# Patient Record
Sex: MALE | Race: WHITE | NOT HISPANIC OR LATINO | Employment: OTHER | URBAN - METROPOLITAN AREA
[De-identification: names, ages, dates, MRNs, and addresses within clinical notes are randomized per-mention and may not be internally consistent; named-entity substitution may affect disease eponyms.]

---

## 2017-01-04 ENCOUNTER — OFFICE VISIT (OUTPATIENT)
Dept: FAMILY MEDICINE CLINIC | Facility: CLINIC | Age: 82
End: 2017-01-04

## 2017-01-04 VITALS
TEMPERATURE: 97.9 F | SYSTOLIC BLOOD PRESSURE: 164 MMHG | HEART RATE: 57 BPM | DIASTOLIC BLOOD PRESSURE: 80 MMHG | OXYGEN SATURATION: 97 % | WEIGHT: 205 LBS | HEIGHT: 71 IN | BODY MASS INDEX: 28.7 KG/M2

## 2017-01-04 DIAGNOSIS — L08.9 TRAUMATIC HEMATOMA OF RIGHT LOWER LEG WITH INFECTION, SUBSEQUENT ENCOUNTER: ICD-10-CM

## 2017-01-04 DIAGNOSIS — L03.115 CELLULITIS OF RIGHT LOWER EXTREMITY: Primary | ICD-10-CM

## 2017-01-04 DIAGNOSIS — Z09 HOSPITAL DISCHARGE FOLLOW-UP: ICD-10-CM

## 2017-01-04 DIAGNOSIS — I48.91 ATRIAL FIBRILLATION WITH RVR (HCC): ICD-10-CM

## 2017-01-04 DIAGNOSIS — S80.11XD TRAUMATIC HEMATOMA OF RIGHT LOWER LEG WITH INFECTION, SUBSEQUENT ENCOUNTER: ICD-10-CM

## 2017-01-04 PROBLEM — R60.0 LOCALIZED EDEMA: Status: ACTIVE | Noted: 2017-01-04

## 2017-01-04 PROCEDURE — 99214 OFFICE O/P EST MOD 30 MIN: CPT | Performed by: FAMILY MEDICINE

## 2017-01-04 RX ORDER — FUROSEMIDE 40 MG/1
40 TABLET ORAL 2 TIMES DAILY
Qty: 20 TABLET | Refills: 0 | Status: SHIPPED | OUTPATIENT
Start: 2017-01-04 | End: 2017-01-30

## 2017-01-04 NOTE — MR AVS SNAPSHOT
Casey Snowden   1/4/2017 2:30 PM   Office Visit    Provider:  Aleksander Diez MD   Department:  [facility]   Dept Phone:  [phone]                Your Full Care Plan              Today's Medication Changes          These changes are accurate as of: 1/4/17  3:15 PM.  If you have any questions, ask your nurse or doctor.               New Medication(s)Ordered:     furosemide 40 MG tablet   Commonly known as:  LASIX   Take 1 tablet by mouth 2 (Two) Times a Day.   Started by:  Aleksander Diez MD            Where to Get Your Medications      These medications were sent to [facility]/pharmacy #[id number] - [place], [place] - [address] [address] AT INTERSECTION OF [address] [address] - [phone] [facility] [phone]   [address] [address]     Phone:  [phone]     furosemide 40 MG tablet                  Your Updated Medication List          This list is accurate as of: 1/4/17  3:15 PM.  Always use your most recent med list.                atorvastatin 20 MG tablet   Commonly known as:  LIPITOR   TAKE 1 TABLET DAILY       atropine 1 % ophthalmic solution       carbidopa-levodopa  MG per tablet   Commonly known as:  SINEMET       carboxymethylcellulose 0.5 % solution   Commonly known as:  REFRESH PLUS       clindamycin 300 MG capsule   Commonly known as:  CLEOCIN   Take 1 capsule by mouth 3 (Three) Times a Day for 7 days.       dronedarone 400 MG tablet   Commonly known as:  MULTAQ   Take 1 tablet by mouth 2 (Two) Times a Day With Meals.       ELIQUIS 2.5 MG tablet tablet   Generic drug:  apixaban       furosemide 40 MG tablet   Commonly known as:  LASIX   Take 1 tablet by mouth 2 (Two) Times a Day.       PRESERVISION AREDS capsule               You Were Diagnosed With        Codes Comments    Cellulitis of right lower extremity    -  Primary ICD-10-CM: L03.115  ICD-9-CM: 682.6     Atrial fibrillation with RVR     ICD-10-CM: I48.91  ICD-9-CM: 427.31     Traumatic hematoma of right lower leg with infection, subsequent encounter     ICD-10-CM: S80.11XD, L08.9  ICD-9-CM: V58.89, 924.10, 686.9     Hospital discharge follow-up     ICD-10-CM: Z09  ICD-9-CM: V67.59       Instructions     None    Patient Instructions History      MyChart Signup     Our records indicate that you have declined [facility] MyChart signup. If you would like to sign up for [employer], please email [email or web address] or call [phone] to obtain an activation code.             Other Info from Your Visit           Your Appointments     Jan 30, 2017 11:15 AM [facility]   Hospital Follow Up with Capri Mercdao MD   [facility] (--)    [address]  [facility] [address]   [phone]            Apr 25, 2017  9:30 AM EDT   [facility]   [facility] (--)    [address] [address]   [phone]            May 02, 2017  9:30 AM EDT   Follow Up with Aleksander Diez MD   [facility] (--)    [address] [address]   [phone]           Arrive 15 minutes prior to appointment.            Oct 27, 2017  8:30 AM EDT   [facility]   [facility] (--)    [address] [address]   [phone]            Nov 03, 2017  1:00 PM EDT   Physical with Aleksander Diez MD   [facility] (--)    [address] [address]   [phone]           Arrive 15 minutes prior to appointment.              Allergies     Penicillins  Rash    Rocephin [Ceftriaxone]  Rash      Reason for Visit     Leg Problem F/u to Hospital Admit from [facility] due to blood and leg infection from hematoma.      Vital Signs     Blood Pressure Pulse Temperature Height Weight Oxygen Saturation    164/80 57 97.9  "°F (36.6 °C) 71\" (180.3 cm) 205 lb (93 kg) 97%    Body Mass Index Smoking Status                28.59 kg/m2 Former Smoker          Problems and Diagnoses Noted     Atrial fibrillation (irregular heartbeat)    Bacterial skin infection of leg    Hospital discharge follow-up    Accumulation of fluid in tissues    Traumatic hematoma of right lower leg with infection      "

## 2017-01-04 NOTE — PROGRESS NOTES
Subjective   Casey Snowden is a 82 y.o. male who is here for   Chief Complaint   Patient presents with   • Leg Problem     F/u to Hospital Admit from [facility] due to blood and leg infection from hematoma.   .     History of Present Illness   Knee Pain: Patient presents with a knee injury involving the  right knee. Onset of the symptoms was a week ago. Inciting event: began after a fall injury which occurred at home. Current symptoms include pain located medial knee, swelling and large infected hematoma. Pain is aggravated by any weight bearing.  Patient has had prior knee problems. Evaluation to date: plain films: no significant change from prior studies, MRI: no significant change from prior studies, Ortho consult: in patient at [facility] and ID consult. Treatment to date: surgery I&D.     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    Review of Systems    Objective   Physical Exam   Constitutional: He appears well-developed and well-nourished. No distress.   HENT:   Mouth/Throat: Oropharynx is clear and moist.   Eyes: Conjunctivae are normal.   Neck: Normal range of motion.   Cardiovascular: Normal rate and regular rhythm.    No murmur heard.  Pulmonary/Chest: Effort normal.   Musculoskeletal: He exhibits edema and tenderness.        Legs:  Golf ball sized hole in medial leg , site of infected hematoma I&D, no pus no blood  Good healing tissue  Has lots of surrounding edematous  tissue  Rest of right lower leg with 3+ edema    I re wrapped wound.     Neurological: He is alert. Coordination abnormal.   Skin: Ecchymosis noted. There is erythema.   Nursing note and vitals reviewed.      Assessment/Plan   Casey was seen today for leg problem.    Diagnoses and all orders for this visit:    Cellulitis of right lower extremity  -     furosemide (LASIX) 40 MG tablet; Take 1 tablet by mouth 2 (Two) Times a Day.    Atrial fibrillation with RVR    Traumatic hematoma of right lower leg with infection, subsequent encounter    Hospital discharge follow-up      Patient Instructions   finish off clinda  Cont wound care  Follow ups per schedule  Samples of ELIQUIS and coupon given.  2 yogurt a day to prevent diarrhea      There are no discontinued medications.     Return in about 6 months (around 7/4/2017).    Dr. Aleksander Diez  [facility] [place], [place].

## 2017-01-04 NOTE — PATIENT INSTRUCTIONS
finish off clinda  Cont wound care  Follow ups per schedule  Samples of ELIQUIS and coupon given.  2 yogurt a day to prevent diarrhea

## 2017-01-12 ENCOUNTER — OFFICE VISIT (OUTPATIENT)
Dept: WOUND CARE | Facility: HOSPITAL | Age: 82
End: 2017-01-12
Attending: SURGERY

## 2017-01-12 ENCOUNTER — TRANSCRIBE ORDERS (OUTPATIENT)
Dept: ADMINISTRATIVE | Facility: HOSPITAL | Age: 82
End: 2017-01-12

## 2017-01-12 DIAGNOSIS — M25.469 KNEE SWELLING: Primary | ICD-10-CM

## 2017-01-12 PROCEDURE — G0463 HOSPITAL OUTPT CLINIC VISIT: HCPCS

## 2017-01-13 NOTE — WOUND CARE CLINIC NOTE
DATE OF VISIT: 01/12/2017     CHIEF COMPLAINT: Traumatic wound with hematoma of right upper leg (L97.813).     HISTORY OF PRESENT ILLNESS: This is an 82-year-old male who was apparently climbing a fence and fell, striking the medial posterior aspect of his right upper leg just above the crease of his knee. This happened on approximately 12/26/2016. He had a large hematoma which was then drained and he was placed on antibiotics. Apparently he was admitted later, after Christmas, to the hospital. For a while he was on vancomycin in the hospital and went home on clindamycin, which he just finished recently on 01/09/2017. 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 An x-ray taken shortly after his injury did not indicate any displacement of the knee replacement.  He has had no fever or chills. The area, in spite of the fact that it has debrided, has remained swollen as if it still had retained hematoma, but the wound is not particularly tender and has not gotten larger, but has not gotten smaller. Apparently this right lower extremity leg has always been swollen since he had a blood clot in it about 7 years ago, but there is no indication that he has a clot now but it is more swollen than his left side.     PAST MEDICAL HISTORY:  1.  He also has a problem with Parkinson's disease.  2.  Hypertension.  3.  High cholesterol.   4.  Had a melanoma removed.     It was also noted while he was in the hospital that he had atrial fibrillation and he is on multiple drugs. He is scheduled to have a heart cardioversion sometime in about a month, if this does not spontaneously convert. He had some debridement by Dr. Mallory and there were sutures remaining since this procedure that was done sometime after Christmas.     REVIEW OF SYSTEMS: In addition to the other problems mentioned in history of present illness, he also has easy bruisability. He has problems with thought to be possibly  related to his Parkinson disease. He has some vision problems and persistent cough. Otherwise, he denies abdominal pain, chest pain, convulsions, seizure, depression, difficulty swallowing, GI or  difficulties, increased thirst, increased hunger, joint pain, nausea and vomiting, shortness of breath, urinary retention, kidney stones and no other real positive history.     SOCIAL HISTORY: He is  and lives with his wife. Denies history of smoking or alcohol use, but does use caffeine.     FAMILY HISTORY: Cancer and heart disease.     ALLERGIES: PENICILLIN.     MEDICATIONS:  1.  Atropine.  2.  Pravastatin.  3.  Eliquis.  4.  L-Dopa.  5.  Atorvastatin.    PHYSICAL EXAMINATION:  GENERAL: Well-nourished, well-developed, male with a slight tremor, but no acute distress.   VITAL SIGNS: Temperature is 97.8, pulse 53, respirations 16, blood pressure 139/70. Height is 71 inches and weighs 205 pounds.   HEENT: EOMs intact. Pupils equal, round, reactive to light. The nose and throat are clear. No masses.   NECK: Nontender. Thyroid is not enlarged. No carotid bruits.   CHEST: Clear to percussion and auscultation.   HEART: Reveals irregular heart rate. No obvious murmur.   ABDOMEN: Soft, nontender.   EXTREMITIES: Examination of his upper extremities without abnormality, with a normal neuromuscular activity except for the tremor from his Parkinson's. Examination of his lower extremities reveal he had some moderate swelling of his legs, but the right much worse than the left. The GABBY on the right is 1, and the GABBY on the left is 0.86. He has an open wound just above the crease of his knee on the posterior medial aspect, approximately 8 or 9 cm above his knee crease. There are sutures in place. The wound is clean except for some superficial exudate and slough. This wound measures 1.5 x 3 x 0.2 cm; it is above an area that seems quite swollen. It is purple below it and it is not clear whether this is a hematoma or just a  swollen area. The knee motion seems to be relatively normal. The incision for his previous knee replacement is without abnormality.     DESCRIPTION OF PROCEDURE: It was explained to the patient the need for some debridement of the wound of his right lower leg. He was given 4% lidocaine gel for topical anesthesia. Using sutures and forceps, and a 4 mm curette, the multiple black nylon sutures were removed from the wound. These were not really holding the edges together, they were simply in the wound and then some superficial slough and eschar and debris was removed in a superficial fashion into the layer of the subcutaneous fat. There was muscle present, but this was not debrided. The total wound size postdebridement was 1.5 x 3 x 1 cm in size. This indicates a much deeper wound than was measured prior to debridement. Some of this is removing some of the deeper tissue that was slough and exudate, and some was just wound measurement differences.     ASSESSMENT AND PLAN: This patient obviously had a hematoma and wound of his left lower extremity, above his knee. It is concerning that he does have artificial knee prosthesis. The swollen area, it is not clear whether this is a hematoma or fluid or just general swelling. We are going to get an ultrasound on this to see if there is a loculation of fluid. Otherwise, we are just going to follow it for now. We are going to treat the wound, which is now fairly clean and has healthy granulating base with TheraHoney, foam, Kerlix and Ace wrap from toes to above the wound on the thighs. We will decide what the results of the ultrasound, whether we need to do anything further. We may need a CT scan. I do not really want to open this up if we do not have loculated fluid; he already has the 1 wound. It does not appear for anything to be infected at this time and there was really nothing to culture and we will hold off on the antibiotics.         Casey Power MD  TOR:co  D:    01/12/2017 19:43:26  T:   01/13/2017 03:43:45  Job ID:   [id number]  Document ID:   [id number]  Rev:  0  cc:

## 2017-01-17 ENCOUNTER — HOSPITAL ENCOUNTER (OUTPATIENT)
Dept: ULTRASOUND IMAGING | Facility: HOSPITAL | Age: 82
Discharge: HOME | End: 2017-01-17
Attending: SURGERY | Admitting: SURGERY

## 2017-01-17 DIAGNOSIS — M25.469 KNEE SWELLING: ICD-10-CM

## 2017-01-17 PROCEDURE — 76882 US LMTD JT/FCL EVL NVASC XTR: CPT

## 2017-01-19 ENCOUNTER — OFFICE VISIT (OUTPATIENT)
Dept: WOUND CARE | Facility: HOSPITAL | Age: 82
End: 2017-01-19
Attending: SURGERY

## 2017-01-20 NOTE — WOUND CARE CLINIC NOTE
CHIEF COMPLAINT: Traumatic wound with possible hematoma of right upper leg (L97.813).     HISTORY OF PRESENT ILLNESS: This is an 82-year-old male who apparently was climbing a fence and fell, striking the medial posterior aspect of his right upper leg just above the crease of his knee. This happened on approximately 12/26/2016. He had a large hematoma, which was drained, and he was placed on antibiotics. Apparently he was admitted later after Richie to the hospital. For a while he was on vancomycin in the hospital and went home on clindamycin which he recently finished 01/09/2017. 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 X-ray taken shortly after his injury did not indicate any displacement of the knee replacement, but this was reviewed by his orthopedic surgeon. In spite of the fact that he had this wound opened and debrided it has remained swollen just distal to the incision, and it was suggested that he may still have hematoma. The wound is not particularly tender and has not gotten larger. If anything it has gotten maybe a little bit smaller. It is important to note that this right lower leg has always been more swollen since he had a blood clot in the leg about 7 years ago, but there is no indication that he has a clot now, but it is still more swollen than his left side. Apparently he also had an inferior vena cava filter placed by Dr. Ford Dorado several years ago following this blood clot. We have been treating this wound now with TheraHoney and they feel like it has gotten much smaller and it has done much better since we have been using the TheraHoney and wrapping the leg with an ACE wrap.     PHYSICAL EXAMINATION:  GENERAL: The patient is a well-nourished, well-developed, alert and oriented male in no distress.   VITAL SIGNS: Temperature 97.9, pulse 54, respiratory rate 18, blood pressure 152/76.   EXTREMITIES: Examination of his right lower extremity  reveals that he has the wound which indeed is much smaller. The upper thigh does look actually like it has been decompressed, but the swollen area still is present. He has peripheral swelling though distal to the knee all the way to the ankle. It seems to be worse than it was the last time I saw him, but other than the swelling it does not appear to have any other issues. The wound now measures 0.8 x 1.9 x 0.2 cm and looks actually healthy and clean and there is really nothing that needs further debridement. I think the biggest thing is still there is a continuing swollen area distal to the incision as well as the increased swelling of his lower extremity. He does have good motion of his knee and also good ankle strength and motion.     ASSESSMENT AND PLAN: It should be mentioned that the patient did go to have an ultrasound of this area this week and the ultrasound returned that it was likely a fluid-filled cavity approximately 6 cm in diameter compatible with a seroma or hematoma. The radiologist said he could not tell if it was separate from possibly a Baker's cyst, but just the anatomic location appears that this would not be the case, and it most likely is a seroma or hematoma. With the history that he has of the previous DVT I think we need to get a venous Doppler study ordered again. He had one perhaps in about 10/2016, but I think that this was before the accident and I think it would be conservative to obtain another venous Doppler to rule out deep vein thrombosis, particularly in light of the fact that he does have also the filter and he had the clot in the past. He has seen Dr. Dorado in the past and the family mentioned that maybe they would like to see him again and see if there are any issues with this. I suggested to them that that would be fine and we can get him an appointment. I think we will get the venous Dopplers first and let Dr. Dorado see this. I also offered gave them other suggestions; we could  try to aspirate the seroma with a large-bore needle, but the chances of this fluid coming out of it were slim, and there is another possibility he may need an actual operative drainage of this wound. I would be glad to aspirate the wound, but they wanted to see Dr. Dorado first, and that would be fine. I think if he felt it was indicated then he could certainly accomplish that and the family apparently wanted to discuss it with him in conjunction with our care, and I think though that ultimately this is either going to have to be aspirated or opened up and probably decorticated since it has been so chronic from the middle of 12/2016. We are going to continue the TheraHoney and gauze and the Coban 2 wrap from his toes to just below his knee, and then from his knee we will continue an ACE wrap and use TheraHoney. We will see him again next week. Hopefully we have the venous Doppler study back and he may have seen Dr. Dorado and they would hopefully feel better about what his position would be, and if we need to try to aspirate it too we can do that at that time, but I think once we know there is not a DVT we can proceed with all the options.       Casey Power MD  TOR:ab  D:   01/19/2017 18:19:39  T:   01/20/2017 16:12:46  Job ID:   [id number]  Document ID:   [id number]  Rev:  0  cc:

## 2017-01-23 ENCOUNTER — APPOINTMENT (OUTPATIENT)
Dept: WOUND CARE | Facility: HOSPITAL | Age: 82
End: 2017-01-23
Attending: SURGERY

## 2017-01-26 ENCOUNTER — OFFICE VISIT (OUTPATIENT)
Dept: WOUND CARE | Facility: HOSPITAL | Age: 82
End: 2017-01-26
Attending: SURGERY

## 2017-01-26 PROCEDURE — 97602 WOUND(S) CARE NON-SELECTIVE: CPT

## 2017-01-28 NOTE — WOUND CARE CLINIC NOTE
DATE OF SERVICE: 01/26/2017     CHIEF COMPLAINT: Traumatic wound with possible hematoma of right upper leg (L97.813).     HISTORY OF PRESENT ILLNESS: This patient is an 82-year-old male who apparently was climbing a fence and fell sometime around 12/26/2016. He had a wound on the right upper leg just above the crease of his knee. He had a large hematoma which was drained and he was placed on antibiotics. Apparently he was admitted after Richie to the hospital. He was on vancomycin for a while in the hospital and went home on clindamycin, which he is finished taking. It is important to note that he had a total knee replacement in the leg in 2007. X-rays taken shortly after injury did not indicate any displacement of the knee replacement, but this was reviewed by Orthopedic Surgeon. Despite the fact that he had this wound open and debrided, it has remained swollen just distal to the incision, and it was suggested that he may still have a hematoma. The wound is not particularly tender and has not gotten larger. If anything maybe it is a little bit smaller. It is important to note that the right lower leg has always been more swollen since he had a blood clot in the leg about 7 years ago, but there is no indication that this is a clot now, but is still more swollen than the left side. Apparently he also had an inferior vena cava filter placed by Dr. Dorado several years ago following a blood clot. We have been treating the open wound with TheraHoney and it has gotten much better. He had an ultrasound about a week ago and this showed about a 6 cm fluid-filled mass. It is not clear whether it is seroma or hematoma, but he also had a venous Doppler study of his leg and this ruled out the DVT.    PHYSICAL EXAMINATION:   GENERAL: Well-nourished, well-developed, male in no acute distress.   VITAL SIGNS: Temperature 97.9, pulse is 59, respirations 20, blood pressure 146/77.  EXTREMITIES: Examination of his right lower leg  shows he has some swelling of his lower leg, but this seems to be better than it has been in the past. He has a palpable pulse and GABBY is 1. The wound is just above the knee on the medial side of the right leg. It measures 1.2 x 0.3 x 0.1 cm and is considerably  and smaller than it has been. The mass that has been mentioned is just below the wound. It today measures roughly 7.5 x 5.5 cm. It seems softer, smaller and more movable. This possibly is due to the fact there is less swelling in the area.     ASSESSMENT AND PLAN: The patient overall is doing better. He has gotten a thigh high support sock which is distributing the pressure much better than the knee high sock or Ace wraps. I think the wound is almost healed. We are going to continue to use the TheraHoney for its autolytic properties, to continue cleaning the wound up so we can avoid further mechanical debridement. I think after 1 more week this will be clean and probably closed on its own. The question is what to do with the fluid cyst. We discussed this with Dr. Dorado. He is somewhat in agreement that we can watch it for a while. I did explain to the patient and his wife that this could become infected or it could become a nidus for infection or it may disappear or get hard. It is unlikely just an aspiration would remove it. It may need surgical removal, but if he wants to wait for a while I think there is no real rush, so we will follow along and see what happens, and we will see him next week. Dr. Dorado is also going to reevaluate him in about 3-4 weeks.       Casey Power MD  TOR:elfego  D:   01/26/2017 20:25:53  T:   01/27/2017 20:12:00  Job ID:   [id number]  Document ID:   [id number]  Rev:  0  cc:

## 2017-01-30 ENCOUNTER — OFFICE VISIT (OUTPATIENT)
Dept: CARDIOLOGY | Facility: CLINIC | Age: 82
End: 2017-01-30

## 2017-01-30 VITALS
HEART RATE: 50 BPM | WEIGHT: 189 LBS | BODY MASS INDEX: 26.36 KG/M2 | DIASTOLIC BLOOD PRESSURE: 95 MMHG | SYSTOLIC BLOOD PRESSURE: 172 MMHG

## 2017-01-30 DIAGNOSIS — E78.2 MIXED HYPERLIPIDEMIA: ICD-10-CM

## 2017-01-30 DIAGNOSIS — I10 BENIGN ESSENTIAL HYPERTENSION: Primary | ICD-10-CM

## 2017-01-30 DIAGNOSIS — I10 ESSENTIAL HYPERTENSION: ICD-10-CM

## 2017-01-30 DIAGNOSIS — I48.91 ATRIAL FIBRILLATION WITH RVR (HCC): ICD-10-CM

## 2017-01-30 PROCEDURE — 99214 OFFICE O/P EST MOD 30 MIN: CPT | Performed by: INTERNAL MEDICINE

## 2017-01-30 PROCEDURE — 93000 ELECTROCARDIOGRAM COMPLETE: CPT | Performed by: INTERNAL MEDICINE

## 2017-01-30 NOTE — MR AVS SNAPSHOT
Casey Snowden   1/30/2017 11:15 AM   Office Visit    Dept Phone:  [phone]   Encounter #:  [id number]    Provider:  Capri Mercado MD   Department:  [facility]                Your Full Care Plan              Today's Medication Changes          These changes are accurate as of: 1/30/17 12:17 PM.  If you have any questions, ask your nurse or doctor.               Stop taking medication(s)listed here:     dronedarone 400 MG tablet   Commonly known as:  MULTAQ   Stopped by:  Capri Mercado MD           furosemide 40 MG tablet   Commonly known as:  LASIX   Stopped by:  Capri Mercado MD                      Your Updated Medication List          This list is accurate as of: 1/30/17 12:17 PM.  Always use your most recent med list.                atorvastatin 20 MG tablet   Commonly known as:  LIPITOR   TAKE 1 TABLET DAILY       atropine 1 % ophthalmic solution       carbidopa-levodopa  MG per tablet   Commonly known as:  SINEMET       carboxymethylcellulose 0.5 % solution   Commonly known as:  REFRESH PLUS       ELIQUIS 2.5 MG tablet tablet   Generic drug:  apixaban       PRESERVISION AREDS capsule               We Performed the Following     ECG 12 Lead       You Were Diagnosed With        Codes Comments    Benign essential hypertension    -  Primary ICD-10-CM: I10  ICD-9-CM: 401.1     Mixed hyperlipidemia     ICD-10-CM: E78.2  ICD-9-CM: 272.2     Atrial fibrillation with RVR     ICD-10-CM: I48.91  ICD-9-CM: 427.31     Essential hypertension     ICD-10-CM: I10  ICD-9-CM: 401.9       Instructions     None    Patient Instructions History      Upcoming Appointments     Visit Type Date Time Department    HOSPITAL FOLLOW UP 1/30/2017 11:15 AM [facility]    FOLLOW UP 2/2/2017 10:45 AM  [facility]    [facility] 4/25/2017  9:30 AM [facility]    OFFICE VISIT 4/28/2017 10:15 AM [facility]    FOLLOW UP  5/2/2017  9:30 AM [facility]    [facility] 10/27/2017  8:30 AM [facility]    SUBSEQUENT MEDICARE WELLNESS 11/3/2017  1:00 PM [facility]      MyChart Signup     Our records indicate that you have declined [facility] MyChart signup. If you would like to sign up for MyChart, please email [email or web address] or call [phone] to obtain an activation code.             Other Info from Your Visit           Your Appointments     Feb 02, 2017 10:45 AM EST   Follow Up with Casey Power MD   [facility] WOUND [facility] ([place])    [address] [place] [address] [phone]           Arrive 15 minutes prior to appointment.            Apr 25, 2017  9:30 AM EDT   [facility]    [address] [address] [phone]            Apr 28, 2017 10:15 AM EDT   Office Visit with Capri Mercado MD   [facility] HEART SPECIALISTS (--)    [address] [facility] [facility] [facility] [address] [phone]           Arrive 15 minutes prior to appointment.            May 02, 2017  9:30 AM EDT   Follow Up with Aleksander Diez MD   [facility] FAMILY [facility]ICINE (--)    [address] [address] [phone]           Arrive 15 minutes prior to appointment.            Oct 27, 2017  8:30 AM EDT   [facility]   [facility] (--)    [address] [address] [phone]              Allergies     Penicillins  Rash    Rocephin [Ceftriaxone]  Rash      Reason for Visit     Atrial Fibrillation           Vital Signs     Blood Pressure Pulse Weight Body Mass Index Smoking Status       172/95 50 189 lb (85.7 kg) 26.36 kg/m2 Former Smoker       Problems and Diagnoses Noted     Atrial fibrillation (irregular heartbeat)    Benign essential hypertension     High cholesterol or triglycerides    High blood pressure      Results     ECG 12 Lead

## 2017-01-30 NOTE — LETTER
2017     Aleksander Diez MD  [address] [address]    Patient: Casey Snowden   YOB: 1934   Date of Visit: 2017       Dear Dr. Rg MD:    Thank you for referring Casey Snowden to me for evaluation. Below are the relevant portions of my assessment and plan of care.    If you have questions, please do not hesitate to call me. I look forward to following Casey along with you.         Sincerely,        Capri Mercado MD        CC: No Recipients  Capri Mercado MD  2017 12:12 PM  Sign at close encounter  Procedure   [facility]  Cardiology Progress Note    Patient Identification:  Name:Casey Snowden  Age:82 y.o.  Sex: male  :  1934  MRN: [id number]           2017    Subjective:    Chief Complaint   Patient presents with   • Atrial Fibrillation       HPI     Hospital Course 2016: Casey Snowden Is a 82-year-old male with past medical history including DVT, Parkinson's disease, and previous right knee replacement. He began having issues with his right knee about 3-4 weeks ago after a trauma from a fall to the knee. It has had some swelling both of the knee as well as behind the knee. He saw urgent care as well as his vascular surgeon Dr. NAVARRO. Additional issues developed over the past week, especially the past 2 or 3 days. He had fevers, chills increasing drowsiness, and even drainage from the area. He presented to [facility] emergency room x-rays were negative he was started on IV vancomycin. Feels about the same.  The patient was admitted to the hospital with the infected hematoma in the right leg. He also had some paroxysmal atrial fibrillation with a rapid response requiring Cardizem drip. He was seen by orthopedic surgery, cardiology and infectious disease. He had I&D of the infected hematoma which grew methicillin sensitive staph and was placed on multi for his paroxysmal A. fib.  After the I&D the patient was restarted on Eliquis and felt well enough to go home. He'll continue with some oral antibiotics for another week and follow-up with his doctors in the next week or 2 as listed for follow-up appointments.    Since the discharge from hospital he is doing good. Remains in sinus rhythm.  He completed his Multaq and remains in sinus rhythm and he wants to stay off Multaq. He is on Eliquis. He denies any chest pain.       Review of Systems   Constitution: Negative for chills, fever and malaise/fatigue.   HENT: Negative for headaches.    Eyes: Negative for blurred vision and double vision.   Cardiovascular: Positive for leg swelling (better). Negative for chest pain, irregular heartbeat and palpitations.   Respiratory: Positive for snoring. Negative for shortness of breath.    Skin: Negative for color change.   Musculoskeletal: Negative for arthritis and joint pain.   Gastrointestinal: Negative for abdominal pain, nausea and vomiting.   Neurological: Negative for dizziness, light-headedness and numbness.   Psychiatric/Behavioral: Negative for depression.       Past Medical History   Diagnosis Date   • Carotid stenosis    • Chronic deep vein thrombosis (DVT) of popliteal vein of right lower extremity    • DVT of lower extremity (deep venous thrombosis)    • Long-term use of high-risk medication    • Parkinson's disease    • Postphlebitic syndrome        Past Surgical History   Procedure Laterality Date   • Vena cava filter placement     • Total knee arthroplasty Right    • Knee incision and drainage Right 12/27/2016     Procedure: KNEE INCISION AND DRAINAGE;  Surgeon: Triston Whitten MD;  Location: [facility];  Service:        Social History     Social History   • Marital status:      Spouse name: N/A   • Number of children: N/A   • Years of education: college grad     Occupational History   • financial advis      self employed     Social History Main Topics   • Smoking status:  Former Smoker     Types: Cigarettes   • Smokeless tobacco: Never Used   • Alcohol use 1.2 oz/week     2 Glasses of wine per week   • Drug use: No   • Sexual activity: Yes     Partners: Female     Other Topics Concern   • Not on file     Social History Narrative       Family History   Problem Relation Age of Onset   • Heart attack Mother    • Stroke Brother    • Heart attack Maternal Aunt    • Heart attack Maternal Uncle    • Deep vein thrombosis Neg Hx        Scheduled Meds:    Current Outpatient Prescriptions:   •  atorvastatin (LIPITOR) 20 MG tablet, TAKE 1 TABLET DAILY, Disp: 90 tablet, Rfl: 3  •  atropine 1 % ophthalmic solution, Apply 1 drop to eye 3 (Three) Times a Day., Disp: , Rfl:   •  carbidopa-levodopa (SINEMET)  MG per tablet, 1 tablet 3 (Three) Times a Day., Disp: , Rfl:   •  carboxymethylcellulose (REFRESH PLUS) 0.5 % solution, 1 drop 3 (Three) Times a Day As Needed for dry eyes., Disp: , Rfl:   •  ELIQUIS 2.5 MG tablet tablet, 2.5 mg Every 12 (Twelve) Hours., Disp: , Rfl:   •  Multiple Vitamins-Minerals (PRESERVISION AREDS) capsule, Take  by mouth., Disp: , Rfl:     Objective:  Visit Vitals   • /95   • Pulse 50   • Wt 189 lb (85.7 kg)   • BMI 26.36 kg/m2        Physical Exam  Physical Exam:    General: No acute distress.    Skin: Warm and dry, no diaphoresis noted   HEENT: No ptosis; external ear and nose normal; oral mucosa moist   Neck: Supple; no carotid bruits; no JVD, Trachea mid line   Heart: S1S2 regular rate and rhythm; no murmurs; no gallop or rub appreciated, apex not displaced   Chest: Respirations regular, unlabored at rest, bilateral breath sounds have good air entry; no  wheezes auscultated.     Abdomen: Soft, non-tender, non-distended, positive bowel sounds  No hepatosplenomegaly   Extremities: Bilateral lower extremities have no pre-tibial pitting edema; Radials are palpable   Neurological: Alert and oriented x 3; no new motor deficits,           ECG 12 Lead  Date/Time:  1/30/2017 12:07 PM  Performed by: AMARI MERCADO  Authorized by: AMARI MERCADO   Comparison: compared with previous ECG   Similar to previous ECG  Rhythm: sinus rhythm  Rate: normal  QRS axis: normal               Assessment:  Problem List Items Addressed This Visit        Cardiovascular and Mediastinum    Benign essential hypertension - Primary    Hyperlipidemia    Atrial fibrillation with RVR    Hypertension          Plan:    Overall clinically he has been stable with no angina. His ECG has been stable. He can DC Multaq but continue the Eliquis. He remains in sinus now. No angina. His BP is high today and I asked him to check his bP and call his PmD to restart his Losartan      01/30/2017  Sudarshan Mercado MD, FACC

## 2017-02-02 ENCOUNTER — OFFICE VISIT (OUTPATIENT)
Dept: WOUND CARE | Facility: HOSPITAL | Age: 82
End: 2017-02-02
Attending: SURGERY

## 2017-02-02 PROCEDURE — G0463 HOSPITAL OUTPT CLINIC VISIT: HCPCS

## 2017-02-03 NOTE — WOUND CARE CLINIC NOTE
DATE OF VISIT: 02/02/2017    CHIEF COMPLAINT: Traumatic wound with possible hematoma of right upper leg (L97.813).     HISTORY OF PRESENT ILLNESS: The patient is an 82-year-old male who was climbing a fence in 12/2016 and feel, creating a wound on his right upper leg just above the crease of his knee. He had a large hematoma, which was drained and placed on antibiotics. He had an x-ray of the leg since he had previously had a knee replacement. The wound got better. We saw him. We were treating him with TheraHoney and other treatments, but he persisted in having what was felt to be a fluid collection, maybe seroma. An ultrasound suggested it was about a 6 cm filled mass. He has venous Dopplers of his leg and this ruled out a deep vein thrombosis. He has had previous vena cava filter inserted and in the past and blood clot in the leg. At the present time, the wound has apparently healed according to the, family. We have been following with his seroma and Dr. Dorado is also following him.    PHYSICAL EXAMINATION:  GENERAL: The patient is a well-nourished, well-developed, male in no acute distress.   VITAL SIGNS: Temperature is 98°, pulse 60, respirations 16, blood pressure is 170/80.   EXTREMITIES: The wound is healed on the right lower extremity.     ASSESSMENT AND PLAN: There is no wound, but there continues to be the swollen area that is smaller though this week than it was last week. It is now down to 6.6 x 5 cm. I think that this is getting smaller, that the swelling around it may be smaller also. He may still need to be drained in the future, but this is somewhat also being followed by Dr. Dorado. We will just see him back in about 4-6 weeks just for followup just to make sure nothing further is coming of this. As far as the wound goes, it is healed at this time.     Casey Power MD  TOR:kayden  D:   02/02/2017 18:13:52  T:   02/02/2017 23:32:34  Job ID:   [id number]  Document ID:   [id number]  Rev:  0  cc:

## 2017-03-09 ENCOUNTER — APPOINTMENT (OUTPATIENT)
Dept: WOUND CARE | Facility: HOSPITAL | Age: 82
End: 2017-03-09
Attending: SURGERY

## 2017-04-10 DIAGNOSIS — E78.2 MIXED HYPERLIPIDEMIA: ICD-10-CM

## 2017-04-10 RX ORDER — ATORVASTATIN CALCIUM 20 MG/1
TABLET, FILM COATED ORAL
Qty: 90 TABLET | Refills: 3 | Status: CANCELLED | OUTPATIENT
Start: 2017-04-10

## 2017-04-11 DIAGNOSIS — E78.2 MIXED HYPERLIPIDEMIA: ICD-10-CM

## 2017-04-11 RX ORDER — ATORVASTATIN CALCIUM 20 MG/1
20 TABLET, FILM COATED ORAL DAILY
Qty: 90 TABLET | Refills: 3 | Status: SHIPPED | OUTPATIENT
Start: 2017-04-11 | End: 2018-03-20 | Stop reason: SDUPTHER

## 2017-04-25 ENCOUNTER — LAB (OUTPATIENT)
Dept: FAMILY MEDICINE CLINIC | Facility: CLINIC | Age: 82
End: 2017-04-25

## 2017-04-25 DIAGNOSIS — I10 BENIGN ESSENTIAL HYPERTENSION: Primary | ICD-10-CM

## 2017-04-25 DIAGNOSIS — S80.11XD TRAUMATIC HEMATOMA OF RIGHT LOWER LEG WITH INFECTION, SUBSEQUENT ENCOUNTER: ICD-10-CM

## 2017-04-25 DIAGNOSIS — E78.5 HYPERLIPIDEMIA, UNSPECIFIED HYPERLIPIDEMIA TYPE: ICD-10-CM

## 2017-04-25 DIAGNOSIS — I65.29 STENOSIS OF CAROTID ARTERY, UNSPECIFIED LATERALITY: ICD-10-CM

## 2017-04-25 DIAGNOSIS — R60.0 LOCALIZED EDEMA: ICD-10-CM

## 2017-04-25 DIAGNOSIS — G20 PARKINSON'S DISEASE (HCC): ICD-10-CM

## 2017-04-25 DIAGNOSIS — L08.9 TRAUMATIC HEMATOMA OF RIGHT LOWER LEG WITH INFECTION, SUBSEQUENT ENCOUNTER: ICD-10-CM

## 2017-04-26 LAB
ALBUMIN SERPL-MCNC: 3.9 G/DL (ref 3.5–5.2)
ALBUMIN/GLOB SERPL: 1.8 G/DL
ALP SERPL-CCNC: 70 U/L (ref 40–129)
ALT SERPL-CCNC: <5 U/L (ref 5–41)
AST SERPL-CCNC: 12 U/L (ref 5–40)
BILIRUB SERPL-MCNC: 0.6 MG/DL (ref 0.2–1.2)
BUN SERPL-MCNC: 18 MG/DL (ref 8–23)
BUN/CREAT SERPL: 22.5 (ref 7–25)
CALCIUM SERPL-MCNC: 8.5 MG/DL (ref 8.8–10.5)
CHLORIDE SERPL-SCNC: 104 MMOL/L (ref 98–107)
CHOLEST SERPL-MCNC: 171 MG/DL (ref 0–200)
CO2 SERPL-SCNC: 26.2 MMOL/L (ref 22–29)
CREAT SERPL-MCNC: 0.8 MG/DL (ref 0.76–1.27)
ERYTHROCYTE [DISTWIDTH] IN BLOOD BY AUTOMATED COUNT: 14.3 % (ref 11.5–14.5)
ERYTHROCYTE [SEDIMENTATION RATE] IN BLOOD BY WESTERGREN METHOD: 9 MM/HR (ref 0–20)
GLOBULIN SER CALC-MCNC: 2.2 GM/DL
GLUCOSE SERPL-MCNC: 112 MG/DL (ref 65–99)
HCT VFR BLD AUTO: 40.7 % (ref 42–52)
HDLC SERPL-MCNC: 63 MG/DL (ref 40–60)
HGB BLD-MCNC: 13.7 G/DL (ref 14–18)
LDLC SERPL CALC-MCNC: 95 MG/DL (ref 0–100)
MCH RBC QN AUTO: 31.5 PG (ref 27–31)
MCHC RBC AUTO-ENTMCNC: 33.7 G/DL (ref 31–37)
MCV RBC AUTO: 93.6 FL (ref 80–94)
PLATELET # BLD AUTO: 173 10*3/MM3 (ref 140–500)
POTASSIUM SERPL-SCNC: 4.2 MMOL/L (ref 3.5–5.2)
PROT SERPL-MCNC: 6.1 G/DL (ref 6–8.5)
RBC # BLD AUTO: 4.35 10*6/MM3 (ref 4.7–6.1)
SODIUM SERPL-SCNC: 143 MMOL/L (ref 136–145)
T3FREE SERPL-MCNC: 2.6 PG/ML (ref 2–4.4)
TRIGL SERPL-MCNC: 65 MG/DL (ref 0–150)
TSH SERPL DL<=0.005 MIU/L-ACNC: 4.09 MIU/ML (ref 0.27–4.2)
VLDLC SERPL CALC-MCNC: 13 MG/DL (ref 8–32)
WBC # BLD AUTO: 4.88 10*3/MM3 (ref 4.8–10.8)

## 2017-04-28 ENCOUNTER — OFFICE VISIT (OUTPATIENT)
Dept: CARDIOLOGY | Facility: CLINIC | Age: 82
End: 2017-04-28

## 2017-04-28 VITALS
WEIGHT: 181 LBS | SYSTOLIC BLOOD PRESSURE: 165 MMHG | DIASTOLIC BLOOD PRESSURE: 79 MMHG | HEART RATE: 48 BPM | BODY MASS INDEX: 25.34 KG/M2 | HEIGHT: 71 IN

## 2017-04-28 DIAGNOSIS — I10 BENIGN ESSENTIAL HYPERTENSION: ICD-10-CM

## 2017-04-28 DIAGNOSIS — G20 PARKINSON'S DISEASE (HCC): ICD-10-CM

## 2017-04-28 DIAGNOSIS — I48.0 PAROXYSMAL ATRIAL FIBRILLATION (HCC): Primary | ICD-10-CM

## 2017-04-28 PROCEDURE — 93000 ELECTROCARDIOGRAM COMPLETE: CPT | Performed by: INTERNAL MEDICINE

## 2017-04-28 PROCEDURE — 99213 OFFICE O/P EST LOW 20 MIN: CPT | Performed by: INTERNAL MEDICINE

## 2017-05-02 ENCOUNTER — OFFICE VISIT (OUTPATIENT)
Dept: FAMILY MEDICINE CLINIC | Facility: CLINIC | Age: 82
End: 2017-05-02

## 2017-05-02 VITALS
HEIGHT: 71 IN | HEART RATE: 52 BPM | WEIGHT: 181.6 LBS | OXYGEN SATURATION: 97 % | DIASTOLIC BLOOD PRESSURE: 70 MMHG | SYSTOLIC BLOOD PRESSURE: 148 MMHG | BODY MASS INDEX: 25.42 KG/M2 | TEMPERATURE: 97.3 F

## 2017-05-02 DIAGNOSIS — I10 BENIGN ESSENTIAL HYPERTENSION: Primary | ICD-10-CM

## 2017-05-02 PROCEDURE — 99213 OFFICE O/P EST LOW 20 MIN: CPT | Performed by: FAMILY MEDICINE

## 2017-07-07 ENCOUNTER — ANESTHESIA (OUTPATIENT)
Dept: PERIOP | Facility: HOSPITAL | Age: 82
End: 2017-07-07

## 2017-07-07 ENCOUNTER — APPOINTMENT (OUTPATIENT)
Dept: GENERAL RADIOLOGY | Facility: HOSPITAL | Age: 82
End: 2017-07-07
Attending: SURGERY

## 2017-07-07 ENCOUNTER — ANESTHESIA EVENT (OUTPATIENT)
Dept: PERIOP | Facility: HOSPITAL | Age: 82
End: 2017-07-07

## 2017-07-07 ENCOUNTER — HOSPITAL ENCOUNTER (INPATIENT)
Facility: HOSPITAL | Age: 82
LOS: 3 days | Discharge: HOME HEALTH | End: 2017-07-10
Attending: SURGERY | Admitting: SURGERY

## 2017-07-07 DIAGNOSIS — T14.8XXA HEMATOMA: ICD-10-CM

## 2017-07-07 PROBLEM — Z95.828 PRESENCE OF IVC FILTER: Chronic | Status: ACTIVE | Noted: 2017-07-07

## 2017-07-07 PROBLEM — Z79.01 ANTICOAGULANT LONG-TERM USE: Chronic | Status: ACTIVE | Noted: 2017-07-07

## 2017-07-07 PROBLEM — I50.32 CHRONIC DIASTOLIC (CONGESTIVE) HEART FAILURE (HCC): Chronic | Status: ACTIVE | Noted: 2017-07-07

## 2017-07-07 LAB
ANION GAP SERPL CALCULATED.3IONS-SCNC: 10.6 MMOL/L
APTT PPP: 33.4 SECONDS (ref 22.7–35.4)
BACTERIA UR QL AUTO: NORMAL /HPF
BASOPHILS # BLD AUTO: 0.02 10*3/MM3 (ref 0–0.2)
BASOPHILS NFR BLD AUTO: 0.3 % (ref 0–1.5)
BILIRUB UR QL STRIP: NEGATIVE
BUN BLD-MCNC: 19 MG/DL (ref 8–23)
BUN/CREAT SERPL: 22.9 (ref 7–25)
CALCIUM SPEC-SCNC: 9.1 MG/DL (ref 8.6–10.5)
CHLORIDE SERPL-SCNC: 106 MMOL/L (ref 98–107)
CLARITY UR: ABNORMAL
CO2 SERPL-SCNC: 27.4 MMOL/L (ref 22–29)
COLOR UR: YELLOW
CREAT BLD-MCNC: 0.83 MG/DL (ref 0.76–1.27)
DEPRECATED RDW RBC AUTO: 51.1 FL (ref 37–54)
EOSINOPHIL # BLD AUTO: 0.07 10*3/MM3 (ref 0–0.7)
EOSINOPHIL NFR BLD AUTO: 1 % (ref 0.3–6.2)
ERYTHROCYTE [DISTWIDTH] IN BLOOD BY AUTOMATED COUNT: 14.8 % (ref 11.5–14.5)
GFR SERPL CREATININE-BSD FRML MDRD: 89 ML/MIN/1.73
GLUCOSE BLD-MCNC: 109 MG/DL (ref 65–99)
GLUCOSE UR STRIP-MCNC: NEGATIVE MG/DL
HCT VFR BLD AUTO: 38 % (ref 40.4–52.2)
HGB BLD-MCNC: 13 G/DL (ref 13.7–17.6)
HGB UR QL STRIP.AUTO: NEGATIVE
HYALINE CASTS UR QL AUTO: NORMAL /LPF
IMM GRANULOCYTES # BLD: 0 10*3/MM3 (ref 0–0.03)
IMM GRANULOCYTES NFR BLD: 0 % (ref 0–0.5)
INR PPP: 1.15 (ref 0.9–1.1)
KETONES UR QL STRIP: ABNORMAL
LEUKOCYTE ESTERASE UR QL STRIP.AUTO: NEGATIVE
LYMPHOCYTES # BLD AUTO: 0.89 10*3/MM3 (ref 0.9–4.8)
LYMPHOCYTES NFR BLD AUTO: 12.4 % (ref 19.6–45.3)
MCH RBC QN AUTO: 32.2 PG (ref 27–32.7)
MCHC RBC AUTO-ENTMCNC: 34.2 G/DL (ref 32.6–36.4)
MCV RBC AUTO: 94.1 FL (ref 79.8–96.2)
MONOCYTES # BLD AUTO: 0.57 10*3/MM3 (ref 0.2–1.2)
MONOCYTES NFR BLD AUTO: 8 % (ref 5–12)
NEUTROPHILS # BLD AUTO: 5.61 10*3/MM3 (ref 1.9–8.1)
NEUTROPHILS NFR BLD AUTO: 78.3 % (ref 42.7–76)
NITRITE UR QL STRIP: NEGATIVE
PH UR STRIP.AUTO: 5.5 [PH] (ref 5–8)
PLATELET # BLD AUTO: 141 10*3/MM3 (ref 140–500)
PMV BLD AUTO: 10.5 FL (ref 6–12)
POTASSIUM BLD-SCNC: 4.4 MMOL/L (ref 3.5–5.2)
PROT UR QL STRIP: ABNORMAL
PROTHROMBIN TIME: 14.3 SECONDS (ref 11.7–14.2)
RBC # BLD AUTO: 4.04 10*6/MM3 (ref 4.6–6)
RBC # UR: NORMAL /HPF
REF LAB TEST METHOD: NORMAL
SODIUM BLD-SCNC: 144 MMOL/L (ref 136–145)
SP GR UR STRIP: 1.03 (ref 1–1.03)
SQUAMOUS #/AREA URNS HPF: NORMAL /HPF
UROBILINOGEN UR QL STRIP: ABNORMAL
WBC NRBC COR # BLD: 7.16 10*3/MM3 (ref 4.5–10.7)
WBC UR QL AUTO: NORMAL /HPF

## 2017-07-07 PROCEDURE — 0JBN0ZZ EXCISION OF RIGHT LOWER LEG SUBCUTANEOUS TISSUE AND FASCIA, OPEN APPROACH: ICD-10-PCS | Performed by: SURGERY

## 2017-07-07 PROCEDURE — 25010000002 FENTANYL CITRATE (PF) 100 MCG/2ML SOLUTION: Performed by: ANESTHESIOLOGY

## 2017-07-07 PROCEDURE — 87186 SC STD MICRODIL/AGAR DIL: CPT | Performed by: SURGERY

## 2017-07-07 PROCEDURE — 85730 THROMBOPLASTIN TIME PARTIAL: CPT | Performed by: SURGERY

## 2017-07-07 PROCEDURE — 87075 CULTR BACTERIA EXCEPT BLOOD: CPT | Performed by: SURGERY

## 2017-07-07 PROCEDURE — 25010000002 PROPOFOL 10 MG/ML EMULSION: Performed by: NURSE ANESTHETIST, CERTIFIED REGISTERED

## 2017-07-07 PROCEDURE — 81001 URINALYSIS AUTO W/SCOPE: CPT | Performed by: SURGERY

## 2017-07-07 PROCEDURE — 87205 SMEAR GRAM STAIN: CPT | Performed by: SURGERY

## 2017-07-07 PROCEDURE — 25010000002 VANCOMYCIN 10 G RECONSTITUTED SOLUTION: Performed by: SURGERY

## 2017-07-07 PROCEDURE — 25010000002 MIDAZOLAM PER 1 MG: Performed by: ANESTHESIOLOGY

## 2017-07-07 PROCEDURE — 85025 COMPLETE CBC W/AUTO DIFF WBC: CPT | Performed by: SURGERY

## 2017-07-07 PROCEDURE — 87102 FUNGUS ISOLATION CULTURE: CPT | Performed by: SURGERY

## 2017-07-07 PROCEDURE — 87070 CULTURE OTHR SPECIMN AEROBIC: CPT | Performed by: SURGERY

## 2017-07-07 PROCEDURE — 71020 HC CHEST PA AND LATERAL: CPT

## 2017-07-07 PROCEDURE — 88304 TISSUE EXAM BY PATHOLOGIST: CPT | Performed by: SURGERY

## 2017-07-07 PROCEDURE — 87147 CULTURE TYPE IMMUNOLOGIC: CPT | Performed by: SURGERY

## 2017-07-07 PROCEDURE — 85610 PROTHROMBIN TIME: CPT | Performed by: SURGERY

## 2017-07-07 PROCEDURE — 80048 BASIC METABOLIC PNL TOTAL CA: CPT | Performed by: SURGERY

## 2017-07-07 RX ORDER — HYDRALAZINE HYDROCHLORIDE 20 MG/ML
10 INJECTION INTRAMUSCULAR; INTRAVENOUS EVERY 4 HOURS PRN
Status: DISCONTINUED | OUTPATIENT
Start: 2017-07-07 | End: 2017-07-10 | Stop reason: HOSPADM

## 2017-07-07 RX ORDER — PROMETHAZINE HYDROCHLORIDE 25 MG/1
25 TABLET ORAL ONCE AS NEEDED
Status: DISCONTINUED | OUTPATIENT
Start: 2017-07-07 | End: 2017-07-07 | Stop reason: HOSPADM

## 2017-07-07 RX ORDER — MIDAZOLAM HYDROCHLORIDE 1 MG/ML
1 INJECTION INTRAMUSCULAR; INTRAVENOUS
Status: DISCONTINUED | OUTPATIENT
Start: 2017-07-07 | End: 2017-07-07 | Stop reason: HOSPADM

## 2017-07-07 RX ORDER — FLUMAZENIL 0.1 MG/ML
0.2 INJECTION INTRAVENOUS AS NEEDED
Status: DISCONTINUED | OUTPATIENT
Start: 2017-07-07 | End: 2017-07-07 | Stop reason: HOSPADM

## 2017-07-07 RX ORDER — FENTANYL CITRATE 50 UG/ML
50 INJECTION, SOLUTION INTRAMUSCULAR; INTRAVENOUS
Status: DISCONTINUED | OUTPATIENT
Start: 2017-07-07 | End: 2017-07-07 | Stop reason: HOSPADM

## 2017-07-07 RX ORDER — SODIUM CHLORIDE 0.9 % (FLUSH) 0.9 %
1-10 SYRINGE (ML) INJECTION AS NEEDED
Status: DISCONTINUED | OUTPATIENT
Start: 2017-07-07 | End: 2017-07-10 | Stop reason: HOSPADM

## 2017-07-07 RX ORDER — HYDRALAZINE HYDROCHLORIDE 20 MG/ML
5 INJECTION INTRAMUSCULAR; INTRAVENOUS
Status: DISCONTINUED | OUTPATIENT
Start: 2017-07-07 | End: 2017-07-07 | Stop reason: HOSPADM

## 2017-07-07 RX ORDER — HYDROCODONE BITARTRATE AND ACETAMINOPHEN 7.5; 325 MG/1; MG/1
1 TABLET ORAL EVERY 4 HOURS PRN
Status: DISCONTINUED | OUTPATIENT
Start: 2017-07-07 | End: 2017-07-10 | Stop reason: HOSPADM

## 2017-07-07 RX ORDER — LIDOCAINE HYDROCHLORIDE 10 MG/ML
0.5 INJECTION, SOLUTION EPIDURAL; INFILTRATION; INTRACAUDAL; PERINEURAL ONCE AS NEEDED
Status: DISCONTINUED | OUTPATIENT
Start: 2017-07-07 | End: 2017-07-07 | Stop reason: HOSPADM

## 2017-07-07 RX ORDER — HYDROCODONE BITARTRATE AND ACETAMINOPHEN 7.5; 325 MG/1; MG/1
1 TABLET ORAL ONCE AS NEEDED
Status: DISCONTINUED | OUTPATIENT
Start: 2017-07-07 | End: 2017-07-07 | Stop reason: HOSPADM

## 2017-07-07 RX ORDER — NALOXONE HCL 0.4 MG/ML
0.2 VIAL (ML) INJECTION AS NEEDED
Status: DISCONTINUED | OUTPATIENT
Start: 2017-07-07 | End: 2017-07-07 | Stop reason: HOSPADM

## 2017-07-07 RX ORDER — PROMETHAZINE HYDROCHLORIDE 25 MG/ML
12.5 INJECTION, SOLUTION INTRAMUSCULAR; INTRAVENOUS ONCE AS NEEDED
Status: DISCONTINUED | OUTPATIENT
Start: 2017-07-07 | End: 2017-07-07 | Stop reason: HOSPADM

## 2017-07-07 RX ORDER — FENTANYL CITRATE 50 UG/ML
100 INJECTION, SOLUTION INTRAMUSCULAR; INTRAVENOUS
Status: DISCONTINUED | OUTPATIENT
Start: 2017-07-07 | End: 2017-07-07 | Stop reason: HOSPADM

## 2017-07-07 RX ORDER — FAMOTIDINE 10 MG/ML
20 INJECTION, SOLUTION INTRAVENOUS ONCE
Status: COMPLETED | OUTPATIENT
Start: 2017-07-07 | End: 2017-07-07

## 2017-07-07 RX ORDER — CARBIDOPA/LEVODOPA 25MG-250MG
1 TABLET ORAL 3 TIMES DAILY
Status: DISCONTINUED | OUTPATIENT
Start: 2017-07-07 | End: 2017-07-10 | Stop reason: HOSPADM

## 2017-07-07 RX ORDER — SODIUM CHLORIDE, SODIUM LACTATE, POTASSIUM CHLORIDE, CALCIUM CHLORIDE 600; 310; 30; 20 MG/100ML; MG/100ML; MG/100ML; MG/100ML
9 INJECTION, SOLUTION INTRAVENOUS CONTINUOUS
Status: DISCONTINUED | OUTPATIENT
Start: 2017-07-07 | End: 2017-07-07 | Stop reason: HOSPADM

## 2017-07-07 RX ORDER — HYDROMORPHONE HYDROCHLORIDE 1 MG/ML
0.5 INJECTION, SOLUTION INTRAMUSCULAR; INTRAVENOUS; SUBCUTANEOUS
Status: DISCONTINUED | OUTPATIENT
Start: 2017-07-07 | End: 2017-07-07 | Stop reason: HOSPADM

## 2017-07-07 RX ORDER — ATROPINE SULFATE 10 MG/ML
1 SOLUTION/ DROPS OPHTHALMIC 3 TIMES DAILY
Status: DISCONTINUED | OUTPATIENT
Start: 2017-07-07 | End: 2017-07-08

## 2017-07-07 RX ORDER — MIDAZOLAM HYDROCHLORIDE 1 MG/ML
2 INJECTION INTRAMUSCULAR; INTRAVENOUS
Status: DISCONTINUED | OUTPATIENT
Start: 2017-07-07 | End: 2017-07-07 | Stop reason: HOSPADM

## 2017-07-07 RX ORDER — LABETALOL HYDROCHLORIDE 5 MG/ML
5 INJECTION, SOLUTION INTRAVENOUS
Status: DISCONTINUED | OUTPATIENT
Start: 2017-07-07 | End: 2017-07-07 | Stop reason: HOSPADM

## 2017-07-07 RX ORDER — SODIUM CHLORIDE 0.9 % (FLUSH) 0.9 %
1-10 SYRINGE (ML) INJECTION AS NEEDED
Status: DISCONTINUED | OUTPATIENT
Start: 2017-07-07 | End: 2017-07-07 | Stop reason: HOSPADM

## 2017-07-07 RX ORDER — MULTIPLE VITAMINS W/ MINERALS TAB 9MG-400MCG
1 TAB ORAL DAILY
Status: DISCONTINUED | OUTPATIENT
Start: 2017-07-07 | End: 2017-07-10 | Stop reason: HOSPADM

## 2017-07-07 RX ORDER — EPHEDRINE SULFATE 50 MG/ML
5 INJECTION, SOLUTION INTRAVENOUS ONCE AS NEEDED
Status: DISCONTINUED | OUTPATIENT
Start: 2017-07-07 | End: 2017-07-07 | Stop reason: HOSPADM

## 2017-07-07 RX ORDER — PROPOFOL 10 MG/ML
VIAL (ML) INTRAVENOUS AS NEEDED
Status: DISCONTINUED | OUTPATIENT
Start: 2017-07-07 | End: 2017-07-07 | Stop reason: SURG

## 2017-07-07 RX ORDER — SODIUM CHLORIDE 9 MG/ML
100 INJECTION, SOLUTION INTRAVENOUS CONTINUOUS
Status: DISCONTINUED | OUTPATIENT
Start: 2017-07-07 | End: 2017-07-08

## 2017-07-07 RX ORDER — ONDANSETRON 2 MG/ML
4 INJECTION INTRAMUSCULAR; INTRAVENOUS ONCE AS NEEDED
Status: DISCONTINUED | OUTPATIENT
Start: 2017-07-07 | End: 2017-07-07 | Stop reason: HOSPADM

## 2017-07-07 RX ORDER — DIPHENHYDRAMINE HYDROCHLORIDE 50 MG/ML
12.5 INJECTION INTRAMUSCULAR; INTRAVENOUS
Status: DISCONTINUED | OUTPATIENT
Start: 2017-07-07 | End: 2017-07-07 | Stop reason: HOSPADM

## 2017-07-07 RX ORDER — PROMETHAZINE HYDROCHLORIDE 25 MG/1
25 SUPPOSITORY RECTAL ONCE AS NEEDED
Status: DISCONTINUED | OUTPATIENT
Start: 2017-07-07 | End: 2017-07-07 | Stop reason: HOSPADM

## 2017-07-07 RX ORDER — OXYCODONE AND ACETAMINOPHEN 7.5; 325 MG/1; MG/1
1 TABLET ORAL ONCE AS NEEDED
Status: DISCONTINUED | OUTPATIENT
Start: 2017-07-07 | End: 2017-07-07 | Stop reason: HOSPADM

## 2017-07-07 RX ORDER — ATORVASTATIN CALCIUM 20 MG/1
20 TABLET, FILM COATED ORAL DAILY
Status: DISCONTINUED | OUTPATIENT
Start: 2017-07-07 | End: 2017-07-10 | Stop reason: HOSPADM

## 2017-07-07 RX ORDER — CARBOXYMETHYLCELLULOSE SODIUM 10 MG/ML
1 GEL OPHTHALMIC 3 TIMES DAILY PRN
Status: DISCONTINUED | OUTPATIENT
Start: 2017-07-07 | End: 2017-07-10 | Stop reason: HOSPADM

## 2017-07-07 RX ORDER — PROMETHAZINE HYDROCHLORIDE 25 MG/1
12.5 TABLET ORAL ONCE AS NEEDED
Status: DISCONTINUED | OUTPATIENT
Start: 2017-07-07 | End: 2017-07-07 | Stop reason: HOSPADM

## 2017-07-07 RX ADMIN — SODIUM CHLORIDE 100 ML/HR: 9 INJECTION, SOLUTION INTRAVENOUS at 14:39

## 2017-07-07 RX ADMIN — CARBIDOPA AND LEVODOPA 1 TABLET: 25; 250 TABLET ORAL at 20:43

## 2017-07-07 RX ADMIN — PROPOFOL 150 MG: 10 INJECTION, EMULSION INTRAVENOUS at 11:29

## 2017-07-07 RX ADMIN — ATORVASTATIN CALCIUM 20 MG: 20 TABLET, FILM COATED ORAL at 17:07

## 2017-07-07 RX ADMIN — FAMOTIDINE 20 MG: 10 INJECTION INTRAVENOUS at 10:30

## 2017-07-07 RX ADMIN — MIDAZOLAM 1 MG: 1 INJECTION INTRAMUSCULAR; INTRAVENOUS at 10:32

## 2017-07-07 RX ADMIN — MULTIPLE VITAMINS W/ MINERALS TAB 1 TABLET: TAB at 17:08

## 2017-07-07 RX ADMIN — APIXABAN 2.5 MG: 2.5 TABLET, FILM COATED ORAL at 20:43

## 2017-07-07 RX ADMIN — VANCOMYCIN HYDROCHLORIDE 1250 MG: 10 INJECTION, POWDER, LYOPHILIZED, FOR SOLUTION INTRAVENOUS at 20:43

## 2017-07-07 RX ADMIN — VANCOMYCIN HYDROCHLORIDE 1250 MG: 10 INJECTION, POWDER, LYOPHILIZED, FOR SOLUTION INTRAVENOUS at 09:38

## 2017-07-07 RX ADMIN — CARBIDOPA AND LEVODOPA 1 TABLET: 25; 250 TABLET ORAL at 17:07

## 2017-07-07 RX ADMIN — FENTANYL CITRATE 25 MCG: 50 INJECTION INTRAMUSCULAR; INTRAVENOUS at 11:33

## 2017-07-07 RX ADMIN — PROPOFOL 50 MG: 10 INJECTION, EMULSION INTRAVENOUS at 11:33

## 2017-07-07 RX ADMIN — SODIUM CHLORIDE, POTASSIUM CHLORIDE, SODIUM LACTATE AND CALCIUM CHLORIDE 9 ML/HR: 600; 310; 30; 20 INJECTION, SOLUTION INTRAVENOUS at 10:31

## 2017-07-07 RX ADMIN — FENTANYL CITRATE 25 MCG: 50 INJECTION INTRAMUSCULAR; INTRAVENOUS at 11:44

## 2017-07-07 NOTE — ANESTHESIA PREPROCEDURE EVALUATION
Anesthesia Evaluation     Patient summary reviewed and Nursing notes reviewed   NPO Solid Status: > 8 hours       Airway   Mallampati: II  TM distance: >3 FB  Neck ROM: limited  possible difficult intubation  Dental - normal exam     Pulmonary - negative pulmonary ROS and normal exam   Cardiovascular - normal exam    (+) hypertension, dysrhythmias Atrial Fib, PVD, DVT,       Neuro/Psych- negative ROS  GI/Hepatic/Renal/Endo - negative ROS     Musculoskeletal (-) negative ROS    Abdominal  - normal exam   Substance History - negative use     OB/GYN negative ob/gyn ROS         Other        ROS/Med Hx Other: Parkinsons  Carotid stenosis                                Anesthesia Plan    ASA 3     general     intravenous induction   Anesthetic plan and risks discussed with patient.    Plan discussed with CRNA.

## 2017-07-07 NOTE — PLAN OF CARE
Problem: Patient Care Overview (Adult)  Goal: Plan of Care Review  Outcome: Ongoing (interventions implemented as appropriate)    07/07/17 0925   Coping/Psychosocial Response Interventions   Plan Of Care Reviewed With patient   Patient Care Overview   Progress no change       Goal: Adult Individualization and Mutuality  Outcome: Ongoing (interventions implemented as appropriate)    07/07/17 0925   Individualization   Patient Specific Preferences Call pt Afshin   Patient Specific Goals No infection after surgery.   Patient Specific Interventions Teach good hand hygiene.       Goal: Discharge Needs Assessment  Outcome: Ongoing (interventions implemented as appropriate)    07/07/17 0925   Discharge Needs Assessment   Concerns To Be Addressed denies needs/concerns at this time   Current Health   Anticipated Changes Related to Illness none   Self-Care   Equipment Currently Used at Home none         Problem: Perioperative Period (Adult)  Goal: Signs and Symptoms of Listed Potential Problems Will be Absent or Manageable (Perioperative Period)  Outcome: Ongoing (interventions implemented as appropriate)    07/07/17 0925   Perioperative Period   Problems Present (Perioperative Period) none

## 2017-07-07 NOTE — CONSULTS
Referring Provider: Valentin Peña MD  Reason for Consultation: To evaluate chronic medical conditions that may be exacerbated postoperatively  PCP: Aleksander Diez MD      HPI  Patient is a 82 y.o. male presents with history of A. fib and hypertension who presents as an admission after undergoing an incision and drainage of a infected postoperative seroma. Patient has been dealing with this for some time per old records and followed by Dr. Dorado.     We have been consulted due to elevated blood pressure.  The patient did have a blood pressure of 184/77 and 139/105 looking back at old records patient does have a history of A. fib and hypertension.  Patient was on losartan but about a year ago his neurologist is continued it due to the patient having Parkinson's needing to be on increased dose of carbidopa levodopa and he was having some dizziness issues which may have some autonomic instability.  Patient was followed up by Dr. Mercado for A. fib but since the patient only had at one time they attribute it due to overwhelming infection.  Patient is on Elliquis but that is due to a chronic DVT and filter.      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    • DVT of lower extremity (deep venous thrombosis)    • Long-term use of high-risk medication    • Parkinson's disease    • Postphlebitic syndrome        PAST SURGICAL HISTORY  Past Surgical History:   Procedure Laterality Date   • KNEE INCISION AND DRAINAGE Right 12/27/2016    Procedure: KNEE INCISION AND DRAINAGE;  Surgeon: Triston Whitten MD;  Location: [facility];  Service:    • TOTAL KNEE ARTHROPLASTY Right    • VENA CAVA FILTER PLACEMENT         FAMILY HISTORY  Family History   Problem Relation Age of Onset   • Heart attack Mother    • Stroke Brother    • Heart attack Maternal Aunt    • Heart attack Maternal Uncle    •  Deep vein thrombosis Neg Hx        SOCIAL HISTORY  Social History   Substance Use Topics   • Smoking status: Former Smoker     Types: Cigarettes   • Smokeless tobacco: Never Used   • Alcohol use 1.2 oz/week     2 Glasses of wine per week       MEDICATIONS:  Prescriptions Prior to Admission   Medication Sig Dispense Refill Last Dose   • atropine 1 % ophthalmic solution Apply 1 drop to eye 3 (Three) Times a Day. For drooling   7/7/2017 at 0700   • carbidopa-levodopa (SINEMET)  MG per tablet 1 tablet 3 (Three) Times a Day.   7/7/2017 at 0700   • carboxymethylcellulose (REFRESH PLUS) 0.5 % solution 1 drop 3 (Three) Times a Day As Needed for dry eyes.   7/6/2017 at Unknown time   • ELIQUIS 2.5 MG tablet tablet 2.5 mg Every 12 (Twelve) Hours.   7/6/2017 at 0800   • Multiple Vitamins-Minerals (PRESERVISION AREDS) capsule Take  by mouth.   7/7/2017 at 0700   • atorvastatin (LIPITOR) 20 MG tablet Take 1 tablet by mouth Daily. 90 tablet 3 7/5/2017 at 2100       Allergies:  Penicillins and Rocephin [ceftriaxone]    Review of Systems:  fatigue , increasing swelling and pain of the right thigh with skin discoloration  Negative for following (except as per HPI):  Constitution: chills, fevers,   Eyes: change of vision, loss of vision and discharge  ENT: ear drainage, ear ringing and facial trauma  Respiratory: cough, pleuritic pain, shortness of air  Cardiovascular: chest pressure, pain, lower extremity edema, palpitations  Gastrointestinal: constipation, diarrhea, nausea, vomiting, pain    Integument: rash and wound  Hematologic / Lymphatic: excessive bleeding and easy bruising  Musculoskeletal: joint pain, joint stiffness, joint swelling   Neurological: headaches, numbness, seizures and tremors  Behavioral / Psych: anxiety, depression and hallucinations         Vital Signs  Temp:  [97.2 °F (36.2 °C)-97.7 °F (36.5 °C)] 97.7 °F (36.5 °C)  Heart Rate:  [56-72] 60  Resp:  [14-18] 18  BP: (139-184)/() 160/73  Flowsheet  "Rows         First Filed Value    Admission Height  71\" (180.3 cm) Documented at 07/07/2017 0909    Admission Weight  180 lb 4.8 oz (81.8 kg) Documented at 07/07/2017 0909           Physical Exam:  General Appearance:    Alert, cooperative, in no acute distress   Head:    Normocephalic, without obvious abnormality, atraumatic   Eyes:         conjunctivae and sclerae normal, no icterus, PERRLA   ENT:    Ears grossly intact, oral mucosa moist,   Neck:   No adenopathy, supple, trachea midline,    Back:     Normal to inspection, range of motion normal   Lungs:     Clear to auscultation,respirations regular, even and                   unlabored    Heart:    Regular rhythm and normal rate,  +2/6 LUSBmurmur, normal S1 and S2,   Abdomen:     Normal bowel sounds, no masses,  soft non-tender, non-distended,        Pulses:   Pulses palpable and equal bilaterally   Skin:   No bleeding, rash, bruising    Neurologic:    Psych:   Cranial nerves 2 - 12 grossly intact, sensation intact,     Moves all extremities , equal bilateral strength 4/5    Alert and Oriented x 3, Normal Affect     LABS:  Admission on 07/07/2017   Component Date Value Ref Range Status   • Glucose 07/07/2017 109* 65 - 99 mg/dL Final   • BUN 07/07/2017 19  8 - 23 mg/dL Final   • Creatinine 07/07/2017 0.83  0.76 - 1.27 mg/dL Final   • Sodium 07/07/2017 144  136 - 145 mmol/L Final   • Potassium 07/07/2017 4.4  3.5 - 5.2 mmol/L Final   • Chloride 07/07/2017 106  98 - 107 mmol/L Final   • CO2 07/07/2017 27.4  22.0 - 29.0 mmol/L Final   • Calcium 07/07/2017 9.1  8.6 - 10.5 mg/dL Final   • eGFR Non African Amer 07/07/2017 89  >60 mL/min/1.73 Final   • BUN/Creatinine Ratio 07/07/2017 22.9  7.0 - 25.0 Final   • Anion Gap 07/07/2017 10.6  mmol/L Final   • Protime 07/07/2017 14.3* 11.7 - 14.2 Seconds Final   • INR 07/07/2017 1.15* 0.90 - 1.10 Final   • PTT 07/07/2017 33.4  22.7 - 35.4 seconds Final   • WBC 07/07/2017 7.16  4.50 - 10.70 10*3/mm3 Final   • RBC 07/07/2017 " 4.04* 4.60 - 6.00 10*6/mm3 Final   • Hemoglobin 07/07/2017 13.0* 13.7 - 17.6 g/dL Final   • Hematocrit 07/07/2017 38.0* 40.4 - 52.2 % Final   • MCV 07/07/2017 94.1  79.8 - 96.2 fL Final   • MCH 07/07/2017 32.2  27.0 - 32.7 pg Final   • MCHC 07/07/2017 34.2  32.6 - 36.4 g/dL Final   • RDW 07/07/2017 14.8* 11.5 - 14.5 % Final   • RDW-SD 07/07/2017 51.1  37.0 - 54.0 fl Final   • MPV 07/07/2017 10.5  6.0 - 12.0 fL Final   • Platelets 07/07/2017 141  140 - 500 10*3/mm3 Final   • Neutrophil % 07/07/2017 78.3* 42.7 - 76.0 % Final   • Lymphocyte % 07/07/2017 12.4* 19.6 - 45.3 % Final   • Monocyte % 07/07/2017 8.0  5.0 - 12.0 % Final   • Eosinophil % 07/07/2017 1.0  0.3 - 6.2 % Final   • Basophil % 07/07/2017 0.3  0.0 - 1.5 % Final   • Immature Grans % 07/07/2017 0.0  0.0 - 0.5 % Final   • Neutrophils, Absolute 07/07/2017 5.61  1.90 - 8.10 10*3/mm3 Final   • Lymphocytes, Absolute 07/07/2017 0.89* 0.90 - 4.80 10*3/mm3 Final   • Monocytes, Absolute 07/07/2017 0.57  0.20 - 1.20 10*3/mm3 Final   • Eosinophils, Absolute 07/07/2017 0.07  0.00 - 0.70 10*3/mm3 Final   • Basophils, Absolute 07/07/2017 0.02  0.00 - 0.20 10*3/mm3 Final   • Immature Grans, Absolute 07/07/2017 0.00  0.00 - 0.03 10*3/mm3 Final   • Color, UA 07/07/2017 Yellow  Yellow, Straw Final   • Appearance,  07/07/2017 Cloudy* Clear Final   • pH, UA 07/07/2017 5.5  5.0 - 8.0 Final   • Specific Gravity, UA 07/07/2017 1.027  1.005 - 1.030 Final   • Glucose, UA 07/07/2017 Negative  Negative Final   • Ketones, UA 07/07/2017 Trace* Negative Final   • Bilirubin, UA 07/07/2017 Negative  Negative Final   • Blood, UA 07/07/2017 Negative  Negative Final   • Protein, UA 07/07/2017 Trace* Negative Final   • Leuk Esterase, UA 07/07/2017 Negative  Negative Final   • Nitrite, UA 07/07/2017 Negative  Negative Final   • Urobilinogen, UA 07/07/2017 0.2 E.U./dL  0.2 - 1.0 E.U./dL Final   • RBC, UA 07/07/2017 0-2  None Seen, 0-2 /HPF Final   • WBC, UA 07/07/2017 0-2  None Seen, 0-2  /HPF Final   • Bacteria, UA 07/07/2017 None Seen  None Seen /HPF Final   • Squamous Epithelial Cells, UA 07/07/2017 0-2  None Seen, 0-2 /HPF Final   • Hyaline Casts, UA 07/07/2017 7-12  None Seen /LPF Final   • Methodology 07/07/2017 Automated Microscopy   Final   • Gram Stain Result 07/07/2017 Rare (1+) WBCs seen   Preliminary   • Gram Stain Result 07/07/2017 No organisms seen   Preliminary       DIAGNOSTICS:  Xr Chest 2 View    Result Date: 7/7/2017  Narrative: PA AND LATERAL CHEST X-RAY  HISTORY: Hypertension, atrial fibrillation and prior melanoma. Preop.  Chest x-ray consisting of 3 views is provided.  Comparison exams: 05/03/2006.  FINDINGS: The cardiomediastinal silhouette is normal. The lungs are clear. The costophrenic sulci are dry and the bones appear normal. There is no pneumothorax.      Impression: Negative.  This report was finalized on 7/7/2017 1:54 PM by Dr. Anup Verdin MD.           Results Review:   I reviewed the patient's new clinical results.  I personally viewed and interpreted the patient's EKG/Telemetry data - sinus  Old records reviewed    ASSESSMENT AND PLAN    +  Benign essential hypertension  -Patient's blood pressure seemed to been elevated at his cardiologist appointment in December where it was 165/79 however they felt like it had been lower at home and therefore did not reinstitute any medications due to the neurologist discontinuing his losartan about a year ago.  -Primary is ordered metoprolol 25 twice a day we'll add when necessary IV hydralazine  -Would avoid using methyldopa due to the Parkinson's  -Wife is concerned about continuing blood pressure medication and once pain is controlled may be able to use a smaller dose versus may need to have a risk-benefit discussion before discharge  Due to him being older having dizziness and do not want an elderly man with Parkinson's to fall     + Parkinson's disease  -stAble continue carbidopa levodopa  -Would avoid using Phenergan or  Compazine or methyldopa or tramadol, Demerol, Remeron, Flexeril     + A-fib  -Supposedly was a one-time incident to infection     + Hematoma/infected Seroma  -Status post incision and drainage postop management per primary     + Chronic diastolic (congestive) heart failure  -Stable, monitor volume status     +h/o DVT, h/o chronic DVT RLE and filter/ Anticoagulant long-term use  -ResumeEliquis when okay with primary    + DVT prophylaxis SCDs and MANUEL hose for now      I discussed the patients findings and my recommendations with the patient and/or family.  Please reference all orders placed.    Shannan Encarnacion MD  07/07/17  5:05 PM

## 2017-07-07 NOTE — OP NOTE
Operative Note  Date of Admission:  7/7/2017  OR Date: 7/7/2017    Pre-op Diagnosis:   Infected postoperative seroma [[id number]]    Post-op Diagnosis:     Post-Op Diagnosis Codes:     * Infected postoperative seroma [[id number]]    Procedure:   1) excisional debridement skin and subcutaneous tissue and seroma cavity (7 x 4 cm)    Surgeon: Valentin Peña MD    Assistant: None    Anesthesia: General    Staff:   Circulator: Gabby Choi RN  Scrub Person: Dianne Christianson; Alexandra Rudd    Estimated Blood Loss: 25-50 cc    Complications: None    Findings: Fluid in the wound was a seroma.  There was a well-defined seroma capsule as well.  There is a lot of skin and soft tissue infection but no abscess.    Indications:    The patient is an 82 y.o. male seen for evaluation of a recurrent lesion on the right thigh.  Please see Dr. Dorado's office history and physical for more details..       Procedure:    Patient was taken the operating and placed supine on operating table.  After induction of IV sedation general anesthesia the right leg was prepped and draped with ChloraPrep.  The periwound was bright red in color.  There was no obvious fluctuance but deftly an area where the fluid cavity was closest to the skin.  This was entered sharply.  After getting through the subcutaneous tissue we entered a seroma cavity.  Straw-colored fluid was removed via suction.  This showed a well-defined seroma wall.  This was excised almost in its entirety with the Bovie electrocautery.  Full-thickness skin was also excised in order to create an elliptical opening to the wound.  Simultaneous tissue was also excised sharply.  Any residual seroma wall was cauterized.  Good hemostasis was achieved.  A culture was obtained.  The wound was packed open with 2 moist 4 x 4's and dressed.  It's report was correct ×2 at the end the case.  Patient was recovery room in stable condition.  Total area debrided was 7 x 4 x 4 cm.      Active  Hospital Problems (** Indicates Principal Problem)    Diagnosis Date Noted   • Hematoma [T14.8] 07/07/2017      Resolved Hospital Problems    Diagnosis Date Noted Date Resolved   No resolved problems to display.      Valentin Peña MD     Date: 7/7/2017  Time: 12:18 PM

## 2017-07-07 NOTE — PLAN OF CARE
Problem: Patient Care Overview (Adult)  Goal: Plan of Care Review  Outcome: Ongoing (interventions implemented as appropriate)    07/07/17 1651   Coping/Psychosocial Response Interventions   Plan Of Care Reviewed With patient;spouse   Patient Care Overview   Progress improving   Outcome Evaluation   Outcome Summary/Follow up Plan Blood pressure remains high otherwide VSS, incision lateral right knee, dreid drainage, denies pain, consulted [facility] for BP management, resting well in bed, will continue to monitor pt.       Goal: Adult Individualization and Mutuality  Outcome: Ongoing (interventions implemented as appropriate)    Problem: Fall Risk (Adult)  Goal: Identify Related Risk Factors and Signs and Symptoms  Outcome: Ongoing (interventions implemented as appropriate)  Goal: Absence of Falls  Outcome: Ongoing (interventions implemented as appropriate)

## 2017-07-07 NOTE — PROGRESS NOTES
"Pharmacokinetic Consult - Vancomycin Dosing (Initial Note)    Casey Snowden is a 82 y.o. male 71\" (180.3 cm) 180 lb 4.8 oz (81.8 kg)   Pharmacy consulted to dose vancomycin for infected seroma, right thigh.  Pharmacy dosing vancomycin per Dr. Peña's request.   Goal trough: 10-20 mcg/mL     IV Anti-Infectives     Ordered     Dose/Rate Route Frequency Start Stop    07/07/17 1254  Pharmacy to dose vancomycin     Ordering Provider:  Valentin Peña MD     Does not apply Continuous PRN 07/07/17 1253      07/07/17 0912  vancomycin 1250 mg/250 mL 0.9% NS IVPB ([facility])     Ordering Provider:  Valentin Peña MD    1,250 mg Intravenous Once 07/07/17 0945 07/07/17 0938         Relevant clinical data and objective history reviewed:    Lab Results   Component Value Date    CREATININE 0.83 07/07/2017     Estimated Creatinine Clearance: 79.4 mL/min (by C-G formula based on Cr of 0.83).    Lab Results   Component Value Date    WBC 7.16 07/07/2017     Temp Readings from Last 1 Encounters:   07/07/17 97.2 °F (36.2 °C) (Oral)     Baseline culture/source/susceptibility:   7/7 wound cultures pending    Assessment/Plan  Pt received vanco 1250mg IV x1 at 0938 this aM. Will start vancomycin 1250mg IV Q 12hr at 2000 tonight. Will monitor serum creatinine and vancomycin levels and adjust as needed. Vancomycin level due with 5th dose at 0800 on 7/9.  Thank you Dr Peña for this consult.     Zohreh Anaya, PharmD, BCPS  07/07/17 2:26 PM  "

## 2017-07-07 NOTE — ANESTHESIA POSTPROCEDURE EVALUATION
Patient: Casey CLARK Temple    Procedure Summary     Date Anesthesia Start Anesthesia Stop Room / Location    07/07/17 1119 1213  [facility]       Procedure Diagnosis Surgeon Provider    INCISION AND DRAINAGE INFECTED HEMATOMA RIGHT THIGH (Right ) Infected postoperative seroma MD David Quintero MD          Anesthesia Type: general  Last vitals  BP      Temp      Pulse     Resp      SpO2        Post Anesthesia Care and Evaluation    Patient location during evaluation: bedside  Patient participation: complete - patient participated  Level of consciousness: awake  Pain score: 2  Pain management: adequate  Airway patency: patent  Anesthetic complications: No anesthetic complications    Cardiovascular status: acceptable  Respiratory status: acceptable  Hydration status: acceptable

## 2017-07-08 LAB
CYTO UR: NORMAL
LAB AP CASE REPORT: NORMAL
Lab: NORMAL
PATH REPORT.FINAL DX SPEC: NORMAL
PATH REPORT.GROSS SPEC: NORMAL

## 2017-07-08 PROCEDURE — 25010000002 VANCOMYCIN 10 G RECONSTITUTED SOLUTION: Performed by: SURGERY

## 2017-07-08 RX ORDER — ATROPINE SULFATE 10 MG/ML
1 SOLUTION/ DROPS OPHTHALMIC DAILY PRN
Status: DISCONTINUED | OUTPATIENT
Start: 2017-07-08 | End: 2017-07-10 | Stop reason: HOSPADM

## 2017-07-08 RX ADMIN — CARBIDOPA AND LEVODOPA 1 TABLET: 25; 250 TABLET ORAL at 16:31

## 2017-07-08 RX ADMIN — CARBIDOPA AND LEVODOPA 1 TABLET: 25; 250 TABLET ORAL at 08:03

## 2017-07-08 RX ADMIN — MULTIPLE VITAMINS W/ MINERALS TAB 1 TABLET: TAB at 08:03

## 2017-07-08 RX ADMIN — VANCOMYCIN HYDROCHLORIDE 1250 MG: 10 INJECTION, POWDER, LYOPHILIZED, FOR SOLUTION INTRAVENOUS at 10:32

## 2017-07-08 RX ADMIN — SODIUM HYPOCHLORITE 946 ML: 1.25 SOLUTION TOPICAL at 08:04

## 2017-07-08 RX ADMIN — APIXABAN 2.5 MG: 2.5 TABLET, FILM COATED ORAL at 20:12

## 2017-07-08 RX ADMIN — METOPROLOL TARTRATE 25 MG: 25 TABLET ORAL at 08:02

## 2017-07-08 RX ADMIN — SODIUM HYPOCHLORITE 946 ML: 1.25 SOLUTION TOPICAL at 16:31

## 2017-07-08 RX ADMIN — ATORVASTATIN CALCIUM 20 MG: 20 TABLET, FILM COATED ORAL at 08:03

## 2017-07-08 RX ADMIN — VANCOMYCIN HYDROCHLORIDE 1250 MG: 10 INJECTION, POWDER, LYOPHILIZED, FOR SOLUTION INTRAVENOUS at 20:14

## 2017-07-08 RX ADMIN — APIXABAN 2.5 MG: 2.5 TABLET, FILM COATED ORAL at 08:03

## 2017-07-08 RX ADMIN — CARBIDOPA AND LEVODOPA 1 TABLET: 25; 250 TABLET ORAL at 20:12

## 2017-07-08 RX ADMIN — SODIUM CHLORIDE 100 ML/HR: 9 INJECTION, SOLUTION INTRAVENOUS at 03:53

## 2017-07-08 NOTE — PLAN OF CARE
Problem: Patient Care Overview (Adult)  Goal: Plan of Care Review  Outcome: Ongoing (interventions implemented as appropriate)    07/08/17 1820   Coping/Psychosocial Response Interventions   Plan Of Care Reviewed With patient;spouse   Patient Care Overview   Progress improving   Outcome Evaluation   Outcome Summary/Follow up Plan VSS, frequent PAC's, continue to monitor, denies pain, drsg changed, pt up ambulating in fernandez and room       Goal: Adult Individualization and Mutuality  Outcome: Ongoing (interventions implemented as appropriate)    Problem: Fall Risk (Adult)  Goal: Identify Related Risk Factors and Signs and Symptoms  Outcome: Ongoing (interventions implemented as appropriate)    07/08/17 1820   Fall Risk   Fall Risk: Related Risk Factors history of falls   Fall Risk: Signs and Symptoms presence of risk factors       Goal: Absence of Falls  Outcome: Ongoing (interventions implemented as appropriate)    07/08/17 1820   Fall Risk (Adult)   Absence of Falls making progress toward outcome

## 2017-07-08 NOTE — PROGRESS NOTES
"DAILY PROGRESS NOTE  [facility]    Patient Identification:  Name: Casey Snowden  Age: 82 y.o.  Sex: male  :  1934  MRN: [id number]         Primary Care Physician: Aleksander Diez MD      Subjective  No c/o.     Objective:  General Appearance:  Comfortable, well-appearing, in no acute distress and not in pain.    Vital signs: (most recent): Blood pressure 169/72, pulse 61, temperature 98.5 °F (36.9 °C), temperature source Oral, resp. rate 16, height 71\" (180.3 cm), weight 180 lb 4.8 oz (81.8 kg), SpO2 98 %.    Lungs:  Normal respiratory rate and normal effort.  Breath sounds clear to auscultation.    Heart: Normal rate.  Regular rhythm.    Extremities: There is no dependent edema.    Neurological: Patient is alert and oriented to person, place and time.    Skin:  Warm and dry.                Vital signs in last 24 hours:  Temp:  [97.9 °F (36.6 °C)-99 °F (37.2 °C)] 98.5 °F (36.9 °C)  Heart Rate:  [57-70] 61  Resp:  [14-18] 16  BP: (129-169)/(69-86) 169/72    Intake/Output:    Intake/Output Summary (Last 24 hours) at 17 1851  Last data filed at 17 1815   Gross per 24 hour   Intake             2800 ml   Output             1650 ml   Net             1150 ml           Results from last 7 days  Lab Units 17  0854   WBC 10*3/mm3 7.16   HEMOGLOBIN g/dL 13.0*   PLATELETS 10*3/mm3 141     Results from last 7 days  Lab Units 17  0854   SODIUM mmol/L 144   POTASSIUM mmol/L 4.4   CHLORIDE mmol/L 106   CO2 mmol/L 27.4   BUN mg/dL 19   CREATININE mg/dL 0.83   GLUCOSE mg/dL 109*   Estimated Creatinine Clearance: 79.4 mL/min (by C-G formula based on Cr of 0.83).  Results from last 7 days  Lab Units 17  0854   CALCIUM mg/dL 9.1         Assessment:  Active Problems:    Benign essential hypertension - controlled.     Parkinson's disease    Paroxysmal a-fib - presently NSR    Hematoma    Chronic diastolic (congestive) heart failure    Anticoagulant long-term use    Presence of " IVC filter        Plan:  Medically stable. Thanks.     Kishore Fernandes MD  7/8/2017  6:51 PM

## 2017-07-08 NOTE — PROGRESS NOTES
Casey Mendez MD       LOS: 1 day   Patient Care Team:  Aleksander Diez MD as PCP - General  Kaye Landa MD as PCP - Claims Attributed  Joseph Acharya MD as Consulting Physician (Ophthalmology)  Ford Dorado MD as Consulting Physician (Vascular Surgery)  Kaye Landa MD as Consulting Physician (Neurology)  Kartik Valadez MD (Dermatology)    Subjective     82 y.o. male doing well after incision and drainage of right medial distal thigh seroma.  Cultures pending.  Cellulitis improving.  Base of wound clean and granulating.  Begin dressing changes with Dakin's.  Continue intravenous vancomycin.  Increase diet and activity.  Resume home Eliquis.      Review of Systems  Review of Systems - Negative except history of present illness      Objective     Vital Signs  Temp:  [97.2 °F (36.2 °C)-99 °F (37.2 °C)] 97.9 °F (36.6 °C)  Heart Rate:  [56-72] 70  Resp:  [14-18] 16  BP: (129-184)/() 155/86    Physical Exam  General: No acute distress. Alert and oriented x 4  HEENT: No jugular venous distension, trachea is midline  CV: RRR, S1S2  Resp: Clear unlabored breathing  Abd: Abdomen is soft, nontender, nondistended  Extremities: Right medial thigh wound clean and granulating.  Mild cellulitis.  Palpable pedal pulses.  No leg swelling   Results Review:     No results found for this or any previous visit (from the past 12 hour(s)).]      Assessment/Plan           Active Problems:    Benign essential hypertension    Parkinson's disease    A-fib    Hematoma    Chronic diastolic (congestive) heart failure    Anticoagulant long-term use    Presence of IVC filter      Assessment & Plan  82 y.o. male doing well after right leg surgery yesterday.  Begin dressing changes.  Resume Eliquis.  Continue antibiotics.  Await culture results.      Casey Mendez MD  07/08/17  8:29 AM

## 2017-07-09 LAB — VANCOMYCIN TROUGH SERPL-MCNC: 10.8 MCG/ML (ref 5–20)

## 2017-07-09 PROCEDURE — 80202 ASSAY OF VANCOMYCIN: CPT | Performed by: SURGERY

## 2017-07-09 PROCEDURE — 25010000002 VANCOMYCIN 10 G RECONSTITUTED SOLUTION: Performed by: SURGERY

## 2017-07-09 PROCEDURE — 25010000002 VANCOMYCIN: Performed by: SURGERY

## 2017-07-09 RX ADMIN — SODIUM HYPOCHLORITE 946 ML: 1.25 SOLUTION TOPICAL at 20:29

## 2017-07-09 RX ADMIN — VANCOMYCIN HYDROCHLORIDE 1750 MG: 1 INJECTION, POWDER, LYOPHILIZED, FOR SOLUTION INTRAVENOUS at 17:26

## 2017-07-09 RX ADMIN — VANCOMYCIN HYDROCHLORIDE 1250 MG: 10 INJECTION, POWDER, LYOPHILIZED, FOR SOLUTION INTRAVENOUS at 07:59

## 2017-07-09 RX ADMIN — CARBIDOPA AND LEVODOPA 1 TABLET: 25; 250 TABLET ORAL at 15:41

## 2017-07-09 RX ADMIN — CARBIDOPA AND LEVODOPA 1 TABLET: 25; 250 TABLET ORAL at 20:29

## 2017-07-09 RX ADMIN — CARBIDOPA AND LEVODOPA 1 TABLET: 25; 250 TABLET ORAL at 08:00

## 2017-07-09 RX ADMIN — ATORVASTATIN CALCIUM 20 MG: 20 TABLET, FILM COATED ORAL at 08:00

## 2017-07-09 RX ADMIN — APIXABAN 2.5 MG: 2.5 TABLET, FILM COATED ORAL at 20:29

## 2017-07-09 RX ADMIN — MULTIPLE VITAMINS W/ MINERALS TAB 1 TABLET: TAB at 08:00

## 2017-07-09 RX ADMIN — APIXABAN 2.5 MG: 2.5 TABLET, FILM COATED ORAL at 08:00

## 2017-07-09 RX ADMIN — SODIUM HYPOCHLORITE 946 ML: 1.25 SOLUTION TOPICAL at 09:00

## 2017-07-09 RX ADMIN — METOPROLOL TARTRATE 25 MG: 25 TABLET ORAL at 08:01

## 2017-07-09 NOTE — PLAN OF CARE
Problem: Patient Care Overview (Adult)  Goal: Plan of Care Review  Outcome: Ongoing (interventions implemented as appropriate)    07/09/17 0129   Coping/Psychosocial Response Interventions   Plan Of Care Reviewed With patient   Patient Care Overview   Progress improving   Outcome Evaluation   Outcome Summary/Follow up Plan HR bradycardic has been as low as mid 40s., frequent PAC's, Continue to monitor, denies pain, continue dressing changes.          07/09/17 0129   Coping/Psychosocial Response Interventions   Plan Of Care Reviewed With patient   Patient Care Overview   Progress improving   Outcome Evaluation   Outcome Summary/Follow up Plan HR bradycardic has been as low as mid 40s., frequent PAC's, Continue to monitor, denies pain, continue dressing changes.        Goal: Adult Individualization and Mutuality  Outcome: Ongoing (interventions implemented as appropriate)  Goal: Discharge Needs Assessment  Outcome: Ongoing (interventions implemented as appropriate)    Problem: Perioperative Period (Adult)  Goal: Signs and Symptoms of Listed Potential Problems Will be Absent or Manageable (Perioperative Period)  Outcome: Ongoing (interventions implemented as appropriate)    Problem: Fall Risk (Adult)  Goal: Identify Related Risk Factors and Signs and Symptoms  Outcome: Ongoing (interventions implemented as appropriate)  Goal: Absence of Falls  Outcome: Ongoing (interventions implemented as appropriate)

## 2017-07-09 NOTE — PROGRESS NOTES
"DAILY PROGRESS NOTE  [facility]    Patient Identification:  Name: Casey Snowden  Age: 82 y.o.  Sex: male  :  1934  MRN: [id number]         Primary Care Physician: Aleksander Diez MD      Subjective  No c/o.     Objective:  General Appearance:  Comfortable, well-appearing, in no acute distress and not in pain.    Vital signs: (most recent): Blood pressure 130/58, pulse 51, temperature 97.6 °F (36.4 °C), temperature source Oral, resp. rate 16, height 71\" (180.3 cm), weight 180 lb 4.8 oz (81.8 kg), SpO2 97 %.    Lungs:  Normal respiratory rate and normal effort.  Breath sounds clear to auscultation.    Heart: Normal rate.  Regular rhythm.    Extremities: There is dependent edema (Trace).    Neurological: Patient is oriented to person, place and time.    Skin:  Warm and dry.                Vital signs in last 24 hours:  Temp:  [97.2 °F (36.2 °C)-97.6 °F (36.4 °C)] 97.6 °F (36.4 °C)  Heart Rate:  [40-62] 51  Resp:  [16-24] 16  BP: (129-173)/(58-84) 130/58    Intake/Output:    Intake/Output Summary (Last 24 hours) at 17 1521  Last data filed at 17 1230   Gross per 24 hour   Intake             1216 ml   Output             1250 ml   Net              -34 ml           Results from last 7 days  Lab Units 17  0854   WBC 10*3/mm3 7.16   HEMOGLOBIN g/dL 13.0*   PLATELETS 10*3/mm3 141     Results from last 7 days  Lab Units 17  0854   SODIUM mmol/L 144   POTASSIUM mmol/L 4.4   CHLORIDE mmol/L 106   CO2 mmol/L 27.4   BUN mg/dL 19   CREATININE mg/dL 0.83   GLUCOSE mg/dL 109*   Estimated Creatinine Clearance: 79.4 mL/min (by C-G formula based on Cr of 0.83).  Results from last 7 days  Lab Units 17  0854   CALCIUM mg/dL 9.1         Assessment:  Active Problems:    Benign essential hypertension    Parkinson's disease    Paroxysmal a-fib    Hematoma    Chronic diastolic (congestive) heart failure    Anticoagulant long-term use    Presence of IVC filter        Plan:  Medically " stable.   Thanks.     Kishore Fernandes MD  7/9/2017  3:21 PM

## 2017-07-09 NOTE — PROGRESS NOTES
"Pharmacy to Dose Vancomycin IV    Day 3  Consult for Dr. Peña  Treating: Right thigh infected seroma  Goal trough/random: 15-20 mcg/mL     Current Vancomycin dose: 30.6 mg/Kg/day    IV Anti-Infectives     Ordered     Dose/Rate Route Frequency Start Stop    07/07/17 1431  vancomycin 1250 mg/250 mL 0.9% NS IVPB ([facility])     Ordering Provider:  Valentin Peña MD    1,250 mg  over 125 Minutes Intravenous Every 12 Hours 07/07/17 2000 07/07/17 0912  vancomycin 1250 mg/250 mL 0.9% NS IVPB ([facility])     Ordering Provider:  Valentin Peña MD    1,250 mg Intravenous Once 07/07/17 0945 07/07/17 0938      Relevant clinical data and objective history reviewed:  82 y.o. male 71\" (180.3 cm) 180 lb 4.8 oz (81.8 kg)  Body mass index is 25.15 kg/(m^2).    Results from last 7 days  Lab Units 07/07/17  0854   CREATININE mg/dL 0.83     Estimated Creatinine Clearance: 79.4 mL/min (by C-G formula based on Cr of 0.83).    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     Will increase Vancomycin to 1750 mg IV q12hrs (42.8 mg/Kg/day) & follow w levels.     Lab Results   Component Value Date    VANCO TROUGH 10.80 mcg/mL 07/09/2017 @ 07:17 am     Vancomycin Dosing History:  7/7 09:38 Vancomycin 1250 mg IV x1  7/8 Vancomycin 1250 mg IV q12hrs  7/* " 07:17 Vanc trough: 10.80 mcg/mL (prior to 5th dose)       Thank you for your consult,    Mary Sumner [facility]  07/09/17 10:31 AM

## 2017-07-09 NOTE — PLAN OF CARE
Problem: Patient Care Overview (Adult)  Goal: Plan of Care Review  Outcome: Ongoing (interventions implemented as appropriate)    07/09/17 8501   Coping/Psychosocial Response Interventions   Plan Of Care Reviewed With patient;spouse;sibling   Patient Care Overview   Progress improving   Outcome Evaluation   Outcome Summary/Follow up Plan VSS with brent/ PACs. Taking PO food and fluids well. Likely will get wound vac tomorrow. MD plans to order. Vanc trough low today and dose increased this montez. AMB steady in room and fernandez and up to BR et up in chair most of day. Will cont to monitor.       Goal: Adult Individualization and Mutuality  Outcome: Ongoing (interventions implemented as appropriate)  Goal: Discharge Needs Assessment  Outcome: Ongoing (interventions implemented as appropriate)    Problem: Perioperative Period (Adult)  Goal: Signs and Symptoms of Listed Potential Problems Will be Absent or Manageable (Perioperative Period)  Outcome: Ongoing (interventions implemented as appropriate)    Problem: Fall Risk (Adult)  Goal: Identify Related Risk Factors and Signs and Symptoms  Outcome: Outcome(s) achieved Date Met:  07/09/17

## 2017-07-09 NOTE — PROGRESS NOTES
Casey Mendez MD       LOS: 2 days   Patient Care Team:  Aleksander Diez MD as PCP - General  Kaye Landa MD as PCP - Claims Attributed  Joseph Acharya MD as Consulting Physician (Ophthalmology)  Ford Dorado MD as Consulting Physician (Vascular Surgery)  Kaye Landa MD as Consulting Physician (Neurology)  Kartik Valadez MD (Dermatology)    Subjective     82 y.o. male doing well after incision and drainage right medial thigh infected distal seroma.  Wound clean and granulating.  Cellulitis improving with intravenous antibiotics and Dakin's dressings.  Await final culture results.  Patient on Eliquis at home for atrial fibrillation which has been resumed.  Wound care may be best with wound VAC at home.  Discussed with patient.    Review of Systems  Review of Systems - Negative except history of present illness      Objective     Vital Signs  Temp:  [97.2 °F (36.2 °C)-98.5 °F (36.9 °C)] 97.6 °F (36.4 °C)  Heart Rate:  [52-62] 62  Resp:  [16-24] 18  BP: (152-173)/(72-84) 173/77    Physical Exam  General: No acute distress. Alert and oriented x 4  HEENT: No jugular venous distension, trachea is midline  CV: RRR, S1S2  Resp: Clear unlabored breathing  Abd: Abdomen is soft, nontender, nondistended  Extremities: Right medial thigh wound clean and granulating with cellulitis improving    Results Review:       Recent Results (from the past 12 hour(s))   Vancomycin, Trough Prior to dose due at 0800 on 7/9. Please make sure dose is not infusing prior to drawing level.    Collection Time: 07/09/17  7:17 AM   Result Value Ref Range    Vancomycin Trough 10.80 5.00 - 20.00 mcg/mL   ]      Assessment/Plan           Active Problems:    Benign essential hypertension    Parkinson's disease    Paroxysmal a-fib    Hematoma    Chronic diastolic (congestive) heart failure    Anticoagulant long-term use    Presence of IVC filter      Assessment & Plan  82 y.o. male with improving right medial thigh  wound and cellulitis.  Continue intravenous antibiotics until culture results known.  Consider instituting wound VAC to wound tomorrow.  Consider changing to oral antibiotics depending upon final culture results.      Casey Mendez MD  07/09/17  8:42 AM

## 2017-07-10 VITALS
DIASTOLIC BLOOD PRESSURE: 78 MMHG | SYSTOLIC BLOOD PRESSURE: 159 MMHG | OXYGEN SATURATION: 99 % | RESPIRATION RATE: 17 BRPM | WEIGHT: 180.3 LBS | TEMPERATURE: 97.7 F | BODY MASS INDEX: 25.24 KG/M2 | HEIGHT: 71 IN | HEART RATE: 50 BPM

## 2017-07-10 PROBLEM — L03.115 CELLULITIS OF RIGHT LOWER EXTREMITY WITHOUT FOOT: Status: ACTIVE | Noted: 2017-07-10

## 2017-07-10 LAB
ANION GAP SERPL CALCULATED.3IONS-SCNC: 11.9 MMOL/L
BUN BLD-MCNC: 12 MG/DL (ref 8–23)
BUN/CREAT SERPL: 16.9 (ref 7–25)
CALCIUM SPEC-SCNC: 8.3 MG/DL (ref 8.6–10.5)
CHLORIDE SERPL-SCNC: 108 MMOL/L (ref 98–107)
CO2 SERPL-SCNC: 24.1 MMOL/L (ref 22–29)
CREAT BLD-MCNC: 0.71 MG/DL (ref 0.76–1.27)
GFR SERPL CREATININE-BSD FRML MDRD: 106 ML/MIN/1.73
GLUCOSE BLD-MCNC: 104 MG/DL (ref 65–99)
MAGNESIUM SERPL-MCNC: 2.1 MG/DL (ref 1.6–2.4)
POTASSIUM BLD-SCNC: 3.8 MMOL/L (ref 3.5–5.2)
SODIUM BLD-SCNC: 144 MMOL/L (ref 136–145)

## 2017-07-10 PROCEDURE — 25010000002 VANCOMYCIN: Performed by: SURGERY

## 2017-07-10 PROCEDURE — 83735 ASSAY OF MAGNESIUM: CPT | Performed by: SURGERY

## 2017-07-10 PROCEDURE — 80048 BASIC METABOLIC PNL TOTAL CA: CPT | Performed by: HOSPITALIST

## 2017-07-10 RX ORDER — LOSARTAN POTASSIUM 25 MG/1
25 TABLET ORAL
Status: DISCONTINUED | OUTPATIENT
Start: 2017-07-10 | End: 2017-07-10 | Stop reason: HOSPADM

## 2017-07-10 RX ORDER — DOXYCYCLINE 100 MG/1
100 CAPSULE ORAL EVERY 12 HOURS SCHEDULED
Qty: 20 CAPSULE | Refills: 1 | Status: SHIPPED | OUTPATIENT
Start: 2017-07-10 | End: 2017-11-03

## 2017-07-10 RX ORDER — HYDROCODONE BITARTRATE AND ACETAMINOPHEN 7.5; 325 MG/1; MG/1
1 TABLET ORAL EVERY 4 HOURS PRN
Qty: 40 TABLET | Refills: 0 | Status: SHIPPED | OUTPATIENT
Start: 2017-07-10 | End: 2017-07-18

## 2017-07-10 RX ORDER — LOSARTAN POTASSIUM 25 MG/1
25 TABLET ORAL
Qty: 1 TABLET | Refills: 0 | Status: SHIPPED | OUTPATIENT
Start: 2017-07-10 | End: 2017-08-02 | Stop reason: SDUPTHER

## 2017-07-10 RX ORDER — DOXYCYCLINE 100 MG/1
100 CAPSULE ORAL EVERY 12 HOURS SCHEDULED
Status: DISCONTINUED | OUTPATIENT
Start: 2017-07-10 | End: 2017-07-10 | Stop reason: HOSPADM

## 2017-07-10 RX ADMIN — VANCOMYCIN HYDROCHLORIDE 1750 MG: 1 INJECTION, POWDER, LYOPHILIZED, FOR SOLUTION INTRAVENOUS at 05:46

## 2017-07-10 RX ADMIN — MULTIPLE VITAMINS W/ MINERALS TAB 1 TABLET: TAB at 08:09

## 2017-07-10 RX ADMIN — SODIUM HYPOCHLORITE 946 ML: 1.25 SOLUTION TOPICAL at 08:10

## 2017-07-10 RX ADMIN — LOSARTAN POTASSIUM 25 MG: 25 TABLET, FILM COATED ORAL at 11:05

## 2017-07-10 RX ADMIN — ATORVASTATIN CALCIUM 20 MG: 20 TABLET, FILM COATED ORAL at 08:09

## 2017-07-10 RX ADMIN — CARBIDOPA AND LEVODOPA 1 TABLET: 25; 250 TABLET ORAL at 08:09

## 2017-07-10 RX ADMIN — DOXYCYCLINE 100 MG: 100 CAPSULE ORAL at 09:49

## 2017-07-10 RX ADMIN — APIXABAN 2.5 MG: 2.5 TABLET, FILM COATED ORAL at 08:09

## 2017-07-10 NOTE — PROGRESS NOTES
"  Name: Casey Snowden  ADMIT: 2017   Age/Sex: 82 y.o.male LOS:  LOS: 3 days    :    1934     ROOM: [facility]   MRN:    [id number]    PCP:    Aleksander Diez MD     Subjective   No pain. No light-headedness or dizziness    Objective   Vital Signs  Temp:  [97.3 °F (36.3 °C)-97.7 °F (36.5 °C)] 97.6 °F (36.4 °C)  Heart Rate:  [40-59] 59  Resp:  [16-18] 17  BP: (129-187)/(58-80) 187/77  Body mass index is 25.15 kg/(m^2).    Objective:  General Appearance:  Comfortable and well-appearing.    Vital signs: (most recent): Blood pressure (!) 187/77, pulse 59, temperature 97.6 °F (36.4 °C), temperature source Oral, resp. rate 17, height 71\" (180.3 cm), weight 180 lb 4.8 oz (81.8 kg), SpO2 97 %.  Vital signs are normal.    HEENT: Normal HEENT exam.    Lungs:  Normal effort.  Breath sounds clear to auscultation.    Heart: Normal rate.  Regular rhythm.    Abdomen: Abdomen is soft and non-distended.  Bowel sounds are normal.   There is no abdominal tenderness.     Extremities: Normal range of motion.  There is no dependent edema.    Neurological: Patient is alert and oriented to person, place and time.    Skin:  Warm and dry.  No rash.             Results Review:       I reviewed the patient's new clinical results.    Results from last 7 days  Lab Units 17  0854   WBC 10*3/mm3 7.16   HEMOGLOBIN g/dL 13.0*   PLATELETS 10*3/mm3 141     Results from last 7 days  Lab Units 07/10/17  0326 17  0854   SODIUM mmol/L 144 144   POTASSIUM mmol/L 3.8 4.4   CHLORIDE mmol/L 108* 106   CO2 mmol/L 24.1 27.4   BUN mg/dL 12 19   CREATININE mg/dL 0.71* 0.83   GLUCOSE mg/dL 104* 109*   Estimated Creatinine Clearance: 82.4 mL/min (by C-G formula based on Cr of 0.71).  Results from last 7 days  Lab Units 07/10/17  0326 17  0854   CALCIUM mg/dL 8.3* 9.1   MAGNESIUM mg/dL 2.1  --        RADIOLOGY  7/10/2017  Pending      apixaban 2.5 mg Oral Q12H   atorvastatin 20 mg Oral Daily   carbidopa-levodopa 1 tablet Oral TID "   doxycycline 100 mg Oral Q12H   losartan 25 mg Oral Q24H   metoprolol tartrate 25 mg Oral Q12H   multivitamin with minerals 1 tablet Oral Daily   sodium hypochlorite 946 mL Irrigation Q12H       Pharmacy to dose vancomycin    Diet Regular      Assessment/Plan   Assessment:   Active Problems:    Benign essential hypertension    Parkinson's disease    Paroxysmal a-fib    Hematoma    Chronic diastolic (congestive) heart failure    Anticoagulant long-term use    Presence of IVC filter    Cellulitis of right lower extremity without foot        Plan:   - Had long discussion with wife and patient at bedside. BP high here to 190s/80s. HR has been 40s-50s. I will stop his metoprolol and restart losartan which he has been on in the past. Will watch him for a couple of hours and if he does ok then can be discharged from a medical perspective. He has follow up with his PCP in 1 week but will also need to follow up with his Neurologist in next 3-4 weeks as his Parkinson's can cause issues with his BP as well.       Disposition  TBD.      Johnny Duenas MD  [facility]  07/10/17  9:51 AM

## 2017-07-10 NOTE — PROGRESS NOTES
Continued Stay Note  [facility]     Patient Name: Casey Snowden  MRN: [id number]  Today's Date: 7/10/2017    Admit Date: 7/7/2017          Discharge Plan       07/10/17 1734    Final Note    Final Note Pt discharged home with [facility] and wound vac from [facility]              Discharge Codes       07/10/17 1733    Discharge Codes    Discharge Codes 06  Discharged/transferred to home under care of organized home health service organization in anticipation of skilled care        Expected Discharge Date and Time     Expected Discharge Date Expected Discharge Time    Jul 10, 2017             Mayuri Beltrán

## 2017-07-10 NOTE — DISCHARGE SUMMARY
Name: Casey CLARK Sp ADMIT: 2017   : 1934  PCP: Aleksander Diez MD    MRN: [id number] LOS: 3 days   AGE/SEX: 82 y.o. male  ROOM: [facility]     Date of Admission: 2017  Date of Discharge:  7/10/2017    PCP: Aleksander Diez MD      DISCHARGE DIAGNOSIS  Active Hospital Problems (** Indicates Principal Problem)    Diagnosis Date Noted   • Cellulitis of right lower extremity without foot [L03.115] 07/10/2017   • Hematoma [T14.8] 2017   • Chronic diastolic (congestive) heart failure [I50.32] 2017   • Anticoagulant long-term use [Z79.01] 2017   • Presence of IVC filter [Z95.828] 2017   • Paroxysmal a-fib [I48.0] 2016   • Benign essential hypertension [I10] 2016   • Parkinson's disease [G20] 2016      Resolved Hospital Problems    Diagnosis Date Noted Date Resolved   No resolved problems to display.       SECONDARY DIAGNOSES  Past Medical History:   Diagnosis Date   • Carotid stenosis    • Chronic deep vein thrombosis (DVT) of popliteal vein of right lower extremity    • DVT of lower extremity (deep venous thrombosis)    • Long-term use of high-risk medication    • Parkinson's disease    • Postphlebitic syndrome        CONSULTS   Consults     Date and Time Order Name Status Description    2017 1629 Inpatient Consult to Internal Medicine            PROCEDURES PERFORMED    Date: Admits 2017, discharge 7/10/2017    HOSPITAL COURSE  Patient is a 82 y.o. male presented to [facility] admitted for right leg cellulitis with infected seroma debrided and removed by Dr. Peña.  Wound is clear and appears to be healing well.  Cultures are staph aureus pansensitive.  He will begin doxycycline for a total of 14 day course.  He was on for days prior.  He will go home with 10 days' worth of antibiotic.  His follow-up will be in 2 weeks.  He will do Dakin's dressing changes until his wound VAC can be arranged..  Please see the admitting history and  "physical for further details.  Patient feels well is ambulatory and is strongly desires discharge      VITAL SIGNS  BP (!) 187/77 (BP Location: Right arm, Patient Position: Sitting)  Pulse 59  Temp 97.6 °F (36.4 °C) (Oral)   Resp 17  Ht 71\" (180.3 cm)  Wt 180 lb 4.8 oz (81.8 kg)  SpO2 97%  BMI 25.15 kg/m2  Objective  CONDITION ON DISCHARGE  Stable.      DISCHARGE DISPOSITION   Home-Health Care Svc      DISCHARGE MEDICATIONS   Casey Snowden   Home Medication Instructions APOLLO:[id number]    Printed on:07/10/17 0805   Medication Information                      atorvastatin (LIPITOR) 20 MG tablet  Take 1 tablet by mouth Daily.             atropine 1 % ophthalmic solution  Apply 1 drop to eye 3 (Three) Times a Day. For drooling             carbidopa-levodopa (SINEMET)  MG per tablet  1 tablet 3 (Three) Times a Day.             carboxymethylcellulose (REFRESH PLUS) 0.5 % solution  1 drop 3 (Three) Times a Day As Needed for dry eyes.             doxycycline (MONODOX) 100 MG capsule  Take 1 capsule by mouth Every 12 (Twelve) Hours. Indications: Skin and Soft Tissue Infection             ELIQUIS 2.5 MG tablet tablet  2.5 mg Every 12 (Twelve) Hours.             HYDROcodone-acetaminophen (NORCO) 7.5-325 MG per tablet  Take 1 tablet by mouth Every 4 (Four) Hours As Needed for Moderate Pain (4-6) for up to 7 days.             Multiple Vitamins-Minerals (PRESERVISION AREDS) capsule  Take  by mouth.             sodium hypochlorite in sterile water irrigation topical solution 0.0625%  Irrigate with 946 mL as directed Every 12 (Twelve) Hours.                Future Appointments  Date Time Provider Department Center   10/27/2017 8:30 AM [facility] None   11/3/2017 1:00 PM Aleksander Diez MD [facility] None     Additional Instructions for the Follow-ups that You Need to Schedule     Discharge Follow-Up With Specified Provider    As directed    To:  Dr Dorado   Follow Up:  2 Weeks           "   Follow-up Information     Follow up with Aleksander Diez MD .    Specialty:  Family Medicine    Contact information:    [address] [address] [phone]          Follow up with Kaye Landa MD .    Specialty:  Neurology    Contact information:    [address]  [address]  [facility] [place] [id number] [phone]            TEST  RESULTS PENDING AT DISCHARGE   Order Current Status    Anaerobic Culture In process    Fungus Culture In process    Wound Culture Preliminary result           Billin, Post Op Global    Ford Dorado MD  Office Number [phone]    07/10/17  8:05 AM

## 2017-07-10 NOTE — PROGRESS NOTES
Discharge Planning Assessment  [facility]     Patient Name: Casey Snowden  MRN: [id number]  Today's Date: 7/10/2017    Admit Date: 7/7/2017          Discharge Needs Assessment     None            Discharge Plan       07/10/17 0834    Case Management/Social Work Plan    Plan Home with [facility]    Patient/Family In Agreement With Plan yes    Additional Comments 0Spoke with patient at bedside.  Introduced self, explained CCP role, facesheet verified.  Pt states he prefers [facility] for home health.  Spoke with Santos who will follow and informed Santos of need for wound vac.  [employer] form order form for wound vac signed by Dr. Dorado and in CCP office.          Discharge Placement     No information found        Expected Discharge Date and Time     Expected Discharge Date Expected Discharge Time    Jul 10, 2017               Demographic Summary     None            Functional Status     None            Psychosocial     None            Abuse/Neglect     None            Legal     None            Substance Abuse     None            Patient Forms     None          Mayuri Beltrán

## 2017-07-10 NOTE — NURSING NOTE
S/W JEAN CLAUDE Vanessa regarding discharge today.  HH is following and will initiate wound vac.  Per Dr. Dorado's note, pt to continue Dakin's dressing changes until VAC set up.

## 2017-07-10 NOTE — PLAN OF CARE
Problem: Patient Care Overview (Adult)  Goal: Plan of Care Review  Outcome: Ongoing (interventions implemented as appropriate)  Plan possible wound vac in AM. Left inner thigh wound re-dressed with Dakins W-D. Denies pain. Sinus brent, HR 40's. Frequent PAC's, had short burst of Afib. Hopeful d/c in AM.     07/10/17 0159   Coping/Psychosocial Response Interventions   Plan Of Care Reviewed With patient   Patient Care Overview   Progress progress toward functional goals as expected         Problem: Fall Risk (Adult)  Goal: Absence of Falls  Outcome: Ongoing (interventions implemented as appropriate)    Problem: Infection, Risk/Actual (Adult)  Goal: Identify Related Risk Factors and Signs and Symptoms  Outcome: Outcome(s) achieved Date Met:  07/10/17  Goal: Infection Prevention/Resolution  Outcome: Ongoing (interventions implemented as appropriate)    Problem: Wound, Traumatic, Nonburn (Adult)  Goal: Signs and Symptoms of Listed Potential Problems Will be Absent or Manageable (Wound, Traumatic, Nonburn)  Outcome: Ongoing (interventions implemented as appropriate)

## 2017-07-10 NOTE — PROGRESS NOTES
Discharge Planning Assessment  [facility]     Patient Name: Casey Snowden  MRN: [id number]  Today's Date: 7/10/2017    Admit Date: 7/7/2017          Discharge Needs Assessment       07/10/17 0846    Living Environment    Lives With spouse    Living Arrangements house    Home Accessibility stairs to enter home    Number of Stairs to Enter Home 2    Transportation Available car;family or friend will provide    Living Environment    Provides Primary Care For no one    Quality Of Family Relationships supportive    Discharge Needs Assessment    Readmission Within The Last 30 Days no previous admission in last 30 days    Anticipated Changes Related to Illness none    Equipment Currently Used at Home none            Discharge Plan       07/10/17 0834    Case Management/Social Work Plan    Plan Home with [facility]    Patient/Family In Agreement With Plan yes    Additional Comments 0Spoke with patient at bedside.  Introduced self, explained CCP role, facesheet verified.  Pt states he prefers [facility] for home health.  Spoke with Santos who will follow and informed Santos of need for wound vac.  [employer] form order form for wound vac signed by Dr. Dorado and in CCP office.          Discharge Placement     No information found        Expected Discharge Date and Time     Expected Discharge Date Expected Discharge Time    Jul 10, 2017               Demographic Summary       07/10/17 0836    Referral Information    Admission Type inpatient    Referral Source admission list    Reason For Consult discharge planning    Contact Information    Permission Granted to Share Information With family/designee            Functional Status       07/10/17 0802    Functional Status Current    Ambulation 0-->independent    Transferring 0-->independent    Toileting 0-->independent    Bathing 0-->independent    Dressing 0-->independent    Eating 0-->independent    Communication 0-->understands/communicates without difficulty    Functional Status Prior     Ambulation 0-->independent    Transferring 0-->independent    Toileting 0-->independent    Bathing 0-->independent    Dressing 0-->independent    Communication 0-->understands/communicates without difficulty    Cognitive/Perceptual/Developmental    Current Mental Status/Cognitive Functioning no deficits noted            Psychosocial     None            Abuse/Neglect     None            Legal     None            Substance Abuse     None            Patient Forms     None          Mayuri Beltrán

## 2017-07-10 NOTE — PROGRESS NOTES
Ford Dorado MD       LOS: 3 days   Patient Care Team:  Aleksander Diez MD as PCP - General  Kaye Landa MD as PCP - Claims Attributed  Joseph Acharya MD as Consulting Physician (Ophthalmology)  Ford Dorado MD as Consulting Physician (Vascular Surgery)  Kaye Landa MD as Consulting Physician (Neurology)  Kartik Valadez MD (Dermatology)    Subjective     82 y.o. male doing well after incision and drainage right medial thigh infected distal seroma.  Wound clean and granulating.  Cellulitis improving with intravenous antibiotics and Dakin's dressings.  The results are coag-negative staph.  He is penicillin allergic.  We'll start him on doxycycline.  We'll continue a total of 2 week course.  Patient on Eliquis at home for atrial fibrillation which has been resumed.  Wound care may be best with wound VAC at home.  We'll try to arrange.  We'll go with home health until that can be started.  Discussed with patient.    Review of Systems  Review of Systems - Negative except history of present illness      Objective     Vital Signs  Temp:  [97.3 °F (36.3 °C)-97.7 °F (36.5 °C)] 97.6 °F (36.4 °C)  Heart Rate:  [40-62] 59  Resp:  [16-18] 17  BP: (129-187)/(58-80) 187/77    Physical Exam  General: No acute distress. Alert and oriented x 4  HEENT: No jugular venous distension, trachea is midline  CV: RRR, S1S2  Resp: Clear unlabored breathing  Abd: Abdomen is soft, nontender, nondistended  Extremities: Right medial thigh wound clean and granulating with cellulitis improving    Results Review:       Recent Results (from the past 12 hour(s))   Basic Metabolic Panel    Collection Time: 07/10/17  3:26 AM   Result Value Ref Range    Glucose 104 (H) 65 - 99 mg/dL    BUN 12 8 - 23 mg/dL    Creatinine 0.71 (L) 0.76 - 1.27 mg/dL    Sodium 144 136 - 145 mmol/L    Potassium 3.8 3.5 - 5.2 mmol/L    Chloride 108 (H) 98 - 107 mmol/L    CO2 24.1 22.0 - 29.0 mmol/L    Calcium 8.3 (L) 8.6 - 10.5 mg/dL    eGFR  Non  Amer 106 >60 mL/min/1.73    BUN/Creatinine Ratio 16.9 7.0 - 25.0    Anion Gap 11.9 mmol/L   Magnesium    Collection Time: 07/10/17  3:26 AM   Result Value Ref Range    Magnesium 2.1 1.6 - 2.4 mg/dL   ]      Assessment/Plan           Active Problems:    Benign essential hypertension    Parkinson's disease    Paroxysmal a-fib    Hematoma    Chronic diastolic (congestive) heart failure    Anticoagulant long-term use    Presence of IVC filter      Assessment & Plan  82 y.o. male with improving right medial thigh wound and cellulitis.  Continue intravenous antibiotics until culture results known.  Consider instituting wound VAC to wound tomorrow.  Consider changing to oral antibiotics depending upon final culture results.      Ford Dorado MD  07/10/17  7:58 AM

## 2017-07-11 ENCOUNTER — TRANSITIONAL CARE MANAGEMENT TELEPHONE ENCOUNTER (OUTPATIENT)
Dept: FAMILY MEDICINE CLINIC | Facility: CLINIC | Age: 82
End: 2017-07-11

## 2017-07-11 LAB
BACTERIA SPEC AEROBE CULT: ABNORMAL
GRAM STN SPEC: ABNORMAL
GRAM STN SPEC: ABNORMAL

## 2017-07-12 LAB — BACTERIA SPEC ANAEROBE CULT: NORMAL

## 2017-07-18 ENCOUNTER — OFFICE VISIT (OUTPATIENT)
Dept: FAMILY MEDICINE CLINIC | Facility: CLINIC | Age: 82
End: 2017-07-18

## 2017-07-18 VITALS
BODY MASS INDEX: 25.47 KG/M2 | HEIGHT: 71 IN | WEIGHT: 181.9 LBS | DIASTOLIC BLOOD PRESSURE: 68 MMHG | HEART RATE: 51 BPM | SYSTOLIC BLOOD PRESSURE: 130 MMHG | OXYGEN SATURATION: 98 % | TEMPERATURE: 97.8 F

## 2017-07-18 DIAGNOSIS — G20 PARKINSON'S DISEASE (HCC): ICD-10-CM

## 2017-07-18 DIAGNOSIS — I10 BENIGN ESSENTIAL HYPERTENSION: Primary | ICD-10-CM

## 2017-07-18 DIAGNOSIS — L08.9 TRAUMATIC HEMATOMA OF RIGHT LOWER LEG WITH INFECTION, SEQUELA: ICD-10-CM

## 2017-07-18 DIAGNOSIS — S80.11XS TRAUMATIC HEMATOMA OF RIGHT LOWER LEG WITH INFECTION, SEQUELA: ICD-10-CM

## 2017-07-18 DIAGNOSIS — Z09 HOSPITAL DISCHARGE FOLLOW-UP: ICD-10-CM

## 2017-07-18 PROCEDURE — 99496 TRANSJ CARE MGMT HIGH F2F 7D: CPT | Performed by: FAMILY MEDICINE

## 2017-07-18 NOTE — PROGRESS NOTES
"Transitional Care Follow Up Visit  Subjective     Casey Snowden is a 83 y.o. male who presents for a transitional care management visit.    Within 48 business hours after discharge our office contacted him via telephone to coordinate his care and needs.      I reviewed and discussed the details of that call along with the discharge summary, hospital problems, inpatient lab results, inpatient diagnostic studies, and consultation reports with Casey.    Date of TCM Phone Call 7/11/2017   Date of Admission 7/7/2017   Date of Discharge 7/10/2017   Discharge Disposition Home or Self Care       History of Present Illness   Course During Hospital Stay:  Admitted for I&D of infected seroma of calf  On doxy now  Bp went up and HR went low, toprol stopped and replaced with cozaar       The following portions of the patient's history were reviewed and updated as appropriate: allergies, current medications, past medical history, past social history, past surgical history and problem list.    Review of Systems    Objective   Physical Exam   Constitutional: He appears well-developed and well-nourished. No distress.   Cardiovascular: Normal rate and intact distal pulses.    Pulmonary/Chest: Effort normal.   Musculoskeletal:        Legs:  Neurological: He is alert.   Nursing note and vitals reviewed.       Fungus Culture   Order: [id number]   Status:  Preliminary result   Visible to patient:  No (Not Released) Dx:  Hematoma   Specimen Information: Thigh, Right; Body Fluid        Culture   No fungus isolated at 1 week           Tissue Exam   Order: [id number]   Status:  Final result   Visible to patient:  No (Not Released) Dx:  Hematoma      11d ago     Final Diagnosis   1: \"SEROMA WALL\", PARTIAL EXCISIONAL SPECIMEN:  FIBROMEMBRANOUS AND FATTY TISSUE WITH EXTENSIVE ACUTE AND CHRONIC INFLAMMATION.  OVERLYING SKIN WITH MILD REACTIVE CHANGE.           Anaerobic Culture   Order: [id number]   Status:  Final result   Visible to patient:  " No (Not Released) Dx:  Hematoma   Specimen Information: Thigh, Right; Body Fluid        Culture   No anaerobes isolated at 5 days              Specimen Information: Thigh, Right; Wound        Culture   Rare Staphylococcus lugdunensis (A)        Stain   Rare (1+) WBCs seen      No organisms seen      Resulting Agency: [facility]   Susceptibility    Staphylococcus lugdunensis     LORI     Clindamycin >=8 ug/ml Resistant     Erythromycin Intermediate     Oxacillin 0.5 ug/ml Susceptible     Penicillin G >=0.5 ug/ml Resistant     Rifampin <=0.5 ug/ml Susceptible     Tetracycline <=1 ug/ml Susceptible     Trimethoprim + Sulfamethoxazole <=10 ug/ml Susceptible     Vancomycin <=0.5 ug/ml Susceptible              Specimen Collected: 07/07/17 11:44 AM Last Resulted: 07/11/17  1:06 PM                  Assessment/Plan   Casey was seen today for transitional care management and hypertension.    Diagnoses and all orders for this visit:    Benign essential hypertension    Parkinson's disease    Traumatic hematoma of right lower leg with infection, sequela    Hospital discharge follow-up      Finish all doxy, he had 2 bottles.  Start yogurt and pro biotics today to avoid diarrhea  See Dr lopez next week  Current with home health for wound vac  Stay on small dose cozaar for now  But anticipated bp dropping again in future, and may stop if this occurs

## 2017-07-18 NOTE — PATIENT INSTRUCTIONS
Finish all doxy, he had 2 bottles.  Start yogurt and pro biotics today to avoid diarrhea  See Dr lopez next week  Current with home health for wound vac  Stay on small dose cozaar for now  But anticipated bp dropping again in future, and may stop if this occurs

## 2017-08-02 RX ORDER — LOSARTAN POTASSIUM 25 MG/1
TABLET ORAL
Qty: 30 TABLET | Refills: 0 | Status: SHIPPED | OUTPATIENT
Start: 2017-08-02 | End: 2017-08-31 | Stop reason: SDUPTHER

## 2017-08-04 LAB — FUNGUS WND CULT: NORMAL

## 2017-09-01 RX ORDER — LOSARTAN POTASSIUM 25 MG/1
TABLET ORAL
Qty: 90 TABLET | Refills: 0 | Status: SHIPPED | OUTPATIENT
Start: 2017-09-01 | End: 2017-11-03 | Stop reason: SDUPTHER

## 2017-10-27 DIAGNOSIS — Z00.00 ROUTINE ADULT HEALTH MAINTENANCE: ICD-10-CM

## 2017-10-27 DIAGNOSIS — Z51.81 MEDICATION MONITORING ENCOUNTER: ICD-10-CM

## 2017-10-27 DIAGNOSIS — R53.83 FATIGUE, UNSPECIFIED TYPE: ICD-10-CM

## 2017-10-27 DIAGNOSIS — I10 BENIGN ESSENTIAL HYPERTENSION: Primary | ICD-10-CM

## 2017-10-27 DIAGNOSIS — R35.1 NOCTURIA: ICD-10-CM

## 2017-10-27 DIAGNOSIS — E78.5 HYPERLIPIDEMIA, UNSPECIFIED HYPERLIPIDEMIA TYPE: ICD-10-CM

## 2017-10-27 LAB
ALT SERPL-CCNC: 7 U/L (ref 5–41)
APPEARANCE UR: CLEAR
BILIRUB UR QL STRIP: NEGATIVE
BUN SERPL-MCNC: 19 MG/DL (ref 8–23)
BUN/CREAT SERPL: 21.8 (ref 7–25)
CALCIUM SERPL-MCNC: 8.9 MG/DL (ref 8.8–10.5)
CHLORIDE SERPL-SCNC: 106 MMOL/L (ref 98–107)
CHOLEST SERPL-MCNC: 165 MG/DL (ref 0–200)
CO2 SERPL-SCNC: 27.6 MMOL/L (ref 22–29)
COLOR UR: YELLOW
CREAT SERPL-MCNC: 0.87 MG/DL (ref 0.76–1.27)
ERYTHROCYTE [DISTWIDTH] IN BLOOD BY AUTOMATED COUNT: 14.6 % (ref 11.5–14.5)
GFR SERPLBLD CREATININE-BSD FMLA CKD-EPI: 102 ML/MIN/1.73
GFR SERPLBLD CREATININE-BSD FMLA CKD-EPI: 84 ML/MIN/1.73
GLUCOSE SERPL-MCNC: 101 MG/DL (ref 65–99)
GLUCOSE UR QL: NEGATIVE
HCT VFR BLD AUTO: 41.5 % (ref 42–52)
HDLC SERPL-MCNC: 59 MG/DL (ref 40–60)
HGB BLD-MCNC: 13.8 G/DL (ref 14–18)
HGB UR QL STRIP: NEGATIVE
KETONES UR QL STRIP: ABNORMAL
LDLC SERPL CALC-MCNC: 95 MG/DL (ref 0–100)
LDLC/HDLC SERPL: 1.61 {RATIO}
LEUKOCYTE ESTERASE UR QL STRIP: NEGATIVE
MCH RBC QN AUTO: 32.6 PG (ref 27–31)
MCHC RBC AUTO-ENTMCNC: 33.3 G/DL (ref 31–37)
MCV RBC AUTO: 98.1 FL (ref 80–94)
NITRITE UR QL STRIP: NEGATIVE
PH UR STRIP: 5.5 [PH] (ref 4.5–8)
PLATELET # BLD AUTO: 176 10*3/MM3 (ref 140–500)
POTASSIUM SERPL-SCNC: 4.2 MMOL/L (ref 3.5–5.2)
PROT UR QL STRIP: ABNORMAL
PSA SERPL-MCNC: 0.93 NG/ML (ref 0–4)
RBC # BLD AUTO: 4.23 10*6/MM3 (ref 4.7–6.1)
SODIUM SERPL-SCNC: 144 MMOL/L (ref 136–145)
SP GR UR: 1.03 (ref 1–1.03)
TRIGL SERPL-MCNC: 56 MG/DL (ref 0–150)
UROBILINOGEN UR STRIP-MCNC: ABNORMAL MG/DL
VLDLC SERPL CALC-MCNC: 11.2 MG/DL (ref 8–32)
WBC # BLD AUTO: 5.06 10*3/MM3 (ref 4.8–10.8)

## 2017-11-03 ENCOUNTER — OFFICE VISIT (OUTPATIENT)
Dept: FAMILY MEDICINE CLINIC | Facility: CLINIC | Age: 82
End: 2017-11-03

## 2017-11-03 VITALS
HEART RATE: 53 BPM | TEMPERATURE: 97.9 F | HEIGHT: 71 IN | OXYGEN SATURATION: 96 % | SYSTOLIC BLOOD PRESSURE: 152 MMHG | WEIGHT: 176.6 LBS | DIASTOLIC BLOOD PRESSURE: 80 MMHG | BODY MASS INDEX: 24.72 KG/M2

## 2017-11-03 DIAGNOSIS — Z23 NEED FOR INFLUENZA VACCINATION: ICD-10-CM

## 2017-11-03 DIAGNOSIS — E78.2 MIXED HYPERLIPIDEMIA: ICD-10-CM

## 2017-11-03 DIAGNOSIS — G20 PARKINSON'S DISEASE (HCC): ICD-10-CM

## 2017-11-03 DIAGNOSIS — D50.0 IRON DEFICIENCY ANEMIA DUE TO CHRONIC BLOOD LOSS: ICD-10-CM

## 2017-11-03 DIAGNOSIS — I50.32 CHRONIC DIASTOLIC CONGESTIVE HEART FAILURE (HCC): Chronic | ICD-10-CM

## 2017-11-03 DIAGNOSIS — Z00.00 MEDICARE ANNUAL WELLNESS VISIT, SUBSEQUENT: ICD-10-CM

## 2017-11-03 DIAGNOSIS — Z79.01 ANTICOAGULANT LONG-TERM USE: Primary | Chronic | ICD-10-CM

## 2017-11-03 DIAGNOSIS — I10 BENIGN ESSENTIAL HYPERTENSION: ICD-10-CM

## 2017-11-03 DIAGNOSIS — Z23 NEED FOR VACCINATION FOR PNEUMOCOCCUS: ICD-10-CM

## 2017-11-03 DIAGNOSIS — I82.531 CHRONIC DEEP VEIN THROMBOSIS (DVT) OF POPLITEAL VEIN OF RIGHT LOWER EXTREMITY (HCC): ICD-10-CM

## 2017-11-03 PROCEDURE — 90670 PCV13 VACCINE IM: CPT | Performed by: FAMILY MEDICINE

## 2017-11-03 PROCEDURE — G0439 PPPS, SUBSEQ VISIT: HCPCS | Performed by: FAMILY MEDICINE

## 2017-11-03 PROCEDURE — 90662 IIV NO PRSV INCREASED AG IM: CPT | Performed by: FAMILY MEDICINE

## 2017-11-03 PROCEDURE — G0009 ADMIN PNEUMOCOCCAL VACCINE: HCPCS | Performed by: FAMILY MEDICINE

## 2017-11-03 PROCEDURE — G0008 ADMIN INFLUENZA VIRUS VAC: HCPCS | Performed by: FAMILY MEDICINE

## 2017-11-03 RX ORDER — LOSARTAN POTASSIUM 25 MG/1
25 TABLET ORAL DAILY
Qty: 90 TABLET | Refills: 3 | Status: SHIPPED | OUTPATIENT
Start: 2017-11-03 | End: 2018-05-11 | Stop reason: SDUPTHER

## 2017-11-03 NOTE — PROGRESS NOTES
QUICK REFERENCE INFORMATION:  The ABCs of the Annual Wellness Visit    Subsequent Medicare Wellness Visit    HEALTH RISK ASSESSMENT    1934    Recent Hospitalizations:  Recently treated at the following:  [facility].    Battled a lot of health problems this yr  Had a large right leg hematoma  evac by ortho  Got infected  Surgery again by vascular , large seroma  Wound vac at home for months  Wound finally healed   [facility], great job  Went to a college football game.    Now with a melanoma on his forehead  Going in for mohs.      Current Medical Providers:  Patient Care Team:  Aleksander Diez MD as PCP - General  Kaye Landa MD as PCP - Claims Attributed  Joseph Acharya MD as Consulting Physician (Ophthalmology)  Ford Dorado MD as Consulting Physician (Vascular Surgery)  Kaye Landa MD as Consulting Physician (Neurology)  Kartik Valadez MD (Dermatology)        Smoking Status:  History   Smoking Status   • Never Smoker   Smokeless Tobacco   • Never Used       Alcohol Consumption:  History   Alcohol Use   • 1.2 oz/week   • 2 Glasses of wine per week       Depression Screen:   PHQ-2/PHQ-9 Depression Screening 11/3/2017   Little interest or pleasure in doing things 0   Feeling down, depressed, or hopeless 0   Total Score 0       Health Habits and Functional and Cognitive Screening:  Functional & Cognitive Status 11/3/2017   Do you have difficulty preparing food and eating? No   Do you have difficulty bathing yourself, getting dressed or grooming yourself? No   Do you have difficulty using the toilet? No   Do you have difficulty moving around from place to place? No   Do you have trouble with steps or getting out of a bed or a chair? No   In the past year have you fallen or experienced a near fall? No   Current Diet Well Balanced Diet   Dental Exam Up to date   Eye Exam Up to date   Exercise (times per week) 4 times per week   Current Exercise  Activities Include Walking   Do you need help using the phone?  No   Are you deaf or do you have serious difficulty hearing?  Yes   Do you need help with transportation? No   Do you need help shopping? No   Do you need help preparing meals?  No   Do you need help with housework?  No   Do you need help with laundry? No   Do you need help taking your medications? No   Do you need help managing money? No   Have you felt unusual stress, anger or loneliness in the last month? No   Who do you live with? Spouse   If you need help, do you have trouble finding someone available to you? No   Have you been bothered in the last four weeks by sexual problems? No   Do you have difficulty concentrating, remembering or making decisions? No           Does the patient have evidence of cognitive impairment? Yes    Aspirin use counseling: on lifelong anti coag for dvt      Recent Lab Results:  CMP:  Lab Results   Component Value Date     (H) 10/27/2017    BUN 19 10/27/2017    CREATININE 0.87 10/27/2017    EGFRIFNONA 84 10/27/2017    EGFRIFAFRI 102 10/27/2017    BCR 21.8 10/27/2017     10/27/2017    K 4.2 10/27/2017    CO2 27.6 10/27/2017    CALCIUM 8.9 10/27/2017    PROTENTOTREF 6.1 04/25/2017    ALBUMIN 3.90 04/25/2017    LABGLOBREF 2.2 04/25/2017    LABIL2 1.8 04/25/2017    BILITOT 0.6 04/25/2017    ALKPHOS 70 04/25/2017    AST 12 04/25/2017    ALT 7 10/27/2017     Lipid Panel:  Lab Results   Component Value Date    TRIG 56 10/27/2017    HDL 59 10/27/2017    VLDL 11.2 10/27/2017    LDLHDL 1.61 10/27/2017     HbA1c:       Visual Acuity:  No exam data present    Age-appropriate Screening Schedule:  Refer to the list below for future screening recommendations based on patient's age, sex and/or medical conditions. Orders for these recommended tests are listed in the plan section. The patient has been provided with a written plan.    Health Maintenance   Topic Date Due   • TDAP/TD VACCINES (1 - Tdap) 07/12/1953   • LIPID  PANEL  10/27/2018   • INFLUENZA VACCINE  Completed   • PNEUMOCOCCAL VACCINES (65+ LOW/MEDIUM RISK)  Completed   • ZOSTER VACCINE  Completed        Subjective   History of Present Illness    Casey Snowden is a 83 y.o. male who presents for an Subsequent Wellness Visit.    The following portions of the patient's history were reviewed and updated as appropriate: allergies, current medications, past family history, past medical history, past social history, past surgical history and problem list.    Outpatient Medications Prior to Visit   Medication Sig Dispense Refill   • atorvastatin (LIPITOR) 20 MG tablet Take 1 tablet by mouth Daily. 90 tablet 3   • atropine 1 % ophthalmic solution Apply 1 drop to eye 3 (Three) Times a Day. For drooling     • carbidopa-levodopa (SINEMET)  MG per tablet 1 tablet 3 (Three) Times a Day.     • carboxymethylcellulose (REFRESH PLUS) 0.5 % solution 1 drop 3 (Three) Times a Day As Needed for dry eyes.     • ELIQUIS 2.5 MG tablet tablet 2.5 mg Every 12 (Twelve) Hours.     • Multiple Vitamins-Minerals (PRESERVISION AREDS) capsule Take  by mouth.     • doxycycline (MONODOX) 100 MG capsule Take 1 capsule by mouth Every 12 (Twelve) Hours. Indications: Skin and Soft Tissue Infection 20 capsule 1   • losartan (COZAAR) 25 MG tablet TAKE 1 TABLET BY MOUTH DAILY. 90 tablet 0   • sodium hypochlorite in sterile water irrigation topical solution 0.0625% Irrigate with 946 mL as directed Every 12 (Twelve) Hours. 1000 mL 2     No facility-administered medications prior to visit.        Patient Active Problem List   Diagnosis   • Benign essential hypertension   • Stenosis of carotid artery   • Mixed hyperlipidemia   • Parkinson's disease   • Peripheral arterial occlusive disease   • Routine adult health maintenance   • Screening for prostate cancer   • Medication monitoring encounter   • Melanoma   • Chronic deep vein thrombosis (DVT) of popliteal vein of right lower extremity   • Uses hearing aid  "  • Cellulitis of right lower extremity   • Sepsis   • Traumatic hematoma of right lower leg with infection   • Paroxysmal a-fib   • Hospital discharge follow-up   • Localized edema   • Hematoma   • Chronic diastolic (congestive) heart failure   • Anticoagulant long-term use   • Presence of IVC filter   • Cellulitis of right lower extremity without foot   • Medicare annual wellness visit, subsequent   • Iron deficiency anemia due to chronic blood loss       Advance Care Planning:  has an advance directive - a copy HAS NOT been provided    Identification of Risk Factors:  Risk factors include: increased fall risk, caretaker stress and cognitive impairment.    Review of Systems    Compared to one year ago, the patient feels his physical health is worse.  Compared to one year ago, the patient feels his mental health is better.    Objective     Physical Exam   Constitutional: He appears well-developed and well-nourished. No distress.   HENT:   Mouth/Throat: Oropharynx is clear and moist.   Cardiovascular: Normal rate.    Pulmonary/Chest: Effort normal.   Abdominal: Soft.   Neurological: He is alert. No cranial nerve deficit. He exhibits abnormal muscle tone. Coordination abnormal.   Psychiatric: He has a normal mood and affect.   Nursing note and vitals reviewed.      Vitals:    11/03/17 1258   BP: 152/80   BP Location: Left arm   Patient Position: Sitting   Pulse: 53   Temp: 97.9 °F (36.6 °C)   SpO2: 96%   Weight: 176 lb 9.6 oz (80.1 kg)   Height: 71\" (180.3 cm)   PainSc: 0-No pain       Body mass index is 24.63 kg/(m^2).  Discussed the patient's BMI with him. The BMI is in the acceptable range.    Assessment/Plan   Patient Self-Management and Personalized Health Advice  The patient has been provided with information about: diet, exercise and fall prevention and preventive services including:   · Diabetes screening, see lab orders, Exercise counseling provided, Influenza vaccine, Pneumococcal vaccine .    Visit " Diagnoses:    ICD-10-CM ICD-9-CM   1. Anticoagulant long-term use Z79.01 V58.61   2. Need for influenza vaccination Z23 V04.81   3. Need for vaccination for pneumococcus Z23 V03.82   4. Benign essential hypertension I10 401.1   5. Chronic deep vein thrombosis (DVT) of popliteal vein of right lower extremity I82.531 453.51   6. Chronic diastolic (congestive) heart failure I50.32 428.32     428.0   7. Mixed hyperlipidemia E78.2 272.2   8. Parkinson's disease G20 332.0   9. Medicare annual wellness visit, subsequent Z00.00 V70.0   10. Iron deficiency anemia due to chronic blood loss D50.0 280.0       Orders Placed This Encounter   Procedures   • Flu Vaccine High Dose PF 65YR+   • Pneumococcal Conjugate Vaccine 13-Valent All   • Vitamin B12     Standing Status:   Future     Standing Expiration Date:   11/3/2018   • Folate     Standing Status:   Future     Standing Expiration Date:   11/3/2018   • Iron and TIBC     Standing Status:   Future   • Ferritin     Standing Status:   Future   • Hemoglobin & Hematocrit, Blood     Standing Status:   Future       Outpatient Encounter Prescriptions as of 11/3/2017   Medication Sig Dispense Refill   • atorvastatin (LIPITOR) 20 MG tablet Take 1 tablet by mouth Daily. 90 tablet 3   • atropine 1 % ophthalmic solution Apply 1 drop to eye 3 (Three) Times a Day. For drooling     • carbidopa-levodopa (SINEMET)  MG per tablet 1 tablet 3 (Three) Times a Day.     • carboxymethylcellulose (REFRESH PLUS) 0.5 % solution 1 drop 3 (Three) Times a Day As Needed for dry eyes.     • ELIQUIS 2.5 MG tablet tablet 2.5 mg Every 12 (Twelve) Hours.     • losartan (COZAAR) 25 MG tablet Take 1 tablet by mouth Daily. Indications: High Blood Pressure Disorder 90 tablet 3   • Multiple Vitamins-Minerals (PRESERVISION AREDS) capsule Take  by mouth.     • [DISCONTINUED] doxycycline (MONODOX) 100 MG capsule Take 1 capsule by mouth Every 12 (Twelve) Hours. Indications: Skin and Soft Tissue Infection 20 capsule  1   • [DISCONTINUED] losartan (COZAAR) 25 MG tablet TAKE 1 TABLET BY MOUTH DAILY. 90 tablet 0   • [DISCONTINUED] sodium hypochlorite in sterile water irrigation topical solution 0.0625% Irrigate with 946 mL as directed Every 12 (Twelve) Hours. 1000 mL 2     No facility-administered encounter medications on file as of 11/3/2017.        Reviewed use of high risk medication in the elderly: yes  Reviewed for potential of harmful drug interactions in the elderly: yes    Follow Up:  Return in about 6 months (around 5/3/2018).         An After Visit Summary and PPPS with all of these plans were given to the patient.

## 2017-11-27 RX ORDER — LOSARTAN POTASSIUM 25 MG/1
TABLET ORAL
Qty: 90 TABLET | Refills: 0 | Status: SHIPPED | OUTPATIENT
Start: 2017-11-27 | End: 2018-05-11 | Stop reason: SDUPTHER

## 2018-03-20 DIAGNOSIS — E78.2 MIXED HYPERLIPIDEMIA: ICD-10-CM

## 2018-03-21 RX ORDER — ATORVASTATIN CALCIUM 20 MG/1
20 TABLET, FILM COATED ORAL DAILY
Qty: 90 TABLET | Refills: 0 | Status: SHIPPED | OUTPATIENT
Start: 2018-03-21 | End: 2018-05-11 | Stop reason: SDUPTHER

## 2018-04-03 ENCOUNTER — RESULTS ENCOUNTER (OUTPATIENT)
Dept: FAMILY MEDICINE CLINIC | Facility: CLINIC | Age: 83
End: 2018-04-03

## 2018-04-03 DIAGNOSIS — D50.0 IRON DEFICIENCY ANEMIA DUE TO CHRONIC BLOOD LOSS: ICD-10-CM

## 2018-05-04 LAB
FERRITIN SERPL-MCNC: 104 NG/ML (ref 30–400)
FOLATE SERPL-MCNC: 8.47 NG/ML (ref 4.78–20)
HCT VFR BLD AUTO: 40.3 % (ref 42–52)
HGB BLD-MCNC: 13.3 G/DL (ref 14–18)
IRON SATN MFR SERPL: 20 %
IRON SERPL-MCNC: 43 MCG/DL (ref 59–158)
TIBC SERPL-MCNC: 215 MCG/DL (ref 261–498)
UIBC SERPL-MCNC: 172 MCG/DL (ref 112–346)
VIT B12 SERPL-MCNC: 567 PG/ML (ref 232–1245)

## 2018-05-11 ENCOUNTER — OFFICE VISIT (OUTPATIENT)
Dept: FAMILY MEDICINE CLINIC | Facility: CLINIC | Age: 83
End: 2018-05-11

## 2018-05-11 VITALS
OXYGEN SATURATION: 97 % | HEART RATE: 59 BPM | BODY MASS INDEX: 24.5 KG/M2 | SYSTOLIC BLOOD PRESSURE: 140 MMHG | TEMPERATURE: 97.8 F | DIASTOLIC BLOOD PRESSURE: 72 MMHG | HEIGHT: 71 IN | WEIGHT: 175 LBS

## 2018-05-11 DIAGNOSIS — E78.2 MIXED HYPERLIPIDEMIA: Primary | ICD-10-CM

## 2018-05-11 DIAGNOSIS — D50.8 IRON DEFICIENCY ANEMIA SECONDARY TO INADEQUATE DIETARY IRON INTAKE: ICD-10-CM

## 2018-05-11 DIAGNOSIS — I50.32 CHRONIC DIASTOLIC CONGESTIVE HEART FAILURE (HCC): Chronic | ICD-10-CM

## 2018-05-11 PROBLEM — T14.8XXA HEMATOMA: Status: RESOLVED | Noted: 2017-07-07 | Resolved: 2018-05-11

## 2018-05-11 PROCEDURE — 99213 OFFICE O/P EST LOW 20 MIN: CPT | Performed by: FAMILY MEDICINE

## 2018-05-11 RX ORDER — PRAMIPEXOLE DIHYDROCHLORIDE 0.5 MG/1
TABLET ORAL
Refills: 3 | Status: ON HOLD | COMMUNITY
Start: 2018-04-28 | End: 2019-06-03

## 2018-05-11 RX ORDER — ATORVASTATIN CALCIUM 20 MG/1
20 TABLET, FILM COATED ORAL DAILY
Qty: 90 TABLET | Refills: 3 | Status: SHIPPED | OUTPATIENT
Start: 2018-05-11 | End: 2019-05-02 | Stop reason: SDUPTHER

## 2018-05-11 RX ORDER — LOSARTAN POTASSIUM 25 MG/1
25 TABLET ORAL DAILY
Qty: 90 TABLET | Refills: 3 | Status: SHIPPED | OUTPATIENT
Start: 2018-05-11 | End: 2018-12-20

## 2018-05-11 NOTE — PROGRESS NOTES
"  Chief Complaint   Patient presents with   • Follow-up   • Hypertension   • Hyperlipidemia       Subjective     Patient here for follow-up of elevated blood pressure.    He is exercising and is not adherent to a low-salt diet.    Blood pressure is well controlled at home.   Cardiac symptoms: fatigue.   Patient denies: chest pain.   Cardiovascular risk factors: advanced age (older than 55 for men, 65 for women), hypertension and male gender.   Use of agents associated with hypertension: none.   History of target organ damage: heart failure.  Patient is taking prescribed hypertension medications as prescribed without side effects.  Still mild anemia, iron is low,and his TIBC is low  Not eating  Add on Ensure , 1 a day.  Stay on otc iron , 1 a day.    No falls with his Parkinsons    The following portions of the patient's history were reviewed and updated as appropriate: allergies, current medications, past medical history, past social history, past surgical history and problem list.    Review of Systems  Pertinent items are noted in HPI.    Results for orders placed or performed in visit on 04/03/18   Vitamin B12   Result Value Ref Range    Vitamin B-12 567 232 - 1,245 pg/mL   Folate   Result Value Ref Range    Folate 8.47 4.78 - 20.00 ng/mL   Iron and TIBC   Result Value Ref Range    TIBC 215 (L) 261 - 498 mcg/dL    UIBC 172 112 - 346 mcg/dL    Iron 43 (L) 59 - 158 mcg/dL    Iron Saturation 20 %   Ferritin   Result Value Ref Range    Ferritin 104.00 30.00 - 400.00 ng/mL   Hemoglobin & Hematocrit, Blood   Result Value Ref Range    Hemoglobin 13.3 (L) 14.0 - 18.0 g/dL    Hematocrit 40.3 (L) 42.0 - 52.0 %        Vitals:    05/11/18 1324   BP: 140/72   BP Location: Left arm   Patient Position: Sitting   Pulse: 59   Temp: 97.8 °F (36.6 °C)   SpO2: 97%   Weight: 79.4 kg (175 lb)   Height: 180.3 cm (71\")     Objective    Gen: alert, pleasant.  HEENT: PERRL, EOMI intact, lids ok, ear canals clear, TMs normal, throat clear, " nostrils normal  Neck: no bruit, no enlarged thyroid  Lungs: CTA  Heart: RR no murmur  ABD: soft , + BS  Pulses: intact  Mood: stable     Assessment/Plan   Hypertension, normal blood pressure Evidence of target organ damage: heart failure.    Casey was seen today for follow-up, hypertension and hyperlipidemia.    Diagnoses and all orders for this visit:    Mixed hyperlipidemia  -     atorvastatin (LIPITOR) 20 MG tablet; Take 1 tablet by mouth Daily.    Chronic diastolic (congestive) heart failure  -     losartan (COZAAR) 25 MG tablet; Take 1 tablet by mouth Daily.    Iron deficiency anemia secondary to inadequate dietary iron intake  -     Iron and TIBC; Future  -     Ferritin; Future  -     Hemoglobin & Hematocrit, Blood; Future      Medication: no change.  Follow up: 6 months and as needed.    Patient Instructions   Again we will need to keep his BP up 140-150 to avoid falls.    Medications Discontinued During This Encounter   Medication Reason   • losartan (COZAAR) 25 MG tablet Duplicate order   • atorvastatin (LIPITOR) 20 MG tablet Reorder   • losartan (COZAAR) 25 MG tablet Reorder        Return in about 6 months (around 11/11/2018).    Limit salt  Limit alcoholic drinks to 1 a day  Limit caffeine to 1-2 servings a day    Dr. Aleksander Diez MD  [place], [place]  [facility].

## 2018-08-25 ENCOUNTER — HOSPITAL ENCOUNTER (EMERGENCY)
Facility: HOSPITAL | Age: 83
Discharge: HOME | End: 2018-08-25
Attending: EMERGENCY MEDICINE | Admitting: EMERGENCY MEDICINE

## 2018-08-25 ENCOUNTER — APPOINTMENT (OUTPATIENT)
Dept: GENERAL RADIOLOGY | Facility: HOSPITAL | Age: 83
End: 2018-08-25

## 2018-08-25 VITALS
RESPIRATION RATE: 16 BRPM | DIASTOLIC BLOOD PRESSURE: 82 MMHG | HEIGHT: 69 IN | HEART RATE: 61 BPM | TEMPERATURE: 97.7 F | SYSTOLIC BLOOD PRESSURE: 182 MMHG | WEIGHT: 171.06 LBS | OXYGEN SATURATION: 100 % | BODY MASS INDEX: 25.34 KG/M2

## 2018-08-25 DIAGNOSIS — R07.9 CHEST PAIN, UNSPECIFIED TYPE: Primary | ICD-10-CM

## 2018-08-25 LAB
ALBUMIN SERPL-MCNC: 3.9 G/DL (ref 3.5–5.2)
ALBUMIN/GLOB SERPL: 1.6 G/DL
ALP SERPL-CCNC: 68 U/L (ref 40–129)
ALT SERPL W P-5'-P-CCNC: <5 U/L (ref 5–41)
ANION GAP SERPL CALCULATED.3IONS-SCNC: 9.7 MMOL/L
AST SERPL-CCNC: 13 U/L (ref 5–40)
BASOPHILS # BLD AUTO: 0.03 10*3/MM3 (ref 0–0.2)
BASOPHILS NFR BLD AUTO: 0.3 % (ref 0–2)
BILIRUB SERPL-MCNC: 0.5 MG/DL (ref 0.2–1.2)
BUN BLD-MCNC: 20 MG/DL (ref 8–23)
BUN/CREAT SERPL: 26 (ref 7–25)
CALCIUM SPEC-SCNC: 8.8 MG/DL (ref 8.8–10.5)
CHLORIDE SERPL-SCNC: 104 MMOL/L (ref 98–107)
CO2 SERPL-SCNC: 28.3 MMOL/L (ref 22–29)
CREAT BLD-MCNC: 0.77 MG/DL (ref 0.76–1.27)
DEPRECATED RDW RBC AUTO: 51.2 FL (ref 37–54)
EOSINOPHIL # BLD AUTO: 0.11 10*3/MM3 (ref 0.1–0.3)
EOSINOPHIL NFR BLD AUTO: 1.2 % (ref 0–4)
ERYTHROCYTE [DISTWIDTH] IN BLOOD BY AUTOMATED COUNT: 14 % (ref 11.5–14.5)
GFR SERPL CREATININE-BSD FRML MDRD: 96 ML/MIN/1.73
GLOBULIN UR ELPH-MCNC: 2.5 GM/DL
GLUCOSE BLD-MCNC: 104 MG/DL (ref 65–99)
HCT VFR BLD AUTO: 39.8 % (ref 42–52)
HGB BLD-MCNC: 13.3 G/DL (ref 14–18)
HOLD SPECIMEN: NORMAL
HOLD SPECIMEN: NORMAL
IMM GRANULOCYTES # BLD: 0.04 10*3/MM3 (ref 0–0.03)
IMM GRANULOCYTES NFR BLD: 0.4 % (ref 0–0.5)
LYMPHOCYTES # BLD AUTO: 1.08 10*3/MM3 (ref 0.6–4.8)
LYMPHOCYTES NFR BLD AUTO: 11.9 % (ref 20–45)
MCH RBC QN AUTO: 32.8 PG (ref 27–31)
MCHC RBC AUTO-ENTMCNC: 33.4 G/DL (ref 31–37)
MCV RBC AUTO: 98 FL (ref 80–94)
MONOCYTES # BLD AUTO: 0.66 10*3/MM3 (ref 0–1)
MONOCYTES NFR BLD AUTO: 7.3 % (ref 3–8)
NEUTROPHILS # BLD AUTO: 7.13 10*3/MM3 (ref 1.5–8.3)
NEUTROPHILS NFR BLD AUTO: 78.9 % (ref 45–70)
NRBC BLD MANUAL-RTO: 0 /100 WBC (ref 0–0)
PLATELET # BLD AUTO: 159 10*3/MM3 (ref 140–500)
PMV BLD AUTO: 10.2 FL (ref 7.4–10.4)
POTASSIUM BLD-SCNC: 4.4 MMOL/L (ref 3.5–5.2)
PROT SERPL-MCNC: 6.4 G/DL (ref 6–8.5)
RBC # BLD AUTO: 4.06 10*6/MM3 (ref 4.7–6.1)
SODIUM BLD-SCNC: 142 MMOL/L (ref 136–145)
TROPONIN T SERPL-MCNC: 0.01 NG/ML (ref 0–0.03)
TROPONIN T SERPL-MCNC: <0.01 NG/ML (ref 0–0.03)
WBC NRBC COR # BLD: 9.05 10*3/MM3 (ref 4.8–10.8)
WHOLE BLOOD HOLD SPECIMEN: NORMAL
WHOLE BLOOD HOLD SPECIMEN: NORMAL

## 2018-08-25 PROCEDURE — 93010 ELECTROCARDIOGRAM REPORT: CPT | Performed by: INTERNAL MEDICINE

## 2018-08-25 PROCEDURE — 93005 ELECTROCARDIOGRAM TRACING: CPT | Performed by: EMERGENCY MEDICINE

## 2018-08-25 PROCEDURE — 84484 ASSAY OF TROPONIN QUANT: CPT | Performed by: EMERGENCY MEDICINE

## 2018-08-25 PROCEDURE — 80053 COMPREHEN METABOLIC PANEL: CPT | Performed by: EMERGENCY MEDICINE

## 2018-08-25 PROCEDURE — 99284 EMERGENCY DEPT VISIT MOD MDM: CPT | Performed by: EMERGENCY MEDICINE

## 2018-08-25 PROCEDURE — 99285 EMERGENCY DEPT VISIT HI MDM: CPT

## 2018-08-25 PROCEDURE — 85025 COMPLETE CBC W/AUTO DIFF WBC: CPT | Performed by: EMERGENCY MEDICINE

## 2018-08-25 PROCEDURE — 71046 X-RAY EXAM CHEST 2 VIEWS: CPT

## 2018-08-25 RX ORDER — SODIUM CHLORIDE 0.9 % (FLUSH) 0.9 %
10 SYRINGE (ML) INJECTION AS NEEDED
Status: DISCONTINUED | OUTPATIENT
Start: 2018-08-25 | End: 2018-08-25 | Stop reason: HOSPADM

## 2018-08-25 RX ORDER — FAMOTIDINE 20 MG/1
20 TABLET, FILM COATED ORAL 2 TIMES DAILY
Qty: 30 TABLET | Refills: 0 | Status: ON HOLD | OUTPATIENT
Start: 2018-08-25 | End: 2018-11-05

## 2018-08-25 RX ORDER — ASPIRIN 325 MG
325 TABLET ORAL ONCE
Status: COMPLETED | OUTPATIENT
Start: 2018-08-25 | End: 2018-08-25

## 2018-08-25 RX ORDER — ASPIRIN 325 MG
325 TABLET ORAL ONCE
Status: DISCONTINUED | OUTPATIENT
Start: 2018-08-25 | End: 2018-08-25

## 2018-08-25 RX ADMIN — ASPIRIN 325 MG: 325 TABLET ORAL at 14:22

## 2018-09-11 ENCOUNTER — OFFICE VISIT (OUTPATIENT)
Dept: FAMILY MEDICINE CLINIC | Facility: CLINIC | Age: 83
End: 2018-09-11

## 2018-09-11 VITALS
WEIGHT: 175 LBS | HEART RATE: 75 BPM | DIASTOLIC BLOOD PRESSURE: 66 MMHG | SYSTOLIC BLOOD PRESSURE: 116 MMHG | OXYGEN SATURATION: 97 % | HEIGHT: 69 IN | BODY MASS INDEX: 25.92 KG/M2

## 2018-09-11 DIAGNOSIS — R07.89 ANTERIOR CHEST WALL PAIN: Primary | ICD-10-CM

## 2018-09-11 PROCEDURE — 99213 OFFICE O/P EST LOW 20 MIN: CPT | Performed by: FAMILY MEDICINE

## 2018-09-11 NOTE — PROGRESS NOTES
Subjective   Casey Snowden is a 84 y.o. male who is here for   Chief Complaint   Patient presents with   • Chest Pain   .     History of Present Illness   Chest Pain: Patient complains of chest pain. Onset was 1 week ago, with improving course since that time. The patient describes the pain as intermittent, sharp in nature, does not radiate. Patient rates pain as a 5/10 in intensity.  Associated symptoms are none. Aggravating factors are coughing and deep inspiration.  Alleviating factors are: none. Patient's cardiac risk factors are advanced age (older than 55 for men, 65 for women).  Patient's risk factors for DVT/PE: none. Previous cardiac testing: chest x-ray, electrocardiogram (ECG) and troponins   Pain in left anterior sharp seems to move toward stomach  No injury  No rash  No cardiac history.  Reviewed ER notes/results, all -        The following portions of the patient's history were reviewed and updated as appropriate: allergies, current medications, past medical history, past social history, past surgical history and problem list.    Review of Systems    Objective   Physical Exam   Cardiovascular: Normal rate, regular rhythm and normal heart sounds.    No murmur heard.      Pulmonary/Chest: Effort normal and breath sounds normal. No respiratory distress. He has no wheezes. He has no rales. He exhibits no tenderness.   Nursing note and vitals reviewed.      Assessment/Plan   Casey was seen today for chest pain.    Diagnoses and all orders for this visit:    Anterior chest wall pain    obs for now  Tylenol for pain    There are no Patient Instructions on file for this visit.    There are no discontinued medications.     No Follow-up on file.    Dr. Aleksander Diez  [facility] [place], [place]

## 2018-10-03 ENCOUNTER — OFFICE VISIT (OUTPATIENT)
Dept: CARDIOLOGY | Facility: CLINIC | Age: 83
End: 2018-10-03

## 2018-10-03 VITALS
SYSTOLIC BLOOD PRESSURE: 148 MMHG | WEIGHT: 187 LBS | DIASTOLIC BLOOD PRESSURE: 82 MMHG | HEIGHT: 71 IN | HEART RATE: 75 BPM | BODY MASS INDEX: 26.18 KG/M2

## 2018-10-03 DIAGNOSIS — R26.2 INABILITY TO AMBULATE DUE TO MULTIPLE JOINTS: ICD-10-CM

## 2018-10-03 DIAGNOSIS — I50.32 CHRONIC DIASTOLIC CONGESTIVE HEART FAILURE (HCC): Chronic | ICD-10-CM

## 2018-10-03 DIAGNOSIS — I48.0 PAROXYSMAL A-FIB (HCC): Primary | ICD-10-CM

## 2018-10-03 DIAGNOSIS — M79.89 SWELLING OF BOTH LOWER EXTREMITIES: ICD-10-CM

## 2018-10-03 DIAGNOSIS — R07.2 PRECORDIAL PAIN: ICD-10-CM

## 2018-10-03 PROCEDURE — 99214 OFFICE O/P EST MOD 30 MIN: CPT | Performed by: INTERNAL MEDICINE

## 2018-10-03 RX ORDER — FUROSEMIDE 20 MG/1
20 TABLET ORAL DAILY
Qty: 30 TABLET | Refills: 11 | Status: ON HOLD | OUTPATIENT
Start: 2018-10-03 | End: 2018-11-05

## 2018-10-03 NOTE — PROGRESS NOTES
Subjective:     Encounter Date:10/03/2018      Patient ID: Casey Snowden is a 84 y.o. male.    Chief Complaint:PAF, CP, edema  History of Present Illness    Dear Dr. Diez,    I had the pleasure of seeing this patient in the office today for initial evaluation and consultation.  He was recently in the emergency room on August 25.    He has a history of paroxysmal atrial fibrillation and has previously followed with Dr. Mercado.  He has been maintained on chronic anticoagulation.  He apparently never required rate control medicine due to baseline bradycardia.    He came to [place] in because his been having some episodes of precordial chest discomfort.  EKG and troponin were unremarkable, it was recommended that he follow-up in our office.  He states since being in the emergency room he has not had any further episodes of chest discomfort.    He does note these been having increasing lower extremity edema.  He's had problems with swelling in the past-he has a history of chronic bilateral lower extremity DVTs and has an IVC in place.  He had an echocardiogram performed December 2016 that showed normal left ventricular systolic function and grade 1 diastolic dysfunction:  Interpretation Summary     · All left ventricular wall segments contract normally.  · Left ventricular function is normal.  · Left ventricular diastolic dysfunction (grade I) consistent with impaired relaxation.  · Left atrial cavity size is mildly dilated.  · Mild mitral valve regurgitation is present        He has not had any orthopnea or PND.  He has had a cough.  No chills or fevers.  He's been compliant with his medical therapy.  He has an appointment with dermatology for removal of melanoma.    The following portions of the patient's history were reviewed and updated as appropriate: allergies, current medications, past family history, past medical history, past social history, past surgical history and problem list.    Past Medical  History:   Diagnosis Date   • Cancer (CMS/HCC) 04/2018    basil cell carcinoma   • Carotid stenosis    • Chronic deep vein thrombosis (DVT) of popliteal vein of right lower extremity (CMS/HCC)    • DVT of lower extremity (deep venous thrombosis) (CMS/HCC)    • Hard of hearing    • Hx of blood clots    • Hyperlipidemia    • Hypertension    • Long-term use of high-risk medication    • Parkinson's disease (CMS/[facility])    • Postphlebitic syndrome        Past Surgical History:   Procedure Laterality Date   • BASAL CELL CARCINOMA EXCISION  04/2018   • CATARACT EXTRACTION Left 08/2018   • INCISION AND DRAINAGE LEG Right 7/7/2017    Procedure: INCISION AND DRAINAGE INFECTED HEMATOMA RIGHT THIGH;  Surgeon: Valentin Peña MD;  Location: [facility] OR;  Service:    • KNEE INCISION AND DRAINAGE Right 12/27/2016    Procedure: KNEE INCISION AND DRAINAGE;  Surgeon: Triston Whitten MD;  Location: [facility];  Service:    • NOSE SURGERY     • SKIN GRAFT  12/2017   • TOTAL KNEE ARTHROPLASTY Right    • VENA CAVA FILTER PLACEMENT         Social History     Social History   • Marital status:      Spouse name: N/A   • Number of children: N/A   • Years of education: college grad     Occupational History   • financial advis      self employed     Social History Main Topics   • Smoking status: Never Smoker   • Smokeless tobacco: Never Used      Comment: caff use   • Alcohol use 1.2 oz/week     2 Glasses of wine per week   • Drug use: No   • Sexual activity: Yes     Partners: Female     Other Topics Concern   • Not on file     Social History Narrative   • No narrative on file       Review of Systems   Constitution: Positive for malaise/fatigue. Negative for chills, decreased appetite, fever and night sweats.   HENT: Negative for ear discharge, ear pain, hearing loss, nosebleeds and sore throat.    Eyes: Negative for blurred vision, double vision and pain.   Cardiovascular: Negative for cyanosis.   Respiratory: Negative for  "hemoptysis and sputum production.    Endocrine: Negative for cold intolerance and heat intolerance.   Hematologic/Lymphatic: Negative for adenopathy.   Skin: Negative for dry skin, itching, nail changes, rash and suspicious lesions.   Musculoskeletal: Negative for arthritis, gout, muscle cramps, muscle weakness, myalgias and neck pain.   Gastrointestinal: Negative for anorexia, bowel incontinence, constipation, diarrhea, dysphagia, hematemesis and jaundice.   Genitourinary: Negative for bladder incontinence, dysuria, flank pain, frequency, hematuria and nocturia.   Neurological: Negative for focal weakness, numbness, paresthesias and seizures.   Psychiatric/Behavioral: Negative for altered mental status, hallucinations, hypervigilance, suicidal ideas and thoughts of violence.   Allergic/Immunologic: Negative for persistent infections.       Procedures       Objective:     Vitals:    10/03/18 1008   BP: 148/82   Pulse: 75   Weight: 84.8 kg (187 lb)   Height: 180.3 cm (71\")         Physical Exam   Constitutional: He is oriented to person, place, and time. He appears well-developed and well-nourished. No distress.   HENT:   Head: Normocephalic and atraumatic.   Nose: Nose normal.   Mouth/Throat: Oropharynx is clear and moist.   Eyes: Pupils are equal, round, and reactive to light. Conjunctivae and EOM are normal. Right eye exhibits no discharge. Left eye exhibits no discharge.   Neck: Normal range of motion. Neck supple. No tracheal deviation present. No thyromegaly present.   Cardiovascular: Normal rate, regular rhythm, S1 normal, S2 normal, normal heart sounds and normal pulses.  Exam reveals no S3.    Pulmonary/Chest: Effort normal and breath sounds normal. No stridor. No respiratory distress. He exhibits no tenderness.   Abdominal: Soft. Bowel sounds are normal. He exhibits no distension and no mass. There is no tenderness. There is no rebound and no guarding.   Musculoskeletal: Normal range of motion. He exhibits " edema. He exhibits no tenderness or deformity.   Lymphadenopathy:     He has no cervical adenopathy.   Neurological: He is alert and oriented to person, place, and time. He has normal reflexes.   Skin: Skin is warm and dry. No rash noted. He is not diaphoretic. No erythema.   Psychiatric: He has a normal mood and affect. Thought content normal.       Lab Review:             Performed        Assessment:          Diagnosis Plan   1. Paroxysmal A-fib (CMS/HCC)  Adult Transthoracic Echo Complete W/ Cont if Necessary Per Protocol    Stress Test With Myocardial Perfusion One Day   2. Chronic diastolic (congestive) heart failure  Adult Transthoracic Echo Complete W/ Cont if Necessary Per Protocol    Stress Test With Myocardial Perfusion One Day    furosemide (LASIX) 20 MG tablet   3. Swelling of both lower extremities  Adult Transthoracic Echo Complete W/ Cont if Necessary Per Protocol    Stress Test With Myocardial Perfusion One Day    furosemide (LASIX) 20 MG tablet   4. Precordial pain     5. Inability to ambulate due to multiple joints            Plan:       1. Atrial Fibrillation and Atrial Flutter  Assessment  • The patient has paroxysmal atrial fibrillation  • The patient's CHADS2-VASc score is 3  • A [id number] score of 2 or more is considered a high risk for a thromboembolic event    Plan  • Continue apixaban for antithrombotic therapy, bleeding issues discussed    2. Heart Failure  Assessment  • Beta blocker not prescribed for patient reasons  • Diuretics prescribed  • Angiotensin receptor blocker (ARB) prescribed    Plan  • The patient has received heart failure education on the following topics: medication instructions, symptom management and weight monitoring  • The heart failure care plan was discussed with the patient today including: up-titrating HF medications    Subjective/Objective  • Physical exam findings positive for peripheral edema.   • Physical exam findings negative for S3 gallop.    3.  Patient  has worsening bilateral lower extremity edema.  We will obtain an echocardiogram and I will initiate furosemide 20 mg once daily.  He does have a history of chronic DVT; we will write the results of the echocardiogram.  Patient remains on chronic anticoagulation and follows with Dr. Dorado  4.  History of chronic DVT-patient has an IVC filter in place  5.  Essential hypertension-continue current medical therapy.  6.  Chest discomfort-with both typical and atypical components, unable to ambulate on a treadmill, we will arrange for a Lexiscan Cardiolite.    Thank you very much for allowing us to participate in the care of this pleasant patient.  Please don't hesitate to call if I can be of assistance in any way.      Current Outpatient Prescriptions:   •  atorvastatin (LIPITOR) 20 MG tablet, Take 1 tablet by mouth Daily., Disp: 90 tablet, Rfl: 3  •  atropine 1 % ophthalmic solution, Apply 1 drop to eye 3 (Three) Times a Day. For drooling, Disp: , Rfl:   •  carbidopa-levodopa (SINEMET)  MG per tablet, 1 tablet 3 (Three) Times a Day., Disp: , Rfl:   •  carboxymethylcellulose (REFRESH PLUS) 0.5 % solution, 1 drop 3 (Three) Times a Day As Needed (eye)., Disp: , Rfl:   •  ELIQUIS 2.5 MG tablet tablet, 2.5 mg Every 12 (Twelve) Hours., Disp: , Rfl:   •  losartan (COZAAR) 25 MG tablet, Take 1 tablet by mouth Daily., Disp: 90 tablet, Rfl: 3  •  Multiple Vitamins-Minerals (PRESERVISION AREDS) capsule, Take 1 tablet by mouth Daily., Disp: , Rfl:   •  pramipexole (MIRAPEX) 0.5 MG tablet, TAKE 1 TABLET BY MOUTH 3 (THREE) TIMES DAILY, Disp: , Rfl: 3  •  famotidine (PEPCID) 20 MG tablet, Take 1 tablet by mouth 2 (Two) Times a Day., Disp: 30 tablet, Rfl: 0  •  furosemide (LASIX) 20 MG tablet, Take 1 tablet by mouth Daily., Disp: 30 tablet, Rfl: 11

## 2018-10-11 ENCOUNTER — RESULTS ENCOUNTER (OUTPATIENT)
Dept: FAMILY MEDICINE CLINIC | Facility: CLINIC | Age: 83
End: 2018-10-11

## 2018-10-11 DIAGNOSIS — D50.8 IRON DEFICIENCY ANEMIA SECONDARY TO INADEQUATE DIETARY IRON INTAKE: ICD-10-CM

## 2018-10-17 ENCOUNTER — HOSPITAL ENCOUNTER (OUTPATIENT)
Dept: NUCLEAR MEDICINE | Facility: HOSPITAL | Age: 83
Discharge: HOME | End: 2018-10-17
Attending: INTERNAL MEDICINE

## 2018-10-17 ENCOUNTER — HOSPITAL ENCOUNTER (OUTPATIENT)
Dept: CARDIOLOGY | Facility: HOSPITAL | Age: 83
Discharge: HOME | End: 2018-10-17
Attending: INTERNAL MEDICINE

## 2018-10-17 VITALS
WEIGHT: 187 LBS | HEART RATE: 67 BPM | BODY MASS INDEX: 26.18 KG/M2 | HEIGHT: 71 IN | OXYGEN SATURATION: 98 % | DIASTOLIC BLOOD PRESSURE: 83 MMHG | SYSTOLIC BLOOD PRESSURE: 150 MMHG

## 2018-10-17 DIAGNOSIS — M79.89 SWELLING OF BOTH LOWER EXTREMITIES: ICD-10-CM

## 2018-10-17 DIAGNOSIS — I50.32 CHRONIC DIASTOLIC CONGESTIVE HEART FAILURE (HCC): Chronic | ICD-10-CM

## 2018-10-17 DIAGNOSIS — I48.0 PAROXYSMAL A-FIB (HCC): ICD-10-CM

## 2018-10-17 LAB
AORTIC DIMENSIONLESS INDEX: 0.6 (DI)
BH CV ECHO MEAS - ACS: 1.8 CM
BH CV ECHO MEAS - AO MAX PG (FULL): 6.5 MMHG
BH CV ECHO MEAS - AO MAX PG: 10.1 MMHG
BH CV ECHO MEAS - AO MEAN PG (FULL): 3 MMHG
BH CV ECHO MEAS - AO MEAN PG: 5 MMHG
BH CV ECHO MEAS - AO ROOT AREA (BSA CORRECTED): 1.8
BH CV ECHO MEAS - AO ROOT AREA: 10.8 CM^2
BH CV ECHO MEAS - AO ROOT DIAM: 3.7 CM
BH CV ECHO MEAS - AO V2 MAX: 159 CM/SEC
BH CV ECHO MEAS - AO V2 MEAN: 102 CM/SEC
BH CV ECHO MEAS - AO V2 VTI: 32.5 CM
BH CV ECHO MEAS - ASC AORTA: 3.2 CM
BH CV ECHO MEAS - AVA(I,A): 1.9 CM^2
BH CV ECHO MEAS - AVA(I,D): 1.9 CM^2
BH CV ECHO MEAS - AVA(V,A): 1.9 CM^2
BH CV ECHO MEAS - AVA(V,D): 1.9 CM^2
BH CV ECHO MEAS - BSA(HAYCOCK): 2.1 M^2
BH CV ECHO MEAS - BSA: 2 M^2
BH CV ECHO MEAS - BZI_BMI: 26.1 KILOGRAMS/M^2
BH CV ECHO MEAS - BZI_METRIC_HEIGHT: 180.3 CM
BH CV ECHO MEAS - BZI_METRIC_WEIGHT: 84.8 KG
BH CV ECHO MEAS - CONTRAST EF (2CH): 33 CM2
BH CV ECHO MEAS - CONTRAST EF 4CH: 34 CM2
BH CV ECHO MEAS - EDV(CUBED): 241.8 ML
BH CV ECHO MEAS - EDV(MOD-SP2): 211 ML
BH CV ECHO MEAS - EDV(MOD-SP4): 185 ML
BH CV ECHO MEAS - EDV(TEICH): 196.1 ML
BH CV ECHO MEAS - EF(CUBED): 57.1 %
BH CV ECHO MEAS - EF(MOD-BP): 36 %
BH CV ECHO MEAS - EF(MOD-SP2): 33.2 %
BH CV ECHO MEAS - EF(MOD-SP4): 34.1 %
BH CV ECHO MEAS - EF(TEICH): 47.8 %
BH CV ECHO MEAS - ESV(CUBED): 103.8 ML
BH CV ECHO MEAS - ESV(MOD-SP2): 141 ML
BH CV ECHO MEAS - ESV(MOD-SP4): 122 ML
BH CV ECHO MEAS - ESV(TEICH): 102.4 ML
BH CV ECHO MEAS - FS: 24.6 %
BH CV ECHO MEAS - IVS/LVPW: 1.2
BH CV ECHO MEAS - IVSD: 1.1 CM
BH CV ECHO MEAS - LAT PEAK E' VEL: 8 CM/SEC
BH CV ECHO MEAS - LV DIASTOLIC VOL/BSA (35-75): 90.3 ML/M^2
BH CV ECHO MEAS - LV MASS(C)D: 254.6 GRAMS
BH CV ECHO MEAS - LV MASS(C)DI: 124.2 GRAMS/M^2
BH CV ECHO MEAS - LV MAX PG: 3.6 MMHG
BH CV ECHO MEAS - LV MEAN PG: 2 MMHG
BH CV ECHO MEAS - LV SYSTOLIC VOL/BSA (12-30): 59.5 ML/M^2
BH CV ECHO MEAS - LV V1 MAX: 94.8 CM/SEC
BH CV ECHO MEAS - LV V1 MEAN: 63.4 CM/SEC
BH CV ECHO MEAS - LV V1 VTI: 19.7 CM
BH CV ECHO MEAS - LVIDD: 6.2 CM
BH CV ECHO MEAS - LVIDS: 4.7 CM
BH CV ECHO MEAS - LVLD AP2: 9.6 CM
BH CV ECHO MEAS - LVLD AP4: 9 CM
BH CV ECHO MEAS - LVLS AP2: 8.9 CM
BH CV ECHO MEAS - LVLS AP4: 8.9 CM
BH CV ECHO MEAS - LVOT AREA (M): 3.1 CM^2
BH CV ECHO MEAS - LVOT AREA: 3.1 CM^2
BH CV ECHO MEAS - LVOT DIAM: 2 CM
BH CV ECHO MEAS - LVPWD: 0.87 CM
BH CV ECHO MEAS - MED PEAK E' VEL: 8 CM/SEC
BH CV ECHO MEAS - MR MAX PG: 84.3 MMHG
BH CV ECHO MEAS - MR MAX VEL: 459 CM/SEC
BH CV ECHO MEAS - MV A DUR: 0.17 SEC
BH CV ECHO MEAS - MV A MAX VEL: 55.3 CM/SEC
BH CV ECHO MEAS - MV DEC SLOPE: 365 CM/SEC^2
BH CV ECHO MEAS - MV DEC TIME: 0.16 SEC
BH CV ECHO MEAS - MV E MAX VEL: 111 CM/SEC
BH CV ECHO MEAS - MV E/A: 2
BH CV ECHO MEAS - MV MAX PG: 5.9 MMHG
BH CV ECHO MEAS - MV MEAN PG: 2 MMHG
BH CV ECHO MEAS - MV P1/2T MAX VEL: 121 CM/SEC
BH CV ECHO MEAS - MV P1/2T: 97.1 MSEC
BH CV ECHO MEAS - MV V2 MAX: 121 CM/SEC
BH CV ECHO MEAS - MV V2 MEAN: 64.2 CM/SEC
BH CV ECHO MEAS - MV V2 VTI: 32.2 CM
BH CV ECHO MEAS - MVA P1/2T LCG: 1.8 CM^2
BH CV ECHO MEAS - MVA(P1/2T): 2.3 CM^2
BH CV ECHO MEAS - MVA(VTI): 1.9 CM^2
BH CV ECHO MEAS - PA ACC TIME: 0.11 SEC
BH CV ECHO MEAS - PA MAX PG (FULL): 3.7 MMHG
BH CV ECHO MEAS - PA MAX PG: 5.2 MMHG
BH CV ECHO MEAS - PA PR(ACCEL): 28.2 MMHG
BH CV ECHO MEAS - PA V2 MAX: 114 CM/SEC
BH CV ECHO MEAS - PULM A REVS DUR: 0.17 SEC
BH CV ECHO MEAS - PULM A REVS VEL: 37.7 CM/SEC
BH CV ECHO MEAS - PULM DIAS VEL: 85.5 CM/SEC
BH CV ECHO MEAS - PULM S/D: 0.73
BH CV ECHO MEAS - PULM SYS VEL: 62.1 CM/SEC
BH CV ECHO MEAS - PVA(V,A): 1.7 CM^2
BH CV ECHO MEAS - PVA(V,D): 1.7 CM^2
BH CV ECHO MEAS - QP/QS: 0.62
BH CV ECHO MEAS - RAP SYSTOLE: 8 MMHG
BH CV ECHO MEAS - RV MAX PG: 1.5 MMHG
BH CV ECHO MEAS - RV MEAN PG: 1 MMHG
BH CV ECHO MEAS - RV V1 MAX: 61.3 CM/SEC
BH CV ECHO MEAS - RV V1 MEAN: 37.7 CM/SEC
BH CV ECHO MEAS - RV V1 VTI: 12.3 CM
BH CV ECHO MEAS - RVOT AREA: 3.1 CM^2
BH CV ECHO MEAS - RVOT DIAM: 2 CM
BH CV ECHO MEAS - RVSP: 53.4 MMHG
BH CV ECHO MEAS - SI(AO): 170.5 ML/M^2
BH CV ECHO MEAS - SI(CUBED): 67.3 ML/M^2
BH CV ECHO MEAS - SI(LVOT): 30.2 ML/M^2
BH CV ECHO MEAS - SI(MOD-SP2): 34.2 ML/M^2
BH CV ECHO MEAS - SI(MOD-SP4): 30.7 ML/M^2
BH CV ECHO MEAS - SI(TEICH): 45.8 ML/M^2
BH CV ECHO MEAS - SV(AO): 349.4 ML
BH CV ECHO MEAS - SV(CUBED): 138 ML
BH CV ECHO MEAS - SV(LVOT): 61.9 ML
BH CV ECHO MEAS - SV(MOD-SP2): 70 ML
BH CV ECHO MEAS - SV(MOD-SP4): 63 ML
BH CV ECHO MEAS - SV(RVOT): 38.6 ML
BH CV ECHO MEAS - SV(TEICH): 93.8 ML
BH CV ECHO MEAS - TAPSE (>1.6): 2 CM2
BH CV ECHO MEAS - TR MAX VEL: 337 CM/SEC
BH CV ECHO MEASUREMENTS AVERAGE E/E' RATIO: 13.88
BH CV STRESS BP STAGE 1: NORMAL
BH CV STRESS COMMENTS STAGE 1: NORMAL
BH CV STRESS DOSE REGADENOSON STAGE 1: 0.4
BH CV STRESS DURATION MIN STAGE 1: 0
BH CV STRESS DURATION SEC STAGE 1: 30
BH CV STRESS HR STAGE 1: 70
BH CV STRESS O2 STAGE 1: 98
BH CV STRESS PROTOCOL 1: NORMAL
BH CV STRESS RECOVERY BP: NORMAL MMHG
BH CV STRESS RECOVERY HR: 87 BPM
BH CV STRESS RECOVERY O2: 99 %
BH CV STRESS STAGE 1: 1
BH CV VAS BP LEFT ARM: NORMAL MMHG
BH CV XLRA - RV BASE: 4.1 CM
BH CV XLRA - TDI S': 13 CM/SEC
LEFT ATRIUM VOLUME INDEX: 53 ML/M2
LEFT ATRIUM VOLUME: 97 CM3
LV EF NUC BP: 40 %
MAXIMAL PREDICTED HEART RATE: 136 BPM
MAXIMAL PREDICTED HEART RATE: 136 BPM
PERCENT MAX PREDICTED HR: 91.91 %
STRESS BASELINE BP: NORMAL MMHG
STRESS BASELINE HR: 67 BPM
STRESS O2 SAT REST: 98 %
STRESS PERCENT HR: 108 %
STRESS POST ESTIMATED WORKLOAD: 1 METS
STRESS POST EXERCISE DUR SEC: 30 SEC
STRESS POST O2 SAT PEAK: 99 %
STRESS POST PEAK BP: NORMAL MMHG
STRESS POST PEAK HR: 125 BPM
STRESS TARGET HR: 116 BPM
STRESS TARGET HR: 116 BPM

## 2018-10-17 PROCEDURE — 93016 CV STRESS TEST SUPVJ ONLY: CPT | Performed by: INTERNAL MEDICINE

## 2018-10-17 PROCEDURE — A9500 TC99M SESTAMIBI: HCPCS | Performed by: INTERNAL MEDICINE

## 2018-10-17 PROCEDURE — 25010000002 REGADENOSON 0.4 MG/5ML SOLUTION: Performed by: INTERNAL MEDICINE

## 2018-10-17 PROCEDURE — 78452 HT MUSCLE IMAGE SPECT MULT: CPT

## 2018-10-17 PROCEDURE — 93017 CV STRESS TEST TRACING ONLY: CPT

## 2018-10-17 PROCEDURE — 93018 CV STRESS TEST I&R ONLY: CPT | Performed by: INTERNAL MEDICINE

## 2018-10-17 PROCEDURE — 93306 TTE W/DOPPLER COMPLETE: CPT

## 2018-10-17 PROCEDURE — 0 TECHNETIUM SESTAMIBI: Performed by: INTERNAL MEDICINE

## 2018-10-17 PROCEDURE — 93306 TTE W/DOPPLER COMPLETE: CPT | Performed by: INTERNAL MEDICINE

## 2018-10-17 PROCEDURE — 78452 HT MUSCLE IMAGE SPECT MULT: CPT | Performed by: INTERNAL MEDICINE

## 2018-10-17 PROCEDURE — 25010000002 PERFLUTREN (DEFINITY) 8.476 MG IN SODIUM CHLORIDE 0.9 % 10 ML INJECTION: Performed by: INTERNAL MEDICINE

## 2018-10-17 RX ADMIN — REGADENOSON 0.4 MG: 0.08 INJECTION, SOLUTION INTRAVENOUS at 10:21

## 2018-10-17 RX ADMIN — TECHNETIUM TC 99M SESTAMIBI 1 DOSE: 1 INJECTION INTRAVENOUS at 10:21

## 2018-10-17 RX ADMIN — PERFLUTREN 3 ML: 6.52 INJECTION, SUSPENSION INTRAVENOUS at 11:10

## 2018-10-17 RX ADMIN — TECHNETIUM TC 99M SESTAMIBI 1 DOSE: 1 INJECTION INTRAVENOUS at 08:28

## 2018-10-18 ENCOUNTER — OFFICE VISIT (OUTPATIENT)
Dept: CARDIOLOGY | Facility: CLINIC | Age: 83
End: 2018-10-18

## 2018-10-18 VITALS
DIASTOLIC BLOOD PRESSURE: 80 MMHG | BODY MASS INDEX: 25.34 KG/M2 | HEART RATE: 60 BPM | SYSTOLIC BLOOD PRESSURE: 132 MMHG | WEIGHT: 181 LBS | HEIGHT: 71 IN

## 2018-10-18 DIAGNOSIS — I50.21 ACUTE SYSTOLIC HEART FAILURE (HCC): Primary | ICD-10-CM

## 2018-10-18 DIAGNOSIS — I48.0 PAROXYSMAL A-FIB (HCC): Chronic | ICD-10-CM

## 2018-10-18 DIAGNOSIS — I10 BENIGN ESSENTIAL HYPERTENSION: ICD-10-CM

## 2018-10-18 PROCEDURE — 99214 OFFICE O/P EST MOD 30 MIN: CPT | Performed by: INTERNAL MEDICINE

## 2018-10-18 RX ORDER — ASPIRIN 81 MG/1
81 TABLET, CHEWABLE ORAL DAILY
Status: ON HOLD | COMMUNITY
End: 2018-11-05

## 2018-10-18 NOTE — PROGRESS NOTES
Subjective:     Encounter Date:10/18/2018      Patient ID: Casey Snowden is a 84 y.o. male.    Chief Complaint:PAF, CP, edema  History of Present Illness    Dear Dr. iDez,    I had the pleasure of seeing this patient in the office today for f/u.  Yesterday he had a stress test and echocardiogram; I had him come in today so we can go over this.  Interpretation Summary     · Findings consistent with a normal ECG stress test.  · Left ventricular ejection fraction is moderately reduced (Calculated EF = 40%).  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      Interpretation Summary     · Left ventricular systolic function is moderately decreased. Calculated EF = 36%. Estimated EF was in disagreement with the calculated EF. Estimated EF appears to be in the range of 31 - 35%. The left ventricular cavity is moderately dilated. Left ventricular diastolic dysfunction is noted (grade II w/high LAP) consistent with pseudonormalization.  · The following segments are akinetic: mid anterior, mid anteroseptal, apical anterior, apical septal and apex. All other segments are normal. The akinetic segments are thin, bright, and consistent with prior infarct  · Left atrial cavity size is moderate-to-severely dilated.  · Moderate mitral valve regurgitation is present. In one view it appears moderately severe  · Mild to moderate tricuspid valve regurgitation is present. Estimated right ventricular systolic pressure from tricuspid regurgitation is moderately elevated (45-55 mmHg).  · There is a small (<1cm) pericardial effusion.         He has a history of paroxysmal atrial fibrillation and has previously followed with Dr. Mercado.  He has been maintained on chronic anticoagulation.  He apparently never required rate control medicine due to baseline bradycardia.    He came to [facility] 8/25 because was been having  some episodes of precordial chest discomfort.  EKG and troponin were unremarkable, it was recommended that he follow-up in our office.  He came in about 6 weeks later.  He states since being in the emergency room he has not had any further episodes of chest discomfort.  Today he reaffirms that he is not had any further episodes of chest discomfort.    He does note these been having increasing lower extremity edema.  He's had problems with swelling in the past-he has a history of chronic bilateral lower extremity DVTs and has an IVC in place.  He had an echocardiogram performed December 2016 that showed normal left ventricular systolic function and grade 1 diastolic dysfunction.  When I saw him on the third we started him on lasix 20 mg daily and his edema has resolved.:  Interpretation Summary     · All left ventricular wall segments contract normally.  · Left ventricular function is normal.  · Left ventricular diastolic dysfunction (grade I) consistent with impaired relaxation.  · Left atrial cavity size is mildly dilated.  · Mild mitral valve regurgitation is present        He has not had any orthopnea or PND.  He has had a cough.  No chills or fevers.  He's been compliant with his medical therapy.  He has an appointment with dermatology for removal of melanoma.    The following portions of the patient's history were reviewed and updated as appropriate: allergies, current medications, past family history, past medical history, past social history, past surgical history and problem list.    Past Medical History:   Diagnosis Date   • Cancer (CMS/[facility]) 04/2018    basil cell carcinoma   • Carotid stenosis    • Chronic deep vein thrombosis (DVT) of popliteal vein of right lower extremity (CMS/[facility])    • DVT of lower extremity (deep venous thrombosis) (CMS/[facility])    • Hard of hearing    • Hx of blood clots    • Hyperlipidemia    • Hypertension    • Long-term use of high-risk medication    • Parkinson's disease (CMS/[facility])     • Postphlebitic syndrome        Past Surgical History:   Procedure Laterality Date   • BASAL CELL CARCINOMA EXCISION  04/2018   • CATARACT EXTRACTION Left 08/2018   • INCISION AND DRAINAGE LEG Right 7/7/2017    Procedure: INCISION AND DRAINAGE INFECTED HEMATOMA RIGHT THIGH;  Surgeon: Valentin Peña MD;  Location: [facility] OR;  Service:    • KNEE INCISION AND DRAINAGE Right 12/27/2016    Procedure: KNEE INCISION AND DRAINAGE;  Surgeon: Triston Whitten MD;  Location: [facility] OR;  Service:    • NOSE SURGERY     • SKIN GRAFT  12/2017   • TOTAL KNEE ARTHROPLASTY Right    • VENA CAVA FILTER PLACEMENT         Social History     Social History   • Marital status:      Spouse name: N/A   • Number of children: N/A   • Years of education: college grad     Occupational History   • financial advis      self employed     Social History Main Topics   • Smoking status: Never Smoker   • Smokeless tobacco: Never Used      Comment: caff use   • Alcohol use 1.2 oz/week     2 Glasses of wine per week   • Drug use: No   • Sexual activity: Yes     Partners: Female     Other Topics Concern   • Not on file     Social History Narrative   • No narrative on file       Review of Systems   Constitution: Positive for malaise/fatigue. Negative for chills, decreased appetite, fever and night sweats.   HENT: Negative for ear discharge, ear pain, hearing loss, nosebleeds and sore throat.    Eyes: Negative for blurred vision, double vision and pain.   Cardiovascular: Negative for cyanosis.   Respiratory: Negative for hemoptysis and sputum production.    Endocrine: Negative for cold intolerance and heat intolerance.   Hematologic/Lymphatic: Negative for adenopathy.   Skin: Negative for dry skin, itching, nail changes, rash and suspicious lesions.   Musculoskeletal: Negative for arthritis, gout, muscle cramps, muscle weakness, myalgias and neck pain.   Gastrointestinal: Negative for anorexia, bowel incontinence, constipation,  "diarrhea, dysphagia, hematemesis and jaundice.   Genitourinary: Negative for bladder incontinence, dysuria, flank pain, frequency, hematuria and nocturia.   Neurological: Negative for focal weakness, numbness, paresthesias and seizures.   Psychiatric/Behavioral: Negative for altered mental status, hallucinations, hypervigilance, suicidal ideas and thoughts of violence.   Allergic/Immunologic: Negative for persistent infections.       Procedures       Objective:     Vitals:    10/18/18 1105   BP: 132/80   Pulse: 60   Weight: 82.1 kg (181 lb)   Height: 180.3 cm (71\")         Physical Exam   Constitutional: He is oriented to person, place, and time. He appears well-developed and well-nourished. No distress.   HENT:   Head: Normocephalic and atraumatic.   Nose: Nose normal.   Mouth/Throat: Oropharynx is clear and moist.   Eyes: Pupils are equal, round, and reactive to light. Conjunctivae and EOM are normal. Right eye exhibits no discharge. Left eye exhibits no discharge.   Neck: Normal range of motion. Neck supple. No tracheal deviation present. No thyromegaly present.   Cardiovascular: Normal rate, regular rhythm, S1 normal, S2 normal, normal heart sounds and normal pulses.  Exam reveals no S3.    Pulmonary/Chest: Effort normal and breath sounds normal. No stridor. No respiratory distress. He exhibits no tenderness.   Abdominal: Soft. Bowel sounds are normal. He exhibits no distension and no mass. There is no tenderness. There is no rebound and no guarding.   Musculoskeletal: Normal range of motion. He exhibits edema. He exhibits no tenderness or deformity.   Lymphadenopathy:     He has no cervical adenopathy.   Neurological: He is alert and oriented to person, place, and time. He has normal reflexes.   Skin: Skin is warm and dry. No rash noted. He is not diaphoretic. No erythema.   Psychiatric: He has a normal mood and affect. Thought content normal.       Lab Review:             Performed        Assessment:          " Diagnosis Plan   1. Acute systolic heart failure (CMS/HCC)  Case Request Cath Lab: Left Heart Cath   2. Paroxysmal A-fib (CMS/[facility])  Case Request Cath Lab: Left Heart Cath   3. Benign essential hypertension  Case Request Cath Lab: Left Heart Cath          Plan:       1. Atrial Fibrillation and Atrial Flutter  Assessment  • The patient has paroxysmal atrial fibrillation  • The patient's CHADS2-VASc score is 3  • A [id number] score of 2 or more is considered a high risk for a thromboembolic event    Plan  • Continue apixaban for antithrombotic therapy, bleeding issues discussed    2. Heart Failure  Assessment  • Beta blocker not prescribed for patient reasons  • Diuretics prescribed  • Angiotensin receptor blocker (ARB) prescribed    Plan  • The patient has received heart failure education on the following topics: medication instructions, symptom management and weight monitoring  • The heart failure care plan was discussed with the patient today including: up-titrating HF medications    Subjective/Objective  • Physical exam findings positive for peripheral edema.   • Physical exam findings negative for S3 gallop.    3.  Hhistory of chronic DVT; Patient remains on chronic anticoagulation and follows with Dr. Dorado. patient has an IVC filter in place  4.  Abnormal echocardiogram and stress test consistent with an ischemic cardiomyopathy.  We will arrange for cardiac catheterization to be performed.  He will start taking enteric-coated aspirin 81 mg daily.  5.  Essential hypertension-continue current medical therapy.      Thank you very much for allowing us to participate in the care of this pleasant patient.  Please don't hesitate to call if I can be of assistance in any way.      Current Outpatient Prescriptions:   •  atorvastatin (LIPITOR) 20 MG tablet, Take 1 tablet by mouth Daily., Disp: 90 tablet, Rfl: 3  •  atropine 1 % ophthalmic solution, Apply 1 drop to eye 3 (Three) Times a Day. For drooling, Disp: , Rfl:   •   carbidopa-levodopa (SINEMET)  MG per tablet, 1 tablet 3 (Three) Times a Day., Disp: , Rfl:   •  carboxymethylcellulose (REFRESH PLUS) 0.5 % solution, 1 drop 3 (Three) Times a Day As Needed (eye)., Disp: , Rfl:   •  ELIQUIS 2.5 MG tablet tablet, 2.5 mg Every 12 (Twelve) Hours., Disp: , Rfl:   •  furosemide (LASIX) 20 MG tablet, Take 1 tablet by mouth Daily., Disp: 30 tablet, Rfl: 11  •  losartan (COZAAR) 25 MG tablet, Take 1 tablet by mouth Daily., Disp: 90 tablet, Rfl: 3  •  Multiple Vitamins-Minerals (PRESERVISION AREDS) capsule, Take 1 tablet by mouth Daily., Disp: , Rfl:   •  pramipexole (MIRAPEX) 0.5 MG tablet, TAKE 1 TABLET BY MOUTH 3 (THREE) TIMES DAILY, Disp: , Rfl: 3  •  famotidine (PEPCID) 20 MG tablet, Take 1 tablet by mouth 2 (Two) Times a Day., Disp: 30 tablet, Rfl: 0  No current facility-administered medications for this visit.

## 2018-10-30 PROBLEM — I50.21 ACUTE SYSTOLIC HEART FAILURE: Status: ACTIVE | Noted: 2018-10-30

## 2018-11-02 ENCOUNTER — TRANSCRIBE ORDERS (OUTPATIENT)
Dept: ADMINISTRATIVE | Facility: HOSPITAL | Age: 83
End: 2018-11-02

## 2018-11-02 ENCOUNTER — LAB (OUTPATIENT)
Dept: LAB | Facility: HOSPITAL | Age: 83
End: 2018-11-02
Attending: INTERNAL MEDICINE

## 2018-11-02 DIAGNOSIS — Z01.810 PRE-OPERATIVE CARDIOVASCULAR EXAMINATION: ICD-10-CM

## 2018-11-02 DIAGNOSIS — Z13.6 SCREENING FOR ISCHEMIC HEART DISEASE: ICD-10-CM

## 2018-11-02 DIAGNOSIS — Z01.810 PRE-OPERATIVE CARDIOVASCULAR EXAMINATION: Primary | ICD-10-CM

## 2018-11-02 LAB
ANION GAP SERPL CALCULATED.3IONS-SCNC: 11.5 MMOL/L
BASOPHILS # BLD AUTO: 0.04 10*3/MM3 (ref 0–0.2)
BASOPHILS NFR BLD AUTO: 0.7 % (ref 0–2)
BUN BLD-MCNC: 19 MG/DL (ref 8–23)
BUN/CREAT SERPL: 21.6 (ref 7–25)
CALCIUM SPEC-SCNC: 8.7 MG/DL (ref 8.8–10.5)
CHLORIDE SERPL-SCNC: 103 MMOL/L (ref 98–107)
CO2 SERPL-SCNC: 25.5 MMOL/L (ref 22–29)
CREAT BLD-MCNC: 0.88 MG/DL (ref 0.76–1.27)
DEPRECATED RDW RBC AUTO: 52.5 FL (ref 37–54)
EOSINOPHIL # BLD AUTO: 0.11 10*3/MM3 (ref 0.1–0.3)
EOSINOPHIL NFR BLD AUTO: 1.9 % (ref 0–4)
ERYTHROCYTE [DISTWIDTH] IN BLOOD BY AUTOMATED COUNT: 14.4 % (ref 11.5–14.5)
GFR SERPL CREATININE-BSD FRML MDRD: 83 ML/MIN/1.73
GLUCOSE BLD-MCNC: 108 MG/DL (ref 65–99)
HCT VFR BLD AUTO: 43 % (ref 42–52)
HGB BLD-MCNC: 14 G/DL (ref 14–18)
IMM GRANULOCYTES # BLD: 0.01 10*3/MM3 (ref 0–0.03)
IMM GRANULOCYTES NFR BLD: 0.2 % (ref 0–0.5)
LYMPHOCYTES # BLD AUTO: 1.19 10*3/MM3 (ref 0.6–4.8)
LYMPHOCYTES NFR BLD AUTO: 20.6 % (ref 20–45)
MCH RBC QN AUTO: 32 PG (ref 27–31)
MCHC RBC AUTO-ENTMCNC: 32.6 G/DL (ref 31–37)
MCV RBC AUTO: 98.2 FL (ref 80–94)
MONOCYTES # BLD AUTO: 0.49 10*3/MM3 (ref 0–1)
MONOCYTES NFR BLD AUTO: 8.5 % (ref 3–8)
NEUTROPHILS # BLD AUTO: 3.95 10*3/MM3 (ref 1.5–8.3)
NEUTROPHILS NFR BLD AUTO: 68.1 % (ref 45–70)
NRBC BLD MANUAL-RTO: 0 /100 WBC (ref 0–0)
PLATELET # BLD AUTO: 168 10*3/MM3 (ref 140–500)
PMV BLD AUTO: 10 FL (ref 7.4–10.4)
POTASSIUM BLD-SCNC: 3.9 MMOL/L (ref 3.5–5.2)
RBC # BLD AUTO: 4.38 10*6/MM3 (ref 4.7–6.1)
SODIUM BLD-SCNC: 140 MMOL/L (ref 136–145)
WBC NRBC COR # BLD: 5.79 10*3/MM3 (ref 4.8–10.8)

## 2018-11-02 PROCEDURE — 85025 COMPLETE CBC W/AUTO DIFF WBC: CPT

## 2018-11-02 PROCEDURE — 80048 BASIC METABOLIC PNL TOTAL CA: CPT

## 2018-11-02 PROCEDURE — 36415 COLL VENOUS BLD VENIPUNCTURE: CPT

## 2018-11-05 ENCOUNTER — HOSPITAL ENCOUNTER (OUTPATIENT)
Facility: HOSPITAL | Age: 83
Setting detail: HOSPITAL OUTPATIENT SURGERY
Discharge: HOME | End: 2018-11-05
Attending: INTERNAL MEDICINE | Admitting: INTERNAL MEDICINE

## 2018-11-05 VITALS
WEIGHT: 175 LBS | TEMPERATURE: 97.8 F | OXYGEN SATURATION: 99 % | RESPIRATION RATE: 16 BRPM | SYSTOLIC BLOOD PRESSURE: 132 MMHG | BODY MASS INDEX: 24.5 KG/M2 | HEIGHT: 71 IN | HEART RATE: 55 BPM | DIASTOLIC BLOOD PRESSURE: 69 MMHG

## 2018-11-05 DIAGNOSIS — I50.21 ACUTE SYSTOLIC HEART FAILURE (HCC): ICD-10-CM

## 2018-11-05 DIAGNOSIS — I48.0 PAROXYSMAL A-FIB (HCC): ICD-10-CM

## 2018-11-05 DIAGNOSIS — I10 BENIGN ESSENTIAL HYPERTENSION: ICD-10-CM

## 2018-11-05 PROCEDURE — 93458 L HRT ARTERY/VENTRICLE ANGIO: CPT | Performed by: INTERNAL MEDICINE

## 2018-11-05 PROCEDURE — 99152 MOD SED SAME PHYS/QHP 5/>YRS: CPT | Performed by: INTERNAL MEDICINE

## 2018-11-05 PROCEDURE — C1894 INTRO/SHEATH, NON-LASER: HCPCS | Performed by: INTERNAL MEDICINE

## 2018-11-05 PROCEDURE — 0 IOPAMIDOL PER 1 ML: Performed by: INTERNAL MEDICINE

## 2018-11-05 PROCEDURE — C1769 GUIDE WIRE: HCPCS | Performed by: INTERNAL MEDICINE

## 2018-11-05 PROCEDURE — 25010000002 FENTANYL CITRATE (PF) 100 MCG/2ML SOLUTION: Performed by: INTERNAL MEDICINE

## 2018-11-05 PROCEDURE — 25010000002 MIDAZOLAM PER 1 MG: Performed by: INTERNAL MEDICINE

## 2018-11-05 PROCEDURE — 25010000002 HEPARIN (PORCINE) PER 1000 UNITS: Performed by: INTERNAL MEDICINE

## 2018-11-05 RX ORDER — SODIUM CHLORIDE 9 MG/ML
100 INJECTION, SOLUTION INTRAVENOUS CONTINUOUS
Status: DISCONTINUED | OUTPATIENT
Start: 2018-11-05 | End: 2018-11-05 | Stop reason: HOSPADM

## 2018-11-05 RX ORDER — SODIUM CHLORIDE 0.9 % (FLUSH) 0.9 %
3-10 SYRINGE (ML) INJECTION AS NEEDED
Status: DISCONTINUED | OUTPATIENT
Start: 2018-11-05 | End: 2018-11-05 | Stop reason: HOSPADM

## 2018-11-05 RX ORDER — SODIUM CHLORIDE 0.9 % (FLUSH) 0.9 %
3 SYRINGE (ML) INJECTION EVERY 12 HOURS SCHEDULED
Status: DISCONTINUED | OUTPATIENT
Start: 2018-11-05 | End: 2018-11-05 | Stop reason: HOSPADM

## 2018-11-05 RX ORDER — CARVEDILOL 3.12 MG/1
3.12 TABLET ORAL 2 TIMES DAILY
Qty: 60 TABLET | Refills: 6 | Status: SHIPPED | OUTPATIENT
Start: 2018-11-05 | End: 2018-11-29 | Stop reason: SDUPTHER

## 2018-11-05 RX ORDER — MIDAZOLAM HYDROCHLORIDE 1 MG/ML
INJECTION INTRAMUSCULAR; INTRAVENOUS AS NEEDED
Status: DISCONTINUED | OUTPATIENT
Start: 2018-11-05 | End: 2018-11-05 | Stop reason: HOSPADM

## 2018-11-05 RX ORDER — LIDOCAINE HYDROCHLORIDE 20 MG/ML
INJECTION, SOLUTION INFILTRATION; PERINEURAL AS NEEDED
Status: DISCONTINUED | OUTPATIENT
Start: 2018-11-05 | End: 2018-11-05 | Stop reason: HOSPADM

## 2018-11-05 RX ORDER — LIDOCAINE HYDROCHLORIDE 10 MG/ML
0.1 INJECTION, SOLUTION EPIDURAL; INFILTRATION; INTRACAUDAL; PERINEURAL ONCE AS NEEDED
Status: DISCONTINUED | OUTPATIENT
Start: 2018-11-05 | End: 2018-11-05 | Stop reason: HOSPADM

## 2018-11-05 RX ORDER — SODIUM CHLORIDE 9 MG/ML
75 INJECTION, SOLUTION INTRAVENOUS CONTINUOUS
Status: DISCONTINUED | OUTPATIENT
Start: 2018-11-05 | End: 2018-11-05 | Stop reason: HOSPADM

## 2018-11-05 RX ORDER — FENTANYL CITRATE 50 UG/ML
INJECTION, SOLUTION INTRAMUSCULAR; INTRAVENOUS AS NEEDED
Status: DISCONTINUED | OUTPATIENT
Start: 2018-11-05 | End: 2018-11-05 | Stop reason: HOSPADM

## 2018-11-05 RX ADMIN — SODIUM CHLORIDE 75 ML/HR: 9 INJECTION, SOLUTION INTRAVENOUS at 08:44

## 2018-11-05 NOTE — INTERVAL H&P NOTE
H&P reviewed. The patient was examined and there are no changes to the H&P. I have explained the risks and benefits of the procedure to the patient.  The patient understands and agrees to proceed

## 2018-11-05 NOTE — DISCHARGE INSTRUCTIONS
[facility]  [address] [place] [address]    Coronary Angiogram (Radial/Ulnar Approach) After Care    Refer to this sheet in the next few weeks. These instructions provide you with information on caring for yourself after your procedure. Your caregiver may also give you more specific instructions. Your treatment has been planned according to current medical practices, but problems sometimes occur. Call your caregiver if you have any problems or questions after your procedure.    Home Care Instructions:  · You may shower the day after the procedure. Remove the bandage (dressing) and gently wash the site with plain soap and water. Gently pat the site dry. You may apply a band aid daily for 2 days if desired.    · Do not apply powder or lotion to the site.  · Do not submerge the affected site in water for 3 to 5 days or until the site is completely healed.   · Do not lift, push or pull anything over 10 pounds for 2 days after your procedure.  · Inspect the site at least twice daily. You may notice some bruising at the site and it may be tender for 1 to 2 weeks.     · Increase your fluid intake for the next 2 days.    · Keep arm elevated for 24 hours. For the remainder of the day, keep your arm in “Pledge of Allegiance” position when up and about.     · You may drive 24 hours after the procedure unless otherwise instructed by your caregiver.  · Do not operate machinery or power tools for 24 hours.  · A responsible adult should be with you for the first 24 hours after you arrive home. Do not make any important legal decisions or sign legal papers for 24 hours.  Do not drink alcohol for 24 hours.    · Metformin or any medications containing Metformin should not be taken for 48 hours after your procedure.      Call Your Doctor if:   · You have unusual pain at the radial/ulnar (wrist) site.  · You have redness, warmth, swelling, or pain at the radial/ulnar (wrist) site.  · You have drainage (other  than a small amount of blood on the dressing).  · You have chills or a fever > 101.  · Your arm becomes pale or dark, cool, tingly, or numb.  · You have heavy bleeding from the site, hold pressure on the site for 20 minutes.  If the bleeding stops, apply a fresh bandage and call your cardiologist.  However, if you continue to have bleeding, call 911.

## 2018-11-05 NOTE — H&P (VIEW-ONLY)
Subjective:     Encounter Date:10/18/2018      Patient ID: Casey Snowden is a 84 y.o. male.    Chief Complaint:PAF, CP, edema  History of Present Illness    Dear Dr. Diez,    I had the pleasure of seeing this patient in the office today for f/u.  Yesterday he had a stress test and echocardiogram; I had him come in today so we can go over this.  Interpretation Summary     · Findings consistent with a normal ECG stress test.  · Left ventricular ejection fraction is moderately reduced (Calculated EF = 40%).  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      Interpretation Summary     · Left ventricular systolic function is moderately decreased. Calculated EF = 36%. Estimated EF was in disagreement with the calculated EF. Estimated EF appears to be in the range of 31 - 35%. The left ventricular cavity is moderately dilated. Left ventricular diastolic dysfunction is noted (grade II w/high LAP) consistent with pseudonormalization.  · The following segments are akinetic: mid anterior, mid anteroseptal, apical anterior, apical septal and apex. All other segments are normal. The akinetic segments are thin, bright, and consistent with prior infarct  · Left atrial cavity size is moderate-to-severely dilated.  · Moderate mitral valve regurgitation is present. In one view it appears moderately severe  · Mild to moderate tricuspid valve regurgitation is present. Estimated right ventricular systolic pressure from tricuspid regurgitation is moderately elevated (45-55 mmHg).  · There is a small (<1cm) pericardial effusion.         He has a history of paroxysmal atrial fibrillation and has previously followed with Dr. Mercado.  He has been maintained on chronic anticoagulation.  He apparently never required rate control medicine due to baseline bradycardia.    He came to [facility] 8/25 because was been having  some episodes of precordial chest discomfort.  EKG and troponin were unremarkable, it was recommended that he follow-up in our office.  He came in about 6 weeks later.  He states since being in the emergency room he has not had any further episodes of chest discomfort.  Today he reaffirms that he is not had any further episodes of chest discomfort.    He does note these been having increasing lower extremity edema.  He's had problems with swelling in the past–he has a history of chronic bilateral lower extremity DVTs and has an IVC in place.  He had an echocardiogram performed December 2016 that showed normal left ventricular systolic function and grade 1 diastolic dysfunction.  When I saw him on the third we started him on lasix 20 mg daily and his edema has resolved.:  Interpretation Summary     · All left ventricular wall segments contract normally.  · Left ventricular function is normal.  · Left ventricular diastolic dysfunction (grade I) consistent with impaired relaxation.  · Left atrial cavity size is mildly dilated.  · Mild mitral valve regurgitation is present        He has not had any orthopnea or PND.  He has had a cough.  No chills or fevers.  He's been compliant with his medical therapy.  He has an appointment with dermatology for removal of melanoma.    The following portions of the patient's history were reviewed and updated as appropriate: allergies, current medications, past family history, past medical history, past social history, past surgical history and problem list.    Past Medical History:   Diagnosis Date   • Cancer (CMS/[facility]) 04/2018    basil cell carcinoma   • Carotid stenosis    • Chronic deep vein thrombosis (DVT) of popliteal vein of right lower extremity (CMS/[facility])    • DVT of lower extremity (deep venous thrombosis) (CMS/[facility])    • Hard of hearing    • Hx of blood clots    • Hyperlipidemia    • Hypertension    • Long-term use of high-risk medication    • Parkinson's disease (CMS/[facility])     • Postphlebitic syndrome        Past Surgical History:   Procedure Laterality Date   • BASAL CELL CARCINOMA EXCISION  04/2018   • CATARACT EXTRACTION Left 08/2018   • INCISION AND DRAINAGE LEG Right 7/7/2017    Procedure: INCISION AND DRAINAGE INFECTED HEMATOMA RIGHT THIGH;  Surgeon: Valentin Peña MD;  Location: [facility] OR;  Service:    • KNEE INCISION AND DRAINAGE Right 12/27/2016    Procedure: KNEE INCISION AND DRAINAGE;  Surgeon: Triston Whitten MD;  Location: [facility] OR;  Service:    • NOSE SURGERY     • SKIN GRAFT  12/2017   • TOTAL KNEE ARTHROPLASTY Right    • VENA CAVA FILTER PLACEMENT         Social History     Social History   • Marital status:      Spouse name: N/A   • Number of children: N/A   • Years of education: college grad     Occupational History   • financial advis      self employed     Social History Main Topics   • Smoking status: Never Smoker   • Smokeless tobacco: Never Used      Comment: caff use   • Alcohol use 1.2 oz/week     2 Glasses of wine per week   • Drug use: No   • Sexual activity: Yes     Partners: Female     Other Topics Concern   • Not on file     Social History Narrative   • No narrative on file       Review of Systems   Constitution: Positive for malaise/fatigue. Negative for chills, decreased appetite, fever and night sweats.   HENT: Negative for ear discharge, ear pain, hearing loss, nosebleeds and sore throat.    Eyes: Negative for blurred vision, double vision and pain.   Cardiovascular: Negative for cyanosis.   Respiratory: Negative for hemoptysis and sputum production.    Endocrine: Negative for cold intolerance and heat intolerance.   Hematologic/Lymphatic: Negative for adenopathy.   Skin: Negative for dry skin, itching, nail changes, rash and suspicious lesions.   Musculoskeletal: Negative for arthritis, gout, muscle cramps, muscle weakness, myalgias and neck pain.   Gastrointestinal: Negative for anorexia, bowel incontinence, constipation,  "diarrhea, dysphagia, hematemesis and jaundice.   Genitourinary: Negative for bladder incontinence, dysuria, flank pain, frequency, hematuria and nocturia.   Neurological: Negative for focal weakness, numbness, paresthesias and seizures.   Psychiatric/Behavioral: Negative for altered mental status, hallucinations, hypervigilance, suicidal ideas and thoughts of violence.   Allergic/Immunologic: Negative for persistent infections.       Procedures       Objective:     Vitals:    10/18/18 1105   BP: 132/80   Pulse: 60   Weight: 82.1 kg (181 lb)   Height: 180.3 cm (71\")         Physical Exam   Constitutional: He is oriented to person, place, and time. He appears well-developed and well-nourished. No distress.   HENT:   Head: Normocephalic and atraumatic.   Nose: Nose normal.   Mouth/Throat: Oropharynx is clear and moist.   Eyes: Pupils are equal, round, and reactive to light. Conjunctivae and EOM are normal. Right eye exhibits no discharge. Left eye exhibits no discharge.   Neck: Normal range of motion. Neck supple. No tracheal deviation present. No thyromegaly present.   Cardiovascular: Normal rate, regular rhythm, S1 normal, S2 normal, normal heart sounds and normal pulses.  Exam reveals no S3.    Pulmonary/Chest: Effort normal and breath sounds normal. No stridor. No respiratory distress. He exhibits no tenderness.   Abdominal: Soft. Bowel sounds are normal. He exhibits no distension and no mass. There is no tenderness. There is no rebound and no guarding.   Musculoskeletal: Normal range of motion. He exhibits edema. He exhibits no tenderness or deformity.   Lymphadenopathy:     He has no cervical adenopathy.   Neurological: He is alert and oriented to person, place, and time. He has normal reflexes.   Skin: Skin is warm and dry. No rash noted. He is not diaphoretic. No erythema.   Psychiatric: He has a normal mood and affect. Thought content normal.       Lab Review:             Performed        Assessment:          " Diagnosis Plan   1. Acute systolic heart failure (CMS/HCC)  Case Request Cath Lab: Left Heart Cath   2. Paroxysmal A-fib (CMS/[facility])  Case Request Cath Lab: Left Heart Cath   3. Benign essential hypertension  Case Request Cath Lab: Left Heart Cath          Plan:       1. Atrial Fibrillation and Atrial Flutter  Assessment  • The patient has paroxysmal atrial fibrillation  • The patient's CHADS2-VASc score is 3  • A ASS8HV5-LDTs score of 2 or more is considered a high risk for a thromboembolic event    Plan  • Continue apixaban for antithrombotic therapy, bleeding issues discussed    2. Heart Failure  Assessment  • Beta blocker not prescribed for patient reasons  • Diuretics prescribed  • Angiotensin receptor blocker (ARB) prescribed    Plan  • The patient has received heart failure education on the following topics: medication instructions, symptom management and weight monitoring  • The heart failure care plan was discussed with the patient today including: up-titrating HF medications    Subjective/Objective  • Physical exam findings positive for peripheral edema.   • Physical exam findings negative for S3 gallop.    3.  Hhistory of chronic DVT; Patient remains on chronic anticoagulation and follows with Dr. Dorado. patient has an IVC filter in place  4.  Abnormal echocardiogram and stress test consistent with an ischemic cardiomyopathy.  We will arrange for cardiac catheterization to be performed.  He will start taking enteric-coated aspirin 81 mg daily.  5.  Essential hypertension–continue current medical therapy.      Thank you very much for allowing us to participate in the care of this pleasant patient.  Please don't hesitate to call if I can be of assistance in any way.      Current Outpatient Prescriptions:   •  atorvastatin (LIPITOR) 20 MG tablet, Take 1 tablet by mouth Daily., Disp: 90 tablet, Rfl: 3  •  atropine 1 % ophthalmic solution, Apply 1 drop to eye 3 (Three) Times a Day. For drooling, Disp: , Rfl:   •   carbidopa-levodopa (SINEMET)  MG per tablet, 1 tablet 3 (Three) Times a Day., Disp: , Rfl:   •  carboxymethylcellulose (REFRESH PLUS) 0.5 % solution, 1 drop 3 (Three) Times a Day As Needed (eye)., Disp: , Rfl:   •  ELIQUIS 2.5 MG tablet tablet, 2.5 mg Every 12 (Twelve) Hours., Disp: , Rfl:   •  furosemide (LASIX) 20 MG tablet, Take 1 tablet by mouth Daily., Disp: 30 tablet, Rfl: 11  •  losartan (COZAAR) 25 MG tablet, Take 1 tablet by mouth Daily., Disp: 90 tablet, Rfl: 3  •  Multiple Vitamins-Minerals (PRESERVISION AREDS) capsule, Take 1 tablet by mouth Daily., Disp: , Rfl:   •  pramipexole (MIRAPEX) 0.5 MG tablet, TAKE 1 TABLET BY MOUTH 3 (THREE) TIMES DAILY, Disp: , Rfl: 3  •  famotidine (PEPCID) 20 MG tablet, Take 1 tablet by mouth 2 (Two) Times a Day., Disp: 30 tablet, Rfl: 0  No current facility-administered medications for this visit.

## 2018-11-29 ENCOUNTER — OFFICE VISIT (OUTPATIENT)
Dept: CARDIOLOGY | Facility: CLINIC | Age: 83
End: 2018-11-29

## 2018-11-29 VITALS
SYSTOLIC BLOOD PRESSURE: 102 MMHG | DIASTOLIC BLOOD PRESSURE: 68 MMHG | BODY MASS INDEX: 25.06 KG/M2 | HEART RATE: 46 BPM | WEIGHT: 179 LBS | HEIGHT: 71 IN

## 2018-11-29 DIAGNOSIS — E78.2 MIXED HYPERLIPIDEMIA: ICD-10-CM

## 2018-11-29 DIAGNOSIS — I50.32 CHRONIC DIASTOLIC CONGESTIVE HEART FAILURE (HCC): Primary | Chronic | ICD-10-CM

## 2018-11-29 DIAGNOSIS — I48.0 PAROXYSMAL A-FIB (HCC): Chronic | ICD-10-CM

## 2018-11-29 DIAGNOSIS — I10 BENIGN ESSENTIAL HYPERTENSION: ICD-10-CM

## 2018-11-29 PROCEDURE — 99214 OFFICE O/P EST MOD 30 MIN: CPT | Performed by: NURSE PRACTITIONER

## 2018-11-29 PROCEDURE — 93000 ELECTROCARDIOGRAM COMPLETE: CPT | Performed by: NURSE PRACTITIONER

## 2018-11-29 RX ORDER — CARVEDILOL 3.12 MG/1
3.12 TABLET ORAL 2 TIMES DAILY
Qty: 180 TABLET | Refills: 3 | Status: SHIPPED | OUTPATIENT
Start: 2018-11-29 | End: 2018-12-03 | Stop reason: SINTOL

## 2018-11-29 NOTE — PROGRESS NOTES
Subjective:     Encounter Date:11/29/2018      Patient ID: Casey Snowden Sr. is a 84 y.o. male.    Chief Complaint: Ischemia cardiomyopathy, f/u cath  History of Present Illness  He was last seen by Dr. Armas on 10/18/18 to discuss the results of his stress test and echocardiogram.  Interpretation Summary      · Findings consistent with a normal ECG stress test.  · Left ventricular ejection fraction is moderately reduced (Calculated EF = 40%).  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       Interpretation Summary      · Left ventricular systolic function is moderately decreased. Calculated EF = 36%. Estimated EF was in disagreement with the calculated EF. Estimated EF appears to be in the range of 31 - 35%. The left ventricular cavity is moderately dilated. Left ventricular diastolic dysfunction is noted (grade II w/high LAP) consistent with pseudonormalization.  · The following segments are akinetic: mid anterior, mid anteroseptal, apical anterior, apical septal and apex. All other segments are normal. The akinetic segments are thin, bright, and consistent with prior infarct  · Left atrial cavity size is moderate-to-severely dilated.  · Moderate mitral valve regurgitation is present. In one view it appears moderately severe  · Mild to moderate tricuspid valve regurgitation is present. Estimated right ventricular systolic pressure from tricuspid regurgitation is moderately elevated (45-55 mmHg).  · There is a small (<1cm) pericardial effusion.            He has a history of paroxysmal atrial fibrillation and has previously followed with Dr. Mercado.  He has been maintained on chronic anticoagulation.  He apparently never required rate control medicine due to baseline bradycardia.     He came to [facility] 8/25 because was been having some episodes of precordial chest discomfort.  EKG and  troponin were unremarkable, it was recommended that he follow-up in our office.  He came in about 6 weeks later.  He states since being in the emergency room he has not had any further episodes of chest discomfort.  Today he reaffirms that he is not had any further episodes of chest discomfort.     He does note these been having increasing lower extremity edema.  He's had problems with swelling in the past-he has a history of chronic bilateral lower extremity DVTs and has an IVC in place.  He had an echocardiogram performed December 2016 that showed normal left ventricular systolic function and grade 1 diastolic dysfunction.  When I saw him on the third we started him on lasix 20 mg daily and his edema has resolved  Interpretation Summary 10/17/18:    · Left ventricular systolic function is moderately decreased. Calculated EF = 36%. Estimated EF was in disagreement with the calculated EF. Estimated EF appears to be in the range of 31 - 35%. The left ventricular cavity is moderately dilated. Left ventricular diastolic dysfunction is noted (grade II w/high LAP) consistent with pseudonormalization.  · The following segments are akinetic: mid anterior, mid anteroseptal, apical anterior, apical septal and apex. All other segments are normal. The akinetic segments are thin, bright, and consistent with prior infarct  · Left atrial cavity size is moderate-to-severely dilated.  · Moderate mitral valve regurgitation is present. In one view it appears moderately severe  · Mild to moderate tricuspid valve regurgitation is present. Estimated right ventricular systolic pressure from tricuspid regurgitation is moderately elevated (45-55 mmHg).  · There is a small (<1cm) pericardial effusion.          He had a cardiac catheterization November 5, 2018.    LEFT VENTRICULOGRAPHY: The LV pressure was 158/12 .  There was severely reduced global LV function EF is about 30% is a large area of mid anterior to apical akinesis and  inferoapical akinesis a lot of calcium in the coronaries .  There was no mitral insufficiency or gradient across the aortic valve on pullback.     CORONARY ANGIOGRAPHY:  Left main: Normal  Left anterior descending: Luminal irregularities that are 10% in the proximal LAD the mid LAD tapers and then is subtotally occluded in the midportion probably it's recanalized distally the vessel is smallish and diffusely narrowed in the mid distal portion about 60% it it also has EREN 2 flow were all the other vessels have normal flow the diagonals are free of obstructive disease but not really of much size  Ramus intermedius:Not present  Circumflex: Proximal circumflex is normal there is some 1020% disease in the large OM1 some 20% disease in the mid circumflex just after the OM1 bifurcation  RCA: Is a dominant vessel.  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  There is no evidence of ischemia on stress testing and is not having much in the way of symptoms    He was diagnosed with ischemic cardiomyopathy with a EF of 31-35 percent.  He was discharged home on carvedilol 3.125 mg twice a day.    He is here for follow-up status post his catheter or to sensation.  He denies any palpitations, shortness of breath, edema.  He denies any chest pain/tightness, lightheadedness or dizziness.  He denies any fatigue.  He denies any PND, cough, orthopnea.  He is on Eliquis with a history of DVT and placement of IVC filter.    His right wrist catheterization site is well-healed.  Positive radial pulses bilaterally.         The following portions of the patient's history were reviewed and updated as appropriate: allergies, current medications, past family history, past medical history, past social history, past surgical history and problem list.    Review of Systems   Constitution: Negative for weakness and malaise/fatigue.   HENT:  Negative for congestion, hoarse voice and sore throat.    Eyes: Negative for blurred vision, double vision, photophobia, vision loss in left eye and vision loss in right eye.   Cardiovascular: Negative for chest pain, dyspnea on exertion, irregular heartbeat, leg swelling, near-syncope, orthopnea, palpitations, paroxysmal nocturnal dyspnea and syncope.   Respiratory: Negative for cough, hemoptysis, shortness of breath, sleep disturbances due to breathing, snoring, sputum production and wheezing.    Endocrine: Negative.    Hematologic/Lymphatic: Does not bruise/bleed easily.   Skin: Negative for color change, dry skin, poor wound healing and rash.   Musculoskeletal: Negative for back pain, falls, gout, joint pain, joint swelling, muscle cramps and muscle weakness.   Gastrointestinal: Negative for abdominal pain, constipation, diarrhea, dysphagia, melena, nausea and vomiting.   Neurological: Negative for excessive daytime sleepiness, dizziness, headaches, light-headedness, loss of balance, numbness, paresthesias, seizures and vertigo.   Psychiatric/Behavioral: Negative for depression and substance abuse. The patient is not nervous/anxious.        ECG 12 Lead  Date/Time: 11/29/2018 4:45 PM  Performed by: Jacqueline Fuller APRN  Authorized by: Jacqueline Fuller APRN   Comparison: compared with previous ECG from 8/25/2018  Rhythm: sinus tachycardia  Rate: bradycardic  Conduction: conduction normal  ST Elevation: V2, V3 and V4  ST Depression: aVL  T depression: V2, V3, V4 and V5  T flattening: I  QRS axis: normal  Clinical impression: abnormal ECG               Objective:         Physical Exam   Constitutional: He is oriented to person, place, and time. Vital signs are normal. He appears well-developed and well-nourished. No distress.   HENT:   Head: Normocephalic and atraumatic.   Right Ear: Hearing normal.   Left Ear: Hearing normal.   Eyes: Conjunctivae and lids are normal.   Neck: Normal range of motion. Neck supple. No JVD  "present. Carotid bruit is not present. No thyromegaly present.   Cardiovascular: Normal rate, regular rhythm, S1 normal, S2 normal, normal heart sounds and intact distal pulses.  PMI is not displaced.  Exam reveals no gallop.    No murmur heard.  Pulses:       Carotid pulses are 2+ on the right side, and 2+ on the left side.       Radial pulses are 2+ on the right side, and 2+ on the left side.        Dorsalis pedis pulses are 2+ on the right side, and 2+ on the left side.        Posterior tibial pulses are 2+ on the right side, and 2+ on the left side.   Pulmonary/Chest: Effort normal and breath sounds normal. No respiratory distress. He has no wheezes. He has no rhonchi. He has no rales. He exhibits no tenderness.   Abdominal: Soft. Normal appearance and bowel sounds are normal. He exhibits no distension, no abdominal bruit and no mass. There is no tenderness.   Musculoskeletal: Normal range of motion.   Exhibits no edema or deformity   Lymphadenopathy:     He has no cervical adenopathy.   Neurological: He is alert and oriented to person, place, and time. No cranial nerve deficit. Coordination and gait normal.   Oriented to person, place and time.   Skin: Skin is warm, dry and intact. No rash noted. He is not diaphoretic. No cyanosis. Nails show no clubbing.   Psychiatric: He has a normal mood and affect. His speech is normal and behavior is normal. Judgment and thought content normal. Cognition and memory are normal.     Vitals:    11/29/18 1108   BP: 102/68   Pulse: (!) 46   Weight: 81.2 kg (179 lb)   Height: 180.3 cm (71\")           Lab Review:       Assessment:          Diagnosis Plan   1. Chronic diastolic (congestive) heart failure     2. Paroxysmal A-fib (CMS/HCC)     3. Benign essential hypertension     4. Mixed hyperlipidemia            Plan:       1.  Chronic diastolic heart failure - now on carvedilol, tolerating well.  S/p cardiac cath.  Referral to cardiac rehab    Heart Failure  Assessment  • NYHA " class I - There is no limitation of physical activity. Physical activity does not cause fatigue, palpitations or shortness of breath.  • ACE inhibitor not prescribed  • Beta blocker prescribed  • Diuretics not prescribed  • Angiotensin receptor blocker (ARB) prescribed  • Calcium channel blockers not prescribed  • The most recent ejection fraction is 31%  • Left ventricular function is moderately reduced by qualitative assessment  • The left ventricle was last assessed on 11/5/2018    Plan  • The patient has received heart failure education on the following topics: dietary sodium restriction, medication instructions and physical activity  • The heart failure care plan was discussed with the patient today including: continuing the current program  •  The patient has been counseled about ICD or CRT-D implantation    Subjective/Objective    • Physical exam findings negative for rales and peripheral edema.      2. PAF- in sinus brent HR 46, on BB and Eliquis  Atrial Fibrillation and Atrial Flutter  Assessment  • The patient has paroxysmal atrial fibrillation  • This is non-valvular in etiology  • The patient's CHADS2-VASc score is 4  • A [id number] score of 2 or more is considered a high risk for a thromboembolic event  • Apixaban prescribed    Plan  • Attempt to maintain sinus rhythm  • Continue apixaban for antithrombotic therapy, bleeding issues discussed  • Continue beta blocker for rhythm control    3.  Essential HTN - controlled    4.  Hyperlipidemia - on statin therapy    RTO 1 month with JA - appt set    RIDDHI Ye

## 2018-12-03 ENCOUNTER — OFFICE VISIT (OUTPATIENT)
Dept: FAMILY MEDICINE CLINIC | Facility: CLINIC | Age: 83
End: 2018-12-03

## 2018-12-03 VITALS
BODY MASS INDEX: 25.06 KG/M2 | HEIGHT: 71 IN | DIASTOLIC BLOOD PRESSURE: 66 MMHG | WEIGHT: 179 LBS | HEART RATE: 31 BPM | OXYGEN SATURATION: 96 % | SYSTOLIC BLOOD PRESSURE: 104 MMHG

## 2018-12-03 DIAGNOSIS — G20 PARKINSON'S DISEASE (HCC): ICD-10-CM

## 2018-12-03 DIAGNOSIS — G90.3 ORTHOSTATIC HYPOTENSION DUE TO PARKINSON'S DISEASE (HCC): ICD-10-CM

## 2018-12-03 DIAGNOSIS — R00.1 BRADYCARDIA WITH 31-40 BEATS PER MINUTE: Primary | ICD-10-CM

## 2018-12-03 PROBLEM — R07.89 ANTERIOR CHEST WALL PAIN: Status: RESOLVED | Noted: 2018-09-11 | Resolved: 2018-12-03

## 2018-12-03 PROCEDURE — 99213 OFFICE O/P EST LOW 20 MIN: CPT | Performed by: FAMILY MEDICINE

## 2018-12-03 PROCEDURE — G0008 ADMIN INFLUENZA VIRUS VAC: HCPCS | Performed by: FAMILY MEDICINE

## 2018-12-03 PROCEDURE — 93000 ELECTROCARDIOGRAM COMPLETE: CPT | Performed by: FAMILY MEDICINE

## 2018-12-03 PROCEDURE — 90662 IIV NO PRSV INCREASED AG IM: CPT | Performed by: FAMILY MEDICINE

## 2018-12-03 NOTE — PROGRESS NOTES
Subjective   Casey Snowden Sr. is a 84 y.o. male who is here for   Chief Complaint   Patient presents with   • Follow-up   • Slow Heart Rate   • Hypotension   .     History of Present Illness   Fatigue: Patient complains of fatigue. Symptoms began several weeks ago. Sentinal symptom the patient feels fatigue began with: exercise intolerance. Symptoms of his fatigue have been general malaise. Patient describes the following psychologic symptoms: none.  Patient denies significant change in weight. Symptoms have gradually worsened. Severity has been symptoms bothersome, but easily able to carry out all usual work/school/family activities. Previous visits for this problem: multiple, this is a longstanding diagnosis. Last seen 1 week ago by me and cardio.   I saw him in sept, he had CP  Sent for nuclear stress which he flunked  Saw Dr soria, sent for Cath  Has old anterior defect and minor CAD  Placed on Coreg bid  We  Have had to stop all his bp meds over the past few years due to Parkinsons hypotension  Bp is low today  HR 30's.  On exam he has regular frequent premature beats.        The following portions of the patient's history were reviewed and updated as appropriate: allergies, current medications, past family history, past medical history, past social history, past surgical history and problem list.    Review of Systems    Objective     Physical Exam   Constitutional: He appears well-developed and well-nourished.   Cardiovascular: A regularly irregular rhythm present. Bradycardia present.   Neurological: A cranial nerve deficit is present. He exhibits abnormal muscle tone.   Nursing note and vitals reviewed.    Skin: right shin skin tear and purple due hitting re-bar around his home pool last week  Healing.      ECG 12 Lead  Date/Time: 12/3/2018 2:01 PM  Performed by: Aleksander Diez MD  Authorized by: Aleksander Diez MD   Comparison: compared with previous ECG from 11/3/2018  Rhythm: sinus  rhythm  Ectopy: atrial premature contractions  Rate: bradycardic  T depression: V2, V3 and V4  Clinical impression: abnormal ECG, dysrhythmia - atrial and myocardial injury  Comments: Heart rate is 48            Assessment/Plan   Casey was seen today for follow-up, slow heart rate and hypotension.    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  Too risky ; BP and HR both too low, high risk for falls    There are no Patient Instructions on file for this visit.    Medications Discontinued During This Encounter   Medication Reason   • carvedilol (COREG) 3.125 MG tablet Side effects        Return in about 6 months (around 6/3/2019) for Medicare Wellness visit.    Dr. Aleksander Diez  [facility]  [place], [place].

## 2018-12-07 ENCOUNTER — TREATMENT (OUTPATIENT)
Dept: CARDIAC REHAB | Facility: HOSPITAL | Age: 83
End: 2018-12-07

## 2018-12-07 DIAGNOSIS — I50.32 CHRONIC DIASTOLIC CONGESTIVE HEART FAILURE (HCC): Primary | ICD-10-CM

## 2018-12-07 PROCEDURE — 93798 PHYS/QHP OP CAR RHAB W/ECG: CPT

## 2018-12-07 PROCEDURE — 93797 PHYS/QHP OP CAR RHAB WO ECG: CPT

## 2018-12-07 NOTE — PROGRESS NOTES
1 Hour initial assessment and educational session followed by 1 hour exercise session. See session comments

## 2018-12-10 ENCOUNTER — TREATMENT (OUTPATIENT)
Dept: CARDIAC REHAB | Facility: HOSPITAL | Age: 83
End: 2018-12-10

## 2018-12-10 DIAGNOSIS — I50.32 CHRONIC DIASTOLIC CONGESTIVE HEART FAILURE (HCC): Primary | ICD-10-CM

## 2018-12-10 PROCEDURE — 93798 PHYS/QHP OP CAR RHAB W/ECG: CPT

## 2018-12-12 ENCOUNTER — TREATMENT (OUTPATIENT)
Dept: CARDIAC REHAB | Facility: HOSPITAL | Age: 83
End: 2018-12-12

## 2018-12-12 DIAGNOSIS — I50.32 CHRONIC DIASTOLIC CONGESTIVE HEART FAILURE (HCC): Primary | ICD-10-CM

## 2018-12-12 PROCEDURE — 93798 PHYS/QHP OP CAR RHAB W/ECG: CPT

## 2018-12-14 ENCOUNTER — TREATMENT (OUTPATIENT)
Dept: CARDIAC REHAB | Facility: HOSPITAL | Age: 83
End: 2018-12-14

## 2018-12-14 ENCOUNTER — APPOINTMENT (OUTPATIENT)
Dept: CARDIAC REHAB | Facility: HOSPITAL | Age: 83
End: 2018-12-14

## 2018-12-14 DIAGNOSIS — I50.32 CHRONIC DIASTOLIC CONGESTIVE HEART FAILURE (HCC): Primary | ICD-10-CM

## 2018-12-14 PROCEDURE — 93798 PHYS/QHP OP CAR RHAB W/ECG: CPT

## 2018-12-17 ENCOUNTER — APPOINTMENT (OUTPATIENT)
Dept: CARDIAC REHAB | Facility: HOSPITAL | Age: 83
End: 2018-12-17

## 2018-12-19 ENCOUNTER — APPOINTMENT (OUTPATIENT)
Dept: CARDIAC REHAB | Facility: HOSPITAL | Age: 83
End: 2018-12-19

## 2018-12-20 ENCOUNTER — OFFICE VISIT (OUTPATIENT)
Dept: CARDIOLOGY | Facility: CLINIC | Age: 83
End: 2018-12-20

## 2018-12-20 VITALS
BODY MASS INDEX: 25.48 KG/M2 | SYSTOLIC BLOOD PRESSURE: 168 MMHG | DIASTOLIC BLOOD PRESSURE: 82 MMHG | WEIGHT: 182 LBS | HEART RATE: 55 BPM | HEIGHT: 71 IN

## 2018-12-20 DIAGNOSIS — I50.32 CHRONIC DIASTOLIC CONGESTIVE HEART FAILURE (HCC): Chronic | ICD-10-CM

## 2018-12-20 DIAGNOSIS — Z79.01 ANTICOAGULANT LONG-TERM USE: Chronic | ICD-10-CM

## 2018-12-20 DIAGNOSIS — I82.531 CHRONIC DEEP VEIN THROMBOSIS (DVT) OF POPLITEAL VEIN OF RIGHT LOWER EXTREMITY (HCC): ICD-10-CM

## 2018-12-20 DIAGNOSIS — G90.3 ORTHOSTATIC HYPOTENSION DUE TO PARKINSON'S DISEASE (HCC): ICD-10-CM

## 2018-12-20 DIAGNOSIS — I25.10 CORONARY ARTERY DISEASE INVOLVING NATIVE CORONARY ARTERY OF NATIVE HEART WITHOUT ANGINA PECTORIS: Chronic | ICD-10-CM

## 2018-12-20 DIAGNOSIS — G20 PARKINSON'S DISEASE (HCC): ICD-10-CM

## 2018-12-20 DIAGNOSIS — I48.0 PAROXYSMAL A-FIB (HCC): Primary | Chronic | ICD-10-CM

## 2018-12-20 PROBLEM — I50.21 ACUTE SYSTOLIC HEART FAILURE (HCC): Status: RESOLVED | Noted: 2018-10-30 | Resolved: 2018-12-20

## 2018-12-20 PROCEDURE — 99214 OFFICE O/P EST MOD 30 MIN: CPT | Performed by: INTERNAL MEDICINE

## 2018-12-20 RX ORDER — LOSARTAN POTASSIUM 25 MG/1
50 TABLET ORAL DAILY
Qty: 90 TABLET | Refills: 3 | Status: SHIPPED | OUTPATIENT
Start: 2018-12-20 | End: 2019-05-02 | Stop reason: SDUPTHER

## 2018-12-20 RX ORDER — ASPIRIN 81 MG/1
81 TABLET ORAL DAILY
COMMUNITY
End: 2019-07-12

## 2018-12-20 NOTE — PROGRESS NOTES
Subjective:     Encounter Date:12/20/2018      Patient ID: Casey Snowden Sr. is a 84 y.o. male.    Chief Complaint:PAF, CAD  History of Present Illness    Dear Dr. Diez,    I had the pleasure of seeing this patient in the office today for f/u.      He comes in to follow-up with his history of CAD.  He was in our office about a month ago and since then is been doing well.  He had been started on carvedilol 3.125 mg twice daily by Dr. Azar, but developed bradycardia and so the carvedilol was discontinued.  He's not had any symptoms at all.  He is active in cardiac rehabilitation down doing well.  He's not had any chest pain or shortness of breath, and his edema has improved.    He had a cardiac catheterization November 5, 2018.     LEFT VENTRICULOGRAPHY: The LV pressure was 158/12 .  There was severely reduced global LV function EF is about 30% is a large area of mid anterior to apical akinesis and inferoapical akinesis a lot of calcium in the coronaries .  There was no mitral insufficiency or gradient across the aortic valve on pullback.     CORONARY ANGIOGRAPHY:  Left main: Normal  Left anterior descending: Luminal irregularities that are 10% in the proximal LAD the mid LAD tapers and then is subtotally occluded in the midportion probably it's recanalized distally the vessel is smallish and diffusely narrowed in the mid distal portion about 60% it it also has EREN 2 flow were all the other vessels have normal flow the diagonals are free of obstructive disease but not really of much size  Ramus intermedius:Not present  Circumflex: Proximal circumflex is normal there is some 1020% disease in the large OM1 some 20% disease in the mid circumflex just after the OM1 bifurcation  RCA: Is a dominant vessel.  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  There is no evidence of ischemia on stress testing and is  not having much in the way of symptoms     He was diagnosed with ischemic cardiomyopathy with a EF of 31-35 percent.  He was discharged home on carvedilol 3.125 mg twice a day.  Interpretation Summary     · Findings consistent with a normal ECG stress test.  · Left ventricular ejection fraction is moderately reduced (Calculated EF = 40%).  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      Interpretation Summary     · Left ventricular systolic function is moderately decreased. Calculated EF = 36%. Estimated EF was in disagreement with the calculated EF. Estimated EF appears to be in the range of 31 - 35%. The left ventricular cavity is moderately dilated. Left ventricular diastolic dysfunction is noted (grade II w/high LAP) consistent with pseudonormalization.  · The following segments are akinetic: mid anterior, mid anteroseptal, apical anterior, apical septal and apex. All other segments are normal. The akinetic segments are thin, bright, and consistent with prior infarct  · Left atrial cavity size is moderate-to-severely dilated.  · Moderate mitral valve regurgitation is present. In one view it appears moderately severe  · Mild to moderate tricuspid valve regurgitation is present. Estimated right ventricular systolic pressure from tricuspid regurgitation is moderately elevated (45-55 mmHg).  · There is a small (<1cm) pericardial effusion.         He has a history of paroxysmal atrial fibrillation and has previously followed with Dr. Mercado.  He has been maintained on chronic anticoagulation.  He apparently never required rate control medicine due to baseline bradycardia.    He came to ER 8/25 because was been having some episodes of precordial chest discomfort.  EKG and troponin were unremarkable, it was recommended that he follow-up in our office.  He came in about 6 weeks later.  He states  since being in the emergency room he has not had any further episodes of chest discomfort.  Today he reaffirms that he is not had any further episodes of chest discomfort.    He does note these been having increasing lower extremity edema.  He's had problems with swelling in the past-he has a history of chronic bilateral lower extremity DVTs and has an IVC in place.  He had an echocardiogram performed December 2016 that showed normal left ventricular systolic function and grade 1 diastolic dysfunction.  When I saw him on the third we started him on lasix 20 mg daily and his edema has resolved.:  Interpretation Summary     · All left ventricular wall segments contract normally.  · Left ventricular function is normal.  · Left ventricular diastolic dysfunction (grade I) consistent with impaired relaxation.  · Left atrial cavity size is mildly dilated.  · Mild mitral valve regurgitation is present        He has not had any orthopnea or PND.  He has had a cough.  No chills or fevers.  He's been compliant with his medical therapy.  He had an appointment with dermatology for removal of melanoma.    The following portions of the patient's history were reviewed and updated as appropriate: allergies, current medications, past family history, past medical history, past social history, past surgical history and problem list.    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 (CMS/HCC)    • DVT of lower extremity (deep venous thrombosis) (CMS/HCC)    • Hard of hearing    • Hx of blood clots    • Hyperlipidemia    • Hypertension    • Long-term use of high-risk medication    • Parkinson's disease (CMS/[facility])    • Postphlebitic syndrome        Past Surgical History:   Procedure Laterality Date   • BASAL CELL CARCINOMA EXCISION  04/2018   • CARDIAC CATHETERIZATION N/A 11/5/2018     Procedure: Left Heart Cath;  Surgeon: Oliverio Azar MD;  Location[facility] CATH [facility]AS[facility] LOC[facility];  Service: Cardiovascular   • CARDIAC CATHETERIZATION N/A 11/5/2018    Procedure: Coronary angiography;  Surgeon: Oliverio Azar MD;  Location: [facility] CATH [facility]AS[facility] LOC[facility];  Service: Cardiovascular   • CARDIAC CATHETERIZATION N/A 11/5/2018    Procedure: Left ventriculography;  Surgeon: Oliverio Azar MD;  Location: [facility] CATH [facility]AS[facility] LOC[facility];  Service: Cardiovascular   • CATARACT EXTRACTION Left 08/2018   • INCISION AND DRAINAGE LEG Right 7/7/2017    Procedure: INCISION AND DRAINAGE INFECTED HEMATOMA RIGHT THIGH;  Surgeon: Valentin Peña MD;  Location: [facility] MAIN OR;  Service:    • KNEE INCISION AND DRAINAGE Right 12/27/2016    Procedure: KNEE INCISION AND DRAINAGE;  Surgeon: Triston Whitten MD;  Location: [facility] MAIN OR;  Service:    • NOSE SURGERY      nose replacement   • SKIN GRAFT  12/2017   • TOTAL KNEE ARTHROPLASTY Right    • VENA CAVA FILTER PLACEMENT         Social History     Socioeconomic History   • Marital status:      Spouse name: Not on file   • Number of children: Not on file   • Years of education: college grad   • Highest education level: Not on file   Social Needs   • Financial resource strain: Not on file   • Food insecurity - worry: Not on file   • Food insecurity - inability: Not on file   • Transportation needs - medical: Not on file   • Transportation needs - non-medical: Not on file   Occupational History   • Occupation: financial advis     Comment: self employed   Tobacco Use   • Smoking status: Never Smoker   • Smokeless tobacco: Never Used   • Tobacco comment: caff use   Substance and Sexual Activity   • Alcohol use: Yes     Alcohol/week: 1.2 oz     Types: 2 Glasses of wine per week     Comment: none for a month   • Drug use: No   • Sexual activity: Yes     Partners: Female   Other Topics Concern   • Not on file   Social History Narrative   • Not on file  "      Review of Systems   Constitution: Positive for malaise/fatigue. Negative for chills, decreased appetite, fever and night sweats.   HENT: Negative for ear discharge, ear pain, hearing loss, nosebleeds and sore throat.    Eyes: Negative for blurred vision, double vision and pain.   Cardiovascular: Negative for cyanosis.   Respiratory: Negative for hemoptysis and sputum production.    Endocrine: Negative for cold intolerance and heat intolerance.   Hematologic/Lymphatic: Negative for adenopathy.   Skin: Negative for dry skin, itching, nail changes, rash and suspicious lesions.   Musculoskeletal: Negative for arthritis, gout, muscle cramps, muscle weakness, myalgias and neck pain.   Gastrointestinal: Negative for anorexia, bowel incontinence, constipation, diarrhea, dysphagia, hematemesis and jaundice.   Genitourinary: Negative for bladder incontinence, dysuria, flank pain, frequency, hematuria and nocturia.   Neurological: Negative for focal weakness, numbness, paresthesias and seizures.   Psychiatric/Behavioral: Negative for altered mental status, hallucinations, hypervigilance, suicidal ideas and thoughts of violence.   Allergic/Immunologic: Negative for persistent infections.       Procedures       Objective:     Vitals:    12/20/18 0953   BP: 168/82   Pulse: 55   Weight: 82.6 kg (182 lb)   Height: 180.3 cm (71\")         Physical Exam   Constitutional: He is oriented to person, place, and time. He appears well-developed and well-nourished. No distress.   HENT:   Head: Normocephalic and atraumatic.   Nose: Nose normal.   Mouth/Throat: Oropharynx is clear and moist.   Eyes: Conjunctivae and EOM are normal. Pupils are equal, round, and reactive to light. Right eye exhibits no discharge. Left eye exhibits no discharge.   Neck: Normal range of motion. Neck supple. No tracheal deviation present. No thyromegaly present.   Cardiovascular: Normal rate, regular rhythm, S1 normal, S2 normal, normal heart sounds and " normal pulses. Exam reveals no S3.   Pulmonary/Chest: Effort normal and breath sounds normal. No stridor. No respiratory distress. He exhibits no tenderness.   Abdominal: Soft. Bowel sounds are normal. He exhibits no distension and no mass. There is no tenderness. There is no rebound and no guarding.   Musculoskeletal: Normal range of motion. He exhibits edema. He exhibits no tenderness or deformity.   Lymphadenopathy:     He has no cervical adenopathy.   Neurological: He is alert and oriented to person, place, and time. He has normal reflexes.   Skin: Skin is warm and dry. No rash noted. He is not diaphoretic. No erythema.   Psychiatric: He has a normal mood and affect. Thought content normal.       Lab Review:             Performed        Assessment:          Diagnosis Plan   1. Paroxysmal A-fib (CMS/HCC)     2. Chronic diastolic (congestive) heart failure  losartan (COZAAR) 25 MG tablet   3. 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 Coronary artery disease involving native coronary artery of native heart without angina pectoris     5. 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 Parkinson's disease (CMS/[facility])     7. Anticoagulant long-term use            Plan:       1. Atrial Fibrillation and Atrial Flutter  Assessment  • The patient has paroxysmal atrial fibrillation  • The patient's CHADS2-VASc score is 3  • A [id number] score of 2 or more is considered a high risk for a thromboembolic event    Plan  • Continue apixaban for antithrombotic therapy, bleeding issues discussed    2. Heart Failure  Assessment  • Beta blocker not prescribed for patient reasons  • Diuretics prescribed  • Angiotensin receptor blocker (ARB) prescribed    Plan  • The patient has received heart failure education on the following topics: medication instructions, symptom management and weight monitoring  • The heart failure care plan was discussed with the patient  today including: up-titrating HF medications    Subjective/Objective  • Physical exam findings positive for peripheral edema.   • Physical exam findings negative for S3 gallop.    3.  Hhistory of chronic DVT; Patient remains on chronic anticoagulation and follows with Dr. Dorado. Patient has an IVC filter in place  4.  CAD- complete occlusion of LAD with apical wall motion abnormality and ejection fraction of 35-40 percent.  We will advance losartan from 25-50 mg daily, continue aspirin, continue atorvastatin  5.  Essential hypertension-      Thank you very much for allowing us to participate in the care of this pleasant patient.  Please don't hesitate to call if I can be of assistance in any way.      Current Outpatient Medications:   •  aspirin 81 MG EC tablet, Take 81 mg by mouth Daily., Disp: , Rfl:   •  atorvastatin (LIPITOR) 20 MG tablet, Take 1 tablet by mouth Daily., Disp: 90 tablet, Rfl: 3  •  atropine 1 % ophthalmic solution, Administer 1 drop to both eyes 3 (Three) Times a Day. For drooling, Disp: , Rfl:   •  carbidopa-levodopa (SINEMET)  MG per tablet, 1 tablet 3 (Three) Times a Day., Disp: , Rfl:   •  carboxymethylcellulose (REFRESH PLUS) 0.5 % solution, 1 drop 3 (Three) Times a Day As Needed (eye)., Disp: , Rfl:   •  ELIQUIS 2.5 MG tablet tablet, 2.5 mg Every 12 (Twelve) Hours., Disp: , Rfl:   •  losartan (COZAAR) 25 MG tablet, Take 1 tablet by mouth Daily., Disp: 90 tablet, Rfl: 3  •  Multiple Vitamins-Minerals (PRESERVISION AREDS) capsule, Take 1 tablet by mouth Daily., Disp: , Rfl:   •  pramipexole (MIRAPEX) 0.5 MG tablet, TAKE 1 TABLET BY MOUTH 3 (THREE) TIMES DAILY, Disp: , Rfl: 3

## 2018-12-21 ENCOUNTER — APPOINTMENT (OUTPATIENT)
Dept: CARDIAC REHAB | Facility: HOSPITAL | Age: 83
End: 2018-12-21

## 2018-12-21 ENCOUNTER — TREATMENT (OUTPATIENT)
Dept: CARDIAC REHAB | Facility: HOSPITAL | Age: 83
End: 2018-12-21

## 2018-12-21 DIAGNOSIS — I50.32 CHRONIC DIASTOLIC CONGESTIVE HEART FAILURE (HCC): Primary | ICD-10-CM

## 2018-12-21 PROCEDURE — 93798 PHYS/QHP OP CAR RHAB W/ECG: CPT

## 2018-12-24 ENCOUNTER — APPOINTMENT (OUTPATIENT)
Dept: CARDIAC REHAB | Facility: HOSPITAL | Age: 83
End: 2018-12-24

## 2018-12-26 ENCOUNTER — APPOINTMENT (OUTPATIENT)
Dept: CARDIAC REHAB | Facility: HOSPITAL | Age: 83
End: 2018-12-26

## 2018-12-26 ENCOUNTER — TREATMENT (OUTPATIENT)
Dept: CARDIAC REHAB | Facility: HOSPITAL | Age: 83
End: 2018-12-26

## 2018-12-26 DIAGNOSIS — I50.32 CHRONIC DIASTOLIC CONGESTIVE HEART FAILURE (HCC): Primary | ICD-10-CM

## 2018-12-26 PROCEDURE — 93798 PHYS/QHP OP CAR RHAB W/ECG: CPT

## 2018-12-28 ENCOUNTER — TREATMENT (OUTPATIENT)
Dept: CARDIAC REHAB | Facility: HOSPITAL | Age: 83
End: 2018-12-28

## 2018-12-28 ENCOUNTER — APPOINTMENT (OUTPATIENT)
Dept: CARDIAC REHAB | Facility: HOSPITAL | Age: 83
End: 2018-12-28

## 2018-12-28 DIAGNOSIS — I50.32 CHRONIC DIASTOLIC CONGESTIVE HEART FAILURE (HCC): Primary | ICD-10-CM

## 2018-12-28 PROCEDURE — 93798 PHYS/QHP OP CAR RHAB W/ECG: CPT

## 2018-12-31 ENCOUNTER — TREATMENT (OUTPATIENT)
Dept: CARDIAC REHAB | Facility: HOSPITAL | Age: 83
End: 2018-12-31

## 2018-12-31 ENCOUNTER — APPOINTMENT (OUTPATIENT)
Dept: CARDIAC REHAB | Facility: HOSPITAL | Age: 83
End: 2018-12-31

## 2018-12-31 DIAGNOSIS — I50.32 CHRONIC DIASTOLIC CONGESTIVE HEART FAILURE (HCC): Primary | ICD-10-CM

## 2018-12-31 PROCEDURE — 93798 PHYS/QHP OP CAR RHAB W/ECG: CPT

## 2019-01-02 ENCOUNTER — TREATMENT (OUTPATIENT)
Dept: CARDIAC REHAB | Facility: HOSPITAL | Age: 84
End: 2019-01-02

## 2019-01-02 ENCOUNTER — APPOINTMENT (OUTPATIENT)
Dept: CARDIAC REHAB | Facility: HOSPITAL | Age: 84
End: 2019-01-02

## 2019-01-02 DIAGNOSIS — I50.32 CHRONIC DIASTOLIC CONGESTIVE HEART FAILURE (HCC): Primary | ICD-10-CM

## 2019-01-02 PROCEDURE — 93798 PHYS/QHP OP CAR RHAB W/ECG: CPT

## 2019-01-04 ENCOUNTER — APPOINTMENT (OUTPATIENT)
Dept: CARDIAC REHAB | Facility: HOSPITAL | Age: 84
End: 2019-01-04

## 2019-01-04 ENCOUNTER — TREATMENT (OUTPATIENT)
Dept: CARDIAC REHAB | Facility: HOSPITAL | Age: 84
End: 2019-01-04

## 2019-01-04 DIAGNOSIS — I50.32 CHRONIC DIASTOLIC CONGESTIVE HEART FAILURE (HCC): Primary | ICD-10-CM

## 2019-01-04 PROCEDURE — 93798 PHYS/QHP OP CAR RHAB W/ECG: CPT

## 2019-01-07 ENCOUNTER — APPOINTMENT (OUTPATIENT)
Dept: CARDIAC REHAB | Facility: HOSPITAL | Age: 84
End: 2019-01-07

## 2019-01-07 ENCOUNTER — TREATMENT (OUTPATIENT)
Dept: CARDIAC REHAB | Facility: HOSPITAL | Age: 84
End: 2019-01-07

## 2019-01-07 DIAGNOSIS — I50.32 CHRONIC DIASTOLIC CONGESTIVE HEART FAILURE (HCC): Primary | ICD-10-CM

## 2019-01-07 PROCEDURE — 93798 PHYS/QHP OP CAR RHAB W/ECG: CPT

## 2019-01-09 ENCOUNTER — TREATMENT (OUTPATIENT)
Dept: CARDIAC REHAB | Facility: HOSPITAL | Age: 84
End: 2019-01-09

## 2019-01-09 ENCOUNTER — APPOINTMENT (OUTPATIENT)
Dept: CARDIAC REHAB | Facility: HOSPITAL | Age: 84
End: 2019-01-09

## 2019-01-09 ENCOUNTER — OFFICE VISIT (OUTPATIENT)
Dept: CARDIAC REHAB | Facility: HOSPITAL | Age: 84
End: 2019-01-09

## 2019-01-09 DIAGNOSIS — I50.32 CHRONIC DIASTOLIC CONGESTIVE HEART FAILURE (HCC): Primary | ICD-10-CM

## 2019-01-09 PROCEDURE — 93798 PHYS/QHP OP CAR RHAB W/ECG: CPT

## 2019-01-09 PROCEDURE — 93797 PHYS/QHP OP CAR RHAB WO ECG: CPT

## 2019-01-11 ENCOUNTER — TREATMENT (OUTPATIENT)
Dept: CARDIAC REHAB | Facility: HOSPITAL | Age: 84
End: 2019-01-11

## 2019-01-11 ENCOUNTER — APPOINTMENT (OUTPATIENT)
Dept: CARDIAC REHAB | Facility: HOSPITAL | Age: 84
End: 2019-01-11

## 2019-01-11 DIAGNOSIS — I50.32 CHRONIC DIASTOLIC CONGESTIVE HEART FAILURE (HCC): Primary | ICD-10-CM

## 2019-01-11 PROCEDURE — 93798 PHYS/QHP OP CAR RHAB W/ECG: CPT

## 2019-01-14 ENCOUNTER — TREATMENT (OUTPATIENT)
Dept: CARDIAC REHAB | Facility: HOSPITAL | Age: 84
End: 2019-01-14

## 2019-01-14 ENCOUNTER — APPOINTMENT (OUTPATIENT)
Dept: CARDIAC REHAB | Facility: HOSPITAL | Age: 84
End: 2019-01-14

## 2019-01-14 DIAGNOSIS — I50.32 CHRONIC DIASTOLIC CONGESTIVE HEART FAILURE (HCC): Primary | ICD-10-CM

## 2019-01-14 PROCEDURE — 93798 PHYS/QHP OP CAR RHAB W/ECG: CPT

## 2019-01-16 ENCOUNTER — APPOINTMENT (OUTPATIENT)
Dept: CARDIAC REHAB | Facility: HOSPITAL | Age: 84
End: 2019-01-16

## 2019-01-16 ENCOUNTER — TREATMENT (OUTPATIENT)
Dept: CARDIAC REHAB | Facility: HOSPITAL | Age: 84
End: 2019-01-16

## 2019-01-16 DIAGNOSIS — I50.32 CHRONIC DIASTOLIC CONGESTIVE HEART FAILURE (HCC): Primary | ICD-10-CM

## 2019-01-16 PROCEDURE — 93798 PHYS/QHP OP CAR RHAB W/ECG: CPT

## 2019-01-18 ENCOUNTER — TREATMENT (OUTPATIENT)
Dept: CARDIAC REHAB | Facility: HOSPITAL | Age: 84
End: 2019-01-18

## 2019-01-18 ENCOUNTER — APPOINTMENT (OUTPATIENT)
Dept: CARDIAC REHAB | Facility: HOSPITAL | Age: 84
End: 2019-01-18

## 2019-01-18 DIAGNOSIS — I50.32 CHRONIC DIASTOLIC CONGESTIVE HEART FAILURE (HCC): Primary | ICD-10-CM

## 2019-01-18 PROCEDURE — 93798 PHYS/QHP OP CAR RHAB W/ECG: CPT

## 2019-01-21 ENCOUNTER — APPOINTMENT (OUTPATIENT)
Dept: CARDIAC REHAB | Facility: HOSPITAL | Age: 84
End: 2019-01-21

## 2019-01-21 ENCOUNTER — TREATMENT (OUTPATIENT)
Dept: CARDIAC REHAB | Facility: HOSPITAL | Age: 84
End: 2019-01-21

## 2019-01-21 DIAGNOSIS — I50.32 CHRONIC DIASTOLIC CONGESTIVE HEART FAILURE (HCC): Primary | ICD-10-CM

## 2019-01-21 PROCEDURE — 93798 PHYS/QHP OP CAR RHAB W/ECG: CPT

## 2019-01-23 ENCOUNTER — APPOINTMENT (OUTPATIENT)
Dept: CARDIAC REHAB | Facility: HOSPITAL | Age: 84
End: 2019-01-23

## 2019-01-23 ENCOUNTER — OFFICE VISIT (OUTPATIENT)
Dept: CARDIAC REHAB | Facility: HOSPITAL | Age: 84
End: 2019-01-23

## 2019-01-23 ENCOUNTER — TREATMENT (OUTPATIENT)
Dept: CARDIAC REHAB | Facility: HOSPITAL | Age: 84
End: 2019-01-23

## 2019-01-23 DIAGNOSIS — I50.32 CHRONIC DIASTOLIC CONGESTIVE HEART FAILURE (HCC): Primary | ICD-10-CM

## 2019-01-23 PROCEDURE — 93797 PHYS/QHP OP CAR RHAB WO ECG: CPT

## 2019-01-23 PROCEDURE — 93798 PHYS/QHP OP CAR RHAB W/ECG: CPT

## 2019-01-25 ENCOUNTER — APPOINTMENT (OUTPATIENT)
Dept: CARDIAC REHAB | Facility: HOSPITAL | Age: 84
End: 2019-01-25

## 2019-01-25 ENCOUNTER — TREATMENT (OUTPATIENT)
Dept: CARDIAC REHAB | Facility: HOSPITAL | Age: 84
End: 2019-01-25

## 2019-01-25 DIAGNOSIS — I50.32 CHRONIC DIASTOLIC CONGESTIVE HEART FAILURE (HCC): Primary | ICD-10-CM

## 2019-01-25 PROCEDURE — 93798 PHYS/QHP OP CAR RHAB W/ECG: CPT

## 2019-01-28 ENCOUNTER — APPOINTMENT (OUTPATIENT)
Dept: CARDIAC REHAB | Facility: HOSPITAL | Age: 84
End: 2019-01-28

## 2019-01-28 ENCOUNTER — TREATMENT (OUTPATIENT)
Dept: CARDIAC REHAB | Facility: HOSPITAL | Age: 84
End: 2019-01-28

## 2019-01-28 DIAGNOSIS — I50.32 CHRONIC DIASTOLIC CONGESTIVE HEART FAILURE (HCC): Primary | ICD-10-CM

## 2019-01-28 PROCEDURE — 93798 PHYS/QHP OP CAR RHAB W/ECG: CPT

## 2019-01-30 ENCOUNTER — APPOINTMENT (OUTPATIENT)
Dept: CARDIAC REHAB | Facility: HOSPITAL | Age: 84
End: 2019-01-30

## 2019-02-01 ENCOUNTER — APPOINTMENT (OUTPATIENT)
Dept: CARDIAC REHAB | Facility: HOSPITAL | Age: 84
End: 2019-02-01

## 2019-02-01 ENCOUNTER — TREATMENT (OUTPATIENT)
Dept: CARDIAC REHAB | Facility: HOSPITAL | Age: 84
End: 2019-02-01

## 2019-02-01 DIAGNOSIS — I50.32 CHRONIC DIASTOLIC CONGESTIVE HEART FAILURE (HCC): Primary | ICD-10-CM

## 2019-02-01 PROCEDURE — 93798 PHYS/QHP OP CAR RHAB W/ECG: CPT

## 2019-02-04 ENCOUNTER — TREATMENT (OUTPATIENT)
Dept: CARDIAC REHAB | Facility: HOSPITAL | Age: 84
End: 2019-02-04

## 2019-02-04 ENCOUNTER — APPOINTMENT (OUTPATIENT)
Dept: CARDIAC REHAB | Facility: HOSPITAL | Age: 84
End: 2019-02-04

## 2019-02-04 DIAGNOSIS — I50.32 CHRONIC DIASTOLIC CONGESTIVE HEART FAILURE (HCC): Primary | ICD-10-CM

## 2019-02-04 PROCEDURE — 93798 PHYS/QHP OP CAR RHAB W/ECG: CPT

## 2019-02-06 ENCOUNTER — TREATMENT (OUTPATIENT)
Dept: CARDIAC REHAB | Facility: HOSPITAL | Age: 84
End: 2019-02-06

## 2019-02-06 ENCOUNTER — APPOINTMENT (OUTPATIENT)
Dept: CARDIAC REHAB | Facility: HOSPITAL | Age: 84
End: 2019-02-06

## 2019-02-06 DIAGNOSIS — I50.32 CHRONIC DIASTOLIC CONGESTIVE HEART FAILURE (HCC): Primary | ICD-10-CM

## 2019-02-06 PROCEDURE — 93798 PHYS/QHP OP CAR RHAB W/ECG: CPT

## 2019-02-08 ENCOUNTER — APPOINTMENT (OUTPATIENT)
Dept: CARDIAC REHAB | Facility: HOSPITAL | Age: 84
End: 2019-02-08

## 2019-02-08 ENCOUNTER — TREATMENT (OUTPATIENT)
Dept: CARDIAC REHAB | Facility: HOSPITAL | Age: 84
End: 2019-02-08

## 2019-02-08 DIAGNOSIS — I50.32 CHRONIC DIASTOLIC CONGESTIVE HEART FAILURE (HCC): Primary | ICD-10-CM

## 2019-02-08 PROCEDURE — 93798 PHYS/QHP OP CAR RHAB W/ECG: CPT

## 2019-02-11 ENCOUNTER — APPOINTMENT (OUTPATIENT)
Dept: CARDIAC REHAB | Facility: HOSPITAL | Age: 84
End: 2019-02-11

## 2019-02-11 ENCOUNTER — TREATMENT (OUTPATIENT)
Dept: CARDIAC REHAB | Facility: HOSPITAL | Age: 84
End: 2019-02-11

## 2019-02-11 DIAGNOSIS — I50.32 CHRONIC DIASTOLIC CONGESTIVE HEART FAILURE (HCC): Primary | ICD-10-CM

## 2019-02-11 PROCEDURE — 93798 PHYS/QHP OP CAR RHAB W/ECG: CPT

## 2019-02-13 ENCOUNTER — APPOINTMENT (OUTPATIENT)
Dept: CARDIAC REHAB | Facility: HOSPITAL | Age: 84
End: 2019-02-13

## 2019-02-13 ENCOUNTER — TREATMENT (OUTPATIENT)
Dept: CARDIAC REHAB | Facility: HOSPITAL | Age: 84
End: 2019-02-13

## 2019-02-13 DIAGNOSIS — I50.32 CHRONIC DIASTOLIC CONGESTIVE HEART FAILURE (HCC): Primary | ICD-10-CM

## 2019-02-13 PROCEDURE — 93798 PHYS/QHP OP CAR RHAB W/ECG: CPT

## 2019-02-15 ENCOUNTER — APPOINTMENT (OUTPATIENT)
Dept: CARDIAC REHAB | Facility: HOSPITAL | Age: 84
End: 2019-02-15

## 2019-02-15 ENCOUNTER — TREATMENT (OUTPATIENT)
Dept: CARDIAC REHAB | Facility: HOSPITAL | Age: 84
End: 2019-02-15

## 2019-02-15 DIAGNOSIS — I50.32 CHRONIC DIASTOLIC CONGESTIVE HEART FAILURE (HCC): Primary | ICD-10-CM

## 2019-02-15 PROCEDURE — 93798 PHYS/QHP OP CAR RHAB W/ECG: CPT

## 2019-03-22 ENCOUNTER — TELEPHONE (OUTPATIENT)
Dept: CARDIOLOGY | Facility: CLINIC | Age: 84
End: 2019-03-22

## 2019-03-22 NOTE — TELEPHONE ENCOUNTER
Pt's wife called and said that in light of the article in the newspaper ( [employer] on Wednesday- article said that if pt takes aspirin, you can get internal bleeding) she is wondering if the patient needs to be taking an aspirin since he is already taking Cozaar and Eliquis.    Pt can be reached at [phone]

## 2019-05-02 DIAGNOSIS — E78.2 MIXED HYPERLIPIDEMIA: ICD-10-CM

## 2019-05-02 DIAGNOSIS — I50.32 CHRONIC DIASTOLIC CONGESTIVE HEART FAILURE (HCC): Chronic | ICD-10-CM

## 2019-05-02 RX ORDER — LOSARTAN POTASSIUM 25 MG/1
50 TABLET ORAL DAILY
Qty: 180 TABLET | Refills: 0 | Status: ON HOLD | OUTPATIENT
Start: 2019-05-02 | End: 2019-06-05 | Stop reason: SDUPTHER

## 2019-05-02 RX ORDER — ATORVASTATIN CALCIUM 20 MG/1
20 TABLET, FILM COATED ORAL DAILY
Qty: 90 TABLET | Refills: 3 | Status: SHIPPED | OUTPATIENT
Start: 2019-05-02 | End: 2020-05-06

## 2019-06-03 ENCOUNTER — HOSPITAL ENCOUNTER (OUTPATIENT)
Facility: HOSPITAL | Age: 84
Discharge: ANOTHER HOSPITAL | End: 2019-06-04
Attending: EMERGENCY MEDICINE | Admitting: EMERGENCY MEDICINE

## 2019-06-03 ENCOUNTER — APPOINTMENT (OUTPATIENT)
Dept: GENERAL RADIOLOGY | Facility: HOSPITAL | Age: 84
End: 2019-06-03

## 2019-06-03 DIAGNOSIS — I48.91 ATRIAL FIBRILLATION WITH RVR (HCC): Primary | ICD-10-CM

## 2019-06-03 DIAGNOSIS — R91.8 INFILTRATE OF UPPER LOBE OF RIGHT LUNG PRESENT ON IMAGING STUDY: ICD-10-CM

## 2019-06-03 LAB
ALBUMIN SERPL-MCNC: 3.3 G/DL (ref 3.5–5.2)
ALBUMIN/GLOB SERPL: 1.1 G/DL
ALP SERPL-CCNC: 56 U/L (ref 39–117)
ALT SERPL W P-5'-P-CCNC: 5 U/L (ref 1–41)
ANION GAP SERPL CALCULATED.3IONS-SCNC: 12.5 MMOL/L
APTT PPP: 33.2 SECONDS (ref 24.3–38.1)
AST SERPL-CCNC: 15 U/L (ref 1–40)
B PARAPERT DNA SPEC QL NAA+PROBE: NOT DETECTED
B PERT DNA SPEC QL NAA+PROBE: NOT DETECTED
BASOPHILS # BLD AUTO: 0.02 10*3/MM3 (ref 0–0.2)
BASOPHILS NFR BLD AUTO: 0.3 % (ref 0–1.5)
BILIRUB SERPL-MCNC: 0.5 MG/DL (ref 0.2–1.2)
BUN BLD-MCNC: 23 MG/DL (ref 8–23)
BUN/CREAT SERPL: 24.2 (ref 7–25)
C PNEUM DNA NPH QL NAA+NON-PROBE: NOT DETECTED
CALCIUM SPEC-SCNC: 8.7 MG/DL (ref 8.6–10.5)
CHLORIDE SERPL-SCNC: 105 MMOL/L (ref 98–107)
CO2 SERPL-SCNC: 24.5 MMOL/L (ref 22–29)
CREAT BLD-MCNC: 0.95 MG/DL (ref 0.76–1.27)
DEPRECATED RDW RBC AUTO: 52.4 FL (ref 37–54)
EOSINOPHIL # BLD AUTO: 0.09 10*3/MM3 (ref 0–0.4)
EOSINOPHIL NFR BLD AUTO: 1.4 % (ref 0.3–6.2)
ERYTHROCYTE [DISTWIDTH] IN BLOOD BY AUTOMATED COUNT: 14.3 % (ref 12.3–15.4)
FLUAV H1 2009 PAND RNA NPH QL NAA+PROBE: NOT DETECTED
FLUAV H1 HA GENE NPH QL NAA+PROBE: NOT DETECTED
FLUAV H3 RNA NPH QL NAA+PROBE: NOT DETECTED
FLUAV SUBTYP SPEC NAA+PROBE: NOT DETECTED
FLUBV RNA ISLT QL NAA+PROBE: NOT DETECTED
GFR SERPL CREATININE-BSD FRML MDRD: 76 ML/MIN/1.73
GLOBULIN UR ELPH-MCNC: 3.1 GM/DL
GLUCOSE BLD-MCNC: 140 MG/DL (ref 65–99)
HADV DNA SPEC NAA+PROBE: NOT DETECTED
HBA1C MFR BLD: 5.3 % (ref 4.8–5.6)
HCOV 229E RNA SPEC QL NAA+PROBE: NOT DETECTED
HCOV HKU1 RNA SPEC QL NAA+PROBE: NOT DETECTED
HCOV NL63 RNA SPEC QL NAA+PROBE: NOT DETECTED
HCOV OC43 RNA SPEC QL NAA+PROBE: NOT DETECTED
HCT VFR BLD AUTO: 40.4 % (ref 37.5–51)
HGB BLD-MCNC: 13.4 G/DL (ref 13–17.7)
HMPV RNA NPH QL NAA+NON-PROBE: NOT DETECTED
HPIV1 RNA SPEC QL NAA+PROBE: NOT DETECTED
HPIV2 RNA SPEC QL NAA+PROBE: NOT DETECTED
HPIV3 RNA NPH QL NAA+PROBE: NOT DETECTED
HPIV4 P GENE NPH QL NAA+PROBE: NOT DETECTED
IMM GRANULOCYTES # BLD AUTO: 0.01 10*3/MM3 (ref 0–0.05)
IMM GRANULOCYTES NFR BLD AUTO: 0.2 % (ref 0–0.5)
INR PPP: 1.14 (ref 0.9–1.1)
LIPASE SERPL-CCNC: 15 U/L (ref 13–60)
LYMPHOCYTES # BLD AUTO: 0.79 10*3/MM3 (ref 0.7–3.1)
LYMPHOCYTES NFR BLD AUTO: 12.3 % (ref 19.6–45.3)
M PNEUMO IGG SER IA-ACNC: NOT DETECTED
MCH RBC QN AUTO: 32.6 PG (ref 26.6–33)
MCHC RBC AUTO-ENTMCNC: 33.2 G/DL (ref 31.5–35.7)
MCV RBC AUTO: 98.3 FL (ref 79–97)
MONOCYTES # BLD AUTO: 0.48 10*3/MM3 (ref 0.1–0.9)
MONOCYTES NFR BLD AUTO: 7.5 % (ref 5–12)
NEUTROPHILS # BLD AUTO: 5.03 10*3/MM3 (ref 1.7–7)
NEUTROPHILS NFR BLD AUTO: 78.3 % (ref 42.7–76)
NRBC BLD AUTO-RTO: 0 /100 WBC (ref 0–0.2)
NT-PROBNP SERPL-MCNC: 2646 PG/ML (ref 5–1800)
PLATELET # BLD AUTO: 135 10*3/MM3 (ref 140–450)
PMV BLD AUTO: 10.4 FL (ref 6–12)
POTASSIUM BLD-SCNC: 3.9 MMOL/L (ref 3.5–5.2)
PROCALCITONIN SERPL-MCNC: 0.33 NG/ML (ref 0.1–0.25)
PROT SERPL-MCNC: 6.4 G/DL (ref 6–8.5)
PROTHROMBIN TIME: 14.3 SECONDS (ref 12.1–15)
RBC # BLD AUTO: 4.11 10*6/MM3 (ref 4.14–5.8)
RHINOVIRUS RNA SPEC NAA+PROBE: NOT DETECTED
RSV RNA NPH QL NAA+NON-PROBE: NOT DETECTED
SODIUM BLD-SCNC: 142 MMOL/L (ref 136–145)
TROPONIN T SERPL-MCNC: 0.42 NG/ML (ref 0–0.03)
TROPONIN T SERPL-MCNC: <0.01 NG/ML (ref 0–0.03)
TSH SERPL DL<=0.05 MIU/L-ACNC: 6.01 MIU/ML (ref 0.27–4.2)
WBC NRBC COR # BLD: 6.42 10*3/MM3 (ref 3.4–10.8)

## 2019-06-03 PROCEDURE — 99284 EMERGENCY DEPT VISIT MOD MDM: CPT

## 2019-06-03 PROCEDURE — 96376 TX/PRO/DX INJ SAME DRUG ADON: CPT

## 2019-06-03 PROCEDURE — 84484 ASSAY OF TROPONIN QUANT: CPT | Performed by: EMERGENCY MEDICINE

## 2019-06-03 PROCEDURE — 83690 ASSAY OF LIPASE: CPT | Performed by: EMERGENCY MEDICINE

## 2019-06-03 PROCEDURE — 84443 ASSAY THYROID STIM HORMONE: CPT | Performed by: NURSE PRACTITIONER

## 2019-06-03 PROCEDURE — 84484 ASSAY OF TROPONIN QUANT: CPT | Performed by: NURSE PRACTITIONER

## 2019-06-03 PROCEDURE — 25010000002 LEVOFLOXACIN PER 250 MG: Performed by: NURSE PRACTITIONER

## 2019-06-03 PROCEDURE — 85730 THROMBOPLASTIN TIME PARTIAL: CPT | Performed by: EMERGENCY MEDICINE

## 2019-06-03 PROCEDURE — 96375 TX/PRO/DX INJ NEW DRUG ADDON: CPT

## 2019-06-03 PROCEDURE — 87633 RESP VIRUS 12-25 TARGETS: CPT | Performed by: NURSE PRACTITIONER

## 2019-06-03 PROCEDURE — 93005 ELECTROCARDIOGRAM TRACING: CPT | Performed by: EMERGENCY MEDICINE

## 2019-06-03 PROCEDURE — 93005 ELECTROCARDIOGRAM TRACING: CPT | Performed by: NURSE PRACTITIONER

## 2019-06-03 PROCEDURE — 80053 COMPREHEN METABOLIC PANEL: CPT | Performed by: EMERGENCY MEDICINE

## 2019-06-03 PROCEDURE — 96374 THER/PROPH/DIAG INJ IV PUSH: CPT

## 2019-06-03 PROCEDURE — 93010 ELECTROCARDIOGRAM REPORT: CPT | Performed by: INTERNAL MEDICINE

## 2019-06-03 PROCEDURE — 94799 UNLISTED PULMONARY SVC/PX: CPT

## 2019-06-03 PROCEDURE — 85610 PROTHROMBIN TIME: CPT | Performed by: EMERGENCY MEDICINE

## 2019-06-03 PROCEDURE — 93005 ELECTROCARDIOGRAM TRACING: CPT

## 2019-06-03 PROCEDURE — 83880 ASSAY OF NATRIURETIC PEPTIDE: CPT | Performed by: EMERGENCY MEDICINE

## 2019-06-03 PROCEDURE — 99222 1ST HOSP IP/OBS MODERATE 55: CPT | Performed by: NURSE PRACTITIONER

## 2019-06-03 PROCEDURE — 25010000002 ENOXAPARIN PER 10 MG: Performed by: NURSE PRACTITIONER

## 2019-06-03 PROCEDURE — 87581 M.PNEUMON DNA AMP PROBE: CPT | Performed by: NURSE PRACTITIONER

## 2019-06-03 PROCEDURE — 71046 X-RAY EXAM CHEST 2 VIEWS: CPT

## 2019-06-03 PROCEDURE — 85025 COMPLETE CBC W/AUTO DIFF WBC: CPT | Performed by: EMERGENCY MEDICINE

## 2019-06-03 PROCEDURE — 87798 DETECT AGENT NOS DNA AMP: CPT | Performed by: NURSE PRACTITIONER

## 2019-06-03 PROCEDURE — 87486 CHLMYD PNEUM DNA AMP PROBE: CPT | Performed by: NURSE PRACTITIONER

## 2019-06-03 PROCEDURE — 83036 HEMOGLOBIN GLYCOSYLATED A1C: CPT | Performed by: NURSE PRACTITIONER

## 2019-06-03 PROCEDURE — 96372 THER/PROPH/DIAG INJ SC/IM: CPT

## 2019-06-03 PROCEDURE — 84145 PROCALCITONIN (PCT): CPT | Performed by: NURSE PRACTITIONER

## 2019-06-03 PROCEDURE — 96365 THER/PROPH/DIAG IV INF INIT: CPT

## 2019-06-03 PROCEDURE — 99285 EMERGENCY DEPT VISIT HI MDM: CPT | Performed by: EMERGENCY MEDICINE

## 2019-06-03 RX ORDER — SODIUM CHLORIDE 0.9 % (FLUSH) 0.9 %
10 SYRINGE (ML) INJECTION AS NEEDED
Status: DISCONTINUED | OUTPATIENT
Start: 2019-06-03 | End: 2019-06-04 | Stop reason: HOSPADM

## 2019-06-03 RX ORDER — ASPIRIN 81 MG/1
81 TABLET ORAL DAILY
Status: DISCONTINUED | OUTPATIENT
Start: 2019-06-04 | End: 2019-06-04 | Stop reason: HOSPADM

## 2019-06-03 RX ORDER — BISACODYL 10 MG
10 SUPPOSITORY, RECTAL RECTAL DAILY PRN
Status: DISCONTINUED | OUTPATIENT
Start: 2019-06-03 | End: 2019-06-04 | Stop reason: HOSPADM

## 2019-06-03 RX ORDER — ATORVASTATIN CALCIUM 20 MG/1
20 TABLET, FILM COATED ORAL DAILY
Status: DISCONTINUED | OUTPATIENT
Start: 2019-06-03 | End: 2019-06-04 | Stop reason: HOSPADM

## 2019-06-03 RX ORDER — DILTIAZEM HYDROCHLORIDE 5 MG/ML
INJECTION INTRAVENOUS
Status: COMPLETED
Start: 2019-06-03 | End: 2019-06-03

## 2019-06-03 RX ORDER — ATORVASTATIN CALCIUM 20 MG/1
TABLET, FILM COATED ORAL
Status: COMPLETED
Start: 2019-06-03 | End: 2019-06-03

## 2019-06-03 RX ORDER — BISACODYL 5 MG/1
5 TABLET, DELAYED RELEASE ORAL DAILY PRN
Status: DISCONTINUED | OUTPATIENT
Start: 2019-06-03 | End: 2019-06-04 | Stop reason: HOSPADM

## 2019-06-03 RX ORDER — PRAMIPEXOLE DIHYDROCHLORIDE 0.25 MG/1
TABLET ORAL
Status: COMPLETED
Start: 2019-06-03 | End: 2019-06-03

## 2019-06-03 RX ORDER — ACETAMINOPHEN 325 MG/1
650 TABLET ORAL EVERY 4 HOURS PRN
Status: DISCONTINUED | OUTPATIENT
Start: 2019-06-03 | End: 2019-06-04 | Stop reason: HOSPADM

## 2019-06-03 RX ORDER — ATROPINE SULFATE 10 MG/ML
1 SOLUTION/ DROPS OPHTHALMIC 3 TIMES DAILY PRN
Status: DISCONTINUED | OUTPATIENT
Start: 2019-06-03 | End: 2019-06-04 | Stop reason: HOSPADM

## 2019-06-03 RX ORDER — PRAMIPEXOLE DIHYDROCHLORIDE 0.25 MG/1
0.5 TABLET ORAL 3 TIMES DAILY
Status: DISCONTINUED | OUTPATIENT
Start: 2019-06-03 | End: 2019-06-04 | Stop reason: HOSPADM

## 2019-06-03 RX ORDER — POLYVINYL ALCOHOL 14 MG/ML
1 SOLUTION/ DROPS OPHTHALMIC
Status: DISCONTINUED | OUTPATIENT
Start: 2019-06-03 | End: 2019-06-04 | Stop reason: HOSPADM

## 2019-06-03 RX ORDER — ATROPINE SULFATE 10 MG/ML
1 SOLUTION/ DROPS OPHTHALMIC 3 TIMES DAILY
Status: DISCONTINUED | OUTPATIENT
Start: 2019-06-03 | End: 2019-06-03

## 2019-06-03 RX ORDER — ONDANSETRON 2 MG/ML
4 INJECTION INTRAMUSCULAR; INTRAVENOUS EVERY 6 HOURS PRN
Status: DISCONTINUED | OUTPATIENT
Start: 2019-06-03 | End: 2019-06-04 | Stop reason: HOSPADM

## 2019-06-03 RX ORDER — SODIUM CHLORIDE 9 MG/ML
40 INJECTION, SOLUTION INTRAVENOUS AS NEEDED
Status: DISCONTINUED | OUTPATIENT
Start: 2019-06-03 | End: 2019-06-04 | Stop reason: HOSPADM

## 2019-06-03 RX ORDER — SODIUM CHLORIDE 0.9 % (FLUSH) 0.9 %
3 SYRINGE (ML) INJECTION EVERY 12 HOURS SCHEDULED
Status: DISCONTINUED | OUTPATIENT
Start: 2019-06-03 | End: 2019-06-04 | Stop reason: HOSPADM

## 2019-06-03 RX ORDER — LEVOFLOXACIN 5 MG/ML
750 INJECTION, SOLUTION INTRAVENOUS EVERY 24 HOURS
Status: DISCONTINUED | OUTPATIENT
Start: 2019-06-03 | End: 2019-06-04

## 2019-06-03 RX ORDER — DILTIAZEM HYDROCHLORIDE 5 MG/ML
10 INJECTION INTRAVENOUS ONCE
Status: COMPLETED | OUTPATIENT
Start: 2019-06-03 | End: 2019-06-03

## 2019-06-03 RX ORDER — CARBIDOPA/LEVODOPA 25MG-250MG
1 TABLET ORAL 3 TIMES DAILY
Status: DISCONTINUED | OUTPATIENT
Start: 2019-06-03 | End: 2019-06-04 | Stop reason: HOSPADM

## 2019-06-03 RX ORDER — PRAMIPEXOLE DIHYDROCHLORIDE 0.5 MG/1
0.5 TABLET ORAL 3 TIMES DAILY
COMMUNITY

## 2019-06-03 RX ORDER — ACETAMINOPHEN 650 MG/1
650 SUPPOSITORY RECTAL EVERY 4 HOURS PRN
Status: DISCONTINUED | OUTPATIENT
Start: 2019-06-03 | End: 2019-06-04 | Stop reason: HOSPADM

## 2019-06-03 RX ORDER — ONDANSETRON 4 MG/1
4 TABLET, FILM COATED ORAL EVERY 6 HOURS PRN
Status: DISCONTINUED | OUTPATIENT
Start: 2019-06-03 | End: 2019-06-04 | Stop reason: HOSPADM

## 2019-06-03 RX ORDER — SODIUM CHLORIDE 0.9 % (FLUSH) 0.9 %
3-10 SYRINGE (ML) INJECTION AS NEEDED
Status: DISCONTINUED | OUTPATIENT
Start: 2019-06-03 | End: 2019-06-04 | Stop reason: HOSPADM

## 2019-06-03 RX ADMIN — ATORVASTATIN CALCIUM 20 MG: 20 TABLET, FILM COATED ORAL at 20:51

## 2019-06-03 RX ADMIN — METOPROLOL TARTRATE 5 MG: 5 INJECTION, SOLUTION INTRAVENOUS at 18:13

## 2019-06-03 RX ADMIN — PRAMIPEXOLE DIHYDROCHLORIDE 0.5 MG: 0.25 TABLET ORAL at 22:35

## 2019-06-03 RX ADMIN — LEVOFLOXACIN 750 MG: 5 INJECTION, SOLUTION INTRAVENOUS at 19:01

## 2019-06-03 RX ADMIN — METOPROLOL TARTRATE 12.5 MG: 25 TABLET, FILM COATED ORAL at 22:35

## 2019-06-03 RX ADMIN — DILTIAZEM HYDROCHLORIDE 10 MG: 5 INJECTION INTRAVENOUS at 13:55

## 2019-06-03 RX ADMIN — DILTIAZEM HYDROCHLORIDE 10 MG: 5 INJECTION INTRAVENOUS at 12:44

## 2019-06-03 RX ADMIN — METOPROLOL TARTRATE 5 MG: 5 INJECTION, SOLUTION INTRAVENOUS at 17:33

## 2019-06-03 RX ADMIN — SODIUM CHLORIDE, PRESERVATIVE FREE 3 ML: 5 INJECTION INTRAVENOUS at 20:54

## 2019-06-03 RX ADMIN — CARBIDOPA AND LEVODOPA 1 TABLET: 10; 100 TABLET ORAL at 20:54

## 2019-06-03 RX ADMIN — ENOXAPARIN SODIUM 80 MG: 80 INJECTION SUBCUTANEOUS at 19:54

## 2019-06-03 RX ADMIN — METOPROLOL TARTRATE 5 MG: 5 INJECTION, SOLUTION INTRAVENOUS at 18:40

## 2019-06-03 RX ADMIN — DILTIAZEM HYDROCHLORIDE 5 MG/HR: 5 INJECTION INTRAVENOUS at 14:16

## 2019-06-03 NOTE — PROGRESS NOTES
Pharmacy was consulted to dose Lovenox for atrial fibrillation requiring full anticoagulation.    SCr= 0.95; CrCl= 65mL/min; Wt= 79.4kg; Platelets= 135    Initiate Lovenox 80mg (1mg/kg) subq q12h.    Susana Castaneda, PharmD  6/3/2019  6:56 PM

## 2019-06-03 NOTE — H&P
"[facility] HOSPITALIST     Aleksander Diez MD    CHIEF COMPLAINT: Dizziness    HISTORY OF PRESENT ILLNESS:  The patient is an 84-year-old male that presented to the emergency department secondary to sudden onset dizziness after mowing grass and exerting himself this morning. He notes left lower chest \"discomfort\" but no chest pain.  Wife notes 5/31/2019 patient had chills, excessive sleepiness with BP on low side.  2 days ago had temp 100.7 associated with dry cough without shortness of air or any further fever or chills.  She notes patient worked in his garden all day yesterday, did 1 hour of treadmill and elliptical at the [facility] then returned home and worked again in his garden when he developed the above symptoms.  Wife notes discussion with Dr. Sandoval 2 days ago regarding low blood pressure and cough and was told to hold his losartan which she did 2 days ago but then resumed today.    In the ER he was found to be in A. fib RVR.  He initially was given a bolus of diltiazem that brought his rate down to 110 from 160 however it returned to 160s soon thereafter.  He was placed on diltiazem drip and given second bolus of diltiazem.    He has a history of PAF, ICM with s/d CHF, RLS, Parkinson's with chronic orthostasis, HTN, carotid stenosis, PAD/chronic DVT RLE with IVC filter on ASA/Eliquis per Dr. Dorado, CAD/HLD    He otherwise denies headache/rhinorrhea/nasal congestion/lightheadedness/syncopal sensation/soa/n/v/d/chest pain/abdominal pain/recent illness/sick exposures/change in bowel or bladder habits/no weight change/bloody emesis or bloody stools/change in medications or any other new concerns.    Past Medical History:   Diagnosis Date   • Atrial fibrillation (CMS/HCC)    • Cancer (CMS/HCC) 04/2018    basil cell carcinoma   • Carotid stenosis    • Chronic deep vein thrombosis (DVT) of popliteal vein of right lower extremity (CMS/HCC)    • Coronary artery disease involving native coronary " artery of native heart without angina pectoris 12/20/2018   • DVT of lower extremity (deep venous thrombosis) (CMS/HCC)    • Hard of hearing    • Hx of blood clots    • Hyperlipidemia    • Hypertension    • Long-term use of high-risk medication    • Parkinson's disease (CMS/HCC)    • Postphlebitic syndrome      Past Surgical History:   Procedure Laterality Date   • BASAL CELL CARCINOMA EXCISION  04/2018   • CARDIAC CATHETERIZATION N/A 11/5/2018    Procedure: Left Heart Cath;  Surgeon: Oliverio Azar MD;  Location: [facility] CATH INVASIVE LOCATION;  Service: Cardiovascular   • CARDIAC CATHETERIZATION N/A 11/5/2018    Procedure: Coronary angiography;  Surgeon: Oliverio Azar MD;  Location: [facility] CATH INVASIVE LOCATION;  Service: Cardiovascular   • CARDIAC CATHETERIZATION N/A 11/5/2018    Procedure: Left ventriculography;  Surgeon: Oliverio Azar MD;  Location: [facility] CATH INVASIVE LOCATION;  Service: Cardiovascular   • CATARACT EXTRACTION Left 08/2018   • INCISION AND DRAINAGE LEG Right 7/7/2017    Procedure: INCISION AND DRAINAGE INFECTED HEMATOMA RIGHT THIGH;  Surgeon: Valentin Peña MD;  Location: [facility] OR;  Service:    • KNEE INCISION AND DRAINAGE Right 12/27/2016    Procedure: KNEE INCISION AND DRAINAGE;  Surgeon: Triston Whitten MD;  Location: [facility] MAIN OR;  Service:    • NOSE SURGERY      nose replacement   • SKIN GRAFT  12/2017   • TOTAL KNEE ARTHROPLASTY Right    • VENA CAVA FILTER PLACEMENT       Family History   Problem Relation Age of Onset   • Heart attack Mother 74   • Hypertension Mother    • Stroke Brother    • Cancer Brother    • Cancer Sister    • Cancer Brother    • Deep vein thrombosis Neg Hx      Social History     Tobacco Use   • Smoking status: Never Smoker   • Smokeless tobacco: Never Used   • Tobacco comment: caff use   Substance Use Topics   • Alcohol use: Yes     Alcohol/week: 1.2 oz     Types: 2 Glasses of wine per week     Comment: none for a month   • Drug use: No  "    Medications Prior to Admission   Medication Sig Dispense Refill Last Dose   • aspirin 81 MG EC tablet Take 81 mg by mouth Daily.   Taking   • atorvastatin (LIPITOR) 20 MG tablet TAKE 1 TABLET BY MOUTH  DAILY 90 tablet 3    • atropine 1 % ophthalmic solution Administer 1 drop to both eyes 3 (Three) Times a Day. For drooling   Taking   • carbidopa-levodopa (SINEMET)  MG per tablet 1 tablet 3 (Three) Times a Day.   Taking   • carboxymethylcellulose (REFRESH PLUS) 0.5 % solution 1 drop 3 (Three) Times a Day As Needed (eye).   Taking   • ELIQUIS 2.5 MG tablet tablet 2.5 mg Every 12 (Twelve) Hours.   Taking   • losartan (COZAAR) 25 MG tablet TAKE 2 TABLETS BY MOUTH  DAILY 180 tablet 0 6/3/2019 at Unknown time   • Multiple Vitamins-Minerals (PRESERVISION AREDS) capsule Take 1 tablet by mouth Daily.   Taking     Allergies:  Penicillins and Rocephin [ceftriaxone]    Immunization History   Administered Date(s) Administered   • Flu Vaccine High Dose PF 65YR+ 11/23/2015, 11/02/2016, 11/03/2017, 12/03/2018   • Pneumococcal Conjugate 13-Valent (PCV13) 11/03/2017   • Pneumococcal Polysaccharide (PPSV23) 11/02/2016     REVIEW OF SYSTEMS:  Please see the above history of present illness for pertinent positives and negatives.  The remainder of the patient's systems have been reviewed and are negative.     Vital Signs  Temp:  [97.6 °F (36.4 °C)] 97.6 °F (36.4 °C)  Heart Rate:  [109-160] 116  Resp:  [16-17] 17  BP: ()/(67-98) 128/87  Body mass index is 24.41 kg/m².    Flowsheet Rows      First Filed Value   Admission Height  180.3 cm (71\") Documented at 06/03/2019 1223   Admission Weight  79.4 kg (175 lb) Documented at 06/03/2019 1223           Physical Exam:  Physical Exam   Constitutional: Patient appears well-developed and well-nourished and in no acute distress   HEENT:   Head: Normocephalic and atraumatic.   Eyes:  Pupils are equal, round, and reactive to light. EOM are intact. Sclera are anicteric and " non-injected.  Mouth and Throat: Patient has moist mucous membranes. Oropharynx is clear of any erythema or exudate.     Cardiovascular: Tachycardic rate, irregular rhythm, S1 normal and S2 normal.  Exam reveals no gallop and no friction rub.  No murmur heard.  Pulmonary/Chest: Lungs are clear to auscultation bilaterally. No respiratory distress. No wheezes. No rhonchi. No rales.   Abdominal: Soft. Bowel sounds are normal. No distension and no mass. There is no hepatosplenomegaly. There is no tenderness.   Musculoskeletal: Normal Muscle tone  Extremities: No edema. Pulses are palpable in all 4 extremities.  Neurological: Patient is alert and oriented to person, place, and time. Cranial nerves II-XII are grossly intact with no focal deficits.  Left hand coarse tremor noted at rest.  Skin: Skin is warm. No rash noted. Nails show no clubbing.  No cyanosis or erythema.    Emotional Behavior:    Judgement and Insight: Fair   Mental Status:  Alertness alert   Memory: Fair   Mood and Affect:         Depression none               Anxiety none    Debilities:   Physical Weakness none   Handicaps none   Disabilities hard of hearing   Agitation none     Results Review:    I reviewed the patient's new clinical results.  Lab Results (most recent)     Procedure Component Value Units Date/Time    BNP [[id number]]  (Abnormal) Collected:  06/03/19 1253    Specimen:  Blood Updated:  06/03/19 1326     proBNP 2,646.0 pg/mL     Narrative:       Among patients with dyspnea, NT-proBNP is highly sensitive for the detection of acute congestive heart failure. In addition NT-proBNP of <300 pg/ml effectively rules out acute congestive heart failure with 99% negative predictive value.    Troponin [[id number]]  (Normal) Collected:  06/03/19 1253    Specimen:  Blood Updated:  06/03/19 1319     Troponin T <0.010 ng/mL     Narrative:       Troponin T Reference Range:  <= 0.03 ng/mL-   Negative for AMI  >0.03 ng/mL-     Abnormal for myocardial  necrosis.  Clinicians would have to utilize clinical acumen, EKG, Troponin and serial changes to determine if it is an Acute Myocardial Infarction or myocardial injury due to an underlying chronic condition.     Comprehensive Metabolic Panel [[id number]]  (Abnormal) Collected:  06/03/19 1253    Specimen:  Blood Updated:  06/03/19 1317     Glucose 140 mg/dL      BUN 23 mg/dL      Creatinine 0.95 mg/dL      Sodium 142 mmol/L      Potassium 3.9 mmol/L      Chloride 105 mmol/L      CO2 24.5 mmol/L      Calcium 8.7 mg/dL      Total Protein 6.4 g/dL      Albumin 3.30 g/dL      ALT (SGPT) 5 U/L      AST (SGOT) 15 U/L      Alkaline Phosphatase 56 U/L      Total Bilirubin 0.5 mg/dL      eGFR Non African Amer 76 mL/min/1.73      Globulin 3.1 gm/dL      A/G Ratio 1.1 g/dL      BUN/Creatinine Ratio 24.2     Anion Gap 12.5 mmol/L     Narrative:       GFR Normal >60  Chronic Kidney Disease <60  Kidney Failure <15    Protime-INR [[id number]]  (Abnormal) Collected:  06/03/19 1253    Specimen:  Blood Updated:  06/03/19 1316     Protime 14.3 Seconds      INR 1.14    Narrative:       Therapeutic Ranges for INR: 2.0-3.0 (PT 20-30)                              2.5-3.5 (PT 25-34)    aPTT [[id number]]  (Normal) Collected:  06/03/19 1253    Specimen:  Blood Updated:  06/03/19 1316     PTT 33.2 seconds     Narrative:       PTT = The equivalent PTT values for the therapeutic range of heparin levels at 0.1 to 0.7 U/ml are 53 to 110 seconds.    CBC & Differential [[id number]] Collected:  06/03/19 1253    Specimen:  Blood Updated:  06/03/19 1258    Narrative:       The following orders were created for panel order CBC & Differential.  Procedure                               Abnormality         Status                     ---------                               -----------         ------                     CBC Auto Differential[[id number]]        Abnormal            Final result                 Please view results for these tests on the individual  orders.    CBC Auto Differential [[id number]]  (Abnormal) Collected:  06/03/19 1253    Specimen:  Blood Updated:  06/03/19 1258     WBC 6.42 10*3/mm3      RBC 4.11 10*6/mm3      Hemoglobin 13.4 g/dL      Hematocrit 40.4 %      MCV 98.3 fL      MCH 32.6 pg      MCHC 33.2 g/dL      RDW 14.3 %      RDW-SD 52.4 fl      MPV 10.4 fL      Platelets 135 10*3/mm3      Neutrophil % 78.3 %      Lymphocyte % 12.3 %      Monocyte % 7.5 %      Eosinophil % 1.4 %      Basophil % 0.3 %      Immature Grans % 0.2 %      Neutrophils, Absolute 5.03 10*3/mm3      Lymphocytes, Absolute 0.79 10*3/mm3      Monocytes, Absolute 0.48 10*3/mm3      Eosinophils, Absolute 0.09 10*3/mm3      Basophils, Absolute 0.02 10*3/mm3      Immature Grans, Absolute 0.01 10*3/mm3      nRBC 0.0 /100 WBC     Lipase [[id number]] Collected:  06/03/19 1253    Specimen:  Blood Updated:  06/03/19 1256          Imaging Results (most recent)     Procedure Component Value Units Date/Time    XR Chest 2 View [[id number]] Collected:  06/03/19 1346     Updated:  06/03/19 1349    Narrative:       CHEST X-RAY, 06/03/2019         HISTORY:  84-year-old male in the ED complaining of chest pain today.     TECHNIQUE:  AP and lateral upright chest series.     COMPARISON:  *  Chest x-ray, 08/25/2018.     FINDINGS:  Mild patchy infiltrate is present in the right upper lobe, new since the  prior exam.     Remaining portions of both lungs are clear. No visible pleural effusion.  Heart size and pulmonary vascularity are within normal limits. Benign  calcified granulomas in the left upper lung and left hilum.       Impression:       Right upper lobe infiltrate.     This report was finalized on 6/3/2019 1:47 PM by Dr. Milton Jason MD.           Reviewed    ECG/EMG Results (most recent)     Procedure Component Value Units Date/Time    ECG 12 Lead [[id number]] Collected:  06/03/19 1229     Updated:  06/03/19 1311    Narrative:       HEART RATE= 143  bpm  RR Interval= 403  ms  NH  "Interval=   ms  P Horizontal Axis=   deg  P Front Axis=   deg  QRSD Interval= 94  ms  QT Interval= 288  ms  QRS Axis= 29  deg  T Wave Axis= 194  deg  - ABNORMAL ECG -  Atrial fibrillation with rapid V-rate  Ventricular premature complex  Extensive anterior infarct, acute  Electronically Signed By:   Date and Time of Study: 2019-06-03 12:29:41    ECG 12 Lead [[id number]] Collected:  06/03/19 1537     Updated:  06/03/19 1539    Narrative:       HEART RATE= 106  bpm  RR Interval= 566  ms  ID Interval=   ms  P Horizontal Axis=   deg  P Front Axis=   deg  QRSD Interval= 98  ms  QT Interval= 372  ms  QRS Axis= 15  deg  T Wave Axis= 105  deg  - ABNORMAL ECG -  Atrial fibrillation  Ventricular premature complex  Probable anteroseptal infarct, recent  Electronically Signed By:   Date and Time of Study: 2019-06-03 15:37:39        reviewed    Assessment/Plan   A-fib RVR, h/o PAF:  CAD/HLD/PAD/ICM EF 30%/chronic s/d CHF, h/o anterior occlusion:   HTN: Cardiology consulted (patient of Dr. Armas outpatient)  Hold eliquis, change to therapeutic dose lovenox, will likely need full A/C with 5 mg eliquis bid (was on 2.5 mg)  HOLD losartan, continue home asa 81 mg daily/lipitor 20 mg daily  D/W [facility] (RIDDHI Montes who d/w Dr. Azar), likely ST elevation rate related due to LAD occlusion, second EKG improved c/w previous  Repeat EKG tonight with next troponin  Continue IV metoprolol and wean off diltiazem drip  Monitor closely    (11/2018 Cath note: \"large area of mid anterior to apical akinesis and inferoapical akinesis a lot of calcium in the coronaries. 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 Landa [facility]  Allow home sinemet/atropine     Restless leg syndrome: no longer taking mirapex    I discussed the patients findings and my " recommendations with patient, wife, daughter, grandson, RN.    Nesha Rollins, RIDDHI  06/03/19  3:56 PM

## 2019-06-03 NOTE — ED NOTES
Chief Complaint   Patient presents with   • Dizziness     Pt c/o dizziness and indigestion that began this morning.  Pt has history of MI in October of 2018.  Pt wife states that BP was low this morning.           Margret Mcgowan RN  06/03/19 [id number]

## 2019-06-03 NOTE — ED PROVIDER NOTES
EMERGENCY DEPARTMENT ENCOUNTER      Room Number: 2/02      HPI:    Chief complaint: Weakness, near syncope, rapid heart rate and low blood pressure    Location: Symptoms noted at home    Quality/Severity: Moderate    Timing/Duration: Symptoms started this morning    Modifying Factors: Standing causes lightheadedness    Associated Symptoms: Uncomfortable feeling in left chest    Narrative: Pt is a 84 y.o. male who presents complaining of near syncope as noted above.  The patient relates that after exercising at the [email or web address] this morning and while tilling the garden he began to notice some lightheadedness.  Patient's wife checked the patient's blood pressure and it was low and the heart rate was elevated.  History of atrial fibrillation several years ago but no current treatment for this particular problem.  Patient is on Eliquis due to prior history of DVT.      PMD: Aleksander Diez MD    REVIEW OF SYSTEMS  Review of Systems   Constitutional: Negative for activity change, appetite change, fatigue and fever.   HENT: Negative for congestion.    Respiratory: Negative for cough, shortness of breath and wheezing.    Cardiovascular: Positive for chest pain (This morning and left chest-vague) and leg swelling (Chronic right lower extremity). Negative for palpitations.   Gastrointestinal: Negative for abdominal pain, diarrhea, nausea and vomiting.   Genitourinary: Negative for dysuria, flank pain, hematuria and urgency.   Musculoskeletal: Negative for back pain.   Skin: Negative for rash.   Neurological: Positive for dizziness and light-headedness. Negative for syncope, weakness and headaches.   Psychiatric/Behavioral: Positive for confusion (Patient's wife reports an episode of mild confusion 3 days ago that resolved overnight). The patient is not nervous/anxious.    All other systems reviewed and are negative.      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 (CMS/Formerly [facility]) 11/02/2016   • Uses hearing aid 11/02/2016   • Sepsis (CMS/Formerly [facility]) 12/26/2016   • Traumatic hematoma of right lower leg with infection 12/26/2016   • Paroxysmal A-fib (CMS/Formerly [facility]) 12/27/2016   • Hospital discharge follow-up 01/04/2017   • Localized edema 01/04/2017   • Chronic diastolic (congestive) heart failure 07/07/2017   • Anticoagulant long-term use 07/07/2017   • Presence of IVC filter 07/07/2017   • Cellulitis of right lower extremity without foot 07/10/2017   • Medicare annual wellness visit, subsequent 11/03/2017   • Iron deficiency anemia secondary to inadequate dietary iron intake 11/03/2017   • Bradycardia with 31-40 beats per minute 12/03/2018   • Orthostatic hypotension due to Parkinson's disease (CMS/Formerly [facility]) 12/03/2018   • Coronary artery disease involving native coronary artery of native heart without angina pectoris 12/20/2018     Resolved Ambulatory Problems     Diagnosis Date Noted   • Deep vein thrombosis (CMS/Formerly [facility]) 11/16/2012   • Cough 11/02/2016   • Cellulitis of right lower extremity 12/26/2016   • Hypertension 11/16/2012   • Hematoma 07/07/2017   • Anterior chest wall pain 09/11/2018   • Acute systolic heart failure (CMS/Formerly [facility]) 10/30/2018     Past Medical History:   Diagnosis Date   • Atrial fibrillation (CMS/Formerly [facility])    • Cancer (CMS/Formerly [facility]) 04/2018   • Carotid stenosis    • Chronic deep vein thrombosis (DVT) of popliteal vein of right lower extremity (CMS/Former[facility])    • Coronary artery disease involving native coronary artery of native heart without angina  pectoris 12/20/2018   • DVT of lower extremity (deep venous thrombosis) (CMS/HCC)    • Hard of hearing    • Hx of blood clots    • Hyperlipidemia    • Hypertension    • Long-term use of high-risk medication    • Parkinson's disease (CMS/HCC)    • Postphlebitic syndrome        PAST SURGICAL HISTORY  Past Surgical History:   Procedure Laterality Date   • BASAL CELL CARCINOMA EXCISION  04/2018   • CARDIAC CATHETERIZATION N/A 11/5/2018    Procedure: Left Heart Cath;  Surgeon: Oliverio Azar MD;  Location: [facility] CATH INVASIVE LOCATION;  Service: Cardiovascular   • CARDIAC CATHETERIZATION N/A 11/5/2018    Procedure: Coronary angiography;  Surgeon: Oliverio Azar MD;  Location: [facility] CATH INVASIVE LOCATION;  Service: Cardiovascular   • CARDIAC CATHETERIZATION N/A 11/5/2018    Procedure: Left ventriculography;  Surgeon: Oliverio Azar MD;  Location: [facility] CATH INVASIVE LOCATION;  Service: Cardiovascular   • CATARACT EXTRACTION Left 08/2018   • INCISION AND DRAINAGE LEG Right 7/7/2017    Procedure: INCISION AND DRAINAGE INFECTED HEMATOMA RIGHT THIGH;  Surgeon: Valentin Peña MD;  Location: [facility] OR;  Service:    • KNEE INCISION AND DRAINAGE Right 12/27/2016    Procedure: KNEE INCISION AND DRAINAGE;  Surgeon: Triston Whitten MD;  Location: [facility] OR;  Service:    • NOSE SURGERY      nose replacement   • SKIN GRAFT  12/2017   • TOTAL KNEE ARTHROPLASTY Right    • VENA CAVA FILTER PLACEMENT         FAMILY HISTORY  Family History   Problem Relation Age of Onset   • Heart attack Mother 74   • Hypertension Mother    • Stroke Brother    • Cancer Brother    • Cancer Sister    • Cancer Brother    • Deep vein thrombosis Neg Hx        SOCIAL HISTORY  Social History     Socioeconomic History   • Marital status:      Spouse name: Not on file   • Number of children: Not on file   • Years of education: college grad   • Highest education level: Not on file   Occupational History   • Occupation: financial  advis     Comment: self employed   Tobacco Use   • Smoking status: Never Smoker   • Smokeless tobacco: Never Used   • Tobacco comment: caff use   Substance and Sexual Activity   • Alcohol use: Yes     Alcohol/week: 1.2 oz     Types: 2 Glasses of wine per week     Comment: none for a month   • Drug use: No   • Sexual activity: Yes     Partners: Female       ALLERGIES  Penicillins and Rocephin [ceftriaxone]    PHYSICAL EXAM  ED Triage Vitals [06/03/19 1223]   Temp Heart Rate Resp BP SpO2   97.6 °F (36.4 °C) 116 16 128/88 98 %      Temp src Heart Rate Source Patient Position BP Location FiO2 (%)   Oral Monitor Lying Right arm --       Physical Exam   Constitutional: He is oriented to person, place, and time.   HENT:   Head: Normocephalic.   Old, left, frontoparietal surgical scar   Eyes: Conjunctivae and EOM are normal.   Neck: Normal range of motion. Neck supple. No thyromegaly present.   Cardiovascular:   No murmur heard.  Auscultation of the heart revealed an irregularly irregular rhythm with tachycardia.  2/6 systolic ejection murmur best heard over the right upper sternal border.   Pulmonary/Chest: Effort normal and breath sounds normal. No respiratory distress.   Abdominal: Soft. Bowel sounds are normal. There is no tenderness.   Musculoskeletal: Normal range of motion. He exhibits edema (1+ right lower extremity).   Lymphadenopathy:     He has no cervical adenopathy.   Neurological: He is alert and oriented to person, place, and time.   Skin: Skin is warm and dry.   Psychiatric: Mood, memory, affect and judgment normal.   Nursing note and vitals reviewed.      LAB RESULTS  Results for orders placed or performed during the hospital encounter of 06/03/19   Comprehensive Metabolic Panel   Result Value Ref Range    Glucose 140 (H) 65 - 99 mg/dL    BUN 23 8 - 23 mg/dL    Creatinine 0.95 0.76 - 1.27 mg/dL    Sodium 142 136 - 145 mmol/L    Potassium 3.9 3.5 - 5.2 mmol/L    Chloride 105 98 - 107 mmol/L    CO2 24.5 22.0  - 29.0 mmol/L    Calcium 8.7 8.6 - 10.5 mg/dL    Total Protein 6.4 6.0 - 8.5 g/dL    Albumin 3.30 (L) 3.50 - 5.20 g/dL    ALT (SGPT) 5 1 - 41 U/L    AST (SGOT) 15 1 - 40 U/L    Alkaline Phosphatase 56 39 - 117 U/L    Total Bilirubin 0.5 0.2 - 1.2 mg/dL    eGFR Non African Amer 76 >60 mL/min/1.73    Globulin 3.1 gm/dL    A/G Ratio 1.1 g/dL    BUN/Creatinine Ratio 24.2 7.0 - 25.0    Anion Gap 12.5 mmol/L   Protime-INR   Result Value Ref Range    Protime 14.3 12.1 - 15.0 Seconds    INR 1.14 (H) 0.90 - 1.10   aPTT   Result Value Ref Range    PTT 33.2 24.3 - 38.1 seconds   Lipase   Result Value Ref Range    Lipase 15 13 - 60 U/L   BNP   Result Value Ref Range    proBNP 2,646.0 (H) 5.0-1,800.0 pg/mL   Troponin   Result Value Ref Range    Troponin T <0.010 0.000-<0.030 ng/mL   CBC Auto Differential   Result Value Ref Range    WBC 6.42 3.40 - 10.80 10*3/mm3    RBC 4.11 (L) 4.14 - 5.80 10*6/mm3    Hemoglobin 13.4 13.0 - 17.7 g/dL    Hematocrit 40.4 37.5 - 51.0 %    MCV 98.3 (H) 79.0 - 97.0 fL    MCH 32.6 26.6 - 33.0 pg    MCHC 33.2 31.5 - 35.7 g/dL    RDW 14.3 12.3 - 15.4 %    RDW-SD 52.4 37.0 - 54.0 fl    MPV 10.4 6.0 - 12.0 fL    Platelets 135 (L) 140 - 450 10*3/mm3    Neutrophil % 78.3 (H) 42.7 - 76.0 %    Lymphocyte % 12.3 (L) 19.6 - 45.3 %    Monocyte % 7.5 5.0 - 12.0 %    Eosinophil % 1.4 0.3 - 6.2 %    Basophil % 0.3 0.0 - 1.5 %    Immature Grans % 0.2 0.0 - 0.5 %    Neutrophils, Absolute 5.03 1.70 - 7.00 10*3/mm3    Lymphocytes, Absolute 0.79 0.70 - 3.10 10*3/mm3    Monocytes, Absolute 0.48 0.10 - 0.90 10*3/mm3    Eosinophils, Absolute 0.09 0.00 - 0.40 10*3/mm3    Basophils, Absolute 0.02 0.00 - 0.20 10*3/mm3    Immature Grans, Absolute 0.01 0.00 - 0.05 10*3/mm3    nRBC 0.0 0.0 - 0.2 /100 WBC         I ordered the above labs and reviewed the results    RADIOLOGY  Xr Chest 2 View    Result Date: 6/3/2019  Narrative: CHEST X-RAY, 06/03/2019     HISTORY: 84-year-old male in the ED complaining of chest pain today.   TECHNIQUE: AP and lateral upright chest series.  COMPARISON: *  Chest x-ray, 08/25/2018.  FINDINGS: Mild patchy infiltrate is present in the right upper lobe, new since the prior exam.  Remaining portions of both lungs are clear. No visible pleural effusion. Heart size and pulmonary vascularity are within normal limits. Benign calcified granulomas in the left upper lung and left hilum.      Impression: Right upper lobe infiltrate.  This report was finalized on 6/3/2019 1:47 PM by Dr. Milton Jason MD.        I ordered the above radiologic testing and reviewed the results    PROCEDURES  Procedures      PROGRESS AND CONSULTS  ED Course as of Jun 03 1438   Mon Jun 03, 2019   1430 EKG tracing was contemporaneously reviewed at 1231 hrs. and showed an atrial fibrillation with a ventricular response of 143 bpm.  Single PVC noted.  Anterior Q waves suggestive of an old myocardial infarction.  EKG from October, 2018 did show similar anterior Q waves, but the anterior T waves are now different from the previous tracing.  Possibly, these are rate related changes.  Cardizem bolus given which did decrease the heart rate down to approximately 110 bpm.  Slowly over time the heart rate did slowly increase.  Subsequently, the patient was placed on a diltiazem drip.  Case and findings discussed with nurse hospitalist practitioner Ayo who agreed that the patient warranted admission for further evaluation/treatment.  Patient and wife were agreeable to care plan.  [ML]      ED Course User Index  [ML] Aleksander Rice MD           MEDICAL DECISION MAKING  Results were reviewed/discussed with the patient and they were also made aware of online access. Pt also made aware that some labs, such as cultures, will not be resulted during ER visit and follow up with PMD is necessary.     MDM  Number of Diagnoses or Management Options     Amount and/or Complexity of Data Reviewed  Clinical lab tests: ordered and reviewed  Tests in the  radiology section of CPT®: ordered and reviewed  Review and summarize past medical records: yes (Old cardiology tests reviewed including EKG, echo and heart catheterization)  Discuss the patient with other providers: yes  Independent visualization of images, tracings, or specimens: yes    Risk of Complications, Morbidity, and/or Mortality  Presenting problems: high  Diagnostic procedures: high  Management options: high    Patient Progress  Patient progress: improved         DIAGNOSIS  Final diagnoses:   Atrial fibrillation with RVR (CMS/HCC)   Infiltrate of upper lobe of right lung present on imaging study       Latest Documented Vital Signs:  As of 2:38 PM  BP- 122/81 HR- (!) 139 Temp- 97.6 °F (36.4 °C) (Oral) O2 sat- 97%    DISPOSITION  Patient admitted to the intensive care       Medication List      No changes were made to your prescriptions during this visit.              Aleksander Rice MD  06/03/19 [id number]

## 2019-06-03 NOTE — SIGNIFICANT NOTE
06/03/19 1958   Provider Notification   Reason for Communication Critical lab value  (troponin of 0.420)   Provider Name Deena Rimma APRABDOUL   Notification Route Phone call   Response No new orders

## 2019-06-03 NOTE — ED NOTES
EKG placed on back of patients stretcher for transport.  Left room to obtain monitor for transport.  Upon arriving back to room, family member holding EKG with cell phone and taking picture. Informed family that it is against policy to take photos off medical records.  Family refuses to delete photo.  Manager of ICU notified.      Margret Mcgowan, RN  06/03/19 [id number]

## 2019-06-04 ENCOUNTER — PREP FOR SURGERY (OUTPATIENT)
Dept: OTHER | Facility: HOSPITAL | Age: 84
End: 2019-06-04

## 2019-06-04 ENCOUNTER — APPOINTMENT (OUTPATIENT)
Dept: CARDIOLOGY | Facility: HOSPITAL | Age: 84
End: 2019-06-04

## 2019-06-04 ENCOUNTER — HOSPITAL ENCOUNTER (INPATIENT)
Facility: HOSPITAL | Age: 84
LOS: 1 days | Discharge: HOME | End: 2019-06-05
Attending: INTERNAL MEDICINE | Admitting: INTERNAL MEDICINE

## 2019-06-04 VITALS
WEIGHT: 177 LBS | HEART RATE: 53 BPM | TEMPERATURE: 97.9 F | SYSTOLIC BLOOD PRESSURE: 154 MMHG | OXYGEN SATURATION: 98 % | RESPIRATION RATE: 16 BRPM | BODY MASS INDEX: 24.78 KG/M2 | HEIGHT: 71 IN | DIASTOLIC BLOOD PRESSURE: 85 MMHG

## 2019-06-04 DIAGNOSIS — I25.119 CORONARY ARTERY DISEASE WITH ANGINA PECTORIS, UNSPECIFIED VESSEL OR LESION TYPE, UNSPECIFIED WHETHER NATIVE OR TRANSPLANTED HEART (HCC): Primary | ICD-10-CM

## 2019-06-04 DIAGNOSIS — I50.32 CHRONIC DIASTOLIC CONGESTIVE HEART FAILURE (HCC): Chronic | ICD-10-CM

## 2019-06-04 DIAGNOSIS — I25.119 CORONARY ARTERY DISEASE WITH ANGINA PECTORIS, UNSPECIFIED VESSEL OR LESION TYPE, UNSPECIFIED WHETHER NATIVE OR TRANSPLANTED HEART (HCC): ICD-10-CM

## 2019-06-04 LAB
ACT BLD: 186 SECONDS (ref 82–152)
ANION GAP SERPL CALCULATED.3IONS-SCNC: 10.3 MMOL/L
BASOPHILS # BLD AUTO: 0.03 10*3/MM3 (ref 0–0.2)
BASOPHILS NFR BLD AUTO: 0.6 % (ref 0–1.5)
BH CV ECHO MEAS - ACS: 1.8 CM
BH CV ECHO MEAS - AO MAX PG (FULL): 4.8 MMHG
BH CV ECHO MEAS - AO MAX PG: 8.9 MMHG
BH CV ECHO MEAS - AO MEAN PG (FULL): 3 MMHG
BH CV ECHO MEAS - AO MEAN PG: 5 MMHG
BH CV ECHO MEAS - AO ROOT AREA (BSA CORRECTED): 1.8
BH CV ECHO MEAS - AO ROOT AREA: 9.9 CM^2
BH CV ECHO MEAS - AO ROOT DIAM: 3.6 CM
BH CV ECHO MEAS - AO V2 MAX: 149 CM/SEC
BH CV ECHO MEAS - AO V2 MEAN: 95.4 CM/SEC
BH CV ECHO MEAS - AO V2 VTI: 35.1 CM
BH CV ECHO MEAS - AVA(I,A): 2.1 CM^2
BH CV ECHO MEAS - AVA(I,D): 2.1 CM^2
BH CV ECHO MEAS - AVA(V,A): 2.1 CM^2
BH CV ECHO MEAS - AVA(V,D): 2.1 CM^2
BH CV ECHO MEAS - BSA(HAYCOCK): 2 M^2
BH CV ECHO MEAS - BSA: 2 M^2
BH CV ECHO MEAS - BZI_BMI: 24.7 KILOGRAMS/M^2
BH CV ECHO MEAS - BZI_METRIC_HEIGHT: 180.3 CM
BH CV ECHO MEAS - BZI_METRIC_WEIGHT: 80.3 KG
BH CV ECHO MEAS - EDV(CUBED): 233.7 ML
BH CV ECHO MEAS - EDV(MOD-SP2): 128 ML
BH CV ECHO MEAS - EDV(MOD-SP4): 131 ML
BH CV ECHO MEAS - EDV(TEICH): 191.1 ML
BH CV ECHO MEAS - EF(CUBED): 55 %
BH CV ECHO MEAS - EF(MOD-BP): 46 %
BH CV ECHO MEAS - EF(MOD-SP2): 44.5 %
BH CV ECHO MEAS - EF(MOD-SP4): 49.5 %
BH CV ECHO MEAS - EF(TEICH): 45.9 %
BH CV ECHO MEAS - ESV(CUBED): 105.2 ML
BH CV ECHO MEAS - ESV(MOD-SP2): 71 ML
BH CV ECHO MEAS - ESV(MOD-SP4): 66.2 ML
BH CV ECHO MEAS - ESV(TEICH): 103.4 ML
BH CV ECHO MEAS - FS: 23.4 %
BH CV ECHO MEAS - IVS/LVPW: 1.1
BH CV ECHO MEAS - IVSD: 1.2 CM
BH CV ECHO MEAS - LA DIMENSION: 3.8 CM
BH CV ECHO MEAS - LA/AO: 1.1
BH CV ECHO MEAS - LAT PEAK E' VEL: 5 CM/SEC
BH CV ECHO MEAS - LV DIASTOLIC VOL/BSA (35-75): 65.4 ML/M^2
BH CV ECHO MEAS - LV MASS(C)D: 309.9 GRAMS
BH CV ECHO MEAS - LV MASS(C)DI: 154.8 GRAMS/M^2
BH CV ECHO MEAS - LV MAX PG: 4.1 MMHG
BH CV ECHO MEAS - LV MEAN PG: 2 MMHG
BH CV ECHO MEAS - LV SYSTOLIC VOL/BSA (12-30): 33.1 ML/M^2
BH CV ECHO MEAS - LV V1 MAX: 101 CM/SEC
BH CV ECHO MEAS - LV V1 MEAN: 65.1 CM/SEC
BH CV ECHO MEAS - LV V1 VTI: 24 CM
BH CV ECHO MEAS - LVIDD: 6.2 CM
BH CV ECHO MEAS - LVIDS: 4.7 CM
BH CV ECHO MEAS - LVLD AP2: 10.5 CM
BH CV ECHO MEAS - LVLD AP4: 10.1 CM
BH CV ECHO MEAS - LVLS AP2: 10.1 CM
BH CV ECHO MEAS - LVLS AP4: 9.1 CM
BH CV ECHO MEAS - LVOT AREA (M): 3.1 CM^2
BH CV ECHO MEAS - LVOT AREA: 3.1 CM^2
BH CV ECHO MEAS - LVOT DIAM: 2 CM
BH CV ECHO MEAS - LVPWD: 1.1 CM
BH CV ECHO MEAS - MED PEAK E' VEL: 4 CM/SEC
BH CV ECHO MEAS - MV A DUR: 0.13 SEC
BH CV ECHO MEAS - MV A MAX VEL: 55.4 CM/SEC
BH CV ECHO MEAS - MV DEC SLOPE: 442 CM/SEC^2
BH CV ECHO MEAS - MV DEC TIME: 0.2 SEC
BH CV ECHO MEAS - MV E MAX VEL: 81.1 CM/SEC
BH CV ECHO MEAS - MV E/A: 1.5
BH CV ECHO MEAS - MV MAX PG: 4.7 MMHG
BH CV ECHO MEAS - MV MEAN PG: 2 MMHG
BH CV ECHO MEAS - MV P1/2T MAX VEL: 111 CM/SEC
BH CV ECHO MEAS - MV P1/2T: 73.6 MSEC
BH CV ECHO MEAS - MV V2 MAX: 108 CM/SEC
BH CV ECHO MEAS - MV V2 MEAN: 60.3 CM/SEC
BH CV ECHO MEAS - MV V2 VTI: 37.5 CM
BH CV ECHO MEAS - MVA P1/2T LCG: 2 CM^2
BH CV ECHO MEAS - MVA(P1/2T): 3 CM^2
BH CV ECHO MEAS - MVA(VTI): 2 CM^2
BH CV ECHO MEAS - PA ACC TIME: 0.09 SEC
BH CV ECHO MEAS - PA MAX PG (FULL): 0.78 MMHG
BH CV ECHO MEAS - PA MAX PG: 2.1 MMHG
BH CV ECHO MEAS - PA PR(ACCEL): 40.8 MMHG
BH CV ECHO MEAS - PA V2 MAX: 73.3 CM/SEC
BH CV ECHO MEAS - PULM A REVS DUR: 0.12 SEC
BH CV ECHO MEAS - PULM A REVS VEL: 23.5 CM/SEC
BH CV ECHO MEAS - PULM DIAS VEL: 48.3 CM/SEC
BH CV ECHO MEAS - PULM S/D: 0.54
BH CV ECHO MEAS - PULM SYS VEL: 26.2 CM/SEC
BH CV ECHO MEAS - PVA(V,A): 3.3 CM^2
BH CV ECHO MEAS - PVA(V,D): 3.3 CM^2
BH CV ECHO MEAS - QP/QS: 0.88
BH CV ECHO MEAS - RAP SYSTOLE: 3 MMHG
BH CV ECHO MEAS - RV MAX PG: 1.4 MMHG
BH CV ECHO MEAS - RV MEAN PG: 1 MMHG
BH CV ECHO MEAS - RV V1 MAX: 58.4 CM/SEC
BH CV ECHO MEAS - RV V1 MEAN: 41.3 CM/SEC
BH CV ECHO MEAS - RV V1 VTI: 15.9 CM
BH CV ECHO MEAS - RVOT AREA: 4.2 CM^2
BH CV ECHO MEAS - RVOT DIAM: 2.3 CM
BH CV ECHO MEAS - RVSP: 41 MMHG
BH CV ECHO MEAS - SI(AO): 173.5 ML/M^2
BH CV ECHO MEAS - SI(CUBED): 64.2 ML/M^2
BH CV ECHO MEAS - SI(LVOT): 37.7 ML/M^2
BH CV ECHO MEAS - SI(MOD-SP2): 28.5 ML/M^2
BH CV ECHO MEAS - SI(MOD-SP4): 32.4 ML/M^2
BH CV ECHO MEAS - SI(TEICH): 43.8 ML/M^2
BH CV ECHO MEAS - SV(AO): 347.4 ML
BH CV ECHO MEAS - SV(CUBED): 128.6 ML
BH CV ECHO MEAS - SV(LVOT): 75.4 ML
BH CV ECHO MEAS - SV(MOD-SP2): 57 ML
BH CV ECHO MEAS - SV(MOD-SP4): 64.8 ML
BH CV ECHO MEAS - SV(RVOT): 66.1 ML
BH CV ECHO MEAS - SV(TEICH): 87.8 ML
BH CV ECHO MEAS - TAPSE (>1.6): 2.4 CM2
BH CV ECHO MEAS - TR MAX VEL: 323 CM/SEC
BH CV ECHO MEASUREMENTS AVERAGE E/E' RATIO: 18.02
BH CV VAS BP RIGHT ARM: NORMAL MMHG
BH CV XLRA - RV BASE: 4.5 CM
BH CV XLRA - TDI S': 9 CM/SEC
BUN BLD-MCNC: 18 MG/DL (ref 8–23)
BUN/CREAT SERPL: 22 (ref 7–25)
CALCIUM SPEC-SCNC: 8.3 MG/DL (ref 8.6–10.5)
CHLORIDE SERPL-SCNC: 106 MMOL/L (ref 98–107)
CHOLEST SERPL-MCNC: 128 MG/DL (ref 0–200)
CO2 SERPL-SCNC: 23.7 MMOL/L (ref 22–29)
CREAT BLD-MCNC: 0.82 MG/DL (ref 0.76–1.27)
DEPRECATED RDW RBC AUTO: 52 FL (ref 37–54)
EOSINOPHIL # BLD AUTO: 0.13 10*3/MM3 (ref 0–0.4)
EOSINOPHIL NFR BLD AUTO: 2.4 % (ref 0.3–6.2)
ERYTHROCYTE [DISTWIDTH] IN BLOOD BY AUTOMATED COUNT: 14.6 % (ref 12.3–15.4)
GFR SERPL CREATININE-BSD FRML MDRD: 90 ML/MIN/1.73
GLUCOSE BLD-MCNC: 111 MG/DL (ref 65–99)
HCT VFR BLD AUTO: 38.3 % (ref 37.5–51)
HDLC SERPL-MCNC: 50 MG/DL (ref 40–60)
HGB BLD-MCNC: 12.9 G/DL (ref 13–17.7)
IMM GRANULOCYTES # BLD AUTO: 0.01 10*3/MM3 (ref 0–0.05)
IMM GRANULOCYTES NFR BLD AUTO: 0.2 % (ref 0–0.5)
LDLC SERPL CALC-MCNC: 65 MG/DL (ref 0–100)
LDLC/HDLC SERPL: 1.3 {RATIO}
LEFT ATRIUM VOLUME INDEX: 47 ML/M2
LV EF 2D ECHO EST: 40 %
LYMPHOCYTES # BLD AUTO: 1.17 10*3/MM3 (ref 0.7–3.1)
LYMPHOCYTES NFR BLD AUTO: 21.7 % (ref 19.6–45.3)
MAXIMAL PREDICTED HEART RATE: 136 BPM
MCH RBC QN AUTO: 32.6 PG (ref 26.6–33)
MCHC RBC AUTO-ENTMCNC: 33.7 G/DL (ref 31.5–35.7)
MCV RBC AUTO: 96.7 FL (ref 79–97)
MONOCYTES # BLD AUTO: 0.48 10*3/MM3 (ref 0.1–0.9)
MONOCYTES NFR BLD AUTO: 8.9 % (ref 5–12)
NEUTROPHILS # BLD AUTO: 3.56 10*3/MM3 (ref 1.7–7)
NEUTROPHILS NFR BLD AUTO: 66.2 % (ref 42.7–76)
NRBC BLD AUTO-RTO: 0 /100 WBC (ref 0–0.2)
PLATELET # BLD AUTO: 140 10*3/MM3 (ref 140–450)
PMV BLD AUTO: 10.4 FL (ref 6–12)
POTASSIUM BLD-SCNC: 4.1 MMOL/L (ref 3.5–5.2)
RBC # BLD AUTO: 3.96 10*6/MM3 (ref 4.14–5.8)
SODIUM BLD-SCNC: 140 MMOL/L (ref 136–145)
STRESS TARGET HR: 116 BPM
T4 FREE SERPL-MCNC: 1.15 NG/DL (ref 0.93–1.7)
TRIGL SERPL-MCNC: 65 MG/DL (ref 0–150)
TROPONIN T SERPL-MCNC: 0.88 NG/ML (ref 0–0.03)
TROPONIN T SERPL-MCNC: 0.91 NG/ML (ref 0–0.03)
VLDLC SERPL-MCNC: 13 MG/DL (ref 8–32)
WBC NRBC COR # BLD: 5.38 10*3/MM3 (ref 3.4–10.8)

## 2019-06-04 PROCEDURE — C1725 CATH, TRANSLUMIN NON-LASER: HCPCS | Performed by: INTERNAL MEDICINE

## 2019-06-04 PROCEDURE — 93005 ELECTROCARDIOGRAM TRACING: CPT | Performed by: INTERNAL MEDICINE

## 2019-06-04 PROCEDURE — 25010000002 PERFLUTREN (DEFINITY) 8.476 MG IN SODIUM CHLORIDE 0.9 % 10 ML INJECTION: Performed by: INTERNAL MEDICINE

## 2019-06-04 PROCEDURE — C1887 CATHETER, GUIDING: HCPCS | Performed by: INTERNAL MEDICINE

## 2019-06-04 PROCEDURE — 84484 ASSAY OF TROPONIN QUANT: CPT | Performed by: NURSE PRACTITIONER

## 2019-06-04 PROCEDURE — 93458 L HRT ARTERY/VENTRICLE ANGIO: CPT | Performed by: INTERNAL MEDICINE

## 2019-06-04 PROCEDURE — C1769 GUIDE WIRE: HCPCS | Performed by: INTERNAL MEDICINE

## 2019-06-04 PROCEDURE — 4A023N7 MEASUREMENT OF CARDIAC SAMPLING AND PRESSURE, LEFT HEART, PERCUTANEOUS APPROACH: ICD-10-PCS | Performed by: INTERNAL MEDICINE

## 2019-06-04 PROCEDURE — C9600 PERC DRUG-EL COR STENT SING: HCPCS | Performed by: INTERNAL MEDICINE

## 2019-06-04 PROCEDURE — 99152 MOD SED SAME PHYS/QHP 5/>YRS: CPT | Performed by: INTERNAL MEDICINE

## 2019-06-04 PROCEDURE — 84439 ASSAY OF FREE THYROXINE: CPT | Performed by: NURSE PRACTITIONER

## 2019-06-04 PROCEDURE — 25010000002 FENTANYL CITRATE (PF) 100 MCG/2ML SOLUTION: Performed by: INTERNAL MEDICINE

## 2019-06-04 PROCEDURE — C1894 INTRO/SHEATH, NON-LASER: HCPCS | Performed by: INTERNAL MEDICINE

## 2019-06-04 PROCEDURE — 25010000002 MIDAZOLAM PER 1 MG: Performed by: INTERNAL MEDICINE

## 2019-06-04 PROCEDURE — 80048 BASIC METABOLIC PNL TOTAL CA: CPT | Performed by: NURSE PRACTITIONER

## 2019-06-04 PROCEDURE — 25010000002 HEPARIN (PORCINE) PER 1000 UNITS: Performed by: INTERNAL MEDICINE

## 2019-06-04 PROCEDURE — 99223 1ST HOSP IP/OBS HIGH 75: CPT | Performed by: INTERNAL MEDICINE

## 2019-06-04 PROCEDURE — 027034Z DILATION OF CORONARY ARTERY, ONE ARTERY WITH DRUG-ELUTING INTRALUMINAL DEVICE, PERCUTANEOUS APPROACH: ICD-10-PCS | Performed by: INTERNAL MEDICINE

## 2019-06-04 PROCEDURE — 99153 MOD SED SAME PHYS/QHP EA: CPT | Performed by: INTERNAL MEDICINE

## 2019-06-04 PROCEDURE — G0378 HOSPITAL OBSERVATION PER HR: HCPCS

## 2019-06-04 PROCEDURE — 93306 TTE W/DOPPLER COMPLETE: CPT | Performed by: INTERNAL MEDICINE

## 2019-06-04 PROCEDURE — 85347 COAGULATION TIME ACTIVATED: CPT

## 2019-06-04 PROCEDURE — 93010 ELECTROCARDIOGRAM REPORT: CPT | Performed by: INTERNAL MEDICINE

## 2019-06-04 PROCEDURE — B2111ZZ FLUOROSCOPY OF MULTIPLE CORONARY ARTERIES USING LOW OSMOLAR CONTRAST: ICD-10-PCS | Performed by: INTERNAL MEDICINE

## 2019-06-04 PROCEDURE — 85025 COMPLETE CBC W/AUTO DIFF WBC: CPT | Performed by: NURSE PRACTITIONER

## 2019-06-04 PROCEDURE — 80061 LIPID PANEL: CPT | Performed by: NURSE PRACTITIONER

## 2019-06-04 PROCEDURE — 25010000002 LEVOFLOXACIN PER 250 MG: Performed by: NURSE PRACTITIONER

## 2019-06-04 PROCEDURE — 0 IOPAMIDOL PER 1 ML: Performed by: INTERNAL MEDICINE

## 2019-06-04 PROCEDURE — B2151ZZ FLUOROSCOPY OF LEFT HEART USING LOW OSMOLAR CONTRAST: ICD-10-PCS | Performed by: INTERNAL MEDICINE

## 2019-06-04 PROCEDURE — 93306 TTE W/DOPPLER COMPLETE: CPT

## 2019-06-04 PROCEDURE — 92928 PRQ TCAT PLMT NTRAC ST 1 LES: CPT | Performed by: INTERNAL MEDICINE

## 2019-06-04 PROCEDURE — 99238 HOSP IP/OBS DSCHRG MGMT 30/<: CPT | Performed by: NURSE PRACTITIONER

## 2019-06-04 PROCEDURE — 25010000002 ENOXAPARIN PER 10 MG: Performed by: NURSE PRACTITIONER

## 2019-06-04 PROCEDURE — C1874 STENT, COATED/COV W/DEL SYS: HCPCS | Performed by: INTERNAL MEDICINE

## 2019-06-04 DEVICE — XIENCE SIERRA™ EVEROLIMUS ELUTING CORONARY STENT SYSTEM 3.00 MM X 28 MM / RAPID-EXCHANGE
Type: IMPLANTABLE DEVICE | Status: FUNCTIONAL
Brand: XIENCE SIERRA™

## 2019-06-04 RX ORDER — ATORVASTATIN CALCIUM 20 MG/1
20 TABLET, FILM COATED ORAL DAILY
Status: DISCONTINUED | OUTPATIENT
Start: 2019-06-05 | End: 2019-06-05 | Stop reason: HOSPADM

## 2019-06-04 RX ORDER — ACETAMINOPHEN 650 MG/1
650 SUPPOSITORY RECTAL EVERY 4 HOURS PRN
Status: DISCONTINUED | OUTPATIENT
Start: 2019-06-04 | End: 2019-06-05 | Stop reason: HOSPADM

## 2019-06-04 RX ORDER — ASPIRIN 81 MG/1
81 TABLET ORAL DAILY
Status: DISCONTINUED | OUTPATIENT
Start: 2019-06-04 | End: 2019-06-05 | Stop reason: HOSPADM

## 2019-06-04 RX ORDER — ONDANSETRON 4 MG/1
4 TABLET, FILM COATED ORAL EVERY 6 HOURS PRN
Status: DISCONTINUED | OUTPATIENT
Start: 2019-06-04 | End: 2019-06-05 | Stop reason: HOSPADM

## 2019-06-04 RX ORDER — ASPIRIN 81 MG/1
81 TABLET ORAL DAILY
Status: CANCELLED | OUTPATIENT
Start: 2019-06-05

## 2019-06-04 RX ORDER — MIDAZOLAM HYDROCHLORIDE 1 MG/ML
INJECTION INTRAMUSCULAR; INTRAVENOUS AS NEEDED
Status: DISCONTINUED | OUTPATIENT
Start: 2019-06-04 | End: 2019-06-04 | Stop reason: HOSPADM

## 2019-06-04 RX ORDER — POLYVINYL ALCOHOL 14 MG/ML
1 SOLUTION/ DROPS OPHTHALMIC
Status: DISCONTINUED | OUTPATIENT
Start: 2019-06-04 | End: 2019-06-05 | Stop reason: HOSPADM

## 2019-06-04 RX ORDER — LEVOFLOXACIN 5 MG/ML
750 INJECTION, SOLUTION INTRAVENOUS EVERY 24 HOURS
Status: DISCONTINUED | OUTPATIENT
Start: 2019-06-04 | End: 2019-06-04 | Stop reason: HOSPADM

## 2019-06-04 RX ORDER — ATROPINE SULFATE 10 MG/ML
1 SOLUTION/ DROPS OPHTHALMIC 3 TIMES DAILY PRN
Status: CANCELLED | OUTPATIENT
Start: 2019-06-04

## 2019-06-04 RX ORDER — ATORVASTATIN CALCIUM 20 MG/1
20 TABLET, FILM COATED ORAL DAILY
Status: CANCELLED | OUTPATIENT
Start: 2019-06-05

## 2019-06-04 RX ORDER — NALOXONE HCL 0.4 MG/ML
0.4 VIAL (ML) INJECTION
Status: DISCONTINUED | OUTPATIENT
Start: 2019-06-04 | End: 2019-06-05 | Stop reason: HOSPADM

## 2019-06-04 RX ORDER — HEPARIN SODIUM 1000 [USP'U]/ML
INJECTION, SOLUTION INTRAVENOUS; SUBCUTANEOUS AS NEEDED
Status: DISCONTINUED | OUTPATIENT
Start: 2019-06-04 | End: 2019-06-04 | Stop reason: HOSPADM

## 2019-06-04 RX ORDER — LIDOCAINE HYDROCHLORIDE 20 MG/ML
INJECTION, SOLUTION INFILTRATION; PERINEURAL AS NEEDED
Status: DISCONTINUED | OUTPATIENT
Start: 2019-06-04 | End: 2019-06-04 | Stop reason: HOSPADM

## 2019-06-04 RX ORDER — ACETAMINOPHEN 325 MG/1
650 TABLET ORAL EVERY 4 HOURS PRN
Status: DISCONTINUED | OUTPATIENT
Start: 2019-06-04 | End: 2019-06-05 | Stop reason: HOSPADM

## 2019-06-04 RX ORDER — CARBIDOPA/LEVODOPA 25MG-250MG
1 TABLET ORAL EVERY 8 HOURS SCHEDULED
Status: DISCONTINUED | OUTPATIENT
Start: 2019-06-04 | End: 2019-06-05 | Stop reason: HOSPADM

## 2019-06-04 RX ORDER — TEMAZEPAM 15 MG/1
15 CAPSULE ORAL NIGHTLY PRN
Status: DISCONTINUED | OUTPATIENT
Start: 2019-06-04 | End: 2019-06-05 | Stop reason: HOSPADM

## 2019-06-04 RX ORDER — NITROGLYCERIN 5 MG/ML
INJECTION, SOLUTION INTRAVENOUS AS NEEDED
Status: DISCONTINUED | OUTPATIENT
Start: 2019-06-04 | End: 2019-06-04 | Stop reason: HOSPADM

## 2019-06-04 RX ORDER — PRAMIPEXOLE DIHYDROCHLORIDE 0.5 MG/1
0.5 TABLET ORAL 3 TIMES DAILY
Status: DISCONTINUED | OUTPATIENT
Start: 2019-06-04 | End: 2019-06-04

## 2019-06-04 RX ORDER — SODIUM CHLORIDE 0.9 % (FLUSH) 0.9 %
3-10 SYRINGE (ML) INJECTION AS NEEDED
Status: CANCELLED | OUTPATIENT
Start: 2019-06-04

## 2019-06-04 RX ORDER — ACETAMINOPHEN 325 MG/1
650 TABLET ORAL EVERY 4 HOURS PRN
Status: CANCELLED | OUTPATIENT
Start: 2019-06-04

## 2019-06-04 RX ORDER — SODIUM CHLORIDE 0.9 % (FLUSH) 0.9 %
3 SYRINGE (ML) INJECTION EVERY 12 HOURS SCHEDULED
Status: DISCONTINUED | OUTPATIENT
Start: 2019-06-04 | End: 2019-06-05 | Stop reason: HOSPADM

## 2019-06-04 RX ORDER — BISACODYL 10 MG
10 SUPPOSITORY, RECTAL RECTAL DAILY PRN
Status: DISCONTINUED | OUTPATIENT
Start: 2019-06-04 | End: 2019-06-05 | Stop reason: HOSPADM

## 2019-06-04 RX ORDER — CARBIDOPA/LEVODOPA 25MG-250MG
1 TABLET ORAL 3 TIMES DAILY
Status: DISCONTINUED | OUTPATIENT
Start: 2019-06-04 | End: 2019-06-04

## 2019-06-04 RX ORDER — MORPHINE SULFATE 2 MG/ML
1 INJECTION, SOLUTION INTRAMUSCULAR; INTRAVENOUS EVERY 4 HOURS PRN
Status: DISCONTINUED | OUTPATIENT
Start: 2019-06-04 | End: 2019-06-05 | Stop reason: HOSPADM

## 2019-06-04 RX ORDER — CARBIDOPA/LEVODOPA 25MG-250MG
1 TABLET ORAL 3 TIMES DAILY
Status: CANCELLED | OUTPATIENT
Start: 2019-06-04

## 2019-06-04 RX ORDER — LOSARTAN POTASSIUM 50 MG/1
50 TABLET ORAL DAILY
Status: DISCONTINUED | OUTPATIENT
Start: 2019-06-04 | End: 2019-06-05 | Stop reason: HOSPADM

## 2019-06-04 RX ORDER — CLOPIDOGREL BISULFATE 75 MG/1
75 TABLET ORAL DAILY
Status: DISCONTINUED | OUTPATIENT
Start: 2019-06-05 | End: 2019-06-05 | Stop reason: HOSPADM

## 2019-06-04 RX ORDER — SODIUM CHLORIDE 9 MG/ML
50 INJECTION, SOLUTION INTRAVENOUS CONTINUOUS
Status: ACTIVE | OUTPATIENT
Start: 2019-06-04 | End: 2019-06-05

## 2019-06-04 RX ORDER — BISACODYL 10 MG
10 SUPPOSITORY, RECTAL RECTAL DAILY PRN
Status: CANCELLED | OUTPATIENT
Start: 2019-06-04

## 2019-06-04 RX ORDER — LOSARTAN POTASSIUM 50 MG/1
50 TABLET ORAL
Status: DISCONTINUED | OUTPATIENT
Start: 2019-06-04 | End: 2019-06-04 | Stop reason: SDUPTHER

## 2019-06-04 RX ORDER — SODIUM CHLORIDE 0.9 % (FLUSH) 0.9 %
10 SYRINGE (ML) INJECTION AS NEEDED
Status: CANCELLED | OUTPATIENT
Start: 2019-06-04

## 2019-06-04 RX ORDER — SODIUM CHLORIDE 9 MG/ML
75 INJECTION, SOLUTION INTRAVENOUS CONTINUOUS
Status: DISCONTINUED | OUTPATIENT
Start: 2019-06-04 | End: 2019-06-05 | Stop reason: HOSPADM

## 2019-06-04 RX ORDER — FENTANYL CITRATE 50 UG/ML
INJECTION, SOLUTION INTRAMUSCULAR; INTRAVENOUS AS NEEDED
Status: DISCONTINUED | OUTPATIENT
Start: 2019-06-04 | End: 2019-06-04 | Stop reason: HOSPADM

## 2019-06-04 RX ORDER — POLYVINYL ALCOHOL 14 MG/ML
1 SOLUTION/ DROPS OPHTHALMIC
Status: CANCELLED | OUTPATIENT
Start: 2019-06-04

## 2019-06-04 RX ORDER — SODIUM CHLORIDE 9 MG/ML
40 INJECTION, SOLUTION INTRAVENOUS AS NEEDED
Status: DISCONTINUED | OUTPATIENT
Start: 2019-06-04 | End: 2019-06-05 | Stop reason: HOSPADM

## 2019-06-04 RX ORDER — ACETAMINOPHEN 650 MG/1
650 SUPPOSITORY RECTAL EVERY 4 HOURS PRN
Status: CANCELLED | OUTPATIENT
Start: 2019-06-04

## 2019-06-04 RX ORDER — PRAMIPEXOLE DIHYDROCHLORIDE 0.5 MG/1
0.5 TABLET ORAL 3 TIMES DAILY
Status: DISCONTINUED | OUTPATIENT
Start: 2019-06-04 | End: 2019-06-05 | Stop reason: HOSPADM

## 2019-06-04 RX ORDER — ONDANSETRON 2 MG/ML
4 INJECTION INTRAMUSCULAR; INTRAVENOUS EVERY 6 HOURS PRN
Status: DISCONTINUED | OUTPATIENT
Start: 2019-06-04 | End: 2019-06-05 | Stop reason: HOSPADM

## 2019-06-04 RX ORDER — ATROPINE SULFATE 10 MG/ML
1 SOLUTION/ DROPS OPHTHALMIC 3 TIMES DAILY PRN
Status: DISCONTINUED | OUTPATIENT
Start: 2019-06-04 | End: 2019-06-05 | Stop reason: HOSPADM

## 2019-06-04 RX ORDER — PRAMIPEXOLE DIHYDROCHLORIDE 0.25 MG/1
0.5 TABLET ORAL 3 TIMES DAILY
Status: CANCELLED | OUTPATIENT
Start: 2019-06-04

## 2019-06-04 RX ORDER — SODIUM CHLORIDE 0.9 % (FLUSH) 0.9 %
10 SYRINGE (ML) INJECTION AS NEEDED
Status: DISCONTINUED | OUTPATIENT
Start: 2019-06-04 | End: 2019-06-05 | Stop reason: HOSPADM

## 2019-06-04 RX ORDER — SODIUM CHLORIDE 0.9 % (FLUSH) 0.9 %
3 SYRINGE (ML) INJECTION EVERY 12 HOURS SCHEDULED
Status: CANCELLED | OUTPATIENT
Start: 2019-06-04

## 2019-06-04 RX ORDER — BISACODYL 5 MG/1
5 TABLET, DELAYED RELEASE ORAL DAILY PRN
Status: DISCONTINUED | OUTPATIENT
Start: 2019-06-04 | End: 2019-06-05 | Stop reason: HOSPADM

## 2019-06-04 RX ORDER — LEVOFLOXACIN 5 MG/ML
750 INJECTION, SOLUTION INTRAVENOUS EVERY 24 HOURS
Status: CANCELLED | OUTPATIENT
Start: 2019-06-04 | End: 2019-06-09

## 2019-06-04 RX ORDER — HYDROCODONE BITARTRATE AND ACETAMINOPHEN 5; 325 MG/1; MG/1
1 TABLET ORAL EVERY 4 HOURS PRN
Status: DISCONTINUED | OUTPATIENT
Start: 2019-06-04 | End: 2019-06-05 | Stop reason: HOSPADM

## 2019-06-04 RX ORDER — ASPIRIN 81 MG/1
81 TABLET ORAL DAILY
Status: DISCONTINUED | OUTPATIENT
Start: 2019-06-05 | End: 2019-06-04

## 2019-06-04 RX ORDER — SODIUM CHLORIDE 0.9 % (FLUSH) 0.9 %
3-10 SYRINGE (ML) INJECTION AS NEEDED
Status: DISCONTINUED | OUTPATIENT
Start: 2019-06-04 | End: 2019-06-05 | Stop reason: HOSPADM

## 2019-06-04 RX ORDER — CLOPIDOGREL BISULFATE 75 MG/1
TABLET ORAL AS NEEDED
Status: DISCONTINUED | OUTPATIENT
Start: 2019-06-04 | End: 2019-06-04 | Stop reason: HOSPADM

## 2019-06-04 RX ORDER — ONDANSETRON 2 MG/ML
4 INJECTION INTRAMUSCULAR; INTRAVENOUS EVERY 6 HOURS PRN
Status: CANCELLED | OUTPATIENT
Start: 2019-06-04

## 2019-06-04 RX ORDER — SODIUM CHLORIDE 9 MG/ML
40 INJECTION, SOLUTION INTRAVENOUS AS NEEDED
Status: CANCELLED | OUTPATIENT
Start: 2019-06-04

## 2019-06-04 RX ORDER — LEVOFLOXACIN 5 MG/ML
750 INJECTION, SOLUTION INTRAVENOUS EVERY 24 HOURS
Status: DISCONTINUED | OUTPATIENT
Start: 2019-06-04 | End: 2019-06-05 | Stop reason: HOSPADM

## 2019-06-04 RX ORDER — BISACODYL 5 MG/1
5 TABLET, DELAYED RELEASE ORAL DAILY PRN
Status: CANCELLED | OUTPATIENT
Start: 2019-06-04

## 2019-06-04 RX ORDER — ONDANSETRON 4 MG/1
4 TABLET, FILM COATED ORAL EVERY 6 HOURS PRN
Status: CANCELLED | OUTPATIENT
Start: 2019-06-04

## 2019-06-04 RX ADMIN — ATORVASTATIN CALCIUM 20 MG: 20 TABLET, FILM COATED ORAL at 08:09

## 2019-06-04 RX ADMIN — SODIUM CHLORIDE 50 ML/HR: 9 INJECTION, SOLUTION INTRAVENOUS at 19:12

## 2019-06-04 RX ADMIN — METOPROLOL TARTRATE 12.5 MG: 25 TABLET, FILM COATED ORAL at 08:09

## 2019-06-04 RX ADMIN — LEVOFLOXACIN 750 MG: 5 INJECTION, SOLUTION INTRAVENOUS at 21:23

## 2019-06-04 RX ADMIN — PERFLUTREN 2 ML: 6.52 INJECTION, SUSPENSION INTRAVENOUS at 09:00

## 2019-06-04 RX ADMIN — CARBIDOPA AND LEVODOPA 1 TABLET: 25; 250 TABLET ORAL at 19:12

## 2019-06-04 RX ADMIN — NITROGLYCERIN 0.5 INCH: 20 OINTMENT TOPICAL at 06:41

## 2019-06-04 RX ADMIN — ASPIRIN 81 MG: 81 TABLET, DELAYED RELEASE ORAL at 19:12

## 2019-06-04 RX ADMIN — SODIUM CHLORIDE 75 ML/HR: 9 INJECTION, SOLUTION INTRAVENOUS at 10:36

## 2019-06-04 RX ADMIN — CARBIDOPA AND LEVODOPA 1 TABLET: 10; 100 TABLET ORAL at 08:09

## 2019-06-04 RX ADMIN — ASPIRIN 81 MG: 81 TABLET ORAL at 08:09

## 2019-06-04 RX ADMIN — METOPROLOL TARTRATE 5 MG: 5 INJECTION INTRAVENOUS at 19:12

## 2019-06-04 RX ADMIN — PRAMIPEXOLE DIHYDROCHLORIDE 0.5 MG: 0.5 TABLET ORAL at 21:23

## 2019-06-04 RX ADMIN — LOSARTAN POTASSIUM 50 MG: 50 TABLET, FILM COATED ORAL at 16:45

## 2019-06-04 RX ADMIN — PRAMIPEXOLE DIHYDROCHLORIDE 0.5 MG: 0.25 TABLET ORAL at 08:09

## 2019-06-04 RX ADMIN — CARBIDOPA AND LEVODOPA 1 TABLET: 25; 250 TABLET ORAL at 21:23

## 2019-06-04 RX ADMIN — SODIUM CHLORIDE, PRESERVATIVE FREE 3 ML: 5 INJECTION INTRAVENOUS at 08:11

## 2019-06-04 RX ADMIN — ENOXAPARIN SODIUM 80 MG: 80 INJECTION SUBCUTANEOUS at 05:53

## 2019-06-04 NOTE — DISCHARGE SUMMARY
"Casey Snowden Sr.  1934  [id number]    Hospitalists Discharge Summary    Date of Admission: 6/3/2019  Date of Discharge:  6/4/2019    History of Present Illness: Taken from [facility] on admit:  \"The patient is an 84-year-old male that presented to the emergency department secondary to sudden onset dizziness after mowing grass and exerting himself this morning. He notes left lower chest \"discomfort\" but no chest pain.  Wife notes 5/31/2019 patient had chills, excessive sleepiness with BP on low side.  2 days ago had temp 100.7 associated with dry cough without shortness of air or any further fever or chills.  She notes patient worked in his garden all day yesterday, did 1 hour of treadmill and elliptical at the [facility] then returned home and worked again in his garden when he developed the above symptoms.  Wife notes discussion with Dr. Diez 2 days ago regarding low blood pressure and cough and was told to hold his losartan which she did 2 days ago but then resumed today.     In the ER he was found to be in A. fib RVR.  He initially was given a bolus of diltiazem that brought his rate down to 110 from 160 however it returned to 160s soon thereafter.  He was placed on diltiazem drip and given second bolus of diltiazem.     He has a history of PAF, ICM with s/d CHF, RLS, Parkinson's with chronic orthostasis, HTN, carotid stenosis, PAD/chronic DVT RLE with IVC filter on ASA/Eliquis per Dr. Dorado, CAD/HLD     He otherwise denies headache/rhinorrhea/nasal congestion/lightheadedness/syncopal sensation/soa/n/v/d/chest pain/abdominal pain/recent illness/sick exposures/change in bowel or bladder habits/no weight change/bloody emesis or bloody stools/change in medications or any other new concerns.\"    Hospital Course Summary/Primary Discharge Diagnoses:  Acute STEMI:    A-fib RVR, h/o PAF:  CAD/HLD/PAD/ICM EF 30%/chronic s/d CHF, h/o LAD occlusion:   HTN: Cardiology following (patient of Dr. Armas outpatient)   Trops now " positive  Therapeutic dose lovenox started, eliquis (2.5 mg bid dosing at home) held  Initially on diltiazem drip, weaned off with initiation of metoprolol tartrate IV 5 mg x 3 doses then oral 12.5 mg every 12 hours with bradycardia this am  Losartan held initially for hypotention  Continued ASA/lipitor   Transfer for heart cath today     Secondary Discharge Diagnoses:  Rule out RUL CABP:   RVP negative, procal +  Started levaquin on admit    Subclinical hypothyroidism:   Repeat TFTs 6-8 weeks     Parkinson's disease: follows [facility]  Allowed home sinemet/atropine      Restless leg syndrome: no further issues, not on mirtazapine any longer    PCP  Patient Care Team:  Aleksander Diez MD as PCP - General  Ford Dorado MD as PCP - Claims Attributed  Joseph Acharya MD as Consulting Physician (Ophthalmology)  Ford Dorado MD as Consulting Physician (Vascular Surgery)  Kaye Landa MD as Consulting Physician (Neurology)  Kartik Valadez MD (Dermatology)    Consults:   Consults     Date and Time Order Name Status Description    6/3/2019 1531 Inpatient Cardiology Consult          Operations and Procedures Performed:     Xr Chest 2 View    Result Date: 6/3/2019  Narrative: CHEST X-RAY, 06/03/2019     HISTORY: 84-year-old male in the ED complaining of chest pain today.  TECHNIQUE: AP and lateral upright chest series.  COMPARISON: *  Chest x-ray, 08/25/2018.  FINDINGS: Mild patchy infiltrate is present in the right upper lobe, new since the prior exam.  Remaining portions of both lungs are clear. No visible pleural effusion. Heart size and pulmonary vascularity are within normal limits. Benign calcified granulomas in the left upper lung and left hilum.      Impression: Right upper lobe infiltrate.  This report was finalized on 6/3/2019 1:47 PM by Dr. Milton Jason MD.      Allergies:  is allergic to penicillins and rocephin [ceftriaxone].    Bob  No medications  noted per report of 6/3/2019, reviewed by me    Discharge Medications: PLEASE SEE DISCHARGE READMIT MEDICATIONS UPON RELEASE BY RN AT [facility] FACILITY    Last Lab Results:   Lab Results (most recent)     Procedure Component Value Units Date/Time    Troponin [[id number]]  (Abnormal) Collected:  06/04/19 0559    Specimen:  Blood Updated:  06/04/19 0631     Troponin T 0.911 ng/mL     Narrative:       Troponin T Reference Range:  <= 0.03 ng/mL-   Negative for AMI  >0.03 ng/mL-     Abnormal for myocardial necrosis.  Clinicians would have to utilize clinical acumen, EKG, Troponin and serial changes to determine if it is an Acute Myocardial Infarction or myocardial injury due to an underlying chronic condition.     Basic Metabolic Panel [[id number]]  (Abnormal) Collected:  06/04/19 0559    Specimen:  Blood Updated:  06/04/19 0628     Glucose 111 mg/dL      BUN 18 mg/dL      Creatinine 0.82 mg/dL      Sodium 140 mmol/L      Potassium 4.1 mmol/L      Chloride 106 mmol/L      CO2 23.7 mmol/L      Calcium 8.3 mg/dL      eGFR Non African Amer 90 mL/min/1.73      BUN/Creatinine Ratio 22.0     Anion Gap 10.3 mmol/L     Narrative:       GFR Normal >60  Chronic Kidney Disease <60  Kidney Failure <15    Lipid Panel [[id number]] Collected:  06/04/19 0559    Specimen:  Blood Updated:  06/04/19 0628     Total Cholesterol 128 mg/dL      Triglycerides 65 mg/dL      HDL Cholesterol 50 mg/dL      LDL Cholesterol  65 mg/dL      VLDL Cholesterol 13 mg/dL      LDL/HDL Ratio 1.30    Narrative:       Cholesterol Reference Ranges  (U.S. Department of Health and Human Services ATP III Classifications)    Desirable          <200 mg/dL  Borderline High    200-239 mg/dL  High Risk          >240 mg/dL      Triglyceride Reference Ranges  (U.S. Department of Health and Human Services ATP III Classifications)    Normal           <150 mg/dL  Borderline High  150-199 mg/dL  High             200-499 mg/dL  Very High        >500 mg/dL    HDL Reference  Ranges  (U.S. Department of Health and Human Services ATP III Classifcations)    Low     <40 mg/dl (major risk factor for CHD)  High    >60 mg/dl ('negative' risk factor for CHD)        LDL Reference Ranges  (U.S. Department of Health and Human Services ATP III Classifcations)    Optimal          <100 mg/dL  Near Optimal     100-129 mg/dL  Borderline High  130-159 mg/dL  High             160-189 mg/dL  Very High        >189 mg/dL    CBC & Differential [[id number]] Collected:  06/04/19 0559    Specimen:  Blood Updated:  06/04/19 0608    Narrative:       The following orders were created for panel order CBC & Differential.  Procedure                               Abnormality         Status                     ---------                               -----------         ------                     CBC Auto Differential[[id number]]        Abnormal            Final result                 Please view results for these tests on the individual orders.    CBC Auto Differential [[id number]]  (Abnormal) Collected:  06/04/19 0559    Specimen:  Blood Updated:  06/04/19 0608     WBC 5.38 10*3/mm3      RBC 3.96 10*6/mm3      Hemoglobin 12.9 g/dL      Hematocrit 38.3 %      MCV 96.7 fL      MCH 32.6 pg      MCHC 33.7 g/dL      RDW 14.6 %      RDW-SD 52.0 fl      MPV 10.4 fL      Platelets 140 10*3/mm3      Neutrophil % 66.2 %      Lymphocyte % 21.7 %      Monocyte % 8.9 %      Eosinophil % 2.4 %      Basophil % 0.6 %      Immature Grans % 0.2 %      Neutrophils, Absolute 3.56 10*3/mm3      Lymphocytes, Absolute 1.17 10*3/mm3      Monocytes, Absolute 0.48 10*3/mm3      Eosinophils, Absolute 0.13 10*3/mm3      Basophils, Absolute 0.03 10*3/mm3      Immature Grans, Absolute 0.01 10*3/mm3      nRBC 0.0 /100 WBC     Troponin [[id number]]  (Abnormal) Collected:  06/04/19 0004    Specimen:  Blood Updated:  06/04/19 0035     Troponin T 0.875 ng/mL     Narrative:       Troponin T Reference Range:  <= 0.03 ng/mL-   Negative for AMI  >0.03 ng/mL-      Abnormal for myocardial necrosis.  Clinicians would have to utilize clinical acumen, EKG, Troponin and serial changes to determine if it is an Acute Myocardial Infarction or myocardial injury due to an underlying chronic condition.     Respiratory Panel, PCR - Swab, Nasopharynx [[id number]]  (Normal) Collected:  06/03/19 1718    Specimen:  Swab from Nasopharynx Updated:  06/03/19 2123     ADENOVIRUS, PCR Not Detected     Coronavirus 229E Not Detected     Coronavirus HKU1 Not Detected     Coronavirus NL63 Not Detected     Coronavirus OC43 Not Detected     Human Metapneumovirus Not Detected     Human Rhinovirus/Enterovirus Not Detected     Influenza B PCR Not Detected     Parainfluenza Virus 1 Not Detected     Parainfluenza Virus 2 Not Detected     Parainfluenza Virus 3 Not Detected     Parainfluenza Virus 4 Not Detected     Bordetella pertussis pcr Not Detected     Influenza A H1 2009 PCR Not Detected     Chlamydophila pneumoniae PCR Not Detected     Mycoplasma pneumo by PCR Not Detected     Influenza A PCR Not Detected     Influenza A H3 Not Detected     Influenza A H1 Not Detected     RSV, PCR Not Detected     Bordetella parapertussis PCR Not Detected    TSH [[id number]]  (Abnormal) Collected:  06/03/19 1253    Specimen:  Blood Updated:  06/03/19 1559     TSH 6.010 mIU/mL     Procalcitonin [[id number]]  (Abnormal) Collected:  06/03/19 1253    Specimen:  Blood Updated:  06/03/19 1559     Procalcitonin 0.33 ng/mL     Narrative:       As a Marker for Sepsis (Non-Neonates):   1. <0.5 ng/mL represents a low risk of severe sepsis and/or septic shock.  2. >2 ng/mL represents a high risk of severe sepsis and/or septic shock.    As a Marker for Lower Respiratory Tract Infections that require antibiotic therapy:    PCT on Admission     Antibiotic Therapy       6-12 Hrs later  > 0.5                Strongly Recommended             >0.25 - <0.5         Recommended  0.1 - 0.25           Discouraged              Remeasure/reassess  PCT  <0.1                 Strongly Discouraged     Remeasure/reassess PCT                     PCT values of < 0.5 ng/mL do not exclude an infection, because localized infections (without systemic signs) may be associated with such low concentrations, or a systemic infection in its initial stages (< 6 hours). Furthermore, increased PCT can occur without infection. PCT concentrations between 0.5 and 2.0 ng/mL should be interpreted taking into account the patient's history. It is recommended to retest PCT within 6-24 hours if any concentrations < 2 ng/mL are obtained.    Hemoglobin A1c [[id number]]  (Normal) Collected:  06/03/19 1253    Specimen:  Blood Updated:  06/03/19 1552     Hemoglobin A1C 5.30 %     Narrative:       Hemoglobin A1C Ranges:    Increased Risk for Diabetes  5.7% to 6.4%  Diabetes                     >= 6.5%  Diabetic Goal                < 7.0%    Lipase [[id number]]  (Normal) Collected:  06/03/19 1253    Specimen:  Blood Updated:  06/03/19 1353     Lipase 15 U/L     BNP [[id number]]  (Abnormal) Collected:  06/03/19 1253    Specimen:  Blood Updated:  06/03/19 1326     proBNP 2,646.0 pg/mL     Narrative:       Among patients with dyspnea, NT-proBNP is highly sensitive for the detection of acute congestive heart failure. In addition NT-proBNP of <300 pg/ml effectively rules out acute congestive heart failure with 99% negative predictive value.    Comprehensive Metabolic Panel [[id number]]  (Abnormal) Collected:  06/03/19 1253    Specimen:  Blood Updated:  06/03/19 1317     Glucose 140 mg/dL      BUN 23 mg/dL      Creatinine 0.95 mg/dL      Sodium 142 mmol/L      Potassium 3.9 mmol/L      Chloride 105 mmol/L      CO2 24.5 mmol/L      Calcium 8.7 mg/dL      Total Protein 6.4 g/dL      Albumin 3.30 g/dL      ALT (SGPT) 5 U/L      AST (SGOT) 15 U/L      Alkaline Phosphatase 56 U/L      Total Bilirubin 0.5 mg/dL      eGFR Non African Amer 76 mL/min/1.73      Globulin 3.1 gm/dL      A/G Ratio 1.1 g/dL       BUN/Creatinine Ratio 24.2     Anion Gap 12.5 mmol/L     Narrative:       GFR Normal >60  Chronic Kidney Disease <60  Kidney Failure <15    Protime-INR [[id number]]  (Abnormal) Collected:  06/03/19 1253    Specimen:  Blood Updated:  06/03/19 1316     Protime 14.3 Seconds      INR 1.14    Narrative:       Therapeutic Ranges for INR: 2.0-3.0 (PT 20-30)                              2.5-3.5 (PT 25-34)    aPTT [[id number]]  (Normal) Collected:  06/03/19 1253    Specimen:  Blood Updated:  06/03/19 1316     PTT 33.2 seconds     Narrative:       PTT = The equivalent PTT values for the therapeutic range of heparin levels at 0.1 to 0.7 U/ml are 53 to 110 seconds.    CBC & Differential [[id number]] Collected:  06/03/19 1253    Specimen:  Blood Updated:  06/03/19 1258    Narrative:       The following orders were created for panel order CBC & Differential.  Procedure                               Abnormality         Status                     ---------                               -----------         ------                     CBC Auto Differential[[id number]]        Abnormal            Final result                 Please view results for these tests on the individual orders.    CBC Auto Differential [[id number]]  (Abnormal) Collected:  06/03/19 1253    Specimen:  Blood Updated:  06/03/19 1258     WBC 6.42 10*3/mm3      RBC 4.11 10*6/mm3      Hemoglobin 13.4 g/dL      Hematocrit 40.4 %      MCV 98.3 fL      MCH 32.6 pg      MCHC 33.2 g/dL      RDW 14.3 %      RDW-SD 52.4 fl      MPV 10.4 fL      Platelets 135 10*3/mm3      Neutrophil % 78.3 %      Lymphocyte % 12.3 %      Monocyte % 7.5 %      Eosinophil % 1.4 %      Basophil % 0.3 %      Immature Grans % 0.2 %      Neutrophils, Absolute 5.03 10*3/mm3      Lymphocytes, Absolute 0.79 10*3/mm3      Monocytes, Absolute 0.48 10*3/mm3      Eosinophils, Absolute 0.09 10*3/mm3      Basophils, Absolute 0.02 10*3/mm3      Immature Grans, Absolute 0.01 10*3/mm3      nRBC 0.0 /100 WBC          Imaging Results (most recent)     Procedure Component Value Units Date/Time    XR Chest 2 View [[id number]] Collected:  06/03/19 1346     Updated:  06/03/19 1349    Narrative:       CHEST X-RAY, 06/03/2019         HISTORY:  84-year-old male in the ED complaining of chest pain today.     TECHNIQUE:  AP and lateral upright chest series.     COMPARISON:  *  Chest x-ray, 08/25/2018.     FINDINGS:  Mild patchy infiltrate is present in the right upper lobe, new since the  prior exam.     Remaining portions of both lungs are clear. No visible pleural effusion.  Heart size and pulmonary vascularity are within normal limits. Benign  calcified granulomas in the left upper lung and left hilum.       Impression:       Right upper lobe infiltrate.     This report was finalized on 6/3/2019 1:47 PM by Dr. Milton Jason MD.             PROCEDURES: none    Condition on Discharge: Stable    Physical Exam at Discharge  Vital Signs  Temp:  [97.6 °F (36.4 °C)-98.5 °F (36.9 °C)] 97.9 °F (36.6 °C)  Heart Rate:  [] 52  Resp:  [16-18] 16  BP: ()/() 140/78  Body mass index is 24.69 kg/m².    Physical Exam:  Physical Exam   Constitutional: Patient appears well-developed and well-nourished and in no acute distress   HEENT:   Head: Normocephalic and atraumatic.   Eyes:  Pupils are equal, round, and reactive to light. EOM are intact. Sclera are anicteric and non-injected.  Mouth and Throat: Patient has moist mucous membranes. Oropharynx is clear of any erythema or exudate.       Cardiovascular: Bradycardic rate, regular rhythm S1 normal and S2 normal.  Exam reveals no gallop and no friction rub.  No murmur heard.  Pulmonary/Chest: Lungs are clear to auscultation bilaterally. No respiratory distress. No wheezes. No rhonchi. No rales.   Abdominal: Soft. Bowel sounds are normal. No distension and no mass. There is no hepatosplenomegaly. There is no tenderness.   Musculoskeletal: Normal Muscle tone for age  Extremities: No  edema. Pulses are palpable in all 4 extremities.  Neurological: Patient is alert and oriented to person, place, and time. Cranial nerves II-XII are grossly intact with no focal deficits.  Skin: Skin is warm. No rash noted. Nails show no clubbing.  No cyanosis or erythema.    Discharge Disposition  [facility], [place], operated by [facility]    Discharge Diet:    Dietary Orders (From admission, onward)    Start     Ordered    06/04/19 0001  NPO Diet NPO Except: Sips With Meds  Diet Effective Midnight     Question:  NPO Except:  Answer:  Sips With Meds    06/03/19 2010          Activity at Discharge:  Bedrest    Pre-discharge education  Cardiac      Follow-up Appointments  Future Appointments   Date Time Provider Department Center   6/5/2019  9:45 AM Aleksander Diez MD [facility] None   6/12/2019 10:45 AM eCcilio Armas III, MD [facility] None       Test Results Pending at Discharge: NONE     RIDDHI Barnes  06/04/19  8:08 AM    Time: Discharge 30 min (if over 30 minutes give explanation as to why it took greater than 30 minutes)

## 2019-06-04 NOTE — CONSULTS
[facility]  Consult Note                                                                              6/4/2019  No ref. provider found    Patient Identification:  Casey Snowden Sr.:   84 y.o.  male  1934     Date of Admission:6/3/2019    CC:  Consult requested by RIDDHI Rollins for evaluation of chest discomfort, ST elevation myocardial infarction, paroxysmal atrial fibrillation    History of Present Illness:  Patient is an 84-year-old gentleman with history of paroxysmal atrial fibrillation, coronary artery disease, hypertension, hyperlipidemia, deep venous thrombosis with IVC filter in place and on Eliquis therapy.  He is followed by Dr. Azar and in October 2018 had a stress test that showed an ejection fraction of 40% with medium to large size anterior inferior and apical wall infarction without ischemia.  An echocardiogram showed an ejection fraction of 31 to 35% with grade 2 diastolic dysfunction wall motion abnormalities in the anterior apical and anteroseptal walls.  There is moderate mitral regurgitation noted with mild to moderate tricuspid regurgitation with an RV systolic pressure moderately elevated.  Subsequent cardiac catheterization showed prior anterior wall infarction with a subtotal occlusion of the mid LAD with subsequent distal diffuse disease with 60% distal apical stenosis.  There was 10 to 30% stenoses in the circumflex and right coronary vessels.  He was treated medically and was doing cardiac rehab.  He was intolerant of Coreg due to bradycardia which was discontinued in December.    He states that he has been participating in rehab and doing yard work.  According to the chart he had had some fever and cough and chills for 3 to 4 days prior to presentation.  He does not seem to impressed with this.  He states yesterday morning he went to rehab his usual felt well however later in the morning when working in the yard he got lightheaded dizzy and had left-sided chest pressure.   It was nonradiating but he was short of breath and slightly diaphoretic with this.  He was brought to the emergency room where he was noted to be in atrial fibrillation with rapid ventricular rates.  He was also noted to have right-sided pneumonia.  He was started on Levaquin, given metoprolol and IV Cardizem.  He eventually converted to sinus rhythm.  His left-sided chest discomfort resolved prior to leaving the emergency room.  His troponin however is elevated though was normal on admission.  This morning it is 0.911.  He remained pain-free throughout the night.  This morning he complains of right-sided pleuritic chest pain that started at 6 this morning.  This is unlike his pain he had yesterday.  He again repeatedly still tells me he has not had any left-sided chest discomfort that he had when he came in.  He he converted to sinus rhythm on 430 this morning.  He is receiving Lopressor.  He also also received aspirin Lovenox.  Losartan has been on hold.    Past Medical History:  Past Medical History:   Diagnosis Date   • Atrial fibrillation (CMS/[facility])    • Cancer (CMS/[facility]) 04/2018    basil cell carcinoma   • Carotid stenosis    • Chronic deep vein thrombosis (DVT) of popliteal vein of right lower extremity (CMS/HCC)    • Coronary artery disease involving native coronary artery of native heart without angina pectoris 12/20/2018   • DVT of lower extremity (deep venous thrombosis) (CMS/[facility])    • Hard of hearing    • Hx of blood clots    • Hyperlipidemia    • Hypertension    • Long-term use of high-risk medication    • Parkinson's disease (CMS/HCC)    • Postphlebitic syndrome      Past Surgical History:  Past Surgical History:   Procedure Laterality Date   • BASAL CELL CARCINOMA EXCISION  04/2018   • CARDIAC CATHETERIZATION N/A 11/5/2018    Procedure: Left Heart Cath;  Surgeon: Oliverio Azar MD;  Location: [facility] CATH INVASIVE LOCATION;  Service: Cardiovascular   • CARDIAC CATHETERIZATION N/A 11/5/2018     Procedure: Coronary angiography;  Surgeon: Oliverio Azar MD;  [facility];  Service: Cardiovascular   • CARDIAC CATHETERIZATION N/A 11/5/2018    Procedure: Left ventriculography;  Surgeon: Oliverio Azar MD;  [facility];  Service: Cardiovascular   • CATARACT EXTRACTION Left 08/2018   • INCISION AND DRAINAGE LEG Right 7/7/2017    Procedure: INCISION AND DRAINAGE INFECTED HEMATOMA RIGHT THIGH;  Surgeon: Valentin Peña MD;  Location: [facility] MAIN OR;  Service:    • KNEE INCISION AND DRAINAGE Right 12/27/2016    Procedure: KNEE INCISION AND DRAINAGE;  Surgeon: Triston Whitten MD;  Location: [facility] MAIN OR;  Service:    • NOSE SURGERY      nose replacement   • SKIN GRAFT  12/2017   • TOTAL KNEE ARTHROPLASTY Right    • VENA CAVA FILTER PLACEMENT       Allergies:  Allergies   Allergen Reactions   • Penicillins Rash   • Rocephin [Ceftriaxone] Rash     Home Meds:  Medications Prior to Admission   Medication Sig Dispense Refill Last Dose   • aspirin 81 MG EC tablet Take 81 mg by mouth Daily.   6/2/2019 at Unknown time   • atorvastatin (LIPITOR) 20 MG tablet TAKE 1 TABLET BY MOUTH  DAILY 90 tablet 3 6/2/2019 at Unknown time   • atropine 1 % ophthalmic solution 1 drop 3 (Three) Times a Day As Needed (as needed orally for increased secretions). For drooling   6/3/2019 at Unknown time   • carbidopa-levodopa (SINEMET)  MG per tablet 1 tablet 3 (Three) Times a Day.   6/3/2019 at Unknown time   • carboxymethylcellulose (REFRESH PLUS) 0.5 % solution 1 drop 3 (Three) Times a Day As Needed (eye).   6/2/2019 at Unknown time   • ELIQUIS 2.5 MG tablet tablet 2.5 mg Every 12 (Twelve) Hours.   6/3/2019 at Unknown time   • losartan (COZAAR) 25 MG tablet TAKE 2 TABLETS BY MOUTH  DAILY 180 tablet 0 6/3/2019 at Unknown time   • Multiple Vitamins-Minerals (PRESERVISION AREDS) capsule Take 1 tablet by mouth Daily.   6/3/2019 at Unknown time   • pramipexole (MIRAPEX) 0.5 MG  tablet Take 0.5 mg by mouth 3 (Three) Times a Day.        Current Meds  Scheduled Meds:  aspirin 81 mg Oral Daily   atorvastatin 20 mg Oral Daily   carbidopa-levodopa      carbidopa-levodopa 1 tablet Oral TID   enoxaparin 1 mg/kg Subcutaneous Q12H   levoFLOXacin 750 mg Intravenous Q24H   metoprolol tartrate 12.5 mg Oral Q12H   pramipexole 0.5 mg Oral TID   sodium chloride 3 mL Intravenous Q12H     Continuous Infusions:  diltiaZEM 5-15 mg/hr Last Rate: 15 mg/hr (06/03/19 1440)   Pharmacy to Dose enoxaparin (LOVENOX)       Social History:   Social History     Socioeconomic History   • Marital status:      Spouse name: Not on file   • Number of children: Not on file   • Years of education: college grad   • Highest education level: Not on file   Occupational History   • Occupation: financial advis     Comment: self employed   Tobacco Use   • Smoking status: Never Smoker   • Smokeless tobacco: Never Used   • Tobacco comment: caff use   Substance and Sexual Activity   • Alcohol use: No     Alcohol/week: 1.2 oz     Types: 2 Glasses of wine per week     Frequency: Never     Comment: none for a month   • Drug use: No   • Sexual activity: Yes     Partners: Female     Family History:  Family History   Problem Relation Age of Onset   • Heart attack Mother 74   • Hypertension Mother    • Stroke Brother    • Cancer Brother    • Cancer Sister    • Cancer Brother    • Deep vein thrombosis Neg Hx      REVIEW OF SYSTEMS:   CONSTITUTIONAL: No weight loss, weakness or fatigue.   HEENT: Eyes: No visual loss, blurred vision, double vision or yellow sclerae. Ears, Nose, Throat: No hearing loss, sneezing, congestion, runny nose or sore throat.   SKIN: No rash or itching.     RESPIRATORY: No hemoptysis, cough or sputum.   GASTROINTESTINAL: No anorexia, nausea, vomiting or diarrhea. No abdominal pain, bright red blood per rectum or melena.  GENITOURINARY: No burning on urination, hematuria or increased frequency.  NEUROLOGICAL: No  "headache, syncope, paralysis, ataxia, numbness or tingling in the extremities. No change in bowel or bladder control.   MUSCULOSKELETAL: No muscle, back pain, joint pain or stiffness.   HEMATOLOGIC: No anemia, bleeding or bruising.   LYMPHATICS: No enlarged nodes. No history of splenectomy.   PSYCHIATRIC: No history of depression, anxiety, hallucinations.   ENDOCRINOLOGIC: No reports of sweating, cold or heat intolerance. No polyuria or polydipsia.     Physical Exam    /78   Pulse 52   Temp 97.9 °F (36.6 °C) (Oral)   Resp 16   Ht 180.3 cm (71\")   Wt 80.3 kg (177 lb)   SpO2 98%   BMI 24.69 kg/m²     General Appearance Well developed, cooperative and well nourished and no acute distress   Head Normocephalic, without abnormality, atraumatic   Ears Ears appear intact with no abnormalities noted   Throat No oral lesions, no thrush, oral mucosa moist   Neck No adenopathy, supple, trachea midline, no thyromegaly, no carotid bruit, no JVD   Back No skin lesions, erythema or scars, no tenderness to percussion or palpation,range of motion is normal   Lungs Clear to auscultation,respirations regular, even and unlabored   Heart Regular rhythm and normal rate, normal S1 and S2, no murmur, no gallop, no rub, no click   Chest wall No abnormalities observed   Abdomen Normal bowel sounds, no masses, no hepatomegaly,    Extremities Moves all extremities well, no edema, no cyanosis, no redness   Pulses Pulses palpable and equal bilaterally. Normal radial, carotid, femoral, dorsalis pedis and posterior tibial pulses bilaterally. Normal abdominal aorta   Skin No bleeding, bruising or rash   Psychiatric Alert and oriented x 3, normal mood and affect    Results from last 7 days   Lab Units 06/04/19  0559 06/03/19  1253   SODIUM mmol/L 140 142   POTASSIUM mmol/L 4.1 3.9   CHLORIDE mmol/L 106 105   CO2 mmol/L 23.7 24.5   BUN mg/dL 18 23   CREATININE mg/dL 0.82 0.95   CALCIUM mg/dL 8.3* 8.7   BILIRUBIN mg/dL  --  0.5   ALK PHOS " U/L  --  56   ALT (SGPT) U/L  --  5   AST (SGOT) U/L  --  15   GLUCOSE mg/dL 111* 140*     Results from last 7 days   Lab Units 06/04/19  0559 06/04/19  0004 06/03/19  1908   TROPONIN T ng/mL 0.911* 0.875* 0.420*     Results from last 7 days   Lab Units 06/04/19  0559 06/03/19  1253   WBC 10*3/mm3 5.38 6.42   HEMOGLOBIN g/dL 12.9* 13.4   HEMATOCRIT % 38.3 40.4   PLATELETS 10*3/mm3 140 135*     Results from last 7 days   Lab Units 06/03/19  1253   INR  1.14*   APTT seconds 33.2     I personally viewed and interpreted the patient's EKG/Telemetry data    Assessment and Plan  1.  ST elevation myocardial infarction.  Staggering course with no pain after he left the emergency room.  He had baseline ST-T wave changes in anterolateral leads which can founds interpretation of his EKG but I do see additional ST elevation on admission though this has improved and become more biphasic.  He remained pain-free during the night and has a known LAD occlusion.  All atrial fibrillation with rapid rates and pneumonia certainly playing a role, with additional ST elevation and troponin elevation I favor repeat cardiac catheterization and revascularization of the LAD if no other (circumflex) stenosis has developed.  Reviewed with the patient including risks, and benefits of cardiac catheterization including myocardial infarction, death, renal failure, bleeding stroke local injury to artery nerve and vein.  He understand wishes to proceed.  Have discussed with RIDDHI Rollins and will arrange transfer to [facility] to address further  2.  Known LAD disease with cardiomyopathy, as above  3. Hypertension  4.  Paroxysmal atrial fibrillation.  Maintaining sinus rhythm we will try to continue with low-dose metoprolol therapy.  Previously on Eliquis and will need to resume after cath.    5.  Hyperlipidemia  6.  History of moderate mitral regurgitation.  proBNP slightly elevated but he does not appear overtly wet.  Hold off on diuretics for now.   Recheck an echocardiogram  7.  Pneumonia.  On Levaquin.   8.  History of deep venous thrombosis, status post IVC filter placement  9.  History of skin cancer  10.  Parkinson's disease      I have reviewed HPI and ROS above.    Mini Perez  6/4/2019  7:46 AM    80min spent in reviewing records, discussion and examination of the patient and discussion with other members of the patient's medical team.     Dictated utilizing Dragon dictation

## 2019-06-04 NOTE — PLAN OF CARE
Problem: Patient Care Overview  Goal: Plan of Care Review  Outcome: Ongoing (interventions implemented as appropriate)   06/03/19 2000   Coping/Psychosocial   Plan of Care Reviewed With patient;daughter   Plan of Care Review   Progress improving   OTHER   Outcome Summary very pleasant and active 84 year old. Denies chest pain or discomfort. HR responded well to Metoprolol     Goal: Individualization and Mutuality  Outcome: Ongoing (interventions implemented as appropriate)

## 2019-06-04 NOTE — NURSING NOTE
Case Management Discharge Note    Final Note: Transferred to  [facility]    Destination      No service has been selected for the patient.      Durable Medical Equipment      No service has been selected for the patient.      Dialysis/Infusion      No service has been selected for the patient.      Home Medical Care      No service has been selected for the patient.      Therapy      No service has been selected for the patient.      Community Resources      No service has been selected for the patient.             Final Discharge Disposition Code: 02 - [facility] for [facility]

## 2019-06-04 NOTE — H&P (VIEW-ONLY)
[facility]  Consult Note                                                                              6/4/2019  No ref. provider found    Patient Identification:  Casey Snowden Sr.:   84 y.o.  male  1934     Date of Admission:6/3/2019    CC:  Consult requested by RIDDHI Rollins for evaluation of chest discomfort, ST elevation myocardial infarction, paroxysmal atrial fibrillation    History of Present Illness:  Patient is an 84-year-old gentleman with history of paroxysmal atrial fibrillation, coronary artery disease, hypertension, hyperlipidemia, deep venous thrombosis with IVC filter in place and on Eliquis therapy.  He is followed by Dr. Azar and in October 2018 had a stress test that showed an ejection fraction of 40% with medium to large size anterior inferior and apical wall infarction without ischemia.  An echocardiogram showed an ejection fraction of 31 to 35% with grade 2 diastolic dysfunction wall motion abnormalities in the anterior apical and anteroseptal walls.  There is moderate mitral regurgitation noted with mild to moderate tricuspid regurgitation with an RV systolic pressure moderately elevated.  Subsequent cardiac catheterization showed prior anterior wall infarction with a subtotal occlusion of the mid LAD with subsequent distal diffuse disease with 60% distal apical stenosis.  There was 10 to 30% stenoses in the circumflex and right coronary vessels.  He was treated medically and was doing cardiac rehab.  He was intolerant of Coreg due to bradycardia which was discontinued in December.    He states that he has been participating in rehab and doing yard work.  According to the chart he had had some fever and cough and chills for 3 to 4 days prior to presentation.  He does not seem to impressed with this.  He states yesterday morning he went to rehab his usual felt well however later in the morning when working in the yard he got lightheaded dizzy and had left-sided chest pressure.   It was nonradiating but he was short of breath and slightly diaphoretic with this.  He was brought to the emergency room where he was noted to be in atrial fibrillation with rapid ventricular rates.  He was also noted to have right-sided pneumonia.  He was started on Levaquin, given metoprolol and IV Cardizem.  He eventually converted to sinus rhythm.  His left-sided chest discomfort resolved prior to leaving the emergency room.  His troponin however is elevated though was normal on admission.  This morning it is 0.911.  He remained pain-free throughout the night.  This morning he complains of right-sided pleuritic chest pain that started at 6 this morning.  This is unlike his pain he had yesterday.  He again repeatedly still tells me he has not had any left-sided chest discomfort that he had when he came in.  He he converted to sinus rhythm on 430 this morning.  He is receiving Lopressor.  He also also received aspirin Lovenox.  Losartan has been on hold.    Past Medical History:  Past Medical History:   Diagnosis Date   • Atrial fibrillation (CMS/[facility])    • Cancer (CMS/[facility]) 04/2018    basil cell carcinoma   • Carotid stenosis    • Chronic deep vein thrombosis (DVT) of popliteal vein of right lower extremity (CMS/HCC)    • Coronary artery disease involving native coronary artery of native heart without angina pectoris 12/20/2018   • DVT of lower extremity (deep venous thrombosis) (CMS/[facility])    • Hard of hearing    • Hx of blood clots    • Hyperlipidemia    • Hypertension    • Long-term use of high-risk medication    • Parkinson's disease (CMS/HCC)    • Postphlebitic syndrome      Past Surgical History:  Past Surgical History:   Procedure Laterality Date   • BASAL CELL CARCINOMA EXCISION  04/2018   • CARDIAC CATHETERIZATION N/A 11/5/2018    Procedure: Left Heart Cath;  Surgeon: Oliverio Azar MD;  Location: [facility] CATH INVASIVE LOCATION;  Service: Cardiovascular   • CARDIAC CATHETERIZATION N/A 11/5/2018     Procedure: Coronary angiography;  Surgeon: Oliverio Azar MD;  Location: [facility] CATH INVASIVE LOCATION;  Service: Cardiovascular   • CARDIAC CATHETERIZATION N/A 11/5/2018    Procedure: Left ventriculography;  Surgeon: Oliverio Azar MD;  Location: [facility] CATH INVASIVE LOCATION;  Service: Cardiovascular   • CATARACT EXTRACTION Left 08/2018   • INCISION AND DRAINAGE LEG Right 7/7/2017    Procedure: INCISION AND DRAINAGE INFECTED HEMATOMA RIGHT THIGH;  Surgeon: Valentin Peña MD;  Location: [facility] MAIN OR;  Service:    • KNEE INCISION AND DRAINAGE Right 12/27/2016    Procedure: KNEE INCISION AND DRAINAGE;  Surgeon: Triston Whitten MD;  Location: [facility] MAIN OR;  Service:    • NOSE SURGERY      nose replacement   • SKIN GRAFT  12/2017   • TOTAL KNEE ARTHROPLASTY Right    • VENA CAVA FILTER PLACEMENT       Allergies:  Allergies   Allergen Reactions   • Penicillins Rash   • Rocephin [Ceftriaxone] Rash     Home Meds:  Medications Prior to Admission   Medication Sig Dispense Refill Last Dose   • aspirin 81 MG EC tablet Take 81 mg by mouth Daily.   6/2/2019 at Unknown time   • atorvastatin (LIPITOR) 20 MG tablet TAKE 1 TABLET BY MOUTH  DAILY 90 tablet 3 6/2/2019 at Unknown time   • atropine 1 % ophthalmic solution 1 drop 3 (Three) Times a Day As Needed (as needed orally for increased secretions). For drooling   6/3/2019 at Unknown time   • carbidopa-levodopa (SINEMET)  MG per tablet 1 tablet 3 (Three) Times a Day.   6/3/2019 at Unknown time   • carboxymethylcellulose (REFRESH PLUS) 0.5 % solution 1 drop 3 (Three) Times a Day As Needed (eye).   6/2/2019 at Unknown time   • ELIQUIS 2.5 MG tablet tablet 2.5 mg Every 12 (Twelve) Hours.   6/3/2019 at Unknown time   • losartan (COZAAR) 25 MG tablet TAKE 2 TABLETS BY MOUTH  DAILY 180 tablet 0 6/3/2019 at Unknown time   • Multiple Vitamins-Minerals (PRESERVISION AREDS) capsule Take 1 tablet by mouth Daily.   6/3/2019 at Unknown time   • pramipexole (MIRAPEX) 0.5 MG  tablet Take 0.5 mg by mouth 3 (Three) Times a Day.        Current Meds  Scheduled Meds:  aspirin 81 mg Oral Daily   atorvastatin 20 mg Oral Daily   carbidopa-levodopa      carbidopa-levodopa 1 tablet Oral TID   enoxaparin 1 mg/kg Subcutaneous Q12H   levoFLOXacin 750 mg Intravenous Q24H   metoprolol tartrate 12.5 mg Oral Q12H   pramipexole 0.5 mg Oral TID   sodium chloride 3 mL Intravenous Q12H     Continuous Infusions:  diltiaZEM 5-15 mg/hr Last Rate: 15 mg/hr (06/03/19 1440)   Pharmacy to Dose enoxaparin (LOVENOX)       Social History:   Social History     Socioeconomic History   • Marital status:      Spouse name: Not on file   • Number of children: Not on file   • Years of education: college grad   • Highest education level: Not on file   Occupational History   • Occupation: financial advis     Comment: self employed   Tobacco Use   • Smoking status: Never Smoker   • Smokeless tobacco: Never Used   • Tobacco comment: caff use   Substance and Sexual Activity   • Alcohol use: No     Alcohol/week: 1.2 oz     Types: 2 Glasses of wine per week     Frequency: Never     Comment: none for a month   • Drug use: No   • Sexual activity: Yes     Partners: Female     Family History:  Family History   Problem Relation Age of Onset   • Heart attack Mother 74   • Hypertension Mother    • Stroke Brother    • Cancer Brother    • Cancer Sister    • Cancer Brother    • Deep vein thrombosis Neg Hx      REVIEW OF SYSTEMS:   CONSTITUTIONAL: No weight loss, weakness or fatigue.   HEENT: Eyes: No visual loss, blurred vision, double vision or yellow sclerae. Ears, Nose, Throat: No hearing loss, sneezing, congestion, runny nose or sore throat.   SKIN: No rash or itching.     RESPIRATORY: No hemoptysis, cough or sputum.   GASTROINTESTINAL: No anorexia, nausea, vomiting or diarrhea. No abdominal pain, bright red blood per rectum or melena.  GENITOURINARY: No burning on urination, hematuria or increased frequency.  NEUROLOGICAL: No  "headache, syncope, paralysis, ataxia, numbness or tingling in the extremities. No change in bowel or bladder control.   MUSCULOSKELETAL: No muscle, back pain, joint pain or stiffness.   HEMATOLOGIC: No anemia, bleeding or bruising.   LYMPHATICS: No enlarged nodes. No history of splenectomy.   PSYCHIATRIC: No history of depression, anxiety, hallucinations.   ENDOCRINOLOGIC: No reports of sweating, cold or heat intolerance. No polyuria or polydipsia.     Physical Exam    /78   Pulse 52   Temp 97.9 °F (36.6 °C) (Oral)   Resp 16   Ht 180.3 cm (71\")   Wt 80.3 kg (177 lb)   SpO2 98%   BMI 24.69 kg/m²     General Appearance Well developed, cooperative and well nourished and no acute distress   Head Normocephalic, without abnormality, atraumatic   Ears Ears appear intact with no abnormalities noted   Throat No oral lesions, no thrush, oral mucosa moist   Neck No adenopathy, supple, trachea midline, no thyromegaly, no carotid bruit, no JVD   Back No skin lesions, erythema or scars, no tenderness to percussion or palpation,range of motion is normal   Lungs Clear to auscultation,respirations regular, even and unlabored   Heart Regular rhythm and normal rate, normal S1 and S2, no murmur, no gallop, no rub, no click   Chest wall No abnormalities observed   Abdomen Normal bowel sounds, no masses, no hepatomegaly,    Extremities Moves all extremities well, no edema, no cyanosis, no redness   Pulses Pulses palpable and equal bilaterally. Normal radial, carotid, femoral, dorsalis pedis and posterior tibial pulses bilaterally. Normal abdominal aorta   Skin No bleeding, bruising or rash   Psychiatric Alert and oriented x 3, normal mood and affect    Results from last 7 days   Lab Units 06/04/19  0559 06/03/19  1253   SODIUM mmol/L 140 142   POTASSIUM mmol/L 4.1 3.9   CHLORIDE mmol/L 106 105   CO2 mmol/L 23.7 24.5   BUN mg/dL 18 23   CREATININE mg/dL 0.82 0.95   CALCIUM mg/dL 8.3* 8.7   BILIRUBIN mg/dL  --  0.5   ALK PHOS " U/L  --  56   ALT (SGPT) U/L  --  5   AST (SGOT) U/L  --  15   GLUCOSE mg/dL 111* 140*     Results from last 7 days   Lab Units 06/04/19  0559 06/04/19  0004 06/03/19  1908   TROPONIN T ng/mL 0.911* 0.875* 0.420*     Results from last 7 days   Lab Units 06/04/19  0559 06/03/19  1253   WBC 10*3/mm3 5.38 6.42   HEMOGLOBIN g/dL 12.9* 13.4   HEMATOCRIT % 38.3 40.4   PLATELETS 10*3/mm3 140 135*     Results from last 7 days   Lab Units 06/03/19  1253   INR  1.14*   APTT seconds 33.2     I personally viewed and interpreted the patient's EKG/Telemetry data    Assessment and Plan  1.  ST elevation myocardial infarction.  Staggering course with no pain after he left the emergency room.  He had baseline ST-T wave changes in anterolateral leads which can founds interpretation of his EKG but I do see additional ST elevation on admission though this has improved and become more biphasic.  He remained pain-free during the night and has a known LAD occlusion.  All atrial fibrillation with rapid rates and pneumonia certainly playing a role, with additional ST elevation and troponin elevation I favor repeat cardiac catheterization and revascularization of the LAD if no other (circumflex) stenosis has developed.  Reviewed with the patient including risks, and benefits of cardiac catheterization including myocardial infarction, death, renal failure, bleeding stroke local injury to artery nerve and vein.  He understand wishes to proceed.  Have discussed with RIDDHI Rollins and will arrange transfer to [facility] to address further  2.  Known LAD disease with cardiomyopathy, as above  3. Hypertension  4.  Paroxysmal atrial fibrillation.  Maintaining sinus rhythm we will try to continue with low-dose metoprolol therapy.  Previously on Eliquis and will need to resume after cath.    5.  Hyperlipidemia  6.  History of moderate mitral regurgitation.  proBNP slightly elevated but he does not appear overtly wet.  Hold off on diuretics for now.   Recheck an echocardiogram  7.  Pneumonia.  On Levaquin.   8.  History of deep venous thrombosis, status post IVC filter placement  9.  History of skin cancer  10.  Parkinson's disease      I have reviewed HPI and ROS above.    Mini Perez  6/4/2019  7:46 AM    80min spent in reviewing records, discussion and examination of the patient and discussion with other members of the patient's medical team.     Dictated utilizing Dragon dictation

## 2019-06-04 NOTE — PLAN OF CARE
Problem: Patient Care Overview  Goal: Plan of Care Review  Outcome: Ongoing (interventions implemented as appropriate)   06/04/19 0350   Coping/Psychosocial   Plan of Care Reviewed With patient   Plan of Care Review   Progress no change   OTHER   Outcome Summary pt remains in afib overnight. denies any c/o chest pain. EKG done overnight and reviewed by cardiologist. troponins resulted with critical values; cardiology aware and we will continue to monitor pt overnight for signs of CP. Per [facility] pt possible transfer to [facility] in am for heart cath. continue to monitor, am labs ordered.      Goal: Individualization and Mutuality  Outcome: Ongoing (interventions implemented as appropriate)    Goal: Discharge Needs Assessment  Outcome: Ongoing (interventions implemented as appropriate)      Problem: Arrhythmia/Dysrhythmia (Symptomatic) (Adult)  Goal: Signs and Symptoms of Listed Potential Problems Will be Absent, Minimized or Managed (Arrhythmia/Dysrhythmia)  Outcome: Ongoing (interventions implemented as appropriate)   06/04/19 0350   Goal/Outcome Evaluation   Problems Assessed (Arrhythmia/Dysrhythmia) all   Problems Present (Dysrhythmia) electrophysiologic conduction defect;situational response       Problem: Cardiac: ACS (Acute Coronary Syndrome) (Adult)  Goal: Signs and Symptoms of Listed Potential Problems Will be Absent, Minimized or Managed (Cardiac: ACS)  Outcome: Ongoing (interventions implemented as appropriate)   06/04/19 0350   Goal/Outcome Evaluation   Problems Assessed (Acute Coronary Syndrome) all   Problems Present (Acute Coronary Syn) dysrhythmia/arrhythmia;ischemia leading to infarction;situational response

## 2019-06-04 NOTE — PLAN OF CARE
Problem: Patient Care Overview  Goal: Plan of Care Review  Outcome: Ongoing (interventions implemented as appropriate)   06/03/19 2154   Coping/Psychosocial   Plan of Care Reviewed With patient   Plan of Care Review   Progress no change   OTHER   Outcome Summary pt admitted prior to shift change.     Goal: Individualization and Mutuality  Outcome: Ongoing (interventions implemented as appropriate)    Goal: Discharge Needs Assessment  Outcome: Ongoing (interventions implemented as appropriate)      Problem: Arrhythmia/Dysrhythmia (Symptomatic) (Adult)  Goal: Signs and Symptoms of Listed Potential Problems Will be Absent, Minimized or Managed (Arrhythmia/Dysrhythmia)  Outcome: Ongoing (interventions implemented as appropriate)   06/03/19 2154   Goal/Outcome Evaluation   Problems Assessed (Arrhythmia/Dysrhythmia) all   Problems Present (Dysrhythmia) electrophysiologic conduction defect;situational response       Problem: Cardiac: ACS (Acute Coronary Syndrome) (Adult)  Goal: Signs and Symptoms of Listed Potential Problems Will be Absent, Minimized or Managed (Cardiac: ACS)  Outcome: Ongoing (interventions implemented as appropriate)   06/03/19 2154   Goal/Outcome Evaluation   Problems Assessed (Acute Coronary Syndrome) all   Problems Present (Acute Coronary Syn) dysrhythmia/arrhythmia;situational response

## 2019-06-05 VITALS
OXYGEN SATURATION: 97 % | RESPIRATION RATE: 16 BRPM | SYSTOLIC BLOOD PRESSURE: 154 MMHG | DIASTOLIC BLOOD PRESSURE: 63 MMHG | HEART RATE: 61 BPM | BODY MASS INDEX: 24.64 KG/M2 | WEIGHT: 176 LBS | TEMPERATURE: 97.9 F | HEIGHT: 71 IN

## 2019-06-05 PROBLEM — I21.4 NSTEMI (NON-ST ELEVATED MYOCARDIAL INFARCTION): Status: ACTIVE | Noted: 2019-06-05

## 2019-06-05 LAB
ANION GAP SERPL CALCULATED.3IONS-SCNC: 9.8 MMOL/L
BASOPHILS # BLD AUTO: 0.02 10*3/MM3 (ref 0–0.2)
BASOPHILS NFR BLD AUTO: 0.5 % (ref 0–1.5)
BUN BLD-MCNC: 16 MG/DL (ref 8–23)
BUN/CREAT SERPL: 21.3 (ref 7–25)
CALCIUM SPEC-SCNC: 7.8 MG/DL (ref 8.6–10.5)
CHLORIDE SERPL-SCNC: 105 MMOL/L (ref 98–107)
CHOLEST SERPL-MCNC: 131 MG/DL (ref 0–200)
CO2 SERPL-SCNC: 22.2 MMOL/L (ref 22–29)
CREAT BLD-MCNC: 0.75 MG/DL (ref 0.76–1.27)
DEPRECATED RDW RBC AUTO: 52.9 FL (ref 37–54)
EOSINOPHIL # BLD AUTO: 0.12 10*3/MM3 (ref 0–0.4)
EOSINOPHIL NFR BLD AUTO: 2.8 % (ref 0.3–6.2)
ERYTHROCYTE [DISTWIDTH] IN BLOOD BY AUTOMATED COUNT: 14.5 % (ref 12.3–15.4)
GFR SERPL CREATININE-BSD FRML MDRD: 99 ML/MIN/1.73
GLUCOSE BLD-MCNC: 102 MG/DL (ref 65–99)
HCT VFR BLD AUTO: 37 % (ref 37.5–51)
HDLC SERPL-MCNC: 45 MG/DL (ref 40–60)
HGB BLD-MCNC: 12.2 G/DL (ref 13–17.7)
IMM GRANULOCYTES # BLD AUTO: 0 10*3/MM3 (ref 0–0.05)
IMM GRANULOCYTES NFR BLD AUTO: 0 % (ref 0–0.5)
LDLC SERPL CALC-MCNC: 75 MG/DL (ref 0–100)
LDLC/HDLC SERPL: 1.67 {RATIO}
LYMPHOCYTES # BLD AUTO: 1.13 10*3/MM3 (ref 0.7–3.1)
LYMPHOCYTES NFR BLD AUTO: 26.5 % (ref 19.6–45.3)
MCH RBC QN AUTO: 32.5 PG (ref 26.6–33)
MCHC RBC AUTO-ENTMCNC: 33 G/DL (ref 31.5–35.7)
MCV RBC AUTO: 98.7 FL (ref 79–97)
MONOCYTES # BLD AUTO: 0.4 10*3/MM3 (ref 0.1–0.9)
MONOCYTES NFR BLD AUTO: 9.4 % (ref 5–12)
NEUTROPHILS # BLD AUTO: 2.59 10*3/MM3 (ref 1.7–7)
NEUTROPHILS NFR BLD AUTO: 60.8 % (ref 42.7–76)
NRBC BLD AUTO-RTO: 0 /100 WBC (ref 0–0.2)
PLATELET # BLD AUTO: 140 10*3/MM3 (ref 140–450)
PMV BLD AUTO: 10.7 FL (ref 6–12)
POTASSIUM BLD-SCNC: 3.7 MMOL/L (ref 3.5–5.2)
RBC # BLD AUTO: 3.75 10*6/MM3 (ref 4.14–5.8)
SODIUM BLD-SCNC: 137 MMOL/L (ref 136–145)
TRIGL SERPL-MCNC: 55 MG/DL (ref 0–150)
VLDLC SERPL-MCNC: 11 MG/DL (ref 5–40)
WBC NRBC COR # BLD: 4.26 10*3/MM3 (ref 3.4–10.8)

## 2019-06-05 PROCEDURE — 93010 ELECTROCARDIOGRAM REPORT: CPT | Performed by: INTERNAL MEDICINE

## 2019-06-05 PROCEDURE — 93005 ELECTROCARDIOGRAM TRACING: CPT | Performed by: INTERNAL MEDICINE

## 2019-06-05 PROCEDURE — 80061 LIPID PANEL: CPT | Performed by: INTERNAL MEDICINE

## 2019-06-05 PROCEDURE — 80048 BASIC METABOLIC PNL TOTAL CA: CPT | Performed by: NURSE PRACTITIONER

## 2019-06-05 PROCEDURE — 99232 SBSQ HOSP IP/OBS MODERATE 35: CPT | Performed by: NURSE PRACTITIONER

## 2019-06-05 PROCEDURE — 85025 COMPLETE CBC W/AUTO DIFF WBC: CPT | Performed by: NURSE PRACTITIONER

## 2019-06-05 RX ORDER — CLOPIDOGREL BISULFATE 75 MG/1
75 TABLET ORAL DAILY
Qty: 30 TABLET | Refills: 11 | Status: SHIPPED | OUTPATIENT
Start: 2019-06-06 | End: 2020-05-26

## 2019-06-05 RX ORDER — LEVOFLOXACIN 750 MG/1
750 TABLET ORAL DAILY
Qty: 3 TABLET | Refills: 0 | Status: SHIPPED | OUTPATIENT
Start: 2019-06-05 | End: 2019-06-08

## 2019-06-05 RX ORDER — LOSARTAN POTASSIUM 50 MG/1
50 TABLET ORAL DAILY
Qty: 30 TABLET | Refills: 5 | Status: SHIPPED | OUTPATIENT
Start: 2019-06-05 | End: 2019-11-26 | Stop reason: SDUPTHER

## 2019-06-05 RX ORDER — METOPROLOL SUCCINATE 25 MG/1
12.5 TABLET, EXTENDED RELEASE ORAL 2 TIMES DAILY
Qty: 30 TABLET | Refills: 11 | Status: SHIPPED | OUTPATIENT
Start: 2019-06-05 | End: 2019-07-12

## 2019-06-05 RX ADMIN — LOSARTAN POTASSIUM 50 MG: 50 TABLET, FILM COATED ORAL at 08:54

## 2019-06-05 RX ADMIN — PRAMIPEXOLE DIHYDROCHLORIDE 0.5 MG: 0.5 TABLET ORAL at 08:55

## 2019-06-05 RX ADMIN — CARBIDOPA AND LEVODOPA 1 TABLET: 25; 250 TABLET ORAL at 05:09

## 2019-06-05 RX ADMIN — CLOPIDOGREL 75 MG: 75 TABLET, FILM COATED ORAL at 08:54

## 2019-06-05 RX ADMIN — ATORVASTATIN CALCIUM 20 MG: 20 TABLET, FILM COATED ORAL at 08:55

## 2019-06-05 RX ADMIN — ASPIRIN 81 MG: 81 TABLET, DELAYED RELEASE ORAL at 08:54

## 2019-06-05 RX ADMIN — METOPROLOL TARTRATE 12.5 MG: 25 TABLET ORAL at 08:54

## 2019-06-05 NOTE — PLAN OF CARE
Problem: Patient Care Overview  Goal: Plan of Care Review  Outcome: Ongoing (interventions implemented as appropriate)   06/05/19 0440   Coping/Psychosocial   Plan of Care Reviewed With patient   Plan of Care Review   Progress improving   OTHER   Outcome Summary Sinus brent in the 50s. -150s. IV Lopressor PRN for SBP >160. Did not have to give during shift. No complaints of pain. Rt radial site c/d/i soft. Will continue to monitor.       Problem: Cardiac Catheterization (Diagnostic/Interventional) (Adult)  Goal: Signs and Symptoms of Listed Potential Problems Will be Absent, Minimized or Managed (Cardiac Catheterization)  Outcome: Ongoing (interventions implemented as appropriate)   06/05/19 0440   Goal/Outcome Evaluation   Problems Assessed (Cardiac Catheterization) all   Problems Present (Cardiac Cath) situational response

## 2019-06-05 NOTE — CONSULTS
"Met with patient and his wife, discussed benefits of cardiac rehab. Provided phase II information packet, which includes; general information about cardiac rehab, [facility] handout and [employer] letter article entitled “Cardiac Rehab is often the Best Medicine for Recovery\", stresses the importance of cardiac rehab after a heart event.   I provided the contact information for cardiac rehab here at [facility]. He has attend their program in the past and felt that it really helped him. He continues to exercise three days a week at the [facility] since graduating from the program. and encouraged him to call when discharged.   Instructed to bring a copy of After Visit Summary to initial assessment.        "

## 2019-06-05 NOTE — PROGRESS NOTES
Hospital Discharge    Patient Name: Casey Snowden Sr.  Age/Sex: 84 y.o. male  : 1934  MRN: [id number]    Encounter Provider: RIDDHI Azevedo  Referring Provider: Johnny Glasgow MD  Place of Service: [facility] CARDIOLOGY  Patient Care Team:  Aleksander Diez MD as PCP - General  Ford Dorado MD as PCP - [facility]  Joseph Acharya MD as Consulting Physician (Ophthalmology)  Ford Dorado MD as Consulting Physician (Vascular Surgery)  Kaye Landa MD as Consulting Physician (Neurology)  Kartik Valadez MD (Dermatology)         Date of Discharge:  2019   Date of Admit: 2019    Discharge Condition: Stable  Discharge Diagnosis:    Coronary artery disease with angina pectoris (CMS/HCC)    NSTEMI (non-ST elevated myocardial infarction) (CMS/[facility])      Hospital Course:   Casey Snowden Sr. is a 84 y.o. male who is followed by Dr. Armas for known coronary artery disease with LAD stenosis that was treated medically, cardiomyopathy, and atrial fibrillation. He presented to [facility] on 6/3 with complaints of light headed and dizziness as well as left sided chest pain as he was working in the yard. He was found to be in atrial fibrillation with RVR, with worse than baseline anterior ST changes. He was rate controlled over night. He was also noted to have pneumonia and was started on IV levaquin. Patient was seen by Dr. Perez on the morning of . He was noted to jave troponin elevation and was transferred to [facility] for cardiac catheterization. The LAD was totally occluded and was treated with PCI and drug eluting stent placement to the LAD (3.0 x 28 mm Xience Lory drug eluting stent). Echocardiogram showed EF 40% with an apical wall motion abnormalities. He has been on carvedilol on the past, this was stopped due to bradycardia. He received metoprolol and tolerated well with HR in the 50's.  At discharge this was changed to metoprolol succinate given cardiomyopathy. He is also on losartan. He will be on triple therapy for one month, then likely drop ASA.   Patient is stable for discharge. He will complete a 5 day course of levaquin. He has an appointment with Dr. Armas next week which he will keep.       Objective:  Temp:  [97.8 °F (36.6 °C)-98.3 °F (36.8 °C)] 97.9 °F (36.6 °C)  Heart Rate:  [51-82] 61  Resp:  [14-18] 16  BP: (128-188)/() 154/63    Intake/Output Summary (Last 24 hours) at 6/5/2019 1124  Last data filed at 6/5/2019 0745  Gross per 24 hour   Intake 1821 ml   Output --   Net 1821 ml     Body mass index is 24.55 kg/m².      06/04/19  1027 06/04/19  1736 06/05/19  0013   Weight: 79.4 kg (175 lb) 79.4 kg (175 lb) 79.8 kg (176 lb)     Weight change:     Physical Exam:  Constitutional: He is oriented to person, place, and time. He appears well-developed. He does not appear ill.   Neck: No JVD present. Carotid bruit is not present.   Cardiovascular: Normal rate, regular rhythm and normal heart sounds.    Pulses:       Posterior tibial pulses are 2+ on the right side, and 2+ on the left side.   Pulmonary/Chest: Effort normal and breath sounds normal.   Abdominal: Soft. Normal appearance and bowel sounds are normal. There is no tenderness.   Musculoskeletal: Normal range of motion.        Right shoulder: He exhibits no deformity.        Left shoulder: He exhibits no deformity.   Neurological: He is alert and oriented to person, place, and time. He has normal strength.   Skin: Skin is warm, dry and intact. No rash noted.   Psychiatric: He has a normal mood and affect. His behavior is normal. Thought content normal.   Vitals reviewed  Right radial cath site. Soft, no hematoma pulses intact.     Procedures Performed  Procedure(s):  Left Heart Cath  Coronary angiography  Left ventriculography  Percutaneous Coronary Intervention    Procedure findings:  Left main: Large caliber vessel that  bifurcates to an LAD and circumflex.  Normal  LAD: Medium caliber vessel that gives rise to a small caliber diagonal branch.  The LAD is occluded in the mid segment.  EREN I flow.  LCX: Medium caliber vessel that gives rise to medium caliber OM1 and OM 2 branch.  The circumflex coronary artery is normal  RCA: Medium caliber, dominant vessel gives rise to a small caliber PDA and RPL branch.  The RCA has a diffuse 30% to 40% stenosis in the mid segment     Left ventricular angiography: Normal left ventricular size.  Moderately reduced left ventricular systolic function.  Ejection fraction 35%.  Mid to distal anterolateral wall akinesis.  Apical dyskinesis.     Percutaneous intervention report: Unfractionated heparin was used for anticoagulation and confirmed therapeutic by ACT.  A 6 Italian XB 3.5 guide catheter was used to engage left main.  A Fielder XT and Corsair catheter were advanced in tandem.  The Fielder XT was used to cross the mid vessel occlusion and seated distally.  A 2.5 x 15 mm trek balloon was used as a trapping balloon to remove the Corsair catheter.  The 2.5 x 15 mm trek balloon was used to predilate the LAD.  Following that a 3.0 x 20 mm noncompliant trek balloon was used to the mid LAD.  A 3.0 x 28 mm Xience Lory drug-eluting stent was then deployed across the mid LAD stenosis at 16 yaakov.  This was postdilated with a 3.25 x 20 mm noncompliant trek balloon to 20 yaakov.  Completion angiography showed EREN-3 flow and no complications.  Wire and balloon were removed without difficulty     Hemodynamics:   LV: 150/8  AO: 150/64        PCI CORONARY SEGMENT: Mid LAD  PRE-STENOSIS: 100  POST-STENOSIS: 0%  LESION TYPE: c  EREN FLOW PRE/POST: 1/3  CULPRIT LESION: Yes     Estimated blood loss: Minimal  Complications: None     Conclusions:   1. Left main: Normal  2. LAD: Occluded mid segment.  EREN I flow  3. LCX:Normal  4. RCA: 30 to 40% mid vessel stenosis  5.  Normal left ventricular size.  Moderately reduced  systolic function.  Ejection fraction 35%.  Mid to distal anterolateral akinesis.  Apical dyskinesis  6.  PCI of the mid LAD with a 3.0 x 28 mm Xience Lory drug-eluting stent, postdilated to high pressure with a 3.25 mm noncompliant trek balloon     Echo 6/4/19  · The left ventricular cavity is mildly dilated.  · Left ventricular wall thickness is consistent with mild concentric hypertrophy.  · There is mild calcification of the aortic valve.  · Estimated EF = 40%.  · The following left ventricular wall segments are akinetic: apical anterior, apical lateral, apical inferior, apical septal and apex.  · Left ventricular diastolic dysfunction (grade II) consistent with pseudonormalization.  · Left atrial cavity size is moderately dilated.  · Mild tricuspid valve regurgitation is present.  · Calculated right ventricular systolic pressure from tricuspid regurgitation is 41 mmHg.  · Mild pulmonic valve regurgitation is present.       Consults:  Consults     Date and Time Order Name Status Description    6/4/2019 1022 Inpatient Cardiology Consult            Pertinent Test Results:  Results from last 7 days   Lab Units 06/05/19 0323 06/04/19  0559 06/03/19  1253   SODIUM mmol/L 137 140 142   POTASSIUM mmol/L 3.7 4.1 3.9   CHLORIDE mmol/L 105 106 105   CO2 mmol/L 22.2 23.7 24.5   BUN mg/dL 16 18 23   CREATININE mg/dL 0.75* 0.82 0.95   GLUCOSE mg/dL 102* 111* 140*   CALCIUM mg/dL 7.8* 8.3* 8.7   AST (SGOT) U/L  --   --  15   ALT (SGPT) U/L  --   --  5     Results from last 7 days   Lab Units 06/04/19  0559 06/04/19  0004 06/03/19  1908 06/03/19  1253   TROPONIN T ng/mL 0.911* 0.875* 0.420* <0.010     Results from last 7 days   Lab Units 06/05/19  0323 06/04/19  0559 06/03/19  1253   WBC 10*3/mm3 4.26 5.38 6.42   HEMOGLOBIN g/dL 12.2* 12.9* 13.4   HEMATOCRIT % 37.0* 38.3 40.4   PLATELETS 10*3/mm3 140 140 135*     Results from last 7 days   Lab Units 06/03/19  1253   INR  1.14*   APTT seconds 33.2         Results from last 7  days   Lab Units 06/05/19  0323 06/04/19  0559   CHOLESTEROL mg/dL 131 128   TRIGLYCERIDES mg/dL 55 65   HDL CHOL mg/dL 45 50     Results from last 7 days   Lab Units 06/03/19  1253   PROBNP pg/mL 2,646.0*     Results from last 7 days   Lab Units 06/03/19  1253   TSH mIU/mL 6.010*       Discharge Medications     Discharge Medications      New Medications      Instructions Start Date   clopidogrel 75 MG tablet  Commonly known as:  PLAVIX   75 mg, Oral, Daily   Start Date:  6/6/2019     metoprolol succinate XL 25 MG 24 hr tablet  Commonly known as:  TOPROL-XL   12.5 mg, Oral, 2 Times Daily         Changes to Medications      Instructions Start Date   losartan 50 MG tablet  Commonly known as:  COZAAR  What changed:  medication strength   50 mg, Oral, Daily         Continue These Medications      Instructions Start Date   aspirin 81 MG EC tablet   81 mg, Oral, Daily      atorvastatin 20 MG tablet  Commonly known as:  LIPITOR   20 mg, Oral, Daily      atropine 1 % ophthalmic solution   1 drop, 3 Times Daily PRN, For drooling      carbidopa-levodopa  MG per tablet  Commonly known as:  SINEMET   1 tablet, 3 Times Daily      carboxymethylcellulose 0.5 % solution  Commonly known as:  REFRESH PLUS   1 drop, 3 Times Daily PRN      ELIQUIS 2.5 MG tablet tablet  Generic drug:  apixaban   2.5 mg, Every 12 Hours Scheduled      pramipexole 0.5 MG tablet  Commonly known as:  MIRAPEX   0.5 mg, Oral, 3 Times Daily      PRESERVISION AREDS capsule   1 tablet, Oral, 2 Times Daily             Discharge Diet:    Dietary Orders (From admission, onward)    Start     Ordered    06/04/19 1533  Diet Regular; Consistent Carbohydrate, Cardiac  Diet Effective Now     Question Answer Comment   Diet Texture / Consistency Regular    Common Modifiers Consistent Carbohydrate    Common Modifiers Cardiac        06/04/19 1532          Activity at Discharge:    Activity Instructions     Gradually Increase Activity Until at Pre-Hospitalization Level       Routine post cardiac catheterization/PCI discharge home care instructions:    1. No submerging procedure site below water for 7-10 days.  2. No lifting objects greater than 1 lbs for 3 days.  3. If groin site used, avoid climbing several flights of stairs or sitting for longer than 2 hours at a time for the next 24 hours.   4. Monitor puncture site for bleeding and/or knots;. If bleeding should occur at the groin site: lie flat, apply pressure and return to the ER. If bleeding should occur at the wrist site, apply pressure and return to the ER.  5.  You may apply a DRY Band-Aid over the puncture site if needed. Do not apply any lotions, salves or ointments to site.  6. No driving for 3 days.  7. Return to ER for recurrent symptoms.  8. No smoking.  9. Take all medications as prescribed.           Discharge disposition: home     Discharge Instructions and Follow ups:  Future Appointments   Date Time Provider Department Center   6/11/2019  1:00 PM Aleksander Diez MD [facility] None   6/12/2019 10:45 AM Cecilio Armas III, MD [facility] None     Additional Instructions for the Follow-ups that You Need to Schedule     Discharge Follow-up with Specified Provider: Keep appointment with Dr. Armas.   As directed      To:  Keep appointment with Dr. Armas.           Follow-up Information     Johnny Glasgow MD Follow up.    Specialty:  Cardiology  Contact information:  [address] [address] [phone]             Aleksander Diez MD Follow up.    Specialty:  Family Medicine  Contact information:  [address] [facility] [address] [phone]                   Test Results Pending at Discharge: none     RIDDHI Azevedo  06/05/19  11:24 AM

## 2019-06-05 NOTE — PROGRESS NOTES
Case Management Discharge Note    Final Note: Pt d/c home.      Destination      No service has been selected for the patient.      Durable Medical Equipment      No service has been selected for the patient.      Dialysis/Infusion      No service has been selected for the patient.      Home Medical Care      No service has been selected for the patient.      Therapy      No service has been selected for the patient.      Community Resources      No service has been selected for the patient.        Transportation Services  Private: Car    Final Discharge Disposition Code: 01 - home or self-care

## 2019-06-06 ENCOUNTER — READMISSION MANAGEMENT (OUTPATIENT)
Dept: CALL CENTER | Facility: HOSPITAL | Age: 84
End: 2019-06-06

## 2019-06-06 ENCOUNTER — TELEPHONE (OUTPATIENT)
Dept: CARDIOLOGY | Facility: CLINIC | Age: 84
End: 2019-06-06

## 2019-06-06 NOTE — OUTREACH NOTE
Prep Survey      Responses   Facility patient discharged from?  [facility]   Is patient eligible?  Yes   Discharge diagnosis  NSTEMI     Does the patient have one of the following disease processes/diagnoses(primary or secondary)?  Acute MI (STEMI,NSTEMI)   Does the patient have Home health ordered?  No   Is there a DME ordered?  No   Prep survey completed?  Yes          Jana Avila RN

## 2019-06-06 NOTE — TELEPHONE ENCOUNTER
Pt's wife called stating pt's discharge paperwork stated he should discontinue all vitamin A supplement due to starting plavix and metoprolol. She is wanting to know if pt needs to stop preservision?    Please adivse

## 2019-06-07 ENCOUNTER — READMISSION MANAGEMENT (OUTPATIENT)
Dept: CALL CENTER | Facility: HOSPITAL | Age: 84
End: 2019-06-07

## 2019-06-07 NOTE — OUTREACH NOTE
AMI Week 1 Survey      Responses   Facility patient discharged from?  [facility]   Does the patient have one of the following disease processes/diagnoses(primary or secondary)?  Acute MI (STEMI,NSTEMI)   Is there a successful TCM telephone encounter documented?  No   Week 1 attempt successful?  Yes   Call start time  1217   Call end time  1219   Discharge diagnosis  NSTEMI     Is patient permission given to speak with other caregiver?  Yes   List who call center can speak with  Evonne- Wife   Meds reviewed with patient/caregiver?  Yes   Is the patient having any side effects they believe may be caused by any medication additions or changes?  No   Does the patient have all prescriptions related to this admission filled (includes statins,anticoagulants,HTN meds,anti-arrhythmia meds)  Yes   Is the patient taking all medications as directed (includes completed medication regime)?  Yes   Does the patient have a primary care provider?   Yes   Does the patient have an appointment with their PCP,cardiologist,or clinic within 7 days of discharge?  Yes   Has the patient kept scheduled appointments due by today?  N/A   Psychosocial issues?  No   Did the patient receive a copy of their discharge instructions?  Yes   Nursing interventions  Reviewed instructions with patient   What is the patient's perception of their health status since discharge?  Improving   Nursing interventions  Nurse provided patient education   Is the patient/caregiver able to teach back signs and symptoms of when to call for help immediately:  Sudden chest discomfort, Sudden discomfort in arms, back, neck or jaw, Shortness of breath at any time, Sudden sweating or clammy skin, Nausea or vomiting, Dizziness or lightheadedness, Irregular or rapid heart rate   Nursing interventions  Nurse provided patient education   Is the patient/caregiver able to teach back lifestyle changes to help prevent MIs  Regular exercise as approved by provider, Heart healthy diet    Is the patient/caregiver able to teach back ways to prevent a second heart attack:  Take medications, Follow up with MD   Is the patient/caregiver able to teach back the hierarchy of who to call/visit for symptoms/problems? PCP, Specialist, Home health nurse, Urgent Care, ED, 911  Yes   Week 1 call completed?  Yes          Marely Reeves RN

## 2019-06-11 ENCOUNTER — OFFICE VISIT (OUTPATIENT)
Dept: FAMILY MEDICINE CLINIC | Facility: CLINIC | Age: 84
End: 2019-06-11

## 2019-06-11 VITALS
OXYGEN SATURATION: 97 % | HEIGHT: 71 IN | DIASTOLIC BLOOD PRESSURE: 64 MMHG | BODY MASS INDEX: 24.64 KG/M2 | WEIGHT: 176 LBS | HEART RATE: 48 BPM | SYSTOLIC BLOOD PRESSURE: 126 MMHG

## 2019-06-11 DIAGNOSIS — I21.02 ST ELEVATION MYOCARDIAL INFARCTION INVOLVING LEFT ANTERIOR DESCENDING (LAD) CORONARY ARTERY (HCC): ICD-10-CM

## 2019-06-11 DIAGNOSIS — Z09 HOSPITAL DISCHARGE FOLLOW-UP: ICD-10-CM

## 2019-06-11 DIAGNOSIS — I48.91 ATRIAL FIBRILLATION WITH RVR (HCC): Primary | ICD-10-CM

## 2019-06-11 PROBLEM — L03.115 CELLULITIS OF RIGHT LOWER EXTREMITY WITHOUT FOOT: Status: RESOLVED | Noted: 2017-07-10 | Resolved: 2019-06-11

## 2019-06-11 PROCEDURE — 99213 OFFICE O/P EST LOW 20 MIN: CPT | Performed by: FAMILY MEDICINE

## 2019-06-11 NOTE — PROGRESS NOTES
Subjective   Casey Snowden Sr. is a 84 y.o. male who is here for   Chief Complaint   Patient presents with   • Atrial Fibrillation     Hospital Follow Up  Heart CATH AND STENT   .     History of Present Illness   Chest Pain: Patient complains of chest pain. Onset was 5 days ago, with resolved course since that time. The patient describes the pain as intermittent, vague in nature, does not radiate. Patient rates pain as a 1/10 in intensity.  Associated symptoms are fatigue, near-syncope and palpitations. Aggravating factors are none.  Alleviating factors are: none. Patient's cardiac risk factors are advanced age (older than 55 for men, 65 for women), dyslipidemia, hypertension and male gender.  Patient's risk factors for DVT/PE: none. Previous cardiac testing: s/p card cath: due to STEMI adn 100 % occluded LAD. .  His wife called me last Saturday night as oseas was lightheaded and working in the garden  Had no pain nor fever  We assumed is was his usual autonomic hypotension from his Parkinsons.  But this worsened and Evonne smartly took him to [facility]  We reviewed all his hospital records  Found to be in Afib with RVR, elevated ST seg and troponin's  Taken to [facility] for cath        The following portions of the patient's history were reviewed and updated as appropriate: allergies, current medications, past family history, past medical history, past social history, past surgical history and problem list.    Review of Systems    Objective   Physical Exam   Constitutional: He appears well-developed and well-nourished.   Cardiovascular: Normal rate.   Pulmonary/Chest: Effort normal.   Neurological: He is alert.   Nursing note and vitals reviewed.  wrist healed nicely      Assessment/Plan   Casey was seen today for atrial fibrillation.    Diagnoses and all orders for this visit:    Atrial fibrillation with RVR (CMS/[facility])    Hospital discharge follow-up    ST elevation myocardial infarction involving left anterior  descending (LAD) coronary artery (CMS/[facility])    continue all cardiac exertion restrictions for the time being  No driving no working with power tools and no garden or mowers.    I anticipate we will have to reduce his bp meds soon due to hypotension    He sees Dr soria tomorrow      There are no Patient Instructions on file for this visit.    There are no discontinued medications.     Return in about 3 months (around 9/11/2019) for blood pressure.    Dr. Aleksander Diez  [facility]  [place], [place]

## 2019-06-12 ENCOUNTER — OFFICE VISIT (OUTPATIENT)
Dept: CARDIOLOGY | Facility: CLINIC | Age: 84
End: 2019-06-12

## 2019-06-12 VITALS
BODY MASS INDEX: 25.2 KG/M2 | SYSTOLIC BLOOD PRESSURE: 150 MMHG | HEIGHT: 71 IN | HEART RATE: 43 BPM | DIASTOLIC BLOOD PRESSURE: 70 MMHG | WEIGHT: 180 LBS

## 2019-06-12 DIAGNOSIS — I25.10 CORONARY ARTERY DISEASE INVOLVING NATIVE CORONARY ARTERY OF NATIVE HEART WITHOUT ANGINA PECTORIS: Chronic | ICD-10-CM

## 2019-06-12 DIAGNOSIS — Z79.01 ANTICOAGULANT LONG-TERM USE: Chronic | ICD-10-CM

## 2019-06-12 DIAGNOSIS — I48.0 PAROXYSMAL A-FIB (HCC): Chronic | ICD-10-CM

## 2019-06-12 DIAGNOSIS — I50.32 CHRONIC DIASTOLIC CONGESTIVE HEART FAILURE (HCC): Chronic | ICD-10-CM

## 2019-06-12 DIAGNOSIS — I21.02 ST ELEVATION MYOCARDIAL INFARCTION INVOLVING LEFT ANTERIOR DESCENDING (LAD) CORONARY ARTERY (HCC): Primary | ICD-10-CM

## 2019-06-12 PROCEDURE — 99214 OFFICE O/P EST MOD 30 MIN: CPT | Performed by: INTERNAL MEDICINE

## 2019-06-12 NOTE — PROGRESS NOTES
Subjective:     Encounter Date:6/12/2019      Patient ID: Casey Snowden Sr. is a 84 y.o. male.    Chief Complaint:PAF, CAD  History of Present Illness    Dear Dr. Diez,    I had the pleasure of seeing this patient in the office today for f/u.      He comes in to follow-up with his history of CAD.      He presented to [facility] on 6/3 with complaints of light headed and dizziness as well as left sided chest pain as he was working in the yard. He was found to be in atrial fibrillation with RVR, with worse than baseline anterior ST changes. He was rate controlled over night. He was also noted to have pneumonia and was started on IV levaquin. Patient was seen by Dr. Perez on the morning of 6/4. He was noted to jave troponin elevation and was transferred to [facility] for cardiac catheterization. The LAD was totally occluded and was treated with PCI and drug eluting stent placement to the LAD (3.0 x 28 mm Xience Lory drug eluting stent). Echocardiogram showed EF 40% with an apical wall motion abnormalities. He has been on carvedilol on the past, this was stopped due to bradycardia. He received metoprolol and tolerated well with HR in the 50's. At discharge this was changed to metoprolol succinate given cardiomyopathy. He is also on losartan.     Cath 6/2019  Hemodynamics:   LV: 150/8  AO: 150/64        PCI CORONARY SEGMENT: Mid LAD  PRE-STENOSIS: 100  POST-STENOSIS: 0%  LESION TYPE: c  EREN FLOW PRE/POST: 1/3  CULPRIT LESION: Yes     Estimated blood loss: Minimal  Complications: None     Conclusions:   1. Left main: Normal  2. LAD: Occluded mid segment.  EREN I flow  3. LCX:Normal  4. RCA: 30 to 40% mid vessel stenosis  5.  Normal left ventricular size.  Moderately reduced systolic function.  Ejection fraction 35%.  Mid to distal anterolateral akinesis.  Apical dyskinesis  6.  PCI of the mid LAD with a 3.0 x 28 mm Xience Lory drug-eluting stent, postdilated to high pressure with a  3.25 mm noncompliant trek balloon     Echo 6/2019  Interpretation Summary     · The left ventricular cavity is mildly dilated.  · Left ventricular wall thickness is consistent with mild concentric hypertrophy.  · There is mild calcification of the aortic valve.  · Estimated EF = 40%.  · The following left ventricular wall segments are akinetic: apical anterior, apical lateral, apical inferior, apical septal and apex.  · Left ventricular diastolic dysfunction (grade II) consistent with pseudonormalization.  · Left atrial cavity size is moderately dilated.  · Mild tricuspid valve regurgitation is present.  · Calculated right ventricular systolic pressure from tricuspid regurgitation is 41 mmHg.  · Mild pulmonic valve regurgitation is present.          He has a history of paroxysmal atrial fibrillation and has previously followed with Dr. Mercado.  He has been maintained on chronic anticoagulation.  He apparently never required rate control medicine due to baseline bradycardia.      The following portions of the patient's history were reviewed and updated as appropriate: allergies, current medications, past family history, past medical history, past social history, past surgical history and problem list.    Past Medical History:   Diagnosis Date   • Atrial fibrillation (CMS/HCC)    • Cancer (CMS/[facility]) 04/2018    basil cell carcinoma   • Carotid stenosis    • CHF (congestive heart failure) (CMS/HCC)    • Chronic deep vein thrombosis (DVT) of popliteal vein of right lower extremity (CMS/HCC)    • Coronary artery disease involving native coronary artery of native heart without angina pectoris 12/20/2018   • DVT of lower extremity (deep venous thrombosis) (CMS/HCC)    • Elevated cholesterol    • Hard of hearing    • Hx of blood clots    • Hyperlipidemia    • Hypertension    • Long-term use of high-risk medication    • Parkinson's disease (CMS/HCC)    • Postphlebitic syndrome        Past Surgical History:   Procedure  Laterality Date   • BASAL CELL CARCINOMA EXCISION  04/2018   • CARDIAC CATHETERIZATION N/A 11/5/2018    Procedure: Left Heart Cath;  Surgeon: Oliverio Azar MD;  Location: [facility] CATH INVASIVE LOCATION;  Service: Cardiovascular   • CARDIAC CATHETERIZATION N/A 11/5/2018    Procedure: Coronary angiography;  Surgeon: Oliverio Azar MD;  Location:  [facility] CATH INVASIVE LOCATION;  Service: Cardiovascular   • CARDIAC CATHETERIZATION N/A 11/5/2018    Procedure: Left ventriculography;  Surgeon: Oliverio Azar MD;  Location: [facility] CATH INVASIVE LOCATION;  Service: Cardiovascular   • CARDIAC CATHETERIZATION N/A 6/4/2019    Procedure: Left Heart Cath;  Surgeon: Johnny Glasgow MD;  Location: [facility] CATH INVASIVE LOCATION;  Service: Cardiovascular   • CARDIAC CATHETERIZATION N/A 6/4/2019    Procedure: Coronary angiography;  Surgeon: Johnny Glasgow MD;  Location: [facility] CATH INVASIVE LOCATION;  Service: Cardiovascular   • CARDIAC CATHETERIZATION N/A 6/4/2019    Procedure: Left ventriculography;  Surgeon: Johnny Glasgow MD;  Location: [facility] CATH INVASIVE LOCATION;  Service: Cardiovascular   • CARDIAC CATHETERIZATION N/A 6/4/2019    Procedure: Stent BILL coronary;  Surgeon: Johnny Glasgow MD;  Location: [facility] CATH INVASIVE LOCATION;  Service: Cardiovascular   • CARDIAC SURGERY     • CATARACT EXTRACTION Left 08/2018   • CATARACT EXTRACTION Right 09/2018   • COLONOSCOPY      2011   • INCISION AND DRAINAGE LEG Right 7/7/2017    Procedure: INCISION AND DRAINAGE INFECTED HEMATOMA RIGHT THIGH;  Surgeon: Valentin Peña MD;  Location: [facility] OR;  Service:    • JOINT REPLACEMENT     • KNEE INCISION AND DRAINAGE Right 12/27/2016    Procedure: KNEE INCISION AND DRAINAGE;  Surgeon: Triston Whitten MD;  Location: [facility] OR;  Service:    • NOSE SURGERY      nose replacement   • SKIN BIOPSY     • SKIN GRAFT  12/2017   • TOTAL KNEE ARTHROPLASTY Right    • VASCULAR SURGERY     • VENA CAVA  "FILTER PLACEMENT         Social History     Socioeconomic History   • Marital status:      Spouse name: Not on file   • Number of children: Not on file   • Years of education: college grad   • Highest education level: Not on file   Occupational History   • Occupation: financial advis     Comment: self employed   Tobacco Use   • Smoking status: Never Smoker   • Smokeless tobacco: Never Used   • Tobacco comment: caff use   Substance and Sexual Activity   • Alcohol use: No     Frequency: Never     Comment: none for a month   • Drug use: No   • Sexual activity: Yes     Partners: Female       Review of Systems   Constitution: Positive for malaise/fatigue. Negative for chills, decreased appetite, fever and night sweats.   HENT: Negative for ear discharge, ear pain, hearing loss, nosebleeds and sore throat.    Eyes: Negative for blurred vision, double vision and pain.   Cardiovascular: Negative for cyanosis.   Respiratory: Negative for hemoptysis and sputum production.    Endocrine: Negative for cold intolerance and heat intolerance.   Hematologic/Lymphatic: Negative for adenopathy.   Skin: Negative for dry skin, itching, nail changes, rash and suspicious lesions.   Musculoskeletal: Negative for arthritis, gout, muscle cramps, muscle weakness, myalgias and neck pain.   Gastrointestinal: Negative for anorexia, bowel incontinence, constipation, diarrhea, dysphagia, hematemesis and jaundice.   Genitourinary: Negative for bladder incontinence, dysuria, flank pain, frequency, hematuria and nocturia.   Neurological: Negative for focal weakness, numbness, paresthesias and seizures.   Psychiatric/Behavioral: Negative for altered mental status, hallucinations, hypervigilance, suicidal ideas and thoughts of violence.   Allergic/Immunologic: Negative for persistent infections.       Procedures       Objective:     Vitals:    06/12/19 1043   BP: 150/70   Pulse: (!) 43   Weight: 81.6 kg (180 lb)   Height: 180.3 cm (71\")         " Physical Exam   Constitutional: He is oriented to person, place, and time. He appears well-developed and well-nourished. No distress.   HENT:   Head: Normocephalic and atraumatic.   Nose: Nose normal.   Mouth/Throat: Oropharynx is clear and moist.   Eyes: Conjunctivae and EOM are normal. Pupils are equal, round, and reactive to light. Right eye exhibits no discharge. Left eye exhibits no discharge.   Neck: Normal range of motion. Neck supple. No tracheal deviation present. No thyromegaly present.   Cardiovascular: Normal rate, regular rhythm, S1 normal, S2 normal, normal heart sounds and normal pulses. Exam reveals no S3.   Pulmonary/Chest: Effort normal and breath sounds normal. No stridor. No respiratory distress. He exhibits no tenderness.   Abdominal: Soft. Bowel sounds are normal. He exhibits no distension and no mass. There is no tenderness. There is no rebound and no guarding.   Musculoskeletal: Normal range of motion. He exhibits edema. He exhibits no tenderness or deformity.   Lymphadenopathy:     He has no cervical adenopathy.   Neurological: He is alert and oriented to person, place, and time. He has normal reflexes.   Skin: Skin is warm and dry. No rash noted. He is not diaphoretic. No erythema.   Psychiatric: He has a normal mood and affect. Thought content normal.       Lab Review:             Performed        Assessment:          Diagnosis Plan   1. ST elevation myocardial infarction involving left anterior descending (LAD) coronary artery (CMS/HCC)     2. Coronary artery disease involving native coronary artery of native heart without angina pectoris     3. Chronic diastolic (congestive) heart failure     4. Paroxysmal A-fib (CMS/HCC)     5. Anticoagulant long-term use            Plan:       1. Atrial Fibrillation and Atrial Flutter  Assessment  • The patient has paroxysmal atrial fibrillation  • The patient's CHADS2-VASc score is 3  • A [id number] score of 2 or more is considered a high risk for a  thromboembolic event    Plan  • Continue apixaban for antithrombotic therapy, bleeding issues discussed    2. Heart Failure  Assessment  • Beta blocker not prescribed for patient reasons  • Diuretics prescribed  • Angiotensin receptor blocker (ARB) prescribed    Plan  • The patient has received heart failure education on the following topics: medication instructions, symptom management and weight monitoring  • The heart failure care plan was discussed with the patient today including: up-titrating HF medications    Subjective/Objective  • Physical exam findings positive for peripheral edema.   • Physical exam findings negative for S3 gallop.    3.  Hhistory of chronic DVT; Patient remains on chronic anticoagulation and follows with Dr. Dorado. Patient has an IVC filter in place  4.  Coronary Artery Disease  Assessment  • The patient has no angina    Plan  • Lifestyle modifications discussed include adhering to a heart healthy diet, avoidance of tobacco products, maintenance of a healthy weight, medication compliance, regular exercise and regular monitoring of cholesterol and blood pressure  • Today we will continue his current medical therapy.  Heart rate is somewhat slower, so we may well be adjusting his medical regimen.  He remains on triple therapy with aspirin, Plavix, and Eliquis; we will see him back in 3 weeks and anticipate we will discontinue the aspirin at that time.    Subjective - Objective  • Current antiplatelet therapy includes aspirin 81 mg      5.  Essential hypertension-      Thank you very much for allowing us to participate in the care of this pleasant patient.  Please don't hesitate to call if I can be of assistance in any way.      Current Outpatient Medications:   •  aspirin 81 MG EC tablet, Take 81 mg by mouth Daily., Disp: , Rfl:   •  atorvastatin (LIPITOR) 20 MG tablet, TAKE 1 TABLET BY MOUTH  DAILY, Disp: 90 tablet, Rfl: 3  •  atropine 1 % ophthalmic solution, 1 drop 3 (Three) Times a Day  As Needed (as needed orally for increased secretions). For drooling, Disp: , Rfl:   •  carbidopa-levodopa (SINEMET)  MG per tablet, 1 tablet 3 (Three) Times a Day., Disp: , Rfl:   •  carboxymethylcellulose (REFRESH PLUS) 0.5 % solution, 1 drop 3 (Three) Times a Day As Needed (eye)., Disp: , Rfl:   •  clopidogrel (PLAVIX) 75 MG tablet, Take 1 tablet by mouth Daily., Disp: 30 tablet, Rfl: 11  •  ELIQUIS 2.5 MG tablet tablet, 2.5 mg Every 12 (Twelve) Hours., Disp: , Rfl:   •  losartan (COZAAR) 50 MG tablet, Take 1 tablet by mouth Daily., Disp: 30 tablet, Rfl: 5  •  metoprolol succinate XL (TOPROL-XL) 25 MG 24 hr tablet, Take 0.5 tablets by mouth 2 (Two) Times a Day., Disp: 30 tablet, Rfl: 11  •  pramipexole (MIRAPEX) 0.5 MG tablet, Take 0.5 mg by mouth 3 (Three) Times a Day., Disp: , Rfl:   •  Multiple Vitamins-Minerals (PRESERVISION AREDS) capsule, Take 1 tablet by mouth 2 (Two) Times a Day., Disp: , Rfl:

## 2019-06-14 ENCOUNTER — READMISSION MANAGEMENT (OUTPATIENT)
Dept: CALL CENTER | Facility: HOSPITAL | Age: 84
End: 2019-06-14

## 2019-06-14 NOTE — OUTREACH NOTE
AMI Week 2 Survey      Responses   Facility patient discharged from?  [place]   Does the patient have one of the following disease processes/diagnoses(primary or secondary)?  Acute MI (STEMI,NSTEMI)   Week 2 attempt successful?  Yes   Call start time  1505   Call end time  1512   Discharge diagnosis  NSTEMI     Is patient permission given to speak with other caregiver?  Yes   List who call center can speak with  Evonne- Wife   Meds reviewed with patient/caregiver?  Yes   Is the patient having any side effects they believe may be caused by any medication additions or changes?  No   Does the patient have all prescriptions related to this admission filled (includes statins,anticoagulants,HTN meds,anti-arrhythmia meds)  Yes   Is the patient taking all medications as directed (includes completed medication regime)?  Yes   Does the patient have a primary care provider?   Yes   Does the patient have an appointment with their PCP,cardiologist,or clinic within 7 days of discharge?  Yes   Has the patient kept scheduled appointments due by today?  Yes   Has home health visited the patient within 72 hours of discharge?  N/A   Psychosocial issues?  No   Did the patient receive a copy of their discharge instructions?  Yes   Nursing interventions  Reviewed instructions with patient   What is the patient's perception of their health status since discharge?  Improving   Nursing interventions  Nurse provided patient education   Is the patient/caregiver able to teach back signs and symptoms of when to call for help immediately:  Sudden chest discomfort, Sudden discomfort in arms, back, neck or jaw, Shortness of breath at any time, Sudden sweating or clammy skin, Nausea or vomiting, Dizziness or lightheadedness, Irregular or rapid heart rate   Nursing interventions  Nurse provided patient education   Is the pateint /caregiver able to teach back the importance of cardiac rehab?  Yes   Nursing interventions  Provided education on  importance of cardiac rehab   Is the patient/caregiver able to teach back lifestyle changes to help prevent MIs  Regular exercise as approved by provider, Heart healthy diet, Maintaining a healthy weight, Reducing stress   Is the patient/caregiver able to teach back ways to prevent a second heart attack:  Take medications, Follow up with MD, Participate in Cardiac Rehab   Is the patient/caregiver able to teach back the hierarchy of who to call/visit for symptoms/problems? PCP, Specialist, Home health nurse, Urgent Care, ED, 911  Yes   Additional teach back comments  Wrist has healed. He is out working in his garden today.  He is doing well.   Week 2 call completed?  Yes          Jennifer Cheung RN

## 2019-06-21 ENCOUNTER — READMISSION MANAGEMENT (OUTPATIENT)
Dept: CALL CENTER | Facility: HOSPITAL | Age: 84
End: 2019-06-21

## 2019-06-21 NOTE — OUTREACH NOTE
AMI Week 3 Survey      Responses   Facility patient discharged from?  [facility]   Does the patient have one of the following disease processes/diagnoses(primary or secondary)?  Acute MI (STEMI,NSTEMI)   Week 3 attempt successful?  Yes   Call start time  1227   Call end time  1230   Discharge diagnosis  NSTEMI     Is patient permission given to speak with other caregiver?  Yes   List who call center can speak with  Evonne- Wife   Person spoke with today (if not patient) and relationship  Evonne- Wife   Meds reviewed with patient/caregiver?  Yes   Is the patient having any side effects they believe may be caused by any medication additions or changes?  No   Does the patient have all prescriptions related to this admission filled (includes statins,anticoagulants,HTN meds,anti-arrhythmia meds)  Yes   Is the patient taking all medications as directed (includes completed medication regime)?  Yes   Does the patient have a primary care provider?   Yes   Does the patient have an appointment with their PCP,cardiologist,or clinic within 7 days of discharge?  Yes   Has the patient kept scheduled appointments due by today?  Yes   Psychosocial issues?  No   Did the patient receive a copy of their discharge instructions?  Yes   Nursing interventions  Reviewed instructions with patient, Educated on MyChart   What is the patient's perception of their health status since discharge?  Improving   Nursing interventions  Nurse provided patient education   Is the patient/caregiver able to teach back signs and symptoms of when to call for help immediately:  Sudden chest discomfort, Sudden discomfort in arms, back, neck or jaw, Shortness of breath at any time, Sudden sweating or clammy skin, Nausea or vomiting, Dizziness or lightheadedness, Irregular or rapid heart rate   Nursing interventions  Nurse provided patient education   Is the pateint /caregiver able to teach back the importance of cardiac rehab?  Yes   Nursing interventions   Provided education on importance of cardiac rehab   Is the patient/caregiver able to teach back lifestyle changes to help prevent MIs  Heart healthy diet, Regular exercise as approved by provider, Maintaining a healthy weight, Reducing stress   Is the patient/caregiver able to teach back ways to prevent a second heart attack:  Take medications, Follow up with MD, Manage risk factors   Is the patient/caregiver able to teach back the hierarchy of who to call/visit for symptoms/problems? PCP, Specialist, Home health nurse, Urgent Care, ED, 911  Yes   Week 3 call completed?  Yes   Revoked  No further contact(revokes)-requires comment   Graduated/Revoked comments  Leaving for a 2 week vacation. They will call with questions.           Marely Reeves RN

## 2019-07-10 ENCOUNTER — OFFICE VISIT (OUTPATIENT)
Dept: CARDIOLOGY | Facility: CLINIC | Age: 84
End: 2019-07-10

## 2019-07-10 VITALS
DIASTOLIC BLOOD PRESSURE: 62 MMHG | SYSTOLIC BLOOD PRESSURE: 142 MMHG | WEIGHT: 181 LBS | HEIGHT: 71 IN | HEART RATE: 49 BPM | BODY MASS INDEX: 25.34 KG/M2

## 2019-07-10 DIAGNOSIS — I50.32 CHRONIC DIASTOLIC CONGESTIVE HEART FAILURE (HCC): Chronic | ICD-10-CM

## 2019-07-10 DIAGNOSIS — Z95.828 PRESENCE OF IVC FILTER: Chronic | ICD-10-CM

## 2019-07-10 DIAGNOSIS — I21.02 ST ELEVATION MYOCARDIAL INFARCTION INVOLVING LEFT ANTERIOR DESCENDING (LAD) CORONARY ARTERY (HCC): ICD-10-CM

## 2019-07-10 DIAGNOSIS — I10 BENIGN ESSENTIAL HYPERTENSION: ICD-10-CM

## 2019-07-10 DIAGNOSIS — Z79.01 ANTICOAGULANT LONG-TERM USE: Chronic | ICD-10-CM

## 2019-07-10 DIAGNOSIS — I25.10 CORONARY ARTERY DISEASE INVOLVING NATIVE CORONARY ARTERY OF NATIVE HEART WITHOUT ANGINA PECTORIS: Primary | Chronic | ICD-10-CM

## 2019-07-10 DIAGNOSIS — I48.0 PAROXYSMAL A-FIB (HCC): Chronic | ICD-10-CM

## 2019-07-10 PROBLEM — I48.91 ATRIAL FIBRILLATION WITH RVR: Status: RESOLVED | Noted: 2019-06-03 | Resolved: 2019-07-10

## 2019-07-10 PROBLEM — R00.1 BRADYCARDIA WITH 31-40 BEATS PER MINUTE: Status: RESOLVED | Noted: 2018-12-03 | Resolved: 2019-07-10

## 2019-07-10 PROBLEM — I25.119 CORONARY ARTERY DISEASE WITH ANGINA PECTORIS: Status: RESOLVED | Noted: 2019-06-04 | Resolved: 2019-07-10

## 2019-07-10 PROCEDURE — 99214 OFFICE O/P EST MOD 30 MIN: CPT | Performed by: INTERNAL MEDICINE

## 2019-07-10 NOTE — PROGRESS NOTES
Subjective:     Encounter Date:7/10/2019      Patient ID: Casey Snowden Sr. is a 84 y.o. male.    Chief Complaint:PAF, CAD  History of Present Illness    Dear Dr. Diez,    I had the pleasure of seeing this patient in the office today for f/u.      He comes in to follow-up with his history of CAD.  Has been doing well with no complaints whatsoever.  He comes in today with anticipation that we will discontinue his aspirin.    He presented to [facility] on 6/3 with complaints of light headed and dizziness as well as left sided chest pain as he was working in the yard. He was found to be in atrial fibrillation with RVR, with worse than baseline anterior ST changes. He was rate controlled over night. He was also noted to have pneumonia and was started on IV levaquin. Patient was seen by Dr. Perez on the morning of 6/4. He was noted to jave troponin elevation and was transferred to [facility] for cardiac catheterization. The LAD was totally occluded and was treated with PCI and drug eluting stent placement to the LAD (3.0 x 28 mm Xience Lory drug eluting stent). Echocardiogram showed EF 40% with an apical wall motion abnormalities. He has been on carvedilol on the past, this was stopped due to bradycardia. He received metoprolol and tolerated well with HR in the 50's. At discharge this was changed to metoprolol succinate given cardiomyopathy. He is also on losartan.     Cath 6/2019  Hemodynamics:   LV: 150/8  AO: 150/64        PCI CORONARY SEGMENT: Mid LAD  PRE-STENOSIS: 100  POST-STENOSIS: 0%  LESION TYPE: c  EREN FLOW PRE/POST: 1/3  CULPRIT LESION: Yes     Estimated blood loss: Minimal  Complications: None     Conclusions:   1. Left main: Normal  2. LAD: Occluded mid segment.  EREN I flow  3. LCX:Normal  4. RCA: 30 to 40% mid vessel stenosis  5.  Normal left ventricular size.  Moderately reduced systolic function.  Ejection fraction 35%.  Mid to distal anterolateral akinesis.  Apical  dyskinesis  6.  PCI of the mid LAD with a 3.0 x 28 mm Xience Lory drug-eluting stent, postdilated to high pressure with a 3.25 mm noncompliant trek balloon     Echo 6/2019  Interpretation Summary     · The left ventricular cavity is mildly dilated.  · Left ventricular wall thickness is consistent with mild concentric hypertrophy.  · There is mild calcification of the aortic valve.  · Estimated EF = 40%.  · The following left ventricular wall segments are akinetic: apical anterior, apical lateral, apical inferior, apical septal and apex.  · Left ventricular diastolic dysfunction (grade II) consistent with pseudonormalization.  · Left atrial cavity size is moderately dilated.  · Mild tricuspid valve regurgitation is present.  · Calculated right ventricular systolic pressure from tricuspid regurgitation is 41 mmHg.  · Mild pulmonic valve regurgitation is present.          He has a history of paroxysmal atrial fibrillation and has previously followed with Dr. Mercado.  He has been maintained on chronic anticoagulation.  He apparently never required rate control medicine due to baseline bradycardia.      The following portions of the patient's history were reviewed and updated as appropriate: allergies, current medications, past family history, past medical history, past social history, past surgical history and problem list.    Past Medical History:   Diagnosis Date   • Atrial fibrillation (CMS/[facility])    • Cancer (CMS/[facility]) 04/2018    basil cell carcinoma   • Carotid stenosis    • CHF (congestive heart failure) (CMS/[facility])    • Chronic deep vein thrombosis (DVT) of popliteal vein of right lower extremity (CMS/[facility])    • Coronary artery disease involving native coronary artery of native heart without angina pectoris 12/20/2018   • DVT of lower extremity (deep venous thrombosis) (CMS/[facility])    • Elevated cholesterol    • Hard of hearing    • Hx of blood clots    • Hyperlipidemia    • Hypertension    • Long-term use of  high-risk medication    • Parkinson's disease (CMS/[facility])    • Postphlebitic syndrome        Past Surgical History:   Procedure Laterality Date   • BASAL CELL CARCINOMA EXCISION  04/2018   • CARDIAC CATHETERIZATION N/A 11/5/2018    Procedure: Left Heart Cath;  Surgeon: Oliverio Azar MD;  Location: [facility] C[facility]VASIVE LOCATION;  Service: Cardiovascular   • CARDIAC CATHETERIZATION N/A 11/5/2018    Procedure: Coronary angiography;  Surgeon: Oliverio Azar MD;  Location: [facility] CATH [facility]ASIVE LOC[facility];  Service: Cardiovascular   • CARDIAC CATHETERIZATION N/A 11/5/2018    Procedure: Left ventriculography;  Surgeon: Oliverio Azar MD;  Location:  [facility] CATH INVASIVE LOC[facility];  Service: Cardiovascular   • CARDIAC CATHETERIZATION N/A 6/4/2019    Procedure: Left Heart Cath;  Surgeon: Johnny Glasgow MD;  Location: [facility] CATH [facility]ASIVE LOC[facility];  Service: Cardiovascular   • CARDIAC CATHETERIZATION N/A 6/4/2019    Procedure: Coronary angiography;  Surgeon: Johnny Glasgow MD;  Location: [facility] C[facility]ASIVE LOC[facility];  Service: Cardiovascular   • CARDIAC CATHETERIZATION N/A 6/4/2019    Procedure: Left ventriculography;  Surgeon: Johnny Glasgow MD;  Location: [facility] C[facility]IVE LOC[facility];  Service: Cardiovascular   • CARDIAC CATHETERIZATION N/A 6/4/2019    Procedure: Stent BILL coronary;  Surgeon: Johnny Glasgow MD;  Location: [facility] CATH INVASIVE LOC[facility];  Service: Cardiovascular   • CARDIAC SURGERY     • CATARACT EXTRACTION Left 08/2018   • CATARACT EXTRACTION Right 09/2018   • COLONOSCOPY      2011   • INCISION AND DRAINAGE LEG Right 7/7/2017    Procedure: INCISION AND DRAINAGE INFECTED HEMATOMA RIGHT THIGH;  Surgeon: Valentin Peña MD;  Location: [facility] OR;  Service:    • JOINT REPLACEMENT     • KNEE INCISION AND DRAINAGE Right 12/27/2016    Procedure: KNEE INCISION AND DRAINAGE;  Surgeon: Triston Whitten MD;  Location: [facility] OR;  Service:    • NOSE SURGERY      nose  replacement   • SKIN BIOPSY     • SKIN GRAFT  12/2017   • TOTAL KNEE ARTHROPLASTY Right    • VASCULAR SURGERY     • VENA CAVA FILTER PLACEMENT         Social History     Socioeconomic History   • Marital status:      Spouse name: Not on file   • Number of children: Not on file   • Years of education: college grad   • Highest education level: Not on file   Occupational History   • Occupation: financial advis     Comment: self employed   Tobacco Use   • Smoking status: Never Smoker   • Smokeless tobacco: Never Used   • Tobacco comment: caff use   Substance and Sexual Activity   • Alcohol use: No     Frequency: Never     Comment: none for a month   • Drug use: No   • Sexual activity: Yes     Partners: Female       Review of Systems   Constitution: Positive for malaise/fatigue. Negative for chills, decreased appetite, fever and night sweats.   HENT: Negative for ear discharge, ear pain, hearing loss, nosebleeds and sore throat.    Eyes: Negative for blurred vision, double vision and pain.   Cardiovascular: Negative for cyanosis.   Respiratory: Negative for hemoptysis and sputum production.    Endocrine: Negative for cold intolerance and heat intolerance.   Hematologic/Lymphatic: Negative for adenopathy.   Skin: Negative for dry skin, itching, nail changes, rash and suspicious lesions.   Musculoskeletal: Negative for arthritis, gout, muscle cramps, muscle weakness, myalgias and neck pain.   Gastrointestinal: Negative for anorexia, bowel incontinence, constipation, diarrhea, dysphagia, hematemesis and jaundice.   Genitourinary: Negative for bladder incontinence, dysuria, flank pain, frequency, hematuria and nocturia.   Neurological: Negative for focal weakness, numbness, paresthesias and seizures.   Psychiatric/Behavioral: Negative for altered mental status, hallucinations, hypervigilance, suicidal ideas and thoughts of violence.   Allergic/Immunologic: Negative for persistent infections.       Procedures      "  Objective:     Vitals:    07/10/19 1501   BP: 142/62   Pulse: (!) 49   Weight: 82.1 kg (181 lb)   Height: 180.3 cm (71\")         Physical Exam   Constitutional: He is oriented to person, place, and time. He appears well-developed and well-nourished. No distress.   HENT:   Head: Normocephalic and atraumatic.   Nose: Nose normal.   Mouth/Throat: Oropharynx is clear and moist.   Eyes: Conjunctivae and EOM are normal. Pupils are equal, round, and reactive to light. Right eye exhibits no discharge. Left eye exhibits no discharge.   Neck: Normal range of motion. Neck supple. No tracheal deviation present. No thyromegaly present.   Cardiovascular: Normal rate, regular rhythm, S1 normal, S2 normal, normal heart sounds and normal pulses. Exam reveals no S3.   Pulmonary/Chest: Effort normal and breath sounds normal. No stridor. No respiratory distress. He exhibits no tenderness.   Abdominal: Soft. Bowel sounds are normal. He exhibits no distension and no mass. There is no tenderness. There is no rebound and no guarding.   Musculoskeletal: Normal range of motion. He exhibits edema. He exhibits no tenderness or deformity.   Lymphadenopathy:     He has no cervical adenopathy.   Neurological: He is alert and oriented to person, place, and time. He has normal reflexes.   Skin: Skin is warm and dry. No rash noted. He is not diaphoretic. No erythema.   Psychiatric: He has a normal mood and affect. Thought content normal.       Lab Review:             Performed        Assessment:          Diagnosis Plan   1. Coronary artery disease involving native coronary artery of native heart without angina pectoris     2. Paroxysmal A-fib (CMS/HCC)     3. ST elevation myocardial infarction involving left anterior descending (LAD) coronary artery (CMS/HCC)     4. Presence of IVC filter     5. Chronic diastolic (congestive) heart failure     6. Benign essential hypertension     7. Anticoagulant long-term use            Plan:       1. Atrial " Fibrillation and Atrial Flutter  Assessment  • The patient has paroxysmal atrial fibrillation  • The patient's CHADS2-VASc score is 3  • A [id number] score of 2 or more is considered a high risk for a thromboembolic event    Plan  • Continue apixaban for antithrombotic therapy, bleeding issues discussed    2. Heart Failure  Assessment  • Beta blocker not prescribed for patient reasons  • Diuretics prescribed  • Angiotensin receptor blocker (ARB) prescribed    Plan  • The patient has received heart failure education on the following topics: medication instructions, symptom management and weight monitoring  • The heart failure care plan was discussed with the patient today including: up-titrating HF medications    Subjective/Objective  • Physical exam findings positive for peripheral edema.   • Physical exam findings negative for S3 gallop.    3.  Hhistory of chronic DVT; Patient remains on chronic anticoagulation and follows with Dr. Dorado. Patient has an IVC filter in place  4.  Coronary Artery Disease  Assessment  • The patient has no angina    Plan  • Lifestyle modifications discussed include adhering to a heart healthy diet, avoidance of tobacco products, maintenance of a healthy weight, medication compliance, regular exercise and regular monitoring of cholesterol and blood pressure  • Doing well, today we will discontinue his aspirin and also discontinue his metoprolol given the bradycardia    Subjective - Objective  • Current antiplatelet therapy includes aspirin 81 mg      5.  Essential hypertension-      Thank you very much for allowing us to participate in the care of this pleasant patient.  Please don't hesitate to call if I can be of assistance in any way.      Current Outpatient Medications:   •  aspirin 81 MG EC tablet, Take 81 mg by mouth Daily., Disp: , Rfl:   •  atorvastatin (LIPITOR) 20 MG tablet, TAKE 1 TABLET BY MOUTH  DAILY, Disp: 90 tablet, Rfl: 3  •  atropine 1 % ophthalmic solution, 1 drop 3  (Three) Times a Day As Needed (as needed orally for increased secretions). For drooling, Disp: , Rfl:   •  carbidopa-levodopa (SINEMET)  MG per tablet, 1 tablet 3 (Three) Times a Day., Disp: , Rfl:   •  carboxymethylcellulose (REFRESH PLUS) 0.5 % solution, 1 drop 3 (Three) Times a Day As Needed (eye)., Disp: , Rfl:   •  clopidogrel (PLAVIX) 75 MG tablet, Take 1 tablet by mouth Daily., Disp: 30 tablet, Rfl: 11  •  ELIQUIS 2.5 MG tablet tablet, 2.5 mg Every 12 (Twelve) Hours., Disp: , Rfl:   •  losartan (COZAAR) 50 MG tablet, Take 1 tablet by mouth Daily., Disp: 30 tablet, Rfl: 5  •  metoprolol succinate XL (TOPROL-XL) 25 MG 24 hr tablet, Take 0.5 tablets by mouth 2 (Two) Times a Day., Disp: 30 tablet, Rfl: 11  •  Multiple Vitamins-Minerals (PRESERVISION AREDS) capsule, Take 1 tablet by mouth 2 (Two) Times a Day., Disp: , Rfl:   •  pramipexole (MIRAPEX) 0.5 MG tablet, Take 0.5 mg by mouth 3 (Three) Times a Day., Disp: , Rfl:

## 2019-08-15 ENCOUNTER — TELEPHONE (OUTPATIENT)
Dept: CARDIOLOGY | Facility: CLINIC | Age: 84
End: 2019-08-15

## 2019-08-15 NOTE — TELEPHONE ENCOUNTER
Patient is having dental implants done and needs to know how long he can be off of Eliquis? Is there anything else he needs to do besides that as far as stopping any other medication?    DB/

## 2019-09-03 RX ORDER — LOSARTAN POTASSIUM 25 MG/1
TABLET ORAL
Qty: 180 TABLET | Refills: 0 | Status: SHIPPED | OUTPATIENT
Start: 2019-09-03 | End: 2019-09-09 | Stop reason: DRUGHIGH

## 2019-09-09 ENCOUNTER — OFFICE VISIT (OUTPATIENT)
Dept: FAMILY MEDICINE CLINIC | Facility: CLINIC | Age: 84
End: 2019-09-09

## 2019-09-09 VITALS
DIASTOLIC BLOOD PRESSURE: 68 MMHG | HEIGHT: 71 IN | WEIGHT: 175 LBS | HEART RATE: 50 BPM | OXYGEN SATURATION: 99 % | BODY MASS INDEX: 24.5 KG/M2 | SYSTOLIC BLOOD PRESSURE: 128 MMHG

## 2019-09-09 DIAGNOSIS — G20 PARKINSON'S DISEASE (HCC): Primary | ICD-10-CM

## 2019-09-09 DIAGNOSIS — G90.3 ORTHOSTATIC HYPOTENSION DUE TO PARKINSON'S DISEASE (HCC): ICD-10-CM

## 2019-09-09 PROBLEM — K11.7 SIALORRHEA: Status: ACTIVE | Noted: 2019-08-19

## 2019-09-09 PROCEDURE — 99213 OFFICE O/P EST LOW 20 MIN: CPT | Performed by: FAMILY MEDICINE

## 2019-09-09 RX ORDER — CEPHALEXIN 500 MG/1
CAPSULE ORAL
Refills: 0 | COMMUNITY
Start: 2019-09-04 | End: 2020-03-04

## 2019-09-09 RX ORDER — FLUTICASONE PROPIONATE 0.05 %
CREAM (GRAM) TOPICAL
Refills: 1 | COMMUNITY
Start: 2019-07-10 | End: 2022-03-09

## 2019-09-09 NOTE — PROGRESS NOTES
Chief Complaint   Patient presents with   • Hypertension       Subjective     Patient here for follow-up of elevated blood pressure.    He is exercising and is adherent to a low-salt diet.    Blood pressure is well controlled at home.   Cardiac symptoms: none.   Patient denies: chest pressure/discomfort and syncope.   Cardiovascular risk factors: advanced age (older than 55 for men, 65 for women).   Use of agents associated with hypertension: none.   History of target organ damage: peripheral artery disease.  Patient is taking prescribed hypertension medications as prescribed without side effects.    Aspirin and Toprol stopped by cardiology.      The following portions of the patient's history were reviewed and updated as appropriate: allergies, current medications, past medical history, past social history, past surgical history and problem list.    Review of Systems  Pertinent items are noted in HPI.    Results for orders placed or performed during the hospital encounter of 06/04/19   Basic Metabolic Panel   Result Value Ref Range    Glucose 102 (H) 65 - 99 mg/dL    BUN 16 8 - 23 mg/dL    Creatinine 0.75 (L) 0.76 - 1.27 mg/dL    Sodium 137 136 - 145 mmol/L    Potassium 3.7 3.5 - 5.2 mmol/L    Chloride 105 98 - 107 mmol/L    CO2 22.2 22.0 - 29.0 mmol/L    Calcium 7.8 (L) 8.6 - 10.5 mg/dL    eGFR Non African Amer 99 >60 mL/min/1.73    BUN/Creatinine Ratio 21.3 7.0 - 25.0    Anion Gap 9.8 mmol/L   Lipid Panel   Result Value Ref Range    Total Cholesterol 131 0 - 200 mg/dL    Triglycerides 55 0 - 150 mg/dL    HDL Cholesterol 45 40 - 60 mg/dL    LDL Cholesterol  75 0 - 100 mg/dL    VLDL Cholesterol 11 5 - 40 mg/dL    LDL/HDL Ratio 1.67    CBC Auto Differential   Result Value Ref Range    WBC 4.26 3.40 - 10.80 10*3/mm3    RBC 3.75 (L) 4.14 - 5.80 10*6/mm3    Hemoglobin 12.2 (L) 13.0 - 17.7 g/dL    Hematocrit 37.0 (L) 37.5 - 51.0 %    MCV 98.7 (H) 79.0 - 97.0 fL    MCH 32.5 26.6 - 33.0 pg    MCHC 33.0 31.5 - 35.7 g/dL  "   RDW 14.5 12.3 - 15.4 %    RDW-SD 52.9 37.0 - 54.0 fl    MPV 10.7 6.0 - 12.0 fL    Platelets 140 140 - 450 10*3/mm3    Neutrophil % 60.8 42.7 - 76.0 %    Lymphocyte % 26.5 19.6 - 45.3 %    Monocyte % 9.4 5.0 - 12.0 %    Eosinophil % 2.8 0.3 - 6.2 %    Basophil % 0.5 0.0 - 1.5 %    Immature Grans % 0.0 0.0 - 0.5 %    Neutrophils, Absolute 2.59 1.70 - 7.00 10*3/mm3    Lymphocytes, Absolute 1.13 0.70 - 3.10 10*3/mm3    Monocytes, Absolute 0.40 0.10 - 0.90 10*3/mm3    Eosinophils, Absolute 0.12 0.00 - 0.40 10*3/mm3    Basophils, Absolute 0.02 0.00 - 0.20 10*3/mm3    Immature Grans, Absolute 0.00 0.00 - 0.05 10*3/mm3    nRBC 0.0 0.0 - 0.2 /100 WBC   POC Activated Clotting Time   Result Value Ref Range    Activated Clotting Time  186 (H) 82 - 152 Seconds        Vitals:    09/09/19 1415   BP: 128/68   BP Location: Left arm   Patient Position: Sitting   Cuff Size: Adult   Pulse: 50   SpO2: 99%   Weight: 79.4 kg (175 lb)   Height: 180.3 cm (71\")     BP Readings from Last 3 Encounters:   09/09/19 128/68   07/10/19 142/62   06/12/19 150/70     Objective      Gen: alert, pleasant.  HEENT: PERRL, EOMI intact, lids ok, ear canals clear, TMs normal, throat clear, nostrils normal  Neck: no bruit, no enlarged thyroid  Lungs: CTA  Heart: RR no murmur  ABD: soft , + BS  Pulses: intact  Mood: stable    Skin has a new basal cell cancer on his left forearm  Asked him to call his derm MD for eval.       Assessment/Plan   Hypertension, normal blood pressure Evidence of target organ damage: peripheral artery disease.    Casey was seen today for hypertension.    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  Follow up: 6 months and as needed.     He will see Vascular Dr Cintron and Cardio Dr Armas soon  Hopefully they can decide on Plavix or ELIQUIS  benign on double AC is high risk for him due to falls.    HR is better " off Toprol    Eating well    Reviewed neuro Dr Goodwin notes      There are no Patient Instructions on file for this visit.  Medications Discontinued During This Encounter   Medication Reason   • losartan (COZAAR) 25 MG tablet Dose adjustment        No Follow-up on file.    Limit salt  Limit alcoholic drinks to 1 a day  Limit caffeine to 1-2 servings a day    Dr. Aleksander Diez MD  [place], [place]  [facility].

## 2019-09-10 ENCOUNTER — TELEPHONE (OUTPATIENT)
Dept: CARDIOLOGY | Facility: CLINIC | Age: 84
End: 2019-09-10

## 2019-09-13 ENCOUNTER — OFFICE VISIT (OUTPATIENT)
Dept: CARDIOLOGY | Facility: CLINIC | Age: 84
End: 2019-09-13

## 2019-09-13 VITALS
DIASTOLIC BLOOD PRESSURE: 80 MMHG | WEIGHT: 174 LBS | SYSTOLIC BLOOD PRESSURE: 128 MMHG | HEART RATE: 55 BPM | BODY MASS INDEX: 24.36 KG/M2 | HEIGHT: 71 IN

## 2019-09-13 DIAGNOSIS — I65.29 STENOSIS OF CAROTID ARTERY, UNSPECIFIED LATERALITY: ICD-10-CM

## 2019-09-13 DIAGNOSIS — I82.531 CHRONIC DEEP VEIN THROMBOSIS (DVT) OF POPLITEAL VEIN OF RIGHT LOWER EXTREMITY (HCC): ICD-10-CM

## 2019-09-13 DIAGNOSIS — I77.9 PERIPHERAL ARTERIAL OCCLUSIVE DISEASE (HCC): ICD-10-CM

## 2019-09-13 DIAGNOSIS — Z95.828 PRESENCE OF IVC FILTER: Chronic | ICD-10-CM

## 2019-09-13 DIAGNOSIS — G90.3 ORTHOSTATIC HYPOTENSION DUE TO PARKINSON'S DISEASE (HCC): ICD-10-CM

## 2019-09-13 DIAGNOSIS — I25.10 CORONARY ARTERY DISEASE INVOLVING NATIVE CORONARY ARTERY OF NATIVE HEART WITHOUT ANGINA PECTORIS: Chronic | ICD-10-CM

## 2019-09-13 DIAGNOSIS — I50.32 CHRONIC DIASTOLIC CONGESTIVE HEART FAILURE (HCC): Chronic | ICD-10-CM

## 2019-09-13 DIAGNOSIS — G20 PARKINSON'S DISEASE (HCC): ICD-10-CM

## 2019-09-13 DIAGNOSIS — Z79.01 ANTICOAGULANT LONG-TERM USE: Chronic | ICD-10-CM

## 2019-09-13 DIAGNOSIS — I48.0 PAROXYSMAL A-FIB (HCC): Primary | Chronic | ICD-10-CM

## 2019-09-13 DIAGNOSIS — I21.02 ST ELEVATION MYOCARDIAL INFARCTION INVOLVING LEFT ANTERIOR DESCENDING (LAD) CORONARY ARTERY (HCC): ICD-10-CM

## 2019-09-13 PROCEDURE — 99214 OFFICE O/P EST MOD 30 MIN: CPT | Performed by: INTERNAL MEDICINE

## 2019-09-13 NOTE — PROGRESS NOTES
Subjective:     Encounter Date:9/13/2019      Patient ID: Casey Snowden Sr. is a 85 y.o. male.    Chief Complaint:PAF, CAD  History of Present Illness    Dear Dr. Diez,    I had the pleasure of seeing this patient in the office today for f/u.      He comes in today- he was asked to see us to discuss the combination of the Plavix and the Eliquis.  He courses on the Plavix because of his recent drug-eluting stent.  He is on the Eliquis because of his history of paroxysmal atrial fibrillation with elevated CHADS Vasc score of 3 as well as lower extremity DVT.    He has not had any significant bleeding problems.    He has not had any other cardiac complaints.  He denies any chest pain, pressure, tightness, squeezing, or heartburn.  He has not experienced any feeling of palpitations, tachycardia or heart racing and no presyncope or syncope.  There has not been any problems with dizziness or lightheadedness.  There has not been any orthopnea or PND, and no problems with lower extremity edema.  He denies any shortness of breath at rest or with activity and has not had any wheezing.  He has not had any problems with unexplained nausea or vomiting. He has continued to perform daily activities of living without any specific problem or change in the level of activity.  He has not been recently hospitalized for any reason.    He presented to [facility] on 6/3 with complaints of light headed and dizziness as well as left sided chest pain as he was working in the yard. He was found to be in atrial fibrillation with RVR, with worse than baseline anterior ST changes. He was rate controlled over night. He was also noted to have pneumonia and was started on IV levaquin. Patient was seen by Dr. Perez on the morning of 6/4. He was noted to jave troponin elevation and was transferred to [facility] for cardiac catheterization. The LAD was totally occluded and was treated with PCI and drug eluting stent  placement to the LAD (3.0 x 28 mm Xience Lory drug eluting stent). Echocardiogram showed EF 40% with an apical wall motion abnormalities. He has been on carvedilol on the past, this was stopped due to bradycardia. He received metoprolol and tolerated well with HR in the 50's. At discharge this was changed to metoprolol succinate given cardiomyopathy. He is also on losartan.     Cath 6/2019  Hemodynamics:   LV: 150/8  AO: 150/64        PCI CORONARY SEGMENT: Mid LAD  PRE-STENOSIS: 100  POST-STENOSIS: 0%  LESION TYPE: c  EREN FLOW PRE/POST: 1/3  CULPRIT LESION: Yes     Estimated blood loss: Minimal  Complications: None     Conclusions:   1. Left main: Normal  2. LAD: Occluded mid segment.  EREN I flow  3. LCX:Normal  4. RCA: 30 to 40% mid vessel stenosis  5.  Normal left ventricular size.  Moderately reduced systolic function.  Ejection fraction 35%.  Mid to distal anterolateral akinesis.  Apical dyskinesis  6.  PCI of the mid LAD with a 3.0 x 28 mm Xience Lory drug-eluting stent, postdilated to high pressure with a 3.25 mm noncompliant trek balloon     Echo 6/2019  Interpretation Summary     · The left ventricular cavity is mildly dilated.  · Left ventricular wall thickness is consistent with mild concentric hypertrophy.  · There is mild calcification of the aortic valve.  · Estimated EF = 40%.  · The following left ventricular wall segments are akinetic: apical anterior, apical lateral, apical inferior, apical septal and apex.  · Left ventricular diastolic dysfunction (grade II) consistent with pseudonormalization.  · Left atrial cavity size is moderately dilated.  · Mild tricuspid valve regurgitation is present.  · Calculated right ventricular systolic pressure from tricuspid regurgitation is 41 mmHg.  · Mild pulmonic valve regurgitation is present.          He has a history of paroxysmal atrial fibrillation and has previously followed with Dr. Mercado.  He has been maintained on chronic anticoagulation.  He  apparently never required rate control medicine due to baseline bradycardia.      The following portions of the patient's history were reviewed and updated as appropriate: allergies, current medications, past family history, past medical history, past social history, past surgical history and problem list.    Past Medical History:   Diagnosis Date   • Atrial fibrillation (CMS/[facility])    • Cancer (CMS/[facility]) 04/2018    basil cell carcinoma   • Carotid stenosis    • CHF (congestive heart failure) (CMS/[facility])    • Chronic deep vein thrombosis (DVT) of popliteal vein of right lower extremity (CMS/[facility])    • Coronary artery disease involving native coronary artery of native heart without angina pectoris 12/20/2018   • DVT of lower extremity (deep venous thrombosis) (CMS/[facility])    • Elevated cholesterol    • Hard of hearing    • Hx of blood clots    • Hyperlipidemia    • Hypertension    • Long-term use of high-risk medication    • Parkinson's disease (CMS/[facility])    • Postphlebitic syndrome        Past Surgical History:   Procedure Laterality Date   • BASAL CELL CARCINOMA EXCISION  04/2018   • CARDIAC CATHETERIZATION N/A 11/5/2018    Procedure: Left Heart Cath;  Surgeon: Oliverio Azar MD;  Location: [facility] INVASIVE LOCATION;  Service: Cardiovascular   • CARDIAC CATHETERIZATION N/A 11/5/2018    Procedure: Coronary angiography;  Surgeon: Oliverio Azar MD;  Location: [facility] CATH INVASIVE LOCATION;  Service: Cardiovascular   • CARDIAC CATHETERIZATION N/A 11/5/2018    Procedure: Left ventriculography;  Surgeon: Oliverio Azar MD;  Location: [facility] INVASIVE LOCATION;  Service: Cardiovascular   • CARDIAC CATHETERIZATION N/A 6/4/2019    Procedure: Left Heart Cath;  Surgeon: Johnny Glasgow MD;  Location: [facility] INVASIVE LOCATION;  Service: Cardiovascular   • CARDIAC CATHETERIZATION N/A 6/4/2019    Procedure: Coronary angiography;  Surgeon: Johnny Glasgow MD;  Location: [facility] INVASIVE LOCATION;   Service: Cardiovascular   • CARDIAC CATHETERIZATION N/A 6/4/2019    Procedure: Left ventriculography;  Surgeon: Johnny Glasgow MD;  Location: [facility] CATH INVASIVE LOCATION;  Service: Cardiovascular   • CARDIAC CATHETERIZATION N/A 6/4/2019    Procedure: Stent BILL coronary;  Surgeon: Johnny Glasgow MD;  Location:  [facility] CATH INVASIVE LOCATION;  Service: Cardiovascular   • CARDIAC SURGERY     • CATARACT EXTRACTION Left 08/2018   • CATARACT EXTRACTION Right 09/2018   • COLONOSCOPY      2011   • INCISION AND DRAINAGE LEG Right 7/7/2017    Procedure: INCISION AND DRAINAGE INFECTED HEMATOMA RIGHT THIGH;  Surgeon: Valentin Peña MD;  Location: [facility] MAIN OR;  Service:    • JOINT REPLACEMENT     • KNEE INCISION AND DRAINAGE Right 12/27/2016    Procedure: KNEE INCISION AND DRAINAGE;  Surgeon: Triston Whitten MD;  Location: [facility] MAIN OR;  Service:    • NOSE SURGERY      nose replacement   • SKIN BIOPSY     • SKIN GRAFT  12/2017   • TOTAL KNEE ARTHROPLASTY Right    • VASCULAR SURGERY     • VENA CAVA FILTER PLACEMENT         Social History     Socioeconomic History   • Marital status:      Spouse name: Not on file   • Number of children: Not on file   • Years of education: college grad   • Highest education level: Not on file   Occupational History   • Occupation: financial advis     Comment: self employed   Tobacco Use   • Smoking status: Never Smoker   • Smokeless tobacco: Never Used   • Tobacco comment: caff use   Substance and Sexual Activity   • Alcohol use: No     Frequency: Never     Comment: none for a month   • Drug use: No   • Sexual activity: Yes     Partners: Female       Review of Systems   Constitution: Positive for malaise/fatigue. Negative for chills, decreased appetite, fever and night sweats.   HENT: Negative for ear discharge, ear pain, hearing loss, nosebleeds and sore throat.    Eyes: Negative for blurred vision, double vision and pain.   Cardiovascular: Negative for  "cyanosis.   Respiratory: Negative for hemoptysis and sputum production.    Endocrine: Negative for cold intolerance and heat intolerance.   Hematologic/Lymphatic: Negative for adenopathy.   Skin: Negative for dry skin, itching, nail changes, rash and suspicious lesions.   Musculoskeletal: Negative for arthritis, gout, muscle cramps, muscle weakness, myalgias and neck pain.   Gastrointestinal: Negative for anorexia, bowel incontinence, constipation, diarrhea, dysphagia, hematemesis and jaundice.   Genitourinary: Negative for bladder incontinence, dysuria, flank pain, frequency, hematuria and nocturia.   Neurological: Negative for focal weakness, numbness, paresthesias and seizures.   Psychiatric/Behavioral: Negative for altered mental status, hallucinations, hypervigilance, suicidal ideas and thoughts of violence.   Allergic/Immunologic: Negative for persistent infections.       Procedures       Objective:     Vitals:    09/13/19 1006   BP: 128/80   Pulse: 55   Weight: 78.9 kg (174 lb)   Height: 180.3 cm (71\")         Physical Exam   Constitutional: He is oriented to person, place, and time. He appears well-developed and well-nourished. No distress.   HENT:   Head: Normocephalic and atraumatic.   Nose: Nose normal.   Mouth/Throat: Oropharynx is clear and moist.   Eyes: Conjunctivae and EOM are normal. Pupils are equal, round, and reactive to light. Right eye exhibits no discharge. Left eye exhibits no discharge.   Neck: Normal range of motion. Neck supple. No tracheal deviation present. No thyromegaly present.   Cardiovascular: Normal rate, regular rhythm, S1 normal, S2 normal, normal heart sounds and normal pulses. Exam reveals no S3.   Pulmonary/Chest: Effort normal and breath sounds normal. No stridor. No respiratory distress. He exhibits no tenderness.   Abdominal: Soft. Bowel sounds are normal. He exhibits no distension and no mass. There is no tenderness. There is no rebound and no guarding.   Musculoskeletal: " Normal range of motion. He exhibits edema. He exhibits no tenderness or deformity.   Lymphadenopathy:     He has no cervical adenopathy.   Neurological: He is alert and oriented to person, place, and time. He has normal reflexes.   Skin: Skin is warm and dry. No rash noted. He is not diaphoretic. No erythema.   Psychiatric: He has a normal mood and affect. Thought content normal.       Lab Review:             Performed        Assessment:          Diagnosis Plan   1. Paroxysmal A-fib (CMS/[facility])     2. ST elevation myocardial infarction involving left anterior descending (LAD) coronary artery (CMS/[facility])     3. Stenosis of carotid artery, unspecified laterality     4. Orthostatic hypotension due to Parkinson's disease (CMS/[facility])     5. Coronary artery disease involving native coronary artery of native heart without angina pectoris     6. Chronic diastolic (congestive) heart failure     7. Anticoagulant long-term use            Plan:       1. Atrial Fibrillation and Atrial Flutter  Assessment  • The patient has paroxysmal atrial fibrillation  • The patient's CHADS2-VASc score is 3  • A [id number] score of 2 or more is considered a high risk for a thromboembolic event    Plan  • Continue apixaban for antithrombotic therapy, bleeding issues discussed    2. Heart Failure  Assessment  • Beta blocker not prescribed for patient reasons  • Diuretics prescribed  • Angiotensin receptor blocker (ARB) prescribed    Plan  • The patient has received heart failure education on the following topics: medication instructions, symptom management and weight monitoring  • The heart failure care plan was discussed with the patient today including: up-titrating HF medications    Subjective/Objective  • Physical exam findings positive for peripheral edema.   • Physical exam findings negative for S3 gallop.    3.  Hhistory of chronic DVT; Patient remains on chronic anticoagulation and follows with Dr. Dorado. Patient has an IVC filter in  place  4.  Coronary Artery Disease  Assessment  • The patient has no angina    Plan  • Lifestyle modifications discussed include adhering to a heart healthy diet, avoidance of tobacco products, maintenance of a healthy weight, medication compliance, regular exercise and regular monitoring of cholesterol and blood pressure  • Doing well, today we will discontinue his aspirin and also discontinue his metoprolol given the bradycardia    Subjective - Objective  • Current antiplatelet therapy includes aspirin 81 mg      5.  Essential hypertension-  6.  At this point patient must remain on the combination of Plavix and Eliquis.  He has a class I indication for antiplatelet therapy given his recent drug-eluting stent and underlying coronary disease.  He has a class I indication for chronic anticoagulation with his history of DVT as well as paroxysmal atrial fibrillation.    Thank you very much for allowing us to participate in the care of this pleasant patient.  Please don't hesitate to call if I can be of assistance in any way.      Current Outpatient Medications:   •  atorvastatin (LIPITOR) 20 MG tablet, TAKE 1 TABLET BY MOUTH  DAILY, Disp: 90 tablet, Rfl: 3  •  atropine 1 % ophthalmic solution, 1 drop 3 (Three) Times a Day As Needed (as needed orally for increased secretions). For drooling, Disp: , Rfl:   •  carbidopa-levodopa (SINEMET)  MG per tablet, 1 tablet 3 (Three) Times a Day., Disp: , Rfl:   •  carboxymethylcellulose (REFRESH PLUS) 0.5 % solution, 1 drop 3 (Three) Times a Day As Needed (eye)., Disp: , Rfl:   •  cephalexin (KEFLEX) 500 MG capsule, TAKE ONE CAPSULE BY MOUTH TWICE A DAY UNTIL FINISHED, Disp: , Rfl: 0  •  clopidogrel (PLAVIX) 75 MG tablet, Take 1 tablet by mouth Daily., Disp: 30 tablet, Rfl: 11  •  ELIQUIS 2.5 MG tablet tablet, 2.5 mg Every 12 (Twelve) Hours., Disp: , Rfl:   •  fluticasone (CUTIVATE) 0.05 % cream, APPLY TO AFFECTED AREA TWICE A DAY, Disp: , Rfl: 1  •  losartan (COZAAR) 50 MG  tablet, Take 1 tablet by mouth Daily., Disp: 30 tablet, Rfl: 5  •  Multiple Vitamins-Minerals (PRESERVISION AREDS) capsule, Take 1 tablet by mouth 2 (Two) Times a Day., Disp: , Rfl:   •  pramipexole (MIRAPEX) 0.5 MG tablet, Take 0.5 mg by mouth 3 (Three) Times a Day., Disp: , Rfl:

## 2019-11-26 RX ORDER — LOSARTAN POTASSIUM 25 MG/1
TABLET ORAL
Qty: 180 TABLET | Refills: 3 | Status: SHIPPED | OUTPATIENT
Start: 2019-11-26 | End: 2020-09-10

## 2020-03-04 ENCOUNTER — OFFICE VISIT (OUTPATIENT)
Dept: CARDIOLOGY | Facility: CLINIC | Age: 85
End: 2020-03-04

## 2020-03-04 VITALS
HEIGHT: 71 IN | DIASTOLIC BLOOD PRESSURE: 80 MMHG | WEIGHT: 180 LBS | BODY MASS INDEX: 25.2 KG/M2 | HEART RATE: 59 BPM | SYSTOLIC BLOOD PRESSURE: 140 MMHG

## 2020-03-04 DIAGNOSIS — Z79.01 ANTICOAGULANT LONG-TERM USE: Chronic | ICD-10-CM

## 2020-03-04 DIAGNOSIS — I48.0 PAROXYSMAL A-FIB (HCC): Chronic | ICD-10-CM

## 2020-03-04 DIAGNOSIS — I11.0 HYPERTENSIVE HEART DISEASE WITH HEART FAILURE (HCC): Chronic | ICD-10-CM

## 2020-03-04 DIAGNOSIS — I10 BENIGN ESSENTIAL HYPERTENSION: Chronic | ICD-10-CM

## 2020-03-04 DIAGNOSIS — I82.531 CHRONIC DEEP VEIN THROMBOSIS (DVT) OF POPLITEAL VEIN OF RIGHT LOWER EXTREMITY (HCC): ICD-10-CM

## 2020-03-04 DIAGNOSIS — I50.32 CHRONIC DIASTOLIC CONGESTIVE HEART FAILURE (HCC): Chronic | ICD-10-CM

## 2020-03-04 DIAGNOSIS — Z95.828 PRESENCE OF IVC FILTER: Chronic | ICD-10-CM

## 2020-03-04 DIAGNOSIS — G90.3 ORTHOSTATIC HYPOTENSION DUE TO PARKINSON'S DISEASE (HCC): ICD-10-CM

## 2020-03-04 DIAGNOSIS — I25.10 CORONARY ARTERY DISEASE INVOLVING NATIVE CORONARY ARTERY OF NATIVE HEART WITHOUT ANGINA PECTORIS: Primary | Chronic | ICD-10-CM

## 2020-03-04 DIAGNOSIS — I21.02 ST ELEVATION MYOCARDIAL INFARCTION INVOLVING LEFT ANTERIOR DESCENDING (LAD) CORONARY ARTERY (HCC): Chronic | ICD-10-CM

## 2020-03-04 PROCEDURE — 93000 ELECTROCARDIOGRAM COMPLETE: CPT | Performed by: INTERNAL MEDICINE

## 2020-03-04 PROCEDURE — 99214 OFFICE O/P EST MOD 30 MIN: CPT | Performed by: INTERNAL MEDICINE

## 2020-03-04 NOTE — PROGRESS NOTES
Subjective:     Encounter Date: 03/04/20        Patient ID: Casey Snowden Sr. is a 85 y.o. male.    Chief Complaint:PAF, CAD  History of Present Illness    Dear Dr. Diez,    I had the pleasure of seeing this patient in the office today for f/u.   It has been 6 months since his last visit.  Is a history of paroxysmal atrial fibrillation  with elevated CHADS Vasc score of 3 as well as lower extremity DVT.  He also has a history of coronary disease with a drug-eluting stent.  Currently on a combination of Eliquis and Plavix.    He states he is doing great with no cardiac complaints.  He denies any chest pain, pressure, tightness, squeezing, or heartburn.  He has not experienced any feeling of palpitations, tachycardia or heart racing and no presyncope or syncope.  There has not been any problems with dizziness or lightheadedness.  There has not been any orthopnea or PND, and no problems with lower extremity edema.  He denies any shortness of breath at rest or with activity and has not had any wheezing.  He has not had any problems with unexplained nausea or vomiting. He has continued to perform daily activities of living without any specific problem or change in the level of activity.  He has not been recently hospitalized for any reason.    He presented to [facility] on 6/3 with complaints of light headed and dizziness as well as left sided chest pain as he was working in the yard. He was found to be in atrial fibrillation with RVR, with worse than baseline anterior ST changes. He was rate controlled over night. He was also noted to have pneumonia and was started on IV levaquin. Patient was seen by Dr. Perez on the morning of 6/4. He was noted to jave troponin elevation and was transferred to [facility] for cardiac catheterization. The LAD was totally occluded and was treated with PCI and drug eluting stent placement to the LAD (3.0 x 28 mm Xience Lory drug eluting stent).  Echocardiogram showed EF 40% with an apical wall motion abnormalities. He has been on carvedilol on the past, this was stopped due to bradycardia. He received metoprolol and tolerated well with HR in the 50's. At discharge this was changed to metoprolol succinate given cardiomyopathy. He is also on losartan.     Cath 6/2019  Hemodynamics:   LV: 150/8  AO: 150/64        PCI CORONARY SEGMENT: Mid LAD  PRE-STENOSIS: 100  POST-STENOSIS: 0%  LESION TYPE: c  EREN FLOW PRE/POST: 1/3  CULPRIT LESION: Yes     Estimated blood loss: Minimal  Complications: None     Conclusions:   1. Left main: Normal  2. LAD: Occluded mid segment.  EREN I flow  3. LCX:Normal  4. RCA: 30 to 40% mid vessel stenosis  5.  Normal left ventricular size.  Moderately reduced systolic function.  Ejection fraction 35%.  Mid to distal anterolateral akinesis.  Apical dyskinesis  6.  PCI of the mid LAD with a 3.0 x 28 mm Xience Lory drug-eluting stent, postdilated to high pressure with a 3.25 mm noncompliant trek balloon     Echo 6/2019  Interpretation Summary     · The left ventricular cavity is mildly dilated.  · Left ventricular wall thickness is consistent with mild concentric hypertrophy.  · There is mild calcification of the aortic valve.  · Estimated EF = 40%.  · The following left ventricular wall segments are akinetic: apical anterior, apical lateral, apical inferior, apical septal and apex.  · Left ventricular diastolic dysfunction (grade II) consistent with pseudonormalization.  · Left atrial cavity size is moderately dilated.  · Mild tricuspid valve regurgitation is present.  · Calculated right ventricular systolic pressure from tricuspid regurgitation is 41 mmHg.  · Mild pulmonic valve regurgitation is present.          He has a history of paroxysmal atrial fibrillation and has previously followed with Dr. Mercado.  He has been maintained on chronic anticoagulation.  He apparently never required rate control medicine due to baseline  bradycardia.      The following portions of the patient's history were reviewed and updated as appropriate: allergies, current medications, past family history, past medical history, past social history, past surgical history and problem list.    Past Medical History:   Diagnosis Date   • Atrial fibrillation (CMS/[facility])    • Cancer (CMS/[facility]) 04/2018    basil cell carcinoma   • Carotid stenosis    • CHF (congestive heart failure) (CMS/[facility])    • Chronic deep vein thrombosis (DVT) of popliteal vein of right lower extremity (CMS/[facility])    • Coronary artery disease involving native coronary artery of native heart without angina pectoris 12/20/2018   • DVT of lower extremity (deep venous thrombosis) (CMS/[facility])    • Elevated cholesterol    • Hard of hearing    • Hx of blood clots    • Hyperlipidemia    • Hypertension    • Long-term use of high-risk medication    • Parkinson's disease (CMS/[facility])    • Postphlebitic syndrome        Past Surgical History:   Procedure Laterality Date   • BASAL CELL CARCINOMA EXCISION  04/2018   • CARDIAC CATHETERIZATION N/A 11/5/2018    Procedure: Left Heart Cath;  Surgeon: Oliverio Azar MD;  Location: [facility] CATH INVASIVE LOCATION;  Service: Cardiovascular   • CARDIAC CATHETERIZATION N/A 11/5/2018    Procedure: Coronary angiography;  Surgeon: Oliverio Azar MD;  Location: [facility] CATH INVASIVE LOCATION;  Service: Cardiovascular   • CARDIAC CATHETERIZATION N/A 11/5/2018    Procedure: Left ventriculography;  Surgeon: Oliverio Azar MD;  [facility];  Service: Cardiovascular   • CARDIAC CATHETERIZATION N/A 6/4/2019    Procedure: Left Heart Cath;  Surgeon: Johnny Glasgow MD;  Location: [facility] CATH INVASIVE LOCATION;  Service: Cardiovascular   • CARDIAC CATHETERIZATION N/A 6/4/2019    Procedure: Coronary angiography;  Surgeon: Johnny Glasgow MD;  Location: [facility] CATH INVASIVE LOCATION;  Service: Cardiovascular   • CARDIAC CATHETERIZATION N/A 6/4/2019     Procedure: Left ventriculography;  Surgeon: Johnny Glasgow MD;  Location: [facility] CATH INVASIVE LOCATION;  Service: Cardiovascular   • CARDIAC CATHETERIZATION N/A 6/4/2019    Procedure: Stent BILL coronary;  Surgeon: Johnny Glasgow MD;  Location: [facility] CATH INVASIVE LOCATION;  Service: Cardiovascular   • CARDIAC SURGERY     • CATARACT EXTRACTION Left 08/2018   • CATARACT EXTRACTION Right 09/2018   • COLONOSCOPY      2011   • INCISION AND DRAINAGE LEG Right 7/7/2017    Procedure: INCISION AND DRAINAGE INFECTED HEMATOMA RIGHT THIGH;  Surgeon: Valentin Peña MD;  Location: [facility] MAIN OR;  Service:    • JOINT REPLACEMENT     • KNEE INCISION AND DRAINAGE Right 12/27/2016    Procedure: KNEE INCISION AND DRAINAGE;  Surgeon: Triston Whitten MD;  Location: [facility] OR;  Service:    • NOSE SURGERY      nose replacement   • SKIN BIOPSY     • SKIN GRAFT  12/2017   • TOTAL KNEE ARTHROPLASTY Right    • VASCULAR SURGERY     • VENA CAVA FILTER PLACEMENT         Social History     Socioeconomic History   • Marital status:      Spouse name: Not on file   • Number of children: Not on file   • Years of education: college grad   • Highest education level: Not on file   Occupational History   • Occupation: financial advis     Comment: self employed   Tobacco Use   • Smoking status: Never Smoker   • Smokeless tobacco: Never Used   • Tobacco comment: caff use   Substance and Sexual Activity   • Alcohol use: No     Frequency: Never     Comment: none for a month   • Drug use: No   • Sexual activity: Yes     Partners: Female       Review of Systems   Constitution: Positive for malaise/fatigue. Negative for chills, decreased appetite, fever and night sweats.   HENT: Negative for ear discharge, ear pain, hearing loss, nosebleeds and sore throat.    Eyes: Negative for blurred vision, double vision and pain.   Cardiovascular: Negative for cyanosis.   Respiratory: Negative for hemoptysis and sputum production.   "  Endocrine: Negative for cold intolerance and heat intolerance.   Hematologic/Lymphatic: Negative for adenopathy.   Skin: Negative for dry skin, itching, nail changes, rash and suspicious lesions.   Musculoskeletal: Negative for arthritis, gout, muscle cramps, muscle weakness, myalgias and neck pain.   Gastrointestinal: Negative for anorexia, bowel incontinence, constipation, diarrhea, dysphagia, hematemesis and jaundice.   Genitourinary: Negative for bladder incontinence, dysuria, flank pain, frequency, hematuria and nocturia.   Neurological: Negative for focal weakness, numbness, paresthesias and seizures.   Psychiatric/Behavioral: Negative for altered mental status, hallucinations, hypervigilance, suicidal ideas and thoughts of violence.   Allergic/Immunologic: Negative for persistent infections.         ECG 12 Lead  Date/Time: 3/4/2020 3:08 PM  Performed by: Cecilio Armas III, MD  Authorized by: Cecilio Armas III, MD   Comparison: compared with previous ECG   Similar to previous ECG  Rhythm: sinus rhythm  Rate: normal  Conduction: conduction normal  Q waves: V2, V3 and V4    ST Depression: V1, V2 and V3  T inversion: V1, V2, V3, V4, V6 and V5  QRS axis: normal  Other: no other findings    Clinical impression: abnormal EKG               Objective:     Vitals:    03/04/20 1315   BP: 140/80   Pulse: 59   Weight: 81.6 kg (180 lb)   Height: 180.3 cm (71\")         Physical Exam   Constitutional: He is oriented to person, place, and time. He appears well-developed and well-nourished. No distress.   HENT:   Head: Normocephalic and atraumatic.   Nose: Nose normal.   Mouth/Throat: Oropharynx is clear and moist.   Eyes: Pupils are equal, round, and reactive to light. Conjunctivae and EOM are normal. Right eye exhibits no discharge. Left eye exhibits no discharge.   Neck: Normal range of motion. Neck supple. No tracheal deviation present. No thyromegaly present.   Cardiovascular: Normal rate, regular rhythm, S1 normal, " S2 normal, normal heart sounds and normal pulses. Exam reveals no S3.   Pulmonary/Chest: Effort normal and breath sounds normal. No stridor. No respiratory distress. He exhibits no tenderness.   Abdominal: Soft. Bowel sounds are normal. He exhibits no distension and no mass. There is no tenderness. There is no rebound and no guarding.   Musculoskeletal: Normal range of motion. He exhibits edema. He exhibits no tenderness or deformity.   Lymphadenopathy:     He has no cervical adenopathy.   Neurological: He is alert and oriented to person, place, and time. He has normal reflexes.   Skin: Skin is warm and dry. No rash noted. He is not diaphoretic. No erythema.   Psychiatric: He has a normal mood and affect. Thought content normal.       Lab Review:             Performed        Assessment:          Diagnosis Plan   1. Coronary artery disease involving native coronary artery of native heart without angina pectoris  ECG 12 Lead   2. ST elevation myocardial infarction involving left anterior descending (LAD) coronary artery (CMS/[facility])  ECG 12 Lead   3. Presence of IVC filter  ECG 12 Lead   4. Paroxysmal A-fib (CMS/[facility])  ECG 12 Lead   5. Orthostatic hypotension due to Parkinson's disease (CMS/[facility])  ECG 12 Lead   6. Chronic diastolic (congestive) heart failure  ECG 12 Lead   7. Chronic deep vein thrombosis (DVT) of popliteal vein of right lower extremity (CMS/[facility])  ECG 12 Lead   8. Benign essential hypertension  ECG 12 Lead   9. Hypertensive heart disease with heart failure (CMS/[facility])  ECG 12 Lead   10. Anticoagulant long-term use  ECG 12 Lead          Plan:       1. Atrial Fibrillation and Atrial Flutter  Assessment  • The patient has paroxysmal atrial fibrillation  • The patient's CHADS2-VASc score is 3  • A [id number] score of 2 or more is considered a high risk for a thromboembolic event    Plan  • Continue apixaban for antithrombotic therapy, bleeding issues discussed    2. Heart Failure  Assessment  • Beta blocker not  prescribed for patient reasons  • Diuretics prescribed  • Angiotensin receptor blocker (ARB) prescribed    Plan  • The patient has received heart failure education on the following topics: medication instructions, symptom management and weight monitoring  • The heart failure care plan was discussed with the patient today including: continuing the current program    Subjective/Objective    • Physical exam findings negative for peripheral edema and S3 gallop.    3.  History of chronic DVT; Patient remains on chronic anticoagulation and follows with Dr. Dorado. Patient has an IVC filter in place  4.  Coronary Artery Disease  Assessment  • The patient has no angina    Plan  • Lifestyle modifications discussed include adhering to a heart healthy diet, avoidance of tobacco products, maintenance of a healthy weight, medication compliance, regular exercise and regular monitoring of cholesterol and blood pressure  • Toprol has been discontinued in the past because of bradycardia    Subjective - Objective  • There is a history of past MI  • There has been a previous stent procedure using BILL  • Current antiplatelet therapy includes aspirin 81 mg      5.  Essential hypertension-  6.  At this point patient must remain on the combination of Plavix and Eliquis.  He has a class I indication for antiplatelet therapy given his recent drug-eluting stent and underlying coronary disease.  He has a class I indication for chronic anticoagulation with his history of DVT as well as paroxysmal atrial fibrillation.    Thank you very much for allowing us to participate in the care of this pleasant patient.  Please don't hesitate to call if I can be of assistance in any way.      Current Outpatient Medications:   •  atorvastatin (LIPITOR) 20 MG tablet, TAKE 1 TABLET BY MOUTH  DAILY, Disp: 90 tablet, Rfl: 3  •  atropine 1 % ophthalmic solution, 1 drop 3 (Three) Times a Day As Needed (as needed orally for increased secretions). For drooling, Disp:  , Rfl:   •  carbidopa-levodopa (SINEMET)  MG per tablet, 1 tablet 3 (Three) Times a Day., Disp: , Rfl:   •  carboxymethylcellulose (REFRESH PLUS) 0.5 % solution, 1 drop 3 (Three) Times a Day As Needed (eye)., Disp: , Rfl:   •  clopidogrel (PLAVIX) 75 MG tablet, Take 1 tablet by mouth Daily., Disp: 30 tablet, Rfl: 11  •  ELIQUIS 2.5 MG tablet tablet, 2.5 mg Every 12 (Twelve) Hours., Disp: , Rfl:   •  fluticasone (CUTIVATE) 0.05 % cream, APPLY TO AFFECTED AREA TWICE A DAY, Disp: , Rfl: 1  •  losartan (COZAAR) 25 MG tablet, TAKE 2 TABLETS BY MOUTH  DAILY, Disp: 180 tablet, Rfl: 3  •  Multiple Vitamins-Minerals (PRESERVISION AREDS) capsule, Take 1 tablet by mouth 2 (Two) Times a Day., Disp: , Rfl:   •  pramipexole (MIRAPEX) 0.5 MG tablet, Take 0.5 mg by mouth 3 (Three) Times a Day., Disp: , Rfl:

## 2020-05-04 ENCOUNTER — OFFICE VISIT (OUTPATIENT)
Dept: FAMILY MEDICINE CLINIC | Facility: CLINIC | Age: 85
End: 2020-05-04

## 2020-05-04 DIAGNOSIS — G90.3 ORTHOSTATIC HYPOTENSION DUE TO PARKINSON'S DISEASE (HCC): ICD-10-CM

## 2020-05-04 DIAGNOSIS — G20 PARKINSON'S DISEASE (HCC): Primary | ICD-10-CM

## 2020-05-04 PROCEDURE — 99441 PR PHYS/QHP TELEPHONE EVALUATION 5-10 MIN: CPT | Performed by: FAMILY MEDICINE

## 2020-05-04 NOTE — PROGRESS NOTES
Subjective   Casey Snowden Sr. is a 85 y.o. male who is here for   Chief Complaint   Patient presents with   • Hypertension   .     History of Present Illness   Today is telephone medical visit between Dr Aleksander Diez and current patient.  Consent is given by patient for his encounter.  This is occurring during the current COVID 19 pandemic with social isolation mandates per federal and state guidelines. Patient's chart has been reviewed.  10 min visit. S/w he and his wife by speaker phone    Parkinsons is stable  No falls  No choking.  Able to work in the yard.  Staying healthy at home.        Home BP , two times a day:    169/80. In am  187/87. In am    Afternoon 140-150/ after meds  Probably all ok, as avoiding hypotension is more important for Afshin.          The following portions of the patient's history were reviewed and updated as appropriate: allergies, current medications, past family history, past medical history, past social history, past surgical history and problem list.    Review of Systems   Constitutional: Negative for fever.   Respiratory: Negative for shortness of breath.    Cardiovascular: Negative for chest pain.   Neurological: Positive for speech difficulty, weakness and numbness. Negative for dizziness, facial asymmetry, light-headedness and headaches.       Objective   Physical Exam    Assessment/Plan   Casey was seen today for hypertension.    Diagnoses and all orders for this visit:    Parkinson's disease (CMS/[facility])    Orthostatic hypotension due to Parkinson's disease (CMS/[facility])      There are no Patient Instructions on file for this visit.    There are no discontinued medications.     Return in about 6 months (around 11/4/2020) for Medicare Wellness visit.    Dr. Aleksander Diez  [facility]  [place], [place]

## 2020-05-05 DIAGNOSIS — E78.2 MIXED HYPERLIPIDEMIA: ICD-10-CM

## 2020-05-06 RX ORDER — ATORVASTATIN CALCIUM 20 MG/1
20 TABLET, FILM COATED ORAL DAILY
Qty: 90 TABLET | Refills: 3 | Status: SHIPPED | OUTPATIENT
Start: 2020-05-06 | End: 2021-06-22

## 2020-05-26 RX ORDER — CLOPIDOGREL BISULFATE 75 MG/1
TABLET ORAL
Qty: 90 TABLET | Refills: 1 | Status: SHIPPED | OUTPATIENT
Start: 2020-05-26 | End: 2020-10-20

## 2020-07-17 ENCOUNTER — TELEPHONE (OUTPATIENT)
Dept: FAMILY MEDICINE CLINIC | Facility: CLINIC | Age: 85
End: 2020-07-17

## 2020-07-17 NOTE — TELEPHONE ENCOUNTER
[place]  For mondays appointment Wife called and wanted you aware of some things with  since she cant come in the office with him on Monday    WIFE IS CONCERNED WITH WEIGHT LOSS AND CONTINUES TO EATS CANDY AND COKES also there is a SPOT UNDER LEFT EYE PLEASE LOOK AT

## 2020-09-10 ENCOUNTER — OFFICE VISIT (OUTPATIENT)
Dept: CARDIOLOGY | Facility: CLINIC | Age: 85
End: 2020-09-10

## 2020-09-10 VITALS
BODY MASS INDEX: 24.36 KG/M2 | SYSTOLIC BLOOD PRESSURE: 160 MMHG | HEIGHT: 71 IN | HEART RATE: 55 BPM | DIASTOLIC BLOOD PRESSURE: 78 MMHG | WEIGHT: 174 LBS

## 2020-09-10 DIAGNOSIS — Z79.01 ANTICOAGULANT LONG-TERM USE: Chronic | ICD-10-CM

## 2020-09-10 DIAGNOSIS — I10 BENIGN ESSENTIAL HYPERTENSION: Chronic | ICD-10-CM

## 2020-09-10 DIAGNOSIS — Z95.828 PRESENCE OF IVC FILTER: Chronic | ICD-10-CM

## 2020-09-10 DIAGNOSIS — I82.531 CHRONIC DEEP VEIN THROMBOSIS (DVT) OF POPLITEAL VEIN OF RIGHT LOWER EXTREMITY (HCC): Chronic | ICD-10-CM

## 2020-09-10 DIAGNOSIS — G20 PARKINSON'S DISEASE (HCC): ICD-10-CM

## 2020-09-10 DIAGNOSIS — I48.0 PAROXYSMAL A-FIB (HCC): Primary | Chronic | ICD-10-CM

## 2020-09-10 DIAGNOSIS — I21.02 ST ELEVATION MYOCARDIAL INFARCTION INVOLVING LEFT ANTERIOR DESCENDING (LAD) CORONARY ARTERY (HCC): Chronic | ICD-10-CM

## 2020-09-10 DIAGNOSIS — I50.32 CHRONIC DIASTOLIC CONGESTIVE HEART FAILURE (HCC): Chronic | ICD-10-CM

## 2020-09-10 DIAGNOSIS — G90.3 ORTHOSTATIC HYPOTENSION DUE TO PARKINSON'S DISEASE (HCC): ICD-10-CM

## 2020-09-10 DIAGNOSIS — I25.10 CORONARY ARTERY DISEASE INVOLVING NATIVE CORONARY ARTERY OF NATIVE HEART WITHOUT ANGINA PECTORIS: Chronic | ICD-10-CM

## 2020-09-10 PROBLEM — I73.9 PAD (PERIPHERAL ARTERY DISEASE): Chronic | Status: ACTIVE | Noted: 2020-09-10

## 2020-09-10 PROCEDURE — 93000 ELECTROCARDIOGRAM COMPLETE: CPT | Performed by: INTERNAL MEDICINE

## 2020-09-10 PROCEDURE — 99214 OFFICE O/P EST MOD 30 MIN: CPT | Performed by: INTERNAL MEDICINE

## 2020-09-10 RX ORDER — LOSARTAN POTASSIUM 100 MG/1
100 TABLET ORAL DAILY
Qty: 90 TABLET | Refills: 3 | Status: SHIPPED | OUTPATIENT
Start: 2020-09-10 | End: 2020-10-27

## 2020-09-10 NOTE — PROGRESS NOTES
Subjective:     Encounter Date: 09/10/20        Patient ID: Casey Snowden Sr. is a 86 y.o. male.    Chief Complaint:PAF, CAD  History of Present Illness    Dear Dr. Diez,    I had the pleasure of seeing this patient in the office today for f/u.   It has been 6 months since his last visit.  He has a history of paroxysmal atrial fibrillation with elevated CHADS Vasc score of 3 as well as lower extremity DVT.  He also has a history of coronary disease with a drug-eluting stent and an ischemic cardiomyopathy.  Currently on a combination of Eliquis and Plavix.    He states he is doing great with no cardiac complaints.  He has not had any chest pain or chest discomfort.  No shortness of breath.  Sometimes a little bit of a cough that is nonproductive.  No lower extremity edema.  Weight has been stable; his wife thinks he is lost a little bit of weight.  No chills or fevers.  No exposure to anyone known to have COVID-19.  No dizziness or lightheadedness, no presyncope or syncope.  They have been safe and watching out to try to avoid catching COVID-19.  He has not had any palpitations or tachycardia.  No problem with dizziness on standing.  They have noted that his systolic pressure has been running higher in the 160-180 range.  Diastolic is remained okay in the 70s.    He presented to [facility] on 6/3/19 with complaints of light headed and dizziness as well as left sided chest pain as he was working in the yard. He was found to be in atrial fibrillation with RVR, with worse than baseline anterior ST changes. He was rate controlled over night. He was also noted to have pneumonia and was started on IV levaquin. Patient was seen by Dr. Perez on the morning of 6/4. He was noted to jave troponin elevation and was transferred to [facility] for cardiac catheterization. The LAD was totally occluded and was treated with PCI and drug eluting stent placement to the LAD (3.0 x 28 mm Xience Lory  drug eluting stent). Echocardiogram showed EF 40% with an apical wall motion abnormalities. He has been on carvedilol on the past, this was stopped due to bradycardia. He received metoprolol and tolerated well with HR in the 50's. At discharge this was changed to metoprolol succinate given cardiomyopathy. He is also on losartan.     Cath 6/2019  Hemodynamics:   LV: 150/8  AO: 150/64        PCI CORONARY SEGMENT: Mid LAD  PRE-STENOSIS: 100  POST-STENOSIS: 0%  LESION TYPE: c  EREN FLOW PRE/POST: 1/3  CULPRIT LESION: Yes     Estimated blood loss: Minimal  Complications: None     Conclusions:   1. Left main: Normal  2. LAD: Occluded mid segment.  EREN I flow  3. LCX:Normal  4. RCA: 30 to 40% mid vessel stenosis  5.  Normal left ventricular size.  Moderately reduced systolic function.  Ejection fraction 35%.  Mid to distal anterolateral akinesis.  Apical dyskinesis  6.  PCI of the mid LAD with a 3.0 x 28 mm Xience Lory drug-eluting stent, postdilated to high pressure with a 3.25 mm noncompliant trek balloon     Echo 6/2019  Interpretation Summary     · The left ventricular cavity is mildly dilated.  · Left ventricular wall thickness is consistent with mild concentric hypertrophy.  · There is mild calcification of the aortic valve.  · Estimated EF = 40%.  · The following left ventricular wall segments are akinetic: apical anterior, apical lateral, apical inferior, apical septal and apex.  · Left ventricular diastolic dysfunction (grade II) consistent with pseudonormalization.  · Left atrial cavity size is moderately dilated.  · Mild tricuspid valve regurgitation is present.  · Calculated right ventricular systolic pressure from tricuspid regurgitation is 41 mmHg.  · Mild pulmonic valve regurgitation is present.          He has a history of paroxysmal atrial fibrillation and has previously followed with Dr. Mercado.  He has been maintained on chronic anticoagulation.  He apparently never required rate control medicine  due to baseline bradycardia.      The following portions of the patient's history were reviewed and updated as appropriate: allergies, current medications, past family history, past medical history, past social history, past surgical history and problem list.    Past Medical History:   Diagnosis Date   • Atrial fibrillation (CMS/[facility])    • Cancer (CMS/[facility]) 04/2018    basil cell carcinoma   • Carotid stenosis    • CHF (congestive heart failure) (CMS/[facility])    • Chronic deep vein thrombosis (DVT) of popliteal vein of right lower extremity (CMS/[facility])    • Coronary artery disease involving native coronary artery of native heart without angina pectoris 12/20/2018   • DVT of lower extremity (deep venous thrombosis) (CMS/[facility])    • Elevated cholesterol    • Hard of hearing    • Hx of blood clots    • Hyperlipidemia    • Hypertension    • Long-term use of high-risk medication    • Parkinson's disease (CMS/[facility])    • Postphlebitic syndrome        Past Surgical History:   Procedure Laterality Date   • BASAL CELL CARCINOMA EXCISION  04/2018   • CARDIAC CATHETERIZATION N/A 11/5/2018    Procedure: Left Heart Cath;  Surgeon: Oliverio Azar MD;  Location: [facility] CATH INVASIVE LOCATION;  Service: Cardiovascular   • CARDIAC CATHETERIZATION N/A 11/5/2018    Procedure: Coronary angiography;  Surgeon: Oliverio Azar MD;  Location: [facility] CATH INVASIVE LOCATION;  Service: Cardiovascular   • CARDIAC CATHETERIZATION N/A 11/5/2018    Procedure: Left ventriculography;  Surgeon: Oliverio Azar MD;  Location: [facility] CATH INVASIVE LOCATION;  Service: Cardiovascular   • CARDIAC CATHETERIZATION N/A 6/4/2019    Procedure: Left Heart Cath;  Surgeon: Johnny Glasgow MD;  Location: [facility] CATH INVASIVE LOCATION;  Service: Cardiovascular   • CARDIAC CATHETERIZATION N/A 6/4/2019    Procedure: Coronary angiography;  Surgeon: Johnny Glasgow MD;  Location: [facility] CATH INVASIVE LOCATION;  Service: Cardiovascular   • CARDIAC CATHETERIZATION  N/A 6/4/2019    Procedure: Left ventriculography;  Surgeon: Johnny Glasgow MD;  [facility];  Service: Cardiovascular   • CARDIAC CATHETERIZATION N/A 6/4/2019    Procedure: Stent BILL coronary;  Surgeon: Johnny Glasgow MD;  Location:  [facility] CATH INVASIVE [facility];  Service: Cardiovascular   • CARDIAC SURGERY     • CATARACT EXTRACTION Left 08/2018   • CATARACT EXTRACTION Right 09/2018   • COLONOSCOPY      2011   • INCISION AND DRAINAGE LEG Right 7/7/2017    Procedure: INCISION AND DRAINAGE INFECTED HEMATOMA RIGHT THIGH;  Surgeon: Valentin Peña MD;  Location: [facility] MAIN OR;  Service:    • JOINT REPLACEMENT     • KNEE INCISION AND DRAINAGE Right 12/27/2016    Procedure: KNEE INCISION AND DRAINAGE;  Surgeon: Triston Whitten MD;  Location: [facility] MAIN OR;  Service:    • NOSE SURGERY      nose replacement   • SKIN BIOPSY     • SKIN GRAFT  12/2017   • TOTAL KNEE ARTHROPLASTY Right    • VASCULAR SURGERY     • VENA CAVA FILTER PLACEMENT         Social History     Socioeconomic History   • Marital status:      Spouse name: Not on file   • Number of children: Not on file   • Years of education: college grad   • Highest education level: Not on file   Occupational History   • Occupation: financial advis     Comment: self employed   Tobacco Use   • Smoking status: Never Smoker   • Smokeless tobacco: Never Used   • Tobacco comment: caff use   Substance and Sexual Activity   • Alcohol use: No     Frequency: Never     Comment: none for a month   • Drug use: No   • Sexual activity: Yes     Partners: Female       Review of Systems   Constitution: Positive for malaise/fatigue. Negative for chills, decreased appetite, fever and night sweats.   HENT: Negative for ear discharge, ear pain, hearing loss, nosebleeds and sore throat.    Eyes: Negative for blurred vision, double vision and pain.   Cardiovascular: Negative for cyanosis.   Respiratory: Negative for hemoptysis and sputum  "production.    Endocrine: Negative for cold intolerance and heat intolerance.   Hematologic/Lymphatic: Negative for adenopathy.   Skin: Negative for dry skin, itching, nail changes, rash and suspicious lesions.   Musculoskeletal: Negative for arthritis, gout, muscle cramps, muscle weakness, myalgias and neck pain.   Gastrointestinal: Negative for anorexia, bowel incontinence, constipation, diarrhea, dysphagia, hematemesis and jaundice.   Genitourinary: Negative for bladder incontinence, dysuria, flank pain, frequency, hematuria and nocturia.   Neurological: Negative for focal weakness, numbness, paresthesias and seizures.   Psychiatric/Behavioral: Negative for altered mental status, hallucinations, hypervigilance, suicidal ideas and thoughts of violence.   Allergic/Immunologic: Negative for persistent infections.         ECG 12 Lead  Date/Time: 9/10/2020 10:42 AM  Performed by: Cecilio Armas III, MD  Authorized by: Cecilio Armas III, MD   Comparison: compared with previous ECG   Similar to previous ECG  Rhythm: sinus rhythm  Ectopy: unifocal PVCs  Rate: normal  Conduction: conduction normal  Q waves: V1, V2, V3 and V4    ST Elevation: V3 and V4  T inversion: V2, V3, V4, V5 and V6  QRS axis: normal  Other: no other findings    Clinical impression: abnormal EKG               Objective:     Vitals:    09/10/20 0854   BP: 160/78   Pulse: 55   Weight: 78.9 kg (174 lb)   Height: 180.3 cm (71\")         Physical Exam   Constitutional: He is oriented to person, place, and time. He appears well-developed and well-nourished. No distress.   HENT:   Head: Normocephalic and atraumatic.   Nose: Nose normal.   Mouth/Throat: Oropharynx is clear and moist.   Eyes: Pupils are equal, round, and reactive to light. Conjunctivae and EOM are normal. Right eye exhibits no discharge. Left eye exhibits no discharge.   Neck: Normal range of motion. Neck supple. No tracheal deviation present. No thyromegaly present.   Cardiovascular: Normal " rate, regular rhythm, S1 normal, S2 normal, normal heart sounds and normal pulses. Exam reveals no S3.   Pulmonary/Chest: Effort normal and breath sounds normal. No stridor. No respiratory distress. He exhibits no tenderness.   Abdominal: Soft. Bowel sounds are normal. He exhibits no distension and no mass. There is no tenderness. There is no rebound and no guarding.   Musculoskeletal: Normal range of motion. He exhibits edema. He exhibits no tenderness or deformity.   Lymphadenopathy:     He has no cervical adenopathy.   Neurological: He is alert and oriented to person, place, and time. He has normal reflexes.   Skin: Skin is warm and dry. No rash noted. He is not diaphoretic. No erythema.   Psychiatric: He has a normal mood and affect. Thought content normal.       Lab Review:             Performed        Assessment:          Diagnosis Plan   1. Paroxysmal A-fib (CMS/HCC)  losartan (COZAAR) 100 MG tablet    ECG 12 Lead   2. Coronary artery disease involving native coronary artery of native heart without angina pectoris  losartan (COZAAR) 100 MG tablet    ECG 12 Lead   3. Presence of IVC filter  ECG 12 Lead   4. Orthostatic hypotension due to Parkinson's disease (CMS/HCC)  ECG 12 Lead   5. Chronic diastolic (congestive) heart failure  ECG 12 Lead   6. Chronic deep vein thrombosis (DVT) of popliteal vein of right lower extremity (CMS/HCC)  ECG 12 Lead   7. Benign essential hypertension  ECG 12 Lead   8. Parkinson's disease (CMS/HCC)  ECG 12 Lead   9. Anticoagulant long-term use  ECG 12 Lead   10. ST elevation myocardial infarction involving left anterior descending (LAD) coronary artery (CMS/HCC)  ECG 12 Lead          Plan:       1. Atrial Fibrillation and Atrial Flutter  Assessment  • The patient has paroxysmal atrial fibrillation  • The patient's CHADS2-VASc score is 3  • A [id number] score of 2 or more is considered a high risk for a thromboembolic event    Plan  • Continue apixaban for antithrombotic therapy,  bleeding issues discussed    2. Heart Failure  Assessment  • Beta blocker not prescribed for patient reasons  • Diuretics prescribed  • Angiotensin receptor blocker (ARB) prescribed    Plan  • The patient has received heart failure education on the following topics: medication instructions, symptom management and weight monitoring  • The heart failure care plan was discussed with the patient today including: continuing the current program    Subjective/Objective    • Physical exam findings negative for peripheral edema and S3 gallop.    3.  History of chronic DVT; Patient remains on chronic anticoagulation and follows with Dr. Dorado. Patient has an IVC filter in place  4.  Coronary Artery Disease  Assessment  • The patient has no angina    Plan  • Lifestyle modifications discussed include adhering to a heart healthy diet, avoidance of tobacco products, maintenance of a healthy weight, medication compliance, regular exercise and regular monitoring of cholesterol and blood pressure  • Toprol has been discontinued in the past because of bradycardia    Subjective - Objective  • There is a history of past MI  • There has been a previous stent procedure using BILL  • Current antiplatelet therapy includes aspirin 81 mg      5.  Essential hypertension- he also has a history of orthostatic hypotension with his Parkinson's, but he is not been having orthostatic symptoms.  Given his current systolic hypertension and his history of ischemic cardiomyopathy I am going to advance his losartan from 50 to 100 mg daily I will have him call me in 1 or 2 weeks and let me know how his blood pressures running.  6.  At this point patient must remain on the combination of Plavix and Eliquis.  He has a class I indication for antiplatelet therapy given his prior drug-eluting stent and underlying coronary disease.  He has a class I indication for chronic anticoagulation with his history of DVT as well as paroxysmal atrial fibrillation.  7.   Ischemic cardiomyopathy-continue current medical therapy.  As noted above he is not on beta-blockade because of sinus bradycardia.  No heart failure symptoms.  We will arrange for a follow-up in 1 year.  Anticipate we will repeat an echocardiogram at that time.    Thank you very much for allowing us to participate in the care of this pleasant patient.  Please don't hesitate to call if I can be of assistance in any way.      Current Outpatient Medications:   •  atorvastatin (LIPITOR) 20 MG tablet, TAKE 1 TABLET BY MOUTH  DAILY, Disp: 90 tablet, Rfl: 3  •  atropine 1 % ophthalmic solution, 1 drop 3 (Three) Times a Day As Needed (as needed orally for increased secretions). For drooling, Disp: , Rfl:   •  carbidopa-levodopa (SINEMET)  MG per tablet, 1 tablet 3 (Three) Times a Day., Disp: , Rfl:   •  carboxymethylcellulose (REFRESH PLUS) 0.5 % solution, 1 drop 3 (Three) Times a Day As Needed (eye)., Disp: , Rfl:   •  clopidogrel (PLAVIX) 75 MG tablet, TAKE 1 TABLET BY MOUTH EVERY DAY, Disp: 90 tablet, Rfl: 1  •  ELIQUIS 2.5 MG tablet tablet, 2.5 mg Every 12 (Twelve) Hours., Disp: , Rfl:   •  fluticasone (CUTIVATE) 0.05 % cream, APPLY TO AFFECTED AREA TWICE A DAY, Disp: , Rfl: 1  •  losartan (COZAAR) 25 MG tablet, TAKE 2 TABLETS BY MOUTH  DAILY, Disp: 180 tablet, Rfl: 3  •  Multiple Vitamins-Minerals (PRESERVISION AREDS) capsule, Take 1 tablet by mouth 2 (Two) Times a Day., Disp: , Rfl:   •  pramipexole (MIRAPEX) 0.5 MG tablet, Take 0.5 mg by mouth 3 (Three) Times a Day., Disp: , Rfl:

## 2020-09-15 ENCOUNTER — OFFICE VISIT (OUTPATIENT)
Dept: FAMILY MEDICINE CLINIC | Facility: CLINIC | Age: 85
End: 2020-09-15

## 2020-09-15 VITALS
BODY MASS INDEX: 24.56 KG/M2 | HEIGHT: 71 IN | WEIGHT: 175.4 LBS | OXYGEN SATURATION: 99 % | SYSTOLIC BLOOD PRESSURE: 162 MMHG | DIASTOLIC BLOOD PRESSURE: 76 MMHG | HEART RATE: 50 BPM

## 2020-09-15 DIAGNOSIS — Z23 NEED FOR INFLUENZA VACCINATION: ICD-10-CM

## 2020-09-15 DIAGNOSIS — Z00.00 MEDICARE ANNUAL WELLNESS VISIT, SUBSEQUENT: Primary | ICD-10-CM

## 2020-09-15 PROBLEM — Z09 HOSPITAL DISCHARGE FOLLOW-UP: Status: RESOLVED | Noted: 2017-01-04 | Resolved: 2020-09-15

## 2020-09-15 PROCEDURE — G0008 ADMIN INFLUENZA VIRUS VAC: HCPCS | Performed by: FAMILY MEDICINE

## 2020-09-15 PROCEDURE — G0439 PPPS, SUBSEQ VISIT: HCPCS | Performed by: FAMILY MEDICINE

## 2020-09-15 PROCEDURE — 90694 VACC AIIV4 NO PRSRV 0.5ML IM: CPT | Performed by: FAMILY MEDICINE

## 2020-09-15 NOTE — PROGRESS NOTES
The ABCs of the Annual Wellness Visit  Subsequent Medicare Wellness Visit    Chief Complaint   Patient presents with   • Medicare Wellness-subsequent       Subjective   History of Present Illness:  Casey Snowden Sr. is a 86 y.o. male who presents for a Subsequent Medicare Wellness Visit.    HEALTH RISK ASSESSMENT    Recent Hospitalizations:  No hospitalization(s) within the last year.    Current Medical Providers:  Patient Care Team:  Aleksander Diez MD as PCP - General  Marck, Joseph Youngblood MD as Consulting Physician (Ophthalmology)  Ford Dorado MD as Consulting Physician (Vascular Surgery)  Kaye Landa MD as Consulting Physician (Neurology)  Kartik Valadez MD (Dermatology)    Smoking Status:  Social History     Tobacco Use   Smoking Status Never Smoker   Smokeless Tobacco Never Used   Tobacco Comment    caff use       Alcohol Consumption:  Social History     Substance and Sexual Activity   Alcohol Use No   • Frequency: Never    Comment: none for a month       Depression Screen:   PHQ-2/PHQ-9 Depression Screening 9/15/2020   Little interest or pleasure in doing things 0   Feeling down, depressed, or hopeless 0   Trouble falling or staying asleep, or sleeping too much 0   Feeling tired or having little energy 0   Poor appetite or overeating 0   Feeling bad about yourself - or that you are a failure or have let yourself or your family down 0   Trouble concentrating on things, such as reading the newspaper or watching television 0   Moving or speaking so slowly that other people could have noticed. Or the opposite - being so fidgety or restless that you have been moving around a lot more than usual 0   Thoughts that you would be better off dead, or of hurting yourself in some way 0   Total Score 0       Fall Risk Screen:  STEADI Fall Risk Assessment has not been completed.    Health Habits and Functional and Cognitive Screening:  Functional & Cognitive Status 9/15/2020   Do you have  difficulty preparing food and eating? No   Do you have difficulty bathing yourself, getting dressed or grooming yourself? No   Do you have difficulty using the toilet? No   Do you have difficulty moving around from place to place? No   Do you have trouble with steps or getting out of a bed or a chair? No   Current Diet Well Balanced Diet   Dental Exam Up to date   Eye Exam Up to date   Exercise (times per week) 7 times per week   Current Exercise Activities Include Walking   Do you need help using the phone?  No   Are you deaf or do you have serious difficulty hearing?  Yes   Do you need help with transportation? No   Do you need help shopping? No   Do you need help preparing meals?  No   Do you need help with housework?  No   Do you need help with laundry? No   Do you need help taking your medications? No   Do you need help managing money? No   Do you ever drive or ride in a car without wearing a seat belt? No   Have you felt unusual stress, anger or loneliness in the last month? No   Who do you live with? Spouse   If you need help, do you have trouble finding someone available to you? No   Have you been bothered in the last four weeks by sexual problems? No   Do you have difficulty concentrating, remembering or making decisions? No         Does the patient have evidence of cognitive impairment? Yes    Asprin use counseling:On clopidrogel as an alternative (due to ASA contraindication)    Age-appropriate Screening Schedule:  Refer to the list below for future screening recommendations based on patient's age, sex and/or medical conditions. Orders for these recommended tests are listed in the plan section. The patient has been provided with a written plan.    Health Maintenance   Topic Date Due   • TDAP/TD VACCINES (1 - Tdap) 07/12/1953   • LIPID PANEL  06/05/2020   • INFLUENZA VACCINE  Completed   • ZOSTER VACCINE  Completed          The following portions of the patient's history were reviewed and updated as  appropriate: allergies, current medications, past family history, past medical history, past social history, past surgical history and problem list.    Outpatient Medications Prior to Visit   Medication Sig Dispense Refill   • atorvastatin (LIPITOR) 20 MG tablet TAKE 1 TABLET BY MOUTH  DAILY 90 tablet 3   • atropine 1 % ophthalmic solution 1 drop 3 (Three) Times a Day As Needed (as needed orally for increased secretions). For drooling     • carbidopa-levodopa (SINEMET)  MG per tablet 1 tablet 3 (Three) Times a Day.     • carboxymethylcellulose (REFRESH PLUS) 0.5 % solution 1 drop 3 (Three) Times a Day As Needed (eye).     • clopidogrel (PLAVIX) 75 MG tablet TAKE 1 TABLET BY MOUTH EVERY DAY 90 tablet 1   • ELIQUIS 2.5 MG tablet tablet 2.5 mg Every 12 (Twelve) Hours.     • fluticasone (CUTIVATE) 0.05 % cream APPLY TO AFFECTED AREA TWICE A DAY  1   • losartan (COZAAR) 100 MG tablet Take 1 tablet by mouth Daily. 90 tablet 3   • Multiple Vitamins-Minerals (PRESERVISION AREDS) capsule Take 1 tablet by mouth 2 (Two) Times a Day.     • pramipexole (MIRAPEX) 0.5 MG tablet Take 0.5 mg by mouth 3 (Three) Times a Day.       No facility-administered medications prior to visit.        Patient Active Problem List   Diagnosis   • Benign essential hypertension   • Stenosis of carotid artery   • Mixed hyperlipidemia   • Parkinson's disease (CMS/HCC)   • Peripheral arterial occlusive disease (CMS/HCC)   • Routine adult health maintenance   • Screening for prostate cancer   • Medication monitoring encounter   • Melanoma (CMS/[facility])   • Chronic deep vein thrombosis (DVT) of popliteal vein of right lower extremity (CMS/[facility])   • Uses hearing aid   • Paroxysmal A-fib (CMS/[facility])   • Localized edema   • Chronic diastolic (congestive) heart failure   • Anticoagulant long-term use   • Presence of IVC filter   • Medicare annual wellness visit, subsequent   • Iron deficiency anemia secondary to inadequate dietary iron intake   • Orthostatic  "hypotension due to Parkinson's disease (CMS/[facility])   • Coronary artery disease involving native coronary artery of native heart without angina pectoris   • ST elevation myocardial infarction involving left anterior descending (LAD) coronary artery (CMS/[facility])   • Sialorrhea   • Hypertensive heart disease with heart failure (CMS/[facility])   • PAD (peripheral artery disease) (CMS/[facility])       Advanced Care Planning:  ACP discussion was held with the patient during this visit. Patient has an advance directive in EMR which is still valid.     Review of Systems    Compared to one year ago, the patient feels his physical health is worse.  Compared to one year ago, the patient feels his mental health is worse.    Reviewed chart for potential of high risk medication in the elderly: yes  Reviewed chart for potential of harmful drug interactions in the elderly:yes    Objective         Vitals:    09/15/20 1348   BP: 162/76   BP Location: Left arm   Patient Position: Sitting   Cuff Size: Adult   Pulse: 50   SpO2: 99%   Weight: 79.6 kg (175 lb 6.4 oz)   Height: 180.3 cm (71\")       Body mass index is 24.46 kg/m².  Discussed the patient's BMI with him. The BMI is in the acceptable range.    Physical Exam  Vitals signs and nursing note reviewed.   Cardiovascular:      Rate and Rhythm: Rhythm irregular.   Neurological:      Mental Status: He is alert.      Cranial Nerves: Cranial nerve deficit present.      Motor: Weakness present.      Gait: Gait abnormal.   Psychiatric:         Mood and Affect: Mood normal.               Assessment/Plan   Medicare Risks and Personalized Health Plan  CMS Preventative Services Quick Reference  Cardiovascular risk  Dementia/Memory   Depression/Dysphoria  Diabetic Lab Screening   Fall Risk  Immunizations Discussed/Encouraged (specific immunizations; Influenza )    The above risks/problems have been discussed with the patient.  Pertinent information has been shared with the patient in the After Visit " Summary.  Follow up plans and orders are seen below in the Assessment/Plan Section.    Diagnoses and all orders for this visit:    1. Medicare annual wellness visit, subsequent (Primary)    2. Need for influenza vaccination  -     Fluad Quad 65+ yrs ([id number])    parkinson's is stable  No falls  Eating sleeping well    Follow Up:  Return in about 6 months (around 3/15/2021).     An After Visit Summary and PPPS were given to the patient.

## 2020-09-16 ENCOUNTER — TELEPHONE (OUTPATIENT)
Dept: FAMILY MEDICINE CLINIC | Facility: CLINIC | Age: 85
End: 2020-09-16

## 2020-09-16 NOTE — TELEPHONE ENCOUNTER
PT IS CALLING IN TO LEAVE A MESSAGE FOR DR NIXON. PT TAKES LOSARTAN AND THE DOSAGE HAS BEEN INCREASED TO TAKING 4 TABLETS AT 100MG A DAY.  PTS CURRENT PRESCRIPTION CALLS FOR HIM TAKING 50MG.  PT WANTS TO KNOW IF IT IS OK FOR HIM TO TAKE 4 PILLS DAILY OR IF HE NEEDS A NEW PRESCRIPTION WRITTEN.    PT CALL BACK  [phone]  PHARMACY  [facility]  [address] [place] [address]  [phone]

## 2020-10-01 ENCOUNTER — TELEPHONE (OUTPATIENT)
Dept: CARDIOLOGY | Facility: CLINIC | Age: 85
End: 2020-10-01

## 2020-10-01 NOTE — TELEPHONE ENCOUNTER
Pt was seen on 9/10/20. This was the plan:        He was calling today with BP reading since his losartan 100 MG daily.     He will call back next week with his BP reading. He has been taking his BP before taking his medicine.    His BP now is 176/76.    PT #: [phone]

## 2020-10-06 DIAGNOSIS — G90.3 ORTHOSTATIC HYPOTENSION DUE TO PARKINSON'S DISEASE (HCC): Primary | ICD-10-CM

## 2020-10-06 DIAGNOSIS — I11.0 HYPERTENSIVE HEART DISEASE WITH HEART FAILURE (HCC): ICD-10-CM

## 2020-10-06 RX ORDER — CLONIDINE HYDROCHLORIDE 0.1 MG/1
0.1 TABLET ORAL EVERY 12 HOURS SCHEDULED
Status: DISCONTINUED | OUTPATIENT
Start: 2020-10-06 | End: 2021-02-19

## 2020-10-06 RX ORDER — CLONIDINE HYDROCHLORIDE 0.1 MG/1
0.1 TABLET ORAL 2 TIMES DAILY
COMMUNITY
End: 2020-10-06 | Stop reason: SDUPTHER

## 2020-10-06 RX ORDER — CLONIDINE HYDROCHLORIDE 0.1 MG/1
0.1 TABLET ORAL 2 TIMES DAILY
Qty: 60 TABLET | Refills: 0 | Status: SHIPPED | OUTPATIENT
Start: 2020-10-06 | End: 2020-10-30

## 2020-10-06 NOTE — TELEPHONE ENCOUNTER
Pt called. He was wanting to know if Carbidopa-levdopa  TID, pramipexole 0.5 MG TID would conflict with Clonidine 0.1 MG BID.    PT #:[phone]

## 2020-10-06 NOTE — TELEPHONE ENCOUNTER
Call patient-I have sent in a prescription for clonidine 0.1 mg for him to take twice daily.  Have him report back in 1-2 weeks with his blood pressure and how he is feeling.  Have him decrease his losartan back to 25 mg 1 tab daily

## 2020-10-20 ENCOUNTER — TELEPHONE (OUTPATIENT)
Dept: CARDIOLOGY | Facility: CLINIC | Age: 85
End: 2020-10-20

## 2020-10-20 RX ORDER — CLOPIDOGREL BISULFATE 75 MG/1
TABLET ORAL
Qty: 90 TABLET | Refills: 3 | Status: SHIPPED | OUTPATIENT
Start: 2020-10-20 | End: 2021-11-19

## 2020-10-20 NOTE — TELEPHONE ENCOUNTER
Patient left voicemail wanting to report B/P readings. Tried calling patient back, No answer and unable to LVM.

## 2020-10-27 DIAGNOSIS — I48.0 PAROXYSMAL A-FIB (HCC): Chronic | ICD-10-CM

## 2020-10-27 DIAGNOSIS — I25.10 CORONARY ARTERY DISEASE INVOLVING NATIVE CORONARY ARTERY OF NATIVE HEART WITHOUT ANGINA PECTORIS: Chronic | ICD-10-CM

## 2020-10-27 RX ORDER — LOSARTAN POTASSIUM 100 MG/1
50 TABLET ORAL DAILY
Qty: 90 TABLET | Refills: 3
Start: 2020-10-27 | End: 2020-12-04

## 2020-10-27 NOTE — TELEPHONE ENCOUNTER
Pt called today regarding BP reading:    10/17/20: 1:00PM: 190/79     10/19/20: 1:00PM: 156/68    10/22/20: 11:00AM: 153/78    10/26/20: 11:45AM 151/71    TODAY: 184/70    He is taking clonidine 0.1 mg for him to take twice daily and losartan 25MG daily    PT #: [phone]

## 2020-10-27 NOTE — TELEPHONE ENCOUNTER
Please have him increase the losartan back to 50 mg daily and continue the clonidine 0.1 mg BID.  Call in 2 weeks with updated BP

## 2020-10-29 ENCOUNTER — TELEPHONE (OUTPATIENT)
Dept: CARDIOLOGY | Facility: CLINIC | Age: 85
End: 2020-10-29

## 2020-10-29 NOTE — TELEPHONE ENCOUNTER
Pt's wife called the office, very concerned about her , she took his blood pressure this morning at 9 AM it was 139/82 with pulse at 122, at 950 pts blood pressure was 120/97 with a pulse of 122. She took his blood pressure around 10:20 blood pressure was 94/86 with a pulse of 84.    They are currently in [place], wife is very concerned. Pt took all his blood pressure medication this morning at 9 AM.    Pt is having no symptoms.     Wife is wondering if this should be concerning.     [phone]

## 2020-10-29 NOTE — TELEPHONE ENCOUNTER
Patient's spouse called at 1:25 pm, still concerned over his BP and pulse.  She is still awaiting a call for guidance.      11:20 a.m. BP 91/66 with HR of 109  1:15 p.m. BP 97/90 with .      Patient is having no symptoms of lightheadedness, headache, SHORTNESS OF BREATH, chest pressure or pain, weakness or any difficulty breathing.      In addition, he has had these medications today:    25 mg of Losartan  0.1 mg of Clonidine  2.5 mg of Eliquis    Please advise:

## 2020-10-29 NOTE — TELEPHONE ENCOUNTER
Spoke to wife.  They are actually currently out shopping.  The patient denies any symptomology at all.  He states he is feeling great.  The wife admits she is the one worrying.  I gave her parameters to call our office if his heart rate sustains greater than 140 or if he is symptomatic with his blood pressure or heart rate at his current levels.  They will be home on Saturday evening as they are currently out of town.  She voiced understanding.

## 2020-10-30 RX ORDER — CLONIDINE HYDROCHLORIDE 0.1 MG/1
TABLET ORAL
Qty: 60 TABLET | Refills: 0 | Status: SHIPPED | OUTPATIENT
Start: 2020-10-30 | End: 2020-11-18

## 2020-11-10 ENCOUNTER — TELEPHONE (OUTPATIENT)
Dept: CARDIOLOGY | Facility: CLINIC | Age: 85
End: 2020-11-10

## 2020-11-10 NOTE — TELEPHONE ENCOUNTER
Patient wife Evonne calling to report his B/P readings.    11/04     Morning 179/70  Afternoon 172/77  11/05  Morning 180/69  Afternoon 128/56  11/06  Morning 176/75  Afternoon 145/65  11/07  Morning 192/77  11/08  Morning 169/67  11/09  Morning 185/81    No HR reported    Thank you,   Billie Tomlinson LPN

## 2020-11-18 RX ORDER — CLONIDINE HYDROCHLORIDE 0.1 MG/1
TABLET ORAL
Qty: 90 TABLET | Refills: 2 | Status: SHIPPED | OUTPATIENT
Start: 2020-11-18 | End: 2021-02-19

## 2020-12-04 ENCOUNTER — TELEPHONE (OUTPATIENT)
Dept: FAMILY MEDICINE CLINIC | Facility: CLINIC | Age: 85
End: 2020-12-04

## 2020-12-04 ENCOUNTER — OFFICE VISIT (OUTPATIENT)
Dept: FAMILY MEDICINE CLINIC | Facility: CLINIC | Age: 85
End: 2020-12-04

## 2020-12-04 DIAGNOSIS — I11.0 HYPERTENSIVE HEART DISEASE WITH HEART FAILURE (HCC): Primary | ICD-10-CM

## 2020-12-04 DIAGNOSIS — IMO0002 METASTATIC SQUAMOUS CELL CARCINOMA: Primary | ICD-10-CM

## 2020-12-04 PROBLEM — C77.0: Status: ACTIVE | Noted: 2020-12-02

## 2020-12-04 PROCEDURE — 99442 PR PHYS/QHP TELEPHONE EVALUATION 11-20 MIN: CPT | Performed by: FAMILY MEDICINE

## 2020-12-04 RX ORDER — VALSARTAN 160 MG/1
160 TABLET ORAL DAILY
Qty: 90 TABLET | Refills: 3 | Status: SHIPPED | OUTPATIENT
Start: 2020-12-04 | End: 2021-11-11

## 2020-12-04 NOTE — TELEPHONE ENCOUNTER
PTS WIFE JEET IS CALLING IN TO REQUEST A CALL BACK FROM DR NIXON REGARDING A ILLNESS THAT PT CURRENTLY HAS.    JEET CALL BACK  [phone]

## 2020-12-04 NOTE — PROGRESS NOTES
Subjective   Casey Snowden Sr. is a 86 y.o. male who is here for   Chief Complaint   Patient presents with   • Cancer   .     History of Present Illness   Today is telephone medical visit between Dr Aleksander Diez and current patient.  Consent is given by patient for his encounter.  This is occurring during the current COVID-19 pandemic of 2020-1; with social isolation mandates per federal and state guidelines. Patient's chart has been reviewed.  Medical history from chart is reviewed. Patient location is per their choice. Provider location is in my routine medical office in [facility].  Regarding EM coding: history will not be as robust and exam will not be possible. Most EM coding decisions will be based on MDM and time. Time included pre and post charting and time spent speaking with the patient.  20 min call.    Telephone follow-up with Afshin and his wife.  He had his routine vascular scan 2 months ago on his carotids was found to have a right neck mass.  His vascular surgeon probably send him back to his dermatologist, where he is known to have had previous squamous basal and melanoma skin cancers in the past.  Seen by his dermatologist referred then to the plastic surgeon who performed the Mohs surgery for his melanoma.  Surgery was not recommended at that time sent to [facility].  Where he is seen Dr. Dario Li and Dr. Lares.  He has upcoming radiation and chemotherapy to the head and neck.  He apparently has 2 teeth are in bad condition and that needs to be extracted.  Was sent to his dentist here in [place].  Dental extraction is to occur on this December 9 next Wednesday.    Afshin is on both clopidogrel and Eliquis for history of multiple DVTs and paroxysmal atrial fibrillation.  Last EKG in September he is not in A. Fib.    Due to the emergency status he will have to come off both his blood thinners.  So spoke with he and his wife today they understand that today will be his last  Plavix dose.  He may restart the day after surgery on December 10 Thursday.  He will stay on his Eliquis through December 6 on Sunday.  He will not take his Eliquis on the seventh eighth or ninth.  Postop day 1 he can restart.    But again once he starts chemoradiation if he is bleeding from the gums area will have to follow commonsense to take him off the blood thinners again until his gums heal up and then he can restart the medication in a couple days.    Recommended that she go about the grocery stores weekend obtain lots of foods and liquids that time can consume because his mouth can be very sore and dry.  So stock up on Gatorade and ice cream popsicles and soups and chowders.            The following portions of the patient's history were reviewed and updated as appropriate: allergies, current medications, past family history, past medical history, past social history, past surgical history and problem list.    Review of Systems    Objective   Physical Exam    Assessment/Plan   Diagnoses and all orders for this visit:    1. Metastatic squamous cell carcinoma (CMS/HCC) (Primary)      There are no Patient Instructions on file for this visit.    There are no discontinued medications.     Return if symptoms worsen or fail to improve.    Dr. Aleksander Diez  [facility]  [place], [place]

## 2021-02-19 RX ORDER — CLONIDINE HYDROCHLORIDE 0.1 MG/1
TABLET ORAL
Qty: 270 TABLET | Refills: 2 | Status: SHIPPED | OUTPATIENT
Start: 2021-02-19 | End: 2021-11-23 | Stop reason: SDUPTHER

## 2021-03-16 ENCOUNTER — OFFICE VISIT (OUTPATIENT)
Dept: FAMILY MEDICINE CLINIC | Facility: CLINIC | Age: 86
End: 2021-03-16

## 2021-03-16 VITALS
DIASTOLIC BLOOD PRESSURE: 80 MMHG | HEART RATE: 57 BPM | TEMPERATURE: 98.8 F | OXYGEN SATURATION: 96 % | HEIGHT: 71 IN | BODY MASS INDEX: 24.22 KG/M2 | WEIGHT: 173 LBS | SYSTOLIC BLOOD PRESSURE: 140 MMHG

## 2021-03-16 DIAGNOSIS — I82.531 CHRONIC DEEP VEIN THROMBOSIS (DVT) OF POPLITEAL VEIN OF RIGHT LOWER EXTREMITY (HCC): ICD-10-CM

## 2021-03-16 DIAGNOSIS — IMO0002 METASTATIC SQUAMOUS CELL CARCINOMA: Primary | ICD-10-CM

## 2021-03-16 DIAGNOSIS — G20 PARKINSON'S DISEASE (HCC): ICD-10-CM

## 2021-03-16 DIAGNOSIS — I50.32 CHRONIC DIASTOLIC CONGESTIVE HEART FAILURE (HCC): ICD-10-CM

## 2021-03-16 DIAGNOSIS — I48.0 PAROXYSMAL A-FIB (HCC): ICD-10-CM

## 2021-03-16 PROBLEM — Z51.12 ENCOUNTER FOR ANTINEOPLASTIC IMMUNOTHERAPY: Status: ACTIVE | Noted: 2021-01-07

## 2021-03-16 PROCEDURE — 99214 OFFICE O/P EST MOD 30 MIN: CPT | Performed by: FAMILY MEDICINE

## 2021-03-16 NOTE — PROGRESS NOTES
Chief Complaint   Patient presents with   • Hypertension   • Hyperlipidemia       Subjective     Patient here for follow-up of elevated blood pressure.    He is not exercising and is adherent to a low-salt diet.    Blood pressure is well controlled at home.   Cardiac symptoms: fatigue.   Patient denies: chest pain.   Cardiovascular risk factors: advanced age (older than 55 for men, 65 for women), dyslipidemia, family history of premature cardiovascular disease, hypertension and male gender.   Use of agents associated with hypertension: none.   History of target organ damage: angina/ prior myocardial infarction, heart failure, peripheral artery disease and prior coronary revascularization.  Patient is taking prescribed hypertension medications as prescribed without side effects.  Afshin and his wife are here in follow-up.  Afshin is undergoing cancer treatment for metastatic skin squamous cell cancer.  Location is right neck, with extension into right neck lymph nodes.  Receiving care at [facility].  Underwent extensive radiation to the area with Dr. Dario Li.  Now is receiving IV infusions of immunotherapy.    He is not lost weight.  Very dry mouth with eating quite well.    Reviewed his recent PET scan, with resolution of the hot right neck lymph node.  No other metastatic disease is seen.    Reviewed other findings include a large 9 cm left renal cyst.  Which will not require treatment at this point.    Did discuss his small hiatal hernia, he was unaware of this diagnosis.  He has no significant GERD issues.    We updated his med list with new blood pressure medications now on valsartan 160 and clonidine.      Has yet another skin cancer he has several basal cell cancers on the back of his head.  He will have several Mohs surgeries in the next few weeks to remove these.    Had his 2 teeth extracted a few months ago.  Without complication.  Now back on his anticoagulation      The following portions  "of the patient's history were reviewed and updated as appropriate: allergies, current medications, past medical history, past social history, past surgical history and problem list.    Review of Systems  Pertinent items are noted in HPI.         Vitals:    03/16/21 0850   BP: 140/80   BP Location: Left arm   Patient Position: Sitting   Cuff Size: Adult   Pulse: 57   Temp: 98.8 °F (37.1 °C)   TempSrc: Temporal   SpO2: 96%   Weight: 78.5 kg (173 lb)   Height: 180.3 cm (71\")     BP Readings from Last 3 Encounters:   03/16/21 140/80   09/15/20 162/76   09/10/20 160/78     Objective      Gen: alert, pleasant.  Neck: no bruit, no enlarged thyroid  Lungs: CTA  Heart: RR, no murmur  Feet: no edema  Pulses: intact  Skin: Extensive sun damage to the skin from neck above.  Multiple scars.       Assessment/Plan   Hypertension, normal blood pressure Evidence of target organ damage: heart failure, left ventricular hypertrophy and peripheral artery disease.    Diagnoses and all orders for this visit:    1. Metastatic squamous cell carcinoma (CMS/HCC) (Primary)    2. Parkinson's disease (CMS/HCC)    3. Chronic diastolic congestive heart failure (CMS/HCC)    4. 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 Paroxysmal A-fib (CMS/HCC)      He has several follow-ups with multiple specialist as above.  We will see him in August for his Medicare wellness visit  Medication: no change.  Follow up: 4 months and as needed.    There are no Patient Instructions on file for this visit.  There are no discontinued medications.     No follow-ups on file.    Limit salt  Limit alcoholic drinks to 1 a day  Limit caffeine to 1-2 servings a day    Dr. Aleksander Diez MD  [place], [place]  [facility].  "

## 2021-06-22 DIAGNOSIS — E78.2 MIXED HYPERLIPIDEMIA: ICD-10-CM

## 2021-06-22 RX ORDER — ATORVASTATIN CALCIUM 20 MG/1
20 TABLET, FILM COATED ORAL DAILY
Qty: 90 TABLET | Refills: 0 | Status: SHIPPED | OUTPATIENT
Start: 2021-06-22 | End: 2021-09-20 | Stop reason: SDUPTHER

## 2021-09-20 ENCOUNTER — OFFICE VISIT (OUTPATIENT)
Dept: FAMILY MEDICINE CLINIC | Facility: CLINIC | Age: 86
End: 2021-09-20

## 2021-09-20 VITALS
BODY MASS INDEX: 22.65 KG/M2 | SYSTOLIC BLOOD PRESSURE: 150 MMHG | OXYGEN SATURATION: 98 % | HEART RATE: 50 BPM | HEIGHT: 71 IN | WEIGHT: 161.8 LBS | DIASTOLIC BLOOD PRESSURE: 78 MMHG

## 2021-09-20 DIAGNOSIS — IMO0002 METASTATIC SQUAMOUS CELL CARCINOMA: ICD-10-CM

## 2021-09-20 DIAGNOSIS — E78.2 MIXED HYPERLIPIDEMIA: ICD-10-CM

## 2021-09-20 DIAGNOSIS — Z00.00 MEDICARE ANNUAL WELLNESS VISIT, SUBSEQUENT: Primary | ICD-10-CM

## 2021-09-20 PROCEDURE — G0439 PPPS, SUBSEQ VISIT: HCPCS | Performed by: FAMILY MEDICINE

## 2021-09-20 PROCEDURE — 1159F MED LIST DOCD IN RCRD: CPT | Performed by: FAMILY MEDICINE

## 2021-09-20 PROCEDURE — 1170F FXNL STATUS ASSESSED: CPT | Performed by: FAMILY MEDICINE

## 2021-09-20 RX ORDER — LEVOTHYROXINE SODIUM 0.05 MG/1
50 TABLET ORAL DAILY
COMMUNITY
Start: 2021-07-14 | End: 2022-08-31 | Stop reason: SDUPTHER

## 2021-09-20 RX ORDER — ATORVASTATIN CALCIUM 20 MG/1
20 TABLET, FILM COATED ORAL DAILY
Qty: 90 TABLET | Refills: 3 | Status: SHIPPED | OUTPATIENT
Start: 2021-09-20 | End: 2022-08-18

## 2021-09-20 NOTE — PROGRESS NOTES
The ABCs of the Annual Wellness Visit  Subsequent Medicare Wellness Visit    Chief Complaint   Patient presents with   • Medicare Wellness-subsequent      Subjective    History of Present Illness:  Casey Snowden Sr. is a 87 y.o. male who presents for a Subsequent Medicare Wellness Visit.    The following portions of the patient's history were reviewed and   updated as appropriate: allergies, current medications, past medical history, past social history, past surgical history and problem list.    Compared to one year ago, the patient feels his physical   health is worse.    Compared to one year ago, the patient feels his mental   health is worse.    Recent Hospitalizations:  He was admitted within the past 365 days at [facility].       Current Medical Providers:  Patient Care Team:  Aleksander Diez MD as PCP - General  Marck, Joseph Youngblood MD as Consulting Physician (Ophthalmology)  Ford Dorado MD as Consulting Physician (Vascular Surgery)  Kaye Landa MD as Consulting Physician (Neurology)  Kartik Valadez MD (Dermatology)    Outpatient Medications Prior to Visit   Medication Sig Dispense Refill   • atropine 1 % ophthalmic solution 1 drop 3 (Three) Times a Day As Needed (as needed orally for increased secretions). For drooling     • carbidopa-levodopa (SINEMET)  MG per tablet 1 tablet 3 (Three) Times a Day.     • carboxymethylcellulose (REFRESH PLUS) 0.5 % solution 1 drop 3 (Three) Times a Day As Needed (eye).     • cloNIDine (CATAPRES) 0.1 MG tablet TAKE 0.1 MG IN THE AM AND 2 TABLETS IN THE PM. 270 tablet 2   • clopidogrel (PLAVIX) 75 MG tablet TAKE 1 TABLET BY MOUTH EVERY DAY 90 tablet 3   • ELIQUIS 2.5 MG tablet tablet 2.5 mg Every 12 (Twelve) Hours.     • fluticasone (CUTIVATE) 0.05 % cream APPLY TO AFFECTED AREA TWICE A DAY  1   • levothyroxine (SYNTHROID, LEVOTHROID) 50 MCG tablet Take 50 mcg by mouth Daily.     • Multiple Vitamins-Minerals (PRESERVISION  AREDS) capsule Take 1 tablet by mouth 2 (Two) Times a Day.     • pramipexole (MIRAPEX) 0.5 MG tablet Take 0.5 mg by mouth 3 (Three) Times a Day.     • valsartan (DIOVAN) 160 MG tablet Take 1 tablet by mouth Daily. 90 tablet 3   • atorvastatin (LIPITOR) 20 MG tablet TAKE 1 TABLET BY MOUTH  DAILY 90 tablet 0     No facility-administered medications prior to visit.       No opioid medication identified on active medication list. I have reviewed chart for other potential  high risk medication/s and harmful drug interactions in the elderly.          Aspirin is not on active medication list.  Aspirin use is not indicated based on review of current medical condition/s. Risk of harm outweighs potential benefits.  .    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 (CMS/HCC)   • Uses hearing aid   • Paroxysmal A-fib (CMS/HCC)   • Localized edema   • Chronic diastolic (congestive) heart failure   • Anticoagulant long-term use   • Presence of IVC filter   • Medicare annual wellness visit, subsequent   • Iron deficiency anemia secondary to inadequate dietary iron intake   • Orthostatic hypotension due to Parkinson's disease (CMS/HCC)   • Coronary artery disease involving native coronary artery of native heart without angina pectoris   • ST elevation myocardial infarction involving left anterior descending (LAD) coronary artery (CMS/HCC)   • Sialorrhea   • 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  ACP discussion was held with the patient during this visit. Patient has an advance directive (not in EMR), copy requested.          Objective    Vitals:    09/20/21 1028   BP: 150/78   BP Location: Left arm   Patient Position: Sitting   Cuff Size: Adult   Pulse: 50   SpO2: 98%   Weight: 73.4 kg (161 lb 12.8 oz)   Height: 180.3 cm (71\")     BMI Readings from Last 1 Encounters:   09/20/21 22.57 kg/m²   BMI is within normal parameters. No follow-up required.    Does the patient have evidence of cognitive impairment? Yes    Physical Exam  Cardiovascular:      Rate and Rhythm: Normal rate.   Neurological:      Mental Status: He is alert.   Psychiatric:         Mood and Affect: Mood normal.                 HEALTH RISK ASSESSMENT    Smoking Status:  Social History     Tobacco Use   Smoking Status Never Smoker   Smokeless Tobacco Never Used   Tobacco Comment    caff use     Alcohol Consumption:  Social History     Substance and Sexual Activity   Alcohol Use No    Comment: none for a month     Fall Risk Screen:    [facility] Fall Risk Assessment has not been completed.    Depression Screening:  PHQ-2/PHQ-9 Depression Screening 9/20/2021   Little interest or pleasure in doing things 0   Feeling down, depressed, or hopeless 0   Trouble falling or staying asleep, or sleeping too much -   Feeling tired or having little energy -   Poor appetite or overeating -   Feeling bad about yourself - or that you are a failure or have let yourself or your family down -   Trouble concentrating on things, such as reading the newspaper or watching television -   Moving or speaking so slowly that other people could have noticed. Or the opposite - being so fidgety or restless that you have been moving around a lot more than usual -   Thoughts that you would be better off dead, or of hurting yourself in some way - "   Total Score 0       Health Habits and Functional and Cognitive Screening:  Functional & Cognitive Status 9/20/2021   Do you have difficulty preparing food and eating? No   Do you have difficulty bathing yourself, getting dressed or grooming yourself? No   Do you have difficulty using the toilet? No   Do you have difficulty moving around from place to place? No   Do you have trouble with steps or getting out of a bed or a chair? No   Current Diet Well Balanced Diet   Dental Exam Up to date   Eye Exam Up to date   Exercise (times per week) 4 times per week   Current Exercises Include Treadmill   Current Exercise Activities Include -   Do you need help using the phone?  No   Are you deaf or do you have serious difficulty hearing?  Yes   Do you need help with transportation? No   Do you need help shopping? No   Do you need help preparing meals?  No   Do you need help with housework?  No   Do you need help with laundry? No   Do you need help taking your medications? No   Do you need help managing money? No   Do you ever drive or ride in a car without wearing a seat belt? No   Have you felt unusual stress, anger or loneliness in the last month? No   Who do you live with? Spouse   If you need help, do you have trouble finding someone available to you? No   Have you been bothered in the last four weeks by sexual problems? No   Do you have difficulty concentrating, remembering or making decisions? No       Age-appropriate Screening Schedule:  Refer to the list below for future screening recommendations based on patient's age, sex and/or medical conditions. Orders for these recommended tests are listed in the plan section. The patient has been provided with a written plan.    Health Maintenance   Topic Date Due   • TDAP/TD VACCINES (2 - Tdap) 08/20/2009   • LIPID PANEL  06/05/2020   • INFLUENZA VACCINE  10/01/2021   • ZOSTER VACCINE  Completed              Assessment/Plan   CMS Preventative Services Quick Reference  Risk  Factors Identified During Encounter  Cardiovascular Disease  Dementia/Memory   Depression/Dysphoria  Immunizations Discussed/Encouraged (specific Immunizations; Influenza and COVID19  The above risks/problems have been discussed with the patient.  Follow up actions/plans if indicated are seen below in the Assessment/Plan Section.  Pertinent information has been shared with the patient in the After Visit Summary.    Diagnoses and all orders for this visit:    1. Medicare annual wellness visit, subsequent (Primary)    2. Metastatic squamous cell carcinoma (CMS/HCC)    3. Mixed hyperlipidemia  -     atorvastatin (LIPITOR) 20 MG tablet; Take 1 tablet by mouth Daily. Indications: Elevation of Both Cholesterol and Triglycerides in Blood  Dispense: 90 tablet; Refill: 3    Afshin has had a rough year.  After having his carotids rechecked at his vascular surgeons office to follow-up on known carotid disease a large neck mass was seen.  End up being metastatic squamous cell cancer.  Has been with [facility] for the entire year.  Had radiation and immunotherapy.  Good news his last PET scan is all clear of obvious cancer    He has lost weight.    His wife will call the [facility] department as time is elbow for the COVID-19 booster shot.  Then 2 weeks after that he should get his high-dose flu shot    His PET scan in [facility] did reveal a 3.8 AAA.  We can reultrasound that area in 1 year.  Although I assume his vascular surgery follow-up will address this        Follow Up:   No follow-ups on file.     An After Visit Summary and PPPS were made available to the patient.

## 2021-09-30 ENCOUNTER — OFFICE VISIT (OUTPATIENT)
Dept: CARDIOLOGY | Facility: CLINIC | Age: 86
End: 2021-09-30

## 2021-09-30 VITALS
BODY MASS INDEX: 22.26 KG/M2 | SYSTOLIC BLOOD PRESSURE: 120 MMHG | OXYGEN SATURATION: 92 % | DIASTOLIC BLOOD PRESSURE: 84 MMHG | WEIGHT: 159 LBS | RESPIRATION RATE: 16 BRPM | HEIGHT: 71 IN | HEART RATE: 48 BPM

## 2021-09-30 DIAGNOSIS — I82.531 CHRONIC DEEP VEIN THROMBOSIS (DVT) OF POPLITEAL VEIN OF RIGHT LOWER EXTREMITY (HCC): ICD-10-CM

## 2021-09-30 DIAGNOSIS — G20 PARKINSON'S DISEASE (HCC): ICD-10-CM

## 2021-09-30 DIAGNOSIS — I48.0 PAROXYSMAL A-FIB (HCC): Primary | ICD-10-CM

## 2021-09-30 DIAGNOSIS — Z95.828 PRESENCE OF IVC FILTER: ICD-10-CM

## 2021-09-30 DIAGNOSIS — I25.10 CORONARY ARTERY DISEASE INVOLVING NATIVE CORONARY ARTERY OF NATIVE HEART WITHOUT ANGINA PECTORIS: ICD-10-CM

## 2021-09-30 DIAGNOSIS — I50.32 CHRONIC DIASTOLIC CONGESTIVE HEART FAILURE (HCC): ICD-10-CM

## 2021-09-30 DIAGNOSIS — I10 BENIGN ESSENTIAL HYPERTENSION: ICD-10-CM

## 2021-09-30 DIAGNOSIS — G90.3 ORTHOSTATIC HYPOTENSION DUE TO PARKINSON'S DISEASE (HCC): ICD-10-CM

## 2021-09-30 PROCEDURE — 93000 ELECTROCARDIOGRAM COMPLETE: CPT | Performed by: INTERNAL MEDICINE

## 2021-09-30 PROCEDURE — 99214 OFFICE O/P EST MOD 30 MIN: CPT | Performed by: INTERNAL MEDICINE

## 2021-09-30 NOTE — PROGRESS NOTES
Subjective:     Encounter Date: 09/30/21        Patient ID: Casey Snowden Sr. is a 87 y.o. male.    Chief Complaint:PAF, CAD  History of Present Illness    Dear Dr. Diez,    I had the pleasure of seeing this patient in the office today for f/u.   It has been 6 months since his last visit.  He has a history of paroxysmal atrial fibrillation with elevated CHADS Vasc score of 3 as well as lower extremity DVT.  He also has a history of coronary disease with a drug-eluting stent and an ischemic cardiomyopathy.  Currently on a combination of Eliquis and Plavix.    He has had a tough year.  He was diagnosed with metastatic squamous cell carcinoma.  He is undergoing radiation therapy to his neck as well as chemotherapy.  He just finished chemotherapy.    He states that he has lost weight.  He is lost about 15 to 20 pounds.  He is not been trying to lose weight.    No cardiac complaints.  No chest pain or chest discomfort.  No palpitations.  He has not had any problem with new lower extremity edema, he does have edema in the left leg since his DVT.  No dizziness or lightheadedness.  Lately his blood pressure has been elevated and this is being followed.    He presented to [facility] on 6/3/19 with complaints of light headed and dizziness as well as left sided chest pain as he was working in the yard. He was found to be in atrial fibrillation with RVR, with worse than baseline anterior ST changes. He was rate controlled over night. He was also noted to have pneumonia and was started on IV levaquin. Patient was seen by Dr. Perez on the morning of 6/4. He was noted to jave troponin elevation and was transferred to [facility] for cardiac catheterization. The LAD was totally occluded and was treated with PCI and drug eluting stent placement to the LAD (3.0 x 28 mm Xience Lory drug eluting stent). Echocardiogram showed EF 40% with an apical wall motion abnormalities. He has been on carvedilol  on the past, this was stopped due to bradycardia. He received metoprolol and tolerated well with HR in the 50's. At discharge this was changed to metoprolol succinate given cardiomyopathy. He is also on losartan.     Cath 6/2019  Hemodynamics:   LV: 150/8  AO: 150/64        PCI CORONARY SEGMENT: Mid LAD  PRE-STENOSIS: 100  POST-STENOSIS: 0%  LESION TYPE: c  EREN FLOW PRE/POST: 1/3  CULPRIT LESION: Yes     Estimated blood loss: Minimal  Complications: None     Conclusions:   1. Left main: Normal  2. LAD: Occluded mid segment.  EREN I flow  3. LCX:Normal  4. RCA: 30 to 40% mid vessel stenosis  5.  Normal left ventricular size.  Moderately reduced systolic function.  Ejection fraction 35%.  Mid to distal anterolateral akinesis.  Apical dyskinesis  6.  PCI of the mid LAD with a 3.0 x 28 mm Xience Lory drug-eluting stent, postdilated to high pressure with a 3.25 mm noncompliant trek balloon     Echo 6/2019  Interpretation Summary     · The left ventricular cavity is mildly dilated.  · Left ventricular wall thickness is consistent with mild concentric hypertrophy.  · There is mild calcification of the aortic valve.  · Estimated EF = 40%.  · The following left ventricular wall segments are akinetic: apical anterior, apical lateral, apical inferior, apical septal and apex.  · Left ventricular diastolic dysfunction (grade II) consistent with pseudonormalization.  · Left atrial cavity size is moderately dilated.  · Mild tricuspid valve regurgitation is present.  · Calculated right ventricular systolic pressure from tricuspid regurgitation is 41 mmHg.  · Mild pulmonic valve regurgitation is present.          He has a history of paroxysmal atrial fibrillation and has previously followed with Dr. Mercado.  He has been maintained on chronic anticoagulation.  He apparently never required rate control medicine due to baseline bradycardia.      The following portions of the patient's history were reviewed and updated as  appropriate: allergies, current medications, past family history, past medical history, past social history, past surgical history and problem list.    Past Medical History:   Diagnosis Date   • Atrial fibrillation (CMS/[facility])    • Cancer (CMS/[facility]) 04/2018    basil cell carcinoma   • Carotid stenosis    • CHF (congestive heart failure) (CMS/[facility])    • Chronic deep vein thrombosis (DVT) of popliteal vein of right lower extremity (CMS/[facility])    • Coronary artery disease involving native coronary artery of native heart without angina pectoris 12/20/2018   • DVT of lower extremity (deep venous thrombosis) (CMS/[facility])    • Hard of hearing    • Long-term use of high-risk medication    • PAD (peripheral artery disease) (CMS/[facility]) 9/10/2020   • Parkinson's disease (CMS/[facility])    • Postphlebitic syndrome        Past Surgical History:   Procedure Laterality Date   • BASAL CELL CARCINOMA EXCISION  04/2018   • CARDIAC CATHETERIZATION N/A 11/5/2018    Procedure: Left Heart Cath;  Surgeon: Oliverio Azar MD;  Location: [facility] CATH INVASIVE LOCATION;  Service: Cardiovascular   • CARDIAC CATHETERIZATION N/A 11/5/2018    Procedure: Coronary angiography;  Surgeon: Oliverio Azar MD;  [facility];  Service: Cardiovascular   • CARDIAC CATHETERIZATION N/A 11/5/2018    Procedure: Left ventriculography;  Surgeon: Oliverio Azar MD;  [facility];  Service: Cardiovascular   • CARDIAC CATHETERIZATION N/A 6/4/2019    Procedure: Left Heart Cath;  Surgeon: Johnny Glasgow MD;  Location: [facility] CATH INVASIVE LOCATION;  Service: Cardiovascular   • CARDIAC CATHETERIZATION N/A 6/4/2019    Procedure: Coronary angiography;  Surgeon: Johnny Glasgow MD;  [facility];  Service: Cardiovascular   • CARDIAC CATHETERIZATION N/A 6/4/2019    Procedure: Left ventriculography;  Surgeon: Johnny Glasgow MD;  Location: [facility] CATH INVASIVE LOCATION;  Service:  "Cardiovascular   • CARDIAC CATHETERIZATION N/A 6/4/2019    Procedure: Stent BILL coronary;  Surgeon: Johnny Glasgow MD;  [facility];  Service: Cardiovascular   • CARDIAC SURGERY     • CATARACT EXTRACTION Left 08/2018   • CATARACT EXTRACTION Right 09/2018   • COLONOSCOPY      2011   • INCISION AND DRAINAGE LEG Right 7/7/2017    Procedure: INCISION AND DRAINAGE INFECTED HEMATOMA RIGHT THIGH;  Surgeon: Valentin Peña MD;  Location: [facility] MAIN OR;  Service:    • JOINT REPLACEMENT     • KNEE INCISION AND DRAINAGE Right 12/27/2016    Procedure: KNEE INCISION AND DRAINAGE;  Surgeon: Triston Whitten MD;  Location: [facility] MAIN OR;  Service:    • NOSE SURGERY      nose replacement   • SKIN BIOPSY     • SKIN GRAFT  12/2017   • TOTAL KNEE ARTHROPLASTY Right    • VASCULAR SURGERY     • VENA CAVA FILTER PLACEMENT               ECG 12 Lead    Date/Time: 9/30/2021 10:33 AM  Performed by: Cecilio Armas III, MD  Authorized by: Cecilio Armas III, MD   Comparison: compared with previous ECG   Similar to previous ECG  Rhythm: sinus rhythm  Rate: normal  Conduction: conduction normal  Q waves: V2 and V3    ST Elevation: V2 and V3  T inversion: V3 and V4  T flattening: V5 and V6  QRS axis: normal  Other: no other findings    Clinical impression: abnormal EKG               Objective:     Vitals:    09/30/21 1019   BP: 120/84   Pulse: (!) 48   Resp: 16   SpO2: 92%   Weight: 72.1 kg (159 lb)   Height: 180.3 cm (71\")         Physical Exam  Constitutional:       General: He is not in acute distress.     Appearance: He is well-developed. He is not diaphoretic.   HENT:      Head: Normocephalic and atraumatic.      Nose: Nose normal.   Eyes:      General:         Right eye: No discharge.         Left eye: No discharge.      Conjunctiva/sclera: Conjunctivae normal.   Neck:      Thyroid: No thyromegaly.      Trachea: No tracheal deviation.   Cardiovascular:      Rate and Rhythm: Normal rate and regular " rhythm.      Pulses: Normal pulses.      Heart sounds: Normal heart sounds, S1 normal and S2 normal. No S3 sounds.    Pulmonary:      Effort: Pulmonary effort is normal. No respiratory distress.      Breath sounds: Normal breath sounds. No stridor.   Chest:      Chest wall: No tenderness.   Abdominal:      General: Bowel sounds are normal. There is no distension.      Palpations: Abdomen is soft. There is no mass.      Tenderness: There is no abdominal tenderness. There is no guarding or rebound.   Musculoskeletal:         General: No tenderness or deformity. Normal range of motion.      Cervical back: Normal range of motion and neck supple.      Left lower leg: Edema present.   Lymphadenopathy:      Cervical: No cervical adenopathy.   Skin:     General: Skin is warm and dry.      Findings: No erythema or rash.   Neurological:      Mental Status: He is alert and oriented to person, place, and time.      Deep Tendon Reflexes: Reflexes are normal and symmetric.   Psychiatric:         Thought Content: Thought content normal.         Lab Review:             Lab Results   Component Value Date    GLUCOSE 102 (H) 06/05/2019    BUN 17 03/18/2021    CREATININE 0.70 03/18/2021    EGFRIFNONA 99 06/05/2019    EGFRIFAFRI 102 10/27/2017    BCR 24.3 03/18/2021    K 4.4 03/18/2021    CO2 27 03/18/2021    CALCIUM 8.7 03/18/2021    PROTENTOTREF 6.1 04/25/2017    ALBUMIN 3.8 03/18/2021    LABIL2 1.5 03/18/2021    AST 20 03/18/2021    ALT 6 03/18/2021     Results for orders placed during the hospital encounter of 06/03/19    Adult Transthoracic Echo Complete W/ Cont if Necessary Per Protocol    Interpretation Summary  · The left ventricular cavity is mildly dilated.  · Left ventricular wall thickness is consistent with mild concentric hypertrophy.  · There is mild calcification of the aortic valve.  · Estimated EF = 40%.  · The following left ventricular wall segments are akinetic: apical anterior, apical lateral, apical inferior,  apical septal and apex.  · Left ventricular diastolic dysfunction (grade II) consistent with pseudonormalization.  · Left atrial cavity size is moderately dilated.  · Mild tricuspid valve regurgitation is present.  · Calculated right ventricular systolic pressure from tricuspid regurgitation is 41 mmHg.  · Mild pulmonic valve regurgitation is present.            Assessment:          Diagnosis Plan   1. Paroxysmal A-fib (HCC)  ECG 12 Lead   2. Coronary artery disease involving native coronary artery of native heart without angina pectoris  ECG 12 Lead   3. 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)  ECG 12 Lead   4. Presence of IVC filter  ECG 12 Lead   5. Chronic diastolic (congestive) heart failure  ECG 12 Lead   6. Chronic deep vein thrombosis (DVT) of popliteal vein of right lower extremity (HCC)  ECG 12 Lead   7. Benign essential hypertension  ECG 12 Lead   8. Parkinson's disease (HCC)  ECG 12 Lead          Plan:       1. Atrial Fibrillation and Atrial Flutter  Assessment  • The patient has paroxysmal atrial fibrillation  • The patient's CHADS2-VASc score is 3  • A [id number] score of 2 or more is considered a high risk for a thromboembolic event  • Apixaban prescribed    Plan  • Attempt to maintain sinus rhythm  • Continue apixaban for antithrombotic therapy, bleeding issues discussed  • Sinus bradycardia no rate control meds    2. Heart Failure  Assessment  • Beta blocker not prescribed for patient reasons  • Diuretics prescribed  • Angiotensin receptor blocker (ARB) prescribed    Plan  • The patient has received heart failure education on the following topics: medication instructions, symptom management and weight monitoring  • The heart failure care plan was discussed with the patient today including: continuing the current program    Subjective/Objective    • Physical exam findings negative for peripheral edema and S3 gallop.    3.  History of chronic DVT; Patient remains on chronic  anticoagulation and follows with Dr. Dorado. Patient has an IVC filter in place, chronic left lower extremity edema  4.  Coronary Artery Disease  Assessment  • The patient has no angina    Plan  • Lifestyle modifications discussed include adhering to a heart healthy diet, avoidance of tobacco products, maintenance of a healthy weight, medication compliance, regular exercise and regular monitoring of cholesterol and blood pressure  • Toprol has been discontinued in the past because of bradycardia    Subjective - Objective  • There is a history of past MI  • There has been a previous stent procedure using BILL  • Current antiplatelet therapy includes aspirin 81 mg      5.  Essential hypertension- he also has a history of orthostatic hypotension with his Parkinson's, but he is not been having orthostatic symptoms.  I will have him follow his blood pressure for now.  6.  At this point patient remains on the combination of Plavix and Eliquis.  He has a class I indication for antiplatelet therapy given his prior drug-eluting stent and underlying coronary disease.  He has a class I indication for chronic anticoagulation with his history of DVT as well as paroxysmal atrial fibrillation.  7.  Ischemic cardiomyopathy-continue current medical therapy.  As noted above he is not on beta-blockade because of sinus bradycardia.  No heart failure symptoms.  We will arrange for a follow-up in 1 year.  Anticipate we will repeat an echocardiogram at that time.    Thank you very much for allowing us to participate in the care of this pleasant patient.  Please don't hesitate to call if I can be of assistance in any way.      Current Outpatient Medications:   •  atorvastatin (LIPITOR) 20 MG tablet, Take 1 tablet by mouth Daily. Indications: Elevation of Both Cholesterol and Triglycerides in Blood, Disp: 90 tablet, Rfl: 3  •  atropine 1 % ophthalmic solution, 1 drop 3 (Three) Times a Day As Needed (as needed orally for increased secretions).  For drooling, Disp: , Rfl:   •  carbidopa-levodopa (SINEMET)  MG per tablet, 1 tablet 3 (Three) Times a Day., Disp: , Rfl:   •  carboxymethylcellulose (REFRESH PLUS) 0.5 % solution, 1 drop 3 (Three) Times a Day As Needed (eye)., Disp: , Rfl:   •  cloNIDine (CATAPRES) 0.1 MG tablet, TAKE 0.1 MG IN THE AM AND 2 TABLETS IN THE PM., Disp: 270 tablet, Rfl: 2  •  clopidogrel (PLAVIX) 75 MG tablet, TAKE 1 TABLET BY MOUTH EVERY DAY, Disp: 90 tablet, Rfl: 3  •  ELIQUIS 2.5 MG tablet tablet, 2.5 mg Every 12 (Twelve) Hours., Disp: , Rfl:   •  fluticasone (CUTIVATE) 0.05 % cream, APPLY TO AFFECTED AREA TWICE A DAY, Disp: , Rfl: 1  •  levothyroxine (SYNTHROID, LEVOTHROID) 50 MCG tablet, Take 50 mcg by mouth Daily., Disp: , Rfl:   •  Multiple Vitamins-Minerals (PRESERVISION AREDS) capsule, Take 1 tablet by mouth 2 (Two) Times a Day., Disp: , Rfl:   •  pramipexole (MIRAPEX) 0.5 MG tablet, Take 0.5 mg by mouth 3 (Three) Times a Day., Disp: , Rfl:   •  valsartan (DIOVAN) 160 MG tablet, Take 1 tablet by mouth Daily., Disp: 90 tablet, Rfl: 3

## 2021-11-11 DIAGNOSIS — I11.0 HYPERTENSIVE HEART DISEASE WITH HEART FAILURE (HCC): ICD-10-CM

## 2021-11-11 RX ORDER — VALSARTAN 160 MG/1
TABLET ORAL
Qty: 90 TABLET | Refills: 3 | Status: SHIPPED | OUTPATIENT
Start: 2021-11-11 | End: 2022-08-22 | Stop reason: SDUPTHER

## 2021-11-19 RX ORDER — CLOPIDOGREL BISULFATE 75 MG/1
TABLET ORAL
Qty: 90 TABLET | Refills: 3 | Status: SHIPPED | OUTPATIENT
Start: 2021-11-19 | End: 2022-11-28

## 2021-11-23 RX ORDER — CLONIDINE HYDROCHLORIDE 0.1 MG/1
TABLET ORAL
Qty: 270 TABLET | Refills: 2 | Status: SHIPPED | OUTPATIENT
Start: 2021-11-23 | End: 2022-07-12 | Stop reason: SDUPTHER

## 2021-12-07 ENCOUNTER — TELEPHONE (OUTPATIENT)
Dept: FAMILY MEDICINE CLINIC | Facility: CLINIC | Age: 86
End: 2021-12-07

## 2021-12-07 NOTE — TELEPHONE ENCOUNTER
Apply cortisone cream to the area 2 times a day.  Take a Claritin once a day  Apply cold pack to the area 3 times a day for 20 minutes  Should resolve in a several days

## 2021-12-07 NOTE — TELEPHONE ENCOUNTER
PATIENT LEFT ARM WHERE HE GOT THE BOOSTER FOR COVID VACCINE IS BROKE OUT IN A RASH FROM SHOULDER TO ELBOW. IT HAS STOP ITCHING BUT IS STILL RED.       CALLER: JEET  CALLBACK # [phone]

## 2022-01-11 ENCOUNTER — TELEPHONE (OUTPATIENT)
Dept: CARDIOLOGY | Facility: CLINIC | Age: 87
End: 2022-01-11

## 2022-01-11 NOTE — TELEPHONE ENCOUNTER
Patient made aware and will notify the RN.    Thank you,  Jennifer Hanks RN  [facility]  Triage

## 2022-01-11 NOTE — TELEPHONE ENCOUNTER
This patient is part of a case management program with [facility].  The nurse who called has just taken over his case.  If he is logged into their system, they get his VS readings.  This morning before his meds, his BP was 206/73 HR50. Patient told the nurse that he was very stressed with family matters at that time. After taking his am meds, his BP came down to 174/67 HR46.    She has a message stating that Dr. Armas is aware that he is still hypertensive. Patient is keeping a log.  Typically his BP runs 160-170s/60s.    She wanted to make us aware and make sure this is still something we just want to monitor.  She didn't have a current med list.  Our MAR has him on Clonidine 0.1mg 1 tablet in the am and 2 tablets in the pm. And  Valsartan 160mg 1 tablet every other day.    Patient is asymptomatic and denied any complaints.    Thank you,  Jennifer Hanks RN  [facility]  Triage

## 2022-01-11 NOTE — TELEPHONE ENCOUNTER
He is elderly and chronically ill and was going through chemo and radiation in September.  His blood pressures have been stable since that time.  He also has a history of orthostatic hypotension.  I would not change anything at this time.  If he becomes symptomatic let us know.  Please let the nursing staff know.

## 2022-03-09 ENCOUNTER — OFFICE VISIT (OUTPATIENT)
Dept: FAMILY MEDICINE CLINIC | Facility: CLINIC | Age: 87
End: 2022-03-09

## 2022-03-09 VITALS
OXYGEN SATURATION: 100 % | SYSTOLIC BLOOD PRESSURE: 144 MMHG | BODY MASS INDEX: 22.96 KG/M2 | TEMPERATURE: 97.3 F | DIASTOLIC BLOOD PRESSURE: 74 MMHG | HEIGHT: 71 IN | WEIGHT: 164 LBS | HEART RATE: 43 BPM

## 2022-03-09 DIAGNOSIS — G90.3 ORTHOSTATIC HYPOTENSION DUE TO PARKINSON'S DISEASE: Primary | ICD-10-CM

## 2022-03-09 PROBLEM — G47.52 RBD (REM BEHAVIORAL DISORDER): Status: ACTIVE | Noted: 2022-01-20

## 2022-03-09 PROCEDURE — 99213 OFFICE O/P EST LOW 20 MIN: CPT | Performed by: FAMILY MEDICINE

## 2022-03-09 RX ORDER — ATROPINE SULFATE 10 MG/ML
1 SOLUTION/ DROPS OPHTHALMIC
COMMUNITY
Start: 2022-01-25 | End: 2022-03-09 | Stop reason: SDUPTHER

## 2022-03-09 NOTE — PROGRESS NOTES
"  Subjective   Casey Snowden Sr. is a 87 y.o. male who is here for   Chief Complaint   Patient presents with   • Hyperlipidemia   • Parkinson's Disease   • Hypotension   .     History of Present Illness   Afshin comes in with his wife today.  Overall doing fairly well.  Has finished up all his metastatic skin squamous cell cancer radiation.  Also went through chemotherapy.  He has lost weight.  But it has leveled off.  His appetite is good.  No recent falling.  Biggest issue is his hearing loss he has bilateral hearing aids.  Reviewed his recent specialist notes.  Updated medication list.  His home blood pressures are still elevated 160  But again he has orthostatic hypotension due to his Parkinson's.  So do not recommend more aggressive antihypertensive medications      The following portions of the patient's history were reviewed and updated as appropriate: allergies, current medications, past family history, past medical history, past social history, past surgical history and problem list.    Review of Systems    Objective   Vitals:    03/09/22 1530   BP: 144/74   BP Location: Left arm   Patient Position: Sitting   Cuff Size: Adult   Pulse: (!) 43   Temp: 97.3 °F (36.3 °C)   SpO2: 100%   Weight: 74.4 kg (164 lb)   Height: 180.3 cm (71\")      Physical Exam  Vitals reviewed.   Cardiovascular:      Rate and Rhythm: Normal rate.   Neurological:      Mental Status: He is alert.      Motor: Weakness present.      Gait: Gait abnormal.         Assessment/Plan   Diagnoses and all orders for this visit:    1. 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) (Primary)      There are no Patient Instructions on file for this visit.    Medications Discontinued During This Encounter   Medication Reason   • atropine 1 % ophthalmic solution Duplicate order   • fluticasone (CUTIVATE) 0.05 % cream *Therapy completed        Return in about 6 months (around 9/9/2022).    Dr. Aleksander Diez  [facility]  [place], [place]

## 2022-07-12 RX ORDER — CLONIDINE HYDROCHLORIDE 0.1 MG/1
TABLET ORAL
Qty: 270 TABLET | Refills: 2 | Status: SHIPPED | OUTPATIENT
Start: 2022-07-12 | End: 2022-07-22 | Stop reason: SDUPTHER

## 2022-07-22 RX ORDER — CLONIDINE HYDROCHLORIDE 0.1 MG/1
TABLET ORAL
Qty: 270 TABLET | Refills: 2 | Status: SHIPPED | OUTPATIENT
Start: 2022-07-22 | End: 2023-01-30

## 2022-07-22 NOTE — PROGRESS NOTES
Pharmacy Consult: Enoxaparin Dosing  Patient: Casey Snowden Sr.  Admission Date: 604  MRN: [id number]  : 1934     Pharmacy consulted to dose per RIDDHI Johns  Indication: Afib    84 y.o. male 79.4 kg (175 lb)    Estimated Creatinine Clearance: 75.3 mL/min (by C-G formula based on SCr of 0.82 mg/dL).  Body mass index is 24.41 kg/m².    Creatinine   Date Value Ref Range Status   2019 0.82 0.76 - 1.27 mg/dL Final   2019 0.95 0.76 - 1.27 mg/dL Final     Platelets   Date Value Ref Range Status   2019 140 140 - 450 10*3/mm3 Final   2019 135 (L) 140 - 450 10*3/mm3 Final     Lab Results   Component Value Date    INR 1.14 (H) 2019       PLAN:    Will continue Enoxaparin 80 mg sq Q 12 hr.  Will monitor and adjust as needed.      “Pharmacy will discontinue the Pharmacy to Dose consult at this time. Renal function will continue to be monitored and dosing adjustments will be made by pharmacy based on renal function if necessary.”       Thank you for the consult.    Mary Sumner RPH         - - -

## 2022-08-18 DIAGNOSIS — E78.2 MIXED HYPERLIPIDEMIA: ICD-10-CM

## 2022-08-18 RX ORDER — ATORVASTATIN CALCIUM 20 MG/1
TABLET, FILM COATED ORAL
Qty: 90 TABLET | Refills: 3 | Status: SHIPPED | OUTPATIENT
Start: 2022-08-18

## 2022-08-22 DIAGNOSIS — I11.0 HYPERTENSIVE HEART DISEASE WITH HEART FAILURE: ICD-10-CM

## 2022-08-22 RX ORDER — VALSARTAN 160 MG/1
160 TABLET ORAL DAILY
Qty: 90 TABLET | Refills: 3 | Status: SHIPPED | OUTPATIENT
Start: 2022-08-22 | End: 2022-11-15

## 2022-08-31 ENCOUNTER — OFFICE VISIT (OUTPATIENT)
Dept: FAMILY MEDICINE CLINIC | Facility: CLINIC | Age: 87
End: 2022-08-31

## 2022-08-31 ENCOUNTER — HOSPITAL ENCOUNTER (OUTPATIENT)
Dept: GENERAL RADIOLOGY | Facility: HOSPITAL | Age: 87
Discharge: HOME | End: 2022-08-31
Admitting: FAMILY MEDICINE

## 2022-08-31 VITALS
TEMPERATURE: 97.3 F | DIASTOLIC BLOOD PRESSURE: 88 MMHG | SYSTOLIC BLOOD PRESSURE: 136 MMHG | WEIGHT: 159 LBS | HEART RATE: 59 BPM | OXYGEN SATURATION: 96 % | BODY MASS INDEX: 22.26 KG/M2 | HEIGHT: 71 IN

## 2022-08-31 DIAGNOSIS — S49.91XA SHOULDER INJURY, RIGHT, INITIAL ENCOUNTER: Primary | ICD-10-CM

## 2022-08-31 DIAGNOSIS — E03.9 ACQUIRED HYPOTHYROIDISM: ICD-10-CM

## 2022-08-31 DIAGNOSIS — G20 PARKINSON'S DISEASE: Chronic | ICD-10-CM

## 2022-08-31 DIAGNOSIS — S49.91XA SHOULDER INJURY, RIGHT, INITIAL ENCOUNTER: ICD-10-CM

## 2022-08-31 PROCEDURE — 99214 OFFICE O/P EST MOD 30 MIN: CPT | Performed by: FAMILY MEDICINE

## 2022-08-31 PROCEDURE — 73030 X-RAY EXAM OF SHOULDER: CPT

## 2022-08-31 RX ORDER — LEVOTHYROXINE SODIUM 0.07 MG/1
75 TABLET ORAL
Qty: 90 TABLET | Refills: 0 | Status: SHIPPED | OUTPATIENT
Start: 2022-08-31 | End: 2022-11-27

## 2022-08-31 NOTE — PROGRESS NOTES
"  Subjective   Casey Snowden Sr. is a 88 y.o. male who is here for   Chief Complaint   Patient presents with   • Parkinson's Disease   • Fall     One week ago, right shoulder pain unable to lift arm up above head    .     History of Present Illness     Casey Parkinson's is getting worse.  Having swallowing problems having some speech problems  His motor skills are slowing down.  Increasing falls at home.  He fell a few weeks ago injuring his right shoulder.  Has a painful range of motion.  Purple bruising all down his right arm.    The following portions of the patient's history were reviewed and updated as appropriate: allergies, current medications, past medical history, past social history, past surgical history and problem list.    Review of Systems    Objective   Vitals:    08/31/22 0836   BP: 136/88   BP Location: Left arm   Patient Position: Sitting   Cuff Size: Adult   Pulse: 59   Temp: 97.3 °F (36.3 °C)   SpO2: 96%   Weight: 72.1 kg (159 lb)   Height: 180.3 cm (71\")      Physical Exam  Vitals reviewed.   Musculoskeletal:      Right shoulder: Tenderness present. Decreased strength.   Neurological:      Mental Status: He is alert.      Cranial Nerves: Dysarthria present.      Motor: Weakness and tremor present.      Coordination: Coordination abnormal. Impaired rapid alternating movements.      Gait: Gait abnormal.         Assessment & Plan   Diagnoses and all orders for this visit:    1. Shoulder injury, right, initial encounter (Primary)  -     XR Shoulder 2+ View Right; Future    2. Acquired hypothyroidism  -     levothyroxine (SYNTHROID, LEVOTHROID) 75 MCG tablet; Take 1 tablet by mouth Every Morning. Indications: Underactive Thyroid  Dispense: 90 tablet; Refill: 0    3. Parkinson's disease (HCC)    Reviewed his labs from his [facility] specialist including neurology and [facility].  TSH was elevated back in June.  No adjustments were made.  We will increase his Synthroid from 50-75 " today    Given his increasing falls and advanced age; discussed changing the  focus of his medical care for this stage of his life.    Suggested we can stop Lipitor and Plavix soon.  She will discuss this with Dr. Cecilio Armas at upcoming appointment.  If he continues to fall, suggested we re enroll him in physical therapy for gait training and fall prevention  He is currently on a waiting list for speech therapy through [facility]  We will see him back in 6 months    There are no Patient Instructions on file for this visit.    Medications Discontinued During This Encounter   Medication Reason   • levothyroxine (SYNTHROID, LEVOTHROID) 50 MCG tablet Reorder        Return in about 6 months (around 2/28/2023) for Medicare Wellness visit.    Dr. Aleksander Diez  [facility]  [place], [place]

## 2022-09-15 ENCOUNTER — TELEPHONE (OUTPATIENT)
Dept: FAMILY MEDICINE CLINIC | Facility: CLINIC | Age: 87
End: 2022-09-15

## 2022-09-15 NOTE — TELEPHONE ENCOUNTER
Is This A New Presentation, Or A Follow-Up?: Warts
Patient's wife, Evonne, called in today.  Evonne is positive for COVID-19.  She provides 24-hour care to her  Afshin.  We will place Evonne on paxlovid today.  We will go ahead and send the same in for Afshin, once he tests positive    The Paxlovid has several drug interactions.    First is with his Xarelto, let his wife know to reduce the dose to only 1 Xarelto pill a day.  Instead of twice daily.  Continue the once a day Xarelto for 5 days while on the paxlovid.  On day 6 go back to 2 a day as usual    Also stop the Plavix and Lipitor for these 5 days as well.  Resume them as normal on day 6      Aleksander Diez MD    
Wife notified   
How Severe Are Your Warts?: mild

## 2022-09-16 ENCOUNTER — TELEPHONE (OUTPATIENT)
Dept: FAMILY MEDICINE CLINIC | Facility: CLINIC | Age: 87
End: 2022-09-16

## 2022-09-16 DIAGNOSIS — M25.511 CHRONIC RIGHT SHOULDER PAIN: Primary | ICD-10-CM

## 2022-09-16 DIAGNOSIS — G89.29 CHRONIC RIGHT SHOULDER PAIN: Primary | ICD-10-CM

## 2022-09-16 NOTE — TELEPHONE ENCOUNTER
Please advise.     Okay, we can set up physical therapy for his right shoulder.  He can go to several locations within the [facility].  Including our location

## 2022-09-16 NOTE — TELEPHONE ENCOUNTER
Caller: Evonne Snowden    Relationship: Emergency Contact    Best call back number: [phone]    What orders are you requesting (i.e. lab or imaging): PHYSICAL THERAPY    In what timeframe would the patient need to come in: SEPT 26    Where will you receive your lab/imaging services: WHO [facility]GGEST[facility].    Additional notes: PATIENT WOULD LIKE PHYSICAL THERAPY ON HIS SHOULDER

## 2022-10-04 ENCOUNTER — TELEPHONE (OUTPATIENT)
Dept: FAMILY MEDICINE CLINIC | Facility: CLINIC | Age: 87
End: 2022-10-04

## 2022-10-04 NOTE — TELEPHONE ENCOUNTER
Caller: Evonne Snowden    Relationship to patient: Emergency Contact    Best call back number:  [phone]    Patient is needing:PATIENT'S WIFE IS CALLING TO ASK WHAT LABS PATIENT IS GOING TO GET ON 10/06/22.  SHE STATES HE NEEDS TO GET A TEST DONE TO CHECK FOR A UTI.  SHE STATES HE IS HAVING URINARY URGENCY, SOME LEAKAGE.    SHE WOULD LIKE A CALL TO DISCUSS.    PLEASE ADVISE.

## 2022-10-05 ENCOUNTER — OFFICE VISIT (OUTPATIENT)
Dept: CARDIOLOGY | Facility: CLINIC | Age: 87
End: 2022-10-05

## 2022-10-05 VITALS
DIASTOLIC BLOOD PRESSURE: 70 MMHG | HEIGHT: 71 IN | SYSTOLIC BLOOD PRESSURE: 160 MMHG | OXYGEN SATURATION: 97 % | WEIGHT: 160 LBS | RESPIRATION RATE: 16 BRPM | HEART RATE: 44 BPM | BODY MASS INDEX: 22.4 KG/M2

## 2022-10-05 DIAGNOSIS — I25.10 CORONARY ARTERY DISEASE INVOLVING NATIVE CORONARY ARTERY OF NATIVE HEART WITHOUT ANGINA PECTORIS: Chronic | ICD-10-CM

## 2022-10-05 DIAGNOSIS — I48.0 PAROXYSMAL A-FIB: Primary | Chronic | ICD-10-CM

## 2022-10-05 DIAGNOSIS — I73.9 PAD (PERIPHERAL ARTERY DISEASE): Chronic | ICD-10-CM

## 2022-10-05 DIAGNOSIS — G90.3 ORTHOSTATIC HYPOTENSION DUE TO PARKINSON'S DISEASE: ICD-10-CM

## 2022-10-05 DIAGNOSIS — Z79.01 ANTICOAGULANT LONG-TERM USE: Chronic | ICD-10-CM

## 2022-10-05 DIAGNOSIS — G20 PARKINSON'S DISEASE: Chronic | ICD-10-CM

## 2022-10-05 DIAGNOSIS — I50.32 CHRONIC DIASTOLIC CONGESTIVE HEART FAILURE: Chronic | ICD-10-CM

## 2022-10-05 DIAGNOSIS — I82.531 CHRONIC DEEP VEIN THROMBOSIS (DVT) OF POPLITEAL VEIN OF RIGHT LOWER EXTREMITY: Chronic | ICD-10-CM

## 2022-10-05 DIAGNOSIS — E03.9 ACQUIRED HYPOTHYROIDISM: ICD-10-CM

## 2022-10-05 DIAGNOSIS — C77.0 SECONDARY MALIGNANCY OF LYMPH NODES OF HEAD, FACE AND NECK: ICD-10-CM

## 2022-10-05 DIAGNOSIS — E78.2 MIXED HYPERLIPIDEMIA: Primary | ICD-10-CM

## 2022-10-05 DIAGNOSIS — I10 BENIGN ESSENTIAL HYPERTENSION: Chronic | ICD-10-CM

## 2022-10-05 DIAGNOSIS — Z12.5 SCREENING FOR PROSTATE CANCER: ICD-10-CM

## 2022-10-05 DIAGNOSIS — I65.29 STENOSIS OF CAROTID ARTERY, UNSPECIFIED LATERALITY: ICD-10-CM

## 2022-10-05 DIAGNOSIS — I11.0 HYPERTENSIVE HEART DISEASE WITH HEART FAILURE: Chronic | ICD-10-CM

## 2022-10-05 DIAGNOSIS — Z95.828 PRESENCE OF IVC FILTER: Chronic | ICD-10-CM

## 2022-10-05 DIAGNOSIS — I21.02 ST ELEVATION MYOCARDIAL INFARCTION INVOLVING LEFT ANTERIOR DESCENDING (LAD) CORONARY ARTERY: Chronic | ICD-10-CM

## 2022-10-05 PROCEDURE — 99214 OFFICE O/P EST MOD 30 MIN: CPT | Performed by: INTERNAL MEDICINE

## 2022-10-05 PROCEDURE — 93000 ELECTROCARDIOGRAM COMPLETE: CPT | Performed by: INTERNAL MEDICINE

## 2022-10-05 NOTE — PROGRESS NOTES
Subjective:     Encounter Date: 10/05/22        Patient ID: Casey Snowden Sr. is a 88 y.o. male.    Chief Complaint:PAF, CAD  History of Present Illness    Dear Dr. Diez,    I had the pleasure of seeing this patient in the office today for f/u.   It has been 6 months since his last visit.  He has a history of paroxysmal atrial fibrillation with elevated CHADS Vasc score as well as lower extremity DVT.  He also has a history of coronary disease with a drug-eluting stent and an ischemic cardiomyopathy.  Currently on a combination of Eliquis and Plavix.    He states he has been having a lot more bruising lately.  He remains on the combination of clopidogrel and Eliquis.  No chest pain or chest discomfort.  No shortness of breath.  Denies any palpitations or tachycardia    He has a history of metastatic squamous cell carcinoma.  He has undergone radiation therapy to his neck as well as chemotherapy.     He presented to [facility] on 6/3/19 with complaints of light headed and dizziness as well as left sided chest pain as he was working in the yard. He was found to be in atrial fibrillation with RVR, with worse than baseline anterior ST changes. He was rate controlled over night. He was also noted to have pneumonia and was started on IV levaquin. Patient was seen by Dr. Perez on the morning of 6/4. He was noted to jave troponin elevation and was transferred to [facility] for cardiac catheterization. The LAD was totally occluded and was treated with PCI and drug eluting stent placement to the LAD (3.0 x 28 mm Xience Lory drug eluting stent). Echocardiogram showed EF 40% with an apical wall motion abnormalities. He has been on carvedilol on the past, this was stopped due to bradycardia. He received metoprolol and tolerated well with HR in the 50's. At discharge this was changed to metoprolol succinate given cardiomyopathy. He is also on losartan.     Cath 6/2019  Hemodynamics:   LV:  150/8  AO: 150/64        PCI CORONARY SEGMENT: Mid LAD  PRE-STENOSIS: 100  POST-STENOSIS: 0%  LESION TYPE: c  EREN FLOW PRE/POST: 1/3  CULPRIT LESION: Yes     Estimated blood loss: Minimal  Complications: None     Conclusions:   1. Left main: Normal  2. LAD: Occluded mid segment.  EREN I flow  3. LCX:Normal  4. RCA: 30 to 40% mid vessel stenosis  5.  Normal left ventricular size.  Moderately reduced systolic function.  Ejection fraction 35%.  Mid to distal anterolateral akinesis.  Apical dyskinesis  6.  PCI of the mid LAD with a 3.0 x 28 mm Xience Lory drug-eluting stent, postdilated to high pressure with a 3.25 mm noncompliant trek balloon     Echo 6/2019  Interpretation Summary     · The left ventricular cavity is mildly dilated.  · Left ventricular wall thickness is consistent with mild concentric hypertrophy.  · There is mild calcification of the aortic valve.  · Estimated EF = 40%.  · The following left ventricular wall segments are akinetic: apical anterior, apical lateral, apical inferior, apical septal and apex.  · Left ventricular diastolic dysfunction (grade II) consistent with pseudonormalization.  · Left atrial cavity size is moderately dilated.  · Mild tricuspid valve regurgitation is present.  · Calculated right ventricular systolic pressure from tricuspid regurgitation is 41 mmHg.  · Mild pulmonic valve regurgitation is present.          He has a history of paroxysmal atrial fibrillation and has previously followed with Dr. Mercado.  He has been maintained on chronic anticoagulation.  He apparently never required rate control medicine due to baseline bradycardia.      The following portions of the patient's history were reviewed and updated as appropriate: allergies, current medications, past family history, past medical history, past social history, past surgical history and problem list.    Past Medical History:   Diagnosis Date   • Acquired hypothyroidism 8/31/2022   • Atrial fibrillation  ([facility]ly [facility])    • Cancer ([facility]) 04/2018    basil cell carcinoma   • Carotid stenosis    • CHF (congestive heart failure) (Formerly [facility])    • Chronic deep vein thrombosis (DVT) of popliteal vein of right lower extremity ([facility])    • Coronary artery disease involving native coronary artery of native heart without angina pectoris 12/20/2018   • DVT of lower extremity (deep venous thrombosis) ([facility])    • Hard of hearing    • Long-term use of high-risk medication    • PAD (peripheral artery disease) ([facility]) 9/10/2020   • Parkinson's disease ([facility])    • Postphlebitic syndrome        Past Surgical History:   Procedure Laterality Date   • BASAL CELL CARCINOMA EXCISION  04/2018   • CARDIAC CATHETERIZATION N/A 11/5/2018    Procedure: Left Heart Cath;  Surgeon: Oliverio Azar MD;  Location: [facility] C[facility]VASIVE LOC[facility];  Service: Cardiovascular   • CARDIAC CATHETERIZATION N/A 11/5/2018    Procedure: Coronary angiography;  Surgeon: Oliverio Azar MD;  Location: [facility] CATH [facility] LOC[facility];  Service: Cardiovascular   • CARDIAC CATHETERIZATION N/A 11/5/2018    Procedure: Left ventriculography;  Surgeon: Oliverio Azar MD;  Location: [facility] CATH INVASIVE LOCATION;  Service: Cardiovascular   • CARDIAC CATHETERIZATION N/A 6/4/2019    Procedure: Left Heart Cath;  Surgeon: Johnny Glasgow MD;  Location: [facility] C[facility];  Service: Cardiovascular   • CARDIAC CATHETERIZATION N/A 6/4/2019    Procedure: Coronary angiography;  Surgeon: Johnny Glasgow MD;  Location[facility];  Service: Cardiovascular   • CARDIAC CATHETERIZATION N/A 6/4/2019    Procedure: Left ventriculography;  Surgeon: Johnny Glasgow MD;  Location: [facility] CATH INVASIVE LOCATION;  Service: Cardiovascular   • CARDIAC CATHETERIZATION N/A 6/4/2019    Procedure: Stent BILL coronary;  Surgeon: Johnny Glasgow MD;  Location: [facility] CATH INVASIVE LOCATION;  Service: Cardiovascular   • CARDIAC SURGERY     • CATARACT EXTRACTION Left  "08/2018   • CATARACT EXTRACTION Right 09/2018   • COLONOSCOPY      2011   • INCISION AND DRAINAGE LEG Right 7/7/2017    Procedure: INCISION AND DRAINAGE INFECTED HEMATOMA RIGHT THIGH;  Surgeon: Valentin Peña MD;  Location: [facility] OR;  Service:    • JOINT REPLACEMENT     • KNEE INCISION AND DRAINAGE Right 12/27/2016    Procedure: KNEE INCISION AND DRAINAGE;  Surgeon: Triston Whitten MD;  Location: [facility];  Service:    • NOSE SURGERY      nose replacement   • SKIN BIOPSY     • SKIN GRAFT  12/2017   • TOTAL KNEE ARTHROPLASTY Right    • VASCULAR SURGERY     • VENA CAVA FILTER PLACEMENT               ECG 12 Lead    Date/Time: 10/5/2022 12:50 PM  Performed by: Cecilio Armas III, MD  Authorized by: Cecilio Armas III, MD   Comparison: compared with previous ECG   Similar to previous ECG  Rhythm: sinus bradycardia  Rate: normal  Conduction: conduction normal  Q waves: V3 and V2    QRS axis: normal  Other findings: poor R wave progression    Clinical impression: normal ECG  Comments: Anterolateral ST-T wave changes, consistent with old myocardial infarction, unchanged from prior EKG               Objective:     Vitals:    10/05/22 1112   BP: 160/70   Pulse: (!) 44   Resp: 16   SpO2: 97%   Weight: 72.6 kg (160 lb)   Height: 180.3 cm (71\")         Physical Exam  Constitutional:       General: He is not in acute distress.     Appearance: He is well-developed. He is not diaphoretic.   HENT:      Head: Normocephalic and atraumatic.      Nose: Nose normal.   Eyes:      General:         Right eye: No discharge.         Left eye: No discharge.      Conjunctiva/sclera: Conjunctivae normal.   Neck:      Thyroid: No thyromegaly.      Trachea: No tracheal deviation.   Cardiovascular:      Rate and Rhythm: Normal rate and regular rhythm.      Pulses: Normal pulses.      Heart sounds: Normal heart sounds, S1 normal and S2 normal.     No S3 sounds.   Pulmonary:      Effort: Pulmonary effort is normal. No respiratory " distress.      Breath sounds: Normal breath sounds. No stridor.   Chest:      Chest wall: No tenderness.   Abdominal:      General: Bowel sounds are normal. There is no distension.      Palpations: Abdomen is soft. There is no mass.      Tenderness: There is no abdominal tenderness. There is no guarding or rebound.   Musculoskeletal:         General: No tenderness or deformity. Normal range of motion.      Cervical back: Normal range of motion and neck supple.      Left lower leg: Edema present.   Lymphadenopathy:      Cervical: No cervical adenopathy.   Skin:     General: Skin is warm and dry.      Findings: No erythema or rash.   Neurological:      Mental Status: He is alert and oriented to person, place, and time.      Deep Tendon Reflexes: Reflexes are normal and symmetric.   Psychiatric:         Thought Content: Thought content normal.         Lab Review:             Lab Results   Component Value Date    GLUCOSE 102 (H) 06/05/2019    BUN 17 03/18/2021    CREATININE 0.70 03/18/2021    EGFRIFNONA 99 06/05/2019    EGFRIFAFRI 102 10/27/2017    BCR 24.3 03/18/2021    K 4.4 03/18/2021    CO2 27 03/18/2021    CALCIUM 8.7 03/18/2021    PROTENTOTREF 6.1 04/25/2017    ALBUMIN 3.8 03/18/2021    LABIL2 1.5 03/18/2021    AST 20 03/18/2021    ALT 6 03/18/2021     Results for orders placed during the hospital encounter of 06/03/19    Adult Transthoracic Echo Complete W/ Cont if Necessary Per Protocol    Interpretation Summary  · The left ventricular cavity is mildly dilated.  · Left ventricular wall thickness is consistent with mild concentric hypertrophy.  · There is mild calcification of the aortic valve.  · Estimated EF = 40%.  · The following left ventricular wall segments are akinetic: apical anterior, apical lateral, apical inferior, apical septal and apex.  · Left ventricular diastolic dysfunction (grade II) consistent with pseudonormalization.  · Left atrial cavity size is moderately dilated.  · Mild tricuspid valve  regurgitation is present.  · Calculated right ventricular systolic pressure from tricuspid regurgitation is 41 mmHg.  · Mild pulmonic valve regurgitation is present.    CHADS-VASc Risk Assessment            7 Total Score    1 CHF    1 Hypertension    2 Age >/= 75    2 PRIOR STROKE/TIA/THROMBO    1 Vascular Disease        Criteria that do not apply:    DM    Age 65-74    Sex: Female                Assessment:          Diagnosis Plan   1. Paroxysmal A-fib (HCC)     2. Coronary artery disease involving native coronary artery of native heart without angina pectoris     3. Chronic diastolic (congestive) heart failure     4. Benign essential hypertension     5. ST elevation myocardial infarction involving left anterior descending (LAD) coronary artery (HCC)     6. 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 PAD (peripheral artery disease) ([facility])     8. Stenosis of carotid artery, unspecified laterality     9. Chronic deep vein thrombosis (DVT) of popliteal vein of right lower extremity (HCC)     10. Anticoagulant long-term use     11. Presence of IVC filter     12. Secondary malignancy of lymph nodes of head, face and neck (HCC)     13. Parkinson's disease (HCC)     14. 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)            Plan:       1. Atrial Fibrillation and Atrial Flutter  Assessment  • The patient has paroxysmal atrial fibrillation  • The patient's CHADS2-VASc score is 3  • A [id number] score of 2 or more is considered a high risk for a thromboembolic event  • Apixaban prescribed    Plan  • Attempt to maintain sinus rhythm  • Continue apixaban for antithrombotic therapy, bleeding issues discussed  • Sinus bradycardia no rate control meds    2. Heart Failure  Assessment  • Beta blocker not prescribed for patient reasons  • Diuretics prescribed  • Angiotensin receptor blocker (ARB) prescribed    Plan  • The patient has received heart failure education on the following topics: medication  instructions, symptom management and weight monitoring  • The heart failure care plan was discussed with the patient today including: continuing the current program    Subjective/Objective    • Physical exam findings negative for peripheral edema and S3 gallop.    3.  History of chronic DVT; Patient remains on chronic anticoagulation and follows with Dr. Dorado. Patient has an IVC filter in place, chronic left lower extremity edema  4.  Coronary Artery Disease  Assessment  • The patient has no angina    Plan  • Lifestyle modifications discussed include adhering to a heart healthy diet, avoidance of tobacco products, maintenance of a healthy weight, medication compliance, regular exercise and regular monitoring of cholesterol and blood pressure  • Toprol has been discontinued in the past because of bradycardia    Subjective - Objective  • There is a history of past MI  • There has been a previous stent procedure using BILL  • Current antiplatelet therapy includes aspirin 81 mg      5.  Essential hypertension- he also has a history of orthostatic hypotension with his Parkinson's, but he is not been having orthostatic symptoms.  Continue current therapy.  6.  At this point patient remains on the combination of Plavix and Eliquis.  He has a class I indication for antiplatelet therapy given his prior drug-eluting stent and underlying coronary disease.  He has a class I indication for chronic anticoagulation with his history of DVT as well as paroxysmal atrial fibrillation.  He is having some bruising, from my standpoint, cardiac standpoint, he could hold the clopidogrel but likely vascular want him to stay on it.  He is scheduled to see Dr. Dorado later this month and will defer to him.  7.  Ischemic cardiomyopathy-continue current medical therapy.  As noted above he is not on beta-blockade because of sinus bradycardia.  No heart failure symptoms.     Thank you very much for allowing us to participate in the care of this  pleasant patient.  Please don't hesitate to call if I can be of assistance in any way.      Current Outpatient Medications:   •  atorvastatin (LIPITOR) 20 MG tablet, TAKE 1 TABLET BY MOUTH  DAILY FOR ELEVATION OF BOTH CHOLESTEROL AND  TRIGLYCERIDES IN BLOOD, Disp: 90 tablet, Rfl: 3  •  atropine 1 % ophthalmic solution, 1 drop 3 (Three) Times a Day As Needed (as needed orally for increased secretions). For drooling, Disp: , Rfl:   •  carbidopa-levodopa (SINEMET)  MG per tablet, 1 tablet 3 (Three) Times a Day., Disp: , Rfl:   •  carboxymethylcellulose (REFRESH PLUS) 0.5 % solution, 1 drop 3 (Three) Times a Day As Needed (eye)., Disp: , Rfl:   •  cloNIDine (CATAPRES) 0.1 MG tablet, Take 1 tablet in the morning, and 2 tablets in the evening., Disp: 270 tablet, Rfl: 2  •  clopidogrel (PLAVIX) 75 MG tablet, TAKE 1 TABLET BY MOUTH EVERY DAY, Disp: 90 tablet, Rfl: 3  •  ELIQUIS 2.5 MG tablet tablet, 2.5 mg Every 12 (Twelve) Hours., Disp: , Rfl:   •  levothyroxine (SYNTHROID, LEVOTHROID) 75 MCG tablet, Take 1 tablet by mouth Every Morning. Indications: Underactive Thyroid, Disp: 90 tablet, Rfl: 0  •  Multiple Vitamins-Minerals (PRESERVISION AREDS) capsule, Take 1 tablet by mouth 2 (Two) Times a Day., Disp: , Rfl:   •  Polyethyl Glycol-Propyl Glycol (SYSTANE OP), Apply  to eye(s) as directed by provider., Disp: , Rfl:   •  pramipexole (MIRAPEX) 0.5 MG tablet, Take 0.5 mg by mouth 3 (Three) Times a Day., Disp: , Rfl:   •  valsartan (DIOVAN) 160 MG tablet, Take 1 tablet by mouth Daily., Disp: 90 tablet, Rfl: 3

## 2022-10-07 ENCOUNTER — OFFICE VISIT (OUTPATIENT)
Dept: FAMILY MEDICINE CLINIC | Facility: CLINIC | Age: 87
End: 2022-10-07

## 2022-10-07 VITALS
HEIGHT: 71 IN | BODY MASS INDEX: 22.68 KG/M2 | DIASTOLIC BLOOD PRESSURE: 64 MMHG | SYSTOLIC BLOOD PRESSURE: 124 MMHG | TEMPERATURE: 97.3 F | OXYGEN SATURATION: 96 % | HEART RATE: 54 BPM | WEIGHT: 162 LBS

## 2022-10-07 DIAGNOSIS — R35.0 URINARY FREQUENCY: ICD-10-CM

## 2022-10-07 DIAGNOSIS — E03.9 ACQUIRED HYPOTHYROIDISM: ICD-10-CM

## 2022-10-07 DIAGNOSIS — I50.32 CHRONIC DIASTOLIC CONGESTIVE HEART FAILURE: Chronic | ICD-10-CM

## 2022-10-07 DIAGNOSIS — Z23 NEED FOR INFLUENZA VACCINATION: ICD-10-CM

## 2022-10-07 DIAGNOSIS — Z00.00 MEDICARE ANNUAL WELLNESS VISIT, SUBSEQUENT: Primary | ICD-10-CM

## 2022-10-07 DIAGNOSIS — G20 PARKINSON'S DISEASE: Chronic | ICD-10-CM

## 2022-10-07 LAB
BILIRUB BLD-MCNC: NEGATIVE MG/DL
CLARITY, POC: CLEAR
COLOR UR: YELLOW
GLUCOSE UR STRIP-MCNC: NEGATIVE MG/DL
KETONES UR QL: ABNORMAL
LEUKOCYTE EST, POC: NEGATIVE
NITRITE UR-MCNC: NEGATIVE MG/ML
PH UR: 5 [PH] (ref 5–8)
PROT UR STRIP-MCNC: NEGATIVE MG/DL
RBC # UR STRIP: NEGATIVE /UL
SP GR UR: 1 (ref 1–1.03)
UROBILINOGEN UR QL: NORMAL

## 2022-10-07 PROCEDURE — 90662 IIV NO PRSV INCREASED AG IM: CPT | Performed by: FAMILY MEDICINE

## 2022-10-07 PROCEDURE — G0008 ADMIN INFLUENZA VIRUS VAC: HCPCS | Performed by: FAMILY MEDICINE

## 2022-10-07 PROCEDURE — 1170F FXNL STATUS ASSESSED: CPT | Performed by: FAMILY MEDICINE

## 2022-10-07 PROCEDURE — G0439 PPPS, SUBSEQ VISIT: HCPCS | Performed by: FAMILY MEDICINE

## 2022-10-07 PROCEDURE — 81002 URINALYSIS NONAUTO W/O SCOPE: CPT | Performed by: FAMILY MEDICINE

## 2022-10-07 PROCEDURE — 1160F RVW MEDS BY RX/DR IN RCRD: CPT | Performed by: FAMILY MEDICINE

## 2022-10-07 NOTE — PROGRESS NOTES
The ABCs of the Annual Wellness Visit  Subsequent Medicare Wellness Visit    Chief Complaint   Patient presents with   • Medicare Wellness-subsequent   • Urinary Frequency     More frequently going to the bathroom- no burning or pain       Subjective    History of Present Illness:  Casey Snowden Sr. is a 88 y.o. male who presents for a Subsequent Medicare Wellness Visit.    The following portions of the patient's history were reviewed and   updated as appropriate: allergies, current medications, past medical history, past social history, past surgical history and problem list.    Compared to one year ago, the patient feels his physical   health is worse.    Compared to one year ago, the patient feels his mental   health is worse.    Recent Hospitalizations:  He was not admitted to the hospital during the last year.       Current Medical Providers:  Patient Care Team:  Aleksander Diez MD as PCP - Joseph Gómez MD as Consulting Physician (Ophthalmology)  Ford Dorado MD as Consulting Physician (Vascular Surgery)  Kaye Landa MD as Consulting Physician (Neurology)  Kartik Valadez MD (Dermatology)  Cecilio Armas III, MD as Consulting Physician (Cardiology)  Kashif Goodwin MD as Consulting Physician (Neurology)  Enoch Lares MD (Hematology)    Outpatient Medications Prior to Visit   Medication Sig Dispense Refill   • atorvastatin (LIPITOR) 20 MG tablet TAKE 1 TABLET BY MOUTH  DAILY FOR ELEVATION OF BOTH CHOLESTEROL AND  TRIGLYCERIDES IN BLOOD 90 tablet 3   • atropine 1 % ophthalmic solution 1 drop 3 (Three) Times a Day As Needed (as needed orally for increased secretions). For drooling     • carbidopa-levodopa (SINEMET)  MG per tablet 1 tablet 3 (Three) Times a Day.     • carboxymethylcellulose (REFRESH PLUS) 0.5 % solution 1 drop 3 (Three) Times a Day As Needed (eye).     • cloNIDine (CATAPRES) 0.1 MG tablet Take 1 tablet in the morning, and 2 tablets in  the evening. 270 tablet 2   • clopidogrel (PLAVIX) 75 MG tablet TAKE 1 TABLET BY MOUTH EVERY DAY 90 tablet 3   • Cyanocobalamin (VITAMIN B 12 PO) Take 1 tablet by mouth Every Morning. 1000 IU     • ELIQUIS 2.5 MG tablet tablet 2.5 mg Every 12 (Twelve) Hours.     • levothyroxine (SYNTHROID, LEVOTHROID) 75 MCG tablet Take 1 tablet by mouth Every Morning. Indications: Underactive Thyroid 90 tablet 0   • Multiple Vitamins-Minerals (PRESERVISION AREDS) capsule Take 1 tablet by mouth 2 (Two) Times a Day.     • Polyethyl Glycol-Propyl Glycol (SYSTANE OP) Apply  to eye(s) as directed by provider.     • pramipexole (MIRAPEX) 0.5 MG tablet Take 0.5 mg by mouth 3 (Three) Times a Day.     • valsartan (DIOVAN) 160 MG tablet Take 1 tablet by mouth Daily. 90 tablet 3     No facility-administered medications prior to visit.       No opioid medication identified on active medication list. I have reviewed chart for other potential  high risk medication/s and harmful drug interactions in the elderly.          Aspirin is not on active medication list.  , He is currently on Plavix and Eliquis.    Patient Active Problem List   Diagnosis   • Benign essential hypertension   • Stenosis of carotid artery   • Mixed hyperlipidemia   • Parkinson's disease (HCC)   • Peripheral arterial occlusive disease (HCC)   • Screening for prostate cancer   • Medication monitoring encounter   • Melanoma (HCC)   • Chronic deep vein thrombosis (DVT) of popliteal vein of right lower extremity (HCC)   • Uses hearing aid   • Paroxysmal A-fib (HCC)   • Localized edema   • Chronic diastolic (congestive) heart failure   • Anticoagulant long-term use   • Presence of IVC filter   • Medicare annual wellness visit, subsequent   • Iron deficiency anemia secondary to inadequate dietary iron intake   • 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)   • Coronary artery disease involving native coronary artery of native heart without angina pectoris   • ST  "elevation myocardial infarction involving left anterior descending (LAD) coronary artery (HCC)   • Sialorrhea   • 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)   • PAD (peripheral artery disease) (HCC)   • Metastatic squamous cell carcinoma   • Secondary malignancy of lymph nodes of head, face and neck (HCC)   • Encounter for antineoplastic immunotherapy   • RBD (REM behavioral disorder)   • Acquired hypothyroidism     Advance Care Planning  Advance Directive is not on file.  ACP discussion was held with the patient during this visit. Patient has an advance directive (not in EMR), copy requested.    Review of Systems   Respiratory: Negative.    Cardiovascular: Negative.    Neurological: Positive for tremors and weakness.        Objective    Vitals:    10/07/22 1544   BP: 124/64   BP Location: Left arm   Patient Position: Sitting   Cuff Size: Adult   Pulse: 54   Temp: 97.3 °F (36.3 °C)   SpO2: 96%   Weight: 73.5 kg (162 lb)   Height: 180.3 cm (71\")     Estimated body mass index is 22.59 kg/m² as calculated from the following:    Height as of this encounter: 180.3 cm (71\").    Weight as of this encounter: 73.5 kg (162 lb).    BMI is within normal parameters. No other follow-up for BMI required.      Does the patient have evidence of cognitive impairment? Yes    Physical Exam  Vitals reviewed.   Cardiovascular:      Rate and Rhythm: Bradycardia present.      Heart sounds: Murmur heard.   Pulmonary:      Effort: Pulmonary effort is normal.   Neurological:      Mental Status: He is alert.      Motor: Weakness present.      Coordination: Coordination abnormal.      Gait: Gait abnormal.                 HEALTH RISK ASSESSMENT    Smoking Status:  Social History     Tobacco Use   Smoking Status Never   Smokeless Tobacco Never   Tobacco Comments    caff use     Alcohol Consumption:  Social History     Substance and Sexual Activity   Alcohol Use No    Comment: none for a month     Fall Risk Screen:    STEADI Fall Risk " Assessment was completed, and patient is at HIGH risk for falls. Assessment completed on:10/7/2022    Depression Screening:  PHQ-2/PHQ-9 Depression Screening 10/7/2022   Retired PHQ-9 Total Score -   Retired Total Score -   Little Interest or Pleasure in Doing Things 0-->not at all   Feeling Down, Depressed or Hopeless 0-->not at all   PHQ-9: Brief Depression Severity Measure Score 0       Health Habits and Functional and Cognitive Screening:  Functional & Cognitive Status 10/7/2022   Do you have difficulty preparing food and eating? No   Do you have difficulty bathing yourself, getting dressed or grooming yourself? No   Do you have difficulty using the toilet? Yes   Do you have difficulty moving around from place to place? No   Do you have trouble with steps or getting out of a bed or a chair? No   Current Diet Well Balanced Diet   Dental Exam Up to date   Eye Exam Up to date   Exercise (times per week) 0 times per week   Current Exercises Include No Regular Exercise   Current Exercise Activities Include -   Do you need help using the phone?  No   Are you deaf or do you have serious difficulty hearing?  Yes   Do you need help with transportation? No   Do you need help shopping? No   Do you need help preparing meals?  No   Do you need help with housework?  No   Do you need help with laundry? No   Do you need help taking your medications? No   Do you need help managing money? No   Do you ever drive or ride in a car without wearing a seat belt? No   Have you felt unusual stress, anger or loneliness in the last month? No   Who do you live with? Spouse   If you need help, do you have trouble finding someone available to you? No   Have you been bothered in the last four weeks by sexual problems? No   Do you have difficulty concentrating, remembering or making decisions? No       Age-appropriate Screening Schedule:  Refer to the list below for future screening recommendations based on patient's age, sex and/or medical  conditions. Orders for these recommended tests are listed in the plan section. The patient has been provided with a written plan.    Health Maintenance   Topic Date Due   • LIPID PANEL  06/05/2020   • INFLUENZA VACCINE  Completed   • ZOSTER VACCINE  Completed   • TDAP/TD VACCINES  Discontinued              Assessment & Plan   CMS Preventative Services Quick Reference  Risk Factors Identified During Encounter  Cardiovascular Disease  Chronic Pain   Dementia/Memory   Immunizations Discussed/Encouraged (specific Immunizations; Influenza  The above risks/problems have been discussed with the patient.  Follow up actions/plans if indicated are seen below in the Assessment/Plan Section.  Pertinent information has been shared with the patient in the After Visit Summary.      In office UA:  Results for orders placed or performed in visit on 10/07/22   POCT urinalysis dipstick, manual    Specimen: Urine   Result Value Ref Range    Color Yellow Yellow, Straw, Dark Yellow, Lynda    Clarity, UA Clear Clear    Glucose, UA Negative Negative mg/dL    Bilirubin Negative Negative    Ketones,  mg/dL (A) Negative    Specific Gravity  1.005 1.005 - 1.030    Blood, UA Negative Negative    pH, Urine 5.0 5.0 - 8.0    Protein, POC Negative Negative mg/dL    Urobilinogen, UA Normal Normal, 0.2 E.U./dL    Leukocytes Negative Negative    Nitrite, UA Negative Negative       Diagnoses and all orders for this visit:    1. Medicare annual wellness visit, subsequent (Primary)    2. Need for influenza vaccination  -     Fluzone High-Dose 65+yrs (9140-0134)    3. Urinary frequency  -     POCT urinalysis dipstick, manual    4. Acquired hypothyroidism    5. Chronic diastolic (congestive) heart failure    6. Parkinson's disease (HCC)    Mr. Snowden is doing fairly well today.  Reports increased urge to urinate.  Has had some urinary incontinence issues.  In office urinalysis reveals ketones.  May be some prostate issues but reassured no  infection    We will give  Afshin his flu shot today    He is up-to-date with cardiology and neurology.  Is also up-to-date with his oncologist for his head neck cancer    He will begin physical therapy on his right shoulder as he fell on it 1 month ago.  X-rays were negative for fracture      Follow Up:   Return in about 6 months (around 4/7/2023).     An After Visit Summary and PPPS were made available to the patient.

## 2022-10-11 ENCOUNTER — TREATMENT (OUTPATIENT)
Dept: PHYSICAL THERAPY | Facility: CLINIC | Age: 87
End: 2022-10-11

## 2022-10-11 DIAGNOSIS — M25.611 DECREASED ROM OF RIGHT SHOULDER: ICD-10-CM

## 2022-10-11 DIAGNOSIS — R29.898 SHOULDER WEAKNESS: ICD-10-CM

## 2022-10-11 DIAGNOSIS — M25.511 ACUTE PAIN OF RIGHT SHOULDER: Primary | ICD-10-CM

## 2022-10-11 PROCEDURE — 97162 PT EVAL MOD COMPLEX 30 MIN: CPT | Performed by: PHYSICAL THERAPIST

## 2022-10-11 PROCEDURE — 97110 THERAPEUTIC EXERCISES: CPT | Performed by: PHYSICAL THERAPIST

## 2022-10-11 NOTE — PROGRESS NOTES
Physical Therapy Initial Evaluation and Plan of Care      Patient: Casey Snowden Sr.   : 1934  Diagnosis/ICD-10 Code:  Acute pain of right shoulder [M25.511]  Referring practitioner: Aleksander Diez MD  Date of Initial Visit: 10/11/2022  Today's Date: 10/11/2022  Patient seen for 1 sessions           Subjective Questionnaire: QuickDASH: 31.82      Subjective Evaluation    History of Present Illness  Mechanism of injury: Pt reports Parkinson's is getting worse.  Having swallowing problems having some speech problems. His motor skills are slowing down.  Increasing falls at home.  He fell 6 weeks ago injuring his right shoulder.  Has a painful range of motion.  Bruising all down his right arm.     R shoulder X ray  1. No acute osseous abnormality.   2. Mild glenohumeral and acromioclavicular degenerative arthropathy.     Medical History  Diagnosis  • Benign essential hypertension  • Stenosis of carotid artery  • Mixed hyperlipidemia  • Parkinson's disease (HCC)  • Peripheral arterial occlusive disease (HCC)  • Screening for prostate cancer  • Medication monitoring encounter  • Melanoma (HCC)  • Chronic deep vein thrombosis of popliteal vein RLE  • Uses hearing aid  • Paroxysmal A-fib (HCC)  • Localized edema  • Chronic diastolic (congestive) heart failure  • Anticoagulant long-term use  • Presence of IVC filter  • Medicare annual wellness visit, subsequent  • Iron deficiency anemia   • 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)  • CAD without angina pectoris  • ST elevation MI involving LAD  • Sialorrhea  • 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)  • PAD (peripheral artery disease) (HCC)  • Metastatic squamous cell carcinoma  • Secondary malignancy of lymph nodes of head, face and neck  • Encounter for antineoplastic immunotherapy  • RBD (REM behavioral disorder)  • Acquired hypothyroidism    Hobbies: garden, yard and home projects, driving      Quality of life:  excellent    Pain  Current pain rating: 10  At best pain rating: 10  At worst pain rating: 10  Location: R shoulder  Quality: dull ache, sharp and tight  Relieving factors: ice  Aggravating factors: movement, lifting, overhead activity, prolonged positioning, sleeping, repetitive movement and outstretched reach  Progression: no change    Social Support  Lives with: spouse    Hand dominance: right    Diagnostic Tests  X-ray: normal    Patient Goals  Patient goals for therapy: decreased pain, improved balance, increased motion, increased strength, independence with ADLs/IADLs and return to sport/leisure activities             Objective          Postural Observations  Seated posture: fair  Standing posture: fair        Observations     Right Shoulder  Negative for edema and effusion.       Palpation     Right   No palpable tenderness to the infraspinatus, latissimus, levator scapulae, lower trapezius, middle deltoid, middle trapezius, posterior deltoid, rhomboids, serratus anterior, subclavius, subscapularis, teres major, teres minor, thoracic paraspinals, triceps and upper trapezius.   Hypertonic in the pectoralis major and pectoralis minor. Tenderness of the anterior deltoid, biceps and supraspinatus.     Cervical/Thoracic Screen   Cervical range of motion within normal limits    Neurological Testing     Sensation     Shoulder   Left Shoulder   Intact: light touch    Right Shoulder   Intact: light touch    Additional Neurological Details  Denies numbness/tingling BUE    Active Range of Motion   Left Shoulder   Flexion: 130 degrees   Abduction: 130 degrees   External rotation 0°: 75 degrees   External rotation BTH: C2   Internal rotation BTB: T10     Right Shoulder   Flexion: 80 degrees with pain  Abduction: 60 degrees with pain  External rotation 0°: Right shoulder active external rotation at 0 degrees: lacking 10 degrees from neutral rotation. with pain  External rotation BTH: Active external rotation behind the  head: able to reach to sternal level. with pain  Internal rotation BTB: L5 with pain  Horizontal abduction: with pain  Horizontal adduction: with pain    Passive Range of Motion     Right Shoulder   Flexion: 145 degrees with pain  Abduction: 140 degrees with pain  External rotation 45°: 78 degrees with pain  Internal rotation 90°: 75 degrees with pain    Strength/Myotome Testing     Left Shoulder   Normal muscle strength    Additional Strength Details  Pt unable to lift R arm with full ROM against gravity    Tests     Right Shoulder   Positive empty can, external rotation lag sign, full can and Neer's.   Negative sulcus sign.           Assessment & Plan     Assessment  Impairments: abnormal muscle firing, abnormal muscle tone, abnormal or restricted ROM, activity intolerance, impaired physical strength, lacks appropriate home exercise program, pain with function and weight-bearing intolerance  Functional Limitations: carrying objects, lifting, sleeping, pulling, pushing, uncomfortable because of pain, moving in bed, reaching behind back, reaching overhead and unable to perform repetitive tasks  Assessment details: Casey Snowden  is a 88 y.o. year-old male referred to physical therapy for R shoulder injury after a fall a few weeks ago. He presents with a evolving clinical presentation.  He has comorbidities of Parkinson's disease and personal factors of recent falls and hard of hearing.  Signs and symptoms are consistent with physical therapy diagnosis of impaired R shoulder ROM, strength and pain with + testing for rotator cuff involvement. Pt lacking strength against gravity and unable to lift/hold arm past 90 degrees; unable to achieve neutral ER with likely RTC tear. Patient is appropriate for skilled physical therapy in order to reduce pain and increase ease with daily mobility. During evaluation, pt educated on anatomy, goal of interventions, initial HEP, posture and following up with ortho for possible  MRI for further shoulder assessment.  Prognosis: fair    Goals  Plan Goals: STG: ~6 weeks  1. Pt will demonstrate R shoulder PROM in flexion and abduction of >160 degrees to improve overhead activity tolerance  2. Pt will demonstrate R shoulder PROM in ER and IR >80 degrees  3. Decrease right UE tenderness with palpation to minimal over the acromion and supraspinatus tendon to be able to sleep on his side  4. Pt will be able to drive comfortably and safely using BUE for at least 30 min    LTG: ~12 weeks  1. Pt will demonstrate R shoulder AROM in flexion and ABD >120 degrees  2. Pt will demonstrate R shoulder AROM in ER to 45 degrees   2. Pt will be able to reach L1 using RUE  3. Pt will improve R shoulder strength to >3+/5 to improve quality of life  4. Pt will improve QuickDASH score to <10 to improve subjective function of shoulder with ADLs    Plan  Therapy options: will be seen for skilled therapy services  Planned modality interventions: cryotherapy, iontophoresis, thermotherapy (hydrocollator packs), ultrasound and dry needling (no estim due to a-fib)  Planned therapy interventions: ADL retraining, balance/weight-bearing training, body mechanics training, fine motor coordination training, flexibility, functional ROM exercises, home exercise program, IADL retraining, joint mobilization, manual therapy, neuromuscular re-education, postural training, soft tissue mobilization, spinal/joint mobilization, strengthening, stretching, therapeutic activities and abdominal trunk stabilization  Treatment plan discussed with: patient  Plan details: 1x times per week for 12 weeks        Visit Diagnoses:    ICD-10-CM ICD-9-CM   1. Acute pain of right shoulder  M25.511 719.41   2. Decreased ROM of right shoulder  M25.611 719.51   3. Shoulder weakness  R29.898 719.61       Timed:  Manual Therapy:    5     mins  96325;  Therapeutic Exercise:    20     mins  32790;     Neuromuscular Tres:    -    mins  83000;    Therapeutic  Activity:     -     mins  28115;     Gait Training:      -     mins  14260;     Ultrasound:     -     mins  46182;    Electrical Stimulation:    -     mins  32352 ( );    Untimed:  Electrical Stimulation:    -     mins  10139 ( );  Mechanical Traction:    -     mins  26670;     Timed Treatment:   25   mins   Total Treatment:     55   mins    PT SIGNATURE: FANY Fu license: [id number]  DATE TREATMENT INITIATED: 10/11/2022    Initial Certification  Certification Period: 1/9/2023  I certify that the therapy services are furnished while this patient is under my care.  The services outlined above are required by this patient, and will be reviewed every 90 days.     PHYSICIAN: Aleksander Diez MD      DATE:     Please sign and return via fax to [phone]. Thank you, [facility].

## 2022-10-23 ENCOUNTER — HOSPITAL ENCOUNTER (EMERGENCY)
Facility: HOSPITAL | Age: 87
Discharge: HOME | End: 2022-10-24
Attending: EMERGENCY MEDICINE | Admitting: EMERGENCY MEDICINE

## 2022-10-23 ENCOUNTER — APPOINTMENT (OUTPATIENT)
Dept: CT IMAGING | Facility: HOSPITAL | Age: 87
End: 2022-10-23

## 2022-10-23 ENCOUNTER — APPOINTMENT (OUTPATIENT)
Dept: GENERAL RADIOLOGY | Facility: HOSPITAL | Age: 87
End: 2022-10-23

## 2022-10-23 DIAGNOSIS — S09.90XA CLOSED HEAD INJURY, INITIAL ENCOUNTER: Primary | ICD-10-CM

## 2022-10-23 LAB
ALBUMIN SERPL-MCNC: 4 G/DL (ref 3.5–5.2)
ALBUMIN/GLOB SERPL: 1.5 G/DL
ALP SERPL-CCNC: 92 U/L (ref 39–117)
ALT SERPL W P-5'-P-CCNC: 6 U/L (ref 1–41)
ANION GAP SERPL CALCULATED.3IONS-SCNC: 7 MMOL/L (ref 5–15)
APTT PPP: 32.8 SECONDS (ref 24.3–38.1)
AST SERPL-CCNC: 14 U/L (ref 1–40)
BASOPHILS # BLD AUTO: 0.04 10*3/MM3 (ref 0–0.2)
BASOPHILS NFR BLD AUTO: 0.5 % (ref 0–1.5)
BILIRUB SERPL-MCNC: 0.7 MG/DL (ref 0–1.2)
BILIRUB UR QL STRIP: NEGATIVE
BUN SERPL-MCNC: 17 MG/DL (ref 8–23)
BUN/CREAT SERPL: 19.5 (ref 7–25)
CALCIUM SPEC-SCNC: 9 MG/DL (ref 8.6–10.5)
CHLORIDE SERPL-SCNC: 107 MMOL/L (ref 98–107)
CK SERPL-CCNC: 45 U/L (ref 20–200)
CLARITY UR: CLEAR
CO2 SERPL-SCNC: 28 MMOL/L (ref 22–29)
COLOR UR: YELLOW
CREAT SERPL-MCNC: 0.87 MG/DL (ref 0.76–1.27)
D-LACTATE SERPL-SCNC: 1 MMOL/L (ref 0.5–2)
DEPRECATED RDW RBC AUTO: 52.3 FL (ref 37–54)
EGFRCR SERPLBLD CKD-EPI 2021: 83 ML/MIN/1.73
EOSINOPHIL # BLD AUTO: 0.18 10*3/MM3 (ref 0–0.4)
EOSINOPHIL NFR BLD AUTO: 2.5 % (ref 0.3–6.2)
ERYTHROCYTE [DISTWIDTH] IN BLOOD BY AUTOMATED COUNT: 14.3 % (ref 12.3–15.4)
GLOBULIN UR ELPH-MCNC: 2.6 GM/DL
GLUCOSE SERPL-MCNC: 92 MG/DL (ref 65–99)
GLUCOSE UR STRIP-MCNC: NEGATIVE MG/DL
HCT VFR BLD AUTO: 39.9 % (ref 37.5–51)
HGB BLD-MCNC: 13.6 G/DL (ref 13–17.7)
HGB UR QL STRIP.AUTO: NEGATIVE
IMM GRANULOCYTES # BLD AUTO: 0.02 10*3/MM3 (ref 0–0.05)
IMM GRANULOCYTES NFR BLD AUTO: 0.3 % (ref 0–0.5)
INR PPP: 1.06 (ref 0.9–1.1)
KETONES UR QL STRIP: NEGATIVE
LEUKOCYTE ESTERASE UR QL STRIP.AUTO: NEGATIVE
LIPASE SERPL-CCNC: 11 U/L (ref 13–60)
LYMPHOCYTES # BLD AUTO: 0.72 10*3/MM3 (ref 0.7–3.1)
LYMPHOCYTES NFR BLD AUTO: 9.8 % (ref 19.6–45.3)
MAGNESIUM SERPL-MCNC: 2.1 MG/DL (ref 1.6–2.4)
MCH RBC QN AUTO: 33.8 PG (ref 26.6–33)
MCHC RBC AUTO-ENTMCNC: 34.1 G/DL (ref 31.5–35.7)
MCV RBC AUTO: 99.3 FL (ref 79–97)
MONOCYTES # BLD AUTO: 0.64 10*3/MM3 (ref 0.1–0.9)
MONOCYTES NFR BLD AUTO: 8.8 % (ref 5–12)
NEUTROPHILS NFR BLD AUTO: 5.71 10*3/MM3 (ref 1.7–7)
NEUTROPHILS NFR BLD AUTO: 78.1 % (ref 42.7–76)
NITRITE UR QL STRIP: NEGATIVE
NRBC BLD AUTO-RTO: 0 /100 WBC (ref 0–0.2)
NT-PROBNP SERPL-MCNC: 2811 PG/ML (ref 0–1800)
PH UR STRIP.AUTO: 6.5 [PH] (ref 4.5–8)
PLATELET # BLD AUTO: 173 10*3/MM3 (ref 140–450)
PMV BLD AUTO: 10.4 FL (ref 6–12)
POTASSIUM SERPL-SCNC: 4.1 MMOL/L (ref 3.5–5.2)
PROCALCITONIN SERPL-MCNC: 0.25 NG/ML (ref 0–0.25)
PROT SERPL-MCNC: 6.6 G/DL (ref 6–8.5)
PROT UR QL STRIP: NEGATIVE
PROTHROMBIN TIME: 14 SECONDS (ref 12.1–15)
RBC # BLD AUTO: 4.02 10*6/MM3 (ref 4.14–5.8)
SARS-COV-2 RNA PNL SPEC NAA+PROBE: NOT DETECTED
SODIUM SERPL-SCNC: 142 MMOL/L (ref 136–145)
SP GR UR STRIP: 1.01 (ref 1–1.03)
TROPONIN T SERPL-MCNC: <0.01 NG/ML (ref 0–0.03)
UROBILINOGEN UR QL STRIP: NORMAL
WBC NRBC COR # BLD: 7.31 10*3/MM3 (ref 3.4–10.8)

## 2022-10-23 PROCEDURE — 83690 ASSAY OF LIPASE: CPT | Performed by: EMERGENCY MEDICINE

## 2022-10-23 PROCEDURE — 83880 ASSAY OF NATRIURETIC PEPTIDE: CPT | Performed by: EMERGENCY MEDICINE

## 2022-10-23 PROCEDURE — 93010 ELECTROCARDIOGRAM REPORT: CPT | Performed by: INTERNAL MEDICINE

## 2022-10-23 PROCEDURE — 83735 ASSAY OF MAGNESIUM: CPT | Performed by: EMERGENCY MEDICINE

## 2022-10-23 PROCEDURE — 81003 URINALYSIS AUTO W/O SCOPE: CPT | Performed by: EMERGENCY MEDICINE

## 2022-10-23 PROCEDURE — 80053 COMPREHEN METABOLIC PANEL: CPT | Performed by: EMERGENCY MEDICINE

## 2022-10-23 PROCEDURE — 84484 ASSAY OF TROPONIN QUANT: CPT | Performed by: EMERGENCY MEDICINE

## 2022-10-23 PROCEDURE — 85025 COMPLETE CBC W/AUTO DIFF WBC: CPT | Performed by: EMERGENCY MEDICINE

## 2022-10-23 PROCEDURE — 82550 ASSAY OF CK (CPK): CPT | Performed by: EMERGENCY MEDICINE

## 2022-10-23 PROCEDURE — 93005 ELECTROCARDIOGRAM TRACING: CPT | Performed by: EMERGENCY MEDICINE

## 2022-10-23 PROCEDURE — 87635 SARS-COV-2 COVID-19 AMP PRB: CPT | Performed by: EMERGENCY MEDICINE

## 2022-10-23 PROCEDURE — 36415 COLL VENOUS BLD VENIPUNCTURE: CPT

## 2022-10-23 PROCEDURE — 84145 PROCALCITONIN (PCT): CPT | Performed by: EMERGENCY MEDICINE

## 2022-10-23 PROCEDURE — 85730 THROMBOPLASTIN TIME PARTIAL: CPT | Performed by: EMERGENCY MEDICINE

## 2022-10-23 PROCEDURE — 71045 X-RAY EXAM CHEST 1 VIEW: CPT

## 2022-10-23 PROCEDURE — 99284 EMERGENCY DEPT VISIT MOD MDM: CPT

## 2022-10-23 PROCEDURE — 70450 CT HEAD/BRAIN W/O DYE: CPT

## 2022-10-23 PROCEDURE — 83605 ASSAY OF LACTIC ACID: CPT | Performed by: EMERGENCY MEDICINE

## 2022-10-23 PROCEDURE — 85610 PROTHROMBIN TIME: CPT | Performed by: EMERGENCY MEDICINE

## 2022-10-23 PROCEDURE — 87040 BLOOD CULTURE FOR BACTERIA: CPT | Performed by: EMERGENCY MEDICINE

## 2022-10-23 PROCEDURE — P9612 CATHETERIZE FOR URINE SPEC: HCPCS

## 2022-10-23 RX ORDER — SODIUM CHLORIDE 0.9 % (FLUSH) 0.9 %
10 SYRINGE (ML) INJECTION AS NEEDED
Status: DISCONTINUED | OUTPATIENT
Start: 2022-10-23 | End: 2022-10-24 | Stop reason: HOSPADM

## 2022-10-24 ENCOUNTER — TELEPHONE (OUTPATIENT)
Dept: FAMILY MEDICINE CLINIC | Facility: CLINIC | Age: 87
End: 2022-10-24

## 2022-10-24 VITALS
DIASTOLIC BLOOD PRESSURE: 95 MMHG | OXYGEN SATURATION: 96 % | SYSTOLIC BLOOD PRESSURE: 184 MMHG | WEIGHT: 160 LBS | TEMPERATURE: 98.3 F | BODY MASS INDEX: 22.4 KG/M2 | HEIGHT: 71 IN | RESPIRATION RATE: 14 BRPM | HEART RATE: 63 BPM

## 2022-10-24 LAB — QT INTERVAL: 462 MS

## 2022-10-24 NOTE — TELEPHONE ENCOUNTER
Caller: Evonne Snowden    Relationship to patient: Emergency Contact    Best call back number: [phone]    Chief complaint: FOLLOW UP FROM EMERGENCY ROOM VISIT, ON 10/23/22    Type of visit: HOSPITAL FOLLOW UP     Requested date: SEEN AT EMERGENCY ROOM ON 10/23/22     If rescheduling, when is the original appointment: N/A     Additional notes:PATIENT'S WIFE CALLED TO LET DR. NIXON KNOW SHE HAD TO TAKE HER  TO THE EMERGENCY ROOM  YESTERDAY AND WOULD LIKE TO SCHEDULE A HOSPITAL FOLLOW UP FOR HIM    HUB DID NOT SEE ANY AVAILABILITY WITHIN THE NEXT 7 DAYS

## 2022-10-24 NOTE — TELEPHONE ENCOUNTER
I called and spoke with pt's wife Evonne, she does not feel like pt needs a f/u at this time. She just wanted Dr. Diez to be aware that he was taken into the ER. He lost his balance and was disoriented. She states they had been in the car for 5 hours and he may have been overly tired, but she wanted to make sure he was not having a stroke. She said they did blood work and a CT and found no evidence of a stroke. She assured me she would call the office if anything changes and she thinks he needs to be seen.

## 2022-10-24 NOTE — ED PROVIDER NOTES
Subjective   History of Present Illness  This is an 88-year-old male accompanied by his wife and grandson, with a chief complaint of fall.  The  and wife drove back from [facility] today, and the wife had noticed that he seemed to be acting slightly confused.  She felt that he was making odd statements and not acting quite like himself.  However, he was able to walk into the house, eat dinner, use the restroom, and was otherwise participating in his normal ADLs.  He was in the closet changing clothes when his feet got hung up on the pants and he fell.  He struck his head on the way down.  The wife was unable to get him up and called their grandson to assist in getting him back upright.  His grandson felt that he was not behaving like normally, but states that it has gotten somewhat better since they have been here.  The patient himself does not have any complaints of pain at this point in time.  The patient or the person giving the history on their behalf denies any other precipitating, aggravating, or alleviating symptoms than those listed above.        Review of Systems   All other systems reviewed and are negative.      Past Medical History:   Diagnosis Date   • Acquired hypothyroidism 8/31/2022   • Atrial fibrillation ([facility])    • Cancer ([facility]) 04/2018    basil cell carcinoma   • Carotid stenosis    • CHF (congestive heart failure) ([facility])    • Chronic deep vein thrombosis (DVT) of popliteal vein of right lower extremity ([facility])    • Coronary artery disease involving native coronary artery of native heart without angina pectoris 12/20/2018   • DVT of lower extremity (deep venous thrombosis) ([facility])    • Hard of hearing    • Long-term use of high-risk medication    • PAD (peripheral artery disease) ([facility]) 9/10/2020   • Parkinson's disease ([facility])    • Postphlebitic syndrome        Allergies   Allergen Reactions   • Penicillins Rash   • Rocephin [Ceftriaxone] Rash       Past Surgical History:   Procedure Laterality  Date   • BASAL CELL CARCINOMA EXCISION  04/2018   • CARDIAC CATHETERIZATION N/A 11/5/2018    Procedure: Left Heart Cath;  Surgeon: Oliverio Azar MD;  [facility];  Service: Cardiovascular   • CARDIAC CATHETERIZATION N/A 11/5/2018    Procedure: Coronary angiography;  Surgeon: Oliverio Azar MD;  Location:  [facility] CATH INVASIVE LOC[facility];  Service: Cardiovascular   • CARDIAC CATHETERIZATION N/A 11/5/2018    Procedure: Left ventriculography;  Surgeon: Oliverio Azar MD;  [facility];  Service: Cardiovascular   • CARDIAC CATHETERIZATION N/A 6/4/2019    Procedure: Left Heart Cath;  Surgeon: Johnny Glasgow MD;  [facility];  Service: Cardiovascular   • CARDIAC CATHETERIZATION N/A 6/4/2019    Procedure: Coronary angiography;  Surgeon: Johnny Glasgow MD;  [facility];  Service: Cardiovascular   • CARDIAC CATHETERIZATION N/A 6/4/2019    Procedure: Left ventriculography;  Surgeon: Johnny Glasgow MD;  [facility];  Service: Cardiovascular   • CARDIAC CATHETERIZATION N/A 6/4/2019    Procedure: Stent BILL coronary;  Surgeon: Johnny Glasgow MD;  [facility];  Service: Cardiovascular   • CARDIAC SURGERY     • CATARACT EXTRACTION Left 08/2018   • CATARACT EXTRACTION Right 09/2018   • COLONOSCOPY      2011   • INCISION AND DRAINAGE LEG Right 7/7/2017    Procedure: INCISION AND DRAINAGE INFECTED HEMATOMA RIGHT THIGH;  Surgeon: Valentin Peña MD;  Location: [facility] OR;  Service:    • JOINT REPLACEMENT     • KNEE INCISION AND DRAINAGE Right 12/27/2016    Procedure: KNEE INCISION AND DRAINAGE;  Surgeon: Triston Whitten MD;  Location: [facility] OR;  Service:    • NOSE SURGERY      nose replacement   • SKIN BIOPSY     • SKIN GRAFT  12/2017   • TOTAL KNEE ARTHROPLASTY Right    • VASCULAR SURGERY     • VENA CAVA FILTER  PLACEMENT         Family History   Problem Relation Age of Onset   • Heart attack Mother 74   • Hypertension Mother    • Stroke Brother    • Cancer Brother    • Cancer Sister    • Cancer Brother    • Deep vein thrombosis Neg Hx        Social History     Socioeconomic History   • Marital status:    • Years of education: college grad   Tobacco Use   • Smoking status: Never   • Smokeless tobacco: Never   • Tobacco comments:     caff use   Vaping Use   • Vaping Use: Never used   Substance and Sexual Activity   • Alcohol use: No     Comment: none for a month   • Drug use: No   • Sexual activity: Not Currently     Partners: Female           Objective   Physical Exam  Vitals and nursing note reviewed.   Constitutional:       Appearance: Normal appearance. He is normal weight.   HENT:      Head: Normocephalic and atraumatic.      Comments: The patient has the expected flat facies and slow movements of Parkinson syndrome.     Nose: Nose normal.      Mouth/Throat:      Mouth: Mucous membranes are moist.      Pharynx: Oropharynx is clear.   Eyes:      Extraocular Movements: Extraocular movements intact.      Conjunctiva/sclera: Conjunctivae normal.      Pupils: Pupils are equal, round, and reactive to light.   Cardiovascular:      Rate and Rhythm: Normal rate and regular rhythm.      Pulses: Normal pulses.      Heart sounds: Normal heart sounds.   Pulmonary:      Effort: Pulmonary effort is normal.      Breath sounds: Normal breath sounds.   Abdominal:      General: Abdomen is flat. Bowel sounds are normal.      Palpations: Abdomen is soft.   Musculoskeletal:         General: Normal range of motion.      Cervical back: Normal range of motion and neck supple.   Skin:     General: Skin is warm and dry.      Capillary Refill: Capillary refill takes less than 2 seconds.   Neurological:      General: No focal deficit present.      Mental Status: He is alert and oriented to person, place, and time. Mental status is at  baseline.   Psychiatric:         Mood and Affect: Mood normal.         Behavior: Behavior normal.         Thought Content: Thought content normal.         Judgment: Judgment normal.         ECG 12 Lead      Date/Time: 10/23/2022 10:21 PM  Performed by: Esperanza Ramos MD  Authorized by: Esperanza Ramos MD   Interpreted by physician  Rhythm: sinus rhythm  Comments: Nonspecific ST and T wave changes, with biphasic T waves diffusely.  This is essentially unchanged from previous EKG                 ED Course  ED Course as of 10/24/22 0102   Mon Oct 24, 2022   0042 All findings were explained to the patient and his wife.  She believes the patient may just have been overtired, and request to take him home and send him in bed, and then have him follow-up in the morning. [LC]      ED Course User Index  [LC] Esperanza Ramos MD                                           MDM  Number of Diagnoses or Management Options     Amount and/or Complexity of Data Reviewed  Clinical lab tests: reviewed and ordered  Tests in the radiology section of CPT®: reviewed and ordered  Tests in the medicine section of CPT®: reviewed and ordered  Decide to obtain previous medical records or to obtain history from someone other than the patient: yes  Independent visualization of images, tracings, or specimens: yes    Risk of Complications, Morbidity, and/or Mortality  Presenting problems: moderate  Diagnostic procedures: moderate  Management options: moderate    Patient Progress  Patient progress: stable      Final diagnoses:   Closed head injury, initial encounter       ED Disposition  ED Disposition     ED Disposition   Discharge    Condition   Stable    Comment   --             Aleksander Diez MD  [address] [id number] [phone]    Call in 1 day           Medication List      No changes were made to your prescriptions during this visit.          Esperanza Ramos MD  10/24/22 0102

## 2022-10-27 ENCOUNTER — TREATMENT (OUTPATIENT)
Dept: PHYSICAL THERAPY | Facility: CLINIC | Age: 87
End: 2022-10-27

## 2022-10-27 DIAGNOSIS — M25.611 DECREASED ROM OF RIGHT SHOULDER: ICD-10-CM

## 2022-10-27 DIAGNOSIS — R29.898 SHOULDER WEAKNESS: ICD-10-CM

## 2022-10-27 DIAGNOSIS — M25.511 ACUTE PAIN OF RIGHT SHOULDER: Primary | ICD-10-CM

## 2022-10-27 PROCEDURE — 97140 MANUAL THERAPY 1/> REGIONS: CPT | Performed by: PHYSICAL THERAPIST

## 2022-10-27 PROCEDURE — 97110 THERAPEUTIC EXERCISES: CPT | Performed by: PHYSICAL THERAPIST

## 2022-10-27 NOTE — PROGRESS NOTES
Physical Therapy Daily Treatment Note      Patient: Casey Snowden Sr.   : 1934  Referring practitioner: Aleksander Diez MD  Date of Initial Visit: Type: THERAPY  Noted: 10/11/2022  Today's Date: 10/27/2022  Patient seen for 2 sessions         Casey Snowden reports: R shoulder still very limited; using L arm to eat, brush teeth, shower etc         Objective     R shoulder flexion: 92 degrees    See Exercise, Manual, and Modality Logs for complete treatment.       Assessment/Plan   Continued PROM to R shoulder to prevent adhesive capsulitis; added joint mobs and rhythmic stab for joint strength/stability. Progressed AAROM exercises with physioball and reviewed cane AAROM and isometrics; most strength in IR all others 2/5 or less with MMT. Mild improvement in active shoulder flexion compared to evaluation. Updated and issued HEP. Wife reports he has appt with Dr. Shipman  for possible RTC tear. Pt instructed to ice as needed for pain control.        Progress per Plan of Care and Progress strengthening /stabilization /functional activity           Timed:  Manual Therapy:    15     mins  57972;  Therapeutic Exercise:    30     mins  00786;     Neuromuscular Tres:    -    mins  62129;    Therapeutic Activity:     -     mins  26155;     Gait Training:      -     mins  38140;     Ultrasound:     -     mins  42565;      Untimed:  Electrical Stimulation:    -     mins  91595 ( );  Mechanical Traction:    -     mins  82218;   Dry needling:      -     mins  77277/    Timed Treatment:   45   mins   Total Treatment:     50   mins    Lucrecia Taylor PT  Physical Therapist    License #: [id number]

## 2022-10-28 LAB
BACTERIA SPEC AEROBE CULT: NORMAL
BACTERIA SPEC AEROBE CULT: NORMAL

## 2022-10-31 ENCOUNTER — TREATMENT (OUTPATIENT)
Dept: PHYSICAL THERAPY | Facility: CLINIC | Age: 87
End: 2022-10-31

## 2022-10-31 DIAGNOSIS — R29.898 SHOULDER WEAKNESS: ICD-10-CM

## 2022-10-31 DIAGNOSIS — M25.611 DECREASED ROM OF RIGHT SHOULDER: ICD-10-CM

## 2022-10-31 DIAGNOSIS — M25.511 ACUTE PAIN OF RIGHT SHOULDER: Primary | ICD-10-CM

## 2022-10-31 PROCEDURE — 97140 MANUAL THERAPY 1/> REGIONS: CPT | Performed by: PHYSICAL THERAPIST

## 2022-10-31 PROCEDURE — 97110 THERAPEUTIC EXERCISES: CPT | Performed by: PHYSICAL THERAPIST

## 2022-10-31 NOTE — PROGRESS NOTES
Physical Therapy Daily Treatment Note      Patient: Casey Snowden Sr.   : 1934  Referring practitioner: Aleksander Diez MD  Date of Initial Visit: Type: THERAPY  Noted: 10/11/2022  Today's Date: 10/31/2022  Patient seen for 3 sessions         Casey Snowden reports: minimal soreness after last session; pain in R shoulder today 7/10         Objective   See Exercise, Manual, and Modality Logs for complete treatment.       Assessment/Plan  Increased reps of AAROM with cane and ball today; pt with most difficulty with active ER, (unable to get to neutral rotation) with noted compensation due to pain and weakness. Pt to see shoulder specialist next week. Updated and issued HEP along with reminder for next appt. Pt educated on importance of maintaining ROM of R shoulder until he is assessed by shoulder ortho for possible RTC tear.        Progress per Plan of Care and Progress strengthening /stabilization /functional activity           Timed:  Manual Therapy:    13     mins  74625;  Therapeutic Exercise:    16     mins  31814;     Neuromuscular Tres:    -    mins  78232;    Therapeutic Activity:     -     mins  22329;     Gait Training:      -     mins  64435;     Ultrasound:     -     mins  23529;      Untimed:  Electrical Stimulation:    -     mins  64851 ( );  Mechanical Traction:    -     mins  68711;   Dry needling:      -     mins  35159/    Timed Treatment:   29   mins   Total Treatment:     30   mins  Lucrecia Taylor PT  Physical Therapist    License #: [id number]

## 2022-11-07 ENCOUNTER — TREATMENT (OUTPATIENT)
Dept: PHYSICAL THERAPY | Facility: CLINIC | Age: 87
End: 2022-11-07

## 2022-11-07 DIAGNOSIS — M25.611 DECREASED ROM OF RIGHT SHOULDER: ICD-10-CM

## 2022-11-07 DIAGNOSIS — M25.511 ACUTE PAIN OF RIGHT SHOULDER: Primary | ICD-10-CM

## 2022-11-07 DIAGNOSIS — R29.898 SHOULDER WEAKNESS: ICD-10-CM

## 2022-11-07 PROCEDURE — 97140 MANUAL THERAPY 1/> REGIONS: CPT | Performed by: PHYSICAL THERAPIST

## 2022-11-07 PROCEDURE — 97110 THERAPEUTIC EXERCISES: CPT | Performed by: PHYSICAL THERAPIST

## 2022-11-07 NOTE — PROGRESS NOTES
Physical Therapy Daily Treatment Note      Patient: Casey Snowden Sr.   : 1934  Referring practitioner: Aleksander Diez MD  Date of Initial Visit: Type: THERAPY  Noted: 10/11/2022  Today's Date: 2022  Patient seen for 4 sessions         Casey Snowden reports: still pain and difficult to lift my arm; using my L hand for most things even though I am R handed    Objective   See Exercise, Manual, and Modality Logs for complete treatment.       Assessment/Plan  Pt seeing shoulder/ortho on ; continues to progress PROM and AAROM. Still limited with AROM with all directions about shoulder level; sig weakness and lack of ROM with ER, unable to get to neutral actively. Plan to continue to progress strength per pt's tolerance until he sees MD next week for shoulder assessment.       Progress per Plan of Care and Progress strengthening /stabilization /functional activity           Timed:  Manual Therapy:    12     mins  53613;  Therapeutic Exercise:    18     mins  08152;     Neuromuscular Tres:    -    mins  70124;    Therapeutic Activity:     -     mins  83280;     Gait Training:      -     mins  41990;     Ultrasound:     -     mins  97063;      Untimed:  Electrical Stimulation:    -     mins  58457 ( );  Mechanical Traction:    -     mins  51094;   Dry needling:      -     mins  20516/    Timed Treatment:   30   mins   Total Treatment:     30   mins  Lucrecia Taylor PT  Physical Therapist    License #: [id number]

## 2022-11-14 ENCOUNTER — OFFICE VISIT (OUTPATIENT)
Dept: ORTHOPEDIC SURGERY | Facility: CLINIC | Age: 87
End: 2022-11-14

## 2022-11-14 VITALS
SYSTOLIC BLOOD PRESSURE: 217 MMHG | HEART RATE: 48 BPM | WEIGHT: 160 LBS | HEIGHT: 71 IN | DIASTOLIC BLOOD PRESSURE: 80 MMHG | BODY MASS INDEX: 22.4 KG/M2

## 2022-11-14 DIAGNOSIS — M12.811 ROTATOR CUFF ARTHROPATHY OF RIGHT SHOULDER: ICD-10-CM

## 2022-11-14 PROCEDURE — 99203 OFFICE O/P NEW LOW 30 MIN: CPT | Performed by: ORTHOPAEDIC SURGERY

## 2022-11-14 PROCEDURE — 20610 DRAIN/INJ JOINT/BURSA W/O US: CPT | Performed by: ORTHOPAEDIC SURGERY

## 2022-11-14 RX ADMIN — LIDOCAINE HYDROCHLORIDE 4 ML: 10 INJECTION, SOLUTION EPIDURAL; INFILTRATION; INTRACAUDAL; PERINEURAL at 09:44

## 2022-11-14 RX ADMIN — TRIAMCINOLONE ACETONIDE 80 MG: 40 INJECTION, SUSPENSION INTRA-ARTICULAR; INTRAMUSCULAR at 09:44

## 2022-11-14 NOTE — PROGRESS NOTES
Subjective:     Patient ID: Casey Snowden Sr. is a 88 y.o. male.    Chief Complaint:  Right shoulder pain, new patient  History of Present Illness  Casey Snowdne Sr. presents to clinic today for evaluation of right shoulder pain. He is accompanied by an adult female.    The patient states that his pain started after a fall back on 08/30/2022. The patient rates the pain as severe in intensity, 9 to 10 out of 10, aching in nature, with occasional sharp pain. The patient does note associated stiffness, as well as difficulty with raising his arm. The patient reports he has been doing physical therapy with minimal improvement. The patient states he is retired.     The patient is right hand dominant. The patient denies any previous issues with his right shoulder. The patient reports that he has pain, weakness, and stiffness in his right shoulder. The patient notes that he is unable to raise his right arm up to shoulder height or above. The patient states he did not have a problem with this before the fall. The patient denies any numbness or tingling. The patient reports he has been doing physical therapy for several weeks, with minimal improvement. The patient localizes his pain primarily to the anterolateral aspect of his right shoulder. The patient denies any radiation of pain down his arm. The patient rates pain as 9 to 10 out of 10.     The adult female states that he is very limited in his daily activities. The adult female reports the patient cannot cut his meat, he cannot eat with his right arm as he cannot get his hand up to his mouth, cannot brush his teeth, comb his hair, button his shirt; that she is literally doing everything for him. The adult female reports that he is not diabetic. The patient does note that he has had cortisone injections in his knee which gave minimal improvement.      Social History     Occupational History   • Occupation: /retired     Comment: self employed   Tobacco  Use   • Smoking status: Never   • Smokeless tobacco: Never   • Tobacco comments:     caff use   Vaping Use   • Vaping Use: Never used   Substance and Sexual Activity   • Alcohol use: No     Comment: none for a month   • Drug use: No   • Sexual activity: Not Currently     Partners: Female      Past Medical History:   Diagnosis Date   • Acquired hypothyroidism 8/31/2022   • Atrial fibrillation ([facility])    • Cancer ([facility]) 04/2018    basil cell carcinoma   • Carotid stenosis    • CHF (congestive heart failure) ([facility])    • Chronic deep vein thrombosis (DVT) of popliteal vein of right lower extremity ([facility])    • Coronary artery disease involving native coronary artery of native heart without angina pectoris 12/20/2018   • DVT of lower extremity (deep venous thrombosis) ([facility])    • Hard of hearing    • Long-term use of high-risk medication    • PAD (peripheral artery disease) ([facility]) 9/10/2020   • Parkinson's disease ([facility])    • Postphlebitic syndrome      Past Surgical History:   Procedure Laterality Date   • BASAL CELL CARCINOMA EXCISION  04/2018   • CARDIAC CATHETERIZATION N/A 11/5/2018    Procedure: Left Heart Cath;  Surgeon: Oliverio Azar MD;  [facility];  Service: Cardiovascular   • CARDIAC CATHETERIZATION N/A 11/5/2018    Procedure: Coronary angiography;  Surgeon: Oliverio Azar MD;  [facility];  Service: Cardiovascular   • CARDIAC CATHETERIZATION N/A 11/5/2018    Procedure: Left ventriculography;  Surgeon: Oliverio Azar MD;  [facility];  Service: Cardiovascular   • CARDIAC CATHETERIZATION N/A 6/4/2019    Procedure: Left Heart Cath;  Surgeon: Johnny Glasgow MD;  [facility];  Service: Cardiovascular   • CARDIAC CATHETERIZATION N/A 6/4/2019    Procedure: Coronary angiography;  Surgeon: Johnny Glasgow MD;  [facility];  Service: Cardiovascular   • CARDIAC  "CATHETERIZATION N/A 6/4/2019    Procedure: Left ventriculography;  Surgeon: Johnny Glasgow MD;  Location:  [facility] CATH INVASIVE LOCATION;  Service: Cardiovascular   • CARDIAC CATHETERIZATION N/A 6/4/2019    Procedure: Stent BILL coronary;  Surgeon: Johnny Glasgow MD;  Location:  [facility] CATH INVASIVE LOCATION;  Service: Cardiovascular   • CARDIAC SURGERY     • CATARACT EXTRACTION Left 08/2018   • CATARACT EXTRACTION Right 09/2018   • COLONOSCOPY      2011   • INCISION AND DRAINAGE LEG Right 7/7/2017    Procedure: INCISION AND DRAINAGE INFECTED HEMATOMA RIGHT THIGH;  Surgeon: Valentin Peña MD;  [facility];  Service:    • JOINT REPLACEMENT     • KNEE INCISION AND DRAINAGE Right 12/27/2016    Procedure: KNEE INCISION AND DRAINAGE;  Surgeon: Triston Whitten MD;  [facility];  Service:    • NOSE SURGERY      nose replacement   • SKIN BIOPSY     • SKIN GRAFT  12/2017   • TOTAL KNEE ARTHROPLASTY Right    • VASCULAR SURGERY     • VENA CAVA FILTER PLACEMENT         Family History   Problem Relation Age of Onset   • Heart attack Mother 74   • Hypertension Mother    • Stroke Brother    • Cancer Brother    • Cancer Sister    • Cancer Brother    • Deep vein thrombosis Neg Hx          Review of Systems        Objective:  Vitals:    11/14/22 0847   BP: (!) 217/80   Pulse: (!) 48   Weight: 72.6 kg (160 lb)   Height: 180.3 cm (71\")         11/14/22  0847   Weight: 72.6 kg (160 lb)     Body mass index is 22.32 kg/m².  Physical Exam    Vital signs reviewed.   General: No acute distress, alert and oriented  Eyes: conjunctiva clear; pupils equally round and reactive  ENT: external ears and nose atraumatic; oropharynx clear  CV: no peripheral edema  Resp: normal respiratory effort  Skin: no rashes or wounds; normal turgor  Psych: mood and affect appropriate; recent and remote memory intact          Ortho Exam       Right shoulder-  FF- Active- 75 degrees  FF - Passive- 145 degrees  FF " strength- 2/5  ER- Active- 10 degrees  2/5 strength   Passive ER- 35 degrees  Strength- 2/5  Positive deltoid firing in all three components.  Brisk cap refill to all digits, 1+ radial pulse  Positive sensation to light touch in all distributions, right proximal arm as well as right hand, symmetric to the left.    Imaging:  XR Shoulder 2+ View Right    Result Date: 8/31/2022  1. No acute osseous abnormality. 2. Mild glenohumeral and acromioclavicular degenerative arthropathy.  This report was finalized on 8/31/2022 11:48 AM by Dr. Milton Jsaon MD.      Review of outside x-rays right shoulder from August 2022 including review of imaging as well as radiology report indicates moderate to advanced glenohumeral arthritis with significant humeral head elevation consistent with rotator cuff arthropathy, no evidence of fracture, dislocation, or subluxation.    Assessment:        1. Rotator cuff arthropathy of right shoulder           Plan:  Large Joint Arthrocentesis: R glenohumeral  Date/Time: 11/14/2022 9:44 AM  Consent given by: patient  Site marked: site marked  Timeout: Immediately prior to procedure a time out was called to verify the correct patient, procedure, equipment, support staff and site/side marked as required   Supporting Documentation  Indications: pain   Procedure Details  Location: shoulder - R glenohumeral  Preparation: Patient was prepped and draped in the usual sterile fashion  Needle size: 22 G  Approach: anterior  Medications administered: 80 mg triamcinolone acetonide 40 MG/ML; 4 mL lidocaine PF 1% 1 %  Patient tolerance: patient tolerated the procedure well with no immediate complications              Plan:    1. Discussed treatment options at length with patient at today's visit.  2. At this point in time, given his significant pain as well as limitation in function, we discussed options. He would like to proceed with intra-articular injection to right shoulder at this point in time.  3.  Patient would like to proceed with cortisone injection today to the right shoulder glenohumeral joint. Recommended limited use of affected extremity for the next 24 hours to only essential activities other than work on general active and passive motion. Recommended supplementing with ice and soft tissue massage. Discussed with patient that they should see results in 5-7 days, if no improvement in 5-6 weeks I have asked them to call the office to review other options. Patient should call office immediately if they notice redness, warmth, fevers, chills, or residual numbness or tingling for greater than 6 hours after injection.  4. We did discuss option for reverse shoulder arthroplasty. He would like to hold off on that at this time.  5. I will follow-up with him in 6 weeks for reevaluation of motion, function, and strength.        Casey Snowden Sr. was in agreement with plan and had all questions answered.     Orders:  Orders Placed This Encounter   Procedures   • Large Joint Arthrocentesis: R glenohumeral       Medications:  No orders of the defined types were placed in this encounter.      Followup:  No follow-ups on file.    Diagnoses and all orders for this visit:    1. Rotator cuff arthropathy of right shoulder (Primary)    Other orders  -     Large Joint Arthrocentesis: R glenohumeral          Dictated utilizing Dragon dictation   Transcribed from ambient dictation for Junior Taylor MD by Felicia Scott.  11/14/22   10:52 EST    Patient or patient representative verbalized consent to the visit recording.  I have personally performed the services described in this document as transcribed by the above individual, and it is both accurate and complete.

## 2022-11-15 DIAGNOSIS — I11.0 HYPERTENSIVE HEART DISEASE WITH HEART FAILURE: ICD-10-CM

## 2022-11-15 RX ORDER — VALSARTAN 160 MG/1
TABLET ORAL
Qty: 90 TABLET | Refills: 3 | Status: SHIPPED | OUTPATIENT
Start: 2022-11-15

## 2022-11-18 ENCOUNTER — TREATMENT (OUTPATIENT)
Dept: PHYSICAL THERAPY | Facility: CLINIC | Age: 87
End: 2022-11-18

## 2022-11-18 DIAGNOSIS — R29.898 SHOULDER WEAKNESS: ICD-10-CM

## 2022-11-18 DIAGNOSIS — M25.611 DECREASED ROM OF RIGHT SHOULDER: ICD-10-CM

## 2022-11-18 DIAGNOSIS — M25.511 ACUTE PAIN OF RIGHT SHOULDER: Primary | ICD-10-CM

## 2022-11-18 PROCEDURE — 97110 THERAPEUTIC EXERCISES: CPT | Performed by: PHYSICAL THERAPIST

## 2022-11-18 PROCEDURE — 97530 THERAPEUTIC ACTIVITIES: CPT | Performed by: PHYSICAL THERAPIST

## 2022-11-18 NOTE — PROGRESS NOTES
Re-Assessment for 30 Day Progress Note    Patient: Casey Snowden Sr.   : 1934  Diagnosis/ICD-10 Code:  Acute pain of right shoulder [M25.511]  Referring practitioner: Aleksander Diez MD  Date of Initial Visit: Type: THERAPY  Noted: 10/11/2022  Today's Date: 2022  Patient seen for 5 sessions      Subjective:   Casey Snowden reports: he had steroid injection in R shoulder earlier this past week. Next follow up with surgeon in 6 weeks. Pt has no pain at rest or with lifting, but still limited with motion. Unable to use R arm for grooming/showering, eating, dressing etc and pt is R hand dominant.  Subjective Questionnaire: QuickDASH: 54.55  Clinical Progress: unchanged  Home Program Compliance: Yes  Treatment has included: therapeutic exercise, neuromuscular re-education, manual therapy, therapeutic activity and cryotherapy    Objective     Observations      Right Shoulder  Negative for edema and effusion.      Palpation      Right   No palpable tenderness to the latissimus, levator scapulae, lower trapezius, middle deltoid, middle trapezius, posterior deltoid, rhomboids, serratus anterior, subclavius, subscapularis, teres major, teres minor, thoracic paraspinals, triceps and upper trapezius.   Hypertonic in the pectoralis major and pectoralis minor. Tenderness of the anterior deltoid, biceps, infraspinatus, supraspinatus.        Active Range of Motion   Left Shoulder   Flexion: 130 degrees   Abduction: 130 degrees   External rotation 0°: 75 degrees   External rotation BTH: C2   Internal rotation BTB: T10     Right Shoulder   Flexion: 100 degrees  Abduction: 82 degrees  External rotation 0°: Right shoulder active external rotation at 0 degrees: able to achieve neutral rotation with max effort; moderate compensation  External rotation BTH: Active external rotation behind the head: able to reach to chin  Internal rotation BTB: Lumbar   Horizontal abduction: no pain  Horizontal adduction: no pain, able  to reach to opposite shoulder     Passive Range of Motion      Right Shoulder   Flexion: 150 degrees, no pain  Abduction: 154 degrees, no pain  External rotation 45°: 85 degrees with pain  Internal rotation 60°: 90 degrees     Strength/Myotome Testing     Left Shoulder   Normal muscle strength    Right Shoulder  Flexion: 3-  ABD: 2+  ER: 2+  IR: 4-     Tests      Right Shoulder   Positive empty can, external rotation lag sign, full can and Neer's.   Negative sulcus sign.      Assessment & Plan     Assessment  Impairments: abnormal muscle firing, abnormal muscle tone, abnormal or restricted ROM, activity intolerance, impaired physical strength, lacks appropriate home exercise program, pain with function and weight-bearing intolerance  Functional Limitations: carrying objects, lifting, sleeping, pulling, pushing, uncomfortable because of pain, moving in bed, reaching behind back, reaching overhead and unable to perform repetitive tasks  Assessment details: Casey Snowden  is a 88 y.o. year-old male referred to physical therapy for R shoulder injury after a fall a few weeks ago. He presents with a evolving clinical presentation.  He has comorbidities of Parkinson's disease and personal factors of recent falls and hard of hearing.  Pt saw shoulder surgeon and received shoulder steroid injection with sig improvement in pain but still limited functionally; not able to use R arm to eat, grooming, showering, dressing. Pt with mproving R arm passive and active ROM. Pt lacking strength against gravity and unable to lift/hold arm past 90 degrees; able to achieve neutral rotation at 0 degrees but compensating with shoulder ABD and trunk rotation. Added AROM exercises today since pain is controlled. Patient is appropriate for skilled physical therapy in order to reduce pain and increase ease with daily mobility. During therapy, pt educated on anatomy, goal of interventions, HEP, posture and consider potential  TSA.  Prognosis: fair    Goals  Plan Goals: STG: ~6 weeks  1. Pt will demonstrate R shoulder PROM in flexion and abduction of >160 degrees to improve overhead activity tolerance-MET  2. Pt will demonstrate R shoulder PROM in ER and IR >80 degrees-MET  3. Decrease right UE tenderness with palpation to minimal over the acromion and supraspinatus tendon to be able to sleep on his side-MET  4. Pt will be able to drive comfortably and safely using BUE for at least 30 min-PROGRESSING    LTG: ~12 weeks  1. Pt will demonstrate R shoulder AROM in flexion and ABD >120 degrees-PROGRESSING  2. Pt will demonstrate R shoulder AROM in ER to 45 degrees-PROGRESSING  2. Pt will be able to reach L1 using RUE-MET  3. Pt will improve R shoulder strength to >3+/5 to improve quality of life-PROGRESSING  4. Pt will improve QuickDASH score to <10 to improve subjective function of shoulder with ADLs-PROGRESSING    Plan  Therapy options: will be seen for skilled therapy services  Planned modality interventions: cryotherapy, iontophoresis, thermotherapy (hydrocollator packs), ultrasound and dry needling (no estim due to a-fib)  Planned therapy interventions: ADL retraining, balance/weight-bearing training, body mechanics training, fine motor coordination training, flexibility, functional ROM exercises, home exercise program, IADL retraining, joint mobilization, manual therapy, neuromuscular re-education, postural training, soft tissue mobilization, spinal/joint mobilization, strengthening, stretching, therapeutic activities and abdominal trunk stabilization  Treatment plan discussed with: patient  Plan details: 1x times per week for 10 weeks        Visit Diagnoses:    ICD-10-CM ICD-9-CM   1. Acute pain of right shoulder  M25.511 719.41   2. Decreased ROM of right shoulder  M25.611 719.51   3. Shoulder weakness  R29.898 719.61       PT Signature: Lucrecia Taylor PT  [place] license: [id number]      Timed:  Manual Therapy:    -     mins   03687;  Therapeutic Exercise:    28     mins  83117;     Neuromuscular Tres:    -    mins  53247;    Therapeutic Activity:     10     mins  29922;     Gait Training:      -     mins  94627;     Ultrasound:     -     mins  88770;    Electrical Stimulation:    -     mins  07260 ( );    Untimed:  Electrical Stimulation:    -     mins  57372 ( );  Mechanical Traction:    -     mins  94855; \  Dry needling:      -     mins  20560/20561    Timed Treatment:   38   mins   Total Treatment:     40   mins

## 2022-11-21 ENCOUNTER — TREATMENT (OUTPATIENT)
Dept: PHYSICAL THERAPY | Facility: CLINIC | Age: 87
End: 2022-11-21

## 2022-11-21 DIAGNOSIS — M25.511 ACUTE PAIN OF RIGHT SHOULDER: Primary | ICD-10-CM

## 2022-11-21 DIAGNOSIS — R29.898 SHOULDER WEAKNESS: ICD-10-CM

## 2022-11-21 DIAGNOSIS — M25.611 DECREASED ROM OF RIGHT SHOULDER: ICD-10-CM

## 2022-11-21 PROCEDURE — 97140 MANUAL THERAPY 1/> REGIONS: CPT | Performed by: PHYSICAL THERAPIST

## 2022-11-21 PROCEDURE — 97110 THERAPEUTIC EXERCISES: CPT | Performed by: PHYSICAL THERAPIST

## 2022-11-21 NOTE — PROGRESS NOTES
Physical Therapy Daily Treatment Note      Patient: Casey Snowden Sr.   : 1934  Referring practitioner: Aleksander Diez MD  Date of Initial Visit: Type: THERAPY  Noted: 10/11/2022  Today's Date: 2022  Patient seen for 6 sessions         Casey Snowden reports: pain is still good from steroid injection       Objective   See Exercise, Manual, and Modality Logs for complete treatment.       Assessment/Plan  Pt requires cues for isometrics and shoulder raises in frontal and sagittal planes; sig compensation with scaption/ABD at UT. PROM WNL and painful at end ranges. Added AROM ER in supine to improve ROM and muscle strength in ER. Improving AROM in flexion, but still lacking in ABD and ER and painful. Pt still considering TSA to return to ortho surgeon in January.       Progress per Plan of Care and Progress strengthening /stabilization /functional activity         Timed:  Manual Therapy:    10     mins  36976;  Therapeutic Exercise:    20     mins  24404;     Neuromuscular Tres:    -    mins  12065;    Therapeutic Activity:     -     mins  08509;     Gait Training:      -     mins  45733;     Ultrasound:     -     mins  76483;      Untimed:  Electrical Stimulation:    -     mins  82514 ( );  Mechanical Traction:    -     mins  28235;   Dry needling:      -     mins  68316/    Timed Treatment:   30   mins   Total Treatment:     35   mins  Lucrecia Taylor PT  Physical Therapist    License #: [id number]

## 2022-11-27 DIAGNOSIS — E03.9 ACQUIRED HYPOTHYROIDISM: ICD-10-CM

## 2022-11-27 RX ORDER — LIDOCAINE HYDROCHLORIDE 10 MG/ML
4 INJECTION, SOLUTION EPIDURAL; INFILTRATION; INTRACAUDAL; PERINEURAL
Status: COMPLETED | OUTPATIENT
Start: 2022-11-14 | End: 2022-11-14

## 2022-11-27 RX ORDER — TRIAMCINOLONE ACETONIDE 40 MG/ML
80 INJECTION, SUSPENSION INTRA-ARTICULAR; INTRAMUSCULAR
Status: COMPLETED | OUTPATIENT
Start: 2022-11-14 | End: 2022-11-14

## 2022-11-27 RX ORDER — LEVOTHYROXINE SODIUM 0.07 MG/1
75 TABLET ORAL
Qty: 90 TABLET | Refills: 1 | Status: SHIPPED | OUTPATIENT
Start: 2022-11-27

## 2022-11-28 ENCOUNTER — TREATMENT (OUTPATIENT)
Dept: PHYSICAL THERAPY | Facility: CLINIC | Age: 87
End: 2022-11-28

## 2022-11-28 DIAGNOSIS — M25.611 DECREASED ROM OF RIGHT SHOULDER: ICD-10-CM

## 2022-11-28 DIAGNOSIS — M25.511 ACUTE PAIN OF RIGHT SHOULDER: Primary | ICD-10-CM

## 2022-11-28 DIAGNOSIS — R29.898 SHOULDER WEAKNESS: ICD-10-CM

## 2022-11-28 PROCEDURE — 97140 MANUAL THERAPY 1/> REGIONS: CPT | Performed by: PHYSICAL THERAPIST

## 2022-11-28 PROCEDURE — 97110 THERAPEUTIC EXERCISES: CPT | Performed by: PHYSICAL THERAPIST

## 2022-11-28 RX ORDER — CLOPIDOGREL BISULFATE 75 MG/1
TABLET ORAL
Qty: 90 TABLET | Refills: 3 | Status: SHIPPED | OUTPATIENT
Start: 2022-11-28 | End: 2023-01-30

## 2022-11-28 NOTE — PROGRESS NOTES
Physical Therapy Daily Treatment Note      Patient: Casey Snowden Sr.   : 1934  Referring practitioner: Aleksander Diez MD  Date of Initial Visit: Type: THERAPY  Noted: 10/11/2022  Today's Date: 2022  Patient seen for 7 sessions         Casey Snowden reports: he is still using his L hand for most of his functional mobility but the pain and motion are getting better.          Objective   See Exercise, Manual, and Modality Logs for complete treatment.       Assessment/Plan  Pt with improving AROM of R shoulder; pt encouraged to try to use his R arm for eating and grooming activities to improve strength and functional use of R arm. Added bicep curls to help with functional strength of R arm; still most difficult motion is ER.        Progress per Plan of Care and Progress strengthening /stabilization /functional activity         Timed:  Manual Therapy:    15     mins  73921;  Therapeutic Exercise:    30     mins  27228;     Neuromuscular Tres:    -    mins  94019;    Therapeutic Activity:     -     mins  57835;     Gait Training:      -     mins  17771;     Ultrasound:     -     mins  74251;      Untimed:  Electrical Stimulation:    -     mins  18529 ( );  Mechanical Traction:    -     mins  91045;   Dry needling:      -     mins  97554/    Timed Treatment:   45   mins   Total Treatment:     45   mins  Lucrecia Taylor PT  Physical Therapist    License #: [id number]

## 2022-12-05 ENCOUNTER — TREATMENT (OUTPATIENT)
Dept: PHYSICAL THERAPY | Facility: CLINIC | Age: 87
End: 2022-12-05

## 2022-12-05 DIAGNOSIS — R29.898 SHOULDER WEAKNESS: ICD-10-CM

## 2022-12-05 DIAGNOSIS — M25.511 ACUTE PAIN OF RIGHT SHOULDER: Primary | ICD-10-CM

## 2022-12-05 DIAGNOSIS — M25.611 DECREASED ROM OF RIGHT SHOULDER: ICD-10-CM

## 2022-12-05 PROCEDURE — 97110 THERAPEUTIC EXERCISES: CPT | Performed by: PHYSICAL THERAPIST

## 2022-12-05 PROCEDURE — 97140 MANUAL THERAPY 1/> REGIONS: CPT | Performed by: PHYSICAL THERAPIST

## 2022-12-05 NOTE — PROGRESS NOTES
Physical Therapy Daily Treatment Note      Patient: Casey Snowden Sr.   : 1934  Referring practitioner: No ref. provider found  Date of Initial Visit: Type: THERAPY  Noted: 10/11/2022  Today's Date: 2022  Patient seen for 8 sessions         Casey Snowden reports: no pain but tried eating with his R arm with some difficulty but better.          Objective   See Exercise, Manual, and Modality Logs for complete treatment.       Assessment/Plan  Noted compensation with bicep curls due to weakness; increased reps for strength today with bicep curls and scap squeeze. Improving stability with manual rhythmic stab exercise. Improving function of R shoulder but has to be kept around shoulder level or below. Pt's pain is controlled with all exercises and manual tx; pt to follow up with surgeon after first of the year and considering TSA.        Progress per Plan of Care and Progress strengthening /stabilization /functional activity           Timed:  Manual Therapy:    12     mins  01429;  Therapeutic Exercise:    27     mins  13955;   (decreased units billed to account for divided time)  Neuromuscular Tres:    -    mins  31653;    Therapeutic Activity:     -     mins  51043;     Gait Training:      -     mins  43439;     Ultrasound:     -     mins  63777;      Untimed:  Electrical Stimulation:    -     mins  75565 ( );  Mechanical Traction:    -     mins  09645;   Dry needling:      -     mins  19663/    Timed Treatment:   39   mins   Total Treatment:     45   mins    Lucrecia Taylor PT  Physical Therapist  License #: [id number]                 11 YEAR WELL-CHILD VISIT    Nursing notes reviewed and accepted.    Connie Barr is a 11 year old female who presents for well-child exam.  Patient presents with Mother. Good growth and development. Active in gymnastics. She has not had her period. She has glasses but does not wear them. She has type I. Disease. Mom is wondering if there is any specific precautions to take a she approaches menarche.    Concerns raised today include:  Menstruation, bleeding disorder, advice     HEALTH:  Diet:  fair  Vision concerns:  No: Not applicable, does not like wearing her glasses   Hearing concerns:  No: Not applicable  Dental visit:  No: Not applicable    SOCIAL:  School:  [employer] /5th grade  Concerns for school performance:  None  Concerns for behavior:  None  Activity:  athletic  Interests/Activities:  Gymnastics, cheerleading, volleyball, track     DEVELOPMENT: age-appropriate    Medical history, surgical history, and family history reviewed and updated.    PHYSICAL EXAM:  Blood pressure 108/60, temperature 96.9 °F (36.1 °C), temperature source Temporal, height 5' (1.524 m), weight 34.7 kg.  Body mass index is 14.94 kg/m².  10 %ile (Z= -1.30) based on CDC (Girls, 2-20 Years) BMI-for-age based on BMI available as of 2/4/2019.  GENERAL:  Well appearing female, nontoxic, no acute distress.  Alert and interactive. Thin.  SKIN:  Warm, normal turgor.  No cyanosis.  No bruises or lesions.  HEAD:  Normocephalic, atraumatic.  EYES:  Conjunctivae without injection or icterus.  PERRL (pupils equal, round, and reactive to light), EOMI (extraocular movements are intact).  NOSE:  No flaring.  EARS:  TMs (tympanic membranes) transparent with good landmarks.  THROAT:  Oropharynx with moist mucous membranes and no lesions.  NECK:  Supple, no lymphadenopathy or masses.  HEART:  Regular rate and rhythm.  Quiet precordium.  Normal S1, S2.  No murmurs, rubs, or gallops.  LUNGS:  Clear to auscultation bilaterally.  No wheezes, rales, or  rhonchi.  Normal work of breathing.  ABDOMEN:  Soft, nontender.  No organomegaly or masses.  GENITOURINARY:  [place] stage II female genitalia.  EXTREMITIES:  Warm, dry, without abnormalities.  NEUROLOGIC:  Normal tone, bulk, and strength.    V    ASSESSMENT:  11 year old female well child.  Von Willebrand's    PLAN:  Encouraged her to wear her glasses.   Suggested mom contact the [facility] for guidance on impending menarche  All parental concerns and questions discussed.  Anticipatory guidance provided, handout given.              Accident prevention   Safety/car/bicycle/fire/sharp objects/falls/water              Development              Discipline              Diet/Healthy foods   Exercise/Activity   Limit TV/Video games/Computer              Television              Analgesics/Antipyretics              Sun exposure              Tobacco-free home              Dental care   Health/Puberty/About your body  Immunizations per orders.  Risks, benefits, and side effects discussed. = Boostrix, Menactra, HPV, influenza  Return to clinic in 1 year for WCE (well-child exam) or sooner for illness/concerns.      Roberto Vaughan MD  2/4/2019  3:51 PM

## 2022-12-12 ENCOUNTER — TREATMENT (OUTPATIENT)
Dept: PHYSICAL THERAPY | Facility: CLINIC | Age: 87
End: 2022-12-12

## 2022-12-12 DIAGNOSIS — M25.511 ACUTE PAIN OF RIGHT SHOULDER: Primary | ICD-10-CM

## 2022-12-12 DIAGNOSIS — M25.611 DECREASED ROM OF RIGHT SHOULDER: ICD-10-CM

## 2022-12-12 DIAGNOSIS — R29.898 SHOULDER WEAKNESS: ICD-10-CM

## 2022-12-12 PROCEDURE — 97140 MANUAL THERAPY 1/> REGIONS: CPT | Performed by: PHYSICAL THERAPIST

## 2022-12-12 PROCEDURE — 97110 THERAPEUTIC EXERCISES: CPT | Performed by: PHYSICAL THERAPIST

## 2022-12-12 NOTE — PROGRESS NOTES
Physical Therapy Daily Treatment Note      Patient: Casey Snowden Sr.   : 1934  Referring practitioner: Aleksander Diez MD  Date of Initial Visit: Type: THERAPY  Noted: 10/11/2022  Today's Date: 2022  Patient seen for 9 sessions         Casey Snowden reports: improving motion of R shoulder; I woke up Friday and I was able to lift it better.         Objective   See Exercise, Manual, and Modality Logs for complete treatment.       Assessment/Plan  Added 1lb weight to supine AROM of R shoulder strengthening; pt instructed to use water bottle at home for exercises to progress strength. Pt continues PROM with no pain; exercises with minimal pain but difficulty due to weakness of RTC. Most weakness and difficulty with ER and ABD; improving ABD. Able to reach to chin to simulate eating/feeding motion with RUE with improved ease and even up to earlobe level with difficulty; previously only able to reach to chest level. Pt still considering R TSA but with recent improvements in ROM and strength wants to continue to hold for now; appt with ortho surgeon in January.       Progress per Plan of Care and Progress strengthening /stabilization /functional activity           Timed:  Manual Therapy:    12     mins  27509;  Therapeutic Exercise:    20     mins  12324;     Neuromuscular Tres:    -    mins  28101;    Therapeutic Activity:     -     mins  14090;     Gait Training:      -     mins  38204;     Ultrasound:     -     mins  97266;      Untimed:  Electrical Stimulation:    -     mins  16739 ( );  Mechanical Traction:    -     mins  11070;   Dry needling:      -     mins  27594/    Timed Treatment:   32   mins   Total Treatment:     35   mins  Lucrecia Taylor PT  Physical Therapist    License #: [id number]

## 2022-12-19 ENCOUNTER — TREATMENT (OUTPATIENT)
Dept: PHYSICAL THERAPY | Facility: CLINIC | Age: 87
End: 2022-12-19

## 2022-12-19 DIAGNOSIS — M25.511 ACUTE PAIN OF RIGHT SHOULDER: Primary | ICD-10-CM

## 2022-12-19 DIAGNOSIS — R29.898 SHOULDER WEAKNESS: ICD-10-CM

## 2022-12-19 DIAGNOSIS — M25.611 DECREASED ROM OF RIGHT SHOULDER: ICD-10-CM

## 2022-12-19 PROCEDURE — 97140 MANUAL THERAPY 1/> REGIONS: CPT | Performed by: PHYSICAL THERAPIST

## 2022-12-19 PROCEDURE — 97110 THERAPEUTIC EXERCISES: CPT | Performed by: PHYSICAL THERAPIST

## 2022-12-19 NOTE — PROGRESS NOTES
"Re-Assessment for 60 Day Progress Note      Patient: Casey Snowden Sr.   : 1934  Diagnosis/ICD-10 Code:  Acute pain of right shoulder [M25.511]  Referring practitioner: Aleksander Diez MD  Date of Initial Visit: Type: THERAPY  Noted: 10/11/2022  Today's Date: 2022  Patient seen for 10 sessions      Subjective:   Casey Snowden reports: he was able to eat his cereal at breakfast with his R arm \"with some effort\" and his R shoulder fatigued. Minimal to no pain. Unable to reach head for showering or grooming ax.   Subjective Questionnaire: QuickDASH: 36.36  Clinical Progress: improved  Home Program Compliance: Yes  Treatment has included: therapeutic exercise, neuromuscular re-education, manual therapy, therapeutic activity and cryotherapy      Objective     Observations      Right Shoulder  Negative for edema and effusion.      Palpation      Right   No palpable tenderness to the latissimus, levator scapulae, lower trapezius, middle deltoid, middle trapezius, posterior deltoid, rhomboids, serratus anterior, subclavius, subscapularis, teres major, teres minor, thoracic paraspinals, triceps and upper trapezius.   Hypertonic in the pectoralis major and pectoralis minor.  Tenderness of the anterior deltoid, biceps, infraspinatus, supraspinatus.         Active Range of Motion   Left Shoulder   Flexion: 130 degrees   Abduction: 130 degrees   External rotation 0°: 75 degrees   External rotation BTH: C2   Internal rotation BTB: T10     Right Shoulder   Flexion: 110 degrees  Abduction: 72 degrees  External rotation 0°: Right shoulder active external rotation at 0 degrees: able to achieve neutral rotation with effort; moderate compensation  External rotation BTH: Active external rotation behind the head: able to reach to mouth, moderate compensation  Internal rotation BTB: Lumbar   Horizontal abduction: no pain  Horizontal adduction: no pain, able to reach to opposite shoulder     Passive Range of Motion "      Right Shoulder   Flexion: 160 degrees, no pain  Abduction: 175 degrees, no pain  External rotation 45°: 80 degrees with pain  Internal rotation 60°: 90 degrees     Strength/Myotome Testing     Left Shoulder   Normal muscle strength     Right Shoulder  Flexion: 3-  ABD: 2+  ER: 2+  IR: 4-     Tests      Right Shoulder   Positive external rotation lag sign  Negative sulcus sign.      Assessment & Plan     Assessment  Impairments: abnormal muscle firing, abnormal muscle tone, abnormal or restricted ROM, activity intolerance, impaired physical strength, lacks appropriate home exercise program, pain with function and weight-bearing intolerance  Functional Limitations: carrying objects, lifting, sleeping, pulling, pushing, uncomfortable because of pain, moving in bed, reaching behind back, reaching overhead and unable to perform repetitive tasks  Assessment details: Casey Snowden  is a 88 y.o. year-old male referred to physical therapy for R shoulder injury after a fall. He presents with a evolving clinical presentation.  He has comorbidities of Parkinson's disease and personal factors of recent falls and hard of hearing.  Pt saw shoulder surgeon and received shoulder steroid injection with sig improvement in pain but still limited functionally; not able to use R arm to eat, grooming, showering, dressing. Pt with improving R arm passive ROM and continued difficulty with active ROM. Pt lacking strength against gravity and unable to lift/hold arm past ~90 degrees; able to achieve neutral rotation at 0 degrees but compensating with shoulder ABD and trunk rotation. Added AROM exercises and strengthening exercises the past few weeks with improving functional use of strength of R arm, but quick fatigue. Improving QuickDASH. Patient is appropriate for skilled physical therapy in order to reduce pain and increase ease with daily mobility. During therapy, pt educated on anatomy, goal of interventions, HEP, posture and  consideration of potential TSA.  Prognosis: fair    Goals  Plan Goals: STG: ~6 weeks  1. Pt will demonstrate R shoulder PROM in flexion and abduction of >160 degrees to improve overhead activity tolerance-MET  2. Pt will demonstrate R shoulder PROM in ER and IR >80 degrees-MET  3. Decrease right UE tenderness with palpation to minimal over the acromion and supraspinatus tendon to be able to sleep on his side-MET  4. Pt will be able to drive comfortably and safely using BUE for at least 30 min-PROGRESSING    LTG: ~12 weeks  1. Pt will demonstrate R shoulder AROM in flexion and ABD >120 degrees-PROGRESSING  2. Pt will demonstrate R shoulder AROM in ER to 45 degrees-PROGRESSING  2. Pt will be able to reach L1 using RUE-MET  3. Pt will improve R shoulder strength to >3+/5 to improve quality of life-PROGRESSING  4. Pt will improve QuickDASH score to <10 to improve subjective function of shoulder with ADLs-PROGRESSING    Plan  Therapy options: will be seen for skilled therapy services  Planned modality interventions: cryotherapy, iontophoresis, thermotherapy (hydrocollator packs), ultrasound and dry needling (no estim due to a-fib)  Planned therapy interventions: ADL retraining, balance/weight-bearing training, body mechanics training, fine motor coordination training, flexibility, functional ROM exercises, home exercise program, IADL retraining, joint mobilization, manual therapy, neuromuscular re-education, postural training, soft tissue mobilization, spinal/joint mobilization, strengthening, stretching, therapeutic activities and abdominal trunk stabilization  Treatment plan discussed with: patient  Plan details: 1x times per week for 8 weeks        Visit Diagnoses:    ICD-10-CM ICD-9-CM   1. Acute pain of right shoulder  M25.511 719.41   2. Decreased ROM of right shoulder  M25.611 719.51   3. Shoulder weakness  R29.898 719.61       PT Signature: Lucrecia Taylor PT  [place] license: [id number]      Timed:  Manual Therapy:    15      mins  80705;  Therapeutic Exercise:    28     mins  17927;   (decreased units billed to account for divided time)  Neuromuscular Tres:    -    mins  65699;    Therapeutic Activity:     5     mins  29086;     Gait Training:      -     mins  53528;     Ultrasound:     -     mins  45630;    Electrical Stimulation:    -     mins  01331 ( );    Untimed:  Electrical Stimulation:    -     mins  61962 ( );  Mechanical Traction:    -     mins  92363; \  Dry needling:      -     mins  20560/20561    Timed Treatment:   48   mins   Total Treatment:     50   mins

## 2023-01-09 ENCOUNTER — TREATMENT (OUTPATIENT)
Dept: PHYSICAL THERAPY | Facility: CLINIC | Age: 88
End: 2023-01-09
Payer: MEDICARE

## 2023-01-09 DIAGNOSIS — M25.611 DECREASED ROM OF RIGHT SHOULDER: ICD-10-CM

## 2023-01-09 DIAGNOSIS — R29.898 SHOULDER WEAKNESS: ICD-10-CM

## 2023-01-09 DIAGNOSIS — M25.511 ACUTE PAIN OF RIGHT SHOULDER: Primary | ICD-10-CM

## 2023-01-09 PROCEDURE — 97530 THERAPEUTIC ACTIVITIES: CPT | Performed by: PHYSICAL THERAPIST

## 2023-01-09 PROCEDURE — 97110 THERAPEUTIC EXERCISES: CPT | Performed by: PHYSICAL THERAPIST

## 2023-01-09 PROCEDURE — 97140 MANUAL THERAPY 1/> REGIONS: CPT | Performed by: PHYSICAL THERAPIST

## 2023-01-09 NOTE — PROGRESS NOTES
Physical Therapy Daily Treatment Note      Patient: Casey Snowden Sr.   : 1934  Referring practitioner: No ref. provider found  Date of Initial Visit: Type: THERAPY  Noted: 10/11/2022  Today's Date: 2023  Patient seen for 11 sessions         Casey Snowden reports: he goes to the MD Wednesday, he sees improvement, he is eating with R hand now.         Objective     Active Range of Motion   Left Shoulder   Flexion: 130 degrees   Abduction: 130 degrees   External rotation 0°: 75 degrees   External rotation BTH: C2   Internal rotation BTB: T10     Right Shoulder   Flexion: 125 degrees  Abduction: 80 degrees  External rotation 0°: Right shoulder active external rotation at 0 degrees: 35 degrees  Internal rotation BTB: Lumbar   Horizontal abduction: no pain  Horizontal adduction: no pain, able to reach to opposite shoulder     Passive Range of Motion      Right Shoulder   Flexion: 160 degrees, no pain  Abduction: 175 degrees, no pain  External rotation 45°: 80 degrees, no pain  Internal rotation 60°: 90 degrees, no pain    See Exercise, Manual, and Modality Logs for complete treatment.       Assessment/Plan  Pt is progressing well with PT with R shoulder PROM WNL and progressing AROM; mostly limited by weakness. Pt reports he is able to eat using R arm now and complete grooming/dressing ax with R arm. Updated HEP to include strengthening exercises; pt has been very compliant with exercises at home. Pt has had minimal to no pain since last injection.  Pt would benefit from continued PT to progress AAROM to AROM exercises along with progress strength and shoulder stability.        Progress per Plan of Care and Progress strengthening /stabilization /functional activity           Timed:  Manual Therapy:    15     mins  73120;  Therapeutic Exercise:    20     mins  52735;     Neuromuscular Tres:    -    mins  92753;    Therapeutic Activity:     10     mins  05349;     Gait Training:      -     mins  96494;      Ultrasound:     -     mins  46408;      Untimed:  Electrical Stimulation:    -     mins  58636 ( );  Mechanical Traction:    -     mins  59364;   Dry needling:      -     mins  20560/20561    Timed Treatment:   45   mins   Total Treatment:     50   mins  Lucrecia Taylor PT  Physical Therapist    License #: [id number]

## 2023-01-11 ENCOUNTER — OFFICE VISIT (OUTPATIENT)
Dept: ORTHOPEDIC SURGERY | Facility: CLINIC | Age: 88
End: 2023-01-11
Payer: MEDICARE

## 2023-01-11 VITALS — WEIGHT: 160 LBS | HEIGHT: 71 IN | BODY MASS INDEX: 22.4 KG/M2

## 2023-01-11 DIAGNOSIS — M12.811 ROTATOR CUFF ARTHROPATHY OF RIGHT SHOULDER: Primary | ICD-10-CM

## 2023-01-11 PROCEDURE — 99212 OFFICE O/P EST SF 10 MIN: CPT | Performed by: ORTHOPAEDIC SURGERY

## 2023-01-11 NOTE — PROGRESS NOTES
Subjective:     Patient ID: Casey Snowden Sr. is a 88 y.o. male.    Chief Complaint:  Follow up right shoulder pain, rotator cuff arthropathy  Last injection- 11/14/2022- right glenohumeral joint    History of Present Illness  Casey Snowden Sr. returns to clinic today for evaluation of status post right shoulder glenohumeral injection. The patient is accompanied by an adult female who contributes in his medical history.    The patient reports that he is doing very well with his right shoulder following injection as well as continued work with physical therapy. He rates his pain 0 to 1 out of 10, mildly aching in nature, with some occasional crepitus noted. He denies numbness or tingling. The patient is attending physical therapy with Julisa, with improvement in his range of motion and strength.       Social History     Occupational History   • Occupation: /retired     Comment: self employed   Tobacco Use   • Smoking status: Never   • Smokeless tobacco: Never   • Tobacco comments:     caff use   Vaping Use   • Vaping Use: Never used   Substance and Sexual Activity   • Alcohol use: No     Comment: none for a month   • Drug use: No   • Sexual activity: Not Currently     Partners: Female      Past Medical History:   Diagnosis Date   • Acquired hypothyroidism 8/31/2022   • Atrial fibrillation (HCC)    • Cancer ([facility]) 04/2018    basil cell carcinoma   • Carotid stenosis    • CHF (congestive heart failure) ([facility])    • Chronic deep vein thrombosis (DVT) of popliteal vein of right lower extremity ([facility])    • Coronary artery disease involving native coronary artery of native heart without angina pectoris 12/20/2018   • DVT of lower extremity (deep venous thrombosis) ([facility])    • Hard of hearing    • Long-term use of high-risk medication    • PAD (peripheral artery disease) ([facility]) 9/10/2020   • Parkinson's disease ([facility])    • Postphlebitic syndrome      Past Surgical History:   Procedure Laterality Date   • BASAL  CELL CARCINOMA EXCISION  04/2018   • CARDIAC CATHETERIZATION N/A 11/5/2018    Procedure: Left Heart Cath;  Surgeon: Oliverio Azar MD;  [facility];  Service: Cardiovascular   • CARDIAC CATHETERIZATION N/A 11/5/2018    Procedure: Coronary angiography;  Surgeon: Oliverio Azar MD;  Location:  [facility] CATH INVASIVE LOC[facility];  Service: Cardiovascular   • CARDIAC CATHETERIZATION N/A 11/5/2018    Procedure: Left ventriculography;  Surgeon: Oliverio Azar MD;  Location:  [facility] CATH INVASIVE LOCATION;  Service: Cardiovascular   • CARDIAC CATHETERIZATION N/A 6/4/2019    Procedure: Left Heart Cath;  Surgeon: Johnny Glasgow MD;  [facility];  Service: Cardiovascular   • CARDIAC CATHETERIZATION N/A 6/4/2019    Procedure: Coronary angiography;  Surgeon: Johnny Glasgow MD;  [facility];  Service: Cardiovascular   • CARDIAC CATHETERIZATION N/A 6/4/2019    Procedure: Left ventriculography;  Surgeon: Johnny Glasgow MD;  [facility];  Service: Cardiovascular   • CARDIAC CATHETERIZATION N/A 6/4/2019    Procedure: Stent BILL coronary;  Surgeon: Johnny Glasgow MD;  [facility];  Service: Cardiovascular   • CARDIAC SURGERY     • CATARACT EXTRACTION Left 08/2018   • CATARACT EXTRACTION Right 09/2018   • COLONOSCOPY      2011   • INCISION AND DRAINAGE LEG Right 7/7/2017    Procedure: INCISION AND DRAINAGE INFECTED HEMATOMA RIGHT THIGH;  Surgeon: Valentin Peña MD;  [facility];  Service:    • JOINT REPLACEMENT     • KNEE INCISION AND DRAINAGE Right 12/27/2016    Procedure: KNEE INCISION AND DRAINAGE;  Surgeon: Triston Whitten MD;  Location: [facility] MAIN OR;  Service:    • NOSE SURGERY      nose replacement   • SKIN BIOPSY     • SKIN GRAFT  12/2017   • TOTAL KNEE ARTHROPLASTY Right    • VASCULAR SURGERY     • VENA CAVA FILTER PLACEMENT    "      Family History   Problem Relation Age of Onset   • Heart attack Mother 74   • Hypertension Mother    • Stroke Brother    • Cancer Brother    • Cancer Sister    • Cancer Brother    • Deep vein thrombosis Neg Hx          Review of Systems        Objective:  Vitals:    01/11/23 0809   Weight: 72.6 kg (160 lb)   Height: 180.3 cm (71\")         01/11/23 0809   Weight: 72.6 kg (160 lb)     Body mass index is 22.32 kg/m².  General: No acute distress.  Resp: normal respiratory effort  Skin: no rashes or wounds; normal turgor  Psych: mood and affect appropriate; recent and remote memory intact          Ortho Exam       Right shoulder-  Maximal tenderness along the anterior posterior glenohumeral joint with mild crepitus.  Active Forward Flexion- 145 degrees  Strength- 3/5  Active External Rotation- 20 degrees  Strength- 4-/5  Internal Rotation- T12  Strength- 4/5  Empty can test- Positive  Drop arm test- Positive  Ext rotation lag sign- Positive  Neer's sign- Minimally positive  Champagne- Minimally positive  Brisk cap refill to all digits, palpable 1+ radial pulse      Imaging:  None today  Assessment:        1. Rotator cuff arthropathy of right shoulder           Plan:        1. Discussed treatment options at length with patient at today's visit.  2. The patient is doing well following glenohumeral injection as well as continuing to work with therapy. He still has deficits from his rotator cuff deficiency, but otherwise his active motion has progressed and his function has improved with significant improvement in his pain. Given these findings, we discussed options. He would like to proceed with continued physical therapy with transition to home exercise program.  3. I will plan on seeing him back on an as needed basis if he has recurrence of symptoms for discussion of further treatment options. He is in agreement. All questions answered.        Casey Snowden Sr. was in agreement with plan and had all questions " answered.     Orders:  No orders of the defined types were placed in this encounter.      Medications:  No orders of the defined types were placed in this encounter.      Followup:  Return if symptoms worsen or fail to improve.    Diagnoses and all orders for this visit:    1. Rotator cuff arthropathy of right shoulder (Primary)          Dictated utilizing Dragon dictation     Transcribed from ambient dictation for Junior Taylor MD by Disha Gomez.  01/11/23   09:13 EST    Patient or patient representative verbalized consent to the visit recording.  I have personally performed the services described in this document as transcribed by the above individual, and it is both accurate and complete.

## 2023-01-17 ENCOUNTER — TREATMENT (OUTPATIENT)
Dept: PHYSICAL THERAPY | Facility: CLINIC | Age: 88
End: 2023-01-17
Payer: MEDICARE

## 2023-01-17 DIAGNOSIS — M25.611 DECREASED ROM OF RIGHT SHOULDER: ICD-10-CM

## 2023-01-17 DIAGNOSIS — R29.898 SHOULDER WEAKNESS: ICD-10-CM

## 2023-01-17 DIAGNOSIS — M25.511 ACUTE PAIN OF RIGHT SHOULDER: Primary | ICD-10-CM

## 2023-01-17 PROCEDURE — 97140 MANUAL THERAPY 1/> REGIONS: CPT | Performed by: PHYSICAL THERAPIST

## 2023-01-17 PROCEDURE — 97530 THERAPEUTIC ACTIVITIES: CPT | Performed by: PHYSICAL THERAPIST

## 2023-01-17 PROCEDURE — 97110 THERAPEUTIC EXERCISES: CPT | Performed by: PHYSICAL THERAPIST
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
## 2023-01-23 ENCOUNTER — TREATMENT (OUTPATIENT)
Dept: PHYSICAL THERAPY | Facility: CLINIC | Age: 88
End: 2023-01-23
Payer: MEDICARE

## 2023-01-23 DIAGNOSIS — R29.898 SHOULDER WEAKNESS: ICD-10-CM

## 2023-01-23 DIAGNOSIS — M25.611 DECREASED ROM OF RIGHT SHOULDER: ICD-10-CM

## 2023-01-23 DIAGNOSIS — M25.511 ACUTE PAIN OF RIGHT SHOULDER: Primary | ICD-10-CM

## 2023-01-23 PROCEDURE — 97112 NEUROMUSCULAR REEDUCATION: CPT | Performed by: PHYSICAL THERAPIST

## 2023-01-23 PROCEDURE — 97110 THERAPEUTIC EXERCISES: CPT | Performed by: PHYSICAL THERAPIST

## 2023-01-23 PROCEDURE — 97530 THERAPEUTIC ACTIVITIES: CPT | Performed by: PHYSICAL THERAPIST

## 2023-01-23 PROCEDURE — 97140 MANUAL THERAPY 1/> REGIONS: CPT | Performed by: PHYSICAL THERAPIST

## 2023-01-23 NOTE — PROGRESS NOTES
Physical Therapy Daily Treatment Note      Patient: Casey Snowden Sr.   : 1934  Referring practitioner: Aleksander Diez MD  Date of Initial Visit: Type: THERAPY  Noted: 10/11/2022  Today's Date: 2023  Patient seen for 13 sessions         Casey Snowden reports: he is using is R arm more; still difficulty eating but able to do it. [facility]          Objective   See Exercise, Manual, and Modality Logs for complete treatment.       Assessment/Plan  Pt with improving ROM and strength of R shoulder; added some wrist strengthening exercises with reports of difficulty with wrist mobility while eating. Difficulty with wrist ext and wrist radial deviation. Continue to progress strength and AROM per pt's tolerance to be able to reach head for grooming/showering ax.        Progress per Plan of Care and Progress strengthening /stabilization /functional activity           Timed:  Manual Therapy:    15     mins  02181;  Therapeutic Exercise:    20     mins  00497;     Neuromuscular Tres:    10    mins  32997;    Therapeutic Activity:     15     mins  76073;     Gait Training:      -     mins  50557;     Ultrasound:     -     mins  73150;      Untimed:  Electrical Stimulation:    -     mins  61594 ( );  Mechanical Traction:    -     mins  27406;   Dry needling:      -     mins  46927/    Timed Treatment:   60   mins   Total Treatment:     60   mins  Lucrecia Taylor PT  Physical Therapist    License #: [id number]                 Statement Selected

## 2023-01-28 ENCOUNTER — HOSPITAL ENCOUNTER (EMERGENCY)
Facility: HOSPITAL | Age: 88
Discharge: HOME | End: 2023-01-28
Attending: EMERGENCY MEDICINE | Admitting: EMERGENCY MEDICINE
Payer: MEDICARE

## 2023-01-28 ENCOUNTER — APPOINTMENT (OUTPATIENT)
Dept: GENERAL RADIOLOGY | Facility: HOSPITAL | Age: 88
End: 2023-01-28
Payer: MEDICARE

## 2023-01-28 ENCOUNTER — APPOINTMENT (OUTPATIENT)
Dept: CT IMAGING | Facility: HOSPITAL | Age: 88
End: 2023-01-28
Payer: MEDICARE

## 2023-01-28 VITALS
TEMPERATURE: 97.9 F | RESPIRATION RATE: 18 BRPM | DIASTOLIC BLOOD PRESSURE: 101 MMHG | HEART RATE: 53 BPM | OXYGEN SATURATION: 98 % | SYSTOLIC BLOOD PRESSURE: 216 MMHG

## 2023-01-28 DIAGNOSIS — Z79.01 CHRONIC ANTICOAGULATION: ICD-10-CM

## 2023-01-28 DIAGNOSIS — I10 HYPERTENSION NOT AT GOAL: ICD-10-CM

## 2023-01-28 DIAGNOSIS — S09.90XA MINOR HEAD INJURY, INITIAL ENCOUNTER: Primary | ICD-10-CM

## 2023-01-28 DIAGNOSIS — S61.412A SKIN TEAR OF LEFT HAND WITHOUT COMPLICATION, INITIAL ENCOUNTER: ICD-10-CM

## 2023-01-28 PROCEDURE — 90471 IMMUNIZATION ADMIN: CPT | Performed by: EMERGENCY MEDICINE

## 2023-01-28 PROCEDURE — 70486 CT MAXILLOFACIAL W/O DYE: CPT

## 2023-01-28 PROCEDURE — 70450 CT HEAD/BRAIN W/O DYE: CPT

## 2023-01-28 PROCEDURE — 99284 EMERGENCY DEPT VISIT MOD MDM: CPT

## 2023-01-28 PROCEDURE — 25010000002 TETANUS-DIPHTH-ACELL PERTUSSIS 5-2.5-18.5 LF-MCG/0.5 SUSPENSION PREFILLED SYRINGE: Performed by: EMERGENCY MEDICINE

## 2023-01-28 PROCEDURE — 73130 X-RAY EXAM OF HAND: CPT

## 2023-01-28 PROCEDURE — 90715 TDAP VACCINE 7 YRS/> IM: CPT | Performed by: EMERGENCY MEDICINE

## 2023-01-28 RX ADMIN — TETANUS TOXOID, REDUCED DIPHTHERIA TOXOID AND ACELLULAR PERTUSSIS VACCINE, ADSORBED 0.5 ML: 5; 2.5; 8; 8; 2.5 SUSPENSION INTRAMUSCULAR at 15:15

## 2023-01-28 RX ADMIN — LIDOCAINE HYDROCHLORIDE 1 ML: 10; .005 INJECTION, SOLUTION EPIDURAL; INFILTRATION; INTRACAUDAL; PERINEURAL at 15:17

## 2023-01-30 ENCOUNTER — OFFICE VISIT (OUTPATIENT)
Dept: FAMILY MEDICINE CLINIC | Facility: CLINIC | Age: 88
End: 2023-01-30
Payer: MEDICARE

## 2023-01-30 ENCOUNTER — TELEPHONE (OUTPATIENT)
Dept: PHYSICAL THERAPY | Facility: CLINIC | Age: 88
End: 2023-01-30

## 2023-01-30 VITALS
WEIGHT: 161 LBS | HEART RATE: 76 BPM | SYSTOLIC BLOOD PRESSURE: 134 MMHG | DIASTOLIC BLOOD PRESSURE: 82 MMHG | TEMPERATURE: 97.2 F | HEIGHT: 71 IN | BODY MASS INDEX: 22.54 KG/M2 | OXYGEN SATURATION: 93 %

## 2023-01-30 DIAGNOSIS — G20 PARKINSON'S DISEASE: Chronic | ICD-10-CM

## 2023-01-30 DIAGNOSIS — R29.6 FALLS FREQUENTLY: ICD-10-CM

## 2023-01-30 DIAGNOSIS — S00.83XD FACIAL CONTUSION, SUBSEQUENT ENCOUNTER: Primary | ICD-10-CM

## 2023-01-30 DIAGNOSIS — S41.112D SKIN TEAR OF UPPER ARM WITHOUT COMPLICATION, LEFT, SUBSEQUENT ENCOUNTER: ICD-10-CM

## 2023-01-30 PROCEDURE — 99214 OFFICE O/P EST MOD 30 MIN: CPT | Performed by: FAMILY MEDICINE

## 2023-01-30 RX ORDER — CLONIDINE HYDROCHLORIDE 0.1 MG/1
TABLET ORAL
Qty: 270 TABLET | Refills: 2 | Status: SHIPPED
Start: 2023-01-30

## 2023-01-30 NOTE — TELEPHONE ENCOUNTER
Caller: Evonne Snowden    Relationship: Emergency Contact         What was the call regarding:TOOK A GREAT FALL THIS WEEKEND HAS TO SEE OTHER  TODAY

## 2023-01-30 NOTE — PROGRESS NOTES
"  Subjective   Casey Snowden Sr. is a 88 y.o. male who is here for   Chief Complaint   Patient presents with   • ER Follow up     Had fallen down stairs needs exam skin tear left hand   .     History of Present Illness     Afshin is here in ER follow-up.  Unfortunate he is fallen again.  This time falling down his outside stairs.  Bruising of the left side of his head face and neck.  Also a large bruise on his right upper arm.  Also a large skin tear on his left dorsal hand and forearm.  Went to the ER at [facility].  Was seen there and treated.  Head CT of head and face revealed no intracranial blood nor facial fracture.      The following portions of the patient's history were reviewed and updated as appropriate: allergies, current medications, past medical history, past social history, past surgical history and problem list.    Review of Systems    Objective   Vitals:    01/30/23 1330   BP: 134/82   Pulse: 76   Temp: 97.2 °F (36.2 °C)   SpO2: 93%   Weight: 73 kg (161 lb)   Height: 180.3 cm (71\")      Physical Exam  HENT:      Head: Raccoon eyes, abrasion, contusion and left periorbital erythema present.      Jaw: There is normal jaw occlusion.       Left forearm large skin tear approximately 12 inches long from the forearm down the top of the right hand.  We will to remove the wound dressing from the ER.  Left some of the wound gauze attached to the drying scab.  To prevent bleeding  Applied Bactroban for 2 more nonstick pads placed this on top of the wound.  Then 4 x 4's then wrapped up Curlex  Overall it was healing well oozing blood.  No signs of infection      Assessment & Plan   Diagnoses and all orders for this visit:    1. Facial contusion, subsequent encounter (Primary)    2. Parkinson's disease (HCC)    3. Skin tear of upper arm without complication, left, subsequent encounter    4. Falls frequently    Other orders  -     cloNIDine (CATAPRES) 0.1 MG tablet; Take 1 tablet daily if systolic blood pressure " 160  Dispense: 270 tablet; Refill: 2    Left arm wrist skin tear redressed.  Due to trauma and bleeding we will hold his Plavix and Eliquis.  And also consider permanent disc continuation of these 2 anticoagulants.  This is a second major fall in a few months with injuries.    Also his wife reports his blood pressure is trending low at home.  We will change the direction on clonidine to only 1 a day if systolic blood pressure is 160  He was on 3 times daily    We will see him Wednesday for further wound care    There are no Patient Instructions on file for this visit.    Medications Discontinued During This Encounter   Medication Reason   • clopidogrel (PLAVIX) 75 MG tablet    • ELIQUIS 2.5 MG tablet tablet *Therapy completed   • cloNIDine (CATAPRES) 0.1 MG tablet         Return in about 2 days (around 2/1/2023) for wound/suture check.    Dr. Aleksander Diez  [place], [place]

## 2023-02-01 ENCOUNTER — OFFICE VISIT (OUTPATIENT)
Dept: FAMILY MEDICINE CLINIC | Facility: CLINIC | Age: 88
End: 2023-02-01
Payer: MEDICARE

## 2023-02-01 ENCOUNTER — HOSPITAL ENCOUNTER (OUTPATIENT)
Dept: GENERAL RADIOLOGY | Facility: HOSPITAL | Age: 88
Discharge: HOME | End: 2023-02-01
Admitting: FAMILY MEDICINE
Payer: MEDICARE

## 2023-02-01 VITALS
TEMPERATURE: 98 F | HEIGHT: 71 IN | DIASTOLIC BLOOD PRESSURE: 76 MMHG | SYSTOLIC BLOOD PRESSURE: 132 MMHG | HEART RATE: 64 BPM | OXYGEN SATURATION: 99 % | BODY MASS INDEX: 24.78 KG/M2 | WEIGHT: 177 LBS

## 2023-02-01 DIAGNOSIS — S00.83XD FACIAL CONTUSION, SUBSEQUENT ENCOUNTER: Primary | ICD-10-CM

## 2023-02-01 DIAGNOSIS — S41.112D SKIN TEAR OF UPPER ARM WITHOUT COMPLICATION, LEFT, SUBSEQUENT ENCOUNTER: ICD-10-CM

## 2023-02-01 DIAGNOSIS — R07.81 RIB PAIN ON LEFT SIDE: ICD-10-CM

## 2023-02-01 PROCEDURE — 71101 X-RAY EXAM UNILAT RIBS/CHEST: CPT

## 2023-02-01 PROCEDURE — 99214 OFFICE O/P EST MOD 30 MIN: CPT | Performed by: FAMILY MEDICINE

## 2023-02-01 NOTE — PROGRESS NOTES
"  Subjective   Casey Snowden Sr. is a 88 y.o. male who is here for   Chief Complaint   Patient presents with   • Suture / Staple Removal   .     History of Present Illness   Afshin comes back in today to follow-up on his left arm skin tear.  Minimal pain in the area.  We wrapped it up 2 days ago.  Now his hand is all swollen and purple.  He has good movement in the fingers.    Left facial contusions and abrasions are healing.  He does not report any headaches.  Vision is the same.  Bite and chewing feels normal.  Is been no blood from the nose or mouth.  He has no vertigo.    He is complaining of new left rib pain.  No shortness of breath.  No bruising on the left flank    The following portions of the patient's history were reviewed and updated as appropriate: allergies, current medications, past medical history, past social history, past surgical history and problem list.    Review of Systems    Objective   Vitals:    02/01/23 0836   BP: 132/76   Pulse: 64   Temp: 98 °F (36.7 °C)   SpO2: 99%   Weight: 80.3 kg (177 lb)   Height: 180.3 cm (70.98\")      Physical Exam     Slowly healing large skin tear to the back of the left hand and forearm dorsal hand and down between the thumb and index finger.  Old dressing removed.  Wound cleaned by padded lightly with 4 x 4 gauze and normal saline.  2 nonstick pads smeared with Bactroban applied over the open wound.  Again the wound was a skin tear down to the subcutaneous tissue which seems to be healing well; no signs of infection.  Not bleeding.  As he was on double anticoagulation.  4 x 4's then placed on top of the nonstick pads.  Area wrapped with Kerlix wrap.  Then cover with Coban.  come back on Friday for redressing of left arm and hand wound  And ordered x-ray to check for left rib fractures    Assessment & Plan   Diagnoses and all orders for this visit:    1. Facial contusion, subsequent encounter (Primary)    2. Skin tear of upper arm without complication, left, " subsequent encounter    3. Rib pain on left side  -     XR Ribs Left With PA Chest; Future    Remain off the blood thinners    There are no Patient Instructions on file for this visit.    There are no discontinued medications.     Return in about 2 days (around 2/3/2023) for wound/suture check.    Dr. Aleksander Diez  [place], [place]

## 2023-02-03 ENCOUNTER — OFFICE VISIT (OUTPATIENT)
Dept: FAMILY MEDICINE CLINIC | Facility: CLINIC | Age: 88
End: 2023-02-03
Payer: MEDICARE

## 2023-02-03 VITALS
OXYGEN SATURATION: 94 % | DIASTOLIC BLOOD PRESSURE: 82 MMHG | HEART RATE: 60 BPM | WEIGHT: 171 LBS | HEIGHT: 71 IN | SYSTOLIC BLOOD PRESSURE: 134 MMHG | BODY MASS INDEX: 23.94 KG/M2 | TEMPERATURE: 97.2 F

## 2023-02-03 DIAGNOSIS — S22.32XD CLOSED FRACTURE OF ONE RIB OF LEFT SIDE WITH ROUTINE HEALING, SUBSEQUENT ENCOUNTER: ICD-10-CM

## 2023-02-03 DIAGNOSIS — S41.112D SKIN TEAR OF UPPER ARM WITHOUT COMPLICATION, LEFT, SUBSEQUENT ENCOUNTER: Primary | ICD-10-CM

## 2023-02-03 PROCEDURE — 99214 OFFICE O/P EST MOD 30 MIN: CPT | Performed by: FAMILY MEDICINE

## 2023-02-03 NOTE — PROGRESS NOTES
"  Subjective   Casey Snowden Sr. is a 88 y.o. male who is here for   Chief Complaint   Patient presents with   • Wound Check     Bandage change   .     History of Present Illness   Afshin comes in today for left hand wound assessment and rewrapping    The following portions of the patient's history were reviewed and updated as appropriate: allergies, current medications, past medical history, past social history, past surgical history and problem list.    Review of Systems    Objective   Vitals:    02/03/23 0928   BP: 134/82   Pulse: 60   Temp: 97.2 °F (36.2 °C)   SpO2: 94%   Weight: 77.6 kg (171 lb)   Height: 180.3 cm (70.98\")      Physical Exam   Follow-up on his large skin tear from fall.  Slowly healing.  No bleeding.  Removed old dressing that we placed on Wednesday  Clean the wound with gauze and normal saline removing some old scab  Redressed with Bactroban on a nonstick pad.  Wrapped with gauze  Ace wrap over top of this.  Future wound care discussed.      Follow-up on his left rib x-rays.  Looks like he does have a subtle nondisplaced single rib fracture.  On the background of several previously broken and healed left rib fractures  XR Ribs Left With PA Chest (02/01/2023 09:25)      Assessment & Plan   Diagnoses and all orders for this visit:    1. Skin tear of upper arm without complication, left, subsequent encounter (Primary)    2. Closed fracture of one rib of left side with routine healing, subsequent encounter    Wife reports they have a wound care visit next Wednesday at [facility]  We will stay off his anticoagulation at this time.  As he is still recuperating from his multiple injuries from fall.  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  Less hypotension, due to his Parkinson's    There are no Patient Instructions on file for this visit.    There are no discontinued medications.     Return for " Next scheduled follow up.    Dr. Aleksander Diez  [place], [place]

## 2023-02-06 ENCOUNTER — TREATMENT (OUTPATIENT)
Dept: PHYSICAL THERAPY | Facility: CLINIC | Age: 88
End: 2023-02-06
Payer: MEDICARE

## 2023-02-06 DIAGNOSIS — M25.611 DECREASED ROM OF RIGHT SHOULDER: ICD-10-CM

## 2023-02-06 DIAGNOSIS — R29.898 SHOULDER WEAKNESS: ICD-10-CM

## 2023-02-06 DIAGNOSIS — M25.511 ACUTE PAIN OF RIGHT SHOULDER: Primary | ICD-10-CM

## 2023-02-06 PROCEDURE — 97530 THERAPEUTIC ACTIVITIES: CPT | Performed by: PHYSICAL THERAPIST

## 2023-02-06 PROCEDURE — 97110 THERAPEUTIC EXERCISES: CPT | Performed by: PHYSICAL THERAPIST

## 2023-02-06 PROCEDURE — 97140 MANUAL THERAPY 1/> REGIONS: CPT | Performed by: PHYSICAL THERAPIST

## 2023-02-06 NOTE — PROGRESS NOTES
Physical Therapy Daily Treatment Note      Patient: Casey Snowden Sr.   : 1934  Referring practitioner: Aleksander Diez MD  Date of Initial Visit: Type: THERAPY  Noted: 10/11/2022  Today's Date: 2023  Patient seen for 14 sessions         Casey Snowden reports: fell last week at the [place] [employer]; caught himself with BUE on the floor. L arm more then R arm. L arm in ACE wrap with sig hand bruising and swelling.           Objective   See Exercise, Manual, and Modality Logs for complete treatment.       Assessment/Plan  Modified exercises due to LUE injury; AAROM against gravity due to continued weakness. Improving functional use of R arm; improving ability to eat with RUE but pt reports difficulty with wrist motion. Pt asked to add wrist flexion/ext and UD/RD with a canned good at home with noted weakness at wrist. PROM WNL and improving strength. Wound care appt for LUE on Wednesday.        Progress per Plan of Care and Progress strengthening /stabilization /functional activity           Timed:  Manual Therapy:    15     mins  21728;  Therapeutic Exercise:    30     mins  74137;     Neuromuscular Tres:    -    mins  17246;    Therapeutic Activity:     10     mins  78122;     Gait Training:      -     mins  18407;     Ultrasound:     -     mins  01361;      Untimed:  Electrical Stimulation:    -     mins  38364 ( );  Mechanical Traction:    -     mins  49741;   Dry needling:      -     mins  24859/    Timed Treatment:   55   mins   Total Treatment:     55   mins  Lucrecia Taylor PT  Physical Therapist    License #: [id number]

## 2023-02-07 ENCOUNTER — APPOINTMENT (OUTPATIENT)
Dept: CT IMAGING | Facility: HOSPITAL | Age: 88
DRG: 178 | End: 2023-02-07
Payer: MEDICARE

## 2023-02-07 ENCOUNTER — APPOINTMENT (OUTPATIENT)
Dept: GENERAL RADIOLOGY | Facility: HOSPITAL | Age: 88
DRG: 178 | End: 2023-02-07
Payer: MEDICARE

## 2023-02-07 ENCOUNTER — APPOINTMENT (OUTPATIENT)
Dept: MRI IMAGING | Facility: HOSPITAL | Age: 88
DRG: 178 | End: 2023-02-07
Payer: MEDICARE

## 2023-02-07 ENCOUNTER — HOSPITAL ENCOUNTER (INPATIENT)
Facility: HOSPITAL | Age: 88
LOS: 2 days | Discharge: HOME | DRG: 178 | End: 2023-02-09
Attending: EMERGENCY MEDICINE | Admitting: HOSPITALIST
Payer: MEDICARE

## 2023-02-07 DIAGNOSIS — J18.9 PNEUMONIA OF RIGHT LOWER LOBE DUE TO INFECTIOUS ORGANISM: ICD-10-CM

## 2023-02-07 DIAGNOSIS — G20 PARKINSON'S DISEASE: Chronic | ICD-10-CM

## 2023-02-07 DIAGNOSIS — R41.82 ALTERED MENTAL STATUS, UNSPECIFIED ALTERED MENTAL STATUS TYPE: Primary | ICD-10-CM

## 2023-02-07 PROBLEM — G93.41 METABOLIC ENCEPHALOPATHY: Status: ACTIVE | Noted: 2023-02-07

## 2023-02-07 PROBLEM — J69.0 ASPIRATION PNEUMONIA OF RIGHT LUNG DUE TO VOMIT: Status: ACTIVE | Noted: 2023-02-07

## 2023-02-07 LAB
ALBUMIN SERPL-MCNC: 3.4 G/DL (ref 3.5–5.2)
ALBUMIN SERPL-MCNC: 3.7 G/DL (ref 3.5–5.2)
ALBUMIN/GLOB SERPL: 1.5 G/DL
ALBUMIN/GLOB SERPL: 1.5 G/DL
ALP SERPL-CCNC: 75 U/L (ref 39–117)
ALP SERPL-CCNC: 78 U/L (ref 39–117)
ALT SERPL W P-5'-P-CCNC: 6 U/L (ref 1–41)
ALT SERPL W P-5'-P-CCNC: <5 U/L (ref 1–41)
ANION GAP SERPL CALCULATED.3IONS-SCNC: 5.5 MMOL/L (ref 5–15)
ANION GAP SERPL CALCULATED.3IONS-SCNC: 6.7 MMOL/L (ref 5–15)
AST SERPL-CCNC: 12 U/L (ref 1–40)
AST SERPL-CCNC: 13 U/L (ref 1–40)
BACTERIA UR QL AUTO: ABNORMAL /HPF
BASOPHILS # BLD AUTO: 0.04 10*3/MM3 (ref 0–0.2)
BASOPHILS NFR BLD AUTO: 0.4 % (ref 0–1.5)
BILIRUB SERPL-MCNC: 0.8 MG/DL (ref 0–1.2)
BILIRUB SERPL-MCNC: 0.9 MG/DL (ref 0–1.2)
BILIRUB UR QL STRIP: NEGATIVE
BUN SERPL-MCNC: 24 MG/DL (ref 8–23)
BUN SERPL-MCNC: 26 MG/DL (ref 8–23)
BUN/CREAT SERPL: 32.9 (ref 7–25)
BUN/CREAT SERPL: 33.3 (ref 7–25)
CALCIUM SPEC-SCNC: 8.6 MG/DL (ref 8.6–10.5)
CALCIUM SPEC-SCNC: 9 MG/DL (ref 8.6–10.5)
CHLORIDE SERPL-SCNC: 107 MMOL/L (ref 98–107)
CHLORIDE SERPL-SCNC: 109 MMOL/L (ref 98–107)
CHOLEST SERPL-MCNC: 111 MG/DL (ref 0–200)
CLARITY UR: CLEAR
CO2 SERPL-SCNC: 26.3 MMOL/L (ref 22–29)
CO2 SERPL-SCNC: 26.5 MMOL/L (ref 22–29)
COLOR UR: YELLOW
CREAT SERPL-MCNC: 0.73 MG/DL (ref 0.76–1.27)
CREAT SERPL-MCNC: 0.78 MG/DL (ref 0.76–1.27)
D-LACTATE SERPL-SCNC: 1.1 MMOL/L (ref 0.5–2)
DEPRECATED RDW RBC AUTO: 45.4 FL (ref 37–54)
DEPRECATED RDW RBC AUTO: 46.8 FL (ref 37–54)
EGFRCR SERPLBLD CKD-EPI 2021: 85.8 ML/MIN/1.73
EGFRCR SERPLBLD CKD-EPI 2021: 87.5 ML/MIN/1.73
EOSINOPHIL # BLD AUTO: 0.07 10*3/MM3 (ref 0–0.4)
EOSINOPHIL NFR BLD AUTO: 0.6 % (ref 0.3–6.2)
ERYTHROCYTE [DISTWIDTH] IN BLOOD BY AUTOMATED COUNT: 12.9 % (ref 12.3–15.4)
ERYTHROCYTE [DISTWIDTH] IN BLOOD BY AUTOMATED COUNT: 12.9 % (ref 12.3–15.4)
GLOBULIN UR ELPH-MCNC: 2.3 GM/DL
GLOBULIN UR ELPH-MCNC: 2.5 GM/DL
GLUCOSE BLDC GLUCOMTR-MCNC: 101 MG/DL (ref 70–130)
GLUCOSE BLDC GLUCOMTR-MCNC: 112 MG/DL (ref 70–130)
GLUCOSE BLDC GLUCOMTR-MCNC: 135 MG/DL (ref 70–130)
GLUCOSE SERPL-MCNC: 131 MG/DL (ref 65–99)
GLUCOSE SERPL-MCNC: 147 MG/DL (ref 65–99)
GLUCOSE UR STRIP-MCNC: NEGATIVE MG/DL
HBA1C MFR BLD: 5.6 % (ref 4.8–5.6)
HCT VFR BLD AUTO: 32.4 % (ref 37.5–51)
HCT VFR BLD AUTO: 35.6 % (ref 37.5–51)
HDLC SERPL-MCNC: 57 MG/DL (ref 40–60)
HGB BLD-MCNC: 11.3 G/DL (ref 13–17.7)
HGB BLD-MCNC: 12.1 G/DL (ref 13–17.7)
HGB UR QL STRIP.AUTO: NEGATIVE
HYALINE CASTS UR QL AUTO: ABNORMAL /LPF
IMM GRANULOCYTES # BLD AUTO: 0.03 10*3/MM3 (ref 0–0.05)
IMM GRANULOCYTES NFR BLD AUTO: 0.3 % (ref 0–0.5)
KETONES UR QL STRIP: ABNORMAL
LDLC SERPL CALC-MCNC: 44 MG/DL (ref 0–100)
LDLC/HDLC SERPL: 0.82 {RATIO}
LEUKOCYTE ESTERASE UR QL STRIP.AUTO: ABNORMAL
LYMPHOCYTES # BLD AUTO: 0.58 10*3/MM3 (ref 0.7–3.1)
LYMPHOCYTES NFR BLD AUTO: 5.3 % (ref 19.6–45.3)
MAGNESIUM SERPL-MCNC: 2 MG/DL (ref 1.6–2.4)
MCH RBC QN AUTO: 32.6 PG (ref 26.6–33)
MCH RBC QN AUTO: 33 PG (ref 26.6–33)
MCHC RBC AUTO-ENTMCNC: 34 G/DL (ref 31.5–35.7)
MCHC RBC AUTO-ENTMCNC: 34.9 G/DL (ref 31.5–35.7)
MCV RBC AUTO: 94.7 FL (ref 79–97)
MCV RBC AUTO: 96 FL (ref 79–97)
MONOCYTES # BLD AUTO: 0.47 10*3/MM3 (ref 0.1–0.9)
MONOCYTES NFR BLD AUTO: 4.3 % (ref 5–12)
NEUTROPHILS NFR BLD AUTO: 89.1 % (ref 42.7–76)
NEUTROPHILS NFR BLD AUTO: 9.73 10*3/MM3 (ref 1.7–7)
NITRITE UR QL STRIP: NEGATIVE
NRBC BLD AUTO-RTO: 0 /100 WBC (ref 0–0.2)
PH UR STRIP.AUTO: 5.5 [PH] (ref 5–8)
PLATELET # BLD AUTO: 176 10*3/MM3 (ref 140–450)
PLATELET # BLD AUTO: 176 10*3/MM3 (ref 140–450)
PMV BLD AUTO: 9.5 FL (ref 6–12)
PMV BLD AUTO: 9.7 FL (ref 6–12)
POTASSIUM SERPL-SCNC: 4 MMOL/L (ref 3.5–5.2)
POTASSIUM SERPL-SCNC: 4.1 MMOL/L (ref 3.5–5.2)
PROCALCITONIN SERPL-MCNC: 0.4 NG/ML (ref 0–0.25)
PROT SERPL-MCNC: 5.7 G/DL (ref 6–8.5)
PROT SERPL-MCNC: 6.2 G/DL (ref 6–8.5)
PROT UR QL STRIP: ABNORMAL
QT INTERVAL: 410 MS
QT INTERVAL: 448 MS
RBC # BLD AUTO: 3.42 10*6/MM3 (ref 4.14–5.8)
RBC # BLD AUTO: 3.71 10*6/MM3 (ref 4.14–5.8)
RBC # UR STRIP: ABNORMAL /HPF
REF LAB TEST METHOD: ABNORMAL
SODIUM SERPL-SCNC: 140 MMOL/L (ref 136–145)
SODIUM SERPL-SCNC: 141 MMOL/L (ref 136–145)
SP GR UR STRIP: 1.03 (ref 1–1.03)
SQUAMOUS #/AREA URNS HPF: ABNORMAL /HPF
TRIGL SERPL-MCNC: 36 MG/DL (ref 0–150)
TROPONIN T SERPL-MCNC: 0.01 NG/ML (ref 0–0.03)
TROPONIN T SERPL-MCNC: <0.01 NG/ML (ref 0–0.03)
TSH SERPL DL<=0.05 MIU/L-ACNC: 2.03 UIU/ML (ref 0.27–4.2)
UROBILINOGEN UR QL STRIP: ABNORMAL
VLDLC SERPL-MCNC: 10 MG/DL (ref 5–40)
WBC # UR STRIP: ABNORMAL /HPF
WBC NRBC COR # BLD: 10.92 10*3/MM3 (ref 3.4–10.8)
WBC NRBC COR # BLD: 10.93 10*3/MM3 (ref 3.4–10.8)

## 2023-02-07 PROCEDURE — 25010000002 LEVOFLOXACIN PER 250 MG: Performed by: EMERGENCY MEDICINE

## 2023-02-07 PROCEDURE — 84145 PROCALCITONIN (PCT): CPT | Performed by: EMERGENCY MEDICINE

## 2023-02-07 PROCEDURE — 83735 ASSAY OF MAGNESIUM: CPT | Performed by: HOSPITALIST

## 2023-02-07 PROCEDURE — 36415 COLL VENOUS BLD VENIPUNCTURE: CPT | Performed by: NURSE PRACTITIONER

## 2023-02-07 PROCEDURE — 87040 BLOOD CULTURE FOR BACTERIA: CPT | Performed by: EMERGENCY MEDICINE

## 2023-02-07 PROCEDURE — 93005 ELECTROCARDIOGRAM TRACING: CPT | Performed by: EMERGENCY MEDICINE

## 2023-02-07 PROCEDURE — 99223 1ST HOSP IP/OBS HIGH 75: CPT | Performed by: PSYCHIATRY & NEUROLOGY

## 2023-02-07 PROCEDURE — 93010 ELECTROCARDIOGRAM REPORT: CPT | Performed by: INTERNAL MEDICINE

## 2023-02-07 PROCEDURE — 80053 COMPREHEN METABOLIC PANEL: CPT | Performed by: EMERGENCY MEDICINE

## 2023-02-07 PROCEDURE — 70551 MRI BRAIN STEM W/O DYE: CPT

## 2023-02-07 PROCEDURE — 80061 LIPID PANEL: CPT | Performed by: NURSE PRACTITIONER

## 2023-02-07 PROCEDURE — 84484 ASSAY OF TROPONIN QUANT: CPT | Performed by: HOSPITALIST

## 2023-02-07 PROCEDURE — 82962 GLUCOSE BLOOD TEST: CPT

## 2023-02-07 PROCEDURE — 70450 CT HEAD/BRAIN W/O DYE: CPT

## 2023-02-07 PROCEDURE — 85025 COMPLETE CBC W/AUTO DIFF WBC: CPT | Performed by: EMERGENCY MEDICINE

## 2023-02-07 PROCEDURE — 81001 URINALYSIS AUTO W/SCOPE: CPT | Performed by: EMERGENCY MEDICINE

## 2023-02-07 PROCEDURE — 71045 X-RAY EXAM CHEST 1 VIEW: CPT

## 2023-02-07 PROCEDURE — 71250 CT THORAX DX C-: CPT

## 2023-02-07 PROCEDURE — 84484 ASSAY OF TROPONIN QUANT: CPT | Performed by: EMERGENCY MEDICINE

## 2023-02-07 PROCEDURE — 99285 EMERGENCY DEPT VISIT HI MDM: CPT

## 2023-02-07 PROCEDURE — 83036 HEMOGLOBIN GLYCOSYLATED A1C: CPT | Performed by: NURSE PRACTITIONER

## 2023-02-07 PROCEDURE — 83605 ASSAY OF LACTIC ACID: CPT | Performed by: EMERGENCY MEDICINE

## 2023-02-07 PROCEDURE — 85027 COMPLETE CBC AUTOMATED: CPT | Performed by: NURSE PRACTITIONER

## 2023-02-07 PROCEDURE — 80053 COMPREHEN METABOLIC PANEL: CPT | Performed by: NURSE PRACTITIONER

## 2023-02-07 PROCEDURE — 84443 ASSAY THYROID STIM HORMONE: CPT | Performed by: NURSE PRACTITIONER

## 2023-02-07 PROCEDURE — 93005 ELECTROCARDIOGRAM TRACING: CPT | Performed by: HOSPITALIST

## 2023-02-07 PROCEDURE — 92610 EVALUATE SWALLOWING FUNCTION: CPT

## 2023-02-07 RX ORDER — VALSARTAN 160 MG/1
160 TABLET ORAL DAILY
Status: DISCONTINUED | OUTPATIENT
Start: 2023-02-07 | End: 2023-02-09 | Stop reason: HOSPADM

## 2023-02-07 RX ORDER — LEVOTHYROXINE SODIUM 0.07 MG/1
75 TABLET ORAL
Status: DISCONTINUED | OUTPATIENT
Start: 2023-02-07 | End: 2023-02-09 | Stop reason: HOSPADM

## 2023-02-07 RX ORDER — CLONIDINE HYDROCHLORIDE 0.1 MG/1
0.1 TABLET ORAL NIGHTLY
Status: DISCONTINUED | OUTPATIENT
Start: 2023-02-07 | End: 2023-02-09 | Stop reason: HOSPADM

## 2023-02-07 RX ORDER — ATORVASTATIN CALCIUM 80 MG/1
80 TABLET, FILM COATED ORAL NIGHTLY
Status: DISCONTINUED | OUTPATIENT
Start: 2023-02-07 | End: 2023-02-07

## 2023-02-07 RX ORDER — ASPIRIN 300 MG/1
300 SUPPOSITORY RECTAL DAILY
Status: DISCONTINUED | OUTPATIENT
Start: 2023-02-07 | End: 2023-02-07

## 2023-02-07 RX ORDER — ATORVASTATIN CALCIUM 20 MG/1
20 TABLET, FILM COATED ORAL NIGHTLY
Status: DISCONTINUED | OUTPATIENT
Start: 2023-02-07 | End: 2023-02-09 | Stop reason: HOSPADM

## 2023-02-07 RX ORDER — ASPIRIN 325 MG
325 TABLET ORAL DAILY
Status: DISCONTINUED | OUTPATIENT
Start: 2023-02-07 | End: 2023-02-07

## 2023-02-07 RX ORDER — ONDANSETRON 2 MG/ML
4 INJECTION INTRAMUSCULAR; INTRAVENOUS EVERY 6 HOURS PRN
Status: DISCONTINUED | OUTPATIENT
Start: 2023-02-07 | End: 2023-02-09 | Stop reason: HOSPADM

## 2023-02-07 RX ORDER — CARBIDOPA/LEVODOPA 25MG-250MG
1 TABLET ORAL 3 TIMES DAILY
Status: DISCONTINUED | OUTPATIENT
Start: 2023-02-07 | End: 2023-02-09 | Stop reason: HOSPADM

## 2023-02-07 RX ORDER — ACETAMINOPHEN 325 MG/1
650 TABLET ORAL EVERY 4 HOURS PRN
Status: DISCONTINUED | OUTPATIENT
Start: 2023-02-07 | End: 2023-02-09 | Stop reason: HOSPADM

## 2023-02-07 RX ORDER — PRAMIPEXOLE DIHYDROCHLORIDE 0.5 MG/1
0.5 TABLET ORAL 3 TIMES DAILY
Status: DISCONTINUED | OUTPATIENT
Start: 2023-02-07 | End: 2023-02-09 | Stop reason: HOSPADM

## 2023-02-07 RX ORDER — BISACODYL 10 MG
10 SUPPOSITORY, RECTAL RECTAL DAILY PRN
Status: DISCONTINUED | OUTPATIENT
Start: 2023-02-07 | End: 2023-02-09 | Stop reason: HOSPADM

## 2023-02-07 RX ORDER — ACETAMINOPHEN 650 MG/1
650 SUPPOSITORY RECTAL EVERY 4 HOURS PRN
Status: DISCONTINUED | OUTPATIENT
Start: 2023-02-07 | End: 2023-02-09 | Stop reason: HOSPADM

## 2023-02-07 RX ORDER — LEVOFLOXACIN 5 MG/ML
500 INJECTION, SOLUTION INTRAVENOUS EVERY 24 HOURS
Status: DISCONTINUED | OUTPATIENT
Start: 2023-02-07 | End: 2023-02-07

## 2023-02-07 RX ORDER — LEVOFLOXACIN 5 MG/ML
500 INJECTION, SOLUTION INTRAVENOUS EVERY 24 HOURS
Status: DISCONTINUED | OUTPATIENT
Start: 2023-02-08 | End: 2023-02-08

## 2023-02-07 RX ORDER — SODIUM CHLORIDE 0.9 % (FLUSH) 0.9 %
10 SYRINGE (ML) INJECTION AS NEEDED
Status: DISCONTINUED | OUTPATIENT
Start: 2023-02-07 | End: 2023-02-09 | Stop reason: HOSPADM

## 2023-02-07 RX ORDER — LEVOFLOXACIN 5 MG/ML
750 INJECTION, SOLUTION INTRAVENOUS ONCE
Status: COMPLETED | OUTPATIENT
Start: 2023-02-07 | End: 2023-02-07

## 2023-02-07 RX ORDER — SODIUM CHLORIDE 9 MG/ML
75 INJECTION, SOLUTION INTRAVENOUS CONTINUOUS
Status: DISCONTINUED | OUTPATIENT
Start: 2023-02-07 | End: 2023-02-07

## 2023-02-07 RX ORDER — METRONIDAZOLE 500 MG/100ML
500 INJECTION, SOLUTION INTRAVENOUS EVERY 8 HOURS
Status: DISCONTINUED | OUTPATIENT
Start: 2023-02-07 | End: 2023-02-09 | Stop reason: HOSPADM

## 2023-02-07 RX ADMIN — APIXABAN 2.5 MG: 2.5 TABLET, FILM COATED ORAL at 11:14

## 2023-02-07 RX ADMIN — PRAMIPEXOLE DIHYDROCHLORIDE 0.5 MG: 0.5 TABLET ORAL at 17:00

## 2023-02-07 RX ADMIN — ATORVASTATIN CALCIUM 20 MG: 20 TABLET, FILM COATED ORAL at 21:11

## 2023-02-07 RX ADMIN — CARBIDOPA AND LEVODOPA 1 TABLET: 25; 250 TABLET ORAL at 21:11

## 2023-02-07 RX ADMIN — PRAMIPEXOLE DIHYDROCHLORIDE 0.5 MG: 0.5 TABLET ORAL at 11:14

## 2023-02-07 RX ADMIN — ACETAMINOPHEN 650 MG: 325 TABLET, FILM COATED ORAL at 11:14

## 2023-02-07 RX ADMIN — CARBIDOPA AND LEVODOPA 1 TABLET: 25; 250 TABLET ORAL at 17:00

## 2023-02-07 RX ADMIN — CARBIDOPA AND LEVODOPA 1 TABLET: 25; 250 TABLET ORAL at 11:14

## 2023-02-07 RX ADMIN — CLONIDINE HYDROCHLORIDE 0.1 MG: 0.1 TABLET ORAL at 22:07

## 2023-02-07 RX ADMIN — PRAMIPEXOLE DIHYDROCHLORIDE 0.5 MG: 0.5 TABLET ORAL at 21:11

## 2023-02-07 RX ADMIN — LEVOFLOXACIN 750 MG: 5 INJECTION, SOLUTION INTRAVENOUS at 05:54

## 2023-02-07 RX ADMIN — METRONIDAZOLE 500 MG: 500 INJECTION, SOLUTION INTRAVENOUS at 12:35

## 2023-02-07 RX ADMIN — APIXABAN 2.5 MG: 2.5 TABLET, FILM COATED ORAL at 21:11

## 2023-02-07 RX ADMIN — LEVOTHYROXINE SODIUM 75 MCG: 0.07 TABLET ORAL at 11:14

## 2023-02-07 RX ADMIN — VALSARTAN 160 MG: 160 TABLET, FILM COATED ORAL at 11:14

## 2023-02-07 RX ADMIN — METRONIDAZOLE 500 MG: 500 INJECTION, SOLUTION INTRAVENOUS at 17:01

## 2023-02-07 RX ADMIN — SODIUM CHLORIDE 75 ML/HR: 9 INJECTION, SOLUTION INTRAVENOUS at 05:54

## 2023-02-07 NOTE — CONSULTS
Neurology Consult Note    Consult Date: 2/7/2023    Referring MD: Anup Bashir MD    Reason for Consult I have been asked to see the patient in neurological consultation to render advice and opinion regarding recurrent falls, confusion, word finding difficulty    Casey Snowden Sr. is a 88 y.o. male with approximately 8-year history of idiopathic Parkinson's disease followed by Dr. Kashif Goodwin, on Sinemet 1 tablet 3 times daily, reported history of A-fib on long-term anticoagulation with Eliquis as well as prior DVT with IVC filter in place.  His wife has noticed some progressive cognitive decline associated with this illness.  Over the last 90 days he has had a number of very high risk falls involving stairs or falling into tractors and his property.  Most recently about a week ago he had a bad fall down the stairs and injured his arm.  He struck his head and there was concern for possible head injury but work-up was negative.  His primary care doctor stopped his Plavix and Eliquis.  Several days ago patient developed fluctuating confusion with word finding difficulty and disorientation.  This appeared to be acutely worse last night which led to his presentation to the hospital.  He was admitted for possible stroke.  Symptoms have significantly improved overnight and he is now mostly back to his neurologic baseline.    Patient has a separate complaint of progressive oropharyngeal dysphagia.  He has had difficulty choking on dry foods.  He is currently on a nectar thick diet after speech therapy evaluation.  He has no interest in having a PEG tube placed.    Past Medical History:   Diagnosis Date   • Acquired hypothyroidism 8/31/2022   • Atrial fibrillation (HCC)    • Cancer (HCC) 04/2018    basil cell carcinoma   • Carotid stenosis    • CHF (congestive heart failure) (HCC)    • Chronic deep vein thrombosis (DVT) of popliteal vein of right lower extremity (HCC)    • Coronary artery disease involving native  "coronary artery of native heart without angina pectoris 12/20/2018   • DVT of lower extremity (deep venous thrombosis) ([facility])    • Hard of hearing    • Long-term use of high-risk medication    • PAD (peripheral artery disease) ([facility]) 9/10/2020   • Parkinson's disease ([facility])    • Postphlebitic syndrome        Exam  /66   Pulse 69   Temp 97.5 °F (36.4 °C) (Oral)   Resp 16   Ht 180.3 cm (71\")   Wt 76.9 kg (169 lb 8.5 oz)   SpO2 98%   BMI 23.65 kg/m²   Gen: NAD, vitals reviewed  MS: oriented x3, recent/remote memory intact, impaired verbal and semantic fluency but normal spontaneous speech, normal command following and able to repeat phrases.  Attention/concentration, no neglect  CN: visual acuity grossly normal, PERRL, EOMI, slight facial asymmetry at baseline per family at the bedside, moderate dysarthria  Motor: 5/5 throughout upper and lower extremities, bradykinetic, moderately increased tone, no resting tremor noted  Sensory: Intact to cold temperature and vibration throughout    DATA:    Lab Results   Component Value Date    GLUCOSE 131 (H) 02/07/2023    CALCIUM 8.6 02/07/2023     02/07/2023    K 4.0 02/07/2023    CO2 26.5 02/07/2023     (H) 02/07/2023    BUN 24 (H) 02/07/2023    CREATININE 0.73 (L) 02/07/2023    EGFRIFAFRI 102 10/27/2017    EGFRIFNONA 99 06/05/2019    BCR 32.9 (H) 02/07/2023    ANIONGAP 5.5 02/07/2023     Lab Results   Component Value Date    WBC 10.93 (H) 02/07/2023    HGB 11.3 (L) 02/07/2023    HCT 32.4 (L) 02/07/2023    MCV 94.7 02/07/2023     02/07/2023       Lab review: WBC 11 hemoglobin 11.3    Imaging review: I personally reviewed his CT scan of the head performed this morning in the emergency department which is significant for moderate generalized atrophy and hydrocephalus ex vacuo.  No acute stroke, ICH or SDH noted.  Radiology report reviewed.  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 Armas reviewed, he notes indication for Eliquis is PAF with [id number] of 3    Pre-stroke MRS: 3    Comment: 88-year-old man with fairly advanced Parkinson's disease on long-term anticoagulation for DVT and reported A-fib.  His anticoagulation was stopped after a recent severe fall.  He developed confusion and word finding difficulty over the last 72 hours but appears to be back to baseline at this time.  He did have a small area of apparent aspiration pneumonia noted.  Differential diagnosis for his subacute encephalopathy would include left temporal stroke versus infectious encephalopathy secondary to pneumonia.    I had a long conversation with his wife and daughter about underlying progression of Parkinson's disease and safety concerns.  The most pressing issue is his extreme fall risk which needs to be aggressively mitigated by avoiding stairs and using a rolling walker basically at all times.  I shared with him that advanced gait impairment is a feature of Parkinson's and will almost certainly get worse.    With regard to anticoagulation I would favor continuing Eliquis with a fall prevention strategy in place if he really does have PAF; however his wife thought that his A-fib may have been transient and provoked in the past, but she implied that his indication for anticoagulation for prior DVT was considered to be lifelong.    His dysphagia is also apparently quite severe and he has been placed on a modified diet.  He is not interested in a PEG in the future.    PLAN:  -I would favor resuming Eliquis with an aggressive fall prevention plan.  Not certain if he needs to continue on Plavix as well. Plan to speak to Dr. Armas and Dr. Dorado  -modified diet per ST, patient  declined PEG  -no stairs, use rollator walker, OT home safety eval  -no driving given cognitive decline and apraxia  -MRI brain today if able    Discussed with patient and family at length

## 2023-02-07 NOTE — THERAPY EVALUATION
Acute Care - Speech Language Pathology   Swallow Initial Evaluation [facility]     Patient Name: Casey Snowden Sr.  : 1934  MRN: [id number]  Today's Date: 2023               Admit Date: 2023    Visit Dx:     ICD-10-CM ICD-9-CM   1. Altered mental status, unspecified altered mental status type  R41.82 780.97   2. Pneumonia of right lower lobe due to infectious organism  J18.9 486     Patient Active Problem List   Diagnosis   • Benign essential hypertension   • Stenosis of carotid artery   • Mixed hyperlipidemia   • Parkinson's disease (HCC)   • Peripheral arterial occlusive disease (HCC)   • Screening for prostate cancer   • Medication monitoring encounter   • Melanoma (HCC)   • Chronic deep vein thrombosis (DVT) of popliteal vein of right lower extremity (HCC)   • Uses hearing aid   • Paroxysmal A-fib (HCC)   • Localized edema   • Chronic diastolic (congestive) heart failure   • Anticoagulant long-term use   • Presence of IVC filter   • Medicare annual wellness visit, subsequent   • Iron deficiency anemia secondary to inadequate dietary iron intake   • 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)   • Coronary artery disease involving native coronary artery of native heart without angina pectoris   • ST elevation myocardial infarction involving left anterior descending (LAD) coronary artery (HCC)   • Sialorrhea   • 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)   • PAD (peripheral artery disease) (HCC)   • Metastatic squamous cell carcinoma   • Secondary malignancy of lymph nodes of head, face and neck (HCC)   • Encounter for antineoplastic immunotherapy   • RBD (REM behavioral disorder)   • Acquired hypothyroidism   • Facial contusion, subsequent encounter   • Skin tear of upper arm without complication, left, subsequent encounter   • Altered mental status, unspecified altered mental status type   • Aspiration pneumonia of right lung due to vomit (HCC)   • Metabolic  encephalopathy     Past Medical History:   Diagnosis Date   • Acquired hypothyroidism 8/31/2022   • Atrial fibrillation ([facility])    • Cancer ([facility]) 04/2018    basil cell carcinoma   • Carotid stenosis    • CHF (congestive heart failure) ([facility])    • Chronic deep vein thrombosis (DVT) of popliteal vein of right lower extremity ([facility])    • Coronary artery disease involving native coronary artery of native heart without angina pectoris 12/20/2018   • DVT of lower extremity (deep venous thrombosis) ([facility])    • Hard of hearing    • Long-term use of high-risk medication    • PAD (peripheral artery disease) ([facility]) 9/10/2020   • Parkinson's disease ([facility])    • Postphlebitic syndrome      Past Surgical History:   Procedure Laterality Date   • BASAL CELL CARCINOMA EXCISION  04/2018   • CARDIAC CATHETERIZATION N/A 11/5/2018    Procedure: Left Heart Cath;  Surgeon: Oliverio Azar MD;  Location: [facility] CATH INVASIVE LOCATION;  Service: Cardiovascular   • CARDIAC CATHETERIZATION N/A 11/5/2018    Procedure: Coronary angiography;  Surgeon: Oliverio Azar MD;  Location: [facility] CATH INVASIVE LOCATION;  Service: Cardiovascular   • CARDIAC CATHETERIZATION N/A 11/5/2018    Procedure: Left ventriculography;  Surgeon: Oliverio Azar MD;  Location: [facility] CATH INVASIVE LOCATION;  Service: Cardiovascular   • CARDIAC CATHETERIZATION N/A 6/4/2019    Procedure: Left Heart Cath;  Surgeon: Johnny Glasgow MD;  [facility] LOC[facility];  Service: Cardiovascular   • CARDIAC CATHETERIZATION N/A 6/4/2019    Procedure: Coronary angiography;  Surgeon: Johnny Glasgow MD;  [facility];  Service: Cardiovascular   • CARDIAC CATHETERIZATION N/A 6/4/2019    Procedure: Left ventriculography;  Surgeon: Johnny Glasgow MD;  Location: [facility] CATH INVASIVE LOCATION;  Service: Cardiovascular   • CARDIAC CATHETERIZATION N/A 6/4/2019    Procedure: Stent BILL coronary;  Surgeon: Johnny Glasgow MD;   Location: [facility] CATH INVASIVE LOCATION;  Service: Cardiovascular   • CARDIAC SURGERY     • CATARACT EXTRACTION Left 08/2018   • CATARACT EXTRACTION Right 09/2018   • COLONOSCOPY      2011   • INCISION AND DRAINAGE LEG Right 7/7/2017    Procedure: INCISION AND DRAINAGE INFECTED HEMATOMA RIGHT THIGH;  Surgeon: Valentin Peña MD;  Location: [facility] OR;  Service:    • JOINT REPLACEMENT     • KNEE INCISION AND DRAINAGE Right 12/27/2016    Procedure: KNEE INCISION AND DRAINAGE;  Surgeon: Triston Whitten MD;  Location: [facility] OR;  Service:    • NOSE SURGERY      nose replacement   • SKIN BIOPSY     • SKIN GRAFT  12/2017   • TOTAL KNEE ARTHROPLASTY Right    • VASCULAR SURGERY     • VENA CAVA FILTER PLACEMENT         SLP Recommendation and Plan  SLP Swallowing Diagnosis: suspected pharyngeal dysphagia (02/07/23 1100)  SLP Diet Recommendation: mechanical ground textures, no mixed consistencies, honey thick liquids (02/07/23 1100)  Recommended Precautions and Strategies: upright posture during/after eating, no straw, small bites of food and sips of liquid, assist with feeding (02/07/23 1100)  SLP Rec. for Method of Medication Administration: meds whole, meds crushed, with puree, as tolerated (02/07/23 1100)     Monitor for Signs of Aspiration: yes, notify SLP if any concerns (02/07/23 1100)  Recommended Diagnostics: reassess via VFSS ([facility]) (02/07/23 1100)     Anticipated Discharge Disposition (SLP): unknown (02/07/23 1100)     Therapy Frequency (Swallow): PRN (02/07/23 1100)  Predicted Duration Therapy Intervention (Days): until discharge (02/07/23 1100)                                        Plan of Care Reviewed With: patient  Outcome Evaluation: Patient seen for clinical swallow assessment. Pt recently completed voice therapy for PD at [facility], but is not fully compliant with voicing exercises at home and has regressed some since D/C per wife. Admitted with AMS and RLL PNA. Denies hx of PNA. Pt  reports obstruction with dry solids. Wife indicated patient exhibits wet voice at times and c/o drooling. No hx of dysphagia work up. Intermittent throat clearing with thins via cup/straw, mixed, and regular solids. Wet voice with nectar via straw. No overt s/s of aspiration with ice, honey via cup, puree, or strained mech soft. SLP recs mech soft, no mixed, and honey, no straws. Meds whole or crushed with puree. Allow free water protocol with ice. Will follow for VFSS. Pt appears to be a great candidate for EMST. Discussed in detail with patient and family.      SWALLOW EVALUATION (last 72 hours)     SLP Adult Swallow Evaluation     Row Name 02/07/23 1100                   Rehab Evaluation    Document Type evaluation  -SH           General Information    Patient Profile Reviewed yes  -SH        Pertinent History Of Current Problem PD, AMS, RLL PNA  -        Current Method of Nutrition regular textures;thin liquids  -        Precautions/Limitations, Vision WFL  -        Precautions/Limitations, Hearing hearing impairment, bilaterally;hearing aid, bilaterally  -        Prior Level of Function-Communication other (see comments)  PD  -        Prior Level of Function-Swallowing no diet consistency restrictions;other (see comments)  signs of dysphagia  -        Plans/Goals Discussed with patient;spouse/S.O.;agreed upon  -        Barriers to Rehab medically complex  -        Patient's Goals for Discharge patient did not state  -        Family Goals for Discharge patient able to eat/drink without coughing/choking  -           Oral Motor Structure and Function    Dentition Assessment natural, present and adequate  -           Oral Musculature and Cranial Nerve Assessment    Oral Motor General Assessment generalized oral motor weakness  -           Clinical Swallow Eval    Clinical Swallow Evaluation Summary Patient seen for clinical swallow assessment. Pt recently completed voice therapy for PD at  [facility], but is not fully compliant with voicing exercises at home and has regressed some since D/C per wife. Admitted with AMS and RLL PNA. Denies hx of PNA. Pt reports obstruction with dry solids. Wife indicated patient exhibits wet voice at times and c/o drooling. No hx of dysphagia work up. Intermittent throat clearing with thins via cup/straw, mixed, and regular solids. Wet voice with nectar via straw. No overt s/s of aspiration with ice, honey via cup, puree, or strained mech soft. SLP recs mech soft, no mixed, and honey, no straws. Meds whole or crushed with puree. Allow free water protocol with ice. Will follow for VFSS. Pt appears to be a great candidate for EMST. Discussed in detail with patient and family.  -           SLP Evaluation Clinical Impression    SLP Swallowing Diagnosis suspected pharyngeal dysphagia  -           Recommendations    Therapy Frequency (Swallow) PRN  -        Predicted Duration Therapy Intervention (Days) until discharge  -        SLP Diet Recommendation mechanical ground textures;no mixed consistencies;honey thick liquids  -        Recommended Diagnostics reassess via [facility] ([facility]  -        Recommended Precautions and Strategies upright posture during/after eating;no straw;small bites of food and sips of liquid;assist with feeding  -        Oral Care Recommendations Oral Care BID/PRN  -        SLP Rec. for Method of Medication Administration meds whole;meds crushed;with puree;as tolerated  -        Monitor for Signs of Aspiration yes;notify SLP if any concerns  -        Anticipated Discharge Disposition (SLP) unknown  -           Swallow Goals (SLP)    Swallow LTGs Patient will demonstrate functional swallow for  -           (LTG) Patient will demonstrate functional swallow for    Diet Texture (Demonstrate functional swallow) mechanical ground textures  -        Liquid viscosity (Demonstrate functional swallow) honey/  moderately thick liquids  -               User Key  (r) = Recorded By, (t) = Taken By, (c) = Cosigned By    Initials Name Effective Dates     Tierra Mims MS CCC-SLP 06/16/21 -                 EDUCATION  The patient has been educated in the following areas:   Dysphagia (Swallowing Impairment).        SLP GOALS     Row Name 02/07/23 1100             (LTG) Patient will demonstrate functional swallow for    Diet Texture (Demonstrate functional swallow) mechanical ground textures  -SH      Liquid viscosity (Demonstrate functional swallow) honey/  moderately thick liquids  -            User Key  (r) = Recorded By, (t) = Taken By, (c) = Cosigned By    Initials Name Provider Type     Tierra Mims MS CCC-SLP Speech and Language Pathologist                   Time Calculation:    Time Calculation- SLP     Row Name 02/07/23 1144             Time Calculation- SLP    SLP Start Time 0900  -      SLP Received On 02/07/23  -         Untimed Charges    56859-MX Eval Oral Pharyng Swallow Minutes 90  -SH         Total Minutes    Untimed Charges Total Minutes 90  -SH       Total Minutes 90  -SH            User Key  (r) = Recorded By, (t) = Taken By, (c) = Cosigned By    Initials Name Provider Type     Tierra Mims MS CCC-SLP Speech and Language Pathologist                Therapy Charges for Today     Code Description Service Date Service Provider Modifiers Qty    [id number] HC ST EVAL ORAL PHARYNG SWALLOW 6 2/7/2023 Tierra Mims MS CCC-SLP GN 1               Tierra Mims MS CCC-DULCE  2/7/2023

## 2023-02-07 NOTE — CASE MANAGEMENT/SOCIAL WORK
Discharge Planning Assessment  [facility]     Patient Name: Casey Snowden .  MRN: [id number]  Today's Date: 2/7/2023    Admit Date: 2/7/2023    Plan: Home with spouse   Discharge Needs Assessment     Row Name 02/07/23 1413       Living Environment    People in Home spouse    Current Living Arrangements home    Potentially Unsafe Housing Conditions none    Primary Care Provided by spouse/significant other    Provides Primary Care For no one, unable/limited ability to care for self    Family Caregiver if Needed spouse;child(tyler), adult    Quality of Family Relationships helpful;involved;supportive    Able to Return to Prior Arrangements yes       Resource/Environmental Concerns    Resource/Environmental Concerns none       Transition Planning    Patient/Family Anticipates Transition to home    Patient/Family Anticipated Services at Transition rehabilitation services    Transportation Anticipated family or friend will provide       Discharge Needs Assessment    Readmission Within the Last 30 Days no previous admission in last 30 days    Equipment Currently Used at Home cane, straight;walker, rolling    Concerns to be Addressed discharge planning    Equipment Needed After Discharge none    Provided Post Acute Provider List? N/A    Provided Post Acute Provider Quality & Resource List? N/A               Discharge Plan     Row Name 02/07/23 1415       Plan    Plan Home with spouse    Plan Comments S/W pt, his wife Evonne and their dtr Svetlana at bedside.  Facesheet info confirmed. Pt lives in a 2 story house w/ Evonne.  Their bedroom and bath are on the main floor. Evonne assists pt at home as needed, including with monitoring his meds, meals, shopping, laundry and transportation. Svetlana and their son live close and can assist as needed.  Home DME includes a cane and walker, but pt does not use them.  He has used [facility] in the past and has never been to a SNF.  He has recently been going to outpt PT for his shoulder at  [facility] [place].  PT gregory is pending. Plan at present is to return home upon DC. CCP will continue to follow and assist w/ any DC needs. ...........Neda ESCOBAR/ ASIF              Continued Care and Services - Admitted Since 2/7/2023    Coordination has not been started for this encounter.          Demographic Summary     Row Name 02/07/23 1411       General Information    Admission Type inpatient    Arrived From home    Required Notices Provided Important Message from Medicare    Referral Source admission list    Reason for Consult discharge planning    Preferred Language English               Functional Status     Row Name 02/07/23 1411       Functional Status    Usual Activity Tolerance moderate    Current Activity Tolerance moderate       Assessment of Health Literacy    How often do you have someone help you read hospital materials? Always    How often do you have problems learning about your medical condition because of difficulty understanding written information? Often    How often do you have a problem understanding what is told to you about your medical condition? Often       Functional Status, IADL    Medications assistive person;independent    Meal Preparation assistive person    Housekeeping assistive person    Laundry assistive person    Shopping assistive person       Mental Status    General Appearance WDL WDL                  Neda Wilder RN

## 2023-02-07 NOTE — SIGNIFICANT NOTE
02/07/23 1552   OTHER   Discipline occupational therapist   Rehab Time/Intention   Session Not Performed patient unavailable for evaluation  (Pt off the floor for testing at attempted eval 2x today. Will follow-up)   Recommendation   OT - Next Appointment 02/08/23

## 2023-02-07 NOTE — ED TRIAGE NOTES
Patient presents to ED from home via EMS. Wife called EMS crew due to report of patient having chest pain.  Upon EMS arrival patient denied chest pain and had no complaints. Wife reported to EMS that patient had been confused since dinner time and was not acting like himself.     Upon arrival to ED patient is alert and oriented x 4 and has no complaints. When wife arrived to room she reports that patient was having word finding difficulty during dinner and later they were on the phone with their son and patient was falling asleep while talking. Patient went to bed at 2115 and then woke up early this morning with moaning and was not responding to verbal stimuli from wife, he did fully wake up and respond until EMS arrival. Patient does not have his hearing aids in at this time and is very [place].     Patient placed in mask by EMS, triage staff wearing appropriate PPE.    Medical equipment needed

## 2023-02-07 NOTE — PLAN OF CARE
Follow up for positive result and SBAR completion    Team member Name/:   Site team member works: [employer]    Date of Symptom Onset: 9/15   Date of Positive   Date tested:   Date resulted:   Last Date Worked (shift): 9/15    48-hour infectious period prior to symptom on set or Positive Test if Asymptomatic:  &     Team Members, patients exposed/potentially exposed and location/dates     What dates did you work? 9/15  What shift? Days  Did you have direct patient contact? Yes  Were the patients wearing masks? 4 patients, 2 were not wearing masks  What type of PPE do you wear? Mask and shield (for 2 without masks), goggle for 2 with masks    Ate lunch at desk with speech therapist behind.  Uncertain if speech therapist - Lacey Arango was eating at that time, or had PPE on.    Follow up scheduled for     SBAR to manager, IP, and EH RN   Goal Outcome Evaluation:  Plan of Care Reviewed With: patient           Outcome Evaluation: Patient seen for clinical swallow assessment. Pt recently completed voice therapy for PD at [facility], but is not fully compliant with voicing exercises at home and has regressed some since D/C per wife. Admitted with AMS and RLL PNA. Denies hx of PNA. Pt reports obstruction with dry solids. Wife indicated patient exhibits wet voice at times and c/o drooling. No hx of dysphagia work up. Intermittent throat clearing with thins via cup/straw, mixed, and regular solids. Wet voice with nectar via straw. No overt s/s of aspiration with ice, honey via cup, puree, or strained mech soft. SLP recs mech soft, no mixed, and honey, no straws. Meds whole or crushed with puree. Allow free water protocol with ice. Will follow for VFSS. Pt appears to be a great candidate for EMST. Discussed in detail with patient and family.      Patient was not wearing a face mask during this therapy encounter. Therapist used appropriate personal protective equipment including mask, eye protection and gloves.  Mask used was standard procedure mask. Appropriate PPE was worn during the entire therapy session. Hand hygiene was completed before and after therapy session. Patient is not in enhanced droplet precautions.

## 2023-02-07 NOTE — ED NOTES
".Nursing report ED to floor  Casey Snowden Sr.  88 y.o.  male    HPI :   Chief Complaint   Patient presents with    Altered Mental Status       Admitting doctor:   Anup Bashir MD    Admitting diagnosis:   The primary encounter diagnosis was Altered mental status, unspecified altered mental status type. A diagnosis of Pneumonia of right lower lobe due to infectious organism was also pertinent to this visit.    Code status:   Current Code Status       Date Active Code Status Order ID Comments User Context       2/7/2023 0544 CPR (Attempt to Resuscitate) [id number]  Tia Eden APRN ED        Question Answer    Code Status (Patient has no pulse and is not breathing) CPR (Attempt to Resuscitate)    Medical Interventions (Patient has pulse or is breathing) Full Support    Release to patient Routine Release                    Allergies:   Penicillins and Rocephin [ceftriaxone]    Isolation:   No active isolations    Intake and Output  No intake or output data in the 24 hours ending 02/07/23 0554    Weight:       02/07/23 0431   Weight: 77 kg (169 lb 12.1 oz)       Most recent vitals:   Vitals:    02/07/23 0427 02/07/23 0431 02/07/23 0531   BP: 146/86  139/65   Pulse: 94  64   Resp: 18  16   Temp: 98.9 °F (37.2 °C)     TempSrc: Oral     SpO2: 96%  96%   Weight:  77 kg (169 lb 12.1 oz)    Height: 180.3 cm (71\")         Active LDAs/IV Access:   Lines, Drains & Airways       Active LDAs       Name Placement date Placement time Site Days    Peripheral IV 02/07/23 0427 Posterior;Proximal;Right Forearm 02/07/23 0427  Forearm  less than 1                    Labs (abnormal labs have a star):   Labs Reviewed   COMPREHENSIVE METABOLIC PANEL - Abnormal; Notable for the following components:       Result Value    Glucose 147 (*)     BUN 26 (*)     BUN/Creatinine Ratio 33.3 (*)     All other components within normal limits    Narrative:     GFR Normal >60  Chronic Kidney Disease <60  Kidney Failure <15    The GFR " "formula is only valid for adults with stable renal function between ages 18 and 70.   URINALYSIS W/ MICROSCOPIC IF INDICATED (NO CULTURE) - Abnormal; Notable for the following components:    Ketones, UA Trace (*)     Protein, UA Trace (*)     Leuk Esterase, UA Small (1+) (*)     All other components within normal limits   PROCALCITONIN - Abnormal; Notable for the following components:    Procalcitonin 0.40 (*)     All other components within normal limits    Narrative:     As a Marker for Sepsis (Non-Neonates):    1. <0.5 ng/mL represents a low risk of severe sepsis and/or septic shock.  2. >2 ng/mL represents a high risk of severe sepsis and/or septic shock.    As a Marker for Lower Respiratory Tract Infections that require antibiotic therapy:    PCT on Admission    Antibiotic Therapy       6-12 Hrs later    >0.5                Strongly Recommended  >0.25 - <0.5        Recommended   0.1 - 0.25          Discouraged              Remeasure/reassess PCT  <0.1                Strongly Discouraged     Remeasure/reassess PCT    As 28 day mortality risk marker: \"Change in Procalcitonin Result\" (>80% or <=80%) if Day 0 (or Day 1) and Day 4 values are available. Refer to http://www.[email or web address]-pct-calculator.com    Change in PCT <=80%  A decrease of PCT levels below or equal to 80% defines a positive change in PCT test result representing a higher risk for 28-day all-cause mortality of patients diagnosed with severe sepsis for septic shock.    Change in PCT >80%  A decrease of PCT levels of more than 80% defines a negative change in PCT result representing a lower risk for 28-day all-cause mortality of patients diagnosed with severe sepsis or septic shock.      CBC WITH AUTO DIFFERENTIAL - Abnormal; Notable for the following components:    WBC 10.92 (*)     RBC 3.71 (*)     Hemoglobin 12.1 (*)     Hematocrit 35.6 (*)     Neutrophil % 89.1 (*)     Lymphocyte % 5.3 (*)     Monocyte % 4.3 (*)     Neutrophils, Absolute 9.73 (*)     " Lymphocytes, Absolute 0.58 (*)     All other components within normal limits   URINALYSIS, MICROSCOPIC ONLY - Abnormal; Notable for the following components:    WBC, UA 6-12 (*)     All other components within normal limits   TROPONIN (IN-HOUSE) - Normal    Narrative:     Troponin T Reference Range:  <= 0.03 ng/mL-   Negative for AMI  >0.03 ng/mL-     Abnormal for myocardial necrosis.  Clinicians would have to utilize clinical acumen, EKG, Troponin and serial changes to determine if it is an Acute Myocardial Infarction or myocardial injury due to an underlying chronic condition.       Results may be falsely decreased if patient taking Biotin.     LACTIC ACID, PLASMA - Normal   BLOOD CULTURE   BLOOD CULTURE   CBC (NO DIFF)   COMPREHENSIVE METABOLIC PANEL   HEMOGLOBIN A1C   LIPID PANEL   TSH   POCT GLUCOSE FINGERSTICK   POCT GLUCOSE FINGERSTICK   POCT GLUCOSE FINGERSTICK   POCT GLUCOSE FINGERSTICK   CBC AND DIFFERENTIAL    Narrative:     The following orders were created for panel order CBC & Differential.  Procedure                               Abnormality         Status                     ---------                               -----------         ------                     CBC Auto Differential[[id number]]        Abnormal            Final result                 Please view results for these tests on the individual orders.       EKG:   ECG 12 Lead Altered Mental Status   Preliminary Result   HEART RATE= 73  bpm   RR Interval= 822  ms   ID Interval= 146  ms   P Horizontal Axis= -22  deg   P Front Axis= 18  deg   QRSD Interval= 106  ms   QT Interval= 410  ms   QRS Axis= 12  deg   T Wave Axis= 65  deg   - ABNORMAL ECG -   Sinus rhythm   Atrial premature complex   LVH with secondary repolarization abnormality   Probable anterior infarct, age indeterminate   Electronically Signed By:    Date and Time of Study: 2023-02-07 04:42:14          Meds given in ED:   Medications   sodium chloride 0.9 % flush 10 mL (has no  administration in time range)   sodium chloride 0.9 % infusion (75 mL/hr Intravenous New Bag 2/7/23 0554)   acetaminophen (TYLENOL) tablet 650 mg (has no administration in time range)     Or   acetaminophen (TYLENOL) suppository 650 mg (has no administration in time range)   aspirin tablet 325 mg (has no administration in time range)     Or   aspirin suppository 300 mg (has no administration in time range)   atorvastatin (LIPITOR) tablet 80 mg (has no administration in time range)   ondansetron (ZOFRAN) injection 4 mg (has no administration in time range)   bisacodyl (DULCOLAX) suppository 10 mg (has no administration in time range)   levoFLOXacin (LEVAQUIN) 750 mg/150 mL D5W (premix) (LEVAQUIN) 750 mg (750 mg Intravenous New Bag 2/7/23 0554)       Imaging results:  CT Head Without Contrast    Result Date: 2/7/2023  No acute intracranial findings.  Radiation dose reduction techniques were utilized, including automated exposure control and exposure modulation based on body size.  This report was finalized on 2/7/2023 5:47 AM by Dr. Anat Andersen M.D.      XR Chest 1 View    Result Date: 2/7/2023  Potential patchy infiltrate at the right lung base. Correlation with any symptoms of pneumonia is recommended.  This report was finalized on 2/7/2023 5:20 AM by Dr. Anat Andersen M.D.       Ambulatory status:   - up with standby assist    Social issues:   Social History     Socioeconomic History    Marital status:     Years of education: college grad   Tobacco Use    Smoking status: Never    Smokeless tobacco: Never    Tobacco comments:     caff use   Vaping Use    Vaping Use: Never used   Substance and Sexual Activity    Alcohol use: No     Comment: none for a month    Drug use: No    Sexual activity: Not Currently     Partners: Female       NIH Stroke Scale:         Tiffanie Martel RN  02/07/23 05:54 EST

## 2023-02-07 NOTE — H&P
HISTORY AND PHYSICAL   [facility]        Date of Admission: 2023  Patient Identification:  Name: Casey Snowden Sr.  Age: 88 y.o.  Sex: male  :  1934  MRN: [id number]                     Primary Care Physician: Aleksander Diez MD    Chief Complaint: Confusion/falls    History of Present Illness:   Mr. Snowden is a pleasant 88-year-old man who is pretty soft-spoken as his wife does the majority of speaking for him.  She is very well versed in his past medical history and is aware of all medications as well as how they are dosed which was of benefit.  Apparently this patient has had some downward decline.  He does follow with neurology in an outpatient setting for his Parkinson's disease and had completed some type of speech therapy but does not have any type of addended diet and does not drink thickened liquids.  Spouse states his appetite and p.o. intake is great but he always has coughing associated with oral intake and even overnight.  He has had numerous falls at least 4 since August.  The fall that occurred a couple days prior to admission, patient was instructed by PCP to hold clonidine as well as anticoagulation.  CT on this admission demonstrates no aspects of intracranial hemorrhage and his anticoagulation will be reinstated.  They have not really voiced any fever or chills.  They noticed some of the swallowing issues as previously stated.  I feel he is developing an aspiration pneumonia based on his chest x-ray and when to further evaluate with CT of the chest but in the meantime we will blanket him with Levaquin and Flagyl due to his allergies.  He is not complain of anything at this time such as headache nausea vomiting any focal changes to vision smell taste or sound.  There is been no seizure-like activity and there is been no focal loss of function nor is there been any loss of consciousness.  He has a shuffling gait due to Parkinson's which is longstanding and  progressively getting worse.  Spouse states clonidine is utilized twice daily in an outpatient setting.  There have otherwise been no other changes to his medications particularly his Sinemet or Mirapex and again no new medications introduced over the last couple weeks.  Spouse states anticoagulated for past history of paroxysmal A-fib seen by Dr. Armas but states the vascular surgeon/Nieves due to PAD has also requested patient to remain on full anticoagulation    Past Medical History:  Past Medical History:   Diagnosis Date   • Acquired hypothyroidism 8/31/2022   • Atrial fibrillation (HCC)    • Cancer ([facility]) 04/2018    basil cell carcinoma   • Carotid stenosis    • CHF (congestive heart failure) ([facility])    • Chronic deep vein thrombosis (DVT) of popliteal vein of right lower extremity ([facility])    • Coronary artery disease involving native coronary artery of native heart without angina pectoris 12/20/2018   • DVT of lower extremity (deep venous thrombosis) ([facility])    • Hard of hearing    • Long-term use of high-risk medication    • PAD (peripheral artery disease) ([facility]) 9/10/2020   • Parkinson's disease ([facility])    • Postphlebitic syndrome      Past Surgical History:  Past Surgical History:   Procedure Laterality Date   • BASAL CELL CARCINOMA EXCISION  04/2018   • CARDIAC CATHETERIZATION N/A 11/5/2018    Procedure: Left Heart Cath;  Surgeon: Oliverio Azar MD;  Location: [facility] CATH INVASIVE LOCATION;  Service: Cardiovascular   • CARDIAC CATHETERIZATION N/A 11/5/2018    Procedure: Coronary angiography;  Surgeon: Oliverio Azar MD;  [facility];  Service: Cardiovascular   • CARDIAC CATHETERIZATION N/A 11/5/2018    Procedure: Left ventriculography;  Surgeon: Oliverio Azar MD;  Location: [facility] CATH INVASIVE LOCATION;  Service: Cardiovascular   • CARDIAC CATHETERIZATION N/A 6/4/2019    Procedure: Left Heart Cath;  Surgeon: Johnny Glasgow MD;  Location: [facility] CATH INVASIVE LOCATION;   Service: Cardiovascular   • CARDIAC CATHETERIZATION N/A 6/4/2019    Procedure: Coronary angiography;  Surgeon: Johnny Glasgow MD;  Location:  [facility] CATH INVASIVE LOCATION;  Service: Cardiovascular   • CARDIAC CATHETERIZATION N/A 6/4/2019    Procedure: Left ventriculography;  Surgeon: Johnny Glasgow MD;  Location:  [facility] CATH INVASIVE LOCATION;  Service: Cardiovascular   • CARDIAC CATHETERIZATION N/A 6/4/2019    Procedure: Stent BILL coronary;  Surgeon: Johnny Glasgow MD;  [facility];  Service: Cardiovascular   • CARDIAC SURGERY     • CATARACT EXTRACTION Left 08/2018   • CATARACT EXTRACTION Right 09/2018   • COLONOSCOPY      2011   • INCISION AND DRAINAGE LEG Right 7/7/2017    Procedure: INCISION AND DRAINAGE INFECTED HEMATOMA RIGHT THIGH;  Surgeon: Valentin Peña MD;  Location: [facility] MAIN OR;  Service:    • JOINT REPLACEMENT     • KNEE INCISION AND DRAINAGE Right 12/27/2016    Procedure: KNEE INCISION AND DRAINAGE;  Surgeon: Triston Whitten MD;  Location: [facility] MAIN OR;  Service:    • NOSE SURGERY      nose replacement   • SKIN BIOPSY     • SKIN GRAFT  12/2017   • TOTAL KNEE ARTHROPLASTY Right    • VASCULAR SURGERY     • VENA CAVA FILTER PLACEMENT        Home Meds:  Medications Prior to Admission   Medication Sig Dispense Refill Last Dose   • apixaban (ELIQUIS) 2.5 MG tablet tablet Take 2.5 mg by mouth 2 (Two) Times a Day.      • atorvastatin (LIPITOR) 20 MG tablet TAKE 1 TABLET BY MOUTH  DAILY FOR ELEVATION OF BOTH CHOLESTEROL AND  TRIGLYCERIDES IN BLOOD 90 tablet 3 2/6/2023   • carbidopa-levodopa (SINEMET)  MG per tablet 1 tablet 3 (Three) Times a Day.   2/6/2023   • carboxymethylcellulose (REFRESH PLUS) 0.5 % solution 1 drop 3 (Three) Times a Day As Needed (eye).   2/6/2023   • levothyroxine (SYNTHROID, LEVOTHROID) 75 MCG tablet TAKE 1 TABLET BY MOUTH EVERY MORNING. INDICATIONS: UNDERACTIVE THYROID 90 tablet 1 2/6/2023   • Multiple  Vitamins-Minerals (PRESERVISION AREDS) capsule Take 1 tablet by mouth 2 (Two) Times a Day.   2/6/2023   • pramipexole (MIRAPEX) 0.5 MG tablet Take 0.5 mg by mouth 3 (Three) Times a Day.   2/6/2023   • valsartan (DIOVAN) 160 MG tablet TAKE 1 TABLET BY MOUTH EVERY DAY 90 tablet 3 2/6/2023   • atropine 1 % ophthalmic solution 1 drop 3 (Three) Times a Day As Needed (as needed orally for increased secretions). For drooling      • cloNIDine (CATAPRES) 0.1 MG tablet Take 1 tablet daily if systolic blood pressure 160 270 tablet 2 More than a month   • Cyanocobalamin (VITAMIN B 12 PO) Take 1 tablet by mouth Every Morning. 1000 IU      • Polyethyl Glycol-Propyl Glycol (SYSTANE OP) Apply  to eye(s) as directed by provider.   More than a month       Allergies:  Allergies   Allergen Reactions   • Penicillins Rash   • Rocephin [Ceftriaxone] Rash     Immunizations:  Immunization History   Administered Date(s) Administered   • COVID-19 (MODERNA) 1st, 2nd, 3rd Dose Only 02/04/2021, 03/04/2021, 12/02/2021   • Fluad Quad 65+ 09/15/2020   • Fluzone High Dose =>65 Years ([employer] ONLY) 11/23/2015, 11/02/2016, 11/03/2017, 12/03/2018, 10/26/2019   • Fluzone High-Dose 65+yrs 02/14/2022, 10/07/2022   • Pneumococcal Conjugate 13-Valent (PCV13) 11/03/2017   • Pneumococcal Polysaccharide (PPSV23) 11/02/2016   • Shingrix 08/26/2020, 06/02/2022   • Td 08/20/1999   • Tdap 01/28/2023     Social History:   Social History     Social History Narrative   • Not on file     Social History     Socioeconomic History   • Marital status:    • Years of education: college grad   Tobacco Use   • Smoking status: Never   • Smokeless tobacco: Never   • Tobacco comments:     caff use   Vaping Use   • Vaping Use: Never used   Substance and Sexual Activity   • Alcohol use: No     Comment: none for a month   • Drug use: No   • Sexual activity: Not Currently     Partners: Female       Family History:  Family History   Problem Relation Age of Onset   • Heart  "attack Mother 74   • Hypertension Mother    • Stroke Brother    • Cancer Brother    • Cancer Sister    • Cancer Brother    • Deep vein thrombosis Neg Hx         Review of Systems  See history of present illness and past medical history.  Patient denies any fever chills night sweats.  Denies any headache nausea vomiting focal change in vision smell taste or sound.  He denies any problems swallowing to me the spouse states otherwise.  No chest pain shortness of breath positive cough noted denies any palpitation abdominal pain denies any specific joint pain denies any dysuria.  Denies any full syncope or focal loss of function.  Remainder of ROS is negative.    Objective:  T Max 24 hrs: Temp (24hrs), Av.2 °F (36.8 °C), Min:97.5 °F (36.4 °C), Max:98.9 °F (37.2 °C)    Vitals Ranges:   Temp:  [97.5 °F (36.4 °C)-98.9 °F (37.2 °C)] 97.5 °F (36.4 °C)  Heart Rate:  [64-94] 69  Resp:  [16-18] 16  BP: (139-152)/(65-86) 152/66      Exam:  /66   Pulse 69   Temp 97.5 °F (36.4 °C) (Oral)   Resp 16   Ht 180.3 cm (71\")   Wt 76.9 kg (169 lb 8.5 oz)   SpO2 98%   BMI 23.65 kg/m²     General Appearance:    Alert, cooperative and follows commands, PAD affect but otherwise conversational and pleasant, does not appear toxic though is chronically ill in appearance accompanied by spouse and daughter at bedside   Head:    Normocephalic, without obvious abnormality, atraumatic   Eyes:    PERRL, conjunctivae/corneas clear, EOM's intact, both eyes   Ears:    Normal external ear canals, both ears   Nose:   Nares normal, septum midline, mucosa normal, no drainage    or sinus tenderness   Throat:   Lips, mucosa, and tongue normal   Neck:   Supple, no point tenderness or JVD       Lungs:    Very subtle rhonchi noted at right base to auscultation bilaterally, respirations unlabored   Chest Wall:    No tenderness or deformity    Heart:    Regular rate and rhythm, S1 and S2 normal   Abdomen:     Soft, nontender, bowel sounds active all " four quadrants   Extremities:  Moving all, no cyanosis or edema, no particular discomfort with palpation of joint extremities   Pulses:   2+ and symmetric all extremities           Neurologic:   CNII-XII intact, no obvious focal deficit, I did not evaluate gait, strength while supine seems adequate/near baseline      .    Data Review:  Labs in chart were reviewed.        Results from last 7 days   Lab Units 02/07/23 0701   HEMOGLOBIN A1C % 5.60      Imaging Results (All)     Procedure Component Value Units Date/Time    CT Head Without Contrast [[id number]] Collected: 02/07/23 0544     Updated: 02/07/23 0550    Narrative:      CT HEAD WITHOUT CONTRAST     HISTORY: Renal status changes     COMPARISON: 01/28/2023     TECHNIQUE: Axial CT imaging was obtained through the brain. No IV  contrast was administered.     FINDINGS:  No acute intracranial hemorrhage is seen. There is diffuse atrophy.  There is periventricular and deep white matter microangiopathic change.  There is no midline shift or mass effect. There are bilateral basal  ganglia calcifications. Mucosal thickening is noted within the ethmoid  sinuses. Mastoid air cells appear clear.       Impression:      No acute intracranial findings.     Radiation dose reduction techniques were utilized, including automated  exposure control and exposure modulation based on body size.     This report was finalized on 2/7/2023 5:47 AM by Dr. Anat Andersen M.D.       XR Chest 1 View [[id number]] Collected: 02/07/23 0519     Updated: 02/07/23 0523    Narrative:      SINGLE VIEW OF THE CHEST     HISTORY: Altered mental status     COMPARISON: 07/07/2017     FINDINGS:  Cardiomegaly is present. There is calcification of the aorta. Patient  appears to have some patchy infiltrate at the right lung base.  Correlation with any symptoms of pneumonia is recommended. No  pneumothorax or pleural effusion is seen. There is some scarring noted  at the left lung base.       Impression:       Potential patchy infiltrate at the right lung base. Correlation with any  symptoms of pneumonia is recommended.     This report was finalized on 2/7/2023 5:20 AM by Dr. Anat Andersen M.D.               Assessment:  Active Hospital Problems    Diagnosis  POA   • **Aspiration pneumonia of right lung due to vomit (HCC) [J69.0]  Unknown   • Metabolic encephalopathy [G93.41]  Unknown   • Acquired hypothyroidism [E03.9]  Yes   • PAD (peripheral artery disease) (HCC) [I73.9]  Yes   • Chronic diastolic (congestive) heart failure [I50.32]  Yes   • Anticoagulant long-term use [Z79.01]  Not Applicable   • Paroxysmal A-fib (HCC) [I48.0]  Yes   • Parkinson's disease (HCC) [G20]  Yes   • Benign essential hypertension [I10]  Yes      Resolved Hospital Problems   No resolved problems to display.       Plan:    I think this patient's slow clinical decline is all due to Parkinson's disease made worse with secondary issues from his disease which have directly increased his falls risk.  I think this is also compounding his swallow issues leading to aspiration pneumonia is a chest x-ray showing some abnormalities on the right side of the lung   -Start Levaquin due to drug allergies and also provide some Flagyl for anaerobic coverage   -I will also further evaluate the chest with a CT   -I reinstated his anticoagulation his CT of the head showed no acute head bleed.  I agree with an MRI of the brain but I am not so sure we need to employ a stroke protocol so I discontinued the aspirin and made his Lipitor back to baseline given his good cholesterol profile.  Neuro consult is still placed and will appreciate any additional recommendations   -Speech to evaluate for dietary changes.  Does not take any type of thickened or addended diet prior to admission though has followed speech prior to admission for Parkinson's issues   -I think his anticoagulation is coming to a point where risks are starting to outweigh benefits.  I have no  need for an inpatient consult to cardiology at this time and have advised spouse and family at bedside to coordinate follow-up with Dr. Armas in an outpatient setting over the course of the next month as there needs to be further consideration to DC AC permanently.  He is had at least 4 falls since August PAF-RC- see above    HTN stable -I also think clonidine is a very poor choice for this gentleman due to his Parkinson's as well as drug interactions for some of his Parkinson's meds.  There is been some reported hypotension.  There could be some significant rebound with clonidine as he has been taking it scheduled twice daily prior to admission.  At this point time I am going to continue with 0.1 nightly and remove the a.m. dose and I think further titration to discontinuation should be performed per PCP in an outpatient setting over time.   -Continue valsartan as previously dosed    Hypothyroidism on levothyroxine    Anemia likely of chronic disease -no need for further daily monitoring    Mild reactive leukocytosis -no sepsis present    No additional DVT prophylaxis warranted    Terence Carballo MD  2/7/2023  09:31 EST

## 2023-02-07 NOTE — ED PROVIDER NOTES
EMERGENCY DEPARTMENT ENCOUNTER    Room Number:  MANGO/MANGO  Date seen:  2/7/2023  PCP: Aleksander Diez MD  Historian: Patient/EMS report      HPI:  Chief Complaint: Altered mental status  A complete HPI/ROS/PMH/PSH/SH/FH are unobtainable due to: Altered mental status  Context: Casey Snowden Sr. is a 88 y.o. male who presents to the ED today with mental status changes.  EMS reports that his spouse stated that he has been much more confused and disoriented over the last couple of days but worsened over the last 6 to 7 hours.  He did sustain a fall 4 days ago and was taken off his Eliquis following that fall.  It is unknown whether he struck his head but he did not lose consciousness.  They deny any reported headache, chest pain, shortness of breath, nausea/vomiting, or fevers and chills.          PAST MEDICAL HISTORY  Active Ambulatory Problems     Diagnosis Date Noted   • Benign essential hypertension 11/02/2016   • Stenosis of carotid artery 11/02/2016   • Mixed hyperlipidemia 11/16/2012   • Parkinson's disease (HCC) 11/02/2016   • Peripheral arterial occlusive disease (HCC) 11/02/2016   • Screening for prostate cancer 11/02/2016   • Medication monitoring encounter 11/02/2016   • Melanoma ([facility]) 11/02/2016   • Chronic deep vein thrombosis (DVT) of popliteal vein of right lower extremity ([facility]) 11/02/2016   • Uses hearing aid 11/02/2016   • Paroxysmal A-fib ([facility]) 12/27/2016   • Localized edema 01/04/2017   • Chronic diastolic (congestive) heart failure 07/07/2017   • Anticoagulant long-term use 07/07/2017   • Presence of IVC filter 07/07/2017   • Medicare annual wellness visit, subsequent 11/03/2017   • Iron deficiency anemia secondary to inadequate dietary iron intake 11/03/2017   • 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) 12/03/2018   • Coronary artery disease involving native coronary artery of native heart without angina pectoris 12/20/2018   • ST elevation myocardial infarction involving left  anterior descending (LAD) coronary artery ([facility]) 06/05/2019   • Sialorrhea 08/19/2019   • Hypertensive heart disease with heart failure ([facility]) 03/04/2020   • PAD (peripheral artery disease) ([facility]) 09/10/2020   • Metastatic squamous cell carcinoma 12/03/2020   • Secondary malignancy of lymph nodes of head, face and neck ([facility]) 12/02/2020   • Encounter for antineoplastic immunotherapy 01/07/2021   • RBD (REM behavioral disorder) 01/20/2022   • Acquired hypothyroidism 08/31/2022   • Facial contusion, subsequent encounter 01/30/2023   • Skin tear of upper arm without complication, left, subsequent encounter 01/30/2023     Resolved Ambulatory Problems     Diagnosis Date Noted   • Deep vein thrombosis ([facility]) 11/16/2012   • Routine adult health maintenance 11/02/2016   • Cough 11/02/2016   • Cellulitis of right lower extremity 12/26/2016   • Sepsis ([facility]) 12/26/2016   • Traumatic hematoma of right lower leg with infection 12/26/2016   • Hospital discharge follow-up 01/04/2017   • Hypertension 11/16/2012   • Hematoma 07/07/2017   • Cellulitis of right lower extremity without foot 07/10/2017   • Anterior chest wall pain 09/11/2018   • Acute systolic heart failure ([facility]) 10/30/2018   • Bradycardia with 31-40 beats per minute 12/03/2018   • Atrial fibrillation with RVR ([facility]) 06/03/2019   • Coronary artery disease with angina pectoris ([facility]) 06/04/2019     Past Medical History:   Diagnosis Date   • Atrial fibrillation ([facility])    • Cancer ([facility]) 04/2018   • Carotid stenosis    • CHF (congestive heart failure) ([facility])    • DVT of lower extremity (deep venous thrombosis) ([facility])    • Hard of hearing    • Long-term use of high-risk medication    • Postphlebitic syndrome          PAST SURGICAL HISTORY  Past Surgical History:   Procedure Laterality Date   • BASAL CELL CARCINOMA EXCISION  04/2018   • CARDIAC CATHETERIZATION N/A 11/5/2018    Procedure: Left Heart Cath;  Surgeon: Oliverio Azar MD;  Location: [facility] INVASIVE LOCATION;  Service:  Cardiovascular   • CARDIAC CATHETERIZATION N/A 11/5/2018    Procedure: Coronary angiography;  Surgeon: Oliverio Azar MD;  [facility];  Service: Cardiovascular   • CARDIAC CATHETERIZATION N/A 11/5/2018    Procedure: Left ventriculography;  Surgeon: Oliverio Azar MD;  [facility];  Service: Cardiovascular   • CARDIAC CATHETERIZATION N/A 6/4/2019    Procedure: Left Heart Cath;  Surgeon: Johnny Glasgow MD;  [facility];  Service: Cardiovascular   • CARDIAC CATHETERIZATION N/A 6/4/2019    Procedure: Coronary angiography;  Surgeon: Johnny Glasgow MD;  [facility];  Service: Cardiovascular   • CARDIAC CATHETERIZATION N/A 6/4/2019    Procedure: Left ventriculography;  Surgeon: Johnny Glasgow MD;  [facility];  Service: Cardiovascular   • CARDIAC CATHETERIZATION N/A 6/4/2019    Procedure: Stent BILL coronary;  Surgeon: Johnny Glasgow MD;  Location: [facility] CATH INVASIVE LOCATION;  Service: Cardiovascular   • CARDIAC SURGERY     • CATARACT EXTRACTION Left 08/2018   • CATARACT EXTRACTION Right 09/2018   • COLONOSCOPY      2011   • INCISION AND DRAINAGE LEG Right 7/7/2017    Procedure: INCISION AND DRAINAGE INFECTED HEMATOMA RIGHT THIGH;  Surgeon: Valentin Peña MD;  Location: [facility] OR;  Service:    • JOINT REPLACEMENT     • KNEE INCISION AND DRAINAGE Right 12/27/2016    Procedure: KNEE INCISION AND DRAINAGE;  Surgeon: Triston Whitten MD;  Location: [facility] OR;  Service:    • NOSE SURGERY      nose replacement   • SKIN BIOPSY     • SKIN GRAFT  12/2017   • TOTAL KNEE ARTHROPLASTY Right    • VASCULAR SURGERY     • VENA CAVA FILTER PLACEMENT           FAMILY HISTORY  Family History   Problem Relation Age of Onset   • Heart attack Mother 74   • Hypertension Mother    • Stroke Brother    • Cancer Brother    • Cancer Sister    • Cancer  Brother    • Deep vein thrombosis Neg Hx          SOCIAL HISTORY  Social History     Socioeconomic History   • Marital status:    • Years of education: college grad   Tobacco Use   • Smoking status: Never   • Smokeless tobacco: Never   • Tobacco comments:     caff use   Vaping Use   • Vaping Use: Never used   Substance and Sexual Activity   • Alcohol use: No     Comment: none for a month   • Drug use: No   • Sexual activity: Not Currently     Partners: Female         ALLERGIES  Penicillins and Rocephin [ceftriaxone]        REVIEW OF SYSTEMS  Review of Systems   Unable to perform ROS: Mental status change            PHYSICAL EXAM  ED Triage Vitals [02/07/23 0427]   Temp Heart Rate Resp BP SpO2   98.9 °F (37.2 °C) 94 18 146/86 96 %      Temp src Heart Rate Source Patient Position BP Location FiO2 (%)   Oral -- -- -- --       Physical Exam  Vitals and nursing note reviewed.   Constitutional:       General: He is not in acute distress.  HENT:      Head: Normocephalic and atraumatic.   Eyes:      Pupils: Pupils are equal, round, and reactive to light.   Cardiovascular:      Rate and Rhythm: Normal rate and regular rhythm.      Heart sounds: Normal heart sounds.   Pulmonary:      Effort: Pulmonary effort is normal. No respiratory distress.      Breath sounds: Normal breath sounds.   Abdominal:      Palpations: Abdomen is soft.      Tenderness: There is no abdominal tenderness. There is no guarding or rebound.   Musculoskeletal:         General: Normal range of motion.      Cervical back: Normal range of motion and neck supple.   Skin:     General: Skin is warm and dry.   Neurological:      Mental Status: He is alert. He is disoriented and confused.      Sensory: Sensation is intact.   Psychiatric:         Mood and Affect: Mood and affect normal.         Vital signs and nursing notes reviewed.          LAB RESULTS  Recent Results (from the past 24 hour(s))   ECG 12 Lead Altered Mental Status    Collection Time:  02/07/23  4:42 AM   Result Value Ref Range    QT Interval 410 ms   Comprehensive Metabolic Panel    Collection Time: 02/07/23  4:43 AM    Specimen: Blood   Result Value Ref Range    Glucose 147 (H) 65 - 99 mg/dL    BUN 26 (H) 8 - 23 mg/dL    Creatinine 0.78 0.76 - 1.27 mg/dL    Sodium 140 136 - 145 mmol/L    Potassium 4.1 3.5 - 5.2 mmol/L    Chloride 107 98 - 107 mmol/L    CO2 26.3 22.0 - 29.0 mmol/L    Calcium 9.0 8.6 - 10.5 mg/dL    Total Protein 6.2 6.0 - 8.5 g/dL    Albumin 3.7 3.5 - 5.2 g/dL    ALT (SGPT) <5 1 - 41 U/L    AST (SGOT) 13 1 - 40 U/L    Alkaline Phosphatase 78 39 - 117 U/L    Total Bilirubin 0.8 0.0 - 1.2 mg/dL    Globulin 2.5 gm/dL    A/G Ratio 1.5 g/dL    BUN/Creatinine Ratio 33.3 (H) 7.0 - 25.0    Anion Gap 6.7 5.0 - 15.0 mmol/L    eGFR 85.8 >60.0 mL/min/1.73   Troponin    Collection Time: 02/07/23  4:43 AM    Specimen: Blood   Result Value Ref Range    Troponin T 0.013 0.000 - 0.030 ng/mL   Lactic Acid, Plasma    Collection Time: 02/07/23  4:43 AM    Specimen: Blood   Result Value Ref Range    Lactate 1.1 0.5 - 2.0 mmol/L   Procalcitonin    Collection Time: 02/07/23  4:43 AM    Specimen: Blood   Result Value Ref Range    Procalcitonin 0.40 (H) 0.00 - 0.25 ng/mL   CBC Auto Differential    Collection Time: 02/07/23  4:43 AM    Specimen: Blood   Result Value Ref Range    WBC 10.92 (H) 3.40 - 10.80 10*3/mm3    RBC 3.71 (L) 4.14 - 5.80 10*6/mm3    Hemoglobin 12.1 (L) 13.0 - 17.7 g/dL    Hematocrit 35.6 (L) 37.5 - 51.0 %    MCV 96.0 79.0 - 97.0 fL    MCH 32.6 26.6 - 33.0 pg    MCHC 34.0 31.5 - 35.7 g/dL    RDW 12.9 12.3 - 15.4 %    RDW-SD 46.8 37.0 - 54.0 fl    MPV 9.7 6.0 - 12.0 fL    Platelets 176 140 - 450 10*3/mm3    Neutrophil % 89.1 (H) 42.7 - 76.0 %    Lymphocyte % 5.3 (L) 19.6 - 45.3 %    Monocyte % 4.3 (L) 5.0 - 12.0 %    Eosinophil % 0.6 0.3 - 6.2 %    Basophil % 0.4 0.0 - 1.5 %    Immature Grans % 0.3 0.0 - 0.5 %    Neutrophils, Absolute 9.73 (H) 1.70 - 7.00 10*3/mm3    Lymphocytes,  Absolute 0.58 (L) 0.70 - 3.10 10*3/mm3    Monocytes, Absolute 0.47 0.10 - 0.90 10*3/mm3    Eosinophils, Absolute 0.07 0.00 - 0.40 10*3/mm3    Basophils, Absolute 0.04 0.00 - 0.20 10*3/mm3    Immature Grans, Absolute 0.03 0.00 - 0.05 10*3/mm3    nRBC 0.0 0.0 - 0.2 /100 WBC   Urinalysis With Microscopic If Indicated (No Culture) - Urine, Clean Catch    Collection Time: 02/07/23  4:52 AM    Specimen: Urine, Clean Catch   Result Value Ref Range    Color, UA Yellow Yellow, Straw    Appearance, UA Clear Clear    pH, UA 5.5 5.0 - 8.0    Specific Gravity, UA 1.028 1.005 - 1.030    Glucose, UA Negative Negative    Ketones, UA Trace (A) Negative    Bilirubin, UA Negative Negative    Blood, UA Negative Negative    Protein, UA Trace (A) Negative    Leuk Esterase, UA Small (1+) (A) Negative    Nitrite, UA Negative Negative    Urobilinogen, UA 1.0 E.U./dL 0.2 - 1.0 E.U./dL   Urinalysis, Microscopic Only - Urine, Clean Catch    Collection Time: 02/07/23  4:52 AM    Specimen: Urine, Clean Catch   Result Value Ref Range    RBC, UA 0-2 None Seen, 0-2 /HPF    WBC, UA 6-12 (A) None Seen, 0-2 /HPF    Bacteria, UA None Seen None Seen /HPF    Squamous Epithelial Cells, UA 0-2 None Seen, 0-2 /HPF    Hyaline Casts, UA 3-6 None Seen /LPF    Methodology Automated Microscopy        Ordered the above labs and reviewed the results.        RADIOLOGY  CT Head Without Contrast    Result Date: 2/7/2023  CT HEAD WITHOUT CONTRAST  HISTORY: Renal status changes  COMPARISON: 01/28/2023  TECHNIQUE: Axial CT imaging was obtained through the brain. No IV contrast was administered.  FINDINGS: No acute intracranial hemorrhage is seen. There is diffuse atrophy. There is periventricular and deep white matter microangiopathic change. There is no midline shift or mass effect. There are bilateral basal ganglia calcifications. Mucosal thickening is noted within the ethmoid sinuses. Mastoid air cells appear clear.      No acute intracranial findings.  Radiation  dose reduction techniques were utilized, including automated exposure control and exposure modulation based on body size.  This report was finalized on 2/7/2023 5:47 AM by Dr. Anat Andersen M.D.      XR Chest 1 View    Result Date: 2/7/2023  SINGLE VIEW OF THE CHEST  HISTORY: Altered mental status  COMPARISON: 07/07/2017  FINDINGS: Cardiomegaly is present. There is calcification of the aorta. Patient appears to have some patchy infiltrate at the right lung base. Correlation with any symptoms of pneumonia is recommended. No pneumothorax or pleural effusion is seen. There is some scarring noted at the left lung base.      Potential patchy infiltrate at the right lung base. Correlation with any symptoms of pneumonia is recommended.  This report was finalized on 2/7/2023 5:20 AM by Dr. Anat Andersen M.D.        Ordered the above noted radiological studies. Reviewed by me in PACS.            PROCEDURES  Procedures    EKG independently interpreted by myself as follows:    EKG          EKG time: 0442  Rhythm/Rate: NSR with PAC, 73  P waves and [place]: nml  QRS, axis: nml, nml   ST and T waves: nml     Interpreted Contemporaneously by me, independently viewed  changed compared to prior 10/23/22            MEDICATIONS GIVEN IN ER  Medications   sodium chloride 0.9 % flush 10 mL (has no administration in time range)   sodium chloride 0.9 % infusion (75 mL/hr Intravenous New Bag 2/7/23 0554)   acetaminophen (TYLENOL) tablet 650 mg (has no administration in time range)     Or   acetaminophen (TYLENOL) suppository 650 mg (has no administration in time range)   aspirin tablet 325 mg (has no administration in time range)     Or   aspirin suppository 300 mg (has no administration in time range)   atorvastatin (LIPITOR) tablet 80 mg (has no administration in time range)   ondansetron (ZOFRAN) injection 4 mg (has no administration in time range)   bisacodyl (DULCOLAX) suppository 10 mg (has no administration in time range)    levoFLOXacin (LEVAQUIN) 750 mg/150 mL D5W (premix) (LEVAQUIN) 750 mg (750 mg Intravenous New Bag 2/7/23 0554)                   MEDICAL DECISION MAKING, PROGRESS, and CONSULTS    All labs have been independently reviewed by me.  All radiology studies have been reviewed by me and I have also reviewed the radiology report.   EKG's independently viewed and interpreted by me.  Discussion below represents my analysis of pertinent findings related to patient's condition, differential diagnosis, treatment plan and final disposition.      Additional sources:  - Discussed/ obtained information from independent historians: Patient but the vast majority of the history obtained from EMS    - External (non-ED) record review: Upon medical records review, the patient was last seen and evaluated by his primary care provider on 2/1/2023 following a fall he sustained at home where he underwent x-ray imaging of the chest and ribs.    - Chronic or social conditions impacting care: Hypertension, coronary artery disease, atrial fibrillation    - Shared decision making: Decision for admission based on shared conversations have between myself as well as the patient and spouse at bedside.  Case was discussed with RIDDHI Clark for [facility], who agrees to admit the patient to the hospital for Dr. Bashir.      Orders placed during this visit:  Orders Placed This Encounter   Procedures   • Blood Culture - Blood,   • Blood Culture - Blood,   • XR Chest 1 View   • CT Head Without Contrast   • MRI Brain Without Contrast   • Comprehensive Metabolic Panel   • Urinalysis With Microscopic If Indicated (No Culture) - Urine, Catheter   • Troponin   • Lactic Acid, Plasma   • Procalcitonin   • CBC Auto Differential   • Urinalysis, Microscopic Only - Urine, Clean Catch   • CBC (No Diff)   • Comprehensive Metabolic Panel   • Hemoglobin A1c   • Lipid Panel   • TSH   • NPO Diet NPO Type: Strict NPO   • Vital Signs   • Pulse Oximetry, Continuous   •  Cardiac Monitoring   • Notify physician of changes in level of consciousness, worsening of stroke symptoms, acute headache or severe nausea and vomiting or any of the following vital sign parameters:   • Nursing Dysphagia Screening   • RN to Place Order SLP Consult - Eval & Treat Choosing Reason of RN Dysphagia Screen Failed   • Nurse to Call MD or Nutrition Services for Diet if Patient Passes Dysphagia Screen   • Intake and Output   • Neuro Checks   • NIHSS Assessment   • Order CT Head Without Contrast for Neurological Decline   • Provide Stroke Education Material   • Tobacco Cessation Education   • Place Sequential Compression Device   • Maintain Sequential Compression Device   • Activity As Tolerated   • Code Status and Medical Interventions:   • L[facility] (on-call MD unless specified) Details   • Notify Stroke Coordinator   • Inpatient Rehab Admission Consult   • Consult to Case Management    • Consult to Diabetes Educator   • Inpatient Neurology Consult Stroke   • OT Consult: Eval & Treat   • PT Consult: Eval & Treat as tolerated   • Oxygen Therapy-   • SLP Consult: Eval & Treat Communication Disorder   • POC Glucose Q6H   • ECG 12 Lead Altered Mental Status   • ECG 12 Lead   • Adult transthoracic echo complete   • Insert Peripheral IV   • Inpatient Admission   • Fall Precautions   • CBC & Differential             Differential diagnosis:    Differential diagnosis includes but is not limited to sepsis, pneumonia, urinary tract infection, acute CVA, intracranial hemorrhage, acute coronary syndrome, acute anemia, or electrolyte disturbance.      Independent interpretation of labs, radiology studies, and discussions with consultants:    Chest x-ray independently interpreted by myself showing a potential right lower lobe infiltrate.  No edema noted.  CT scan of the head independently interpreted by myself showing no acute areas of infarct or hemorrhage.      ED Course as of 02/07/23 0633   Tue Feb 07, 2023    0525 Given the report of the patient having a 101 temperature at home as well as the potential on a right lower lobe infiltrate seen on chest x-ray, we will treat with IV antibiotics for community-acquired pneumonia. [BM]   0526 The patient's presentation, work-up, as well as treatment plan were discussed at length with RIDDHI Clark for [facility], who agrees to admit the patient to the hospital today for Dr. Bashir. [BM]   0605 On reevaluation, the patient is resting comfortably, though confused, with stable vital signs.  I informed the patient and spouse at bedside that his work-up does show a potential right lower lobe infiltrate on chest x-ray.  We will admit him to the hospital today for further management and treatment.  The patient and spouse are in agreement with that plan and all questions have been answered. [BM]      ED Course User Index  [BM] Ivan Encarnacion MD           The patient was wearing a facemask upon entrance into the room and remained in such throughout their visit.  I was wearing PPE including a facemask, eye protection, as well as gloves at any point entering the room and throughout the visit      DIAGNOSIS  Final diagnoses:   Altered mental status, unspecified altered mental status type   Pneumonia of right lower lobe due to infectious organism         DISPOSITION  ADMISSION    Discussed treatment plan and reason for admission with pt/family and admitting physician.  Pt/family voiced understanding of the plan for admission for further testing/treatment as needed.                 Latest Documented Vital Signs:  As of 06:33 EST  BP- 139/65 HR- 64 Temp- 98.9 °F (37.2 °C) (Oral) O2 sat- 96%              --    Please note that portions of this were completed with a voice recognition program.       Note Disclaimer: At [facility], we believe that sharing information builds trust and better relationships. You are receiving this note because you are receiving care at [facility] or  recently visited. It is possible you will see health information before a provider has talked with you about it. This kind of information can be easy to misunderstand. To help you fully understand what it means for your health, we urge you to discuss this note with your provider.           Ivan Encarnacion MD  02/07/23 0634

## 2023-02-08 ENCOUNTER — APPOINTMENT (OUTPATIENT)
Dept: GENERAL RADIOLOGY | Facility: HOSPITAL | Age: 88
DRG: 178 | End: 2023-02-08
Payer: MEDICARE

## 2023-02-08 PROCEDURE — 92526 ORAL FUNCTION THERAPY: CPT

## 2023-02-08 PROCEDURE — 74230 X-RAY XM SWLNG FUNCJ C+: CPT

## 2023-02-08 PROCEDURE — 92611 MOTION FLUOROSCOPY/SWALLOW: CPT

## 2023-02-08 PROCEDURE — 97162 PT EVAL MOD COMPLEX 30 MIN: CPT

## 2023-02-08 PROCEDURE — 99233 SBSQ HOSP IP/OBS HIGH 50: CPT | Performed by: NURSE PRACTITIONER

## 2023-02-08 PROCEDURE — 99222 1ST HOSP IP/OBS MODERATE 55: CPT | Performed by: INTERNAL MEDICINE

## 2023-02-08 PROCEDURE — 97110 THERAPEUTIC EXERCISES: CPT

## 2023-02-08 PROCEDURE — 25010000002 LEVOFLOXACIN PER 250 MG: Performed by: HOSPITALIST

## 2023-02-08 RX ORDER — LEVOFLOXACIN 5 MG/ML
500 INJECTION, SOLUTION INTRAVENOUS EVERY 24 HOURS
Status: DISCONTINUED | OUTPATIENT
Start: 2023-02-09 | End: 2023-02-09 | Stop reason: HOSPADM

## 2023-02-08 RX ORDER — LEVOFLOXACIN 5 MG/ML
750 INJECTION, SOLUTION INTRAVENOUS EVERY 24 HOURS
Status: DISCONTINUED | OUTPATIENT
Start: 2023-02-09 | End: 2023-02-08

## 2023-02-08 RX ORDER — LEVOFLOXACIN 5 MG/ML
250 INJECTION, SOLUTION INTRAVENOUS ONCE
Status: DISCONTINUED | OUTPATIENT
Start: 2023-02-08 | End: 2023-02-08

## 2023-02-08 RX ADMIN — METRONIDAZOLE 500 MG: 500 INJECTION, SOLUTION INTRAVENOUS at 17:07

## 2023-02-08 RX ADMIN — CARBIDOPA AND LEVODOPA 1 TABLET: 25; 250 TABLET ORAL at 17:06

## 2023-02-08 RX ADMIN — METRONIDAZOLE 500 MG: 500 INJECTION, SOLUTION INTRAVENOUS at 00:23

## 2023-02-08 RX ADMIN — PRAMIPEXOLE DIHYDROCHLORIDE 0.5 MG: 0.5 TABLET ORAL at 21:10

## 2023-02-08 RX ADMIN — BARIUM SULFATE 55 ML: 0.81 POWDER, FOR SUSPENSION ORAL at 09:11

## 2023-02-08 RX ADMIN — ATORVASTATIN CALCIUM 20 MG: 20 TABLET, FILM COATED ORAL at 21:12

## 2023-02-08 RX ADMIN — APIXABAN 2.5 MG: 2.5 TABLET, FILM COATED ORAL at 21:10

## 2023-02-08 RX ADMIN — CLONIDINE HYDROCHLORIDE 0.1 MG: 0.1 TABLET ORAL at 21:09

## 2023-02-08 RX ADMIN — APIXABAN 2.5 MG: 2.5 TABLET, FILM COATED ORAL at 07:37

## 2023-02-08 RX ADMIN — CARBIDOPA AND LEVODOPA 1 TABLET: 25; 250 TABLET ORAL at 07:36

## 2023-02-08 RX ADMIN — LEVOFLOXACIN 500 MG: 500 INJECTION, SOLUTION INTRAVENOUS at 05:44

## 2023-02-08 RX ADMIN — VALSARTAN 160 MG: 160 TABLET, FILM COATED ORAL at 07:36

## 2023-02-08 RX ADMIN — CARBIDOPA AND LEVODOPA 1 TABLET: 25; 250 TABLET ORAL at 21:10

## 2023-02-08 RX ADMIN — BARIUM SULFATE 50 ML: 400 SUSPENSION ORAL at 09:11

## 2023-02-08 RX ADMIN — PRAMIPEXOLE DIHYDROCHLORIDE 0.5 MG: 0.5 TABLET ORAL at 17:06

## 2023-02-08 RX ADMIN — METRONIDAZOLE 500 MG: 500 INJECTION, SOLUTION INTRAVENOUS at 07:47

## 2023-02-08 RX ADMIN — BARIUM SULFATE 4 ML: 980 POWDER, FOR SUSPENSION ORAL at 09:11

## 2023-02-08 RX ADMIN — PRAMIPEXOLE DIHYDROCHLORIDE 0.5 MG: 0.5 TABLET ORAL at 07:36

## 2023-02-08 RX ADMIN — LEVOTHYROXINE SODIUM 75 MCG: 0.07 TABLET ORAL at 05:44

## 2023-02-08 RX ADMIN — BARIUM SULFATE 1 TEASPOON(S): 0.6 CREAM ORAL at 09:11

## 2023-02-08 NOTE — PROGRESS NOTES
DOS: 2023  NAME: Casey Snowden .   : 1934  PCP: Aleksander Diez MD    Chief Complaint   Patient presents with   • Altered Mental Status        Stroke    Subjective: Pt seen in follow up, however the problem is new to me.  He is sitting up in his recliner with his wife and daughter at bedside.  He denies any new weakness, numbness, speech or visual disturbances, or headaches.    Objective:  Vital signs:      Vitals:    23 1822 23 2311 23 0711 23   BP: 115/62 121/57 152/68    BP Location:  Right arm Right arm    Patient Position:  Lying Lying    Pulse: 71 60 58 64   Resp: 16 16 16    Temp: 98 °F (36.7 °C) 97.7 °F (36.5 °C) 98 °F (36.7 °C)    TempSrc: Oral Oral Oral    SpO2: 98% 97% 96%    Weight:       Height:           Current Facility-Administered Medications:   •  acetaminophen (TYLENOL) tablet 650 mg, 650 mg, Oral, Q4H PRN, 650 mg at 23 1114 **OR** acetaminophen (TYLENOL) suppository 650 mg, 650 mg, Rectal, Q4H PRN, Tia Eden APRN  •  apixaban (ELIQUIS) tablet 2.5 mg, 2.5 mg, Oral, Q12H, Terence Carballo MD, 2.5 mg at 23 0737  •  atorvastatin (LIPITOR) tablet 20 mg, 20 mg, Oral, Nightly, Terence Carballo MD, 20 mg at 23 2111  •  bisacodyl (DULCOLAX) suppository 10 mg, 10 mg, Rectal, Daily PRN, Tia Eden APRN  •  carbidopa-levodopa (SINEMET)  MG per tablet 1 tablet, 1 tablet, Oral, TID, Terence Carballo MD, 1 tablet at 23 0736  •  cloNIDine (CATAPRES) tablet 0.1 mg, 0.1 mg, Oral, Nightly, Terence Carballo MD, 0.1 mg at 237  •  Glycerin-Hypromellose- (ARTIFICIAL TEARS) 0.2-0.2-1 % ophthalmic solution solution 2 drop, 2 drop, Both Eyes, 4x Daily PRN, Terence Carballo MD  •  levoFLOXacin (LEVAQUIN) 250 mg/50 mL D5W (premix) 250 mg, 250 mg, Intravenous, Once, Terence Carballo MD  •  [START ON 2023] levoFLOXacin (LEVAQUIN) 750 mg/150 mL D5W (premix) (LEVAQUIN) 750 mg, 750  mg, Intravenous, Q24H, Terence Carballo MD  •  levothyroxine (SYNTHROID, LEVOTHROID) tablet 75 mcg, 75 mcg, Oral, Q AM, Terence Carballo MD, 75 mcg at 02/08/23 0544  •  metroNIDAZOLE (FLAGYL) IVPB 500 mg, 500 mg, Intravenous, Q8H, Terence Carballo MD, 500 mg at 02/08/23 0747  •  ondansetron (ZOFRAN) injection 4 mg, 4 mg, Intravenous, Q6H PRN, Tia Eden APRN  •  pramipexole (MIRAPEX) tablet 0.5 mg, 0.5 mg, Oral, TID, Terence Carballo MD, 0.5 mg at 02/08/23 0736  •  [COMPLETED] Insert Peripheral IV, , , Once **AND** sodium chloride 0.9 % flush 10 mL, 10 mL, Intravenous, PRN, Ivan Encarnacion MD  •  valsartan (DIOVAN) tablet 160 mg, 160 mg, Oral, Daily, Terence Carballo MD, 160 mg at 02/08/23 0736    PRN meds  •  acetaminophen **OR** acetaminophen  •  bisacodyl  •  Glycerin-Hypromellose-  •  ondansetron  •  [COMPLETED] Insert Peripheral IV **AND** sodium chloride    No current facility-administered medications on file prior to encounter.     Current Outpatient Medications on File Prior to Encounter   Medication Sig   • apixaban (ELIQUIS) 2.5 MG tablet tablet Take 2.5 mg by mouth 2 (Two) Times a Day.   • atorvastatin (LIPITOR) 20 MG tablet TAKE 1 TABLET BY MOUTH  DAILY FOR ELEVATION OF BOTH CHOLESTEROL AND  TRIGLYCERIDES IN BLOOD   • carbidopa-levodopa (SINEMET)  MG per tablet 1 tablet 3 (Three) Times a Day.   • carboxymethylcellulose (REFRESH PLUS) 0.5 % solution 1 drop 3 (Three) Times a Day As Needed (eye).   • levothyroxine (SYNTHROID, LEVOTHROID) 75 MCG tablet TAKE 1 TABLET BY MOUTH EVERY MORNING. INDICATIONS: UNDERACTIVE THYROID   • Multiple Vitamins-Minerals (PRESERVISION AREDS) capsule Take 1 tablet by mouth 2 (Two) Times a Day.   • pramipexole (MIRAPEX) 0.5 MG tablet Take 0.5 mg by mouth 3 (Three) Times a Day.   • valsartan (DIOVAN) 160 MG tablet TAKE 1 TABLET BY MOUTH EVERY DAY   • atropine 1 % ophthalmic solution 1 drop 3 (Three) Times a Day As Needed (as needed  orally for increased secretions). For drooling   • cloNIDine (CATAPRES) 0.1 MG tablet Take 1 tablet daily if systolic blood pressure 160   • Cyanocobalamin (VITAMIN B 12 PO) Take 1 tablet by mouth Every Morning. 1000 IU   • Polyethyl Glycol-Propyl Glycol (SYSTANE OP) Apply  to eye(s) as directed by provider.       General appearance: Elderly male, flat affect, NAD, alert and cooperative, well groomed  HEENT: Normocephalic, atraumatic, no masses or tenderness  Resp: Even and unlabored  Extremities: No obvious edema  Skin: warm, dry    Neurological:   MS: oriented x3, recent/remote memory impaired, normal attention/concentration, language intact- low tone voice, no neglect, normal fund of knowledge  CN: visual fields full, EOMI, [place], facial sensation equal, mild left-sided facial droop, tongue midline  Motor: 5/5 in all 4 ext., mild cogwheeling of LUE  Sensory: light touch and cold sensation intact in all 4 ext.  Coordination: Normal finger to nose test  Gait and station: Deferred  Rapid alternating movements: normal finger to thumb tap    Laboratory results:  Lab Results   Component Value Date    TSH 2.030 02/07/2023     Lab Results   Component Value Date    HGBA1C 5.60 02/07/2023     Lab Results   Component Value Date    BUEFZAWS50 192 08/15/2022     Lab Results   Component Value Date    CHOL 111 02/07/2023    CHOL 131 06/05/2019    CHOL 128 06/04/2019    CHLPL 165 10/27/2017    CHLPL 171 04/25/2017    CHLPL 160 10/25/2016     Lab Results   Component Value Date    TRIG 36 02/07/2023    TRIG 55 06/05/2019    TRIG 65 06/04/2019     Lab Results   Component Value Date    HDL 57 02/07/2023    HDL 45 06/05/2019    HDL 50 06/04/2019     Lab Results   Component Value Date    LDL 44 02/07/2023    LDL 75 06/05/2019    LDL 65 06/04/2019     Lab Results   Component Value Date    WBC 10.93 (H) 02/07/2023    HGB 11.3 (L) 02/07/2023    HCT 32.4 (L) 02/07/2023    MCV 94.7 02/07/2023     02/07/2023     Lab Results   Component  Value Date    GLUCOSE 131 (H) 02/07/2023    BUN 24 (H) 02/07/2023    CREATININE 0.73 (L) 02/07/2023    EGFRIFNONA 99 06/05/2019    EGFRIFAFRI 102 10/27/2017    BCR 32.9 (H) 02/07/2023    K 4.0 02/07/2023    CO2 26.5 02/07/2023    CALCIUM 8.6 02/07/2023    PROTENTOTREF 6.1 04/25/2017    ALBUMIN 3.4 (L) 02/07/2023    LABIL2 1.5 03/18/2021    AST 12 02/07/2023    ALT 6 02/07/2023     Lab Results   Component Value Date    PTT 32.8 10/23/2022     Lab Results   Component Value Date    INR 1.06 10/23/2022    INR 1.14 (H) 06/03/2019    INR 1.15 (H) 07/07/2017    PROTIME 14.0 10/23/2022    PROTIME 14.3 06/03/2019    PROTIME 14.3 (H) 07/07/2017     Brief Urine Lab Results  (Last result in the past 365 days)      Color   Clarity   Blood   Leuk Est   Nitrite   Protein   CREAT   Urine HCG        02/07/23 0452 Yellow   Clear   Negative   Small (1+)   Negative   Trace                 Review and interpretation of imaging:  XR Ribs Left With PA Chest    Result Date: 2/1/2023  1. Old healed fracture deformities of the left 4th, 5th, 6th and 7th ribs. 2. Suspected subtle acute fracture of the anterior left 9th rib, correlate clinically.  This report was finalized on 2/1/2023 10:14 AM by Dr. Milton Jason MD.      CT Head Without Contrast    Result Date: 2/7/2023  No acute intracranial findings.  Radiation dose reduction techniques were utilized, including automated exposure control and exposure modulation based on body size.  This report was finalized on 2/7/2023 5:47 AM by Dr. Anat Andersen M.D.      CT Chest Without Contrast Diagnostic    Result Date: 2/7/2023  1.  Patchy ground glass opacities in the lung bases right greater than left favoring pneumonitis/aspiration. Superimposed edema not excluded. Recommend follow-up to resolution after treatment. 2.  Trace pleural effusions. 3.  Mildly prominent mediastinal nodes likely reactive which could be followed on subsequent surveillance. 4.  Age-indeterminate but  chronic-appearing nondisplaced rib fractures, please correlate clinically. 5.  Please see above for additional findings/recommendations.  This report was finalized on 2/7/2023 2:04 PM by Dr. Hans Laurent M.D.      MRI Brain Without Contrast    Result Date: 2/7/2023  1. No acute intracranial abnormality is seen. 2. There is mild/moderate small vessel disease in cerebral white matter. 3. There is a 5 x 5 cm defect in the subcutaneous fat of the scalp overlying the superior lateral left frontal bone. By history the patient had surgery for skin cancer at this site and please correlate with surgical history. The remainder of MRI of the brain is within normal limits with no acute infarct seen. The results were communicated to Terence Joshisuzanna by telephone on 12/07/2023 at 1:40 PM.      This report was finalized on 2/7/2023 5:11 PM by Dr. Triston Bennett M.D.      XR Chest 1 View    Result Date: 2/7/2023  Potential patchy infiltrate at the right lung base. Correlation with any symptoms of pneumonia is recommended.  This report was finalized on 2/7/2023 5:20 AM by Dr. Anat Andersen M.D.      Results for orders placed during the hospital encounter of 06/03/19    Adult Transthoracic Echo Complete W/ Cont if Necessary Per Protocol    Interpretation Summary  · The left ventricular cavity is mildly dilated.  · Left ventricular wall thickness is consistent with mild concentric hypertrophy.  · There is mild calcification of the aortic valve.  · Estimated EF = 40%.  · The following left ventricular wall segments are akinetic: apical anterior, apical lateral, apical inferior, apical septal and apex.  · Left ventricular diastolic dysfunction (grade II) consistent with pseudonormalization.  · Left atrial cavity size is moderately dilated.  · Mild tricuspid valve regurgitation is present.  · Calculated right ventricular systolic pressure from tricuspid regurgitation is 41 mmHg.  · Mild pulmonic valve regurgitation is  present.      Impression/Assessment:  This is a 88-year-old male with past medical history of Parkinson's disease on Sinemet 25- 250mg 1 tablet 3 times daily followed by [facility], paroxysmal atrial fibrillation on Eliquis 2.5 mg twice daily, DVT with IVC filter, PAD, CAD on Plavix and Lipitor PTA who presented to the hospital on 2/7/2023 with complaints of chest pain, word finding difficulty, and confusion.  Reportedly he has been having some pretty severe falls over the last few months with recent work-up for a head injury that was negative.  His Eliquis and Plavix were discontinued by his PCP due to concern for his recurrent falls.  Wife also has been complaining of progressive trouble swallowing and choking on dry foods.      Our service was consulted secondary to complaints of word finding difficulty.  He underwent a noncontrast CT head that did not reveal any acute intracranial abnormalities, moderate generalized atrophy and hydrocephalus ex vacuo noted.  MRI brain was also obtained and does not show any evidence of restricted diffusion to suggest an acute infarction, mild to moderate small vessel disease and generalized atrophy noted.    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  Suspect TME secondary to his aspiration pneumonia with exacerbating features of his Parkinson's disease.  Discussed again with the wife that we do recommend he resume his Eliquis with strict implementation of falls precautions at his home.  I recommended that they have an OT/PT home safety evaluation at discharge. Wife reports history of CAD with stent placement, will need to ask Dr. Armas if he needs to resume his Plavix.   Again  recommend assistive device at all times and avoiding stairs.  If he has to use the stairs he should have someone with him.  Recommend no driving ever given cognitive decline and apraxia.  Primary treating his aspiration pneumonia  ST following for his oropharyngeal dysphagia.  Continue falls precautions.  He should keep his planned follow-up with his established neurologist at [place].  We will sign off, will see again per request.    Case discussed with patient, wife, daughter, and Dr. Chavez, and he agrees with plan above.     RIDDHI Wagner

## 2023-02-08 NOTE — PROGRESS NOTES
"    DAILY PROGRESS NOTE  [facility]    Patient Identification:  Name: Casey Snowden Sr.  Age: 88 y.o.  Sex: male  :  1934  MRN: [id number]         Primary Care Physician: Aleksander Diez MD    Subjective:  Interval History: Feeling better overall.  Denies any chest pain nausea vomiting.  Not a big fan of the thickened liquids but is tolerating.  No diarrhea abdominal pain and/or chest pain    Objective: Sitting up in bedside chair eating breakfast.  Conversational and pleasant with fluent speech.  Nontoxic overall.  PD affect    Scheduled Meds:apixaban, 2.5 mg, Oral, Q12H  atorvastatin, 20 mg, Oral, Nightly  carbidopa-levodopa, 1 tablet, Oral, TID  cloNIDine, 0.1 mg, Oral, Nightly  levoFLOXacin, 250 mg, Intravenous, Once  [START ON 2023] levoFLOXacin, 750 mg, Intravenous, Q24H  levothyroxine, 75 mcg, Oral, Q AM  metroNIDAZOLE, 500 mg, Intravenous, Q8H  pramipexole, 0.5 mg, Oral, TID  valsartan, 160 mg, Oral, Daily      Continuous Infusions:     Vital signs in last 24 hours:  Temp:  [97.7 °F (36.5 °C)-98.1 °F (36.7 °C)] 98 °F (36.7 °C)  Heart Rate:  [58-71] 64  Resp:  [16] 16  BP: (115-152)/(57-72) 152/68    Intake/Output:    Intake/Output Summary (Last 24 hours) at 2023 1017  Last data filed at 2023 2045  Gross per 24 hour   Intake 600 ml   Output --   Net 600 ml       Exam:  /68 (BP Location: Right arm, Patient Position: Lying)   Pulse 64   Temp 98 °F (36.7 °C) (Oral)   Resp 16   Ht 180.3 cm (71\")   Wt 76.9 kg (169 lb 8.5 oz)   SpO2 96%   BMI 23.65 kg/m²     General Appearance:    Alert, cooperative, nontoxic                          Head:    Normocephalic, without obvious abnormality, atraumatic                           Eyes:    PERRL, conjunctivae/corneas clear, EOM's intact, both eyes                         Throat:   Oral mucosa pink and moist                           Neck:   Supple, symmetrical, trachea midline, no JVD                         Lungs:    " Right lower lobe rhonchi improving to auscultation bilaterally, respirations unlabored                 Chest Wall:    No tenderness or deformity                          Heart:    Regular rate and rhythm, S1 and S2 normal                  Abdomen:     Soft, nontender, bowel sounds active                 Extremities:   Moving all, no cyanosis or edema                        Pulses:   Pulses palpable in all extremities                            Skin:   Skin is warm and dry                  Neurologic:   CNII-XII intact, no obvious focal deficit      Data Review:  Labs in chart were reviewed.    Assessment:  Active Hospital Problems    Diagnosis  POA   • **Aspiration pneumonia of right lung due to vomit (HCC) [J69.0]  Unknown   • Metabolic encephalopathy [G93.41]  Unknown   • Acquired hypothyroidism [E03.9]  Yes   • PAD (peripheral artery disease) ([facility]) [I73.9]  Yes   • Chronic diastolic (congestive) heart failure [I50.32]  Yes   • Anticoagulant long-term use [Z79.01]  Not Applicable   • Paroxysmal A-fib (HCC) [I48.0]  Yes   • Parkinson's disease (HCC) [G20]  Yes   • Benign essential hypertension [I10]  Yes      Resolved Hospital Problems   No resolved problems to display.       Plan:    CT confirmed aspiration pneumonia   -Speech therapy adjusting diet -regular with honey thick with swallow study pending   -Levaquin/Flagyl I wrote for 500 mg of Levaquin but I see that has been changed by pharmacy to 750 mg and I do not really agree with that so I placed back on 500 mg      MRI of the brain discredit any acute CVA.  Discussed with radiology MD   -I imagine aspiration pneumonia has slowly worsened intermittent metabolic encephalopathy as well as made Parkinson's symptoms worse   -Await any further neuro recommendations      I truly think anticoagulation is becoming a bit high risk for this gentleman.  I do not think there is a good or bad answer but I think with his number of falls the risk are starting to outweigh  benefits.  Consult placed to cardiology as patient follows with Dr. Armas for further recommendations regarding AC.  I appreciate neurology looking to reach out discussed case with vascular  as I held on a vascular consult   -SIERRA hoskins noted the other night and likely not a new issue but will appreciate any additional cardiology input.  Troponin and magnesium levels as well as potassium are all normal though changes noted on EKG but I am not so sure I agree with elevated ST segments upon personal review    HTN stable -clonidine not a good choice and this should likely be further weaned to discontinuation by PCP.  Currently on reduced dosing with nightly only and blood pressure trends are more than acceptable    No family at bedside -request to call spouse and happy to oblige so I will reach out at [phone] and call this number twice but there was no answer    Terence Carballo MD  2/8/2023  10:17 EST

## 2023-02-08 NOTE — THERAPY EVALUATION
Patient Name: Casey Snowden Sr.  : 1934    MRN: [id number]                              Today's Date: 2023       Admit Date: 2023    Visit Dx:     ICD-10-CM ICD-9-CM   1. Altered mental status, unspecified altered mental status type  R41.82 780.97   2. Pneumonia of right lower lobe due to infectious organism  J18.9 486     Patient Active Problem List   Diagnosis   • Benign essential hypertension   • Stenosis of carotid artery   • Mixed hyperlipidemia   • Parkinson's disease (HCC)   • Peripheral arterial occlusive disease (HCC)   • Screening for prostate cancer   • Medication monitoring encounter   • Melanoma (HCC)   • Chronic deep vein thrombosis (DVT) of popliteal vein of right lower extremity (HCC)   • Uses hearing aid   • Paroxysmal A-fib (HCC)   • Localized edema   • Chronic diastolic (congestive) heart failure   • Anticoagulant long-term use   • Presence of IVC filter   • Medicare annual wellness visit, subsequent   • Iron deficiency anemia secondary to inadequate dietary iron intake   • 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)   • Coronary artery disease involving native coronary artery of native heart without angina pectoris   • ST elevation myocardial infarction involving left anterior descending (LAD) coronary artery (HCC)   • Sialorrhea   • 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)   • PAD (peripheral artery disease) (HCC)   • Metastatic squamous cell carcinoma   • Secondary malignancy of lymph nodes of head, face and neck (HCC)   • Encounter for antineoplastic immunotherapy   • RBD (REM behavioral disorder)   • Acquired hypothyroidism   • Facial contusion, subsequent encounter   • Skin tear of upper arm without complication, left, subsequent encounter   • Altered mental status, unspecified altered mental status type   • Aspiration pneumonia of right lung due to vomit (HCC)   • Metabolic encephalopathy     Past Medical History:   Diagnosis Date   • Acquired  hypothyroidism 8/31/2022   • Atrial fibrillation ([facility])    • Cancer ([facility]) 04/2018    basil cell carcinoma   • Carotid stenosis    • CHF (congestive heart failure) ([facility])    • Chronic deep vein thrombosis (DVT) of popliteal vein of right lower extremity ([facility])    • Coronary artery disease involving native coronary artery of native heart without angina pectoris 12/20/2018   • DVT of lower extremity (deep venous thrombosis) ([facility])    • Hard of hearing    • Long-term use of high-risk medication    • PAD (peripheral artery disease) ([facility]) 9/10/2020   • Parkinson's disease ([facility])    • Postphlebitic syndrome      Past Surgical History:   Procedure Laterality Date   • BASAL CELL CARCINOMA EXCISION  04/2018   • CARDIAC CATHETERIZATION N/A 11/5/2018    Procedure: Left Heart Cath;  Surgeon: Oliverio Azar MD;  Location: [facility] CATH INVASIVE LOCATION;  Service: Cardiovascular   • CARDIAC CATHETERIZATION N/A 11/5/2018    Procedure: Coronary angiography;  Surgeon: Oliverio Azar MD;  Location: [facility] CATH [facility]VASIVE LOCATION;  Service: Cardiovascular   • CARDIAC CATHETERIZATION N/A 11/5/2018    Procedure: Left ventriculography;  Surgeon: Oliverio Azar MD;  Location: [facility] CATH [facility]ASIVE LOC[facility];  Service: Cardiovascular   • CARDIAC CATHETERIZATION N/A 6/4/2019    Procedure: Left Heart Cath;  Surgeon: Johnny Glasgow MD;  Location: [facility] LOC[facility];  Service: Cardiovascular   • CARDIAC CATHETERIZATION N/A 6/4/2019    Procedure: Coronary angiography;  Surgeon: Johnny Glasgow MD;  Location: [facility] CATH [facility]VASIVE LOC[facility];  Service: Cardiovascular   • CARDIAC CATHETERIZATION N/A 6/4/2019    Procedure: Left ventriculography;  Surgeon: Johnny Glasgow MD;  Location: [facility] CATH [facility]AS[facility] LOC[facility];  Service: Cardiovascular   • CARDIAC CATHETERIZATION N/A 6/4/2019    Procedure: Stent BILL coronary;  Surgeon: Johnny Glasgow MD;  Location: [facility] CATH INVASIVE LOCATION;  Service: Cardiovascular   •  CARDIAC SURGERY     • CATARACT EXTRACTION Left 08/2018   • CATARACT EXTRACTION Right 09/2018   • COLONOSCOPY      2011   • INCISION AND DRAINAGE LEG Right 7/7/2017    Procedure: INCISION AND DRAINAGE INFECTED HEMATOMA RIGHT THIGH;  Surgeon: Valentin Peña MD;  Location: [facility] OR;  Service:    • JOINT REPLACEMENT     • KNEE INCISION AND DRAINAGE Right 12/27/2016    Procedure: KNEE INCISION AND DRAINAGE;  Surgeon: Triston Whitten MD;  Location: [facility] OR;  Service:    • NOSE SURGERY      nose replacement   • SKIN BIOPSY     • SKIN GRAFT  12/2017   • TOTAL KNEE ARTHROPLASTY Right    • VASCULAR SURGERY     • VENA CAVA FILTER PLACEMENT        General Information     Row Name 02/08/23 0956          Physical Therapy Time and Intention    Document Type evaluation  -AR     Mode of Treatment physical therapy  -AR     Row Name 02/08/23 0956          General Information    Patient Profile Reviewed yes  -AR     Prior Level of Function min assist:  no use of AD, 2 recent falls over last2 wks, 1 out at [employer]  -AR     Existing Precautions/Restrictions fall  -AR     Barriers to Rehab none identified  -AR     Row Name 02/08/23 0956          Living Environment    People in Home spouse  -AR     Row Name 02/08/23 0956          Home Main Entrance    Number of Stairs, Main Entrance five  -AR     Stair Railings, Main Entrance railings safe and in good condition  -AR     Row Name 02/08/23 0956          Cognition    Orientation Status (Cognition) oriented x 3  -AR     Row Name 02/08/23 0956          Safety Issues, Functional Mobility    Impairments Affecting Function (Mobility) balance;endurance/activity tolerance;strength  -AR           User Key  (r) = Recorded By, (t) = Taken By, (c) = Cosigned By    Initials Name Provider Type    AR Palmira Mao PT Physical Therapist               Mobility     Row Name 02/08/23 0957          Bed Mobility    Bed Mobility supine-sit;sit-supine  -AR     Supine-Sit [place]  (Bed Mobility) not tested  -AR     Sit-Supine [place] (Bed Mobility) not tested  -AR     Row Name 02/08/23 0957          Sit-Stand Transfer    Sit-Stand [place] (Transfers) contact guard  -AR     Row Name 02/08/23 0957          Gait/Stairs (Locomotion)    [place] Level (Gait) minimum assist (75% patient effort);contact guard  -AR     Distance in Feet (Gait) 320, 1 LOB first 10' requiring min A then CGA rest of walk.  -AR     Deviations/Abnormal Patterns (Gait) festinating/shuffling  -AR     Bilateral Gait Deviations forward flexed posture  -AR           User Key  (r) = Recorded By, (t) = Taken By, (c) = Cosigned By    Initials Name Provider Type    AR Palmira Mao, PT Physical Therapist               Obj/Interventions     Row Name 02/08/23 0958          Range of Motion Comprehensive    Comment, General Range of Motion B LE WFL  -AR     Row Name 02/08/23 0958          Strength Comprehensive (MMT)    Comment, General Manual Muscle Testing (MMT) Assessment B LE 4+/5  -AR     Row Name 02/08/23 0958          Balance    Balance Assessment standing dynamic balance  -AR     Dynamic Standing Balance contact guard  -AR           User Key  (r) = Recorded By, (t) = Taken By, (c) = Cosigned By    Initials Name Provider Type    AR Palmira Mao, PT Physical Therapist               Goals/Plan     Row Name 02/08/23 1001          Bed Mobility Goal 1 (PT)    Activity/Assistive Device (Bed Mobility Goal 1, PT) bed mobility activities, all  -AR     [place] Level/Cues Needed (Bed Mobility Goal 1, PT) standby assist  -AR     Time Frame (Bed Mobility Goal 1, PT) 1 week  -AR     Row Name 02/08/23 1001          Transfer Goal 1 (PT)    Activity/Assistive Device (Transfer Goal 1, PT) sit-to-stand/stand-to-sit;bed-to-chair/chair-to-bed  -AR     [place] Level/Cues Needed (Transfer Goal 1, PT) standby assist  -AR     Time Frame (Transfer Goal 1, PT) 1 week  -AR     Row Name 02/08/23 1001          Gait Training  Goal 1 (PT)    Activity/Assistive Device (Gait Training Goal 1, PT) gait (walking locomotion)  -AR     [place] Level (Gait Training Goal 1, PT) standby assist  -AR     Distance (Gait Training Goal 1, PT) 300  -AR     Time Frame (Gait Training Goal 1, PT) 1 week  -AR     Row Name 02/08/23 1001          Therapy Assessment/Plan (PT)    Planned Therapy Interventions (PT) balance training;bed mobility training;gait training;home exercise program;patient/family education;transfer training;ROM (range of motion);stair training;strengthening  -AR           User Key  (r) = Recorded By, (t) = Taken By, (c) = Cosigned By    Initials Name Provider Type    AR Palmira Mao, PT Physical Therapist               Clinical Impression     Row Name 02/08/23 0958          Pain    Pretreatment Pain Rating 0/10 - no pain  -AR     Posttreatment Pain Rating 0/10 - no pain  -AR     Pain Intervention(s) Repositioned  -AR     Row Name 02/08/23 0958          Plan of Care Review    Outcome Evaluation Pt admitted with AMS and aspiration pneumonia.  H/o Parkinson's, doesn't normally use any AD but owns cane, enjoys going to [employer], 2 recent falls.  Today pt demonstrates generalized ewakness from bsaeline but able to ambulate 320' w/ min A improving to cGA, no UE support.  Discussed using his cane on steps and when out of home for safety, walking recommendations while inpatient, and PT POC.  Recommend DC to home with assist as needed, family discussing if they want a rolling walker or not for home.  -AR     Row Name 02/08/23 0958          Therapy Assessment/Plan (PT)    Rehab Potential (PT) good, to achieve stated therapy goals  -AR     Criteria for Skilled Interventions Met (PT) yes  -AR     Therapy Frequency (PT) 3 times/wk  -AR     Row Name 02/08/23 0958          Vital Signs    O2 Delivery Pre Treatment room air  -AR     Row Name 02/08/23 0958          Positioning and Restraints    Pre-Treatment Position sitting in chair/recliner  -AR      Post Treatment Position --  transport bed  -AR           User Key  (r) = Recorded By, (t) = Taken By, (c) = Cosigned By    Initials Name Provider Type    Palmira Hanley PT Physical Therapist               Outcome Measures     Row Name 02/08/23 1002          How much help from another person do you currently need...    Turning from your back to your side while in flat bed without using bedrails? 4  -AR     Moving from lying on back to sitting on the side of a flat bed without bedrails? 3  -AR     Moving to and from a bed to a chair (including a wheelchair)? 3  -AR     Standing up from a chair using your arms (e.g., wheelchair, bedside chair)? 3  -AR     Climbing 3-5 steps with a railing? 3  -AR     To walk in hospital room? 3  -AR     AM-PAC 6 Clicks Score (PT) 19  -AR     Highest level of mobility 6 --> Walked 10 steps or more  -AR     Row Name 02/08/23 1002          Functional Assessment    Outcome Measure Options AM-PAC 6 Clicks Basic Mobility (PT)  -AR           User Key  (r) = Recorded By, (t) = Taken By, (c) = Cosigned By    Initials Name Provider Type    Palmira Hanley PT Physical Therapist                             Physical Therapy Education     Title: PT OT SLP Therapies (In Progress)     Topic: Physical Therapy (In Progress)     Point: Mobility training (In Progress)     Learning Progress Summary           Patient Acceptance, E, NR by AR at 2/8/2023 1002                   Point: Home exercise program (In Progress)     Learning Progress Summary           Patient Acceptance, E, NR by AR at 2/8/2023 1002                   Point: Body mechanics (In Progress)     Learning Progress Summary           Patient Acceptance, E, NR by AR at 2/8/2023 1002                   Point: Precautions (In Progress)     Learning Progress Summary           Patient Acceptance, E, NR by AR at 2/8/2023 1002                               User Key     Initials Effective Dates Name Provider Type Discipline    AR 06/16/21  -  Palmira Mao, PT Physical Therapist PT              PT Recommendation and Plan  Planned Therapy Interventions (PT): balance training, bed mobility training, gait training, home exercise program, patient/family education, transfer training, ROM (range of motion), stair training, strengthening  Outcome Evaluation: Pt admitted with AMS and aspiration pneumonia.  H/o Parkinson's, doesn't normally use any AD but owns cane, enjoys going to [employer], 2 recent falls.  Today pt demonstrates generalized ewakness from bsaeline but able to ambulate 320' w/ min A improving to cGA, no UE support.  Discussed using his cane on steps and when out of home for safety, walking recommendations while inpatient, and PT POC.  Recommend DC to home with assist as needed, family discussing if they want a rolling walker or not for home.     Time Calculation:    PT Charges     Row Name 02/08/23 0955             Time Calculation    Start Time 0830  -AR      Stop Time 0856  -AR      Time Calculation (min) 26 min  -AR      PT Received On 02/08/23  -AR      PT - Next Appointment 02/10/23  -AR      PT Goal Re-Cert Due Date 02/15/23  -AR            User Key  (r) = Recorded By, (t) = Taken By, (c) = Cosigned By    Initials Name Provider Type    AR Palmira Mao, PT Physical Therapist              Therapy Charges for Today     Code Description Service Date Service Provider Modifiers Qty    39124043971 HC PT THER PROC EA 15 MIN 2/8/2023 Palmira Mao, PT GP 1    14310464186 HC PT EVAL MOD COMPLEXITY 4 2/8/2023 Palmira Mao, PT GP 1          PT G-Codes  Outcome Measure Options: AM-PAC 6 Clicks Basic Mobility (PT)  AM-PAC 6 Clicks Score (PT): 19  PT Discharge Summary  Anticipated Discharge Disposition (PT): home with assist, home with outpatient therapy services (may benefit from OP PT)    Palmira Mao PT  2/8/2023

## 2023-02-08 NOTE — PLAN OF CARE
Goal Outcome Evaluation:              Outcome Evaluation: Pt admitted with AMS and aspiration pneumonia.  H/o Parkinson's, doesn't normally use any AD but owns cane, enjoys going to [employer], 2 recent falls.  Today pt demonstrates generalized ewakness from bsaeline but able to ambulate 320' w/ min A improving to cGA, no UE support.  Discussed using his cane on steps and when out of home for safety, walking recommendations while inpatient, and PT POC.  Recommend DC to home with assist as needed, family discussing if they want a rolling walker or not for home.

## 2023-02-08 NOTE — PLAN OF CARE
Goal Outcome Evaluation:              Outcome Evaluation: VFSS completed. Pt presents with mild-moderate oropharyngeal dysphagia. See note for details. Recommend mechanical soft/ground meat solids and thin liquids with use of small drinks by cup and 2 quick swallows per drink and liquid wash every 1-2 bites of solids. Fully upright posture, small bites, and meds whole if small, crushed if large in puree.     Pt seen later in afternoon for VFSS follow up with pt's wife present to provide education regarding VFSS results and recommendations. Extensive education provided regarding swallowing physiology, deficits noted, diet recommendation and modifications, and swallowing strategies. Pt able to repeat strategies and pt's wife wrote down information in her notebook. Pt's wife expressing that pt's cough is the worst at night and concern for reflux. Recommended to follow up with MD/GI regarding concern for reflux and initiate medication if appropriate per MD. Pt's wife to discuss with MD. All pt's and pt's wife questions were answered and agreed to plan of care. Recommend outpatient SLP services targeting dysphagia and pt reported he wants the doctors appointments to slow down before considering therapy. Will continue to follow.

## 2023-02-08 NOTE — THERAPY TREATMENT NOTE
Acute Care - Speech Language Pathology   Swallow Treatment Note Psychiatric     Patient Name: Casey Snowden Sr.  : 1934  MRN: [id number]  Today's Date: 2023               Admit Date: 2023    Visit Dx:     ICD-10-CM ICD-9-CM   1. Altered mental status, unspecified altered mental status type  R41.82 780.97   2. Pneumonia of right lower lobe due to infectious organism  J18.9 486     Patient Active Problem List   Diagnosis   • Benign essential hypertension   • Stenosis of carotid artery   • Mixed hyperlipidemia   • Parkinson's disease (HCC)   • Peripheral arterial occlusive disease (HCC)   • Screening for prostate cancer   • Medication monitoring encounter   • Melanoma (HCC)   • Chronic deep vein thrombosis (DVT) of popliteal vein of right lower extremity (HCC)   • Uses hearing aid   • Paroxysmal A-fib (HCC)   • Localized edema   • Chronic diastolic (congestive) heart failure   • Anticoagulant long-term use   • Presence of IVC filter   • Medicare annual wellness visit, subsequent   • Iron deficiency anemia secondary to inadequate dietary iron intake   • 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)   • Coronary artery disease involving native coronary artery of native heart without angina pectoris   • ST elevation myocardial infarction involving left anterior descending (LAD) coronary artery (HCC)   • Sialorrhea   • 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)   • PAD (peripheral artery disease) (HCC)   • Metastatic squamous cell carcinoma   • Secondary malignancy of lymph nodes of head, face and neck (HCC)   • Encounter for antineoplastic immunotherapy   • RBD (REM behavioral disorder)   • Acquired hypothyroidism   • Facial contusion, subsequent encounter   • Skin tear of upper arm without complication, left, subsequent encounter   • Altered mental status, unspecified altered mental status type   • Aspiration pneumonia of right lung due to vomit (HCC)   • Metabolic encephalopathy      Past Medical History:   Diagnosis Date   • Acquired hypothyroidism 8/31/2022   • Atrial fibrillation ([facility])    • Cancer ([facility]) 04/2018    basil cell carcinoma   • Carotid stenosis    • CHF (congestive heart failure) ([facility])    • Chronic deep vein thrombosis (DVT) of popliteal vein of right lower extremity ([facility])    • Coronary artery disease involving native coronary artery of native heart without angina pectoris 12/20/2018   • DVT of lower extremity (deep venous thrombosis) ([facility])    • Hard of hearing    • Long-term use of high-risk medication    • PAD (peripheral artery disease) ([facility]) 9/10/2020   • Parkinson's disease ([facility])    • Postphlebitic syndrome      Past Surgical History:   Procedure Laterality Date   • BASAL CELL CARCINOMA EXCISION  04/2018   • CARDIAC CATHETERIZATION N/A 11/5/2018    Procedure: Left Heart Cath;  Surgeon: Oliverio Azar MD;  Location: [facility] CATH INVASIVE LOCATION;  Service: Cardiovascular   • CARDIAC CATHETERIZATION N/A 11/5/2018    Procedure: Coronary angiography;  Surgeon: Oliverio Azar MD;  Location: [facility] CATH INVASIVE LOCATION;  Service: Cardiovascular   • CARDIAC CATHETERIZATION N/A 11/5/2018    Procedure: Left ventriculography;  Surgeon: Oliverio Azar MD;  Location: [facility] CATH INVASIVE LOCATION;  Service: Cardiovascular   • CARDIAC CATHETERIZATION N/A 6/4/2019    Procedure: Left Heart Cath;  Surgeon: Johnny Glasgow MD;  Location: [facility] CATH INVASIVE LOCATION;  Service: Cardiovascular   • CARDIAC CATHETERIZATION N/A 6/4/2019    Procedure: Coronary angiography;  Surgeon: Johnny Glasgow MD;  Location: [facility] CATH INVASIVE LOCATION;  Service: Cardiovascular   • CARDIAC CATHETERIZATION N/A 6/4/2019    Procedure: Left ventriculography;  Surgeon: Johnny Glasgow MD;  Location: [facility] CATH INVASIVE LOCATION;  Service: Cardiovascular   • CARDIAC CATHETERIZATION N/A 6/4/2019    Procedure: Stent BILL coronary;  Surgeon: Johnny Glasgow MD;  Location:   [facility];  Service: Cardiovascular   • CARDIAC SURGERY     • CATARACT EXTRACTION Left 08/2018   • CATARACT EXTRACTION Right 09/2018   • COLONOSCOPY      2011   • INCISION AND DRAINAGE LEG Right 7/7/2017    Procedure: INCISION AND DRAINAGE INFECTED HEMATOMA RIGHT THIGH;  Surgeon: Valentin Peña MD;  Location: [facility] MAIN OR;  Service:    • JOINT REPLACEMENT     • KNEE INCISION AND DRAINAGE Right 12/27/2016    Procedure: KNEE INCISION AND DRAINAGE;  Surgeon: Triston Whitten MD;  Location: [facility] MAIN OR;  Service:    • NOSE SURGERY      nose replacement   • SKIN BIOPSY     • SKIN GRAFT  12/2017   • TOTAL KNEE ARTHROPLASTY Right    • VASCULAR SURGERY     • VENA CAVA FILTER PLACEMENT         SLP Recommendation and Plan  SLP Swallowing Diagnosis: mild-moderate, oral dysphagia, pharyngeal dysphagia (02/08/23 0845)  SLP Diet Recommendation: mechanical ground textures, thin liquids (02/08/23 0845)  Recommended Precautions and Strategies: upright posture during/after eating, small bites of food and sips of liquid, no straw (quick double swallow every drink, liquid wash every 1-2 drinks) (02/08/23 0845)  SLP Rec. for Method of Medication Administration: meds whole, meds crushed, with puree (02/08/23 0845)     Monitor for Signs of Aspiration: yes, notify SLP if any concerns (02/08/23 0845)     Swallow Criteria for Skilled Therapeutic Interventions Met: demonstrates skilled criteria (02/08/23 0845)  Anticipated Discharge Disposition (SLP): home with OP services (if pt agreeable) (02/08/23 0845)  Rehab Potential/Prognosis, Swallowing: good, to achieve stated therapy goals (02/08/23 0845)  Therapy Frequency (Swallow): PRN (02/08/23 0845)  Predicted Duration Therapy Intervention (Days): until discharge (02/08/23 0845)      SWALLOW EVALUATION (last 72 hours)     SLP Adult Swallow Evaluation     Row Name 02/08/23 1110 02/08/23 0845 02/07/23 1100          Document Type therapy note (daily note)  -ML  "evaluation  - evaluation  -    Subjective Information no complaints  - no complaints  -ML --    Patient Observations alert;cooperative  - alert;cooperative  - --    Patient/Family/Caregiver Comments/Observations Pt's wife present in room.  -ML -- --    Patient Effort excellent  -ML excellent  -ML --    Symptoms Noted During/After Treatment none  -ML none  -ML --          Patient Profile Reviewed yes  -ML yes  -ML yes  -SH    Pertinent History Of Current Problem -- Pt admitted with recurrent falls, increased confusion and word finding difficulty at admission. MRI negative for acute intracranial abnormality. Pt and pt's wife describe increased swallowing difficulty. Pt with hx of Parkinson's disease. CT chest revealed \"Patchy ground glass opacities in the lung bases right greater than left favoring pneumonitis/aspiration. Superimposed edema not excluded.\" Pt on regular diet and thin liquids at home  - PD, AMS, RLL PNA  -    Current Method of Nutrition -- mechanical ground textures;honey-thick liquids  per clinical swallowing evaluation yesterday  - regular textures;thin liquids  -    Precautions/Limitations, Vision -- -- WFL  -    Precautions/Limitations, Hearing -- -- hearing impairment, bilaterally;hearing aid, bilaterally  -    Prior Level of Function-Communication -- -- other (see comments)  PD  -    Prior Level of Function-Swallowing -- -- no diet consistency restrictions;other (see comments)  signs of dysphagia  -    Plans/Goals Discussed with -- -- patient;spouse/S.O.;agreed upon  [facility]    Barriers to Rehab -- -- medically complex  -    Patient's Goals for Discharge -- -- patient did not state  -    Family Goals for Discharge -- -- patient able to eat/drink without coughing/choking  -          Dentition Assessment -- -- natural, present and adequate  -          Oral Motor General Assessment -- -- generalized oral motor weakness  -          Clinical Swallow Evaluation Summary -- " -- Patient seen for clinical swallow assessment. Pt recently completed voice therapy for PD at [facility], but is not fully compliant with voicing exercises at home and has regressed some since D/C per wife. Admitted with AMS and RLL PNA. Denies hx of PNA. Pt reports obstruction with dry solids. Wife indicated patient exhibits wet voice at times and c/o drooling. No hx of dysphagia work up. Intermittent throat clearing with thins via cup/straw, mixed, and regular solids. Wet voice with nectar via straw. No overt s/s of aspiration with ice, honey via cup, puree, or strained mech soft. SLP recs mech soft, no mixed, and honey, no straws. Meds whole or crushed with puree. Allow free water protocol with ice. Will follow for VFSS. Pt appears to be a great candidate for EMST. Discussed in detail with patient and family.  -SH          Utensils Used -- spoon;cup;straw  -ML --    Consistencies Trialed -- regular textures;soft to chew textures;chopped;mixed consistency;pureed;thin liquids;nectar/syrup-thick liquids  -ML --          VFSS Summary -- VFSS completed with radiologist, Dr. Dupree, present in room. Pt presents with mild-moderate oropharyngeal dysphagia characterized by mildly impaired hyolaryngeal excursion, impaired tongue base retraction and superior pharyngeal contraction causing vallecular residue (mild with puree, mild-moderate with mechanical soft, moderate-severe with regular solids), and also presence of collection of material at the level of base of tongue suggestive of pharyngocele that collects liquid material during the swallow which then falls to vallecula and pryiforms after the initial swallow. During trial of thin liquids by cup, no penetration or aspiration occurs during the initial swallow, however, mild oral residue, residue from suggestive pharyngocele, and mild residue already in vallecula and pyriforms collects in pyriforms and is posteriorly penetrated at initiation of spontanous second  swallow and then aspirated during the swallow. Aspiration was initially silent, however, when pt initiated talking after trial exhibited throat clear. Pt cued to further cough, which appeared effective to clear material from airway. Swallow strategy of 2 quick double swallows with small drink of thin liquids by cup was effective to eliminate penetration and aspiration of thin liquids during trials in isolation and when utilized as liquid wash after trials of puree and mechanical soft. Trace penetration to the cords noted on second swallow after trial of thin liquids used as liquid wash after regular solids and was not effective to eliminate vallecular residue of regular solids. Trial of thin liquids by straw was effective in eliminating vallecular residue of regular solids, however, noted penetration to the vocal cords on second swallow. Pt exhibited no penetration or aspiration during trials of nectar thick liquids by cup (utilizing double swallow), puree, mechanical soft, or regular solids. Pt is at risk of aspiration. Diet modification to mechanical soft solids and thin liquids with use of small drinks by cup and 2 quick swallows per drink and liquid wash every 1-2 bites reduces risk of aspiration at this time. Fully upright posture, small bites, and meds whole if small, crushed if large in puree.  -ML --          Summary Statement -- VFSS completed with radiologist, Dr. Dupree, present in room. Pt presents with mild-moderate oropharyngeal dysphagia characterized by mildly impaired hyolaryngeal excursion, impaired tongue base retraction and superior pharyngeal contraction causing vallecular residue (mild with puree, mild-moderate with mechanical soft, moderate-severe with regular solids) and also presence of collection of material at the level of base of tongue suggestive of pharyngocele that collects liquid material during the swallow which then falls to vallecula and pryiforms after the initial swallow. During  trial of thin liquids by cup, no penetration or aspiration occurs during the initial swallow, however, mild oral residue, residue from suggestive pharyngocele, and mild residue already in vallecula and pyriforms collects in pyriforms and is posteriorly penetrated at initiation of spontanous second swallow and then aspirated during the swallow. Trace penetration to the cords noted on second swallow after trial of thin liquids used as liquid wash after regular solids and was not effective to eliminate vallecular residue of regular solids. Trial of thin liquids by straw was effective in eliminating vallecular residue of regular solids, however, noted penetration to the vocal cords on second swallow. Pt exhibited no penetration or aspiration during trials of nectar thick liquids by cup (utilizing double swallow), puree, mechanical soft, or regular solid or during trials of thin liquids when utilizing small drink by cup with 2 quick swallows.  -ML --          SLP Swallowing Diagnosis -- mild-moderate;oral dysphagia;pharyngeal dysphagia  - suspected pharyngeal dysphagia  -    Functional Impact -- risk of aspiration/pneumonia  - --    Rehab Potential/Prognosis, Swallowing -- good, to achieve stated therapy goals  - --    Swallow Criteria for Skilled Therapeutic Interventions Met -- demonstrates skilled criteria  - --          Therapy Frequency (Swallow) -- PRN  -ML PRN  -    Predicted Duration Therapy Intervention (Days) -- until discharge  - until discharge  -    SLP Diet Recommendation -- mechanical ground textures;thin liquids  -ML mechanical ground textures;no mixed consistencies;honey thick liquids  -    Recommended Diagnostics -- -- reassess via VFSS ([facility])  -    Recommended Precautions and Strategies -- upright posture during/after eating;small bites of food and sips of liquid;no straw  quick double swallow every drink, liquid wash every 1-2 drinks  - upright posture during/after eating;no  straw;small bites of food and sips of liquid;assist with feeding  -    Oral Care Recommendations -- Oral Care BID/PRN  -ML Oral Care BID/PRN  -    SLP Rec. for Method of Medication Administration -- meds whole;meds crushed;with puree  -ML meds whole;meds crushed;with puree;as tolerated  -    Monitor for Signs of Aspiration -- yes;notify SLP if any concerns  -ML yes;notify SLP if any concerns  -    Anticipated Discharge Disposition (SLP) -- home with OP services  if pt agreeable  -ML unknown  -          Swallow LTGs -- Patient will demonstrate functional swallow for  -ML Patient will demonstrate functional swallow for  -    Swallow STGs -- pharyngeal strengthening exercise goal selection (SLP);swallow compensatory strategies goal selection (SLP)  - --    Pharyngeal Strengthening Exercise Goal Selection (SLP) -- pharyngeal strengthening exercise, SLP goal 1  - --    Swallow Compensatory Strategies Goal Selection (SLP) -- swallow compensatory strategies, SLP goal 1  -ML --          Diet Texture (Demonstrate functional swallow) -- soft to chew (chopped) textures  - mechanical ground textures  -    Liquid viscosity (Demonstrate functional swallow) -- thin liquids  -ML honey/  moderately thick liquids  -    [place] (Demonstrate functional swallow) -- with use of compensatory strategies  - --    Time Frame (Demonstrate functional swallow) -- by discharge  - --          Activity (Pharyngeal Strengthening Goal 1, SLP) -- increase superior movement of the hyolaryngeal complex;increase anterior movement of the hyolaryngeal complex;increase tongue base retraction;increase squeeze/positive pressure generation  - --    [place]/Accuracy (Pharyngeal Strengthening Goal 1, SLP) -- with minimal cues (75-90% accuracy)  - --    Time Frame (Pharyngeal Strengthening Goal 1, SLP) -- short term goal (STG)  - --          Activity (Swallow Compensatory Strategies/Techniques Goal 1, SLP) compensatory  strategies;during meal intake;small cup sips;other (see comments)  -ML compensatory strategies;during meal intake;small cup sips;other (see comments)  quick double swallow with thins, liquid wash every 1-2 bites  -ML --    [place]/Accuracy (Swallow Compensatory Strategies/Techniques Goal 1, SLP) independently (over 90% accuracy)  -ML independently (over 90% accuracy)  -ML --    Time Frame (Swallow Compensatory Strategies/Techniques Goal 1, SLP) short term goal (STG)  -ML short term goal (STG)  -ML --    Progress/Outcomes (Swallow Compensatory Strategies/Techniques Goal 1, SLP) goal ongoing  -ML -- --    Comment (Swallow Compensatory Strategies/Techniques Goal 1, SLP) Pt seen in room for VFSS follow up with pt's wife present to provide education regarding VFSS results and recommendations. Extensive education provided regarding swallowing physiology, deficits noted, diet recommendation and modifications, and swallowing strategies. Pt able to repeat strategies and pt's wife wrote down information in her notebook. Pt's wife expressing that pt's cough is the worst at night and concern for reflux. Recommended to follow up with MD/GI regarding concern for reflux and initiate medication if appropriate per MD. Pt's wife to discuss with MD. All pt's and pt's wife questions were answered and agreed to plan of care. Recommend outpatient SLP services targeting dysphagia and pt reported he wants the doctors appointments to slow down before considering therapy. Will continue to follow.  -ML -- --          User Key  (r) = Recorded By, (t) = Taken By, (c) = Cosigned By    Initials Name Effective Dates    SH Tierra Mims MS CCC-SLP 06/16/21 -     ML Rosa Shanks MS CCC-SLP 06/16/21 -                 EDUCATION  The patient has been educated in the following areas:   Dysphagia (Swallowing Impairment).        SLP GOALS     Row Name 02/08/23 1110 02/08/23 0845 02/07/23 1100       (LTG) Patient will demonstrate functional  swallow for    Diet Texture (Demonstrate functional swallow) -- soft to chew (chopped) textures  -ML mechanical ground textures  -SH    Liquid viscosity (Demonstrate functional swallow) -- thin liquids  -ML honey/  moderately thick liquids  -SH    [place] (Demonstrate functional swallow) -- with use of compensatory strategies  -ML --    Time Frame (Demonstrate functional swallow) -- by discharge  -ML --       (STG) Pharyngeal Strengthening Exercise Goal 1 (SLP)    Activity (Pharyngeal Strengthening Goal 1, SLP) -- increase superior movement of the hyolaryngeal complex;increase anterior movement of the hyolaryngeal complex;increase tongue base retraction;increase squeeze/positive pressure generation  -ML --    [place]/Accuracy (Pharyngeal Strengthening Goal 1, SLP) -- with minimal cues (75-90% accuracy)  -ML --    Time Frame (Pharyngeal Strengthening Goal 1, SLP) -- short term goal (STG)  -ML --       (STG) Swallow Compensatory Strategies Goal 1 (SLP)    Activity (Swallow Compensatory Strategies/Techniques Goal 1, SLP) compensatory strategies;during meal intake;small cup sips;other (see comments)  -ML compensatory strategies;during meal intake;small cup sips;other (see comments)  quick double swallow with thins, liquid wash every 1-2 bites  -ML --    [place]/Accuracy (Swallow Compensatory Strategies/Techniques Goal 1, SLP) independently (over 90% accuracy)  -ML independently (over 90% accuracy)  -ML --    Time Frame (Swallow Compensatory Strategies/Techniques Goal 1, SLP) short term goal (STG)  -ML short term goal (STG)  -ML --    Progress/Outcomes (Swallow Compensatory Strategies/Techniques Goal 1, SLP) goal ongoing  -ML -- --    Comment (Swallow Compensatory Strategies/Techniques Goal 1, SLP) Pt seen in room for VFSS follow up with pt's wife present to provide education regarding VFSS results and recommendations. Extensive education provided regarding swallowing physiology, deficits noted, diet  recommendation and modifications, and swallowing strategies. Pt able to repeat strategies and pt's wife wrote down information in her notebook. Pt's wife expressing that pt's cough is the worst at night and concern for reflux. Recommended to follow up with MD/GI regarding concern for reflux and initiate medication if appropriate per MD. Pt's wife to discuss with MD. All pt's and pt's wife questions were answered and agreed to plan of care. Recommend outpatient SLP services targeting dysphagia and pt reported he wants the doctors appointments to slow down before considering therapy. Will continue to follow.  -ML -- --          User Key  (r) = Recorded By, (t) = Taken By, (c) = Cosigned By    Initials Name Provider Type    Tierra Lozano MS CCC-SLP Speech and Language Pathologist    Rosa Flower MS CCC-SLP Speech and Language Pathologist                   Time Calculation:    Time Calculation- SLP     Row Name 02/08/23 1201 02/08/23 1030          Time Calculation- SLP    SLP Start Time 1110  -ML 0845  -ML     SLP Received On 02/08/23  -ML 02/08/23  -ML        Untimed Charges    SLP Eval/Re-eval  -- ST [facility] Flu[facility] Eval Swallow - 80155  -ML     14634-[facility] Flu[facility] Eval Swallow Minutes -- 120  -ML     63334-HP Treatment Swallow Minutes 30  -ML --        Total Minutes    Untimed Charges Total Minutes 30  -  -ML      Total Minutes 30  -  -ML           User Key  (r) = Recorded By, (t) = Taken By, (c) = Cosigned By    Initials Name Provider Type    Rosa Flower MS CCC-SLP Speech and Language Pathologist                Therapy Charges for Today     Code Description Service Date Service Provider Modifiers Qty    79819575083 HC ST MOTION FLUORO EVAL SWALLOW 8 2/8/2023 Rosa Shanks MS CCC-SLP GN 1    [id number] HC ST TREATMENT SWALLOW 2 2/8/2023 Rosa Shanks MS CCC-SLP GN 1               MS SHAINA Correa  2/8/2023

## 2023-02-08 NOTE — MBS/VFSS/FEES
Acute Care - Speech Language Pathology   Swallow Initial Evaluation [facility]     Patient Name: Casey Snowden Sr.  : 1934  MRN: [id number]  Today's Date: 2023               Admit Date: 2023    Visit Dx:     ICD-10-CM ICD-9-CM   1. Altered mental status, unspecified altered mental status type  R41.82 780.97   2. Pneumonia of right lower lobe due to infectious organism  J18.9 486     Patient Active Problem List   Diagnosis   • Benign essential hypertension   • Stenosis of carotid artery   • Mixed hyperlipidemia   • Parkinson's disease (HCC)   • Peripheral arterial occlusive disease (HCC)   • Screening for prostate cancer   • Medication monitoring encounter   • Melanoma (HCC)   • Chronic deep vein thrombosis (DVT) of popliteal vein of right lower extremity (HCC)   • Uses hearing aid   • Paroxysmal A-fib (HCC)   • Localized edema   • Chronic diastolic (congestive) heart failure   • Anticoagulant long-term use   • Presence of IVC filter   • Medicare annual wellness visit, subsequent   • Iron deficiency anemia secondary to inadequate dietary iron intake   • 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)   • Coronary artery disease involving native coronary artery of native heart without angina pectoris   • ST elevation myocardial infarction involving left anterior descending (LAD) coronary artery (HCC)   • Sialorrhea   • 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)   • PAD (peripheral artery disease) (HCC)   • Metastatic squamous cell carcinoma   • Secondary malignancy of lymph nodes of head, face and neck (HCC)   • Encounter for antineoplastic immunotherapy   • RBD (REM behavioral disorder)   • Acquired hypothyroidism   • Facial contusion, subsequent encounter   • Skin tear of upper arm without complication, left, subsequent encounter   • Altered mental status, unspecified altered mental status type   • Aspiration pneumonia of right lung due to vomit (HCC)   • Metabolic  encephalopathy     Past Medical History:   Diagnosis Date   • Acquired hypothyroidism 8/31/2022   • Atrial fibrillation ([facility])    • Cancer ([facility]) 04/2018    basil cell carcinoma   • Carotid stenosis    • CHF (congestive heart failure) ([facility])    • Chronic deep vein thrombosis (DVT) of popliteal vein of right lower extremity ([facility])    • Coronary artery disease involving native coronary artery of native heart without angina pectoris 12/20/2018   • DVT of lower extremity (deep venous thrombosis) ([facility])    • Hard of hearing    • Long-term use of high-risk medication    • PAD (peripheral artery disease) ([facility]) 9/10/2020   • Parkinson's disease ([facility])    • Postphlebitic syndrome      Past Surgical History:   Procedure Laterality Date   • BASAL CELL CARCINOMA EXCISION  04/2018   • CARDIAC CATHETERIZATION N/A 11/5/2018    Procedure: Left Heart Cath;  Surgeon: Oliverio Azar MD;  Location: [facility] CATH INVASIVE LOCATION;  Service: Cardiovascular   • CARDIAC CATHETERIZATION N/A 11/5/2018    Procedure: Coronary angiography;  Surgeon: Oliverio Azar MD;  Location: [facility] CATH INVASIVE LOCATION;  Service: Cardiovascular   • CARDIAC CATHETERIZATION N/A 11/5/2018    Procedure: Left ventriculography;  Surgeon: Oliverio Azar MD;  Location[facility];  Service: Cardiovascular   • CARDIAC CATHETERIZATION N/A 6/4/2019    Procedure: Left Heart Cath;  Surgeon: Johnny Glasgow MD;  [facility];  Service: Cardiovascular   • CARDIAC CATHETERIZATION N/A 6/4/2019    Procedure: Coronary angiography;  Surgeon: Johnny Glasgow MD;  [facility];  Service: Cardiovascular   • CARDIAC CATHETERIZATION N/A 6/4/2019    Procedure: Left ventriculography;  Surgeon: Johnny Glasgow MD;  Location: [facility] CATH INVASIVE LOCATION;  Service: Cardiovascular   • CARDIAC CATHETERIZATION N/A 6/4/2019    Procedure: Stent BILL coronary;  Surgeon: Johnny Glasgow MD;   Location: [facility] INVASIVE LOCATION;  Service: Cardiovascular   • CARDIAC SURGERY     • CATARACT EXTRACTION Left 08/2018   • CATARACT EXTRACTION Right 09/2018   • COLONOSCOPY      2011   • INCISION AND DRAINAGE LEG Right 7/7/2017    Procedure: INCISION AND DRAINAGE INFECTED HEMATOMA RIGHT THIGH;  Surgeon: Valentin Peña MD;  Location: [facility] OR;  Service:    • JOINT REPLACEMENT     • KNEE INCISION AND DRAINAGE Right 12/27/2016    Procedure: KNEE INCISION AND DRAINAGE;  Surgeon: Triston Whitten MD;  Location: [facility] OR;  Service:    • NOSE SURGERY      nose replacement   • SKIN BIOPSY     • SKIN GRAFT  12/2017   • TOTAL KNEE ARTHROPLASTY Right    • VASCULAR SURGERY     • VENA CAVA FILTER PLACEMENT         SLP Recommendation and Plan  SLP Swallowing Diagnosis: mild-moderate, oral dysphagia, pharyngeal dysphagia (02/08/23 0845)  SLP Diet Recommendation: mechanical ground textures, thin liquids (02/08/23 0845)  Recommended Precautions and Strategies: upright posture during/after eating, small bites of food and sips of liquid, no straw (quick double swallow every drink, liquid wash every 1-2 drinks) (02/08/23 0845)  SLP Rec. for Method of Medication Administration: meds whole, meds crushed, with puree (02/08/23 0845)     Monitor for Signs of Aspiration: yes, notify SLP if any concerns (02/08/23 0845)     Swallow Criteria for Skilled Therapeutic Interventions Met: demonstrates skilled criteria (02/08/23 0845)  Anticipated Discharge Disposition (SLP): home with OP services (if pt agreeable) (02/08/23 0845)  Rehab Potential/Prognosis, Swallowing: good, to achieve stated therapy goals (02/08/23 0845)  Therapy Frequency (Swallow): PRN (02/08/23 0845)  Predicted Duration Therapy Intervention (Days): until discharge (02/08/23 0845)       Outcome Evaluation: VFSS completed. Pt presents with mild-moderate oropharyngeal dysphagia. See note for details. Recommend mechanical soft/ground meat solids and thin  "liquids with use of small drinks by cup and 2 quick swallows per drink and liquid wash every 1-2 bites of solids. Fully upright posture, small bites, and meds whole if small, crushed if large in puree.      SWALLOW EVALUATION (last 72 hours)     SLP Adult Swallow Evaluation     Row Name 02/08/23 0845 02/07/23 1100          Document Type evaluation  - evaluation  -    Subjective Information no complaints  - --    Patient Observations alert;cooperative  - --    Patient Effort excellent  - --    Symptoms Noted During/After Treatment none  - --          Patient Profile Reviewed yes  -ML yes  -    Pertinent History Of Current Problem Pt admitted with recurrent falls, increased confusion and word finding difficulty at admission. MRI negative for acute intracranial abnormality. Pt and pt's wife describe increased swallowing difficulty. Pt with hx of Parkinson's disease. CT chest revealed \"Patchy ground glass opacities in the lung bases right greater than left favoring pneumonitis/aspiration. Superimposed edema not excluded.\" Pt on regular diet and thin liquids at home  - PD, AMS, RLL PNA  -    Current Method of Nutrition mechanical ground textures;honey-thick liquids  per clinical swallowing evaluation yesterday  - regular textures;thin liquids  -    Precautions/Limitations, Vision -- WFL  -    Precautions/Limitations, Hearing -- hearing impairment, bilaterally;hearing aid, bilaterally  -    Prior Level of Function-Communication -- other (see comments)  PD  -    Prior Level of Function-Swallowing -- no diet consistency restrictions;other (see comments)  signs of dysphagia  -    Plans/Goals Discussed with -- patient;spouse/S.O.;agreed upon  [facility]    Barriers to Rehab -- medically complex  -    Patient's Goals for Discharge -- patient did not state  -    Family Goals for Discharge -- patient able to eat/drink without coughing/choking  -          Dentition Assessment -- natural, present and " adequate  -          Oral Motor General Assessment -- generalized oral motor weakness  -          Clinical Swallow Evaluation Summary -- Patient seen for clinical swallow assessment. Pt recently completed voice therapy for PD at [facility], but is not fully compliant with voicing exercises at home and has regressed some since D/C per wife. Admitted with AMS and RLL PNA. Denies hx of PNA. Pt reports obstruction with dry solids. Wife indicated patient exhibits wet voice at times and c/o drooling. No hx of dysphagia work up. Intermittent throat clearing with thins via cup/straw, mixed, and regular solids. Wet voice with nectar via straw. No overt s/s of aspiration with ice, honey via cup, puree, or strained mech soft. SLP recs mech soft, no mixed, and honey, no straws. Meds whole or crushed with puree. Allow free water protocol with ice. Will follow for VFSS. Pt appears to be a great candidate for EMST. Discussed in detail with patient and family.  -          Utensils Used spoon;cup;straw  -ML --    Consistencies Trialed regular textures;soft to chew textures;chopped;mixed consistency;pureed;thin liquids;nectar/syrup-thick liquids  -ML --          VFSS Summary VFSS completed with radiologist, Dr. Dupree, present in room. Pt presents with mild-moderate oropharyngeal dysphagia characterized by mildly impaired hyolaryngeal excursion, impaired tongue base retraction and superior pharyngeal contraction causing vallecular residue (mild with puree, mild-moderate with mechanical soft, moderate-severe with regular solids), and also presence of collection of material at the level of base of tongue suggestive of pharyngocele that collects liquid material during the swallow which then falls to vallecula and pryiforms after the initial swallow. During trial of thin liquids by cup, no penetration or aspiration occurs during the initial swallow, however, mild oral residue, residue from suggestive pharyngocele, and mild  residue already in vallecula and pyriforms collects in pyriforms and is posteriorly penetrated at initiation of spontanous second swallow and then aspirated during the swallow. Aspiration was initially silent, however, when pt initiated talking after trial exhibited throat clear. Pt cued to further cough, which appeared effective to clear material from airway. Swallow strategy of 2 quick double swallows with small drink of thin liquids by cup was effective to eliminate penetration and aspiration of thin liquids during trials in isolation and when utilized as liquid wash after trials of puree and mechanical soft. Trace penetration to the cords noted on second swallow after trial of thin liquids used as liquid wash after regular solids and was not effective to eliminate vallecular residue of regular solids. Trial of thin liquids by straw was effective in eliminating vallecular residue of regular solids, however, noted penetration to the vocal cords on second swallow. Pt exhibited no penetration or aspiration during trials of nectar thick liquids by cup (utilizing double swallow), puree, mechanical soft, or regular solids. Pt is at risk of aspiration. Diet modification to mechanical soft solids and thin liquids with use of small drinks by cup and 2 quick swallows per drink and liquid wash every 1-2 bites reduces risk of aspiration at this time. Fully upright posture, small bites, and meds whole if small, crushed if large in puree.  -ML --          Summary Statement VFSS completed with radiologist, Dr. Dupree, present in room. Pt presents with mild-moderate oropharyngeal dysphagia characterized by mildly impaired hyolaryngeal excursion, impaired tongue base retraction and superior pharyngeal contraction causing vallecular residue (mild with puree, mild-moderate with mechanical soft, moderate-severe with regular solids) and also presence of collection of material at the level of base of tongue suggestive of pharyngocele  that collects liquid material during the swallow which then falls to vallecula and pryiforms after the initial swallow. During trial of thin liquids by cup, no penetration or aspiration occurs during the initial swallow, however, mild oral residue, residue from suggestive pharyngocele, and mild residue already in vallecula and pyriforms collects in pyriforms and is posteriorly penetrated at initiation of spontanous second swallow and then aspirated during the swallow. Trace penetration to the cords noted on second swallow after trial of thin liquids used as liquid wash after regular solids and was not effective to eliminate vallecular residue of regular solids. Trial of thin liquids by straw was effective in eliminating vallecular residue of regular solids, however, noted penetration to the vocal cords on second swallow. Pt exhibited no penetration or aspiration during trials of nectar thick liquids by cup (utilizing double swallow), puree, mechanical soft, or regular solid or during trials of thin liquids when utilizing small drink by cup with 2 quick swallows.  -ML --          SLP Swallowing Diagnosis mild-moderate;oral dysphagia;pharyngeal dysphagia  -ML suspected pharyngeal dysphagia  -    Functional Impact risk of aspiration/pneumonia  -ML --    Rehab Potential/Prognosis, Swallowing good, to achieve stated therapy goals  -ML --    Swallow Criteria for Skilled Therapeutic Interventions Met demonstrates skilled criteria  - --          Therapy Frequency (Swallow) PRN  -ML PRN  -    Predicted Duration Therapy Intervention (Days) until discharge  - until discharge  -    SLP Diet Recommendation mechanical ground textures;thin liquids  -ML mechanical ground textures;no mixed consistencies;honey thick liquids  -    Recommended Diagnostics -- reassess via VFSS (MBS)  -    Recommended Precautions and Strategies upright posture during/after eating;small bites of food and sips of liquid;no straw  quick double  swallow every drink, liquid wash every 1-2 drinks  -ML upright posture during/after eating;no straw;small bites of food and sips of liquid;assist with feeding  -    Oral Care Recommendations Oral Care BID/PRN  -ML Oral Care BID/PRN  -SH    SLP Rec. for Method of Medication Administration meds whole;meds crushed;with puree  -ML meds whole;meds crushed;with puree;as tolerated  -    Monitor for Signs of Aspiration yes;notify SLP if any concerns  -ML yes;notify SLP if any concerns  -    Anticipated Discharge Disposition (SLP) home with OP services  if pt agreeable  -ML unknown  -          Swallow LTGs Patient will demonstrate functional swallow for  -ML Patient will demonstrate functional swallow for  -    Swallow STGs pharyngeal strengthening exercise goal selection (SLP);swallow compensatory strategies goal selection (SLP)  -ML --    Pharyngeal Strengthening Exercise Goal Selection (SLP) pharyngeal strengthening exercise, SLP goal 1  -ML --    Swallow Compensatory Strategies Goal Selection (SLP) swallow compensatory strategies, SLP goal 1  -ML --          Diet Texture (Demonstrate functional swallow) soft to chew (chopped) textures  -ML mechanical ground textures  -    Liquid viscosity (Demonstrate functional swallow) thin liquids  -ML honey/  moderately thick liquids  -    [place] (Demonstrate functional swallow) with use of compensatory strategies  -ML --    Time Frame (Demonstrate functional swallow) by discharge  -ML --          Activity (Pharyngeal Strengthening Goal 1, SLP) increase superior movement of the hyolaryngeal complex;increase anterior movement of the hyolaryngeal complex;increase tongue base retraction;increase squeeze/positive pressure generation  -ML --    [place]/Accuracy (Pharyngeal Strengthening Goal 1, SLP) with minimal cues (75-90% accuracy)  -ML --    Time Frame (Pharyngeal Strengthening Goal 1, SLP) short term goal (STG)  -ML --          Activity (Swallow Compensatory  Strategies/Techniques Goal 1, SLP) compensatory strategies;during meal intake;small cup sips;other (see comments)  quick double swallow with thins, liquid wash every 1-2 bites  -ML --    [place]/Accuracy (Swallow Compensatory Strategies/Techniques Goal 1, SLP) independently (over 90% accuracy)  -ML --    Time Frame (Swallow Compensatory Strategies/Techniques Goal 1, SLP) short term goal (STG)  -ML --          User Key  (r) = Recorded By, (t) = Taken By, (c) = Cosigned By    Initials Name Effective Dates    SH Tierra Mims, MS CCC-SLP 06/16/21 -     ML Rimma Rosa, MS CCC-SLP 06/16/21 -                 EDUCATION  The patient has been educated in the following areas:   Dysphagia (Swallowing Impairment).        SLP GOALS     Row Name 02/08/23 0845 02/07/23 1100          (LTG) Patient will demonstrate functional swallow for    Diet Texture (Demonstrate functional swallow) soft to chew (chopped) textures  -ML mechanical ground textures  -SH     Liquid viscosity (Demonstrate functional swallow) thin liquids  -ML honey/  moderately thick liquids  -SH     [place] (Demonstrate functional swallow) with use of compensatory strategies  -ML --     Time Frame (Demonstrate functional swallow) by discharge  -ML --        (STG) Pharyngeal Strengthening Exercise Goal 1 (SLP)    Activity (Pharyngeal Strengthening Goal 1, SLP) increase superior movement of the hyolaryngeal complex;increase anterior movement of the hyolaryngeal complex;increase tongue base retraction;increase squeeze/positive pressure generation  -ML --     [place]/Accuracy (Pharyngeal Strengthening Goal 1, SLP) with minimal cues (75-90% accuracy)  -ML --     Time Frame (Pharyngeal Strengthening Goal 1, SLP) short term goal (STG)  -ML --        (STG) Swallow Compensatory Strategies Goal 1 (SLP)    Activity (Swallow Compensatory Strategies/Techniques Goal 1, SLP) compensatory strategies;during meal intake;small cup sips;other (see comments)  quick  double swallow with thins, liquid wash every 1-2 bites  -ML --     [place]/Accuracy (Swallow Compensatory Strategies/Techniques Goal 1, SLP) independently (over 90% accuracy)  -ML --     Time Frame (Swallow Compensatory Strategies/Techniques Goal 1, SLP) short term goal (STG)  -ML --           User Key  (r) = Recorded By, (t) = Taken By, (c) = Cosigned By    Initials Name Provider Type     Tierra Mims MS CCC-SLP Speech and Language Pathologist    Rosa Flower MS CCC-SLP Speech and Language Pathologist                   Time Calculation:    Time Calculation- SLP     Row Name 02/08/23 1030             Time Calculation- SLP    SLP Start Time 0845  -ML      SLP Received On 02/08/23  -ML         Untimed Charges    SLP Eval/Re-eval  ST [facility] Fluoro Eval Swallow - 25539  -ML      38017-WM [facility] Fluoro Eval Swallow Minutes 120  -ML         Total Minutes    Untimed Charges Total Minutes 120  -ML       Total Minutes 120  -ML            User Key  (r) = Recorded By, (t) = Taken By, (c) = Cosigned By    Initials Name Provider Type    Rosa Flower MS CCC-SLP Speech and Language Pathologist                Therapy Charges for Today     Code Description Service Date Service Provider Modifiers Qty    [id number] HC ST MOTION FLUORO EVAL SWALLOW 8 2/8/2023 Rosa Shanks MS CCC-SLP GN 1               Rosa Shanks MS CCC-DULCE  2/8/2023

## 2023-02-08 NOTE — PLAN OF CARE
No acute events overnight. Sinus brent on tele. VSS on room air. SCD's on. IV abx infused. Bed alarm activated. Call light within reach.    Goal Outcome Evaluation:           Progress: no change       Problem: Adult Inpatient Plan of Care  Goal: Plan of Care Review  Outcome: Ongoing, Progressing  Flowsheets  Taken 2/8/2023 0709 by Mariel Villanueva RN  Progress: no change  Taken 2/7/2023 1022 by Tierra Mims MS CCC-SLP  Plan of Care Reviewed With: patient  Goal: Patient-Specific Goal (Individualized)  Outcome: Ongoing, Progressing  Goal: Absence of Hospital-Acquired Illness or Injury  Outcome: Ongoing, Progressing  Intervention: Identify and Manage Fall Risk  Description: Perform standard risk assessment on admission using a validated tool or comprehensive approach appropriate to the patient; reassess fall risk frequently, with change in status or transfer to another level of care.  Communicate fall injury risk to interprofessional healthcare team.  Determine need for increased observation, equipment and environmental modification, such as low bed, signage and supportive, nonskid footwear.  Adjust safety measures to individual developmental age, stage and identified risk factors.  Reinforce the importance of safety and physical activity with patient and family.  Perform regular intentional rounding to assess need for position change, pain assessment and personal needs, including assistance with toileting.  Recent Flowsheet Documentation  Taken 2/8/2023 0640 by Mariel Villanueva RN  Safety Promotion/Fall Prevention: safety round/check completed  Taken 2/8/2023 0430 by Mariel Villanueva RN  Safety Promotion/Fall Prevention: safety round/check completed  Taken 2/8/2023 0224 by Mariel Villanueva RN  Safety Promotion/Fall Prevention: safety round/check completed  Taken 2/8/2023 0024 by Mariel Villanueva RN  Safety Promotion/Fall Prevention:   activity supervised   assistive device/personal items within reach   clutter  free environment maintained   fall prevention program maintained   gait belt   lighting adjusted   muscle strengthening facilitated   nonskid shoes/slippers when out of bed   room organization consistent   safety round/check completed  Taken 2/7/2023 2200 by Mariel Villanueva RN  Safety Promotion/Fall Prevention: safety round/check completed  Taken 2/7/2023 2045 by Mariel Villanueva RN  Safety Promotion/Fall Prevention:   activity supervised   assistive device/personal items within reach   clutter free environment maintained   fall prevention program maintained   gait belt   lighting adjusted   muscle strengthening facilitated   nonskid shoes/slippers when out of bed   room organization consistent   safety round/check completed  Intervention: Prevent Skin Injury  Description: Perform a screening for skin injury risk, such as pressure or moisture associated skin damage on admission and at regular intervals throughout hospital stay.  Keep all areas of skin (especially folds) clean and dry.  Maintain adequate skin hydration.  Relieve and redistribute pressure and protect bony prominences; implement measures based on patient-specific risk factors.  Match turning and repositioning schedule to clinical condition.  Encourage weight shift frequently; assist with reposition if unable to complete independently.  Float heels off bed; avoid pressure on the Achilles tendon.  Keep skin free from extended contact with medical devices.  Encourage functional activity and mobility, as early as tolerated.  Use aids (e.g., slide boards, mechanical lift) during transfer.  Recent Flowsheet Documentation  Taken 2/8/2023 0640 by Mariel Villanueva RN  Body Position:   sitting up in bed   position changed independently  Taken 2/8/2023 0430 by Mariel Villanueva RN  Body Position: right  Taken 2/8/2023 0224 by Mariel Villanueva RN  Body Position: position changed independently  Taken 2/8/2023 0024 by Mariel Villanueva RN  Body Position:  position changed independently  Skin Protection: adhesive use limited  Taken 2/7/2023 2200 by Mariel Villanueva RN  Body Position:   side-lying   right   position changed independently  Taken 2/7/2023 2045 by Mariel Villanueva RN  Body Position: sitting up in bed  Skin Protection: adhesive use limited  Intervention: Prevent and Manage VTE (Venous Thromboembolism) Risk  Description: Assess for VTE (venous thromboembolism) risk.  Encourage and assist with early ambulation.  Initiate and maintain compression or other therapy, as indicated, based on identified risk in accordance with organizational protocol and provider order.  Encourage both active and passive leg exercises while in bed, if unable to ambulate.  Recent Flowsheet Documentation  Taken 2/8/2023 0640 by Mariel Villanueva RN  Activity Management: activity adjusted per tolerance  Taken 2/8/2023 0430 by Mariel Villanueva RN  Activity Management: activity adjusted per tolerance  Taken 2/8/2023 0224 by Mariel Villanueva RN  Activity Management: activity adjusted per tolerance  Taken 2/8/2023 0024 by Mariel Villanueva RN  Activity Management: activity adjusted per tolerance  VTE Prevention/Management:   bilateral   sequential compression devices on  Range of Motion: active ROM (range of motion) encouraged  Taken 2/7/2023 2200 by Mariel Villanueva RN  Activity Management: activity adjusted per tolerance  Taken 2/7/2023 2045 by Mariel Villanueva RN  Activity Management: activity adjusted per tolerance  VTE Prevention/Management:   bilateral   sequential compression devices on  Range of Motion: ROM (range of motion) performed  Intervention: Prevent Infection  Description: Maintain skin and mucous membrane integrity; promote hand, oral and pulmonary hygiene.  Optimize fluid balance, nutrition, sleep and glycemic control to maximize infection resistance.  Identify potential sources of infection early to prevent or mitigate progression of infection (e.g., wound, lines,  devices).  Evaluate ongoing need for invasive devices; remove promptly when no longer indicated.  Recent Flowsheet Documentation  Taken 2/8/2023 0640 by Mariel Villanueva RN  Infection Prevention: rest/sleep promoted  Taken 2/8/2023 0430 by Mariel Villanueva RN  Infection Prevention: rest/sleep promoted  Taken 2/8/2023 0224 by Mariel Villanueva RN  Infection Prevention: rest/sleep promoted  Taken 2/8/2023 0024 by Mariel Villanueva RN  Infection Prevention:   hand hygiene promoted   rest/sleep promoted   single patient room provided   visitors restricted/screened  Taken 2/7/2023 2200 by Mariel Villanueva RN  Infection Prevention: rest/sleep promoted  Taken 2/7/2023 2045 by Mariel Villanueva RN  Infection Prevention:   hand hygiene promoted   rest/sleep promoted   single patient room provided   visitors restricted/screened  Goal: Optimal Comfort and Wellbeing  Outcome: Ongoing, Progressing  Intervention: Provide Person-Centered Care  Description: Use a family-focused approach to care.  Develop trust and rapport by proactively providing information, encouraging questions, addressing concerns and offering reassurance.  Acknowledge emotional response to hospitalization.  Recognize and utilize personal coping strategies.  Honor spiritual and cultural preferences.  Recent Flowsheet Documentation  Taken 2/8/2023 0024 by Mariel Villanueva RN  Trust Relationship/Rapport:   care explained   choices provided  Taken 2/7/2023 2045 by Mariel Villanueva RN  Trust Relationship/Rapport:   care explained   choices provided   emotional support provided   empathic listening provided   questions encouraged   questions answered   reassurance provided   thoughts/feelings acknowledged  Goal: Readiness for Transition of Care  Outcome: Ongoing, Progressing

## 2023-02-09 ENCOUNTER — READMISSION MANAGEMENT (OUTPATIENT)
Dept: CALL CENTER | Facility: HOSPITAL | Age: 88
End: 2023-02-09
Payer: MEDICARE

## 2023-02-09 VITALS
WEIGHT: 169.53 LBS | HEART RATE: 55 BPM | TEMPERATURE: 97.8 F | HEIGHT: 71 IN | BODY MASS INDEX: 23.73 KG/M2 | DIASTOLIC BLOOD PRESSURE: 74 MMHG | OXYGEN SATURATION: 99 % | SYSTOLIC BLOOD PRESSURE: 149 MMHG | RESPIRATION RATE: 16 BRPM

## 2023-02-09 PROCEDURE — 25010000002 LEVOFLOXACIN PER 250 MG: Performed by: HOSPITALIST

## 2023-02-09 RX ORDER — LEVOFLOXACIN 750 MG/1
750 TABLET ORAL DAILY
Qty: 3 TABLET | Refills: 0 | Status: SHIPPED | OUTPATIENT
Start: 2023-02-10 | End: 2023-02-15

## 2023-02-09 RX ORDER — METRONIDAZOLE 500 MG/1
500 TABLET ORAL 3 TIMES DAILY
Qty: 12 TABLET | Refills: 0 | Status: SHIPPED | OUTPATIENT
Start: 2023-02-09 | End: 2023-02-15

## 2023-02-09 RX ADMIN — LEVOFLOXACIN 500 MG: 500 INJECTION, SOLUTION INTRAVENOUS at 06:39

## 2023-02-09 RX ADMIN — METRONIDAZOLE 500 MG: 500 INJECTION, SOLUTION INTRAVENOUS at 08:44

## 2023-02-09 RX ADMIN — APIXABAN 2.5 MG: 2.5 TABLET, FILM COATED ORAL at 08:44

## 2023-02-09 RX ADMIN — METRONIDAZOLE 500 MG: 500 INJECTION, SOLUTION INTRAVENOUS at 01:27

## 2023-02-09 RX ADMIN — PRAMIPEXOLE DIHYDROCHLORIDE 0.5 MG: 0.5 TABLET ORAL at 08:44

## 2023-02-09 RX ADMIN — VALSARTAN 160 MG: 160 TABLET, FILM COATED ORAL at 08:44

## 2023-02-09 RX ADMIN — LEVOTHYROXINE SODIUM 75 MCG: 0.07 TABLET ORAL at 06:37

## 2023-02-09 RX ADMIN — CARBIDOPA AND LEVODOPA 1 TABLET: 25; 250 TABLET ORAL at 08:44

## 2023-02-09 NOTE — OUTREACH NOTE
Prep Survey    Flowsheet Row Responses   [facility] patient discharged from? [facility]   Is LACE score < 7 ? No   Eligibility [facility]   Date of Admission 02/07/23   Date of Discharge 02/09/23   Discharge Disposition Home or Self Care   Discharge diagnosis Aspiration pneumonia of right lung due to vomit    Does the patient have one of the following disease processes/diagnoses(primary or secondary)? Pneumonia   Does the patient have Home health ordered? No   Is there a DME ordered? Yes   [facility]   Prep survey completed? Yes          Roxanne DIAZ - Registered Nurse

## 2023-02-09 NOTE — PROGRESS NOTES
Discharge Planning Assessment  [facility]     Patient Name: Casey Snowden .  MRN: [id number]  Today's Date: 2/9/2023    Admit Date: 2/7/2023    Plan: Home with spouse and walker and 3 in 1 commode from [employer]   Discharge Needs Assessment    No documentation.                Discharge Plan     Row Name 02/09/23 1400       Plan    Plan Home with spouse and walker and 3 in 1 commode from [employer]    Final Discharge Disposition Code 01 - home or self-care    Final Note Home with family, spoke with daughter Svetlana she denied the need for home health. Gerald SILVERIO              Continued Care and Services - Admitted Since 2/7/2023     Durable Medical Equipment     Service Provider Request Status Selected Services Address Phone Fax Patient Preferred    [facility] Accepted N/A [id number] [address] [phone] --              Expected Discharge Date and Time     Expected Discharge Date Expected Discharge Time    Feb 9, 2023          Demographic Summary    No documentation.                Functional Status    No documentation.                Psychosocial    No documentation.                Abuse/Neglect    No documentation.                Legal    No documentation.                Substance Abuse    No documentation.                Patient Forms    No documentation.                   Gia Castillo, RN

## 2023-02-09 NOTE — CONSULTS
Date of Consultation: 23    Referral Provider: Terence Carballo MD     Reason for Consultation: Paroxysmal atrial fibrillation, anticoagulation recommendations.    Encounter Provider: Juan Diego Gustafson MD    Group of Service: [facility]     Patient Name: Casey Snowden Sr.    :1934    Chief complaint: Confusion, falls.    History of Present Illness:      This is a very pleasant 88 year-old male who is normally followed by Dr. Cecilio Armas in our group.  He has a history of paroxysmal atrial fibrillation and lower extremity DVT for which he takes Eliquis.  He has an IVC filter, and is followed by Dr. Dorado in vascular surgery.  He also has a history of coronary artery disease, and underwent drug-eluting stent placement to the mid LAD in 2019 following an NSTEMI.  His most recent ejection fraction was 40% with wall motion abnormalities consistent with an ischemic cardiomyopathy in 2019.    The patient was accompanied by his wife in the room today, and she provided much of the history.  He also has Parkinson's disease, and is on Sinemet.  His wife has noticed some cognitive decline over the last several months.  He has had 2 major falls in the last several months, including an episode several weeks ago where he fell down some stairs and injured his left arm significantly.  He did strike his head at that time as well.  He also fell in August after tripping in the yard.  He was recently taken off of his Plavix and Eliquis by his primary physician because of falls risk.    He presented with confusion, which has largely resolved.  He did have patchy groundglass opacities in the lung bases on a CT of the chest, possibly consistent with aspiration.  He denied any chest pain or significant new shortness of breath.  He is in sinus rhythm.    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 ([facility])    • Coronary artery disease involving native coronary artery of native heart without angina pectoris 12/20/2018   • DVT of lower extremity (deep venous thrombosis) ([facility])    • Hard of hearing    • Long-term use of high-risk medication    • PAD (peripheral artery disease) ([facility]) 9/10/2020   • Parkinson's disease ([facility])    • Postphlebitic syndrome          Past Surgical History:   Procedure Laterality Date   • BASAL CELL CARCINOMA EXCISION  04/2018   • CARDIAC CATHETERIZATION N/A 11/5/2018    Procedure: Left Heart Cath;  Surgeon: Oliverio Azar MD;  Location:  [facility];  Service: Cardiovascular   • CARDIAC CATHETERIZATION N/A 11/5/2018    Procedure: Coronary angiography;  Surgeon: Oliverio Azar MD;  Location:  [facility];  Service: Cardiovascular   • CARDIAC CATHETERIZATION N/A 11/5/2018    Procedure: Left ventriculography;  Surgeon: Oliverio Azar MD;  [facility];  Service: Cardiovascular   • CARDIAC CATHETERIZATION N/A 6/4/2019    Procedure: Left Heart Cath;  Surgeon: Johnny Glasgow MD;  [facility];  Service: Cardiovascular   • CARDIAC CATHETERIZATION N/A 6/4/2019    Procedure: Coronary angiography;  Surgeon: Johnny Glasgwo MD;  [facility];  Service: Cardiovascular   • CARDIAC CATHETERIZATION N/A 6/4/2019    Procedure: Left ventriculography;  Surgeon: Johnny Glasgow MD;  [facility];  Service: Cardiovascular   • CARDIAC CATHETERIZATION N/A 6/4/2019    Procedure: Stent BILL coronary;  Surgeon: Johnny Glasgow MD;  [facility];  Service: Cardiovascular   • CARDIAC SURGERY     • CATARACT EXTRACTION Left 08/2018   • CATARACT EXTRACTION Right 09/2018   •  COLONOSCOPY      2011   • INCISION AND DRAINAGE LEG Right 7/7/2017    Procedure: INCISION AND DRAINAGE INFECTED HEMATOMA RIGHT THIGH;  Surgeon: Valentin Peña MD;  Location: [facility] MAIN OR;  Service:    • JOINT REPLACEMENT     • KNEE INCISION AND DRAINAGE Right 12/27/2016    Procedure: KNEE INCISION AND DRAINAGE;  Surgeon: Triston Whitten MD;  Location: [facility] MAIN OR;  Service:    • NOSE SURGERY      nose replacement   • SKIN BIOPSY     • SKIN GRAFT  12/2017   • TOTAL KNEE ARTHROPLASTY Right    • VASCULAR SURGERY     • VENA CAVA FILTER PLACEMENT           Allergies   Allergen Reactions   • Penicillins Rash   • Rocephin [Ceftriaxone] Rash         No current facility-administered medications on file prior to encounter.     Current Outpatient Medications on File Prior to Encounter   Medication Sig Dispense Refill   • apixaban (ELIQUIS) 2.5 MG tablet tablet Take 2.5 mg by mouth 2 (Two) Times a Day.     • atorvastatin (LIPITOR) 20 MG tablet TAKE 1 TABLET BY MOUTH  DAILY FOR ELEVATION OF BOTH CHOLESTEROL AND  TRIGLYCERIDES IN BLOOD 90 tablet 3   • carbidopa-levodopa (SINEMET)  MG per tablet 1 tablet 3 (Three) Times a Day.     • carboxymethylcellulose (REFRESH PLUS) 0.5 % solution 1 drop 3 (Three) Times a Day As Needed (eye).     • levothyroxine (SYNTHROID, LEVOTHROID) 75 MCG tablet TAKE 1 TABLET BY MOUTH EVERY MORNING. INDICATIONS: UNDERACTIVE THYROID 90 tablet 1   • Multiple Vitamins-Minerals (PRESERVISION AREDS) capsule Take 1 tablet by mouth 2 (Two) Times a Day.     • pramipexole (MIRAPEX) 0.5 MG tablet Take 0.5 mg by mouth 3 (Three) Times a Day.     • valsartan (DIOVAN) 160 MG tablet TAKE 1 TABLET BY MOUTH EVERY DAY 90 tablet 3   • atropine 1 % ophthalmic solution 1 drop 3 (Three) Times a Day As Needed (as needed orally for increased secretions). For drooling     • cloNIDine (CATAPRES) 0.1 MG tablet Take 1 tablet daily if systolic blood pressure 160 270 tablet 2   • Cyanocobalamin (VITAMIN B 12 PO) Take  "1 tablet by mouth Every Morning. 1000 IU     • Polyethyl Glycol-Propyl Glycol (SYSTANE OP) Apply  to eye(s) as directed by provider.           Social History     Socioeconomic History   • Marital status:    • Years of education: college grad   Tobacco Use   • Smoking status: Never   • Smokeless tobacco: Never   • Tobacco comments:     caff use   Vaping Use   • Vaping Use: Never used   Substance and Sexual Activity   • Alcohol use: No     Comment: none for a month   • Drug use: No   • Sexual activity: Not Currently     Partners: Female         Family History   Problem Relation Age of Onset   • Heart attack Mother 74   • Hypertension Mother    • Stroke Brother    • Cancer Brother    • Cancer Sister    • Cancer Brother    • Deep vein thrombosis Neg Hx        REVIEW OF SYSTEMS:   Pertinent positives are noted in the HPI above.  Otherwise, all other systems were reviewed, and are negative.     Objective:     Vitals:    02/08/23 0711 02/08/23 0736 02/08/23 1219 02/08/23 1749   BP: 152/68  172/70 143/66   BP Location: Right arm  Right arm    Patient Position: Lying  Lying    Pulse: 58 64 56 72   Resp: 16  16 16   Temp: 98 °F (36.7 °C)  98.5 °F (36.9 °C) 98 °F (36.7 °C)   TempSrc: Oral  Oral Oral   SpO2: 96%  100% 100%   Weight:       Height:         Body mass index is 23.65 kg/m².  Flowsheet Rows    Flowsheet Row First Filed Value   Admission Height 180.3 cm (71\") Documented at 02/07/2023 0427   Admission Weight 77 kg (169 lb 12.1 oz) Documented at 02/07/2023 0431           General:    No acute distress, alert and oriented x4, pleasant                   Head:    Normocephalic, atraumatic.   Eyes:          Conjunctivae and sclerae normal, no icterus, PERRLA   Throat:   No oral lesions, no thrush, oral mucosa moist.    Neck:   Supple, trachea midline.   Lungs:     Clear to auscultation bilaterally     Heart:    Regular rhythm and normal rate.  No murmurs, gallops, or rubs noted.   Abdomen:     Soft, non-tender, " non-distended, positive bowel sounds.    Extremities:   Trace edema of the right lower extremity   Pulses:   Pulses palpable and equal bilaterally.    Skin:  Left arm wrapped from previous injury and skin tear noted on the left hand.   Neuro:  Parkinsonism.   Psychiatric:   Normal mood and affect.           Lab Review:                Results from last 7 days   Lab Units 02/07/23  0701   SODIUM mmol/L 141   POTASSIUM mmol/L 4.0   CHLORIDE mmol/L 109*   CO2 mmol/L 26.5   BUN mg/dL 24*   CREATININE mg/dL 0.73*   GLUCOSE mg/dL 131*   CALCIUM mg/dL 8.6     Results from last 7 days   Lab Units 02/07/23  1651 02/07/23  0443   TROPONIN T ng/mL <0.010 0.013     Results from last 7 days   Lab Units 02/07/23  0701   WBC 10*3/mm3 10.93*   HEMOGLOBIN g/dL 11.3*   HEMATOCRIT % 32.4*   PLATELETS 10*3/mm3 176         Results from last 7 days   Lab Units 02/07/23  0701   CHOLESTEROL mg/dL 111     Results from last 7 days   Lab Units 02/07/23  1651   MAGNESIUM mg/dL 2.0     Results from last 7 days   Lab Units 02/07/23  0701   CHOLESTEROL mg/dL 111   TRIGLYCERIDES mg/dL 36   HDL CHOL mg/dL 57   LDL CHOL mg/dL 44       EKG (reviewed by me personally):Sinus rhythm, short MN interval, ST changes in the anterior leads with biphasic T waves in V3 and V4 (unchanged)      Assessment:   1.  Idiopathic Parkinson's disease  2.  Parkinson's associated dementia  3.  Recurrent falls, related to gait imbalance from #1  4.  Paroxysmal atrial fibrillation  5.  History of lower extremity DVT, status post IVC filter  6.  Coronary artery disease, status post BILL to the mid LAD in 2019  7.  Ischemic cardiomyopathy with last ejection fraction 40% in 2019  8.  Likely aspiration pneumonitis   9.  Recent NSVT  10.  Hypertension with a history of orthostasis    Plan:       I had a long discussion with the patient and his wife today.  He has had 2 major falls in the last several months, including a fall several weeks ago where he significantly injured his  left arm and hit his head after falling down stairs.  He does not routinely fall, although he does have gait imbalance associated with the Parkinson's disease.  However, he also has paroxysmal atrial fibrillation with a CHADS-VASc score of at least 4.  He has also had a lower extremity DVT and has an IVC filter.    I really agree with Dr. Moreland here.  I think that there is a need for the Eliquis, and if he can stay on this, it would be favorable.  I would favor continuing this, but with very careful fall precautions and a plan for ambulating.  I personally think he very likely needs a walker, and I reiterated this to the patient and his wife.  If he has any falls going forward, I think the Eliquis will need to be discontinued.  I would not recommend restarting the Plavix.  I would keep him off of antiplatelet agents at this point to minimize his risk of bleeding with the Eliquis.  He does have coronary artery disease, although his LAD stent was placed in 2019.    With regards to the NSVT, this was self-limited several nights ago.  I would continue his current regimen for now.  I would not use a beta-blocker given his heart rate is already in the upper 50s to lower 60s.  I do not feel that he needs an ischemic evaluation without any anginal symptoms.  I also do not feel that an echocardiogram would change his management significantly here.    We will continue to follow.  Thank you very much for this consult.    Jeremy Gustafson MD

## 2023-02-09 NOTE — DISCHARGE SUMMARY
PHYSICIAN DISCHARGE SUMMARY                                                                        [facility]    Patient Identification:  Name: Casey Snowden Sr.  Age: 88 y.o.  Sex: male  :  1934  MRN: [id number]  Primary Care Physician: Aleksander Diez MD    Admit date: 2023  Discharge date and time: 2023     Discharged Condition: good    Discharge Diagnoses:  Aspiration pneumonia of right lung due to vomit (HCC)    Benign essential hypertension    Parkinson's disease (HCC)    Paroxysmal A-fib (HCC)    Chronic diastolic (congestive) heart failure    Anticoagulant long-term use    PAD (peripheral artery disease) (HCC)    Acquired hypothyroidism    Metabolic encephalopathy         Hospital Course:  Pleasant 88-year-old gentleman with history of Parkinson disease as well as paroxysmal atrial fibrillation on chronic anticoagulation presents via his family with history of increased confusion and recent fall.  Please H&P for full details.  On presentation he is afebrile but with a mild leukocytosis and mildly elevated procalcitonin level.  Chest x-ray followed by CT scan did show basilar infiltrates consistent with probable pneumonitis versus pneumonia.  If this combination there is concern for aspiration pneumonia and he was started on Levaquin and Flagyl.  Speech therapy consultation was obtained and his diet has been modified.  He has no pulmonary complaints today.  There is concern about the risk versus benefit of ongoing anticoagulation.  Both neurology and cardiology consultation was obtained.  Both would recommend continuing anticoagulation at this point.  He was also seen by PT and OT and we will request that they also see him at home.  There was reported confusion during hospitalization but he is alert and oriented this morning.  Suspect his confusion was secondary to possible delirium as well as possible  metabolic acidosis from the pneumonia/pneumonitis.        Consults:     Consults     Date and Time Order Name Status Description    2/8/2023  7:50 AM Inpatient Cardiology Consult Completed     2/7/2023  9:38 AM Inpatient Neurology Consult General Completed     2/7/2023  5:25 AM [facility] (on-call MD unless specified) Details              Discharge Exam:  Afebrile vital signs stable.  Well-developed well-nourished male in no apparent distress.  Lungs clear to auscultation good air movement.  Heart regular rate and rhythm.  Extremities no clubbing cyanosis or edema.  Alert oriented conversant cooperative and pleasant.     Disposition:  Home    Patient Instructions:      Discharge Medications      New Medications      Instructions Start Date   levoFLOXacin 750 MG tablet  Commonly known as: Levaquin   750 mg, Oral, Daily   Start Date: February 10, 2023     metroNIDAZOLE 500 MG tablet  Commonly known as: Flagyl   500 mg, Oral, 3 Times Daily         Continue These Medications      Instructions Start Date   apixaban 2.5 MG tablet tablet  Commonly known as: ELIQUIS   2.5 mg, Oral, 2 Times Daily      atorvastatin 20 MG tablet  Commonly known as: LIPITOR   TAKE 1 TABLET BY MOUTH  DAILY FOR ELEVATION OF BOTH CHOLESTEROL AND  TRIGLYCERIDES IN BLOOD      atropine 1 % ophthalmic solution   1 drop, 3 Times Daily PRN, For drooling      carbidopa-levodopa  MG per tablet  Commonly known as: SINEMET   1 tablet, 3 Times Daily      cloNIDine 0.1 MG tablet  Commonly known as: CATAPRES   Take 1 tablet daily if systolic blood pressure 160      levothyroxine 75 MCG tablet  Commonly known as: SYNTHROID, LEVOTHROID   75 mcg, Oral, Every Early Morning      pramipexole 0.5 MG tablet  Commonly known as: MIRAPEX   0.5 mg, Oral, 3 Times Daily      PreserVision AREDS capsule   1 tablet, Oral, 2 Times Daily      SYSTANE OP   Ophthalmic      valsartan 160 MG tablet  Commonly known as: DIOVAN   TAKE 1 TABLET BY MOUTH EVERY DAY      VITAMIN B 12 PO   1  tablet, Oral, Every Early Morning, 1000 IU         Stop These Medications    carboxymethylcellulose 0.5 % solution  Commonly known as: REFRESH PLUS          Diet Instructions     Diet: Regular      Discharge Diet: Regular    No Straw, No Mixed Consistencies; Texture: Mechanical Ground (NDD 2); Fluid Consistency: Thin        Future Appointments   Date Time Provider Department Center   2/13/2023 10:00 AM de Nise, Lucrecia, PT [facility]   2/20/2023 10:30 AM de Nise, Lucrecia, PT [facility]   2/27/2023 10:00 AM de Nise, Lucrecia, PT [facility]   3/6/2023 10:00 AM de Nise, Lucrecia, PT [facility]   3/13/2023 10:00 AM de Nise, Lucrecia, PT [facility]   3/20/2023 10:00 AM de Nise, Lucrecia, PT [facility]   3/27/2023 10:00 AM de Nise, Lucrecia, PT [facility]   3/31/2023 12:45 PM Aleksander Nixon MD [facility]   10/11/2023 10:45 AM Cecilio Armas III, MD [facility]     Additional Instructions for the Follow-ups that You Need to Schedule     Ambulatory Referral to Home Health (Hospital)   As directed      Face to Face Visit Date: 2/9/2023    Follow-up provider for Plan of Care?: I treated the patient in an acute care facility and will not continue treatment after discharge.    Follow-up provider: ALEKSANDER NIXON [[id number]]    Reason/Clinical Findings: Parkinson dz.  Falls.    Describe mobility limitations that make leaving home difficult: See above.    Nursing/Therapeutic Services Requested: Physical Therapy Occupational Therapy    PT orders: Therapeutic exercise Gait Training Transfer training Strengthening Home safety assessment    Weight Bearing Status: As Tolerated    Occupational orders: Activities of daily living Strengthening Cognition Fine motor Home safety assessment    Frequency: 1 Week 1         Discharge Follow-up with PCP   As directed       Currently Documented PCP:    Aleksander Nixon MD    PCP Phone Number:    [phone]     Follow Up Details: 1 week         Discharge  Follow-up with Specialty: Cardiology, Neurology   As directed      Specialty: Cardiology, Neurology    Follow Up Details: As directed.            Follow-up Information     Aleksander Diez MD .    Specialty: Family Medicine  Why: 1 week  Contact information:  [address] [facility] [id number] [phone]                       Discharge Order (From admission, onward)     Start     Ordered    02/09/23 1138  Discharge patient  Once        Expected Discharge Date: 02/09/23    Discharge Disposition: Home or Self Care    Physician of Record for Attribution - Please select from Treatment Team: KISHORE RAMIREZ [[id number]]    Review needed by CMO to determine Physician of Record: No       Question Answer Comment   Physician of Record for Attribution - Please select from Treatment Team KISHORE RAMIREZ    Review needed by CMO to determine Physician of Record No        02/09/23 1144                  Total time spent discharging patient including evaluation,post hospitalization follow up,  medication and post hospitalization instructions and education total time exceeds 30 minutes.    Signed:  Kishore Ramirez MD  2/9/2023  12:01 EST    EMR Dragon/Transcription disclaimer:   Much of this encounter note is an electronic transcription/translation of spoken language to printed text. The electronic translation of spoken language may permit erroneous, or at times, nonsensical words or phrases to be inadvertently transcribed; Although I have reviewed the note for such errors, some may still exist.

## 2023-02-09 NOTE — DISCHARGE PLACEMENT REQUEST
"Casey Snowden Sr. (88 y.o. Male)     Date of Birth   1934    Social Security Number       Address   [address] [place] [address]    Home Phone   [phone]    MRN   [id number]       Yarsanism   Bahai    Marital Status                               Admission Date   2/7/23    Admission Type   Emergency    Admitting Provider   Anup Bashir MD    Attending Provider   Kishore Fernandes MD    Department, Room/Bed   [address], [address]/1       Discharge Date       Discharge Disposition       Discharge Destination                               Attending Provider: Kishore Fernandes MD    Allergies: Penicillins, Rocephin [Ceftriaxone]    Isolation: None   Infection: None   Code Status: CPR    Ht: 180.3 cm (71\")   Wt: 76.9 kg (169 lb 8.5 oz)    Admission Cmt: None   Principal Problem: Aspiration pneumonia of right lung due to vomit (HCC) [J69.0]                 Active Insurance as of 2/7/2023     Primary Coverage     Payor Plan Insurance Group Employer/Plan Group    [facility] MEDICARE REPLACEMENT [facility] MEDICARE REPLACEMENT [id number]     Payor Plan Address Payor Plan Phone Number Payor Plan Fax Number Effective Dates    [address] [address]   1/1/2017 - None Entered    [facility] [address]       Subscriber Name Subscriber Birth Date Member ID       CASEY SNOWDEN EDUARDO JEFFERSON 1934 [id number]                 Emergency Contacts      (Rel.) Home Phone Work Phone Mobile Phone    SpEvonne (Spouse) [phone] -- [phone]    Svetlana Sol (Daughter) [phone] -- --    Casey Snowden Jr (Son) [phone] -- --              "

## 2023-02-09 NOTE — PLAN OF CARE
No acute events overnight. Patient alert and oriented, pleasant through care. Able to make needs known. Educated on importance of weight shifting when awake and up in chair to prevent pressure injuries as patient reports that he favors sitting up as opposed to being in bed. Call light within reach.    Goal Outcome Evaluation:  Plan of Care Reviewed With: patient        Progress: no change       Problem: Adult Inpatient Plan of Care  Goal: Plan of Care Review  Outcome: Ongoing, Progressing  Flowsheets (Taken 2/9/2023 0507)  Progress: no change  Plan of Care Reviewed With: patient  Goal: Patient-Specific Goal (Individualized)  Outcome: Ongoing, Progressing  Goal: Absence of Hospital-Acquired Illness or Injury  Outcome: Ongoing, Progressing  Intervention: Identify and Manage Fall Risk  Description: Perform standard risk assessment on admission using a validated tool or comprehensive approach appropriate to the patient; reassess fall risk frequently, with change in status or transfer to another level of care.  Communicate fall injury risk to interprofessional healthcare team.  Determine need for increased observation, equipment and environmental modification, such as low bed, signage and supportive, nonskid footwear.  Adjust safety measures to individual developmental age, stage and identified risk factors.  Reinforce the importance of safety and physical activity with patient and family.  Perform regular intentional rounding to assess need for position change, pain assessment and personal needs, including assistance with toileting.  Recent Flowsheet Documentation  Taken 2/9/2023 0230 by Mariel Villanueva, RN  Safety Promotion/Fall Prevention: safety round/check completed  Taken 2/9/2023 0000 by Mariel Villanueva, RN  Safety Promotion/Fall Prevention:   activity supervised   assistive device/personal items within reach   clutter free environment maintained   fall prevention program maintained   lighting adjusted   gait  belt   muscle strengthening facilitated   nonskid shoes/slippers when out of bed   room organization consistent   safety round/check completed  Taken 2/8/2023 2200 by Mariel Villanueva RN  Safety Promotion/Fall Prevention: safety round/check completed  Taken 2/8/2023 2045 by Mariel Villanueva RN  Safety Promotion/Fall Prevention: safety round/check completed  Intervention: Prevent Skin Injury  Description: Perform a screening for skin injury risk, such as pressure or moisture associated skin damage on admission and at regular intervals throughout hospital stay.  Keep all areas of skin (especially folds) clean and dry.  Maintain adequate skin hydration.  Relieve and redistribute pressure and protect bony prominences; implement measures based on patient-specific risk factors.  Match turning and repositioning schedule to clinical condition.  Encourage weight shift frequently; assist with reposition if unable to complete independently.  Float heels off bed; avoid pressure on the Achilles tendon.  Keep skin free from extended contact with medical devices.  Encourage functional activity and mobility, as early as tolerated.  Use aids (e.g., slide boards, mechanical lift) during transfer.  Recent Flowsheet Documentation  Taken 2/9/2023 0230 by Mariel Villanueva RN  Body Position: position changed independently  Taken 2/9/2023 0000 by Mariel Villanueva RN  Body Position: position changed independently  Skin Protection:   adhesive use limited   incontinence pads utilized   tubing/devices free from skin contact  Taken 2/8/2023 2200 by Mariel Villanueva RN  Body Position: supine, legs elevated  Taken 2/8/2023 2045 by Mariel Villanueva RN  Body Position:   turned   right   lower extremity elevated  Skin Protection:   adhesive use limited   incontinence pads utilized   tubing/devices free from skin contact  Intervention: Prevent and Manage VTE (Venous Thromboembolism) Risk  Description: Assess for VTE (venous thromboembolism)  risk.  Encourage and assist with early ambulation.  Initiate and maintain compression or other therapy, as indicated, based on identified risk in accordance with organizational protocol and provider order.  Encourage both active and passive leg exercises while in bed, if unable to ambulate.  Recent Flowsheet Documentation  Taken 2/9/2023 0230 by Mariel Villanueva RN  Activity Management: activity adjusted per tolerance  Taken 2/9/2023 0000 by Mariel Villanueva RN  Activity Management: activity adjusted per tolerance  VTE Prevention/Management:   bilateral   sequential compression devices on  Range of Motion: active ROM (range of motion) encouraged  Taken 2/8/2023 2200 by Mariel Villanueva RN  Activity Management:   back to bed   activity adjusted per tolerance  VTE Prevention/Management:   bilateral   sequential compression devices on  Taken 2/8/2023 2045 by Mariel Villanueva RN  Activity Management:   up in chair   activity adjusted per tolerance  VTE Prevention/Management:   bilateral   sequential compression devices off  Range of Motion: ROM (range of motion) performed  Intervention: Prevent Infection  Description: Maintain skin and mucous membrane integrity; promote hand, oral and pulmonary hygiene.  Optimize fluid balance, nutrition, sleep and glycemic control to maximize infection resistance.  Identify potential sources of infection early to prevent or mitigate progression of infection (e.g., wound, lines, devices).  Evaluate ongoing need for invasive devices; remove promptly when no longer indicated.  Recent Flowsheet Documentation  Taken 2/9/2023 0230 by Mariel Villanueva RN  Infection Prevention: rest/sleep promoted  Taken 2/9/2023 0000 by Mariel Villanueva RN  Infection Prevention:   hand hygiene promoted   rest/sleep promoted   single patient room provided   visitors restricted/screened  Taken 2/8/2023 2200 by Mariel Villanueva RN  Infection Prevention: rest/sleep promoted  Taken 2/8/2023 2045 by Rich  Mariel RN  Infection Prevention: rest/sleep promoted  Goal: Optimal Comfort and Wellbeing  Outcome: Ongoing, Progressing  Intervention: Provide Person-Centered Care  Description: Use a family-focused approach to care.  Develop trust and rapport by proactively providing information, encouraging questions, addressing concerns and offering reassurance.  Acknowledge emotional response to hospitalization.  Recognize and utilize personal coping strategies.  Honor spiritual and cultural preferences.  Recent Flowsheet Documentation  Taken 2/9/2023 0000 by Mariel Villanueva RN  Trust Relationship/Rapport:   care explained   choices provided   emotional support provided   empathic listening provided   questions encouraged   questions answered   reassurance provided   thoughts/feelings acknowledged  Taken 2/8/2023 2045 by Mariel Villanueva, RN  Trust Relationship/Rapport:   care explained   choices provided   emotional support provided   empathic listening provided   questions encouraged   questions answered   reassurance provided   thoughts/feelings acknowledged  Goal: Readiness for Transition of Care  Outcome: Ongoing, Progressing

## 2023-02-10 ENCOUNTER — TRANSITIONAL CARE MANAGEMENT TELEPHONE ENCOUNTER (OUTPATIENT)
Dept: CALL CENTER | Facility: HOSPITAL | Age: 88
End: 2023-02-10
Payer: MEDICARE

## 2023-02-10 ENCOUNTER — TELEPHONE (OUTPATIENT)
Dept: FAMILY MEDICINE CLINIC | Facility: CLINIC | Age: 88
End: 2023-02-10

## 2023-02-10 NOTE — OUTREACH NOTE
Call Center TCM Note    Flowsheet Row Responses   [facility] patient discharged from? [facility]   Does the patient have one of the following disease processes/diagnoses(primary or secondary)? Pneumonia   TCM attempt successful? Yes   Call start time 1408   Call end time 1420   Discharge diagnosis Aspiration pneumonia of right lung due to vomit    Is patient permission given to speak with other caregiver? Yes   List who call center can speak with Spouse Evonne    Person spoke with today (if not patient) and relationship Spouse Evonne    Meds reviewed with patient/caregiver? Yes   Is the patient having any side effects they believe may be caused by any medication additions or changes? No   Does the patient have all medications ordered at discharge? No   What is keeping the patient from filling the prescriptions? --  [picking up antibiotic later today-Flagyl was stopped by PCP-spouse spoke with PCP ]   Nursing Interventions Nurse provided patient education   Is the patient taking all medications as directed (includes completed medication regime)? Yes   Comments Hosp dc fu apt on 2/15/23 with PCP    Does the patient have an appointment with their PCP within 7 days of discharge? Yes   Has home health visited the patient within 72 hours of discharge? N/A   Home health comments PT scheduled 2/13/23 (scheduled previously)   Has all DME been delivered? Yes   DME comments Walker and bath toilet aid   Pulse Ox monitoring None   Did the patient receive a copy of their discharge instructions? Yes   Nursing interventions Reviewed instructions with patient   What is the patient's perception of their health status since discharge? Same   If the patient is a current smoker, are they able to teach back resources for cessation? Not a smoker   Is the patient/caregiver able to teach back the hierarchy of who to call/visit for symptoms/problems? PCP, Specialist, Home health nurse, Urgent Care, ED, 911 Yes   Is the patient/caregiver  able to teach back signs and symptoms of worsening condition: Fever/chills   Is the patient/caregiver able to teach back importance of completing antibiotic course of treatment? Yes   TCM call completed? Yes   Call end time [id number]          Lynda Castle RN    2/10/2023, 14:21 EST

## 2023-02-10 NOTE — PROGRESS NOTES
"Enter Query Response Below      Query Response:     Unable to determine         If applicable, please update the problem list.         Patient: Casey Snowden Sr.        : 1934  Account: [id number]           Admit Date: 2023        How to Respond to this query:       a. Click New Note     b. Answer query within the yellow box.                c. Update the Problem List, if applicable.      If you have any questions about this query contact me at: [email or web address]    Dr. Carballo,  Patient has a diagnosis of metabolic encephalopathy during this admission. Discharge noted stated \"There was reported confusion during hospitalization, but he is alert and oriented this morning.\"  Patient has a history of Parkinson's associated dementia.   During this admission the patient was describes as oriented x3 and had a GCS 15 the entire admission.     After study, was Metabolic Encephalopathy clinically supported during this admission?    -Yes, please include additional clinical indicators: ____________  -No  -Other- specify________  -Unable to determine      By submitting this query, we are merely seeking further clarification of documentation to accurately reflect all conditions that you are monitoring, evaluating, treating or that extend the hospitalization or utilize additional resources of care. Please utilize your independent clinical judgment when addressing the question(s) above.     This query and your response, once completed, will be entered into the legal medical record.    Sincerely,  Melinda Pedro  Clinical Documentation Integrity Program     "

## 2023-02-10 NOTE — TELEPHONE ENCOUNTER
Caller: Evonne Snowden    Relationship: Emergency Contact    Best call back number: [phone]    What is the best time to reach you: ANYTIME     Who are you requesting to speak with (clinical staff, provider,  specific staff member): CLINICAL    What was the call regarding: SOME MEDICATIONS PATIENT WAS PRESCRIBED BY HOSPITALITIS AND WIFE HAS CONCERNS REGARDING THEM      PLEASE CALL TO DISCUSS AND ADVISE

## 2023-02-10 NOTE — TELEPHONE ENCOUNTER
Patients spouse called regarding the two antibiotics he was given to continue at home since being d/c from hospital. They have him taking both levaquin and flagyl, but since Wednesday he has been experiencing some abdominal cramping and more loose stools. He does have an appointment to follow up with you next week, but she wants to make sure these two are ok to take together before she goes to the pharmacy to pick them up.

## 2023-02-12 LAB
BACTERIA SPEC AEROBE CULT: NORMAL
BACTERIA SPEC AEROBE CULT: NORMAL

## 2023-02-15 ENCOUNTER — OFFICE VISIT (OUTPATIENT)
Dept: FAMILY MEDICINE CLINIC | Facility: CLINIC | Age: 88
End: 2023-02-15
Payer: MEDICARE

## 2023-02-15 VITALS
TEMPERATURE: 96.9 F | DIASTOLIC BLOOD PRESSURE: 70 MMHG | HEIGHT: 71 IN | RESPIRATION RATE: 16 BRPM | WEIGHT: 171 LBS | SYSTOLIC BLOOD PRESSURE: 110 MMHG | HEART RATE: 60 BPM | BODY MASS INDEX: 23.94 KG/M2

## 2023-02-15 DIAGNOSIS — Z09 HOSPITAL DISCHARGE FOLLOW-UP: ICD-10-CM

## 2023-02-15 DIAGNOSIS — Z79.01 ANTICOAGULANT LONG-TERM USE: Chronic | ICD-10-CM

## 2023-02-15 DIAGNOSIS — J69.0 ASPIRATION PNEUMONIA OF RIGHT LOWER LOBE DUE TO VOMIT: Primary | ICD-10-CM

## 2023-02-15 DIAGNOSIS — R13.19 DYSPHAGIA, NEUROLOGIC: ICD-10-CM

## 2023-02-15 DIAGNOSIS — S41.112D SKIN TEAR OF UPPER ARM WITHOUT COMPLICATION, LEFT, SUBSEQUENT ENCOUNTER: ICD-10-CM

## 2023-02-15 DIAGNOSIS — S00.83XD FACIAL CONTUSION, SUBSEQUENT ENCOUNTER: ICD-10-CM

## 2023-02-15 DIAGNOSIS — G90.3 ORTHOSTATIC HYPOTENSION DUE TO PARKINSON'S DISEASE: ICD-10-CM

## 2023-02-15 PROBLEM — R41.82 ALTERED MENTAL STATUS, UNSPECIFIED ALTERED MENTAL STATUS TYPE: Status: RESOLVED | Noted: 2023-02-07 | Resolved: 2023-02-15

## 2023-02-15 PROCEDURE — 1111F DSCHRG MED/CURRENT MED MERGE: CPT | Performed by: FAMILY MEDICINE

## 2023-02-15 PROCEDURE — 99496 TRANSJ CARE MGMT HIGH F2F 7D: CPT | Performed by: FAMILY MEDICINE

## 2023-02-15 NOTE — PROGRESS NOTES
Transitional Care Follow Up Visit  Subjective     Casey Snowden Sr. is a 88 y.o. male who presents for a transitional care management visit.    Within 48 business hours after discharge our office contacted him via telephone to coordinate his care and needs.      I reviewed and discussed the details of that call along with the discharge summary, hospital problems, inpatient lab results, inpatient diagnostic studies, and consultation reports with Casey.     Current outpatient and discharge medications have been reconciled for the patient.  Reviewed by: Aleksander Diez MD      Date of TCM Phone Call 2/9/2023   [facility]   Date of Admission 2/7/2023   Date of Discharge 2/9/2023   Discharge Disposition Home or Self Care     Risk for Readmission (LACE) Score: 13 (2/9/2023  6:00 AM)    CT Head Without Contrast (02/07/2023 05:00)  XR Chest 1 View (02/07/2023 05:00)  CT Chest Without Contrast Diagnostic (02/07/2023 09:43)  MRI Brain Without Contrast (02/07/2023 13:26)  FL Video Swallow With Speech Single Contrast (02/08/2023 09:11)    History of Present Illness   Course During Hospital Stay:    Afshin was rushed to the hospital last week by his wife in the middle of the night.  Casey was doing his usual gurgling on his saliva while he sleeps at night.  Due to his Parkinson's.  He sleeps on his back.  Woke up said it was heart was hurting she was worried he was having a heart attack.  ER was worried he was having a stroke.  Good news he had neither  We reviewed his hospital stay.  Likely had a aspiration pneumonia.  Which certainly fits the clinical picture due to his Parkinson's and known dysphagia  Was treated with Levaquin and Flagyl  Sent home on same  He cannot tolerate the Flagyl.  So he stopped it.  But was able to finish out Levaquin.  His breathing is back to normal no fever    Seen by neurology and cardiology.  They want him back on at least the Eliquis as he does have a elevated  score  But they were okay with stopping the Plavix.  And I am okay with that  As he is a very high risk of falls, and he is proven such multiple times    Good news all abrasions of his left face are healed up.  Only minimal bruising in his anterior neck  And a large skin tear left hand is healing nicely without infection      ,     The following portions of the patient's history were reviewed and updated as appropriate: allergies, current medications, past medical history, past social history, past surgical history and problem list.    Review of Systems    Objective   Physical Exam  Vitals reviewed.   Cardiovascular:      Rate and Rhythm: Normal rate.   Pulmonary:      Effort: Pulmonary effort is normal.   Neurological:      Mental Status: He is alert.      Cranial Nerves: Cranial nerve deficit present.      Gait: Gait abnormal.       Vitals:    02/15/23 1259   BP: 110/70   Pulse: 60   Resp: 16   Temp: 96.9 °F (36.1 °C)         Current outpatient and discharge medications have been reconciled for the patient.  Reviewed by: Aleksander Diez MD    Assessment & Plan   Diagnoses and all orders for this visit:    1. Aspiration pneumonia of right lower lobe due to vomit (HCC) (Primary)    2. Anticoagulant long-term use    3. Facial contusion, subsequent encounter    4. 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)    5. Skin tear of upper arm without complication, left, subsequent encounter    6. Hospital discharge follow-up    7. Dysphagia, neurologic    Wound care discussed.    Fall risks discussed    Okay with restarting Plavix    We will try to generally follow the swallow study recommendations  Eating slowly, small bites, chew thoroughly.  Sip of water.  If food or water gets caught.  Stop eating, cough forcefully

## 2023-02-20 ENCOUNTER — TREATMENT (OUTPATIENT)
Dept: PHYSICAL THERAPY | Facility: CLINIC | Age: 88
End: 2023-02-20
Payer: MEDICARE

## 2023-02-20 DIAGNOSIS — M25.511 ACUTE PAIN OF RIGHT SHOULDER: Primary | ICD-10-CM

## 2023-02-20 DIAGNOSIS — M25.611 DECREASED ROM OF RIGHT SHOULDER: ICD-10-CM

## 2023-02-20 DIAGNOSIS — R29.898 SHOULDER WEAKNESS: ICD-10-CM

## 2023-02-20 PROCEDURE — 97110 THERAPEUTIC EXERCISES: CPT | Performed by: PHYSICAL THERAPIST

## 2023-02-20 PROCEDURE — 97140 MANUAL THERAPY 1/> REGIONS: CPT | Performed by: PHYSICAL THERAPIST

## 2023-02-20 PROCEDURE — 97530 THERAPEUTIC ACTIVITIES: CPT | Performed by: PHYSICAL THERAPIST
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
## 2023-02-21 ENCOUNTER — READMISSION MANAGEMENT (OUTPATIENT)
Dept: CALL CENTER | Facility: HOSPITAL | Age: 88
End: 2023-02-21
Payer: MEDICARE

## 2023-02-21 NOTE — OUTREACH NOTE
COPD/PN Week 2 Survey    Flowsheet Row Responses   [facility] patient discharged from? [facility]   Does the patient have one of the following disease processes/diagnoses(primary or secondary)? Pneumonia   Week 2 attempt successful? No   Unsuccessful attempts Attempt 1          Lenore Eid Registered Nurse

## 2023-02-27 ENCOUNTER — TREATMENT (OUTPATIENT)
Dept: PHYSICAL THERAPY | Facility: CLINIC | Age: 88
End: 2023-02-27
Payer: MEDICARE

## 2023-02-27 DIAGNOSIS — R29.898 SHOULDER WEAKNESS: ICD-10-CM

## 2023-02-27 DIAGNOSIS — M25.511 ACUTE PAIN OF RIGHT SHOULDER: Primary | ICD-10-CM

## 2023-02-27 DIAGNOSIS — M25.611 DECREASED ROM OF RIGHT SHOULDER: ICD-10-CM

## 2023-02-27 PROCEDURE — 97140 MANUAL THERAPY 1/> REGIONS: CPT | Performed by: PHYSICAL THERAPIST

## 2023-02-27 PROCEDURE — 97110 THERAPEUTIC EXERCISES: CPT | Performed by: PHYSICAL THERAPIST

## 2023-02-27 NOTE — PROGRESS NOTES
Physical Therapy Daily Treatment Note      Patient: Casey Snowden Sr.   : 1934  Referring practitioner: Aleksander Diez MD  Date of Initial Visit: Type: THERAPY  Noted: 10/11/2022  Today's Date: 2023  Patient seen for 16 sessions         Casey Snowden reports: R shoulder pain is about the same; some intermittent aches and pops but no sig pain.      Objective   See Exercise, Manual, and Modality Logs for complete treatment.       Assessment/Plan  No joint noise today during tx session; continued to work on strength with motion against gravity in flexion and ABD along with ER, most weakness in ER. Pt advised to avoid any activity that increases pain. Improving stability with rhythmic stab at shoulder but sig lack of ER strength needing AAROM for proper technique and decreased compensation. Some pain noted with eating with R hand. Also noted weak wrist flexors needing VC/TC to decrease compensation at bicep with strengthening exercise.      Progress per Plan of Care and Progress strengthening /stabilization /functional activity       Timed:  Manual Therapy:    15     mins  57440;  Therapeutic Exercise:    30     mins  80053; *  Neuromuscular Tres:    -    mins  92375;    Therapeutic Activity:     10     mins  17690; *    Gait Training:      -     mins  47553;     Ultrasound:     -     mins  96256;      Untimed:  Electrical Stimulation:    -     mins  41070 ( );  Mechanical Traction:    -     mins  89345;   Dry needling:      -     mins  49599/    *Decreased units billed to account for divided time    Timed Treatment:   55   mins   Total Treatment:     55   mins  Lucrecia Taylor PT  Physical Therapist    License #: [id number]

## 2023-03-01 ENCOUNTER — READMISSION MANAGEMENT (OUTPATIENT)
Dept: CALL CENTER | Facility: HOSPITAL | Age: 88
End: 2023-03-01
Payer: MEDICARE

## 2023-03-01 NOTE — OUTREACH NOTE
"COPD/PN Week 3 Survey    Flowsheet Row Responses   [facility] [place] patient discharged from? [place]   Does the patient have one of the following disease processes/diagnoses(primary or secondary)? Pneumonia   Week 3 attempt successful? Yes   Call start time 1608   Call end time 1619   Discharge diagnosis Aspiration pneumonia of right lung due to vomit    Person spoke with today (if not patient) and relationship Spouse Evonne Wilcox reviewed with patient/caregiver? Yes   Is the patient taking all medications as directed (includes completed medication regime)? Yes   Medication comments ABT completes   Does the patient have a primary care provider?  Yes   Does the patient have an appointment with their PCP or specialist within 7 days of discharge? Yes   Has the patient kept scheduled appointments due by today? Yes   Has home health visited the patient within 72 hours of discharge? N/A   Has all DME been delivered? No   DME comments Walker and bath toilet aid   Did the patient receive a copy of their discharge instructions? Yes   What is the patient's perception of their health status since discharge? Improving   Is the patient/caregiver able to teach back the hierarchy of who to call/visit for symptoms/problems? PCP, Specialist, Home health nurse, Urgent Care, ED, 911 Yes   Is the patient/caregiver able to teach back signs and symptoms of worsening condition: Fever/chills, Shortness of breath, Chest pain   Is the patient/caregiver able to teach back importance of completing antibiotic course of treatment? Yes   Week 3 call completed? Yes   Graduated/Revoked comments WIfe reports overall Pt is doing well and is out side cooking on the smoker at this time. Wife did report that pt has increased secretions at night and she can him him \"gurgle \" when he is asleep. Pt has Parkinsons and has an order for Atropine drops PRN up to 3 x day. Wife states he only uses it when they go out in public . Recommended using drop prior " to bed to help with the secretions.           CHRISTIAN OVERTON - Registered Nurse

## 2023-03-13 ENCOUNTER — TREATMENT (OUTPATIENT)
Dept: PHYSICAL THERAPY | Facility: CLINIC | Age: 88
End: 2023-03-13
Payer: MEDICARE

## 2023-03-13 DIAGNOSIS — M25.511 ACUTE PAIN OF RIGHT SHOULDER: Primary | ICD-10-CM

## 2023-03-13 DIAGNOSIS — R29.898 SHOULDER WEAKNESS: ICD-10-CM

## 2023-03-13 DIAGNOSIS — M25.611 DECREASED ROM OF RIGHT SHOULDER: ICD-10-CM

## 2023-03-13 PROCEDURE — 97110 THERAPEUTIC EXERCISES: CPT | Performed by: PHYSICAL THERAPIST

## 2023-03-13 PROCEDURE — 97140 MANUAL THERAPY 1/> REGIONS: CPT | Performed by: PHYSICAL THERAPIST

## 2023-03-13 PROCEDURE — 97530 THERAPEUTIC ACTIVITIES: CPT | Performed by: PHYSICAL THERAPIST

## 2023-03-13 NOTE — PROGRESS NOTES
Physical Therapy Daily Treatment Note      Patient: Casey Snowden Sr.   : 1934  Referring practitioner: Aleksander Diez MD  Date of Initial Visit: Type: THERAPY  Noted: 10/11/2022  Today's Date: 3/13/2023  Patient seen for 17 sessions         Casey Snowden reports: intermittent pain returning into R shoulder; able to eat better with R arm, still wanting to avoid surgery.         Objective   See Exercise, Manual, and Modality Logs for complete treatment.       Assessment/Plan  Pt with slow improvement in strength with likely RTC tear but wanting to avoid surgery. Updated and printed HEP for compliance to work on strength in pain-free range; focus on ER strength (isometrically and against gravity). Limited time today due to pt's dentist appt after today's session thus some exercises deferred. Pt may consider calling ortho office and get an appt for another injection in a few weeks with increased pain last few sessions.        Progress per Plan of Care and Progress strengthening /stabilization /functional activity         Timed:  Manual Therapy:    10     mins  72137;  Therapeutic Exercise:    25     mins  26686;     Neuromuscular Tres:    -    mins  16434;    Therapeutic Activity:     10     mins  79585;     Gait Training:      -     mins  89523;     Ultrasound:     -     mins  26534;      Untimed:  Electrical Stimulation:    -     mins  89855 ( );  Mechanical Traction:    -     mins  36647;   Dry needling:      -     mins  48532/    Timed Treatment:   45   mins   Total Treatment:     45   mins    Lucrecia Taylor PT  Physical Therapist    License #: [id number]

## 2023-03-20 ENCOUNTER — TREATMENT (OUTPATIENT)
Dept: PHYSICAL THERAPY | Facility: CLINIC | Age: 88
End: 2023-03-20
Payer: MEDICARE

## 2023-03-20 DIAGNOSIS — M25.611 DECREASED ROM OF RIGHT SHOULDER: ICD-10-CM

## 2023-03-20 DIAGNOSIS — R29.898 SHOULDER WEAKNESS: ICD-10-CM

## 2023-03-20 DIAGNOSIS — M25.511 ACUTE PAIN OF RIGHT SHOULDER: Primary | ICD-10-CM

## 2023-03-20 PROCEDURE — 97530 THERAPEUTIC ACTIVITIES: CPT | Performed by: PHYSICAL THERAPIST

## 2023-03-20 PROCEDURE — 97110 THERAPEUTIC EXERCISES: CPT | Performed by: PHYSICAL THERAPIST

## 2023-03-20 PROCEDURE — 97140 MANUAL THERAPY 1/> REGIONS: CPT | Performed by: PHYSICAL THERAPIST
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
## 2023-03-27 ENCOUNTER — TREATMENT (OUTPATIENT)
Dept: PHYSICAL THERAPY | Facility: CLINIC | Age: 88
End: 2023-03-27
Payer: MEDICARE

## 2023-03-27 DIAGNOSIS — M25.511 ACUTE PAIN OF RIGHT SHOULDER: Primary | ICD-10-CM

## 2023-03-27 DIAGNOSIS — R29.898 SHOULDER WEAKNESS: ICD-10-CM

## 2023-03-27 DIAGNOSIS — M25.611 DECREASED ROM OF RIGHT SHOULDER: ICD-10-CM

## 2023-03-27 PROCEDURE — 97140 MANUAL THERAPY 1/> REGIONS: CPT | Performed by: PHYSICAL THERAPIST

## 2023-03-27 PROCEDURE — 97110 THERAPEUTIC EXERCISES: CPT | Performed by: PHYSICAL THERAPIST

## 2023-03-27 NOTE — PROGRESS NOTES
Physical Therapy Daily Treatment Note      Patient: Casey Snowden Sr.   : 1934  Referring practitioner: Aleksander Diez MD  Date of Initial Visit: Type: THERAPY  Noted: 10/11/2022  Today's Date: 3/27/2023  Patient seen for 19 sessions         Casey Snowden reports: he has been working outside in his garden no shoulder pain with increased activity           Objective   See Exercise, Manual, and Modality Logs for complete treatment.       Assessment/Plan  Noted R shoulder pain with passive ER and end range flexion and ABD; continue to progress strength and functional ROM of R shoulder. Pt reports more pain than the past few weeks with exercises and manual tx; may consider US or ionto for pain control or refer back to surgeon for another steroid injection. Pt able to complete active arm elevation to ~90 degrees; worsens with fatigue throughout session.     Progress per Plan of Care and Progress strengthening /stabilization /functional activity           Timed:  Manual Therapy:    15     mins  12160;  Therapeutic Exercise:    23     mins  59937;     Neuromuscular Tres:    -    mins  51879;    Therapeutic Activity:     -     mins  96113;     Gait Training:      -     mins  92942;     Ultrasound:     -     mins  88644;      Untimed:  Electrical Stimulation:    -     mins  55425 ( );  Mechanical Traction:    -     mins  57417;   Dry needling:      -     mins  35211/    Timed Treatment:   38   mins   Total Treatment:     42   mins  Lucrecia Taylor PT  Physical Therapist    License #: [id number]

## 2023-03-31 ENCOUNTER — OFFICE VISIT (OUTPATIENT)
Dept: FAMILY MEDICINE CLINIC | Facility: CLINIC | Age: 88
End: 2023-03-31
Payer: MEDICARE

## 2023-03-31 VITALS
TEMPERATURE: 97.3 F | SYSTOLIC BLOOD PRESSURE: 132 MMHG | BODY MASS INDEX: 24.08 KG/M2 | DIASTOLIC BLOOD PRESSURE: 82 MMHG | HEIGHT: 71 IN | HEART RATE: 59 BPM | WEIGHT: 172 LBS | OXYGEN SATURATION: 97 %

## 2023-03-31 DIAGNOSIS — G90.3 ORTHOSTATIC HYPOTENSION DUE TO PARKINSON'S DISEASE: ICD-10-CM

## 2023-03-31 DIAGNOSIS — G20 PARKINSON'S DISEASE: Primary | Chronic | ICD-10-CM

## 2023-03-31 PROBLEM — R60.0 LOCALIZED EDEMA: Status: RESOLVED | Noted: 2017-01-04 | Resolved: 2023-03-31

## 2023-03-31 PROBLEM — S00.83XD FACIAL CONTUSION, SUBSEQUENT ENCOUNTER: Status: RESOLVED | Noted: 2023-01-30 | Resolved: 2023-03-31

## 2023-03-31 PROBLEM — G93.41 METABOLIC ENCEPHALOPATHY: Status: RESOLVED | Noted: 2023-02-07 | Resolved: 2023-03-31

## 2023-03-31 PROCEDURE — 1159F MED LIST DOCD IN RCRD: CPT | Performed by: FAMILY MEDICINE

## 2023-03-31 PROCEDURE — 99214 OFFICE O/P EST MOD 30 MIN: CPT | Performed by: FAMILY MEDICINE

## 2023-03-31 PROCEDURE — 1160F RVW MEDS BY RX/DR IN RCRD: CPT | Performed by: FAMILY MEDICINE

## 2023-03-31 RX ORDER — CLOPIDOGREL BISULFATE 75 MG/1
TABLET ORAL
COMMUNITY
Start: 2023-02-22 | End: 2023-03-31

## 2023-03-31 NOTE — PROGRESS NOTES
"  Chief Complaint   Patient presents with   • Hypertension   • Tremors       Subjective     Patient here for follow-up of elevated blood pressure.    He is exercising and is adherent to a low-salt diet.    Blood pressure is well controlled at home.   Cardiac symptoms: fatigue and near-syncope.   Patient denies: chest pain.   Cardiovascular risk factors: advanced age (older than 55 for men, 65 for women).   Use of agents associated with hypertension: none.   History of target organ damage: prior coronary revascularization.  Patient is taking prescribed hypertension medications as prescribed without side effects.    Has completed his course of Plavix.  Was placed on this briefly for possible stroke TIA during last hospitalization.  Stroke was ruled out,  Final diagnosis with aspiration pneumonia  Due to his known dysphagia due to his Parkinson disease    Parkinson's is fairly stable.  Still having memory problems  High risk for falls.  Several falls this year with sustained injuries    Ongoing frozen right shoulder.  He was told only thing to fix it will be a total shoulder  I have recommended against having any type of major surgery.  Too risky for Afshin        The following portions of the patient's history were reviewed and updated as appropriate: allergies, current medications, past medical history, past social history, past surgical history and problem list.    Review of Systems  A comprehensive review of systems was negative except for: Neurological: positive for coordination problems, dizziness, gait problems, memory problems, paresthesia, speech problems, tremors and weakness         Vitals:    03/31/23 1242   BP: 132/82   Pulse: 59   Temp: 97.3 °F (36.3 °C)   SpO2: 97%   Weight: 78 kg (172 lb)   Height: 180.3 cm (70.98\")     BP Readings from Last 3 Encounters:   03/31/23 132/82   02/15/23 110/70   02/09/23 149/74     Objective      Gen: alert, pleasant.  Neck: no bruit, no enlarged thyroid  Lungs: CTA  Heart: " RR, no murmur.  In rhythm today  Feet: no edema  Pulses: intact    Assessment & Plan   Hypertension, normal blood pressure Evidence of target organ damage: prior coronary revascularization.    Diagnoses and all orders for this visit:    1. Parkinson's disease (HCC) (Primary)    2. 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)      Fall prevention risk techniques issues discussed today    Continue his right shoulder exercises to maintain range of motion    Swallow precautions discussed.  High risk for aspiration pneumonia  Medication: no change.  Follow up: 6 months and as needed.    There are no Patient Instructions on file for this visit.  Medications Discontinued During This Encounter   Medication Reason   • clopidogrel (PLAVIX) 75 MG tablet *Therapy completed        Return in about 6 months (around 9/30/2023) for Medicare Wellness visit.    Limit salt  Limit alcoholic drinks to 1 a day  Limit caffeine to 1-2 servings a day    Dr. Aleksander Diez MD  [place], [place]  [facility].

## 2023-04-04 ENCOUNTER — TREATMENT (OUTPATIENT)
Dept: PHYSICAL THERAPY | Facility: CLINIC | Age: 88
End: 2023-04-04
Payer: MEDICARE

## 2023-04-04 DIAGNOSIS — M25.511 ACUTE PAIN OF RIGHT SHOULDER: Primary | ICD-10-CM

## 2023-04-04 DIAGNOSIS — M25.611 DECREASED ROM OF RIGHT SHOULDER: ICD-10-CM

## 2023-04-04 DIAGNOSIS — R29.898 SHOULDER WEAKNESS: ICD-10-CM

## 2023-04-04 PROCEDURE — 97140 MANUAL THERAPY 1/> REGIONS: CPT | Performed by: PHYSICAL THERAPIST

## 2023-04-04 PROCEDURE — 97110 THERAPEUTIC EXERCISES: CPT | Performed by: PHYSICAL THERAPIST

## 2023-04-04 NOTE — PROGRESS NOTES
Physical Therapy Daily Treatment Note      Patient: Casey Snowden Sr.   : 1934  Referring practitioner: Aleksander Diez MD  Date of Initial Visit: Type: THERAPY  Noted: 10/11/2022  Today's Date: 2023  Patient seen for 20 sessions         Casey Snowden reports: pain has been better this past week; I don't think I need that shot now.         Objective   See Exercise, Manual, and Modality Logs for complete treatment.       Assessment/Plan  Added manual PNF with mild resistance into flexion as tolerated from supine position; added sidelying ER to improve RTC strength. Pt with improving R shoulder mobility and strength; but limited by quick fatigue with exercises by end of session. Added AAROM standing with cane into flexion. Intermittent popping at shoulder joint but non-painful. Continue to progress strength per pt's tolerance to improve functional use of R arm.    Progress per Plan of Care and Progress strengthening /stabilization /functional activity           Timed:  Manual Therapy:    15     mins  37854;  Therapeutic Exercise:    20     mins  12520;     Neuromuscular Tres:    -    mins  70283;    Therapeutic Activity:     -     mins  02384;     Gait Training:      -     mins  44569;     Ultrasound:     -     mins  17701;      Untimed:  Electrical Stimulation:    -     mins  71122 ( );  Mechanical Traction:    -     mins  87213;   Dry needling:      -     mins  67790/    Timed Treatment:   35   mins   Total Treatment:     40   mins  Lucrecia Taylor PT  Physical Therapist    License #: [id number]

## 2023-04-10 ENCOUNTER — TELEPHONE (OUTPATIENT)
Dept: PHYSICAL THERAPY | Facility: CLINIC | Age: 88
End: 2023-04-10

## 2023-04-10 NOTE — TELEPHONE ENCOUNTER
Caller: Evonne Snowden    Relationship: Emergency Contact       What was the call regarding:HAS A NEURO APPT THEY FORGOT ABOUT

## 2023-04-11 ENCOUNTER — TREATMENT (OUTPATIENT)
Dept: PHYSICAL THERAPY | Facility: CLINIC | Age: 88
End: 2023-04-11
Payer: MEDICARE

## 2023-04-11 DIAGNOSIS — M25.611 DECREASED ROM OF RIGHT SHOULDER: ICD-10-CM

## 2023-04-11 DIAGNOSIS — R29.898 SHOULDER WEAKNESS: ICD-10-CM

## 2023-04-11 DIAGNOSIS — M25.511 ACUTE PAIN OF RIGHT SHOULDER: Primary | ICD-10-CM

## 2023-04-11 PROCEDURE — 97110 THERAPEUTIC EXERCISES: CPT | Performed by: PHYSICAL THERAPIST

## 2023-04-11 PROCEDURE — 97140 MANUAL THERAPY 1/> REGIONS: CPT | Performed by: PHYSICAL THERAPIST

## 2023-04-11 NOTE — PROGRESS NOTES
Physical Therapy Daily Treatment Note      Patient: Casey Snowden Sr.   : 1934  Referring practitioner: Aleksander Diez MD  Date of Initial Visit: Type: THERAPY  Noted: 10/11/2022  Today's Date: 2023  Patient seen for 21 sessions         Casey Snowden reports: no real pain at the moment         Objective   See Exercise, Manual, and Modality Logs for complete treatment.       Assessment/Plan  Improving strength with supine arm raises along with isometric ER and rhythmic stab exercises. Assistance for ER against resistance band and AAROM with PNF exercises. Continue to progress strength and ROM as tolerated.        Progress per Plan of Care and Progress strengthening /stabilization /functional activity           Timed:  Manual Therapy:    10     mins  29247;  Therapeutic Exercise:    20     mins  56818;     Neuromuscular Tres:    -    mins  99872;    Therapeutic Activity:     10     mins  90371; *    Gait Training:      -     mins  68740;     Ultrasound:     -     mins  71000;      Untimed:  Electrical Stimulation:    -     mins  28378 (MC );  Mechanical Traction:    -     mins  99039;   Dry needling:      -     mins  08122/    Timed Treatment:   40   mins   Total Treatment:     40   mins    Decreased units billed to account for divided time    Lucrecia Taylor PT  Physical Therapist    License #: [id number]

## 2023-04-12 ENCOUNTER — TELEPHONE (OUTPATIENT)
Dept: CARDIOLOGY | Facility: CLINIC | Age: 88
End: 2023-04-12
Payer: MEDICARE

## 2023-04-12 NOTE — TELEPHONE ENCOUNTER
Casey Douglass .'s spouse returned call.  Reviewed Dr. Armas' recommendations with Evonne and she verbalized understanding.  Evonne stated she will call Dr. Cintron as requested.    Scheduled patient for f/u with Dr. Armas in [place] office on 4/20.    Thank you,  Patricia MACARIO RN  Triage Nurse [facility]

## 2023-04-12 NOTE — TELEPHONE ENCOUNTER
Called and left VM. Will continue to try to reach patient.     Margret Rincon RN  Triage [facility]

## 2023-04-12 NOTE — TELEPHONE ENCOUNTER
Patient's wife is calling because 2 days ago the patient's RLE became a little more swollen and began leaking clear fluid. She states the patient has had a DVT in his right leg and it is also larger than his left, but the weeping is new. Patient denies any other symptoms. /78 HR 55-56. Patient does have a vascular surgeon Dr. Cintron. Wife was unsure if she should contact him or our office. Below are his meds:    eliquis 2.5mg BID  Valsartan 160mg daily    Margret Rincon RN  Triage [facility]

## 2023-04-17 ENCOUNTER — TREATMENT (OUTPATIENT)
Dept: PHYSICAL THERAPY | Facility: CLINIC | Age: 88
End: 2023-04-17
Payer: MEDICARE

## 2023-04-17 DIAGNOSIS — M25.611 DECREASED ROM OF RIGHT SHOULDER: ICD-10-CM

## 2023-04-17 DIAGNOSIS — M25.511 ACUTE PAIN OF RIGHT SHOULDER: Primary | ICD-10-CM

## 2023-04-17 DIAGNOSIS — R29.898 SHOULDER WEAKNESS: ICD-10-CM

## 2023-04-17 NOTE — PROGRESS NOTES
Re-Assessment / Re-Certification      Patient: Casey Snowden Sr.   : 1934  Diagnosis/ICD-10 Code:  Acute pain of right shoulder [M25.511]  Referring practitioner: Aleksander Diez MD  Date of Initial Visit: Type: THERAPY  Noted: 10/11/2022  Today's Date: 2023  Patient seen for 22 sessions      Subjective:   Casey Snowden reports: no pain this week; decreased popping in R shoulder. Still difficulty lifting my arm.   Subjective Questionnaire: QuickDASH: 20.45  Clinical Progress: improved  Home Program Compliance: Yes  Treatment has included: therapeutic exercise, neuromuscular re-education, manual therapy, therapeutic activity, moist heat and cryotherapy      Objective     Active Range of Motion   Left Shoulder   Flexion: 130 degrees   Abduction: 130 degrees   External rotation 0°: 75 degrees   External rotation BTH: C2   Internal rotation BTB: T10     Right Shoulder -standing  Flexion: 105 degrees  Abduction: 76 degrees  External rotation 0°: Right shoulder active external rotation at 0 degrees: 31 moderate compensation  External rotation BTH: Active external rotation behind the head: able to reach to ear/mouth  Internal rotation BTB: thoracic  Horizontal abduction: no pain  Horizontal adduction: no pain, able to reach to opposite shoulder     Right Shoulder-supine/sidelying  Flexion: 150 degrees  ABD: 145 degrees  ER: 50 degrees     Passive Range of Motion      Right Shoulder   Flexion: 160 degrees, mild pain  Abduction: 170 degrees, no pain  External rotation 45°: 80 degrees with pain  Internal rotation 60°: 90 degrees     Strength/Myotome Testing     Left Shoulder   Normal muscle strength     Right Shoulder  Flexion: 2+  ABD: 2+  ER: 3-  IR: 4-    Assessment & Plan     Assessment  Impairments: abnormal muscle firing, abnormal muscle tone, abnormal or restricted ROM, activity intolerance, impaired physical strength, lacks appropriate home exercise program, pain with function and weight-bearing  intolerance  Functional Limitations: carrying objects, lifting, pulling, pushing, uncomfortable because of pain, moving in bed, reaching behind back, reaching overhead and unable to perform repetitive tasks  Assessment details: Casey Snowden Leonarda is a 88 year-old male referred to physical therapy for R shoulder injury after a fall.  He has comorbidities of Parkinson's disease and personal factors of recent falls, hospitalization and hard of hearing. Pt saw shoulder surgeon and received shoulder steroid injection and initially recommending shoulder replacement however at most recent follow up; surgeon said continue PT and hold on surgery. Pt had to miss a few sessions recently due to other health issues. Pt with sig improvement in pain but still limited functionally; now able to use R arm to eat, but grooming, showering, dressing with effort. Pt with improving R arm passive ROM to WNL and continued difficulty with active ROM but compensating with neck and trunk but recent improvement in ER, but difficulty lifting R arm against gravity higher than ~ shoulder level. Able to reach ear/mouth but not behind the head.  Improving QuickDASH. Emphasis on strengthening the next few weeks. Patient is appropriate for skilled physical therapy in order to reduce pain and increase ease with daily mobility. During therapy, pt educated on anatomy, goal of interventions, HEP, posture and strengthening of R arm to improve function.    Barriers to therapy: recent fall and hospitalization due to pneumonia  Prognosis: fair    Goals  Plan Goals: STG: ~6 weeks  1. Pt will demonstrate R shoulder PROM in flexion and abduction of >160 degrees to improve overhead activity tolerance-MET  2. Pt will demonstrate R shoulder PROM in ER and IR >80 degrees-MET  3. Decrease right UE tenderness with palpation to minimal over the acromion and supraspinatus tendon to be able to sleep on his side-MET  4. Pt will be able to drive comfortably and safely  using BUE for at least 30 min-PROGRESSING    LTG: ~12 weeks  1. Pt will demonstrate R shoulder AROM in flexion and ABD >120 degrees-PROGRESSING  2. Pt will demonstrate R shoulder AROM in ER to 45 degrees-PROGRESSING  2. Pt will be able to reach L1 using RUE-MET  3. Pt will improve R shoulder strength to >3+/5 to improve quality of life-PROGRESSING  4. Pt will improve QuickDASH score to <10 to improve subjective function of shoulder with ADLs-PROGRESSING    Plan  Therapy options: will be seen for skilled therapy services  Planned modality interventions: cryotherapy, iontophoresis, thermotherapy (hydrocollator packs), ultrasound and dry needling (no estim due to a-fib)  Planned therapy interventions: ADL retraining, balance/weight-bearing training, body mechanics training, fine motor coordination training, flexibility, functional ROM exercises, home exercise program, IADL retraining, joint mobilization, manual therapy, neuromuscular re-education, postural training, soft tissue mobilization, spinal/joint mobilization, strengthening, stretching, therapeutic activities and abdominal trunk stabilization  Treatment plan discussed with: patient  Plan details: 1x times per week for 8 weeks        Visit Diagnoses:    ICD-10-CM ICD-9-CM   1. Acute pain of right shoulder  M25.511 719.41   2. Decreased ROM of right shoulder  M25.611 719.51   3. Shoulder weakness  R29.898 719.61            Recommendations: Continue as planned  Timeframe: 8 weeks  Prognosis to achieve goals: fair    PT Signature: Lucrecia Taylor PT  [place] license: [id number]      Based upon review of the patient's progress and continued therapy plan, it is my medical opinion that Casey Snowden should continue physical therapy treatment at [facility]  [address] [place] [id number] [phone].    Signature: __________________________________  Aleksander Diez MD    Timed:  Manual Therapy:    15     mins   42040;  Therapeutic Exercise:    30     mins  34178;     Neuromuscular Tres:    -    mins  86632;    Therapeutic Activity:     10     mins  26888;     Gait Training:      -     mins  16509;     Ultrasound:     -     mins  37929;    Electrical Stimulation:    -     mins  05526 ( );    Untimed:  Electrical Stimulation:    -     mins  15069 ( );  Mechanical Traction:    -     mins  21658; \  Dry needling:      -     mins  20560/20561    Timed Treatment:   55   mins   Total Treatment:     55   mins

## 2023-04-20 ENCOUNTER — OFFICE VISIT (OUTPATIENT)
Dept: CARDIOLOGY | Facility: CLINIC | Age: 88
End: 2023-04-20
Payer: MEDICARE

## 2023-04-20 VITALS
SYSTOLIC BLOOD PRESSURE: 138 MMHG | BODY MASS INDEX: 23.17 KG/M2 | WEIGHT: 165.5 LBS | OXYGEN SATURATION: 100 % | HEART RATE: 55 BPM | DIASTOLIC BLOOD PRESSURE: 80 MMHG | HEIGHT: 71 IN

## 2023-04-20 DIAGNOSIS — I50.32 CHRONIC DIASTOLIC CONGESTIVE HEART FAILURE: Chronic | ICD-10-CM

## 2023-04-20 DIAGNOSIS — I48.0 PAROXYSMAL A-FIB: Chronic | ICD-10-CM

## 2023-04-20 DIAGNOSIS — I11.0 HYPERTENSIVE HEART DISEASE WITH HEART FAILURE: Chronic | ICD-10-CM

## 2023-04-20 DIAGNOSIS — I10 BENIGN ESSENTIAL HYPERTENSION: Chronic | ICD-10-CM

## 2023-04-20 DIAGNOSIS — I73.9 PAD (PERIPHERAL ARTERY DISEASE): Primary | Chronic | ICD-10-CM

## 2023-04-20 DIAGNOSIS — I21.02 ST ELEVATION MYOCARDIAL INFARCTION INVOLVING LEFT ANTERIOR DESCENDING (LAD) CORONARY ARTERY: Chronic | ICD-10-CM

## 2023-04-20 NOTE — LETTER
April 20, 2023     Fodr Dorado MD  [address]  [place] [id number]    Patient: Casey Snowden Sr.   YOB: 1934   Date of Visit: 4/20/2023       Dear Dr. Estrada MD:    Thank you for referring Casey Snowden to me for evaluation. Below are the relevant portions of my assessment and plan of care.    If you have questions, please do not hesitate to call me. I look forward to following Casey along with you.         Sincerely,        Cecilio Armas III, MD        CC: No Recipients    Cecilio Armas III, MD  04/20/23 1219  Signed      Subjective:     Encounter Date: 04/20/23       Patient ID: Casey Sonwden Sr. is a 88 y.o. male.    Chief Complaint:PAF, CAD  History of Present Illness    Dear Dr. Diez,    I had the pleasure of seeing this patient in the office today for f/u.   It has been 6 months since his last visit.  He has a history of paroxysmal atrial fibrillation with elevated CHADS Vasc score as well as lower extremity DVT.  He also has a history of coronary disease with a drug-eluting stent and an ischemic cardiomyopathy.  Currently on a combination of Eliquis and Plavix.    He is recently hospitalized with mental status changes.  He also had called in because of swelling and drainage from his right lower extremity.  He has a history of a DVT on that side and follows with vascular surgery, we had them call vascular surgery and they recommended compliance with his compression stockings.    No chest pain or chest discomfort.  Weight is actually down 7 pounds from his weight 3 weeks ago.  He has not had any problems with swelling on the left.  No palpitations or tachycardia.  When he was hospitalized he was seen by Dr. Gustafson of our service and no changes were made.    He has a history of metastatic squamous cell carcinoma.  He has undergone radiation therapy to his neck as well as chemotherapy.     He presented to [facility] on 6/3/19 with complaints of light  headed and dizziness as well as left sided chest pain as he was working in the yard. He was found to be in atrial fibrillation with RVR, with worse than baseline anterior ST changes. He was rate controlled over night. He was also noted to have pneumonia and was started on IV levaquin. Patient was seen by Dr. Perez on the morning of 6/4. He was noted to jave troponin elevation and was transferred to [facility] for cardiac catheterization. The LAD was totally occluded and was treated with PCI and drug eluting stent placement to the LAD (3.0 x 28 mm Xience Lory drug eluting stent). Echocardiogram showed EF 40% with an apical wall motion abnormalities. He has been on carvedilol on the past, this was stopped due to bradycardia. He received metoprolol and tolerated well with HR in the 50's. At discharge this was changed to metoprolol succinate given cardiomyopathy. He is also on losartan.     Cath 6/2019  Hemodynamics:   LV: 150/8  AO: 150/64        PCI CORONARY SEGMENT: Mid LAD  PRE-STENOSIS: 100  POST-STENOSIS: 0%  LESION TYPE: c  EREN FLOW PRE/POST: 1/3  CULPRIT LESION: Yes     Estimated blood loss: Minimal  Complications: None     Conclusions:   1. Left main: Normal  2. LAD: Occluded mid segment.  EREN I flow  3. LCX:Normal  4. RCA: 30 to 40% mid vessel stenosis  5.  Normal left ventricular size.  Moderately reduced systolic function.  Ejection fraction 35%.  Mid to distal anterolateral akinesis.  Apical dyskinesis  6.  PCI of the mid LAD with a 3.0 x 28 mm Xience Lory drug-eluting stent, postdilated to high pressure with a 3.25 mm noncompliant trek balloon     Echo 6/2019  Interpretation Summary     · The left ventricular cavity is mildly dilated.  · Left ventricular wall thickness is consistent with mild concentric hypertrophy.  · There is mild calcification of the aortic valve.  · Estimated EF = 40%.  · The following left ventricular wall segments are akinetic: apical anterior, apical lateral,  apical inferior, apical septal and apex.  · Left ventricular diastolic dysfunction (grade II) consistent with pseudonormalization.  · Left atrial cavity size is moderately dilated.  · Mild tricuspid valve regurgitation is present.  · Calculated right ventricular systolic pressure from tricuspid regurgitation is 41 mmHg.  · Mild pulmonic valve regurgitation is present.          He has a history of paroxysmal atrial fibrillation and has previously followed with Dr. Mercado.  He has been maintained on chronic anticoagulation.  He apparently never required rate control medicine due to baseline bradycardia.      The following portions of the patient's history were reviewed and updated as appropriate: allergies, current medications, past family history, past medical history, past social history, past surgical history and problem list.    Past Medical History:   Diagnosis Date   • Acquired hypothyroidism 8/31/2022   • Atrial fibrillation    • Cancer 04/2018    basil cell carcinoma   • Carotid stenosis    • CHF (congestive heart failure)    • Chronic deep vein thrombosis (DVT) of popliteal vein of right lower extremity    • Coronary artery disease involving native coronary artery of native heart without angina pectoris 12/20/2018   • DVT of lower extremity (deep venous thrombosis)    • Hard of hearing    • Localized edema 1/4/2017   • Long-term use of high-risk medication    • Metabolic encephalopathy 2/7/2023   • PAD (peripheral artery disease) 9/10/2020   • Parkinson's disease    • Postphlebitic syndrome        Past Surgical History:   Procedure Laterality Date   • BASAL CELL CARCINOMA EXCISION  04/2018   • CARDIAC CATHETERIZATION N/A 11/5/2018    Procedure: Left Heart Cath;  Surgeon: Oliverio Azar MD;  Location: [facility] CATH INVASIVE LOCATION;  Service: Cardiovascular   • CARDIAC CATHETERIZATION N/A 11/5/2018    Procedure: Coronary angiography;  Surgeon: Oliverio Azar MD;  Location: [facility] CATH INVASIVE  "LOCATION;  Service: Cardiovascular   • CARDIAC CATHETERIZATION N/A 11/5/2018    Procedure: Left ventriculography;  Surgeon: Oliverio Azar MD;  Location: [facility] CATH INVASIVE LOCATION;  Service: Cardiovascular   • CARDIAC CATHETERIZATION N/A 6/4/2019    Procedure: Left Heart Cath;  Surgeon: Johnny Glasgow MD;  Location: [facility] CATH INVASIVE LOCATION;  Service: Cardiovascular   • CARDIAC CATHETERIZATION N/A 6/4/2019    Procedure: Coronary angiography;  Surgeon: Johnny Glasgow MD;  Location: [facility] CATH INVASIVE LOCATION;  Service: Cardiovascular   • CARDIAC CATHETERIZATION N/A 6/4/2019    Procedure: Left ventriculography;  Surgeon: Johnny Glasgow MD;  Location: [facility] CATH INVASIVE LOCATION;  Service: Cardiovascular   • CARDIAC CATHETERIZATION N/A 6/4/2019    Procedure: Stent BILL coronary;  Surgeon: Johnny Glasgow MD;  Location: [facility] CATH INVASIVE LOCATION;  Service: Cardiovascular   • CARDIAC SURGERY     • CATARACT EXTRACTION Left 08/2018   • CATARACT EXTRACTION Right 09/2018   • COLONOSCOPY      2011   • INCISION AND DRAINAGE LEG Right 7/7/2017    Procedure: INCISION AND DRAINAGE INFECTED HEMATOMA RIGHT THIGH;  Surgeon: Valentin Peña MD;  Location: [facility] OR;  Service:    • JOINT REPLACEMENT     • KNEE INCISION AND DRAINAGE Right 12/27/2016    Procedure: KNEE INCISION AND DRAINAGE;  Surgeon: Triston Whitten MD;  Location: [facility] MAIN OR;  Service:    • NOSE SURGERY      nose replacement   • SKIN BIOPSY     • SKIN GRAFT  12/2017   • TOTAL KNEE ARTHROPLASTY Right    • VASCULAR SURGERY     • VENA CAVA FILTER PLACEMENT             Procedures      Objective:     Vitals:    04/20/23 1112   BP: 138/80   BP Location: Right arm   Patient Position: Sitting   Pulse: 55   SpO2: 100%   Weight: 75.1 kg (165 lb 8 oz)   Height: 180.3 cm (70.98\")        Physical Exam  Constitutional:       General: He is not in acute distress.     Appearance: He is well-developed. He is not " diaphoretic.   HENT:      Head: Normocephalic and atraumatic.      Nose: Nose normal.   Eyes:      General:         Right eye: No discharge.         Left eye: No discharge.      Conjunctiva/sclera: Conjunctivae normal.   Neck:      Thyroid: No thyromegaly.      Trachea: No tracheal deviation.   Cardiovascular:      Rate and Rhythm: Normal rate and regular rhythm.      Pulses: Normal pulses.      Heart sounds: Normal heart sounds, S1 normal and S2 normal.     No S3 sounds.   Pulmonary:      Effort: Pulmonary effort is normal. No respiratory distress.      Breath sounds: Normal breath sounds. No stridor.   Chest:      Chest wall: No tenderness.   Abdominal:      General: Bowel sounds are normal. There is no distension.      Palpations: Abdomen is soft. There is no mass.      Tenderness: There is no abdominal tenderness. There is no guarding or rebound.   Musculoskeletal:         General: No tenderness or deformity. Normal range of motion.      Cervical back: Normal range of motion and neck supple.      Right lower leg: Edema present.   Lymphadenopathy:      Cervical: No cervical adenopathy.   Skin:     General: Skin is warm and dry.      Findings: No erythema or rash.   Neurological:      Mental Status: He is alert and oriented to person, place, and time.      Deep Tendon Reflexes: Reflexes are normal and symmetric.   Psychiatric:         Thought Content: Thought content normal.         Lab Review:             Lab Results   Component Value Date    GLUCOSE 131 (H) 02/07/2023    BUN 24 (H) 02/07/2023    CREATININE 0.73 (L) 02/07/2023    EGFRIFNONA 99 06/05/2019    EGFRIFAFRI 102 10/27/2017    BCR 32.9 (H) 02/07/2023    K 4.0 02/07/2023    CO2 26.5 02/07/2023    CALCIUM 8.6 02/07/2023    PROTENTOTREF 6.1 04/25/2017    ALBUMIN 3.4 (L) 02/07/2023    LABIL2 1.5 03/18/2021    AST 12 02/07/2023    ALT 6 02/07/2023     Results for orders placed during the hospital encounter of 06/03/19    Adult Transthoracic Echo Complete W/  Cont if Necessary Per Protocol    Interpretation Summary  · The left ventricular cavity is mildly dilated.  · Left ventricular wall thickness is consistent with mild concentric hypertrophy.  · There is mild calcification of the aortic valve.  · Estimated EF = 40%.  · The following left ventricular wall segments are akinetic: apical anterior, apical lateral, apical inferior, apical septal and apex.  · Left ventricular diastolic dysfunction (grade II) consistent with pseudonormalization.  · Left atrial cavity size is moderately dilated.  · Mild tricuspid valve regurgitation is present.  · Calculated right ventricular systolic pressure from tricuspid regurgitation is 41 mmHg.  · Mild pulmonic valve regurgitation is present.    CHADS-VASc Risk Assessment            7 Total Score    1 CHF    1 Hypertension    2 Age >/= 75    2 PRIOR STROKE/TIA/THROMBO    1 Vascular Disease        Criteria that do not apply:    DM    Age 65-74    Sex: Female               Assessment:         Diagnosis Plan   1. PAD (peripheral artery disease) ([facility])        2. Hypertensive heart disease with heart failure        3. ST elevation myocardial infarction involving left anterior descending (LAD) coronary artery        4. Chronic diastolic (congestive) heart failure        5. Paroxysmal A-fib ([facility])        6. Benign essential hypertension              Plan:      1. Atrial Fibrillation and Atrial Flutter  Assessment  • The patient has paroxysmal atrial fibrillation  • The patient's CHADS2-VASc score is 3  • A [id number] score of 2 or more is considered a high risk for a thromboembolic event  • Apixaban prescribed    Plan  • Attempt to maintain sinus rhythm  • Continue apixaban for antithrombotic therapy, bleeding issues discussed  • Sinus bradycardia no rate control meds    2. Heart Failure  Assessment  • Beta blocker not prescribed for patient reasons  • Diuretics prescribed  • Angiotensin receptor blocker (ARB) prescribed    Plan  • The patient  has received heart failure education on the following topics: medication instructions, symptom management and weight monitoring  • The heart failure care plan was discussed with the patient today including: continuing the current program    Subjective/Objective    • Physical exam findings negative for peripheral edema and S3 gallop.    3.  History of chronic DVT; Patient remains on chronic anticoagulation and follows with Dr. Dorado. Patient has an IVC filter in place, last week or 2 has developed increased right lower extremity edema and was having some weeping, they have been in contact with vascular surgery who recommended compliance with compression stockings at this point.  4.  Coronary Artery Disease  Assessment  • The patient has no angina    Plan  • Lifestyle modifications discussed include adhering to a heart healthy diet, avoidance of tobacco products, maintenance of a healthy weight, medication compliance, regular exercise and regular monitoring of cholesterol and blood pressure  • Toprol has been discontinued in the past because of bradycardia    Subjective - Objective  • There is a history of past MI  • There has been a previous stent procedure using BILL  • Current antiplatelet therapy includes aspirin 81 mg      5.  Essential hypertension- he also has a history of orthostatic hypotension with his Parkinson's, but he is not been having orthostatic symptoms.  Continue current therapy.  6.  At this point patient remains on the combination of Plavix and Eliquis.  He has a class I indication for antiplatelet therapy given his prior drug-eluting stent and underlying coronary disease.  He has a class I indication for chronic anticoagulation with his history of DVT as well as paroxysmal atrial fibrillation.   7.  Ischemic cardiomyopathy-continue current medical therapy.  As noted above he is not on beta-blockade because of sinus bradycardia.  No heart failure symptoms.     Thank you very much for allowing us to  participate in the care of this pleasant patient.  Please don't hesitate to call if I can be of assistance in any way.      Current Outpatient Medications:   •  apixaban (ELIQUIS) 2.5 MG tablet tablet, Take 1 tablet by mouth 2 (Two) Times a Day., Disp: , Rfl:   •  atorvastatin (LIPITOR) 20 MG tablet, TAKE 1 TABLET BY MOUTH  DAILY FOR ELEVATION OF BOTH CHOLESTEROL AND  TRIGLYCERIDES IN BLOOD, Disp: 90 tablet, Rfl: 3  •  atropine 1 % ophthalmic solution, 1 drop 3 (Three) Times a Day As Needed (as needed orally for increased secretions). For drooling, Disp: , Rfl:   •  carbidopa-levodopa (SINEMET)  MG per tablet, 1 tablet 3 (Three) Times a Day., Disp: , Rfl:   •  Cyanocobalamin (VITAMIN B 12 PO), Take 1 tablet by mouth Every Morning. 1000 IU, Disp: , Rfl:   •  levothyroxine (SYNTHROID, LEVOTHROID) 75 MCG tablet, TAKE 1 TABLET BY MOUTH EVERY MORNING. INDICATIONS: UNDERACTIVE THYROID, Disp: 90 tablet, Rfl: 1  •  Multiple Vitamins-Minerals (PRESERVISION AREDS) capsule, Take 1 tablet by mouth 2 (Two) Times a Day., Disp: , Rfl:   •  Polyethyl Glycol-Propyl Glycol (SYSTANE OP), Apply  to eye(s) as directed by provider., Disp: , Rfl:   •  pramipexole (MIRAPEX) 0.5 MG tablet, Take 1 tablet by mouth 3 (Three) Times a Day., Disp: , Rfl:   •  valsartan (DIOVAN) 160 MG tablet, TAKE 1 TABLET BY MOUTH EVERY DAY, Disp: 90 tablet, Rfl: 3

## 2023-04-20 NOTE — PROGRESS NOTES
Subjective:     Encounter Date: 04/20/23        Patient ID: Casey Snowden Sr. is a 88 y.o. male.    Chief Complaint:PAF, CAD  History of Present Illness    Dear Dr. Diez,    I had the pleasure of seeing this patient in the office today for f/u.   It has been 6 months since his last visit.  He has a history of paroxysmal atrial fibrillation with elevated CHADS Vasc score as well as lower extremity DVT.  He also has a history of coronary disease with a drug-eluting stent and an ischemic cardiomyopathy.  Currently on a combination of Eliquis and Plavix.    He is recently hospitalized with mental status changes.  He also had called in because of swelling and drainage from his right lower extremity.  He has a history of a DVT on that side and follows with vascular surgery, we had them call vascular surgery and they recommended compliance with his compression stockings.    No chest pain or chest discomfort.  Weight is actually down 7 pounds from his weight 3 weeks ago.  He has not had any problems with swelling on the left.  No palpitations or tachycardia.  When he was hospitalized he was seen by Dr. Gustafson of our service and no changes were made.    He has a history of metastatic squamous cell carcinoma.  He has undergone radiation therapy to his neck as well as chemotherapy.     He presented to [facility] on 6/3/19 with complaints of light headed and dizziness as well as left sided chest pain as he was working in the yard. He was found to be in atrial fibrillation with RVR, with worse than baseline anterior ST changes. He was rate controlled over night. He was also noted to have pneumonia and was started on IV levaquin. Patient was seen by Dr. Perez on the morning of 6/4. He was noted to jave troponin elevation and was transferred to [facility] for cardiac catheterization. The LAD was totally occluded and was treated with PCI and drug eluting stent placement to the LAD (3.0 x 28 mm  Xience Lory drug eluting stent). Echocardiogram showed EF 40% with an apical wall motion abnormalities. He has been on carvedilol on the past, this was stopped due to bradycardia. He received metoprolol and tolerated well with HR in the 50's. At discharge this was changed to metoprolol succinate given cardiomyopathy. He is also on losartan.     Cath 6/2019  Hemodynamics:   LV: 150/8  AO: 150/64        PCI CORONARY SEGMENT: Mid LAD  PRE-STENOSIS: 100  POST-STENOSIS: 0%  LESION TYPE: c  EREN FLOW PRE/POST: 1/3  CULPRIT LESION: Yes     Estimated blood loss: Minimal  Complications: None     Conclusions:   1. Left main: Normal  2. LAD: Occluded mid segment.  EREN I flow  3. LCX:Normal  4. RCA: 30 to 40% mid vessel stenosis  5.  Normal left ventricular size.  Moderately reduced systolic function.  Ejection fraction 35%.  Mid to distal anterolateral akinesis.  Apical dyskinesis  6.  PCI of the mid LAD with a 3.0 x 28 mm Xience Lory drug-eluting stent, postdilated to high pressure with a 3.25 mm noncompliant trek balloon     Echo 6/2019  Interpretation Summary     · The left ventricular cavity is mildly dilated.  · Left ventricular wall thickness is consistent with mild concentric hypertrophy.  · There is mild calcification of the aortic valve.  · Estimated EF = 40%.  · The following left ventricular wall segments are akinetic: apical anterior, apical lateral, apical inferior, apical septal and apex.  · Left ventricular diastolic dysfunction (grade II) consistent with pseudonormalization.  · Left atrial cavity size is moderately dilated.  · Mild tricuspid valve regurgitation is present.  · Calculated right ventricular systolic pressure from tricuspid regurgitation is 41 mmHg.  · Mild pulmonic valve regurgitation is present.          He has a history of paroxysmal atrial fibrillation and has previously followed with Dr. Mercado.  He has been maintained on chronic anticoagulation.  He apparently never required rate  control medicine due to baseline bradycardia.      The following portions of the patient's history were reviewed and updated as appropriate: allergies, current medications, past family history, past medical history, past social history, past surgical history and problem list.    Past Medical History:   Diagnosis Date   • Acquired hypothyroidism 8/31/2022   • Atrial fibrillation    • Cancer 04/2018    basil cell carcinoma   • Carotid stenosis    • CHF (congestive heart failure)    • Chronic deep vein thrombosis (DVT) of popliteal vein of right lower extremity    • Coronary artery disease involving native coronary artery of native heart without angina pectoris 12/20/2018   • DVT of lower extremity (deep venous thrombosis)    • Hard of hearing    • Localized edema 1/4/2017   • Long-term use of high-risk medication    • Metabolic encephalopathy 2/7/2023   • PAD (peripheral artery disease) 9/10/2020   • Parkinson's disease    • Postphlebitic syndrome        Past Surgical History:   Procedure Laterality Date   • BASAL CELL CARCINOMA EXCISION  04/2018   • CARDIAC CATHETERIZATION N/A 11/5/2018    Procedure: Left Heart Cath;  Surgeon: Oliverio Azar MD;  Location: [facility] CATH INVASIVE LOCATION;  Service: Cardiovascular   • CARDIAC CATHETERIZATION N/A 11/5/2018    Procedure: Coronary angiography;  Surgeon: Oliverio Azar MD;  Location: [facility] CATH INVASIVE LOCATION;  Service: Cardiovascular   • CARDIAC CATHETERIZATION N/A 11/5/2018    Procedure: Left ventriculography;  Surgeon: Oliverio Azar MD;  Location[facility] CATH INVASIVE LOC[facility];  Service: Cardiovascular   • CARDIAC CATHETERIZATION N/A 6/4/2019    Procedure: Left Heart Cath;  Surgeon: Johnny Glasgow MD;  Location: [facility] CATH INVASIVE LOCATION;  Service: Cardiovascular   • CARDIAC CATHETERIZATION N/A 6/4/2019    Procedure: Coronary angiography;  Surgeon: Johnny Glasgow MD;  Location: [facility] CATH INVASIVE LOCATION;  Service: Cardiovascular   •  "CARDIAC CATHETERIZATION N/A 6/4/2019    Procedure: Left ventriculography;  Surgeon: Johnny Glasgow MD;  [facility];  Service: Cardiovascular   • CARDIAC CATHETERIZATION N/A 6/4/2019    Procedure: Stent BILL coronary;  Surgeon: Johnny Glasgow MD;  Location:  [facility] CATH INVASIVE LOC[facility];  Service: Cardiovascular   • CARDIAC SURGERY     • CATARACT EXTRACTION Left 08/2018   • CATARACT EXTRACTION Right 09/2018   • COLONOSCOPY      2011   • INCISION AND DRAINAGE LEG Right 7/7/2017    Procedure: INCISION AND DRAINAGE INFECTED HEMATOMA RIGHT THIGH;  Surgeon: Valentin Peña MD;  [facility] MA[facility] OR;  Service:    • JOINT REPLACEMENT     • KNEE INCISION AND DRAINAGE Right 12/27/2016    Procedure: KNEE INCISION AND DRAINAGE;  Surgeon: Triston Whitten MD;  Location: [facility] MAIN OR;  Service:    • NOSE SURGERY      nose replacement   • SKIN BIOPSY     • SKIN GRAFT  12/2017   • TOTAL KNEE ARTHROPLASTY Right    • VASCULAR SURGERY     • VENA CAVA FILTER PLACEMENT             Procedures       Objective:     Vitals:    04/20/23 1112   BP: 138/80   BP Location: Right arm   Patient Position: Sitting   Pulse: 55   SpO2: 100%   Weight: 75.1 kg (165 lb 8 oz)   Height: 180.3 cm (70.98\")         Physical Exam  Constitutional:       General: He is not in acute distress.     Appearance: He is well-developed. He is not diaphoretic.   HENT:      Head: Normocephalic and atraumatic.      Nose: Nose normal.   Eyes:      General:         Right eye: No discharge.         Left eye: No discharge.      Conjunctiva/sclera: Conjunctivae normal.   Neck:      Thyroid: No thyromegaly.      Trachea: No tracheal deviation.   Cardiovascular:      Rate and Rhythm: Normal rate and regular rhythm.      Pulses: Normal pulses.      Heart sounds: Normal heart sounds, S1 normal and S2 normal.     No S3 sounds.   Pulmonary:      Effort: Pulmonary effort is normal. No respiratory distress.      Breath sounds: " Normal breath sounds. No stridor.   Chest:      Chest wall: No tenderness.   Abdominal:      General: Bowel sounds are normal. There is no distension.      Palpations: Abdomen is soft. There is no mass.      Tenderness: There is no abdominal tenderness. There is no guarding or rebound.   Musculoskeletal:         General: No tenderness or deformity. Normal range of motion.      Cervical back: Normal range of motion and neck supple.      Right lower leg: Edema present.   Lymphadenopathy:      Cervical: No cervical adenopathy.   Skin:     General: Skin is warm and dry.      Findings: No erythema or rash.   Neurological:      Mental Status: He is alert and oriented to person, place, and time.      Deep Tendon Reflexes: Reflexes are normal and symmetric.   Psychiatric:         Thought Content: Thought content normal.         Lab Review:             Lab Results   Component Value Date    GLUCOSE 131 (H) 02/07/2023    BUN 24 (H) 02/07/2023    CREATININE 0.73 (L) 02/07/2023    EGFRIFNONA 99 06/05/2019    EGFRIFAFRI 102 10/27/2017    BCR 32.9 (H) 02/07/2023    K 4.0 02/07/2023    CO2 26.5 02/07/2023    CALCIUM 8.6 02/07/2023    PROTENTOTREF 6.1 04/25/2017    ALBUMIN 3.4 (L) 02/07/2023    LABIL2 1.5 03/18/2021    AST 12 02/07/2023    ALT 6 02/07/2023     Results for orders placed during the hospital encounter of 06/03/19    Adult Transthoracic Echo Complete W/ Cont if Necessary Per Protocol    Interpretation Summary  · The left ventricular cavity is mildly dilated.  · Left ventricular wall thickness is consistent with mild concentric hypertrophy.  · There is mild calcification of the aortic valve.  · Estimated EF = 40%.  · The following left ventricular wall segments are akinetic: apical anterior, apical lateral, apical inferior, apical septal and apex.  · Left ventricular diastolic dysfunction (grade II) consistent with pseudonormalization.  · Left atrial cavity size is moderately dilated.  · Mild tricuspid valve  regurgitation is present.  · Calculated right ventricular systolic pressure from tricuspid regurgitation is 41 mmHg.  · Mild pulmonic valve regurgitation is present.    CHADS-VASc Risk Assessment            7 Total Score    1 CHF    1 Hypertension    2 Age >/= 75    2 PRIOR STROKE/TIA/THROMBO    1 Vascular Disease        Criteria that do not apply:    DM    Age 65-74    Sex: Female                Assessment:          Diagnosis Plan   1. PAD (peripheral artery disease) ([facility])        2. Hypertensive heart disease with heart failure        3. ST elevation myocardial infarction involving left anterior descending (LAD) coronary artery        4. Chronic diastolic (congestive) heart failure        5. Paroxysmal A-fib ([facility])        6. Benign essential hypertension               Plan:       1. Atrial Fibrillation and Atrial Flutter  Assessment  • The patient has paroxysmal atrial fibrillation  • The patient's CHADS2-VASc score is 3  • A [id number] score of 2 or more is considered a high risk for a thromboembolic event  • Apixaban prescribed    Plan  • Attempt to maintain sinus rhythm  • Continue apixaban for antithrombotic therapy, bleeding issues discussed  • Sinus bradycardia no rate control meds    2. Heart Failure  Assessment  • Beta blocker not prescribed for patient reasons  • Diuretics prescribed  • Angiotensin receptor blocker (ARB) prescribed    Plan  • The patient has received heart failure education on the following topics: medication instructions, symptom management and weight monitoring  • The heart failure care plan was discussed with the patient today including: continuing the current program    Subjective/Objective    • Physical exam findings negative for peripheral edema and S3 gallop.    3.  History of chronic DVT; Patient remains on chronic anticoagulation and follows with Dr. Dorado. Patient has an IVC filter in place, last week or 2 has developed increased right lower extremity edema and was having some  wekym, they have been in contact with vascular surgery who recommended compliance with compression stockings at this point.  4.  Coronary Artery Disease  Assessment  • The patient has no angina    Plan  • Lifestyle modifications discussed include adhering to a heart healthy diet, avoidance of tobacco products, maintenance of a healthy weight, medication compliance, regular exercise and regular monitoring of cholesterol and blood pressure  • Toprol has been discontinued in the past because of bradycardia    Subjective - Objective  • There is a history of past MI  • There has been a previous stent procedure using BILL  • Current antiplatelet therapy includes aspirin 81 mg      5.  Essential hypertension- he also has a history of orthostatic hypotension with his Parkinson's, but he is not been having orthostatic symptoms.  Continue current therapy.  6.  At this point patient remains on the combination of Plavix and Eliquis.  He has a class I indication for antiplatelet therapy given his prior drug-eluting stent and underlying coronary disease.  He has a class I indication for chronic anticoagulation with his history of DVT as well as paroxysmal atrial fibrillation.   7.  Ischemic cardiomyopathy-continue current medical therapy.  As noted above he is not on beta-blockade because of sinus bradycardia.  No heart failure symptoms.     Thank you very much for allowing us to participate in the care of this pleasant patient.  Please don't hesitate to call if I can be of assistance in any way.      Current Outpatient Medications:   •  apixaban (ELIQUIS) 2.5 MG tablet tablet, Take 1 tablet by mouth 2 (Two) Times a Day., Disp: , Rfl:   •  atorvastatin (LIPITOR) 20 MG tablet, TAKE 1 TABLET BY MOUTH  DAILY FOR ELEVATION OF BOTH CHOLESTEROL AND  TRIGLYCERIDES IN BLOOD, Disp: 90 tablet, Rfl: 3  •  atropine 1 % ophthalmic solution, 1 drop 3 (Three) Times a Day As Needed (as needed orally for increased secretions). For drooling,  Disp: , Rfl:   •  carbidopa-levodopa (SINEMET)  MG per tablet, 1 tablet 3 (Three) Times a Day., Disp: , Rfl:   •  Cyanocobalamin (VITAMIN B 12 PO), Take 1 tablet by mouth Every Morning. 1000 IU, Disp: , Rfl:   •  levothyroxine (SYNTHROID, LEVOTHROID) 75 MCG tablet, TAKE 1 TABLET BY MOUTH EVERY MORNING. INDICATIONS: UNDERACTIVE THYROID, Disp: 90 tablet, Rfl: 1  •  Multiple Vitamins-Minerals (PRESERVISION AREDS) capsule, Take 1 tablet by mouth 2 (Two) Times a Day., Disp: , Rfl:   •  Polyethyl Glycol-Propyl Glycol (SYSTANE OP), Apply  to eye(s) as directed by provider., Disp: , Rfl:   •  pramipexole (MIRAPEX) 0.5 MG tablet, Take 1 tablet by mouth 3 (Three) Times a Day., Disp: , Rfl:   •  valsartan (DIOVAN) 160 MG tablet, TAKE 1 TABLET BY MOUTH EVERY DAY, Disp: 90 tablet, Rfl: 3

## 2023-05-01 ENCOUNTER — TREATMENT (OUTPATIENT)
Dept: PHYSICAL THERAPY | Facility: CLINIC | Age: 88
End: 2023-05-01
Payer: MEDICARE

## 2023-05-01 DIAGNOSIS — R29.898 SHOULDER WEAKNESS: ICD-10-CM

## 2023-05-01 DIAGNOSIS — M25.611 DECREASED ROM OF RIGHT SHOULDER: ICD-10-CM

## 2023-05-01 DIAGNOSIS — M25.511 ACUTE PAIN OF RIGHT SHOULDER: Primary | ICD-10-CM

## 2023-05-01 NOTE — PROGRESS NOTES
Physical Therapy Daily Treatment Note      Patient: Casey Snowden Sr.   : 1934  Referring practitioner: Aleksander Diez MD  Date of Initial Visit: Type: THERAPY  Noted: 10/11/2022  Today's Date: 2023  Patient seen for 23 sessions         Casey Snowden reports: he has had some days where it has hurt more than others but is not related to any certain movement/activity.      Objective   See Exercise, Manual, and Modality Logs for complete treatment.       Assessment/Plan  Pt requires intermittent assistance for full R arm elevation with overhead exercises today; especially at the end of the session due to muscle fatigue. Most weakness with R shoulder activity >90 degrees and ER. Pt reports he sits down to eat and uses his R arm now without thinking. Still difficulty showering/grooming reaching to his head with R arm. Continue to progress strengthening and R shoulder stability as tolerated to improve functional use of R arm along with prevent further injury.     Progress per Plan of Care and Progress strengthening /stabilization /functional activity         Timed:  Manual Therapy:    15     mins  89532;  Therapeutic Exercise:    25     mins  39069;     Neuromuscular Tres:    -    mins  81236;    Therapeutic Activity:     10     mins  97322;     Gait Training:      -     mins  13052;     Ultrasound:     -     mins  43862;      Untimed:  Electrical Stimulation:    -     mins  03085 ( );  Mechanical Traction:    -     mins  46922;   Dry needling:      -     mins  58574/    Timed Treatment:   50   mins   Total Treatment:     50   mins  Lucrecia Taylor PT  Physical Therapist    License #: [id number]

## 2023-05-08 ENCOUNTER — TREATMENT (OUTPATIENT)
Dept: PHYSICAL THERAPY | Facility: CLINIC | Age: 88
End: 2023-05-08
Payer: MEDICARE

## 2023-05-08 DIAGNOSIS — M25.511 ACUTE PAIN OF RIGHT SHOULDER: Primary | ICD-10-CM

## 2023-05-08 DIAGNOSIS — R29.898 SHOULDER WEAKNESS: ICD-10-CM

## 2023-05-08 DIAGNOSIS — M25.611 DECREASED ROM OF RIGHT SHOULDER: ICD-10-CM

## 2023-05-08 PROCEDURE — 97110 THERAPEUTIC EXERCISES: CPT | Performed by: PHYSICAL THERAPIST

## 2023-05-08 PROCEDURE — 97140 MANUAL THERAPY 1/> REGIONS: CPT | Performed by: PHYSICAL THERAPIST

## 2023-05-08 NOTE — PROGRESS NOTES
Physical Therapy Daily Treatment Note      Patient: Casey Snowden Sr.   : 1934  Referring practitioner: Aleksander Diez MD  Date of Initial Visit: Type: THERAPY  Noted: 10/11/2022  Today's Date: 2023  Patient seen for 24 sessions         Casey Snowden reports: R shoulder hurting more recently but he has been working in the yard a lot recently.       Objective   See Exercise, Manual, and Modality Logs for complete treatment.       Assessment/Plan  Pt with improving strength and stability of R shoulder with exercises and manual tx today. Decreased popping. Mild increase in pain recently with increased yard work (spreading mulch and planting/pulling). No difficulty with eating but difficulty reaching anything above shoulder level or behind his head for grooming/dressing ax.        Progress per Plan of Care and Progress strengthening /stabilization /functional activity           Timed:  Manual Therapy:    12     mins  50990;  Therapeutic Exercise:    30     mins  03539;   (decreased units billed to account for divided time)  Neuromuscular Tres:    -    mins  70531;    Therapeutic Activity:     -     mins  34525;     Gait Training:      -     mins  69826;     Ultrasound:     -     mins  65714;      Untimed:  Electrical Stimulation:    -     mins  16767 ( );  Mechanical Traction:    -     mins  46502;   Dry needling:      -     mins  73834/    Timed Treatment:   42   mins   Total Treatment:     45   mins  Lucrecia Tayolr PT  Physical Therapist    License #: [id number]

## 2023-05-17 ENCOUNTER — TREATMENT (OUTPATIENT)
Dept: PHYSICAL THERAPY | Facility: CLINIC | Age: 88
End: 2023-05-17
Payer: MEDICARE

## 2023-05-17 DIAGNOSIS — R29.898 SHOULDER WEAKNESS: ICD-10-CM

## 2023-05-17 DIAGNOSIS — M25.511 ACUTE PAIN OF RIGHT SHOULDER: Primary | ICD-10-CM

## 2023-05-17 DIAGNOSIS — M25.611 DECREASED ROM OF RIGHT SHOULDER: ICD-10-CM

## 2023-05-17 NOTE — PROGRESS NOTES
Physical Therapy Re-Assessment/Progress Note      Patient: Casey Snowden Sr.   : 1934  Referring practitioner: Aleksander Diez MD  Date of Initial Visit: Type: THERAPY  Noted: 10/11/2022  Today's Date: 2023  Patient seen for 25 sessions         Casey Snowden reports: a little more pain in his shoulder recently; woke up with dizziness yesterday but improved today.     QuickDASH: 27.27     Objective     Active Range of Motion   Left Shoulder   Flexion: 130 degrees   Abduction: 130 degrees   External rotation 0°: 75 degrees   External rotation BTH: C2   Internal rotation BTB: T10     Right Shoulder -standing  Flexion: 105 degrees  Abduction: 76 degrees  External rotation 0°: Right shoulder active external rotation at 0 degrees: 31 moderate compensation  External rotation BTH: Active external rotation behind the head: able to reach to ear/mouth  Internal rotation BTB: thoracic  Horizontal abduction: no pain  Horizontal adduction: no pain, able to reach to opposite shoulder     Right Shoulder-supine/sidelying  Flexion: 150 degrees  ABD: 145 degrees  ER: 50 degrees     Passive Range of Motion      Right Shoulder   Flexion: 160 degrees, mild pain  Abduction: 170 degrees, moderate pain  External rotation 90°: 80 degrees, mild pain  Internal rotation 90°: 90 degrees     Strength/Myotome Testing     Left Shoulder   Normal muscle strength     Right Shoulder  Flexion: 2+  ABD: 2+  ER: 3-  IR: 4-    See Exercise, Manual, and Modality Logs for complete treatment.       Assessment & Plan     Assessment  Impairments: abnormal muscle firing, abnormal muscle tone, abnormal or restricted ROM, activity intolerance, impaired physical strength, lacks appropriate home exercise program, pain with function and weight-bearing intolerance  Functional Limitations: carrying objects, lifting, pulling, pushing, uncomfortable because of pain, moving in bed, reaching behind back, reaching overhead and unable to perform  repetitive tasks  Assessment details: Casey Snowden Sr. is a 88 year-old male referred to physical therapy for R shoulder injury after a fall.  He has comorbidities of Parkinson's disease and personal factors of recent falls, hospitalization and hard of hearing. Pt saw shoulder surgeon and received shoulder steroid injection and initially recommending shoulder replacement however at most recent follow up; surgeon said continue PT and hold on surgery. Pt with most weakness with ER and shoulder elevation higher than shoulder level. Pt demonstrate painful arc with pain/difficulty in mid-range of shoulder elevation. Pt has been working in the yard more recently which could contribute to his increased pain. Pt advised to apply ice with increased R shoulder activity. Pt also educated on goal of and frequency of shoulder injections for pain control vs risk/benefits of TSA. Pt had to miss a few sessions recently due to other health issues. Pt with sig improvement in pain but still limited functionally; now able to use R arm to eat, but grooming, showering, dressing with effort. Pt with improving R arm passive ROM to WNL and continued difficulty with active ROM but compensating with neck and trunk but recent improvement in ER, but difficulty lifting R arm against gravity higher than ~ shoulder level. Able to reach ear/mouth but not behind the head.  Improving QuickDASH. Emphasis on strengthening the next few weeks. Patient is appropriate for skilled physical therapy in order to reduce pain and increase ease with daily mobility. During therapy, pt educated on anatomy, goal of interventions, HEP, posture and strengthening of R arm to improve function.    Barriers to therapy: recent fall and hospitalization due to pneumonia  Prognosis: fair    Goals  Plan Goals: STG: ~6 weeks  1. Pt will demonstrate R shoulder PROM in flexion and abduction of >160 degrees to improve overhead activity tolerance-MET  2. Pt will demonstrate R  shoulder PROM in ER and IR >80 degrees-MET  3. Decrease right UE tenderness with palpation to minimal over the acromion and supraspinatus tendon to be able to sleep on his side-MET  4. Pt will be able to drive comfortably and safely using BUE for at least 30 min-MET    LTG: ~12 weeks  1. Pt will demonstrate R shoulder AROM in flexion and ABD >120 degrees-PROGRESSING  2. Pt will demonstrate R shoulder AROM in ER to 45 degrees-PROGRESSING  2. Pt will be able to reach L1 using RUE-MET  3. Pt will improve R shoulder strength to >3+/5 to improve quality of life-PROGRESSING  4. Pt will improve QuickDASH score to <10 to improve subjective function of shoulder with ADLs-PROGRESSING    Plan  Therapy options: will be seen for skilled therapy services  Planned modality interventions: cryotherapy, iontophoresis, thermotherapy (hydrocollator packs), ultrasound and dry needling (no estim due to a-fib)  Planned therapy interventions: ADL retraining, balance/weight-bearing training, body mechanics training, fine motor coordination training, flexibility, functional ROM exercises, home exercise program, IADL retraining, joint mobilization, manual therapy, neuromuscular re-education, postural training, soft tissue mobilization, spinal/joint mobilization, strengthening, stretching, therapeutic activities and abdominal trunk stabilization  Treatment plan discussed with: patient  Plan details: 1x times per week for 6 weeks      Progress per Plan of Care and Progress strengthening /stabilization /functional activity           Timed:  Manual Therapy:    10     mins  04553;  Therapeutic Exercise:    30     mins  84939; (decreased units billed to account for divided time)    Neuromuscular Tres:    -    mins  09078;    Therapeutic Activity:     15     mins  87191;     Gait Training:      -     mins  78562;     Ultrasound:     -     mins  21560;      Untimed:  Electrical Stimulation:    -     mins  17732 ( );  Mechanical Traction:    -      mins  70332;   Dry needling:      -     mins  20560/20561    Timed Treatment:   55   mins   Total Treatment:     55   mins  Lucrecia Taylor PT  Physical Therapist    License #: [id number]

## 2023-05-22 ENCOUNTER — TREATMENT (OUTPATIENT)
Dept: PHYSICAL THERAPY | Facility: CLINIC | Age: 88
End: 2023-05-22
Payer: MEDICARE

## 2023-05-22 DIAGNOSIS — R29.898 SHOULDER WEAKNESS: ICD-10-CM

## 2023-05-22 DIAGNOSIS — M25.511 ACUTE PAIN OF RIGHT SHOULDER: Primary | ICD-10-CM

## 2023-05-22 DIAGNOSIS — M25.611 DECREASED ROM OF RIGHT SHOULDER: ICD-10-CM

## 2023-05-22 PROCEDURE — 97140 MANUAL THERAPY 1/> REGIONS: CPT | Performed by: PHYSICAL THERAPIST

## 2023-05-22 PROCEDURE — 97110 THERAPEUTIC EXERCISES: CPT | Performed by: PHYSICAL THERAPIST

## 2023-05-22 NOTE — PROGRESS NOTES
Physical Therapy Daily Treatment Note      Patient: Casey Snowden Sr.   : 1934  Referring practitioner: Aleksander Diez MD  Date of Initial Visit: Type: THERAPY  Noted: 10/11/2022  Today's Date: 2023  Patient seen for 26 sessions         Casey Snowden reports: intermittent pain in R shoulder but working outside in the yard a lot.    Objective   See Exercise, Manual, and Modality Logs for complete treatment.       Assessment/Plan  Improving strength with exercises especially with standing overhead ball rolls on wall and standing resisted ER. Added horizontal ABD in supine today with noted weakness and instability. Pt reports decreased popping sensation at joint; only intermittently now. Continue to progress strength and AROM as tolerated to improve function; most difficulty reaching BTH with RUE, able to get to ear.         Progress per Plan of Care and Progress strengthening /stabilization /functional activity       Timed:  Manual Therapy:    12     mins  95335;  Therapeutic Exercise:    20     mins  58191;     Neuromuscular Tres:    -    mins  11974;    Therapeutic Activity:     15     mins  34586;     Gait Training:      -     mins  80704;     Ultrasound:     -     mins  13367;      Untimed:  Electrical Stimulation:    -     mins  72116 ( );  Mechanical Traction:    -     mins  74590;   Dry needling:      -     mins  75395/    Timed Treatment:   47   mins   Total Treatment:     55   mins    Decreased units billed to account for divided time    Lucrecia Taylor PT  Physical Therapist    License #: [id number]

## 2023-05-30 ENCOUNTER — TREATMENT (OUTPATIENT)
Dept: PHYSICAL THERAPY | Facility: CLINIC | Age: 88
End: 2023-05-30

## 2023-05-30 DIAGNOSIS — R29.898 SHOULDER WEAKNESS: ICD-10-CM

## 2023-05-30 DIAGNOSIS — E03.9 ACQUIRED HYPOTHYROIDISM: ICD-10-CM

## 2023-05-30 DIAGNOSIS — M25.611 DECREASED ROM OF RIGHT SHOULDER: ICD-10-CM

## 2023-05-30 DIAGNOSIS — M25.511 ACUTE PAIN OF RIGHT SHOULDER: Primary | ICD-10-CM

## 2023-05-30 PROCEDURE — 97110 THERAPEUTIC EXERCISES: CPT | Performed by: PHYSICAL THERAPIST

## 2023-05-30 PROCEDURE — 97140 MANUAL THERAPY 1/> REGIONS: CPT | Performed by: PHYSICAL THERAPIST

## 2023-05-30 RX ORDER — LEVOTHYROXINE SODIUM 0.07 MG/1
75 TABLET ORAL
Qty: 90 TABLET | Refills: 1 | Status: SHIPPED | OUTPATIENT
Start: 2023-05-30

## 2023-05-30 NOTE — PROGRESS NOTES
Physical Therapy Daily Treatment Note      Patient: Casey Snowden Sr.   : 1934  Referring practitioner: Aleksander Diez MD  Date of Initial Visit: Type: THERAPY  Noted: 10/11/2022  Today's Date: 2023  Patient seen for 27 sessions         Casey Snowden reports: minimal pain unless he does too much; pt continues to report improvement in functional use of R arm        Objective   See Exercise, Manual, and Modality Logs for complete treatment.       Assessment/Plan  Improving strength with exercises especially with standing overhead ball rolls on wall and ER; still tending to compensate with shoulder ABD and wrist ext. Pt continued to report improved functional use of R arm. Continue to progress strength and AROM as tolerated to improve function; most difficulty reaching BTH with RUE, able to get to ear. improving joint stability with resisted rhythmic stab at 90 degrees. Pt educated that due to his sig tear the R shoulder may not ever be as strong as it was but hopeful to continue to improve functional use of R arm and avoid TSR.        Progress per Plan of Care and Progress strengthening /stabilization /functional activity           Timed:  Manual Therapy:    15     mins  54049;  Therapeutic Exercise:    30     mins  34136;     Neuromuscular Tres:    -    mins  51694;    Therapeutic Activity:     15     mins  95633;     Gait Training:      -     mins  53846;     Ultrasound:     -     mins  55151;      Untimed:  Electrical Stimulation:    -     mins  14136 ( );  Mechanical Traction:    -     mins  56244;   Dry needling:      -     mins  95524/    Timed Treatment:   60   mins   Total Treatment:     60   mins    Decreased units billed to account for divided time.    Lucrecia Taylor PT  Physical Therapist    License #: [id number]

## 2023-06-05 ENCOUNTER — TREATMENT (OUTPATIENT)
Dept: PHYSICAL THERAPY | Facility: CLINIC | Age: 88
End: 2023-06-05
Payer: MEDICARE

## 2023-06-05 DIAGNOSIS — M25.611 DECREASED ROM OF RIGHT SHOULDER: ICD-10-CM

## 2023-06-05 DIAGNOSIS — R29.898 SHOULDER WEAKNESS: ICD-10-CM

## 2023-06-05 DIAGNOSIS — M25.511 ACUTE PAIN OF RIGHT SHOULDER: Primary | ICD-10-CM

## 2023-07-27 ENCOUNTER — TREATMENT (OUTPATIENT)
Dept: PHYSICAL THERAPY | Facility: CLINIC | Age: 88
End: 2023-07-27
Payer: MEDICARE

## 2023-07-27 DIAGNOSIS — M25.511 ACUTE PAIN OF RIGHT SHOULDER: Primary | ICD-10-CM

## 2023-07-27 DIAGNOSIS — M25.611 DECREASED ROM OF RIGHT SHOULDER: ICD-10-CM

## 2023-07-27 DIAGNOSIS — R29.898 SHOULDER WEAKNESS: ICD-10-CM

## 2023-07-27 NOTE — PROGRESS NOTES
Re-Assessment / Re-Certification        Patient: Casey Snowden Sr.   : 1934  Diagnosis/ICD-10 Code:  Acute pain of right shoulder [M25.511]  Referring practitioner: Aleksander Diez MD  Date of Initial Visit: Type: THERAPY  Noted: 10/11/2022  Today's Date: 2023  Patient seen for 32 sessions      Subjective:   Casey Snowden reports: I have trouble using my R arm like I want to. Pt fell off a step stool in the yard yesterday. Laceration on R forearm and R forehead  Subjective Questionnaire: QuickDASH: 22.73  Clinical Progress: unchanged  Home Program Compliance: Yes  Treatment has included: therapeutic exercise, neuromuscular re-education, manual therapy, therapeutic activity, and cryotherapy    Objective     Active Range of Motion   Left Shoulder   Flexion: 140 degrees   Abduction: 130 degrees   External rotation 0°: 75 degrees   External rotation BTH: C2   Internal rotation BTB: T10     Right Shoulder -standing  Flexion: 120 degrees  Abduction: 80 degrees  External rotation 0°: Right shoulder active external rotation at 0 degrees: 35 moderate compensation  External rotation BTH: Active external rotation behind the head: able to reach to ear/mouth  Internal rotation BTB: thoracic  Horizontal abduction: no pain  Horizontal adduction: no pain, able to reach to opposite shoulder     Right Shoulder-supine/sidelying  Flexion: 150 degrees  ABD: 145 degrees  ER: 50 degrees     Passive Range of Motion      Right Shoulder   Flexion: 160 degrees, mild pain  Abduction: 170 degrees, mild pain  External rotation 90°: 80 degrees, mild pain  Internal rotation 90°: 90 degrees     Strength/Myotome Testing     Left Shoulder   Normal muscle strength     Right Shoulder  Flexion: 3-  ABD: 2+  ER: 3-  IR: 4     Assessment & Plan     Assessment  Impairments: abnormal muscle firing, abnormal muscle tone, abnormal or restricted ROM, activity intolerance, impaired physical strength, lacks appropriate home exercise program,  pain with function and weight-bearing intolerance  Functional Limitations: carrying objects, lifting, pulling, pushing, uncomfortable because of pain, moving in bed, reaching behind back, reaching overhead and unable to perform repetitive tasks  Assessment details: Casey Snowden Sr. is a 88 year-old male referred to physical therapy for R shoulder injury after a fall.  He has comorbidities of Parkinson's disease and personal factors of recent falls (yesterday), hospitalization and hard of hearing. Pt initially saw shoulder surgeon and received shoulder steroid injection (Nov of 2022)  and initially recommending shoulder replacement however at most recent follow up; surgeon said continue PT and hold on surgery. Pt with most weakness with ER and motion against gravity specifically shoulder elevation higher than shoulder level. Pt demonstrates painful arc with pain/difficulty in mid-range of shoulder elevation. Pt has been working in the yard more recently which could contribute to his increased pain and recent falls limiting progress. Pt advised to apply ice with increased R shoulder activity. Pt also educated on goal of and frequency of shoulder injections for pain control vs risk/benefits of TSA. Pt had to miss a few sessions recently due to vacation; unable to fish/play games with his family due to R arm limitations. Pt with sig improvement in pain but still limited functionally; now able to use R arm to eat, but grooming, showering, dressing with effort (overhead). Pt with improving R arm passive ROM to WNL and continued difficulty with active ROM but compensating with neck and trunk but recent improvement in ER, but difficulty lifting R arm against gravity higher than ~ shoulder level. Emphasis on strengthening the next few weeks. Patient is appropriate for skilled physical therapy in order to reduce pain and increase ease with daily mobility. During therapy, pt educated on anatomy, goal of interventions, HEP,  posture and strengthening of R arm to improve function. Increased duration of PT due to fall on 7/26/23 with increased pain with today's exercises.     Barriers to therapy: falls (most recent yesterday 7/26)  Prognosis: fair    Goals  Plan Goals: STG: ~6 weeks  1. Pt will demonstrate R shoulder PROM in flexion and abduction of >160 degrees to improve overhead activity tolerance-MET  2. Pt will demonstrate R shoulder PROM in ER and IR >80 degrees-MET  3. Decrease right UE tenderness with palpation to minimal over the acromion and supraspinatus tendon to be able to sleep on his side-MET  4. Pt will be able to drive comfortably and safely using BUE for at least 30 min-MET    LTG: ~12 weeks  1. Pt will demonstrate R shoulder AROM in flexion and ABD >120 degrees-PROGRESSING  2. Pt will demonstrate R shoulder AROM in ER to 45 degrees-PROGRESSING  2. Pt will be able to reach L1 using RUE-MET  3. Pt will improve R shoulder strength to >3+/5 to improve quality of life-PROGRESSING  4. Pt will improve QuickDASH score to <10 to improve subjective function of shoulder with ADLs-PROGRESSING    Plan  Therapy options: will be seen for skilled therapy services  Planned modality interventions: cryotherapy, iontophoresis, thermotherapy (hydrocollator packs), ultrasound and dry needling (no estim due to a-fib)  Planned therapy interventions: ADL retraining, balance/weight-bearing training, body mechanics training, fine motor coordination training, flexibility, functional ROM exercises, home exercise program, IADL retraining, joint mobilization, manual therapy, neuromuscular re-education, postural training, soft tissue mobilization, spinal/joint mobilization, strengthening, stretching, therapeutic activities and abdominal trunk stabilization  Treatment plan discussed with: patient  Plan details: 1x times per week for 10 weeks      Visit Diagnoses:    ICD-10-CM ICD-9-CM   1. Acute pain of right shoulder  M25.511 719.41   2. Decreased ROM  of right shoulder  M25.611 719.51   3. Shoulder weakness  R29.898 719.61         PT Signature: Lucrecia Taylor PT  [place] license: [id number]      Based upon review of the patient's progress and continued therapy plan, it is my medical opinion that Casey Snowden should continue physical therapy treatment at [facility]  [address] [place] [id number] [phone].    Signature: __________________________________  Aleksander Diez MD    Timed:  Manual Therapy:    12     mins  73716;  Therapeutic Exercise:    30     mins  68832; *    Neuromuscular Tres:    -    mins  05242;    Therapeutic Activity:     15     mins  47917;     Gait Training:      -     mins  14996;     Ultrasound:     -     mins  97497;    Electrical Stimulation:    -     mins  89798 ( );    Untimed:  Electrical Stimulation:    -     mins  92518 ( );  Mechanical Traction:    -     mins  20039; \  Dry needling:      -     mins  19028/92876    Timed Treatment:   57   mins   Total Treatment:     60   mins    Decreased units billed to account for divided time*

## 2023-08-01 DIAGNOSIS — E78.2 MIXED HYPERLIPIDEMIA: ICD-10-CM

## 2023-08-01 DIAGNOSIS — I11.0 HYPERTENSIVE HEART DISEASE WITH HEART FAILURE: ICD-10-CM

## 2023-08-01 RX ORDER — ATORVASTATIN CALCIUM 20 MG/1
TABLET, FILM COATED ORAL
Qty: 90 TABLET | Refills: 3 | Status: SHIPPED | OUTPATIENT
Start: 2023-08-01

## 2023-08-01 RX ORDER — VALSARTAN 160 MG/1
TABLET ORAL
Qty: 90 TABLET | Refills: 3 | Status: SHIPPED | OUTPATIENT
Start: 2023-08-01

## 2023-08-03 ENCOUNTER — TELEPHONE (OUTPATIENT)
Dept: PHYSICAL THERAPY | Facility: CLINIC | Age: 88
End: 2023-08-03

## 2023-08-15 ENCOUNTER — HOSPITAL ENCOUNTER (OUTPATIENT)
Dept: GENERAL RADIOLOGY | Facility: HOSPITAL | Age: 88
Discharge: HOME | End: 2023-08-15
Admitting: FAMILY MEDICINE
Payer: MEDICARE

## 2023-08-15 ENCOUNTER — OFFICE VISIT (OUTPATIENT)
Dept: FAMILY MEDICINE CLINIC | Facility: CLINIC | Age: 88
End: 2023-08-15
Payer: MEDICARE

## 2023-08-15 VITALS
HEIGHT: 71 IN | TEMPERATURE: 97.3 F | SYSTOLIC BLOOD PRESSURE: 136 MMHG | WEIGHT: 165 LBS | OXYGEN SATURATION: 98 % | BODY MASS INDEX: 23.1 KG/M2 | DIASTOLIC BLOOD PRESSURE: 78 MMHG | HEART RATE: 75 BPM

## 2023-08-15 DIAGNOSIS — M54.42 ACUTE LEFT-SIDED LOW BACK PAIN WITH LEFT-SIDED SCIATICA: Primary | ICD-10-CM

## 2023-08-15 DIAGNOSIS — M54.42 ACUTE LEFT-SIDED LOW BACK PAIN WITH LEFT-SIDED SCIATICA: ICD-10-CM

## 2023-08-15 PROCEDURE — 1159F MED LIST DOCD IN RCRD: CPT | Performed by: FAMILY MEDICINE

## 2023-08-15 PROCEDURE — 99213 OFFICE O/P EST LOW 20 MIN: CPT | Performed by: FAMILY MEDICINE

## 2023-08-15 PROCEDURE — 1160F RVW MEDS BY RX/DR IN RCRD: CPT | Performed by: FAMILY MEDICINE

## 2023-08-15 PROCEDURE — 72110 X-RAY EXAM L-2 SPINE 4/>VWS: CPT

## 2023-08-15 NOTE — PROGRESS NOTES
"  Subjective   Casey Snowden Sr. is a 89 y.o. male who is here for   Chief Complaint   Patient presents with    Back Pain   .     Back Pain       Afshin brought in by his wife today with increasing left lumbar pain.  Pain is shooting down to just below his left knee.  He is an intermittent low back pain for years.  But sciatica is new and getting worse  He is using a walker.  He has advancing cognitive decline.  He is current with physical therapy now for right shoulder.    The following portions of the patient's history were reviewed and updated as appropriate: allergies, current medications, past medical history, past social history, past surgical history, and problem list.    Review of Systems   Musculoskeletal:  Positive for back pain.     Objective   Vitals:    08/15/23 1120   BP: 136/78   Pulse: 75   Temp: 97.3 øF (36.3 øC)   SpO2: 98%   Weight: 74.8 kg (165 lb)   Height: 180.3 cm (70.98\")      Physical Exam  Vitals reviewed.   Neurological:      Mental Status: He is alert.      Motor: Weakness present.      Gait: Gait abnormal.       Assessment & Plan   Diagnoses and all orders for this visit:    1. Acute left-sided low back pain with left-sided sciatica (Primary)  -     XR Spine Lumbar 4+ View; Future  -     Ambulatory Referral to Physical Therapy Evaluate and treat    We will send for x-rays  Then add on additional physical therapy orders to treat his lumbar pain and left-sided sciatica    There are no Patient Instructions on file for this visit.    There are no discontinued medications.     No follow-ups on file.    Dr. Aleksander Diez  [place], [place]    "

## 2023-08-16 NOTE — PROGRESS NOTES
Physical Therapy Initial Evaluation and Plan of Care    [address] [id number]    Patient: Casey Snowden Sr.   : 1934  Diagnosis/ICD-10 Code:  Acute bilateral low back pain with left-sided sciatica [M54.42]  Referring practitioner: Aleksander Diez MD  Date of Initial Visit: 2023  Today's Date: 2023  Patient seen for 1 sessions           Subjective Questionnaire: Oswestry: 68% or 34/50      Subjective Evaluation    History of Present Illness  Mechanism of injury: Afshin brought in by his wife today with increasing left lumbar pain.  Pain is shooting down to just below his left knee.  He is an intermittent low back pain for years but sciatica is new and getting worse. He is using a walker previously no AD.He has advancing cognitive decline and history of multiple falls. He is current with physical therapy now for right shoulder. MD did not give him any additional medication; wanting to try PT first.      Medical History  Diagnosis  ú           Benign essential hypertension  ú           Stenosis of carotid artery  ú           Mixed hyperlipidemia  ú           Parkinson's disease (HCC)  ú           Peripheral arterial occlusive disease (HCC)  ú           Screening for prostate cancer  ú           Medication monitoring encounter  ú           Melanoma (HCC)  ú           Chronic deep vein thrombosis of popliteal vein RLE  ú           Uses hearing aid  ú           Paroxysmal A-fib (HCC)  ú           Localized edema  ú           Chronic diastolic (congestive) heart failure  ú           Anticoagulant long-term use  ú           Presence of IVC filter  ú           Medicare annual wellness visit, subsequent  ú           Iron deficiency anemia   ú           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)  ú           CAD without angina pectoris  ú           ST elevation MI involving LAD  ú           Sialorrhea  ú           Hypertensive heart disease with heart failure  (HCC)  ú           PAD (peripheral artery disease) (HCC)  ú           Metastatic squamous cell carcinoma  ú           Secondary malignancy of lymph nodes of head, face and neck  ú           Encounter for antineoplastic immunotherapy  ú           RBD (REM behavioral disorder)  ú           Acquired hypothyroidism    Hobbies: garden, yard and home projects, driving          Patient Occupation: retired Quality of life: good    Pain  At best pain ratin  At worst pain ratin (walking)  Location: LLE  Quality: dull ache and sharp (shooting down L leg)  Relieving factors: medications (acetominophen)  Aggravating factors: ambulation, prolonged positioning, stairs, standing, overhead activity, repetitive movement, squatting and movement  Progression: worsening    Social Support  Lives in: multiple-level home (8 steps to enter)  Lives with: spouse    Hand dominance: right    Diagnostic Tests  X-ray: abnormal (Degenerative changes of the lumbar spine. No acute findings. IVC filter, dilated AAA)    Patient Goals  Patient goals for therapy: decreased pain, improved balance, increased motion, increased strength, independence with ADLs/IADLs and return to sport/leisure activities           Objective        Special Questions  Patient is experiencing disturbed sleep.       Postural Observations  Seated posture: good  Standing posture: good      Palpation   Left   Tenderness of the lumbar interspinals and quadratus lumborum.     Right Tenderness of the lumbar interspinals and quadratus lumborum.     Tenderness     Additional Tenderness Details  No pain with palpation    Neurological Testing     Sensation     Lumbar   Left   Intact: light touch    Right   Intact: light touch    Additional Neurological Details  Pt denies numbness/tingling BLE    Active Range of Motion     Lumbar   Flexion: with pain  Left lateral flexion: Active left lumbar lateral flexion: 50% with pain  Right lateral flexion: WFL  Left rotation: Active left  lumbar rotation: sideways.   Right rotation: Active right lumbar rotation: sideways.     Additional Active Range of Motion Details  Flexion to reach floor    Passive Range of Motion     Additional Passive Range of Motion Details  Pt unable to tolerate prone; no pain with lumbar PIVMs in sidelying/sitting    Strength/Myotome Testing     Left Hip   Planes of Motion   Flexion: 2-  Extension: 2+  Abduction: 3-  Adduction: 3-    Right Hip   Planes of Motion   Flexion: 3-  Extension: 3-  Abduction: 3+  Adduction: 3+    Left Knee   Flexion: 3-  Extension: 3+    Right Knee   Flexion: 4-  Extension: 4-    Left Ankle/Foot   Dorsiflexion: 3+  Plantar flexion: 3-    Right Ankle/Foot   Dorsiflexion: 4  Plantar flexion: 4    Tests       Thoracic   Positive slump.     Lumbar   Positive repeated flexion.     Left   Positive passive SLR.     Additional Tests Details  Slump + LLE    Lumbar Flexibility Comments:   Pt unable to lift LLE off table due to pain  RLE SLR: 47 degrees    Ambulation     Observational Gait   Gait: antalgic   Decreased walking speed and left stance time.     Additional Observational Gait Details  With cane today    Comments   Mod A for bed mobility on/off mat table        Assessment & Plan       Assessment  Impairments: abnormal gait, abnormal muscle firing, abnormal muscle tone, abnormal or restricted ROM, activity intolerance, impaired balance, impaired physical strength, lacks appropriate home exercise program, pain with function, safety issue and weight-bearing intolerance   Functional limitations: carrying objects, lifting, sleeping, walking, pulling, pushing, uncomfortable because of pain, moving in bed, sitting, standing, stooping and unable to perform repetitive tasks   Assessment details: Casey CLARK Sp Bowen is a 89 year-old male referred to physical therapy for low back pain radiating down LLE. He presents with a evolving clinical presentation.  He has comorbidities of R RTC tear, multiple falls, PD  and personal factors of trying to maintain his active lifestyle and independence that may affect his progress in the plan of care.  Pt using Rwx/cane but previously not using AD. Signs and symptoms are consistent with physical therapy diagnosis of impaired lumbar ROM with neural tension LLE. Patient is appropriate for skilled physical therapy in order to reduce pain and increase ease with daily mobility. During evaluation, pt educated on anatomy, goal of interventions, initial HEP and body mechanics to promote healthy lifestyle and improve quality of life.     Prognosis: good    Goals  Plan Goals: ST weeks  1. Pt will be able to perform/tolerate HEP without increased symptoms  2. Increased lumbar AROM to WFL in all planes to allow for increased ease with sit-stand transfers and functional activities  3. Pt will be able to complete bed mobility (supine to/from sit) without increased back/LLE pain  4. Pt will be able to compete LLE SLR to at least 30 degrees to indicate decreased neural tension    LT weeks  1. Pt will be able to perform/tolerate HEP independently  2. Pt will be able to walk/stand for 20 min without a rest break and no AD to be able to navigate the grocery store and yard work  3. Pt will improve Back Index to 15/50 or less to improve subjective function during ADLs  4. Pt to exhibit non painful trunk AROM to allow him to resume golfing/fishing without functional limitation  5. Pt to demonstrate ability to lift 10# medicine ball/weight from floor to waist safely and while demonstrating proper body mechanics with tolerable symptoms    Plan  Therapy options: will be seen for skilled therapy services  Planned modality interventions: cryotherapy, electrical stimulation/Russian stimulation, TENS, traction, ultrasound, thermotherapy (hydrocollator packs) and dry needling  Planned therapy interventions: abdominal trunk stabilization, balance/weight-bearing training, body mechanics training,  flexibility, functional ROM exercises, gait training, home exercise program, joint mobilization, manual therapy, postural training, spinal/joint mobilization, strengthening, stretching, therapeutic activities, motor coordination training, soft tissue mobilization, ADL retraining, neuromuscular re-education, transfer training and IADL retraining  Treatment plan discussed with: patient (wife)  Plan details: 2x per week for 12 weeks      Visit Diagnoses:    ICD-10-CM ICD-9-CM   1. Acute bilateral low back pain with left-sided sciatica  M54.42 724.2     724.3   2. Impaired functional mobility, balance, gait, and endurance  Z74.09 V49.89       Timed:  Manual Therapy:    8     mins  82918;  Therapeutic Exercise:    20     mins  10481;     Neuromuscular Tres:    -    mins  92207;    Therapeutic Activity:     -     mins  65681;     Gait Training:      -     mins  33657;     Ultrasound:     -     mins  73410;    Electrical Stimulation:    -     mins  95650 ( );    Low Eval                       20      Mins  23341  Mod Eval                        -     Mins  58059  High Eval                       -     Mins  57005    Untimed:  Electrical Stimulation:    -     mins  71555 ( );  Mechanical Traction:    -     mins  26185;     Timed Treatment:   28   mins   Total Treatment:     60   mins    PT SIGNATURE: Lucrecia Taylor PT   [place] license: [id number]  DATE TREATMENT INITIATED: 8/17/2023    Initial Certification  Certification Period: 11/15/2023  I certify that the therapy services are furnished while this patient is under my care.  The services outlined above are required by this patient, and will be reviewed every 90 days.     PHYSICIAN: Aleksander Diez MD      DATE:     Please sign and return via fax to [phone]. Thank you, [facility].

## 2023-08-17 ENCOUNTER — TREATMENT (OUTPATIENT)
Dept: PHYSICAL THERAPY | Facility: CLINIC | Age: 88
End: 2023-08-17
Payer: MEDICARE

## 2023-08-17 DIAGNOSIS — Z74.09 IMPAIRED FUNCTIONAL MOBILITY, BALANCE, GAIT, AND ENDURANCE: ICD-10-CM

## 2023-08-17 DIAGNOSIS — M54.42 ACUTE BILATERAL LOW BACK PAIN WITH LEFT-SIDED SCIATICA: Primary | ICD-10-CM

## 2023-08-17 PROCEDURE — 97161 PT EVAL LOW COMPLEX 20 MIN: CPT | Performed by: PHYSICAL THERAPIST

## 2023-08-17 PROCEDURE — 97140 MANUAL THERAPY 1/> REGIONS: CPT | Performed by: PHYSICAL THERAPIST

## 2023-08-17 PROCEDURE — 97110 THERAPEUTIC EXERCISES: CPT | Performed by: PHYSICAL THERAPIST

## 2023-08-21 ENCOUNTER — TREATMENT (OUTPATIENT)
Dept: PHYSICAL THERAPY | Facility: CLINIC | Age: 88
End: 2023-08-21
Payer: MEDICARE

## 2023-08-21 DIAGNOSIS — M54.42 ACUTE BILATERAL LOW BACK PAIN WITH LEFT-SIDED SCIATICA: Primary | ICD-10-CM

## 2023-08-21 DIAGNOSIS — Z74.09 IMPAIRED FUNCTIONAL MOBILITY, BALANCE, GAIT, AND ENDURANCE: ICD-10-CM

## 2023-08-21 PROCEDURE — 97140 MANUAL THERAPY 1/> REGIONS: CPT | Performed by: PHYSICAL THERAPIST

## 2023-08-21 PROCEDURE — 97110 THERAPEUTIC EXERCISES: CPT | Performed by: PHYSICAL THERAPIST

## 2023-08-21 NOTE — PROGRESS NOTES
Physical Therapy Daily Treatment Note      Patient: Casey Snowden Sr.   : 1934  Referring practitioner: Aleksander Diez MD  Date of Initial Visit: Type: THERAPY  Noted: 2023  Today's Date: 2023  Patient seen for 2 sessions         Casey Snowden reports: continued pain and LLE giving out.        Objective   See Exercise, Manual, and Modality Logs for complete treatment.       Assessment/Plan  Moderate cues with hand over hand technique for appropriate stretch. Continued manual traction/LAD and manual sciatic nerve glides. Pt needing min A for gait with cane through the clinic and multiple bouts of LLE giving out; pt with assistance out to car today. Pt has meeting at [employer] today; limited on time today. Pt instructed to ice/heat at home today.       Progress per Plan of Care and Progress strengthening /stabilization /functional activity           Timed:  Manual Therapy:    10     mins  73825;  Therapeutic Exercise:    30     mins  41822; *    Neuromuscular Tres:    -    mins  63160;    Therapeutic Activity:     10     mins  87951;  *   Gait Training:      -     mins  32507;     Ultrasound:     -     mins  32611;      Untimed:  Electrical Stimulation:    -     mins  45924 ( );  Mechanical Traction:    -     mins  39572;   Dry needling:      -     mins  30053/    Timed Treatment:   50   mins   Total Treatment:     50   mins    Decreased units billed to account for divided time*  Lucrecia Taylor PT  Physical Therapist    License #: [id number]

## 2023-08-24 ENCOUNTER — TREATMENT (OUTPATIENT)
Dept: PHYSICAL THERAPY | Facility: CLINIC | Age: 88
End: 2023-08-24
Payer: MEDICARE

## 2023-08-24 DIAGNOSIS — M54.42 ACUTE BILATERAL LOW BACK PAIN WITH LEFT-SIDED SCIATICA: Primary | ICD-10-CM

## 2023-08-24 DIAGNOSIS — Z74.09 IMPAIRED FUNCTIONAL MOBILITY, BALANCE, GAIT, AND ENDURANCE: ICD-10-CM

## 2023-08-24 PROCEDURE — 97110 THERAPEUTIC EXERCISES: CPT | Performed by: PHYSICAL THERAPIST

## 2023-08-24 PROCEDURE — 97140 MANUAL THERAPY 1/> REGIONS: CPT | Performed by: PHYSICAL THERAPIST

## 2023-08-24 NOTE — PROGRESS NOTES
Physical Therapy Daily Treatment Note      Patient: Casey Snowden Sr.   : 1934  Referring practitioner: Aleksander Diez MD  Date of Initial Visit: Type: THERAPY  Noted: 2023  Today's Date: 2023  Patient seen for 3 sessions         Casey Snowden reports: he is dizzy today; leg hasn't bothered him too much. Pt using walker today due to increased dizziness.        Objective     /73 mmHg-prior to exercise    151/76 mmHg- after table exercises    183/78 mmHg- after Nustep    See Exercise, Manual, and Modality Logs for complete treatment.       Assessment/Plan  Pt with increased tolerance to LE stretches and nerve glides today; thus added some stabilization exercises today. Nustep for LLE mobility. BP monitored throughout; pt and wife reports he normally runs moderately higher than normal. Advised wife to continue to check BP once he gets home and hydrate, rest today. PT recommending to use Rwx until dizziness subsides and LLE more stable. Multiple bouts of LLE giving out with cane last session but none today with Rwx.        Progress per Plan of Care and Progress strengthening /stabilization /functional activity           Timed:  Manual Therapy:    10     mins  50122;  Therapeutic Exercise:    30     mins  22405; *   Neuromuscular Tres:    -    mins  88417;    Therapeutic Activity:     10     mins  35587; *  Gait Training:      -     mins  63126;     Ultrasound:     -     mins  23825;      Untimed:  Electrical Stimulation:    -     mins  14616 ( );  Mechanical Traction:    -     mins  68297;   Dry needling:      -     mins  09844/    Timed Treatment:   50   mins   Total Treatment:     50   mins    Decreased units billed to account for divided time*  Lucrecia Taylor, PT  Physical Therapist    License #: [id number]

## 2023-08-29 ENCOUNTER — OFFICE VISIT (OUTPATIENT)
Dept: FAMILY MEDICINE CLINIC | Facility: CLINIC | Age: 88
End: 2023-08-29
Payer: MEDICARE

## 2023-08-29 ENCOUNTER — TREATMENT (OUTPATIENT)
Dept: PHYSICAL THERAPY | Facility: CLINIC | Age: 88
End: 2023-08-29
Payer: MEDICARE

## 2023-08-29 VITALS
WEIGHT: 171 LBS | HEIGHT: 71 IN | BODY MASS INDEX: 23.94 KG/M2 | TEMPERATURE: 97.6 F | DIASTOLIC BLOOD PRESSURE: 66 MMHG | OXYGEN SATURATION: 97 % | SYSTOLIC BLOOD PRESSURE: 146 MMHG | HEART RATE: 56 BPM

## 2023-08-29 DIAGNOSIS — I10 BENIGN ESSENTIAL HYPERTENSION: Primary | Chronic | ICD-10-CM

## 2023-08-29 DIAGNOSIS — Z74.09 IMPAIRED FUNCTIONAL MOBILITY, BALANCE, GAIT, AND ENDURANCE: ICD-10-CM

## 2023-08-29 DIAGNOSIS — M54.42 ACUTE BILATERAL LOW BACK PAIN WITH LEFT-SIDED SCIATICA: Primary | ICD-10-CM

## 2023-08-29 PROCEDURE — 97140 MANUAL THERAPY 1/> REGIONS: CPT | Performed by: PHYSICAL THERAPIST

## 2023-08-29 PROCEDURE — 99213 OFFICE O/P EST LOW 20 MIN: CPT | Performed by: FAMILY MEDICINE

## 2023-08-29 PROCEDURE — 1160F RVW MEDS BY RX/DR IN RCRD: CPT | Performed by: FAMILY MEDICINE

## 2023-08-29 PROCEDURE — 1159F MED LIST DOCD IN RCRD: CPT | Performed by: FAMILY MEDICINE

## 2023-08-29 NOTE — PROGRESS NOTES
Physical Therapy Daily Treatment Note    [facility]  [address] [place] [address]        Patient: Casey Snowden Sr.   : 1934  Referring practitioner: Aleksander Diez MD  Date of Initial Visit: Type: THERAPY  Noted: 2023  Today's Date: 2023  Patient seen for 4 sessions       Visit Diagnoses:    ICD-10-CM ICD-9-CM   1. Acute bilateral low back pain with left-sided sciatica  M54.42 724.2     724.3   2. Impaired functional mobility, balance, gait, and endurance  Z74.09 V49.89       Subjective Evaluation    History of Present Illness    Subjective comment: Left leg hurting today. No longer dizzy but hasn't taken his BP in a few days. 196/88 R UE. 193/85 L UE.  taken sitting today with mech cuff. Supine with manual cuff to L arm 185/100 taken 2x with same result     Objective   See Exercise, Manual, and Modality Logs for complete treatment.       Assessment & Plan       Assessment  Assessment details: MD chart notes states pt has HTN and orthostatic hypotension due to his PD. However, most BPs in MD office have been in the 130-140/75-85 range, upon review. States he has been taking his BP meds.   Admits he has been in quite a bit of pain the past few weeks. Has frequent sharp L glut pain if stepping the wrong way, usually with too much external rotation at the hip.   This was seen today when moving on rx table with sharp L glut pain. Able to ease in 30 seconds with repositioning.   Will relay BP numbers to spouse and recommending they communicate with MD ASAP.   May benefit from cupping but is on a blood thinner which could cause issue.   Has not spoken with Dr. Diez since x-ray. May benefit from pain management if PT rx not helpful.  Good tolerance to deep tissue work today and mod level distraction of L LE.   Unable to perform ex today due to BP above safe range.   Sharp pain and protected L hip when FWB with no device. Much improved off loading with walker. May need  further exam and imaging at L hip to r/o as source of pain. Pt left after gait assessment and did not perform ROM or special tests on hip today.         Timed:         Manual Therapy:    15     mins  52947;     Therapeutic Exercise:         mins  15575;     Neuromuscular Tres:        mins  25862;    Therapeutic Activity:          mins  58327;     Gait Training:           mins  74335;     Ultrasound:          mins  26443;    Ionto                                   mins   46047  Self Care                       5     mins   89081  Canalith Repos         mins 97775  Work hardening   __   min 58715/66755      Un-Timed:  Electrical Stimulation:         mins  18184 ( );  Dry Needling          mins self-pay  Traction          mins 23818      Timed Treatment:   20   mins   Total Treatment:   35     mins    Zorre Zeno Kimura, PT  [place] License: [id number]    [place] License:  [id number]

## 2023-08-29 NOTE — PROGRESS NOTES
"  Subjective   Casey Snowden Sr. is a 89 y.o. male who is here for   Chief Complaint   Patient presents with    Hypertension   .     History of Present Illness     Afshin comes over for a visit from physical therapy today.  He is receiving physical therapy for lumbar pain and sciatica loss of balance frequent falls.  Pressure was quite elevated today with Staladex in the 180s and 190s  He was having a fair amount of pain.  Takes his valsartan daily.  Not having any headache chest pain shortness of air.    The following portions of the patient's history were reviewed and updated as appropriate: allergies, current medications, past medical history, past social history, past surgical history, and problem list.    Review of Systems    Objective   Vitals:    08/29/23 1611   BP: 146/66   Pulse: 56   Temp: 97.6 øF (36.4 øC)   SpO2: 97%   Weight: 77.6 kg (171 lb)   Height: 180.3 cm (71\")      Physical Exam  Vitals reviewed.   Neurological:      Mental Status: He is alert.      Motor: Weakness present.      Gait: Gait abnormal.       Assessment & Plan   Diagnoses and all orders for this visit:    1. Benign essential hypertension (Primary)    Recent elevated blood pressure readings.  Asymptomatic  Perhaps brought on by stress anxiety and pain going to physical therapy  Asked him to take 2 extra drink Tylenol 1 hour prior to therapy  Resume physical therapy without changes    There are no Patient Instructions on file for this visit.    There are no discontinued medications.     No follow-ups on file.    Dr. Aleksander Diez  [place], [place]    "

## 2023-09-07 ENCOUNTER — TREATMENT (OUTPATIENT)
Dept: PHYSICAL THERAPY | Facility: CLINIC | Age: 88
End: 2023-09-07
Payer: MEDICARE

## 2023-09-07 DIAGNOSIS — Z74.09 IMPAIRED FUNCTIONAL MOBILITY, BALANCE, GAIT, AND ENDURANCE: ICD-10-CM

## 2023-09-07 DIAGNOSIS — M54.42 ACUTE BILATERAL LOW BACK PAIN WITH LEFT-SIDED SCIATICA: Primary | ICD-10-CM

## 2023-09-07 PROCEDURE — 97110 THERAPEUTIC EXERCISES: CPT | Performed by: PHYSICAL THERAPIST

## 2023-09-07 PROCEDURE — 97140 MANUAL THERAPY 1/> REGIONS: CPT | Performed by: PHYSICAL THERAPIST

## 2023-09-07 NOTE — PROGRESS NOTES
Physical Therapy Daily Treatment Note      Patient: Casey Snowden Sr.   : 1934  Referring practitioner: Aleksander Diez MD  Date of Initial Visit: Type: THERAPY  Noted: 2023  Today's Date: 2023  Patient seen for 5 sessions         Casey Snowden reports: L leg pain down to ankle/calf 9/10 and then 30 min prior to session in L hip at 10. Pt using walker for support today.            Objective   BP prior to exercise: 161/72 mmHg  /75 mmHg after mat table exercises    See Exercise, Manual, and Modality Logs for complete treatment.       Assessment/Plan  Pt with continued LLE pain; difficulty with WB on LLE. Added heel slides and supine ankle pumps today with reports of lower leg pain and neural tightness with knee ext. Pt advised to contact PCP about continued LLE with minimal progress made thus far in therapy. Pt encouraged to continue to monitor BP and go to the [facility] (if BP ok) and do the Nustep for neural mobility (not speed) for 10-15 min over the weekend and continue to use walker.       Progress per Plan of Care and Progress strengthening /stabilization /functional activity           Timed:  Manual Therapy:    15     mins  66675;  Therapeutic Exercise:    30     mins  43421; *    Neuromuscular Tres:    -    mins  53403;    Therapeutic Activity:     -     mins  30068;     Gait Training:      -     mins  93099;     Ultrasound:     -     mins  39106;      Untimed:  Electrical Stimulation:    -     mins  01332 ( );  Mechanical Traction:    -     mins  24368;   Dry needling:      -     mins  17990/    Timed Treatment:   45   mins   Total Treatment:     50   mins    Decreased units billed to account for divided time*  Lucrecia Taylor, PT  Physical Therapist    License #: [id number]

## 2023-09-14 ENCOUNTER — TREATMENT (OUTPATIENT)
Dept: PHYSICAL THERAPY | Facility: CLINIC | Age: 88
End: 2023-09-14
Payer: MEDICARE

## 2023-09-14 DIAGNOSIS — Z74.09 IMPAIRED FUNCTIONAL MOBILITY, BALANCE, GAIT, AND ENDURANCE: ICD-10-CM

## 2023-09-14 DIAGNOSIS — M54.42 ACUTE BILATERAL LOW BACK PAIN WITH LEFT-SIDED SCIATICA: Primary | ICD-10-CM

## 2023-09-14 PROCEDURE — 97110 THERAPEUTIC EXERCISES: CPT | Performed by: PHYSICAL THERAPIST

## 2023-09-14 NOTE — PROGRESS NOTES
Re-Assessment / Re-Certification      Patient: Casey Snowden Sr.   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   Subjective Questionnaire: Oswestry: 54% impaired  Clinical Progress: unchanged  Home Program Compliance: Yes  Treatment has included: therapeutic exercise, neuromuscular re-education, manual therapy, therapeutic activity, gait training, traction, moist heat, and cryotherapy      Objective     Special Questions  Patient is no longer experiencing disturbed sleep.         Postural Observations  Seated posture: good  Standing posture: good     Palpation     Left- minimal tenderness of the lumbar interspinals and quadratus lumborum.      Tenderness      Along sciatic nerve distribution at L piriformis, HS, calf     Neurological Testing      Sensation      Lumbar   Left   Intact: light touch     Right   Intact: light touch     Additional Neurological Details  Pt denies numbness/tingling BLE     Active Range of Motion      Lumbar   Flexion: with pain  Left lateral flexion: Active left lumbar lateral flexion: 50% with pain  Right lateral flexion: WFL  Left rotation: Active left lumbar rotation: sideways.   Right rotation: Active right lumbar rotation: sideways.      Additional Active Range of Motion Details  Flexion to reach floor     Passive Range of Motion      Additional Passive Range of Motion Details  Pt unable to tolerate prone; no pain with lumbar PIVMs in sidelying/sitting     Strength/Myotome Testing      Left Hip   Planes of Motion   Flexion: 3-  Extension: 3-  Abduction: 3-  Adduction: 3-     Right Hip    Planes of Motion   Flexion: 3-  Extension: 3-  Abduction: 3+  Adduction: 3+     Left Knee   Flexion: 3+  Extension: 4     Right Knee   Flexion: 4-  Extension: 4     Left Ankle/Foot   Dorsiflexion: 3+  Plantar flexion: 3-     Right Ankle/Foot   Dorsiflexion: 4  Plantar flexion: 4     Tests         Thoracic   Positive slump.     Left   Negative passive SLR or active SLR.      Additional Tests Details  Slump + LLE     Lumbar Flexibility Comments:   LLE SLR: 59 degrees  RLE SLR: 67 degrees     Ambulation      Observational Gait   Gait: antalgic   Decreased walking speed and left stance time.      Additional Observational Gait Details  With walker today     Comments   I for bed mobility on/off mat table      Assessment & Plan       Assessment  Impairments: abnormal gait, abnormal muscle firing, abnormal muscle tone, abnormal or restricted ROM, activity intolerance, impaired balance, impaired physical strength, lacks appropriate home exercise program, pain with function, safety issue and weight-bearing intolerance   Functional limitations: carrying objects, lifting, walking, pulling, pushing, uncomfortable because of pain, moving in bed, sitting, standing, stooping and unable to perform repetitive tasks   Assessment details: Casey Snowden  is a 89 year-old male referred to physical therapy for low back pain radiating down LLE; mostly knee to ankle. He presents with a evolving clinical presentation.  He has comorbidities of R RTC tear, multiple falls, PD and personal factors of trying to maintain his active lifestyle and independence that may affect his progress in the plan of care.  Pt using Rwx/cane but previously not using AD. Pt is sleeping better, decreasing neural irritation, now able to complete SLR without pain and I with bed mobility, transfers and gait with AD. Discussed starting the day with calf stretch and possibly doing the aquatic class at the [facility]. Pt report fall on Monday due to random LLE shooting  pain but no pain with exercises today. Patient is appropriate for skilled physical therapy in order to reduce pain and increase ease with daily mobility. Discussion with pt and wife to reach out to PCP and see what next steps are for potential injection due to continued pain down LLE and risk of falls.  Prognosis: good    Goals  Plan Goals: ST weeks  1. Pt will be able to perform/tolerate HEP without increased symptoms-MET  2. Increased lumbar AROM to WFL in all planes to allow for increased ease with sit-stand transfers and functional activities-PROGRESSING  3. Pt will be able to complete bed mobility (supine to/from sit) without increased back/LLE pain-MET (but inconsistent)  4. Pt will be able to compete LLE SLR to at least 30 degrees to indicate decreased neural tension-MET    LT weeks  1. Pt will be able to perform/tolerate HEP independently-PROGRESSING (discussed starting each day with calf stretch)  2. Pt will be able to walk/stand for 20 min without a rest break and no AD to be able to navigate the grocery store and yard work-PROGRESSING (not consistent)  3. Pt will improve Back Index to 15/50 or less to improve subjective function during ADLs-PROGRESSING  4. Pt to exhibit non painful trunk AROM to allow him to resume golfing/fishing without functional limitation-NOT MET  5. Pt to demonstrate ability to lift 10# medicine ball/weight from floor to waist safely and while demonstrating proper body mechanics with tolerable symptoms-NOT MET    Plan  Therapy options: will be seen for skilled therapy services  Planned modality interventions: cryotherapy, electrical stimulation/Russian stimulation, TENS, traction, ultrasound, thermotherapy (hydrocollator packs) and dry needling  Planned therapy interventions: abdominal trunk stabilization, balance/weight-bearing training, body mechanics training, flexibility, functional ROM exercises, gait training, home exercise program, joint mobilization, manual therapy,  postural training, spinal/joint mobilization, strengthening, stretching, therapeutic activities, motor coordination training, soft tissue mobilization, ADL retraining, neuromuscular re-education, transfer training and IADL retraining  Treatment plan discussed with: patient (wife)  Plan details: 2x per week for 8 weeks      Visit Diagnoses:    ICD-10-CM ICD-9-CM   1. Acute bilateral low back pain with left-sided sciatica  M54.42 724.2     724.3   2. Impaired functional mobility, balance, gait, and endurance  Z74.09 V49.89         PT Signature: Lucrecia Taylor PT  [place] license: [id number]      Timed:  Manual Therapy:    -     mins  45843;  Therapeutic Exercise:    30     mins  71791;     Neuromuscular Tres:    -    mins  96292;    Therapeutic Activity:     10     mins  76277;   *  Gait Training:      -     mins  25085;     Ultrasound:     -     mins  94478;    Electrical Stimulation:    -     mins  86760 ( );    Untimed:  Electrical Stimulation:    -     mins  57133 ( );  Mechanical Traction:    -     mins  68009; \  Dry needling:      -     mins  51579/20561    Timed Treatment:   40   mins   Total Treatment:     40   mins    Decreased units billed to account for divided time*

## 2023-09-21 ENCOUNTER — TREATMENT (OUTPATIENT)
Dept: PHYSICAL THERAPY | Facility: CLINIC | Age: 88
End: 2023-09-21
Payer: MEDICARE

## 2023-09-21 DIAGNOSIS — Z74.09 IMPAIRED FUNCTIONAL MOBILITY, BALANCE, GAIT, AND ENDURANCE: ICD-10-CM

## 2023-09-21 DIAGNOSIS — M54.42 ACUTE BILATERAL LOW BACK PAIN WITH LEFT-SIDED SCIATICA: Primary | ICD-10-CM

## 2023-09-21 PROCEDURE — 97530 THERAPEUTIC ACTIVITIES: CPT | Performed by: PHYSICAL THERAPIST

## 2023-09-21 PROCEDURE — 97110 THERAPEUTIC EXERCISES: CPT | Performed by: PHYSICAL THERAPIST

## 2023-09-21 NOTE — PROGRESS NOTES
Physical Therapy Daily Treatment Note      Patient: Casey Snowden Sr.   : 1934  Referring practitioner: Aleksander Diez MD  Date of Initial Visit: Type: THERAPY  Noted: 2023  Today's Date: 2023  Patient seen for 7 sessions         Casey Snowden reports: no sharp pains down the leg since last week; no falls. Discomfort sleeping last night. Pt still using walker for safety. Pain seems to have centralized behind my          Objective   See Exercise, Manual, and Modality Logs for complete treatment.       Assessment/Plan  Pt demonstrates decreased nerve tension down LLE with decreased intensity and frequency of radiating pain down the leg. Most pain now at posterior knee with nerve glides and stretches. Pt going out of town next week; advised to take walker and work on knee ext exercises until next visit.        Progress per Plan of Care and Progress strengthening /stabilization /functional activity           Timed:  Manual Therapy:    -     mins  80937;  Therapeutic Exercise:    30     mins  13599;     Neuromuscular Tres:    -    mins  96811;    Therapeutic Activity:     25    mins  23832;     Gait Training:      -     mins  33792;     Ultrasound:     -     mins  81632;      Untimed:  Electrical Stimulation:    -     mins  34215 ( );  Mechanical Traction:    -     mins  23593;   Dry needling:      -     mins  75203/    Timed Treatment:   55   mins   Total Treatment:     55   mins    Decreased units billed to account for divided time  Lucrecia Taylor PT  Physical Therapist    License #: [id number]

## 2023-10-02 DIAGNOSIS — I10 BENIGN ESSENTIAL HYPERTENSION: Primary | Chronic | ICD-10-CM

## 2023-10-02 DIAGNOSIS — E03.9 ACQUIRED HYPOTHYROIDISM: ICD-10-CM

## 2023-10-02 DIAGNOSIS — D50.8 IRON DEFICIENCY ANEMIA SECONDARY TO INADEQUATE DIETARY IRON INTAKE: ICD-10-CM

## 2023-10-02 DIAGNOSIS — Z00.00 MEDICARE ANNUAL WELLNESS VISIT, SUBSEQUENT: ICD-10-CM

## 2023-10-02 DIAGNOSIS — E78.2 MIXED HYPERLIPIDEMIA: ICD-10-CM

## 2023-10-05 ENCOUNTER — TREATMENT (OUTPATIENT)
Dept: PHYSICAL THERAPY | Facility: CLINIC | Age: 88
End: 2023-10-05
Payer: MEDICARE

## 2023-10-05 DIAGNOSIS — M54.42 ACUTE BILATERAL LOW BACK PAIN WITH LEFT-SIDED SCIATICA: Primary | ICD-10-CM

## 2023-10-05 DIAGNOSIS — Z74.09 IMPAIRED FUNCTIONAL MOBILITY, BALANCE, GAIT, AND ENDURANCE: ICD-10-CM

## 2023-10-05 PROCEDURE — 97110 THERAPEUTIC EXERCISES: CPT | Performed by: PHYSICAL THERAPIST

## 2023-10-05 PROCEDURE — 97112 NEUROMUSCULAR REEDUCATION: CPT | Performed by: PHYSICAL THERAPIST

## 2023-10-05 PROCEDURE — 97530 THERAPEUTIC ACTIVITIES: CPT | Performed by: PHYSICAL THERAPIST

## 2023-10-05 NOTE — PROGRESS NOTES
Physical Therapy Daily Treatment Note      Patient: Casey Snowden Sr.   : 1934  Referring practitioner: Aleksander Diez MD  Date of Initial Visit: Type: THERAPY  Noted: 2023  Today's Date: 10/5/2023  Patient seen for 8 sessions         Casey Snowden reports: continued L leg pain behind my knee; worse when I don't use the walker for support. Pt sees PCP on Tuesday         Objective   See Exercise, Manual, and Modality Logs for complete treatment.       Assessment/Plan  Pt with continued neural tension down LLE; concentrated at posterior L knee at this time. Pt following up with PCP next week due to impairments of L leg and minimal progress with therapy. Possible need for imaging or referral to ortho/pain management. Pt instructed to continue to use walker for safety and decreased falls risk.        Progress per Plan of Care and Progress strengthening /stabilization /functional activity       Timed:  Manual Therapy:    -     mins  50173;  Therapeutic Exercise:    20     mins  47975;     Neuromuscular Tres:    10    mins  06041;    Therapeutic Activity:     10     mins  22854;     Gait Training:      -     mins  28687;     Ultrasound:     -     mins  06604;      Untimed:  Electrical Stimulation:    -     mins  31849 ( );  Mechanical Traction:    -     mins  77628;   Dry needling:      -     mins  86159/    Timed Treatment:   40   mins   Total Treatment:     45   mins  Lucrecia Taylor PT  Physical Therapist    License #: [id number]

## 2023-10-09 ENCOUNTER — APPOINTMENT (OUTPATIENT)
Dept: CT IMAGING | Facility: HOSPITAL | Age: 88
End: 2023-10-09
Payer: MEDICARE

## 2023-10-09 ENCOUNTER — APPOINTMENT (OUTPATIENT)
Dept: GENERAL RADIOLOGY | Facility: HOSPITAL | Age: 88
End: 2023-10-09
Payer: MEDICARE

## 2023-10-09 ENCOUNTER — DOCUMENTATION (OUTPATIENT)
Dept: PHYSICAL THERAPY | Facility: CLINIC | Age: 88
End: 2023-10-09
Payer: MEDICARE

## 2023-10-09 ENCOUNTER — HOSPITAL ENCOUNTER (INPATIENT)
Facility: HOSPITAL | Age: 88
LOS: 7 days | Discharge: HOME HEALTH | End: 2023-10-16
Attending: EMERGENCY MEDICINE | Admitting: STUDENT IN AN ORGANIZED HEALTH CARE EDUCATION/TRAINING PROGRAM
Payer: MEDICARE

## 2023-10-09 ENCOUNTER — APPOINTMENT (OUTPATIENT)
Dept: MRI IMAGING | Facility: HOSPITAL | Age: 88
End: 2023-10-09
Payer: MEDICARE

## 2023-10-09 DIAGNOSIS — Z79.01 CHRONIC ANTICOAGULATION: ICD-10-CM

## 2023-10-09 DIAGNOSIS — I10 HYPERTENSION, UNSPECIFIED TYPE: ICD-10-CM

## 2023-10-09 DIAGNOSIS — H53.462 LEFT HOMONYMOUS HEMIANOPSIA: ICD-10-CM

## 2023-10-09 DIAGNOSIS — M54.42 ACUTE BILATERAL LOW BACK PAIN WITH LEFT-SIDED SCIATICA: Primary | ICD-10-CM

## 2023-10-09 DIAGNOSIS — R54 AGE-RELATED PHYSICAL DEBILITY: ICD-10-CM

## 2023-10-09 DIAGNOSIS — I48.91 ATRIAL FIBRILLATION, UNSPECIFIED TYPE: ICD-10-CM

## 2023-10-09 DIAGNOSIS — R41.4 LEFT-SIDED NEGLECT: ICD-10-CM

## 2023-10-09 DIAGNOSIS — E78.5 HYPERLIPIDEMIA, UNSPECIFIED HYPERLIPIDEMIA TYPE: ICD-10-CM

## 2023-10-09 DIAGNOSIS — I63.9 ACUTE ISCHEMIC STROKE: Primary | ICD-10-CM

## 2023-10-09 DIAGNOSIS — Z74.09 IMPAIRED FUNCTIONAL MOBILITY, BALANCE, GAIT, AND ENDURANCE: ICD-10-CM

## 2023-10-09 LAB
ABO GROUP BLD: NORMAL
ALBUMIN SERPL-MCNC: 4.2 G/DL (ref 3.5–5.2)
ALBUMIN/GLOB SERPL: 1.8 G/DL
ALP SERPL-CCNC: 90 U/L (ref 39–117)
ALT SERPL W P-5'-P-CCNC: <5 U/L (ref 1–41)
ANION GAP SERPL CALCULATED.3IONS-SCNC: 13.6 MMOL/L (ref 5–15)
APTT PPP: 30 SECONDS (ref 22.7–35.4)
AST SERPL-CCNC: 12 U/L (ref 1–40)
BASOPHILS # BLD AUTO: 0.04 10*3/MM3 (ref 0–0.2)
BASOPHILS NFR BLD AUTO: 0.5 % (ref 0–1.5)
BILIRUB SERPL-MCNC: 0.5 MG/DL (ref 0–1.2)
BLD GP AB SCN SERPL QL: NEGATIVE
BUN SERPL-MCNC: 18 MG/DL (ref 8–23)
BUN/CREAT SERPL: 20.2 (ref 7–25)
CALCIUM SPEC-SCNC: 9.4 MG/DL (ref 8.6–10.5)
CHLORIDE SERPL-SCNC: 106 MMOL/L (ref 98–107)
CO2 SERPL-SCNC: 26.4 MMOL/L (ref 22–29)
CREAT BLDA-MCNC: 0.9 MG/DL (ref 0.6–1.3)
CREAT SERPL-MCNC: 0.89 MG/DL (ref 0.76–1.27)
DEPRECATED RDW RBC AUTO: 44.9 FL (ref 37–54)
EGFRCR SERPLBLD CKD-EPI 2021: 81.9 ML/MIN/1.73
EOSINOPHIL # BLD AUTO: 0.07 10*3/MM3 (ref 0–0.4)
EOSINOPHIL NFR BLD AUTO: 1 % (ref 0.3–6.2)
ERYTHROCYTE [DISTWIDTH] IN BLOOD BY AUTOMATED COUNT: 12.9 % (ref 12.3–15.4)
GLOBULIN UR ELPH-MCNC: 2.3 GM/DL
GLUCOSE BLDC GLUCOMTR-MCNC: 119 MG/DL (ref 70–130)
GLUCOSE BLDC GLUCOMTR-MCNC: 70 MG/DL (ref 70–130)
GLUCOSE BLDC GLUCOMTR-MCNC: 86 MG/DL (ref 70–130)
GLUCOSE SERPL-MCNC: 58 MG/DL (ref 65–99)
HCT VFR BLD AUTO: 39.8 % (ref 37.5–51)
HGB BLD-MCNC: 13.8 G/DL (ref 13–17.7)
HOLD SPECIMEN: NORMAL
HOLD SPECIMEN: NORMAL
IMM GRANULOCYTES # BLD AUTO: 0.02 10*3/MM3 (ref 0–0.05)
IMM GRANULOCYTES NFR BLD AUTO: 0.3 % (ref 0–0.5)
INR PPP: 1.14 (ref 0.9–1.1)
LYMPHOCYTES # BLD AUTO: 0.62 10*3/MM3 (ref 0.7–3.1)
LYMPHOCYTES NFR BLD AUTO: 8.5 % (ref 19.6–45.3)
MCH RBC QN AUTO: 33.1 PG (ref 26.6–33)
MCHC RBC AUTO-ENTMCNC: 34.7 G/DL (ref 31.5–35.7)
MCV RBC AUTO: 95.4 FL (ref 79–97)
MONOCYTES # BLD AUTO: 0.56 10*3/MM3 (ref 0.1–0.9)
MONOCYTES NFR BLD AUTO: 7.7 % (ref 5–12)
NEUTROPHILS NFR BLD AUTO: 5.97 10*3/MM3 (ref 1.7–7)
NEUTROPHILS NFR BLD AUTO: 82 % (ref 42.7–76)
NRBC BLD AUTO-RTO: 0 /100 WBC (ref 0–0.2)
PLATELET # BLD AUTO: 168 10*3/MM3 (ref 140–450)
PMV BLD AUTO: 9.4 FL (ref 6–12)
POTASSIUM SERPL-SCNC: 4.6 MMOL/L (ref 3.5–5.2)
PROT SERPL-MCNC: 6.5 G/DL (ref 6–8.5)
PROTHROMBIN TIME: 14.8 SECONDS (ref 11.7–14.2)
QT INTERVAL: 469 MS
QTC INTERVAL: 469 MS
RBC # BLD AUTO: 4.17 10*6/MM3 (ref 4.14–5.8)
RH BLD: POSITIVE
SODIUM SERPL-SCNC: 146 MMOL/L (ref 136–145)
T&S EXPIRATION DATE: NORMAL
TROPONIN T SERPL HS-MCNC: 31 NG/L
WBC NRBC COR # BLD: 7.28 10*3/MM3 (ref 3.4–10.8)
WHOLE BLOOD HOLD COAG: NORMAL
WHOLE BLOOD HOLD SPECIMEN: NORMAL

## 2023-10-09 PROCEDURE — 85025 COMPLETE CBC W/AUTO DIFF WBC: CPT | Performed by: EMERGENCY MEDICINE

## 2023-10-09 PROCEDURE — 82948 REAGENT STRIP/BLOOD GLUCOSE: CPT

## 2023-10-09 PROCEDURE — 84484 ASSAY OF TROPONIN QUANT: CPT | Performed by: EMERGENCY MEDICINE

## 2023-10-09 PROCEDURE — 70496 CT ANGIOGRAPHY HEAD: CPT

## 2023-10-09 PROCEDURE — 25010000002 HEPARIN (PORCINE) 25000-0.45 UT/250ML-% SOLUTION: Performed by: STUDENT IN AN ORGANIZED HEALTH CARE EDUCATION/TRAINING PROGRAM

## 2023-10-09 PROCEDURE — 36415 COLL VENOUS BLD VENIPUNCTURE: CPT

## 2023-10-09 PROCEDURE — 86900 BLOOD TYPING SEROLOGIC ABO: CPT | Performed by: EMERGENCY MEDICINE

## 2023-10-09 PROCEDURE — 86850 RBC ANTIBODY SCREEN: CPT | Performed by: EMERGENCY MEDICINE

## 2023-10-09 PROCEDURE — 85610 PROTHROMBIN TIME: CPT | Performed by: EMERGENCY MEDICINE

## 2023-10-09 PROCEDURE — 70551 MRI BRAIN STEM W/O DYE: CPT

## 2023-10-09 PROCEDURE — 25510000001 IOPAMIDOL PER 1 ML: Performed by: EMERGENCY MEDICINE

## 2023-10-09 PROCEDURE — 70498 CT ANGIOGRAPHY NECK: CPT

## 2023-10-09 PROCEDURE — 99285 EMERGENCY DEPT VISIT HI MDM: CPT

## 2023-10-09 PROCEDURE — 93010 ELECTROCARDIOGRAM REPORT: CPT | Performed by: INTERNAL MEDICINE

## 2023-10-09 PROCEDURE — 85730 THROMBOPLASTIN TIME PARTIAL: CPT | Performed by: EMERGENCY MEDICINE

## 2023-10-09 PROCEDURE — 80053 COMPREHEN METABOLIC PANEL: CPT | Performed by: EMERGENCY MEDICINE

## 2023-10-09 PROCEDURE — 99291 CRITICAL CARE FIRST HOUR: CPT | Performed by: STUDENT IN AN ORGANIZED HEALTH CARE EDUCATION/TRAINING PROGRAM

## 2023-10-09 PROCEDURE — 71045 X-RAY EXAM CHEST 1 VIEW: CPT

## 2023-10-09 PROCEDURE — 82565 ASSAY OF CREATININE: CPT

## 2023-10-09 PROCEDURE — 86901 BLOOD TYPING SEROLOGIC RH(D): CPT | Performed by: EMERGENCY MEDICINE

## 2023-10-09 PROCEDURE — 93005 ELECTROCARDIOGRAM TRACING: CPT | Performed by: EMERGENCY MEDICINE

## 2023-10-09 PROCEDURE — 0042T HC CT CEREBRAL PERFUSION W/WO CONTRAST: CPT

## 2023-10-09 RX ORDER — SODIUM CHLORIDE 0.9 % (FLUSH) 0.9 %
10 SYRINGE (ML) INJECTION AS NEEDED
Status: DISCONTINUED | OUTPATIENT
Start: 2023-10-09 | End: 2023-10-16 | Stop reason: HOSPADM

## 2023-10-09 RX ORDER — CARBIDOPA/LEVODOPA 25MG-250MG
1 TABLET ORAL 3 TIMES DAILY
Status: DISCONTINUED | OUTPATIENT
Start: 2023-10-09 | End: 2023-10-16 | Stop reason: HOSPADM

## 2023-10-09 RX ORDER — BISACODYL 10 MG
10 SUPPOSITORY, RECTAL RECTAL DAILY PRN
Status: DISCONTINUED | OUTPATIENT
Start: 2023-10-09 | End: 2023-10-16 | Stop reason: HOSPADM

## 2023-10-09 RX ORDER — POLYETHYLENE GLYCOL 3350 17 G/17G
17 POWDER, FOR SOLUTION ORAL DAILY PRN
Status: DISCONTINUED | OUTPATIENT
Start: 2023-10-09 | End: 2023-10-16 | Stop reason: HOSPADM

## 2023-10-09 RX ORDER — VALSARTAN 160 MG/1
160 TABLET ORAL DAILY
Status: DISCONTINUED | OUTPATIENT
Start: 2023-10-10 | End: 2023-10-16 | Stop reason: HOSPADM

## 2023-10-09 RX ORDER — LEVOTHYROXINE SODIUM 0.07 MG/1
75 TABLET ORAL
Status: DISCONTINUED | OUTPATIENT
Start: 2023-10-10 | End: 2023-10-16 | Stop reason: HOSPADM

## 2023-10-09 RX ORDER — HYDRALAZINE HYDROCHLORIDE 10 MG/1
10 TABLET, FILM COATED ORAL EVERY 8 HOURS PRN
Status: DISCONTINUED | OUTPATIENT
Start: 2023-10-09 | End: 2023-10-16 | Stop reason: HOSPADM

## 2023-10-09 RX ORDER — CLONIDINE HYDROCHLORIDE 0.1 MG/1
0.1 TABLET ORAL AS NEEDED
COMMUNITY
End: 2023-10-16 | Stop reason: HOSPADM

## 2023-10-09 RX ORDER — SODIUM CHLORIDE 9 MG/ML
40 INJECTION, SOLUTION INTRAVENOUS AS NEEDED
Status: DISCONTINUED | OUTPATIENT
Start: 2023-10-09 | End: 2023-10-16 | Stop reason: HOSPADM

## 2023-10-09 RX ORDER — AMOXICILLIN 250 MG
2 CAPSULE ORAL 2 TIMES DAILY
Status: DISCONTINUED | OUTPATIENT
Start: 2023-10-09 | End: 2023-10-16 | Stop reason: HOSPADM

## 2023-10-09 RX ORDER — HEPARIN SODIUM 10000 [USP'U]/100ML
12 INJECTION, SOLUTION INTRAVENOUS
Status: DISCONTINUED | OUTPATIENT
Start: 2023-10-09 | End: 2023-10-12

## 2023-10-09 RX ORDER — ATORVASTATIN CALCIUM 80 MG/1
80 TABLET, FILM COATED ORAL NIGHTLY
Status: DISCONTINUED | OUTPATIENT
Start: 2023-10-09 | End: 2023-10-15

## 2023-10-09 RX ORDER — CLOPIDOGREL BISULFATE 75 MG/1
300 TABLET ORAL ONCE
Status: COMPLETED | OUTPATIENT
Start: 2023-10-09 | End: 2023-10-09

## 2023-10-09 RX ORDER — ASPIRIN 81 MG/1
81 TABLET, CHEWABLE ORAL DAILY
Status: DISCONTINUED | OUTPATIENT
Start: 2023-10-09 | End: 2023-10-11

## 2023-10-09 RX ORDER — BISACODYL 5 MG/1
5 TABLET, DELAYED RELEASE ORAL DAILY PRN
Status: DISCONTINUED | OUTPATIENT
Start: 2023-10-09 | End: 2023-10-16 | Stop reason: HOSPADM

## 2023-10-09 RX ORDER — PRAMIPEXOLE DIHYDROCHLORIDE 0.5 MG/1
0.5 TABLET ORAL 3 TIMES DAILY
Status: DISCONTINUED | OUTPATIENT
Start: 2023-10-09 | End: 2023-10-10

## 2023-10-09 RX ORDER — SODIUM CHLORIDE 0.9 % (FLUSH) 0.9 %
10 SYRINGE (ML) INJECTION EVERY 12 HOURS SCHEDULED
Status: DISCONTINUED | OUTPATIENT
Start: 2023-10-09 | End: 2023-10-16 | Stop reason: HOSPADM

## 2023-10-09 RX ADMIN — SENNOSIDES AND DOCUSATE SODIUM 2 TABLET: 50; 8.6 TABLET ORAL at 20:36

## 2023-10-09 RX ADMIN — HYDRALAZINE HYDROCHLORIDE 10 MG: 10 TABLET, FILM COATED ORAL at 18:47

## 2023-10-09 RX ADMIN — PRAMIPEXOLE DIHYDROCHLORIDE 0.5 MG: 0.5 TABLET ORAL at 20:36

## 2023-10-09 RX ADMIN — ASPIRIN 81 MG: 81 TABLET, CHEWABLE ORAL at 18:33

## 2023-10-09 RX ADMIN — HEPARIN SODIUM 12 UNITS/KG/HR: 10000 INJECTION, SOLUTION INTRAVENOUS at 18:40

## 2023-10-09 RX ADMIN — IOPAMIDOL 95 ML: 755 INJECTION, SOLUTION INTRAVENOUS at 12:25

## 2023-10-09 RX ADMIN — Medication 10 ML: at 15:09

## 2023-10-09 RX ADMIN — ATORVASTATIN CALCIUM 80 MG: 80 TABLET, FILM COATED ORAL at 20:36

## 2023-10-09 RX ADMIN — IOPAMIDOL 50 ML: 755 INJECTION, SOLUTION INTRAVENOUS at 12:23

## 2023-10-09 RX ADMIN — CARBIDOPA AND LEVODOPA 1 TABLET: 25; 250 TABLET ORAL at 18:32

## 2023-10-09 RX ADMIN — PRAMIPEXOLE DIHYDROCHLORIDE 0.5 MG: 0.5 TABLET ORAL at 18:33

## 2023-10-09 RX ADMIN — Medication 10 ML: at 20:36

## 2023-10-09 RX ADMIN — CARBIDOPA AND LEVODOPA 1 TABLET: 25; 250 TABLET ORAL at 20:35

## 2023-10-09 RX ADMIN — CLOPIDOGREL BISULFATE 300 MG: 75 TABLET, FILM COATED ORAL at 18:33

## 2023-10-09 NOTE — ED NOTES
"Nursing report ED to floor  Casey Snowden Sr.  89 y.o.  male    HPI :   Chief Complaint   Patient presents with    Neuro Deficit(s)       Admitting doctor:   Hernán Saldivar MD    Admitting diagnosis:   The primary encounter diagnosis was Acute ischemic stroke. Diagnoses of Left-sided neglect, Left homonymous hemianopsia, Chronic anticoagulation, Atrial fibrillation, unspecified type, Hypertension, unspecified type, and Hyperlipidemia, unspecified hyperlipidemia type were also pertinent to this visit.    Code status:   Current Code Status       Date Active Code Status Order ID Comments User Context       Prior            Allergies:   Penicillins and Rocephin [ceftriaxone]    Isolation:   No active isolations    Intake and Output  No intake or output data in the 24 hours ending 10/09/23 1325    Weight:       10/09/23  1154   Weight: 76 kg (167 lb 8.8 oz)       Most recent vitals:   Vitals:    10/09/23 1154 10/09/23 1156 10/09/23 1231   BP:  156/80 173/76   BP Location:   Right arm   Patient Position:   Lying   Pulse:  64 60   Resp:  17 18   Temp:   97.6 °F (36.4 °C)   TempSrc:   Oral   SpO2:  98% 95%   Weight: 76 kg (167 lb 8.8 oz)     Height:   180.3 cm (71\")       Active LDAs/IV Access:   Lines, Drains & Airways       Active LDAs       Name Placement date Placement time Site Days    Peripheral IV 10/09/23 1154 Left Antecubital 10/09/23  1154  Antecubital  less than 1                    Labs (abnormal labs have a star):   Labs Reviewed   PROTIME-INR - Abnormal; Notable for the following components:       Result Value    Protime 14.8 (*)     INR 1.14 (*)     All other components within normal limits   CBC WITH AUTO DIFFERENTIAL - Abnormal; Notable for the following components:    MCH 33.1 (*)     Neutrophil % 82.0 (*)     Lymphocyte % 8.5 (*)     Lymphocytes, Absolute 0.62 (*)     All other components within normal limits   APTT - Normal   POCT GLUCOSE FINGERSTICK - Normal   POCT CREATININE - Normal   RAINBOW DRAW    " Narrative:     The following orders were created for panel order [place] Draw.  Procedure                               Abnormality         Status                     ---------                               -----------         ------                     Green Top (Gel)[[id number]]                                  Final result               Lavender Top[[id number]]                                     Final result               Gold Top - SST[[id number]]                                   Final result               Light Blue Top[[id number]]                                   Final result                 Please view results for these tests on the individual orders.   COMPREHENSIVE METABOLIC PANEL   SINGLE HSTROPONIN T   POCT GLUCOSE FINGERSTICK   TYPE AND SCREEN   CBC AND DIFFERENTIAL    Narrative:     The following orders were created for panel order CBC & Differential.  Procedure                               Abnormality         Status                     ---------                               -----------         ------                     CBC Auto Differential[[id number]]        Abnormal            Final result                 Please view results for these tests on the individual orders.   GREEN TOP   LAVENDER TOP   GOLD TOP - SST   LIGHT BLUE TOP       EKG:   ECG 12 Lead Stroke Evaluation   Preliminary Result   HEART RATE= 60  bpm   RR Interval= 1000  ms   MA Interval= 138  ms   P Horizontal Axis= -31  deg   P Front Axis= -29  deg   QRSD Interval= 106  ms   QT Interval= 469  ms   QTcB= 469  ms   QRS Axis= 9  deg   T Wave Axis= 23  deg   - ABNORMAL ECG -   Sinus rhythm   Ventricular premature complex   Probable anterior infarct, age indeterminate   Electronically Signed By:    Date and Time of Study: 2023-10-09 12:35:10          Meds given in ED:   Medications   sodium chloride 0.9 % flush 10 mL (has no administration in time range)   clopidogrel (PLAVIX) tablet 300 mg (has no administration in time range)   aspirin  chewable tablet 81 mg (has no administration in time range)   iopamidol (ISOVUE-370) 76 % injection 50 mL (50 mL Intravenous Given by Other 10/9/23 1223)   iopamidol (ISOVUE-370) 76 % injection 95 mL (95 mL Intravenous Given by Other 10/9/23 1224)       Imaging results:  No radiology results for the last day    Ambulatory status:   - bedrest    Social issues:   Social History     Socioeconomic History    Marital status:     Years of education: college grad   Tobacco Use    Smoking status: Never    Smokeless tobacco: Never    Tobacco comments:     caff use   Vaping Use    Vaping Use: Never used   Substance and Sexual Activity    Alcohol use: No     Comment: none for a month    Drug use: No    Sexual activity: Not Currently     Partners: Female       NIH Stroke Scale:  Interval: baseline    Triston Champagen RN  10/09/23 13:25 EDT       99

## 2023-10-09 NOTE — H&P
Patient Name:  Casey Snowden .  YOB: 1934  MRN:  [id number]  Admit Date:  10/9/2023  Patient Care Team:  Aleksander Diez MD as PCP - General  Joseph Acharya MD as Consulting Physician (Ophthalmology)  Ford Dorado MD as Consulting Physician (Vascular Surgery)  Cecilio Armas III, MD as Consulting Physician (Cardiology)  Kashif Goodwin MD as Consulting Physician (Neurology)  Enoch Lares MD (Hematology)  Kayden Chance MD (Dermatology)      Subjective   History Present Illness     Chief Complaint   Patient presents with    Neuro Deficit(s)     This is an 89-year-old male with Parkinson's disease with dementia, atrial fibrillation, history of DVT status post IVC filter was brought to the hospital by his wife after a sudden alteration in mental status.  Reportedly they were on the phone, and during or right after the conversation the patient became somewhat confused, had some left-sided weakness, and appeared to be gray.  EMS was called and he was brought to the hospital.  CT angiograms in the emergency department that showed severe stenosis of the right ICA due to an atherosclerotic plaque with a small area of dangling thrombus as well as severe stenosis within the posterior cerebral arteries with diminished perfusion.  Troponin was elevated at 31, EKG was normal sinus rhythm.  He was admitted for further evaluation and management and neurology consultation    Personal History     Past Medical History:   Diagnosis Date    Acquired hypothyroidism 8/31/2022    Atrial fibrillation     Cancer 04/2018    basil cell carcinoma    Carotid stenosis     CHF (congestive heart failure)     Chronic deep vein thrombosis (DVT) of popliteal vein of right lower extremity     Coronary artery disease involving native coronary artery of native heart without angina pectoris 12/20/2018    DVT of lower extremity (deep venous thrombosis)     Hard of hearing     Localized edema 1/4/2017     Long-term use of high-risk medication     Metabolic encephalopathy 2/7/2023    PAD (peripheral artery disease) 9/10/2020    Parkinson's disease     Postphlebitic syndrome      Past Surgical History:   Procedure Laterality Date    BASAL CELL CARCINOMA EXCISION  04/2018    CARDIAC CATHETERIZATION N/A 11/5/2018    Procedure: Left Heart Cath;  Surgeon: Oliverio Azar MD;  Location[facility] C[facility]VASIVE LOCATION;  Service: Cardiovascular    CARDIAC CATHETERIZATION N/A 11/5/2018    Procedure: Coronary angiography;  Surgeon: Oliverio Azar MD;  [facility];  Service: Cardiovascular    CARDIAC CATHETERIZATION N/A 11/5/2018    Procedure: Left ventriculography;  Surgeon: Oliveroi Azar MD;  [facility];  Service: Cardiovascular    CARDIAC CATHETERIZATION N/A 6/4/2019    Procedure: Left Heart Cath;  Surgeon: Johnny Glasgow MD;  Location: [facility] CATH INVASIVE LOCATION;  Service: Cardiovascular    CARDIAC CATHETERIZATION N/A 6/4/2019    Procedure: Coronary angiography;  Surgeon: Johnny Glasgow MD;  Location: [facility] CATH INVASIVE LOCATION;  Service: Cardiovascular    CARDIAC CATHETERIZATION N/A 6/4/2019    Procedure: Left ventriculography;  Surgeon: Johnny Glasgow MD;  Location: [facility] CATH INVASIVE LOCATION;  Service: Cardiovascular    CARDIAC CATHETERIZATION N/A 6/4/2019    Procedure: Stent BILL coronary;  Surgeon: Johnny Glasgow MD;  Location: [facility] CATH INVASIVE LOCATION;  Service: Cardiovascular    CARDIAC SURGERY      CATARACT EXTRACTION Left 08/2018    CATARACT EXTRACTION Right 09/2018    COLONOSCOPY      2011    INCISION AND DRAINAGE LEG Right 7/7/2017    Procedure: INCISION AND DRAINAGE INFECTED HEMATOMA RIGHT THIGH;  Surgeon: Valentin Peña MD;  Location: [facility] OR;  Service:     JOINT REPLACEMENT      KNEE INCISION AND DRAINAGE Right 12/27/2016    Procedure: KNEE INCISION AND DRAINAGE;  Surgeon: Triston Whitten,  MD;  Location: [facility] OR;  Service:     NOSE SURGERY      nose replacement    SKIN BIOPSY      SKIN GRAFT  12/2017    TOTAL KNEE ARTHROPLASTY Right     VASCULAR SURGERY      VENA CAVA FILTER PLACEMENT       Family History   Problem Relation Age of Onset    Heart attack Mother 74    Hypertension Mother     Stroke Brother     Cancer Brother     Cancer Sister     Cancer Brother     Deep vein thrombosis Neg Hx      Social History     Tobacco Use    Smoking status: Never    Smokeless tobacco: Never    Tobacco comments:     caff use   Vaping Use    Vaping Use: Never used   Substance Use Topics    Alcohol use: No     Comment: none for a month    Drug use: No     No current facility-administered medications on file prior to encounter.     Current Outpatient Medications on File Prior to Encounter   Medication Sig Dispense Refill    apixaban (ELIQUIS) 2.5 MG tablet tablet Take 1 tablet by mouth 2 (Two) Times a Day.      atorvastatin (LIPITOR) 20 MG tablet TAKE 1 TABLET BY MOUTH  DAILY FOR ELEVATION OF BOTH CHOLESTEROL AND  TRIGLYCERIDES IN BLOOD (Patient taking differently: Take 1 tablet by mouth Every Night.) 90 tablet 3    atropine 1 % ophthalmic solution 1 drop 3 (Three) Times a Day As Needed (as needed orally for increased secretions). Orally      carbidopa-levodopa (SINEMET)  MG per tablet Take 1 tablet by mouth 3 (Three) Times a Day.      cloNIDine (CATAPRES) 0.1 MG tablet Take 1 tablet by mouth As Needed for High Blood Pressure. AS needed if BP>160  Does not take anymore      Cyanocobalamin (Vitamin B 12) 500 MCG tablet Take 2 tablets by mouth Every Morning.      levothyroxine (SYNTHROID, LEVOTHROID) 75 MCG tablet TAKE 1 TABLET BY MOUTH EVERY MORNING. INDICATIONS: UNDERACTIVE THYROID 90 tablet 1    Multiple Vitamins-Minerals (PRESERVISION AREDS) capsule Take 1 tablet by mouth 2 (Two) Times a Day.      Polyethyl Glycol-Propyl Glycol (SYSTANE OP) Apply 1 dose to eye(s) as directed by provider As Needed.       pramipexole (MIRAPEX) 0.5 MG tablet Take 1 tablet by mouth 3 (Three) Times a Day. Pt now takes 2.5 mg      valsartan (DIOVAN) 160 MG tablet TAKE 1 TABLET BY MOUTH  DAILY (Patient taking differently: Take 1 tablet by mouth Daily.) 90 tablet 3     Allergies   Allergen Reactions    Penicillins Rash    Rocephin [Ceftriaxone] Rash       Objective    Objective     Vital Signs  Temp:  [97.5 °F (36.4 °C)-97.6 °F (36.4 °C)] 97.5 °F (36.4 °C)  Heart Rate:  [56-64] 56  Resp:  [17-18] 18  BP: (156-192)/(76-80) 192/79  SpO2:  [95 %-99 %] 99 %  on   ;   Device (Oxygen Therapy): room air  Body mass index is 23.37 kg/m².    Physical Exam  Constitutional:       General: He is not in acute distress.     Comments: Chronically ill-appearing, elderly and frail   HENT:      Head: Normocephalic and atraumatic.   Eyes:      Extraocular Movements: Extraocular movements intact.      Pupils: Pupils are equal, round, and reactive to light.   Cardiovascular:      Rate and Rhythm: Normal rate and regular rhythm.   Pulmonary:      Effort: Pulmonary effort is normal. No respiratory distress.   Abdominal:      General: There is no distension.      Palpations: Abdomen is soft.      Tenderness: There is no abdominal tenderness.   Skin:     General: Skin is warm and dry.   Neurological:      Mental Status: He is oriented to person, place, and time.      Motor: Weakness present.         Results Review:  I reviewed the patient's new clinical results.  I reviewed the patient's new imaging results and agree with the interpretation.  I reviewed the patient's other test results and agree with the interpretation  I personally viewed and interpreted the patient's EKG/Telemetry data  Discussed with ED provider.    Lab Results (last 24 hours)       Procedure Component Value Units Date/Time    POC Glucose Once [[id number]]  (Normal) Collected: 10/09/23 1158    Specimen: Blood Updated: 10/09/23 1200     Glucose 70 mg/dL     CBC & Differential [[id number]]   (Abnormal) Collected: 10/09/23 1203    Specimen: Blood Updated: 10/09/23 1211    Narrative:      The following orders were created for panel order CBC & Differential.  Procedure                               Abnormality         Status                     ---------                               -----------         ------                     CBC Auto Differential[[id number]]        Abnormal            Final result                 Please view results for these tests on the individual orders.    Comprehensive Metabolic Panel [[id number]]  (Abnormal) Collected: 10/09/23 1203    Specimen: Blood Updated: 10/09/23 1344     Glucose 58 mg/dL      BUN 18 mg/dL      Creatinine 0.89 mg/dL      Sodium 146 mmol/L      Potassium 4.6 mmol/L      Chloride 106 mmol/L      CO2 26.4 mmol/L      Calcium 9.4 mg/dL      Total Protein 6.5 g/dL      Albumin 4.2 g/dL      ALT (SGPT) <5 U/L      AST (SGOT) 12 U/L      Alkaline Phosphatase 90 U/L      Total Bilirubin 0.5 mg/dL      Globulin 2.3 gm/dL      A/G Ratio 1.8 g/dL      BUN/Creatinine Ratio 20.2     Anion Gap 13.6 mmol/L      eGFR 81.9 mL/min/1.73     Narrative:      GFR Normal >60  Chronic Kidney Disease <60  Kidney Failure <15    The GFR formula is only valid for adults with stable renal function between ages 18 and 70.    Protime-INR [[id number]]  (Abnormal) Collected: 10/09/23 1203    Specimen: Blood Updated: 10/09/23 1229     Protime 14.8 Seconds      INR 1.14    aPTT [[id number]]  (Normal) Collected: 10/09/23 1203    Specimen: Blood Updated: 10/09/23 1229     PTT 30.0 seconds     Single High Sensitivity Troponin T [[id number]]  (Abnormal) Collected: 10/09/23 1203    Specimen: Blood Updated: 10/09/23 1343     HS Troponin T 31 ng/L     Narrative:      High Sensitive Troponin T Reference Range:  <10.0 ng/L- Negative Female for AMI  <15.0 ng/L- Negative Male for AMI  >=10 - Abnormal Female indicating possible myocardial injury.  >=15 - Abnormal Male indicating possible myocardial injury.    Clinicians would have to utilize clinical acumen, EKG, Troponin, and serial changes to determine if it is an Acute Myocardial Infarction or myocardial injury due to an underlying chronic condition.         CBC Auto Differential [[id number]]  (Abnormal) Collected: 10/09/23 1203    Specimen: Blood Updated: 10/09/23 1211     WBC 7.28 10*3/mm3      RBC 4.17 10*6/mm3      Hemoglobin 13.8 g/dL      Hematocrit 39.8 %      MCV 95.4 fL      MCH 33.1 pg      MCHC 34.7 g/dL      RDW 12.9 %      RDW-SD 44.9 fl      MPV 9.4 fL      Platelets 168 10*3/mm3      Neutrophil % 82.0 %      Lymphocyte % 8.5 %      Monocyte % 7.7 %      Eosinophil % 1.0 %      Basophil % 0.5 %      Immature Grans % 0.3 %      Neutrophils, Absolute 5.97 10*3/mm3      Lymphocytes, Absolute 0.62 10*3/mm3      Monocytes, Absolute 0.56 10*3/mm3      Eosinophils, Absolute 0.07 10*3/mm3      Basophils, Absolute 0.04 10*3/mm3      Immature Grans, Absolute 0.02 10*3/mm3      nRBC 0.0 /100 WBC     POC Creatinine [[id number]]  (Normal) Collected: 10/09/23 1207    Specimen: Blood Updated: 10/09/23 1305     Creatinine 0.90 mg/dL      Comment: Serial Number: [id number]:  [id number]       POC Glucose Once [[id number]]  (Normal) Collected: 10/09/23 1359    Specimen: Blood Updated: 10/09/23 1401     Glucose 86 mg/dL             Results for orders placed or performed during the hospital encounter of 02/07/23   Blood Culture - Blood, Arm, Right    Specimen: Arm, Right; Blood   Result Value Ref Range    Blood Culture No growth at 5 days        Imaging Results (Last 24 Hours)       Procedure Component Value Units Date/Time    MRI Brain Without Contrast [[id number]] Resulted: 10/09/23 1624     Updated: 10/09/23 1636    CT Angiogram Head w AI Analysis of LVO [[id number]] Collected: 10/09/23 1410     Updated: 10/09/23 1514    Narrative:      CT ANGIOGRAM OF THE HEAD AND NECK AND CT PERFUSION STUDY     CLINICAL HISTORY: Left-sided weakness and neglect.     TECHNIQUE: A noncontrast  head CT was performed with 3 mm axial images.  Thereafter, a CT perfusion study was performed after the dynamic bolus  of IV contrast. Standard perfusion maps were constructed with RAPID  software. A CT angiogram of the head and neck was then performed with 1  mm axial images. Sagittal, coronal, and 3-dimensional reconstructed  images were obtained. Finally, a delayed postcontrast head CT was  performed with 3 mm axial images. AI analysis of LVO was utilized.     COMPARISON: Brain MRI dated 02/07/2023.     FINDINGS:     NONCONTRAST HEAD CT: The ventricles, sulci, and cisterns are  age-appropriate. Mild changes of chronic small vessel ischemic phenomena  are noted. There is no convincing area of abnormal gray-white matter  differentiation to suggest a focus of acute completed infarction.     There is an indeterminate area of skin thickening and subcutaneous fat  density within the right parietal scalp which measures up to  approximately 5 x 17 mm in greatest coronal dimensions and approximately  14 mm in greatest AP dimensions. While this may represent an area of  scarring or scalp hematoma I cannot exclude the possibility of an  infiltrative scalp lesion at this site. I recommend further evaluation  with physical exam findings.     CT PERFUSION STUDY: There is no abnormality seen on the CBF less than  30% maps. On the time to maximal enhancement greater than 6 seconds  maps, there is a 62 cc abnormality seen within the inferior and medial  aspects of the right temporal and occipital lobes within the right PCA  distribution.     CTA NECK: There is a classic configuration of the aortic arch. There is  a mild degree of stenosis at the origin of the left subclavian artery  and a moderate degree of stenosis at the origin of the right subclavian  artery. There is a moderate degree of stenosis at the origin of the  right vertebral artery and a moderate to severe degree of stenosis is  seen at the origin of the left  vertebral artery. A moderate degree of  stenosis is seen at the origin of the left common carotid artery. There  is a mild to moderate degree of stenosis of the distal aspect the left  common carotid where the residual lumen diameter is approximately 4 to 5  mm. Within the proximal portion of the left ICA, there is no significant  NASCET stenosis. Within the proximal portion of the right ICA, there is  an irregular predominantly noncalcified atherosclerotic plaque that  results in at least an 80% NASCET stenosis. Furthermore, there is a  question of a small dangling thrombus within the proximal portion of the  right ICA as best seen on image #177.     CTA HEAD: The intracranial segments of the vertebral arteries are  unremarkable. The basilar artery is within normal limits. There is a  mild stenosis of the proximal left P2 segment and there is a severe  degree of stenosis within the distal left P2 segment. There are 2 severe  stenoses appreciated within the right P2 segment.     Atherosclerotic calcifications are appreciated within the cavernous  segments of the internal carotids resulting in mild degrees of luminal  compromise. There is a prominent appearance of the right MCA vasculature  which likely reflects autoregulatory vasodilatation from the proximal  ICA stenosis. Otherwise, the middle and anterior cerebral's are  unremarkable.     DELAYED POSTCONTRAST HEAD CT: No abnormal foci of contrast enhancement  are seen within the brain parenchyma.       Impression:         Within the proximal portion of the right ICA, there is a severe stenosis  that is estimated to be at least 80% by NASCET criteria. This is  secondary to a largely noncalcified atherosclerotic plaque. The plaque  is irregular and there may even be a very small area of dangling  thrombus protruding into the lumen of the right ICA. Intracranially,  there is an dilated right MCA vasculature which is likely secondary to  autoregulation as a result of  the proximal right ICA stenosis.     There are severe stenoses within the posterior cerebral arteries. There  is diminished perfusion within the right PCA distribution on the time to  maximal enhancement greater than 6 seconds maps.     A moderate degree of stenosis is seen at the origin of the right  vertebral artery and a moderate to severe degree of stenosis is seen at  the origin of the left vertebral artery.     Additional intracranial and extracranial atherosclerotic phenomena are  as discussed in detail above.     Otherwise, there is no evidence to suggest an area of acute completed  infarction and there is no evidence for large vessel occlusion. Further  evaluation could be performed with an MRI study for more sensitive  assessment of an area of acute infarction.     Incidental note is made of an indeterminate area of skin thickening and  subcutaneous fat density within the right parietal scalp measuring up to  5 x 17 x 14 mm in greatest dimensions which may represent a scalp  hematoma or area of scalp scarring. However, I cannot exclude the  possibility of an infiltrative lesion at this site. Please correlate  with physical exam findings.     The urgent findings of the noncontrast head CT were discussed with Dr. Chou on 10/09/2023 at approximately 12:15 p.m. The findings of the  CT angiogram and CT perfusion study were discussed with Dr. Chou at  approximately 12:30 p.m.     The findings within the scalp were discussed with Dr. Kay on  10/09/2023 at approximately 1:45 p.m.     Radiation dose reduction techniques were utilized, including automated  exposure control and exposure modulation based on body size.        This report was finalized on 10/9/2023 3:10 PM by Dr. Sebastian Laurent M.D  on Workstation: [id number]       CT Angiogram Neck [[id number]] Collected: 10/09/23 1410     Updated: 10/09/23 1514    Narrative:      CT ANGIOGRAM OF THE HEAD AND NECK AND CT PERFUSION STUDY     CLINICAL HISTORY:  Left-sided weakness and neglect.     TECHNIQUE: A noncontrast head CT was performed with 3 mm axial images.  Thereafter, a CT perfusion study was performed after the dynamic bolus  of IV contrast. Standard perfusion maps were constructed with RAPID  software. A CT angiogram of the head and neck was then performed with 1  mm axial images. Sagittal, coronal, and 3-dimensional reconstructed  images were obtained. Finally, a delayed postcontrast head CT was  performed with 3 mm axial images. AI analysis of LVO was utilized.     COMPARISON: Brain MRI dated 02/07/2023.     FINDINGS:     NONCONTRAST HEAD CT: The ventricles, sulci, and cisterns are  age-appropriate. Mild changes of chronic small vessel ischemic phenomena  are noted. There is no convincing area of abnormal gray-white matter  differentiation to suggest a focus of acute completed infarction.     There is an indeterminate area of skin thickening and subcutaneous fat  density within the right parietal scalp which measures up to  approximately 5 x 17 mm in greatest coronal dimensions and approximately  14 mm in greatest AP dimensions. While this may represent an area of  scarring or scalp hematoma I cannot exclude the possibility of an  infiltrative scalp lesion at this site. I recommend further evaluation  with physical exam findings.     CT PERFUSION STUDY: There is no abnormality seen on the CBF less than  30% maps. On the time to maximal enhancement greater than 6 seconds  maps, there is a 62 cc abnormality seen within the inferior and medial  aspects of the right temporal and occipital lobes within the right PCA  distribution.     CTA NECK: There is a classic configuration of the aortic arch. There is  a mild degree of stenosis at the origin of the left subclavian artery  and a moderate degree of stenosis at the origin of the right subclavian  artery. There is a moderate degree of stenosis at the origin of the  right vertebral artery and a moderate to severe  degree of stenosis is  seen at the origin of the left vertebral artery. A moderate degree of  stenosis is seen at the origin of the left common carotid artery. There  is a mild to moderate degree of stenosis of the distal aspect the left  common carotid where the residual lumen diameter is approximately 4 to 5  mm. Within the proximal portion of the left ICA, there is no significant  NASCET stenosis. Within the proximal portion of the right ICA, there is  an irregular predominantly noncalcified atherosclerotic plaque that  results in at least an 80% NASCET stenosis. Furthermore, there is a  question of a small dangling thrombus within the proximal portion of the  right ICA as best seen on image #177.     CTA HEAD: The intracranial segments of the vertebral arteries are  unremarkable. The basilar artery is within normal limits. There is a  mild stenosis of the proximal left P2 segment and there is a severe  degree of stenosis within the distal left P2 segment. There are 2 severe  stenoses appreciated within the right P2 segment.     Atherosclerotic calcifications are appreciated within the cavernous  segments of the internal carotids resulting in mild degrees of luminal  compromise. There is a prominent appearance of the right MCA vasculature  which likely reflects autoregulatory vasodilatation from the proximal  ICA stenosis. Otherwise, the middle and anterior cerebral's are  unremarkable.     DELAYED POSTCONTRAST HEAD CT: No abnormal foci of contrast enhancement  are seen within the brain parenchyma.       Impression:         Within the proximal portion of the right ICA, there is a severe stenosis  that is estimated to be at least 80% by NASCET criteria. This is  secondary to a largely noncalcified atherosclerotic plaque. The plaque  is irregular and there may even be a very small area of dangling  thrombus protruding into the lumen of the right ICA. Intracranially,  there is an dilated right MCA vasculature which  is likely secondary to  autoregulation as a result of the proximal right ICA stenosis.     There are severe stenoses within the posterior cerebral arteries. There  is diminished perfusion within the right PCA distribution on the time to  maximal enhancement greater than 6 seconds maps.     A moderate degree of stenosis is seen at the origin of the right  vertebral artery and a moderate to severe degree of stenosis is seen at  the origin of the left vertebral artery.     Additional intracranial and extracranial atherosclerotic phenomena are  as discussed in detail above.     Otherwise, there is no evidence to suggest an area of acute completed  infarction and there is no evidence for large vessel occlusion. Further  evaluation could be performed with an MRI study for more sensitive  assessment of an area of acute infarction.     Incidental note is made of an indeterminate area of skin thickening and  subcutaneous fat density within the right parietal scalp measuring up to  5 x 17 x 14 mm in greatest dimensions which may represent a scalp  hematoma or area of scalp scarring. However, I cannot exclude the  possibility of an infiltrative lesion at this site. Please correlate  with physical exam findings.     The urgent findings of the noncontrast head CT were discussed with Dr. Chou on 10/09/2023 at approximately 12:15 p.m. The findings of the  CT angiogram and CT perfusion study were discussed with Dr. Chou at  approximately 12:30 p.m.     The findings within the scalp were discussed with Dr. Kay on  10/09/2023 at approximately 1:45 p.m.     Radiation dose reduction techniques were utilized, including automated  exposure control and exposure modulation based on body size.        This report was finalized on 10/9/2023 3:10 PM by Dr. Sebastian Laurent M.D  on Workstation: [id number]       CT CEREBRAL PERFUSION WITH & WITHOUT CONTRAST [[id number]] Collected: 10/09/23 1410     Updated: 10/09/23 1514    Narrative:       CT ANGIOGRAM OF THE HEAD AND NECK AND CT PERFUSION STUDY     CLINICAL HISTORY: Left-sided weakness and neglect.     TECHNIQUE: A noncontrast head CT was performed with 3 mm axial images.  Thereafter, a CT perfusion study was performed after the dynamic bolus  of IV contrast. Standard perfusion maps were constructed with RAPID  software. A CT angiogram of the head and neck was then performed with 1  mm axial images. Sagittal, coronal, and 3-dimensional reconstructed  images were obtained. Finally, a delayed postcontrast head CT was  performed with 3 mm axial images. AI analysis of LVO was utilized.     COMPARISON: Brain MRI dated 02/07/2023.     FINDINGS:     NONCONTRAST HEAD CT: The ventricles, sulci, and cisterns are  age-appropriate. Mild changes of chronic small vessel ischemic phenomena  are noted. There is no convincing area of abnormal gray-white matter  differentiation to suggest a focus of acute completed infarction.     There is an indeterminate area of skin thickening and subcutaneous fat  density within the right parietal scalp which measures up to  approximately 5 x 17 mm in greatest coronal dimensions and approximately  14 mm in greatest AP dimensions. While this may represent an area of  scarring or scalp hematoma I cannot exclude the possibility of an  infiltrative scalp lesion at this site. I recommend further evaluation  with physical exam findings.     CT PERFUSION STUDY: There is no abnormality seen on the CBF less than  30% maps. On the time to maximal enhancement greater than 6 seconds  maps, there is a 62 cc abnormality seen within the inferior and medial  aspects of the right temporal and occipital lobes within the right PCA  distribution.     CTA NECK: There is a classic configuration of the aortic arch. There is  a mild degree of stenosis at the origin of the left subclavian artery  and a moderate degree of stenosis at the origin of the right subclavian  artery. There is a moderate degree  of stenosis at the origin of the  right vertebral artery and a moderate to severe degree of stenosis is  seen at the origin of the left vertebral artery. A moderate degree of  stenosis is seen at the origin of the left common carotid artery. There  is a mild to moderate degree of stenosis of the distal aspect the left  common carotid where the residual lumen diameter is approximately 4 to 5  mm. Within the proximal portion of the left ICA, there is no significant  NASCET stenosis. Within the proximal portion of the right ICA, there is  an irregular predominantly noncalcified atherosclerotic plaque that  results in at least an 80% NASCET stenosis. Furthermore, there is a  question of a small dangling thrombus within the proximal portion of the  right ICA as best seen on image #177.     CTA HEAD: The intracranial segments of the vertebral arteries are  unremarkable. The basilar artery is within normal limits. There is a  mild stenosis of the proximal left P2 segment and there is a severe  degree of stenosis within the distal left P2 segment. There are 2 severe  stenoses appreciated within the right P2 segment.     Atherosclerotic calcifications are appreciated within the cavernous  segments of the internal carotids resulting in mild degrees of luminal  compromise. There is a prominent appearance of the right MCA vasculature  which likely reflects autoregulatory vasodilatation from the proximal  ICA stenosis. Otherwise, the middle and anterior cerebral's are  unremarkable.     DELAYED POSTCONTRAST HEAD CT: No abnormal foci of contrast enhancement  are seen within the brain parenchyma.       Impression:         Within the proximal portion of the right ICA, there is a severe stenosis  that is estimated to be at least 80% by NASCET criteria. This is  secondary to a largely noncalcified atherosclerotic plaque. The plaque  is irregular and there may even be a very small area of dangling  thrombus protruding into the lumen  of the right ICA. Intracranially,  there is an dilated right MCA vasculature which is likely secondary to  autoregulation as a result of the proximal right ICA stenosis.     There are severe stenoses within the posterior cerebral arteries. There  is diminished perfusion within the right PCA distribution on the time to  maximal enhancement greater than 6 seconds maps.     A moderate degree of stenosis is seen at the origin of the right  vertebral artery and a moderate to severe degree of stenosis is seen at  the origin of the left vertebral artery.     Additional intracranial and extracranial atherosclerotic phenomena are  as discussed in detail above.     Otherwise, there is no evidence to suggest an area of acute completed  infarction and there is no evidence for large vessel occlusion. Further  evaluation could be performed with an MRI study for more sensitive  assessment of an area of acute infarction.     Incidental note is made of an indeterminate area of skin thickening and  subcutaneous fat density within the right parietal scalp measuring up to  5 x 17 x 14 mm in greatest dimensions which may represent a scalp  hematoma or area of scalp scarring. However, I cannot exclude the  possibility of an infiltrative lesion at this site. Please correlate  with physical exam findings.     The urgent findings of the noncontrast head CT were discussed with Dr. Chou on 10/09/2023 at approximately 12:15 p.m. The findings of the  CT angiogram and CT perfusion study were discussed with Dr. Chou at  approximately 12:30 p.m.     The findings within the scalp were discussed with Dr. Kay on  10/09/2023 at approximately 1:45 p.m.     Radiation dose reduction techniques were utilized, including automated  exposure control and exposure modulation based on body size.        This report was finalized on 10/9/2023 3:10 PM by Dr. Sebastian Laurent M.D  on Workstation: [id number]       XR Chest 1 View [[id number]] Collected:  10/09/23 1335     Updated: 10/09/23 1352    Narrative:      XR CHEST 1 VW-     HISTORY: Male who is 89 years-old, stroke     TECHNIQUE: Frontal view of the chest     COMPARISON: 2/7/2023     FINDINGS: The heart size is borderline. Pulmonary vasculature is  unremarkable. Aorta is calcified. No focal pulmonary consolidation,  pleural effusion, or pneumothorax. Old granulomatous disease is present.  No acute osseous process.       Impression:      No no focal pulmonary consolidation. Borderline heart size.  Follow-up as clinical indications persist.     This report was finalized on 10/9/2023 1:49 PM by Dr. Wesley Amaya M.D on Workstation: [id number]               Results for orders placed during the hospital encounter of 06/03/19    Adult Transthoracic Echo Complete W/ Cont if Necessary Per Protocol    Interpretation Summary  · The left ventricular cavity is mildly dilated.  · Left ventricular wall thickness is consistent with mild concentric hypertrophy.  · There is mild calcification of the aortic valve.  · Estimated EF = 40%.  · The following left ventricular wall segments are akinetic: apical anterior, apical lateral, apical inferior, apical septal and apex.  · Left ventricular diastolic dysfunction (grade II) consistent with pseudonormalization.  · Left atrial cavity size is moderately dilated.  · Mild tricuspid valve regurgitation is present.  · Calculated right ventricular systolic pressure from tricuspid regurgitation is 41 mmHg.  · Mild pulmonic valve regurgitation is present.      ECG 12 Lead Stroke Evaluation   Preliminary Result   HEART RATE= 60  bpm   RR Interval= 1000  ms   TN Interval= 138  ms   P Horizontal Axis= -31  deg   P Front Axis= -29  deg   QRSD Interval= 106  ms   QT Interval= 469  ms   QTcB= 469  ms   QRS Axis= 9  deg   T Wave Axis= 23  deg   - ABNORMAL ECG -   Sinus rhythm   Ventricular premature complex   Probable anterior infarct, age indeterminate   Electronically Signed By:    Date  and Time of Study: 2023-10-09 12:35:10      SCANNED - TELEMETRY     Final Result                 Assessment/Plan     Active Hospital Problems    Diagnosis  POA    **Acute ischemic stroke [I63.9]  Yes    Benign essential hypertension [I10]  Yes      Resolved Hospital Problems   No resolved problems to display.     Acute ischemic stroke  CT angiogram results noted.  Stat MRI of the brain has been ordered.  Echocardiogram, PT/OT, swallow evaluation    Right internal carotid artery stenosis with possible thrombus  Has been started on Plavix, aspirin, and atorvastatin  80% stenosis noted.    DVT of the popliteal vein of right lower extremity  IVC filter noted    Paroxysmal atrial fibrillation  Currently on Eliquis 2.5 mg twice daily.  Holding off on restarting Eliquis due to possible need for vascular intervention    Parkinson disorder  Continue home Sinemet, pramipexole    Coronary artery disease  Aspirin, statin, Plavix    Hypertension   hypothyroidism  Continue home medications    Abdominal discomfort  Exam not consistent with acute abdomen  check GI panel PCR due to some mentions of loose/watery stool    I discussed the patient's findings and my recommendations with patient, family, and nursing staff.    VTE Prophylaxis - SCDs.  Code Status - Full code.       Hernán Saldivar MD  [facility]  10/09/23  16:46 EDT

## 2023-10-09 NOTE — CONSULTS
Name: Casey Snowden . ADMIT: 10/9/2023   : 1934  PCP: Aleksander Diez MD    MRN: [id number] LOS: 0 days   AGE/SEX: 89 y.o. male  ROOM: [facility] N9/     Inpatient Vascular Surgery Consult  Consult performed by: Valentin Peña MD  Consult ordered by: Berto Chou MD        CC: Right carotid stenosis  Subjective     History of Present Illness  89 y.o. male with a history of DVT status post IVC filter, atrial fibrillation on Eliquis, small abdominal aortic aneurysm, and carotid artery disease all followed by my partner Dr. Ford Dorado in the office.  He has office appointment with us in a few weeks.  In the interim, he was admitted after confusion and left-sided weakness this morning.  Work-up is demonstrated a right-sided stroke and a severe right carotid artery stenosis.      Review of Systems  Falls, weakness, skin cancer, easy bruising, confusion, Parkinson's      History Review Reviewed Comments   Past Medical History:  [x]     Past Surgical History: [x]     Family History: [x]     Social History: [x]       Scheduled Meds:     aspirin, 81 mg, Oral, Daily  atorvastatin, 80 mg, Oral, Nightly  carbidopa-levodopa, 1 tablet, Oral, TID  [START ON 10/10/2023] levothyroxine, 75 mcg, Oral, Q AM  pramipexole, 0.5 mg, Oral, TID  senna-docusate sodium, 2 tablet, Oral, BID  sodium chloride, 10 mL, Intravenous, Q12H  sodium chloride, 10 mL, Intravenous, Q12H  [START ON 10/10/2023] valsartan, 160 mg, Oral, Daily      IV Meds:   heparin, 12 Units/kg/hr, Last Rate: 12 Units/kg/hr (10/09/23 1840)        Allergies: Penicillins and Rocephin [ceftriaxone]      Objective   Temp:  [97.5 °F (36.4 °C)-97.6 °F (36.4 °C)] 97.5 °F (36.4 °C)  Heart Rate:  [56-64] 56  Resp:  [17-18] 18  BP: (156-192)/(76-80) 192/79    No intake/output data recorded.    Physical Exam  Constitutional:       Appearance: He is ill-appearing.   Cardiovascular:      Rate and Rhythm: Rhythm irregular.   Abdominal:       Palpations: Abdomen is soft.   Neurological:      Comments: Patient able to answer questions and provide history.   strength is symmetric.       Data Reviewed:  CBC    Results from last 7 days   Lab Units 10/09/23  1203 10/03/23  0854   WBC 10*3/mm3 7.28 5.55   HEMOGLOBIN g/dL 13.8 13.0   PLATELETS 10*3/mm3 168 171     BMP   Results from last 7 days   Lab Units 10/09/23  1207 10/09/23  1203 10/03/23  0854   SODIUM mmol/L  --  146* 141   POTASSIUM mmol/L  --  4.6 4.7   CHLORIDE mmol/L  --  106 106   CO2 mmol/L  --  26.4 26.1   BUN mg/dL  --  18 21   CREATININE mg/dL 0.90 0.89 0.83   GLUCOSE mg/dL  --  58* 97     Coag   Results from last 7 days   Lab Units 10/09/23  1203   INR  1.14*   APTT seconds 30.0     Radiology(recent) MRI Brain Without Contrast    Result Date: 10/9/2023  Multiple small cortical and white matter infarcts involving the right cerebral hemisphere is appreciated including small infarcts involving the pre and postcentral gyri on the right superiorly, right parietal lobe superiorly and posteriorly and white matter of the right parietal and temporal lobes. The largest infarct measures approximately 4 mm in size and involves the right parietal cortex superiorly and posteriorly. There is no evidence of hemorrhage. There is no evidence of mass or of mass effect on this noncontrast MRI examination of the brain. Moderate small vessel ischemic disease is noted.  The above information was called to and discussed with Dr. Chou at the time of the dictation.      CT Angiogram Head w AI Analysis of LVO    Result Date: 10/9/2023   Within the proximal portion of the right ICA, there is a severe stenosis that is estimated to be at least 80% by NASCET criteria. This is secondary to a largely noncalcified atherosclerotic plaque. The plaque is irregular and there may even be a very small area of dangling thrombus protruding into the lumen of the right ICA. Intracranially, there is an dilated right MCA  vasculature which is likely secondary to autoregulation as a result of the proximal right ICA stenosis.  There are severe stenoses within the posterior cerebral arteries. There is diminished perfusion within the right PCA distribution on the time to maximal enhancement greater than 6 seconds maps.  A moderate degree of stenosis is seen at the origin of the right vertebral artery and a moderate to severe degree of stenosis is seen at the origin of the left vertebral artery.  Additional intracranial and extracranial atherosclerotic phenomena are as discussed in detail above.  Otherwise, there is no evidence to suggest an area of acute completed infarction and there is no evidence for large vessel occlusion. Further evaluation could be performed with an MRI study for more sensitive assessment of an area of acute infarction.  Incidental note is made of an indeterminate area of skin thickening and subcutaneous fat density within the right parietal scalp measuring up to 5 x 17 x 14 mm in greatest dimensions which may represent a scalp hematoma or area of scalp scarring. However, I cannot exclude the possibility of an infiltrative lesion at this site. Please correlate with physical exam findings.  The urgent findings of the noncontrast head CT were discussed with Dr. Chou on 10/09/2023 at approximately 12:15 p.m. The findings of the CT angiogram and CT perfusion study were discussed with Dr. Chou at approximately 12:30 p.m.  The findings within the scalp were discussed with Dr. Kay on 10/09/2023 at approximately 1:45 p.m.  Radiation dose reduction techniques were utilized, including automated exposure control and exposure modulation based on body size.   This report was finalized on 10/9/2023 3:10 PM by Dr. Sebastian Laurent M.D on Workstation: [id number]      CT Angiogram Neck    Result Date: 10/9/2023   Within the proximal portion of the right ICA, there is a severe stenosis that is estimated to be at least 80%  by NASCET criteria. This is secondary to a largely noncalcified atherosclerotic plaque. The plaque is irregular and there may even be a very small area of dangling thrombus protruding into the lumen of the right ICA. Intracranially, there is an dilated right MCA vasculature which is likely secondary to autoregulation as a result of the proximal right ICA stenosis.  There are severe stenoses within the posterior cerebral arteries. There is diminished perfusion within the right PCA distribution on the time to maximal enhancement greater than 6 seconds maps.  A moderate degree of stenosis is seen at the origin of the right vertebral artery and a moderate to severe degree of stenosis is seen at the origin of the left vertebral artery.  Additional intracranial and extracranial atherosclerotic phenomena are as discussed in detail above.  Otherwise, there is no evidence to suggest an area of acute completed infarction and there is no evidence for large vessel occlusion. Further evaluation could be performed with an MRI study for more sensitive assessment of an area of acute infarction.  Incidental note is made of an indeterminate area of skin thickening and subcutaneous fat density within the right parietal scalp measuring up to 5 x 17 x 14 mm in greatest dimensions which may represent a scalp hematoma or area of scalp scarring. However, I cannot exclude the possibility of an infiltrative lesion at this site. Please correlate with physical exam findings.  The urgent findings of the noncontrast head CT were discussed with Dr. Chou on 10/09/2023 at approximately 12:15 p.m. The findings of the CT angiogram and CT perfusion study were discussed with Dr. Chou at approximately 12:30 p.m.  The findings within the scalp were discussed with Dr. Kay on 10/09/2023 at approximately 1:45 p.m.  Radiation dose reduction techniques were utilized, including automated exposure control and exposure modulation based on body  size.   This report was finalized on 10/9/2023 3:10 PM by Dr. Sebastian Laurent M.D on Workstation: [id number]      CT CEREBRAL PERFUSION WITH & WITHOUT CONTRAST    Result Date: 10/9/2023   Within the proximal portion of the right ICA, there is a severe stenosis that is estimated to be at least 80% by NASCET criteria. This is secondary to a largely noncalcified atherosclerotic plaque. The plaque is irregular and there may even be a very small area of dangling thrombus protruding into the lumen of the right ICA. Intracranially, there is an dilated right MCA vasculature which is likely secondary to autoregulation as a result of the proximal right ICA stenosis.  There are severe stenoses within the posterior cerebral arteries. There is diminished perfusion within the right PCA distribution on the time to maximal enhancement greater than 6 seconds maps.  A moderate degree of stenosis is seen at the origin of the right vertebral artery and a moderate to severe degree of stenosis is seen at the origin of the left vertebral artery.  Additional intracranial and extracranial atherosclerotic phenomena are as discussed in detail above.  Otherwise, there is no evidence to suggest an area of acute completed infarction and there is no evidence for large vessel occlusion. Further evaluation could be performed with an MRI study for more sensitive assessment of an area of acute infarction.  Incidental note is made of an indeterminate area of skin thickening and subcutaneous fat density within the right parietal scalp measuring up to 5 x 17 x 14 mm in greatest dimensions which may represent a scalp hematoma or area of scalp scarring. However, I cannot exclude the possibility of an infiltrative lesion at this site. Please correlate with physical exam findings.  The urgent findings of the noncontrast head CT were discussed with Dr. Chou on 10/09/2023 at approximately 12:15 p.m. The findings of the CT angiogram and CT perfusion study  were discussed with Dr. Chou at approximately 12:30 p.m.  The findings within the scalp were discussed with Dr. Kay on 10/09/2023 at approximately 1:45 p.m.  Radiation dose reduction techniques were utilized, including automated exposure control and exposure modulation based on body size.   This report was finalized on 10/9/2023 3:10 PM by Dr. Sebastian Laurent M.D on Workstation: [id number]      XR Chest 1 View    Result Date: 10/9/2023  No no focal pulmonary consolidation. Borderline heart size. Follow-up as clinical indications persist.  This report was finalized on 10/9/2023 1:49 PM by Dr. Wesley Amaya M.D on Workstation: [id number]       Labs significant for: Right-sided carotid stenosis and right-sided stroke    Imaging Studies:  Imaging studies shows soft plaque in the right ICA with a greater than 90% stenosis.  I really do not see any clear thrombus as mentioned by radiology.      Active Hospital Problems    Diagnosis  POA    **Acute ischemic stroke [I63.9]  Yes    Benign essential hypertension [I10]  Yes      Resolved Hospital Problems   No resolved problems to display.       Data Points:  During this visit the following were done:  Labs Reviewed [x]    Labs Ordered []    Radiology Reports Reviewed [x]    Radiology Ordered []    EKG, echo, and/or stress test reviewed []    Vascular lab results reviewed  []    Vascular lab images reviewed and interpreted per myself   []    Referring Provider Records Reviewed []    ER Records Reviewed []    Hospital Records Reviewed/Summarized [x]    History Obtained From Family []    Radiological images view and Interpreted per myself []    Case Discussed with referring provider []     Decision to obtain and request outside records  []    Total data points:4 or more    Active Hospital Problems:   Active Hospital Problems    Diagnosis  POA    **Acute ischemic stroke [I63.9]  Yes    Benign essential hypertension [I10]  Yes      Resolved Hospital Problems   No resolved  "problems to display.      Assessment & Plan   [place]  Assessment / Plan     89 y.o. male who is followed in my office by Dr. Ford Dorado for DVT status post IVC filter, small abdominal aortic aneurysm, and carotid artery disease.  His last ultrasound was done about a year ago and he is due to see us again in the office in a few weeks.  The patient had an episode of confusion and left-sided weakness this morning and was brought into the hospital by his wife.  He has been seen by stroke neurology.  As part of his work-up he had a CT angiogram of the head and neck as well as an MRI which was just completed.  CT angiogram shows a severe right-sided stenosis and mention of some possible thrombus.  MRI interpretation is pending but it looks like he does have some small right-sided strokes as well.    The patient has had a progressive decline over the last several months with falls but his wife believes that the episode today was quite different and that his previous falls were not related to other strokes but instead related to his Parkinson's.    On exam, his  strength is symmetric.  He has scattered skin lesions from skin cancer resections.  Carotid bruit noted.    The patient did not mention history of head neck radiation to me but when I got back to my office I see that indeed he has been treated for this.    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 Patient was recently at his cardiologist office for evaluation of carotid stenosis. At that time, patient was getting carotid Doppler ultrasounds and was noted to have right neck mass. Patient actually had noticed this right neck mass about 4 weeks " "ago. The mass is nontender and was not associate with any dysphagia. No weight loss or any other complaints. Patient was seen by Dr. José Gibson and FNA of the submandibular mass returned positive for squamous cell carcinoma. PET CT scan showed multiple hypermetabolic cervical lymph nodes. \"    From their last note on 6/23 they were discussing his clinical decline and multiple falls and considering stopping surveillance imaging.    It does look like this is most likely a symptomatic right internal carotid artery stenosis with extensive soft plaque.  Patient took his last dose of Eliquis yesterday and has been started on a heparin drip.  I actually do not see that there is clear thrombus on the CT angiogram but I do not think this is unreasonable.  The patient's age, significant decline with multiple falls, and the history of head neck radiation dramatically increased risk for carotid endarterectomy.  I need to get more information about the extent of his radiation and discuss with the care team regarding all options.  We will continue to follow.    I discussed the patients findings and my recommendations with patient and family.  Please call my office with any question: [phone]                      "

## 2023-10-09 NOTE — ED PROVIDER NOTES
EMERGENCY DEPARTMENT ENCOUNTER    Room Number:  35/35  PCP: Aleksander Diez MD  Historian: Patient      HPI:  Chief Complaint: Altered mental status  A complete HPI/ROS/PMH/PSH/SH/FH are unobtainable due to: Nothing  Context: Casey Snowden Sr. is a 89 y.o. male who presents to the ED c/o altered mental status noted by his wife around 1015 this morning.  EMS arrived and noted the patient appeared to have some left facial droop and he was also flaccid in the left upper extremity.  They report that the left upper extremity weakness resolved in route.  The patient also was noted to have left neglect which has persisted.  Patient reportedly takes apixaban and his last dose was this morning.  He denies headache.            PAST MEDICAL HISTORY  Active Ambulatory Problems     Diagnosis Date Noted    Benign essential hypertension 11/02/2016    Stenosis of carotid artery 11/02/2016    Mixed hyperlipidemia 11/16/2012    Parkinson's disease 11/02/2016    Peripheral arterial occlusive disease 11/02/2016    Screening for prostate cancer 11/02/2016    Medication monitoring encounter 11/02/2016    Melanoma 11/02/2016    Chronic deep vein thrombosis (DVT) of popliteal vein of right lower extremity 11/02/2016    Uses hearing aid 11/02/2016    Paroxysmal A-fib 12/27/2016    Chronic diastolic (congestive) heart failure 07/07/2017    Anticoagulant long-term use 07/07/2017    Presence of IVC filter 07/07/2017    Medicare annual wellness visit, subsequent 11/03/2017    Iron deficiency anemia secondary to inadequate dietary iron intake 11/03/2017    Orthostatic hypotension due to Parkinson's disease 12/03/2018    Coronary artery disease involving native coronary artery of native heart without angina pectoris 12/20/2018    ST elevation myocardial infarction involving left anterior descending (LAD) coronary artery 06/05/2019    Sialorrhea 08/19/2019    Hypertensive heart disease with heart failure 03/04/2020    PAD (peripheral artery  disease) 09/10/2020    Metastatic squamous cell carcinoma 12/03/2020    Secondary malignancy of lymph nodes of head, face and neck 12/02/2020    Encounter for antineoplastic immunotherapy 01/07/2021    RBD (REM behavioral disorder) 01/20/2022    Acquired hypothyroidism 08/31/2022    Skin tear of upper arm without complication, left, subsequent encounter 01/30/2023    Aspiration pneumonia of right lung due to vomit 02/07/2023    Dysphagia, neurologic 02/15/2023     Resolved Ambulatory Problems     Diagnosis Date Noted    Deep vein thrombosis 11/16/2012    Routine adult health maintenance 11/02/2016    Cough 11/02/2016    Cellulitis of right lower extremity 12/26/2016    Sepsis 12/26/2016    Traumatic hematoma of right lower leg with infection 12/26/2016    Hospital discharge follow-up 01/04/2017    Localized edema 01/04/2017    Hypertension 11/16/2012    Hematoma 07/07/2017    Cellulitis of right lower extremity without foot 07/10/2017    Anterior chest wall pain 09/11/2018    Acute systolic heart failure 10/30/2018    Bradycardia with 31-40 beats per minute 12/03/2018    Atrial fibrillation with RVR 06/03/2019    Coronary artery disease with angina pectoris 06/04/2019    Facial contusion, subsequent encounter 01/30/2023    Altered mental status, unspecified altered mental status type 02/07/2023    Metabolic encephalopathy 02/07/2023     Past Medical History:   Diagnosis Date    Atrial fibrillation     Cancer 04/2018    Carotid stenosis     CHF (congestive heart failure)     DVT of lower extremity (deep venous thrombosis)     Hard of hearing     Long-term use of high-risk medication     Postphlebitic syndrome          PAST SURGICAL HISTORY  Past Surgical History:   Procedure Laterality Date    BASAL CELL CARCINOMA EXCISION  04/2018    CARDIAC CATHETERIZATION N/A 11/5/2018    Procedure: Left Heart Cath;  Surgeon: Oliverio Azar MD;  Location: [facility] CATH INVASIVE LOCATION;  Service: Cardiovascular    CARDIAC  CATHETERIZATION N/A 11/5/2018    Procedure: Coronary angiography;  Surgeon: Oliverio Azar MD;  Location: [facility] CATH INVASIVE LOCATION;  Service: Cardiovascular    CARDIAC CATHETERIZATION N/A 11/5/2018    Procedure: Left ventriculography;  Surgeon: Oliverio Azar MD;  Location: [facility] CATH INVASIVE LOCATION;  Service: Cardiovascular    CARDIAC CATHETERIZATION N/A 6/4/2019    Procedure: Left Heart Cath;  Surgeon: Johnny Glasgow MD;  Location: [facility] C[facility]VASIVE LOCATION;  Service: Cardiovascular    CARDIAC CATHETERIZATION N/A 6/4/2019    Procedure: Coronary angiography;  Surgeon: Johnny Glasgow MD;  Location: [facility] CATH INVASIVE LOCATION;  Service: Cardiovascular    CARDIAC CATHETERIZATION N/A 6/4/2019    Procedure: Left ventriculography;  Surgeon: Johnny Glasgow MD;  Location: [facility] CATH INVASIVE LOCATION;  Service: Cardiovascular    CARDIAC CATHETERIZATION N/A 6/4/2019    Procedure: Stent BILL coronary;  Surgeon: Johnny Glasgow MD;  Location: [facility] CATH INVASIVE LOCATION;  Service: Cardiovascular    CARDIAC SURGERY      CATARACT EXTRACTION Left 08/2018    CATARACT EXTRACTION Right 09/2018    COLONOSCOPY      2011    INCISION AND DRAINAGE LEG Right 7/7/2017    Procedure: INCISION AND DRAINAGE INFECTED HEMATOMA RIGHT THIGH;  Surgeon: Valentin Peña MD;  Location: [facility] OR;  Service:     JOINT REPLACEMENT      KNEE INCISION AND DRAINAGE Right 12/27/2016    Procedure: KNEE INCISION AND DRAINAGE;  Surgeon: Triston Whitten MD;  Location: [facility] MAIN OR;  Service:     NOSE SURGERY      nose replacement    SKIN BIOPSY      SKIN GRAFT  12/2017    TOTAL KNEE ARTHROPLASTY Right     VASCULAR SURGERY      VENA CAVA FILTER PLACEMENT           FAMILY HISTORY  Family History   Problem Relation Age of Onset    Heart attack Mother 74    Hypertension Mother     Stroke Brother     Cancer Brother     Cancer Sister     Cancer Brother     Deep vein thrombosis Neg Hx          SOCIAL  HISTORY  Social History     Socioeconomic History    Marital status:     Years of education: college grad   Tobacco Use    Smoking status: Never    Smokeless tobacco: Never    Tobacco comments:     caff use   Vaping Use    Vaping Use: Never used   Substance and Sexual Activity    Alcohol use: No     Comment: none for a month    Drug use: No    Sexual activity: Not Currently     Partners: Female         ALLERGIES  Penicillins and Rocephin [ceftriaxone]        REVIEW OF SYSTEMS  Review of Systems   Review of all 14 systems is negative other than stated in the HPI above.      PHYSICAL EXAM  ED Triage Vitals [10/09/23 1156]   Temp Heart Rate Resp BP SpO2   -- 64 17 156/80 98 %      Temp src Heart Rate Source Patient Position BP Location FiO2 (%)   -- -- -- -- --       Physical Exam      GENERAL: Awake and alert, no acute distress  HENT: nares patent  EYES: no scleral icterus, pupils 3 mm reactive bilaterally, patient does not track past midline to the left, left hemianopsia present  CV: irregular rhythm, normal rate  RESPIRATORY: normal effort  ABDOMEN: soft, nondistended, nontender throughout  MUSCULOSKELETAL: no deformity  NEURO: alert, moves all extremities, follows commands.  Patient oriented to self only.  He has left hemineglect.  Mild left lower facial droop.  Normal strength in bilateral upper and lower extremities without drift present.  Speech fluent and clear.  PSYCH:  calm, cooperative  SKIN: warm, dry    Vital signs and nursing notes reviewed.          LAB RESULTS  Recent Results (from the past 24 hour(s))   POC Glucose Once    Collection Time: 10/09/23 11:58 AM    Specimen: Blood   Result Value Ref Range    Glucose 70 70 - 130 mg/dL   Protime-INR    Collection Time: 10/09/23 12:03 PM    Specimen: Blood   Result Value Ref Range    Protime 14.8 (H) 11.7 - 14.2 Seconds    INR 1.14 (H) 0.90 - 1.10   aPTT    Collection Time: 10/09/23 12:03 PM    Specimen: Blood   Result Value Ref Range    PTT 30.0 22.7 -  35.4 seconds   Type & Screen    Collection Time: 10/09/23 12:03 PM    Specimen: Blood   Result Value Ref Range    ABO Type A     RH type Positive     Antibody Screen Negative     T&S Expiration Date 10/12/2023 11:59:59 PM    CBC Auto Differential    Collection Time: 10/09/23 12:03 PM    Specimen: Blood   Result Value Ref Range    WBC 7.28 3.40 - 10.80 10*3/mm3    RBC 4.17 4.14 - 5.80 10*6/mm3    Hemoglobin 13.8 13.0 - 17.7 g/dL    Hematocrit 39.8 37.5 - 51.0 %    MCV 95.4 79.0 - 97.0 fL    MCH 33.1 (H) 26.6 - 33.0 pg    MCHC 34.7 31.5 - 35.7 g/dL    RDW 12.9 12.3 - 15.4 %    RDW-SD 44.9 37.0 - 54.0 fl    MPV 9.4 6.0 - 12.0 fL    Platelets 168 140 - 450 10*3/mm3    Neutrophil % 82.0 (H) 42.7 - 76.0 %    Lymphocyte % 8.5 (L) 19.6 - 45.3 %    Monocyte % 7.7 5.0 - 12.0 %    Eosinophil % 1.0 0.3 - 6.2 %    Basophil % 0.5 0.0 - 1.5 %    Immature Grans % 0.3 0.0 - 0.5 %    Neutrophils, Absolute 5.97 1.70 - 7.00 10*3/mm3    Lymphocytes, Absolute 0.62 (L) 0.70 - 3.10 10*3/mm3    Monocytes, Absolute 0.56 0.10 - 0.90 10*3/mm3    Eosinophils, Absolute 0.07 0.00 - 0.40 10*3/mm3    Basophils, Absolute 0.04 0.00 - 0.20 10*3/mm3    Immature Grans, Absolute 0.02 0.00 - 0.05 10*3/mm3    nRBC 0.0 0.0 - 0.2 /100 WBC   POC Creatinine    Collection Time: 10/09/23 12:07 PM    Specimen: Blood   Result Value Ref Range    Creatinine 0.90 0.60 - 1.30 mg/dL   ECG 12 Lead Stroke Evaluation    Collection Time: 10/09/23 12:35 PM   Result Value Ref Range    QT Interval 469 ms    QTC Interval 469 ms       Ordered the above labs and reviewed the results.        RADIOLOGY  No Radiology Exams Resulted Within Past 24 Hours    Ordered the above noted radiological studies. Reviewed by me in PACS.            PROCEDURES  Critical Care    Performed by: Oscar Kay MD  Authorized by: Oscar Kay MD    Critical care provider statement:     Critical care time (minutes):  35    Critical care time was exclusive of:  Separately billable  procedures and treating other patients    Critical care was necessary to treat or prevent imminent or life-threatening deterioration of the following conditions:  CNS failure or compromise    Critical care was time spent personally by me on the following activities:  Ordering and review of laboratory studies, ordering and review of radiographic studies, ordering and performing treatments and interventions, discussions with consultants, evaluation of patient's response to treatment, pulse oximetry, re-evaluation of patient's condition, obtaining history from patient or surrogate and examination of patient                MEDICATIONS GIVEN IN ER  Medications   sodium chloride 0.9 % flush 10 mL (has no administration in time range)   iopamidol (ISOVUE-370) 76 % injection 50 mL (50 mL Intravenous Given by Other 10/9/23 1223)   iopamidol (ISOVUE-370) 76 % injection 95 mL (95 mL Intravenous Given by Other 10/9/23 1225)                   MEDICAL DECISION MAKING, PROGRESS, and CONSULTS    All labs have been independently reviewed by me.  All radiology studies have been reviewed by me and I have also reviewed the radiology report.   EKG's independently viewed and interpreted by me.  Discussion below represents my analysis of pertinent findings related to patient's condition, differential diagnosis, treatment plan and final disposition.      Additional sources:  - Discussed/ obtained information from independent historians: EMS    - External (non-ED) record review: I reviewed PCP office visit from 8/29/2023 where patient was seen in follow-up for hypertension.    - Chronic or social conditions impacting care: Chronic anticoagulation    - Shared decision making: Patient informed of plan for admission for further evaluation of possible acute ischemic stroke      Orders placed during this visit:  Orders Placed This Encounter   Procedures    Critical Care    CT Angiogram Head w AI Analysis of LVO    CT Angiogram Neck    CT  CEREBRAL PERFUSION WITH & WITHOUT CONTRAST    XR Chest 1 View    Lyons Draw    Comprehensive Metabolic Panel    Protime-INR    aPTT    Single High Sensitivity Troponin T    CBC Auto Differential    NPO Diet NPO Type: Strict NPO    Initiate Department's Acute Stroke Process (Team D, Code 19, etc.)    Perform NIH Stroke Scale    Measure Actual Weight    Notify MD for SBP < 80 or > 200    Notify Provider for SBP greater than 140 if hemorrhagic stroke    Head of Bed 30 Degrees or Less    Undress and Gown    Continuous Pulse Oximetry    Vital Signs    Neuro Checks    No Hypotonic Fluids    Nursing Dysphagia Screening (Complete Prior to Giving anything PO)    RN to Place Order SLP Consult (IF swallow screen failed) - Eval & Treat Choosing Reason of RN Dysphagia Screen Failed    Inpatient Neurology Consult Stroke    Inpatient Neurology Consult Stroke    LHA (on-call MD unless specified) Details    Inpatient Palliative Care Team Consult    Oxygen Therapy- Nasal Cannula; Titrate 1-6 LPM Per SpO2; 90 - 95%    POC Glucose Once    POC Glucose Once    POC Creatinine    ECG 12 Lead Stroke Evaluation    Type & Screen    Insert Large-Bore Peripheral IV - RIGHT AC Preferred    Inpatient Admission    CBC & Differential    Green Top (Gel)    Lavender Top    Gold Top - SST    Light Blue Top           Differential diagnosis includes but is not limited to:    Intracranial hemorrhage  Acute ischemic stroke  Seizure        Independent interpretation of labs, radiology studies, and discussions with consultants:  ED Course as of 10/09/23 1315   Mon Oct 09, 2023   1201 Discussed with Dr. Chou, stroke neurologist, who agreed with plan to obtain CT head, CTA head neck, CT perfusion imaging.  Patient will not be a candidate for thrombolytic therapy due to his current use of apixaban with last dose this morning. [JR]   1203 NIH Stroke Scale/Score (NIHSS) - MDCalc  Calculated on Oct 09 2023 12:03 PM  7 points -> NIH Stroke Scale [JR]   1211  CT brain without contrast independently interpreted in PACS and demonstrates no acute intracranial hemorrhage. [JR]   1301 EKG          EKG time: 12:35 PM  Rhythm/Rate: Sinus rhythm, 60  P waves and AL: Normal  QRS, axis: Normal axis, single PVC  ST and T waves: Biphasic T waves anteriorly with borderline ST elevation in V3    Interpreted Contemporaneously by me, independently viewed  Similar compared to prior 2/7/2023       [JR]   1312 Discussed with Dr. Saldivar [facility], who agrees to admit. [JR]      ED Course User Index  [JR] Oscar Kay MD             DIAGNOSIS  Final diagnoses:   Acute ischemic stroke   Left-sided neglect   Left homonymous hemianopsia   Chronic anticoagulation   Atrial fibrillation, unspecified type   Hypertension, unspecified type   Hyperlipidemia, unspecified hyperlipidemia type         DISPOSITION  Admit            Latest Documented Vital Signs:  As of 13:15 EDT  BP- 173/76 HR- 60 Temp- 97.6 °F (36.4 °C) (Oral) O2 sat- 95%              --    Please note that portions of this were completed with a voice recognition program.       Note Disclaimer: At [facility], we believe that sharing information builds trust and better relationships. You are receiving this note because you are receiving care at [facility] or recently visited. It is possible you will see health information before a provider has talked with you about it. This kind of information can be easy to misunderstand. To help you fully understand what it means for your health, we urge you to discuss this note with your provider.             Oscar Kay MD  10/09/23 9224

## 2023-10-09 NOTE — ED TRIAGE NOTES
Patient to ED via EMS from home. Patient's wife called EMS for unresponsiveness. When EMS arrived, patient was hypotensive but did improve when they laid him down. EMS reports that patient had some left sided weakness and has left hemineglect.

## 2023-10-09 NOTE — CONSULTS
Neurology Consult Note    Consult Date: 10/9/2023    Referring MD: Hernán Saldivar MD    Reason for Consult I have been asked to see the patient in neurological consultation to render advice and opinion regarding strokelike symptoms.    Casey Snowden Sr. is a 89 y.o. 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  As per the ED physician the patient had left-sided weakness and neglect and a team D was initiated.  I personally reviewed the images, CT perfusion showed an area of penumbra on the right PCA territory, CT angio head and neck showed critical stenosis on the right ICA with soft thrombus, right P1 severe stenosis.  On my assessment the patient neglect improved but still having left hemianopsia and left facial droop, left-sided weakness also improved on my assessment.  As per the wife patient had sciatica with back pain 6 weeks ago and since that time he would use a walker.  She stated compliant with his Eliquis and he took his last dose this morning.    Past Medical History:   Diagnosis Date    Acquired hypothyroidism 8/31/2022    Atrial fibrillation     Cancer 04/2018    basil cell carcinoma    Carotid stenosis     CHF (congestive heart failure)     Chronic deep vein thrombosis (DVT) of popliteal vein of right lower extremity     Coronary artery disease involving native coronary artery of native heart without angina pectoris 12/20/2018    DVT of lower extremity (deep venous thrombosis)     Hard of hearing     Localized edema 1/4/2017    Long-term use of high-risk medication     Metabolic encephalopathy 2/7/2023    PAD (peripheral artery disease) 9/10/2020    Parkinson's disease     Postphlebitic syndrome        Exam  BP (!) 192/79   Pulse 56   Temp 97.6 °F (36.4 °C) (Oral)   Resp  "18   Ht 180.3 cm (71\")   Wt 76 kg (167 lb 8.8 oz)   SpO2 95%   BMI 23.37 kg/m²   Gen: NAD, vitals reviewed  MS: oriented x3, recent/remote memory intact, normal attention/concentration, language intact, no neglect.  CN: Left hemianopsia, PERRL, EOMI, left lower facial droop, no dysarthria  Motor: 5/5 throughout upper and lower extremities, normal tone    NIH Stroke Scale :    (0_) 1a. Level of consciousness (LOC): 0=alert;1=arousable by minor stimulation;2=obtunded or needs strong stimulation to attend;3=unresponsive or reflex responses only  (0_) 1b. LOC Questions: 0=answers both;1=answers one;2=answers neither  (0_) 1c. LOC Commands: 0=performs both tasks;1=performs one task;2=performs neither task  (0_) 2. Best Gaze: 0=normal;1=partial gaze palsy;2=forced deviation or total gaze paresis  (2_) 3. Visual: 0=normal;1=partial hemianopia;2=complete hemianopia;3=blind  (1_) 4. Facial palsy: 0=normal;1=minor paresis;2=partial paralysis;3=complete paralysis  FOR 5 AND 6 BELOW: 0=normal;1=drifts but maintains in air;2=unable to maintain in air;3=moves but unable to lift against gravity;4=no movement  (0_)0 (_)1 (_)2 (_)3 (_)4 (_)NA 5a. Motor arm-left  (0_)0 (_)1 (_)2 (_)3 (_)4 (_)NA 5b. Motor arm-right  (0_)0 (_)1 (_)2 (_)3 (_)4 (_)NA 6a. Motor leg-left  (0_)0 (_)1 (_)2 (_)3 (_)4 (_)NA 6ba. Motor leg-right  (0_) 7. Limb ataxia:0=absent;1=unilateral;2=bilateral; NA=unable to test  (0_) 8. Sensory: 0=normal;1=mild-moderate loss;2-severe or total loss  (0_) 9. Best language: 0=normal;1=mild-moderate aphasia, some deficits apparent but able to communicate;2=severe aphasia, fragmentary expression only, unable to communicate well;3=global aphasia, mute and no comprehension  (0_)10. Dysarthria: 0=normal;1=mild-moderate, slurs some words;2=severe, speech mostly unintelligible; NA=unable to test (e.g.,intubation)  (0_)11. Extinction/Inattention: 0=normal;1=visual,tactile,auditory or other extinction to bilateral simultaneous " stimulation, but no severe neglect;2=answers neither      NIH Score: Complete  3      DATA:    Lab Results   Component Value Date    GLUCOSE 58 (L) 10/09/2023    CALCIUM 9.4 10/09/2023     (H) 10/09/2023    K 4.6 10/09/2023    CO2 26.4 10/09/2023     10/09/2023    BUN 18 10/09/2023    CREATININE 0.90 10/09/2023    EGFRIFAFRI 102 10/27/2017    EGFRIFNONA 99 06/05/2019    BCR 20.2 10/09/2023    ANIONGAP 13.6 10/09/2023     Lab Results   Component Value Date    WBC 7.28 10/09/2023    HGB 13.8 10/09/2023    HCT 39.8 10/09/2023    MCV 95.4 10/09/2023     10/09/2023       Lab review: CBC and BMP unremarkable.    Imaging review: I personally reviewed the images, CT perfusion showed an area of penumbra on the right PCA territory, CT angio head and neck showed critical stenosis on the right ICA with soft thrombus, right P1 severe stenosis.    Diagnoses:  -Strokelike symptoms, rule out right MCA/PCA acute ischemic stroke.  -Right internal carotid artery severe stenosis with soft thrombus  -Right posterior cerebral artery severe stenosis versus nonocclusive thrombus  -Atrial fibrillation on Eliquis    Pre-stroke MRS: 3  NIHSS: 3    Comment: Patient was on Eliquis prior to arrival but he was only taking half dose at 2.5 mg twice daily instead of the full dose 5 mg twice daily.    PLAN:   1.  Loaded with 300 mg of Plavix, 81 mg aspirin  2.  We will get stat MRI brain to evaluate the size of the stroke and safety of anticoagulation.  3.  Depending on the size of the stroke we will decide about heparin drip  4.  Also depending on the size of the stroke we will decide about vascular surgery consult giving the right ICA stenosis/thrombosis.  5.  Stroke labs and order set  6.  2D echo to rule out intracardiac thrombus  7.  PT/OT evaluation  8.  Needs official swallow evaluation prior to taking p.o.  9.  Start Lipitor 80 mg daily will adjust after stroke labs.  10.  Stroke labs with LDL, TSH, A1c    Patient is  critically ill.    Medical Decision Making for this neurology consultation consists of the following:  Review of previous chart, including H/P, provider and nursing notes as applicable.  Review of medications and vital signs.  Review of previous labs, neuroimaging, and additional relevant diagnostics.   Interpretation of laboratory, imaging, and other diagnostic results  Discussion with other providers and family   Total face-to-face/floor time: 60 minutes at least 30 minutes of critical care, reviewing critical imaging, deciding about antiplatelets and anticoagulation, ordering stat MRI, discussing with other attendings such as ED and neuro interventionalist.

## 2023-10-09 NOTE — PROGRESS NOTES
Clinical Pharmacy Services: Medication History    Casey Snowden Sr. is a 89 y.o. male presenting to [facility] for   Chief Complaint   Patient presents with    Neuro Deficit(s)       He  has a past medical history of Acquired hypothyroidism (8/31/2022), Atrial fibrillation, Cancer (04/2018), Carotid stenosis, CHF (congestive heart failure), Chronic deep vein thrombosis (DVT) of popliteal vein of right lower extremity, Coronary artery disease involving native coronary artery of native heart without angina pectoris (12/20/2018), DVT of lower extremity (deep venous thrombosis), Hard of hearing, Localized edema (1/4/2017), Long-term use of high-risk medication, Metabolic encephalopathy (2/7/2023), PAD (peripheral artery disease) (9/10/2020), Parkinson's disease, and Postphlebitic syndrome.    Allergies as of 10/09/2023 - Reviewed 10/09/2023   Allergen Reaction Noted    Penicillins Rash 11/02/2016    Rocephin [ceftriaxone] Rash 12/26/2016       Medication information was obtained from: Patient Supplied Medication List   Pharmacy and Phone Number:     Prior to Admission Medications       Prescriptions Last Dose Informant Patient Reported? Taking?    apixaban (ELIQUIS) 2.5 MG tablet tablet  Self Yes Yes    Take 1 tablet by mouth 2 (Two) Times a Day.    atorvastatin (LIPITOR) 20 MG tablet  Self No Yes    TAKE 1 TABLET BY MOUTH  DAILY FOR ELEVATION OF BOTH CHOLESTEROL AND  TRIGLYCERIDES IN BLOOD    Patient taking differently:  Take 1 tablet by mouth Every Night.    atropine 1 % ophthalmic solution  Self Yes Yes    1 drop 3 (Three) Times a Day As Needed (as needed orally for increased secretions). Orally    carbidopa-levodopa (SINEMET)  MG per tablet  Self Yes Yes    Take 1 tablet by mouth 3 (Three) Times a Day.    cloNIDine (CATAPRES) 0.1 MG tablet  Self Yes Yes    Take 1 tablet by mouth As Needed for High Blood Pressure. AS needed if BP>160    Cyanocobalamin (Vitamin B 12) 500 MCG tablet  Self Yes Yes     Take 2 tablets by mouth Every Morning.    levothyroxine (SYNTHROID, LEVOTHROID) 75 MCG tablet  Self No Yes    TAKE 1 TABLET BY MOUTH EVERY MORNING. INDICATIONS: UNDERACTIVE THYROID    Multiple Vitamins-Minerals (PRESERVISION AREDS) capsule  Self Yes Yes    Take 1 tablet by mouth 2 (Two) Times a Day.    Polyethyl Glycol-Propyl Glycol (SYSTANE OP)  Self Yes Yes    Apply 1 dose to eye(s) as directed by provider As Needed.    pramipexole (MIRAPEX) 0.5 MG tablet  Self Yes Yes    Take 1 tablet by mouth 3 (Three) Times a Day.    valsartan (DIOVAN) 160 MG tablet  Self No Yes    TAKE 1 TABLET BY MOUTH  DAILY    Patient taking differently:  Take 1 tablet by mouth Daily.              Medication notes:     This medication list is complete to the best of my knowledge as of 10/9/2023    Please call if questions.    Bjorn Fried  Medication History Technician  [phone]    10/9/2023 12:13 EDT

## 2023-10-10 ENCOUNTER — APPOINTMENT (OUTPATIENT)
Dept: CT IMAGING | Facility: HOSPITAL | Age: 88
End: 2023-10-10
Payer: MEDICARE

## 2023-10-10 ENCOUNTER — APPOINTMENT (OUTPATIENT)
Dept: CARDIOLOGY | Facility: HOSPITAL | Age: 88
End: 2023-10-10
Payer: MEDICARE

## 2023-10-10 LAB
ANION GAP SERPL CALCULATED.3IONS-SCNC: 9.3 MMOL/L (ref 5–15)
APTT PPP: 41.7 SECONDS (ref 22.7–35.4)
APTT PPP: 43.9 SECONDS (ref 22.7–35.4)
APTT PPP: 47.6 SECONDS (ref 22.7–35.4)
APTT PPP: 95.4 SECONDS (ref 22.7–35.4)
BASOPHILS # BLD AUTO: 0.03 10*3/MM3 (ref 0–0.2)
BASOPHILS NFR BLD AUTO: 0.7 % (ref 0–1.5)
BH CV VAS PRELIMINARY FINDINGS SCRIPTING: 1
BH CV XLRA MEAS LEFT DIST CCA EDV: -6.7 CM/SEC
BH CV XLRA MEAS LEFT DIST CCA PSV: -73.3 CM/SEC
BH CV XLRA MEAS LEFT DIST ICA EDV: -8.4 CM/SEC
BH CV XLRA MEAS LEFT DIST ICA PSV: -57.1 CM/SEC
BH CV XLRA MEAS LEFT ICA/CCA RATIO: 2.2
BH CV XLRA MEAS LEFT MID ICA EDV: -18.4 CM/SEC
BH CV XLRA MEAS LEFT MID ICA PSV: -79.2 CM/SEC
BH CV XLRA MEAS LEFT PROX CCA EDV: 7.5 CM/SEC
BH CV XLRA MEAS LEFT PROX CCA PSV: 90.7 CM/SEC
BH CV XLRA MEAS LEFT PROX ECA PSV: -222.7 CM/SEC
BH CV XLRA MEAS LEFT PROX ICA EDV: 18.7 CM/SEC
BH CV XLRA MEAS LEFT PROX ICA PSV: 161.3 CM/SEC
BH CV XLRA MEAS LEFT PROX SCLA PSV: 145.3 CM/SEC
BH CV XLRA MEAS LEFT VERTEBRAL A EDV: 8.6 CM/SEC
BH CV XLRA MEAS LEFT VERTEBRAL A PSV: 43.5 CM/SEC
BH CV XLRA MEAS RIGHT DIST CCA EDV: -9.2 CM/SEC
BH CV XLRA MEAS RIGHT DIST CCA PSV: -65.4 CM/SEC
BH CV XLRA MEAS RIGHT DIST ICA EDV: -12.5 CM/SEC
BH CV XLRA MEAS RIGHT DIST ICA PSV: -47.8 CM/SEC
BH CV XLRA MEAS RIGHT ICA/CCA RATIO: 8.4
BH CV XLRA MEAS RIGHT MID ICA EDV: -14.3 CM/SEC
BH CV XLRA MEAS RIGHT MID ICA PSV: -87 CM/SEC
BH CV XLRA MEAS RIGHT PROX CCA EDV: -7.1 CM/SEC
BH CV XLRA MEAS RIGHT PROX CCA PSV: -56.5 CM/SEC
BH CV XLRA MEAS RIGHT PROX ECA PSV: -238.7 CM/SEC
BH CV XLRA MEAS RIGHT PROX ICA EDV: -148.2 CM/SEC
BH CV XLRA MEAS RIGHT PROX ICA PSV: -549.9 CM/SEC
BH CV XLRA MEAS RIGHT PROX SCLA PSV: 126.8 CM/SEC
BH CV XLRA MEAS RIGHT VERTEBRAL A EDV: 9 CM/SEC
BH CV XLRA MEAS RIGHT VERTEBRAL A PSV: 48.4 CM/SEC
BUN SERPL-MCNC: 16 MG/DL (ref 8–23)
BUN/CREAT SERPL: 24.2 (ref 7–25)
CALCIUM SPEC-SCNC: 8.8 MG/DL (ref 8.6–10.5)
CHLORIDE SERPL-SCNC: 106 MMOL/L (ref 98–107)
CHOLEST SERPL-MCNC: 138 MG/DL (ref 0–200)
CO2 SERPL-SCNC: 23.7 MMOL/L (ref 22–29)
CREAT SERPL-MCNC: 0.66 MG/DL (ref 0.76–1.27)
DEPRECATED RDW RBC AUTO: 43.8 FL (ref 37–54)
EGFRCR SERPLBLD CKD-EPI 2021: 89.7 ML/MIN/1.73
EOSINOPHIL # BLD AUTO: 0.12 10*3/MM3 (ref 0–0.4)
EOSINOPHIL NFR BLD AUTO: 3 % (ref 0.3–6.2)
ERYTHROCYTE [DISTWIDTH] IN BLOOD BY AUTOMATED COUNT: 13 % (ref 12.3–15.4)
GLUCOSE BLDC GLUCOMTR-MCNC: 130 MG/DL (ref 70–130)
GLUCOSE BLDC GLUCOMTR-MCNC: 89 MG/DL (ref 70–130)
GLUCOSE SERPL-MCNC: 103 MG/DL (ref 65–99)
HBA1C MFR BLD: 5.7 % (ref 4.8–5.6)
HCT VFR BLD AUTO: 36.4 % (ref 37.5–51)
HDLC SERPL-MCNC: 56 MG/DL (ref 40–60)
HGB BLD-MCNC: 12.5 G/DL (ref 13–17.7)
IMM GRANULOCYTES # BLD AUTO: 0.01 10*3/MM3 (ref 0–0.05)
IMM GRANULOCYTES NFR BLD AUTO: 0.2 % (ref 0–0.5)
LDLC SERPL CALC-MCNC: 71 MG/DL (ref 0–100)
LDLC/HDLC SERPL: 1.28 {RATIO}
LEFT ARM BP: NORMAL MMHG
LYMPHOCYTES # BLD AUTO: 0.76 10*3/MM3 (ref 0.7–3.1)
LYMPHOCYTES NFR BLD AUTO: 18.8 % (ref 19.6–45.3)
MCH RBC QN AUTO: 32.3 PG (ref 26.6–33)
MCHC RBC AUTO-ENTMCNC: 34.3 G/DL (ref 31.5–35.7)
MCV RBC AUTO: 94.1 FL (ref 79–97)
MONOCYTES # BLD AUTO: 0.46 10*3/MM3 (ref 0.1–0.9)
MONOCYTES NFR BLD AUTO: 11.4 % (ref 5–12)
NEUTROPHILS NFR BLD AUTO: 2.66 10*3/MM3 (ref 1.7–7)
NEUTROPHILS NFR BLD AUTO: 65.9 % (ref 42.7–76)
NRBC BLD AUTO-RTO: 0 /100 WBC (ref 0–0.2)
PLATELET # BLD AUTO: 162 10*3/MM3 (ref 140–450)
PMV BLD AUTO: 9.9 FL (ref 6–12)
POTASSIUM SERPL-SCNC: 3.7 MMOL/L (ref 3.5–5.2)
RBC # BLD AUTO: 3.87 10*6/MM3 (ref 4.14–5.8)
RIGHT ARM BP: NORMAL MMHG
SODIUM SERPL-SCNC: 139 MMOL/L (ref 136–145)
TRIGL SERPL-MCNC: 52 MG/DL (ref 0–150)
VLDLC SERPL-MCNC: 11 MG/DL (ref 5–40)
WBC NRBC COR # BLD: 4.04 10*3/MM3 (ref 3.4–10.8)

## 2023-10-10 PROCEDURE — 83036 HEMOGLOBIN GLYCOSYLATED A1C: CPT | Performed by: STUDENT IN AN ORGANIZED HEALTH CARE EDUCATION/TRAINING PROGRAM

## 2023-10-10 PROCEDURE — 97165 OT EVAL LOW COMPLEX 30 MIN: CPT

## 2023-10-10 PROCEDURE — 80048 BASIC METABOLIC PNL TOTAL CA: CPT | Performed by: STUDENT IN AN ORGANIZED HEALTH CARE EDUCATION/TRAINING PROGRAM

## 2023-10-10 PROCEDURE — 85730 THROMBOPLASTIN TIME PARTIAL: CPT | Performed by: STUDENT IN AN ORGANIZED HEALTH CARE EDUCATION/TRAINING PROGRAM

## 2023-10-10 PROCEDURE — 93880 EXTRACRANIAL BILAT STUDY: CPT

## 2023-10-10 PROCEDURE — 92610 EVALUATE SWALLOWING FUNCTION: CPT

## 2023-10-10 PROCEDURE — 70450 CT HEAD/BRAIN W/O DYE: CPT

## 2023-10-10 PROCEDURE — 80061 LIPID PANEL: CPT | Performed by: STUDENT IN AN ORGANIZED HEALTH CARE EDUCATION/TRAINING PROGRAM

## 2023-10-10 PROCEDURE — 82948 REAGENT STRIP/BLOOD GLUCOSE: CPT

## 2023-10-10 PROCEDURE — 97535 SELF CARE MNGMENT TRAINING: CPT

## 2023-10-10 PROCEDURE — 99233 SBSQ HOSP IP/OBS HIGH 50: CPT | Performed by: STUDENT IN AN ORGANIZED HEALTH CARE EDUCATION/TRAINING PROGRAM

## 2023-10-10 PROCEDURE — 25010000002 HEPARIN (PORCINE) 25000-0.45 UT/250ML-% SOLUTION: Performed by: STUDENT IN AN ORGANIZED HEALTH CARE EDUCATION/TRAINING PROGRAM

## 2023-10-10 PROCEDURE — 85025 COMPLETE CBC W/AUTO DIFF WBC: CPT | Performed by: STUDENT IN AN ORGANIZED HEALTH CARE EDUCATION/TRAINING PROGRAM

## 2023-10-10 RX ORDER — PRAMIPEXOLE DIHYDROCHLORIDE 0.25 MG/1
0.25 TABLET ORAL 3 TIMES DAILY
Status: DISCONTINUED | OUTPATIENT
Start: 2023-10-10 | End: 2023-10-16 | Stop reason: HOSPADM

## 2023-10-10 RX ORDER — CLOPIDOGREL BISULFATE 75 MG/1
75 TABLET ORAL DAILY
Status: DISCONTINUED | OUTPATIENT
Start: 2023-10-10 | End: 2023-10-11

## 2023-10-10 RX ADMIN — CARBIDOPA AND LEVODOPA 1 TABLET: 25; 250 TABLET ORAL at 08:39

## 2023-10-10 RX ADMIN — CARBIDOPA AND LEVODOPA 1 TABLET: 25; 250 TABLET ORAL at 16:41

## 2023-10-10 RX ADMIN — ATORVASTATIN CALCIUM 80 MG: 80 TABLET, FILM COATED ORAL at 20:33

## 2023-10-10 RX ADMIN — ASPIRIN 81 MG: 81 TABLET, CHEWABLE ORAL at 08:39

## 2023-10-10 RX ADMIN — VALSARTAN 160 MG: 160 TABLET, FILM COATED ORAL at 08:39

## 2023-10-10 RX ADMIN — HEPARIN SODIUM 15 UNITS/KG/HR: 10000 INJECTION, SOLUTION INTRAVENOUS at 01:54

## 2023-10-10 RX ADMIN — SENNOSIDES AND DOCUSATE SODIUM 2 TABLET: 50; 8.6 TABLET ORAL at 20:33

## 2023-10-10 RX ADMIN — SENNOSIDES AND DOCUSATE SODIUM 2 TABLET: 50; 8.6 TABLET ORAL at 08:41

## 2023-10-10 RX ADMIN — PRAMIPEXOLE DIHYDROCHLORIDE 0.5 MG: 0.5 TABLET ORAL at 08:39

## 2023-10-10 RX ADMIN — CARBIDOPA AND LEVODOPA 1 TABLET: 25; 250 TABLET ORAL at 20:33

## 2023-10-10 RX ADMIN — PRAMIPEXOLE DIHYDROCHLORIDE 0.25 MG: 0.25 TABLET ORAL at 20:33

## 2023-10-10 RX ADMIN — LEVOTHYROXINE SODIUM 75 MCG: 0.07 TABLET ORAL at 05:28

## 2023-10-10 RX ADMIN — Medication 10 ML: at 08:42

## 2023-10-10 RX ADMIN — HEPARIN SODIUM 16 UNITS/KG/HR: 10000 INJECTION, SOLUTION INTRAVENOUS at 20:52

## 2023-10-10 RX ADMIN — CLOPIDOGREL BISULFATE 75 MG: 75 TABLET, FILM COATED ORAL at 15:00

## 2023-10-10 RX ADMIN — PRAMIPEXOLE DIHYDROCHLORIDE 0.25 MG: 0.25 TABLET ORAL at 16:41

## 2023-10-10 NOTE — PROGRESS NOTES
Name: Casey Snowden . ADMIT: 10/9/2023   : 1934  PCP: Aleksander Diez MD    MRN: [id number] LOS: 1 days   AGE/SEX: 89 y.o. male  ROOM: [facility]     Subjective   Examined at bedside.  No new complaints.  Blood pressures elevated.    Objective   Objective   Vital Signs  Temp:  [97.5 °F (36.4 °C)-97.6 °F (36.4 °C)] 97.5 °F (36.4 °C)  Heart Rate:  [56-64] 62  Resp:  [17-18] 18  BP: (119-193)/(52-80) 193/80  SpO2:  [95 %-100 %] 100 %  on   ;   Device (Oxygen Therapy): (P) room air  Body mass index is 23.37 kg/m².  Physical Exam  Constitutional:       Comments: Chronically ill-appearing   HENT:      Head: Normocephalic and atraumatic.   Cardiovascular:      Rate and Rhythm: Normal rate and regular rhythm.   Pulmonary:      Effort: Pulmonary effort is normal. No respiratory distress.   Abdominal:      General: There is no distension.      Palpations: Abdomen is soft.      Tenderness: There is no abdominal tenderness.   Neurological:      Mental Status: He is alert and oriented to person, place, and time.      Motor: Weakness present.         Results Review     I reviewed the patient's new clinical results.  Results from last 7 days   Lab Units 10/10/23  0444 10/09/23  1203   WBC 10*3/mm3 4.04 7.28   HEMOGLOBIN g/dL 12.5* 13.8   PLATELETS 10*3/mm3 162 168     Results from last 7 days   Lab Units 10/10/23  0444 10/09/23  1207 10/09/23  1203   SODIUM mmol/L 139  --  146*   POTASSIUM mmol/L 3.7  --  4.6   CHLORIDE mmol/L 106  --  106   CO2 mmol/L 23.7  --  26.4   BUN mg/dL 16  --  18   CREATININE mg/dL 0.66* 0.90 0.89   GLUCOSE mg/dL 103*  --  58*   Estimated Creatinine Clearance: 81.6 mL/min (A) (by C-G formula based on SCr of 0.66 mg/dL (L)).  Results from last 7 days   Lab Units 10/09/23  1203   ALBUMIN g/dL 4.2   BILIRUBIN mg/dL 0.5   ALK PHOS U/L 90   AST (SGOT) U/L 12   ALT (SGPT) U/L <5     Results from last 7 days   Lab Units 10/10/23  0444 10/09/23  1203   CALCIUM mg/dL 8.8 9.4   ALBUMIN g/dL  --   4.2       COVID19   Date Value Ref Range Status   10/23/2022 Not Detected Not Detected - Ref. Range Final     SARS-CoV-2, MARCELO   Date Value Ref Range Status   07/20/2020 Not Detected Not Detected Final     Comment:     This test was developed and its performance characteristics determined  by [facility]. This test has not been FDA cleared or  approved. This test has been authorized by FDA under an Emergency Use  Authorization (EUA). This test is only authorized for the duration of  time the declaration that circumstances exist justifying the  authorization of the emergency use of in vitro diagnostic tests for  detection of SARS-CoV-2 virus and/or diagnosis of COVID-19 infection  under section 564(b)(1) of the Act, 21 U.S.C. 360bbb-3(b)(1), unless  the authorization is terminated or revoked sooner.  When diagnostic testing is negative, the possibility of a false  negative result should be considered in the context of a patient's  recent exposures and the presence of clinical signs and symptoms  consistent with COVID-19. An individual without symptoms of COVID-19  and who is not shedding SARS-CoV-2 virus would expect to have a  negative (not detected) result in this assay.     Hemoglobin A1C   Date/Time Value Ref Range Status   10/10/2023 0444 5.70 (H) 4.80 - 5.60 % Final     Glucose   Date/Time Value Ref Range Status   10/10/2023 0629 89 70 - 130 mg/dL Final   10/09/2023 2051 119 70 - 130 mg/dL Final   10/09/2023 1359 86 70 - 130 mg/dL Final   10/09/2023 1158 70 70 - 130 mg/dL Final     Results for orders placed or performed during the hospital encounter of 02/07/23   Blood Culture - Blood, Arm, Right    Specimen: Arm, Right; Blood   Result Value Ref Range    Blood Culture No growth at 5 days          MRI Brain Without Contrast  Narrative: MRI EXAMINATION OF THE BRAIN WITHOUT CONTRAST     HISTORY: Confusion, weakness. Left-sided weakness. Melanoma.     COMPARISON: CT head 10/09/2023.     TECHNIQUE: A MRI  examination of the brain was performed utilizing  sagittal T1, axial diffusion, T1, T2, T2 FLAIR and susceptibility  weighted imaging.     FINDINGS: Small cortical infarcts are appreciated involving the  precentral and postcentral gyri on the right superiorly measuring 2 to 3  mm in size. An acute cortical infarct involving the right parietal lobe  superiorly and posteriorly is appreciated measuring 4 mm in size. An  acute periventricular infarct involving the right parietal lobe is  appreciated superior to the atrium of the right lateral ventricle. Acute  infarcts are appreciated involving the white matter of the right  temporal lobe posteriorly and medially, periventricular. The larger  infarcts demonstrate mild T2 FLAIR hyperintensity. Mild to moderate  small vessel ischemic disease is noted. There is no evidence of mass or  mass effect on this noncontrast MRI examination of the brain.     Impression: Multiple small cortical and white matter infarcts involving  the right cerebral hemisphere is appreciated including small infarcts  involving the pre and postcentral gyri on the right superiorly, right  parietal lobe superiorly and posteriorly and white matter of the right  parietal and temporal lobes. The largest infarct measures approximately  4 mm in size and involves the right parietal cortex superiorly and  posteriorly. There is no evidence of hemorrhage. There is no evidence of  mass or of mass effect on this noncontrast MRI examination of the brain.  Moderate small vessel ischemic disease is noted.     The above information was called to and discussed with Dr. Chou at  the time of the dictation.     CT Angiogram Head w AI Analysis of LVO, CT Angiogram Neck, CT CEREBRAL PERFUSION WITH & WITHOUT CONTRAST  Narrative: CT ANGIOGRAM OF THE HEAD AND NECK AND CT PERFUSION STUDY     CLINICAL HISTORY: Left-sided weakness and neglect.     TECHNIQUE: A noncontrast head CT was performed with 3 mm axial  images.  Thereafter, a CT perfusion study was performed after the dynamic bolus  of IV contrast. Standard perfusion maps were constructed with RAPID  software. A CT angiogram of the head and neck was then performed with 1  mm axial images. Sagittal, coronal, and 3-dimensional reconstructed  images were obtained. Finally, a delayed postcontrast head CT was  performed with 3 mm axial images. AI analysis of LVO was utilized.     COMPARISON: Brain MRI dated 02/07/2023.     FINDINGS:     NONCONTRAST HEAD CT: The ventricles, sulci, and cisterns are  age-appropriate. Mild changes of chronic small vessel ischemic phenomena  are noted. There is no convincing area of abnormal gray-white matter  differentiation to suggest a focus of acute completed infarction.     There is an indeterminate area of skin thickening and subcutaneous fat  density within the right parietal scalp which measures up to  approximately 5 x 17 mm in greatest coronal dimensions and approximately  14 mm in greatest AP dimensions. While this may represent an area of  scarring or scalp hematoma I cannot exclude the possibility of an  infiltrative scalp lesion at this site. I recommend further evaluation  with physical exam findings.     CT PERFUSION STUDY: There is no abnormality seen on the CBF less than  30% maps. On the time to maximal enhancement greater than 6 seconds  maps, there is a 62 cc abnormality seen within the inferior and medial  aspects of the right temporal and occipital lobes within the right PCA  distribution.     CTA NECK: There is a classic configuration of the aortic arch. There is  a mild degree of stenosis at the origin of the left subclavian artery  and a moderate degree of stenosis at the origin of the right subclavian  artery. There is a moderate degree of stenosis at the origin of the  right vertebral artery and a moderate to severe degree of stenosis is  seen at the origin of the left vertebral artery. A moderate degree  of  stenosis is seen at the origin of the left common carotid artery. There  is a mild to moderate degree of stenosis of the distal aspect the left  common carotid where the residual lumen diameter is approximately 4 to 5  mm. Within the proximal portion of the left ICA, there is no significant  NASCET stenosis. Within the proximal portion of the right ICA, there is  an irregular predominantly noncalcified atherosclerotic plaque that  results in at least an 80% NASCET stenosis. Furthermore, there is a  question of a small dangling thrombus within the proximal portion of the  right ICA as best seen on image #177.     CTA HEAD: The intracranial segments of the vertebral arteries are  unremarkable. The basilar artery is within normal limits. There is a  mild stenosis of the proximal left P2 segment and there is a severe  degree of stenosis within the distal left P2 segment. There are 2 severe  stenoses appreciated within the right P2 segment.     Atherosclerotic calcifications are appreciated within the cavernous  segments of the internal carotids resulting in mild degrees of luminal  compromise. There is a prominent appearance of the right MCA vasculature  which likely reflects autoregulatory vasodilatation from the proximal  ICA stenosis. Otherwise, the middle and anterior cerebral's are  unremarkable.     DELAYED POSTCONTRAST HEAD CT: No abnormal foci of contrast enhancement  are seen within the brain parenchyma.     Impression:    Within the proximal portion of the right ICA, there is a severe stenosis  that is estimated to be at least 80% by NASCET criteria. This is  secondary to a largely noncalcified atherosclerotic plaque. The plaque  is irregular and there may even be a very small area of dangling  thrombus protruding into the lumen of the right ICA. Intracranially,  there is an dilated right MCA vasculature which is likely secondary to  autoregulation as a result of the proximal right ICA stenosis.     There  are severe stenoses within the posterior cerebral arteries. There  is diminished perfusion within the right PCA distribution on the time to  maximal enhancement greater than 6 seconds maps.     A moderate degree of stenosis is seen at the origin of the right  vertebral artery and a moderate to severe degree of stenosis is seen at  the origin of the left vertebral artery.     Additional intracranial and extracranial atherosclerotic phenomena are  as discussed in detail above.     Otherwise, there is no evidence to suggest an area of acute completed  infarction and there is no evidence for large vessel occlusion. Further  evaluation could be performed with an MRI study for more sensitive  assessment of an area of acute infarction.     Incidental note is made of an indeterminate area of skin thickening and  subcutaneous fat density within the right parietal scalp measuring up to  5 x 17 x 14 mm in greatest dimensions which may represent a scalp  hematoma or area of scalp scarring. However, I cannot exclude the  possibility of an infiltrative lesion at this site. Please correlate  with physical exam findings.     The urgent findings of the noncontrast head CT were discussed with Dr. Chou on 10/09/2023 at approximately 12:15 p.m. The findings of the  CT angiogram and CT perfusion study were discussed with Dr. Chou at  approximately 12:30 p.m.     The findings within the scalp were discussed with Dr. Kay on  10/09/2023 at approximately 1:45 p.m.     Radiation dose reduction techniques were utilized, including automated  exposure control and exposure modulation based on body size.        This report was finalized on 10/9/2023 3:10 PM by Dr. Sebastian Laurent M.D  on Workstation: [id number]     XR Chest 1 View  Narrative: XR CHEST 1 VW-     HISTORY: Male who is 89 years-old, stroke     TECHNIQUE: Frontal view of the chest     COMPARISON: 2/7/2023     FINDINGS: The heart size is borderline. Pulmonary vasculature  is  unremarkable. Aorta is calcified. No focal pulmonary consolidation,  pleural effusion, or pneumothorax. Old granulomatous disease is present.  No acute osseous process.     Impression: No no focal pulmonary consolidation. Borderline heart size.  Follow-up as clinical indications persist.     This report was finalized on 10/9/2023 1:49 PM by Dr. Wesley Amaya M.D on Workstation: [id number]       Scheduled Medications  aspirin, 81 mg, Oral, Daily  atorvastatin, 80 mg, Oral, Nightly  carbidopa-levodopa, 1 tablet, Oral, TID  levothyroxine, 75 mcg, Oral, Q AM  pramipexole, 0.5 mg, Oral, TID  senna-docusate sodium, 2 tablet, Oral, BID  sodium chloride, 10 mL, Intravenous, Q12H  sodium chloride, 10 mL, Intravenous, Q12H  valsartan, 160 mg, Oral, Daily    Infusions  heparin, 12 Units/kg/hr, Last Rate: 15 Units/kg/hr (10/10/23 0154)    Diet  NPO Diet NPO Type: Strict NPO       Assessment/Plan     Active Hospital Problems    Diagnosis  POA    **Acute ischemic stroke [I63.9]  Yes    Benign essential hypertension [I10]  Yes      Resolved Hospital Problems   No resolved problems to display.       89 y.o. male admitted with Acute ischemic stroke.    Acute cerebrovascular infarcts  MRI confirmed.  Neurology is following.    Right ICA severe stenosis with possible thrombus  He has been started on heparin drip.  Continue aspirin  Echocardiogram, carotid ultrasound ordered    DVT of the popliteal vein of right lower extremity  IVC filter noted     Paroxysmal atrial fibrillation  Currently on Eliquis 2.5 mg twice daily.  Eliquis is currently held and he is on a heparin drip    Parkinson disorder  Continue home Sinemet, pramipexole     Coronary artery disease  Aspirin, statin.      Hypertension   hypothyroidism  Continue home medications     Abdominal discomfort  Exam not consistent with acute abdomen  check GI panel PCR due to some mentions of loose/watery stool      Heparin ggt  for DVT prophylaxis.  Full code.  Discussed with  patient and family.        Hernán Saldivar MD  [facility]  10/10/23  11:55 EDT    Copied text on this note has been reviewed and updated as of 10/10/23

## 2023-10-10 NOTE — CASE MANAGEMENT/SOCIAL WORK
Discharge Planning Assessment  [facility]     Patient Name: Casey Snowden .  MRN: [id number]  Today's Date: 10/10/2023    Admit Date: 10/9/2023    Plan: home with family; PT/OT/Speech consults pending   Discharge Needs Assessment       Row Name 10/10/23 0941       Living Environment    People in Home spouse    Current Living Arrangements home    Potentially Unsafe Housing Conditions none    In the past 12 months has the electric, gas, oil, or water company threatened to shut off services in your home? No    Primary Care Provided by self    Provides Primary Care For no one    Family Caregiver if Needed spouse    Quality of Family Relationships involved;helpful    Able to Return to Prior Arrangements yes       Resource/Environmental Concerns    Resource/Environmental Concerns none       Food Insecurity    Within the past 12 months, you worried that your food would run out before you got the money to buy more. Never true    Within the past 12 months, the food you bought just didn't last and you didn't have money to get more. Never true       Transition Planning    Patient/Family Anticipates Transition to home with family    Transportation Anticipated family or friend will provide       Discharge Needs Assessment    Readmission Within the Last 30 Days no previous admission in last 30 days    Equipment Currently Used at Home walker, rolling    Concerns to be Addressed discharge planning    Equipment Needed After Discharge none    Provided Post Acute Provider List? Yes    Post Acute Provider List Home Health    Delivered To Patient    Method of Delivery In person                   Discharge Plan       Row Name 10/10/23 0942       Plan    Plan home with family; PT/OT/Speech consults pending    Patient/Family in Agreement with Plan yes    Plan Comments Spoke with pt, pt's wife and daughter (Evonne) bedside. Confirmed facesheet correct. Explained role of CCP. Pt lives at home with his wife in a house with steps to  enter. He has walker he uses to ambulate and bedside commode at home. Daughter is going to purchase a shower chair. Wife assist with ADLs as needed. Pts PCP is Dr. Diez and he uses [facility] in [place] with no issues. Pt has used HH in past, [facility] No [facility]F history. PT/OT and speech consults pending. HH/SNF list provided. CCP to follow up for d/c needs.                  Continued Care and Services - Admitted Since 10/9/2023    Coordination has not been started for this encounter.          Demographic Summary    No documentation.                  Functional Status    No documentation.                  Psychosocial    No documentation.                  Abuse/Neglect    No documentation.                  Legal    No documentation.                  Substance Abuse    No documentation.                  Patient Forms    No documentation.                     POLO Khan

## 2023-10-10 NOTE — THERAPY EVALUATION
Patient Name: Casey Snowden .  : 1934    MRN: [id number]                              Today's Date: 10/10/2023       Admit Date: 10/9/2023    Visit Dx:     ICD-10-CM ICD-9-CM   1. Acute ischemic stroke  I63.9 434.91   2. Left-sided neglect  R41.4 781.8   3. Left homonymous hemianopsia  H53.462 368.46   4. Chronic anticoagulation  Z79.01 V58.61   5. Atrial fibrillation, unspecified type  I48.91 427.31   6. Hypertension, unspecified type  I10 401.9   7. Hyperlipidemia, unspecified hyperlipidemia type  E78.5 272.4     Patient Active Problem List   Diagnosis    Benign essential hypertension    Stenosis of carotid artery    Mixed hyperlipidemia    Parkinson's disease    Peripheral arterial occlusive disease    Screening for prostate cancer    Medication monitoring encounter    Melanoma    Chronic deep vein thrombosis (DVT) of popliteal vein of right lower extremity    Uses hearing aid    Paroxysmal A-fib    Chronic diastolic (congestive) heart failure    Anticoagulant long-term use    Presence of IVC filter    Medicare annual wellness visit, subsequent    Iron deficiency anemia secondary to inadequate dietary iron intake    Orthostatic hypotension due to Parkinson's disease    Coronary artery disease involving native coronary artery of native heart without angina pectoris    ST elevation myocardial infarction involving left anterior descending (LAD) coronary artery    Sialorrhea    Hypertensive heart disease with heart failure    PAD (peripheral artery disease)    Metastatic squamous cell carcinoma    Secondary malignancy of lymph nodes of head, face and neck    Encounter for antineoplastic immunotherapy    RBD (REM behavioral disorder)    Acquired hypothyroidism    Skin tear of upper arm without complication, left, subsequent encounter    Aspiration pneumonia of right lung due to vomit    Dysphagia, neurologic    Acute ischemic stroke     Past Medical History:   Diagnosis Date    Acquired hypothyroidism  8/31/2022    Atrial fibrillation     Cancer 04/2018    basil cell carcinoma    Carotid stenosis     CHF (congestive heart failure)     Chronic deep vein thrombosis (DVT) of popliteal vein of right lower extremity     Coronary artery disease involving native coronary artery of native heart without angina pectoris 12/20/2018    DVT of lower extremity (deep venous thrombosis)     Hard of hearing     Localized edema 1/4/2017    Long-term use of high-risk medication     Metabolic encephalopathy 2/7/2023    PAD (peripheral artery disease) 9/10/2020    Parkinson's disease     Postphlebitic syndrome      Past Surgical History:   Procedure Laterality Date    BASAL CELL CARCINOMA EXCISION  04/2018    CARDIAC CATHETERIZATION N/A 11/5/2018    Procedure: Left Heart Cath;  Surgeon: Oliverio Azar MD;  Location[facility]VASIVE LOC[facility];  Service: Cardiovascular    CARDIAC CATHETERIZATION N/A 11/5/2018    Procedure: Coronary angiography;  Surgeon: Oliverio Azar MD;  Location: [facility] CATH INVASIVE LOCATION;  Service: Cardiovascular    CARDIAC CATHETERIZATION N/A 11/5/2018    Procedure: Left ventriculography;  Surgeon: Oliverio Azar MD;  Location: [facility] CATH INVASIVE LOC[facility];  Service: Cardiovascular    CARDIAC CATHETERIZATION N/A 6/4/2019    Procedure: Left Heart Cath;  Surgeon: Johnny Glasgow MD;  Location: [facility] CATH INVASIVE LOCATION;  Service: Cardiovascular    CARDIAC CATHETERIZATION N/A 6/4/2019    Procedure: Coronary angiography;  Surgeon: Johnny Glasgow MD;  Location[facility] LOC[facility];  Service: Cardiovascular    CARDIAC CATHETERIZATION N/A 6/4/2019    Procedure: Left ventriculography;  Surgeon: Johnny Glasgow MD;  Location: [facility] CATH INVASIVE LOCATION;  Service: Cardiovascular    CARDIAC CATHETERIZATION N/A 6/4/2019    Procedure: Stent BILL coronary;  Surgeon: Johnny Glasgow MD;  Location: [facility] CATH [facility];  Service: Cardiovascular    CARDIAC  SURGERY      CATARACT EXTRACTION Left 08/2018    CATARACT EXTRACTION Right 09/2018    COLONOSCOPY      2011    INCISION AND DRAINAGE LEG Right 7/7/2017    Procedure: INCISION AND DRAINAGE INFECTED HEMATOMA RIGHT THIGH;  Surgeon: Valentin Peña MD;  Location: [facility] OR;  Service:     JOINT REPLACEMENT      KNEE INCISION AND DRAINAGE Right 12/27/2016    Procedure: KNEE INCISION AND DRAINAGE;  Surgeon: Triston Whitten MD;  Location: [facility] OR;  Service:     NOSE SURGERY      nose replacement    SKIN BIOPSY      SKIN GRAFT  12/2017    TOTAL KNEE ARTHROPLASTY Right     VASCULAR SURGERY      VENA CAVA FILTER PLACEMENT        General Information       Row Name 10/10/23 1434          OT Time and Intention    Document Type evaluation  -     Mode of Treatment occupational therapy  -       Row Name 10/10/23 1434          General Information    Patient Profile Reviewed yes  -JW     Prior Level of Function independent:;ADL's;all household mobility  indep with ADLs and fxl mobility using RW  -     Existing Precautions/Restrictions fall  -     Barriers to Rehab none identified  -       Row Name 10/10/23 1434          Living Environment    People in Home spouse  -       Row Name 10/10/23 1434          Cognition    Orientation Status (Cognition) oriented x 3  -               User Key  (r) = Recorded By, (t) = Taken By, (c) = Cosigned By      Initials Name Provider Type     Farhana Jackson OT Occupational Therapist                     Mobility/ADL's       Row Name 10/10/23 1435          Bed Mobility    Bed Mobility supine-sit;sit-supine  -     Supine-Sit [place] (Bed Mobility) standby assist  -     Sit-Supine [place] (Bed Mobility) standby assist  -     Assistive Device (Bed Mobility) bed rails;head of bed elevated  -       Row Name 10/10/23 1435          Transfers    Transfers toilet transfer;sit-stand transfer  -       Row Name 10/10/23 1435          Sit-Stand Transfer    Sit-Stand  [place] (Transfers) standby assist  -     Assistive Device (Sit-Stand Transfers) walker, front-wheeled  -       Row Name 10/10/23 1435          Toilet Transfer    Type (Toilet Transfer) sit-stand;stand-sit  -     [place] Level (Toilet Transfer) standby assist  -     Assistive Device (Toilet Transfer) commode;grab bars/safety frame;walker, front-wheeled  -       Row Name 10/10/23 1435          Functional Mobility    Functional Mobility- Ind. Level contact guard assist  -     Functional Mobility- Device walker, front-wheeled  -     Functional Mobility- Comment performs fxl ambulation around the room using RW with CGA  -       Row Name 10/10/23 1435          Activities of Daily Living    BADL Assessment/Intervention lower body dressing;toileting;upper body dressing  -[facility] Name 10/10/23 1435          Lower Body Dressing Assessment/Training    [place] Level (Lower Body Dressing) doff;don;socks;supervision  -     Position (Lower Body Dressing) edge of bed sitting  -     Comment, (Lower Body Dressing) fig-4  -       Row Name 10/10/23 1435          Toileting Assessment/Training    Comment, (Toileting) completes toilet TF but no urge to void or have BM  -       Row Name 10/10/23 1435          Upper Body Dressing Assessment/Training    [place] Level (Upper Body Dressing) don;doff;front opening garment;set up;supervision  -     Position (Upper Body Dressing) edge of bed sitting  -     Comment, (Upper Body Dressing) gown on back  -               User Key  (r) = Recorded By, (t) = Taken By, (c) = Cosigned By      Initials Name Provider Type    Farhana Rosas OT Occupational Therapist                   Obj/Interventions       Row Name 10/10/23 1436          Sensory Assessment (Somatosensory)    Sensory Assessment (Somatosensory) sensation intact  -       Row Name 10/10/23 1436          Vision Assessment/Intervention    Visual Impairment/Limitations blurry  vision;corrective lenses full-time  -     Vision Assessment Comment reports slightly decreased visual acuity from baseline  -       Row Name 10/10/23 1436          Strength Comprehensive (MMT)    Comment, General Manual Muscle Testing (MMT) Assessment UE strength WFL. R proximal weakness from prior injury. LUE 4+/5 grossly  -       Row Name 10/10/23 1436          Motor Skills    Motor Skills coordination  -     Coordination WNL;finger to nose  -       Row Name 10/10/23 1436          Balance    Balance Assessment sitting static balance;sitting dynamic balance;standing static balance;standing dynamic balance  -     Static Sitting Balance independent  -     Dynamic Sitting Balance supervision  -     Position, Sitting Balance sitting edge of bed  -     Static Standing Balance standby assist  -     Dynamic Standing Balance standby assist;contact guard  -     Position/Device Used, Standing Balance supported  -     Balance Interventions occupation based/functional task;sitting;standing  -               User Key  (r) = Recorded By, (t) = Taken By, (c) = Cosigned By      Initials Name Provider Type     Farhana Jackson OT Occupational Therapist                   Goals/Plan       Row Name 10/10/23 1445          Transfer Goal 1 (OT)    Activity/Assistive Device (Transfer Goal 1, OT) transfers, all  -     [place] Level/Cues Needed (Transfer Goal 1, OT) standby assist  -     Time Frame (Transfer Goal 1, OT) short term goal (STG);2 weeks  -     Progress/Outcome (Transfer Goal 1, OT) goal met  -               User Key  (r) = Recorded By, (t) = Taken By, (c) = Cosigned By      Initials Name Provider Type     Farhana Jackson OT Occupational Therapist                   Clinical Impression       Row Name 10/10/23 1438          Pain Assessment    Pretreatment Pain Rating 2/10  -     Posttreatment Pain Rating 2/10  -     Pain Location - Side/Orientation Left  -     Pain Location lower  -      Pain Location - extremity  -     Pre/Posttreatment Pain Comment reports pain in LLE ~6 weeks now  -     Pain Intervention(s) Repositioned;Ambulation/increased activity  -       Row Name 10/10/23 1438          Plan of Care Review    Plan of Care Reviewed With patient  -     Outcome Evaluation Pt is an 89 y/o M admitted with L weakness, multiple R infarcts. Hx of Parkinson's dx, dementia. He lives with his spouse and is indep with ADLs and fxl mobility using RW. He presents today appearing to be close to baseline fxn. UE strength 4+/5 grossly except proximal RUE d/t recent injury, sensation, ROM, and coordination WFL, some dec visual acuity from baseline. He sits EOB with spv, set-up for LB dressing and SBA for commode TF. He performs fxl ambulation using RW with SBA-CGA, no overt LOB in and out of bathroom. Returns to supine in bed, left with all needs in reach. No acute OT needs identified at this time, will sign off. Recommend d/c home with family assist  -       Row Name 10/10/23 1438          Therapy Assessment/Plan (OT)    Criteria for Skilled Therapeutic Interventions Met (OT) no problems identified which require skilled intervention  -     Therapy Frequency (OT) evaluation only  -       Row Name 10/10/23 1438          Therapy Plan Review/Discharge Plan (OT)    Anticipated Discharge Disposition (OT) home with assist  -       Row Name 10/10/23 1437          Positioning and Restraints    Pre-Treatment Position in bed  -     Post Treatment Position bed  -     In Bed fowlers;call light within reach;encouraged to call for assist;exit alarm on;with family/caregiver  -               User Key  (r) = Recorded By, (t) = Taken By, (c) = Cosigned By      Initials Name Provider Type    Farhana Rosas, FREDDIE Occupational Therapist                   Outcome Measures       Row Name 10/10/23 1172          How much help from another is currently needed...    Putting on and taking off regular lower body  clothing? 3  -JW     Bathing (including washing, rinsing, and drying) 3  -JW     Toileting (which includes using toilet bed pan or urinal) 3  -JW     Putting on and taking off regular upper body clothing 3  -JW     Taking care of personal grooming (such as brushing teeth) 4  -JW     Eating meals 4  -JW     AM-PAC 6 Clicks Score (OT) 20  -       Row Name 10/10/23 0839          How much help from another person do you currently need...    Turning from your back to your side while in flat bed without using bedrails? 2 (P)   -JR     Moving from lying on back to sitting on the side of a flat bed without bedrails? 2 (P)   -JR     Moving to and from a bed to a chair (including a wheelchair)? 2 (P)   -JR     Standing up from a chair using your arms (e.g., wheelchair, bedside chair)? 2 (P)   -JR     Climbing 3-5 steps with a railing? 1 (P)   -JR     To walk in hospital room? 2 (P)   -JR     AM-PAC 6 Clicks Score (PT) 11 (P)   -JR     Highest level of mobility 4 --> Transferred to chair/commode (P)   -       Row Name 10/10/23 1446          Modified [place] Scale    Modified [place] Scale 3 - Moderate disability.  Requiring some help, but able to walk without assistance.  -       Row Name 10/10/23 1446          Functional Assessment    Outcome Measure Options AM-PAC 6 Clicks Daily Activity (OT);Modified [place]  -               User Key  (r) = Recorded By, (t) = Taken By, (c) = Cosigned By      Initials Name Provider Type     Farhana Jackson, OT Occupational Therapist    Tia Vazquez, Nursing Student Nursing Student                    Occupational Therapy Education       Title: PT OT SLP Therapies (In Progress)       Topic: Occupational Therapy (In Progress)       Point: ADL training (Done)       Description:   Instruct learner(s) on proper safety adaptation and remediation techniques during self care or transfers.   Instruct in proper use of assistive devices.                  Learning Progress Summary              Patient Acceptance, E, VU by  at 10/10/2023 1447    Comment: role of OT, plan of care, safety                         Point: Home exercise program (Not Started)       Description:   Instruct learner(s) on appropriate technique for monitoring, assisting and/or progressing therapeutic exercises/activities.                  Learner Progress:  Not documented in this visit.              Point: Precautions (Done)       Description:   Instruct learner(s) on prescribed precautions during self-care and functional transfers.                  Learning Progress Summary             Patient Acceptance, E, VU by RICKY at 10/10/2023 1447    Comment: role of OT, plan of care, safety                         Point: Body mechanics (Done)       Description:   Instruct learner(s) on proper positioning and spine alignment during self-care, functional mobility activities and/or exercises.                  Learning Progress Summary             Patient Acceptance, E, VU by  at 10/10/2023 1447    Comment: role of OT, plan of care, safety                                         User Key       Initials Effective Dates Name Provider Type Discipline     06/10/21 -  Farhana Jackson OT Occupational Therapist OT                  OT Recommendation and Plan  Therapy Frequency (OT): evaluation only  Plan of Care Review  Plan of Care Reviewed With: patient  Outcome Evaluation: Pt is an 88 y/o M admitted with L weakness, multiple R infarcts. Hx of Parkinson's dx, dementia. He lives with his spouse and is indep with ADLs and fxl mobility using RW. He presents today appearing to be close to baseline fxn. UE strength 4+/5 grossly except proximal RUE d/t recent injury, sensation, ROM, and coordination WFL, some dec visual acuity from baseline. He sits EOB with spv, set-up for LB dressing and SBA for commode TF. He performs fxl ambulation using RW with SBA-CGA, no overt LOB in and out of bathroom. Returns to supine in bed, left with all needs in reach. No  acute OT needs identified at this time, will sign off. Recommend d/c home with family assist     Time Calculation:   Evaluation Complexity (OT)  Review Occupational Profile/Medical/Therapy History Complexity: brief/low complexity  Assessment, Occupational Performance/Identification of Deficit Complexity: 1-3 performance deficits  Clinical Decision Making Complexity (OT): problem focused assessment/low complexity  Overall Complexity of Evaluation (OT): low complexity     Time Calculation- OT       Row Name 10/10/23 1447             Time Calculation- OT    OT Start Time 1407  -JW      OT Stop Time 1432  -JW      OT Time Calculation (min) 25 min  -JW      Total Timed Code Minutes- OT 15 minute(s)  -JW      OT Received On 10/10/23  -         Timed Charges    81890 - OT Self Care/Mgmt Minutes 15  -JW         Untimed Charges    OT Eval/Re-eval Minutes 10  -JW         Total Minutes    Timed Charges Total Minutes 15  -JW      Untimed Charges Total Minutes 10  -JW       Total Minutes 25  -JW                User Key  (r) = Recorded By, (t) = Taken By, (c) = Cosigned By      Initials Name Provider Type     Farhana Jakcson OT Occupational Therapist                  Therapy Charges for Today       Code Description Service Date Service Provider Modifiers Qty    [id number]  OT SELF CARE/MGMT/TRAIN EA 15 MIN 10/10/2023 Farhana Jackson OT GO 1    [id number]  OT EVAL LOW COMPLEXITY 3 10/10/2023 Farhana Jackson OT GO 1                 Farhana Jackson OT  10/10/2023

## 2023-10-10 NOTE — PROGRESS NOTES
Name: Casey Snowden . ADMIT: 10/9/2023   : 1934  PCP: Aleksander Diez MD    MRN: [id number] LOS: 1 days   AGE/SEX: 89 y.o. male  ROOM:  [place] [id number]     CC: Stroke and right carotid artery stenosis  Interval History: Well overnight.  Daughter at bedside.  Subjective   Subjective     Review of Systems  Objective   Objective     Vitals:   Temp:  [97.5 °F (36.4 °C)-97.6 °F (36.4 °C)] 97.5 °F (36.4 °C)  Heart Rate:  [56-64] 62  Resp:  [17-18] 18  BP: (119-193)/(52-80) 193/80    No intake/output data recorded.    Scheduled Meds:     aspirin, 81 mg, Oral, Daily  atorvastatin, 80 mg, Oral, Nightly  carbidopa-levodopa, 1 tablet, Oral, TID  levothyroxine, 75 mcg, Oral, Q AM  pramipexole, 0.5 mg, Oral, TID  senna-docusate sodium, 2 tablet, Oral, BID  sodium chloride, 10 mL, Intravenous, Q12H  sodium chloride, 10 mL, Intravenous, Q12H  valsartan, 160 mg, Oral, Daily      IV Meds:   heparin, 12 Units/kg/hr, Last Rate: 15 Units/kg/hr (10/10/23 0154)        Physical Exam  Vascular: Radiation changes to the right neck.    Data Reviewed:  CBC    Results from last 7 days   Lab Units 10/10/23  0444 10/09/23  1203   WBC 10*3/mm3 4.04 7.28   HEMOGLOBIN g/dL 12.5* 13.8   PLATELETS 10*3/mm3 162 168     BMP   Results from last 7 days   Lab Units 10/10/23  0444 10/09/23  1207 10/09/23  1203   SODIUM mmol/L 139  --  146*   POTASSIUM mmol/L 3.7  --  4.6   CHLORIDE mmol/L 106  --  106   CO2 mmol/L 23.7  --  26.4   BUN mg/dL 16  --  18   CREATININE mg/dL 0.66* 0.90 0.89   GLUCOSE mg/dL 103*  --  58*     Radiology(recent) MRI Brain Without Contrast    Result Date: 10/9/2023  Multiple small cortical and white matter infarcts involving the right cerebral hemisphere is appreciated including small infarcts involving the pre and postcentral gyri on the right superiorly, right parietal lobe superiorly and posteriorly and white matter of the right parietal and temporal lobes. The largest infarct measures approximately 4 mm in  size and involves the right parietal cortex superiorly and posteriorly. There is no evidence of hemorrhage. There is no evidence of mass or of mass effect on this noncontrast MRI examination of the brain. Moderate small vessel ischemic disease is noted.  The above information was called to and discussed with Dr. Chou at the time of the dictation.      CT Angiogram Head w AI Analysis of LVO    Result Date: 10/9/2023   Within the proximal portion of the right ICA, there is a severe stenosis that is estimated to be at least 80% by NASCET criteria. This is secondary to a largely noncalcified atherosclerotic plaque. The plaque is irregular and there may even be a very small area of dangling thrombus protruding into the lumen of the right ICA. Intracranially, there is an dilated right MCA vasculature which is likely secondary to autoregulation as a result of the proximal right ICA stenosis.  There are severe stenoses within the posterior cerebral arteries. There is diminished perfusion within the right PCA distribution on the time to maximal enhancement greater than 6 seconds maps.  A moderate degree of stenosis is seen at the origin of the right vertebral artery and a moderate to severe degree of stenosis is seen at the origin of the left vertebral artery.  Additional intracranial and extracranial atherosclerotic phenomena are as discussed in detail above.  Otherwise, there is no evidence to suggest an area of acute completed infarction and there is no evidence for large vessel occlusion. Further evaluation could be performed with an MRI study for more sensitive assessment of an area of acute infarction.  Incidental note is made of an indeterminate area of skin thickening and subcutaneous fat density within the right parietal scalp measuring up to 5 x 17 x 14 mm in greatest dimensions which may represent a scalp hematoma or area of scalp scarring. However, I cannot exclude the possibility of an infiltrative lesion  at this site. Please correlate with physical exam findings.  The urgent findings of the noncontrast head CT were discussed with Dr. Chou on 10/09/2023 at approximately 12:15 p.m. The findings of the CT angiogram and CT perfusion study were discussed with Dr. Chou at approximately 12:30 p.m.  The findings within the scalp were discussed with Dr. Kay on 10/09/2023 at approximately 1:45 p.m.  Radiation dose reduction techniques were utilized, including automated exposure control and exposure modulation based on body size.   This report was finalized on 10/9/2023 3:10 PM by Dr. Sebastian Laurent M.D on Workstation: [id number]      CT Angiogram Neck    Result Date: 10/9/2023   Within the proximal portion of the right ICA, there is a severe stenosis that is estimated to be at least 80% by NASCET criteria. This is secondary to a largely noncalcified atherosclerotic plaque. The plaque is irregular and there may even be a very small area of dangling thrombus protruding into the lumen of the right ICA. Intracranially, there is an dilated right MCA vasculature which is likely secondary to autoregulation as a result of the proximal right ICA stenosis.  There are severe stenoses within the posterior cerebral arteries. There is diminished perfusion within the right PCA distribution on the time to maximal enhancement greater than 6 seconds maps.  A moderate degree of stenosis is seen at the origin of the right vertebral artery and a moderate to severe degree of stenosis is seen at the origin of the left vertebral artery.  Additional intracranial and extracranial atherosclerotic phenomena are as discussed in detail above.  Otherwise, there is no evidence to suggest an area of acute completed infarction and there is no evidence for large vessel occlusion. Further evaluation could be performed with an MRI study for more sensitive assessment of an area of acute infarction.  Incidental note is made of an indeterminate area of  skin thickening and subcutaneous fat density within the right parietal scalp measuring up to 5 x 17 x 14 mm in greatest dimensions which may represent a scalp hematoma or area of scalp scarring. However, I cannot exclude the possibility of an infiltrative lesion at this site. Please correlate with physical exam findings.  The urgent findings of the noncontrast head CT were discussed with Dr. Chou on 10/09/2023 at approximately 12:15 p.m. The findings of the CT angiogram and CT perfusion study were discussed with Dr. Chou at approximately 12:30 p.m.  The findings within the scalp were discussed with Dr. Kay on 10/09/2023 at approximately 1:45 p.m.  Radiation dose reduction techniques were utilized, including automated exposure control and exposure modulation based on body size.   This report was finalized on 10/9/2023 3:10 PM by Dr. Sebastian Laurent M.D on Workstation: [id number]      CT CEREBRAL PERFUSION WITH & WITHOUT CONTRAST    Result Date: 10/9/2023   Within the proximal portion of the right ICA, there is a severe stenosis that is estimated to be at least 80% by NASCET criteria. This is secondary to a largely noncalcified atherosclerotic plaque. The plaque is irregular and there may even be a very small area of dangling thrombus protruding into the lumen of the right ICA. Intracranially, there is an dilated right MCA vasculature which is likely secondary to autoregulation as a result of the proximal right ICA stenosis.  There are severe stenoses within the posterior cerebral arteries. There is diminished perfusion within the right PCA distribution on the time to maximal enhancement greater than 6 seconds maps.  A moderate degree of stenosis is seen at the origin of the right vertebral artery and a moderate to severe degree of stenosis is seen at the origin of the left vertebral artery.  Additional intracranial and extracranial atherosclerotic phenomena are as discussed in detail above.  Otherwise,  there is no evidence to suggest an area of acute completed infarction and there is no evidence for large vessel occlusion. Further evaluation could be performed with an MRI study for more sensitive assessment of an area of acute infarction.  Incidental note is made of an indeterminate area of skin thickening and subcutaneous fat density within the right parietal scalp measuring up to 5 x 17 x 14 mm in greatest dimensions which may represent a scalp hematoma or area of scalp scarring. However, I cannot exclude the possibility of an infiltrative lesion at this site. Please correlate with physical exam findings.  The urgent findings of the noncontrast head CT were discussed with Dr. Chou on 10/09/2023 at approximately 12:15 p.m. The findings of the CT angiogram and CT perfusion study were discussed with Dr. Chou at approximately 12:30 p.m.  The findings within the scalp were discussed with Dr. Kay on 10/09/2023 at approximately 1:45 p.m.  Radiation dose reduction techniques were utilized, including automated exposure control and exposure modulation based on body size.   This report was finalized on 10/9/2023 3:10 PM by Dr. Sebastian Laurent M.D on Workstation: [id number]      XR Chest 1 View    Result Date: 10/9/2023  No no focal pulmonary consolidation. Borderline heart size. Follow-up as clinical indications persist.  This report was finalized on 10/9/2023 1:49 PM by Dr. Wesley Amaya M.D on Workstation: [id number]       Most notable findings include: Stenosis as discussed    Active Hospital Problems:   Active Hospital Problems    Diagnosis  POA    **Acute ischemic stroke [I63.9]  Yes    Benign essential hypertension [I10]  Yes      Resolved Hospital Problems   No resolved problems to display.      Assessment & Plan   [place], Subsequent Hospital Care  Assessment / Plan     Symptomatic right ICA stenosis: Patient with ipsilateral stroke and extensive soft plaque.  Patient has a history of 33  radiation treatments to the right neck in 2020.  These were performed at [facility].  He also has some skin changes of the right neck more prominent in the upper neck as opposed to the base of the neck.  This increases risk significantly for carotid endarterectomy.  Will discuss with stroke team regarding additional options which could include stenting.    Valentin Peña MD  10/10/23  10:07 EDT  Office Number [phone]

## 2023-10-10 NOTE — NURSING NOTE
[facility] Acute Inpt Rehab Note     Referral received via stroke order set.  Please note this is a screening only, rehab admissions will not actively be evaluating this patient.  If felt patient is appropriate for our services once therapies begin, please call our office at [phone], to initiate a full referral.    Thank you,   Mandie Encarnacion RN   Rehab Admission Nurse

## 2023-10-10 NOTE — THERAPY EVALUATION
Acute Care - Speech Language Pathology   Swallow Initial Evaluation [facility]     Patient Name: Casey Snowden Sr.  : 1934  MRN: [id number]  Today's Date: 10/10/2023               Admit Date: 10/9/2023    Visit Dx:     ICD-10-CM ICD-9-CM   1. Acute ischemic stroke  I63.9 434.91   2. Left-sided neglect  R41.4 781.8   3. Left homonymous hemianopsia  H53.462 368.46   4. Chronic anticoagulation  Z79.01 V58.61   5. Atrial fibrillation, unspecified type  I48.91 427.31   6. Hypertension, unspecified type  I10 401.9   7. Hyperlipidemia, unspecified hyperlipidemia type  E78.5 272.4     Patient Active Problem List   Diagnosis    Benign essential hypertension    Stenosis of carotid artery    Mixed hyperlipidemia    Parkinson's disease    Peripheral arterial occlusive disease    Screening for prostate cancer    Medication monitoring encounter    Melanoma    Chronic deep vein thrombosis (DVT) of popliteal vein of right lower extremity    Uses hearing aid    Paroxysmal A-fib    Chronic diastolic (congestive) heart failure    Anticoagulant long-term use    Presence of IVC filter    Medicare annual wellness visit, subsequent    Iron deficiency anemia secondary to inadequate dietary iron intake    Orthostatic hypotension due to Parkinson's disease    Coronary artery disease involving native coronary artery of native heart without angina pectoris    ST elevation myocardial infarction involving left anterior descending (LAD) coronary artery    Sialorrhea    Hypertensive heart disease with heart failure    PAD (peripheral artery disease)    Metastatic squamous cell carcinoma    Secondary malignancy of lymph nodes of head, face and neck    Encounter for antineoplastic immunotherapy    RBD (REM behavioral disorder)    Acquired hypothyroidism    Skin tear of upper arm without complication, left, subsequent encounter    Aspiration pneumonia of right lung due to vomit    Dysphagia, neurologic    Acute ischemic stroke     Past  Medical History:   Diagnosis Date    Acquired hypothyroidism 8/31/2022    Atrial fibrillation     Cancer 04/2018    basil cell carcinoma    Carotid stenosis     CHF (congestive heart failure)     Chronic deep vein thrombosis (DVT) of popliteal vein of right lower extremity     Coronary artery disease involving native coronary artery of native heart without angina pectoris 12/20/2018    DVT of lower extremity (deep venous thrombosis)     Hard of hearing     Localized edema 1/4/2017    Long-term use of high-risk medication     Metabolic encephalopathy 2/7/2023    PAD (peripheral artery disease) 9/10/2020    Parkinson's disease     Postphlebitic syndrome      Past Surgical History:   Procedure Laterality Date    BASAL CELL CARCINOMA EXCISION  04/2018    CARDIAC CATHETERIZATION N/A 11/5/2018    Procedure: Left Heart Cath;  Surgeon: Oliverio Azar MD;  Location: [facility] C[facility]AS[facility] LOC[facility];  Service: Cardiovascular    CARDIAC CATHETERIZATION N/A 11/5/2018    Procedure: Coronary angiography;  Surgeon: Oliverio Azar MD;  Location: [facility] CATH INVASIVE LOCATION;  Service: Cardiovascular    CARDIAC CATHETERIZATION N/A 11/5/2018    Procedure: Left ventriculography;  Surgeon: Oliverio Azar MD;  Location: [facility] CATH [facility]VASIVE LOC[facility];  Service: Cardiovascular    CARDIAC CATHETERIZATION N/A 6/4/2019    Procedure: Left Heart Cath;  Surgeon: Johnny Glasgow MD;  Location: [facility] CATH [facility]VAS[facility] LOC[facility];  Service: Cardiovascular    CARDIAC CATHETERIZATION N/A 6/4/2019    Procedure: Coronary angiography;  Surgeon: Johnny Glasgow MD;  Location: [facility] CATH [facility]VAS[facility] LOC[facility];  Service: Cardiovascular    CARDIAC CATHETERIZATION N/A 6/4/2019    Procedure: Left ventriculography;  Surgeon: Johnny Glasgow MD;  Location: [facility] CATH INVASIVE LOCATION;  Service: Cardiovascular    CARDIAC CATHETERIZATION N/A 6/4/2019    Procedure: Stent BILL coronary;  Surgeon: Johnny Glasgow MD;  Location: [facility] CATH  INVASIVE LOCATION;  Service: Cardiovascular    CARDIAC SURGERY      CATARACT EXTRACTION Left 08/2018    CATARACT EXTRACTION Right 09/2018    COLONOSCOPY      2011    INCISION AND DRAINAGE LEG Right 7/7/2017    Procedure: INCISION AND DRAINAGE INFECTED HEMATOMA RIGHT THIGH;  Surgeon: Valentin Peña MD;  Location: [facility] MAIN OR;  Service:     JOINT REPLACEMENT      KNEE INCISION AND DRAINAGE Right 12/27/2016    Procedure: KNEE INCISION AND DRAINAGE;  Surgeon: Triston Whitten MD;  Location: [facility] MAIN OR;  Service:     NOSE SURGERY      nose replacement    SKIN BIOPSY      SKIN GRAFT  12/2017    TOTAL KNEE ARTHROPLASTY Right     VASCULAR SURGERY      VENA CAVA FILTER PLACEMENT         SLP Recommendation and Plan  SLP Swallowing Diagnosis: R/O pharyngeal dysphagia (10/10/23 1300)  SLP Diet Recommendation: puree, nectar thick liquids, ice chips between meals after oral care, with supervision (10/10/23 1300)  Recommended Precautions and Strategies: upright posture during/after eating, small bites of food and sips of liquid, no straw (10/10/23 1300)  SLP Rec. for Method of Medication Administration: meds whole, with puree (10/10/23 1300)     Monitor for Signs of Aspiration: yes, notify SLP if any concerns (10/10/23 1300)  Recommended Diagnostics: reassess via VFSS (MBS) (10/10/23 1300)  Swallow Criteria for Skilled Therapeutic Interventions Met: demonstrates skilled criteria (10/10/23 1300)  Anticipated Discharge Disposition (SLP): anticipate therapy at next level of care (10/10/23 1300)  Rehab Potential/Prognosis, Swallowing: good, to achieve stated therapy goals (10/10/23 1300)  Therapy Frequency (Swallow): PRN (10/10/23 1300)  Predicted Duration Therapy Intervention (Days): until discharge (10/10/23 1300)  Oral Care Recommendations: Oral Care before breakfast, after meals and PRN, Before ice/water (10/10/23 1300)                                      Oral Care Recommendations: Oral Care before breakfast, after  meals and PRN, Before ice/water (10/10/23 1300)    Outcome Evaluation: Clinical swallow assessment completed. Family at bedside. Reported that patient had recent dental procedure (lower tooth extraction) and has been tolerating soft solids to avoid dental damage. Per chart, patient has dysphagia history with most recent VFSS completed 02/2023 (recommendation for soft, ground and thin liquids). Patient agreeable to p.o. trials this date. Tolerated ice chips with appropriate oral manipulation and no overt s/sx of aspiration. Demonstrated throat clear with trials of thin by spoon and immediate cough with thin by straw. Inconsistent wet vocal quality/throat clear with thin by cup and nectar by straw. Tolerated trials of nectar by spoon/cup, honey by cup, and puree with no overt s/sx of aspiration or penetration. Mastication timely/fxnal for soft solids. Throat clear x2 with soft solids. Patient reported globus sensation following trials of soft solids. Regular solids deferred due to recent dental procedure. Due to inconsistent overt s/sx of aspiration with thins, recommend puree solids and nectar thick liquids by cup. Medication with applesauce. Precautions: upright, small bites/sips, avoid straws. Patient and family agreeable to recommendations. ST to follow with instrumental swallow assessment to determine least restrictive diet.      SWALLOW EVALUATION (last 72 hours)       SLP Adult Swallow Evaluation       Row Name 10/10/23 1300       Rehab Evaluation    Document Type evaluation  -SR    Subjective Information no complaints  -SR    Patient Observations alert;cooperative;agree to therapy  -SR    Patient/Family/Caregiver Comments/Observations Pt seated upright in bed. Family at bedside.  -SR    Patient Effort good  -SR    Symptoms Noted During/After Treatment none  -SR       General Information    Patient Profile Reviewed yes  -SR    Pertinent History Of Current Problem 89 y.o. male; presented to hospital with L sided  weakness; admitted with acute ischemic CVA. PMHx significant for Parkinson's dx, dementia. Hx dysphagia - VFSS completed 02/2023 with recommendation for mechanical soft (ground) with thin liquids.  -SR    Current Method of Nutrition NPO  -SR    Precautions/Limitations, Vision WFL with corrective lenses  -SR    Precautions/Limitations, Hearing WFL;for purposes of eval  -SR    Prior Level of Function-Communication cognitive-linguistic impairment;other (see comments)  hx dementia, per chart  -SR    Prior Level of Function-Swallowing soft to chew;thin liquids;other (see comments)  soft foods following dental procedure  -SR    Plans/Goals Discussed with patient;family;agreed upon  -SR    Barriers to Rehab none identified  -SR       Pain Scale: Numbers Pre/Post-Treatment    Pretreatment Pain Rating 0/10 - no pain  -SR    Posttreatment Pain Rating 0/10 - no pain  -SR       Oral Motor Structure and Function    Dentition Assessment missing teeth;other (see comments)  recent dental procedure- lower tooth extraction  -SR    Mucosal Quality moist, healthy  -SR    Volitional Swallow WFL  -SR    Volitional Cough WFL  -SR       Oral Musculature and Cranial Nerve Assessment    Oral Motor General Assessment generalized oral motor weakness  -SR    Oral Labial or Buccal Impairment, Detail, Cranial Nerve VII (Facial): left labial droop;other (see comments)  mild  -SR       General Eating/Swallowing Observations    Respiratory Support Currently in Use room air  -SR    Eating/Swallowing Skills self-fed;fed by SLP  -SR    Positioning During Eating upright 90 degree;upright in bed  -SR    Utensils Used spoon;cup;straw  -SR    Consistencies Trialed ice chips;thin liquids;nectar/syrup-thick liquids;honey-thick liquids;pureed;mechanical ground textures;mixed consistency  -SR       Clinical Swallow Eval    Clinical Swallow Evaluation Summary Clinical swallow assessment completed. Family at bedside. Reported that patient had recent dental  procedure (lower tooth extraction) and has been tolerating soft solids to avoid dental damage. Per chart, patient has dysphagia history with most recent VFSS completed 02/2023 (recommendation for soft, ground and thin liquids). Patient agreeable to p.o. trials this date. Tolerated ice chips with appropriate oral manipulation and no overt s/sx of aspiration. Demonstrated throat clear with trials of thin by spoon and immediate cough with thin by straw. Inconsistent wet vocal quality/throat clear with thin by cup and nectar by straw. Tolerated trials of nectar by spoon/cup, honey by cup, and puree with no overt s/sx of aspiration or penetration. Mastication timely/fxnal for soft solids. Throat clear x2 with soft solids. Patient reported globus sensation following trials of soft solids. Regular solids deferred due to recent dental procedure. Due to inconsistent overt s/sx of aspiration with thins, recommend puree solids and nectar thick liquids by cup. Medication with applesauce. Precautions: upright, small bites/sips, avoid straws. Patient and family agreeable to recommendations. ST to follow with instrumental swallow assessment to determine least restrictive diet.  -SR       SLP Evaluation Clinical Impression    SLP Swallowing Diagnosis R/O pharyngeal dysphagia  -SR    Functional Impact risk of aspiration/pneumonia  -SR    Rehab Potential/Prognosis, Swallowing good, to achieve stated therapy goals  -SR    Swallow Criteria for Skilled Therapeutic Interventions Met demonstrates skilled criteria  -SR       Recommendations    Therapy Frequency (Swallow) PRN  -SR    Predicted Duration Therapy Intervention (Days) until discharge  -SR    SLP Diet Recommendation puree;nectar thick liquids;ice chips between meals after oral care, with supervision  -SR    Recommended Diagnostics reassess via VFSS (MBS)  -SR    Recommended Precautions and Strategies upright posture during/after eating;small bites of food and sips of liquid;no  straw  -SR    Oral Care Recommendations Oral Care before breakfast, after meals and PRN;Before ice/water  -SR    SLP Rec. for Method of Medication Administration meds whole;with puree  -SR    Monitor for Signs of Aspiration yes;notify SLP if any concerns  -SR    Anticipated Discharge Disposition (SLP) anticipate therapy at next level of care  -SR       Swallow Goals (SLP)    Swallow LTGs Patient will demonstrate progress toward functional swallow for  -SR       (LTG) Patient will demonstrate progress toward functional swallow for    Diet Texture (Demonstrate progress toward functional swallow) soft to chew (ground) textures  -SR    Liquid viscosity (Demonstrate progress toward functional swallow) thin liquids  -SR    Alger (Demonstrate progress towards functional swallow) independently (over 90% accuracy)  -SR    Time Frame (Demonstrate progress toward functional swallow) 1 week  -SR    Progress/Outcomes (Demonstrate progress toward functional swallow) new goal  -SR              User Key  (r) = Recorded By, (t) = Taken By, (c) = Cosigned By      Initials Name Effective Dates    SR Tierra Rodriguez CCC-SLP 11/10/22 -                     EDUCATION  The patient has been educated in the following areas:   Dysphagia (Swallowing Impairment) Oral Care/Hydration.        SLP GOALS       Row Name 10/10/23 1300             (LTG) Patient will demonstrate progress toward functional swallow for    Diet Texture (Demonstrate progress toward functional swallow) soft to chew (ground) textures  -SR      Liquid viscosity (Demonstrate progress toward functional swallow) thin liquids  -SR      Alger (Demonstrate progress towards functional swallow) independently (over 90% accuracy)  -SR      Time Frame (Demonstrate progress toward functional swallow) 1 week  -SR      Progress/Outcomes (Demonstrate progress toward functional swallow) new goal  -SR                User Key  (r) = Recorded By, (t) = Taken By, (c) = Cosigned By       Initials Name Provider Type    Tierra Fernandez CCC-SLP Speech and Language Pathologist                       Time Calculation:    Time Calculation- SLP       Row Name 10/10/23 1508             Time Calculation- SLP    SLP Start Time 1300  -SR      SLP Received On 10/10/23  -SR         Untimed Charges    SLP Eval/Re-eval  ST Eval Oral Pharyng Swallow - 57169  -SR      05338-MP Eval Oral Pharyng Swallow Minutes 60  -SR         Total Minutes    Untimed Charges Total Minutes 60  -SR       Total Minutes 60  -SR                User Key  (r) = Recorded By, (t) = Taken By, (c) = Cosigned By      Initials Name Provider Type    Tierra Fernandez CCC-SLP Speech and Language Pathologist                    Therapy Charges for Today       Code Description Service Date Service Provider Modifiers Qty    [id number]  ST EVAL ORAL PHARYNG SWALLOW 4 10/10/2023 Tierra Rodriguez CCC-SLP GN 1                 SHAINA Vang  10/10/2023

## 2023-10-10 NOTE — SIGNIFICANT NOTE
10/10/23 1558   OTHER   Discipline physical therapist   Rehab Time/Intention   Session Not Performed other (see comments)  (PT check on patient x2 in AM. Pt with MD then off the floor for testing. PT will check back tomorrow.)   Recommendation   PT - Next Appointment 10/11/23

## 2023-10-10 NOTE — PLAN OF CARE
Goal Outcome Evaluation:  Plan of Care Reviewed With: patient        Progress: improving       Patient alert and oriented x4 with some forgetfulness. No acute changes noted. VSS.

## 2023-10-10 NOTE — PLAN OF CARE
Goal Outcome Evaluation:  Plan of Care Reviewed With: patient           Outcome Evaluation: Pt is an 88 y/o M admitted with L weakness, multiple R infarcts. Hx of Parkinson's dx, dementia. He lives with his spouse and is indep with ADLs and fxl mobility using RW. He presents today appearing to be close to baseline fxn. UE strength 4+/5 grossly except proximal RUE d/t recent injury, sensation, ROM, and coordination WFL, some dec visual acuity from baseline. He sits EOB with spv, set-up for LB dressing and SBA for commode TF. He performs fxl ambulation using RW with SBA-CGA, no overt LOB in and out of bathroom. Returns to supine in bed, left with all needs in reach. No acute OT needs identified at this time, will sign off. Recommend d/c home with family assist      Anticipated Discharge Disposition (OT): home with assist

## 2023-10-10 NOTE — PLAN OF CARE
Goal Outcome Evaluation:              Outcome Evaluation: Clinical swallow assessment completed. Family at bedside. Reported that patient had recent dental procedure (lower tooth extraction) and has been tolerating soft solids to avoid dental damage. Per chart, patient has dysphagia history with most recent VFSS completed 02/2023 (recommendation for soft, ground and thin liquids). Patient agreeable to p.o. trials this date. Tolerated ice chips with appropriate oral manipulation and no overt s/sx of aspiration. Demonstrated throat clear with trials of thin by spoon and immediate cough with thin by straw. Inconsistent wet vocal quality/throat clear with thin by cup and nectar by straw. Tolerated trials of nectar by spoon/cup, honey by cup, and puree with no overt s/sx of aspiration or penetration. Mastication timely/fxnal for soft solids. Throat clear x2 with soft solids. Patient reported globus sensation following trials of soft solids. Regular solids deferred due to recent dental procedure.     Due to inconsistent overt s/sx of aspiration with thins, recommend puree solids and nectar thick liquids by cup. Medication with applesauce. Precautions: upright, small bites/sips, avoid straws. Patient and family agreeable to recommendations. ST to follow with instrumental swallow assessment to determine least restrictive diet.

## 2023-10-10 NOTE — PROGRESS NOTES
"DOS: 10/10/2023  NAME: Casey Snowden .   : 1934  PCP: Aleksander Diez MD  Chief Complaint   Patient presents with    Neuro Deficit(s)     Stroke    Subjective: Patient denies any new symptoms overnight.  Feels left-sided weakness has improved.  Denies current vision issues.  No headache.Pt seen in follow up today, however the problem is new to the examiner.      Objective:  Vital signs: BP (!) 193/80 (BP Location: Right arm, Patient Position: Lying)   Pulse 62   Temp 97.5 °F (36.4 °C) (Oral)   Resp 18   Ht 180.3 cm (71\")   Wt 76 kg (167 lb 8.8 oz)   SpO2 100%   BMI 23.37 kg/m²       General appearance: Well developed, well nourished, well groomed, alert and cooperative.   HEENT: Normocephalic.   Cardiac: Irregular rhythm.  Chest Exam: Clear to auscultation bilaterally, no wheezes, no rhonchi.  Extremities: Normal, no edema.   Skin: No rashes or birthmarks.     Higher integrative function: Awake and alert, oriented x3.  Good attention/concentration.  Speech fluent.  No neglect  CN II: Visual fields intact.  CN III IV VI: Extraocular movements are full without nystagmus. Pupils are equal, round, and reactive to light.  CN V: Normal facial sensation.  CN VII: Left droop.  CN VIII: Auditory acuity is normal.   CN IX & X: Symmetric palatal movement.   CN XI: Sternocleidomastoid and trapezius are normal. No weakness.   CN XII: The tongue is midline. No atrophy or fasciculations.   Motor: Normal muscle strength, bulk, and tone in upper and lower extremities.  Sensation: Symmetric to light touch in all extremities.  Station and gait: Deferred.  Coordination: Finger to nose test showed no dysmetria.     Scheduled Meds:aspirin, 81 mg, Oral, Daily  atorvastatin, 80 mg, Oral, Nightly  carbidopa-levodopa, 1 tablet, Oral, TID  levothyroxine, 75 mcg, Oral, Q AM  pramipexole, 0.5 mg, Oral, TID  senna-docusate sodium, 2 tablet, Oral, BID  sodium chloride, 10 mL, Intravenous, Q12H  sodium chloride, 10 mL, " Intravenous, Q12H  valsartan, 160 mg, Oral, Daily      Continuous Infusions:heparin, 12 Units/kg/hr, Last Rate: 15 Units/kg/hr (10/10/23 0154)      PRN Meds:.  senna-docusate sodium **AND** polyethylene glycol **AND** bisacodyl **AND** bisacodyl    hydrALAZINE    sodium chloride    sodium chloride    sodium chloride    sodium chloride    sodium chloride    Laboratory results:  Lab Results   Component Value Date    GLUCOSE 103 (H) 10/10/2023    CALCIUM 8.8 10/10/2023     10/10/2023    K 3.7 10/10/2023    CO2 23.7 10/10/2023     10/10/2023    BUN 16 10/10/2023    CREATININE 0.66 (L) 10/10/2023    EGFRIFAFRI 102 10/27/2017    EGFRIFNONA 99 06/05/2019    BCR 24.2 10/10/2023    ANIONGAP 9.3 10/10/2023     Lab Results   Component Value Date    WBC 4.04 10/10/2023    HGB 12.5 (L) 10/10/2023    HCT 36.4 (L) 10/10/2023    MCV 94.1 10/10/2023     10/10/2023     Lab Results   Component Value Date    CHOL 138 10/10/2023    CHOL 111 02/07/2023    CHOL 131 06/05/2019     Lab Results   Component Value Date    HDL 56 10/10/2023    HDL 57 02/07/2023    HDL 45 06/05/2019     Lab Results   Component Value Date    LDL 71 10/10/2023    LDL 44 02/07/2023    LDL 75 06/05/2019     Lab Results   Component Value Date    TRIG 52 10/10/2023    TRIG 36 02/07/2023    TRIG 55 06/05/2019         Lab 10/10/23  0444   HEMOGLOBIN A1C 5.70*      Review and interpretation of imaging: MRI brain images viewed by me, shows few small scattered strokes in the right parietal region.  MRI Brain Without Contrast    Result Date: 10/9/2023  MRI EXAMINATION OF THE BRAIN WITHOUT CONTRAST  HISTORY: Confusion, weakness. Left-sided weakness. Melanoma.  COMPARISON: CT head 10/09/2023.  TECHNIQUE: A MRI examination of the brain was performed utilizing sagittal T1, axial diffusion, T1, T2, T2 FLAIR and susceptibility weighted imaging.  FINDINGS: Small cortical infarcts are appreciated involving the precentral and postcentral gyri on the right  superiorly measuring 2 to 3 mm in size. An acute cortical infarct involving the right parietal lobe superiorly and posteriorly is appreciated measuring 4 mm in size. An acute periventricular infarct involving the right parietal lobe is appreciated superior to the atrium of the right lateral ventricle. Acute infarcts are appreciated involving the white matter of the right temporal lobe posteriorly and medially, periventricular. The larger infarcts demonstrate mild T2 FLAIR hyperintensity. Mild to moderate small vessel ischemic disease is noted. There is no evidence of mass or mass effect on this noncontrast MRI examination of the brain.      Multiple small cortical and white matter infarcts involving the right cerebral hemisphere is appreciated including small infarcts involving the pre and postcentral gyri on the right superiorly, right parietal lobe superiorly and posteriorly and white matter of the right parietal and temporal lobes. The largest infarct measures approximately 4 mm in size and involves the right parietal cortex superiorly and posteriorly. There is no evidence of hemorrhage. There is no evidence of mass or of mass effect on this noncontrast MRI examination of the brain. Moderate small vessel ischemic disease is noted.  The above information was called to and discussed with Dr. Chou at the time of the dictation.      CT Angiogram Head w AI Analysis of LVO    Result Date: 10/9/2023  CT ANGIOGRAM OF THE HEAD AND NECK AND CT PERFUSION STUDY  CLINICAL HISTORY: Left-sided weakness and neglect.  TECHNIQUE: A noncontrast head CT was performed with 3 mm axial images. Thereafter, a CT perfusion study was performed after the dynamic bolus of IV contrast. Standard perfusion maps were constructed with RAPID software. A CT angiogram of the head and neck was then performed with 1 mm axial images. Sagittal, coronal, and 3-dimensional reconstructed images were obtained. Finally, a delayed postcontrast head CT  was performed with 3 mm axial images. AI analysis of LVO was utilized.  COMPARISON: Brain MRI dated 02/07/2023.  FINDINGS:  NONCONTRAST HEAD CT: The ventricles, sulci, and cisterns are age-appropriate. Mild changes of chronic small vessel ischemic phenomena are noted. There is no convincing area of abnormal gray-white matter differentiation to suggest a focus of acute completed infarction.  There is an indeterminate area of skin thickening and subcutaneous fat density within the right parietal scalp which measures up to approximately 5 x 17 mm in greatest coronal dimensions and approximately 14 mm in greatest AP dimensions. While this may represent an area of scarring or scalp hematoma I cannot exclude the possibility of an infiltrative scalp lesion at this site. I recommend further evaluation with physical exam findings.  CT PERFUSION STUDY: There is no abnormality seen on the CBF less than 30% maps. On the time to maximal enhancement greater than 6 seconds maps, there is a 62 cc abnormality seen within the inferior and medial aspects of the right temporal and occipital lobes within the right PCA distribution.  CTA NECK: There is a classic configuration of the aortic arch. There is a mild degree of stenosis at the origin of the left subclavian artery and a moderate degree of stenosis at the origin of the right subclavian artery. There is a moderate degree of stenosis at the origin of the right vertebral artery and a moderate to severe degree of stenosis is seen at the origin of the left vertebral artery. A moderate degree of stenosis is seen at the origin of the left common carotid artery. There is a mild to moderate degree of stenosis of the distal aspect the left common carotid where the residual lumen diameter is approximately 4 to 5 mm. Within the proximal portion of the left ICA, there is no significant NASCET stenosis. Within the proximal portion of the right ICA, there is an irregular predominantly  noncalcified atherosclerotic plaque that results in at least an 80% NASCET stenosis. Furthermore, there is a question of a small dangling thrombus within the proximal portion of the right ICA as best seen on image #177.  CTA HEAD: The intracranial segments of the vertebral arteries are unremarkable. The basilar artery is within normal limits. There is a mild stenosis of the proximal left P2 segment and there is a severe degree of stenosis within the distal left P2 segment. There are 2 severe stenoses appreciated within the right P2 segment.  Atherosclerotic calcifications are appreciated within the cavernous segments of the internal carotids resulting in mild degrees of luminal compromise. There is a prominent appearance of the right MCA vasculature which likely reflects autoregulatory vasodilatation from the proximal ICA stenosis. Otherwise, the middle and anterior cerebral's are unremarkable.  DELAYED POSTCONTRAST HEAD CT: No abnormal foci of contrast enhancement are seen within the brain parenchyma.       Within the proximal portion of the right ICA, there is a severe stenosis that is estimated to be at least 80% by NASCET criteria. This is secondary to a largely noncalcified atherosclerotic plaque. The plaque is irregular and there may even be a very small area of dangling thrombus protruding into the lumen of the right ICA. Intracranially, there is an dilated right MCA vasculature which is likely secondary to autoregulation as a result of the proximal right ICA stenosis.  There are severe stenoses within the posterior cerebral arteries. There is diminished perfusion within the right PCA distribution on the time to maximal enhancement greater than 6 seconds maps.  A moderate degree of stenosis is seen at the origin of the right vertebral artery and a moderate to severe degree of stenosis is seen at the origin of the left vertebral artery.  Additional intracranial and extracranial atherosclerotic phenomena are as  discussed in detail above.  Otherwise, there is no evidence to suggest an area of acute completed infarction and there is no evidence for large vessel occlusion. Further evaluation could be performed with an MRI study for more sensitive assessment of an area of acute infarction.  Incidental note is made of an indeterminate area of skin thickening and subcutaneous fat density within the right parietal scalp measuring up to 5 x 17 x 14 mm in greatest dimensions which may represent a scalp hematoma or area of scalp scarring. However, I cannot exclude the possibility of an infiltrative lesion at this site. Please correlate with physical exam findings.  The urgent findings of the noncontrast head CT were discussed with Dr. Chou on 10/09/2023 at approximately 12:15 p.m. The findings of the CT angiogram and CT perfusion study were discussed with Dr. Chou at approximately 12:30 p.m.  The findings within the scalp were discussed with Dr. Kay on 10/09/2023 at approximately 1:45 p.m.  Radiation dose reduction techniques were utilized, including automated exposure control and exposure modulation based on body size.   This report was finalized on 10/9/2023 3:10 PM by Dr. Sebastian Laurent M.D on Workstation: [id number]      CT Angiogram Neck    Result Date: 10/9/2023  CT ANGIOGRAM OF THE HEAD AND NECK AND CT PERFUSION STUDY  CLINICAL HISTORY: Left-sided weakness and neglect.  TECHNIQUE: A noncontrast head CT was performed with 3 mm axial images. Thereafter, a CT perfusion study was performed after the dynamic bolus of IV contrast. Standard perfusion maps were constructed with RAPID software. A CT angiogram of the head and neck was then performed with 1 mm axial images. Sagittal, coronal, and 3-dimensional reconstructed images were obtained. Finally, a delayed postcontrast head CT was performed with 3 mm axial images. AI analysis of LVO was utilized.  COMPARISON: Brain MRI dated 02/07/2023.  FINDINGS:  NONCONTRAST HEAD  CT: The ventricles, sulci, and cisterns are age-appropriate. Mild changes of chronic small vessel ischemic phenomena are noted. There is no convincing area of abnormal gray-white matter differentiation to suggest a focus of acute completed infarction.  There is an indeterminate area of skin thickening and subcutaneous fat density within the right parietal scalp which measures up to approximately 5 x 17 mm in greatest coronal dimensions and approximately 14 mm in greatest AP dimensions. While this may represent an area of scarring or scalp hematoma I cannot exclude the possibility of an infiltrative scalp lesion at this site. I recommend further evaluation with physical exam findings.  CT PERFUSION STUDY: There is no abnormality seen on the CBF less than 30% maps. On the time to maximal enhancement greater than 6 seconds maps, there is a 62 cc abnormality seen within the inferior and medial aspects of the right temporal and occipital lobes within the right PCA distribution.  CTA NECK: There is a classic configuration of the aortic arch. There is a mild degree of stenosis at the origin of the left subclavian artery and a moderate degree of stenosis at the origin of the right subclavian artery. There is a moderate degree of stenosis at the origin of the right vertebral artery and a moderate to severe degree of stenosis is seen at the origin of the left vertebral artery. A moderate degree of stenosis is seen at the origin of the left common carotid artery. There is a mild to moderate degree of stenosis of the distal aspect the left common carotid where the residual lumen diameter is approximately 4 to 5 mm. Within the proximal portion of the left ICA, there is no significant NASCET stenosis. Within the proximal portion of the right ICA, there is an irregular predominantly noncalcified atherosclerotic plaque that results in at least an 80% NASCET stenosis. Furthermore, there is a question of a small dangling thrombus  within the proximal portion of the right ICA as best seen on image #177.  CTA HEAD: The intracranial segments of the vertebral arteries are unremarkable. The basilar artery is within normal limits. There is a mild stenosis of the proximal left P2 segment and there is a severe degree of stenosis within the distal left P2 segment. There are 2 severe stenoses appreciated within the right P2 segment.  Atherosclerotic calcifications are appreciated within the cavernous segments of the internal carotids resulting in mild degrees of luminal compromise. There is a prominent appearance of the right MCA vasculature which likely reflects autoregulatory vasodilatation from the proximal ICA stenosis. Otherwise, the middle and anterior cerebral's are unremarkable.  DELAYED POSTCONTRAST HEAD CT: No abnormal foci of contrast enhancement are seen within the brain parenchyma.       Within the proximal portion of the right ICA, there is a severe stenosis that is estimated to be at least 80% by NASCET criteria. This is secondary to a largely noncalcified atherosclerotic plaque. The plaque is irregular and there may even be a very small area of dangling thrombus protruding into the lumen of the right ICA. Intracranially, there is an dilated right MCA vasculature which is likely secondary to autoregulation as a result of the proximal right ICA stenosis.  There are severe stenoses within the posterior cerebral arteries. There is diminished perfusion within the right PCA distribution on the time to maximal enhancement greater than 6 seconds maps.  A moderate degree of stenosis is seen at the origin of the right vertebral artery and a moderate to severe degree of stenosis is seen at the origin of the left vertebral artery.  Additional intracranial and extracranial atherosclerotic phenomena are as discussed in detail above.  Otherwise, there is no evidence to suggest an area of acute completed infarction and there is no evidence for large  vessel occlusion. Further evaluation could be performed with an MRI study for more sensitive assessment of an area of acute infarction.  Incidental note is made of an indeterminate area of skin thickening and subcutaneous fat density within the right parietal scalp measuring up to 5 x 17 x 14 mm in greatest dimensions which may represent a scalp hematoma or area of scalp scarring. However, I cannot exclude the possibility of an infiltrative lesion at this site. Please correlate with physical exam findings.  The urgent findings of the noncontrast head CT were discussed with Dr. Chou on 10/09/2023 at approximately 12:15 p.m. The findings of the CT angiogram and CT perfusion study were discussed with Dr. Chou at approximately 12:30 p.m.  The findings within the scalp were discussed with Dr. Kay on 10/09/2023 at approximately 1:45 p.m.  Radiation dose reduction techniques were utilized, including automated exposure control and exposure modulation based on body size.   This report was finalized on 10/9/2023 3:10 PM by Dr. Sebastian Laurent M.D on Workstation: [id number]      CT CEREBRAL PERFUSION WITH & WITHOUT CONTRAST    Result Date: 10/9/2023  CT ANGIOGRAM OF THE HEAD AND NECK AND CT PERFUSION STUDY  CLINICAL HISTORY: Left-sided weakness and neglect.  TECHNIQUE: A noncontrast head CT was performed with 3 mm axial images. Thereafter, a CT perfusion study was performed after the dynamic bolus of IV contrast. Standard perfusion maps were constructed with RAPID software. A CT angiogram of the head and neck was then performed with 1 mm axial images. Sagittal, coronal, and 3-dimensional reconstructed images were obtained. Finally, a delayed postcontrast head CT was performed with 3 mm axial images. AI analysis of LVO was utilized.  COMPARISON: Brain MRI dated 02/07/2023.  FINDINGS:  NONCONTRAST HEAD CT: The ventricles, sulci, and cisterns are age-appropriate. Mild changes of chronic small vessel ischemic phenomena  are noted. There is no convincing area of abnormal gray-white matter differentiation to suggest a focus of acute completed infarction.  There is an indeterminate area of skin thickening and subcutaneous fat density within the right parietal scalp which measures up to approximately 5 x 17 mm in greatest coronal dimensions and approximately 14 mm in greatest AP dimensions. While this may represent an area of scarring or scalp hematoma I cannot exclude the possibility of an infiltrative scalp lesion at this site. I recommend further evaluation with physical exam findings.  CT PERFUSION STUDY: There is no abnormality seen on the CBF less than 30% maps. On the time to maximal enhancement greater than 6 seconds maps, there is a 62 cc abnormality seen within the inferior and medial aspects of the right temporal and occipital lobes within the right PCA distribution.  CTA NECK: There is a classic configuration of the aortic arch. There is a mild degree of stenosis at the origin of the left subclavian artery and a moderate degree of stenosis at the origin of the right subclavian artery. There is a moderate degree of stenosis at the origin of the right vertebral artery and a moderate to severe degree of stenosis is seen at the origin of the left vertebral artery. A moderate degree of stenosis is seen at the origin of the left common carotid artery. There is a mild to moderate degree of stenosis of the distal aspect the left common carotid where the residual lumen diameter is approximately 4 to 5 mm. Within the proximal portion of the left ICA, there is no significant NASCET stenosis. Within the proximal portion of the right ICA, there is an irregular predominantly noncalcified atherosclerotic plaque that results in at least an 80% NASCET stenosis. Furthermore, there is a question of a small dangling thrombus within the proximal portion of the right ICA as best seen on image #177.  CTA HEAD: The intracranial segments of the  vertebral arteries are unremarkable. The basilar artery is within normal limits. There is a mild stenosis of the proximal left P2 segment and there is a severe degree of stenosis within the distal left P2 segment. There are 2 severe stenoses appreciated within the right P2 segment.  Atherosclerotic calcifications are appreciated within the cavernous segments of the internal carotids resulting in mild degrees of luminal compromise. There is a prominent appearance of the right MCA vasculature which likely reflects autoregulatory vasodilatation from the proximal ICA stenosis. Otherwise, the middle and anterior cerebral's are unremarkable.  DELAYED POSTCONTRAST HEAD CT: No abnormal foci of contrast enhancement are seen within the brain parenchyma.       Within the proximal portion of the right ICA, there is a severe stenosis that is estimated to be at least 80% by NASCET criteria. This is secondary to a largely noncalcified atherosclerotic plaque. The plaque is irregular and there may even be a very small area of dangling thrombus protruding into the lumen of the right ICA. Intracranially, there is an dilated right MCA vasculature which is likely secondary to autoregulation as a result of the proximal right ICA stenosis.  There are severe stenoses within the posterior cerebral arteries. There is diminished perfusion within the right PCA distribution on the time to maximal enhancement greater than 6 seconds maps.  A moderate degree of stenosis is seen at the origin of the right vertebral artery and a moderate to severe degree of stenosis is seen at the origin of the left vertebral artery.  Additional intracranial and extracranial atherosclerotic phenomena are as discussed in detail above.  Otherwise, there is no evidence to suggest an area of acute completed infarction and there is no evidence for large vessel occlusion. Further evaluation could be performed with an MRI study for more sensitive assessment of an area of  acute infarction.  Incidental note is made of an indeterminate area of skin thickening and subcutaneous fat density within the right parietal scalp measuring up to 5 x 17 x 14 mm in greatest dimensions which may represent a scalp hematoma or area of scalp scarring. However, I cannot exclude the possibility of an infiltrative lesion at this site. Please correlate with physical exam findings.  The urgent findings of the noncontrast head CT were discussed with Dr. Chou on 10/09/2023 at approximately 12:15 p.m. The findings of the CT angiogram and CT perfusion study were discussed with Dr. Chou at approximately 12:30 p.m.  The findings within the scalp were discussed with Dr. Kay on 10/09/2023 at approximately 1:45 p.m.  Radiation dose reduction techniques were utilized, including automated exposure control and exposure modulation based on body size.   This report was finalized on 10/9/2023 3:10 PM by Dr. Sebastian Laurent M.D on Workstation: [id number]      XR Chest 1 View    Result Date: 10/9/2023  XR CHEST 1 VW-  HISTORY: Male who is 89 years-old, stroke  TECHNIQUE: Frontal view of the chest  COMPARISON: 2/7/2023  FINDINGS: The heart size is borderline. Pulmonary vasculature is unremarkable. Aorta is calcified. No focal pulmonary consolidation, pleural effusion, or pneumothorax. Old granulomatous disease is present. No acute osseous process.      No no focal pulmonary consolidation. Borderline heart size. Follow-up as clinical indications persist.  This report was finalized on 10/9/2023 1:49 PM by Dr. Wesley Amaya M.D on Workstation: [id number]       Impression:  89-year-old male with history of Parkinson's disease followed by Dr. 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  When examined by the ED physician patient was found to have left-sided weakness and neglect so team D was initiated.  Left visual field loss was also noted initially.  CT perfusion showed an area of penumbra in the right PCA territory and CTA head and neck showed critical stenosis in the right ICA with soft thrombus as well as right P1 severe stenosis.  He has improved overnight.  He reports compliance with his Eliquis.  In addition to Eliquis 2.5 mg twice daily he was on Lipitor 20 mg daily.    Additional work-up  MRI brain without: Multiple small cortical and white matter infarcts in the right cerebral hemisphere in the pre and postcentral gyri on the right as well as right parietal lobe and temporal lobe.  No hemorrhage.  Labs: LDL 71, hemoglobin A1c 5.7%.Earlier this month B12 level was 652, folate 11.1, TSH 4.59, free T41.63.    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 Chou today.  Will follow.      Addendum: felt too high risk for CEA, consult interventional MARVIN. PTT not therapeutic yet, will give additional dose of plavix 75 mg. Longterm will likely recommend single antiplatelet and AC, for cardioembolic and atheroembolic disease.    Addendum: 10/11/2023  Nurse Quiana called yesterday and this morning stating that patient was more sleepy and lethargic, BP was low  90s yesterday but improved after bolus NS, BS was normal. CT head yesterday -ve for acute finding. This morning /81, normal BS, slept well per nurse. No fever or elevated WBC.    Please follow delirium precautions as detailed below.    Delirium precautions:    1. Frequent reorientation, reminding patient not questioning patient   2. Shades up during day/down at night   3. TV off except for soft music only   4. Familiar objects at bedside   5. Encourage friends/family to spend the night   6. Minimize anticholingeric medications   7. Minimize polypharmacy   8. Minimize restraints, includes lines and/or foleys and/or feeding tubes as able   9. Minimize overnight checks/vitals to encourage restful consistent sleep patterns   10. Out of bed during day as much as possible

## 2023-10-11 ENCOUNTER — APPOINTMENT (OUTPATIENT)
Dept: CT IMAGING | Facility: HOSPITAL | Age: 88
End: 2023-10-11
Payer: MEDICARE

## 2023-10-11 ENCOUNTER — APPOINTMENT (OUTPATIENT)
Dept: CARDIOLOGY | Facility: HOSPITAL | Age: 88
End: 2023-10-11
Payer: MEDICARE

## 2023-10-11 LAB
ANION GAP SERPL CALCULATED.3IONS-SCNC: 8 MMOL/L (ref 5–15)
AORTIC DIMENSIONLESS INDEX: 0.5 (DI)
APTT PPP: 116.1 SECONDS (ref 22.7–35.4)
APTT PPP: 58.9 SECONDS (ref 22.7–35.4)
APTT PPP: 63.7 SECONDS (ref 22.7–35.4)
BH CV ECHO MEAS - AO MAX PG: 14.4 MMHG
BH CV ECHO MEAS - AO MEAN PG: 8 MMHG
BH CV ECHO MEAS - AO ROOT DIAM: 3.3 CM
BH CV ECHO MEAS - AO V2 MAX: 190 CM/SEC
BH CV ECHO MEAS - AO V2 VTI: 43.3 CM
BH CV ECHO MEAS - AVA(I,D): 1.87 CM2
BH CV ECHO MEAS - EDV(CUBED): 166.4 ML
BH CV ECHO MEAS - EDV(MOD-SP2): 182 ML
BH CV ECHO MEAS - EDV(MOD-SP4): 180 ML
BH CV ECHO MEAS - EF(MOD-BP): 44.7 %
BH CV ECHO MEAS - EF(MOD-SP2): 45.1 %
BH CV ECHO MEAS - EF(MOD-SP4): 43.3 %
BH CV ECHO MEAS - ESV(CUBED): 54 ML
BH CV ECHO MEAS - ESV(MOD-SP2): 100 ML
BH CV ECHO MEAS - ESV(MOD-SP4): 102 ML
BH CV ECHO MEAS - FS: 31.3 %
BH CV ECHO MEAS - IVS/LVPW: 0.86 CM
BH CV ECHO MEAS - IVSD: 1.2 CM
BH CV ECHO MEAS - LAT PEAK E' VEL: 5 CM/SEC
BH CV ECHO MEAS - LV DIASTOLIC VOL/BSA (35-75): 92.2 CM2
BH CV ECHO MEAS - LV MASS(C)D: 304.3 GRAMS
BH CV ECHO MEAS - LV MAX PG: 3.6 MMHG
BH CV ECHO MEAS - LV MEAN PG: 2 MMHG
BH CV ECHO MEAS - LV SYSTOLIC VOL/BSA (12-30): 52.2 CM2
BH CV ECHO MEAS - LV V1 MAX: 95.2 CM/SEC
BH CV ECHO MEAS - LV V1 VTI: 22.1 CM
BH CV ECHO MEAS - LVIDD: 5.5 CM
BH CV ECHO MEAS - LVIDS: 3.8 CM
BH CV ECHO MEAS - LVOT AREA: 3.7 CM2
BH CV ECHO MEAS - LVOT DIAM: 2.16 CM
BH CV ECHO MEAS - LVPWD: 1.4 CM
BH CV ECHO MEAS - MED PEAK E' VEL: 4.4 CM/SEC
BH CV ECHO MEAS - MR MAX PG: 54.5 MMHG
BH CV ECHO MEAS - MR MAX VEL: 369.2 CM/SEC
BH CV ECHO MEAS - MV A DUR: 0.11 SEC
BH CV ECHO MEAS - MV A MAX VEL: 106.3 CM/SEC
BH CV ECHO MEAS - MV DEC SLOPE: 175.9 CM/SEC2
BH CV ECHO MEAS - MV DEC TIME: 417 SEC
BH CV ECHO MEAS - MV E MAX VEL: 79.3 CM/SEC
BH CV ECHO MEAS - MV E/A: 0.75
BH CV ECHO MEAS - MV MAX PG: 4.3 MMHG
BH CV ECHO MEAS - MV MEAN PG: 1.69 MMHG
BH CV ECHO MEAS - MV P1/2T: 136.9 MSEC
BH CV ECHO MEAS - MV V2 VTI: 34.6 CM
BH CV ECHO MEAS - MVA(P1/2T): 1.61 CM2
BH CV ECHO MEAS - MVA(VTI): 2.34 CM2
BH CV ECHO MEAS - PA ACC TIME: 0.13 SEC
BH CV ECHO MEAS - PA V2 MAX: 97 CM/SEC
BH CV ECHO MEAS - PI END-D VEL: 134.8 CM/SEC
BH CV ECHO MEAS - PULM A REVS DUR: 0.12 SEC
BH CV ECHO MEAS - PULM A REVS VEL: 39 CM/SEC
BH CV ECHO MEAS - PULM DIAS VEL: 38.1 CM/SEC
BH CV ECHO MEAS - PULM S/D: 1.44
BH CV ECHO MEAS - PULM SYS VEL: 55 CM/SEC
BH CV ECHO MEAS - RAP SYSTOLE: 3 MMHG
BH CV ECHO MEAS - RV MAX PG: 2.12 MMHG
BH CV ECHO MEAS - RV V1 MAX: 72.8 CM/SEC
BH CV ECHO MEAS - RV V1 VTI: 17.2 CM
BH CV ECHO MEAS - RVSP: 30.2 MMHG
BH CV ECHO MEAS - SI(MOD-SP2): 42 ML/M2
BH CV ECHO MEAS - SI(MOD-SP4): 39.9 ML/M2
BH CV ECHO MEAS - SV(LVOT): 81.1 ML
BH CV ECHO MEAS - SV(MOD-SP2): 82 ML
BH CV ECHO MEAS - SV(MOD-SP4): 78 ML
BH CV ECHO MEAS - TAPSE (>1.6): 2.7 CM
BH CV ECHO MEAS - TR MAX PG: 27.2 MMHG
BH CV ECHO MEAS - TR MAX VEL: 261 CM/SEC
BH CV ECHO MEASUREMENTS AVERAGE E/E' RATIO: 16.87
BH CV XLRA - RV BASE: 3.9 CM
BH CV XLRA - TDI S': 17 CM/SEC
BUN SERPL-MCNC: 16 MG/DL (ref 8–23)
BUN/CREAT SERPL: 22.9 (ref 7–25)
CALCIUM SPEC-SCNC: 8.9 MG/DL (ref 8.6–10.5)
CHLORIDE SERPL-SCNC: 107 MMOL/L (ref 98–107)
CO2 SERPL-SCNC: 23 MMOL/L (ref 22–29)
CREAT SERPL-MCNC: 0.7 MG/DL (ref 0.76–1.27)
DEPRECATED RDW RBC AUTO: 46.6 FL (ref 37–54)
EGFRCR SERPLBLD CKD-EPI 2021: 88.1 ML/MIN/1.73
ERYTHROCYTE [DISTWIDTH] IN BLOOD BY AUTOMATED COUNT: 13 % (ref 12.3–15.4)
GLUCOSE BLDC GLUCOMTR-MCNC: 109 MG/DL (ref 70–130)
GLUCOSE SERPL-MCNC: 122 MG/DL (ref 65–99)
HCT VFR BLD AUTO: 38.6 % (ref 37.5–51)
HGB BLD-MCNC: 13.3 G/DL (ref 13–17.7)
LEFT ATRIUM VOLUME INDEX: 32.2 ML/M2
MCH RBC QN AUTO: 33.1 PG (ref 26.6–33)
MCHC RBC AUTO-ENTMCNC: 34.5 G/DL (ref 31.5–35.7)
MCV RBC AUTO: 96 FL (ref 79–97)
PA ADP PRP-ACNC: 203 PRU (ref 194–418)
PLATELET # BLD AUTO: 158 10*3/MM3 (ref 140–450)
PMV BLD AUTO: 9.5 FL (ref 6–12)
POTASSIUM SERPL-SCNC: 3.9 MMOL/L (ref 3.5–5.2)
RBC # BLD AUTO: 4.02 10*6/MM3 (ref 4.14–5.8)
SODIUM SERPL-SCNC: 138 MMOL/L (ref 136–145)
WBC NRBC COR # BLD: 4.75 10*3/MM3 (ref 3.4–10.8)

## 2023-10-11 PROCEDURE — 93306 TTE W/DOPPLER COMPLETE: CPT | Performed by: INTERNAL MEDICINE

## 2023-10-11 PROCEDURE — 80048 BASIC METABOLIC PNL TOTAL CA: CPT | Performed by: STUDENT IN AN ORGANIZED HEALTH CARE EDUCATION/TRAINING PROGRAM

## 2023-10-11 PROCEDURE — 82948 REAGENT STRIP/BLOOD GLUCOSE: CPT

## 2023-10-11 PROCEDURE — 85730 THROMBOPLASTIN TIME PARTIAL: CPT | Performed by: STUDENT IN AN ORGANIZED HEALTH CARE EDUCATION/TRAINING PROGRAM

## 2023-10-11 PROCEDURE — 85027 COMPLETE CBC AUTOMATED: CPT | Performed by: STUDENT IN AN ORGANIZED HEALTH CARE EDUCATION/TRAINING PROGRAM

## 2023-10-11 PROCEDURE — 25010000002 HEPARIN (PORCINE) 25000-0.45 UT/250ML-% SOLUTION: Performed by: STUDENT IN AN ORGANIZED HEALTH CARE EDUCATION/TRAINING PROGRAM

## 2023-10-11 PROCEDURE — 93306 TTE W/DOPPLER COMPLETE: CPT

## 2023-10-11 PROCEDURE — 99223 1ST HOSP IP/OBS HIGH 75: CPT | Performed by: NURSE PRACTITIONER

## 2023-10-11 PROCEDURE — 25510000001 PERFLUTREN (DEFINITY) 8.476 MG IN SODIUM CHLORIDE (PF) 0.9 % 10 ML INJECTION: Performed by: STUDENT IN AN ORGANIZED HEALTH CARE EDUCATION/TRAINING PROGRAM

## 2023-10-11 PROCEDURE — 99232 SBSQ HOSP IP/OBS MODERATE 35: CPT | Performed by: NURSE PRACTITIONER

## 2023-10-11 PROCEDURE — 85576 BLOOD PLATELET AGGREGATION: CPT | Performed by: NEUROLOGICAL SURGERY

## 2023-10-11 PROCEDURE — 97161 PT EVAL LOW COMPLEX 20 MIN: CPT

## 2023-10-11 PROCEDURE — 97110 THERAPEUTIC EXERCISES: CPT

## 2023-10-11 RX ORDER — ACETAMINOPHEN 325 MG/1
650 TABLET ORAL EVERY 6 HOURS PRN
Status: DISCONTINUED | OUTPATIENT
Start: 2023-10-11 | End: 2023-10-16 | Stop reason: HOSPADM

## 2023-10-11 RX ADMIN — CARBIDOPA AND LEVODOPA 1 TABLET: 25; 250 TABLET ORAL at 16:39

## 2023-10-11 RX ADMIN — PERFLUTREN 2 ML: 6.52 INJECTION, SUSPENSION INTRAVENOUS at 11:33

## 2023-10-11 RX ADMIN — CARBIDOPA AND LEVODOPA 1 TABLET: 25; 250 TABLET ORAL at 20:29

## 2023-10-11 RX ADMIN — ATORVASTATIN CALCIUM 80 MG: 80 TABLET, FILM COATED ORAL at 20:29

## 2023-10-11 RX ADMIN — CLOPIDOGREL BISULFATE 75 MG: 75 TABLET, FILM COATED ORAL at 10:05

## 2023-10-11 RX ADMIN — SENNOSIDES AND DOCUSATE SODIUM 2 TABLET: 50; 8.6 TABLET ORAL at 20:29

## 2023-10-11 RX ADMIN — LEVOTHYROXINE SODIUM 75 MCG: 0.07 TABLET ORAL at 06:01

## 2023-10-11 RX ADMIN — HEPARIN SODIUM 14 UNITS/KG/HR: 10000 INJECTION, SOLUTION INTRAVENOUS at 23:14

## 2023-10-11 RX ADMIN — PRAMIPEXOLE DIHYDROCHLORIDE 0.25 MG: 0.25 TABLET ORAL at 10:05

## 2023-10-11 RX ADMIN — CARBIDOPA AND LEVODOPA 1 TABLET: 25; 250 TABLET ORAL at 10:05

## 2023-10-11 RX ADMIN — SENNOSIDES AND DOCUSATE SODIUM 2 TABLET: 50; 8.6 TABLET ORAL at 10:05

## 2023-10-11 RX ADMIN — ASPIRIN 81 MG: 81 TABLET, CHEWABLE ORAL at 10:05

## 2023-10-11 RX ADMIN — VALSARTAN 160 MG: 160 TABLET, FILM COATED ORAL at 10:05

## 2023-10-11 RX ADMIN — PRAMIPEXOLE DIHYDROCHLORIDE 0.25 MG: 0.25 TABLET ORAL at 16:39

## 2023-10-11 RX ADMIN — PRAMIPEXOLE DIHYDROCHLORIDE 0.25 MG: 0.25 TABLET ORAL at 20:29

## 2023-10-11 RX ADMIN — TICAGRELOR 180 MG: 90 TABLET ORAL at 17:50

## 2023-10-11 NOTE — PROGRESS NOTES
Name: Casey Snowden . ADMIT: 10/9/2023   : 1934  PCP: Aleksander Diez MD    MRN: [id number] LOS: 2 days   AGE/SEX: 89 y.o. male  ROOM:  [place] [id number]     CC: Stroke and right carotid artery stenosis  Interval History: Stat head CT scan ordered overnight  Subjective   Subjective     Review of Systems  Objective   Objective     Vitals:   Temp:  [97.3 °F (36.3 °C)-97.9 °F (36.6 °C)] 97.9 °F (36.6 °C)  Heart Rate:  [59-64] 60  Resp:  [16-18] 16  BP: ()/() 206/79    No intake/output data recorded.    Scheduled Meds:     aspirin, 81 mg, Oral, Daily  atorvastatin, 80 mg, Oral, Nightly  carbidopa-levodopa, 1 tablet, Oral, TID  clopidogrel, 75 mg, Oral, Daily  levothyroxine, 75 mcg, Oral, Q AM  pramipexole, 0.25 mg, Oral, TID  senna-docusate sodium, 2 tablet, Oral, BID  sodium chloride, 500 mL, Intravenous, Once  sodium chloride, 10 mL, Intravenous, Q12H  sodium chloride, 10 mL, Intravenous, Q12H  valsartan, 160 mg, Oral, Daily      IV Meds:   heparin, 12 Units/kg/hr, Last Rate: Stopped (10/11/23 0625)        Physical Exam  Vascular: Radiation changes to the right neck.    Data Reviewed:  CBC    Results from last 7 days   Lab Units 10/11/23  0447 10/10/23  0444 10/09/23  1203   WBC 10*3/mm3 4.75 4.04 7.28   HEMOGLOBIN g/dL 13.3 12.5* 13.8   PLATELETS 10*3/mm3 158 162 168     BMP   Results from last 7 days   Lab Units 10/11/23  0447 10/10/23  0444 10/09/23  1207 10/09/23  1203   SODIUM mmol/L 138 139  --  146*   POTASSIUM mmol/L 3.9 3.7  --  4.6   CHLORIDE mmol/L 107 106  --  106   CO2 mmol/L 23.0 23.7  --  26.4   BUN mg/dL 16 16  --  18   CREATININE mg/dL 0.70* 0.66* 0.90 0.89   GLUCOSE mg/dL 122* 103*  --  58*     Radiology(recent) CT Head Without Contrast    Result Date: 10/11/2023  There is no evidence of acute infarction or hemorrhage. Further evaluation could be performed with MRI examination of the brain. The above information was called to and discussed with Dr. Chou. .     Radiation dose reduction techniques were utilized, including automated exposure control and exposure modulation based on body size.   This report was finalized on 10/11/2023 7:37 AM by Dr. Liban Smith M.D on Workstation: [id number]      MRI Brain Without Contrast    Result Date: 10/10/2023  Multiple small cortical and white matter infarcts involving the right cerebral hemisphere is appreciated including small infarcts involving the pre and postcentral gyri on the right superiorly, right parietal lobe superiorly and posteriorly and white matter of the right parietal and temporal lobes. The largest infarct measures approximately 4 mm in size and involves the right parietal cortex superiorly and posteriorly. There is no evidence of hemorrhage. There is no evidence of mass or of mass effect on this noncontrast MRI examination of the brain. Moderate small vessel ischemic disease is noted.  The above information was called to and discussed with Dr. Chou at the time of the dictation.  This report was finalized on 10/10/2023 4:59 PM by Dr. Liban Smith M.D on Workstation: [id number]      CT Angiogram Head w AI Analysis of LVO    Result Date: 10/9/2023   Within the proximal portion of the right ICA, there is a severe stenosis that is estimated to be at least 80% by NASCET criteria. This is secondary to a largely noncalcified atherosclerotic plaque. The plaque is irregular and there may even be a very small area of dangling thrombus protruding into the lumen of the right ICA. Intracranially, there is an dilated right MCA vasculature which is likely secondary to autoregulation as a result of the proximal right ICA stenosis.  There are severe stenoses within the posterior cerebral arteries. There is diminished perfusion within the right PCA distribution on the time to maximal enhancement greater than 6 seconds maps.  A moderate degree of stenosis is seen at the origin of the right vertebral artery and a moderate to severe  degree of stenosis is seen at the origin of the left vertebral artery.  Additional intracranial and extracranial atherosclerotic phenomena are as discussed in detail above.  Otherwise, there is no evidence to suggest an area of acute completed infarction and there is no evidence for large vessel occlusion. Further evaluation could be performed with an MRI study for more sensitive assessment of an area of acute infarction.  Incidental note is made of an indeterminate area of skin thickening and subcutaneous fat density within the right parietal scalp measuring up to 5 x 17 x 14 mm in greatest dimensions which may represent a scalp hematoma or area of scalp scarring. However, I cannot exclude the possibility of an infiltrative lesion at this site. Please correlate with physical exam findings.  The urgent findings of the noncontrast head CT were discussed with Dr. Chou on 10/09/2023 at approximately 12:15 p.m. The findings of the CT angiogram and CT perfusion study were discussed with Dr. Chou at approximately 12:30 p.m.  The findings within the scalp were discussed with Dr. Kay on 10/09/2023 at approximately 1:45 p.m.  Radiation dose reduction techniques were utilized, including automated exposure control and exposure modulation based on body size.   This report was finalized on 10/9/2023 3:10 PM by Dr. Sebastian Laurent M.D on Workstation: [id number]      CT Angiogram Neck    Result Date: 10/9/2023   Within the proximal portion of the right ICA, there is a severe stenosis that is estimated to be at least 80% by NASCET criteria. This is secondary to a largely noncalcified atherosclerotic plaque. The plaque is irregular and there may even be a very small area of dangling thrombus protruding into the lumen of the right ICA. Intracranially, there is an dilated right MCA vasculature which is likely secondary to autoregulation as a result of the proximal right ICA stenosis.  There are severe stenoses within the  posterior cerebral arteries. There is diminished perfusion within the right PCA distribution on the time to maximal enhancement greater than 6 seconds maps.  A moderate degree of stenosis is seen at the origin of the right vertebral artery and a moderate to severe degree of stenosis is seen at the origin of the left vertebral artery.  Additional intracranial and extracranial atherosclerotic phenomena are as discussed in detail above.  Otherwise, there is no evidence to suggest an area of acute completed infarction and there is no evidence for large vessel occlusion. Further evaluation could be performed with an MRI study for more sensitive assessment of an area of acute infarction.  Incidental note is made of an indeterminate area of skin thickening and subcutaneous fat density within the right parietal scalp measuring up to 5 x 17 x 14 mm in greatest dimensions which may represent a scalp hematoma or area of scalp scarring. However, I cannot exclude the possibility of an infiltrative lesion at this site. Please correlate with physical exam findings.  The urgent findings of the noncontrast head CT were discussed with Dr. Chou on 10/09/2023 at approximately 12:15 p.m. The findings of the CT angiogram and CT perfusion study were discussed with Dr. Chou at approximately 12:30 p.m.  The findings within the scalp were discussed with Dr. Kay on 10/09/2023 at approximately 1:45 p.m.  Radiation dose reduction techniques were utilized, including automated exposure control and exposure modulation based on body size.   This report was finalized on 10/9/2023 3:10 PM by Dr. Sebastian Laurent M.D on Workstation: [id number]      CT CEREBRAL PERFUSION WITH & WITHOUT CONTRAST    Result Date: 10/9/2023   Within the proximal portion of the right ICA, there is a severe stenosis that is estimated to be at least 80% by NASCET criteria. This is secondary to a largely noncalcified atherosclerotic plaque. The plaque is irregular and  there may even be a very small area of dangling thrombus protruding into the lumen of the right ICA. Intracranially, there is an dilated right MCA vasculature which is likely secondary to autoregulation as a result of the proximal right ICA stenosis.  There are severe stenoses within the posterior cerebral arteries. There is diminished perfusion within the right PCA distribution on the time to maximal enhancement greater than 6 seconds maps.  A moderate degree of stenosis is seen at the origin of the right vertebral artery and a moderate to severe degree of stenosis is seen at the origin of the left vertebral artery.  Additional intracranial and extracranial atherosclerotic phenomena are as discussed in detail above.  Otherwise, there is no evidence to suggest an area of acute completed infarction and there is no evidence for large vessel occlusion. Further evaluation could be performed with an MRI study for more sensitive assessment of an area of acute infarction.  Incidental note is made of an indeterminate area of skin thickening and subcutaneous fat density within the right parietal scalp measuring up to 5 x 17 x 14 mm in greatest dimensions which may represent a scalp hematoma or area of scalp scarring. However, I cannot exclude the possibility of an infiltrative lesion at this site. Please correlate with physical exam findings.  The urgent findings of the noncontrast head CT were discussed with Dr. Chou on 10/09/2023 at approximately 12:15 p.m. The findings of the CT angiogram and CT perfusion study were discussed with Dr. Chou at approximately 12:30 p.m.  The findings within the scalp were discussed with Dr. Kay on 10/09/2023 at approximately 1:45 p.m.  Radiation dose reduction techniques were utilized, including automated exposure control and exposure modulation based on body size.   This report was finalized on 10/9/2023 3:10 PM by Dr. Sebastian Laurent M.D on Workstation: [id number]      XR Chest 1  View    Result Date: 10/9/2023  No no focal pulmonary consolidation. Borderline heart size. Follow-up as clinical indications persist.  This report was finalized on 10/9/2023 1:49 PM by Dr. Wesley Amaya M.D on Workstation: [id number]       Most notable findings include: Stenosis as discussed    Active Hospital Problems:   Active Hospital Problems    Diagnosis  POA    **Acute ischemic stroke [I63.9]  Yes    Benign essential hypertension [I10]  Yes      Resolved Hospital Problems   No resolved problems to display.      Assessment & Plan   [place]illin, Subsequent Hospital Care  Assessment / Plan     Symptomatic right ICA stenosis: Patient with ipsilateral stroke and extensive soft plaque.  Patient has a history of 33 radiation treatments to the right neck in .  These were performed at [facility].  He also has some skin changes of the right neck more prominent in the mid and upper neck as opposed to the base of the neck but has had an FNA/Bx there.  This increases risk significantly for carotid endarterectomy.    TF- JESS vs TCAR. Discussed with Dr. Cadena. Likely TF-JESS Thursday. Will remain available.    Heparin drip primarily for atrial fibrillation and so would not be in a hurry to start it after his procedure.       Valentin Peña MD  10/11/23  10:56 EDT  Office Number [phone]

## 2023-10-11 NOTE — NURSING NOTE
Called to pts room by daughter for increased confusion.  Pt alert but showing signs of confusion.  Took pts vitals, BP 99/53.  Lowered HOB, and paged neuro stat.  500 NS bolus and stat head CT ordered.

## 2023-10-11 NOTE — CONSULTS
[facility] NEUROSURGERY CONSULT NOTE    Patient name: Casey CLARK Sp .  Referring Provider: MD Casey  Reason for Consultation: severe ZEE stenosis     Patient Care Team:  Aleksander Diez MD as PCP - General  Joseph Acharya MD as Consulting Physician (Ophthalmology)  Ford Dorado MD as Consulting Physician (Vascular Surgery)  Cecilio Armas III, MD as Consulting Physician (Cardiology)  Kashif Goodwin MD as Consulting Physician (Neurology)  Enoch Lares MD (Hematology)  Kayden Chance MD (Dermatology)    Chief complaint: Confusion, left-sided weakness found to have right-sided stroke and severe right ICA stenosis.    Subjective .     History of present illness:    Patient is a 89 y.o. male with a history of dementia, DVT status post IVC filter and atrial fibrillation on Eliquis who was brought to the ED for increased confusion and left sided weakness.  Right ICA stenosis is known and he is followed by vascular.  Unfortunately, he is considered too high risk for surgical approach from history of multiple radiation treatments and we have been consulted for possible angioplasty with stent placement.    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    Carotid stenosis     CHF (congestive heart failure)     Chronic deep vein thrombosis (DVT) of popliteal vein of right lower extremity     Coronary artery disease involving native coronary artery of native heart without angina pectoris 12/20/2018    DVT of lower extremity (deep venous thrombosis)     Hard of hearing     Localized edema 1/4/2017    Long-term use of high-risk medication     Metabolic encephalopathy 2/7/2023    PAD (peripheral artery disease) 9/10/2020    Parkinson's disease     Postphlebitic syndrome        PAST SURGICAL  HISTORY  Past Surgical History:   Procedure Laterality Date    BASAL CELL CARCINOMA EXCISION  04/2018    CARDIAC CATHETERIZATION N/A 11/5/2018    Procedure: Left Heart Cath;  Surgeon: Oliverio Azar MD;  [facility]VAS[facility];  Service: Cardiovascular    CARDIAC CATHETERIZATION N/A 11/5/2018    Procedure: Coronary angiography;  Surgeon: Oliverio Azar MD;  Location: [facility] CATH INVASIVE LOC[facility];  Service: Cardiovascular    CARDIAC CATHETERIZATION N/A 11/5/2018    Procedure: Left ventriculography;  Surgeon: Oliverio Azar MD;  Location: [facility] CATH INVASIVE LOC[facility];  Service: Cardiovascular    CARDIAC CATHETERIZATION N/A 6/4/2019    Procedure: Left Heart Cath;  Surgeon: Johnny Glasgow MD;  Location: [facility] CATH INVASIVE LOC[facility];  Service: Cardiovascular    CARDIAC CATHETERIZATION N/A 6/4/2019    Procedure: Coronary angiography;  Surgeon: Johnny Glasgow MD;  Location: [facility] CATH INVASIVE LOCATION;  Service: Cardiovascular    CARDIAC CATHETERIZATION N/A 6/4/2019    Procedure: Left ventriculography;  Surgeon: Johnny Glasgow MD;  Location: [facility] CATH INVASIVE LOCATION;  Service: Cardiovascular    CARDIAC CATHETERIZATION N/A 6/4/2019    Procedure: Stent BILL coronary;  Surgeon: Johnny Glasgow MD;  Location: [facility] CATH INVASIVE LOCATION;  Service: Cardiovascular    CARDIAC SURGERY      CATARACT EXTRACTION Left 08/2018    CATARACT EXTRACTION Right 09/2018    COLONOSCOPY      2011    INCISION AND DRAINAGE LEG Right 7/7/2017    Procedure: INCISION AND DRAINAGE INFECTED HEMATOMA RIGHT THIGH;  Surgeon: Valentin Peña MD;  Location: [facility] OR;  Service:     JOINT REPLACEMENT      KNEE INCISION AND DRAINAGE Right 12/27/2016    Procedure: KNEE INCISION AND DRAINAGE;  Surgeon: Triston Whitten MD;  Location: [facility] MAIN OR;  Service:     NOSE SURGERY      nose replacement    SKIN BIOPSY      SKIN GRAFT  12/2017    TOTAL KNEE ARTHROPLASTY Right     VASCULAR  SURGERY      VENA CAVA FILTER PLACEMENT         FAMILY HISTORY  Family History   Problem Relation Age of Onset    Heart attack Mother 74    Hypertension Mother     Stroke Brother     Cancer Brother     Cancer Sister     Cancer Brother     Deep vein thrombosis Neg Hx        SOCIAL HISTORY  Social History     Tobacco Use    Smoking status: Never    Smokeless tobacco: Never    Tobacco comments:     caff use   Vaping Use    Vaping Use: Never used   Substance Use Topics    Alcohol use: No     Comment: none for a month    Drug use: No       Allergies:  Penicillins and Rocephin [ceftriaxone]    MEDICATIONS:  Medications Prior to Admission   Medication Sig Dispense Refill Last Dose    apixaban (ELIQUIS) 2.5 MG tablet tablet Take 1 tablet by mouth 2 (Two) Times a Day.   10/9/2023 at 0800    atorvastatin (LIPITOR) 20 MG tablet TAKE 1 TABLET BY MOUTH  DAILY FOR ELEVATION OF BOTH CHOLESTEROL AND  TRIGLYCERIDES IN BLOOD (Patient taking differently: Take 1 tablet by mouth Every Night.) 90 tablet 3 10/8/2023 at 2100    atropine 1 % ophthalmic solution 1 drop 3 (Three) Times a Day As Needed (as needed orally for increased secretions). Orally   Past Week    carbidopa-levodopa (SINEMET)  MG per tablet Take 1 tablet by mouth 3 (Three) Times a Day.   10/9/2023 at 0800    cloNIDine (CATAPRES) 0.1 MG tablet Take 1 tablet by mouth As Needed for High Blood Pressure. AS needed if BP>160  Does not take anymore   Patient Taking Differently    Cyanocobalamin (Vitamin B 12) 500 MCG tablet Take 2 tablets by mouth Every Morning.   10/9/2023    levothyroxine (SYNTHROID, LEVOTHROID) 75 MCG tablet TAKE 1 TABLET BY MOUTH EVERY MORNING. INDICATIONS: UNDERACTIVE THYROID 90 tablet 1 10/9/2023    Multiple Vitamins-Minerals (PRESERVISION AREDS) capsule Take 1 tablet by mouth 2 (Two) Times a Day.   10/9/2023    Polyethyl Glycol-Propyl Glycol (SYSTANE OP) Apply 1 dose to eye(s) as directed by provider As Needed.   10/9/2023    pramipexole (MIRAPEX)  0.5 MG tablet Take 0.5 tablets by mouth 3 (Three) Times a Day. Pt now takes 2.5 mg   Patient Taking Differently    valsartan (DIOVAN) 160 MG tablet TAKE 1 TABLET BY MOUTH  DAILY (Patient taking differently: Take 1 tablet by mouth Daily.) 90 tablet 3 10/9/2023 at 0800       Objective     Results Review:  LABS:    No results displayed because visit has over 200 results.          DIAGNOSTICS:  CT Head Without Contrast    Result Date: 10/11/2023  There is no evidence of acute infarction or hemorrhage. Further evaluation could be performed with MRI examination of the brain. The above information was called to and discussed with Dr. Chou. .    Radiation dose reduction techniques were utilized, including automated exposure control and exposure modulation based on body size.   This report was finalized on 10/11/2023 7:37 AM by Dr. Liban Smith M.D on Workstation: [id number]      MRI Brain Without Contrast    Result Date: 10/10/2023  Multiple small cortical and white matter infarcts involving the right cerebral hemisphere is appreciated including small infarcts involving the pre and postcentral gyri on the right superiorly, right parietal lobe superiorly and posteriorly and white matter of the right parietal and temporal lobes. The largest infarct measures approximately 4 mm in size and involves the right parietal cortex superiorly and posteriorly. There is no evidence of hemorrhage. There is no evidence of mass or of mass effect on this noncontrast MRI examination of the brain. Moderate small vessel ischemic disease is noted.  The above information was called to and discussed with Dr. Chou at the time of the dictation.  This report was finalized on 10/10/2023 4:59 PM by Dr. Liban Smith M.D on Workstation: [id number]         Results Review:   I reviewed the patient's new clinical results.  I personally viewed and interpreted the patient's chart.    Vital Signs   Temp:  [97.3 °F (36.3 °C)-97.9 °F (36.6 °C)] 97.9 °F  "(36.6 °C)  Heart Rate:  [59-64] 62  Resp:  [16-18] 16  BP: ()/() 148/65    Physical exam  Drowsy, arouses, confused  Pupils equal round reactive to light  Speech is fluent and clear  Motor exam  Bilateral deltoids 5/5, bilateral biceps 5/5, bilateral triceps 5/5, bilateral hand  5/5  Bilateral hip flexion 5/5, bilateral knee extension 5/5, bilateral DF/PF 5/5  gait deferred  Able to detect  light touch in all 4 extremities      Assessment & Plan       Acute ischemic stroke    Benign essential hypertension      89-year-old male with acute right-sided stroke and symptomatic right ICA stenosis.    PLAN:     Eliquis has been put on hold for now and he is on a heparin drip for possible thrombus.  He was loaded with plavix however P2Y12 is not therapeutic-check tomorrow. Continue ASA and plavix for now. If he becomes therapeutic plan for possible angioplasty with stent placement 10/12.  We will go ahead and make him n.p.o. after midnight.  Switch to Brilinta if remains a nonresponder.    I discussed the patient's findings and my recommendations with patient, family, and nursing staff    Greater than 50% of time spent in counseling and/or coordination of care. Total face/floor time 40 minutes.    Addendum:  Discussed with neurology.  Plan is to DC Plavix and load with Brilinta today.  Repeat P2 Y12 in a.m.  Keep n.p.o. after midnight for angioplasty and possible stent placement tomorrow..    Tiffanie Sheldon, APRN  10/11/23  14:13 EDT    \"Dictated utilizing Dragon dictation\".      "

## 2023-10-11 NOTE — PROGRESS NOTES
Name: Casey Snowden . ADMIT: 10/9/2023   : 1934  PCP: Aleksander Diez MD    MRN: [id number] LOS: 2 days   AGE/SEX: 89 y.o. male  ROOM: [facility]     Subjective     Concern for some urinary retention.  Blood pressure labile     Objective   Objective   Vital Signs  Temp:  [97.3 °F (36.3 °C)-97.9 °F (36.6 °C)] 97.9 °F (36.6 °C)  Heart Rate:  [59-64] 60  Resp:  [16-18] 16  BP: ()/() 206/79  SpO2:  [98 %-100 %] 100 %  on   ;   Device (Oxygen Therapy): partial rebreather mask  Body mass index is 23.29 kg/m².  Physical Exam  Constitutional:       Comments: Chronically ill-appearing   HENT:      Head: Normocephalic and atraumatic.   Eyes:      Extraocular Movements: Extraocular movements intact.      Pupils: Pupils are equal, round, and reactive to light.   Cardiovascular:      Rate and Rhythm: Normal rate and regular rhythm.   Pulmonary:      Effort: Pulmonary effort is normal. No respiratory distress.   Abdominal:      General: There is no distension.      Palpations: Abdomen is soft.      Tenderness: There is no abdominal tenderness.   Musculoskeletal:      Right lower leg: No edema.      Left lower leg: No edema.   Neurological:      Mental Status: He is alert and oriented to person, place, and time.      Motor: Weakness present.         Results Review     I reviewed the patient's new clinical results.  Results from last 7 days   Lab Units 10/11/23  0447 10/10/23  0444 10/09/23  1203   WBC 10*3/mm3 4.75 4.04 7.28   HEMOGLOBIN g/dL 13.3 12.5* 13.8   PLATELETS 10*3/mm3 158 162 168     Results from last 7 days   Lab Units 10/11/23  0447 10/10/23  0444 10/09/23  1207 10/09/23  1203   SODIUM mmol/L 138 139  --  146*   POTASSIUM mmol/L 3.9 3.7  --  4.6   CHLORIDE mmol/L 107 106  --  106   CO2 mmol/L 23.0 23.7  --  26.4   BUN mg/dL 16 16  --  18   CREATININE mg/dL 0.70* 0.66* 0.90 0.89   GLUCOSE mg/dL 122* 103*  --  58*   Estimated Creatinine Clearance: 76.7 mL/min (A) (by C-G formula based on SCr  of 0.7 mg/dL (L)).  Results from last 7 days   Lab Units 10/09/23  1203   ALBUMIN g/dL 4.2   BILIRUBIN mg/dL 0.5   ALK PHOS U/L 90   AST (SGOT) U/L 12   ALT (SGPT) U/L <5     Results from last 7 days   Lab Units 10/11/23  0447 10/10/23  0444 10/09/23  1203   CALCIUM mg/dL 8.9 8.8 9.4   ALBUMIN g/dL  --   --  4.2       COVID19   Date Value Ref Range Status   10/23/2022 Not Detected Not Detected - Ref. Range Final     SARS-CoV-2, MARCELO   Date Value Ref Range Status   07/20/2020 Not Detected Not Detected Final     Comment:     This test was developed and its performance characteristics determined  by [facility]. This test has not been FDA cleared or  approved. This test has been authorized by FDA under an Emergency Use  Authorization (EUA). This test is only authorized for the duration of  time the declaration that circumstances exist justifying the  authorization of the emergency use of in vitro diagnostic tests for  detection of SARS-CoV-2 virus and/or diagnosis of COVID-19 infection  under section 564(b)(1) of the Act, 21 U.S.C. 360bbb-3(b)(1), unless  the authorization is terminated or revoked sooner.  When diagnostic testing is negative, the possibility of a false  negative result should be considered in the context of a patient's  recent exposures and the presence of clinical signs and symptoms  consistent with COVID-19. An individual without symptoms of COVID-19  and who is not shedding SARS-CoV-2 virus would expect to have a  negative (not detected) result in this assay.     Hemoglobin A1C   Date/Time Value Ref Range Status   10/10/2023 0444 5.70 (H) 4.80 - 5.60 % Final     Glucose   Date/Time Value Ref Range Status   10/11/2023 0814 109 70 - 130 mg/dL Final   10/10/2023 1842 130 70 - 130 mg/dL Final   10/10/2023 0629 89 70 - 130 mg/dL Final   10/09/2023 2051 119 70 - 130 mg/dL Final   10/09/2023 1359 86 70 - 130 mg/dL Final   10/09/2023 1158 70 70 - 130 mg/dL Final     Results for orders placed or  performed during the hospital encounter of 02/07/23   Blood Culture - Blood, Arm, Right    Specimen: Arm, Right; Blood   Result Value Ref Range    Blood Culture No growth at 5 days          Adult Transthoracic Echo Complete w/ Color, Spectral and Contrast if Necessary Per Protocol    Left ventricular systolic function is mildly decreased. Calculated left   ventricular EF = 44.7%    Left ventricular wall thickness is consistent with mild to moderate   concentric hypertrophy.    The following left ventricular wall segments are hypokinetic: apical   anterior and mid anteroseptal. The following left ventricular wall   segments are akinetic: apical inferior, apical septal and apex.    Left ventricular diastolic function is consistent with (grade Ia w/high   LAP) impaired relaxation.    There is calcification of the aortic valve.    Estimated right ventricular systolic pressure from tricuspid   regurgitation is normal (<35 mmHg).  CT Head Without Contrast  Narrative: CT HEAD WITHOUT CONTRAST     HISTORY: Left-sided neglect.     COMPARISON: MRI brain 10/09/2023.     FINDINGS: The brain and ventricles are symmetrical. There is no evidence  of intracranial hemorrhage, hydrocephalus or of abnormal extra-axial  fluid. No focal area of decreased attenuation to suggest acute  infarction is identified. The small acute infarcts noted on the MRI  examination of 10/09/2023 are occult.     Impression: There is no evidence of acute infarction or hemorrhage.  Further evaluation could be performed with MRI examination of the brain.  The above information was called to and discussed with Dr. Chou. .           Radiation dose reduction techniques were utilized, including automated  exposure control and exposure modulation based on body size.        This report was finalized on 10/11/2023 7:37 AM by Dr. Liban Smith M.D on Workstation: [id number]       Scheduled Medications  aspirin, 81 mg, Oral, Daily  atorvastatin, 80 mg, Oral,  Nightly  carbidopa-levodopa, 1 tablet, Oral, TID  clopidogrel, 75 mg, Oral, Daily  levothyroxine, 75 mcg, Oral, Q AM  pramipexole, 0.25 mg, Oral, TID  senna-docusate sodium, 2 tablet, Oral, BID  sodium chloride, 500 mL, Intravenous, Once  sodium chloride, 10 mL, Intravenous, Q12H  sodium chloride, 10 mL, Intravenous, Q12H  valsartan, 160 mg, Oral, Daily    Infusions  heparin, 12 Units/kg/hr, Last Rate: Stopped (10/11/23 0625)    Diet  Diet: Regular/House Diet; No Straw; Texture: Pureed (NDD 1); Fluid Consistency: Nectar Thick       Assessment/Plan     Active Hospital Problems    Diagnosis  POA    **Acute ischemic stroke [I63.9]  Yes    Benign essential hypertension [I10]  Yes      Resolved Hospital Problems   No resolved problems to display.       89 y.o. male admitted with Acute ischemic stroke.    Acute cerebrovascular infarcts  MRI confirmed.  Neurology is following.  On aspirin, statin, Plavix.    Right ICA severe stenosis with possible thrombus  He has been started on heparin drip.    Carotid duplex showed near complete occlusion of the right ICA, and 50 to 69% stenosis of the left ICA.  Echocardiogram showed EF of 44.7%, grade 1 diastolic dysfunction    DVT of the popliteal vein of right lower extremity  IVC filter noted     Paroxysmal atrial fibrillation  Currently on Eliquis 2.5 mg twice daily.  Eliquis is currently held and he is on a heparin drip    Parkinson disorder  Continue home Sinemet, pramipexole     Coronary artery disease  Aspirin, statin     Hypertension   hypothyroidism  Continue home medications     Abdominal discomfort  Abdomen does not appear acute on exam.  It may be due to urinary retention.  Check bladder scan      Heparin ggt  for DVT prophylaxis.  Full code.  Discussed with patient and family.        Hernán Saldivar MD  [facility]  10/11/23  12:13 EDT    Copied text on this note has been reviewed and updated as of 10/11/23

## 2023-10-11 NOTE — THERAPY EVALUATION
Acute Care - Physical Therapy Initial Evaluation  [facility]     Patient Name: Casey Snowden Sr.  : 1934  MRN: [id number]  Today's Date: 10/11/2023      Visit Dx:     ICD-10-CM ICD-9-CM   1. Acute ischemic stroke  I63.9 434.91   2. Left-sided neglect  R41.4 781.8   3. Left homonymous hemianopsia  H53.462 368.46   4. Chronic anticoagulation  Z79.01 V58.61   5. Atrial fibrillation, unspecified type  I48.91 427.31   6. Hypertension, unspecified type  I10 401.9   7. Hyperlipidemia, unspecified hyperlipidemia type  E78.5 272.4     Patient Active Problem List   Diagnosis    Benign essential hypertension    Stenosis of carotid artery    Mixed hyperlipidemia    Parkinson's disease    Peripheral arterial occlusive disease    Screening for prostate cancer    Medication monitoring encounter    Melanoma    Chronic deep vein thrombosis (DVT) of popliteal vein of right lower extremity    Uses hearing aid    Paroxysmal A-fib    Chronic diastolic (congestive) heart failure    Anticoagulant long-term use    Presence of IVC filter    Medicare annual wellness visit, subsequent    Iron deficiency anemia secondary to inadequate dietary iron intake    Orthostatic hypotension due to Parkinson's disease    Coronary artery disease involving native coronary artery of native heart without angina pectoris    ST elevation myocardial infarction involving left anterior descending (LAD) coronary artery    Sialorrhea    Hypertensive heart disease with heart failure    PAD (peripheral artery disease)    Metastatic squamous cell carcinoma    Secondary malignancy of lymph nodes of head, face and neck    Encounter for antineoplastic immunotherapy    RBD (REM behavioral disorder)    Acquired hypothyroidism    Skin tear of upper arm without complication, left, subsequent encounter    Aspiration pneumonia of right lung due to vomit    Dysphagia, neurologic    Acute ischemic stroke     Past Medical History:   Diagnosis Date    Acquired  hypothyroidism 8/31/2022    Atrial fibrillation     Cancer 04/2018    basil cell carcinoma    Carotid stenosis     CHF (congestive heart failure)     Chronic deep vein thrombosis (DVT) of popliteal vein of right lower extremity     Coronary artery disease involving native coronary artery of native heart without angina pectoris 12/20/2018    DVT of lower extremity (deep venous thrombosis)     Hard of hearing     Localized edema 1/4/2017    Long-term use of high-risk medication     Metabolic encephalopathy 2/7/2023    PAD (peripheral artery disease) 9/10/2020    Parkinson's disease     Postphlebitic syndrome      Past Surgical History:   Procedure Laterality Date    BASAL CELL CARCINOMA EXCISION  04/2018    CARDIAC CATHETERIZATION N/A 11/5/2018    Procedure: Left Heart Cath;  Surgeon: Oliverio Azar MD;  [facility];  Service: Cardiovascular    CARDIAC CATHETERIZATION N/A 11/5/2018    Procedure: Coronary angiography;  Surgeon: Oliverio Azar MD;  [facility];  Service: Cardiovascular    CARDIAC CATHETERIZATION N/A 11/5/2018    Procedure: Left ventriculography;  Surgeon: Oliverio Azar MD;  [facility];  Service: Cardiovascular    CARDIAC CATHETERIZATION N/A 6/4/2019    Procedure: Left Heart Cath;  Surgeon: Johnny Glasgow MD;  Location:  [facility];  Service: Cardiovascular    CARDIAC CATHETERIZATION N/A 6/4/2019    Procedure: Coronary angiography;  Surgeon: Johnny Glasgow MD;  [facility];  Service: Cardiovascular    CARDIAC CATHETERIZATION N/A 6/4/2019    Procedure: Left ventriculography;  Surgeon: Johnny Glasgow MD;  [facility];  Service: Cardiovascular    CARDIAC CATHETERIZATION N/A 6/4/2019    Procedure: Stent BILL coronary;  Surgeon: Johnny Glasgow MD;  [facility];  Service: Cardiovascular     CARDIAC SURGERY      CATARACT EXTRACTION Left 08/2018    CATARACT EXTRACTION Right 09/2018    COLONOSCOPY      2011    INCISION AND DRAINAGE LEG Right 7/7/2017    Procedure: INCISION AND DRAINAGE INFECTED HEMATOMA RIGHT THIGH;  Surgeon: Valentin Peña MD;  Location: [facility];  Service:     JOINT REPLACEMENT      KNEE INCISION AND DRAINAGE Right 12/27/2016    Procedure: KNEE INCISION AND DRAINAGE;  Surgeon: Triston Whitten MD;  Location: [facility];  Service:     NOSE SURGERY      nose replacement    SKIN BIOPSY      SKIN GRAFT  12/2017    TOTAL KNEE ARTHROPLASTY Right     VASCULAR SURGERY      VENA CAVA FILTER PLACEMENT       PT Assessment (last 12 hours)       PT Evaluation and Treatment       [facility] Name 10/11/23 1500          Physical Therapy Time and Intention    Subjective Information no complaints  -     Document Type evaluation  -     Mode of Treatment individual therapy;physical therapy  -     Patient Effort good  -     Symptoms Noted During/After Treatment none  -[facility] Name 10/11/23 1500          General Information    Patient Profile Reviewed yes  -     Patient Observations alert;cooperative;agree to therapy  -     Patient/Family/Caregiver Comments/Observations pt supine in bed no acute distress  -     Existing Precautions/Restrictions fall  -       Row Name 10/11/23 1500          Pain    Pretreatment Pain Rating 0/10 - no pain  -     Posttreatment Pain Rating 0/10 - no pain  -[facility] Name 10/11/23 1500          Cognition    Orientation Status (Cognition) oriented to;person  -     Follows Commands (Cognition) follows one-step commands;75-90% accuracy  -[facility] Name 10/11/23 1500          Range of Motion Comprehensive    Comment, General Range of Motion LEs AROM WFL  -       Row Name 10/11/23 1500          Strength Comprehensive (MMT)    Comment, General Manual Muscle Testing (MMT) Assessment LEs grossly at least 3/5  -[facility] Name 10/11/23 1500           Bed Mobility    Bed Mobility sit-supine;supine-sit  -     Supine-Sit [place] (Bed Mobility) standby assist  -     Sit-Supine [place] (Bed Mobility) standby assist  -     Assistive Device (Bed Mobility) bed rails;head of bed elevated  -[facility] Name 10/11/23 1500          Transfers    Transfers stand-sit transfer;sit-stand transfer;toilet transfer  -       Row Name 10/11/23 1500          Sit-Stand Transfer    Sit-Stand [place] (Transfers) contact guard  -     Assistive Device (Sit-Stand Transfers) walker, front-wheeled  -       Row Name 10/11/23 1500          Stand-Sit Transfer    Stand-Sit [place] (Transfers) contact guard  -     Assistive Device (Stand-Sit Transfers) walker, front-wheeled  [facility] Name 10/11/23 1500          Toilet Transfer    Type (Toilet Transfer) stand-sit;sit-stand  -     [place] Level (Toilet Transfer) contact guard  -     Assistive Device (Toilet Transfer) commode;walker, front-wheeled;grab bars/safety frame  -[facility] Name 10/11/23 1500          Gait/Stairs (Locomotion)    [place] Level (Gait) contact guard;set up;verbal cues  -     Assistive Device (Gait) walker, front-wheeled  -     Distance in Feet (Gait) 150  -     Pattern (Gait) step-through  -     Deviations/Abnormal Patterns (Gait) matt decreased;stride length decreased  -     Bilateral Gait Deviations forward flexed posture;heel strike decreased;weight shift ability decreased  -     Comment, (Gait/Stairs) Vcs for inc step length bilaterally, rythmic counting  -       Row Name 10/11/23 1500          Balance    Static Sitting Balance standby assist  -     Position, Sitting Balance unsupported;sitting in chair  -     Static Standing Balance contact guard  -     Position/Device Used, Standing Balance supported;walker, front-wheeled  -       Row Name 10/11/23 1500          Plan of Care Review    Plan of Care Reviewed With patient  -     Outcome  Evaluation Pt 88 yo male noted Right hemisphere strokes, likely cardioembolic and atheroembolic, PMH dementia, PD. Pt baseline ambulatory w walker lives w family. On PT exam pt ambulated 150ft CGA rwx, shuffling gait. Pt may benefit from skilled PT acutely PRN for generalized strengthening, anticipate return to home, [facility] as needed.  -       Row Name 10/11/23 1500          Positioning and Restraints    Pre-Treatment Position in bed  -     Post Treatment Position bed  -     In Bed fowlers;call light within reach;encouraged to call for assist;exit alarm on  -       Row Name 10/11/23 1500          Therapy Assessment/Plan (PT)    Rehab Potential (PT) good, to achieve stated therapy goals  -     Criteria for Skilled Interventions Met (PT) yes  -     Therapy Frequency (PT) 3 times/wk  -[facility] Name 10/11/23 1500          PT Evaluation Complexity    History, PT Evaluation Complexity 1-2 personal factors and/or comorbidities  -     Examination of Body Systems (PT Eval Complexity) total of 3 or more elements  -     Clinical Presentation (PT Evaluation Complexity) stable  -     Clinical Decision Making (PT Evaluation Complexity) low complexity  -     Overall Complexity (PT Evaluation Complexity) low complexity  -       Row Name 10/11/23 1500          Physical Therapy Goals    Bed Mobility Goal Selection (PT) bed mobility, PT goal 1  -     Transfer Goal Selection (PT) transfer, PT goal 1  -     Gait Training Goal Selection (PT) gait training, PT goal 1  -[facility] Name 10/11/23 1500          Bed Mobility Goal 1 (PT)    Activity/Assistive Device (Bed Mobility Goal 1, PT) bed mobility activities, all  -     [place] Level/Cues Needed (Bed Mobility Goal 1, PT) modified independence  -     Time Frame (Bed Mobility Goal 1, PT) 2 weeks  -       Row Name 10/11/23 1500          Transfer Goal 1 (PT)    Activity/Assistive Device (Transfer Goal 1, PT)  bed-to-chair/chair-to-bed;sit-to-stand/stand-to-sit;walker, rolling  -     [place] Level/Cues Needed (Transfer Goal 1, PT) modified independence  -     Time Frame (Transfer Goal 1, PT) 2 weeks  -       Row Name 10/11/23 1500          Gait Training Goal 1 (PT)    Activity/Assistive Device (Gait Training Goal 1, PT) assistive device use;walker, rolling  -     [place] Level (Gait Training Goal 1, PT) modified independence  -     Distance (Gait Training Goal 1, PT) 300  -     Time Frame (Gait Training Goal 1, PT) 2 weeks  -               User Key  (r) = Recorded By, (t) = Taken By, (c) = Cosigned By      Initials Name Provider Type     Alexandra Lucio, PT Physical Therapist                    Physical Therapy Education       Title: PT OT SLP Therapies (In Progress)       Topic: Physical Therapy (In Progress)       Point: Mobility training (In Progress)       Learning Progress Summary             Patient Acceptance, E, NR by  at 10/11/2023 1546                         Point: Home exercise program (In Progress)       Learning Progress Summary             Patient Acceptance, E, NR by  at 10/11/2023 1546                         Point: Body mechanics (In Progress)       Learning Progress Summary             Patient Acceptance, E, NR by  at 10/11/2023 1546                         Point: Precautions (In Progress)       Learning Progress Summary             Patient Acceptance, E, NR by  at 10/11/2023 1546                                         User Key       Initials Effective Dates Name Provider Type [facility] 06/16/21 -  Alexandra Lucio, PT Physical Therapist PT                  PT Recommendation and Plan  Anticipated Discharge Disposition (PT): home with assist  Planned Therapy Interventions (PT): balance training, bed mobility training, gait training, home exercise program, ROM (range of motion), stair training, strengthening, stretching, transfer training  Therapy Frequency (PT): 3  times/wk  Plan of Care Reviewed With: patient  Outcome Evaluation: Pt 88 yo male noted Right hemisphere strokes, likely cardioembolic and atheroembolic, PMH dementia, PD. Pt baseline ambulatory w walker lives w family. On PT exam pt ambulated 150ft CGA rwx, shuffling gait. Pt may benefit from skilled PT acutely PRN for generalized strengthening, anticipate return to home, [facility] as needed.   Outcome Measures       Row Name 10/11/23 1500             How much help from another person do you currently need...    Turning from your back to your side while in flat bed without using bedrails? 3  -LH      Moving from lying on back to sitting on the side of a flat bed without bedrails? 3  -LH      Moving to and from a bed to a chair (including a wheelchair)? 3  -LH      Standing up from a chair using your arms (e.g., wheelchair, bedside chair)? 3  -LH      Climbing 3-5 steps with a railing? 3  -LH      To walk in hospital room? 3  -      AM-PAC 6 Clicks Score (PT) 18  -      Highest level of mobility 6 --> Walked 10 steps or more  -         Functional Assessment    Outcome Measure Options AM-PAC 6 Clicks Basic Mobility (PT)  -                User Key  (r) = Recorded By, (t) = Taken By, (c) = Cosigned By      Initials Name Provider Type     Alexandra Lucio, PT Physical Therapist                     Time Calculation:    PT Charges       Row Name 10/11/23 1547             Time Calculation    Start Time 0937  -      Stop Time 1004  -      Time Calculation (min) 27 min  -      PT Received On 10/11/23  -      PT - Next Appointment 10/12/23  -      PT Goal Re-Cert Due Date 10/25/23  -         Time Calculation- PT    Total Timed Code Minutes- PT 15 minute(s)  -                User Key  (r) = Recorded By, (t) = Taken By, (c) = Cosigned By      Initials Name Provider Type     Alexandra Lucio, PT Physical Therapist                  Therapy Charges for Today       Code Description Service Date Service Provider  Modifiers Qty    68784976850 HC PT EVAL LOW COMPLEXITY 3 10/11/2023 Alexandra Lucio, PT GP 1    12980591842 HC PT THER PROC EA 15 MIN 10/11/2023 Alexandra Lucio, PT GP 1            PT G-Codes  Outcome Measure Options: AM-PAC 6 Clicks Basic Mobility (PT)  AM-PAC 6 Clicks Score (PT): 18  AM-PAC 6 Clicks Score (OT): 20  Modified [place] Scale: 3 - Moderate disability.  Requiring some help, but able to walk without assistance.    Alexandra Lucio, PT  10/11/2023

## 2023-10-11 NOTE — PLAN OF CARE
Goal Outcome Evaluation:              Outcome Evaluation: Defer VFSS, at this time, due to pt fatigue, per RN. Pt reported to have gotten little sleep last night and is currently very lethargic. Will plan to reschedule VFSS for later today or tomorrow.

## 2023-10-11 NOTE — PLAN OF CARE
Goal Outcome Evaluation:  Plan of Care Reviewed With: patient           Outcome Evaluation: Pt 89 yo male noted Right hemisphere strokes, likely cardioembolic and atheroembolic, PMH dementia, PD. Pt baseline ambulatory w walker lives w family. On PT exam pt ambulated 150ft CGA rwx, shuffling gait. Pt may benefit from skilled PT acutely PRN for generalized strengthening, anticipate return to home, [facility] as needed.      Anticipated Discharge Disposition (PT): home with assist

## 2023-10-11 NOTE — PLAN OF CARE
Goal Outcome Evaluation:  Plan of Care Reviewed With: patient        Progress: improving     500cc bolus finished while in CT. Upon return to room patient is alert and oriented x4. Blood pressure elevated but within parameters. PTT levels elevated. Heparin drip currently on hold, to resume at 0720 per protocol. Next PTT level due at 1220

## 2023-10-12 ENCOUNTER — ANESTHESIA EVENT (OUTPATIENT)
Dept: INTERVENTIONAL RADIOLOGY/VASCULAR | Facility: HOSPITAL | Age: 88
End: 2023-10-12
Payer: MEDICARE

## 2023-10-12 LAB
ACT BLD: 137 SECONDS (ref 82–152)
ACT BLD: 227 SECONDS (ref 82–152)
ACT BLD: 233 SECONDS (ref 82–152)
ACT BLD: 239 SECONDS (ref 82–152)
ANION GAP SERPL CALCULATED.3IONS-SCNC: 10 MMOL/L (ref 5–15)
APTT PPP: 70.6 SECONDS (ref 22.7–35.4)
APTT PPP: 75.9 SECONDS (ref 22.7–35.4)
APTT PPP: 84 SECONDS (ref 22.7–35.4)
BUN SERPL-MCNC: 14 MG/DL (ref 8–23)
BUN/CREAT SERPL: 20.6 (ref 7–25)
CALCIUM SPEC-SCNC: 8.6 MG/DL (ref 8.6–10.5)
CHLORIDE SERPL-SCNC: 106 MMOL/L (ref 98–107)
CO2 SERPL-SCNC: 24 MMOL/L (ref 22–29)
CREAT SERPL-MCNC: 0.68 MG/DL (ref 0.76–1.27)
DEPRECATED RDW RBC AUTO: 46.3 FL (ref 37–54)
EGFRCR SERPLBLD CKD-EPI 2021: 88.9 ML/MIN/1.73
ERYTHROCYTE [DISTWIDTH] IN BLOOD BY AUTOMATED COUNT: 13.2 % (ref 12.3–15.4)
GLUCOSE BLDC GLUCOMTR-MCNC: 104 MG/DL (ref 70–130)
GLUCOSE SERPL-MCNC: 100 MG/DL (ref 65–99)
HCT VFR BLD AUTO: 35.5 % (ref 37.5–51)
HGB BLD-MCNC: 12.5 G/DL (ref 13–17.7)
MCH RBC QN AUTO: 33.5 PG (ref 26.6–33)
MCHC RBC AUTO-ENTMCNC: 35.2 G/DL (ref 31.5–35.7)
MCV RBC AUTO: 95.2 FL (ref 79–97)
PA ADP PRP-ACNC: 115 PRU (ref 194–418)
PLATELET # BLD AUTO: 151 10*3/MM3 (ref 140–450)
PMV BLD AUTO: 10 FL (ref 6–12)
POTASSIUM SERPL-SCNC: 3.8 MMOL/L (ref 3.5–5.2)
RBC # BLD AUTO: 3.73 10*6/MM3 (ref 4.14–5.8)
SODIUM SERPL-SCNC: 140 MMOL/L (ref 136–145)
WBC NRBC COR # BLD: 5.73 10*3/MM3 (ref 3.4–10.8)

## 2023-10-12 PROCEDURE — C1769 GUIDE WIRE: HCPCS | Performed by: NEUROLOGICAL SURGERY

## 2023-10-12 PROCEDURE — 85730 THROMBOPLASTIN TIME PARTIAL: CPT | Performed by: STUDENT IN AN ORGANIZED HEALTH CARE EDUCATION/TRAINING PROGRAM

## 2023-10-12 PROCEDURE — C1887 CATHETER, GUIDING: HCPCS | Performed by: NEUROLOGICAL SURGERY

## 2023-10-12 PROCEDURE — C1884 EMBOLIZATION PROTECT SYST: HCPCS | Performed by: NEUROLOGICAL SURGERY

## 2023-10-12 PROCEDURE — C1760 CLOSURE DEV, VASC: HCPCS | Performed by: NEUROLOGICAL SURGERY

## 2023-10-12 PROCEDURE — 25010000002 PHENYLEPHRINE 10 MG/ML SOLUTION: Performed by: NURSE ANESTHETIST, CERTIFIED REGISTERED

## 2023-10-12 PROCEDURE — 85027 COMPLETE CBC AUTOMATED: CPT | Performed by: STUDENT IN AN ORGANIZED HEALTH CARE EDUCATION/TRAINING PROGRAM

## 2023-10-12 PROCEDURE — 25810000003 SODIUM CHLORIDE 0.9 % SOLUTION: Performed by: NEUROLOGICAL SURGERY

## 2023-10-12 PROCEDURE — C1894 INTRO/SHEATH, NON-LASER: HCPCS | Performed by: NEUROLOGICAL SURGERY

## 2023-10-12 PROCEDURE — 25810000003 LACTATED RINGERS PER 1000 ML: Performed by: NEUROLOGICAL SURGERY

## 2023-10-12 PROCEDURE — C1725 CATH, TRANSLUMIN NON-LASER: HCPCS | Performed by: NEUROLOGICAL SURGERY

## 2023-10-12 PROCEDURE — 0 IODIXANOL PER 1 ML: Performed by: STUDENT IN AN ORGANIZED HEALTH CARE EDUCATION/TRAINING PROGRAM

## 2023-10-12 PROCEDURE — 037K3DZ DILATION OF RIGHT INTERNAL CAROTID ARTERY WITH INTRALUMINAL DEVICE, PERCUTANEOUS APPROACH: ICD-10-PCS | Performed by: NEUROLOGICAL SURGERY

## 2023-10-12 PROCEDURE — 25810000003 LACTATED RINGERS PER 1000 ML: Performed by: NURSE ANESTHETIST, CERTIFIED REGISTERED

## 2023-10-12 PROCEDURE — 85347 COAGULATION TIME ACTIVATED: CPT

## 2023-10-12 PROCEDURE — 25010000002 HEPARIN (PORCINE) PER 1000 UNITS: Performed by: NURSE ANESTHETIST, CERTIFIED REGISTERED

## 2023-10-12 PROCEDURE — 25010000002 CLEVIDIPINE BUTYRATE PER 1 MG: Performed by: NURSE ANESTHETIST, CERTIFIED REGISTERED

## 2023-10-12 PROCEDURE — 80048 BASIC METABOLIC PNL TOTAL CA: CPT | Performed by: STUDENT IN AN ORGANIZED HEALTH CARE EDUCATION/TRAINING PROGRAM

## 2023-10-12 PROCEDURE — C1876 STENT, NON-COA/NON-COV W/DEL: HCPCS | Performed by: NEUROLOGICAL SURGERY

## 2023-10-12 PROCEDURE — 82948 REAGENT STRIP/BLOOD GLUCOSE: CPT

## 2023-10-12 PROCEDURE — C9248 INJ, CLEVIDIPINE BUTYRATE: HCPCS | Performed by: NURSE ANESTHETIST, CERTIFIED REGISTERED

## 2023-10-12 PROCEDURE — 85576 BLOOD PLATELET AGGREGATION: CPT | Performed by: NURSE PRACTITIONER

## 2023-10-12 PROCEDURE — 25010000002 FENTANYL CITRATE (PF) 50 MCG/ML SOLUTION: Performed by: NURSE ANESTHETIST, CERTIFIED REGISTERED

## 2023-10-12 PROCEDURE — 25010000002 HYDRALAZINE PER 20 MG: Performed by: NURSE ANESTHETIST, CERTIFIED REGISTERED

## 2023-10-12 PROCEDURE — 25010000002 HYDRALAZINE PER 20 MG: Performed by: STUDENT IN AN ORGANIZED HEALTH CARE EDUCATION/TRAINING PROGRAM

## 2023-10-12 PROCEDURE — 37215 TRANSCATH STENT CCA W/EPS: CPT | Performed by: NEUROLOGICAL SURGERY

## 2023-10-12 PROCEDURE — 25010000002 PROPOFOL 10 MG/ML EMULSION: Performed by: NURSE ANESTHETIST, CERTIFIED REGISTERED

## 2023-10-12 RX ORDER — GLYCOPYRROLATE 0.2 MG/ML
INJECTION INTRAMUSCULAR; INTRAVENOUS AS NEEDED
Status: DISCONTINUED | OUTPATIENT
Start: 2023-10-12 | End: 2023-10-12 | Stop reason: SURG

## 2023-10-12 RX ORDER — LIDOCAINE HYDROCHLORIDE 20 MG/ML
INJECTION, SOLUTION INFILTRATION; PERINEURAL AS NEEDED
Status: DISCONTINUED | OUTPATIENT
Start: 2023-10-12 | End: 2023-10-12 | Stop reason: SURG

## 2023-10-12 RX ORDER — IODIXANOL 320 MG/ML
200 INJECTION, SOLUTION INTRAVASCULAR
Status: COMPLETED | OUTPATIENT
Start: 2023-10-12 | End: 2023-10-12

## 2023-10-12 RX ORDER — FENTANYL CITRATE 50 UG/ML
INJECTION, SOLUTION INTRAMUSCULAR; INTRAVENOUS AS NEEDED
Status: DISCONTINUED | OUTPATIENT
Start: 2023-10-12 | End: 2023-10-12 | Stop reason: SURG

## 2023-10-12 RX ORDER — PHENYLEPHRINE HYDROCHLORIDE 10 MG/ML
INJECTION INTRAVENOUS AS NEEDED
Status: DISCONTINUED | OUTPATIENT
Start: 2023-10-12 | End: 2023-10-12 | Stop reason: SURG

## 2023-10-12 RX ORDER — NALOXONE HCL 0.4 MG/ML
0.2 VIAL (ML) INJECTION AS NEEDED
Status: DISCONTINUED | OUTPATIENT
Start: 2023-10-12 | End: 2023-10-12

## 2023-10-12 RX ORDER — LIDOCAINE HYDROCHLORIDE 10 MG/ML
INJECTION, SOLUTION EPIDURAL; INFILTRATION; INTRACAUDAL; PERINEURAL AS NEEDED
Status: COMPLETED | OUTPATIENT
Start: 2023-10-12 | End: 2023-10-12

## 2023-10-12 RX ORDER — HYDRALAZINE HYDROCHLORIDE 20 MG/ML
5 INJECTION INTRAMUSCULAR; INTRAVENOUS ONCE
Status: COMPLETED | OUTPATIENT
Start: 2023-10-12 | End: 2023-10-12

## 2023-10-12 RX ORDER — HEPARIN SODIUM 10000 [USP'U]/100ML
12 INJECTION, SOLUTION INTRAVENOUS
Status: DISCONTINUED | OUTPATIENT
Start: 2023-10-12 | End: 2023-10-13

## 2023-10-12 RX ORDER — FAMOTIDINE 10 MG/ML
20 INJECTION, SOLUTION INTRAVENOUS EVERY 12 HOURS SCHEDULED
Status: COMPLETED | OUTPATIENT
Start: 2023-10-12 | End: 2023-10-12

## 2023-10-12 RX ORDER — DIPHENHYDRAMINE HYDROCHLORIDE 50 MG/ML
6 INJECTION INTRAMUSCULAR; INTRAVENOUS
Status: DISCONTINUED | OUTPATIENT
Start: 2023-10-12 | End: 2023-10-12

## 2023-10-12 RX ORDER — HEPARIN SODIUM 1000 [USP'U]/ML
INJECTION, SOLUTION INTRAVENOUS; SUBCUTANEOUS AS NEEDED
Status: DISCONTINUED | OUTPATIENT
Start: 2023-10-12 | End: 2023-10-12 | Stop reason: SURG

## 2023-10-12 RX ORDER — SODIUM CHLORIDE, SODIUM LACTATE, POTASSIUM CHLORIDE, CALCIUM CHLORIDE 600; 310; 30; 20 MG/100ML; MG/100ML; MG/100ML; MG/100ML
INJECTION, SOLUTION INTRAVENOUS CONTINUOUS PRN
Status: DISCONTINUED | OUTPATIENT
Start: 2023-10-12 | End: 2023-10-12 | Stop reason: SURG

## 2023-10-12 RX ORDER — ONDANSETRON 2 MG/ML
4 INJECTION INTRAMUSCULAR; INTRAVENOUS ONCE AS NEEDED
Status: DISCONTINUED | OUTPATIENT
Start: 2023-10-12 | End: 2023-10-12

## 2023-10-12 RX ORDER — HYDRALAZINE HYDROCHLORIDE 20 MG/ML
2.5 INJECTION INTRAMUSCULAR; INTRAVENOUS ONCE
Status: COMPLETED | OUTPATIENT
Start: 2023-10-12 | End: 2023-10-12

## 2023-10-12 RX ORDER — FLUMAZENIL 0.1 MG/ML
0.2 INJECTION INTRAVENOUS AS NEEDED
Status: DISCONTINUED | OUTPATIENT
Start: 2023-10-12 | End: 2023-10-12

## 2023-10-12 RX ORDER — PROPOFOL 10 MG/ML
VIAL (ML) INTRAVENOUS AS NEEDED
Status: DISCONTINUED | OUTPATIENT
Start: 2023-10-12 | End: 2023-10-12 | Stop reason: SURG

## 2023-10-12 RX ORDER — SODIUM CHLORIDE, SODIUM LACTATE, POTASSIUM CHLORIDE, CALCIUM CHLORIDE 600; 310; 30; 20 MG/100ML; MG/100ML; MG/100ML; MG/100ML
50 INJECTION, SOLUTION INTRAVENOUS CONTINUOUS
Status: DISCONTINUED | OUTPATIENT
Start: 2023-10-12 | End: 2023-10-15

## 2023-10-12 RX ADMIN — IODIXANOL 127 ML: 320 INJECTION, SOLUTION INTRAVASCULAR at 13:00

## 2023-10-12 RX ADMIN — Medication 10 ML: at 09:06

## 2023-10-12 RX ADMIN — PHENYLEPHRINE HYDROCHLORIDE 100 MCG: 10 INJECTION INTRAVENOUS at 13:34

## 2023-10-12 RX ADMIN — HEPARIN SODIUM 1000 UNITS: 1000 INJECTION, SOLUTION INTRAVENOUS; SUBCUTANEOUS at 13:37

## 2023-10-12 RX ADMIN — PHENYLEPHRINE HYDROCHLORIDE 100 MCG: 10 INJECTION INTRAVENOUS at 13:52

## 2023-10-12 RX ADMIN — FENTANYL CITRATE 12.5 MCG: 50 INJECTION, SOLUTION INTRAMUSCULAR; INTRAVENOUS at 12:51

## 2023-10-12 RX ADMIN — SODIUM CHLORIDE, POTASSIUM CHLORIDE, SODIUM LACTATE AND CALCIUM CHLORIDE 100 ML/HR: 600; 310; 30; 20 INJECTION, SOLUTION INTRAVENOUS at 16:58

## 2023-10-12 RX ADMIN — SODIUM CHLORIDE 5 MG/HR: 9 INJECTION, SOLUTION INTRAVENOUS at 18:36

## 2023-10-12 RX ADMIN — Medication 10 ML: at 22:33

## 2023-10-12 RX ADMIN — GLYCOPYRROLATE 0.4 MCG: 0.2 INJECTION INTRAMUSCULAR; INTRAVENOUS at 13:22

## 2023-10-12 RX ADMIN — HEPARIN SODIUM 2000 UNITS: 1000 INJECTION, SOLUTION INTRAVENOUS; SUBCUTANEOUS at 13:14

## 2023-10-12 RX ADMIN — PROPOFOL 20 MG: 10 INJECTION, EMULSION INTRAVENOUS at 12:11

## 2023-10-12 RX ADMIN — FENTANYL CITRATE 12.5 MCG: 50 INJECTION, SOLUTION INTRAMUSCULAR; INTRAVENOUS at 12:20

## 2023-10-12 RX ADMIN — PHENYLEPHRINE HYDROCHLORIDE 100 MCG: 10 INJECTION INTRAVENOUS at 13:58

## 2023-10-12 RX ADMIN — CLEVIDIPINE 4 MG/HR: 0.5 EMULSION INTRAVENOUS at 11:54

## 2023-10-12 RX ADMIN — FENTANYL CITRATE 12.5 MCG: 50 INJECTION, SOLUTION INTRAMUSCULAR; INTRAVENOUS at 11:58

## 2023-10-12 RX ADMIN — HEPARIN SODIUM 7000 UNITS: 1000 INJECTION, SOLUTION INTRAVENOUS; SUBCUTANEOUS at 12:33

## 2023-10-12 RX ADMIN — LIDOCAINE HYDROCHLORIDE 4 ML: 10 INJECTION, SOLUTION EPIDURAL; INFILTRATION; INTRACAUDAL; PERINEURAL at 12:25

## 2023-10-12 RX ADMIN — HYDRALAZINE HYDROCHLORIDE 2.5 MG: 20 INJECTION, SOLUTION INTRAMUSCULAR; INTRAVENOUS at 11:24

## 2023-10-12 RX ADMIN — SODIUM CHLORIDE, POTASSIUM CHLORIDE, SODIUM LACTATE AND CALCIUM CHLORIDE: 600; 310; 30; 20 INJECTION, SOLUTION INTRAVENOUS at 11:54

## 2023-10-12 RX ADMIN — LIDOCAINE HYDROCHLORIDE 20 MG: 20 INJECTION, SOLUTION INFILTRATION; PERINEURAL at 12:11

## 2023-10-12 RX ADMIN — SODIUM CHLORIDE, POTASSIUM CHLORIDE, SODIUM LACTATE AND CALCIUM CHLORIDE 100 ML/HR: 600; 310; 30; 20 INJECTION, SOLUTION INTRAVENOUS at 11:54

## 2023-10-12 RX ADMIN — PHENYLEPHRINE HYDROCHLORIDE 100 MCG: 10 INJECTION INTRAVENOUS at 13:35

## 2023-10-12 RX ADMIN — PRAMIPEXOLE DIHYDROCHLORIDE 0.25 MG: 0.25 TABLET ORAL at 18:21

## 2023-10-12 RX ADMIN — VALSARTAN 160 MG: 160 TABLET, FILM COATED ORAL at 11:05

## 2023-10-12 RX ADMIN — PHENYLEPHRINE HYDROCHLORIDE 100 MCG: 10 INJECTION INTRAVENOUS at 13:49

## 2023-10-12 RX ADMIN — FAMOTIDINE 20 MG: 10 INJECTION INTRAVENOUS at 11:17

## 2023-10-12 RX ADMIN — HYDRALAZINE HYDROCHLORIDE 5 MG: 20 INJECTION, SOLUTION INTRAMUSCULAR; INTRAVENOUS at 10:57

## 2023-10-12 RX ADMIN — TICAGRELOR 90 MG: 90 TABLET ORAL at 11:04

## 2023-10-12 RX ADMIN — CARBIDOPA AND LEVODOPA 1 TABLET: 25; 250 TABLET ORAL at 18:21

## 2023-10-12 RX ADMIN — FENTANYL CITRATE 12.5 MCG: 50 INJECTION, SOLUTION INTRAMUSCULAR; INTRAVENOUS at 13:19

## 2023-10-12 NOTE — PROGRESS NOTES
Discharge Summary  Discharge Summary from Physical Therapy Report    Patient Information  Casey Snowden Sr.  1934    Dates  PT visit: 23-10/5/23  Number of Visits: 8    Discharge Status of Patient: hospitalized with CVA    Goals: Partially Met    Casey Snowden Sr. is a 89 year-old male referred to physical therapy for low back pain radiating down LLE; more distal LLE. He presents with a evolving clinical presentation.  He has comorbidities of R RTC tear, multiple falls, PD, a fib, DVT RLE and personal factors of trying to maintain his active lifestyle and independence that may affect his progress in the plan of care.  Pt using Rwx/cane but previously not using AD. Pt is sleeping better, decreasing neural irritation, now able to complete SLR without pain and I with bed mobility, transfers and gait with AD. Discussion with pt and wife to reach out to PCP and see what next steps are due to continued pain down LLE and risk of falls with shooting LLE pain. Pt discharged from PT; hospitalized due to ischemic stroke/carotid occlusion.Assessment/goals below as of last progress note.     Goals  Plan Goals: ST weeks  1. Pt will be able to perform/tolerate HEP without increased symptoms-MET  2. Increased lumbar AROM to WFL in all planes to allow for increased ease with sit-stand transfers and functional activities-PROGRESSING  3. Pt will be able to complete bed mobility (supine to/from sit) without increased back/LLE pain-MET (but inconsistent)  4. Pt will be able to compete LLE SLR to at least 30 degrees to indicate decreased neural tension-MET     LT weeks  1. Pt will be able to perform/tolerate HEP independently-PROGRESSING (discussed starting each day with calf stretch)  2. Pt will be able to walk/stand for 20 min without a rest break and no AD to be able to navigate the grocery store and yard work-PROGRESSING (not consistent)  3. Pt will improve Back Index to 15/50 or less to improve subjective  function during ADLs-PROGRESSING  4. Pt to exhibit non painful trunk AROM to allow him to resume golfing/fishing without functional limitation-NOT MET  5. Pt to demonstrate ability to lift 10# medicine ball/weight from floor to waist safely and while demonstrating proper body mechanics with tolerable symptoms-NOT MET    Visit Diagnoses:    ICD-10-CM ICD-9-CM   1. Acute bilateral low back pain with left-sided sciatica  M54.42 724.2     724.3   2. Impaired functional mobility, balance, gait, and endurance  Z74.09 V49.89       Discharge Plan: Future need for rehabilitation activities    Comments     Date of Discharge 10/9/23        Lucrecia Taylor, FANY  Physical Therapist

## 2023-10-12 NOTE — PLAN OF CARE
Goal Outcome Evaluation:              Outcome Evaluation: VFSS scheduled for this date at 0930, however, patient NPO for procedure. Will defer to next date as appropriate.

## 2023-10-12 NOTE — ANESTHESIA PREPROCEDURE EVALUATION
Anesthesia Evaluation     no history of anesthetic complications:                Airway   Mallampati: II  TM distance: >3 FB  Neck ROM: full  Small opening  Dental - normal exam     Pulmonary    (+) pneumonia , COPD,  (-) shortness of breath, recent URI  Cardiovascular     (+) hypertension, past MI , CAD, cardiac stents , dysrhythmias Atrial Fib, CHF , PVD, DVT, hyperlipidemia,  carotid artery disease      Neuro/Psych  (+) Parkinson's disease, CVA  (-) numbness  GI/Hepatic/Renal/Endo    (+) thyroid problem     Musculoskeletal     Abdominal    Substance History      OB/GYN          Other                    Anesthesia Plan    ASA 4     MAC     intravenous induction     Anesthetic plan, risks, benefits, and alternatives have been provided, discussed and informed consent has been obtained with: patient.    CODE STATUS:    Code Status (Patient has no pulse and is not breathing): CPR (Attempt to Resuscitate)  Medical Interventions (Patient has pulse or is breathing): Full Support

## 2023-10-12 NOTE — NURSING NOTE
Patient arrived to bay 16  in IR preop area.  PPE worn per patient and care providers for any bedside care.

## 2023-10-12 NOTE — NURSING NOTE
Report called to Samira Mishra RN. Patient transferred via stretcher to ICU room 371. Family at bedside during transfer. Patient A&O to self, place and situation. Small amt of bleeding noted from bilateral IV sites and Left radial a-line puncture. Right groin puncture site soft, dressing clean, dry and intact. Bilateral pedal pulses palpable. Patient denies pain at this time. HOB at 30-45 degrees.

## 2023-10-12 NOTE — OP NOTE
Description of Procedure  PRE-OPERATIVE DIAGNOSIS  Severe, symptomatic right ICA stenosis      POST-OPERATIVE DIAGNOSIS  Status post successful stent placement and angioplasty of severe right ICA stenosis with no residual stenosis.  The ICA stenosis was approximated to 80% over a short 5 mm segment.    SURGEON(S)   Delroy Cadena MD       Devices used:  6 [place] sheath   Catheters:  5Fr VTK, penumbra mckeon 2,  Fubuki 6 [place]  Filter: Easy filter  Stent: 8 mm x 36 mm carotid Wallstent  Angioplasty balloon: 4 mm x 20 mm and 5mm x 20mm aviator  Closure device: proglide     PROCEDURE(S)  Catheterization of right femoral artery   Catheterization of right ICA  Stent placement and angioplasty of severe and symptomatic right carotid stenosis           Indications for procedure and informed consent:  The patient is an 89-year-old male recently admitted to the hospital after a strokelike episode.  He was evaluated by neurology with a CTA and found to have severe symptomatic carotid stenosis.  The stenosis was estimated to be greater than 80% over a 5 mm segment.  Vascular surgery was consulted to evaluate the patient for carotid endarterectomy.  He has a history of radiation treatment to the chest and neck.  He was considered high risk for a carotid endarterectomy.  Neurosurgery was consulted to evaluate the patient for possible endovascular stent/angioplasty.  The patient was consented for cerebral angiogram and possible right-sided carotid stent/angioplasty.  The risks and benefits of the procedure were discussed with the patient and his family including, but not limited to the risk of infection, hemorrhage, vessel injury, stroke.  He expressed understanding of the risks and benefits and elected to proceed with the intervention.      ANESTHESIA TYPE  Conscious sedation.  See anesthesia notes for complete details.       Monitoring:   Monitoring was done on continuous cardiac strips and continuous pulse oximeter       DESCRIPTION OF PROCEDURE   The patient was brought to the angio suite and was properly identified.  General anesthesia was administered  by the anesthesia team.  Both groins were prepared and draped in a sterile fashion.  A 5 [place] sheath was inserted into the right femoral artery using ultrasound guidance.  The sheath was exchanged with a long 6 [place] Fubuki sheath that was advanced into the aortic arch.   A 6 [place] penumbra Rice 2 catheter was introduced into the system over 35 Glidewire and navigated through the normal vascular channels and advanced selectively into the right carotid artery.  The 6 [place] fubuki was advanced over the Rice 2 catheter into the common carotid artery.   Baseline angiogram images were obtained demonstrating severe stenosis approximated to 80% over a short 5 mm segment at the origin of the right cervical ICA.  There is no evidence of vessel dissection or contrast extravasation.  AP and lateral cranial angiogram images demonstrate the MCA and BÁRBARA vessels filling normally.  The patient was given 10,000 units of heparin to maintain an ACT goal of 250-300 throughout the procedure.  Under roadmap visualization an easy filter was advanced beyond the stenotic segment and deployed adjacent to the C2 vertebral body.  An 8 mm x 36 mm carotid Wallstent was advanced and deployed across the stenotic segment under live fluoroscopy and roadmap visualization.  Next, a 4 mm x 20 mm via track balloon was inflated to nominal pressure for separate times throughout the stent and slightly above nominal pressure at the most stenotic segment.  The balloon was then removed and a 5 mm x 20 mm balloon was inflated to half of nominal pressure.  A follow-up angiogram was performed demonstrating successful revascularization with no residual stenosis.  The easy filter was retrieved.  Follow-up angiogram demonstrated successful revascularization with no residual stenosis.  The stent appeared to be in  adequate position and all the intracranial vessels appear to be filling at baseline.  The catheters were then pulled back and the groin was closed with a Pro-glide closure device.  Ten minutes of pressure was held and the patient was extubated and transferred to the recovery unit in stable condition.  Her groin was flat and dry at the end of the procedure.       Interpretation of the films:  1. Right femoral artery, oblique view:  This was completed at the end of the procedure showing puncture site above the bifurcation with no extravasation of contrast  2.  Right common carotid artery, cervical views: AP, lateral and obliques.  The common carotid artery has mild tortuosity.  There is minimal tortuosity however high-grade stenosis approximated to 80% over a short 5 mm segment at the origin of the cervical ICA.  Normal branches of the external carotid artery are noted.  The internal carotid appears patent through the skull base and supraclinoidal segment.  The final follow-up angiogram images after placement of the carotid stent and angioplasty demonstrate adequate position of the stent with resolution of the stenosis and no evidence of residual stenosis.  3.  Right common carotid artery, Intracranial views: AP, lateral and obliques. Injection into the internal carotid artery demonstrates normal anatomy for the internal carotid through the skull base with moderate degree of tortuosity and the vessel bifurcates into the middle and anterior cerebral artery.  The normal branches are seen filling including the ophthalmic and posterior communicating arteries.  The follow-up angiogram images after placement of a carotid stent shows no evidence of large vessel occlusion.     Estimated blood loss: Less than 50 mL  Complications: None  Specimens: None  Disposition: ICU overnight and discharge planning tomorrow

## 2023-10-12 NOTE — PLAN OF CARE
Goal Outcome Evaluation:  Plan of Care Reviewed With: patient        Progress: improving     Patient alert and oriented. No complaints verbalized. Arouses to voice or gentle touch. 0140 PTT 75.9. Heparin drip therapeutic at 14 units/kg. Next PTT level due at 0720. Patient's daughter reports that patient and family still have questions/concerns about carotid stent and angioplasty scheduled for today. Consent for procedure has not been signed yet.

## 2023-10-13 ENCOUNTER — APPOINTMENT (OUTPATIENT)
Dept: GENERAL RADIOLOGY | Facility: HOSPITAL | Age: 88
End: 2023-10-13
Payer: MEDICARE

## 2023-10-13 ENCOUNTER — APPOINTMENT (OUTPATIENT)
Dept: CARDIOLOGY | Facility: HOSPITAL | Age: 88
End: 2023-10-13
Payer: MEDICARE

## 2023-10-13 LAB
BH CV LOW VAS RIGHT COMMON FEMORAL SPONT: 1
BH CV LOW VAS RIGHT LESSER SAPH VESSEL: 1
BH CV LOW VAS RIGHT POPLITEAL SPONT: 1
BH CV LOWER VASCULAR LEFT COMMON FEMORAL AUGMENT: NORMAL
BH CV LOWER VASCULAR LEFT COMMON FEMORAL COMPETENT: NORMAL
BH CV LOWER VASCULAR LEFT COMMON FEMORAL COMPRESS: NORMAL
BH CV LOWER VASCULAR LEFT COMMON FEMORAL PHASIC: NORMAL
BH CV LOWER VASCULAR LEFT COMMON FEMORAL SPONT: NORMAL
BH CV LOWER VASCULAR LEFT DISTAL FEMORAL COMPRESS: NORMAL
BH CV LOWER VASCULAR LEFT GASTRONEMIUS COMPRESS: NORMAL
BH CV LOWER VASCULAR LEFT GREATER SAPH AK COMPRESS: NORMAL
BH CV LOWER VASCULAR LEFT GREATER SAPH BK COMPRESS: NORMAL
BH CV LOWER VASCULAR LEFT LESSER SAPH COMPRESS: NORMAL
BH CV LOWER VASCULAR LEFT MID FEMORAL AUGMENT: NORMAL
BH CV LOWER VASCULAR LEFT MID FEMORAL COMPETENT: NORMAL
BH CV LOWER VASCULAR LEFT MID FEMORAL COMPRESS: NORMAL
BH CV LOWER VASCULAR LEFT MID FEMORAL PHASIC: NORMAL
BH CV LOWER VASCULAR LEFT MID FEMORAL SPONT: NORMAL
BH CV LOWER VASCULAR LEFT PERONEAL COMPRESS: NORMAL
BH CV LOWER VASCULAR LEFT POPLITEAL AUGMENT: NORMAL
BH CV LOWER VASCULAR LEFT POPLITEAL COMPETENT: NORMAL
BH CV LOWER VASCULAR LEFT POPLITEAL COMPRESS: NORMAL
BH CV LOWER VASCULAR LEFT POPLITEAL PHASIC: NORMAL
BH CV LOWER VASCULAR LEFT POPLITEAL SPONT: NORMAL
BH CV LOWER VASCULAR LEFT POSTERIOR TIBIAL COMPRESS: NORMAL
BH CV LOWER VASCULAR LEFT PROFUNDA FEMORAL COMPRESS: NORMAL
BH CV LOWER VASCULAR LEFT PROXIMAL FEMORAL COMPRESS: NORMAL
BH CV LOWER VASCULAR LEFT SAPHENOFEMORAL JUNCTION COMPRESS: NORMAL
BH CV LOWER VASCULAR RIGHT COMMON FEMORAL AUGMENT: NORMAL
BH CV LOWER VASCULAR RIGHT COMMON FEMORAL COMPETENT: NORMAL
BH CV LOWER VASCULAR RIGHT COMMON FEMORAL COMPRESS: NORMAL
BH CV LOWER VASCULAR RIGHT COMMON FEMORAL PHASIC: NORMAL
BH CV LOWER VASCULAR RIGHT COMMON FEMORAL SPONT: NORMAL
BH CV LOWER VASCULAR RIGHT COMMON FEMORAL THROMBUS: NORMAL
BH CV LOWER VASCULAR RIGHT DISTAL FEMORAL COMPRESS: NORMAL
BH CV LOWER VASCULAR RIGHT GASTRONEMIUS COMPRESS: NORMAL
BH CV LOWER VASCULAR RIGHT GREATER SAPH AK COMPRESS: NORMAL
BH CV LOWER VASCULAR RIGHT GREATER SAPH BK COMPRESS: NORMAL
BH CV LOWER VASCULAR RIGHT LESSER SAPH COMPRESS: NORMAL
BH CV LOWER VASCULAR RIGHT LESSER SAPH THROMBUS: NORMAL
BH CV LOWER VASCULAR RIGHT MID FEMORAL AUGMENT: NORMAL
BH CV LOWER VASCULAR RIGHT MID FEMORAL COMPETENT: NORMAL
BH CV LOWER VASCULAR RIGHT MID FEMORAL COMPRESS: NORMAL
BH CV LOWER VASCULAR RIGHT MID FEMORAL PHASIC: NORMAL
BH CV LOWER VASCULAR RIGHT MID FEMORAL SPONT: NORMAL
BH CV LOWER VASCULAR RIGHT PERONEAL COMPRESS: NORMAL
BH CV LOWER VASCULAR RIGHT POPLITEAL AUGMENT: NORMAL
BH CV LOWER VASCULAR RIGHT POPLITEAL COMPETENT: NORMAL
BH CV LOWER VASCULAR RIGHT POPLITEAL COMPRESS: NORMAL
BH CV LOWER VASCULAR RIGHT POPLITEAL PHASIC: NORMAL
BH CV LOWER VASCULAR RIGHT POPLITEAL SPONT: NORMAL
BH CV LOWER VASCULAR RIGHT POPLITEAL THROMBUS: NORMAL
BH CV LOWER VASCULAR RIGHT POSTERIOR TIBIAL COMPRESS: NORMAL
BH CV LOWER VASCULAR RIGHT PROFUNDA FEMORAL COMPRESS: NORMAL
BH CV LOWER VASCULAR RIGHT PROXIMAL FEMORAL COMPRESS: NORMAL
BH CV LOWER VASCULAR RIGHT SAPHENOFEMORAL JUNCTION COMPRESS: NORMAL
BH CV VAS PRELIMINARY FINDINGS SCRIPTING: 1
GLUCOSE BLDC GLUCOMTR-MCNC: 130 MG/DL (ref 70–130)
GLUCOSE BLDC GLUCOMTR-MCNC: 134 MG/DL (ref 70–130)
GLUCOSE BLDC GLUCOMTR-MCNC: 156 MG/DL (ref 70–130)

## 2023-10-13 PROCEDURE — 99233 SBSQ HOSP IP/OBS HIGH 50: CPT | Performed by: STUDENT IN AN ORGANIZED HEALTH CARE EDUCATION/TRAINING PROGRAM

## 2023-10-13 PROCEDURE — 93970 EXTREMITY STUDY: CPT

## 2023-10-13 PROCEDURE — 74018 RADEX ABDOMEN 1 VIEW: CPT

## 2023-10-13 PROCEDURE — 99024 POSTOP FOLLOW-UP VISIT: CPT | Performed by: NEUROLOGICAL SURGERY

## 2023-10-13 PROCEDURE — 97110 THERAPEUTIC EXERCISES: CPT | Performed by: PHYSICAL THERAPIST

## 2023-10-13 PROCEDURE — 25810000003 SODIUM CHLORIDE 0.9 % SOLUTION: Performed by: NEUROLOGICAL SURGERY

## 2023-10-13 PROCEDURE — 97530 THERAPEUTIC ACTIVITIES: CPT | Performed by: PHYSICAL THERAPIST

## 2023-10-13 PROCEDURE — 25810000003 LACTATED RINGERS PER 1000 ML: Performed by: NEUROLOGICAL SURGERY

## 2023-10-13 PROCEDURE — 82948 REAGENT STRIP/BLOOD GLUCOSE: CPT

## 2023-10-13 RX ORDER — ASPIRIN 81 MG/1
81 TABLET, CHEWABLE ORAL DAILY
Status: DISCONTINUED | OUTPATIENT
Start: 2023-10-13 | End: 2023-10-16 | Stop reason: HOSPADM

## 2023-10-13 RX ORDER — HEPARIN SODIUM 10000 [USP'U]/100ML
12 INJECTION, SOLUTION INTRAVENOUS
Status: DISCONTINUED | OUTPATIENT
Start: 2023-10-13 | End: 2023-10-13

## 2023-10-13 RX ADMIN — TICAGRELOR 90 MG: 90 TABLET ORAL at 21:09

## 2023-10-13 RX ADMIN — Medication 10 ML: at 21:10

## 2023-10-13 RX ADMIN — CARBIDOPA AND LEVODOPA 1 TABLET: 25; 250 TABLET ORAL at 21:09

## 2023-10-13 RX ADMIN — Medication 10 ML: at 09:03

## 2023-10-13 RX ADMIN — ASPIRIN 81 MG: 81 TABLET, CHEWABLE ORAL at 11:02

## 2023-10-13 RX ADMIN — SODIUM CHLORIDE 15 MG/HR: 9 INJECTION, SOLUTION INTRAVENOUS at 02:28

## 2023-10-13 RX ADMIN — HYDRALAZINE HYDROCHLORIDE 10 MG: 10 TABLET, FILM COATED ORAL at 02:49

## 2023-10-13 RX ADMIN — CARBIDOPA AND LEVODOPA 1 TABLET: 25; 250 TABLET ORAL at 15:27

## 2023-10-13 RX ADMIN — CARBIDOPA AND LEVODOPA 1 TABLET: 25; 250 TABLET ORAL at 09:19

## 2023-10-13 RX ADMIN — PRAMIPEXOLE DIHYDROCHLORIDE 0.25 MG: 0.25 TABLET ORAL at 15:27

## 2023-10-13 RX ADMIN — SODIUM CHLORIDE, POTASSIUM CHLORIDE, SODIUM LACTATE AND CALCIUM CHLORIDE 100 ML/HR: 600; 310; 30; 20 INJECTION, SOLUTION INTRAVENOUS at 11:03

## 2023-10-13 RX ADMIN — VALSARTAN 160 MG: 160 TABLET, FILM COATED ORAL at 09:19

## 2023-10-13 RX ADMIN — PRAMIPEXOLE DIHYDROCHLORIDE 0.25 MG: 0.25 TABLET ORAL at 09:19

## 2023-10-13 RX ADMIN — SENNOSIDES AND DOCUSATE SODIUM 2 TABLET: 50; 8.6 TABLET ORAL at 09:19

## 2023-10-13 RX ADMIN — TICAGRELOR 90 MG: 90 TABLET ORAL at 09:19

## 2023-10-13 RX ADMIN — LEVOTHYROXINE SODIUM 75 MCG: 0.07 TABLET ORAL at 06:03

## 2023-10-13 RX ADMIN — ATORVASTATIN CALCIUM 80 MG: 80 TABLET, FILM COATED ORAL at 21:09

## 2023-10-13 RX ADMIN — PRAMIPEXOLE DIHYDROCHLORIDE 0.25 MG: 0.25 TABLET ORAL at 21:09

## 2023-10-13 NOTE — PLAN OF CARE
Goal Outcome Evaluation:     ICU transfer around dinner today. 1 day post angioplasty with stent to right ica. Right groin entry point. Also had art line to left wrist. No bleeding, no hematoma. NIH is a 2 for slight facial droop and dysarthria.     Parkinsons dementia/sundowning in ICU at night. Keep alarms on and close to nurse station.

## 2023-10-13 NOTE — ANESTHESIA POSTPROCEDURE EVALUATION
"Patient: Casey Snowden Sr.    Procedure Summary       Date: 10/12/23 Room / Location: [facility] INTERVENTIONAL    Anesthesia Start: 1154 Anesthesia Stop: 1406    Procedure: IR STENT CERV CAROTID WO EMB PROT Diagnosis: (right ICA stent)    Scheduled Providers: Vinod Cadena MD Provider: Soni Champagne MD    Anesthesia Type: MAC ASA Status: 4            Anesthesia Type: MAC    Vitals  Vitals Value Taken Time   /63 10/12/23 2001   Temp     Pulse 64 10/12/23 2024   Resp 17 10/12/23 1610   SpO2 98 % 10/12/23 2024   Vitals shown include unfiled device data.        Post Anesthesia Care and Evaluation    Patient location during evaluation: bedside  Patient participation: complete - patient participated  Level of consciousness: awake and alert  Pain management: adequate    Airway patency: patent  Anesthetic complications: No anesthetic complications  PONV Status: controlled  Cardiovascular status: acceptable and hemodynamically stable  Respiratory status: acceptable, spontaneous ventilation and nonlabored ventilation  Hydration status: acceptable    Comments: /46 (Patient Position: Lying)   Pulse 72   Temp 36.4 øC (97.6 øF) (Oral)   Resp 17   Ht 180.3 cm (71\")   Wt 73.9 kg (163 lb)   SpO2 99%   BMI 22.73 kg/mý           "

## 2023-10-13 NOTE — THERAPY TREATMENT NOTE
Patient Name: Casey Snowden .  : 1934    MRN: [id number]                              Today's Date: 10/13/2023       Admit Date: 10/9/2023    Visit Dx:     ICD-10-CM ICD-9-CM   1. Acute ischemic stroke  I63.9 434.91   2. Left-sided neglect  R41.4 781.8   3. Left homonymous hemianopsia  H53.462 368.46   4. Chronic anticoagulation  Z79.01 V58.61   5. Atrial fibrillation, unspecified type  I48.91 427.31   6. Hypertension, unspecified type  I10 401.9   7. Hyperlipidemia, unspecified hyperlipidemia type  E78.5 272.4     Patient Active Problem List   Diagnosis    Benign essential hypertension    Stenosis of carotid artery    Mixed hyperlipidemia    Parkinson's disease    Peripheral arterial occlusive disease    Screening for prostate cancer    Medication monitoring encounter    Melanoma    Chronic deep vein thrombosis (DVT) of popliteal vein of right lower extremity    Uses hearing aid    Paroxysmal A-fib    Chronic diastolic (congestive) heart failure    Anticoagulant long-term use    Presence of IVC filter    Medicare annual wellness visit, subsequent    Iron deficiency anemia secondary to inadequate dietary iron intake    Orthostatic hypotension due to Parkinson's disease    Coronary artery disease involving native coronary artery of native heart without angina pectoris    ST elevation myocardial infarction involving left anterior descending (LAD) coronary artery    Sialorrhea    Hypertensive heart disease with heart failure    PAD (peripheral artery disease)    Metastatic squamous cell carcinoma    Secondary malignancy of lymph nodes of head, face and neck    Encounter for antineoplastic immunotherapy    RBD (REM behavioral disorder)    Acquired hypothyroidism    Skin tear of upper arm without complication, left, subsequent encounter    Aspiration pneumonia of right lung due to vomit    Dysphagia, neurologic    Acute ischemic stroke     Past Medical History:   Diagnosis Date    Acquired hypothyroidism  8/31/2022    Atrial fibrillation     Cancer 04/2018    basil cell carcinoma    Carotid stenosis     CHF (congestive heart failure)     Chronic deep vein thrombosis (DVT) of popliteal vein of right lower extremity     Coronary artery disease involving native coronary artery of native heart without angina pectoris 12/20/2018    DVT of lower extremity (deep venous thrombosis)     Hard of hearing     Localized edema 1/4/2017    Long-term use of high-risk medication     Metabolic encephalopathy 2/7/2023    PAD (peripheral artery disease) 9/10/2020    Parkinson's disease     Postphlebitic syndrome      Past Surgical History:   Procedure Laterality Date    BASAL CELL CARCINOMA EXCISION  04/2018    CARDIAC CATHETERIZATION N/A 11/5/2018    Procedure: Left Heart Cath;  Surgeon: Oliverio Azar MD;  Location: [facility] CATH INVASIVE LOCATION;  Service: Cardiovascular    CARDIAC CATHETERIZATION N/A 11/5/2018    Procedure: Coronary angiography;  Surgeon: Oliverio Azar MD;  Location: [facility] CATH INVASIVE LOCATION;  Service: Cardiovascular    CARDIAC CATHETERIZATION N/A 11/5/2018    Procedure: Left ventriculography;  Surgeon: Oliverio Azar MD;  Location: [facility] CATH INVASIVE LOCATION;  Service: Cardiovascular    CARDIAC CATHETERIZATION N/A 6/4/2019    Procedure: Left Heart Cath;  Surgeon: Johnny Glasgow MD;  Location: [facility] CATH INVASIVE LOCATION;  Service: Cardiovascular    CARDIAC CATHETERIZATION N/A 6/4/2019    Procedure: Coronary angiography;  Surgeon: Johnny Glasgow MD;  Location: [facility] CATH INVASIVE LOCATION;  Service: Cardiovascular    CARDIAC CATHETERIZATION N/A 6/4/2019    Procedure: Left ventriculography;  Surgeon: Johnny Glasgow MD;  Location: [facility] CATH INVASIVE LOCATION;  Service: Cardiovascular    CARDIAC CATHETERIZATION N/A 6/4/2019    Procedure: Stent BILL coronary;  Surgeon: Johnny Glasgow MD;  Location: [facility] CATH INVASIVE LOCATION;  Service: Cardiovascular    CARDIAC  SURGERY      CATARACT EXTRACTION Left 08/2018    CATARACT EXTRACTION Right 09/2018    COLONOSCOPY      2011    INCISION AND DRAINAGE LEG Right 7/7/2017    Procedure: INCISION AND DRAINAGE INFECTED HEMATOMA RIGHT THIGH;  Surgeon: Valentin Peña MD;  Location: [facility];  Service:     JOINT REPLACEMENT      KNEE INCISION AND DRAINAGE Right 12/27/2016    Procedure: KNEE INCISION AND DRAINAGE;  Surgeon: Triston Whitten MD;  Location: [facility];  Service:     NOSE SURGERY      nose replacement    SKIN BIOPSY      SKIN GRAFT  12/2017    TOTAL KNEE ARTHROPLASTY Right     VASCULAR SURGERY      VENA CAVA FILTER PLACEMENT        General Information       Row Name 10/13/23 1502 10/13/23 1501       Physical Therapy Time and Intention    Document Type re-evaluation  - therapy note (daily note)  -    Mode of Treatment -- individual therapy;physical therapy  -              User Key  (r) = Recorded By, (t) = Taken By, (c) = Cosigned By      Initials Name Provider Type    Soo Hyman, PT Physical Therapist                   Mobility       Row Name 10/13/23 1501          Bed Mobility    Supine-Sit [place] (Bed Mobility) minimum assist (75% patient effort);2 person assist  -       Row Name 10/13/23 1501          Sit-Stand Transfer    Sit-Stand [place] (Transfers) contact guard  -     Assistive Device (Sit-Stand Transfers) walker, front-wheeled  -       Row Name 10/13/23 1501          Gait/Stairs (Locomotion)    [place] Level (Gait) contact guard;set up;verbal cues  -     Assistive Device (Gait) walker, front-wheeled  -     Distance in Feet (Gait) 125 ft  -     Deviations/Abnormal Patterns (Gait) matt decreased;stride length decreased  -     Bilateral Gait Deviations forward flexed posture;heel strike decreased  -               User Key  (r) = Recorded By, (t) = Taken By, (c) = Cosigned By      Initials Name Provider Type    Soo Hyman, PT Physical  Therapist                   Obj/Interventions       Row Name 10/13/23 1502          Range of Motion Comprehensive    Comment, General Range of Motion [facility] Name 10/13/23 1502          Strength Comprehensive (MMT)    Comment, General Manual Muscle Testing (MMT) Assessment General acute hospital 10/13/23 1502          Motor Skills    Therapeutic Exercise --  BLE AP, LAQ, seated a marching x 10 reps  -               User Key  (r) = Recorded By, (t) = Taken By, (c) = Cosigned By      Initials Name Provider Type    Soo Hyman, PT Physical Therapist                   Goals/Plan       Row Name 10/13/23 1507          Bed Mobility Goal 1 (PT)    Activity/Assistive Device (Bed Mobility Goal 1, PT) bed mobility activities, all  -KH     [place] Level/Cues Needed (Bed Mobility Goal 1, PT) modified independence  -KH     Time Frame (Bed Mobility Goal 1, PT) 2 weeks  -KH       Row Name 10/13/23 1507          Transfer Goal 1 (PT)    Activity/Assistive Device (Transfer Goal 1, PT) bed-to-chair/chair-to-bed;sit-to-stand/stand-to-sit;walker, rolling  -KH     [place] Level/Cues Needed (Transfer Goal 1, PT) modified independence  -KH     Time Frame (Transfer Goal 1, PT) 2 weeks  -KH       Row Name 10/13/23 1507          Gait Training Goal 1 (PT)    Activity/Assistive Device (Gait Training Goal 1, PT) assistive device use;walker, rolling  -KH     Distance (Gait Training Goal 1, PT) 300 ft  -KH     Time Frame (Gait Training Goal 1, PT) 2 weeks  -               User Key  (r) = Recorded By, (t) = Taken By, (c) = Cosigned By      Initials Name Provider Type    Soo Hyman, PT Physical Therapist                   Clinical Impression       [facility] Name 10/13/23 1503          Pain    Pretreatment Pain Rating 0/10 - no pain  -     Posttreatment Pain Rating 0/10 - no pain  -KH       Row Name 10/13/23 1503          Plan of Care Review    Outcome Evaluation Pt is now s/p carotid stent  placement and angioplasty. Pt was eager to get OOB. Increased assistance for bed mobility today. Pt sat EOB well dmeosntrated good strength. Stood with CGA and ambulated 125 ft with CGa and Rwx. Pt up in recliner and tolerated BLE exercises well. Wife in the room. Rn notified  -       Row Name 10/13/23 1507          Therapy Assessment/Plan (PT)    Therapy Frequency (PT) 6 times/wk  -       Row Name 10/13/23 1504          Positioning and Restraints    Pre-Treatment Position in bed  -KH     Post Treatment Position chair  -KH     In Bed sitting;call light within reach;encouraged to call for assist;with family/caregiver;notified ns  -               User Key  (r) = Recorded By, (t) = Taken By, (c) = Cosigned By      Initials Name Provider Type    Soo Hyman, FANY Physical Therapist                   Outcome Measures       Row Name 10/13/23 1503          How much help from another person do you currently need...    Turning from your back to your side while in flat bed without using bedrails? 3  -KH     Moving from lying on back to sitting on the side of a flat bed without bedrails? 3  -KH     Moving to and from a bed to a chair (including a wheelchair)? 3  -KH     Standing up from a chair using your arms (e.g., wheelchair, bedside chair)? 3  -KH     Climbing 3-5 steps with a railing? 2  -KH     To walk in hospital room? 3  -KH     AM-PAC 6 Clicks Score (PT) 17  -KH     Highest level of mobility 5 --> Static standing  -       Row Name 10/13/23 1502          Functional Assessment    Outcome Measure Options AM-PAC 6 Clicks Basic Mobility (PT)  -               User Key  (r) = Recorded By, (t) = Taken By, (c) = Cosigned By      Initials Name Provider Type    Soo Hyman PT Physical Therapist                                 Physical Therapy Education       Title: PT OT SLP Therapies (In Progress)       Topic: Physical Therapy (In Progress)       Point: Mobility training (In Progress)        Learning Progress Summary             Patient Acceptance, E, NR by  at 10/11/2023 1546                         Point: Home exercise program (In Progress)       Learning Progress Summary             Patient Acceptance, E, NR by  at 10/11/2023 1546                         Point: Body mechanics (In Progress)       Learning Progress Summary             Patient Acceptance, E, NR by  at 10/11/2023 1546                         Point: Precautions (In Progress)       Learning Progress Summary             Patient Acceptance, E, NR by  at 10/11/2023 1546                                         User Key       Initials Effective Dates Name Provider Type [facility] 06/16/21 -  Alexandra Lucio, PT Physical Therapist PT                  PT Recommendation and Plan     Outcome Evaluation: Pt is now s/p carotid stent placement and angioplasty. Pt was eager to get OOB. Increased assistance for bed mobility today. Pt sat EOB well dmeosntrated good strength. Stood with CGA and ambulated 125 ft with CGa and Rwx. Pt up in recliner and tolerated BLE exercises well. Wife in the room. Rn notified     Time Calculation:         PT Charges       Row Name 10/13/23 1507             Time Calculation    Start Time 1357  -      Stop Time 1420  -KH      Time Calculation (min) 23 min  -KH      PT Received On 10/13/23  -      PT - Next Appointment 10/14/23  -      PT Goal Re-Cert Due Date 10/27/23  -         Time Calculation- PT    Total Timed Code Minutes- PT 23 minute(s)  -KH         Timed Charges    68198 - PT Therapeutic Exercise Minutes 10  -KH      58836 - PT Therapeutic Activity Minutes 13  -KH         Total Minutes    Timed Charges Total Minutes 23  -KH       Total Minutes 23  -KH                User Key  (r) = Recorded By, (t) = Taken By, (c) = Cosigned By      Initials Name Provider Type    Soo Hyman, PT Physical Therapist                  Therapy Charges for Today       Code Description Service Date  Service Provider Modifiers Qty    04649832597  PT THER PROC EA 15 MIN 10/13/2023 Soo Alvarado, PT GP 1    [id number]  PT THERAPEUTIC ACT EA 15 MIN 10/13/2023 Soo Alvarado, PT GP 1            PT G-Codes  Outcome Measure Options: AM-PAC 6 Clicks Basic Mobility (PT)  AM-PAC 6 Clicks Score (PT): 17  AM-PAC 6 Clicks Score (OT): 20  Modified [place] Scale: 3 - Moderate disability.  Requiring some help, but able to walk without assistance.       Soo Alvarado, PT  10/13/2023

## 2023-10-13 NOTE — NURSING NOTE
Spoke with Dr. Ponce about pt refusing po meds still, Brilinta is every 12 hr and was given on day shift at 1104 pm so now past due by 26 min. He gave an order to place NGT for po meds for now.

## 2023-10-13 NOTE — PROGRESS NOTES
Neurosurgery progress note    Post procedure day #1 s/p cerebral angiogram with right-sided stent/angioplasty    Subjective: No events reported overnight.    Objective:  Vitals:    10/13/23 0615 10/13/23 0630 10/13/23 0700 10/13/23 0702   BP:   124/58    BP Location:       Patient Position:       Pulse: 71 88 70    Resp:       Temp:    97.4 °F (36.3 °C)   TempSrc:    Oral   SpO2: 96% 96% 97%    Weight:       Height:           Physical exam  Awake, alert, oriented x1  Pupils equal round reactive to light  Extraocular muscles intact  Face symmetric  Speech is fluent and clear  No pronator drift  Motor exam  Bilateral deltoids 5/5, bilateral biceps 5/5, bilateral triceps 5/5, bilateral wrist extension 5/5 bilateral hand  5/5  Bilateral hip flexion 5/5, bilateral knee extension 5/5, bilateral DF/PF 5/5  No evidence of groin hematoma  Gait deferred  Able to detect  light touch in all 4 extremities    Assessment/plan   89-year-old male post procedure day #1 s/p right-sided stent/angioplasty  -He appears to have tolerated the procedure well.  He has had mild delirium overnight  -There is no evidence of groin hematoma.  Okay to transfer to the floor from a neurosurgery standpoint.    -Continue aspirin 81 mg daily and Brilinta 90 mg twice daily for 1 week and then will need aspirin 81 mg daily after 1 week.  Can resume anticoagulation with aspirin 81 mg in 1 week.  I would not recommend anticoagulation with dual antiplatelet therapy  -I will plan to see him back in clinic in 4 weeks with a repeat carotid ultrasound to establish a new baseline  -Okay from a neurosurgery standpoint to work with PT/OT for discharge planning

## 2023-10-13 NOTE — NURSING NOTE
Patient here from ICU. Family at bedside. He is able to stand and pivot from cart to chair. NIH and assessment completed

## 2023-10-13 NOTE — PLAN OF CARE
Goal Outcome Evaluation:         Patient had multiple times trying to ambulate without assistance and pulling tubing/lines off. Wife spoke on the phone with him to help him calm down. Restraints were started at that time to keep him safe from falling.     Relaxed and calm about lines/tubes since around 0100, he fell asleep well at 0130 after turning and getting comfortable. He had refused meds all night, so had to get NGT for Brilinta and parkinson's meds.

## 2023-10-13 NOTE — PROGRESS NOTES
"DOS: 10/13/2023  NAME: Casey Snowden .   : 1934  PCP: Aleksander Diez MD  Chief Complaint   Patient presents with    Neuro Deficit(s)       Chief complaint: Stroke  Subjective: Patient seen and examined at the bedside this morning, had an episode of delirium last night after the surgery for his right ICA stenting.  Currently he is day 1 postoperative, neuro exam stable.    Objective:  Vital signs: /70   Pulse 67   Temp 97.4 °F (36.3 °C) (Oral)   Resp 19   Ht 180.3 cm (71\")   Wt 76 kg (167 lb 8.8 oz)   SpO2 97%   BMI 23.37 kg/m²    Gen: NAD, vitals reviewed  MS: oriented x3, fair attention/concentration, language grossly intact, no neglect.  CN: visual acuity grossly normal, PERRL, EOMI, no facial droop, moderate dysarthria  Motor: Moves all extremities against gravity, no drifting, normal tone  Sensory: intact to light touch all 4 ext.    Laboratory results:  Lab Results   Component Value Date    GLUCOSE 100 (H) 10/12/2023    CALCIUM 8.6 10/12/2023     10/12/2023    K 3.8 10/12/2023    CO2 24.0 10/12/2023     10/12/2023    BUN 14 10/12/2023    CREATININE 0.68 (L) 10/12/2023    EGFRIFAFRI 102 10/27/2017    EGFRIFNONA 99 2019    BCR 20.6 10/12/2023    ANIONGAP 10.0 10/12/2023     Lab Results   Component Value Date    WBC 5.73 10/12/2023    HGB 12.5 (L) 10/12/2023    HCT 35.5 (L) 10/12/2023    MCV 95.2 10/12/2023     10/12/2023     Lab Results   Component Value Date    LDL 71 10/10/2023    LDL 44 2023    LDL 75 2019         Lab 10/10/23  0444   HEMOGLOBIN A1C 5.70*        Review of labs: Morning labs pending.    Review and interpretation of imaging: No repeated brain image to review.    Diagnoses:  Right MCA small acute ischemic strokes  Right ICA severe stenosis status post stenting postoperative day 1  Atrial fibrillation      Comments: Patient got delirium last night.    Plan:  1.  Continue dual antiplatelet therapy with Brilinta 90 mg twice daily and " aspirin 81 mg for 1 week.  After 1 week restart Eliquis 5 mg twice daily, continue with aspirin 81 mg daily and stop Brilinta.  This plan was discussed with Dr. Cadena the neuro interventionalists.  We both agreed that his strokes all on the MCA territory with involvement of the temporal lobe and the lateral thalamus likely related to anterior choroidal branch which is on the MCA territory, some watershed stroke on the MCA PCA territory.  2.  Hold Eliquis 5 mg twice daily for 1 week and restart.  When restarting Eliquis stop Brilinta and continue with aspirin.  anticoagulation.  3.  Swallow evaluation  4.  Patient is stable to be transferred out of the ICU from the stroke standpoint.  5.  Repeat stat CT head with worsening neurological examination.    Follow delirium precautions as detailed below.    Delirium precautions:    1. Frequent reorientation, reminding patient not questioning patient   2. Shades up during day/down at night   3. TV off except for soft music only   4. Familiar objects at bedside   5. Encourage friends/family to spend the night   6. Minimize anticholingeric medications   7. Minimize polypharmacy   8. Minimize restraints, includes lines and/or foleys and/or feeding tubes as able   9. Minimize overnight checks/vitals to encourage restful consistent sleep patterns   10. Out of bed during day as much as possible     Management discussed with ICU attending, will discuss with neurosurgery.    Addendum:  The above plan discussed with Dr. Cadena.

## 2023-10-13 NOTE — NURSING NOTE
Attempted over the past several hours to give pt night medications, including his Brilinta, carvidopa-levodopa, tylenol he requested and he stated he did not want to take the medications. He is able to say name and birthday and the year. Baseline Parkinson's disease with dementia, memory loss, but is alert and oriented to all but location at this time. Called patient's wife twice and she asked him to take the medications. He still refused. Will continue to offer night medications to patient when he allows.

## 2023-10-13 NOTE — PLAN OF CARE
Goal Outcome Evaluation:              Spoke with RN. Patient not appropriate for VFSS this morning. Will defer instrumental and complete when patient is more alert.     Recommend continue puree solids and nectar thick liquids by cup. Medication with applesauce. Precautions: upright, small bites/sips, avoid straws.

## 2023-10-13 NOTE — PLAN OF CARE
Goal Outcome Evaluation:              Outcome Evaluation: Pt is now s/p carotid stent placement and angioplasty. Pt was eager to get OOB. Increased assistance for bed mobility today. Pt sat EOB well dmeosntrated good strength. Stood with CGA and ambulated 125 ft with CGa and Rwx. Pt up in recliner and tolerated BLE exercises well. Wife in the room. Rn notified

## 2023-10-14 PROCEDURE — 97116 GAIT TRAINING THERAPY: CPT

## 2023-10-14 RX ADMIN — LEVOTHYROXINE SODIUM 75 MCG: 0.07 TABLET ORAL at 05:15

## 2023-10-14 RX ADMIN — Medication 10 ML: at 20:13

## 2023-10-14 RX ADMIN — CARBIDOPA AND LEVODOPA 1 TABLET: 25; 250 TABLET ORAL at 10:19

## 2023-10-14 RX ADMIN — TICAGRELOR 90 MG: 90 TABLET ORAL at 20:12

## 2023-10-14 RX ADMIN — PRAMIPEXOLE DIHYDROCHLORIDE 0.25 MG: 0.25 TABLET ORAL at 20:11

## 2023-10-14 RX ADMIN — ASPIRIN 81 MG: 81 TABLET, CHEWABLE ORAL at 10:18

## 2023-10-14 RX ADMIN — VALSARTAN 160 MG: 160 TABLET, FILM COATED ORAL at 10:19

## 2023-10-14 RX ADMIN — TICAGRELOR 90 MG: 90 TABLET ORAL at 10:20

## 2023-10-14 RX ADMIN — ATORVASTATIN CALCIUM 80 MG: 80 TABLET, FILM COATED ORAL at 20:11

## 2023-10-14 RX ADMIN — CARBIDOPA AND LEVODOPA 1 TABLET: 25; 250 TABLET ORAL at 20:11

## 2023-10-14 RX ADMIN — CARBIDOPA AND LEVODOPA 1 TABLET: 25; 250 TABLET ORAL at 17:07

## 2023-10-14 RX ADMIN — PRAMIPEXOLE DIHYDROCHLORIDE 0.25 MG: 0.25 TABLET ORAL at 17:07

## 2023-10-14 RX ADMIN — PRAMIPEXOLE DIHYDROCHLORIDE 0.25 MG: 0.25 TABLET ORAL at 10:19

## 2023-10-14 NOTE — PROGRESS NOTES
Name: Casey Snowden . ADMIT: 10/9/2023   : 1934  PCP: Aleksander Diez MD    MRN: [id number] LOS: 5 days   AGE/SEX: 89 y.o. male  ROOM[facility]     Subjective   He underwent stenting and angioplasty of severe right ICA stenosis on 10/12.  He was then transferred to the ICU for monitoring.  He has been stable and is now on 5P.     He is doing well.  Intermittently asleep during the encounter.    Objective   Objective   Vital Signs  Temp:  [97.9 °F (36.6 °C)-98.6 °F (37 °C)] 98.2 °F (36.8 °C)  Heart Rate:  [58-63] 58  Resp:  [16-18] 16  BP: ()/(51-65) 132/59  SpO2:  [97 %-100 %] 99 %  on   ;   Device (Oxygen Therapy): room air  Body mass index is 24.04 kg/m².  Physical Exam  Constitutional:       Comments: Chronically ill-appearing   HENT:      Head: Normocephalic and atraumatic.   Eyes:      Extraocular Movements: Extraocular movements intact.      Pupils: Pupils are equal, round, and reactive to light.   Cardiovascular:      Rate and Rhythm: Normal rate and regular rhythm.   Pulmonary:      Effort: Pulmonary effort is normal. No respiratory distress.   Abdominal:      General: There is no distension.      Palpations: Abdomen is soft.      Tenderness: There is no abdominal tenderness.   Musculoskeletal:      Right lower leg: No edema.      Left lower leg: No edema.   Neurological:      Mental Status: He is alert and oriented to person, place, and time.      Motor: Weakness present.         Results Review     I reviewed the patient's new clinical results.  Results from last 7 days   Lab Units 10/12/23  0442 10/11/23  0447 10/10/23  0444 10/09/23  1203   WBC 10*3/mm3 5.73 4.75 4.04 7.28   HEMOGLOBIN g/dL 12.5* 13.3 12.5* 13.8   PLATELETS 10*3/mm3 151 158 162 168     Results from last 7 days   Lab Units 10/12/23  0442 10/11/23  0447 10/10/23  0444 10/09/23  1207 10/09/23  1203   SODIUM mmol/L 140 138 139  --  146*   POTASSIUM mmol/L 3.8 3.9 3.7  --  4.6   CHLORIDE mmol/L 106 107 106  --  106    CO2 mmol/L 24.0 23.0 23.7  --  26.4   BUN mg/dL 14 16 16  --  18   CREATININE mg/dL 0.68* 0.70* 0.66* 0.90 0.89   GLUCOSE mg/dL 100* 122* 103*  --  58*   Estimated Creatinine Clearance: 81.5 mL/min (A) (by C-G formula based on SCr of 0.68 mg/dL (L)).  Results from last 7 days   Lab Units 10/09/23  1203   ALBUMIN g/dL 4.2   BILIRUBIN mg/dL 0.5   ALK PHOS U/L 90   AST (SGOT) U/L 12   ALT (SGPT) U/L <5     Results from last 7 days   Lab Units 10/12/23  0442 10/11/23  0447 10/10/23  0444 10/09/23  1203   CALCIUM mg/dL 8.6 8.9 8.8 9.4   ALBUMIN g/dL  --   --   --  4.2       COVID19   Date Value Ref Range Status   10/23/2022 Not Detected Not Detected - Ref. Range Final     SARS-CoV-2, MARCELO   Date Value Ref Range Status   07/20/2020 Not Detected Not Detected Final     Comment:     This test was developed and its performance characteristics determined  by [employer]. This test has not been FDA cleared or  approved. This test has been authorized by FDA under an Emergency Use  Authorization (EUA). This test is only authorized for the duration of  time the declaration that circumstances exist justifying the  authorization of the emergency use of in vitro diagnostic tests for  detection of SARS-CoV-2 virus and/or diagnosis of COVID-19 infection  under section 564(b)(1) of the Act, 21 U.S.C. 360bbb-3(b)(1), unless  the authorization is terminated or revoked sooner.  When diagnostic testing is negative, the possibility of a false  negative result should be considered in the context of a patient's  recent exposures and the presence of clinical signs and symptoms  consistent with COVID-19. An individual without symptoms of COVID-19  and who is not shedding SARS-CoV-2 virus would expect to have a  negative (not detected) result in this assay.     Glucose   Date/Time Value Ref Range Status   10/13/2023 1120 130 70 - 130 mg/dL Final   10/13/2023 0523 156 (H) 70 - 130 mg/dL Final   10/13/2023 0013 134 (H) 70 - 130 mg/dL  Final   10/12/2023 1649 104 70 - 130 mg/dL Final     Results for orders placed or performed during the hospital encounter of 02/07/23   Blood Culture - Blood, Arm, Right    Specimen: Arm, Right; Blood   Result Value Ref Range    Blood Culture No growth at 5 days          Duplex Venous Lower Extremity - Bilateral CAR    Acute right lower extremity deep vein thrombosis noted in the common   femoral.    Chronic right lower extremity deep vein thrombosis noted in the   popliteal.    Chronic right lower extremity superficial thrombophlebitis noted in the   small saphenous.    All other veins appeared normal bilaterally.  XR Abdomen KUB  XR ABDOMEN KUB-     HISTORY: 89-year-old male status post NG tube placement.     FINDINGS: Tip of gastric tube is within the proximal stomach, a few  centimeters distal to the GE junction.     This report was finalized on 10/13/2023 7:08 AM by Dr. Maddie Knight M.D  on Workstation: [id number]       Scheduled Medications  aspirin, 81 mg, Oral, Daily  atorvastatin, 80 mg, Oral, Nightly  carbidopa-levodopa, 1 tablet, Oral, TID  levothyroxine, 75 mcg, Oral, Q AM  pramipexole, 0.25 mg, Oral, TID  senna-docusate sodium, 2 tablet, Oral, BID  sodium chloride, 500 mL, Intravenous, Once  sodium chloride, 10 mL, Intravenous, Q12H  sodium chloride, 10 mL, Intravenous, Q12H  ticagrelor, 90 mg, Oral, BID  valsartan, 160 mg, Oral, Daily    Infusions  lactated ringers, 100 mL/hr, Last Rate: 100 mL/hr (10/13/23 1103)    Diet  Diet: Regular/House Diet; Texture: Pureed (NDD 1); Fluid Consistency: Nectar Thick       Assessment/Plan     Active Hospital Problems    Diagnosis  POA    **Acute ischemic stroke [I63.9]  Yes    Chronic diastolic (congestive) heart failure [I50.32]  Yes    Presence of IVC filter [Z95.828]  Not Applicable    Anticoagulant long-term use [Z79.01]  Not Applicable    Paroxysmal A-fib [I48.0]  Yes    Benign essential hypertension [I10]  Yes      Resolved Hospital Problems   No resolved  problems to display.       89 y.o. male admitted with Acute ischemic stroke.    Acute strokes of the right MCA  Right ICA severe stenosis with possible thrombus  Carotid duplex showed near complete occlusion of the right ICA, and 50 to 69% stenosis of the left ICA.  He underwent stenting on 10/12 with neurosurgery.  He is to remain on Brilinta 90 mg twice daily and aspirin 81 mg for 1 week (through 10/18).  He will then stop the Brilinta, resume his, and continue aspirin.    DVT of the popliteal vein of right lower extremity  IVC filter noted     Paroxysmal atrial fibrillation  Eliquis currently held as above     Parkinson disorder  Continue home Sinemet, pramipexole     Coronary artery disease  Aspirin, statin     Hypertension   hypothyroidism      SCDs for DVT ppx   Full code.  Discussed with patient and family.        Hernán Saldivar MD  [facility]  10/14/23  13:05 EDT    Copied text on this note has been reviewed and updated as of 10/14/23

## 2023-10-14 NOTE — PLAN OF CARE
Goal Outcome Evaluation:            Pt. Slept well throughout night, waking at 0300 and wanting to call wife, but went back to sleep. Pt. Has had no bleeding, no confusion during night. Ambulates to bathroom with one assist. No other changes.

## 2023-10-14 NOTE — PLAN OF CARE
Goal Outcome Evaluation:  Plan of Care Reviewed With: patient        Progress: improving  Outcome Evaluation: Pt seen for PT treatment today. Pt required CGA with STS transfers and cues for hand placement. Pt able to ambulate 125ft with rwx, CGA. Pt a little unsteady but no LOB. Will continue to progress pt as able.

## 2023-10-14 NOTE — THERAPY TREATMENT NOTE
Patient Name: Casey Snowden .  : 1934    MRN: [id number]                              Today's Date: 10/14/2023       Admit Date: 10/9/2023    Visit Dx:     ICD-10-CM ICD-9-CM   1. Acute ischemic stroke  I63.9 434.91   2. Left-sided neglect  R41.4 781.8   3. Left homonymous hemianopsia  H53.462 368.46   4. Chronic anticoagulation  Z79.01 V58.61   5. Atrial fibrillation, unspecified type  I48.91 427.31   6. Hypertension, unspecified type  I10 401.9   7. Hyperlipidemia, unspecified hyperlipidemia type  E78.5 272.4     Patient Active Problem List   Diagnosis    Benign essential hypertension    Stenosis of carotid artery    Mixed hyperlipidemia    Parkinson's disease    Peripheral arterial occlusive disease    Screening for prostate cancer    Medication monitoring encounter    Melanoma    Chronic deep vein thrombosis (DVT) of popliteal vein of right lower extremity    Uses hearing aid    Paroxysmal A-fib    Chronic diastolic (congestive) heart failure    Anticoagulant long-term use    Presence of IVC filter    Medicare annual wellness visit, subsequent    Iron deficiency anemia secondary to inadequate dietary iron intake    Orthostatic hypotension due to Parkinson's disease    Coronary artery disease involving native coronary artery of native heart without angina pectoris    ST elevation myocardial infarction involving left anterior descending (LAD) coronary artery    Sialorrhea    Hypertensive heart disease with heart failure    PAD (peripheral artery disease)    Metastatic squamous cell carcinoma    Secondary malignancy of lymph nodes of head, face and neck    Encounter for antineoplastic immunotherapy    RBD (REM behavioral disorder)    Acquired hypothyroidism    Skin tear of upper arm without complication, left, subsequent encounter    Aspiration pneumonia of right lung due to vomit    Dysphagia, neurologic    Acute ischemic stroke     Past Medical History:   Diagnosis Date    Acquired hypothyroidism  8/31/2022    Atrial fibrillation     Cancer 04/2018    basil cell carcinoma    Carotid stenosis     CHF (congestive heart failure)     Chronic deep vein thrombosis (DVT) of popliteal vein of right lower extremity     Coronary artery disease involving native coronary artery of native heart without angina pectoris 12/20/2018    DVT of lower extremity (deep venous thrombosis)     Hard of hearing     Localized edema 1/4/2017    Long-term use of high-risk medication     Metabolic encephalopathy 2/7/2023    PAD (peripheral artery disease) 9/10/2020    Parkinson's disease     Postphlebitic syndrome      Past Surgical History:   Procedure Laterality Date    BASAL CELL CARCINOMA EXCISION  04/2018    CARDIAC CATHETERIZATION N/A 11/5/2018    Procedure: Left Heart Cath;  Surgeon: Oliverio Azar MD;  Location[facility] LOC[facility];  Service: Cardiovascular    CARDIAC CATHETERIZATION N/A 11/5/2018    Procedure: Coronary angiography;  Surgeon: Oliverio Azar MD;  Location[facility];  Service: Cardiovascular    CARDIAC CATHETERIZATION N/A 11/5/2018    Procedure: Left ventriculography;  Surgeon: Oliverio Azar MD;  [facility];  Service: Cardiovascular    CARDIAC CATHETERIZATION N/A 6/4/2019    Procedure: Left Heart Cath;  Surgeon: Johnny Glasgow MD;  [facility];  Service: Cardiovascular    CARDIAC CATHETERIZATION N/A 6/4/2019    Procedure: Coronary angiography;  Surgeon: Johnny Glasgow MD;  [facility];  Service: Cardiovascular    CARDIAC CATHETERIZATION N/A 6/4/2019    Procedure: Left ventriculography;  Surgeon: Johnny Glasgow MD;  [facility];  Service: Cardiovascular    CARDIAC CATHETERIZATION N/A 6/4/2019    Procedure: Stent BILL coronary;  Surgeon: Johnny Glasgow MD;  [facility];  Service: Cardiovascular    CARDIAC  SURGERY      CATARACT EXTRACTION Left 08/2018    CATARACT EXTRACTION Right 09/2018    COLONOSCOPY      2011    INCISION AND DRAINAGE LEG Right 7/7/2017    Procedure: INCISION AND DRAINAGE INFECTED HEMATOMA RIGHT THIGH;  Surgeon: Valentin Peña MD;  Location: [facility] OR;  Service:     JOINT REPLACEMENT      KNEE INCISION AND DRAINAGE Right 12/27/2016    Procedure: KNEE INCISION AND DRAINAGE;  Surgeon: Triston Whitten MD;  Location: [facility];  Service:     NOSE SURGERY      nose replacement    SKIN BIOPSY      SKIN GRAFT  12/2017    TOTAL KNEE ARTHROPLASTY Right     VASCULAR SURGERY      VENA CAVA FILTER PLACEMENT        General Information       Row Name 10/14/23 1247          Physical Therapy Time and Intention    Document Type therapy note (daily note)  -     Mode of Treatment individual therapy;physical therapy  -       Row Name 10/14/23 1247          General Information    Patient Profile Reviewed yes  -     Existing Precautions/Restrictions fall  -       Row Name 10/14/23 1247          Cognition    Orientation Status (Cognition) oriented to;person;place  -       Row Name 10/14/23 1247          Safety Issues, Functional Mobility    Impairments Affecting Function (Mobility) balance;endurance/activity tolerance;strength  -               User Key  (r) = Recorded By, (t) = Taken By, (c) = Cosigned By      Initials Name Provider Type    EB Thu Bowman PTA Physical Therapist Assistant                   Mobility       Row Name 10/14/23 1247          Bed Mobility    Comment, (Bed Mobility) NT-UIC  -       Row Name 10/14/23 1247          Sit-Stand Transfer    Sit-Stand [place] (Transfers) contact guard  -     Assistive Device (Sit-Stand Transfers) walker, front-wheeled  -       Row Name 10/14/23 1247          Gait/Stairs (Locomotion)    [place] Level (Gait) contact guard  -     Assistive Device (Gait) walker, front-wheeled  -     Distance in Feet (Gait) 125  -      Deviations/Abnormal Patterns (Gait) matt decreased;stride length decreased  -EB     Bilateral Gait Deviations forward flexed posture;heel strike decreased  -EB               User Key  (r) = Recorded By, (t) = Taken By, (c) = Cosigned By      Initials Name Provider Type    Thu Collier PTA Physical Therapist Assistant                   Obj/Interventions    No documentation.                  Goals/Plan    No documentation.                  Clinical Impression       Row Name 10/14/23 1249          Plan of Care Review    Plan of Care Reviewed With patient  -EB     Progress improving  -EB     Outcome Evaluation Pt seen for PT treatment today. Pt required CGA with STS transfers and cues for hand placement. Pt able to ambulate 125ft with rwx, CGA. Pt a little unsteady but no LOB. Will continue to progress pt as able.  -EB       Row Name 10/14/23 1249          Therapy Assessment/Plan (PT)    Therapy Frequency (PT) 6 times/wk  -EB       Row Name 10/14/23 1249          Positioning and Restraints    Pre-Treatment Position sitting in chair/recliner  -EB     Post Treatment Position chair  -EB     In Chair sitting;call light within reach;encouraged to call for assist;exit alarm on;with family/caregiver  -EB               User Key  (r) = Recorded By, (t) = Taken By, (c) = Cosigned By      Initials Name Provider Type    Thu Collier PTA Physical Therapist Assistant                   Outcome Measures       Row Name 10/14/23 1250 10/14/23 1015       How much help from another person do you currently need...    Turning from your back to your side while in flat bed without using bedrails? 3  -EB 3  -KP    Moving from lying on back to sitting on the side of a flat bed without bedrails? 3  -EB 3  -KP    Moving to and from a bed to a chair (including a wheelchair)? 3  -EB 3  -KP    Standing up from a chair using your arms (e.g., wheelchair, bedside chair)? 3  -EB 3  -KP    Climbing 3-5 steps with a railing? 2  -EB 2  -KP     To walk in hospital room? 3  - 3  -KP    AM-PAC 6 Clicks Score (PT) 17  - 17  -KP    Highest level of mobility 5 --> Static standing  - 5 --> Static standing  -      Row Name 10/14/23 1250          Modified Roger Scale    Modified Desha Scale 3 - Moderate disability.  Requiring some help, but able to walk without assistance.  -               User Key  (r) = Recorded By, (t) = Taken By, (c) = Cosigned By      Initials Name Provider Type     Silvana Antoine, RN Registered Nurse    Thu Collier PTA Physical Therapist Assistant                                 Physical Therapy Education       Title: PT OT SLP Therapies (In Progress)       Topic: Physical Therapy (In Progress)       Point: Mobility training (Done)       Learning Progress Summary             Patient Acceptance, E, VU by  at 10/14/2023 1251    Acceptance, E, NR by  at 10/11/2023 1546                         Point: Home exercise program (In Progress)       Learning Progress Summary             Patient Acceptance, E, NR by  at 10/11/2023 1546                         Point: Body mechanics (Done)       Learning Progress Summary             Patient Acceptance, E, VU by  at 10/14/2023 1251    Acceptance, E, NR by  at 10/11/2023 1546                         Point: Precautions (Done)       Learning Progress Summary             Patient Acceptance, E, VU by  at 10/14/2023 1251    Acceptance, E, NR by  at 10/11/2023 1546                                         User Key       Initials Effective Dates Name Provider Type Discipline     06/16/21 -  Alexandra Lucio, PT Physical Therapist PT     02/14/23 -  Thu Bowman PTA Physical Therapist Assistant PT                  PT Recommendation and Plan     Plan of Care Reviewed With: patient  Progress: improving  Outcome Evaluation: Pt seen for PT treatment today. Pt required CGA with STS transfers and cues for hand placement. Pt able to ambulate 125ft with rwx, CGA. Pt a little unsteady  but no LOB. Will continue to progress pt as able.     Time Calculation:         PT Charges       Row Name 10/14/23 1247             Time Calculation    Start Time 1103  -EB      Stop Time 1115  -EB      Time Calculation (min) 12 min  -EB      PT Received On 10/14/23  -EB      PT - Next Appointment 10/16/23  -EB         Time Calculation- PT    Total Timed Code Minutes- PT 12 minute(s)  -EB                User Key  (r) = Recorded By, (t) = Taken By, (c) = Cosigned By      Initials Name Provider Type    Thu Collier PTA Physical Therapist Assistant                  Therapy Charges for Today       Code Description Service Date Service Provider Modifiers Qty    [id number] HC GAIT TRAINING EA 15 MIN 10/14/2023 Thu Bowman PTA GP 1            PT G-Codes  Outcome Measure Options: AM-PAC 6 Clicks Basic Mobility (PT)  AM-PAC 6 Clicks Score (PT): 17  AM-PAC 6 Clicks Score (OT): 20  Modified Roger Scale: 3 - Moderate disability.  Requiring some help, but able to walk without assistance.       Thu Bowman PTA  10/14/2023

## 2023-10-15 LAB
ANION GAP SERPL CALCULATED.3IONS-SCNC: 9 MMOL/L (ref 5–15)
BUN SERPL-MCNC: 15 MG/DL (ref 8–23)
BUN/CREAT SERPL: 23.1 (ref 7–25)
CALCIUM SPEC-SCNC: 8.4 MG/DL (ref 8.6–10.5)
CHLORIDE SERPL-SCNC: 107 MMOL/L (ref 98–107)
CO2 SERPL-SCNC: 23 MMOL/L (ref 22–29)
CREAT SERPL-MCNC: 0.65 MG/DL (ref 0.76–1.27)
DEPRECATED RDW RBC AUTO: 45.7 FL (ref 37–54)
EGFRCR SERPLBLD CKD-EPI 2021: 90.1 ML/MIN/1.73
ERYTHROCYTE [DISTWIDTH] IN BLOOD BY AUTOMATED COUNT: 13.1 % (ref 12.3–15.4)
GLUCOSE SERPL-MCNC: 115 MG/DL (ref 65–99)
HCT VFR BLD AUTO: 31.5 % (ref 37.5–51)
HGB BLD-MCNC: 10.9 G/DL (ref 13–17.7)
MCH RBC QN AUTO: 32.6 PG (ref 26.6–33)
MCHC RBC AUTO-ENTMCNC: 34.6 G/DL (ref 31.5–35.7)
MCV RBC AUTO: 94.3 FL (ref 79–97)
PLATELET # BLD AUTO: 121 10*3/MM3 (ref 140–450)
PMV BLD AUTO: 10.2 FL (ref 6–12)
POTASSIUM SERPL-SCNC: 3.5 MMOL/L (ref 3.5–5.2)
RBC # BLD AUTO: 3.34 10*6/MM3 (ref 4.14–5.8)
SODIUM SERPL-SCNC: 139 MMOL/L (ref 136–145)
WBC NRBC COR # BLD: 6.39 10*3/MM3 (ref 3.4–10.8)

## 2023-10-15 PROCEDURE — 80048 BASIC METABOLIC PNL TOTAL CA: CPT | Performed by: STUDENT IN AN ORGANIZED HEALTH CARE EDUCATION/TRAINING PROGRAM

## 2023-10-15 PROCEDURE — 85027 COMPLETE CBC AUTOMATED: CPT | Performed by: STUDENT IN AN ORGANIZED HEALTH CARE EDUCATION/TRAINING PROGRAM

## 2023-10-15 RX ORDER — ATORVASTATIN CALCIUM 20 MG/1
40 TABLET, FILM COATED ORAL NIGHTLY
Status: DISCONTINUED | OUTPATIENT
Start: 2023-10-15 | End: 2023-10-16 | Stop reason: HOSPADM

## 2023-10-15 RX ADMIN — TICAGRELOR 90 MG: 90 TABLET ORAL at 21:01

## 2023-10-15 RX ADMIN — CARBIDOPA AND LEVODOPA 1 TABLET: 25; 250 TABLET ORAL at 15:42

## 2023-10-15 RX ADMIN — LEVOTHYROXINE SODIUM 75 MCG: 0.07 TABLET ORAL at 05:22

## 2023-10-15 RX ADMIN — SENNOSIDES AND DOCUSATE SODIUM 2 TABLET: 50; 8.6 TABLET ORAL at 08:14

## 2023-10-15 RX ADMIN — Medication 10 ML: at 08:10

## 2023-10-15 RX ADMIN — ATORVASTATIN CALCIUM 40 MG: 20 TABLET, FILM COATED ORAL at 21:01

## 2023-10-15 RX ADMIN — TICAGRELOR 90 MG: 90 TABLET ORAL at 08:12

## 2023-10-15 RX ADMIN — ASPIRIN 81 MG: 81 TABLET, CHEWABLE ORAL at 08:12

## 2023-10-15 RX ADMIN — Medication 10 ML: at 21:01

## 2023-10-15 RX ADMIN — CARBIDOPA AND LEVODOPA 1 TABLET: 25; 250 TABLET ORAL at 21:01

## 2023-10-15 RX ADMIN — ACETAMINOPHEN 650 MG: 325 TABLET, FILM COATED ORAL at 14:06

## 2023-10-15 RX ADMIN — PRAMIPEXOLE DIHYDROCHLORIDE 0.25 MG: 0.25 TABLET ORAL at 21:01

## 2023-10-15 RX ADMIN — PRAMIPEXOLE DIHYDROCHLORIDE 0.25 MG: 0.25 TABLET ORAL at 15:42

## 2023-10-15 RX ADMIN — CARBIDOPA AND LEVODOPA 1 TABLET: 25; 250 TABLET ORAL at 08:12

## 2023-10-15 RX ADMIN — PRAMIPEXOLE DIHYDROCHLORIDE 0.25 MG: 0.25 TABLET ORAL at 08:12

## 2023-10-15 RX ADMIN — VALSARTAN 160 MG: 160 TABLET, FILM COATED ORAL at 08:12

## 2023-10-15 NOTE — PROGRESS NOTES
Continued Stay Note  [facility]     Patient Name: Casey Snowden .  MRN: [id number]  Today's Date: 10/15/2023    Admit Date: 10/9/2023    Plan: Home with family and HH, referral pending to [facility]. Follow-up Monday, anticipate DC home after updated Speech eval........Daron SILVERIO   Discharge Plan       Row Name 10/15/23 1246       Plan    Plan Home with family and HH, referral pending to [facility]. Follow-up Monday, anticipate DC home after updated Speech eval........Daron SILVERIO    Patient/Family in Agreement with Plan yes    Plan Comments Inbound call from Dr. Tyson, states patient medically stable for DC once arrangements in place. Home with HH vs. SNF; if patient goes home will need updated SLP eval Monday, will need pre-cert for subacute if rehab option chosen. MD plans for DC Monday. S/W patient's wife Evonne, who is at bedside, by phone and introduced self/role. Pt./spouse prefer home with home health and would like referral to [facility]. Referral in [facility] and  left at [phone]. Follow-up 10/16/23......Daron SILVERIO                         Expected Discharge Date and Time       Expected Discharge Date Expected Discharge Time    Oct 17, 2023               Patricia Hoffman, RN

## 2023-10-15 NOTE — PROGRESS NOTES
Name: Casey Snowden . ADMIT: 10/9/2023   : 1934  PCP: Aleksander Diez MD    MRN: [id number] LOS: 6 days   AGE/SEX: 89 y.o. male  ROOM: [facility]     Subjective   Subjective     No events overnight. No complaints.        Objective   Objective   Vital Signs  Temp:  [98.1 °F (36.7 °C)-98.2 °F (36.8 °C)] 98.1 °F (36.7 °C)  Heart Rate:  [58-64] 59  Resp:  [16] 16  BP: (111-175)/(60-80) 175/66  SpO2:  [98 %-100 %] 98 %  on   ;   Device (Oxygen Therapy): room air  Body mass index is 23.49 kg/m².  Physical Exam  Constitutional:       General: He is not in acute distress.     Appearance: He is not toxic-appearing.   Cardiovascular:      Rate and Rhythm: Normal rate and regular rhythm.      Heart sounds: Normal heart sounds.   Pulmonary:      Effort: Pulmonary effort is normal.      Breath sounds: Normal breath sounds.   Abdominal:      General: Bowel sounds are normal.      Palpations: Abdomen is soft.   Musculoskeletal:         General: No tenderness.      Right lower leg: No edema.      Left lower leg: No edema.   Neurological:      Mental Status: He is alert.   Psychiatric:         Mood and Affect: Mood normal.         Behavior: Behavior normal.         Results Review     I reviewed the patient's new clinical results.  Results from last 7 days   Lab Units 10/15/23  0534 10/12/23  0442 10/11/23  0447 10/10/23  0444   WBC 10*3/mm3 6.39 5.73 4.75 4.04   HEMOGLOBIN g/dL 10.9* 12.5* 13.3 12.5*   PLATELETS 10*3/mm3 121* 151 158 162     Results from last 7 days   Lab Units 10/15/23  0534 10/12/23  0442 10/11/23  0447 10/10/23  0444   SODIUM mmol/L 139 140 138 139   POTASSIUM mmol/L 3.5 3.8 3.9 3.7   CHLORIDE mmol/L 107 106 107 106   CO2 mmol/L 23.0 24.0 23.0 23.7   BUN mg/dL 15 14 16 16   CREATININE mg/dL 0.65* 0.68* 0.70* 0.66*   GLUCOSE mg/dL 115* 100* 122* 103*   Estimated Creatinine Clearance: 83.3 mL/min (A) (by C-G formula based on SCr of 0.65 mg/dL (L)).  Results from last 7 days   Lab Units  10/09/23  1203   ALBUMIN g/dL 4.2   BILIRUBIN mg/dL 0.5   ALK PHOS U/L 90   AST (SGOT) U/L 12   ALT (SGPT) U/L <5     Results from last 7 days   Lab Units 10/15/23  0534 10/12/23  0442 10/11/23  0447 10/10/23  0444 10/09/23  1203   CALCIUM mg/dL 8.4* 8.6 8.9 8.8 9.4   ALBUMIN g/dL  --   --   --   --  4.2       COVID19   Date Value Ref Range Status   10/23/2022 Not Detected Not Detected - Ref. Range Final     SARS-CoV-2, MARCELO   Date Value Ref Range Status   07/20/2020 Not Detected Not Detected Final     Comment:     This test was developed and its performance characteristics determined  by [employer]. This test has not been FDA cleared or  approved. This test has been authorized by FDA under an Emergency Use  Authorization (EUA). This test is only authorized for the duration of  time the declaration that circumstances exist justifying the  authorization of the emergency use of in vitro diagnostic tests for  detection of SARS-CoV-2 virus and/or diagnosis of COVID-19 infection  under section 564(b)(1) of the Act, 21 U.S.C. 360bbb-3(b)(1), unless  the authorization is terminated or revoked sooner.  When diagnostic testing is negative, the possibility of a false  negative result should be considered in the context of a patient's  recent exposures and the presence of clinical signs and symptoms  consistent with COVID-19. An individual without symptoms of COVID-19  and who is not shedding SARS-CoV-2 virus would expect to have a  negative (not detected) result in this assay.     Glucose   Date/Time Value Ref Range Status   10/13/2023 1120 130 70 - 130 mg/dL Final   10/13/2023 0523 156 (H) 70 - 130 mg/dL Final   10/13/2023 0013 134 (H) 70 - 130 mg/dL Final   10/12/2023 1649 104 70 - 130 mg/dL Final       Duplex Venous Lower Extremity - Bilateral CAR    Acute right lower extremity deep vein thrombosis noted in the common   femoral.    Chronic right lower extremity deep vein thrombosis noted in the   popliteal.     Chronic right lower extremity superficial thrombophlebitis noted in the   small saphenous.    All other veins appeared normal bilaterally.  XR Abdomen KUB  XR ABDOMEN KUB-     HISTORY: 89-year-old male status post NG tube placement.     FINDINGS: Tip of gastric tube is within the proximal stomach, a few  centimeters distal to the GE junction.     This report was finalized on 10/13/2023 7:08 AM by Dr. Maddie Knight M.D  on Workstation: [id number]       Scheduled Medications  aspirin, 81 mg, Oral, Daily  atorvastatin, 40 mg, Oral, Nightly  carbidopa-levodopa, 1 tablet, Oral, TID  levothyroxine, 75 mcg, Oral, Q AM  pramipexole, 0.25 mg, Oral, TID  senna-docusate sodium, 2 tablet, Oral, BID  sodium chloride, 500 mL, Intravenous, Once  sodium chloride, 10 mL, Intravenous, Q12H  sodium chloride, 10 mL, Intravenous, Q12H  ticagrelor, 90 mg, Oral, BID  valsartan, 160 mg, Oral, Daily    Infusions   Diet  Diet: Regular/House Diet; Texture: Pureed (NDD 1); Fluid Consistency: Nectar Thick       Assessment/Plan     Active Hospital Problems    Diagnosis  POA    **Acute ischemic stroke [I63.9]  Yes    Chronic diastolic (congestive) heart failure [I50.32]  Yes    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       89 y.o. male admitted with Acute ischemic stroke.    Acute ischemic stroke due to severe right ICA stenosis-s/p angioplasty and stent placement on 10/12/23. MARVIN and neurology recommend 1 week of DAPT with Brilinta and asa while holding eliquis and then exchange brilinta for eliquis  Dysphagia-currently on a modified diet per SLP.  Awaiting SLP reevaluation  Paroxysmal A-fib-Eliquis held as above.  Plan to restart exchange for Brilinta on 10/20/2023  Parkinson's-Sinemet, pramipexole  Coronary artery  disease-ASA/statin  Essential hypertension-bp is a bit on the high side today, but has been otherwise fairly well controlled over the past several days. Continue current regimen and monitor  Hypothyroidism-synthroid  History of DVT of the popliteal vein of the right lower extremity-IVC filter in place  SCDs for DVT prophylaxis.  Full code.  Discussed with patient, family, and CCP.  Anticipate discharge  TBD  tomorrow.      Chilo Tyson MD  [facility]  10/15/23  11:26 EDT    I wore protective equipment throughout this patient encounter including a face mask, gloves and protective eyewear.  Hand hygiene was performed before donning protective equipment and after removal when leaving the room.

## 2023-10-15 NOTE — PLAN OF CARE
Goal Outcome Evaluation:         Pt. Had no confusion or difficulties until 0230. Pt. Woke up not knowing where he was and was ambulating to bathroom, bed alarm alarming. Pt. Stated at that time he was going to his bed in another room. After some talking and showing him familiar items in room, he re-oriented well. No more difficulties except this one incident. Pt. Has a very inflammed rash to back.

## 2023-10-15 NOTE — PROGRESS NOTES
MA informed to schedule appointment with stroke in 4-6 weeks.     No further inpatient stroke work up needed    Plan  Plan:  1.  Continue dual antiplatelet therapy with Brilinta 90 mg twice daily and aspirin 81 mg for 1 week.  After 1 week restart Eliquis 5 mg twice daily, continue with aspirin 81 mg daily and stop Brilinta.  This plan was discussed with Dr. Cadena the neuro interventionalists.    2.  Hold Eliquis 5 mg twice daily for 1 week and restart.  When restarting Eliquis stop Brilinta and continue with aspirin.  anticoagulation.  3.  Decreased Lipitor to 40 mg daily, LDL 44 goal < 70.  4.  BP to normalize  5.  Repeat stat CT head with worsening neurological examination.  5. Follow up with neurology and neurosurgery    Stroke will sign off and see again as needed.    Plan discussed with Dr. Cadena from neurosurgery.

## 2023-10-15 NOTE — DISCHARGE PLACEMENT REQUEST
"Casey Snowden Sr. (89 y.o. Male)       Date of Birth   1934    Social Security Number       Address   [address] [place] [address]    Home Phone   [phone]    MRN   [id number]       [employer]   Faith    Marital Status                               Admission Date   10/9/23    Admission Type   Emergency    Admitting Provider   Hernán Saldivar MD    Attending Provider   Chilo Tyson MD    Department, Room/Bed   [address], P599/1       Discharge Date       Discharge Disposition       Discharge Destination                                 Attending Provider: Chilo Tyson MD    Allergies: Penicillins, Rocephin [Ceftriaxone]    Isolation: None   Infection: None   Code Status: CPR    Ht: 180.3 cm (71\")   Wt: 76.4 kg (168 lb 6.9 oz)    Admission Cmt: None   Principal Problem: Acute ischemic stroke [I63.9]                   Active Insurance as of 10/9/2023       Primary Coverage       Payor Plan Insurance Group Employer/Plan Group    [facility] MEDICARE REPLACEMENT [facility] MEDICARE REPLACEMENT [id number]       Payor Plan Address Payor Plan Phone Number Payor Plan Fax Number Effective Dates    [address] [address]   1/1/2017 - None Entered    [facility] [id number]         Subscriber Name Subscriber Birth Date Member ID       CASEY SNOWDEN SR. 1934 [id number]                     Emergency Contacts        (Rel.) Home Phone Work Phone Mobile Phone    SpEvonne (Spouse) [phone] -- [phone]    Svetlana Sol (Daughter) [phone] -- --    Casey Snowden Jr (Son) [phone] -- --                "

## 2023-10-15 NOTE — PLAN OF CARE
Goal Outcome Evaluation:  Patient did well today. Up to chair, tolerated diet with no s/s of aspiration. Groin site cdi. NIH = 2.   Plan [facility], likely in am after seen by speech.

## 2023-10-16 ENCOUNTER — HOME HEALTH ADMISSION (OUTPATIENT)
Dept: HOME HEALTH SERVICES | Facility: HOME HEALTHCARE | Age: 88
End: 2023-10-16
Payer: MEDICARE

## 2023-10-16 ENCOUNTER — APPOINTMENT (OUTPATIENT)
Dept: GENERAL RADIOLOGY | Facility: HOSPITAL | Age: 88
End: 2023-10-16
Payer: MEDICARE

## 2023-10-16 ENCOUNTER — DOCUMENTATION (OUTPATIENT)
Dept: HOME HEALTH SERVICES | Facility: HOME HEALTHCARE | Age: 88
End: 2023-10-16
Payer: MEDICARE

## 2023-10-16 ENCOUNTER — READMISSION MANAGEMENT (OUTPATIENT)
Dept: CALL CENTER | Facility: HOSPITAL | Age: 88
End: 2023-10-16
Payer: MEDICARE

## 2023-10-16 VITALS
OXYGEN SATURATION: 97 % | HEART RATE: 63 BPM | BODY MASS INDEX: 23.43 KG/M2 | RESPIRATION RATE: 18 BRPM | TEMPERATURE: 97.7 F | SYSTOLIC BLOOD PRESSURE: 143 MMHG | DIASTOLIC BLOOD PRESSURE: 58 MMHG | HEIGHT: 71 IN | WEIGHT: 167.33 LBS

## 2023-10-16 PROBLEM — I63.9 ACUTE ISCHEMIC STROKE: Status: RESOLVED | Noted: 2023-10-09 | Resolved: 2023-10-16

## 2023-10-16 PROCEDURE — 92611 MOTION FLUOROSCOPY/SWALLOW: CPT

## 2023-10-16 PROCEDURE — 74230 X-RAY XM SWLNG FUNCJ C+: CPT

## 2023-10-16 RX ORDER — AMLODIPINE BESYLATE 5 MG/1
5 TABLET ORAL
Status: DISCONTINUED | OUTPATIENT
Start: 2023-10-16 | End: 2023-10-16 | Stop reason: HOSPADM

## 2023-10-16 RX ORDER — AMLODIPINE BESYLATE 5 MG/1
5 TABLET ORAL
Qty: 30 TABLET | Refills: 0 | Status: SHIPPED | OUTPATIENT
Start: 2023-10-16 | End: 2023-11-15

## 2023-10-16 RX ORDER — ATORVASTATIN CALCIUM 40 MG/1
40 TABLET, FILM COATED ORAL NIGHTLY
Qty: 90 TABLET | Refills: 0 | Status: SHIPPED | OUTPATIENT
Start: 2023-10-16

## 2023-10-16 RX ORDER — ASPIRIN 81 MG/1
81 TABLET, CHEWABLE ORAL DAILY
Qty: 90 TABLET | Refills: 0 | Status: SHIPPED | OUTPATIENT
Start: 2023-10-16 | End: 2024-01-14

## 2023-10-16 RX ADMIN — CARBIDOPA AND LEVODOPA 1 TABLET: 25; 250 TABLET ORAL at 14:38

## 2023-10-16 RX ADMIN — BARIUM SULFATE 50 ML: 400 SUSPENSION ORAL at 08:36

## 2023-10-16 RX ADMIN — ASPIRIN 81 MG: 81 TABLET, CHEWABLE ORAL at 09:02

## 2023-10-16 RX ADMIN — VALSARTAN 160 MG: 160 TABLET, FILM COATED ORAL at 09:01

## 2023-10-16 RX ADMIN — AMLODIPINE BESYLATE 5 MG: 5 TABLET ORAL at 12:41

## 2023-10-16 RX ADMIN — Medication 10 ML: at 09:02

## 2023-10-16 RX ADMIN — PRAMIPEXOLE DIHYDROCHLORIDE 0.25 MG: 0.25 TABLET ORAL at 09:02

## 2023-10-16 RX ADMIN — CARBIDOPA AND LEVODOPA 1 TABLET: 25; 250 TABLET ORAL at 09:02

## 2023-10-16 RX ADMIN — ACETAMINOPHEN 650 MG: 325 TABLET, FILM COATED ORAL at 09:03

## 2023-10-16 RX ADMIN — LEVOTHYROXINE SODIUM 75 MCG: 0.07 TABLET ORAL at 05:29

## 2023-10-16 RX ADMIN — BARIUM SULFATE 4 ML: 980 POWDER, FOR SUSPENSION ORAL at 08:36

## 2023-10-16 RX ADMIN — TICAGRELOR 90 MG: 90 TABLET ORAL at 09:01

## 2023-10-16 RX ADMIN — PRAMIPEXOLE DIHYDROCHLORIDE 0.25 MG: 0.25 TABLET ORAL at 14:39

## 2023-10-16 RX ADMIN — BARIUM SULFATE 55 ML: 0.81 POWDER, FOR SUSPENSION ORAL at 08:36

## 2023-10-16 NOTE — PLAN OF CARE
Goal Outcome Evaluation:  Plan of Care Reviewed With: patient           Outcome Evaluation: VFSS completed. Recommend continue with puree and nectar thick liquids- no straws. Recommend meds whole/crushed with puree. Recommend upright, slow rate, alternate liquids/solids. Recommend speech therapy at next level of care. SLP to follow for diet tolerance.

## 2023-10-16 NOTE — CASE MANAGEMENT/SOCIAL WORK
Case Management Discharge Note      Final Note: Patient discharged home with [facility].    Provided Post Acute Provider List?: Yes  Post Acute Provider List: Home Health  Delivered To: Patient  Method of Delivery: In person    Selected Continued Care - Discharged on 10/16/2023 Admission date: 10/9/2023 - Discharge disposition: [facility]Health Care [facility]      Destination    No services have been selected for the patient.                Durable Medical Equipment    No services have been selected for the patient.                Dialysis/Infusion    No services have been selected for the patient.                Home Medical Care Coordination complete.      Service Provider Selected Services Address Phone Fax Patient Preferred    [facility] [address] [address] [phone] --              Therapy    No services have been selected for the patient.                Community Resources    No services have been selected for the patient.                Community & DME    No services have been selected for the patient.                         Final Discharge Disposition Code: 01 - home or self-care

## 2023-10-16 NOTE — PLAN OF CARE
Goal Outcome Evaluation:            Pt. Was alert and oriented until 0330, then had confusion and irritation. Tried to destroy telemetry chords and stated he was not having any tests done. Pt. Re-oriented after a few minutes of explaining, calmed down then back to sleep. Otherwise no changes throughout night.

## 2023-10-16 NOTE — PROGRESS NOTES
Patient is discharging today . Spoke to wife who is agreeable to home health and verified face sheet information is correct . Please call home number please for visit scheduling- [phone]. Orders in Epic for SN,PT, OT and ST. Thank you !

## 2023-10-16 NOTE — DISCHARGE SUMMARY
Patient Name: Casey Snowden Sr.  : 1934  MRN: [id number]    Date of Admission: 10/9/2023  Date of Discharge:  10/16/2023  Primary Care Physician: Aleksander Diez MD      Chief Complaint:   Neuro Deficit(s)      Discharge Diagnoses     Active Hospital Problems    Diagnosis  POA    Chronic diastolic (congestive) heart failure [I50.32]  Yes    Presence of IVC filter [Z95.828]  Not Applicable    Anticoagulant long-term use [Z79.01]  Not Applicable    Paroxysmal A-fib [I48.0]  Yes    Benign essential hypertension [I10]  Yes      Resolved Hospital Problems    Diagnosis Date Resolved POA    **Acute ischemic stroke [I63.9] 10/16/2023 Yes        Hospital Course     Mr. Snowden is a 89 y.o. 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  Please see the admitting history and physical for further details.  He was found to have an acute ischemic stroke due to a severe right ICA stenosis and was admitted to the hospital for further evaluation and treatment.      He was seen in consultation by neurology and neurosurgery. He underwent angioplasty and stent placement to the right ICA on 10/12/23. Neurosurgery recommended dapt with aspirin and brilinta for 1 week following the procedure and then transition to aspirin and eliquis indefinitely. He will need to follow up with neurology and neurosurgery as an outpatient.     Day of Discharge     Subjective:  He was agitated last night, but is doing better now. No complaints.    Physical Exam:  Temp:  [97.7 °F (36.5 °C)-98.4 °F (36.9 °C)] 97.7 °F (36.5 °C)  Heart Rate:  [62-63] 63  Resp:  [16-18] 18  BP: (143-190)/(58-87) 143/58  Body mass index is 23.34 kg/m².  Physical Exam  Constitutional:       General: He is not in acute  distress.     Appearance: He is not toxic-appearing.   Cardiovascular:      Rate and Rhythm: Normal rate and regular rhythm.      Heart sounds: Normal heart sounds.   Pulmonary:      Effort: Pulmonary effort is normal.      Breath sounds: Normal breath sounds.   Abdominal:      General: Bowel sounds are normal.      Palpations: Abdomen is soft.   Musculoskeletal:         General: No tenderness.      Right lower leg: No edema.      Left lower leg: No edema.   Neurological:      Mental Status: He is alert.   Psychiatric:         Mood and Affect: Mood normal.         Behavior: Behavior normal.         Consultants     Consult Orders (all) (From admission, onward)       Start     Ordered    10/12/23 1414  Inpatient Intensivist Consult  Once        Specialty:  Intensive Care  Provider:  (Not yet assigned)    10/12/23 1414    10/10/23 1010  Inpatient Neurointerventionalist Consult  Once        Provider:  (Not yet assigned)    10/10/23 1010    10/09/23 1731  Inpatient Vascular Surgery Consult  Once        Specialty:  Vascular Surgery  Provider:  Kartik Musa II, MD    10/09/23 1730    10/09/23 1510  Notify Stroke Coordinator  Once        Provider:  (Not yet assigned)    10/09/23 1511    10/09/23 1510  Inpatient Rehab Admission Consult  Once        Provider:  (Not yet assigned)    10/09/23 1511    10/09/23 1510  Inpatient Case Management  Consult  Once        Provider:  (Not yet assigned)    10/09/23 1511    10/09/23 1510  Inpatient Diabetes Educator Consult  Once,   Status:  Canceled        Provider:  (Not yet assigned)    10/09/23 1511    10/09/23 1429  Inpatient Case Management  Consult  Once        Provider:  (Not yet assigned)    10/09/23 1428    10/09/23 1314  Inpatient Palliative Care Team Consult  Once,   Status:  Canceled        Provider:  (Not yet assigned)    10/09/23 1314    10/09/23 1301  [facility] (on-call MD unless specified) Details  Once        Specialty:  Hospitalist  Provider:   (Not yet assigned)    10/09/23 1300    10/09/23 1202  Inpatient Neurology Consult Stroke  Once        Specialty:  Neurology  Provider:  (Not yet assigned)    10/09/23 1201    10/09/23 1202  Inpatient Neurology Consult Stroke  Once        Specialty:  Neurology  Provider:  (Not yet assigned)    10/09/23 1201                  Procedures     Imaging Results (All)       Procedure Component Value Units Date/Time    FL Video Swallow Single Contrast [[id number]] Resulted: 10/16/23 0815     Updated: 10/16/23 0838    IR Stent Cerv Carotid Without Emb Prot [[id number]] Resulted: 10/13/23 1019     Updated: 10/13/23 1019    XR Abdomen KUB [[id number]] Collected: 10/13/23 0707     Updated: 10/13/23 0711    Narrative:      XR ABDOMEN KUB-     HISTORY: 89-year-old male status post NG tube placement.     FINDINGS: Tip of gastric tube is within the proximal stomach, a few  centimeters distal to the GE junction.     This report was finalized on 10/13/2023 7:08 AM by Dr. Maddie Knight M.D  on Workstation: [id number]       CT Head Without Contrast [[id number]] Collected: 10/10/23 2007     Updated: 10/11/23 0740    Narrative:      CT HEAD WITHOUT CONTRAST     HISTORY: Left-sided neglect.     COMPARISON: MRI brain 10/09/2023.     FINDINGS: The brain and ventricles are symmetrical. There is no evidence  of intracranial hemorrhage, hydrocephalus or of abnormal extra-axial  fluid. No focal area of decreased attenuation to suggest acute  infarction is identified. The small acute infarcts noted on the MRI  examination of 10/09/2023 are occult.       Impression:      There is no evidence of acute infarction or hemorrhage.  Further evaluation could be performed with MRI examination of the brain.  The above information was called to and discussed with Dr. Chou. .           Radiation dose reduction techniques were utilized, including automated  exposure control and exposure modulation based on body size.        This report was finalized on  10/11/2023 7:37 AM by Dr. Liban Smith M.D on Workstation: [id number]       MRI Brain Without Contrast [[id number]] Collected: 10/09/23 1745     Updated: 10/10/23 1702    Narrative:      MRI EXAMINATION OF THE BRAIN WITHOUT CONTRAST     HISTORY: Confusion, weakness. Left-sided weakness. Melanoma.     COMPARISON: CT head 10/09/2023.     TECHNIQUE: A MRI examination of the brain was performed utilizing  sagittal T1, axial diffusion, T1, T2, T2 FLAIR and susceptibility  weighted imaging.     FINDINGS: Small cortical infarcts are appreciated involving the  precentral and postcentral gyri on the right superiorly measuring 2 to 3  mm in size. An acute cortical infarct involving the right parietal lobe  superiorly and posteriorly is appreciated measuring 4 mm in size. An  acute periventricular infarct involving the right parietal lobe is  appreciated superior to the atrium of the right lateral ventricle. Acute  infarcts are appreciated involving the white matter of the right  temporal lobe posteriorly and medially, periventricular. The larger  infarcts demonstrate mild T2 FLAIR hyperintensity. Mild to moderate  small vessel ischemic disease is noted. There is no evidence of mass or  mass effect on this noncontrast MRI examination of the brain.       Impression:      Multiple small cortical and white matter infarcts involving  the right cerebral hemisphere is appreciated including small infarcts  involving the pre and postcentral gyri on the right superiorly, right  parietal lobe superiorly and posteriorly and white matter of the right  parietal and temporal lobes. The largest infarct measures approximately  4 mm in size and involves the right parietal cortex superiorly and  posteriorly. There is no evidence of hemorrhage. There is no evidence of  mass or of mass effect on this noncontrast MRI examination of the brain.  Moderate small vessel ischemic disease is noted.     The above information was called to and discussed  with Dr. Chou at  the time of the dictation.     This report was finalized on 10/10/2023 4:59 PM by Dr. Liban Smith M.D on Workstation: [id number]       CT Angiogram Head w AI Analysis of LVO [[id number]] Collected: 10/09/23 1410     Updated: 10/09/23 1514    Narrative:      CT ANGIOGRAM OF THE HEAD AND NECK AND CT PERFUSION STUDY     CLINICAL HISTORY: Left-sided weakness and neglect.     TECHNIQUE: A noncontrast head CT was performed with 3 mm axial images.  Thereafter, a CT perfusion study was performed after the dynamic bolus  of IV contrast. Standard perfusion maps were constructed with RAPID  software. A CT angiogram of the head and neck was then performed with 1  mm axial images. Sagittal, coronal, and 3-dimensional reconstructed  images were obtained. Finally, a delayed postcontrast head CT was  performed with 3 mm axial images. AI analysis of LVO was utilized.     COMPARISON: Brain MRI dated 02/07/2023.     FINDINGS:     NONCONTRAST HEAD CT: The ventricles, sulci, and cisterns are  age-appropriate. Mild changes of chronic small vessel ischemic phenomena  are noted. There is no convincing area of abnormal gray-white matter  differentiation to suggest a focus of acute completed infarction.     There is an indeterminate area of skin thickening and subcutaneous fat  density within the right parietal scalp which measures up to  approximately 5 x 17 mm in greatest coronal dimensions and approximately  14 mm in greatest AP dimensions. While this may represent an area of  scarring or scalp hematoma I cannot exclude the possibility of an  infiltrative scalp lesion at this site. I recommend further evaluation  with physical exam findings.     CT PERFUSION STUDY: There is no abnormality seen on the CBF less than  30% maps. On the time to maximal enhancement greater than 6 seconds  maps, there is a 62 cc abnormality seen within the inferior and medial  aspects of the right temporal and occipital lobes within the  right PCA  distribution.     CTA NECK: There is a classic configuration of the aortic arch. There is  a mild degree of stenosis at the origin of the left subclavian artery  and a moderate degree of stenosis at the origin of the right subclavian  artery. There is a moderate degree of stenosis at the origin of the  right vertebral artery and a moderate to severe degree of stenosis is  seen at the origin of the left vertebral artery. A moderate degree of  stenosis is seen at the origin of the left common carotid artery. There  is a mild to moderate degree of stenosis of the distal aspect the left  common carotid where the residual lumen diameter is approximately 4 to 5  mm. Within the proximal portion of the left ICA, there is no significant  NASCET stenosis. Within the proximal portion of the right ICA, there is  an irregular predominantly noncalcified atherosclerotic plaque that  results in at least an 80% NASCET stenosis. Furthermore, there is a  question of a small dangling thrombus within the proximal portion of the  right ICA as best seen on image #177.     CTA HEAD: The intracranial segments of the vertebral arteries are  unremarkable. The basilar artery is within normal limits. There is a  mild stenosis of the proximal left P2 segment and there is a severe  degree of stenosis within the distal left P2 segment. There are 2 severe  stenoses appreciated within the right P2 segment.     Atherosclerotic calcifications are appreciated within the cavernous  segments of the internal carotids resulting in mild degrees of luminal  compromise. There is a prominent appearance of the right MCA vasculature  which likely reflects autoregulatory vasodilatation from the proximal  ICA stenosis. Otherwise, the middle and anterior cerebral's are  unremarkable.     DELAYED POSTCONTRAST HEAD CT: No abnormal foci of contrast enhancement  are seen within the brain parenchyma.       Impression:         Within the proximal portion of  the right ICA, there is a severe stenosis  that is estimated to be at least 80% by NASCET criteria. This is  secondary to a largely noncalcified atherosclerotic plaque. The plaque  is irregular and there may even be a very small area of dangling  thrombus protruding into the lumen of the right ICA. Intracranially,  there is an dilated right MCA vasculature which is likely secondary to  autoregulation as a result of the proximal right ICA stenosis.     There are severe stenoses within the posterior cerebral arteries. There  is diminished perfusion within the right PCA distribution on the time to  maximal enhancement greater than 6 seconds maps.     A moderate degree of stenosis is seen at the origin of the right  vertebral artery and a moderate to severe degree of stenosis is seen at  the origin of the left vertebral artery.     Additional intracranial and extracranial atherosclerotic phenomena are  as discussed in detail above.     Otherwise, there is no evidence to suggest an area of acute completed  infarction and there is no evidence for large vessel occlusion. Further  evaluation could be performed with an MRI study for more sensitive  assessment of an area of acute infarction.     Incidental note is made of an indeterminate area of skin thickening and  subcutaneous fat density within the right parietal scalp measuring up to  5 x 17 x 14 mm in greatest dimensions which may represent a scalp  hematoma or area of scalp scarring. However, I cannot exclude the  possibility of an infiltrative lesion at this site. Please correlate  with physical exam findings.     The urgent findings of the noncontrast head CT were discussed with Dr. Chou on 10/09/2023 at approximately 12:15 p.m. The findings of the  CT angiogram and CT perfusion study were discussed with Dr. Chou at  approximately 12:30 p.m.     The findings within the scalp were discussed with Dr. Kay on  10/09/2023 at approximately 1:45 p.m.      Radiation dose reduction techniques were utilized, including automated  exposure control and exposure modulation based on body size.        This report was finalized on 10/9/2023 3:10 PM by Dr. Sebastian Laurent M.D  on Workstation: [id number]       CT Angiogram Neck [[id number]] Collected: 10/09/23 1410     Updated: 10/09/23 1514    Narrative:      CT ANGIOGRAM OF THE HEAD AND NECK AND CT PERFUSION STUDY     CLINICAL HISTORY: Left-sided weakness and neglect.     TECHNIQUE: A noncontrast head CT was performed with 3 mm axial images.  Thereafter, a CT perfusion study was performed after the dynamic bolus  of IV contrast. Standard perfusion maps were constructed with RAPID  software. A CT angiogram of the head and neck was then performed with 1  mm axial images. Sagittal, coronal, and 3-dimensional reconstructed  images were obtained. Finally, a delayed postcontrast head CT was  performed with 3 mm axial images. AI analysis of LVO was utilized.     COMPARISON: Brain MRI dated 02/07/2023.     FINDINGS:     NONCONTRAST HEAD CT: The ventricles, sulci, and cisterns are  age-appropriate. Mild changes of chronic small vessel ischemic phenomena  are noted. There is no convincing area of abnormal gray-white matter  differentiation to suggest a focus of acute completed infarction.     There is an indeterminate area of skin thickening and subcutaneous fat  density within the right parietal scalp which measures up to  approximately 5 x 17 mm in greatest coronal dimensions and approximately  14 mm in greatest AP dimensions. While this may represent an area of  scarring or scalp hematoma I cannot exclude the possibility of an  infiltrative scalp lesion at this site. I recommend further evaluation  with physical exam findings.     CT PERFUSION STUDY: There is no abnormality seen on the CBF less than  30% maps. On the time to maximal enhancement greater than 6 seconds  maps, there is a 62 cc abnormality seen within the inferior and  medial  aspects of the right temporal and occipital lobes within the right PCA  distribution.     CTA NECK: There is a classic configuration of the aortic arch. There is  a mild degree of stenosis at the origin of the left subclavian artery  and a moderate degree of stenosis at the origin of the right subclavian  artery. There is a moderate degree of stenosis at the origin of the  right vertebral artery and a moderate to severe degree of stenosis is  seen at the origin of the left vertebral artery. A moderate degree of  stenosis is seen at the origin of the left common carotid artery. There  is a mild to moderate degree of stenosis of the distal aspect the left  common carotid where the residual lumen diameter is approximately 4 to 5  mm. Within the proximal portion of the left ICA, there is no significant  NASCET stenosis. Within the proximal portion of the right ICA, there is  an irregular predominantly noncalcified atherosclerotic plaque that  results in at least an 80% NASCET stenosis. Furthermore, there is a  question of a small dangling thrombus within the proximal portion of the  right ICA as best seen on image #177.     CTA HEAD: The intracranial segments of the vertebral arteries are  unremarkable. The basilar artery is within normal limits. There is a  mild stenosis of the proximal left P2 segment and there is a severe  degree of stenosis within the distal left P2 segment. There are 2 severe  stenoses appreciated within the right P2 segment.     Atherosclerotic calcifications are appreciated within the cavernous  segments of the internal carotids resulting in mild degrees of luminal  compromise. There is a prominent appearance of the right MCA vasculature  which likely reflects autoregulatory vasodilatation from the proximal  ICA stenosis. Otherwise, the middle and anterior cerebral's are  unremarkable.     DELAYED POSTCONTRAST HEAD CT: No abnormal foci of contrast enhancement  are seen within the brain  parenchyma.       Impression:         Within the proximal portion of the right ICA, there is a severe stenosis  that is estimated to be at least 80% by NASCET criteria. This is  secondary to a largely noncalcified atherosclerotic plaque. The plaque  is irregular and there may even be a very small area of dangling  thrombus protruding into the lumen of the right ICA. Intracranially,  there is an dilated right MCA vasculature which is likely secondary to  autoregulation as a result of the proximal right ICA stenosis.     There are severe stenoses within the posterior cerebral arteries. There  is diminished perfusion within the right PCA distribution on the time to  maximal enhancement greater than 6 seconds maps.     A moderate degree of stenosis is seen at the origin of the right  vertebral artery and a moderate to severe degree of stenosis is seen at  the origin of the left vertebral artery.     Additional intracranial and extracranial atherosclerotic phenomena are  as discussed in detail above.     Otherwise, there is no evidence to suggest an area of acute completed  infarction and there is no evidence for large vessel occlusion. Further  evaluation could be performed with an MRI study for more sensitive  assessment of an area of acute infarction.     Incidental note is made of an indeterminate area of skin thickening and  subcutaneous fat density within the right parietal scalp measuring up to  5 x 17 x 14 mm in greatest dimensions which may represent a scalp  hematoma or area of scalp scarring. However, I cannot exclude the  possibility of an infiltrative lesion at this site. Please correlate  with physical exam findings.     The urgent findings of the noncontrast head CT were discussed with Dr. Chou on 10/09/2023 at approximately 12:15 p.m. The findings of the  CT angiogram and CT perfusion study were discussed with Dr. Chou at  approximately 12:30 p.m.     The findings within the scalp were  discussed with Dr. Kay on  10/09/2023 at approximately 1:45 p.m.     Radiation dose reduction techniques were utilized, including automated  exposure control and exposure modulation based on body size.        This report was finalized on 10/9/2023 3:10 PM by Dr. Sebastian Laurent M.D  on Workstation: [id number]       CT CEREBRAL PERFUSION WITH & WITHOUT CONTRAST [[id number]] Collected: 10/09/23 1410     Updated: 10/09/23 1514    Narrative:      CT ANGIOGRAM OF THE HEAD AND NECK AND CT PERFUSION STUDY     CLINICAL HISTORY: Left-sided weakness and neglect.     TECHNIQUE: A noncontrast head CT was performed with 3 mm axial images.  Thereafter, a CT perfusion study was performed after the dynamic bolus  of IV contrast. Standard perfusion maps were constructed with RAPID  software. A CT angiogram of the head and neck was then performed with 1  mm axial images. Sagittal, coronal, and 3-dimensional reconstructed  images were obtained. Finally, a delayed postcontrast head CT was  performed with 3 mm axial images. AI analysis of LVO was utilized.     COMPARISON: Brain MRI dated 02/07/2023.     FINDINGS:     NONCONTRAST HEAD CT: The ventricles, sulci, and cisterns are  age-appropriate. Mild changes of chronic small vessel ischemic phenomena  are noted. There is no convincing area of abnormal gray-white matter  differentiation to suggest a focus of acute completed infarction.     There is an indeterminate area of skin thickening and subcutaneous fat  density within the right parietal scalp which measures up to  approximately 5 x 17 mm in greatest coronal dimensions and approximately  14 mm in greatest AP dimensions. While this may represent an area of  scarring or scalp hematoma I cannot exclude the possibility of an  infiltrative scalp lesion at this site. I recommend further evaluation  with physical exam findings.     CT PERFUSION STUDY: There is no abnormality seen on the CBF less than  30% maps. On the time to maximal  enhancement greater than 6 seconds  maps, there is a 62 cc abnormality seen within the inferior and medial  aspects of the right temporal and occipital lobes within the right PCA  distribution.     CTA NECK: There is a classic configuration of the aortic arch. There is  a mild degree of stenosis at the origin of the left subclavian artery  and a moderate degree of stenosis at the origin of the right subclavian  artery. There is a moderate degree of stenosis at the origin of the  right vertebral artery and a moderate to severe degree of stenosis is  seen at the origin of the left vertebral artery. A moderate degree of  stenosis is seen at the origin of the left common carotid artery. There  is a mild to moderate degree of stenosis of the distal aspect the left  common carotid where the residual lumen diameter is approximately 4 to 5  mm. Within the proximal portion of the left ICA, there is no significant  NASCET stenosis. Within the proximal portion of the right ICA, there is  an irregular predominantly noncalcified atherosclerotic plaque that  results in at least an 80% NASCET stenosis. Furthermore, there is a  question of a small dangling thrombus within the proximal portion of the  right ICA as best seen on image #177.     CTA HEAD: The intracranial segments of the vertebral arteries are  unremarkable. The basilar artery is within normal limits. There is a  mild stenosis of the proximal left P2 segment and there is a severe  degree of stenosis within the distal left P2 segment. There are 2 severe  stenoses appreciated within the right P2 segment.     Atherosclerotic calcifications are appreciated within the cavernous  segments of the internal carotids resulting in mild degrees of luminal  compromise. There is a prominent appearance of the right MCA vasculature  which likely reflects autoregulatory vasodilatation from the proximal  ICA stenosis. Otherwise, the middle and anterior cerebral's are  unremarkable.      DELAYED POSTCONTRAST HEAD CT: No abnormal foci of contrast enhancement  are seen within the brain parenchyma.       Impression:         Within the proximal portion of the right ICA, there is a severe stenosis  that is estimated to be at least 80% by NASCET criteria. This is  secondary to a largely noncalcified atherosclerotic plaque. The plaque  is irregular and there may even be a very small area of dangling  thrombus protruding into the lumen of the right ICA. Intracranially,  there is an dilated right MCA vasculature which is likely secondary to  autoregulation as a result of the proximal right ICA stenosis.     There are severe stenoses within the posterior cerebral arteries. There  is diminished perfusion within the right PCA distribution on the time to  maximal enhancement greater than 6 seconds maps.     A moderate degree of stenosis is seen at the origin of the right  vertebral artery and a moderate to severe degree of stenosis is seen at  the origin of the left vertebral artery.     Additional intracranial and extracranial atherosclerotic phenomena are  as discussed in detail above.     Otherwise, there is no evidence to suggest an area of acute completed  infarction and there is no evidence for large vessel occlusion. Further  evaluation could be performed with an MRI study for more sensitive  assessment of an area of acute infarction.     Incidental note is made of an indeterminate area of skin thickening and  subcutaneous fat density within the right parietal scalp measuring up to  5 x 17 x 14 mm in greatest dimensions which may represent a scalp  hematoma or area of scalp scarring. However, I cannot exclude the  possibility of an infiltrative lesion at this site. Please correlate  with physical exam findings.     The urgent findings of the noncontrast head CT were discussed with Dr. Chou on 10/09/2023 at approximately 12:15 p.m. The findings of the  CT angiogram and CT perfusion study were  discussed with Dr. Chou at  approximately 12:30 p.m.     The findings within the scalp were discussed with Dr. Kay on  10/09/2023 at approximately 1:45 p.m.     Radiation dose reduction techniques were utilized, including automated  exposure control and exposure modulation based on body size.        This report was finalized on 10/9/2023 3:10 PM by Dr. Sebastian Laurent M.D  on Workstation: [id number]       XR Chest 1 View [[id number]] Collected: 10/09/23 1335     Updated: 10/09/23 1352    Narrative:      XR CHEST 1 VW-     HISTORY: Male who is 89 years-old, stroke     TECHNIQUE: Frontal view of the chest     COMPARISON: 2/7/2023     FINDINGS: The heart size is borderline. Pulmonary vasculature is  unremarkable. Aorta is calcified. No focal pulmonary consolidation,  pleural effusion, or pneumothorax. Old granulomatous disease is present.  No acute osseous process.       Impression:      No no focal pulmonary consolidation. Borderline heart size.  Follow-up as clinical indications persist.     This report was finalized on 10/9/2023 1:49 PM by Dr. Wesley Amaya M.D on Workstation: [id number]               Pertinent Labs     Results from last 7 days   Lab Units 10/15/23  0534 10/12/23  0442 10/11/23  0447 10/10/23  0444   WBC 10*3/mm3 6.39 5.73 4.75 4.04   HEMOGLOBIN g/dL 10.9* 12.5* 13.3 12.5*   PLATELETS 10*3/mm3 121* 151 158 162     Results from last 7 days   Lab Units 10/15/23  0534 10/12/23  0442 10/11/23  0447 10/10/23  0444   SODIUM mmol/L 139 140 138 139   POTASSIUM mmol/L 3.5 3.8 3.9 3.7   CHLORIDE mmol/L 107 106 107 106   CO2 mmol/L 23.0 24.0 23.0 23.7   BUN mg/dL 15 14 16 16   CREATININE mg/dL 0.65* 0.68* 0.70* 0.66*   GLUCOSE mg/dL 115* 100* 122* 103*   Estimated Creatinine Clearance: 82.7 mL/min (A) (by C-G formula based on SCr of 0.65 mg/dL (L)).    Results from last 7 days   Lab Units 10/15/23  0534 10/12/23  0442 10/11/23  0447 10/10/23  0444   CALCIUM mg/dL 8.4* 8.6 8.9 8.8           Results from  last 7 days   Lab Units 10/10/23  0444   CHOLESTEROL mg/dL 138   TRIGLYCERIDES mg/dL 52   HDL CHOL mg/dL 56   LDL CHOL mg/dL 71           Test Results Pending at Discharge       Discharge Details        Discharge Medications        New Medications        Instructions Start Date   amLODIPine 5 MG tablet  Commonly known as: NORVASC   5 mg, Oral, Every 24 Hours Scheduled      aspirin 81 MG chewable tablet   81 mg, Oral, Daily      Brilinta 90 MG tablet tablet  Generic drug: ticagrelor   90 mg, Oral, 2 Times Daily             Changes to Medications        Instructions Start Date   apixaban 2.5 MG tablet tablet  Commonly known as: ELIQUIS  What changed: These instructions start on October 20, 2023. If you are unsure what to do until then, ask your doctor or other care provider.   2.5 mg, Oral, 2 Times Daily   Start Date: October 20, 2023     atorvastatin 40 MG tablet  Commonly known as: LIPITOR  What changed:   medication strength  See the new instructions.   40 mg, Oral, Nightly             Continue These Medications        Instructions Start Date   atropine 1 % ophthalmic solution   1 drop, 3 Times Daily PRN, Orally       carbidopa-levodopa  MG per tablet  Commonly known as: SINEMET   1 tablet, Oral, 3 Times Daily      levothyroxine 75 MCG tablet  Commonly known as: SYNTHROID, LEVOTHROID   75 mcg, Oral, Every Early Morning      pramipexole 0.5 MG tablet  Commonly known as: MIRAPEX   0.25 mg, Oral, 3 Times Daily, Pt now takes 2.5 mg       PreserVision AREDS capsule   1 tablet, Oral, 2 Times Daily      SYSTANE OP   1 dose, Ophthalmic, As Needed      valsartan 160 MG tablet  Commonly known as: DIOVAN   TAKE 1 TABLET BY MOUTH  DAILY      Vitamin B 12 500 MCG tablet   1,000 mcg, Oral, Every Early Morning             Stop These Medications      cloNIDine 0.1 MG tablet  Commonly known as: CATAPRES              Allergies   Allergen Reactions    Penicillins Rash    Rocephin [Ceftriaxone] Rash         Discharge  Disposition:  Home-Health Care [facility]    Discharge Diet:  Diet Order   Procedures    Diet: Regular/House Diet; No Straw; Texture: Pureed (NDD 1); Fluid Consistency: Nectar Thick       Discharge Activity:   Activity Instructions       Activity as Tolerated              CODE STATUS:    Code Status and Medical Interventions:   Ordered at: 10/10/23 1159     Code Status (Patient has no pulse and is not breathing):    CPR (Attempt to Resuscitate)     Medical Interventions (Patient has pulse or is breathing):    Full Support       No future appointments.  Additional Instructions for the Follow-ups that You Need to Schedule       Ambulatory Referral to Home Health   As directed      Face to Face Visit Date: 10/16/2023   Follow-up provider for Plan of Care?: I treated the patient in an acute care facility and will not continue treatment after discharge.   Follow-up provider: KATHERINE NIXON [[id number]]   Reason/Clinical Findings: debility/hospitalization/stroke   Describe mobility limitations that make leaving home difficult: debility/hospitalization/stroke   Nursing/Therapeutic Services Requested: Skilled Nursing Physical Therapy Speech Therapy Occupational Therapy   Skilled nursing orders: Medication education Neurovascular assessments   PT orders: Transfer training Home safety assessment Therapeutic exercise Strengthening   Occupational orders: Strengthening Cognition Fine motor Home safety assessment Activities of daily living   SLP orders: Dysphagia therapy   Frequency: 1 Week 1        Call MD With Problems / Concerns   As directed      Instructions: return to the hospital if you experience chest pain, shortness of breath, abdominal pain, nausea, vomiting, fevers, sweats, chills, or worsening of your symptoms    Order Comments: Instructions: return to the hospital if you experience chest pain, shortness of breath, abdominal pain, nausea, vomiting, fevers, sweats, chills, or worsening of your symptoms         Discharge  Follow-up with PCP   As directed       Currently Documented PCP:    Katherine Nixon MD    PCP Phone Number:    [phone]     Follow Up Details: 2 week for bp check        Discharge Follow-up with Specialty: neurology, as directed   As directed      Specialty: neurology, as directed        Discharge Follow-up with Specified Provider: neurosurgery 4 weeks or as otherwise directed   As directed      To: neurosurgery 4 weeks or as otherwise directed               Contact information for follow-up providers       Katherine Nixon MD .    Specialty: Family Medicine  Why: 2 week for bp check  Contact information:  [address] [facility] [address]  [phone]                       Contact information for after-discharge care       Home Medical Care       [facility] .    Service: Home Health Services  Contact information:  [address]  [facility] [address] [phone]                                   Additional Instructions for the Follow-ups that You Need to Schedule       Ambulatory Referral to Home Health   As directed      Face to Face Visit Date: 10/16/2023   Follow-up provider for Plan of Care?: I treated the patient in an acute care facility and will not continue treatment after discharge.   Follow-up provider: KATHERINE NIXON [[id number]]   Reason/Clinical Findings: debility/hospitalization/stroke   Describe mobility limitations that make leaving home difficult: debility/hospitalization/stroke   Nursing/Therapeutic Services Requested: Skilled Nursing Physical Therapy Speech Therapy Occupational Therapy   Skilled nursing orders: Medication education Neurovascular assessments   PT orders: Transfer training Home safety assessment Therapeutic exercise Strengthening   Occupational orders: Strengthening Cognition Fine motor Home safety assessment Activities of daily living   SLP orders: Dysphagia therapy   Frequency: 1 Week 1        Call MD With  Problems / Concerns   As directed      Instructions: return to the hospital if you experience chest pain, shortness of breath, abdominal pain, nausea, vomiting, fevers, sweats, chills, or worsening of your symptoms    Order Comments: Instructions: return to the hospital if you experience chest pain, shortness of breath, abdominal pain, nausea, vomiting, fevers, sweats, chills, or worsening of your symptoms         Discharge Follow-up with PCP   As directed       Currently Documented PCP:    Aleksander Diez MD    PCP Phone Number:    [phone]     Follow Up Details: 2 week for bp check        Discharge Follow-up with Specialty: neurology, as directed   As directed      Specialty: neurology, as directed        Discharge Follow-up with Specified Provider: neurosurgery 4 weeks or as otherwise directed   As directed      To: neurosurgery 4 weeks or as otherwise directed            Time Spent on Discharge:  Greater than 30 minutes      Chilo Tyson MD  [facility]  10/16/23  14:11 EDT

## 2023-10-16 NOTE — MBS/VFSS/FEES
Acute Care - Speech Language Pathology   Swallow Initial Evaluation [facility]     Patient Name: Casey Snowden Sr.  : 1934  MRN: [id number]  Today's Date: 10/16/2023               Admit Date: 10/9/2023    Visit Dx:     ICD-10-CM ICD-9-CM   1. Acute ischemic stroke  I63.9 434.91   2. Left-sided neglect  R41.4 781.8   3. Left homonymous hemianopsia  H53.462 368.46   4. Chronic anticoagulation  Z79.01 V58.61   5. Atrial fibrillation, unspecified type  I48.91 427.31   6. Hypertension, unspecified type  I10 401.9   7. Hyperlipidemia, unspecified hyperlipidemia type  E78.5 272.4   8. Age-related physical debility  R54 797     Patient Active Problem List   Diagnosis    Benign essential hypertension    Stenosis of carotid artery    Mixed hyperlipidemia    Parkinson's disease    Peripheral arterial occlusive disease    Screening for prostate cancer    Medication monitoring encounter    Melanoma    Chronic deep vein thrombosis (DVT) of popliteal vein of right lower extremity    Uses hearing aid    Paroxysmal A-fib    Chronic diastolic (congestive) heart failure    Anticoagulant long-term use    Presence of IVC filter    Medicare annual wellness visit, subsequent    Iron deficiency anemia secondary to inadequate dietary iron intake    Orthostatic hypotension due to Parkinson's disease    Coronary artery disease involving native coronary artery of native heart without angina pectoris    ST elevation myocardial infarction involving left anterior descending (LAD) coronary artery    Sialorrhea    Hypertensive heart disease with heart failure    PAD (peripheral artery disease)    Metastatic squamous cell carcinoma    Secondary malignancy of lymph nodes of head, face and neck    Encounter for antineoplastic immunotherapy    RBD (REM behavioral disorder)    Acquired hypothyroidism    Skin tear of upper arm without complication, left, subsequent encounter    Aspiration pneumonia of right lung due to vomit    Dysphagia,  neurologic     Past Medical History:   Diagnosis Date    Acquired hypothyroidism 8/31/2022    Atrial fibrillation     Cancer 04/2018    basil cell carcinoma    Carotid stenosis     CHF (congestive heart failure)     Chronic deep vein thrombosis (DVT) of popliteal vein of right lower extremity     Coronary artery disease involving native coronary artery of native heart without angina pectoris 12/20/2018    DVT of lower extremity (deep venous thrombosis)     Hard of hearing     Localized edema 1/4/2017    Long-term use of high-risk medication     Metabolic encephalopathy 2/7/2023    PAD (peripheral artery disease) 9/10/2020    Parkinson's disease     Postphlebitic syndrome      Past Surgical History:   Procedure Laterality Date    BASAL CELL CARCINOMA EXCISION  04/2018    CARDIAC CATHETERIZATION N/A 11/5/2018    Procedure: Left Heart Cath;  Surgeon: Oliverio Azar MD;  [facility];  Service: Cardiovascular    CARDIAC CATHETERIZATION N/A 11/5/2018    Procedure: Coronary angiography;  Surgeon: Oliverio Azar MD;  [facility];  Service: Cardiovascular    CARDIAC CATHETERIZATION N/A 11/5/2018    Procedure: Left ventriculography;  Surgeon: Oliverio Azar MD;  [facility];  Service: Cardiovascular    CARDIAC CATHETERIZATION N/A 6/4/2019    Procedure: Left Heart Cath;  Surgeon: Johnny Glasgow MD;  [facility];  Service: Cardiovascular    CARDIAC CATHETERIZATION N/A 6/4/2019    Procedure: Coronary angiography;  Surgeon: Johnny Glasgow MD;  [facility];  Service: Cardiovascular    CARDIAC CATHETERIZATION N/A 6/4/2019    Procedure: Left ventriculography;  Surgeon: Johnny Glasgow MD;  [facility];  Service: Cardiovascular    CARDIAC CATHETERIZATION N/A 6/4/2019    Procedure: Stent BILL coronary;  Surgeon: Johnny Glasgow MD;   Location: [facility] CATH INVASIVE LOCATION;  Service: Cardiovascular    CARDIAC SURGERY      CATARACT EXTRACTION Left 08/2018    CATARACT EXTRACTION Right 09/2018    COLONOSCOPY      2011    INCISION AND DRAINAGE LEG Right 7/7/2017    Procedure: INCISION AND DRAINAGE INFECTED HEMATOMA RIGHT THIGH;  Surgeon: Valentin Peña MD;  Location: [facility] MAIN OR;  Service:     JOINT REPLACEMENT      KNEE INCISION AND DRAINAGE Right 12/27/2016    Procedure: KNEE INCISION AND DRAINAGE;  Surgeon: Triston Whitten MD;  Location: [facility] MAIN OR;  Service:     NOSE SURGERY      nose replacement    SKIN BIOPSY      SKIN GRAFT  12/2017    TOTAL KNEE ARTHROPLASTY Right     VASCULAR SURGERY      VENA CAVA FILTER PLACEMENT         SLP Recommendation and Plan  SLP Swallowing Diagnosis: moderate, oral dysphagia, pharyngeal dysphagia (10/16/23 0800)  SLP Diet Recommendation: puree, nectar thick liquids, ice chips between meals after oral care, with supervision (10/16/23 0800)  Recommended Precautions and Strategies: no straw, upright posture during/after eating, small bites of food and sips of liquid, general aspiration precautions (10/16/23 0800)  SLP Rec. for Method of Medication Administration: meds whole, with puree (10/16/23 0800)     Monitor for Signs of Aspiration: yes, notify SLP if any concerns (10/16/23 0800)     Swallow Criteria for Skilled Therapeutic Interventions Met: demonstrates skilled criteria (10/16/23 0800)  Anticipated Discharge Disposition (SLP): anticipate therapy at next level of care (10/16/23 0800)  Rehab Potential/Prognosis, Swallowing: good, to achieve stated therapy goals (10/16/23 0800)  Therapy Frequency (Swallow): PRN (10/16/23 0800)  Predicted Duration Therapy Intervention (Days): until discharge (10/16/23 0800)  Oral Care Recommendations: Oral Care before breakfast, after meals and PRN, Before ice/water (10/16/23 0800)                                      Oral Care Recommendations: Oral Care before  breakfast, after meals and PRN, Before ice/water (10/16/23 0800)    Plan of Care Reviewed With: patient  Outcome Evaluation: VFSS completed. Recommend continue with puree and nectar thick liquids- no straws. Recommend meds whole/crushed with puree. Recommend upright, slow rate, alternate liquids/solids. Recommend speech therapy at next level of care. SLP to follow for diet tolerance.      SWALLOW EVALUATION (last 72 hours)       SLP Adult Swallow Evaluation       Row Name 10/16/23 0800                   Rehab Evaluation    Document Type evaluation  -OC        Subjective Information no complaints  -OC        Patient Observations alert;cooperative;agree to therapy  -OC        Patient Effort good  -OC           General Information    Patient Profile Reviewed yes  -OC        Current Method of Nutrition pureed;nectar/syrup-thick liquids  -OC        Precautions/Limitations, Vision WFL with corrective lenses  -OC        Precautions/Limitations, Hearing WFL;for purposes of eval  -OC        Plans/Goals Discussed with patient;agreed upon  -OC        Barriers to Rehab none identified  -OC           Pain Scale: Numbers Pre/Post-Treatment    Pretreatment Pain Rating 0/10 - no pain  -OC        Posttreatment Pain Rating 0/10 - no pain  -OC           MBS/VFSS Interpretation    VFSS Summary VFSS completed, Dr. Lange present. Patient presents with moderate oropharyngeal dysphagia. Patient demonstrated mistiming, decreased hyolaryngeal elevation/excursion, decreased base of tongue retraction. Patient demonstrated no penetration or aspiration with nectar thick via cup, initial trials of thin via cup, and honey thick via cup. Patient demonstrated no penetration or aspiration puree, moderate pharyngeal residue post swallow. Attempted to clear with thin liquid wash, silent aspiration. Patient demonstrated prolonged mastication and silent aspiration mechanical soft no mixed, thin portion after mastication.  -OC           SLP  Communication to Radiology    Summary Statement VFSS completed, Dr. Lange present. Patient presents with moderate oropharyngeal dysphagia. Patient demonstrated mistiming, decreased hyolaryngeal elevation/excursion, decreased base of tongue retraction. Patient demonstrated no penetration or aspiration with nectar thick via cup, initial trials of thin via cup, and honey thick via cup. Patient demonstrated no penetration or aspiration puree, moderate pharyngeal residue post swallow. Attempted to clear with thin liquid wash, silent aspiration. Patient demonstrated prolonged mastication and silent aspiration mechanical soft no mixed, thin portion after mastication.  -OC           SLP Evaluation Clinical Impression    SLP Swallowing Diagnosis moderate;oral dysphagia;pharyngeal dysphagia  -OC        Functional Impact risk of aspiration/pneumonia  -OC        Rehab Potential/Prognosis, Swallowing good, to achieve stated therapy goals  -OC        Swallow Criteria for Skilled Therapeutic Interventions Met demonstrates skilled criteria  -OC           Recommendations    Therapy Frequency (Swallow) PRN  -OC        Predicted Duration Therapy Intervention (Days) until discharge  -OC        SLP Diet Recommendation puree;nectar thick liquids;ice chips between meals after oral care, with supervision  -OC        Recommended Precautions and Strategies no straw;upright posture during/after eating;small bites of food and sips of liquid;general aspiration precautions  -OC        Oral Care Recommendations Oral Care before breakfast, after meals and PRN;Before ice/water  -OC        SLP Rec. for Method of Medication Administration meds whole;with puree  -OC        Monitor for Signs of Aspiration yes;notify SLP if any concerns  -OC        Anticipated Discharge Disposition (SLP) anticipate therapy at next level of care  -OC           (LTG) Patient will demonstrate progress toward functional swallow for    Diet Texture (Demonstrate progress  toward functional swallow) pureed textures  -OC        Liquid viscosity (Demonstrate progress toward functional swallow) nectar/ mildly thick liquids  -OC        [place] (Demonstrate progress towards functional swallow) independently (over 90% accuracy)  -OC        Time Frame (Demonstrate progress toward functional swallow) 1 week  -OC                  User Key  (r) = Recorded By, (t) = Taken By, (c) = Cosigned By      Initials Name Effective Dates    Patricia Arceo SLP 08/28/23 -                     EDUCATION  The patient has been educated in the following areas:   Dysphagia (Swallowing Impairment).        SLP GOALS       Row Name 10/16/23 0800             (LTG) Patient will demonstrate progress toward functional swallow for    Diet Texture (Demonstrate progress toward functional swallow) pureed textures  -OC      Liquid viscosity (Demonstrate progress toward functional swallow) nectar/ mildly thick liquids  -OC      [place] (Demonstrate progress towards functional swallow) independently (over 90% accuracy)  -OC      Time Frame (Demonstrate progress toward functional swallow) 1 week  -OC                User Key  (r) = Recorded By, (t) = Taken By, (c) = Cosigned By      Initials Name Provider Type    Patricia Arceo SLP Speech and Language Pathologist                       Time Calculation:    Time Calculation- SLP       Row Name 10/16/23 1105             Time Calculation- SLP    SLP Start Time 0800  -OC      SLP Received On 10/16/23  -OC         Untimed Charges    SLP Eval/Re-eval  [facility] [employer] Fluoro Eval Swallow - 41219  -OC      77587-TV Motion Fluoro Eval Swallow Minutes 75  -OC         Total Minutes    Untimed Charges Total Minutes 75  -OC       Total Minutes 75  -OC                User Key  (r) = Recorded By, (t) = Taken By, (c) = Cosigned By      Initials Name Provider Type    Patricia Arceo SLP Speech and Language Pathologist                    Therapy Charges for Today       Code Description  Service Date Service Provider Modifiers Qty    [id number] HC ST MOTION FLUORO EVAL SWALLOW 5 10/16/2023 Patricia Steele, DULCE GN 1                 DULCE Venegas  10/16/2023

## 2023-10-16 NOTE — CASE MANAGEMENT/SOCIAL WORK
Continued Stay Note  [facility]     Patient Name: Casey Snowden .  MRN: [id number]  Today's Date: 10/16/2023    Admit Date: 10/9/2023    Plan: Home with wife and [facility]   Discharge Plan       Row Name 10/16/23 1112       Plan    Plan Home with wife and [facility]    Plan Comments CCP spoke to Alejandra with [facility] and she said they can accept referral. Patient to discharge home with wife and [facility]. CCP to follow. CD, CSW.                   Discharge Codes    No documentation.                 Expected Discharge Date and Time       Expected Discharge Date Expected Discharge Time    Oct 16, 2023

## 2023-10-16 NOTE — OUTREACH NOTE
Prep Survey      Flowsheet Row Responses   [facility] patient discharged from? [place]   Is LACE score < 7 ? No   Eligibility [facility]   Date of Admission 10/09/23   Date of Discharge 10/16/23   Discharge Disposition Home-Health Care Sv   Discharge diagnosis Acute ischemic stroke   Does the patient have one of the following disease processes/diagnoses(primary or secondary)? Stroke   Does the patient have Home health ordered? Yes   What is the Home health agency?  [facility]   Is there a DME ordered? No   Prep survey completed? Yes            DALIA DIAZ - Registered Nurse

## 2023-10-16 NOTE — DISCHARGE INSTRUCTIONS
Continue dual antiplatelet therapy with Brilinta 90 mg twice daily and aspirin 81 mg for 1 week.  After 1 week restart Eliquis 5 mg twice daily, continue with aspirin 81 mg daily and stop Brilinta.   Hold Eliquis 5 mg twice daily for 1 week and restart.  When restarting Eliquis stop Brilinta and continue with aspirin.  anticoagulation.    Recommend continue with puree and nectar thick liquids- no straws. Recommend meds whole/crushed with puree. Recommend upright, slow rate, alternate liquids/solids. Recommend speech therapy at next level of care

## 2023-10-17 ENCOUNTER — TELEPHONE (OUTPATIENT)
Dept: NEUROSURGERY | Facility: CLINIC | Age: 88
End: 2023-10-17
Payer: MEDICARE

## 2023-10-17 ENCOUNTER — TRANSITIONAL CARE MANAGEMENT TELEPHONE ENCOUNTER (OUTPATIENT)
Dept: CALL CENTER | Facility: HOSPITAL | Age: 88
End: 2023-10-17
Payer: MEDICARE

## 2023-10-17 DIAGNOSIS — I65.21 CAROTID STENOSIS, RIGHT: Primary | ICD-10-CM

## 2023-10-17 NOTE — TELEPHONE ENCOUNTER
I called patient spoke to wife gave her 1 month follow up appointment information.Will also mail reminders.

## 2023-10-17 NOTE — OUTREACH NOTE
Call Center TCM Note      Flowsheet Row Responses   [facility] patient discharged from? [place]   Does the patient have one of the following disease processes/diagnoses(primary or secondary)? Stroke   TCM attempt successful? Yes   Call start time 1133   Call end time 1142   Discharge diagnosis Acute ischemic stroke, Angioplasty and stent placement to the right ICA   Is patient permission given to speak with other caregiver? Yes   List who call center can speak with Evonne Snowden Spouse   Person spoke with today (if not patient) and relationship Jain,Evonne Spouse   Meds reviewed with patient/caregiver? Yes   Does the patient have all medications ordered at discharge? Yes   Is the patient taking all medications as directed (includes completed medication regime)? Yes   Medication comments Wife reports med times off this morning because she let patient sleep in and get rest. She denies any questions regarding meds.   Comments PCP Dr Diez. Hospital follow up scheduled for tomorrow 10/18/23  315pm   Does the patient have an appointment with their PCP within 7-14 days of discharge? No   Nursing Interventions Assisted patient with making appointment per protocol   What is the Home health agency?  [facility]   Has home health visited the patient within 72 hours of discharge? Call prior to 72 hours  [Per EPIC PT eval 10/19, Speech eval 10/19]   Psychosocial issues? No   Does the patient require any assistance with activities of daily living such as eating, bathing, dressing, walking, etc.? Yes   ADL comments Wife reports son assisted to get patient a shower last night.   Does the patient have any residual symptoms from stroke/TIA? Yes   Residual symptoms comments Wife reports patient weaker than he was.   Does the patient understand the diet ordered at discharge? Yes  [Pureed texture, nectar thick liquids]   Did the patient receive a copy of their discharge instructions? Yes   Nursing interventions Reviewed  instructions with patient   What is the patient's perception of their health status since discharge? Improving   Is the patient/caregiver able to teach back the risk factors for a stroke? Carotid or other artery disease   Is the patient/caregiver able to teach back signs and symptoms related to disease process for when to call PCP? Yes   Is the patient/caregiver able to teach back signs and symptoms related to disease process for when to call 911? Yes   If the patient is a current smoker, are they able to teach back resources for cessation? Not a smoker   Is the patient/caregiver able to teach back the hierarchy of who to call/visit for symptoms/problems? PCP, Specialist, Home health nurse, Urgent Care, ED, 911 Yes   TCM call completed? Yes   Call end time 1142   Would this patient benefit from a Referral to Amb Social Work? No   Is the patient interested in additional calls from an ambulatory ? No            Jana Power RN    10/17/2023, 11:46 EDT

## 2023-10-18 ENCOUNTER — HOME CARE VISIT (OUTPATIENT)
Dept: HOME HEALTH SERVICES | Facility: HOME HEALTHCARE | Age: 88
End: 2023-10-18
Payer: MEDICARE

## 2023-10-18 ENCOUNTER — OFFICE VISIT (OUTPATIENT)
Dept: FAMILY MEDICINE CLINIC | Facility: CLINIC | Age: 88
End: 2023-10-18
Payer: MEDICARE

## 2023-10-18 VITALS
HEART RATE: 92 BPM | TEMPERATURE: 97.6 F | DIASTOLIC BLOOD PRESSURE: 66 MMHG | HEIGHT: 71 IN | OXYGEN SATURATION: 97 % | WEIGHT: 167 LBS | BODY MASS INDEX: 23.38 KG/M2 | SYSTOLIC BLOOD PRESSURE: 120 MMHG

## 2023-10-18 DIAGNOSIS — I65.21 ICAO (INTERNAL CAROTID ARTERY OCCLUSION), RIGHT: ICD-10-CM

## 2023-10-18 DIAGNOSIS — Z86.73: ICD-10-CM

## 2023-10-18 DIAGNOSIS — Z23 IMMUNIZATION DUE: ICD-10-CM

## 2023-10-18 DIAGNOSIS — Z09 HOSPITAL DISCHARGE FOLLOW-UP: Primary | ICD-10-CM

## 2023-10-18 PROBLEM — S41.112D SKIN TEAR OF UPPER ARM WITHOUT COMPLICATION, LEFT, SUBSEQUENT ENCOUNTER: Status: RESOLVED | Noted: 2023-01-30 | Resolved: 2023-10-18

## 2023-10-18 PROCEDURE — G0299 HHS/HOSPICE OF RN EA 15 MIN: HCPCS

## 2023-10-18 NOTE — PROGRESS NOTES
Transitional Care Follow Up Visit  Subjective     Casey Snowden Sr. is a 89 y.o. male who presents for a transitional care management visit.    Within 48 business hours after discharge our office contacted him via telephone to coordinate his care and needs.      I reviewed and discussed the details of that call along with the discharge summary, hospital problems, inpatient lab results, inpatient diagnostic studies, and consultation reports with Casey.     Current outpatient and discharge medications have been reconciled for the patient.  Reviewed by: Aleksander Diez MD          10/16/2023     4:16 PM   Date of TCM Phone Call   [facility]   Date of Admission 10/9/2023   Date of Discharge 10/16/2023   Discharge Disposition [facility]     Risk for Readmission (LACE) Score: 14 (10/16/2023  6:00 AM)    MRI Brain Without Contrast (10/09/2023 16:36)   Current outpatient and discharge medications have been reconciled for the patient.  Reviewed by: Aleksander Diez MD    History of Present Illness   Course During Hospital Stay:    Afshin had acute strokelike signs and symptoms.  Went to [facility] found to have stroke on MRI affecting multiple areas on the right side of brain.  He had left-sided symptoms.  Found to have nearly complete occlusion of his right internal carotid artery.  He was taken the OR where he had angioplasty and a stent placed.  Today he is feeling much better.  No headaches, no change in vision, no change in hearing.  Balance is still unsteady  Has regained most of his strength in the arm and leg.    He is current with [facility], and he anticipates speech therapy and Occupational Therapy and physical therapy all coming to his home this week.    He is on a new added blood pressure medicine, amlodipine    He will be on aspirin and Brilinta this week.  Then will be on aspirin and Eliquis from here on.    Next he has swelling in his penis and groin  area,     The following portions of the patient's history were reviewed and updated as appropriate: allergies, current medications, past family history, past medical history, past social history, past surgical history, and problem list.    Review of Systems    Objective   Physical Exam  Vitals reviewed.   Cardiovascular:      Rate and Rhythm: Normal rate.   Pulmonary:      Effort: Pulmonary effort is normal.   Neurological:      Mental Status: He is alert.      Motor: Weakness present.      Gait: Gait abnormal.     Right groin still has a surgical bandage on it.  There is typical purple bruising down the inguinal area into the pubic area penile shaft and scrotum.  There is soft tissue swelling of the penile shaft.      Assessment & Plan   Diagnoses and all orders for this visit:    1. Hospital discharge follow-up (Primary)    2. ICAO (internal carotid artery occlusion), right    3. Arterial ischemic stroke, ICA (internal carotid artery), right, chroni    4. Immunization due  -     Fluzone High-Dose 65+yrs    Flu shot today  Medications updated  The soft tissue swelling of the penis should get better in 1 to 3 weeks, he is able to freely urinate now  Has underlying urinary incontinence.  Finish up the Brilinta  Then start Eliquis the next day  He has follow-up with neurosurgery already scheduled  Wife to check his blood pressures at home, when the systolic is less than 100 low they can hold the amlodipine on an as needed basis  Signed all of his home health orders  Continue with the soft mechanical diet.  Discussed eating and chewing and swallowing slowly and completely

## 2023-10-18 NOTE — Clinical Note
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 HE INCONSISTENTLY USES A WALKER WITH SLIDERS, A ROLLATOR, AND A CANE. PATIENT SHUFFLES, HEAD DOWN POSTURE, AND AMBULATES WITH FAST PACED GAIT.                   HB status/Living Arrangements:   RANCH STYLE HOME WITH STAIRS AT ALL ENTRY POINTS WITH SPOUSE                   Skin Integrity/wound status:   INTACT; BRUISED                    Code Status:           FULL/CPR          Fall Risk/Safety concerns:           HIGH RISK           Medication issues/Concerns:          NONE                   Additional Problems/Concerns:   REPORTS DYSPHAGIA/NON COMPLIANT WITH HOSPITAL RECOMMENDED PUREED WITH THICKENED. NON-COMPLIANT WITH DME USAGE/SUPERVISION/ASSISTANCE WITH AMBULATION.         SDOH Barriers (i.e. caregiver concerns, social isolation, transportation, food insecurity, environment, income etc.)/Need for MSW:   NONE                 Plan for next visit:           SN ASSESS/INSTRUCT.                      Very pleasant 89 year old male hospitalization 10/9-10/16 following acute ischemic stroke. Morning of 10/9 patient suddenly lethargic then \"slumped over in chair\" per wife with left sided weakness and altered mental status. Patient transported to [facility] via EMS. He underwent angioplasty with RT ICA stent d/t severe stenosis.             PMH as " listed above.             Patient alert and orient to person, place, time, incorrect by 2 days, no BIMS errors. Spouse signed consent for patient. He relies heavily on wife to answer questions. He reports difficulty with short term memory that began prior to CVA but now is having trouble with aphasia in addition to dysphagia. He has been prescribed a pureed/thickened diet. However, he is not 100% compliant. SN encouraged patient to attempt to answer the questions and wife may help if he is unable. He also has Parkinson's. He reports he feels much better since he has returned home. Spouse also notes his cognition has improved but his gait is concerning. SN reminded patient and spouse patient should have  socks or rubber soled shoes on at all times. They have hard surface wyatt with rugs.             Patient should f/u with neurology and neurosurgery. Patient has f/u with PCP today. Spouse plans to confirm it is safe for patient to take vitamins with prescribed meds. SN will see 1w2 following SOC then 1w4. PT/OT/SLP to eval.                        NADER SANDOVAL RN  [facility]          [phone] C          [phone] O

## 2023-10-19 ENCOUNTER — HOME CARE VISIT (OUTPATIENT)
Dept: HOME HEALTH SERVICES | Facility: HOME HEALTHCARE | Age: 88
End: 2023-10-19
Payer: MEDICARE

## 2023-10-19 VITALS
RESPIRATION RATE: 18 BRPM | TEMPERATURE: 97.3 F | DIASTOLIC BLOOD PRESSURE: 73 MMHG | SYSTOLIC BLOOD PRESSURE: 153 MMHG | HEART RATE: 60 BPM

## 2023-10-19 VITALS
HEART RATE: 57 BPM | TEMPERATURE: 98.6 F | SYSTOLIC BLOOD PRESSURE: 130 MMHG | OXYGEN SATURATION: 98 % | DIASTOLIC BLOOD PRESSURE: 72 MMHG

## 2023-10-19 PROCEDURE — G0152 HHCP-SERV OF OT,EA 15 MIN: HCPCS

## 2023-10-19 PROCEDURE — G0151 HHCP-SERV OF PT,EA 15 MIN: HCPCS

## 2023-10-19 NOTE — HOME HEALTH
Routine Visit Note:    Skill/education provided: OT educated pt/spouse on proper use of rollator during functional mobility in order to reduce risk of falls including routine brake use and utilizing rollator all the way to the desired location vs abandoning when pt gets close.    Patient/caregiver response: Pt/spouse receptive to edu and spouse reports she will encourage and remind pt of proper use.    Plan for next visit: BUE strengthening, home safety.    Other pertinent info: N/A

## 2023-10-19 NOTE — HOME HEALTH
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  HE INCONSISTENTLY USES A WALKER WITH SLIDERS, A ROLLATOR, AND A CANE.  PATIENT SHUFFLES, HEAD DOWN POSTURE, AND AMBULATES WITH FAST PACED GAIT.     HB status/Living Arrangements:   RANCH STYLE HOME WITH STAIRS AT ALL ENTRY POINTS WITH SPOUSE    Skin Integrity/wound status:   INTACT; BRUISED    Code Status:   FULL/CPR    Fall Risk/Safety concerns:   HIGH RISK    Medication issues/Concerns:  NONE    Additional Problems/Concerns:   REPORTS DYSPHAGIA/NON COMPLIANT WITH HOSPITAL RECOMMENDED PUREED WITH THICKENED.  NON-COMPLIANT WITH DME USAGE/SUPERVISION/ASSISTANCE WITH AMBULATION.    SDOH Barriers (i.e. caregiver concerns, social isolation, transportation, food insecurity, environment, income etc.)/Need for MSW:   NONE    Plan for next visit:   SN ASSESS/INSTRUCT.      Very pleasant 89 year old male hospitalization 10/9-10/16 following acute ischemic stroke. Morning of 10/9 patient suddenly lethargic then \"slumped over in chair\" per wife with left sided weakness and altered mental status.  Patient transported to [facility] via EMS. He underwent angioplasty with RT ICA stent d/t severe stenosis.    PMH as listed above.   Patient alert and orient to person, place, time, incorrect by 2 days, no BIMS errors. Spouse signed consent for patient.  He relies heavily on wife to answer questions.  He reports difficulty with " short term memory that began prior to CVA but now is having trouble with aphasia in addition to dysphagia.  He has been prescribed a pureed/thickened diet.  However, he is not 100% compliant.  SN encouraged patient to attempt to answer the questions and wife may help if he is unable.  He also has Parkinson's. He reports he feels much better since he has returned home.  Spouse also notes his cognition has improved but his gait is concerning.  SN reminded patient and spouse patient should have  socks or rubber soled shoes on at all times.  They have hard surface wyatt with rugs.    Patient should f/u with neurology and neurosurgery.  Patient has f/u with PCP today.  Spouse plans to confirm it is safe for patient to take vitamins with prescribed meds.  SN will see 2w1 following SOC then 1w3. PT/OT/SLP to eval.    NADER SANDOVAL RN  [facility]  [phone] C  [phone] O

## 2023-10-19 NOTE — Clinical Note
"Reason for referral/Focus of Care:   89 yo M referred to home health PT with decreased ability to transfer and amb related to recent CVA    Subjective: \"I have been trying to use the walker, but I forget sometimes.\" per patient    Patient's PT Goal: \"get back to walking on my own.\" per patient    Pertinent Medical History: acute ischemic stroke, severe R ICA stenosis, CHF, R TKR with IVC filter 2013, Afib, HTN, hypothyroidism, aphasia   sciatic pain with L LE, [place]    Previous level of function: IND with amb with walker, IND with ADL's    Social Environment/DME/Potential Barriers for Goal Attainment: lives with spouse who provides assist as needed. 4-5 steps to enter home with railing. Has FWW, 4WW, grab bar, BSC    Skin Integrity/wound status: see wound care screen for details.    Code Status: Full    Medication issues/concerns: none identified    HB status: yes. See homebound screen for details.    Problems Identified: see Care Plan    Functional Status/Fall Risk/Safety: see PT evaluation/care plan    POC notification to Dr. Diez     on  10/19/23    via case communication.    Assessment: Skilled physical therapy is needed for 1w1, 2w3 due to decreased mobility related to recent acute ischemic stroke for evaluation, gait training, ther ex, HEP, fall prevention and home safety training    Plan for next visit: progress standing ther ex, HEP instruction, fall prevention instruction  "

## 2023-10-19 NOTE — Clinical Note
Dear Dr. Diez,  OT gregory performed this date, 10/19/23, with OT requesting 2 visits x 2 weeks addressing functional mobility during ADLs, shower t/fs, BUE strength and caregiver edu for increased home safety secondary to R sided CVA with decline in function.   Thank you,   Nereyda Toledo OTR/L

## 2023-10-19 NOTE — HOME HEALTH
"Reason for referral/Focus of Care:   89 yo M referred to home health PT with decreased ability to transfer and amb related to recent CVA    Subjective: \"I have been trying to use the walker, but I forget sometimes.\" per patient    Patient's PT Goal: \"get back to walking on my own.\" per patient    Pertinent Medical History: acute ischemic stroke, severe R ICA stenosis, CHF, R TKR with IVC filter 2013, Afib, HTN, hypothyroidism, aphasia   sciatic pain with L LE, [place]    Previous level of function: IND with amb with walker, IND with ADL's    Social Environment/DME/Potential Barriers for Goal Attainment: lives with spouse who provides assist as needed. 4-5 steps to enter home with railing. Has FWW, 4WW, grab bar, BSC    Skin Integrity/wound status: see wound care screen for details.    Code Status: Full    Medication issues/concerns: none identified    HB status: yes. See homebound screen for details.    Problems Identified: see Care Plan    Functional Status/Fall Risk/Safety: see PT evaluation/care plan    POC notification to Dr. Diez     on  10/19/23    via case communication.    Assessment: Skilled physical therapy is needed for 1w1, 2w3 due to decreased mobility related to recent acute ischemic stroke for evaluation, gait training, ther ex, HEP, fall prevention and home safety training    Plan for next visit: progress standing ther ex, HEP instruction, fall prevention instruction"

## 2023-10-20 ENCOUNTER — HOME CARE VISIT (OUTPATIENT)
Dept: HOME HEALTH SERVICES | Facility: HOME HEALTHCARE | Age: 88
End: 2023-10-20
Payer: MEDICARE

## 2023-10-20 VITALS
RESPIRATION RATE: 18 BRPM | SYSTOLIC BLOOD PRESSURE: 118 MMHG | HEART RATE: 63 BPM | TEMPERATURE: 97.7 F | DIASTOLIC BLOOD PRESSURE: 59 MMHG

## 2023-10-20 VITALS
HEIGHT: 74 IN | HEART RATE: 57 BPM | DIASTOLIC BLOOD PRESSURE: 70 MMHG | RESPIRATION RATE: 18 BRPM | SYSTOLIC BLOOD PRESSURE: 140 MMHG | TEMPERATURE: 98.1 F | BODY MASS INDEX: 20.53 KG/M2 | WEIGHT: 160 LBS | OXYGEN SATURATION: 99 %

## 2023-10-20 PROCEDURE — G0153 HHCP-SVS OF S/L PATH,EA 15MN: HCPCS

## 2023-10-22 NOTE — HOME HEALTH
Routine Visit Note:    Skill/education provided: SLP provided patient with different drink that are consider nectar that and went over water protocol. Patient also instructed on utilizing breather to help with swallowing functions     Patient/caregiver response: Patient and wife wrote everything down and Patient  to follow diet of purred and nectar thicken liquids.     Plan for next visit:  Patient to start exercise program    Other pertinent info:

## 2023-10-23 ENCOUNTER — HOME CARE VISIT (OUTPATIENT)
Dept: HOME HEALTH SERVICES | Facility: HOME HEALTHCARE | Age: 88
End: 2023-10-23
Payer: MEDICARE

## 2023-10-23 VITALS
SYSTOLIC BLOOD PRESSURE: 110 MMHG | OXYGEN SATURATION: 98 % | DIASTOLIC BLOOD PRESSURE: 58 MMHG | TEMPERATURE: 98 F | HEART RATE: 72 BPM | RESPIRATION RATE: 18 BRPM

## 2023-10-23 VITALS
OXYGEN SATURATION: 100 % | RESPIRATION RATE: 18 BRPM | TEMPERATURE: 97.7 F | HEART RATE: 63 BPM | DIASTOLIC BLOOD PRESSURE: 76 MMHG | SYSTOLIC BLOOD PRESSURE: 134 MMHG

## 2023-10-23 PROCEDURE — G0152 HHCP-SERV OF OT,EA 15 MIN: HCPCS

## 2023-10-23 PROCEDURE — G0493 RN CARE EA 15 MIN HH/HOSPICE: HCPCS

## 2023-10-23 NOTE — HOME HEALTH
Routine Visit Note:    Skill/education provided: OT educated, demonstrated, and assessed patient completion of RUE ROM/strengthening exercises in order to improve functional use for ADLs.    Patient/caregiver response: Patient able to return demo with 75% accuracy. Spouse present and able to assist pt with remaining 25% to cue for proper form to maximize benefit of HEP.    Plan for next visit: RUE HEP, home safety, ADLs    Other pertinent info: N/A

## 2023-10-23 NOTE — HOME HEALTH
"  Routine Visit Note:  10/23; spouse and HH manager JEAN CLAUDE Mattson present    Skill/education provided:   SN assessment and instruction of disease mgmt, cva symtoms to reports, pain, safety, meds, nutrition/diet/aspiration precautions    Patient/caregiver response:  v/u; patient requires reminders for consistent use of walker    Plan for next visit:   assess/instruct    Other pertinent info:   Spouse reportts patient is not 100% compliant with walker usage, reports he has been trying to go outside to garden.  Patient reports he is feeling better but had an episode a few days ago of feeling light he was in a \"vortex\" and did feel like his belongings were his.    NADER SANDOVAL RN  [facility]  [phone] C  [phone] O"

## 2023-10-24 ENCOUNTER — HOME CARE VISIT (OUTPATIENT)
Dept: HOME HEALTH SERVICES | Facility: HOME HEALTHCARE | Age: 88
End: 2023-10-24
Payer: MEDICARE

## 2023-10-24 VITALS
SYSTOLIC BLOOD PRESSURE: 118 MMHG | OXYGEN SATURATION: 100 % | DIASTOLIC BLOOD PRESSURE: 70 MMHG | HEART RATE: 71 BPM | TEMPERATURE: 97.3 F

## 2023-10-24 PROCEDURE — G0153 HHCP-SVS OF S/L PATH,EA 15MN: HCPCS

## 2023-10-25 ENCOUNTER — HOME CARE VISIT (OUTPATIENT)
Dept: HOME HEALTH SERVICES | Facility: HOME HEALTHCARE | Age: 88
End: 2023-10-25
Payer: MEDICARE

## 2023-10-25 ENCOUNTER — READMISSION MANAGEMENT (OUTPATIENT)
Dept: CALL CENTER | Facility: HOSPITAL | Age: 88
End: 2023-10-25
Payer: MEDICARE

## 2023-10-25 PROCEDURE — G0157 HHC PT ASSISTANT EA 15: HCPCS

## 2023-10-25 NOTE — OUTREACH NOTE
Stroke Week 2 Survey      Flowsheet Row Responses   [facility] facility patient discharged from? [place]   Does the patient have one of the following disease processes/diagnoses(primary or secondary)? Stroke   Week 2 attempt successful? No   Unsuccessful attempts Attempt 1            Marita Eid Registered Nurse

## 2023-10-26 ENCOUNTER — HOME CARE VISIT (OUTPATIENT)
Dept: HOME HEALTH SERVICES | Facility: HOME HEALTHCARE | Age: 88
End: 2023-10-26
Payer: MEDICARE

## 2023-10-26 VITALS
TEMPERATURE: 97.7 F | DIASTOLIC BLOOD PRESSURE: 80 MMHG | HEART RATE: 70 BPM | SYSTOLIC BLOOD PRESSURE: 110 MMHG | OXYGEN SATURATION: 100 %

## 2023-10-26 VITALS — RESPIRATION RATE: 97 BRPM | SYSTOLIC BLOOD PRESSURE: 128 MMHG | DIASTOLIC BLOOD PRESSURE: 67 MMHG

## 2023-10-26 VITALS
SYSTOLIC BLOOD PRESSURE: 142 MMHG | DIASTOLIC BLOOD PRESSURE: 68 MMHG | HEART RATE: 56 BPM | TEMPERATURE: 98 F | OXYGEN SATURATION: 98 %

## 2023-10-26 PROCEDURE — G0152 HHCP-SERV OF OT,EA 15 MIN: HCPCS

## 2023-10-26 PROCEDURE — G0153 HHCP-SVS OF S/L PATH,EA 15MN: HCPCS

## 2023-10-26 NOTE — HOME HEALTH
Routine Visit Note:    Skill/education provided: OT demonstrated, educated, and progressed patient BUE HEP for increased functional use of RUE.     Patient/caregiver response: Pt able to return demo with 50% accuracy. Spouse able to return demo with 100% accuracy and reports she will assist her .    Plan for next visit: home safety, BUE HEP    Other pertinent info: Pt demos potential bursitis to R elbow this date. OT educated spouse and pt to keep and eye on it if it gets worse to call [facility] for further assistance.

## 2023-10-26 NOTE — HOME HEALTH
Patient is up and motivated upon arrival with use of his Rollator. reports having no new concerns at this time. Spouse is not present for today's visit.     FALLS SINCE LAST VISIT: No    MEDICATION CHANGES: No    PLAN FOR NEXT VISIT:  Gait training matthias LRAD  Review fall risk and safety management strategies  Review/progress home exercise program

## 2023-10-27 ENCOUNTER — HOME CARE VISIT (OUTPATIENT)
Dept: HOME HEALTH SERVICES | Facility: HOME HEALTHCARE | Age: 88
End: 2023-10-27
Payer: MEDICARE

## 2023-10-27 NOTE — HOME HEALTH
Routine Visit Note:   Skill/education provided: SLP provided skilled instructions in proper technique of swallowing exercises. Demonstration and verbal cueing. Reps of 10. Skilled feedback regarding accuracy of his attempts. Plan modified to increase reps to 15 next session and added the Supraglottic Swallow to plan.    Patient/caregiver response: The Effortful Swallow 10/10 swallows, The Monica Maneuver 10/10 swallows, Laryngeal elevation /ee/- Scale at 80 percent accuracy /ee/ Highest note and /ee/ High-low notes at 80 percent accuracy. The Gargle at 80 percent accuracy with minimal cueing. Back tongue drills at 80 percent accuracy with minimal cueing.   Patient reported eating fish, baked potato and soup (tomato bisque), Cream of Wheat and had no issues/questions/concerns.  Plan for next visit: Dysphagia therapy to restore swallowing strength and function for safe intake of least restrictive diet without signs or symptoms of aspiration/aspiration pneumonia.   Swallowing exercise program   Education   Other pertinent info: None

## 2023-10-30 ENCOUNTER — HOME CARE VISIT (OUTPATIENT)
Dept: HOME HEALTH SERVICES | Facility: HOME HEALTHCARE | Age: 88
End: 2023-10-30
Payer: MEDICARE

## 2023-10-30 VITALS
OXYGEN SATURATION: 96 % | TEMPERATURE: 97.7 F | HEART RATE: 64 BPM | SYSTOLIC BLOOD PRESSURE: 128 MMHG | DIASTOLIC BLOOD PRESSURE: 68 MMHG

## 2023-10-30 PROCEDURE — G0152 HHCP-SERV OF OT,EA 15 MIN: HCPCS

## 2023-10-30 NOTE — HOME HEALTH
Routine Visit Note:    Skill/education provided: OT educated patient on BUE HEP with continued progression through additional exercises and importance of proper form.    Patient/caregiver response:  Pt able to demo carryover within session with min cueing needed. Spouse present and able to assist with pt carryover.    Plan for next visit: DC    Other pertinent info: N/A

## 2023-10-31 ENCOUNTER — HOME CARE VISIT (OUTPATIENT)
Dept: HOME HEALTH SERVICES | Facility: HOME HEALTHCARE | Age: 88
End: 2023-10-31
Payer: MEDICARE

## 2023-10-31 ENCOUNTER — READMISSION MANAGEMENT (OUTPATIENT)
Dept: CALL CENTER | Facility: HOSPITAL | Age: 88
End: 2023-10-31
Payer: MEDICARE

## 2023-10-31 VITALS
SYSTOLIC BLOOD PRESSURE: 126 MMHG | DIASTOLIC BLOOD PRESSURE: 62 MMHG | OXYGEN SATURATION: 100 % | HEART RATE: 57 BPM | TEMPERATURE: 97.7 F

## 2023-10-31 PROCEDURE — G0153 HHCP-SVS OF S/L PATH,EA 15MN: HCPCS

## 2023-10-31 PROCEDURE — G0157 HHC PT ASSISTANT EA 15: HCPCS

## 2023-10-31 NOTE — HOME HEALTH
Routine Visit Note:   Skill/education provided: SLP provided skilled reinstructions in swallowing exercises. Reps of 10-15. Skilled feedback regarding accuracy of his attempts.   Patient/caregiver response: The Effortful Swallow 15/15 swallows, The Monica Maneuver 10/10 swallows, Laryngeal elevation /ee/- Scale at 90 percent accuracy /ee/ Highest note and /ee/ High-low notes at 90 percent accuracy. The Gargle at 100 percent accuracy with minimal cueing. Back tongue drills at 80 percent accuracy with minimal cueing. The Supraglottic Swallow at 80 percent accuracy.  Patient took sips of water today during the session. No overt signs or symptoms of aspiration.  Patient ate cake with ice cream and coughed yesterday (reported). Patient ate donuts and half cup coffee this morning. No overt signs or symptoms of aspiration.   Plan for next visit: Dysphagia therapy to restore swallowing strength and function for safe intake of least restrictive diet without signs or symptoms of aspiration/aspiration pneumonia.   Swallowing exercise program   Education   Other pertinent info: None

## 2023-10-31 NOTE — OUTREACH NOTE
Stroke Week 2 Survey      Flowsheet Row Responses   [facility] patient discharged from? [place]   Does the patient have one of the following disease processes/diagnoses(primary or secondary)? Stroke   Week 2 attempt successful? Yes   Call start time 1028   Call end time 1034   Discharge diagnosis Acute ischemic stroke, Angioplasty and stent placement to the right ICA   Person spoke with today (if not patient) and relationship Evonne Snowden Spouse   Meds reviewed with patient/caregiver? Yes   Is the patient taking all medications as directed (includes completed medication regime)? Yes   Does the patient have a primary care provider?  Yes   Does the patient have an appointment with their PCP within 7 days of discharge? Yes   Has the patient kept scheduled appointments due by today? Yes   What is the Home health agency?  [facility]   Has home health visited the patient within 72 hours of discharge? Yes   Home health comments ST working with at this time. Aware that pt is not thickening fluids and is now doing ground foods.   DME comments uses walker   Psychosocial issues? No   Does the patient require any assistance with activities of daily living such as eating, bathing, dressing, walking, etc.? No   Does the patient have any residual symptoms from stroke/TIA? Yes   Does the patient understand the diet ordered at discharge? Yes  [see note]   Did the patient receive a copy of their discharge instructions? Yes   Nursing interventions Reviewed instructions with patient   What is the patient's perception of their health status since discharge? Improving   Nursing interventions Nurse provided patient education   Is the patient able to teach back FAST for Stroke? B alance: Watch for sudden loss of balance, E yes: Check for vision loss, F ace: Look for an uneven smile, A rm: Check if one arm is weak, S peech: Listen for slurred speech, T samantha: Call 9-1-1 right away   Is the patient/caregiver able to teach back the  hierarchy of who to call/visit for symptoms/problems? PCP, Specialist, Home health nurse, Urgent Care, ED, 911 Yes   Week 2 call completed? Yes   Wrap up additional comments Wife reports improvement with Pt. ST in home at this time working with PT>   Call end time 1034            CHRISTIAN OVERTON - Registered Nurse

## 2023-11-01 ENCOUNTER — HOME CARE VISIT (OUTPATIENT)
Dept: HOME HEALTH SERVICES | Facility: HOME HEALTHCARE | Age: 88
End: 2023-11-01
Payer: MEDICARE

## 2023-11-01 VITALS
DIASTOLIC BLOOD PRESSURE: 76 MMHG | TEMPERATURE: 97.8 F | OXYGEN SATURATION: 98 % | HEART RATE: 66 BPM | SYSTOLIC BLOOD PRESSURE: 132 MMHG

## 2023-11-01 VITALS
SYSTOLIC BLOOD PRESSURE: 126 MMHG | OXYGEN SATURATION: 98 % | RESPIRATION RATE: 18 BRPM | HEART RATE: 78 BPM | TEMPERATURE: 97.2 F | DIASTOLIC BLOOD PRESSURE: 82 MMHG

## 2023-11-01 VITALS — OXYGEN SATURATION: 98 % | HEART RATE: 70 BPM | TEMPERATURE: 97.5 F | RESPIRATION RATE: 16 BRPM

## 2023-11-01 PROCEDURE — G0152 HHCP-SERV OF OT,EA 15 MIN: HCPCS

## 2023-11-01 PROCEDURE — G0495 RN CARE TRAIN/EDU IN HH: HCPCS

## 2023-11-01 NOTE — HOME HEALTH
Spouse present for training today. Patient reports good progress towards PLOF.    FALLS SINCE LAST VISIT: No    MEDICATION CHANGES: No    PLAN FOR NEXT VISIT:  Outdoor ambulation  Review fall risk and safety management strategies  Review/progress home exercise program

## 2023-11-01 NOTE — Clinical Note
Dear Dr. Diez,     OT services DC'd this date, 11/1/23. Pt presents at max potential with HEP in place for BUE ROM/strength and functional use for ADL routine. Spouse assists with carryover and routine participation due to deficits in cognition.    Thank you,  Nereyda Toledo OTR/L

## 2023-11-01 NOTE — Clinical Note
Isai Abarca,    Patient is requesting SN DC next week.  Therefore, the [facility] DC that is currently scheduled the following week to me with then defer to you as you will be the last discipline in.  I just wanted to give you a heads up on his request.    Thank you.

## 2023-11-01 NOTE — HOME HEALTH
Pre dc SN Visit Note:  11/1 spouse present    Skill/education provided:   Assessment and instruction of disese mgmt and maintenance, meds, safety, s/s stroke, pain.  Patient has made signficant improvement in the last 2 weeks.  He was discharged from OT today and is requesting a SN DC next week.  Assuming there is no change in status he will be appropriate for SN dc next week.    Patient/caregiver response:   v/u    Plan for next visit:   SN DC    Other pertinent info:     NADER SANDOVAL RN  [facility]  [phone] C  [phone] O

## 2023-11-01 NOTE — Clinical Note
Thank you for the update.  Rima  ----- Message -----  From: Patricia Land, JEAN CLAUDE  Sent: 11/1/2023   6:10 PM EDT  To: DULCE Hartley,    Patient is requesting SN DC next week.  Therefore, the [facility] DC that is currently scheduled the following week to me with then defer to you as you will be the last discipline in.  I just wanted to give you a heads up on his request.    Thank you.

## 2023-11-01 NOTE — HOME HEALTH
Routine Visit Note:    Skill/education provided: OT educated, demonstrated, and progressively challenged pt with BUE strengthening/ROM exercises to increase functional use of RUE.    Patient/caregiver response: Pt receptive to edu and able to follow directions during visit. Carryover potential is poor with spouse present to assist upon DC.    Plan for next visit: N/A    Other pertinent info: N/A

## 2023-11-02 ENCOUNTER — HOME CARE VISIT (OUTPATIENT)
Dept: HOME HEALTH SERVICES | Facility: HOME HEALTHCARE | Age: 88
End: 2023-11-02
Payer: MEDICARE

## 2023-11-02 VITALS
OXYGEN SATURATION: 100 % | DIASTOLIC BLOOD PRESSURE: 64 MMHG | HEART RATE: 56 BPM | TEMPERATURE: 97.3 F | SYSTOLIC BLOOD PRESSURE: 130 MMHG

## 2023-11-02 PROCEDURE — G0153 HHCP-SVS OF S/L PATH,EA 15MN: HCPCS

## 2023-11-03 ENCOUNTER — HOME CARE VISIT (OUTPATIENT)
Dept: HOME HEALTH SERVICES | Facility: HOME HEALTHCARE | Age: 88
End: 2023-11-03
Payer: MEDICARE

## 2023-11-03 VITALS
DIASTOLIC BLOOD PRESSURE: 68 MMHG | TEMPERATURE: 97.3 F | RESPIRATION RATE: 18 BRPM | OXYGEN SATURATION: 98 % | SYSTOLIC BLOOD PRESSURE: 134 MMHG | HEART RATE: 77 BPM

## 2023-11-03 PROCEDURE — G0157 HHC PT ASSISTANT EA 15: HCPCS

## 2023-11-03 NOTE — HOME HEALTH
Patient has no questions and reports no new concerns since last visit. Good adherence to HEP reported.      FALLS SINCE LAST VISIT: No    PLAN FOR NEXT VISIT:  outdoor ambulation  Dynamic balance  Review/progress home exercise program  Review fall risk and safety strategies

## 2023-11-06 ENCOUNTER — HOME CARE VISIT (OUTPATIENT)
Dept: HOME HEALTH SERVICES | Facility: HOME HEALTHCARE | Age: 88
End: 2023-11-06
Payer: MEDICARE

## 2023-11-06 VITALS
SYSTOLIC BLOOD PRESSURE: 142 MMHG | OXYGEN SATURATION: 99 % | TEMPERATURE: 97.7 F | HEART RATE: 59 BPM | DIASTOLIC BLOOD PRESSURE: 70 MMHG

## 2023-11-06 DIAGNOSIS — Z86.73: ICD-10-CM

## 2023-11-06 DIAGNOSIS — R13.19 DYSPHAGIA, NEUROLOGIC: Primary | ICD-10-CM

## 2023-11-06 PROCEDURE — G0153 HHCP-SVS OF S/L PATH,EA 15MN: HCPCS

## 2023-11-06 NOTE — HOME HEALTH
Routine Visit Note:   Skill/education provided: SLP instructed patient to perform swallowing exercises. Reps of 15. Skilled feedback regarding accuracy of his attempts.     Patient/caregiver response: The Effortful Swallow 15/15 swallows, The Monica Maneuver 10/10 swallows, Laryngeal elevation /ee/- Scale at 90 percent accuracy /ee/ Highest note and /ee/ High-low notes at 90 percent accuracy. The Gargle at 100 percent accuracy with minimal cueing. Back tongue drills at 80 percent accuracy with independence. The Supraglottic Swallow at 80 percent accuracy. Anterior-posterior tongue movement at 100 percent accuracy. Chin tuck resistance (with a ball)- Continuous and Repetitive at 100 percent accuracy.  Patient took sips of water today during the session. No overt signs or symptoms of aspiration.   Patient is eating oats, an English muffin with jelly, chicken noodle soup with a Pepsi, and beef filet mignon, bib lettuce and a pumpkin roll slice, Vegeteble soup. For breakfast, oatmeal, Cream of Wheat, biscuits and sausage gravy.  No overt signs or symptoms of aspiration.   Plan for next visit: Dysphagia therapy to restore swallowing strength and function for safe intake of least restrictive diet without signs or symptoms of aspiration/aspiration pneumonia.   Swallowing exercise program   Education   Other pertinent info: SLP contacted through in Dr Rg ashford to request an order for a video swallow study to assess patient's swallow prior to upgrade of diet.

## 2023-11-07 ENCOUNTER — HOME CARE VISIT (OUTPATIENT)
Dept: HOME HEALTH SERVICES | Facility: HOME HEALTHCARE | Age: 88
End: 2023-11-07
Payer: MEDICARE

## 2023-11-07 PROCEDURE — G0157 HHC PT ASSISTANT EA 15: HCPCS

## 2023-11-08 ENCOUNTER — HOME CARE VISIT (OUTPATIENT)
Dept: HOME HEALTH SERVICES | Facility: HOME HEALTHCARE | Age: 88
End: 2023-11-08
Payer: MEDICARE

## 2023-11-08 ENCOUNTER — READMISSION MANAGEMENT (OUTPATIENT)
Dept: CALL CENTER | Facility: HOSPITAL | Age: 88
End: 2023-11-08
Payer: MEDICARE

## 2023-11-08 VITALS
HEART RATE: 72 BPM | RESPIRATION RATE: 18 BRPM | DIASTOLIC BLOOD PRESSURE: 67 MMHG | OXYGEN SATURATION: 98 % | SYSTOLIC BLOOD PRESSURE: 126 MMHG

## 2023-11-08 NOTE — OUTREACH NOTE
Stroke Week 3 Survey      Flowsheet Row Responses   [facility] patient discharged from? [place]   Does the patient have one of the following disease processes/diagnoses(primary or secondary)? Stroke   Week 3 attempt successful? Yes   Call start time 0934   Call end time 0941   Is the patient taking all medications as directed (includes completed medication regime)? Yes   Comments regarding appointments Pt to see cardiology on November 9, 2023.   Does the patient have a primary care provider?  Yes   Has home health visited the patient within 72 hours of discharge? Yes   Home health comments Pt reports eating a normal diet.   Does the patient require any assistance with activities of daily living such as eating, bathing, dressing, walking, etc.? No   Does the patient have any residual symptoms from stroke/TIA? No   Residual symptoms comments Pt reports all weakness has subsided.   What is the patient's perception of their health status since discharge? Returned to baseline/stable            Roxanne PARKER - Registered Nurse

## 2023-11-09 ENCOUNTER — OFFICE VISIT (OUTPATIENT)
Dept: CARDIOLOGY | Facility: CLINIC | Age: 88
End: 2023-11-09
Payer: MEDICARE

## 2023-11-09 VITALS
SYSTOLIC BLOOD PRESSURE: 120 MMHG | WEIGHT: 164.7 LBS | DIASTOLIC BLOOD PRESSURE: 60 MMHG | OXYGEN SATURATION: 98 % | BODY MASS INDEX: 23.06 KG/M2 | HEIGHT: 71 IN | HEART RATE: 63 BPM

## 2023-11-09 DIAGNOSIS — I48.0 PAROXYSMAL A-FIB: Chronic | ICD-10-CM

## 2023-11-09 DIAGNOSIS — I50.32 CHRONIC DIASTOLIC CONGESTIVE HEART FAILURE: Chronic | ICD-10-CM

## 2023-11-09 DIAGNOSIS — Z09 HOSPITAL DISCHARGE FOLLOW-UP: ICD-10-CM

## 2023-11-09 DIAGNOSIS — I25.10 CORONARY ARTERY DISEASE INVOLVING NATIVE CORONARY ARTERY OF NATIVE HEART WITHOUT ANGINA PECTORIS: Primary | Chronic | ICD-10-CM

## 2023-11-09 DIAGNOSIS — G20.A1 PARKINSON'S DISEASE, UNSPECIFIED WHETHER DYSKINESIA PRESENT, UNSPECIFIED WHETHER MANIFESTATIONS FLUCTUATE: Chronic | ICD-10-CM

## 2023-11-09 DIAGNOSIS — I10 BENIGN ESSENTIAL HYPERTENSION: Chronic | ICD-10-CM

## 2023-11-09 DIAGNOSIS — I73.9 PAD (PERIPHERAL ARTERY DISEASE): Chronic | ICD-10-CM

## 2023-11-09 DIAGNOSIS — C77.0 SECONDARY MALIGNANCY OF LYMPH NODES OF HEAD, FACE AND NECK: ICD-10-CM

## 2023-11-09 DIAGNOSIS — Z95.828 PRESENCE OF IVC FILTER: Chronic | ICD-10-CM

## 2023-11-09 DIAGNOSIS — Z79.01 ANTICOAGULANT LONG-TERM USE: Chronic | ICD-10-CM

## 2023-11-09 RX ORDER — AMLODIPINE BESYLATE 2.5 MG/1
2.5 TABLET ORAL
Qty: 90 TABLET | Refills: 3 | Status: SHIPPED | OUTPATIENT
Start: 2023-11-09 | End: 2023-11-13 | Stop reason: SDUPTHER

## 2023-11-09 NOTE — LETTER
November 9, 2023     Ford Dorado MD  [address]  [facility] [address]  [place] [id number]    Patient: Casey Snowden Sr.   YOB: 1934   Date of Visit: 11/9/2023     Dear Ford Dorado MD:       Thank you for referring Casey Snowden to me for evaluation. Below are the relevant portions of my assessment and plan of care.    If you have questions, please do not hesitate to call me. I look forward to following Casey along with you.         Sincerely,        Cecilio Armas III, MD        CC: MD Pawel Olmedo Jesse E III, MD  11/09/23 3552  Sign when Signing Visit      Subjective:     Encounter Date: 11/09/23        Patient ID: Casey Snowden Sr. is a 89 y.o. male.    Chief Complaint:PAF, CAD  History of Present Illness    Dear Dr. Diez,    I had the pleasure of seeing this patient in the office today for f/u.   It has been 6 months since his last visit.  He has a history of paroxysmal atrial fibrillation with elevated CHADS Vasc score as well as lower extremity DVT.  He also has a history of coronary disease with a drug-eluting stent and an ischemic cardiomyopathy.  Currently on a combination of Eliquis and Plavix.    He was recently hospitalized with a CVA due to severe RCA stenosis.  He follows consistently with vascular surgery, he had a percutaneous revascularization procedure performed.  Fortunately no cardiac issues.    Initially he was discharged on apixaban 2.5 mg twice daily along with Brilinta, Brilinta was stopped after several days.  They say he just saw vascular surgery who wanted to increase him to 5 mg twice daily on apixaban but wanted our okay before doing so.  Patient previously on apixaban 2.5 mg because of DVT.    Also noted he had some problems with hypotension, he was discharged on amlodipine 5 mg daily.    He is recently hospitalized with mental status changes.  He also had called in because of swelling and drainage from his right lower extremity.  He has a  history of a DVT on that side and follows with vascular surgery, we had them call vascular surgery and they recommended compliance with his compression stockings.    He has a history of metastatic squamous cell carcinoma.  He has undergone radiation therapy to his neck as well as chemotherapy.     He presented to [facility] on 6/3/19 with complaints of light headed and dizziness as well as left sided chest pain as he was working in the yard. He was found to be in atrial fibrillation with RVR, with worse than baseline anterior ST changes. He was rate controlled over night. He was also noted to have pneumonia and was started on IV levaquin. Patient was seen by Dr. Perez on the morning of 6/4. He was noted to jave troponin elevation and was transferred to [facility] for cardiac catheterization. The LAD was totally occluded and was treated with PCI and drug eluting stent placement to the LAD (3.0 x 28 mm Xience Lory drug eluting stent). Echocardiogram showed EF 40% with an apical wall motion abnormalities. He has been on carvedilol on the past, this was stopped due to bradycardia. He received metoprolol and tolerated well with HR in the 50's. At discharge this was changed to metoprolol succinate given cardiomyopathy. He is also on losartan.     Cath 6/2019  Hemodynamics:   LV: 150/8  AO: 150/64        PCI CORONARY SEGMENT: Mid LAD  PRE-STENOSIS: 100  POST-STENOSIS: 0%  LESION TYPE: c  EREN FLOW PRE/POST: 1/3  CULPRIT LESION: Yes     Estimated blood loss: Minimal  Complications: None     Conclusions:   1. Left main: Normal  2. LAD: Occluded mid segment.  EREN I flow  3. LCX:Normal  4. RCA: 30 to 40% mid vessel stenosis  5.  Normal left ventricular size.  Moderately reduced systolic function.  Ejection fraction 35%.  Mid to distal anterolateral akinesis.  Apical dyskinesis  6.  PCI of the mid LAD with a 3.0 x 28 mm Xience Lory drug-eluting stent, postdilated to high pressure with a 3.25 mm  noncompliant trek balloon     Echo 6/2019  Interpretation Summary     The left ventricular cavity is mildly dilated.  Left ventricular wall thickness is consistent with mild concentric hypertrophy.  There is mild calcification of the aortic valve.  Estimated EF = 40%.  The following left ventricular wall segments are akinetic: apical anterior, apical lateral, apical inferior, apical septal and apex.  Left ventricular diastolic dysfunction (grade II) consistent with pseudonormalization.  Left atrial cavity size is moderately dilated.  Mild tricuspid valve regurgitation is present.  Calculated right ventricular systolic pressure from tricuspid regurgitation is 41 mmHg.  Mild pulmonic valve regurgitation is present.          He has a history of paroxysmal atrial fibrillation and has previously followed with Dr. Mercado.  He has been maintained on chronic anticoagulation.  He apparently never required rate control medicine due to baseline bradycardia.      The following portions of the patient's history were reviewed and updated as appropriate: allergies, current medications, past family history, past medical history, past social history, past surgical history and problem list.    Past Medical History:   Diagnosis Date   • Acquired hypothyroidism 08/31/2022   • Acute ischemic stroke 10/08/2023   • Aneurysm    • Arrhythmia    • Atrial fibrillation    • Cancer 04/2018    basil cell carcinoma   • Carotid stenosis    • CHF (congestive heart failure)    • Chronic deep vein thrombosis (DVT) of popliteal vein of right lower extremity    • Clotting disorder    • Coronary artery disease involving native coronary artery of native heart without angina pectoris 12/20/2018   • DVT of lower extremity (deep venous thrombosis)    • Hard of hearing    • Hyperlipidemia    • Hypertension    • Localized edema 01/04/2017   • Long-term use of high-risk medication    • Metabolic encephalopathy 02/07/2023   • Myocardial infarction 3473     LAD stent   • PAD (peripheral artery disease) 09/10/2020   • Parkinson's disease    • Postphlebitic syndrome    • Skin tear of upper arm without complication, left, subsequent encounter    • Stroke        Past Surgical History:   Procedure Laterality Date   • BASAL CELL CARCINOMA EXCISION  04/2018   • CARDIAC CATHETERIZATION N/A 11/05/2018    Procedure: Left Heart Cath;  Surgeon: Oliverio Azar MD;  Location: [facility] CATH INVASIVE LOCATION;  Service: Cardiovascular   • CARDIAC CATHETERIZATION N/A 11/05/2018    Procedure: Coronary angiography;  Surgeon: Oliverio Azar MD;  [facility];  Service: Cardiovascular   • CARDIAC CATHETERIZATION N/A 11/05/2018    Procedure: Left ventriculography;  Surgeon: Oliverio Azar MD;  [facility];  Service: Cardiovascular   • CARDIAC CATHETERIZATION N/A 06/04/2019    Procedure: Left Heart Cath;  Surgeon: Johnny Glasgow MD;  [facility];  Service: Cardiovascular   • CARDIAC CATHETERIZATION N/A 06/04/2019    Procedure: Coronary angiography;  Surgeon: Johnny Glasgow MD;  Location: [facility] CATH INVASIVE LOCATION;  Service: Cardiovascular   • CARDIAC CATHETERIZATION N/A 06/04/2019    Procedure: Left ventriculography;  Surgeon: Johnny Glasgow MD;  [facility];  Service: Cardiovascular   • CARDIAC CATHETERIZATION N/A 06/04/2019    Procedure: Stent BILL coronary;  Surgeon: Johnny Glasgow MD;  Location: [facility] CATH INVASIVE LOCATION;  Service: Cardiovascular   • CARDIAC SURGERY     • CAROTID ARTERY ANGIOPLASTY Right 10/11/2023    with stent   • CAROTID STENT     • CATARACT EXTRACTION Left 08/2018   • CATARACT EXTRACTION Right 09/2018   • COLONOSCOPY      2011   • CORONARY STENT PLACEMENT     • INCISION AND DRAINAGE LEG Right 07/07/2017    Procedure: INCISION AND DRAINAGE INFECTED HEMATOMA RIGHT THIGH;  Surgeon: Valentin Peña MD;  Location:  "[facility];  Service:    • JOINT REPLACEMENT     • KNEE INCISION AND DRAINAGE Right 12/27/2016    Procedure: KNEE INCISION AND DRAINAGE;  Surgeon: Triston Whitten MD;  Location: [facility];  Service:    • NOSE SURGERY      nose replacement   • SKIN BIOPSY     • SKIN GRAFT  12/2017   • TOTAL KNEE ARTHROPLASTY Right    • VASCULAR SURGERY     • VENA CAVA FILTER PLACEMENT             Procedures       Objective:     Vitals:    11/09/23 1417   BP: 120/60   BP Location: Left arm   Pulse: 63   SpO2: 98%   Weight: 74.7 kg (164 lb 11.2 oz)   Height: 180.3 cm (71\")         Physical Exam  Constitutional:       General: He is not in acute distress.     Appearance: He is well-developed. He is not diaphoretic.   HENT:      Head: Normocephalic and atraumatic.      Nose: Nose normal.   Eyes:      General:         Right eye: No discharge.         Left eye: No discharge.      Conjunctiva/sclera: Conjunctivae normal.   Neck:      Thyroid: No thyromegaly.      Trachea: No tracheal deviation.   Cardiovascular:      Rate and Rhythm: Normal rate and regular rhythm.      Pulses: Normal pulses.      Heart sounds: Normal heart sounds, S1 normal and S2 normal.      No S3 sounds.   Pulmonary:      Effort: Pulmonary effort is normal. No respiratory distress.      Breath sounds: Normal breath sounds. No stridor.   Chest:      Chest wall: No tenderness.   Abdominal:      General: Bowel sounds are normal. There is no distension.      Palpations: Abdomen is soft. There is no mass.      Tenderness: There is no abdominal tenderness. There is no guarding or rebound.   Musculoskeletal:         General: No tenderness or deformity. Normal range of motion.      Cervical back: Normal range of motion and neck supple.      Right lower leg: Edema present.   Lymphadenopathy:      Cervical: No cervical adenopathy.   Skin:     General: Skin is warm and dry.      Findings: No erythema or rash.   Neurological:      Mental Status: He is alert and oriented " to person, place, and time.      Deep Tendon Reflexes: Reflexes are normal and symmetric.   Psychiatric:         Thought Content: Thought content normal.         Lab Review:             Lab Results   Component Value Date    GLUCOSE 115 (H) 10/15/2023    BUN 15 10/15/2023    CREATININE 0.65 (L) 10/15/2023    EGFRIFNONA 99 06/05/2019    EGFRIFAFRI 102 10/27/2017    BCR 23.1 10/15/2023    K 3.5 10/15/2023    CO2 23.0 10/15/2023    CALCIUM 8.4 (L) 10/15/2023    PROTENTOTREF 6.2 10/03/2023    ALBUMIN 4.2 10/09/2023    LABIL2 2.3 10/03/2023    AST 12 10/09/2023    ALT <5 10/09/2023     Results for orders placed during the hospital encounter of 10/09/23    Adult Transthoracic Echo Complete w/ Color, Spectral and Contrast if Necessary Per Protocol    Interpretation Summary  •  Left ventricular systolic function is mildly decreased. Calculated left ventricular EF = 44.7%  •  Left ventricular wall thickness is consistent with mild to moderate concentric hypertrophy.  •  The following left ventricular wall segments are hypokinetic: apical anterior and mid anteroseptal. The following left ventricular wall segments are akinetic: apical inferior, apical septal and apex.  •  Left ventricular diastolic function is consistent with (grade Ia w/high LAP) impaired relaxation.  •  There is calcification of the aortic valve.  •  Estimated right ventricular systolic pressure from tricuspid regurgitation is normal (<35 mmHg).    CHADS-VASc Risk Assessment              7 Total Score    1 CHF    1 Hypertension    2 Age >/= 75    2 PRIOR STROKE/TIA/THROMBO    1 Vascular Disease        Criteria that do not apply:    DM    Age 65-74    Sex: Female                  Assessment:          Diagnosis Plan   1. Coronary artery disease involving native coronary artery of native heart without angina pectoris               Plan:       1. Atrial Fibrillation and Atrial Flutter  Assessment  • The patient has paroxysmal atrial fibrillation  • The patient's  CHADS2-VASc score is 3  • A [id number] score of 2 or more is considered a high risk for a thromboembolic event  • Apixaban prescribed    Plan  • Attempt to maintain sinus rhythm  • Continue apixaban for antithrombotic therapy, bleeding issues discussed  • Sinus bradycardia no rate control meds    2. Heart Failure  Assessment  • Beta blocker not prescribed for patient reasons  • Diuretics prescribed  • Angiotensin receptor blocker (ARB) prescribed    Plan  • The patient has received heart failure education on the following topics: medication instructions, symptom management and weight monitoring  • The heart failure care plan was discussed with the patient today including: continuing the current program    Subjective/Objective    • Physical exam findings negative for peripheral edema and S3 gallop.    3.  History of chronic DVT; Patient remains on chronic anticoagulation and follows with Dr. Dorado. Patient has an IVC filter in place,  4.  Coronary Artery Disease  Assessment  • The patient has no angina    Plan  • Lifestyle modifications discussed include adhering to a heart healthy diet, avoidance of tobacco products, maintenance of a healthy weight, medication compliance, regular exercise and regular monitoring of cholesterol and blood pressure  • Toprol has been discontinued in the past because of bradycardia    Subjective - Objective  • There is a history of past MI   • There has been a previous stent procedure using BILL   • Current antiplatelet therapy includes aspirin 81 mg      5.  Essential hypertension- he also has a history of orthostatic hypotension with his Parkinson's, but he is not been having orthostatic symptoms.  After his CVA he was discharged on amlodipine 5 mg daily, having spells of hypotension, will decrease his amlodipine to 2.5 mg daily.  6.  At this point patient remains on the combination of aspirin and Eliquis.  He has a class I indication for antiplatelet therapy given his prior  drug-eluting stent and underlying coronary disease.  He has a class I indication for chronic anticoagulation with his history of DVT as well as paroxysmal atrial fibrillation.  He had been on clopidogrel and this was switched to aspirin by neurology.  7.  Ischemic cardiomyopathy-continue current medical therapy.  As noted above he is not on beta-blockade because of sinus bradycardia.  No heart failure symptoms.  8.  Patient was on apixaban 2.5 mg twice daily, now that he is off the Brilinta that we will adjust this to 5 mg daily.    Thank you very much for allowing us to participate in the care of this pleasant patient.  Please don't hesitate to call if I can be of assistance in any way.      Current Outpatient Medications:   •  amLODIPine (NORVASC) 5 MG tablet, Take 1 tablet by mouth Daily for 30 days., Disp: 30 tablet, Rfl: 0  •  apixaban (ELIQUIS) 2.5 MG tablet tablet, Take 1 tablet by mouth 2 (Two) Times a Day., Disp: , Rfl:   •  aspirin 81 MG chewable tablet, Chew 1 tablet Daily for 90 days., Disp: 90 tablet, Rfl: 0  •  atorvastatin (LIPITOR) 40 MG tablet, Take 1 tablet by mouth Every Night., Disp: 90 tablet, Rfl: 0  •  atropine 1 % ophthalmic solution, 1 drop 3 (Three) Times a Day As Needed (as needed orally for increased secretions). Orally   Indications: excessive drooling, Disp: , Rfl:   •  carbidopa-levodopa (SINEMET)  MG per tablet, Take 1 tablet by mouth 3 (Three) Times a Day. Indications: Parkinson's Disease, Disp: , Rfl:   •  Cyanocobalamin (Vitamin B 12) 500 MCG tablet, Take 2 tablets by mouth Every Morning. Indications: Inadequate Vitamin B12, Disp: , Rfl:   •  levothyroxine (SYNTHROID, LEVOTHROID) 75 MCG tablet, TAKE 1 TABLET BY MOUTH EVERY MORNING. INDICATIONS: UNDERACTIVE THYROID, Disp: 90 tablet, Rfl: 1  •  Multiple Vitamins-Minerals (PRESERVISION AREDS) capsule, Take 1 tablet by mouth 2 (Two) Times a Day. Indications: supplement, Disp: , Rfl:   •  Polyethyl Glycol-Propyl Glycol (SYSTANE  OP), Apply 1 dose to eye(s) as directed by provider As Needed. Indications: dry eye, Disp: , Rfl:   •  pramipexole (MIRAPEX) 0.5 MG tablet, Take 0.5 tablets by mouth 3 (Three) Times a Day. Pt now takes 2.5 mg   Indications: Parkinson's Disease, Disp: , Rfl:   •  valsartan (DIOVAN) 160 MG tablet, TAKE 1 TABLET BY MOUTH  DAILY (Patient taking differently: Take 1 tablet by mouth Daily.), Disp: 90 tablet, Rfl: 3

## 2023-11-09 NOTE — PROGRESS NOTES
Subjective:     Encounter Date: 11/09/23        Patient ID: Casey Snowden Sr. is a 89 y.o. male.    Chief Complaint:PAF, CAD  History of Present Illness    Dear Dr. Diez,    I had the pleasure of seeing this patient in the office today for f/u.   It has been 6 months since his last visit.  He has a history of paroxysmal atrial fibrillation with elevated CHADS Vasc score as well as lower extremity DVT.  He also has a history of coronary disease with a drug-eluting stent and an ischemic cardiomyopathy.  Currently on a combination of Eliquis and Plavix.    He was recently hospitalized with a CVA due to severe RCA stenosis.  He follows consistently with vascular surgery, he had a percutaneous revascularization procedure performed.  Fortunately no cardiac issues.    Initially he was discharged on apixaban 2.5 mg twice daily along with Brilinta, Brilinta was stopped after several days.  They say he just saw vascular surgery who wanted to increase him to 5 mg twice daily on apixaban but wanted our okay before doing so.  Patient previously on apixaban 2.5 mg because of DVT.    Also noted he had some problems with hypotension, he was discharged on amlodipine 5 mg daily.    He is recently hospitalized with mental status changes.  He also had called in because of swelling and drainage from his right lower extremity.  He has a history of a DVT on that side and follows with vascular surgery, we had them call vascular surgery and they recommended compliance with his compression stockings.    He has a history of metastatic squamous cell carcinoma.  He has undergone radiation therapy to his neck as well as chemotherapy.     He presented to [facility] on [facility]/19 with complaints of light headed and dizziness as well as left sided chest pain as he was working in the yard. He was found to be in atrial fibrillation with RVR, with worse than baseline anterior ST changes. He was rate controlled over night. He was also  noted to have pneumonia and was started on IV levaquin. Patient was seen by Dr. Perez on the morning of 6/4. He was noted to jave troponin elevation and was transferred to [facility] for cardiac catheterization. The LAD was totally occluded and was treated with PCI and drug eluting stent placement to the LAD (3.0 x 28 mm Xience Lory drug eluting stent). Echocardiogram showed EF 40% with an apical wall motion abnormalities. He has been on carvedilol on the past, this was stopped due to bradycardia. He received metoprolol and tolerated well with HR in the 50's. At discharge this was changed to metoprolol succinate given cardiomyopathy. He is also on losartan.     Cath 6/2019  Hemodynamics:   LV: 150/8  AO: 150/64        PCI CORONARY SEGMENT: Mid LAD  PRE-STENOSIS: 100  POST-STENOSIS: 0%  LESION TYPE: c  EREN FLOW PRE/POST: 1/3  CULPRIT LESION: Yes     Estimated blood loss: Minimal  Complications: None     Conclusions:   1. Left main: Normal  2. LAD: Occluded mid segment.  EREN I flow  3. LCX:Normal  4. RCA: 30 to 40% mid vessel stenosis  5.  Normal left ventricular size.  Moderately reduced systolic function.  Ejection fraction 35%.  Mid to distal anterolateral akinesis.  Apical dyskinesis  6.  PCI of the mid LAD with a 3.0 x 28 mm Xience Lory drug-eluting stent, postdilated to high pressure with a 3.25 mm noncompliant trek balloon     Echo 6/2019  Interpretation Summary     The left ventricular cavity is mildly dilated.  Left ventricular wall thickness is consistent with mild concentric hypertrophy.  There is mild calcification of the aortic valve.  Estimated EF = 40%.  The following left ventricular wall segments are akinetic: apical anterior, apical lateral, apical inferior, apical septal and apex.  Left ventricular diastolic dysfunction (grade II) consistent with pseudonormalization.  Left atrial cavity size is moderately dilated.  Mild tricuspid valve regurgitation is present.  Calculated right  ventricular systolic pressure from tricuspid regurgitation is 41 mmHg.  Mild pulmonic valve regurgitation is present.          He has a history of paroxysmal atrial fibrillation and has previously followed with Dr. Mercado.  He has been maintained on chronic anticoagulation.  He apparently never required rate control medicine due to baseline bradycardia.      The following portions of the patient's history were reviewed and updated as appropriate: allergies, current medications, past family history, past medical history, past social history, past surgical history and problem list.    Past Medical History:   Diagnosis Date    Acquired hypothyroidism 08/31/2022    Acute ischemic stroke 10/08/2023    Aneurysm     Arrhythmia     Atrial fibrillation     Cancer 04/2018    basil cell carcinoma    Carotid stenosis     CHF (congestive heart failure)     Chronic deep vein thrombosis (DVT) of popliteal vein of right lower extremity     Clotting disorder     Coronary artery disease involving native coronary artery of native heart without angina pectoris 12/20/2018    DVT of lower extremity (deep venous thrombosis)     Hard of hearing     Hyperlipidemia     Hypertension     Localized edema 01/04/2017    Long-term use of high-risk medication     Metabolic encephalopathy 02/07/2023    Myocardial infarction 2918    LAD stent    PAD (peripheral artery disease) 09/10/2020    Parkinson's disease     Postphlebitic syndrome     Skin tear of upper arm without complication, left, subsequent encounter     Stroke        Past Surgical History:   Procedure Laterality Date    BASAL CELL CARCINOMA EXCISION  04/2018    CARDIAC CATHETERIZATION N/A 11/05/2018    Procedure: Left Heart Cath;  Surgeon: Oliverio Azar MD;  Location: [facility] CATH INVASIVE LOCATION;  Service: Cardiovascular    CARDIAC CATHETERIZATION N/A 11/05/2018    Procedure: Coronary angiography;  Surgeon: Oliverio Azar MD;  Location:  [facility] CATH INVASIVE LOCATION;  Service:  "Cardiovascular    CARDIAC CATHETERIZATION N/A 11/05/2018    Procedure: Left ventriculography;  Surgeon: Oliverio Azar MD;  Location:  [facility] CATH INVASIVE LOCATION;  Service: Cardiovascular    CARDIAC CATHETERIZATION N/A 06/04/2019    Procedure: Left Heart Cath;  Surgeon: Johnny Glasgow MD;  Location: [facility] CATH INVASIVE LOCATION;  Service: Cardiovascular    CARDIAC CATHETERIZATION N/A 06/04/2019    Procedure: Coronary angiography;  Surgeon: Johnny Glasgow MD;  Location: [facility] CATH INVASIVE LOCATION;  Service: Cardiovascular    CARDIAC CATHETERIZATION N/A 06/04/2019    Procedure: Left ventriculography;  Surgeon: Johnny Glasgow MD;  Location: [facility] CATH INVASIVE LOCATION;  Service: Cardiovascular    CARDIAC CATHETERIZATION N/A 06/04/2019    Procedure: Stent BILL coronary;  Surgeon: Johnny Glasgow MD;  Location: [facility] CATH INVASIVE LOC[facility];  Service: Cardiovascular    CARDIAC SURGERY      CAROTID ARTERY ANGIOPLASTY Right 10/11/2023    with stent    CAROTID STENT      CATARACT EXTRACTION Left 08/2018    CATARACT EXTRACTION Right 09/2018    COLONOSCOPY      2011    CORONARY STENT PLACEMENT      INCISION AND DRAINAGE LEG Right 07/07/2017    Procedure: INCISION AND DRAINAGE INFECTED HEMATOMA RIGHT THIGH;  Surgeon: Valentin Peña MD;  Location: [facility] MAIN OR;  Service:     JOINT REPLACEMENT      KNEE INCISION AND DRAINAGE Right 12/27/2016    Procedure: KNEE INCISION AND DRAINAGE;  Surgeon: Triston Whitten MD;  Location: [facility] MAIN OR;  Service:     NOSE SURGERY      nose replacement    SKIN BIOPSY      SKIN GRAFT  12/2017    TOTAL KNEE ARTHROPLASTY Right     VASCULAR SURGERY      VENA CAVA FILTER PLACEMENT             Procedures       Objective:     Vitals:    11/09/23 1417   BP: 120/60   BP Location: Left arm   Pulse: 63   SpO2: 98%   Weight: 74.7 kg (164 lb 11.2 oz)   Height: 180.3 cm (71\")         Physical Exam  Constitutional:       General: He is not in acute distress.   "   Appearance: He is well-developed. He is not diaphoretic.   HENT:      Head: Normocephalic and atraumatic.      Nose: Nose normal.   Eyes:      General:         Right eye: No discharge.         Left eye: No discharge.      Conjunctiva/sclera: Conjunctivae normal.   Neck:      Thyroid: No thyromegaly.      Trachea: No tracheal deviation.   Cardiovascular:      Rate and Rhythm: Normal rate and regular rhythm.      Pulses: Normal pulses.      Heart sounds: Normal heart sounds, S1 normal and S2 normal.      No S3 sounds.   Pulmonary:      Effort: Pulmonary effort is normal. No respiratory distress.      Breath sounds: Normal breath sounds. No stridor.   Chest:      Chest wall: No tenderness.   Abdominal:      General: Bowel sounds are normal. There is no distension.      Palpations: Abdomen is soft. There is no mass.      Tenderness: There is no abdominal tenderness. There is no guarding or rebound.   Musculoskeletal:         General: No tenderness or deformity. Normal range of motion.      Cervical back: Normal range of motion and neck supple.      Right lower leg: Edema present.   Lymphadenopathy:      Cervical: No cervical adenopathy.   Skin:     General: Skin is warm and dry.      Findings: No erythema or rash.   Neurological:      Mental Status: He is alert and oriented to person, place, and time.      Deep Tendon Reflexes: Reflexes are normal and symmetric.   Psychiatric:         Thought Content: Thought content normal.         Lab Review:             Lab Results   Component Value Date    GLUCOSE 115 (H) 10/15/2023    BUN 15 10/15/2023    CREATININE 0.65 (L) 10/15/2023    EGFRIFNONA 99 06/05/2019    EGFRIFAFRI 102 10/27/2017    BCR 23.1 10/15/2023    K 3.5 10/15/2023    CO2 23.0 10/15/2023    CALCIUM 8.4 (L) 10/15/2023    PROTENTOTREF 6.2 10/03/2023    ALBUMIN 4.2 10/09/2023    LABIL2 2.3 10/03/2023    AST 12 10/09/2023    ALT <5 10/09/2023     Results for orders placed during the hospital encounter of  10/09/23    Adult Transthoracic Echo Complete w/ Color, Spectral and Contrast if Necessary Per Protocol    Interpretation Summary    Left ventricular systolic function is mildly decreased. Calculated left ventricular EF = 44.7%    Left ventricular wall thickness is consistent with mild to moderate concentric hypertrophy.    The following left ventricular wall segments are hypokinetic: apical anterior and mid anteroseptal. The following left ventricular wall segments are akinetic: apical inferior, apical septal and apex.    Left ventricular diastolic function is consistent with (grade Ia w/high LAP) impaired relaxation.    There is calcification of the aortic valve.    Estimated right ventricular systolic pressure from tricuspid regurgitation is normal (<35 mmHg).    CHADS-VASc Risk Assessment              7 Total Score    1 CHF    1 Hypertension    2 Age >/= 75    2 PRIOR STROKE/TIA/THROMBO    1 Vascular Disease        Criteria that do not apply:    DM    Age 65-74    Sex: Female                  Assessment:          Diagnosis Plan   1. Coronary artery disease involving native coronary artery of native heart without angina pectoris               Plan:       1. Atrial Fibrillation and Atrial Flutter  Assessment   The patient has paroxysmal atrial fibrillation   The patient's CHADS2-VASc score is 3   A [id number] score of 2 or more is considered a high risk for a thromboembolic event   Apixaban prescribed    Plan   Attempt to maintain sinus rhythm   Continue apixaban for antithrombotic therapy, bleeding issues discussed   Sinus bradycardia no rate control meds    2. Heart Failure  Assessment   Beta blocker not prescribed for patient reasons   Diuretics prescribed   Angiotensin receptor blocker (ARB) prescribed    Plan   The patient has received heart failure education on the following topics: medication instructions, symptom management and weight monitoring   The heart failure care plan was discussed with the  patient today including: continuing the current program    Subjective/Objective     Physical exam findings negative for peripheral edema and S3 gallop.    3.  History of chronic DVT; Patient remains on chronic anticoagulation and follows with Dr. Dorado. Patient has an IVC filter in place,  4.  Coronary Artery Disease  Assessment   The patient has no angina    Plan   Lifestyle modifications discussed include adhering to a heart healthy diet, avoidance of tobacco products, maintenance of a healthy weight, medication compliance, regular exercise and regular monitoring of cholesterol and blood pressure   Toprol has been discontinued in the past because of bradycardia    Subjective - Objective   There is a history of past MI    There has been a previous stent procedure using BILL    Current antiplatelet therapy includes aspirin 81 mg      5.  Essential hypertension- he also has a history of orthostatic hypotension with his Parkinson's, but he is not been having orthostatic symptoms.  After his CVA he was discharged on amlodipine 5 mg daily, having spells of hypotension, will decrease his amlodipine to 2.5 mg daily.  6.  At this point patient remains on the combination of aspirin and Eliquis.  He has a class I indication for antiplatelet therapy given his prior drug-eluting stent and underlying coronary disease.  He has a class I indication for chronic anticoagulation with his history of DVT as well as paroxysmal atrial fibrillation.  He had been on clopidogrel and this was switched to aspirin by neurology.  7.  Ischemic cardiomyopathy-continue current medical therapy.  As noted above he is not on beta-blockade because of sinus bradycardia.  No heart failure symptoms.  8.  Patient was on apixaban 2.5 mg twice daily, now that he is off the Brilinta that we will adjust this to 5 mg daily.    Thank you very much for allowing us to participate in the care of this pleasant patient.  Please don't hesitate to call if I can be of  assistance in any way.      Current Outpatient Medications:     amLODIPine (NORVASC) 5 MG tablet, Take 1 tablet by mouth Daily for 30 days., Disp: 30 tablet, Rfl: 0    apixaban (ELIQUIS) 2.5 MG tablet tablet, Take 1 tablet by mouth 2 (Two) Times a Day., Disp: , Rfl:     aspirin 81 MG chewable tablet, Chew 1 tablet Daily for 90 days., Disp: 90 tablet, Rfl: 0    atorvastatin (LIPITOR) 40 MG tablet, Take 1 tablet by mouth Every Night., Disp: 90 tablet, Rfl: 0    atropine 1 % ophthalmic solution, 1 drop 3 (Three) Times a Day As Needed (as needed orally for increased secretions). Orally   Indications: excessive drooling, Disp: , Rfl:     carbidopa-levodopa (SINEMET)  MG per tablet, Take 1 tablet by mouth 3 (Three) Times a Day. Indications: Parkinson's Disease, Disp: , Rfl:     Cyanocobalamin (Vitamin B 12) 500 MCG tablet, Take 2 tablets by mouth Every Morning. Indications: Inadequate Vitamin B12, Disp: , Rfl:     levothyroxine (SYNTHROID, LEVOTHROID) 75 MCG tablet, TAKE 1 TABLET BY MOUTH EVERY MORNING. INDICATIONS: UNDERACTIVE THYROID, Disp: 90 tablet, Rfl: 1    Multiple Vitamins-Minerals (PRESERVISION AREDS) capsule, Take 1 tablet by mouth 2 (Two) Times a Day. Indications: supplement, Disp: , Rfl:     Polyethyl Glycol-Propyl Glycol (SYSTANE OP), Apply 1 dose to eye(s) as directed by provider As Needed. Indications: dry eye, Disp: , Rfl:     pramipexole (MIRAPEX) 0.5 MG tablet, Take 0.5 tablets by mouth 3 (Three) Times a Day. Pt now takes 2.5 mg   Indications: Parkinson's Disease, Disp: , Rfl:     valsartan (DIOVAN) 160 MG tablet, TAKE 1 TABLET BY MOUTH  DAILY (Patient taking differently: Take 1 tablet by mouth Daily.), Disp: 90 tablet, Rfl: 3

## 2023-11-09 NOTE — HOME HEALTH
Patient reports approaching PLOF and is not interested in OP PT at this time. No new concerns. Appropriate for Dc at this time.     FALLS SINCE LAST VISIT: No    MEDICATION CHANGES: No    PLAN FOR NEXT VISIT:  Objective measures  Review fall risk and safety management strategies  Review/progress home exercise program

## 2023-11-10 ENCOUNTER — HOME CARE VISIT (OUTPATIENT)
Dept: HOME HEALTH SERVICES | Facility: HOME HEALTHCARE | Age: 88
End: 2023-11-10
Payer: MEDICARE

## 2023-11-10 VITALS
DIASTOLIC BLOOD PRESSURE: 82 MMHG | OXYGEN SATURATION: 99 % | HEART RATE: 65 BPM | TEMPERATURE: 97.7 F | SYSTOLIC BLOOD PRESSURE: 130 MMHG

## 2023-11-10 PROCEDURE — G0495 RN CARE TRAIN/EDU IN HH: HCPCS

## 2023-11-10 NOTE — Clinical Note
Patricia,   I was able to get the order for the swallow study from Dr. Diez, but it came after I went on vacation, so I guess the doctor's office did not schedule it. I have him scheduled to make a visit on Monday, 11/13/23 and will get it scheduled then at [facility].   Rima  ----- Message -----  From: Patricia Land RN  Sent: 11/10/2023   2:42 PM EST  To: Aleksander Diez MD; Rima Veliz, SLP      Patient and spouse inquiring about swallow study test.  They are hoping to get it scheduled for next week.    PATRICIA LAND RN  [facility]  [phone] C  [phone] O

## 2023-11-10 NOTE — Clinical Note
Routine Visit Note:  11/10; spouse present    Skill/education provided:   SN assessment and instruction of disease mgmt, meds, safety, and SN discharge instructions.  Patient and spouse agreeable.    Patient/caregiver response:   v/u    Plan for next visit:   SLP/agency DC next week    Other pertinent info:   Patient is doing well, mobility seems to be much more stable today and ambulating without walker.  He is doing mostly well with swallowing, reports slight difficulty a few days ago with chicken noodle soup.  Discussed risk of mixed consistencies.  They've mostly cooked meals that are dental mechanical soft. Meds updated.    He is awaiting scheduling for swallow study.  He was expecting study to be completed late this week or next week but has not yet heard from scheduling.  Message sent to provider and SLP Rima KENYON    SN discharge today per patient request/goals met.      NADER SANDOVAL RN  [facility]  [phone] C  [phone] O

## 2023-11-10 NOTE — HOME HEALTH
SN DC Visit Note:  11/10; spouse present    Skill/education provided:   SN assessment and instruction of disease mgmt, meds, safety, and SN discharge instructions.  Patient and spouse agreeable.    Patient/caregiver response:   v/u    Plan for next visit:   SLP/agency DC next week    Other pertinent info:   Patient is doing well, mobility seems to be much more stable today and ambulating without walker.  He is doing mostly well with swallowing, reports slight difficulty a few days ago with chicken noodle soup.  Discussed risk of mixed consistencies.  They've mostly cooked meals that are dental mechanical soft. Meds updated.    He is awaiting scheduling for swallow study.  He was expecting study to be completed late this week or next week but has not yet heard from scheduling.  Message sent to provider and SLP Rima KENYON    SN discharge today per patient request/goals met.      NADER SADNOVAL RN  [facility]  [phone] C  [phone] O

## 2023-11-10 NOTE — Clinical Note
Patient and spouse inquiring about swallow study test.  They are hoping to get it scheduled for next week.    NADER SANDOVAL RN  [facility]  [phone] C  [phone] O

## 2023-11-11 ENCOUNTER — HOME CARE VISIT (OUTPATIENT)
Dept: HOME HEALTH SERVICES | Facility: HOME HEALTHCARE | Age: 88
End: 2023-11-11
Payer: MEDICARE

## 2023-11-11 VITALS
SYSTOLIC BLOOD PRESSURE: 158 MMHG | TEMPERATURE: 97.7 F | OXYGEN SATURATION: 99 % | DIASTOLIC BLOOD PRESSURE: 80 MMHG | RESPIRATION RATE: 18 BRPM | HEART RATE: 56 BPM

## 2023-11-11 PROCEDURE — G0151 HHCP-SERV OF PT,EA 15 MIN: HCPCS

## 2023-11-11 NOTE — Clinical Note
Patient has met all initial physical therapy goals and was discharged on 11-11-23. He will continue to perform his home exercise program on his own with caregiver supervision. He is ambulating in home safely without device. He reports he is using a cane to ambulate outside of his home for appts. Speech therapy continues.

## 2023-11-11 NOTE — HOME HEALTH
"Discharge Note     Subjective: \" I am feeling good, no aches or pains.\"     Plan for discharge: Patient will continue with his HEP on his own. Patient may go back to out patient therapy later for his right shoulder     Barriers to discharge: None     Ongoing needs: Patient continuing to work with Speech therapy, with up coming swallow study     Any referrals needed: None     Any declines in outcomes: No patient has met all initial therapy goals including improving his score on Tinetti test decreasing his fall risk.     Teaching needed prior to discharge: Patient was able to use teach back method to demonstrate knowledge of HEP. He did need min cueing for correct exercise technique for hamstring curls and not to perform a deep knee bend but a mini squat. Spouse also observed HEP and will continue to give patient reminders as needed.     Assign [facility] discharge responsibility: Speech therapist will be last discipline in treating patient and will complete agency discharge. Patient discharged from home health physical therapy."

## 2023-11-13 ENCOUNTER — HOME CARE VISIT (OUTPATIENT)
Dept: HOME HEALTH SERVICES | Facility: HOME HEALTHCARE | Age: 88
End: 2023-11-13
Payer: MEDICARE

## 2023-11-13 VITALS
SYSTOLIC BLOOD PRESSURE: 155 MMHG | DIASTOLIC BLOOD PRESSURE: 78 MMHG | HEART RATE: 60 BPM | OXYGEN SATURATION: 99 % | TEMPERATURE: 98 F

## 2023-11-13 PROCEDURE — G0153 HHCP-SVS OF S/L PATH,EA 15MN: HCPCS

## 2023-11-13 RX ORDER — AMLODIPINE BESYLATE 2.5 MG/1
2.5 TABLET ORAL
Qty: 90 TABLET | Refills: 3 | Status: SHIPPED | OUTPATIENT
Start: 2023-11-13

## 2023-11-13 NOTE — HOME HEALTH
Routine Visit Note:   Skill/education provided: SLP instructed patient to take sips of water via cup sips. He was instructed to perform swallowing exercises.Skilled feedback regarding accuracy of his attempts.   Patient/caregiver response: The Effortful Swallow and The Monica Maneuver at 100 percent, Laryngeal elevation /ee/- Scale at 90 percent accuracy /ee/ Highest note and /ee/ High-low notes at 100 percent accuracy. The Gargle at 100 percent accuracy with independence. Back tongue drills at 80 percent accuracy with independence. The Supraglottic Swallow at 100 percent accuracy. Anterior-posterior tongue movement at 100 percent accuracy. Chin tuck resistance (with a ball)- Continuous and Repetitive at 100 percent accuracy.   Patient took sips of water today during the session. No overt signs or symptoms of aspiration.     Plan for next visit: Dysphagia therapy to restore swallowing strength and function for safe intake of least restrictive diet without signs or symptoms of aspiration/aspiration pneumonia.   Swallowing exercise program   Education   Other pertinent info:

## 2023-11-16 ENCOUNTER — HOME CARE VISIT (OUTPATIENT)
Dept: HOME HEALTH SERVICES | Facility: HOME HEALTHCARE | Age: 88
End: 2023-11-16
Payer: MEDICARE

## 2023-11-16 ENCOUNTER — HOSPITAL ENCOUNTER (OUTPATIENT)
Dept: CARDIOLOGY | Facility: HOSPITAL | Age: 88
Discharge: HOME | End: 2023-11-16
Payer: MEDICARE

## 2023-11-16 VITALS
OXYGEN SATURATION: 100 % | HEART RATE: 55 BPM | TEMPERATURE: 98.6 F | DIASTOLIC BLOOD PRESSURE: 60 MMHG | SYSTOLIC BLOOD PRESSURE: 142 MMHG

## 2023-11-16 DIAGNOSIS — I65.21 CAROTID STENOSIS, RIGHT: ICD-10-CM

## 2023-11-16 LAB
BH CV MID RIGHT ICA HIDDEN LRR: 1 CM
BH CV RIGHT CCA HIDDEN LRR: 1 CM/S
BH CV VAS CAROTID RIGHT DISTAL STENT EDV: 10.6 CM/S
BH CV VAS CAROTID RIGHT DISTAL STENT PSV: 57.6 CM/S
BH CV VAS CAROTID RIGHT DISTAL TO STENT NATIVE VESSEL E: 11.6 CM/S
BH CV VAS CAROTID RIGHT DISTAL TO STENT PSV: 65.9 CM/S
BH CV VAS CAROTID RIGHT MID STENT EDV: 11.3 CM/S
BH CV VAS CAROTID RIGHT MID STENT PSV: 77.6 CM/S
BH CV VAS CAROTID RIGHT PROXIMAL STENT EDV: 8.81 CM/S
BH CV VAS CAROTID RIGHT PROXIMAL STENT PSV: 68.6 CM/S
BH CV VAS CAROTID RIGHT STENT NATIVE VESSEL PROXIMAL EDV: 11.9 CM/S
BH CV VAS CAROTID RIGHT STENT NATIVE VESSEL PROXIMAL PS: 76.1 CM/S
BH CV XLRA MEAS LEFT DIST CCA EDV: -7 CM/SEC
BH CV XLRA MEAS LEFT DIST CCA PSV: -52.7 CM/SEC
BH CV XLRA MEAS LEFT DIST ICA EDV: -10 CM/SEC
BH CV XLRA MEAS LEFT DIST ICA PSV: -44 CM/SEC
BH CV XLRA MEAS LEFT ICA/CCA RATIO: 1.46
BH CV XLRA MEAS LEFT MID ICA EDV: -11.9 CM/SEC
BH CV XLRA MEAS LEFT MID ICA PSV: -63.7 CM/SEC
BH CV XLRA MEAS LEFT PROX CCA EDV: 6.6 CM/SEC
BH CV XLRA MEAS LEFT PROX CCA PSV: 61.9 CM/SEC
BH CV XLRA MEAS LEFT PROX ECA EDV: -7.8 CM/SEC
BH CV XLRA MEAS LEFT PROX ECA PSV: -119.2 CM/SEC
BH CV XLRA MEAS LEFT PROX ICA EDV: 14.3 CM/SEC
BH CV XLRA MEAS LEFT PROX ICA PSV: 77 CM/SEC
BH CV XLRA MEAS LEFT PROX SCLA PSV: 130.1 CM/SEC
BH CV XLRA MEAS LEFT VERTEBRAL A EDV: 7.7 CM/SEC
BH CV XLRA MEAS LEFT VERTEBRAL A PSV: 39.6 CM/SEC
BH CV XLRA MEAS RIGHT DIST CCA EDV: -8.8 CM/SEC
BH CV XLRA MEAS RIGHT DIST CCA PSV: -68.6 CM/SEC
BH CV XLRA MEAS RIGHT DIST ICA EDV: -11.6 CM/SEC
BH CV XLRA MEAS RIGHT DIST ICA PSV: -65.9 CM/SEC
BH CV XLRA MEAS RIGHT ICA/CCA RATIO: 1.13
BH CV XLRA MEAS RIGHT MID CCA EDV: 11.9 CM/SEC
BH CV XLRA MEAS RIGHT MID CCA PSV: 76.1 CM/SEC
BH CV XLRA MEAS RIGHT MID ICA EDV: -10.6 CM/SEC
BH CV XLRA MEAS RIGHT MID ICA PSV: -57.6 CM/SEC
BH CV XLRA MEAS RIGHT PROX CCA EDV: -8.6 CM/SEC
BH CV XLRA MEAS RIGHT PROX CCA PSV: -52.5 CM/SEC
BH CV XLRA MEAS RIGHT PROX ECA EDV: -11 CM/SEC
BH CV XLRA MEAS RIGHT PROX ECA PSV: -136.1 CM/SEC
BH CV XLRA MEAS RIGHT PROX ICA EDV: -11.3 CM/SEC
BH CV XLRA MEAS RIGHT PROX ICA PSV: -77.6 CM/SEC
BH CV XLRA MEAS RIGHT PROX SCLA PSV: 78.4 CM/SEC
BH CV XLRA MEAS RIGHT VERTEBRAL A EDV: 6.7 CM/SEC
BH CV XLRA MEAS RIGHT VERTEBRAL A PSV: 45.9 CM/SEC
BH CVPROX RIGHT ICA HIDDEN LRR: 1 CM
LEFT ARM BP: NORMAL MMHG
RIGHT ARM BP: NORMAL MMHG

## 2023-11-16 PROCEDURE — 93880 EXTRACRANIAL BILAT STUDY: CPT

## 2023-11-16 PROCEDURE — G0153 HHCP-SVS OF S/L PATH,EA 15MN: HCPCS

## 2023-11-16 NOTE — PROGRESS NOTES
Subjective   History of Present Illness: Casey Snowden Sr. is a 89 y.o. male is here today for hospital follow-up after angiogram with stent placement on 10/12/23.  He is doing well today.  Denies any strokelike episodes.  Denies any changes in strength or sensation.  Denies any new neurologic deficits    History of Present Illness    The following portions of the patient's history were reviewed and updated as appropriate: allergies, current medications, past family history, past medical history, past social history, past surgical history, and problem list.    Past Medical History:   Diagnosis Date    Acquired hypothyroidism 08/31/2022    Acute ischemic stroke 10/08/2023    Aneurysm     Arrhythmia     Atrial fibrillation     Cancer 04/2018    basil cell carcinoma    Carotid stenosis     CHF (congestive heart failure)     Chronic deep vein thrombosis (DVT) of popliteal vein of right lower extremity     Clotting disorder     Coronary artery disease involving native coronary artery of native heart without angina pectoris 12/20/2018    DVT of lower extremity (deep venous thrombosis)     Hard of hearing     Hyperlipidemia     Hypertension     Localized edema 01/04/2017    Long-term use of high-risk medication     Metabolic encephalopathy 02/07/2023    Myocardial infarction 2918    LAD stent    PAD (peripheral artery disease) 09/10/2020    Parkinson's disease     Postphlebitic syndrome     Skin tear of upper arm without complication, left, subsequent encounter     Stroke         Past Surgical History:   Procedure Laterality Date    BASAL CELL CARCINOMA EXCISION  04/2018    CARDIAC CATHETERIZATION N/A 11/05/2018    Procedure: Left Heart Cath;  Surgeon: Oliverio Azar MD;  Location:  [facility];  Service: Cardiovascular    CARDIAC CATHETERIZATION N/A 11/05/2018    Procedure: Coronary angiography;  Surgeon: Oliverio Azar MD;  Location:  [facility]ATION;  Service: Cardiovascular     CARDIAC CATHETERIZATION N/A 11/05/2018    Procedure: Left ventriculography;  Surgeon: Oliverio Azar MD;  Location: [facility] CATH [facility] LOC[facility];  Service: Cardiovascular    CARDIAC CATHETERIZATION N/A 06/04/2019    Procedure: Left Heart Cath;  Surgeon: Johnny Glasgow MD;  Location: [facility];  Service: Cardiovascular    CARDIAC CATHETERIZATION N/A 06/04/2019    Procedure: Coronary angiography;  Surgeon: Johnny Glasgow MD;  Location[facility];  Service: Cardiovascular    CARDIAC CATHETERIZATION N/A 06/04/2019    Procedure: Left ventriculography;  Surgeon: Johnny Glasgow MD;  [facility];  Service: Cardiovascular    CARDIAC CATHETERIZATION N/A 06/04/2019    Procedure: Stent BILL coronary;  Surgeon: Johnny Glasgow MD;  Location: [facility];  Service: Cardiovascular    CARDIAC SURGERY      CAROTID ARTERY ANGIOPLASTY Right 10/11/2023    with stent    CAROTID STENT      CATARACT EXTRACTION Left 08/2018    CATARACT EXTRACTION Right 09/2018    COLONOSCOPY      2011    CORONARY STENT PLACEMENT      INCISION AND DRAINAGE LEG Right 07/07/2017    Procedure: INCISION AND DRAINAGE INFECTED HEMATOMA RIGHT THIGH;  Surgeon: Valentin Peña MD;  Location: [facility] MAIN OR;  Service:     JOINT REPLACEMENT      KNEE INCISION AND DRAINAGE Right 12/27/2016    Procedure: KNEE INCISION AND DRAINAGE;  Surgeon: Triston Whitten MD;  Location: [facility] MAIN OR;  Service:     NOSE SURGERY      nose replacement    SKIN BIOPSY      SKIN GRAFT  12/2017    TOTAL KNEE ARTHROPLASTY Right     VASCULAR SURGERY      VENA CAVA FILTER PLACEMENT            Current Outpatient Medications:     amLODIPine (NORVASC) 2.5 MG tablet, Take 1 tablet by mouth Daily. Indications: High Blood Pressure Disorder, Disp: 90 tablet, Rfl: 3    apixaban (ELIQUIS) 5 MG tablet tablet, Take 1 tablet by mouth 2 (Two) Times a Day. Indications: DVT/PE  (active thrombosis), Prevention of Unwanted Clot in Veins, Disp: 180 tablet, Rfl: 1    aspirin 81 MG chewable tablet, Chew 1 tablet Daily for 90 days., Disp: 90 tablet, Rfl: 0    atorvastatin (LIPITOR) 40 MG tablet, Take 1 tablet by mouth Every Night., Disp: 90 tablet, Rfl: 0    atropine 1 % ophthalmic solution, 1 drop 3 (Three) Times a Day As Needed (as needed orally for increased secretions). Orally   Indications: excessive drooling, Disp: , Rfl:     carbidopa-levodopa (SINEMET)  MG per tablet, Take 1 tablet by mouth 3 (Three) Times a Day. Indications: Parkinson's Disease, Disp: , Rfl:     Cyanocobalamin (Vitamin B 12) 500 MCG tablet, Take 2 tablets by mouth Every Morning. Indications: Inadequate Vitamin B12, Disp: , Rfl:     levothyroxine (SYNTHROID, LEVOTHROID) 75 MCG tablet, TAKE 1 TABLET BY MOUTH EVERY MORNING. INDICATIONS: UNDERACTIVE THYROID, Disp: 90 tablet, Rfl: 1    Multiple Vitamins-Minerals (PRESERVISION AREDS) capsule, Take 1 tablet by mouth 2 (Two) Times a Day. Indications: supplement, Disp: , Rfl:     Polyethyl Glycol-Propyl Glycol (SYSTANE OP), Apply 1 dose to eye(s) as directed by provider As Needed., Disp: , Rfl:     valsartan (DIOVAN) 160 MG tablet, TAKE 1 TABLET BY MOUTH  DAILY (Patient taking differently: Take 1 tablet by mouth Daily.), Disp: 90 tablet, Rfl: 3    Vibegron (Gemtesa) 75 MG tablet, Take  by mouth., Disp: , Rfl:      Allergies   Allergen Reactions    Penicillins Rash    Rocephin [Ceftriaxone] Rash        Social History     Socioeconomic History    Marital status:     Years of education: college grad   Tobacco Use    Smoking status: Never     Passive exposure: Never    Smokeless tobacco: Never    Tobacco comments:     caff use   Vaping Use    Vaping Use: Never used   Substance and Sexual Activity    Alcohol use: No     Comment: none for a month    Drug use: No    Sexual activity: Not Currently     Partners: Female        Family History   Problem Relation Age of Onset     "Heart attack Mother 74    Hypertension Mother     Stroke Brother     Cancer Brother     Cancer Sister     Cancer Brother     Deep vein thrombosis Neg Hx         Review of Systems   Constitutional:  Negative for fatigue.   HENT:  Positive for trouble swallowing.    Eyes:  Negative for visual disturbance.   Neurological:  Negative for dizziness, speech difficulty, weakness, light-headedness, numbness and headaches.   Psychiatric/Behavioral:  Positive for confusion, decreased concentration, hallucinations and sleep disturbance. Negative for agitation.        Objective     Vitals:    23 1253   BP: 120/66   Pulse: 51   Resp: 16   Temp: 97.3 °F (36.3 °C)   SpO2: 100%   Weight: 76 kg (167 lb 9.6 oz)   Height: 180.3 cm (70.98\")     Body mass index is 23.39 kg/m².      Physical Exam  Neurologic Exam  Awake, alert, oriented x3  Pupils equal round reactive to light  Extraocular muscles intact  Face symmetric  Speech is fluent and clear  No pronator drift  Motor exam  Bilateral deltoids 5/5, bilateral biceps 5/5, bilateral triceps 5/5, bilateral wrist extension 5/5 bilateral hand  5/5  Bilateral hip flexion 5/5, bilateral knee extension 5/5, bilateral DF/PF 5/5  No clonus  No Doris's reflex  Steady normal gait  Able to detect  light touch in all 4 extremities        Assessment & Plan   Independent Review of Radiographic Studies:      I personally reviewed the images from the following studies.  Duplex carotid ultrasound from 2023  The carotid ultrasound images were reviewed.  There is no evidence of in-stent stenosis.  No evidence of severe carotid stenosis    Medical Decision Makin-year-old male s/p right-sided stent/angioplasty for carotid stenosis on 2023  -He had prior radiation to his neck and was felt to not be a candidate for carotid endarterectomy.  He has recovered very well from the stent/angioplasty.  -He has contralateral stenosis of 40 to 50%.  I will plan to have him " follow-up in 1 year with a repeat ultrasound to evaluate for any changes or any evidence of in-stent stenosis    Diagnoses and all orders for this visit:    1. History of right common carotid artery stent placement (Primary)  -     Duplex Carotid Ultrasound CAR; Future    2. Bilateral carotid artery stenosis  -     Duplex Carotid Ultrasound CAR; Future    3. History of stroke      Return in about 1 year (around 11/22/2024).  I spent 30 minutes reviewing the medical record, reviewing the ultrasound images, discussing the signs and symptoms of stroke, discussing stroke prevention, discussing the management of carotid stenosis

## 2023-11-20 ENCOUNTER — OFFICE VISIT (OUTPATIENT)
Dept: NEUROLOGY | Facility: CLINIC | Age: 88
End: 2023-11-20
Payer: MEDICARE

## 2023-11-20 VITALS
BODY MASS INDEX: 22.96 KG/M2 | HEART RATE: 66 BPM | WEIGHT: 164 LBS | SYSTOLIC BLOOD PRESSURE: 150 MMHG | HEIGHT: 71 IN | OXYGEN SATURATION: 100 % | DIASTOLIC BLOOD PRESSURE: 62 MMHG

## 2023-11-20 DIAGNOSIS — Z86.73 HISTORY OF RIGHT ICA STROKE: Primary | ICD-10-CM

## 2023-11-20 DIAGNOSIS — R41.89 SUBJECTIVE MEMORY COMPLAINTS: ICD-10-CM

## 2023-11-20 DIAGNOSIS — Z86.79 HISTORY OF CAROTID ARTERY STENOSIS: ICD-10-CM

## 2023-11-20 DIAGNOSIS — Z91.89 AT RISK FOR SLEEP APNEA: ICD-10-CM

## 2023-11-20 PROCEDURE — 99215 OFFICE O/P EST HI 40 MIN: CPT

## 2023-11-20 NOTE — PATIENT INSTRUCTIONS
BP goal less than 130-140 systolic, hydrate with water and avoid hypotension  HgA1c of 5.7%, goal less than 6.5%  LDL 71  Recommend regular physical activity and a healthy diet

## 2023-11-20 NOTE — PROGRESS NOTES
DOS: 2023  NAME: Casey Snowden .   : 1934  PCP: Aleksander Diez MD    No chief complaint on file.     SUBJECTIVE  Neurological Problem:  89 y.o. *** male with a past medical history of ***. They are seen in follow up today for ***, however the problem is new to the examiner. Patient last seen by *** in ***, with a summary of the history taken from the previous note with additions/modifications as indicated. ***He/She is accompanied/unaccompanied.      Interval History:       Review of Systems     The following portions of the patient's history were reviewed and updated as appropriate: allergies, current medications, past family history, past medical history, past social history, past surgical history and problem list.    Current Medications:   Current Outpatient Medications:     amLODIPine (NORVASC) 2.5 MG tablet, Take 1 tablet by mouth Daily. Indications: High Blood Pressure Disorder, Disp: 90 tablet, Rfl: 3    apixaban (ELIQUIS) 5 MG tablet tablet, Take 1 tablet by mouth 2 (Two) Times a Day. Indications: DVT/PE (active thrombosis), Prevention of Unwanted Clot in Veins, Disp: 180 tablet, Rfl: 1    aspirin 81 MG chewable tablet, Chew 1 tablet Daily for 90 days., Disp: 90 tablet, Rfl: 0    atorvastatin (LIPITOR) 40 MG tablet, Take 1 tablet by mouth Every Night., Disp: 90 tablet, Rfl: 0    atropine 1 % ophthalmic solution, 1 drop 3 (Three) Times a Day As Needed (as needed orally for increased secretions). Orally   Indications: excessive drooling, Disp: , Rfl:     carbidopa-levodopa (SINEMET)  MG per tablet, Take 1 tablet by mouth 3 (Three) Times a Day. Indications: Parkinson's Disease, Disp: , Rfl:     Cyanocobalamin (Vitamin B 12) 500 MCG tablet, Take 2 tablets by mouth Every Morning. Indications: Inadequate Vitamin B12, Disp: , Rfl:     levothyroxine (SYNTHROID, LEVOTHROID) 75 MCG tablet, TAKE 1 TABLET BY MOUTH EVERY MORNING. INDICATIONS: UNDERACTIVE THYROID, Disp: 90 tablet, Rfl: 1     Multiple Vitamins-Minerals (PRESERVISION AREDS) capsule, Take 1 tablet by mouth 2 (Two) Times a Day. Indications: supplement, Disp: , Rfl:     Polyethyl Glycol-Propyl Glycol (SYSTANE OP), Apply 1 dose to eye(s) as directed by provider As Needed. Indications: dry eye, Disp: , Rfl:     pramipexole (MIRAPEX) 0.5 MG tablet, Take 0.5 tablets by mouth 3 (Three) Times a Day. Pt now takes 2.5 mg   Indications: Parkinson's Disease, Disp: , Rfl:     valsartan (DIOVAN) 160 MG tablet, TAKE 1 TABLET BY MOUTH  DAILY (Patient taking differently: Take 1 tablet by mouth Daily.), Disp: 90 tablet, Rfl: 3  **I did not stop or change the above medications.  Patient's medication list was updated to reflect medications they have reported as currently taking, including medication changes made by other providers.    Objective   Vital Signs:  There were no vitals taken for this visit.  There is no height or weight on file to calculate BMI.    Physical Exam   Physical Exam:  GENERAL: NAD, alert  HEENT: Normocephalic, atraumatic   COR: RRR  Resp: Even and unlabored  Extremities: No edema  Skin: Warm and dry  Psychiatric: Normal mood and affect    Neurological:   MS: AOx3, recent/remote memory intact, normal attention/concentration, language intact, no neglect, normal fund of knowledge. Recall 3/3  CN: visual acuity grossly normal, PERRL, EOMI, no nystagmus, no facial droop, no dysarthria, hearing symmetric, tongue midline, bilateral shoulder shrug symmetric  Motor: Normal tone. No tremor noted.   Shoulder abductors 5/5  Elbow flexors 5/5  Elbow extensors 5/5   Left  2+  Right  2+  Hip flexors 5/5  Knee extensors 5/5  Hamstring strength 5/5  Dorsiflexors 5/5  Plantar flexors 5/5  Sensory: Intact to light touch in all four ext.  Coordination: No dysmetria finger to nose   Rapid alternating movements: Normal finger to thumb tap  Gait and station: Normal gait and station    Reflexes   Right brachioradialis: 2+  Left brachioradialis:  "2+  Right biceps: 2+  Left biceps: 2+  Right triceps: 2+  Left triceps: 2+  Right patellar: 2+  Left patellar: 2+  Right achilles: 2+  Left achilles: 2+     Result Review :{Labs  Result Review  Imaging  Med Tab  Media  Procedures :23}  The following data was reviewed by: RIDDHI Louis on 11/20/2023:  Laboratory Results:         Lab Results   Component Value Date    WBC 6.39 10/15/2023    HGB 10.9 (L) 10/15/2023    HCT 31.5 (L) 10/15/2023    MCV 94.3 10/15/2023     (L) 10/15/2023     Lab Results   Component Value Date    GLUCOSE 115 (H) 10/15/2023    BUN 15 10/15/2023    CREATININE 0.65 (L) 10/15/2023    EGFRIFNONA 99 06/05/2019    EGFRIFAFRI 102 10/27/2017    BCR 23.1 10/15/2023    K 3.5 10/15/2023    CO2 23.0 10/15/2023    CALCIUM 8.4 (L) 10/15/2023    PROTENTOTREF 6.2 10/03/2023    ALBUMIN 4.2 10/09/2023    LABIL2 2.3 10/03/2023    AST 12 10/09/2023    ALT <5 10/09/2023     Lab Results   Component Value Date    HGBA1C 5.70 (H) 10/10/2023     Lab Results   Component Value Date    CHOL 138 10/10/2023    CHOL 111 02/07/2023    CHOL 131 06/05/2019     Lab Results   Component Value Date    HDL 56 10/10/2023    HDL 57 02/07/2023    HDL 45 06/05/2019     Lab Results   Component Value Date    LDL 71 10/10/2023    LDL 44 02/07/2023    LDL 75 06/05/2019     Lab Results   Component Value Date    TRIG 52 10/10/2023    TRIG 36 02/07/2023    TRIG 55 06/05/2019     No results found for: \"RPR\"  Lab Results   Component Value Date    TSH 4.590 (H) 10/03/2023     Lab Results   Component Value Date    [id number] 652 10/03/2023       {Data reviewed (Optional):45561:::1}             Assessment and Plan {CC Problem List  Visit Diagnosis   ROS  Review (Popup)  Health Maintenance  Quality  BestPractice  Medications  SmartSets  SnapShot Encounters  Media :23}  There are no diagnoses linked to this encounter.    Encouraged regular physical activity, healthy diet.   Secondary stroke prevention: Ideal targets " for stroke prevention would be Blood pressure < 130/80; B12 > 500 TSH in normal range and LDL < 70; HbA1c < 6.5 and smoking cessation if applicable.    Call 911 for stroke symptoms    RIDDHI Louis  [facility]   11/20/23       {Time Spent (Optional):52550}  Follow Up {Instructions Charge Capture  Follow-up Communications :23}  No follow-ups on file.  Patient was given instructions and counseling regarding his condition or for health maintenance advice. Please see specific information pulled into the AVS if appropriate.       Dictated using Dragon

## 2023-11-20 NOTE — PROGRESS NOTES
DOS: 2023  NAME: Casey Snowden Sr.   : 1934  PCP: Aleksander Diez MD    Chief Complaint   Patient presents with    Stroke      SUBJECTIVE  Neurological Problem:  89 y.o. RHW male with a past medical history of Parkinson's disease (follows Dr. Goodwin with [facility]), HTN, HLD, PAF on Eliquis, AAA, DVT s/p IVC filter, PAD, CAD, mixed melanoma/BCC of scalp with recent diagnosis of metastatic cutaneous SCC with recent development of right neck mass. Patient and problem are new to examiner. History is provided by patient and review of records that are summarized below.  He is accompanied by his spouse.    HPI:  Mr. Snowden initially presented to [facility] 10/9/2023 with left-sided weakness and neglect and a team D was initiated.  CT perfusion scan showed an area of penumbra on the right PCA territory.  CTA head/neck showed critical stenosis of the right ICA with soft thrombus, right P1 severe stenosis.  Prior to admission he was taking Eliquis 2.5 mg twice daily which he was reportedly compliant with as well as Lipitor 20 mg daily.  MRI brain wo showed multiple small cortical and white matter infarcts in the right cerebral hemisphere in the pre and postcentral gyri as well as right parietal and temporal lobes.  Carotid ultrasound showed right ICA near occlusion, left ICA 50 to 69% stenosis.  2D echo LVEF 44.7%, borderline increased left atrial volume.  He was started on aspirin 81 mg and Plavix and neurosurgery was consulted for carotid artery stent.  On  the patient underwent cerebral angiogram with right-sided stent/angioplasty.  It was recommended he continue aspirin 81 mg daily and Brilinta 90 mg twice daily for 1 week then would need aspirin 81 mg for 1 week and could resume Eliquis 5 mg twice daily at that time.  Neurosurgery did not recommend anticoagulation with DAPT.  At discharge his Lipitor was changed to 40 mg daily.  He was seen at [facility] in February of this year  for confusion, word finding difficulty and recurrent falls.  MRI brain completed at that time did not show any evidence of restricted diffusion to suggest an acute infarct.  Generalized atrophy as well as mild to moderate small vessel disease was seen at that time.    He presents today in follow-up for stroke and reports no further symptoms of stroke or TIA.  He is doing well on Eliquis 5 mg BID, ASA 81 mg and atorvastatin 40 mg with no symptoms of bleeding.  He monitors his blood pressure at home and says it runs between 130s-170s systolic, stays in 150s/60s on average.  His A1C was 5.7% one mo ago.  Review of lipid panel shows a total 138, TGD 52, HDL 56, LDL 71.  His wife is with him and says he sees his PCP in December for follow-up labs.  He is a non-smoker and does not drink alcohol.  He stays physically active and their yard and working in their garden.  He has had no falls since earlier in the year.  He uses a straight cane for ambulation assistance, has a rollator walker but doesn't utilize it.  He sees Dr. Kashif Goodwin with [facility] for Parkinson's disease.  His wife's biggest concern today is that she has seen cognitive changes.  She reports incoherence and repeating the same question over the weekend. He was also pulling the  off the phone and leave it off the hook until it would start beeping, then his wife had to remind him to continuously hang up the phone. He hasn't been seen by a neuropsychologist and has received no formal diagnosis of dementia however he has vascular risk factors and Parkinson's.     Review of Systems   HENT:  Positive for hearing loss.    Eyes:  Negative for visual disturbance.   Respiratory:  Positive for shortness of breath.    Cardiovascular:  Positive for chest pain and leg swelling.   Neurological:  Positive for tremors and facial asymmetry. Negative for seizures, syncope, speech difficulty, weakness, numbness and headaches.   Psychiatric/Behavioral:   Positive for confusion. Negative for agitation and hallucinations.         The following portions of the patient's history were reviewed and updated as appropriate: allergies, current medications, past family history, past medical history, past social history, past surgical history and problem list.    Current Medications:   Current Outpatient Medications:     amLODIPine (NORVASC) 2.5 MG tablet, Take 1 tablet by mouth Daily. Indications: High Blood Pressure Disorder, Disp: 90 tablet, Rfl: 3    apixaban (ELIQUIS) 5 MG tablet tablet, Take 1 tablet by mouth 2 (Two) Times a Day. Indications: DVT/PE (active thrombosis), Prevention of Unwanted Clot in Veins, Disp: 180 tablet, Rfl: 1    aspirin 81 MG chewable tablet, Chew 1 tablet Daily for 90 days., Disp: 90 tablet, Rfl: 0    atorvastatin (LIPITOR) 40 MG tablet, Take 1 tablet by mouth Every Night., Disp: 90 tablet, Rfl: 0    atropine 1 % ophthalmic solution, 1 drop 3 (Three) Times a Day As Needed (as needed orally for increased secretions). Orally   Indications: excessive drooling, Disp: , Rfl:     carbidopa-levodopa (SINEMET)  MG per tablet, Take 1 tablet by mouth 3 (Three) Times a Day. Indications: Parkinson's Disease, Disp: , Rfl:     Cyanocobalamin (Vitamin B 12) 500 MCG tablet, Take 2 tablets by mouth Every Morning. Indications: Inadequate Vitamin B12, Disp: , Rfl:     levothyroxine (SYNTHROID, LEVOTHROID) 75 MCG tablet, TAKE 1 TABLET BY MOUTH EVERY MORNING. INDICATIONS: UNDERACTIVE THYROID, Disp: 90 tablet, Rfl: 1    Multiple Vitamins-Minerals (PRESERVISION AREDS) capsule, Take 1 tablet by mouth 2 (Two) Times a Day. Indications: supplement, Disp: , Rfl:     valsartan (DIOVAN) 160 MG tablet, TAKE 1 TABLET BY MOUTH  DAILY (Patient taking differently: Take 1 tablet by mouth Daily.), Disp: 90 tablet, Rfl: 3    Polyethyl Glycol-Propyl Glycol (SYSTANE OP), Apply 1 dose to eye(s) as directed by provider As Needed. Indications: dry eye (Patient not taking: Reported on  "11/20/2023), Disp: , Rfl:   **I did not stop or change the above medications. Patient's medication list was updated to reflect medications they have reported as currently taking, including medication changes made by other providers.    Objective   Vital Signs:  /62   Pulse 66   Ht 180.3 cm (71\")   Wt 74.4 kg (164 lb)   SpO2 100%   BMI 22.87 kg/m²   Body mass index is 22.87 kg/m².      Result Review :  The following data was reviewed by: RIDDHI Louis on 11/20/2023:    Laboratory Results:         Lab Results   Component Value Date    WBC 6.39 10/15/2023    HGB 10.9 (L) 10/15/2023    HCT 31.5 (L) 10/15/2023    MCV 94.3 10/15/2023     (L) 10/15/2023     Lab Results   Component Value Date    GLUCOSE 115 (H) 10/15/2023    BUN 15 10/15/2023    CREATININE 0.65 (L) 10/15/2023    EGFRIFNONA 99 06/05/2019    EGFRIFAFRI 102 10/27/2017    BCR 23.1 10/15/2023    K 3.5 10/15/2023    CO2 23.0 10/15/2023    CALCIUM 8.4 (L) 10/15/2023    PROTENTOTREF 6.2 10/03/2023    ALBUMIN 4.2 10/09/2023    LABIL2 2.3 10/03/2023    AST 12 10/09/2023    ALT <5 10/09/2023     Lab Results   Component Value Date    HGBA1C 5.70 (H) 10/10/2023     Lab Results   Component Value Date    CHOL 138 10/10/2023    CHOL 111 02/07/2023    CHOL 131 06/05/2019     Lab Results   Component Value Date    HDL 56 10/10/2023    HDL 57 02/07/2023    HDL 45 06/05/2019     Lab Results   Component Value Date    LDL 71 10/10/2023    LDL 44 02/07/2023    LDL 75 06/05/2019     Lab Results   Component Value Date    TRIG 52 10/10/2023    TRIG 36 02/07/2023    TRIG 55 06/05/2019     No results found for: \"RPR\"  Lab Results   Component Value Date    TSH 4.590 (H) 10/03/2023     Lab Results   Component Value Date    [id number] 652 10/03/2023       Data reviewed : Radiologic studies  , Cardiology studies  , Consultant notes  , and Recent hospitalization notes      Physical Exam:  GENERAL: NAD, alert and cooperative  HEENT: Normocephalic, atraumatic   Resp: " Even and unlabored  Skin: Warm and dry, no rashes, lesions, or ulcers.  Psychiatric: Good mood, normal affect. Thought process normal.    Neurological:   MS: AOx3, recent/remote memory intact, normal attention/concentration, language intact, no neglect, normal fund of knowledge. Recall 2/3, MMSE 23/30    CN: visual acuity grossly normal, PERRL, EOMI, no nystagmus, mild left facial droop, no dysarthria, hearing decreased, tongue mildly deviated to left, bilateral shoulder shrug symmetric  Motor: 5/5 strength in all 4 ext. Normal tone. Resting tremor noted.   Sensory: Intact to light touch in all four ext.  Coordination: No dysmetria finger to nose   Rapid alternating movements: Normal finger to thumb tap  Gait and station: Uses straight cane, normal stride, no shuffling noted          Assessment and Plan   Diagnoses and all orders for this visit:    1. History of right ICA stroke (Primary)    2. History of carotid artery stenosis    3. Subjective memory complaints    4. At risk for sleep apnea  -     Ambulatory Referral to Sleep Medicine      History of right ICA stroke - continue ASA 81 mg daily for secondary prevention. H/o PAF, continue Eliquis 5 mg BID  HTN: Elevated today, 150/62. Recommend home monitoring, stay well hydrated with water and avoid hypotension.   HLD: LDL 71. Continue atorvastatin 40 mg daily. Recommend continued surveillance with PCP.   DM: A1C 5.7%. Recommend healthy diet and regular physical activity   Secondary stroke prevention: Ideal targets for stroke prevention would be Blood pressure < 130/80; B12 > 500 TSH in normal range and LDL < 70; HbA1c < 6.5 and smoking cessation if applicable.    History of carotid artery stenosis, right - continue ASA 81 mg daily. Maintain regular follow-ups with Dr. Cadena in Neurosurgery for monitoring and management    Subjective memory complaints - Offered referral to Neuropsych today, patient and spouse considering it. Will reach out if interested. Tight  control of vascular risk factors in #1.     Risk for sleep apnea - Referral to sleep medicine sent today     Call 911 for stroke symptoms.    We will see Casey back in 6 months, sooner if symptoms warrant.     RIDDHI Louis  [facility]   11/20/23       I spent 40 minutes caring for Casey on this date of service. This time includes time spent by me in the following activities:preparing for the visit, reviewing tests, obtaining and/or reviewing a separately obtained history, performing a medically appropriate examination and/or evaluation , counseling and educating the patient/family/caregiver, ordering medications, tests, or procedures, referring and communicating with other health care professionals , documenting information in the medical record, independently interpreting results and communicating that information with the patient/family/caregiver, and care coordination  Follow Up   Return in about 6 months (around 5/20/2024).  Patient was given instructions and counseling regarding his condition or for health maintenance advice. Please see specific information pulled into the AVS if appropriate.       Dictated using Dragon

## 2023-11-21 ENCOUNTER — HOSPITAL ENCOUNTER (OUTPATIENT)
Dept: GENERAL RADIOLOGY | Facility: HOSPITAL | Age: 88
Discharge: HOME | End: 2023-11-21
Admitting: FAMILY MEDICINE
Payer: MEDICARE

## 2023-11-21 DIAGNOSIS — Z86.73: ICD-10-CM

## 2023-11-21 DIAGNOSIS — R13.19 DYSPHAGIA, NEUROLOGIC: ICD-10-CM

## 2023-11-21 PROCEDURE — 92611 MOTION FLUOROSCOPY/SWALLOW: CPT

## 2023-11-21 PROCEDURE — 74230 X-RAY XM SWLNG FUNCJ C+: CPT

## 2023-11-21 RX ADMIN — BARIUM SULFATE 50 ML: 400 SUSPENSION ORAL at 13:32

## 2023-11-21 RX ADMIN — BARIUM SULFATE 4 ML: 980 POWDER, FOR SUSPENSION ORAL at 13:32

## 2023-11-21 RX ADMIN — BARIUM SULFATE 55 ML: 0.81 POWDER, FOR SUSPENSION ORAL at 13:32

## 2023-11-21 NOTE — MBS/VFSS/FEES
Speech Language Pathology   MBS FEES / Discharge Summary  [facility]       Patient Name: Casey Snowden Sr.  : 1934  MRN: [id number]    Today's Date: 2023      Visit Date: 2023     Visit Dx:     ICD-10-CM ICD-9-CM   1. Dysphagia, neurologic  R13.19 787.29   2. Arterial ischemic stroke, ICA (internal carotid artery), right, chroni  Z86.73 V12.54       Patient Active Problem List   Diagnosis    Benign essential hypertension    Stenosis of carotid artery    Mixed hyperlipidemia    Parkinson's disease    Peripheral arterial occlusive disease    Screening for prostate cancer    Medication monitoring encounter    Melanoma    Chronic deep vein thrombosis (DVT) of popliteal vein of right lower extremity    Uses hearing aid    Paroxysmal A-fib    Chronic diastolic (congestive) heart failure    Anticoagulant long-term use    Presence of IVC filter    Medicare annual wellness visit, subsequent    Iron deficiency anemia secondary to inadequate dietary iron intake    Orthostatic hypotension due to Parkinson's disease    Coronary artery disease involving native coronary artery of native heart without angina pectoris    ST elevation myocardial infarction involving left anterior descending (LAD) coronary artery    Sialorrhea    Hypertensive heart disease with heart failure    PAD (peripheral artery disease)    Metastatic squamous cell carcinoma    Secondary malignancy of lymph nodes of head, face and neck    Encounter for antineoplastic immunotherapy    RBD (REM behavioral disorder)    Acquired hypothyroidism    Aspiration pneumonia of right lung due to vomit    Dysphagia, neurologic    ICAO (internal carotid artery occlusion), right    Arterial ischemic stroke, ICA (internal carotid artery), right, chroni        Past Medical History:   Diagnosis Date    Acquired hypothyroidism 2022    Acute ischemic stroke 10/08/2023    Aneurysm     Arrhythmia     Atrial fibrillation     Cancer 2018    basil cell  carcinoma    Carotid stenosis     CHF (congestive heart failure)     Chronic deep vein thrombosis (DVT) of popliteal vein of right lower extremity     Clotting disorder     Coronary artery disease involving native coronary artery of native heart without angina pectoris 12/20/2018    DVT of lower extremity (deep venous thrombosis)     Hard of hearing     Hyperlipidemia     Hypertension     Localized edema 01/04/2017    Long-term use of high-risk medication     Metabolic encephalopathy 02/07/2023    Myocardial infarction 2918    LAD stent    PAD (peripheral artery disease) 09/10/2020    Parkinson's disease     Postphlebitic syndrome     Skin tear of upper arm without complication, left, subsequent encounter     Stroke         Past Surgical History:   Procedure Laterality Date    BASAL CELL CARCINOMA EXCISION  04/2018    CARDIAC CATHETERIZATION N/A 11/05/2018    Procedure: Left Heart Cath;  Surgeon: Oliverio Azar MD;  Location: [facility] C[facility]VASIVE LOC[facility];  Service: Cardiovascular    CARDIAC CATHETERIZATION N/A 11/05/2018    Procedure: Coronary angiography;  Surgeon: Oliverio Azar MD;  Location: [facility] CATH [facility]ASIVE LOCATION;  Service: Cardiovascular    CARDIAC CATHETERIZATION N/A 11/05/2018    Procedure: Left ventriculography;  Surgeon: Oliverio Azar MD;  Location: [facility] CATH [facility]ASIVE LOC[facility];  Service: Cardiovascular    CARDIAC CATHETERIZATION N/A 06/04/2019    Procedure: Left Heart Cath;  Surgeon: Johnny Glasgow MD;  Location: [facility] C[facility]ASIVE LOC[facility];  Service: Cardiovascular    CARDIAC CATHETERIZATION N/A 06/04/2019    Procedure: Coronary angiography;  Surgeon: Johnny Glasgow MD;  Location: [facility] CATH [facility]AS[facility] LOC[facility];  Service: Cardiovascular    CARDIAC CATHETERIZATION N/A 06/04/2019    Procedure: Left ventriculography;  Surgeon: Johnny Glasgow MD;  Location: [facility] CATH [facility]ASIVE LOC[facility];  Service: Cardiovascular    CARDIAC CATHETERIZATION N/A 06/04/2019    Procedure:  Stent BILL coronary;  Surgeon: Johnny Glasgow MD;  Location: [facility] INVASIVE LOCATION;  Service: Cardiovascular    CARDIAC SURGERY      CAROTID ARTERY ANGIOPLASTY Right 10/11/2023    with stent    CAROTID STENT      CATARACT EXTRACTION Left 08/2018    CATARACT EXTRACTION Right 09/2018    COLONOSCOPY      2011    CORONARY STENT PLACEMENT      INCISION AND DRAINAGE LEG Right 07/07/2017    Procedure: INCISION AND DRAINAGE INFECTED HEMATOMA RIGHT THIGH;  Surgeon: Valentin Peña MD;  Location: [facility] MAIN OR;  Service:     JOINT REPLACEMENT      KNEE INCISION AND DRAINAGE Right 12/27/2016    Procedure: KNEE INCISION AND DRAINAGE;  Surgeon: Triston Whitten MD;  Location: [facility] MAIN OR;  Service:     NOSE SURGERY      nose replacement    SKIN BIOPSY      SKIN GRAFT  12/2017    TOTAL KNEE ARTHROPLASTY Right     VASCULAR SURGERY      VENA CAVA FILTER PLACEMENT                    OP SLP Assessment/Plan - 11/21/23 1448          SLP Assessment    Functional Problems Swallowing  -CR    Impact on Function: Swallowing Risk of malnourishment;Risk of dehydration;Risk of aspiration;Risk of pneumonia  -CR    Clinical Impression: Swallowing Moderate-Severe:;oropharyngeal phase dysphagia  -CR    SLP Diagnosis Moderate-severe oropharyngeal dysphagia  -CR    Prognosis Good (comment)  -CR    Patient/caregiver participated in establishment of treatment plan and goals Yes  -CR    Patient would benefit from skilled therapy intervention Yes  -CR              User Key  (r) = Recorded By, (t) = Taken By, (c) = Cosigned By      Initials Name Provider Type    CR Tena Golden SLP Speech and Language Pathologist                     SLP Adult Swallow Evaluation       Row Name 11/21/23 1400       Rehab Evaluation    Document Type evaluation  -CR    Subjective Information no complaints  -CR    Patient Observations alert;cooperative  -CR    Patient Effort good  -CR    Symptoms Noted During/After Treatment none  -CR        General Information    Patient Profile Reviewed yes  -CR    Pertinent History Of Current Problem 89 y/o male referred for repeat VFSS. Recent hospitalization for ischemic stroke due to a severe right ICA stenosis with stent placement to the right ICA on 10/12/23. During hospitalization, moderate dysphagia noted with recommendation for puree/nectar thick liquid diet at discharge. Patient participated in home health swallow therapy, however, unable to give further details regarding therapy exercises. Further pertinent history includes Parkinson's Disease and radiation treatments for metastatic cutaneous squamous cell carcinoma with development of right neck mass.  -CR    Current Method of Nutrition regular textures;thin liquids  -CR    Precautions/Limitations, Vision WFL;for purposes of eval  -CR    Precautions/Limitations, Hearing WFL;for purposes of eval  -CR    Prior Level of Function-Swallowing no diet consistency restrictions  Puree/NTL recommended from VFSS in 10/2023; patient reports returning to regular/thin diet following discharge  -CR    Plans/Goals Discussed with patient and family;agreed upon  -CR    Barriers to Rehab none identified  -CR       Pain    Additional Documentation Pain Scale: Numbers Pre/Post-Treatment (Group)  -CR       Pain Scale: Numbers Pre/Post-Treatment    Pretreatment Pain Rating 0/10 - no pain  -CR    Posttreatment Pain Rating 0/10 - no pain  -CR       Oral Motor Structure and Function    Dentition Assessment missing teeth  -CR    Secretion Management WNL/WFL  -CR    Mucosal Quality moist, healthy  -CR       Oral Musculature and Cranial Nerve Assessment    Oral Motor General Assessment oral labial or buccal impairment  -CR    Oral Labial or Buccal Impairment, Detail, Cranial Nerve VII (Facial): left labial droop  -CR       MBS/VFSS Interpretation    VFSS Summary VFSS complete. Radiologist, Dr. Levy, present during the study. Patient presents with moderate-severe oropharyngeal  dysphagia characterized by tongue base weakness, prolonged mastication, inconsistent epiglottic deflection, reduced hyolaryngeal elevation/excursion, and poor airway protective response. Moderate valleculae and pyriform residue following the swallow with all trialed consistencies. Patient independently used double swallows in attempt to reduce, however, minimal success. Liquid wash resulted in trace spillover into the airway and silent aspiration. Cued cough and throat clear ineffective in clearing penetrated material. Cued supraglottic swallow demonstrated more success in residue reduction. Right and left head turns ineffective in airway protection. Trace silent aspiration noted with thin liquid, nectar thick liquid, honey thick liquid, and soft/chopped solid. No penetration/aspiration visualized with puree, however residue difficult to clear and cannot rule out trace spillover. Reviewed results of study with patient and daughter. Educated patient and daughter that patient is at an equal risk to aspirate all consistencies, therefore, no consistency is safer than another. Further education of safe swallowing precautions provided as patient verbalized wish to continue eating/drinking by mouth. Recommend patient participate in outpatient swallow therapy. Suggest 5-week [id number] with plans to repeat VFSS following completion of program.  -CR       SLP Communication to Radiology    Summary Statement VFSS complete. Radiologist, Dr. Levy, present during the study. Moderate valleculae and pyriform residue consistent throughout study. Frequent spillover and trace silent aspiration noted with residue.  -CR       SLP Evaluation Clinical Impression    SLP Swallowing Diagnosis mod-severe;oral dysphagia;pharyngeal dysphagia  -CR    Functional Impact risk of aspiration/pneumonia  -CR    Swallow Criteria for Skilled Therapeutic Interventions Met demonstrates skilled criteria  -CR       Recommendations    Therapy Frequency  (Swallow) evaluation only  -CR    Predicted Duration Therapy Intervention (Days) until discharge  -CR    SLP Diet Recommendation --  Patient to continue comfort diet for QOL purposes and trial swallow therapy  -CR    Recommended Diagnostics --  complete [id number] 5-week program with OP speech therapy and repeat VFSS following completion  -CR    Recommended Precautions and Strategies general aspiration precautions;small bites of food and sips of liquid;upright posture during/after eating;multiple swallows per bite of food;multiple swallows per sip of liquid;supraglottic swallow  -CR    Oral Care Recommendations Oral Care BID/PRN  -CR    SLP Rec. for Method of Medication Administration meds whole;meds crushed;with puree;as tolerated  -CR    Monitor for Signs of Aspiration yes;notify SLP if any concerns  -CR              User Key  (r) = Recorded By, (t) = Taken By, (c) = Cosigned By      Initials Name Provider Type    Tena Magallon SLP Speech and Language Pathologist                                    OP SLP Education       Row Name 11/21/23 5725       Education    Barriers to Learning No barriers identified  -CR    Education Provided Described results of evaluation;Patient expressed understanding of evaluation;Family/caregivers expressed understanding of evaluation;Patient participated in establishing goals and treatment plan;Family/caregivers participated in establishing goals and treatment plan;Patient demonstrated recommended strategies  -CR    Assessed Learning needs;Learning motivation;Learning preferences;Learning readiness  -CR    Learning Motivation Strong  -CR    Learning Method Demonstration;Explanation  -CR    Teaching Response Verbalized understanding;Demonstrated understanding  -CR              User Key  (r) = Recorded By, (t) = Taken By, (c) = Cosigned By      Initials Name Effective Dates    Tena Magallon SLP 08/28/23 -                                        Time Calculation:   Untimed  Charges  64757-[facility] Motion Fluoro Eval Swallow Minutes: 60  Total Minutes  Untimed Charges Total Minutes: 60   Total Minutes: 60    Therapy Charges for Today       Code Description Service Date Service Provider Modifiers Qty    [id number]  ST MOTION FLUORO EVAL SWALLOW 4 11/21/2023 Tena Golden, SLP GN 1                    DULCE Hylton  11/21/2023

## 2023-11-22 ENCOUNTER — OFFICE VISIT (OUTPATIENT)
Dept: NEUROSURGERY | Facility: CLINIC | Age: 88
End: 2023-11-22
Payer: MEDICARE

## 2023-11-22 ENCOUNTER — ANCILLARY ORDERS (OUTPATIENT)
Dept: SPEECH THERAPY | Facility: HOSPITAL | Age: 88
End: 2023-11-22
Payer: MEDICARE

## 2023-11-22 VITALS
BODY MASS INDEX: 23.46 KG/M2 | DIASTOLIC BLOOD PRESSURE: 66 MMHG | HEART RATE: 51 BPM | RESPIRATION RATE: 16 BRPM | TEMPERATURE: 97.3 F | WEIGHT: 167.6 LBS | HEIGHT: 71 IN | SYSTOLIC BLOOD PRESSURE: 120 MMHG | OXYGEN SATURATION: 100 %

## 2023-11-22 DIAGNOSIS — Z98.890 HISTORY OF RIGHT COMMON CAROTID ARTERY STENT PLACEMENT: Primary | ICD-10-CM

## 2023-11-22 DIAGNOSIS — R13.12 OROPHARYNGEAL DYSPHAGIA: Primary | ICD-10-CM

## 2023-11-22 DIAGNOSIS — Z95.828 HISTORY OF RIGHT COMMON CAROTID ARTERY STENT PLACEMENT: Primary | ICD-10-CM

## 2023-11-22 DIAGNOSIS — Z86.73 HISTORY OF STROKE: ICD-10-CM

## 2023-11-22 DIAGNOSIS — I65.23 BILATERAL CAROTID ARTERY STENOSIS: ICD-10-CM

## 2023-11-22 RX ORDER — VIBEGRON 75 MG/1
TABLET, FILM COATED ORAL
COMMUNITY

## 2023-11-24 DIAGNOSIS — E03.9 ACQUIRED HYPOTHYROIDISM: ICD-10-CM

## 2023-11-24 RX ORDER — LEVOTHYROXINE SODIUM 0.07 MG/1
75 TABLET ORAL
Qty: 90 TABLET | Refills: 1 | Status: SHIPPED | OUTPATIENT
Start: 2023-11-24

## 2023-11-28 ENCOUNTER — OFFICE VISIT (OUTPATIENT)
Dept: FAMILY MEDICINE CLINIC | Facility: CLINIC | Age: 88
End: 2023-11-28
Payer: MEDICARE

## 2023-11-28 VITALS
DIASTOLIC BLOOD PRESSURE: 80 MMHG | BODY MASS INDEX: 22.26 KG/M2 | SYSTOLIC BLOOD PRESSURE: 130 MMHG | HEIGHT: 71 IN | OXYGEN SATURATION: 99 % | HEART RATE: 60 BPM | TEMPERATURE: 97.7 F | WEIGHT: 159 LBS

## 2023-11-28 DIAGNOSIS — Z86.73: Primary | ICD-10-CM

## 2023-11-28 DIAGNOSIS — R13.19 DYSPHAGIA, NEUROLOGIC: ICD-10-CM

## 2023-11-28 NOTE — PROGRESS NOTES
"  Subjective   Casey Snowden Sr. is a 89 y.o. male who is here for   Chief Complaint   Patient presents with    Stroke   .     History of Present Illness     Afshin is here today brought in by his wife.  He is stable after his recent stroke and carotid artery stent placement.  No new stroke symptoms  Still quite unsteady on his feet.  Uses a single-point cane.  Talked about fall prevention.  No more ladders no more stepstools no more power tools and recommend also that he not drive a car anymore.    Occasions reviewed updated.  No refills needed today    Due to his recent visits with neurology and neurosurgery    Up-to-date with his Parkinson's provider as well    He will be going to urology today for overactive bladder and urinary incontinence    His in-home physical therapy nursing occupational therapy speech therapy is all complete    He will be going to [facility] outpatient for continued speech therapy  And continued dysphagia and choking spells.  Which is caused by his Parkinson's and previous neck radiation and recent stroke      The following portions of the patient's history were reviewed and updated as appropriate: allergies, current medications, past medical history, past social history, past surgical history, and problem list.    Review of Systems    Objective   Vitals:    11/28/23 0909   BP: 130/80   Pulse: 60   Temp: 97.7 °F (36.5 °C)   SpO2: 99%   Weight: 72.1 kg (159 lb)   Height: 180.3 cm (71\")      Physical Exam  Vitals reviewed.   Cardiovascular:      Rate and Rhythm: Normal rate.   Neurological:      Mental Status: He is alert and oriented to person, place, and time.      Motor: Weakness present.      Coordination: Coordination abnormal.      Gait: Gait abnormal.         Assessment & Plan   Diagnoses and all orders for this visit:    1. Arterial ischemic stroke, ICA (internal carotid artery), right, chroni (Primary)    2. Dysphagia, neurologic      There are no Patient Instructions on file " for this visit.    There are no discontinued medications.     Return in about 6 months (around 5/28/2024) for Medicare Wellness visit.    Dr. Aleksander Diez  [facility]  [place], [place]

## 2023-12-05 ENCOUNTER — HOSPITAL ENCOUNTER (OUTPATIENT)
Dept: SPEECH THERAPY | Facility: HOSPITAL | Age: 88
Setting detail: THERAPIES SERIES
Discharge: HOME | End: 2023-12-05
Payer: MEDICARE

## 2023-12-05 DIAGNOSIS — R13.12 OROPHARYNGEAL DYSPHAGIA: Primary | ICD-10-CM

## 2023-12-05 PROCEDURE — 92610 EVALUATE SWALLOWING FUNCTION: CPT

## 2023-12-05 NOTE — THERAPY EVALUATION
Outpatient Speech Language Pathology   Adult Swallow Initial Evaluation  [facility]     Patient Name: Casey Snowden Sr.  : 1934  MRN: [id number]  Today's Date: 2023         Visit Date: 2023   Patient Active Problem List   Diagnosis    Benign essential hypertension    Stenosis of carotid artery    Mixed hyperlipidemia    Parkinson's disease    Peripheral arterial occlusive disease    Screening for prostate cancer    Medication monitoring encounter    Melanoma    Chronic deep vein thrombosis (DVT) of popliteal vein of right lower extremity    Uses hearing aid    Paroxysmal A-fib    Chronic diastolic (congestive) heart failure    Anticoagulant long-term use    Presence of IVC filter    Medicare annual wellness visit, subsequent    Iron deficiency anemia secondary to inadequate dietary iron intake    Orthostatic hypotension due to Parkinson's disease    Coronary artery disease involving native coronary artery of native heart without angina pectoris    ST elevation myocardial infarction involving left anterior descending (LAD) coronary artery    Sialorrhea    Hypertensive heart disease with heart failure    PAD (peripheral artery disease)    Metastatic squamous cell carcinoma    Secondary malignancy of lymph nodes of head, face and neck    Encounter for antineoplastic immunotherapy    RBD (REM behavioral disorder)    Acquired hypothyroidism    Aspiration pneumonia of right lung due to vomit    Dysphagia, neurologic    ICAO (internal carotid artery occlusion), right    Arterial ischemic stroke, ICA (internal carotid artery), right, chroni        Past Medical History:   Diagnosis Date    Acquired hypothyroidism 2022    Acute ischemic stroke 10/08/2023    Aneurysm     Arrhythmia     Atrial fibrillation     Cancer 2018    basil cell carcinoma    Carotid stenosis     CHF (congestive heart failure)     Chronic deep vein thrombosis (DVT) of popliteal vein of right lower extremity     Clotting  disorder     Coronary artery disease involving native coronary artery of native heart without angina pectoris 12/20/2018    DVT of lower extremity (deep venous thrombosis)     Hard of hearing     Hyperlipidemia     Hypertension     Localized edema 01/04/2017    Long-term use of high-risk medication     Metabolic encephalopathy 02/07/2023    Myocardial infarction 2918    LAD stent    PAD (peripheral artery disease) 09/10/2020    Parkinson's disease     Postphlebitic syndrome     Skin tear of upper arm without complication, left, subsequent encounter     Stroke         Past Surgical History:   Procedure Laterality Date    BASAL CELL CARCINOMA EXCISION  04/2018    CARDIAC CATHETERIZATION N/A 11/05/2018    Procedure: Left Heart Cath;  Surgeon: Oliverio Azar MD;  Location: [facility] CATH INVASIVE LOCATION;  Service: Cardiovascular    CARDIAC CATHETERIZATION N/A 11/05/2018    Procedure: Coronary angiography;  Surgeon: Oliverio Azar MD;  Location: [facility] CATH INVASIVE LOCATION;  Service: Cardiovascular    CARDIAC CATHETERIZATION N/A 11/05/2018    Procedure: Left ventriculography;  Surgeon: Oliverio Azar MD;  Location: [facility] CATH INVASIVE LOCATION;  Service: Cardiovascular    CARDIAC CATHETERIZATION N/A 06/04/2019    Procedure: Left Heart Cath;  Surgeon: Johnny Glasgow MD;  Location: [facility] CATH INVASIVE LOCATION;  Service: Cardiovascular    CARDIAC CATHETERIZATION N/A 06/04/2019    Procedure: Coronary angiography;  Surgeon: Johnny Glasgow MD;  Location: [facility] CATH INVASIVE LOCATION;  Service: Cardiovascular    CARDIAC CATHETERIZATION N/A 06/04/2019    Procedure: Left ventriculography;  Surgeon: Johnny Glasgow MD;  Location: [facility] CATH INVASIVE LOCATION;  Service: Cardiovascular    CARDIAC CATHETERIZATION N/A 06/04/2019    Procedure: Stent BILL coronary;  Surgeon: Johnny Glasgow MD;  Location: [facility] CATH INVASIVE LOCATION;  Service: Cardiovascular    CARDIAC SURGERY      CAROTID  ARTERY ANGIOPLASTY Right 10/11/2023    with stent    CAROTID STENT      CATARACT EXTRACTION Left 08/2018    CATARACT EXTRACTION Right 09/2018    COLONOSCOPY      2011    CORONARY STENT PLACEMENT      INCISION AND DRAINAGE LEG Right 07/07/2017    Procedure: INCISION AND DRAINAGE INFECTED HEMATOMA RIGHT THIGH;  Surgeon: Valentin Peña MD;  Location: [facility] OR;  Service:     JOINT REPLACEMENT      KNEE INCISION AND DRAINAGE Right 12/27/2016    Procedure: KNEE INCISION AND DRAINAGE;  Surgeon: Triston Whitten MD;  Location: [facility] OR;  Service:     NOSE SURGERY      nose replacement    SKIN BIOPSY      SKIN GRAFT  12/2017    TOTAL KNEE ARTHROPLASTY Right     VASCULAR SURGERY      VENA CAVA FILTER PLACEMENT           Visit Dx:     ICD-10-CM ICD-9-CM   1. Oropharyngeal dysphagia  R13.12 787.22            OP SLP Assessment/Plan - 12/05/23 1533          SLP Assessment    Functional Problems Swallowing  -CR    Impact on Function: Swallowing Risk of aspiration;Risk of pneumonia  -CR    Clinical Impression: Swallowing Moderate-Severe:  -CR    Prognosis Good (comment)  -CR    Patient/caregiver participated in establishment of treatment plan and goals Yes  -CR    Patient would benefit from skilled therapy intervention Yes  -CR       SLP Plan    Frequency 1x per week  -CR    Duration 5-6 weeks  -CR    Planned CPT's? SLP SWALLOW THERAPY: 96155  -CR    Expected Duration of Therapy Session (SLP Eval) 45  -CR              User Key  (r) = Recorded By, (t) = Taken By, (c) = Cosigned By      Initials Name Provider Type    CR Tena Golden, SLP Speech and Language Pathologist                     SLP Adult Swallow Evaluation       Row Name 12/05/23 1015       Rehab Evaluation    Document Type evaluation  -CR    Subjective Information no complaints  -CR    Patient Observations alert;cooperative  -CR    Patient/Family/Caregiver Comments/Observations Wife present during session.  -CR    Patient Effort good  -CR     Symptoms Noted During/After Treatment none  -CR       General Information    Patient Profile Reviewed yes  -CR    Pertinent History Of Current Problem 88 y/o male referred for OP swallow therapy due to moderate-severe oropharyngeal dysphagia identified during VFSS on 11/21/23. Recent hospitalization for ischemic stroke due to a severe right ICA stenosis with stent placement to the right ICA on 10/12/23. 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  -CR    Current Method of Nutrition regular textures;thin liquids  with known aspiration risks; trialing skilled swallow intervention  -CR    Precautions/Limitations, Vision WFL;for purposes of eval  -CR    Precautions/Limitations, Hearing WFL;for purposes of eval  -CR    Prior Level of Function-Swallowing no diet consistency restrictions  Puree/NTL recommended from VFSS in 10/2023; patient reports returning to regular/thin diet following discharge  -CR    Plans/Goals Discussed with patient and family;agreed upon  -CR    Barriers to Rehab none identified  -CR       Pain Scale: Numbers Pre/Post-Treatment    Pretreatment Pain Rating 0/10 - no pain  -CR    Posttreatment Pain Rating 0/10 - no pain  -CR       Oral Motor Structure and Function    Dentition Assessment missing teeth  -CR    Secretion Management WNL/WFL  -CR    Mucosal Quality moist, healthy  -CR       Oral Musculature and Cranial Nerve Assessment    Oral Labial or Buccal Impairment, Detail, Cranial Nerve VII (Facial): left labial droop  -CR       Clinical Swallow Eval    Clinical Swallow Evaluation Summary Clinical swallow evaluation completed. Wife present during session. Patient with subtle throat clearing with thin liquid by straw. No other overt s/s of aspiration observed with ice, thins, puree, or soft/chopped solids. VFSS remarkable for silent aspiration secondary to residue. Plan for patient to continue comfort diet with  known aspiration risks and trial skilled swallow intervention. Avoid dry/crunchy solids due to risk of severe pharyngeal residue. Introduced [id number] this date. Thorough education regarding [id number] program provided to patient and wife. Written materials provided. Plan to complete 5-week program and repeat VFSS following completion.  -CR       SLP Evaluation Clinical Impression    SLP Swallowing Diagnosis mod-severe;oral dysphagia;pharyngeal dysphagia  -CR    Functional Impact risk of aspiration/pneumonia  -CR    Rehab Potential/Prognosis, Swallowing good, to achieve stated therapy goals  -CR    Swallow Criteria for Skilled Therapeutic Interventions Met demonstrates skilled criteria  -CR       Recommendations    Therapy Frequency (Swallow) 1 day per week  -CR    Predicted Duration Therapy Intervention (Days) other (see comments)  5 weeks with repeat VFSS to follow  -CR    SLP Diet Recommendation other (see comments)  Patient to continue comfort diet for QOL purposes and trial swallow therapy  -CR    Recommended Diagnostics other (see comments)  complete [id number] 5-week program and repeat VFSS following completion  -CR    Recommended Precautions and Strategies general aspiration precautions;small bites of food and sips of liquid;upright posture during/after eating;multiple swallows per bite of food;multiple swallows per sip of liquid;supraglottic swallow  -CR    Oral Care Recommendations Oral Care BID/PRN  -CR    SLP Rec. for Method of Medication Administration meds whole;meds crushed;with puree;as tolerated  -CR    Monitor for Signs of Aspiration yes;notify SLP if any concerns  -CR              User Key  (r) = Recorded By, (t) = Taken By, (c) = Cosigned By      Initials Name Provider Type    Tena Magallon SLP Speech and Language Pathologist                                   OP SLP Education       Row Name 12/05/23 0138       Education    Barriers to Learning No barriers identified  -CR    Education Provided  Described results of evaluation;Patient expressed understanding of evaluation;Family/caregivers expressed understanding of evaluation;Patient participated in establishing goals and treatment plan;Family/caregivers participated in establishing goals and treatment plan;Patient demonstrated recommended strategies;Family/caregivers demonstrated recommended strategies  -CR              User Key  (r) = Recorded By, (t) = Taken By, (c) = Cosigned By      Initials Name Effective Dates    CR Tena Golden, SLP 08/28/23 -                    SLP OP Goals       Row Name 12/05/23 1015          Goal Type Needed    Goal Type Needed Dysphagia  -CR        Subjective Comments    Subjective Comments Patient is pleasant and cooperative. Wife present during session.  -CR        Subjective Pain    Able to rate subjective pain? yes  -CR     Pre-Treatment Pain Level 0  -CR     Post-Treatment Pain Level 0  -CR        Dysphagia Goals    Dysphagia STG's Patient will increase superior/anterior movement of hyolaryngeal complex to reduce residue which may fall into airway by using the Expiratory Muscle Strength Trainer;Patient will increase strength of tongue base and posterior pharyngeal walls to reduce residue that might fall into airway by completing;Patient will compensate for oral/pharyngeal deficits and reduce risks while eating by utilizing  compensatory strategies;Patient will increase closure of larynx to keep food from falling into the airway by completing  -CR     Patient will increase closure of larynx to keep food from falling into the airway by completing super-supraglottic swallow;with intermittent cues  -CR     Status: Patient will increase closure of larynx to keep food from falling into the airway by completing New  -CR     Comments: Patient will increase closure of larynx to keep food from falling into the airway by completing Not addressed this session.  -CR     Patient will increase strength of tongue base and posterior  pharyngeal walls to reduce residue that might fall into airway by completing effotful swallow;with intermittent cues  -CR     Status: Patient will increase strength of tongue base and posterior pharyngeal walls to reduce residue that might fall into airway by completing New  -CR     Comments: Patient will increase strength of tongue base and posterior pharyngeal walls to reduce residue that might fall into airway by completing Not addressed this session.  -CR     Status: Patient will increase superior/anterior movement of hyolaryngeal complex to reduce residue which may fall into airway by using the Expiratory Muscle Strength Trainer New;Progressing as expected  -CR     Comments: Patient will increase superior/anterior movement of hyolaryngeal complex to reduce residue which may fall into airway by using the Expiratory Muscle Strength Trainer Introduced [id number] this date. Patient completed 5 sets of 5 repetitions of [id number] at 30 cmH2O given MOD-MAX cues for adequate breath support and technique. x4 failed attempts. Patient and wife agree to complete EMST sets/reps at home in accordance with the program protocol.  -CR     Patient will compensate for oral/pharyngeal deficits and reduce risks while eating by utilizing  compensatory strategies slow rate;controlled bolus size;without cues  -CR     Status: Patient will compensate for oral/pharyngeal deficits and reduce risks while eating by utilizing  compensatory strategies New;Progressing as expected  -CR     Comments: Patient will compensate for oral/pharyngeal deficits and reduce risks while eating by utilizing  compensatory strategies Reviewed safe swallow strategies including slow rate, small bites/sips, double swallow, and supraglottic swallow. Patient verbalized and demonstrated understanding of each.  -CR               User Key  (r) = Recorded By, (t) = Taken By, (c) = Cosigned By      Initials Name Provider Type    Tena Magallon, SLP Speech and Language  Pathologist                       [facility] Patient Record Number: [id number]    FCM Scores (Percent Functional)  Swallowing - 55%    PRO Scores  Eating Assessment Tool (EAT-10) - 2      Time Calculation:   SLP Start Time: 1015  SLP Stop Time: 1100  SLP Time Calculation (min): 45 min  Untimed Charges  60606-VM Eval Oral Pharyng Swallow Minutes: 45  Total Minutes  Untimed Charges Total Minutes: 45   Total Minutes: 45    Therapy Charges for Today       Code Description Service Date Service Provider Modifiers Qty    01584417834 HC ST EVAL ORAL PHARYNG SWALLOW 3 12/5/2023 Tena Golden, SLP GN 1                     DULCE Hylton  12/5/2023

## 2023-12-12 ENCOUNTER — HOSPITAL ENCOUNTER (OUTPATIENT)
Dept: SPEECH THERAPY | Facility: HOSPITAL | Age: 88
Setting detail: THERAPIES SERIES
Discharge: HOME | End: 2023-12-12
Payer: MEDICARE

## 2023-12-12 DIAGNOSIS — J69.0 ASPIRATION PNEUMONIA OF RIGHT LOWER LOBE DUE TO VOMIT: ICD-10-CM

## 2023-12-12 DIAGNOSIS — R13.12 OROPHARYNGEAL DYSPHAGIA: Primary | ICD-10-CM

## 2023-12-12 DIAGNOSIS — K11.7 SIALORRHEA: ICD-10-CM

## 2023-12-12 DIAGNOSIS — G20.B2 PARKINSON'S DISEASE WITH DYSKINESIA AND FLUCTUATING MANIFESTATIONS: Primary | Chronic | ICD-10-CM

## 2023-12-12 DIAGNOSIS — R13.19 DYSPHAGIA, NEUROLOGIC: ICD-10-CM

## 2023-12-12 PROCEDURE — 92526 ORAL FUNCTION THERAPY: CPT

## 2023-12-12 NOTE — THERAPY TREATMENT NOTE
Outpatient Speech Language Pathology   Adult Swallow Treatment Note  [facility]     Patient Name: Casey Snowden Sr.  : 1934  MRN: [id number]  Today's Date: 2023         Visit Date: 2023   Patient Active Problem List   Diagnosis    Benign essential hypertension    Stenosis of carotid artery    Mixed hyperlipidemia    Parkinson's disease    Peripheral arterial occlusive disease    Screening for prostate cancer    Medication monitoring encounter    Melanoma    Chronic deep vein thrombosis (DVT) of popliteal vein of right lower extremity    Uses hearing aid    Paroxysmal A-fib    Chronic diastolic (congestive) heart failure    Anticoagulant long-term use    Presence of IVC filter    Medicare annual wellness visit, subsequent    Iron deficiency anemia secondary to inadequate dietary iron intake    Orthostatic hypotension due to Parkinson's disease    Coronary artery disease involving native coronary artery of native heart without angina pectoris    ST elevation myocardial infarction involving left anterior descending (LAD) coronary artery    Sialorrhea    Hypertensive heart disease with heart failure    PAD (peripheral artery disease)    Metastatic squamous cell carcinoma    Secondary malignancy of lymph nodes of head, face and neck    Encounter for antineoplastic immunotherapy    RBD (REM behavioral disorder)    Acquired hypothyroidism    Aspiration pneumonia of right lung due to vomit    Dysphagia, neurologic    ICAO (internal carotid artery occlusion), right    Arterial ischemic stroke, ICA (internal carotid artery), right, chroni        Visit Dx:    ICD-10-CM ICD-9-CM   1. Oropharyngeal dysphagia  R13.12 787.22                              SLP OP Goals       Row Name 23 1015          Subjective Comments    Subjective Comments Patient is pleasant and cooperative. Wife present during session. Discussion regarding adjustment to timing of therapy treatments completed with wife/patient who  agree with plan.  -CR        Subjective Pain    Able to rate subjective pain? yes  -CR     Pre-Treatment Pain Level 0  -CR     Post-Treatment Pain Level 0  -CR        Dysphagia Goals    Patient will increase closure of larynx to keep food from falling into the airway by completing super-supraglottic swallow;with intermittent cues  -CR     Status: Patient will increase closure of larynx to keep food from falling into the airway by completing Progressing as expected  -CR     Comments: Patient will increase closure of larynx to keep food from falling into the airway by completing Introduced supraglottic swallow this date. Written instruction provided for home completion. Patient able to demonstrate x1 successful supraglottic swallow during session; unable to complete further reps due to time constraints. Patient and wife reporting they will complete at home.  -CR     Patient will increase strength of tongue base and posterior pharyngeal walls to reduce residue that might fall into airway by completing effotful swallow;with intermittent cues  -CR     Status: Patient will increase strength of tongue base and posterior pharyngeal walls to reduce residue that might fall into airway by completing Progressing as expected  -CR     Comments: Patient will increase strength of tongue base and posterior pharyngeal walls to reduce residue that might fall into airway by completing Introduced effortful swallows this date. Written instruction provided for home completion. Patient completed x5 reps given MIN-MOD cues.  -CR     Status: Patient will increase superior/anterior movement of hyolaryngeal complex to reduce residue which may fall into airway by using the Expiratory Muscle Strength Trainer Progressing as expected  -CR     Comments: Patient will increase superior/anterior movement of hyolaryngeal complex to reduce residue which may fall into airway by using the Expiratory Muscle Strength Trainer [id number] completed for 5 sets of  5 reps at 30 cmH2O given MOD cues for adequate technique. x10 failed attemtps which patient corrected with retry. Failed attempts due to poor labial seal and/or poor breath support. Thorough education regarding proper technique. Wife demonstrates good retention of technique and reports giving instruction to patient during home completion.  -CR     Patient will compensate for oral/pharyngeal deficits and reduce risks while eating by utilizing  compensatory strategies slow rate;controlled bolus size;without cues  -CR     Status: Patient will compensate for oral/pharyngeal deficits and reduce risks while eating by utilizing  compensatory strategies Progressing as expected  -CR     Comments: Patient will compensate for oral/pharyngeal deficits and reduce risks while eating by utilizing  compensatory strategies Reviewed safe swallow strategies with patient and wife. Patient reports no difficulties recently with swallowing. Wife reports audible swallows when patient swallows medication.  -CR               User Key  (r) = Recorded By, (t) = Taken By, (c) = Cosigned By      Initials Name Provider Type    CR Tena Golden SLP Speech and Language Pathologist                          Time Calculation:   SLP Start Time: 1015  SLP Stop Time: 1100  SLP Time Calculation (min): 45 min  Untimed Charges  63208-KI Treatment Swallow Minutes: 45  Total Minutes  Untimed Charges Total Minutes: 45   Total Minutes: 45    Therapy Charges for Today       Code Description Service Date Service Provider Modifiers Qty    [id number] HC ST TREATMENT SWALLOW 3 12/12/2023 Tena Golden SLP GN 1                     DULCE Hylton  12/12/2023

## 2023-12-19 ENCOUNTER — HOSPITAL ENCOUNTER (OUTPATIENT)
Dept: SPEECH THERAPY | Facility: HOSPITAL | Age: 88
Setting detail: THERAPIES SERIES
Discharge: HOME | End: 2023-12-19
Payer: MEDICARE

## 2023-12-19 DIAGNOSIS — R13.12 OROPHARYNGEAL DYSPHAGIA: Primary | ICD-10-CM

## 2023-12-19 PROCEDURE — 92526 ORAL FUNCTION THERAPY: CPT

## 2023-12-19 NOTE — THERAPY TREATMENT NOTE
Outpatient Speech Language Pathology   Adult Swallow Treatment Note  [facility]     Patient Name: Casey Snowden Sr.  : 1934  MRN: [id number]  Today's Date: 2023         Visit Date: 2023   Patient Active Problem List   Diagnosis    Benign essential hypertension    Stenosis of carotid artery    Mixed hyperlipidemia    Parkinson's disease    Peripheral arterial occlusive disease    Screening for prostate cancer    Medication monitoring encounter    Melanoma    Chronic deep vein thrombosis (DVT) of popliteal vein of right lower extremity    Uses hearing aid    Paroxysmal A-fib    Chronic diastolic (congestive) heart failure    Anticoagulant long-term use    Presence of IVC filter    Medicare annual wellness visit, subsequent    Iron deficiency anemia secondary to inadequate dietary iron intake    Orthostatic hypotension due to Parkinson's disease    Coronary artery disease involving native coronary artery of native heart without angina pectoris    ST elevation myocardial infarction involving left anterior descending (LAD) coronary artery    Sialorrhea    Hypertensive heart disease with heart failure    PAD (peripheral artery disease)    Metastatic squamous cell carcinoma    Secondary malignancy of lymph nodes of head, face and neck    Encounter for antineoplastic immunotherapy    RBD (REM behavioral disorder)    Acquired hypothyroidism    Aspiration pneumonia of right lung due to vomit    Dysphagia, neurologic    ICAO (internal carotid artery occlusion), right    Arterial ischemic stroke, ICA (internal carotid artery), right, chroni        Visit Dx:    ICD-10-CM ICD-9-CM   1. Oropharyngeal dysphagia  R13.12 787.22                              SLP OP Goals       Row Name 23 1015          Subjective Comments    Subjective Comments Patient is pleasant and cooperative. Wife present during session.  -CR        Dysphagia Goals    Patient will increase closure of larynx to keep food from falling  into the airway by completing super-supraglottic swallow;with intermittent cues  -CR     Status: Patient will increase closure of larynx to keep food from falling into the airway by completing Progressing as expected  -CR     Comments: Patient will increase closure of larynx to keep food from falling into the airway by completing x10 supraglottic swallows completed with MIN cues.  -CR     Patient will increase strength of tongue base and posterior pharyngeal walls to reduce residue that might fall into airway by completing effotful swallow;with intermittent cues  -CR     Status: Patient will increase strength of tongue base and posterior pharyngeal walls to reduce residue that might fall into airway by completing Progressing as expected  -CR     Comments: Patient will increase strength of tongue base and posterior pharyngeal walls to reduce residue that might fall into airway by completing x10 effortful swallows completed with intermittent cues.  -CR     Status: Patient will increase superior/anterior movement of hyolaryngeal complex to reduce residue which may fall into airway by using the Expiratory Muscle Strength Trainer Progressing as expected  -CR     Comments: Patient will increase superior/anterior movement of hyolaryngeal complex to reduce residue which may fall into airway by using the Expiratory Muscle Strength Trainer [id number] completed for 5 sets of 5 reps at 30 cmH2O given MOD cues for adequate technique. Zero failed attemtps this date; notable improvements since previous session.  -CR     Patient will compensate for oral/pharyngeal deficits and reduce risks while eating by utilizing  compensatory strategies slow rate;controlled bolus size;without cues  -CR     Status: Patient will compensate for oral/pharyngeal deficits and reduce risks while eating by utilizing  compensatory strategies Progressing as expected  -CR     Comments: Patient will compensate for oral/pharyngeal deficits and reduce risks  while eating by utilizing  compensatory strategies Patient and wife reporting patient is implementing slow rate and small bites/sips during meals.  -CR               User Key  (r) = Recorded By, (t) = Taken By, (c) = Cosigned By      Initials Name Provider Type    Tena Magallon SLP Speech and Language Pathologist                          Time Calculation:   SLP Start Time: 1015  SLP Stop Time: 1100  SLP Time Calculation (min): 45 min  Untimed Charges  35859-MA Treatment Swallow Minutes: 45  Total Minutes  Untimed Charges Total Minutes: 45   Total Minutes: 45    Therapy Charges for Today       Code Description Service Date Service Provider Modifiers Qty    07441500659 HC ST TREATMENT SWALLOW 3 12/19/2023 Tena Golden, SLP GN 1                     DULCE Hylton  12/19/2023

## 2023-12-26 ENCOUNTER — APPOINTMENT (OUTPATIENT)
Dept: SPEECH THERAPY | Facility: HOSPITAL | Age: 88
End: 2023-12-26
Payer: MEDICARE

## 2023-12-27 ENCOUNTER — HOSPITAL ENCOUNTER (OUTPATIENT)
Dept: SPEECH THERAPY | Facility: HOSPITAL | Age: 88
Setting detail: THERAPIES SERIES
Discharge: HOME | End: 2023-12-27
Payer: MEDICARE

## 2023-12-27 DIAGNOSIS — R13.12 OROPHARYNGEAL DYSPHAGIA: Primary | ICD-10-CM

## 2023-12-27 PROCEDURE — 92526 ORAL FUNCTION THERAPY: CPT

## 2023-12-27 NOTE — THERAPY TREATMENT NOTE
Outpatient Speech Language Pathology   Adult Swallow Treatment Note  [facility]     Patient Name: Casey Snowden Sr.  : 1934  MRN: [id number]  Today's Date: 2023         Visit Date: 2023   Patient Active Problem List   Diagnosis    Benign essential hypertension    Stenosis of carotid artery    Mixed hyperlipidemia    Parkinson's disease    Peripheral arterial occlusive disease    Screening for prostate cancer    Medication monitoring encounter    Melanoma    Chronic deep vein thrombosis (DVT) of popliteal vein of right lower extremity    Uses hearing aid    Paroxysmal A-fib    Chronic diastolic (congestive) heart failure    Anticoagulant long-term use    Presence of IVC filter    Medicare annual wellness visit, subsequent    Iron deficiency anemia secondary to inadequate dietary iron intake    Orthostatic hypotension due to Parkinson's disease    Coronary artery disease involving native coronary artery of native heart without angina pectoris    ST elevation myocardial infarction involving left anterior descending (LAD) coronary artery    Sialorrhea    Hypertensive heart disease with heart failure    PAD (peripheral artery disease)    Metastatic squamous cell carcinoma    Secondary malignancy of lymph nodes of head, face and neck    Encounter for antineoplastic immunotherapy    RBD (REM behavioral disorder)    Acquired hypothyroidism    Aspiration pneumonia of right lung due to vomit    Dysphagia, neurologic    ICAO (internal carotid artery occlusion), right    Arterial ischemic stroke, ICA (internal carotid artery), right, chroni        Visit Dx:    ICD-10-CM ICD-9-CM   1. Oropharyngeal dysphagia  R13.12 787.22                              SLP OP Goals       Row Name 23 1015          Subjective Comments    Subjective Comments Patient is pleasant and cooperative. Wife present during session.  -CR        Subjective Pain    Able to rate subjective pain? yes  -CR     Pre-Treatment Pain  Level 0  -CR     Post-Treatment Pain Level 0  -CR        Dysphagia Goals    Patient will increase closure of larynx to keep food from falling into the airway by completing super-supraglottic swallow;with intermittent cues  -CR     Status: Patient will increase closure of larynx to keep food from falling into the airway by completing Progressing as expected  -CR     Patient will increase strength of tongue base and posterior pharyngeal walls to reduce residue that might fall into airway by completing effotful swallow;with intermittent cues  -CR     Status: Patient will increase strength of tongue base and posterior pharyngeal walls to reduce residue that might fall into airway by completing Progressing as expected  -CR     Status: Patient will increase superior/anterior movement of hyolaryngeal complex to reduce residue which may fall into airway by using the Expiratory Muscle Strength Trainer Progressing as expected  -CR     Comments: Patient will increase superior/anterior movement of hyolaryngeal complex to reduce residue which may fall into airway by using the Expiratory Muscle Strength Trainer [id number] completed for 5 sets of 5 reps at 37.5 cmH2O given NO cues. Zero failed attemtps this date; notable improvements continuing to be observed. Plan for repeat VFSS next date discussed with patient and wife. Reviewed previous VFSS results with patient/wife and discussed specific improvements this clinician is hoping to see on repeat VFSS.  -CR     Patient will compensate for oral/pharyngeal deficits and reduce risks while eating by utilizing  compensatory strategies slow rate;controlled bolus size;without cues  -CR     Status: Patient will compensate for oral/pharyngeal deficits and reduce risks while eating by utilizing  compensatory strategies Progressing as expected  -CR     Comments: Patient will compensate for oral/pharyngeal deficits and reduce risks while eating by utilizing  compensatory strategies Wife  reporting patient is utilizing safe swallow strategies with more independence during the last week. Less instances of wet vocal quality being observed.  -CR               User Key  (r) = Recorded By, (t) = Taken By, (c) = Cosigned By      Initials Name Provider Type    Tena Magallon SLP Speech and Language Pathologist                          Time Calculation:   SLP Start Time: 1015  SLP Stop Time: 1100  SLP Time Calculation (min): 45 min  Untimed Charges  71391-YF Treatment Swallow Minutes: 45  Total Minutes  Untimed Charges Total Minutes: 45   Total Minutes: 45    Therapy Charges for Today       Code Description Service Date Service Provider Modifiers Qty    10719489949 HC ST TREATMENT SWALLOW 3 12/27/2023 Tena Golden, SLP GN 1                     DULCE Hylton  12/27/2023

## 2023-12-28 ENCOUNTER — HOSPITAL ENCOUNTER (OUTPATIENT)
Dept: GENERAL RADIOLOGY | Facility: HOSPITAL | Age: 88
Discharge: HOME | End: 2023-12-28
Payer: MEDICARE

## 2023-12-28 DIAGNOSIS — K11.7 SIALORRHEA: ICD-10-CM

## 2023-12-28 DIAGNOSIS — G20.B2 PARKINSON'S DISEASE WITH DYSKINESIA AND FLUCTUATING MANIFESTATIONS: Chronic | ICD-10-CM

## 2023-12-28 DIAGNOSIS — R13.19 DYSPHAGIA, NEUROLOGIC: ICD-10-CM

## 2023-12-28 DIAGNOSIS — J69.0 ASPIRATION PNEUMONIA OF RIGHT LOWER LOBE DUE TO VOMIT: ICD-10-CM

## 2023-12-28 PROCEDURE — 74230 X-RAY XM SWLNG FUNCJ C+: CPT

## 2023-12-28 PROCEDURE — 92611 MOTION FLUOROSCOPY/SWALLOW: CPT

## 2023-12-28 RX ADMIN — BARIUM SULFATE 55 ML: 0.81 POWDER, FOR SUSPENSION ORAL at 11:01

## 2023-12-28 RX ADMIN — BARIUM SULFATE 4 ML: 980 POWDER, FOR SUSPENSION ORAL at 11:01

## 2023-12-28 RX ADMIN — BARIUM SULFATE 1 TEASPOON(S): 0.6 CREAM ORAL at 11:01

## 2023-12-28 RX ADMIN — BARIUM SULFATE 50 ML: 400 SUSPENSION ORAL at 11:01

## 2023-12-29 NOTE — MBS/VFSS/FEES
Outpatient Speech Language Pathology   Adult Swallow Initial Evaluation  [facility]     Patient Name: Casey Snowden Sr.  : 1934  MRN: [id number]  Today's Date: 2023         Visit Date: 2023   Patient Active Problem List   Diagnosis    Benign essential hypertension    Stenosis of carotid artery    Mixed hyperlipidemia    Parkinson's disease    Peripheral arterial occlusive disease    Screening for prostate cancer    Medication monitoring encounter    Melanoma    Chronic deep vein thrombosis (DVT) of popliteal vein of right lower extremity    Uses hearing aid    Paroxysmal A-fib    Chronic diastolic (congestive) heart failure    Anticoagulant long-term use    Presence of IVC filter    Medicare annual wellness visit, subsequent    Iron deficiency anemia secondary to inadequate dietary iron intake    Orthostatic hypotension due to Parkinson's disease    Coronary artery disease involving native coronary artery of native heart without angina pectoris    ST elevation myocardial infarction involving left anterior descending (LAD) coronary artery    Sialorrhea    Hypertensive heart disease with heart failure    PAD (peripheral artery disease)    Metastatic squamous cell carcinoma    Secondary malignancy of lymph nodes of head, face and neck    Encounter for antineoplastic immunotherapy    RBD (REM behavioral disorder)    Acquired hypothyroidism    Aspiration pneumonia of right lung due to vomit    Dysphagia, neurologic    ICAO (internal carotid artery occlusion), right    Arterial ischemic stroke, ICA (internal carotid artery), right, chroni        Past Medical History:   Diagnosis Date    Acquired hypothyroidism 2022    Acute ischemic stroke 10/08/2023    Aneurysm     Arrhythmia     Atrial fibrillation     Cancer 2018    basil cell carcinoma    Carotid stenosis     CHF (congestive heart failure)     Chronic deep vein thrombosis (DVT) of popliteal vein of right lower extremity     Clotting  disorder     Coronary artery disease involving native coronary artery of native heart without angina pectoris 12/20/2018    DVT of lower extremity (deep venous thrombosis)     Hard of hearing     Hyperlipidemia     Hypertension     Localized edema 01/04/2017    Long-term use of high-risk medication     Metabolic encephalopathy 02/07/2023    Myocardial infarction 2918    LAD stent    PAD (peripheral artery disease) 09/10/2020    Parkinson's disease     Postphlebitic syndrome     Skin tear of upper arm without complication, left, subsequent encounter     Stroke         Past Surgical History:   Procedure Laterality Date    BASAL CELL CARCINOMA EXCISION  04/2018    CARDIAC CATHETERIZATION N/A 11/05/2018    Procedure: Left Heart Cath;  Surgeon: Oliverio Azar MD;  Location: [facility] CATH INVASIVE LOCATION;  Service: Cardiovascular    CARDIAC CATHETERIZATION N/A 11/05/2018    Procedure: Coronary angiography;  Surgeon: Oliverio Azar MD;  Location: [facility] CATH INVASIVE LOCATION;  Service: Cardiovascular    CARDIAC CATHETERIZATION N/A 11/05/2018    Procedure: Left ventriculography;  Surgeon: Oliverio Azar MD;  Location: [facility] CATH INVASIVE LOCATION;  Service: Cardiovascular    CARDIAC CATHETERIZATION N/A 06/04/2019    Procedure: Left Heart Cath;  Surgeon: Johnny Glasgow MD;  Location: [facility] CATH INVASIVE LOCATION;  Service: Cardiovascular    CARDIAC CATHETERIZATION N/A 06/04/2019    Procedure: Coronary angiography;  Surgeon: Johnny Glasgow MD;  Location: [facility] CATH INVASIVE LOC[facility];  Service: Cardiovascular    CARDIAC CATHETERIZATION N/A 06/04/2019    Procedure: Left ventriculography;  Surgeon: Johnny Glasgow MD;  Location: [facility] CATH INVASIVE LOCATION;  Service: Cardiovascular    CARDIAC CATHETERIZATION N/A 06/04/2019    Procedure: Stent BILL coronary;  Surgeon: Johnny Glasgow MD;  Location: [facility] CATH INVASIVE LOCATION;  Service: Cardiovascular    CARDIAC SURGERY      CAROTID  ARTERY ANGIOPLASTY Right 10/11/2023    with stent    CAROTID STENT      CATARACT EXTRACTION Left 08/2018    CATARACT EXTRACTION Right 09/2018    COLONOSCOPY      2011    CORONARY STENT PLACEMENT      INCISION AND DRAINAGE LEG Right 07/07/2017    Procedure: INCISION AND DRAINAGE INFECTED HEMATOMA RIGHT THIGH;  Surgeon: Valentin Peña MD;  Location: [facility] OR;  Service:     JOINT REPLACEMENT      KNEE INCISION AND DRAINAGE Right 12/27/2016    Procedure: KNEE INCISION AND DRAINAGE;  Surgeon: Triston Whitten MD;  Location: [facility] OR;  Service:     NOSE SURGERY      nose replacement    SKIN BIOPSY      SKIN GRAFT  12/2017    TOTAL KNEE ARTHROPLASTY Right     VASCULAR SURGERY      VENA CAVA FILTER PLACEMENT           Visit Dx:     ICD-10-CM ICD-9-CM   1. Parkinson's disease with dyskinesia and fluctuating manifestations  G20.B2 332.0   2. Sialorrhea  K11.7 527.7   3. Dysphagia, neurologic  R13.19 787.29   4. Aspiration pneumonia of right lower lobe due to vomit  J69.0 507.0            OP SLP Assessment/Plan - 12/28/23 1100          SLP Assessment    Functional Problems Swallowing  -AW    Impact on Function: Swallowing Risk of aspiration;Risk of pneumonia  -AW    Clinical Impression: Swallowing Moderate-Severe:;oropharyngeal phase dysphagia  -AW    SLP Diagnosis Mod-severe oropharyngeal dysphagia  -AW    Prognosis Good (comment)  -AW    Patient/caregiver participated in establishment of treatment plan and goals Yes  -AW    Patient would benefit from skilled therapy intervention Yes  -AW       SLP Plan    Frequency Pt completing outpatient ST and will continue on HEP with repeat VFSS recommended in 4-6 weeks.  -AW              User Key  (r) = Recorded By, (t) = Taken By, (c) = Cosigned By      Initials Name Provider Type    AW Ivette Boyle, SLP Speech and Language Pathologist                     SLP Adult Swallow Evaluation       Row Name 12/28/23 5779       Rehab Evaluation    Document Type evaluation  -AW     Subjective Information no complaints  -AW    Patient Observations alert;cooperative;agree to therapy  -AW    Patient/Family/Caregiver Comments/Observations Wife present for education after the study.  -AW    Patient Effort good  -AW    Symptoms Noted During/After Treatment none  -AW       General Information    Patient Profile Reviewed yes  -AW    Pertinent History Of Current Problem Pt referred for repeat VFSS after being in outpatient ST for 4 weeks. Pt has a h/o dysphagia after an ischemic stroke due to severe R ICA stenosis s/p stent placement on 10/12/23. 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  -AW    Current Method of Nutrition soft to chew textures;thin liquids;other (see comments)  Last VFSS 11/21/23 showed trace aspiration of all consistencies from spillover of mod residuals. Pt chose to eat soft/thin for QOL with known aspiration.  -AW    Precautions/Limitations, Vision WFL;for purposes of eval  -AW    Precautions/Limitations, Hearing hearing impairment, left;hearing impairment, right  -AW    Prior Level of Function-Communication WFL  -AW    Prior Level of Function-Swallowing regular textures;thin liquids  -AW    Plans/Goals Discussed with patient;spouse/S.O.;agreed upon  -AW    Barriers to Rehab medically complex;previous functional deficit  -AW    Patient's Goals for Discharge return to regular diet  -AW    Family Goals for Discharge patient able to return to regular diet  -AW       Pain    Additional Documentation Pain Scale: Numbers Pre/Post-Treatment (Group)  -AW       Pain Scale: Numbers Pre/Post-Treatment    Pretreatment Pain Rating 0/10 - no pain  -AW    Posttreatment Pain Rating 0/10 - no pain  -AW       Oral Motor Structure and Function    Dentition Assessment natural, present and adequate;lower dentures/partial in place  -AW    Secretion Management WNL/WFL  -AW    Mucosal Quality moist, healthy  -AW       Oral Musculature and Cranial Nerve Assessment    Oral  Labial or Buccal Impairment, Detail, Cranial Nerve VII (Facial): left labial droop  -AW       General Eating/Swallowing Observations    Respiratory Support Currently in Use room air  -AW    Eating/Swallowing Skills self-fed  -AW    Positioning During Eating upright in chair  -AW       MBS/VFSS    Utensils Used spoon;cup  -AW    Consistencies Trialed regular textures;soft to chew textures;mixed consistency;pureed;thin liquids;nectar/syrup-thick liquids;honey-thick liquids  -AW       MBS/VFSS Interpretation    VFSS Summary VFSS completed with Dr. Chen. Pt exhibited mod to severe orophayrngeal dysphagia characterized by mistiming, decreased tongue base stregth, and decreased hyolaryngeal excursion. Pt had spillage of thin to the pyriforms with silent penetration and trace aspiration of moderate residuals after the swallow. Chin tuck and 2 second prep did not elimate the penetration/aspiration. Pt had trace silent penetration during the swallow x1 with nectar thick and additional silent trace penetration of moderate residual to the cords. Pt tolerated honey thick and pureed with no penetration or aspiration, however, the risk is high due to moderate residuals (vallecuale, pryiforms). Silent trace penetration occurred from the juice of the peaches in 1/2 trials of soft. No penetration was noted with regular, however, bolus propulsion was slower. Multiple swallows did not clear pharyngeal residuals, increasing the risk of continued penetration/aspiration. Pt's cough was not consistently effective in clearing material from the airway.  -AW       SLP Communication to Radiology    Summary Statement VFSS completed with Dr. Chen. Pt exhibited mod to severe orophayrngeal dysphagia characterized by mistiming, decreased tongue base stregth, and decreased hyolaryngeal excursion. Pt had spillage of thin to the pyriforms with silent penetration and trace aspiration of moderate residuals after the swallow. Chin tuck and 2  second prep did not elimate the penetration/aspiration. Pt had trace silent penetration during the swallow x1 with nectar thick and additional silent trace penetration of moderate residual to the cords. Pt tolerated honey thick and pureed with no penetration or aspiration, however, the risk is high due to moderate residuals (vallecuale, pryiforms). Silent trace penetration occurred from the juice of the peaches in 1/2 trials of soft. No penetration was noted with regular, however, bolus propulsion was slower. Multiple swallows did not clear pharyngeal residuals, increasing the risk of continued penetration/aspiration. Pt's cough was not consistently effective in clearing material from the airway.  -AW       SLP Evaluation Clinical Impression    SLP Swallowing Diagnosis mod-severe;oral dysphagia;pharyngeal dysphagia  -AW    Functional Impact risk of aspiration/pneumonia  -AW    Rehab Potential/Prognosis, Swallowing good, to achieve stated therapy goals  -AW    Swallow Criteria for Skilled Therapeutic Interventions Met demonstrates skilled criteria  -AW       Recommendations    Therapy Frequency (Swallow) evaluation only  -AW    SLP Diet Recommendation soft to chew textures;honey thick liquids;other (see comments);comfort diet, per physician discretion;thin liquids  Pt wishes to continue current diet of soft with thin with known aspiration. Discussed option of HTL being safest, however, feel there is risk with this as well due to residuals. Pt and wife verbalized recommendations and risks.  -AW    Recommended Diagnostics VFSS (MBS);other (see comments)  f/u in 4-6 weeks  -AW    Recommended Precautions and Strategies upright posture during/after eating;small bites of food and sips of liquid;no straw;multiple swallows per bite of food;multiple swallows per sip of liquid;alternate between small bites of food and sips of liquid;volitional throat clear  -AW    Oral Care Recommendations Oral Care BID/PRN  -AW    SLP Rec.  for Method of Medication Administration meds whole;with puree  -AW    Monitor for Signs of Aspiration yes  -AW    Anticipated Discharge Disposition (SLP) home  -AW    Patient/Family Concerns, Anticipated Discharge Disposition (SLP) Extensive education and discussion completed with pt and wife after the study. Pt advised on potential signs of PNA. Pt understands the risks of aspiration and known aspiration on the study, however, wants to continue with the soft/thin diet he has been eating. Pt to continue his HEP as well as EMST. Discussed possible repeat VFSS in 4-6 weeks.  -AW              User Key  (r) = Recorded By, (t) = Taken By, (c) = Cosigned By      Initials Name Provider Type    Ivette Rosas, SLP Speech and Language Pathologist                                   OP SLP Education       Row Name 12/29/23 0815       Education    Barriers to Learning No barriers identified  -AW    Education Provided Described results of evaluation;Patient expressed understanding of evaluation;Family/caregivers expressed understanding of evaluation;Patient participated in establishing goals and treatment plan;Family/caregivers participated in establishing goals and treatment plan;Patient demonstrated recommended strategies;Family/caregivers demonstrated recommended strategies  -AW    Assessed Learning needs;Learning motivation;Learning preferences;Learning readiness  -AW    Learning Motivation Strong  -AW    Learning Method Explanation;Demonstration;Teach back;Written materials  -AW    Teaching Response Verbalized understanding;Demonstrated understanding  -AW    Education Comments Pt was frustrated by results. Reassurance provided. Pt agreed to continue with EMST daily and swallow exercises 2/day. Pt and wife understand the risks of aspiration and known penetration/aspiration on the study. Pt wants to continue on soft and thin as he has been with use of strategies.  -AW              User Key  (r) = Recorded By, (t) = Taken By,  (c) = Cosigned By      Initials Name Effective Dates    AW Ivette Boyle SLP 08/28/23 -                              Time Calculation:   SLP Start Time: 1000  SLP Stop Time: 1130  SLP Time Calculation (min): 90 min    Therapy Charges for Today       Code Description Service Date Service Provider Modifiers Qty    83380198853 HC ST MOTION FLUORO EVAL SWALLOW 6 12/28/2023 Ivette Boyle, SLP GN 1                     DULCE Perea  12/29/2023

## 2023-12-29 NOTE — THERAPY DISCHARGE NOTE
Speech Language Pathology Discharge Summary  [facility]       Patient Name: Casey Snowden Sr.  : 1934  MRN: [id number]    Today's Date: 2023       SLP OP Goals       Row Name 23 1500          Dysphagia Goals    Patient will increase closure of larynx to keep food from falling into the airway by completing super-supraglottic swallow;with intermittent cues  -CR     Status: Patient will increase closure of larynx to keep food from falling into the airway by completing Discontinued  -CR     Patient will increase strength of tongue base and posterior pharyngeal walls to reduce residue that might fall into airway by completing effotful swallow;with intermittent cues  -CR     Status: Patient will increase strength of tongue base and posterior pharyngeal walls to reduce residue that might fall into airway by completing Discontinued  -CR     Status: Patient will increase superior/anterior movement of hyolaryngeal complex to reduce residue which may fall into airway by using the Expiratory Muscle Strength Trainer Achieved  -CR     Patient will compensate for oral/pharyngeal deficits and reduce risks while eating by utilizing  compensatory strategies slow rate;controlled bolus size;without cues  -CR     Status: Patient will compensate for oral/pharyngeal deficits and reduce risks while eating by utilizing  compensatory strategies Achieved  -CR               User Key  (r) = Recorded By, (t) = Taken By, (c) = Cosigned By      Initials Name Provider Type    Tena Magallon SLP Speech and Language Pathologist                    [facility] Patient Record Number: [id number]    FCM Scores (Percent Functional)  Swallowing - 77%      OP SLP Discharge Summary  Date of Discharge: 23  Reason for Discharge: no further expectation of functional progress, identified goals partially met  Progress Toward Achieving Short/long Term Goals: goals partially met within established timelines  Discharge Destination: home  w/ assist  Discharge Instructions: Continue use of [id number] and HEP of swallow exercises      Time Calculation:   Untimed Charges  34156-OW Treatment Swallow Minutes: 45  Total Minutes  Untimed Charges Total Minutes: 45   Total Minutes: 45                Tena Golden SLP  12/29/2023

## 2024-01-08 RX ORDER — ATORVASTATIN CALCIUM 40 MG/1
40 TABLET, FILM COATED ORAL NIGHTLY
Qty: 90 TABLET | Refills: 3 | Status: SHIPPED | OUTPATIENT
Start: 2024-01-08

## 2024-02-05 ENCOUNTER — OFFICE VISIT (OUTPATIENT)
Dept: FAMILY MEDICINE CLINIC | Facility: CLINIC | Age: 89
End: 2024-02-05
Payer: MEDICARE

## 2024-02-05 VITALS
DIASTOLIC BLOOD PRESSURE: 70 MMHG | HEIGHT: 71 IN | BODY MASS INDEX: 23.1 KG/M2 | TEMPERATURE: 97.5 F | HEART RATE: 58 BPM | OXYGEN SATURATION: 100 % | WEIGHT: 165 LBS | SYSTOLIC BLOOD PRESSURE: 126 MMHG

## 2024-02-05 DIAGNOSIS — N28.89 RENAL MASS, LEFT: Primary | ICD-10-CM

## 2024-02-05 PROCEDURE — 1160F RVW MEDS BY RX/DR IN RCRD: CPT | Performed by: FAMILY MEDICINE

## 2024-02-05 PROCEDURE — 1159F MED LIST DOCD IN RCRD: CPT | Performed by: FAMILY MEDICINE

## 2024-02-05 PROCEDURE — 99212 OFFICE O/P EST SF 10 MIN: CPT | Performed by: FAMILY MEDICINE

## 2024-02-05 RX ORDER — FINASTERIDE 5 MG/1
5 TABLET, FILM COATED ORAL DAILY
COMMUNITY
Start: 2024-01-16

## 2024-02-05 RX ORDER — TAMSULOSIN HYDROCHLORIDE 0.4 MG/1
1 CAPSULE ORAL
COMMUNITY
Start: 2024-01-16

## 2024-02-05 NOTE — PROGRESS NOTES
"  Subjective   Casey Snowden Sr. is a 89 y.o. male who is here for   Chief Complaint   Patient presents with    urology follow-up   .     History of Present Illness     Afshin and his wife could not today to discuss the new diagnosis of left kidney cancer.  He went to see urology due to bladder and urinary incontinence issues associated with his Parkinson's  Discovering a large 9 cm mass on the lower left  kidney.  They discussed management of this mass with urology.  Unfortunate Afshin is elderly, has significant coronary artery disease, and Parkinson's  And a nephrectomy would be major surgery for him  Reviewed recent CAT scan.  No obvious metastatic disease.    The following portions of the patient's history were reviewed and updated as appropriate: allergies, current medications, past medical history, past social history, past surgical history, and problem list.    Review of Systems    Objective   Vitals:    02/05/24 1322   BP: 126/70   Pulse: 58   Temp: 97.5 °F (36.4 °C)   SpO2: 100%   Weight: 74.8 kg (165 lb)   Height: 180.3 cm (70.98\")      Physical Exam  Vitals reviewed.   Neurological:      Mental Status: He is alert.         Assessment & Plan   Diagnoses and all orders for this visit:    1. Renal mass, left (Primary)    Urology follow-up in a few weeks  I believe Afshin could come through the surgery fairly well  He certainly is at risk for postoperative complications  I explained to be in the hospital for probably a week.  Significant amount of pain  If the cancer has not metastasized then it would be a good decision  But if his lymph nodes are full of cancer then maybe would not be a wise decision  He can certainly decide to do nothing if he wishes, and I would agree with that decision as well.    There are no Patient Instructions on file for this visit.    There are no discontinued medications.     Return for Medicare Wellness visit.    Dr. Aleksander Diez  [facility]  [place], [place]

## 2024-02-05 NOTE — LETTER
"February 5, 2024       No Recipients    Patient: Casey Snowden Sr.   YOB: 1934   Date of Visit: 2/5/2024     Dear Johnny Castellanos MD:       Thank you for taking care of of Casey Snowden . Below are the relevant portions of my assessment and plan of care.  He and his wife came in today for discussion whether he should attempt the left nephrectomy.  I counseled going for the surgery or not doing anything at all, both would be acceptable decisions.  Given the later stages of his life he is in and multiple medical problems    If you have questions, please do not hesitate to call me. I look forward to following Casey along with you.         Sincerely,        Aleksander Diez MD        CC:   No Recipients    Aleksander Diez MD  02/05/24 1444  Sign when Signing Visit    Subjective  Casey Snowden Sr. is a 89 y.o. male who is here for   Chief Complaint   Patient presents with   • urology follow-up   .     History of Present Illness     Afshin and his wife could not today to discuss the new diagnosis of left kidney cancer.  He went to see urology due to bladder and urinary incontinence issues associated with his Parkinson's  Discovering a large 9 cm mass on the lower left  kidney.  They discussed management of this mass with urology.  Unfortunate Afshin is elderly, has significant coronary artery disease, and Parkinson's  And a nephrectomy would be major surgery for him  Reviewed recent CAT scan.  No obvious metastatic disease.    The following portions of the patient's history were reviewed and updated as appropriate: allergies, current medications, past medical history, past social history, past surgical history, and problem list.    Review of Systems    Objective  Vitals:    02/05/24 1322   BP: 126/70   Pulse: 58   Temp: 97.5 °F (36.4 °C)   SpO2: 100%   Weight: 74.8 kg (165 lb)   Height: 180.3 cm (70.98\")      Physical Exam  Vitals reviewed.   Neurological:      Mental Status: He is alert. "         Assessment & Plan  Diagnoses and all orders for this visit:    1. Renal mass, left (Primary)    Urology follow-up in a few weeks  I believe Afshin could come through the surgery fairly well  He certainly is at risk for postoperative complications  I explained to be in the hospital for probably a week.  Significant amount of pain  If the cancer has not metastasized then it would be a good decision  But if his lymph nodes are full of cancer then maybe would not be a wise decision  He can certainly decide to do nothing if he wishes, and I would agree with that decision as well.    There are no Patient Instructions on file for this visit.    There are no discontinued medications.     Return for Medicare Wellness visit.    Dr. Aleksander Diez  [facility]  [place], [place]

## 2024-03-18 ENCOUNTER — TELEPHONE (OUTPATIENT)
Dept: NEUROLOGY | Facility: CLINIC | Age: 89
End: 2024-03-18
Payer: MEDICARE

## 2024-03-18 NOTE — TELEPHONE ENCOUNTER
Spoke to pt spouse (on BH verbal) in regards to scheduling appt. Had agreed to schedule for May 17th.     (NEEDING UPDATED  VERBAL)

## 2024-04-16 DIAGNOSIS — E03.9 ACQUIRED HYPOTHYROIDISM: ICD-10-CM

## 2024-04-17 RX ORDER — LEVOTHYROXINE SODIUM 0.07 MG/1
75 TABLET ORAL
Qty: 90 TABLET | Refills: 1 | OUTPATIENT
Start: 2024-04-17

## 2024-04-19 DIAGNOSIS — E03.9 ACQUIRED HYPOTHYROIDISM: ICD-10-CM

## 2024-04-22 PROBLEM — I71.40 ABDOMINAL AORTIC ANEURYSM: Status: ACTIVE | Noted: 2024-04-22

## 2024-05-01 ENCOUNTER — TELEPHONE (OUTPATIENT)
Dept: NEUROLOGY | Facility: CLINIC | Age: 89
End: 2024-05-01
Payer: MEDICARE

## 2024-05-01 NOTE — TELEPHONE ENCOUNTER
Called to reschedule patients May 17 appt as the provider will not be in the office. Wife answered the phone and I asked for the patient. She stated he was not able to come to the phone and I informed her she was not on his verbal release which I was able to find one at all for the patient. I asked her if he was close enough for him to give me verbal release to discuss the purpose for the call and she was not happy and said she was not going to go get him. I told her I would try him another time when he was available.

## 2024-05-02 ENCOUNTER — TELEPHONE (OUTPATIENT)
Dept: NEUROLOGY | Facility: CLINIC | Age: 89
End: 2024-05-02
Payer: MEDICARE

## 2024-05-02 NOTE — TELEPHONE ENCOUNTER
Spoke to pt in regards to r/s appt due to provider being out of office. Was told they would call back to r/s

## 2024-05-08 ENCOUNTER — OFFICE VISIT (OUTPATIENT)
Dept: CARDIOLOGY | Facility: CLINIC | Age: 89
End: 2024-05-08
Payer: MEDICARE

## 2024-05-08 VITALS
BODY MASS INDEX: 21.7 KG/M2 | OXYGEN SATURATION: 98 % | HEIGHT: 71 IN | HEART RATE: 64 BPM | SYSTOLIC BLOOD PRESSURE: 118 MMHG | WEIGHT: 155 LBS | DIASTOLIC BLOOD PRESSURE: 74 MMHG

## 2024-05-08 DIAGNOSIS — I11.0 HYPERTENSIVE HEART DISEASE WITH HEART FAILURE: Chronic | ICD-10-CM

## 2024-05-08 DIAGNOSIS — I73.9 PAD (PERIPHERAL ARTERY DISEASE): Chronic | ICD-10-CM

## 2024-05-08 DIAGNOSIS — I25.10 CORONARY ARTERY DISEASE INVOLVING NATIVE CORONARY ARTERY OF NATIVE HEART WITHOUT ANGINA PECTORIS: Primary | Chronic | ICD-10-CM

## 2024-05-08 DIAGNOSIS — I50.32 CHRONIC DIASTOLIC CONGESTIVE HEART FAILURE: Chronic | ICD-10-CM

## 2024-05-08 DIAGNOSIS — I10 BENIGN ESSENTIAL HYPERTENSION: Chronic | ICD-10-CM

## 2024-05-08 DIAGNOSIS — I82.531 CHRONIC DEEP VEIN THROMBOSIS (DVT) OF POPLITEAL VEIN OF RIGHT LOWER EXTREMITY: Chronic | ICD-10-CM

## 2024-05-08 DIAGNOSIS — Z79.01 ANTICOAGULANT LONG-TERM USE: Chronic | ICD-10-CM

## 2024-05-08 DIAGNOSIS — I48.0 PAROXYSMAL A-FIB: Chronic | ICD-10-CM

## 2024-05-08 DIAGNOSIS — Z95.828 PRESENCE OF IVC FILTER: Chronic | ICD-10-CM

## 2024-05-08 RX ORDER — ASPIRIN 81 MG/1
81 TABLET, CHEWABLE ORAL DAILY
COMMUNITY

## 2024-05-14 DIAGNOSIS — Z12.5 SCREENING FOR PROSTATE CANCER: ICD-10-CM

## 2024-05-14 DIAGNOSIS — I10 BENIGN ESSENTIAL HYPERTENSION: Chronic | ICD-10-CM

## 2024-05-14 DIAGNOSIS — E78.2 MIXED HYPERLIPIDEMIA: ICD-10-CM

## 2024-05-14 DIAGNOSIS — E03.9 ACQUIRED HYPOTHYROIDISM: Primary | ICD-10-CM

## 2024-05-14 DIAGNOSIS — G20.B2 PARKINSON'S DISEASE WITH DYSKINESIA AND FLUCTUATING MANIFESTATIONS: Chronic | ICD-10-CM

## 2024-05-24 ENCOUNTER — APPOINTMENT (OUTPATIENT)
Dept: GENERAL RADIOLOGY | Facility: HOSPITAL | Age: 89
End: 2024-05-24
Payer: MEDICARE

## 2024-05-24 ENCOUNTER — HOSPITAL ENCOUNTER (EMERGENCY)
Facility: HOSPITAL | Age: 89
Discharge: HOME | End: 2024-05-24
Attending: STUDENT IN AN ORGANIZED HEALTH CARE EDUCATION/TRAINING PROGRAM
Payer: MEDICARE

## 2024-05-24 ENCOUNTER — APPOINTMENT (OUTPATIENT)
Dept: CT IMAGING | Facility: HOSPITAL | Age: 89
End: 2024-05-24
Payer: MEDICARE

## 2024-05-24 VITALS
SYSTOLIC BLOOD PRESSURE: 179 MMHG | HEIGHT: 71 IN | WEIGHT: 175 LBS | HEART RATE: 57 BPM | TEMPERATURE: 97.5 F | DIASTOLIC BLOOD PRESSURE: 92 MMHG | BODY MASS INDEX: 24.5 KG/M2 | RESPIRATION RATE: 20 BRPM | OXYGEN SATURATION: 100 %

## 2024-05-24 DIAGNOSIS — R09.89 LABILE HYPERTENSION: ICD-10-CM

## 2024-05-24 DIAGNOSIS — G20.A1 PARKINSON'S DISEASE, UNSPECIFIED WHETHER DYSKINESIA PRESENT, UNSPECIFIED WHETHER MANIFESTATIONS FLUCTUATE: ICD-10-CM

## 2024-05-24 DIAGNOSIS — I95.1 ORTHOSTASIS: Primary | ICD-10-CM

## 2024-05-24 LAB
ALBUMIN SERPL-MCNC: 4.1 G/DL (ref 3.5–5.2)
ALBUMIN/GLOB SERPL: 1.7 G/DL
ALP SERPL-CCNC: 84 U/L (ref 39–117)
ALT SERPL W P-5'-P-CCNC: 8 U/L (ref 1–41)
ANION GAP SERPL CALCULATED.3IONS-SCNC: 10 MMOL/L (ref 5–15)
APTT PPP: 32.1 SECONDS (ref 22.7–35.4)
AST SERPL-CCNC: 10 U/L (ref 1–40)
BASOPHILS # BLD AUTO: 0.02 10*3/MM3 (ref 0–0.2)
BASOPHILS NFR BLD AUTO: 0.3 % (ref 0–1.5)
BILIRUB SERPL-MCNC: 0.7 MG/DL (ref 0–1.2)
BUN SERPL-MCNC: 25 MG/DL (ref 8–23)
BUN/CREAT SERPL: 27.5 (ref 7–25)
CALCIUM SPEC-SCNC: 8.5 MG/DL (ref 8.6–10.5)
CHLORIDE SERPL-SCNC: 106 MMOL/L (ref 98–107)
CO2 SERPL-SCNC: 25 MMOL/L (ref 22–29)
CREAT SERPL-MCNC: 0.91 MG/DL (ref 0.76–1.27)
DEPRECATED RDW RBC AUTO: 47.3 FL (ref 37–54)
EGFRCR SERPLBLD CKD-EPI 2021: 80.6 ML/MIN/1.73
EOSINOPHIL # BLD AUTO: 0.13 10*3/MM3 (ref 0–0.4)
EOSINOPHIL NFR BLD AUTO: 1.7 % (ref 0.3–6.2)
ERYTHROCYTE [DISTWIDTH] IN BLOOD BY AUTOMATED COUNT: 13.2 % (ref 12.3–15.4)
GLOBULIN UR ELPH-MCNC: 2.4 GM/DL
GLUCOSE SERPL-MCNC: 66 MG/DL (ref 65–99)
HCT VFR BLD AUTO: 36.2 % (ref 37.5–51)
HGB BLD-MCNC: 12.3 G/DL (ref 13–17.7)
IMM GRANULOCYTES # BLD AUTO: 0.03 10*3/MM3 (ref 0–0.05)
IMM GRANULOCYTES NFR BLD AUTO: 0.4 % (ref 0–0.5)
INR PPP: 1.33 (ref 0.9–1.1)
LYMPHOCYTES # BLD AUTO: 0.62 10*3/MM3 (ref 0.7–3.1)
LYMPHOCYTES NFR BLD AUTO: 7.9 % (ref 19.6–45.3)
MAGNESIUM SERPL-MCNC: 1.9 MG/DL (ref 1.6–2.4)
MCH RBC QN AUTO: 32.9 PG (ref 26.6–33)
MCHC RBC AUTO-ENTMCNC: 34 G/DL (ref 31.5–35.7)
MCV RBC AUTO: 96.8 FL (ref 79–97)
MONOCYTES # BLD AUTO: 0.64 10*3/MM3 (ref 0.1–0.9)
MONOCYTES NFR BLD AUTO: 8.1 % (ref 5–12)
NEUTROPHILS NFR BLD AUTO: 6.42 10*3/MM3 (ref 1.7–7)
NEUTROPHILS NFR BLD AUTO: 81.6 % (ref 42.7–76)
NRBC BLD AUTO-RTO: 0 /100 WBC (ref 0–0.2)
NT-PROBNP SERPL-MCNC: 1193 PG/ML (ref 0–1800)
PLATELET # BLD AUTO: 153 10*3/MM3 (ref 140–450)
PMV BLD AUTO: 9.7 FL (ref 6–12)
POTASSIUM SERPL-SCNC: 4 MMOL/L (ref 3.5–5.2)
PROT SERPL-MCNC: 6.5 G/DL (ref 6–8.5)
PROTHROMBIN TIME: 16.7 SECONDS (ref 11.7–14.2)
RBC # BLD AUTO: 3.74 10*6/MM3 (ref 4.14–5.8)
SODIUM SERPL-SCNC: 141 MMOL/L (ref 136–145)
TROPONIN T SERPL HS-MCNC: 32 NG/L
TSH SERPL DL<=0.05 MIU/L-ACNC: 2.68 UIU/ML (ref 0.27–4.2)
WBC NRBC COR # BLD AUTO: 7.86 10*3/MM3 (ref 3.4–10.8)

## 2024-05-24 PROCEDURE — 93005 ELECTROCARDIOGRAM TRACING: CPT | Performed by: PHYSICIAN ASSISTANT

## 2024-05-24 PROCEDURE — 99284 EMERGENCY DEPT VISIT MOD MDM: CPT

## 2024-05-24 PROCEDURE — 80053 COMPREHEN METABOLIC PANEL: CPT | Performed by: PHYSICIAN ASSISTANT

## 2024-05-24 PROCEDURE — 85610 PROTHROMBIN TIME: CPT | Performed by: PHYSICIAN ASSISTANT

## 2024-05-24 PROCEDURE — 84443 ASSAY THYROID STIM HORMONE: CPT | Performed by: PHYSICIAN ASSISTANT

## 2024-05-24 PROCEDURE — 71045 X-RAY EXAM CHEST 1 VIEW: CPT

## 2024-05-24 PROCEDURE — 85025 COMPLETE CBC W/AUTO DIFF WBC: CPT | Performed by: PHYSICIAN ASSISTANT

## 2024-05-24 PROCEDURE — 85730 THROMBOPLASTIN TIME PARTIAL: CPT | Performed by: PHYSICIAN ASSISTANT

## 2024-05-24 PROCEDURE — 84484 ASSAY OF TROPONIN QUANT: CPT | Performed by: PHYSICIAN ASSISTANT

## 2024-05-24 PROCEDURE — 83735 ASSAY OF MAGNESIUM: CPT | Performed by: PHYSICIAN ASSISTANT

## 2024-05-24 PROCEDURE — 93010 ELECTROCARDIOGRAM REPORT: CPT | Performed by: STUDENT IN AN ORGANIZED HEALTH CARE EDUCATION/TRAINING PROGRAM

## 2024-05-24 PROCEDURE — 70450 CT HEAD/BRAIN W/O DYE: CPT

## 2024-05-24 PROCEDURE — 83880 ASSAY OF NATRIURETIC PEPTIDE: CPT | Performed by: PHYSICIAN ASSISTANT

## 2024-05-24 RX ORDER — AMLODIPINE BESYLATE 2.5 MG/1
2.5 TABLET ORAL DAILY PRN
Qty: 90 TABLET | Refills: 0 | Status: SHIPPED | OUTPATIENT
Start: 2024-05-24 | End: 2024-05-29

## 2024-05-24 RX ORDER — SODIUM CHLORIDE 0.9 % (FLUSH) 0.9 %
10 SYRINGE (ML) INJECTION AS NEEDED
Status: DISCONTINUED | OUTPATIENT
Start: 2024-05-24 | End: 2024-05-24 | Stop reason: HOSPADM

## 2024-05-24 NOTE — ED TRIAGE NOTES
Pt was brought in by ems from home for weakness and hypotension. Pt chad from chair around 1215 when he became weak and dizzy. Wife states that she noted facial droop. Wife took blood pressure and was 66/40. Ems states that he was neurological intact with baseline left facial droop.

## 2024-05-24 NOTE — ED PROVIDER NOTES
" EMERGENCY DEPARTMENT ENCOUNTER      Room Number:  40/40  PCP: Aleksander Diez MD  Independent Historians: Patient  Patient Care Team:  Aleksander Diez MD as PCP - General  Joseph Acharya MD as Consulting Physician (Ophthalmology)  Ford Dorado MD as Consulting Physician (Vascular Surgery)  Cecilio Armas III, MD as Consulting Physician (Cardiology)  Kashif Goodwin MD as Consulting Physician (Neurology)  Enoch Lares MD (Hematology)  Kayden Chance MD (Dermatology)  Vinod Cadena MD as Consulting Physician (Neurosurgery)  Johnny Castellanos MD as Consulting Physician (Urology)       HPI:  Chief Complaint: Hypotension    A complete HPI/ROS/PMH/PSH/SH/FH are unobtainable due to: None    Chronic or social conditions impacting patient care (Social Determinants of Health): None      Context: Casey Snowden Sr. is a 89 y.o. male with a PMH significant for hypertension, carotid stenosis, Parkinson's disease, DVT, paroxysmal A-fib anticoagulated on Eliquis, congestive heart failure, CAD, metastatic squamous of carcinoma who presents to the ED c/o acute hypotension and generalized fatigue and weakness that occurred around noon today.  The patient was trying to walk through the kitchen when he asked his wife to check his blood pressure for feeling \"woozy\".  The wife noted that he looked unsteady on his feet at that time and was somewhat slow to respond.  He was sat in a chair and his blood pressure was found to be 66/40 at that time.  His symptoms fluctuated over the following several minutes but when she noticed an unsteady gait she called EMS for help.  The patient has left-sided deficits from prior stroke last year including a left-sided facial droop the wife states that she may have noticed increase in his facial droop for several minutes during this episode.  Upon arrival without fluids the patient's blood pressure has normalized and is completely asymptomatic.  No falls " or injuries today.      Upon review of prior external notes (non-ED) -and- Review of prior external test results outside of this encounter it appears the patient was evaluated in the office with cardiology for CAD on May 8, 2024.  The patient had a normal TSH and ALT on 5/21/2024.      PAST MEDICAL HISTORY  Active Ambulatory Problems     Diagnosis Date Noted    Benign essential hypertension 11/02/2016    Stenosis of carotid artery 11/02/2016    Mixed hyperlipidemia 11/16/2012    Parkinson's disease 11/02/2016    Peripheral arterial occlusive disease 11/02/2016    Screening for prostate cancer 11/02/2016    Medication monitoring encounter 11/02/2016    Melanoma 11/02/2016    Chronic deep vein thrombosis (DVT) of popliteal vein of right lower extremity 11/02/2016    Uses hearing aid 11/02/2016    Paroxysmal A-fib 12/27/2016    Chronic diastolic (congestive) heart failure 07/07/2017    Anticoagulant long-term use 07/07/2017    Presence of IVC filter 07/07/2017    Medicare annual wellness visit, subsequent 11/03/2017    Iron deficiency anemia secondary to inadequate dietary iron intake 11/03/2017    Orthostatic hypotension due to Parkinson's disease 12/03/2018    Coronary artery disease involving native coronary artery of native heart without angina pectoris 12/20/2018    ST elevation myocardial infarction involving left anterior descending (LAD) coronary artery 06/05/2019    Sialorrhea 08/19/2019    Hypertensive heart disease with heart failure 03/04/2020    PAD (peripheral artery disease) 09/10/2020    Metastatic squamous cell carcinoma 12/03/2020    Secondary malignancy of lymph nodes of head, face and neck 12/02/2020    Encounter for antineoplastic immunotherapy 01/07/2021    RBD (REM behavioral disorder) 01/20/2022    Acquired hypothyroidism 08/31/2022    Aspiration pneumonia of right lung due to vomit 02/07/2023    Dysphagia, neurologic 02/15/2023    ICAO (internal carotid artery occlusion), right 10/18/2023     Arterial ischemic stroke, ICA (internal carotid artery), right, chroni 10/18/2023    Renal mass, left 02/05/2024    Abdominal aortic aneurysm 04/22/2024     Resolved Ambulatory Problems     Diagnosis Date Noted    Deep vein thrombosis 11/16/2012    Routine adult health maintenance 11/02/2016    Cough 11/02/2016    Cellulitis of right lower extremity 12/26/2016    Sepsis 12/26/2016    Traumatic hematoma of right lower leg with infection 12/26/2016    Hospital discharge follow-up 01/04/2017    Localized edema 01/04/2017    Hypertension 11/16/2012    Hematoma 07/07/2017    Cellulitis of right lower extremity without foot 07/10/2017    Anterior chest wall pain 09/11/2018    Acute systolic heart failure 10/30/2018    Bradycardia with 31-40 beats per minute 12/03/2018    Atrial fibrillation with RVR 06/03/2019    Coronary artery disease with angina pectoris 06/04/2019    Facial contusion, subsequent encounter 01/30/2023    Skin tear of upper arm without complication, left, subsequent encounter 01/30/2023    Altered mental status, unspecified altered mental status type 02/07/2023    Metabolic encephalopathy 02/07/2023    Acute ischemic stroke 10/09/2023     Past Medical History:   Diagnosis Date    Abdominal aortic aneurysm without rupture 10/14/2021    Aneurysm     Arrhythmia     Atrial fibrillation     Cancer 04/2018    Carotid stenosis 10/29/2015    CHF (congestive heart failure)     Clotting disorder     DVT of lower extremity (deep venous thrombosis)     Hard of hearing     Hyperlipidemia     Long-term use of high-risk medication     Myocardial infarction 2918    Postphlebitic syndrome     Pure hypercholesterolemia     Stroke 11/02/2023         PAST SURGICAL HISTORY  Past Surgical History:   Procedure Laterality Date    BASAL CELL CARCINOMA EXCISION  04/2018    CARDIAC CATHETERIZATION N/A 11/05/2018    Procedure: Left Heart Cath;  Surgeon: Oliverio Azar MD;  Location: [facility] CATH INVASIVE LOCATION;  Service:  Cardiovascular    CARDIAC CATHETERIZATION N/A 11/05/2018    Procedure: Coronary angiography;  Surgeon: Oliverio Azar MD;  [facility];  Service: Cardiovascular    CARDIAC CATHETERIZATION N/A 11/05/2018    Procedure: Left ventriculography;  Surgeon: Oliverio Azar MD;  [facility];  Service: Cardiovascular    CARDIAC CATHETERIZATION N/A 06/04/2019    Procedure: Left Heart Cath;  Surgeon: Johnny Glasgow MD;  Location: [facility] CATH INVASIVE LOCATION;  Service: Cardiovascular    CARDIAC CATHETERIZATION N/A 06/04/2019    Procedure: Coronary angiography;  Surgeon: Johnny Glasgow MD;  [facility];  Service: Cardiovascular    CARDIAC CATHETERIZATION N/A 06/04/2019    Procedure: Left ventriculography;  Surgeon: Johnny Glasgow MD;  [facility];  Service: Cardiovascular    CARDIAC CATHETERIZATION N/A 06/04/2019    Procedure: Stent BILL coronary;  Surgeon: Johnny Glasgow MD;  Location: [facility] CATH INVASIVE LOCATION;  Service: Cardiovascular    CARDIAC SURGERY      CAROTID ARTERY ANGIOPLASTY Right 10/11/2023    with stent    CAROTID STENT      CATARACT EXTRACTION Left 08/2018    CATARACT EXTRACTION Right 09/2018    COLONOSCOPY      2011    CORONARY STENT PLACEMENT      INCISION AND DRAINAGE LEG Right 07/07/2017    Procedure: INCISION AND DRAINAGE INFECTED HEMATOMA RIGHT THIGH;  Surgeon: Valentin Peña MD;  Location: [facility] OR;  Service:     JOINT REPLACEMENT      KNEE INCISION AND DRAINAGE Right 12/27/2016    Procedure: KNEE INCISION AND DRAINAGE;  Surgeon: Triston Whitten MD;  Location: [facility] OR;  Service:     NOSE SURGERY      nose replacement    SINUS SURGERY  1960    SKIN BIOPSY      SKIN GRAFT  12/2017    TOTAL KNEE ARTHROPLASTY Right     VASCULAR SURGERY      VENA CAVA FILTER PLACEMENT           FAMILY HISTORY  Family History   Problem Relation Age of Onset     Heart attack Mother 74    Hypertension Mother     Cancer Sister     Stroke Brother     Cancer Brother     Cancer Brother     Deep vein thrombosis Neg Hx          SOCIAL HISTORY  Social History     Socioeconomic History    Marital status:     Years of education: college grad   Tobacco Use    Smoking status: Never     Passive exposure: Never    Smokeless tobacco: Never    Tobacco comments:     caff use   Vaping Use    Vaping status: Never Used   Substance and Sexual Activity    Alcohol use: No     Comment: none for a month    Drug use: No    Sexual activity: Not Currently     Partners: Female         ALLERGIES  Penicillins and Rocephin [ceftriaxone]      REVIEW OF SYSTEMS  Included in HPI  All systems reviewed and negative except for those discussed in HPI.      PHYSICAL EXAM    I have reviewed the triage vital signs and nursing notes.    ED Triage Vitals [05/24/24 1352]   Temp Heart Rate Resp BP SpO2   97.5 °F (36.4 °C) 64 20 149/71 100 %      Temp src Heart Rate Source Patient Position BP Location FiO2 (%)   -- Monitor -- -- --       Physical Exam  Constitutional:       General: He is not in acute distress.     Appearance: He is well-developed.   HENT:      Head: Normocephalic and atraumatic.   Eyes:      General: No scleral icterus.     Conjunctiva/sclera: Conjunctivae normal.   Neck:      Trachea: No tracheal deviation.   Cardiovascular:      Rate and Rhythm: Normal rate and regular rhythm.   Pulmonary:      Effort: Pulmonary effort is normal.      Breath sounds: Normal breath sounds.   Abdominal:      Palpations: Abdomen is soft.      Tenderness: There is no abdominal tenderness. There is no guarding.   Musculoskeletal:         General: No deformity.      Cervical back: Normal range of motion.   Lymphadenopathy:      Cervical: No cervical adenopathy.   Skin:     General: Skin is warm and dry.   Neurological:      Mental Status: He is alert and oriented to person, place, and time.      Comments: Cranial  nerves II-XII are intact.  Sensation is intact and symmetric throughout, no deficit.  Motor function is 5/5 and symmetric in the extremities x 4.  There is mild left facial droop  No aphasia, dysarthria, speech is clear and coherent.  Normal FTN.   Psychiatric:         Behavior: Behavior normal.         Vital signs and nursing notes reviewed.      PPE: I wore a surgical mask throughout my encounters with the pt. I performed hand hygiene on entry into the pt room and upon exit.      LAB RESULTS  Recent Results (from the past 24 hour(s))   ECG 12 Lead Stroke Evaluation    Collection Time: 05/24/24  2:22 PM   Result Value Ref Range    QT Interval 463 ms    QTC Interval 443 ms   Comprehensive Metabolic Panel    Collection Time: 05/24/24  2:23 PM    Specimen: Blood   Result Value Ref Range    Glucose 66 65 - 99 mg/dL    BUN 25 (H) 8 - 23 mg/dL    Creatinine 0.91 0.76 - 1.27 mg/dL    Sodium 141 136 - 145 mmol/L    Potassium 4.0 3.5 - 5.2 mmol/L    Chloride 106 98 - 107 mmol/L    CO2 25.0 22.0 - 29.0 mmol/L    Calcium 8.5 (L) 8.6 - 10.5 mg/dL    Total Protein 6.5 6.0 - 8.5 g/dL    Albumin 4.1 3.5 - 5.2 g/dL    ALT (SGPT) 8 1 - 41 U/L    AST (SGOT) 10 1 - 40 U/L    Alkaline Phosphatase 84 39 - 117 U/L    Total Bilirubin 0.7 0.0 - 1.2 mg/dL    Globulin 2.4 gm/dL    A/G Ratio 1.7 g/dL    BUN/Creatinine Ratio 27.5 (H) 7.0 - 25.0    Anion Gap 10.0 5.0 - 15.0 mmol/L    eGFR 80.6 >60.0 mL/min/1.73   Protime-INR    Collection Time: 05/24/24  2:23 PM    Specimen: Blood   Result Value Ref Range    Protime 16.7 (H) 11.7 - 14.2 Seconds    INR 1.33 (H) 0.90 - 1.10   aPTT    Collection Time: 05/24/24  2:23 PM    Specimen: Blood   Result Value Ref Range    PTT 32.1 22.7 - 35.4 seconds   TSH    Collection Time: 05/24/24  2:23 PM    Specimen: Blood   Result Value Ref Range    TSH 2.680 0.270 - 4.200 uIU/mL   Magnesium    Collection Time: 05/24/24  2:23 PM    Specimen: Blood   Result Value Ref Range    Magnesium 1.9 1.6 - 2.4 mg/dL   BNP     Collection Time: 05/24/24  2:23 PM    Specimen: Blood   Result Value Ref Range    proBNP 1,193.0 0.0 - 1,800.0 pg/mL   Single High Sensitivity Troponin T    Collection Time: 05/24/24  2:23 PM    Specimen: Blood   Result Value Ref Range    HS Troponin T 32 (H) <22 ng/L   CBC Auto Differential    Collection Time: 05/24/24  2:23 PM    Specimen: Blood   Result Value Ref Range    WBC 7.86 3.40 - 10.80 10*3/mm3    RBC 3.74 (L) 4.14 - 5.80 10*6/mm3    Hemoglobin 12.3 (L) 13.0 - 17.7 g/dL    Hematocrit 36.2 (L) 37.5 - 51.0 %    MCV 96.8 79.0 - 97.0 fL    MCH 32.9 26.6 - 33.0 pg    MCHC 34.0 31.5 - 35.7 g/dL    RDW 13.2 12.3 - 15.4 %    RDW-SD 47.3 37.0 - 54.0 fl    MPV 9.7 6.0 - 12.0 fL    Platelets 153 140 - 450 10*3/mm3    Neutrophil % 81.6 (H) 42.7 - 76.0 %    Lymphocyte % 7.9 (L) 19.6 - 45.3 %    Monocyte % 8.1 5.0 - 12.0 %    Eosinophil % 1.7 0.3 - 6.2 %    Basophil % 0.3 0.0 - 1.5 %    Immature Grans % 0.4 0.0 - 0.5 %    Neutrophils, Absolute 6.42 1.70 - 7.00 10*3/mm3    Lymphocytes, Absolute 0.62 (L) 0.70 - 3.10 10*3/mm3    Monocytes, Absolute 0.64 0.10 - 0.90 10*3/mm3    Eosinophils, Absolute 0.13 0.00 - 0.40 10*3/mm3    Basophils, Absolute 0.02 0.00 - 0.20 10*3/mm3    Immature Grans, Absolute 0.03 0.00 - 0.05 10*3/mm3    nRBC 0.0 0.0 - 0.2 /100 WBC         RADIOLOGY  CT Head Without Contrast    Result Date: 5/24/2024  EMERGENCY CT SCAN OF THE HEAD WITHOUT CONTRAST ON 05/24/2024  CLINICAL HISTORY: Patient had an episode of hypotension and had subjective worsening of chronic left facial droop and some confusion.  TECHNIQUE: Spiral CT images were obtained from the base of the skull to the vertex without intravenous contrast. The images were reformatted and are submitted in 3 mm thick axial, sagittal and coronal CT sections with brain algorithm.  COMPARISON: This is correlated to a prior MRI of the brain from [facility] on 10/09/2023..  FINDINGS: There are patchy areas of low density extending from  the periventricular into the subcortical white matter of the cerebral hemispheres consistent with mild-moderate small vessel disease. On the prior MRI of the brain on 10/09/2023 there were scattered tiny acute infarcts in the right cerebral hemisphere and on the current head CT I am unable to identify chronic infarcts due to their very tiny size. There is mild diffuse cerebral atrophy. The ventricles are normal in size. I see no mass effect and no midline shift and no extra-axial fluid collections are identified and there is no evidence of acute intracranial hemorrhage. The calvarium and the skull base are normal in appearance. Bilateral intraocular lens implants are in place in the globes from previous bilateral cataract surgery. Otherwise the orbits are unremarkable.      1. No acute intracranial abnormality is identified. There is mild-moderate small vessel disease in the cerebral white matter. There is diffuse cerebral atrophy. The remainder of the head CT is within normal limits. The etiology of the worsening left facial droop following a hypotensive episode is not established on this exam. If there remains clinical suspicion of an acute stroke I recommend an MRI of the brain for a more complete assessment.  Radiation dose reduction techniques were utilized, including automated exposure control and exposure modulation based on body size.   This report was finalized on 5/24/2024 4:30 PM by Dr. Triston Bennett M.D on Workstation: [id number]      XR Chest 1 View    Result Date: 5/24/2024  ONE-VIEW PORTABLE CHEST  HISTORY: Generalized weakness. Hypertension.  FINDINGS: The lungs are well expanded and clear except for calcified granuloma in the upper lobe left lung and several calcified hilar lymph nodes. The heart is top normal in size and there is no acute disease or change from 10/9/2023.  This report was finalized on 5/24/2024 2:42 PM by Dr. Feliciano Lange M.D on Workstation: [id number]         MEDICATIONS GIVEN  IN ER  Medications   sodium chloride 0.9 % flush 10 mL (has no administration in time range)         ORDERS PLACED DURING THIS VISIT:  Orders Placed This Encounter   Procedures    XR Chest 1 View    CT Head Without Contrast    Comprehensive Metabolic Panel    Urinalysis With Microscopic If Indicated (No Culture) - Urine, Clean Catch    Protime-INR    aPTT    TSH    Magnesium    BNP    Single High Sensitivity Troponin T    CBC Auto Differential    Cardiology (on-call MD unless specified)    ECG 12 Lead Stroke Evaluation    Insert Peripheral IV    CBC & Differential         OUTPATIENT MEDICATION MANAGEMENT:  Current Facility-Administered Medications Ordered in Epic   Medication Dose Route Frequency Provider Last Rate Last Admin    sodium chloride 0.9 % flush 10 mL  10 mL Intravenous PRN Fern Brand PA-C         Current Outpatient Medications Ordered in Epic   Medication Sig Dispense Refill    amLODIPine (NORVASC) 2.5 MG tablet Take 1 tablet by mouth Daily As Needed (For sytolic of 130 or greater). Indications: High Blood Pressure Disorder 90 tablet 0    apixaban (ELIQUIS) 5 MG tablet tablet Take 1 tablet by mouth 2 (Two) Times a Day. Indications: DVT/PE (active thrombosis), Prevention of Unwanted Clot in Veins 180 tablet 1    aspirin 81 MG chewable tablet Chew 1 tablet Daily.      atorvastatin (LIPITOR) 40 MG tablet Take 1 tablet by mouth Every Night. Indications: High Amount of Fats in the Blood 90 tablet 3    atropine 1 % ophthalmic solution 1 drop 3 (Three) Times a Day As Needed (as needed orally for increased secretions). Orally   Indications: excessive drooling      carbidopa-levodopa (SINEMET)  MG per tablet Take 1 tablet by mouth 3 (Three) Times a Day. Indications: Parkinson's Disease      Cyanocobalamin (Vitamin B 12) 500 MCG tablet Take 2 tablets by mouth Every Morning. Indications: Inadequate Vitamin B12      finasteride (PROSCAR) 5 MG tablet Take 1 tablet by mouth Daily.      levothyroxine  (SYNTHROID, LEVOTHROID) 75 MCG tablet TAKE 1 TABLET BY MOUTH EVERY MORNING. INDICATIONS: UNDERACTIVE THYROID 90 tablet 1    Multiple Vitamins-Minerals (PRESERVISION AREDS) capsule Take 1 tablet by mouth 2 (Two) Times a Day. Indications: supplement      Polyethyl Glycol-Propyl Glycol (SYSTANE OP) Apply 1 dose to eye(s) as directed by provider As Needed.      tamsulosin (FLOMAX) 0.4 MG capsule 24 hr capsule Take 1 capsule by mouth.      valsartan (DIOVAN) 160 MG tablet TAKE 1 TABLET BY MOUTH  DAILY (Patient taking differently: Take 1 tablet by mouth Daily.) 90 tablet 3    Vibegron (Gemtesa) 75 MG tablet Take  by mouth.               PROGRESS, DATA ANALYSIS, CONSULTS, AND MEDICAL DECISION MAKING  All labs have been independently interpreted by me.  All radiology studies have been reviewed by me. All EKG's have been independently viewed and interpreted by me.  Discussion below represents my analysis of pertinent findings related to patient's condition, differential diagnosis, treatment plan and final disposition.    Patient presentation and evaluation most consistent with orthostatic hypotension.  I feel that his symptoms are likely related to Parkinson's dysautonomia.  Cardiology agrees.  The plan according to cardiology will be to adjust his Norvasc to a as needed dose once daily as needed for greater than 130 systolic pressure.  Patient and family agreeable with this plan and all questions answered.  Close return precautions given at this time.    DIFFERENTIAL DIAGNOSIS INCLUDE BUT NOT LIMITED TO:     Labile hypertension, Parkinson's dysautonomia, LISY, orthostasis    Clinical Scores: N/A      ED Course as of 05/24/24 1650   Fri May 24, 2024   1404 First look: Patient presents to the emergency department via EMS for episode of hypotension noticed by wife.  Patient reportedly got up from armchair at 1215 this afternoon and complained of weakness and lightheadedness.  Wife checked his blood pressure and it was 66/40.   She also thought his chronic left-sided facial droop was more prominent than normal.  EMS was called to the house and patient did not have any acute neurologic deficits.  Patient did undergo a Mohs procedure to the right side of his forehead yesterday.  Reports he has been eating and drinking normally.  Was not started on any narcotic pain medication.    On my exam, patient does have a large bandage over the right side of his forehead.  He has very mild left-sided facial droop which is residual according to him.  I will initiate workup with labs, CT head. [MP]   1542 CT head interpreted myself:  No hemorrhage or midline shift [FS]   1647 I discussed the case with MD Latosha with Cardiology at this time regarding the patient.  I discussed work-up, results, concerns.  I discussed the consulting provider's desire for changing the medication regimen involving Norvasc.  He recommends only taking the Norvasc in the morning if the blood pressure is 130 systolic or higher.  Also recommends following up in the office next week with Dr. Armas. [DC]      ED Course User Index  [DC] Andres Chino PA  [FS] Chris Ward MD  [MP] Fern Brand, PA-C         5538 I rechecked the patient.  I discussed the patient's labs, radiology findings (including all incidental findings), diagnosis, and plan for discharge.  A repeat exam reveals no new worrisome changes from my initial exam findings.  The patient understands that the fact that they are being discharged does not denote that nothing is abnormal, it indicates that no clinical emergency is present and that they must follow-up as directed in order to properly maintain their health.  Follow-up instructions (specifically listed below) and return to ER precautions were given at this time.  I specifically instructed the patient to follow-up with their PCP.  The patient understands and agrees with the plan, and is ready for discharge.  All questions answered.        AS OF  16:50 EDT VITALS:    BP - 177/84  HR - 54  TEMP - 97.5 °F (36.4 °C)  O2 SATS - 100%    COMPLEXITY OF CARE  Admission was considered but after careful review of the patient's presentation, physical examination, diagnostic results, and response to treatment the patient may be safely discharged with outpatient follow-up.      DIAGNOSIS  Final diagnoses:   Orthostasis   Labile hypertension   Parkinson's disease, unspecified whether dyskinesia present, unspecified whether manifestations fluctuate         DISPOSITION  ED Disposition       ED Disposition   Discharge    Condition   Stable    Comment   --                Please note that portions of this document were completed with a voice recognition program.    Note Disclaimer: At [facility], we believe that sharing information builds trust and better relationships. You are receiving this note because you recently visited [facility]. It is possible you will see health information before a provider has talked with you about it. This kind of information can be easy to misunderstand. To help you fully understand what it means for your health, we urge you to discuss this note with your provider.         Andres Chino PA  05/24/24 1878

## 2024-05-25 LAB
QT INTERVAL: 463 MS
QTC INTERVAL: 443 MS

## 2024-05-28 ENCOUNTER — OFFICE VISIT (OUTPATIENT)
Dept: CARDIOLOGY | Facility: CLINIC | Age: 89
End: 2024-05-28
Payer: MEDICARE

## 2024-05-28 ENCOUNTER — OFFICE VISIT (OUTPATIENT)
Dept: NEUROLOGY | Facility: CLINIC | Age: 89
End: 2024-05-28
Payer: MEDICARE

## 2024-05-28 VITALS
OXYGEN SATURATION: 98 % | RESPIRATION RATE: 18 BRPM | DIASTOLIC BLOOD PRESSURE: 50 MMHG | HEART RATE: 64 BPM | HEIGHT: 71 IN | SYSTOLIC BLOOD PRESSURE: 106 MMHG | BODY MASS INDEX: 21 KG/M2 | WEIGHT: 150 LBS

## 2024-05-28 VITALS
WEIGHT: 149.9 LBS | DIASTOLIC BLOOD PRESSURE: 60 MMHG | HEART RATE: 64 BPM | BODY MASS INDEX: 20.98 KG/M2 | SYSTOLIC BLOOD PRESSURE: 110 MMHG | HEIGHT: 71 IN

## 2024-05-28 DIAGNOSIS — E78.2 MIXED HYPERLIPIDEMIA: ICD-10-CM

## 2024-05-28 DIAGNOSIS — I50.32 CHRONIC DIASTOLIC CONGESTIVE HEART FAILURE: ICD-10-CM

## 2024-05-28 DIAGNOSIS — I95.9 HYPOTENSION, UNSPECIFIED HYPOTENSION TYPE: ICD-10-CM

## 2024-05-28 DIAGNOSIS — I10 BENIGN ESSENTIAL HYPERTENSION: Chronic | ICD-10-CM

## 2024-05-28 DIAGNOSIS — Z86.79 HISTORY OF CAROTID ARTERY STENOSIS: ICD-10-CM

## 2024-05-28 DIAGNOSIS — I11.0 HYPERTENSIVE HEART DISEASE WITH HEART FAILURE: ICD-10-CM

## 2024-05-28 DIAGNOSIS — I10 BENIGN ESSENTIAL HYPERTENSION: ICD-10-CM

## 2024-05-28 DIAGNOSIS — I67.9 CEREBROVASCULAR DISEASE: Primary | ICD-10-CM

## 2024-05-28 DIAGNOSIS — I25.10 CORONARY ARTERY DISEASE INVOLVING NATIVE CORONARY ARTERY OF NATIVE HEART WITHOUT ANGINA PECTORIS: Primary | ICD-10-CM

## 2024-05-28 DIAGNOSIS — I73.9 PAD (PERIPHERAL ARTERY DISEASE): ICD-10-CM

## 2024-05-28 DIAGNOSIS — Z86.73 HISTORY OF RIGHT ICA STROKE: ICD-10-CM

## 2024-05-28 DIAGNOSIS — R41.89 COGNITIVE CHANGE: ICD-10-CM

## 2024-05-28 PROCEDURE — 1160F RVW MEDS BY RX/DR IN RCRD: CPT | Performed by: PHYSICIAN ASSISTANT

## 2024-05-28 PROCEDURE — 99215 OFFICE O/P EST HI 40 MIN: CPT

## 2024-05-28 PROCEDURE — 1160F RVW MEDS BY RX/DR IN RCRD: CPT

## 2024-05-28 PROCEDURE — 1159F MED LIST DOCD IN RCRD: CPT | Performed by: PHYSICIAN ASSISTANT

## 2024-05-28 PROCEDURE — 99214 OFFICE O/P EST MOD 30 MIN: CPT | Performed by: PHYSICIAN ASSISTANT

## 2024-05-28 PROCEDURE — 1159F MED LIST DOCD IN RCRD: CPT

## 2024-05-28 NOTE — PROGRESS NOTES
DOS: 2024  NAME: Casey Snowden Sr.   : 1934  PCP: Aleksander Diez MD    Chief Complaint   Patient presents with    History of right ICA stroke      SUBJECTIVE  Neurological Problem:  89 y.o. RHW male with a past medical history of Parkinson's disease (follows Dr. Goodwin with 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 They are seen in follow up today for h/o stroke. A summary of the history was taken from the previous note with additions/modifications as indicated. He is accompanied by his spouse, Evonne.    Interval History:   **For detailed interval history see progress note dated 2023.    Mr. Snowden has a history of bilateral ICA stenosis status post right ICA stent with Dr. Cadena in 2023.  He has been prescribed Eliquis 5 mg twice daily, aspirin 81 mg daily and atorvastatin 40 mg daily.  He also has a diagnosis of Parkinson's disease for which he follows at [facility] with Dr. Goodwin.  He was last seen by myself in 2023.    He presents today in follow-up and reports no further stroke or TIA-like symptoms.  He continues on Eliquis 5 mg twice daily, aspirin 81 mg daily and atorvastatin 40 mg daily and is tolerating them well.  He did have a recent MOHS procedure with dermatology for the removal of squamous cell carcinoma of the scalp and did experience some bleeding post-procedure however he nor his spouse have noticed any blood in the urine or stool.  The biggest concern today is that the patient was seen at [facility] ER on May 24th for an episode of acute fatigue and weakness, blood pressure found to be 66/40 at the time.  The patient's spouse has been  regularly monitoring blood pressure and the patient has had multiple episodes of systolic blood pressure less than 100, some as low as 70s.  He does not drink much water throughout the day.  He does keep busy with his home garden and enjoys reading [employer] in the [employer].  He has not been driving since his stroke last year.  He did experience one fall a few weeks ago in the yard.  At his last visit we discussed the development of cognitive changes and neuropsych evaluation was offered however the patient and his wife wanted to consider it.  Today his spouse says she sees a definitive cognitive decline but is not interested in neuropsych testing or medications as she does not feel it would change his outcome. Review of recent labs lipid panel total 133, TGD 49, HDL 62, LDL 60; TSH 2.680.     Review of Systems   Musculoskeletal:  Positive for gait problem.   Neurological:  Positive for dizziness, tremors, speech difficulty and headaches. Negative for seizures, syncope, weakness, light-headedness and numbness.   Psychiatric/Behavioral:  Positive for confusion. Negative for agitation, behavioral problems, decreased concentration, dysphoric mood, hallucinations, self-injury, sleep disturbance and suicidal ideas. The patient is not nervous/anxious and is not hyperactive.         The following portions of the patient's history were reviewed and updated as appropriate: allergies, current medications, past family history, past medical history, past social history, past surgical history and problem list.    Current Medications:   Current Outpatient Medications:     amLODIPine (NORVASC) 2.5 MG tablet, Take 1 tablet by mouth Daily As Needed (For sytolic of 130 or greater). Indications: High Blood Pressure Disorder, Disp: 90 tablet, Rfl: 0    apixaban (ELIQUIS) 5 MG tablet tablet, Take 1 tablet by mouth 2 (Two) Times a Day. Indications: DVT/PE (active thrombosis), Prevention of Unwanted Clot in Veins, Disp:  "180 tablet, Rfl: 1    aspirin 81 MG chewable tablet, Chew 1 tablet Daily., Disp: , Rfl:     atorvastatin (LIPITOR) 40 MG tablet, Take 1 tablet by mouth Every Night. Indications: High Amount of Fats in the Blood, Disp: 90 tablet, Rfl: 3    atropine 1 % ophthalmic solution, 1 drop 3 (Three) Times a Day As Needed (as needed orally for increased secretions). Orally   Indications: excessive drooling, Disp: , Rfl:     carbidopa-levodopa (SINEMET)  MG per tablet, Take 1 tablet by mouth 3 (Three) Times a Day. Indications: Parkinson's Disease, Disp: , Rfl:     Cyanocobalamin (Vitamin B 12) 500 MCG tablet, Take 2 tablets by mouth Every Morning. Indications: Inadequate Vitamin B12, Disp: , Rfl:     finasteride (PROSCAR) 5 MG tablet, Take 1 tablet by mouth Daily., Disp: , Rfl:     levothyroxine (SYNTHROID, LEVOTHROID) 75 MCG tablet, TAKE 1 TABLET BY MOUTH EVERY MORNING. INDICATIONS: UNDERACTIVE THYROID, Disp: 90 tablet, Rfl: 1    Multiple Vitamins-Minerals (PRESERVISION AREDS) capsule, Take 1 tablet by mouth 2 (Two) Times a Day. Indications: supplement, Disp: , Rfl:     Polyethyl Glycol-Propyl Glycol (SYSTANE OP), Apply 1 dose to eye(s) as directed by provider As Needed., Disp: , Rfl:     tamsulosin (FLOMAX) 0.4 MG capsule 24 hr capsule, Take 1 capsule by mouth., Disp: , Rfl:     valsartan (DIOVAN) 160 MG tablet, TAKE 1 TABLET BY MOUTH  DAILY (Patient taking differently: Take 1 tablet by mouth Daily.), Disp: 90 tablet, Rfl: 3    Vibegron (Gemtesa) 75 MG tablet, Take  by mouth., Disp: , Rfl:   **I did not stop or change the above medications.  Patient's medication list was updated to reflect medications they have reported as currently taking, including medication changes made by other providers.    Objective   Vital Signs:  /50   Pulse 64   Resp 18   Ht 180.3 cm (70.98\")   Wt 68 kg (150 lb)   SpO2 98%   BMI 20.93 kg/m²   Body mass index is 20.93 kg/m².    Physical Exam   Physical Exam:  GENERAL: NAD, " alert  HEENT: Normocephalic, atraumatic   Resp: Even and unlabored  Extremities: No edema  Skin: Warm and dry  Psychiatric: Normal mood and affect    Neurological:   MS: AO to self and person, place, time; recent/remote memory impaired, impaired attention/concentration, language intact, no neglect.  CN: visual acuity grossly normal, PERRL, EOMI, mild left facial droop (residual), no dysarthria, hearing symmetric, tongue midline, bilateral shoulder shrug symmetric  Motor: Normal tone.  Subtle resting tremor of right upper extremity. No abnormal movements noted. No asterixis.   Trapezius elevation: 5/5  Shoulder abductors 5/5  Elbow flexors 5/5  Elbow extensors 5/5   Left  2+  Right  2+  Hip flexors 5/5  Knee extensors 5/5  Hamstring strength 5/5  Dorsiflexors 5/5  Plantar flexors 5/5  Sensory: Intact to light touch in all four ext.  Coordination: No dysmetria finger to nose   Rapid alternating movements: Abnormal finger to thumb tap  Gait and station: Slow shuffled gait, reduced arm swing, multistep turn  Result Review :  The following data was reviewed by: RIDDHI Louis on 05/28/2024:  Laboratory Results:         Lab Results   Component Value Date    WBC 7.86 05/24/2024    HGB 12.3 (L) 05/24/2024    HCT 36.2 (L) 05/24/2024    MCV 96.8 05/24/2024     05/24/2024     Lab Results   Component Value Date    GLUCOSE 66 05/24/2024    BUN 25 (H) 05/24/2024    CREATININE 0.91 05/24/2024    EGFRIFNONA 99 06/05/2019    EGFRIFAFRI >60 12/27/2022    BCR 27.5 (H) 05/24/2024    K 4.0 05/24/2024    CO2 25.0 05/24/2024    CALCIUM 8.5 (L) 05/24/2024    PROTENTOTREF 6.2 10/03/2023    ALBUMIN 4.1 05/24/2024    LABIL2 2.3 10/03/2023    AST 10 05/24/2024    ALT 8 05/24/2024     Lab Results   Component Value Date    HGBA1C 5.70 (H) 10/10/2023     Lab Results   Component Value Date    CHOL 138 10/10/2023    CHOL 111 02/07/2023    CHOL 131 06/05/2019     Lab Results   Component Value Date    HDL 62 (H) 05/21/2024     "HDL 56 10/10/2023    HDL 57 02/07/2023     Lab Results   Component Value Date    LDL 60 05/21/2024    LDL 71 10/10/2023    LDL 44 02/07/2023     Lab Results   Component Value Date    TRIG 49 05/21/2024    TRIG 52 10/10/2023    TRIG 36 02/07/2023     No results found for: \"RPR\"  Lab Results   Component Value Date    TSH 2.680 05/24/2024     Lab Results   Component Value Date    [id number] 652 10/03/2023       Data reviewed : Radiologic studies   and Recent hospitalization notes           Assessment and Plan   Diagnoses and all orders for this visit:    1. Cerebrovascular disease (Primary)    2. History of right ICA stroke    3. History of carotid artery stenosis    4. Benign essential hypertension    5. Mixed hyperlipidemia    6. Hypotension, unspecified hypotension type    7. Cognitive change      Cerebrovascular disease, h/o right ICA stroke d/t ZEE stenosis s/p stent - continue Aspirin 81 mg daily for secondary prevention, Eliquis 5 mg BID  HTN: 106/50 today. Continue to monitor at home, stay well hydrated with water and avoid hypotension  HLD: LDL 60, continue atorvastatin 40 mg daily. Recommend continued surveillance with PCP  Encouraged regular physical activity, healthy diet (e.g. Mediterranean).   Secondary stroke prevention: Ideal targets for stroke prevention would be Blood pressure < 130/80; B12 > 500, TSH in normal range, LDL < 70, HbA1c < 6.5. Call 911 for stroke symptoms.    2. Hypotension - multi-factorial, autonomic r/t Parkinson's, medication induced, hypovolemia - recommend discussing with Cardiology. Encouraged to increase water intake.     3. Cognitive change - Patient and spouse defer further evaluation or treatment.     We will see Casey back in 12 months, sooner if symptoms warrant.     RIDDHI Louis  [facility]   05/28/24       I spent 40 minutes caring for Casey on this date of service. This time includes time spent by me in the following activities:preparing for the visit, " reviewing tests, obtaining and/or reviewing a separately obtained history, performing a medically appropriate examination and/or evaluation , counseling and educating the patient/family/caregiver, referring and communicating with other health care professionals , documenting information in the medical record, independently interpreting results and communicating that information with the patient/family/caregiver, and care coordination  Follow Up   Return in about 1 year (around 5/28/2025).  Patient was given instructions and counseling regarding his condition or for health maintenance advice. Please see specific information pulled into the AVS if appropriate.       Dictated using Dragon Dictation

## 2024-05-28 NOTE — PROGRESS NOTES
CARDIOLOGY        Patient Name: Casey Snowden Sr.  :1934  Age: 89 y.o.  Primary Cardiologist: Cecilio Armas MD  Encounter Provider:  Delroy Malcolm PA-C    Date of Service: 24            CHIEF COMPLAINT / REASON FOR OFFICE VISIT     ER follow-up    HISTORY OF PRESENT ILLNESS       HPI  Casey Snowden Sr. is a 89 y.o. male who presents today for ER follow-up.     Pt has a  history significant for Parkinson's, paroxysmal atrial fibrillation, chronic diastolic heart failure, hypertension, PAD, DVT, and stroke presents for ER follow-up.  Patient presents to the ER on 2024 for hypotension.  Patient got up from his chair and was feeling fatigue and generalized weak and he checked his blood pressure and 66/40.  He has symptoms over a few minutes and called EMS and sent to the ER.  His blood pressure in the ER was 137/84.  Patient workup including CT of his head that not show anything acute.  They discussed case with Dr. Reina (On call).  Patient was to take his Norvasc only in the morning.  Patient was to follow-up in the office.  Patient did see his neurologist this morning as well.    Patient had a another episode day while outside and again yesterday while outside.  Both episodes were he had low blood pressure.  Patient has not been drinking much water.  Patient has been outside during to these recent events.  Patient denies any associated symptoms with the lightheadedness and generalized fatigue.  No chest pain, palpitations, shortness of breath, new leg edema, orthopnea.  Patient has not had any issues with bleeding.      Below is a brief summary of patient's pertinent cardiac history  He was previously hospitalized with a CVA due to severe RCA stenosis.  He follows consistently with vascular surgery, he had a percutaneous revascularization procedure performed.      Initially he was discharged on apixaban 2.5 mg twice daily along with Brilinta, Brilinta was stopped after several  days.  They say he just saw vascular surgery who wanted to increase him to 5 mg twice daily on apixaban but wanted our okay before doing so.  Patient previously on apixaban 2.5 mg because of DVT.     Also noted he had some problems with hypertension, he was discharged on amlodipine 5 mg daily.     He was previously hospitalized with mental status changes.  He also had swelling and drainage from his right lower extremity.  He has a history of a DVT on that side and follows with vascular surgery, we had them call vascular surgery and they recommended compliance with his compression stockings.     He has a history of metastatic squamous cell carcinoma.  He has undergone radiation therapy to his neck as well as chemotherapy.      He presented to [facility] on [facility]/19 with complaints of light headed and dizziness as well as left sided chest pain as he was working in the yard. He was found to be in atrial fibrillation with RVR, with worse than baseline anterior ST changes. He was rate controlled over night. He was also noted to have pneumonia and was started on IV levaquin. Patient was seen by Dr. Perez on the morning of 6/4. He was noted to jave troponin elevation and was transferred to [facility] for cardiac catheterization. The LAD was totally occluded and was treated with PCI and drug eluting stent placement to the LAD (3.0 x 28 mm Xience Lory drug eluting stent). Echocardiogram showed EF 40% with an apical wall motion abnormalities. He has been on carvedilol on the past, this was stopped due to bradycardia. He received metoprolol and tolerated well with HR in the 50's. At discharge this was changed to metoprolol succinate given cardiomyopathy. He is also on losartan.      Cath 6/2019  Hemodynamics:   LV: 150/8  AO: 150/64        PCI CORONARY SEGMENT: Mid LAD  PRE-STENOSIS: 100  POST-STENOSIS: 0%  LESION TYPE: c  EREN FLOW PRE/POST: 1/3  CULPRIT LESION: Yes     Estimated blood loss:  "Minimal  Complications: None     Conclusions:   1. Left main: Normal  2. LAD: Occluded mid segment.  EREN I flow  3. LCX:Normal  4. RCA: 30 to 40% mid vessel stenosis  5.  Normal left ventricular size.  Moderately reduced systolic function.  Ejection fraction 35%.  Mid to distal anterolateral akinesis.  Apical dyskinesis  6.  PCI of the mid LAD with a 3.0 x 28 mm Xience Lory drug-eluting stent, postdilated to high pressure with a 3.25 mm noncompliant trek balloon     Echo 6/2019  Interpretation Summary      The left ventricular cavity is mildly dilated.  Left ventricular wall thickness is consistent with mild concentric hypertrophy.  There is mild calcification of the aortic valve.  Estimated EF = 40%.  The following left ventricular wall segments are akinetic: apical anterior, apical lateral, apical inferior, apical septal and apex.  Left ventricular diastolic dysfunction (grade II) consistent with pseudonormalization.  Left atrial cavity size is moderately dilated.  Mild tricuspid valve regurgitation is present.  Calculated right ventricular systolic pressure from tricuspid regurgitation is 41 mmHg.  Mild pulmonic valve regurgitation is present.            He has a history of paroxysmal atrial fibrillation and has previously followed with Dr. Mercado.  He has been maintained on chronic anticoagulation.  He apparently never required rate control medicine due to baseline bradycardia.       The following portions of the patient's history were reviewed and updated as appropriate: allergies, current medications, past family history, past medical history, past social history, past surgical history and problem list.      VITAL SIGNS     Visit Vitals  /60   Pulse 64   Ht 180.3 cm (70.98\")   Wt 68 kg (149 lb 14.4 oz)   BMI 20.92 kg/m²       @[email or web address]  Wt Readings from Last 3 Encounters:   05/28/24 68 kg (149 lb 14.4 oz)   05/28/24 68 kg (150 lb)   05/24/24 79.4 kg (175 lb)     Body mass index is " 20.92 kg/m².        PHYSICAL EXAMINATION     Constitutional:       General: Awake. Not in acute distress.     Appearance: Not in distress. Chronically ill-appearing.   Pulmonary:      Effort: Pulmonary effort is normal.      Breath sounds: Normal breath sounds.   Cardiovascular:      Normal rate. Regular rhythm.      Murmurs: There is a systolic murmur.   Edema:     Ankle: 1+ edema of the left ankle and trace edema of the right ankle.  Skin:     General: Skin is warm.   Neurological:      Mental Status: Alert.   Psychiatric:         Behavior: Behavior is cooperative.           REVIEWED DATA     Procedures    Cardiac Procedures:    Carotid duplex ultrasound on 11/16/2023  Interpretation Summary         Widely patent right carotid stent without residual or recurrent stenosis. No external carotid stenosis.    Left internal carotid artery demonstrates a less than 50% stenosis.    Transthoracic echo on 10/11/2023  Interpretation Summary       Left ventricular systolic function is mildly decreased. Calculated left ventricular EF = 44.7%    Left ventricular wall thickness is consistent with mild to moderate concentric hypertrophy.    The following left ventricular wall segments are hypokinetic: apical anterior and mid anteroseptal. The following left ventricular wall segments are akinetic: apical inferior, apical septal and apex.    Left ventricular diastolic function is consistent with (grade Ia w/high LAP) impaired relaxation.    There is calcification of the aortic valve.    Estimated right ventricular systolic pressure from tricuspid regurgitation is normal (<35 mmHg).         Lipid Panel          10/10/2023    04:44 5/21/2024    10:42   Lipid Panel   Total Cholesterol 138     Total Cholesterol  133    Triglycerides 52  49    HDL Cholesterol 56  62    VLDL Cholesterol 11  11    LDL Cholesterol  71  60    LDL/HDL Ratio 1.28         Lab Results   Component Value Date     05/24/2024     05/21/2024    K 4.0  05/24/2024    K 4.5 05/21/2024     05/24/2024     (H) 05/21/2024    CO2 25.0 05/24/2024    CO2 24.0 05/21/2024    BUN 25 (H) 05/24/2024    BUN 24 (H) 05/21/2024    CREATININE 0.91 05/24/2024    CREATININE 0.85 05/21/2024    EGFRIFNONA 99 06/05/2019    EGFRIFNONA 90 06/04/2019    EGFRIFAFRI >60 12/27/2022    EGFRIFAFRI >60 12/21/2022    GLUCOSE 66 05/24/2024    GLUCOSE 126 (H) 05/21/2024    CALCIUM 8.5 (L) 05/24/2024    CALCIUM 9.0 05/21/2024    PROTENTOTREF 6.2 10/03/2023    PROTENTOTREF 6.1 04/25/2017    ALBUMIN 4.1 05/24/2024    ALBUMIN 4.2 10/09/2023    BILITOT 0.7 05/24/2024    BILITOT 0.5 10/09/2023    AST 10 05/24/2024    AST 12 10/09/2023    ALT 8 05/24/2024    ALT 10 05/21/2024     Lab Results   Component Value Date    WBC 7.86 05/24/2024    WBC 4.06 05/21/2024    HGB 12.3 (L) 05/24/2024    HGB 12.2 (L) 05/21/2024    HCT 36.2 (L) 05/24/2024    HCT 36.4 (L) 05/21/2024    MCV 96.8 05/24/2024    MCV 96.8 05/21/2024     05/24/2024     05/21/2024     Lab Results   Component Value Date    PROBNP 1,193.0 05/24/2024    PROBNP 2,811.0 (H) 10/23/2022     Lab Results   Component Value Date    CKTOTAL 45 10/23/2022    TROPONINT 32 (H) 05/24/2024     Lab Results   Component Value Date    TSH 2.680 05/24/2024    TSH 2.980 05/21/2024             ASSESSMENT & PLAN     Diagnoses and all orders for this visit:    1. Atrial Fibrillation and Atrial Flutter  Assessment   The patient has paroxysmal atrial fibrillation   The patient's CHADS2-VASc score is 3   A [id number] score of 2 or more is considered a high risk for a thromboembolic event   Apixaban prescribed     Plan   Attempt to maintain sinus rhythm   Continue apixaban for antithrombotic therapy, bleeding issues discussed   Sinus bradycardia no rate control meds     2. Heart Failure  Assessment   Beta blocker not prescribed for patient reasons   Diuretics prescribed   Angiotensin receptor blocker (ARB) prescribed     Plan   The patient has  received heart failure education on the following topics: medication instructions, symptom management and weight monitoring   The heart failure care plan was discussed with the patient today including: continuing the current program     Subjective/Objective      Physical exam findings negative for peripheral edema and S3 gallop.     3.  History of chronic DVT; Patient remains on chronic anticoagulation and follows with Dr. Dorado. Patient has an IVC filter in place    4.  Coronary Artery Disease  Assessment   The patient has no angina     Plan   Lifestyle modifications discussed include adhering to a heart healthy diet, avoidance of tobacco products, maintenance of a healthy weight, medication compliance, regular exercise and regular monitoring of cholesterol and blood pressure   Toprol has been discontinued in the past because of bradycardia     Subjective - Objective   There is a history of past MI    There has been a previous stent procedure using BILL    Current antiplatelet therapy includes aspirin 81 mg        5.  Essential hypertension- he also has a history of orthostatic hypotension with his Parkinson's, blood pressure has not been jumping up on the amlodipine but sometimes they get low values particular midmorning.  Patient has not been taking his Norvasc for the past 4 days and has had low blood pressure readings.  Patient will hold his Norvasc for now    6.  At this point patient remains on the combination of aspirin and Eliquis.  He has a class I indication for antiplatelet therapy given his prior drug-eluting stent and underlying coronary disease.  He has a class I indication for chronic anticoagulation with his history of DVT as well as paroxysmal atrial fibrillation.  He had been on clopidogrel and this was switched to aspirin by neurology.    7.  Ischemic cardiomyopathy-continue current medical therapy.  As noted above he is not on beta-blockade because of sinus bradycardia.  No heart failure  symptoms.    8. Hypotension - multi-factorial, autonomic r/t Parkinson's, medication induced, hypovolemia      9. Cognitive change - Patient and spouse all neurology today and they defer further evaluation or treatment.       Patient to slowly get up from sitting position.  Patient to increase his fluid intake and decrease his time exposed to the heat.  Patient will hold his Norvasc and will check his blood pressure twice daily for the next 2 weeks.  Patient will come to the office for a blood pressure check and a BP cuff check.  I will have patient follow-up in 1 month with me.    Return for Dr. Armas, Next scheduled follow up.    Future Appointments         Provider Department Center    5/29/2024 10:00 AM Aleksander Diez MD [facility] PRIMARY CARE [place]    6/11/2024 2:30 PM [facility] NURSE/MA [facility] CARDIOLOGY [facility]    7/2/2024 9:00 AM [facility] OP VAS RM 2 [facility] NON INVASIVE VASCULAR LABORATORY [place]    7/2/2024 9:30 AM [facility] OP VAS RM 2 [facility] NON INVASIVE VASCULAR LABORATORY [place] [place]    7/2/2024 9:45 AM Elizabeth Landin APRN [facility] VASCULAR SURGERY [place]    7/3/2024 11:00 AM Delroy Malcolm PA-C [facility] CARDIOLOGY [facility]    10/28/2024 10:30 AM [facility] US [facility] VASC CART 4 [facility] NON-INV VASC [place]    11/6/2024 1:30 PM Cecilio Armas III, MD [facility] CARDIOLOGY [facility]    11/13/2024 10:30 AM Vinod Cadena MD [facility] NEUROSURGERY [place]                MEDICATIONS         Discharge Medications            Accurate as of May 28, 2024  2:50 PM. If you have any questions, ask your nurse or doctor.                Continue These Medications        Instructions Start Date   amLODIPine 2.5 MG tablet  Commonly known as: NORVASC   2.5 mg, Oral, Daily PRN      apixaban 5 MG tablet tablet  Commonly known as: ELIQUIS   5 mg, Oral, 2 Times Daily      aspirin 81 MG chewable  tablet   81 mg, Oral, Daily      atorvastatin 40 MG tablet  Commonly known as: LIPITOR   40 mg, Oral, Nightly      atropine 1 % ophthalmic solution   1 drop, 3 Times Daily PRN, Orally       carbidopa-levodopa  MG per tablet  Commonly known as: SINEMET   1 tablet, Oral, 3 Times Daily      finasteride 5 MG tablet  Commonly known as: PROSCAR   5 mg, Oral, Daily      Gemtesa 75 MG tablet  Generic drug: Vibegron   Oral      levothyroxine 75 MCG tablet  Commonly known as: SYNTHROID, LEVOTHROID   75 mcg, Oral, Every Early Morning      PreserVision AREDS capsule   1 tablet, Oral, 2 Times Daily      SYSTANE OP   1 dose, Ophthalmic, As Needed      tamsulosin 0.4 MG capsule 24 hr capsule  Commonly known as: FLOMAX   1 capsule, Oral      valsartan 160 MG tablet  Commonly known as: DIOVAN   TAKE 1 TABLET BY MOUTH  DAILY      Vitamin B 12 500 MCG tablet   2 tablets, Oral, Every Early Morning                   **Dragon Disclaimer:   Much of this encounter note is an electronic transcription/translation of spoken language to printed text. The electronic translation of spoken language may permit erroneous, or at times, nonsensical words or phrases to be inadvertently transcribed. Although I have reviewed the note for such errors, some may still exist.

## 2024-05-29 ENCOUNTER — OFFICE VISIT (OUTPATIENT)
Dept: FAMILY MEDICINE CLINIC | Facility: CLINIC | Age: 89
End: 2024-05-29
Payer: MEDICARE

## 2024-05-29 VITALS
DIASTOLIC BLOOD PRESSURE: 64 MMHG | SYSTOLIC BLOOD PRESSURE: 108 MMHG | BODY MASS INDEX: 21.28 KG/M2 | WEIGHT: 152 LBS | HEIGHT: 71 IN | TEMPERATURE: 97.1 F | HEART RATE: 60 BPM

## 2024-05-29 DIAGNOSIS — I77.9 PERIPHERAL ARTERIAL OCCLUSIVE DISEASE: ICD-10-CM

## 2024-05-29 DIAGNOSIS — E03.9 ACQUIRED HYPOTHYROIDISM: ICD-10-CM

## 2024-05-29 DIAGNOSIS — G90.3 ORTHOSTATIC HYPOTENSION DUE TO PARKINSON'S DISEASE: ICD-10-CM

## 2024-05-29 DIAGNOSIS — Z00.00 MEDICARE ANNUAL WELLNESS VISIT, SUBSEQUENT: Primary | ICD-10-CM

## 2024-05-29 PROCEDURE — 1170F FXNL STATUS ASSESSED: CPT | Performed by: FAMILY MEDICINE

## 2024-05-29 PROCEDURE — 1126F AMNT PAIN NOTED NONE PRSNT: CPT | Performed by: FAMILY MEDICINE

## 2024-05-29 PROCEDURE — 1160F RVW MEDS BY RX/DR IN RCRD: CPT | Performed by: FAMILY MEDICINE

## 2024-05-29 PROCEDURE — G0439 PPPS, SUBSEQ VISIT: HCPCS | Performed by: FAMILY MEDICINE

## 2024-05-29 PROCEDURE — 1159F MED LIST DOCD IN RCRD: CPT | Performed by: FAMILY MEDICINE

## 2024-05-29 RX ORDER — LEVOTHYROXINE SODIUM 0.07 MG/1
75 TABLET ORAL
Qty: 90 TABLET | Refills: 1 | OUTPATIENT
Start: 2024-05-29

## 2024-05-29 RX ORDER — LEVOTHYROXINE SODIUM 0.07 MG/1
75 TABLET ORAL
Qty: 90 TABLET | Refills: 1 | Status: SHIPPED | OUTPATIENT
Start: 2024-05-29

## 2024-05-29 NOTE — PROGRESS NOTES
The ABCs of the Annual Wellness Visit  Subsequent Medicare Wellness Visit    Subjective      Casey Snowden Sr. is a 89 y.o. male who presents for a Subsequent Medicare Wellness Visit.    The following portions of the patient's history were reviewed and   updated as appropriate: allergies, current medications, past medical history, past social history, past surgical history, and problem list.    Compared to one year ago, the patient feels his physical   health is worse.    Compared to one year ago, the patient feels his mental   health is worse.    Recent Hospitalizations:  This patient has had a [facility] admission record on file within the last 365 days.    Current Medical Providers:  Patient Care Team:  Aleksander Diez MD as PCP - General  Marck, Joseph Youngblood MD as Consulting Physician (Ophthalmology)  Ford Dorado MD as Consulting Physician (Vascular Surgery)  Cecilio Armas III, MD as Consulting Physician (Cardiology)  Kashif Goodwin MD as Consulting Physician (Neurology)  Enoch Lares MD (Hematology)  Kayden Chance MD (Dermatology)  Vinod Cadena MD as Consulting Physician (Neurosurgery)  Johnny Castellanos MD as Consulting Physician (Urology)  Inge Pedro, PT as Physical Therapist (Physical Therapy)  Lucrecia Mccloud, PT as Physical Therapist (Physical Therapy)  Nereyda Toledo, OT as Occupational Therapist (Occupational Therapy)  Junior Taylor MD as Surgeon (Orthopedic Surgery)  Casey Power MD as Surgeon (Wound Care)  Nahomi Jacobo APRN as Nurse Practitioner (Nurse Practitioner)    Outpatient Medications Prior to Visit   Medication Sig Dispense Refill    aspirin 81 MG chewable tablet Chew 1 tablet Daily.      atorvastatin (LIPITOR) 40 MG tablet Take 1 tablet by mouth Every Night. Indications: High Amount of Fats in the Blood 90 tablet 3    atropine 1 % ophthalmic solution 1 drop 3 (Three) Times a Day As Needed (as needed orally for increased  secretions). Orally   Indications: excessive drooling      carbidopa-levodopa (SINEMET)  MG per tablet Take 1 tablet by mouth 3 (Three) Times a Day. Indications: Parkinson's Disease      Cyanocobalamin (Vitamin B 12) 500 MCG tablet Take 2 tablets by mouth Every Morning. Indications: Inadequate Vitamin B12      finasteride (PROSCAR) 5 MG tablet Take 1 tablet by mouth Daily.      Multiple Vitamins-Minerals (PRESERVISION AREDS) capsule Take 1 tablet by mouth 2 (Two) Times a Day. Indications: supplement      Polyethyl Glycol-Propyl Glycol (SYSTANE OP) Apply 1 dose to eye(s) as directed by provider As Needed.      tamsulosin (FLOMAX) 0.4 MG capsule 24 hr capsule Take 1 capsule by mouth.      valsartan (DIOVAN) 160 MG tablet TAKE 1 TABLET BY MOUTH  DAILY (Patient taking differently: Take 1 tablet by mouth Daily.) 90 tablet 3    Vibegron (Gemtesa) 75 MG tablet Take  by mouth.      apixaban (ELIQUIS) 5 MG tablet tablet Take 1 tablet by mouth 2 (Two) Times a Day. Indications: DVT/PE (active thrombosis), Prevention of Unwanted Clot in Veins 180 tablet 1    levothyroxine (SYNTHROID, LEVOTHROID) 75 MCG tablet TAKE 1 TABLET BY MOUTH EVERY MORNING. INDICATIONS: UNDERACTIVE THYROID 90 tablet 1    amLODIPine (NORVASC) 2.5 MG tablet Take 1 tablet by mouth Daily As Needed (For sytolic of 130 or greater). Indications: High Blood Pressure Disorder (Patient not taking: Reported on 5/29/2024) 90 tablet 0     No facility-administered medications prior to visit.       No opioid medication identified on active medication list. I have reviewed chart for other potential  high risk medication/s and harmful drug interactions in the elderly.        Aspirin is on active medication list. Aspirin use is indicated based on review of current medical condition/s. Pros and cons of this therapy have been discussed today. Benefits of this medication outweigh potential harm.  Patient has been encouraged to continue taking this medication.   ".      Patient Active Problem List   Diagnosis    Benign essential hypertension    Stenosis of carotid artery    Mixed hyperlipidemia    Parkinson's disease    Peripheral arterial occlusive disease    Screening for prostate cancer    Medication monitoring encounter    Melanoma    Chronic deep vein thrombosis (DVT) of popliteal vein of right lower extremity    Uses hearing aid    Paroxysmal A-fib    Chronic diastolic (congestive) heart failure    Anticoagulant long-term use    Presence of IVC filter    Medicare annual wellness visit, subsequent    Iron deficiency anemia secondary to inadequate dietary iron intake    Orthostatic hypotension due to Parkinson's disease    Coronary artery disease involving native coronary artery of native heart without angina pectoris    ST elevation myocardial infarction involving left anterior descending (LAD) coronary artery    Sialorrhea    Hypertensive heart disease with heart failure    PAD (peripheral artery disease)    Metastatic squamous cell carcinoma    Secondary malignancy of lymph nodes of head, face and neck    Encounter for antineoplastic immunotherapy    RBD (REM behavioral disorder)    Acquired hypothyroidism    Aspiration pneumonia of right lung due to vomit    Dysphagia, neurologic    ICAO (internal carotid artery occlusion), right    Arterial ischemic stroke, ICA (internal carotid artery), right, chroni    Renal mass, left    Abdominal aortic aneurysm     Advance Care Planning   Advance Care Planning     Advance Directive is on file.  ACP discussion was held with the patient during this visit. Patient has an advance directive in EMR which is still valid.      Objective    Vitals:    05/29/24 1001   BP: 108/64   BP Location: Left arm   Patient Position: Sitting   Cuff Size: Adult   Pulse: 60   Temp: 97.1 °F (36.2 °C)   Weight: 68.9 kg (152 lb)   Height: 180.3 cm (70.98\")   PainSc: 0-No pain     Estimated body mass index is 21.21 kg/m² as calculated from the " "following:    Height as of this encounter: 180.3 cm (70.98\").    Weight as of this encounter: 68.9 kg (152 lb).    BMI is within normal parameters. No other follow-up for BMI required.      Does the patient have evidence of cognitive impairment?   Yes: Continue current medications.    Lab Results   Component Value Date    CHLPL 133 2024    TRIG 49 2024    HDL 62 (H) 2024    LDL 60 2024    VLDL 11 2024          HEALTH RISK ASSESSMENT    Smoking Status:  Social History     Tobacco Use   Smoking Status Never    Passive exposure: Never   Smokeless Tobacco Never   Tobacco Comments    caff use     Alcohol Consumption:  Social History     Substance and Sexual Activity   Alcohol Use No    Comment: none for a month     Fall Risk Screen:    ROBERT Fall Risk Assessment was completed, and patient is at MODERATE risk for falls. Assessment completed on:2024    Depression Screenin/29/2024    10:03 AM   PHQ-2/PHQ-9 Depression Screening   Little Interest or Pleasure in Doing Things 0-->not at all   Feeling Down, Depressed or Hopeless 0-->not at all   PHQ-9: Brief Depression Severity Measure Score 0       Health Habits and Functional and Cognitive Screenin/29/2024    10:06 AM   Functional & Cognitive Status   Do you have difficulty preparing food and eating? No   Do you have difficulty bathing yourself, getting dressed or grooming yourself? No   Do you have difficulty using the toilet? No   Do you have difficulty moving around from place to place? No   Do you have trouble with steps or getting out of a bed or a chair? No   Current Diet Well Balanced Diet   Dental Exam Up to date   Eye Exam Up to date   Exercise (times per week) 2 times per week   Current Exercises Include Yard Work   Do you need help using the phone?  No   Are you deaf or do you have serious difficulty hearing?  Yes   Do you need help to go to places out of walking distance? No   Do you need help shopping? No   Do " you need help preparing meals?  No   Do you need help with housework?  No   Do you need help with laundry? No   Do you need help taking your medications? No   Do you need help managing money? No   Do you ever drive or ride in a car without wearing a seat belt? No   Have you felt unusual stress, anger or loneliness in the last month? No   Who do you live with? Spouse   If you need help, do you have trouble finding someone available to you? No   Have you been bothered in the last four weeks by sexual problems? No   Do you have difficulty concentrating, remembering or making decisions? Yes       Age-appropriate Screening Schedule:  Refer to the list below for future screening recommendations based on patient's age, sex and/or medical conditions. Orders for these recommended tests are listed in the plan section. The patient has been provided with a written plan.    Health Maintenance   Topic Date Due    COVID-19 Vaccine (5 - 2023-24 season) 05/31/2024 (Originally 3/7/2024)    ANNUAL WELLNESS VISIT  07/12/2024 (Originally 10/7/2023)    RSV Vaccine - Adults (1 - 1-dose 60+ series) 05/29/2025 (Originally 7/12/1994)    INFLUENZA VACCINE  08/01/2024    LIPID PANEL  05/21/2025    Pneumococcal Vaccine 65+  Completed    ZOSTER VACCINE  Completed    TDAP/TD VACCINES  Discontinued                  CMS Preventative Services Quick Reference  Risk Factors Identified During Encounter:    Fall Risk-High or Moderate: Discussed Fall Prevention in the home    The above risks/problems have been discussed with the patient.  Pertinent information has been shared with the patient in the After Visit Summary.    Diagnoses and all orders for this visit:    1. Medicare annual wellness visit, subsequent (Primary)    2. Orthostatic hypotension due to Parkinson's disease    3. Acquired hypothyroidism  -     levothyroxine (SYNTHROID, LEVOTHROID) 75 MCG tablet; Take 1 tablet by mouth Every Morning. Indications: Underactive Thyroid  Dispense: 90  tablet; Refill: 1    4. Peripheral arterial occlusive disease  -     Discontinue: apixaban (ELIQUIS) 5 MG tablet tablet; Take 1 tablet by mouth 2 (Two) Times a Day. Indications: DVT/PE (active thrombosis), Prevention of Unwanted Clot in Veins  Dispense: 180 tablet; Refill: 1  -     apixaban (ELIQUIS) 5 MG tablet tablet; Take 1 tablet by mouth 2 (Two) Times a Day. Indications: DVT/PE (active thrombosis), Prevention of Unwanted Clot in Veins  Dispense: 60 tablet; Refill: 0    Afshin recently in the emergency room for orthostatic hypotension and a fall.  Longstanding orthostatic hypotension due to Parkinson's disease.  Amlodipine has been stopped    He remains on Eliquis and aspirin.  He is a known fall risk.  Risk for bleeding is fairly high.    Parkinson's continues to worsen    He has lost a fair amount of weight.    Is a very large skin deficit on his right scalp from a squamous cell skin cancer surgical excision recently.  Wife reports that the surgeon had to stop even though the margins were not clear as the deficit was getting too large.    Liberty is poor      Follow Up:   Next Medicare Wellness visit to be scheduled in 1 year.      An After Visit Summary and PPPS were made available to the patient.

## 2024-06-02 NOTE — ED PROVIDER NOTES
MD ATTESTATION NOTE    The GRACE and I have discussed this patient's history, physical exam, and treatment plan.  I have reviewed the documentation and personally had a face to face interaction with the patient. I affirm the documentation and agree with the treatment and plan.  The attached note describes my personal findings.      I provided a substantive portion of the care of the patient.  I personally performed the physical exam in its entirety, and below are my findings.        Brief HPI: Patient presented emergency department with episode of hypotension, fatigue, weakness.  Almost passed out.  Currently improved.    PHYSICAL EXAM  ED Triage Vitals [05/24/24 1352]   Temp Heart Rate Resp BP SpO2   97.5 °F (36.4 °C) 64 20 149/71 100 %      Temp src Heart Rate Source Patient Position BP Location FiO2 (%)   -- Monitor -- -- --         GENERAL: no acute distress  HENT: nares patent  EYES: no scleral icterus  CV: regular rhythm, normal rate  RESPIRATORY: normal effort  ABDOMEN: soft  MUSCULOSKELETAL: no deformity  NEURO: alert, moves all extremities, follows commands  PSYCH:  calm, cooperative  SKIN: warm, dry    Vital signs and nursing notes reviewed.        Plan: Patient presented emergency department with transient hypotension, otherwise well-appearing, vitals otherwise stable.  Labs and imaging otherwise reassuring in the ER.  Suspect orthostatic episode, possibly related to Parkinson's.  Will discharge with supportive care and outpatient follow-up.    ED Course as of 06/02/24 0004   Fri May 24, 2024   1404 First look: Patient presents to the emergency department via EMS for episode of hypotension noticed by wife.  Patient reportedly got up from armchair at 1215 this afternoon and complained of weakness and lightheadedness.  Wife checked his blood pressure and it was 66/40.  She also thought his chronic left-sided facial droop was more prominent than normal.  EMS was called to the house and patient did not have any  acute neurologic deficits.  Patient did undergo a Mohs procedure to the right side of his forehead yesterday.  Reports he has been eating and drinking normally.  Was not started on any narcotic pain medication.    On my exam, patient does have a large bandage over the right side of his forehead.  He has very mild left-sided facial droop which is residual according to him.  I will initiate workup with labs, CT head. [MP]   9977 CT head interpreted myself:  No hemorrhage or midline shift [FS]   8610 I discussed the case with MD Latosha with Cardiology at this time regarding the patient.  I discussed work-up, results, concerns.  I discussed the consulting provider's desire for changing the medication regimen involving Norvasc.  He recommends only taking the Norvasc in the morning if the blood pressure is 130 systolic or higher.  Also recommends following up in the office next week with Dr. Armas. [DC]      ED Course User Index  [DC] Andres Chino, PA  [FS] Chris Ward MD  [MP] Fern Brand, PA-C       SHARED VISIT: This visit was performed by BOTH a physician and an APC. The substantive portion of the medical decision making was performed by this attesting physician who made or approved the management plan and takes responsibility for patient management. All studies in the APC note (if performed) were independently interpreted by me.        Chris Ward MD  06/02/24 0005

## 2024-06-07 ENCOUNTER — TELEPHONE (OUTPATIENT)
Dept: CARDIOLOGY | Facility: CLINIC | Age: 89
End: 2024-06-07
Payer: MEDICARE

## 2024-06-07 NOTE — TELEPHONE ENCOUNTER
Notified pt's wife of recommendations from Dr. Arams. She verbalized understanding.    Thank you,    Mel Davis, RN  [place] [facility] [place]  06/07/24 09:13 EDT

## 2024-06-07 NOTE — TELEPHONE ENCOUNTER
Dr. Armas/Delroy,    Pt's wife called yesterday afternoon. She said pt is still experiencing low blood pressure and lightheadedness even being off of amlodipine. His wife said his B/P is dropping when he is doing activity.     Yesterday his B/P was 137/65. So, he decided to go outside to change a flat on his tractor. He got lightheaded and dizzy. He had to have family help him back into the house. Then, his B/P was 93/53 and 91/50.    Pt's wife asked if you have any recommendations for him?    Thank you,    Mel Davis, RN  Triage [facility] [place]  06/07/24 08:38 EDT

## 2024-06-11 ENCOUNTER — CLINICAL SUPPORT (OUTPATIENT)
Dept: CARDIOLOGY | Facility: CLINIC | Age: 89
End: 2024-06-11
Payer: MEDICARE

## 2024-06-11 DIAGNOSIS — I11.0 HYPERTENSIVE HEART DISEASE WITH HEART FAILURE: ICD-10-CM

## 2024-06-11 RX ORDER — VALSARTAN 40 MG/1
40 TABLET ORAL DAILY
Qty: 90 TABLET | Refills: 0 | Status: SHIPPED | OUTPATIENT
Start: 2024-06-11

## 2024-06-11 NOTE — PROGRESS NOTES
Patient presented to the clinic for BP check.  Verified and updated patient allergies and medication.    BP in office was 112/68 and HR was 67.  BP with patient cuff was 108/59 and HR was 67.    BP log:   Friday - 120/66 - AM    159/68 - PM  Sat - 167/70 - before meds           78/46 - AM - dizzy spells and fatigued           134/62 - PM    Presented patient to Delroy Malcolm PA-C.  Provider stated that patient should cut the Valsartan down to 40 mg.  He would like the patient to take it in the evening.  Patient should keep BP log for a week and call in with the results.  Patient should call if BP is 90/60 with symptoms.  Patient was notified of the above.  Patient verbalized understanding.    DREW Bryan

## 2024-07-02 ENCOUNTER — HOSPITAL ENCOUNTER (OUTPATIENT)
Facility: HOSPITAL | Age: 89
Discharge: HOME | End: 2024-07-02
Payer: MEDICARE

## 2024-07-02 ENCOUNTER — OFFICE VISIT (OUTPATIENT)
Age: 89
End: 2024-07-02
Payer: MEDICARE

## 2024-07-02 VITALS
DIASTOLIC BLOOD PRESSURE: 72 MMHG | HEART RATE: 49 BPM | HEIGHT: 71 IN | WEIGHT: 152 LBS | SYSTOLIC BLOOD PRESSURE: 195 MMHG | BODY MASS INDEX: 21.28 KG/M2

## 2024-07-02 DIAGNOSIS — I82.531 CHRONIC DEEP VEIN THROMBOSIS (DVT) OF POPLITEAL VEIN OF RIGHT LOWER EXTREMITY: Chronic | ICD-10-CM

## 2024-07-02 DIAGNOSIS — I65.23 BILATERAL CAROTID ARTERY STENOSIS: Primary | ICD-10-CM

## 2024-07-02 DIAGNOSIS — R41.0 DISORIENTATION: ICD-10-CM

## 2024-07-02 DIAGNOSIS — I71.43 INFRARENAL ABDOMINAL AORTIC ANEURYSM (AAA) WITHOUT RUPTURE: ICD-10-CM

## 2024-07-02 DIAGNOSIS — Z95.828 PRESENCE OF IVC FILTER: Chronic | ICD-10-CM

## 2024-07-02 DIAGNOSIS — I65.23 ASYMPTOMATIC BILATERAL CAROTID ARTERY STENOSIS: ICD-10-CM

## 2024-07-02 LAB
ABDOMINAL DIST AORTA AP: 4.13 CM
ABDOMINAL DIST AORTA TRANS: 4.16 CM
ABDOMINAL DIST AORTA VEL: 28.8 CM/S
ABDOMINAL LT COM ILIAC AP: 1.63 CM
ABDOMINAL LT COM ILIAC TRANS: 1.59 CM
ABDOMINAL LT COM ILIAC VEL: 94.6 CM/S
ABDOMINAL LT EXT ILIAC VEL: 92.3 CM/S
ABDOMINAL LT INT ILIAC VEL: 99 CM/S
ABDOMINAL MID AORTA AP: 2.82 CM
ABDOMINAL MID AORTA TRANS: 2.8 CM
ABDOMINAL MID AORTA VEL: 56.4 CM/S
ABDOMINAL PROX AORTA AP: 2.65 CM
ABDOMINAL PROX AORTA TRANS: 2.76 CM
ABDOMINAL PROX AORTA VEL: 87.1 CM/S
ABDOMINAL RT COM ILIAC AP: 1.69 CM
ABDOMINAL RT COM ILIAC TRANS: 1.61 CM
ABDOMINAL RT COM ILIAC VEL: 90.1 CM/S
ABDOMINAL RT EXT ILIAC VEL: 104.1 CM/S
BH CV MID RIGHT ICA HIDDEN LRR: 1 CM
BH CV RIGHT CCA HIDDEN LRR: 1 CM/S
BH CV VAS ABD AO LT EXTERNAL ILIAC AP: 1.34 CM
BH CV VAS ABD AO LT INTERNAL ILIAC AP: 1.07 CM
BH CV VAS ABD AO RT EXTERNAL ILIAC AP: 1.26 CM
BH CV VAS CAROTID RIGHT DISTAL STENT EDV: 36.4 CM/S
BH CV VAS CAROTID RIGHT DISTAL STENT PSV: 131 CM/S
BH CV VAS CAROTID RIGHT DISTAL TO STENT NATIVE VESSEL E: 22.6 CM/S
BH CV VAS CAROTID RIGHT DISTAL TO STENT PSV: 111 CM/S
BH CV VAS CAROTID RIGHT MID STENT EDV: 32.9 CM/S
BH CV VAS CAROTID RIGHT MID STENT PSV: 142 CM/S
BH CV VAS CAROTID RIGHT PROXIMAL STENT EDV: 17.4 CM/S
BH CV VAS CAROTID RIGHT PROXIMAL STENT PSV: 105 CM/S
BH CV VAS CAROTID RIGHT STENT NATIVE VESSEL PROXIMAL EDV: 14.3 CM/S
BH CV VAS CAROTID RIGHT STENT NATIVE VESSEL PROXIMAL PS: 80.6 CM/S
BH CV XLRA MEAS LEFT CAROTID BULB EDV: 24.2 CM/SEC
BH CV XLRA MEAS LEFT CAROTID BULB PSV: 144.3 CM/SEC
BH CV XLRA MEAS LEFT DIST CCA EDV: -13.2 CM/SEC
BH CV XLRA MEAS LEFT DIST CCA PSV: -67.2 CM/SEC
BH CV XLRA MEAS LEFT DIST ICA EDV: -19.3 CM/SEC
BH CV XLRA MEAS LEFT DIST ICA PSV: -65.9 CM/SEC
BH CV XLRA MEAS LEFT ICA/CCA RATIO: 2.31
BH CV XLRA MEAS LEFT MID CCA EDV: 15.5 CM/SEC
BH CV XLRA MEAS LEFT MID CCA PSV: 95.1 CM/SEC
BH CV XLRA MEAS LEFT MID ICA EDV: -23 CM/SEC
BH CV XLRA MEAS LEFT MID ICA PSV: -75.7 CM/SEC
BH CV XLRA MEAS LEFT PROX CCA EDV: -12.8 CM/SEC
BH CV XLRA MEAS LEFT PROX CCA PSV: -80.6 CM/SEC
BH CV XLRA MEAS LEFT PROX ECA EDV: -17.4 CM/SEC
BH CV XLRA MEAS LEFT PROX ECA PSV: -156.9 CM/SEC
BH CV XLRA MEAS LEFT PROX ICA EDV: 25.2 CM/SEC
BH CV XLRA MEAS LEFT PROX ICA PSV: 154.9 CM/SEC
BH CV XLRA MEAS LEFT PROX SCLA PSV: 123 CM/SEC
BH CV XLRA MEAS LEFT VERTEBRAL A EDV: -12.5 CM/SEC
BH CV XLRA MEAS LEFT VERTEBRAL A PSV: -45.8 CM/SEC
BH CV XLRA MEAS RIGHT CAROTID BULB EDV: -18.1 CM/SEC
BH CV XLRA MEAS RIGHT CAROTID BULB PSV: -85.6 CM/SEC
BH CV XLRA MEAS RIGHT DIST CCA EDV: -17.4 CM/SEC
BH CV XLRA MEAS RIGHT DIST CCA PSV: -105 CM/SEC
BH CV XLRA MEAS RIGHT DIST ICA EDV: -22.6 CM/SEC
BH CV XLRA MEAS RIGHT DIST ICA PSV: -110.7 CM/SEC
BH CV XLRA MEAS RIGHT ICA/CCA RATIO: 1.35
BH CV XLRA MEAS RIGHT MID CCA EDV: 14.3 CM/SEC
BH CV XLRA MEAS RIGHT MID CCA PSV: 70.3 CM/SEC
BH CV XLRA MEAS RIGHT MID ICA EDV: -36.4 CM/SEC
BH CV XLRA MEAS RIGHT MID ICA PSV: -130.8 CM/SEC
BH CV XLRA MEAS RIGHT PROX CCA EDV: -9.3 CM/SEC
BH CV XLRA MEAS RIGHT PROX CCA PSV: -59 CM/SEC
BH CV XLRA MEAS RIGHT PROX ECA EDV: 19.8 CM/SEC
BH CV XLRA MEAS RIGHT PROX ECA PSV: 170.1 CM/SEC
BH CV XLRA MEAS RIGHT PROX ICA EDV: -32.9 CM/SEC
BH CV XLRA MEAS RIGHT PROX ICA PSV: -142.1 CM/SEC
BH CV XLRA MEAS RIGHT PROX SCLA PSV: 105.8 CM/SEC
BH CV XLRA MEAS RIGHT VERTEBRAL A EDV: -13.7 CM/SEC
BH CV XLRA MEAS RIGHT VERTEBRAL A PSV: -66.6 CM/SEC
BH CVPROX RIGHT ICA HIDDEN LRR: 1 CM
LEFT ARM BP: NORMAL MMHG
RIGHT ARM BP: NORMAL MMHG

## 2024-07-02 PROCEDURE — 93880 EXTRACRANIAL BILAT STUDY: CPT

## 2024-07-02 PROCEDURE — 93978 VASCULAR STUDY: CPT

## 2024-07-02 PROCEDURE — 93880 EXTRACRANIAL BILAT STUDY: CPT | Performed by: SURGERY

## 2024-07-02 PROCEDURE — 93978 VASCULAR STUDY: CPT | Performed by: SURGERY

## 2024-07-02 NOTE — PROGRESS NOTES
Chief Complaint  Carotid Artery Disease and Aortic Aneurysm    Subjective        Casey Snowden Sr. presents to [facility] VASCULAR SURGERY  HPI   Casey Snowden Sr. is a 89 y.o. male that has been followed in our office for carotid artery stenosis, abdominal aortic aneurysm, and a right lower extremity DVT.  In October of last year, he was admitted to [facility] for a stroke and had a right ICA stent done by Dr. Cadena.  He also had a right common femoral DVT at that time.  This has since resolved.  He does have an IVC filter in place.  He returns today in follow up along with a carotid and aortic duplex. He  reports he has been doing well without hospitalizations or surgeries. He denies any symptoms consistent with CVA, TIA, or amaurosis fugax. He denies any abdominal pain, back pain, pain that radiates to his  groin.  His wife reports over the past several days, he has been working out in his yard for several hours.  He then comes inside and is disoriented.  Of note, he is not drinking any water during this time.  He does complain of right lower extremity swelling.  He is not wearing compression stockings.    Review of Systems   Constitutional:  Negative for fever.   Eyes:  Negative for visual disturbance.   Cardiovascular:  Negative for leg swelling.   Gastrointestinal:  Negative for abdominal pain.   Musculoskeletal:  Negative for back pain.   Skin:  Negative for color change, pallor and wound.   Neurological:  Negative for dizziness, facial asymmetry, speech difficulty and weakness.        Casey Snowden Sr.  reports that he has never smoked. He has never been exposed to tobacco smoke. He has never used smokeless tobacco..        Objective   Vital Signs:  Vitals:    07/02/24 0957   BP: (!) 195/72   Pulse: (!) 49      Body mass index is 21.21 kg/m².   BMI is within normal parameters. No other follow-up for BMI required.       Physical Exam  Vitals reviewed.   Constitutional:        Appearance: Normal appearance.   HENT:      Head: Normocephalic.   Cardiovascular:      Rate and Rhythm: Normal rate and regular rhythm.      Pulses:           Dorsalis pedis pulses are 3+ on the right side and 3+ on the left side.        Posterior tibial pulses are 3+ on the right side and 3+ on the left side.   Pulmonary:      Effort: Pulmonary effort is normal.   Skin:     General: Skin is warm.   Neurological:      General: No focal deficit present.      Mental Status: He is alert and oriented to person, place, and time.   Psychiatric:         Mood and Affect: Mood normal.          Result Review :      Previous carotid duplex: Patent stent on the right and a less than 50% stenosis on the left    Previous aortic duplex: 3.9 cm    Aortic duplex from today: Duplex Aorta IVC Iliac Graft Complete CAR (07/02/2024 09:53)     Carotid duplex from today: Duplex Carotid Ultrasound CAR (07/02/2024 09:29)                    Assessment and Plan     Diagnoses and all orders for this visit:    1. Bilateral carotid artery stenosis (Primary)    2. Infrarenal abdominal aortic aneurysm (AAA) without rupture  -     Duplex Aorta IVC Iliac Graft Complete CAR; Future    3. Chronic deep vein thrombosis (DVT) of popliteal vein of right lower extremity    4. Presence of IVC filter    5. Disorientation             Patient present today for follow up of carotid artery stenosis and abdominal arctic aneurysm.  His right carotid stent is patent.  He does see Dr. Cadena and his next appointment is in November.  He will discuss with him whether or not he will continue to follow him long-term.  If he does, we will stop duplexes in our office.  He has had mild growth to his aneurysm from 3.9 cm to 4.1 cm.  We discussed indications for surgical repair.  He is to continue his antiplatelet agent which is aspirin.  He is also on Eliquis. He is on a statin for cholesterol control.  We discussed adequate blood pressure control.  His blood pressure  is high today, though he has not taken his blood pressure medication.  We discussed his disorientation after he works outside.  I have advised that he not work outside in extreme heat conditions, and limit these to the morning and evening hours.  I also advised that he stay hydrated as well.  In regards to his lower extremity swelling, this is chronic from history of DVT.  I have recommended that he restart his compression stockings.  He will return in 6 months along with an aortic duplex.    Follow Up     Return in about 6 months (around 1/2/2025) for aortic duplex.  Patient was given instructions and counseling regarding his condition or for health maintenance advice. Please see specific information pulled into the AVS if appropriate.     RIDDHI Jones

## 2024-07-03 ENCOUNTER — OFFICE VISIT (OUTPATIENT)
Dept: CARDIOLOGY | Facility: CLINIC | Age: 89
End: 2024-07-03
Payer: MEDICARE

## 2024-07-03 VITALS
SYSTOLIC BLOOD PRESSURE: 134 MMHG | HEART RATE: 60 BPM | WEIGHT: 150.7 LBS | RESPIRATION RATE: 18 BRPM | BODY MASS INDEX: 21.1 KG/M2 | OXYGEN SATURATION: 98 % | HEIGHT: 71 IN | DIASTOLIC BLOOD PRESSURE: 64 MMHG

## 2024-07-03 DIAGNOSIS — I48.0 PAROXYSMAL A-FIB: ICD-10-CM

## 2024-07-03 DIAGNOSIS — I11.0 HYPERTENSIVE HEART DISEASE WITH HEART FAILURE: ICD-10-CM

## 2024-07-03 DIAGNOSIS — I50.32 CHRONIC DIASTOLIC CONGESTIVE HEART FAILURE: ICD-10-CM

## 2024-07-03 DIAGNOSIS — I73.9 PAD (PERIPHERAL ARTERY DISEASE): ICD-10-CM

## 2024-07-03 DIAGNOSIS — I71.43 INFRARENAL ABDOMINAL AORTIC ANEURYSM (AAA) WITHOUT RUPTURE: ICD-10-CM

## 2024-07-03 DIAGNOSIS — I25.10 CORONARY ARTERY DISEASE INVOLVING NATIVE CORONARY ARTERY OF NATIVE HEART WITHOUT ANGINA PECTORIS: ICD-10-CM

## 2024-07-03 DIAGNOSIS — I65.23 ASYMPTOMATIC BILATERAL CAROTID ARTERY STENOSIS: Primary | ICD-10-CM

## 2024-07-03 NOTE — PROGRESS NOTES
CARDIOLOGY        Patient Name: Casey Snowden Sr.  :1934  Age: 89 y.o.  Primary Cardiologist: Cecilio Armas MD  Encounter Provider:  Delroy Malcolm PA-C    Date of Service: 24        CHIEF COMPLAINT / REASON FOR OFFICE VISIT     1 month follow-up      HISTORY OF PRESENT ILLNESS       HPI  Casey Snowden Sr. is a 89 y.o. male who presents today for 1 month follow-up.     Pt has a  history significant for Parkinson's, paroxysmal atrial fibrillation, chronic diastolic heart failure, hypertension, PAD, DVT, and stroke presents for ER follow-up.  Patient presented to the ER on 2024 for hypotension.  Patient got up from his chair and was feeling fatigue and generalized weak and he checked his blood pressure and 66/40.  He has symptoms over a few minutes and called EMS and sent to the ER.  His blood pressure in the ER was 137/84.  Patient workup including CT of his head that not show anything acute.  They discussed case with Dr. Reina (On call).  Patient was to take his Norvasc only in the morning.  Patient was to follow-up in the office.  Patient did see his neurologist this morning as well.     On 24 had a another episode day while outside and again yesterday while outside.  Both episodes were he had low blood pressure.  Patient has not been drinking much water.  Patient has been outside during to these recent events.  Patient denies any associated symptoms with the lightheadedness and generalized fatigue.  No chest pain, palpitations, shortness of breath, new leg edema, orthopnea.  Patient has not had any issues with bleeding.     Patient has continued to have fluctuations in his blood pressure.  Due to his blood pressure being on the lower side his valsartan was cut in half.  Patient has not been taking his Norvasc.  Patient blood pressure has been high in the mornings and improves throughout the day.  Patient did have a spell of dizziness while out in the garden again.  No  syncope.  Patient denies any chest pain, shortness of breath, palpitations, no edema to his legs, orthopnea, or bleeding issues.       Below is a brief summary of patient's pertinent cardiac history  He was previously hospitalized with a CVA due to severe RCA stenosis.  He follows consistently with vascular surgery, he had a percutaneous revascularization procedure performed.      Initially he was discharged on apixaban 2.5 mg twice daily along with Brilinta, Brilinta was stopped after several days.  They say he just saw vascular surgery who wanted to increase him to 5 mg twice daily on apixaban but wanted our okay before doing so.  Patient previously on apixaban 2.5 mg because of DVT.     Also noted he had some problems with hypertension, he was discharged on amlodipine 5 mg daily.     He was previously hospitalized with mental status changes.  He also had swelling and drainage from his right lower extremity.  He has a history of a DVT on that side and follows with vascular surgery, we had them call vascular surgery and they recommended compliance with his compression stockings.     He has a history of metastatic squamous cell carcinoma.  He has undergone radiation therapy to his neck as well as chemotherapy.      He presented to [facility] on [facility] with complaints of light headed and dizziness as well as left sided chest pain as he was working in the yard. He was found to be in atrial fibrillation with RVR, with worse than baseline anterior ST changes. He was rate controlled over night. He was also noted to have pneumonia and was started on IV levaquin. Patient was seen by Dr. Perez on the morning of 6/4. He was noted to jave troponin elevation and was transferred to [facility] for cardiac catheterization. The LAD was totally occluded and was treated with PCI and drug eluting stent placement to the LAD (3.0 x 28 mm Xience Lory drug eluting stent). Echocardiogram showed EF 40% with an  apical wall motion abnormalities. He has been on carvedilol on the past, this was stopped due to bradycardia. He received metoprolol and tolerated well with HR in the 50's. At discharge this was changed to metoprolol succinate given cardiomyopathy. He is also on losartan.      Cath 6/2019  Hemodynamics:   LV: 150/8  AO: 150/64        PCI CORONARY SEGMENT: Mid LAD  PRE-STENOSIS: 100  POST-STENOSIS: 0%  LESION TYPE: c  EREN FLOW PRE/POST: 1/3  CULPRIT LESION: Yes     Estimated blood loss: Minimal  Complications: None     Conclusions:   1. Left main: Normal  2. LAD: Occluded mid segment.  EREN I flow  3. LCX:Normal  4. RCA: 30 to 40% mid vessel stenosis  5.  Normal left ventricular size.  Moderately reduced systolic function.  Ejection fraction 35%.  Mid to distal anterolateral akinesis.  Apical dyskinesis  6.  PCI of the mid LAD with a 3.0 x 28 mm Xience Lory drug-eluting stent, postdilated to high pressure with a 3.25 mm noncompliant trek balloon     Echo 6/2019  Interpretation Summary      The left ventricular cavity is mildly dilated.  Left ventricular wall thickness is consistent with mild concentric hypertrophy.  There is mild calcification of the aortic valve.  Estimated EF = 40%.  The following left ventricular wall segments are akinetic: apical anterior, apical lateral, apical inferior, apical septal and apex.  Left ventricular diastolic dysfunction (grade II) consistent with pseudonormalization.  Left atrial cavity size is moderately dilated.  Mild tricuspid valve regurgitation is present.  Calculated right ventricular systolic pressure from tricuspid regurgitation is 41 mmHg.  Mild pulmonic valve regurgitation is present.            He has a history of paroxysmal atrial fibrillation and has previously followed with Dr. Mercado.  He has been maintained on chronic anticoagulation.  He apparently never required rate control medicine due to baseline bradycardia.      The following portions of the patient's  "history were reviewed and updated as appropriate: allergies, current medications, past family history, past medical history, past social history, past surgical history and problem list.      VITAL SIGNS     Visit Vitals  /64 (BP Location: Right arm, Patient Position: Sitting, Cuff Size: Adult)   Pulse 60   Resp 18   Ht 180.3 cm (71\")   Wt 68.4 kg (150 lb 11.2 oz)   SpO2 98%   BMI 21.02 kg/m²       @[email or web address]  Wt Readings from Last 3 Encounters:   07/03/24 68.4 kg (150 lb 11.2 oz)   07/02/24 68.9 kg (152 lb)   05/29/24 68.9 kg (152 lb)     Body mass index is 21.02 kg/m².        PHYSICAL EXAMINATION     Constitutional:       General: Awake. Not in acute distress.     Appearance: Not in distress.   Pulmonary:      Effort: Pulmonary effort is normal.      Breath sounds: Normal breath sounds.   Cardiovascular:      Normal rate. Regular rhythm.      Murmurs: There is no murmur.   Edema:     Pretibial: 1+ edema of the left pretibial area and trace edema of the right pretibial area.  Skin:     General: Skin is warm.   Neurological:      Mental Status: Alert.   Psychiatric:         Behavior: Behavior is cooperative.           REVIEWED DATA     Procedures    Cardiac Procedures:    Carotid duplex ultrasound on 11/16/2023  Interpretation Summary          Widely patent right carotid stent without residual or recurrent stenosis. No external carotid stenosis.    Left internal carotid artery demonstrates a less than 50% stenosis.     Transthoracic echo on 10/11/2023  Interpretation Summary       Left ventricular systolic function is mildly decreased. Calculated left ventricular EF = 44.7%    Left ventricular wall thickness is consistent with mild to moderate concentric hypertrophy.    The following left ventricular wall segments are hypokinetic: apical anterior and mid anteroseptal. The following left ventricular wall segments are akinetic: apical inferior, apical septal and apex.    Left ventricular diastolic " function is consistent with (grade Ia w/high LAP) impaired relaxation.    There is calcification of the aortic valve.    Estimated right ventricular systolic pressure from tricuspid regurgitation is normal (<35 mmHg).           Lipid Panel          10/10/2023    04:44 5/21/2024    10:42   Lipid Panel   Total Cholesterol 138     Total Cholesterol  133    Triglycerides 52  49    HDL Cholesterol 56  62    VLDL Cholesterol 11  11    LDL Cholesterol  71  60    LDL/HDL Ratio 1.28         Lab Results   Component Value Date     05/24/2024     05/21/2024    K 4.0 05/24/2024    K 4.5 05/21/2024     05/24/2024     (H) 05/21/2024    CO2 25.0 05/24/2024    CO2 24.0 05/21/2024    BUN 25 (H) 05/24/2024    BUN 24 (H) 05/21/2024    CREATININE 0.91 05/24/2024    CREATININE 0.85 05/21/2024    EGFRIFNONA 99 06/05/2019    EGFRIFNONA 90 06/04/2019    EGFRIFAFRI >60 12/27/2022    EGFRIFAFRI >60 12/21/2022    GLUCOSE 66 05/24/2024    GLUCOSE 126 (H) 05/21/2024    CALCIUM 8.5 (L) 05/24/2024    CALCIUM 9.0 05/21/2024    PROTENTOTREF 6.2 10/03/2023    PROTENTOTREF 6.1 04/25/2017    ALBUMIN 4.1 05/24/2024    ALBUMIN 4.2 10/09/2023    BILITOT 0.7 05/24/2024    BILITOT 0.5 10/09/2023    AST 10 05/24/2024    AST 12 10/09/2023    ALT 8 05/24/2024    ALT 10 05/21/2024     Lab Results   Component Value Date    WBC 7.86 05/24/2024    WBC 4.06 05/21/2024    HGB 12.3 (L) 05/24/2024    HGB 12.2 (L) 05/21/2024    HCT 36.2 (L) 05/24/2024    HCT 36.4 (L) 05/21/2024    MCV 96.8 05/24/2024    MCV 96.8 05/21/2024     05/24/2024     05/21/2024     Lab Results   Component Value Date    PROBNP 1,193.0 05/24/2024    PROBNP 2,811.0 (H) 10/23/2022     Lab Results   Component Value Date    CKTOTAL 45 10/23/2022    TROPONINT 32 (H) 05/24/2024     Lab Results   Component Value Date    TSH 2.680 05/24/2024    TSH 2.980 05/21/2024             ASSESSMENT & PLAN     Diagnoses and all orders for this visit:      1. Atrial Fibrillation  and Atrial Flutter  Assessment   The patient has paroxysmal atrial fibrillation   The patient's CHADS2-VASc score is 3   A [id number] score of 2 or more is considered a high risk for a thromboembolic event   Apixaban prescribed     Plan   Attempt to maintain sinus rhythm   Continue apixaban for antithrombotic therapy, bleeding issues discussed   Sinus bradycardia no rate control meds     2. Heart Failure  Assessment   Beta blocker not prescribed for patient reasons   Angiotensin receptor blocker (ARB) prescribed     Plan   The patient has received heart failure education on the following topics: medication instructions, symptom management and weight monitoring   The heart failure care plan was discussed with the patient today including: continuing the current program     3.  History of chronic DVT; Patient remains on chronic anticoagulation and follows with Dr. Dorado. Patient has an IVC filter in place     4.  Coronary Artery Disease  Assessment   The patient has no angina     Plan   Lifestyle modifications discussed include adhering to a heart healthy diet, avoidance of tobacco products, maintenance of a healthy weight, medication compliance, regular exercise and regular monitoring of cholesterol and blood pressure   Toprol has been discontinued in the past because of bradycardia     Subjective - Objective   There is a history of past MI    There has been a previous stent procedure using BILL    Current antiplatelet therapy includes aspirin 81 mg        5.  Essential hypertension- he also has a history of orthostatic hypotension with his Parkinson's, blood pressure has not been jumping up on the amlodipine but sometimes they get low values particular midmorning.  Due to having high blood pressures in the morning we will add back his amlodipine 2.5 mg in the morning.     6.  At this point patient remains on the combination of aspirin and Eliquis.  He has a class I indication for antiplatelet therapy given his prior  drug-eluting stent and underlying coronary disease.  He has a class I indication for chronic anticoagulation with his history of DVT as well as paroxysmal atrial fibrillation.  He had been on clopidogrel and this was switched to aspirin by neurology.     7.  Ischemic cardiomyopathy-continue current medical therapy.  As noted above he is not on beta-blockade because of sinus bradycardia.  No heart failure symptoms.     8. Hypotension - multi-factorial, autonomic r/t Parkinson's, medication induced, hypovolemia      9. Cognitive change - Patient and spouse all neurology today and they defer further evaluation or treatment.      Patient will continue checking his blood pressures.  Patient has no new complaints.  I will follow-up in 1 month with me.  Patient to call office with any questions or concerns.    Return in about 4 weeks (around 7/31/2024).    Future Appointments         Provider Department Center    8/14/2024 11:00 AM Delroy Malcolm PA-C [facility] CARDIOLOGY [facility]AG    10/28/2024 10:30 AM [facility] US NIVAS VASC CART 4 [facility] NON-INV VASC [facility]    11/6/2024 1:30 PM Cecilio Armas III, MD [facility] CARDIOLOGY LAG    11/13/2024 10:30 AM Vinod Cadena MD [facility] NEUROSURGERY [facility]    11/25/2024 9:30 AM Aleksander Diez MD [facility] PRIMARY CARE [facility]    1/6/2025 9:30 AM [facility] OP VAS RM 1 [facility] NON INVASIVE VASCULAR LABORATORY [facility]    1/6/2025 10:00 AM Elizabeth Landin APRN [facility] VASCULAR SURGERY [facility]                MEDICATIONS         Discharge Medications            Accurate as of July 3, 2024 12:50 PM. If you have any questions, ask your nurse or doctor.                Continue These Medications        Instructions Start Date   apixaban 5 MG tablet tablet  Commonly known as: ELIQUIS   5 mg, Oral, 2 Times Daily      aspirin 81 MG chewable tablet   81 mg, Oral, Daily       atorvastatin 40 MG tablet  Commonly known as: LIPITOR   40 mg, Oral, Nightly      atropine 1 % ophthalmic solution   1 drop, 3 Times Daily PRN, Orally       carbidopa-levodopa  MG per tablet  Commonly known as: SINEMET   1 tablet, Oral, 3 Times Daily      finasteride 5 MG tablet  Commonly known as: PROSCAR   5 mg, Oral, Daily      Gemtesa 75 MG tablet  Generic drug: Vibegron   Oral      levothyroxine 75 MCG tablet  Commonly known as: SYNTHROID, LEVOTHROID   75 mcg, Oral, Every Early Morning      PreserVision AREDS capsule   1 tablet, Oral, 2 Times Daily      SYSTANE OP   1 dose, Ophthalmic, As Needed      tamsulosin 0.4 MG capsule 24 hr capsule  Commonly known as: FLOMAX   1 capsule, Oral      valsartan 40 MG tablet  Commonly known as: DIOVAN   40 mg, Oral, Daily, In the evening.      Vitamin B 12 500 MCG tablet   2 tablets, Oral, Every Early Morning                   **Dragon Disclaimer:   Much of this encounter note is an electronic transcription/translation of spoken language to printed text. The electronic translation of spoken language may permit erroneous, or at times, nonsensical words or phrases to be inadvertently transcribed. Although I have reviewed the note for such errors, some may still exist.

## 2024-07-05 ENCOUNTER — TELEPHONE (OUTPATIENT)
Dept: CARDIOLOGY | Facility: CLINIC | Age: 89
End: 2024-07-05
Payer: MEDICARE

## 2024-07-05 NOTE — TELEPHONE ENCOUNTER
Patients wife called and said that Delroy wanted patient to resume the Norvasc 2.5 mg in the morning. Patient does not have this medication and will need it called into the [facility] in [place].

## 2024-07-08 RX ORDER — AMLODIPINE BESYLATE 2.5 MG/1
2.5 TABLET ORAL DAILY
COMMUNITY
End: 2024-07-08 | Stop reason: SDUPTHER

## 2024-07-08 RX ORDER — AMLODIPINE BESYLATE 2.5 MG/1
2.5 TABLET ORAL DAILY
Qty: 30 TABLET | Refills: 11 | Status: SHIPPED | OUTPATIENT
Start: 2024-07-08

## 2024-07-08 NOTE — TELEPHONE ENCOUNTER
Rx sent. Pt's wife Evonne advised and verbalized appreciation w/ no further questions at this time.    MORENO Negrete

## 2024-07-08 NOTE — TELEPHONE ENCOUNTER
Spoke w/ pt's wife Evonne advising Rx has been sent to pharmacy. Evonne verbalized appreciation and had no questions at this time.    MORENO Negrete

## 2024-08-09 ENCOUNTER — APPOINTMENT (OUTPATIENT)
Dept: GENERAL RADIOLOGY | Facility: HOSPITAL | Age: 89
End: 2024-08-09
Payer: MEDICARE

## 2024-08-09 ENCOUNTER — APPOINTMENT (OUTPATIENT)
Dept: ULTRASOUND IMAGING | Facility: HOSPITAL | Age: 89
End: 2024-08-09
Payer: MEDICARE

## 2024-08-09 ENCOUNTER — TELEPHONE (OUTPATIENT)
Dept: FAMILY MEDICINE CLINIC | Facility: CLINIC | Age: 89
End: 2024-08-09
Payer: MEDICARE

## 2024-08-09 ENCOUNTER — HOSPITAL ENCOUNTER (EMERGENCY)
Facility: HOSPITAL | Age: 89
Discharge: HOME | End: 2024-08-09
Attending: EMERGENCY MEDICINE
Payer: MEDICARE

## 2024-08-09 VITALS
RESPIRATION RATE: 18 BRPM | SYSTOLIC BLOOD PRESSURE: 176 MMHG | OXYGEN SATURATION: 100 % | TEMPERATURE: 98.6 F | HEIGHT: 71 IN | DIASTOLIC BLOOD PRESSURE: 77 MMHG | HEART RATE: 55 BPM | BODY MASS INDEX: 21.7 KG/M2 | WEIGHT: 155 LBS

## 2024-08-09 DIAGNOSIS — I82.811 SUPERFICIAL THROMBOSIS OF LEG, RIGHT: Primary | ICD-10-CM

## 2024-08-09 DIAGNOSIS — L03.115 CELLULITIS OF RIGHT LOWER LEG: ICD-10-CM

## 2024-08-09 DIAGNOSIS — S80.11XA CONTUSION OF RIGHT LOWER LEG, INITIAL ENCOUNTER: ICD-10-CM

## 2024-08-09 DIAGNOSIS — R60.0 EDEMA OF RIGHT LOWER LEG: ICD-10-CM

## 2024-08-09 LAB
ALBUMIN SERPL-MCNC: 3.9 G/DL (ref 3.5–5.2)
ALBUMIN/GLOB SERPL: 1.6 G/DL
ALP SERPL-CCNC: 76 U/L (ref 39–117)
ALT SERPL W P-5'-P-CCNC: <5 U/L (ref 1–41)
ANION GAP SERPL CALCULATED.3IONS-SCNC: 9.2 MMOL/L (ref 5–15)
APTT PPP: 34.9 SECONDS (ref 24.3–38.1)
AST SERPL-CCNC: 15 U/L (ref 1–40)
BASOPHILS # BLD AUTO: 0.02 10*3/MM3 (ref 0–0.2)
BASOPHILS NFR BLD AUTO: 0.4 % (ref 0–1.5)
BILIRUB SERPL-MCNC: 0.8 MG/DL (ref 0–1.2)
BUN SERPL-MCNC: 21 MG/DL (ref 8–23)
BUN/CREAT SERPL: 29.6 (ref 7–25)
CALCIUM SPEC-SCNC: 9 MG/DL (ref 8.2–9.6)
CHLORIDE SERPL-SCNC: 107 MMOL/L (ref 98–107)
CO2 SERPL-SCNC: 24.8 MMOL/L (ref 22–29)
CREAT SERPL-MCNC: 0.71 MG/DL (ref 0.76–1.27)
DEPRECATED RDW RBC AUTO: 50.9 FL (ref 37–54)
EGFRCR SERPLBLD CKD-EPI 2021: 87.2 ML/MIN/1.73
EOSINOPHIL # BLD AUTO: 0.13 10*3/MM3 (ref 0–0.4)
EOSINOPHIL NFR BLD AUTO: 2.7 % (ref 0.3–6.2)
ERYTHROCYTE [DISTWIDTH] IN BLOOD BY AUTOMATED COUNT: 13.9 % (ref 12.3–15.4)
GLOBULIN UR ELPH-MCNC: 2.4 GM/DL
GLUCOSE SERPL-MCNC: 100 MG/DL (ref 65–99)
HCT VFR BLD AUTO: 31.5 % (ref 37.5–51)
HGB BLD-MCNC: 10.6 G/DL (ref 13–17.7)
IMM GRANULOCYTES # BLD AUTO: 0.01 10*3/MM3 (ref 0–0.05)
IMM GRANULOCYTES NFR BLD AUTO: 0.2 % (ref 0–0.5)
INR PPP: 1.42 (ref 0.9–1.1)
LYMPHOCYTES # BLD AUTO: 0.74 10*3/MM3 (ref 0.7–3.1)
LYMPHOCYTES NFR BLD AUTO: 15.2 % (ref 19.6–45.3)
MCH RBC QN AUTO: 33.3 PG (ref 26.6–33)
MCHC RBC AUTO-ENTMCNC: 33.7 G/DL (ref 31.5–35.7)
MCV RBC AUTO: 99.1 FL (ref 79–97)
MONOCYTES # BLD AUTO: 0.47 10*3/MM3 (ref 0.1–0.9)
MONOCYTES NFR BLD AUTO: 9.7 % (ref 5–12)
NEUTROPHILS NFR BLD AUTO: 3.5 10*3/MM3 (ref 1.7–7)
NEUTROPHILS NFR BLD AUTO: 71.8 % (ref 42.7–76)
NRBC BLD AUTO-RTO: 0 /100 WBC (ref 0–0.2)
PLATELET # BLD AUTO: 159 10*3/MM3 (ref 140–450)
PMV BLD AUTO: 10 FL (ref 6–12)
POTASSIUM SERPL-SCNC: 4.6 MMOL/L (ref 3.5–5.2)
PROT SERPL-MCNC: 6.3 G/DL (ref 6–8.5)
PROTHROMBIN TIME: 17.8 SECONDS (ref 12.1–15)
RBC # BLD AUTO: 3.18 10*6/MM3 (ref 4.14–5.8)
SODIUM SERPL-SCNC: 141 MMOL/L (ref 136–145)
WBC NRBC COR # BLD AUTO: 4.87 10*3/MM3 (ref 3.4–10.8)

## 2024-08-09 PROCEDURE — 73610 X-RAY EXAM OF ANKLE: CPT

## 2024-08-09 PROCEDURE — 85025 COMPLETE CBC W/AUTO DIFF WBC: CPT | Performed by: NURSE PRACTITIONER

## 2024-08-09 PROCEDURE — 73562 X-RAY EXAM OF KNEE 3: CPT

## 2024-08-09 PROCEDURE — 99284 EMERGENCY DEPT VISIT MOD MDM: CPT

## 2024-08-09 PROCEDURE — 93971 EXTREMITY STUDY: CPT

## 2024-08-09 PROCEDURE — 73630 X-RAY EXAM OF FOOT: CPT

## 2024-08-09 PROCEDURE — 73590 X-RAY EXAM OF LOWER LEG: CPT

## 2024-08-09 PROCEDURE — 85610 PROTHROMBIN TIME: CPT | Performed by: NURSE PRACTITIONER

## 2024-08-09 PROCEDURE — 80053 COMPREHEN METABOLIC PANEL: CPT | Performed by: NURSE PRACTITIONER

## 2024-08-09 PROCEDURE — 85730 THROMBOPLASTIN TIME PARTIAL: CPT | Performed by: NURSE PRACTITIONER

## 2024-08-09 RX ORDER — DOXYCYCLINE 100 MG/1
100 CAPSULE ORAL 2 TIMES DAILY
Qty: 14 CAPSULE | Refills: 0 | Status: SHIPPED | OUTPATIENT
Start: 2024-08-09 | End: 2024-08-16

## 2024-08-09 RX ORDER — DOXYCYCLINE 100 MG/1
100 CAPSULE ORAL ONCE
Status: COMPLETED | OUTPATIENT
Start: 2024-08-09 | End: 2024-08-09

## 2024-08-09 RX ADMIN — DOXYCYCLINE 100 MG: 100 CAPSULE ORAL at 15:57

## 2024-08-09 NOTE — TELEPHONE ENCOUNTER
Patients wife called stating he had a fall last Friday. She stated they were at the podiatrist today. His leg and foot are swollen, has bruising and pain. The podiatrist advised them going to the ER from there and patient refused stating he would come her.     I informed that there were no openings and with his history of DVT's they need to go ahead and go to the ER for further evaluation. She voiced understanding and said they would go to [facility].

## 2024-08-09 NOTE — DISCHARGE INSTRUCTIONS
Rest and increase fluids.  Take medications as directed.  Continue your Eliquis as discussed.  Use an Ace wrap on the right leg until you are able to put your support stockings back on.  Follow-up with your primary care.  Return to the emergency department if symptoms get worse or change.

## 2024-08-09 NOTE — ED PROVIDER NOTES
Subjective   History of Present Illness  89-year-old  male patient presented to the emergency department via private vehicle with a chief complaint of fall with injury.  Per wife's report, patient fell at home on Friday.  She states that he had some bruising to his right upper extremity and complained of some tenderness in the right lower extremity.  She states she had not seen his right lower leg until today when they went to the podiatrist.  Patient has a significantly swollen contused right lower extremity.  She states that he has a hard time walking on the leg.  She states he does have a history of blood clots in the leg and is currently on Eliquis for this.  She states that they were seen by the podiatrist and they recommended that the patient come to the hospital for further evaluation due to his history.    History provided by:  Medical records, patient and relative      Review of Systems   Constitutional: Negative.    HENT: Negative.     Respiratory: Negative.     Cardiovascular: Negative.    Gastrointestinal: Negative.    Musculoskeletal:  Positive for gait problem and joint swelling.   Skin:  Positive for color change.   Hematological: Negative.    Psychiatric/Behavioral: Negative.     All other systems reviewed and are negative.      Past Medical History:   Diagnosis Date    Abdominal aortic aneurysm without rupture 10/14/2021    Acquired hypothyroidism 08/31/2022    Acute ischemic stroke 10/08/2023    Aneurysm     Arrhythmia     Atrial fibrillation     Cancer 04/2018    basil cell carcinoma    Carotid stenosis 10/29/2015    CHF (congestive heart failure)     Chronic deep vein thrombosis (DVT) of popliteal vein of right lower extremity     Clotting disorder     Coronary artery disease involving native coronary artery of native heart without angina pectoris 12/20/2018    DVT of lower extremity (deep venous thrombosis)     Hard of hearing     Hyperlipidemia     Hypertension     Localized edema  01/04/2017    Long-term use of high-risk medication     Metabolic encephalopathy 02/07/2023    Myocardial infarction 2918    LAD stent    PAD (peripheral artery disease) 09/10/2020    Parkinson's disease     Postphlebitic syndrome     Pure hypercholesterolemia     Skin tear of upper arm without complication, left, subsequent encounter     Stroke 11/02/2023       Allergies   Allergen Reactions    Penicillins Rash    Rocephin [Ceftriaxone] Rash       Past Surgical History:   Procedure Laterality Date    BASAL CELL CARCINOMA EXCISION  04/2018    CARDIAC CATHETERIZATION N/A 11/05/2018    Procedure: Left Heart Cath;  Surgeon: Oliverio Azar MD;  [facility]ASIVE LOC[facility];  Service: Cardiovascular    CARDIAC CATHETERIZATION N/A 11/05/2018    Procedure: Coronary angiography;  Surgeon: Oliverio Azar MD;  [facility];  Service: Cardiovascular    CARDIAC CATHETERIZATION N/A 11/05/2018    Procedure: Left ventriculography;  Surgeon: Oliverio Azar MD;  [facility];  Service: Cardiovascular    CARDIAC CATHETERIZATION N/A 06/04/2019    Procedure: Left Heart Cath;  Surgeon: Johnny Glasgow MD;  Location[facility];  Service: Cardiovascular    CARDIAC CATHETERIZATION N/A 06/04/2019    Procedure: Coronary angiography;  Surgeon: Johnny Glasgow MD;  Location[facility];  Service: Cardiovascular    CARDIAC CATHETERIZATION N/A 06/04/2019    Procedure: Left ventriculography;  Surgeon: Johnny Glasgow MD;  [facility];  Service: Cardiovascular    CARDIAC CATHETERIZATION N/A 06/04/2019    Procedure: Stent BILL coronary;  Surgeon: Johnny Glasgow MD;  Location: [facility] CATH INVASIVE LOCATION;  Service: Cardiovascular    CARDIAC SURGERY      CAROTID ARTERY ANGIOPLASTY Right 10/11/2023    with stent    CAROTID STENT      CATARACT EXTRACTION Left 08/2018    CATARACT  EXTRACTION Right 09/2018    COLONOSCOPY      2011    CORONARY STENT PLACEMENT      INCISION AND DRAINAGE LEG Right 07/07/2017    Procedure: INCISION AND DRAINAGE INFECTED HEMATOMA RIGHT THIGH;  Surgeon: Valentin Peña MD;  Location: [facility] OR;  Service:     JOINT REPLACEMENT      KNEE INCISION AND DRAINAGE Right 12/27/2016    Procedure: KNEE INCISION AND DRAINAGE;  Surgeon: Triston Whitten MD;  Location: [facility] OR;  Service:     NOSE SURGERY      nose replacement    SINUS SURGERY  1960    SKIN BIOPSY      SKIN GRAFT  12/2017    TOTAL KNEE ARTHROPLASTY Right     VASCULAR SURGERY      VENA CAVA FILTER PLACEMENT         Family History   Problem Relation Age of Onset    Heart attack Mother 74    Hypertension Mother     Cancer Sister     Stroke Brother     Cancer Brother     Cancer Brother     Deep vein thrombosis Neg Hx        Social History     Socioeconomic History    Marital status:     Years of education: college grad   Tobacco Use    Smoking status: Never     Passive exposure: Never    Smokeless tobacco: Never    Tobacco comments:     caff use   Vaping Use    Vaping status: Never Used   Substance and Sexual Activity    Alcohol use: No     Comment: none for a month    Drug use: No    Sexual activity: Not Currently     Partners: Female           Objective   Physical Exam  Vitals and nursing note reviewed.   Constitutional:       General: He is not in acute distress.     Appearance: He is normal weight. He is not ill-appearing, toxic-appearing or diaphoretic.   HENT:      Head: Normocephalic and atraumatic.      Right Ear: External ear normal.      Left Ear: External ear normal.      Nose: Nose normal.      Mouth/Throat:      Mouth: Mucous membranes are moist.      Pharynx: Oropharynx is clear.   Eyes:      Extraocular Movements: Extraocular movements intact.      Conjunctiva/sclera: Conjunctivae normal.      Pupils: Pupils are equal, round, and reactive to light.   Cardiovascular:      Rate  and Rhythm: Normal rate and regular rhythm.      Pulses: Normal pulses.      Heart sounds: Normal heart sounds.   Pulmonary:      Effort: Pulmonary effort is normal.      Breath sounds: Normal breath sounds.   Abdominal:      General: Abdomen is scaphoid. Bowel sounds are normal. There is no distension or abdominal bruit. There are no signs of injury.      Palpations: Abdomen is soft.      Tenderness: There is no abdominal tenderness.   Musculoskeletal:         General: Swelling, tenderness and signs of injury present.      Cervical back: Normal range of motion and neck supple.      Right lower leg: Edema present.   Skin:     General: Skin is warm and dry.      Capillary Refill: Capillary refill takes less than 2 seconds.      Findings: Bruising present.   Neurological:      General: No focal deficit present.      Mental Status: He is alert and oriented to person, place, and time.   Psychiatric:         Mood and Affect: Mood normal.         Behavior: Behavior normal.         Procedures           ED Course                                             Medical Decision Making  Differential diagnosis includes fracture, dislocation, sprain, strain, contusion, abrasion, hematoma, soft tissue injury, deep vein thrombosis, superficial thrombosis, peripheral edema, and debility.      XR Ankle 3+ View Right    Result Date: 8/9/2024  1.No evidence for displaced fracture or dislocation. The ankle mortise remains intact. 2.Soft tissue swelling is noted. Electronically Signed: Houston Rosas MD  8/9/2024 2:33 PM EDT  Workstation ID: [id number]    XR Foot 3+ View Right    Result Date: 8/9/2024  1.No evidence for displaced fracture or dislocation. Electronically Signed: Houston Rosas MD  8/9/2024 2:15 PM EDT  Workstation ID: [id number]    XR Knee 3 View Right    Result Date: 8/9/2024  1.Postoperative changes status post total knee arthroplasty. Near-anatomic alignment is noted. There is no evidence for periimplant fracture or  dislocation. There are no signs of hardware failure or loosening. 2.Soft tissue swelling is seen anteriorly which may be related to focal hematoma. Prepatellar bursitis could also have this appearance. Electronically Signed: Houston Rosas MD  8/9/2024 2:13 PM EDT  Workstation ID: [id number]    XR Tibia Fibula 2 View Right    Result Date: 8/9/2024  1.No evidence for displaced fracture or dislocation. Electronically Signed: Houston Rosas MD  8/9/2024 2:12 PM EDT  Workstation ID: [id number]    US Venous Doppler Lower Extremity Right (duplex)    Result Date: 8/9/2024  1.Evidence for chronic partially occlusive thrombus at the left popliteal venous distribution. 2.No evidence for additional DVT. Electronically Signed: Houston Rosas MD  8/9/2024 2:00 PM EDT  Workstation ID: [id number]      Results for orders placed or performed during the hospital encounter of 08/09/24  -CBC Auto Differential:   Specimen: Blood       Result                      Value             Ref Range           WBC                         4.87              3.40 - 10.80*       RBC                         3.18 (L)          4.14 - 5.80 *       Hemoglobin                  10.6 (L)          13.0 - 17.7 *       Hematocrit                  31.5 (L)          37.5 - 51.0 %       MCV                         99.1 (H)          79.0 - 97.0 *       MCH                         33.3 (H)          26.6 - 33.0 *       MCHC                        33.7              31.5 - 35.7 *       RDW                         13.9              12.3 - 15.4 %       RDW-SD                      50.9              37.0 - 54.0 *       MPV                         10.0              6.0 - 12.0 fL       Platelets                   159               140 - 450 10*       Neutrophil %                71.8              42.7 - 76.0 %       Lymphocyte %                15.2 (L)          19.6 - 45.3 %       Monocyte %                  9.7               5.0 - 12.0 %        Eosinophil %                2.7                0.3 - 6.2 %         Basophil %                  0.4               0.0 - 1.5 %         Immature Grans %            0.2               0.0 - 0.5 %         Neutrophils, Absolute       3.50              1.70 - 7.00 *       Lymphocytes, Absolute       0.74              0.70 - 3.10 *       Monocytes, Absolute         0.47              0.10 - 0.90 *       Eosinophils, Absolute       0.13              0.00 - 0.40 *       Basophils, Absolute         0.02              0.00 - 0.20 *       Immature Grans, Absolu*     0.01              0.00 - 0.05 *       nRBC                        0.0               0.0 - 0.2 /1*  -aPTT:   Specimen: Blood       Result                      Value             Ref Range           PTT                         34.9              24.3 - 38.1 *  -Protime-INR:   Specimen: Blood       Result                      Value             Ref Range           Protime                     17.8 (H)          12.1 - 15.0 *       INR                         1.42 (H)          0.90 - 1.10    -Comprehensive Metabolic Panel:   Specimen: Blood       Result                      Value             Ref Range           Glucose                     100 (H)           65 - 99 mg/dL       BUN                         21                8 - 23 mg/dL        Creatinine                  0.71 (L)          0.76 - 1.27 *       Sodium                      141               136 - 145 mm*       Potassium                   4.6               3.5 - 5.2 mm*       Chloride                    107               98 - 107 mmo*       CO2                         24.8              22.0 - 29.0 *       Calcium                     9.0               8.2 - 9.6 mg*       Total Protein               6.3               6.0 - 8.5 g/*       Albumin                     3.9               3.5 - 5.2 g/*       ALT (SGPT)                  <5                1 - 41 U/L          AST (SGOT)                  15                1 - 40 U/L          Alkaline Phosphatase         76                39 - 117 U/L        Total Bilirubin             0.8               0.0 - 1.2 mg*       Globulin                    2.4               gm/dL               A/G Ratio                   1.6               g/dL                BUN/Creatinine Ratio        29.6 (H)          7.0 - 25.0          Anion Gap                   9.2               5.0 - 15.0 m*       eGFR                        87.2              >60.0 mL/min*  Results for orders placed or performed during the hospital encounter of 07/02/24  -Duplex Carotid Ultrasound CAR:        Result                      Value             Ref Range           Right arm BP                180/86            mmHg                Left arm BP                 170/82            mmHg                Prox CCA PSV                -59.0             cm/sec              Prox CCA EDV                -9.3              cm/sec              Right Mid CCA PSV           70.3              cm/sec              right Mid CCA EDV           14.3              cm/sec              Dist CCA PSV                -105.0            cm/sec              Dist CCA EDV                -17.4             cm/sec              Prox ICA PSV                -142.1            cm/sec              Prox ICA EDV                -32.9             cm/sec              Mid ICA PSV                 -130.8            cm/sec              Mid ICA EDV                 -36.4             cm/sec              Dist ICA PSV                -110.7            cm/sec              Dist ICA EDV                -22.6             cm/sec              Prox ECA PSV                170.1             cm/sec              Prox ECA EDV                19.8              cm/sec              Vertebral A PSV             -66.6             cm/sec              Vertebral A EDV             -13.7             cm/sec              Prox SCLA PSV               105.8             cm/sec              Prox CCA PSV                -80.6             cm/sec              Prox CCA EDV                 -12.8             cm/sec              left Mid CCA PSV            95.1              cm/sec              left Mid CCA EDV            15.5              cm/sec              Dist CCA PSV                -67.2             cm/sec              Dist CCA EDV                -13.2             cm/sec              Prox ICA PSV                154.9             cm/sec              Prox ICA EDV                25.2              cm/sec              Mid ICA PSV                 -75.7             cm/sec              Mid ICA EDV                 -23.0             cm/sec              Dist ICA PSV                -65.9             cm/sec              Dist ICA EDV                -19.3             cm/sec              Prox ECA PSV                -156.9            cm/sec              Prox ECA EDV                -17.4             cm/sec              Vertebral A PSV             -45.8             cm/sec              Vertebral A EDV             -12.5             cm/sec              Prox SCLA PSV               123.0             cm/sec              XLRA ARMINDA RIGHT CAROTI*     -85.6             cm/sec              XLRA ARMINDA RIGHT CAROTI*     -18.1             cm/sec              XLRA ARMINDA LEFT CAROTID*     144.3             cm/sec              XLRA ARMINDA LEFT CAROTID*     24.2              cm/sec              ICA/CCA ratio               1.35                                  Rt.native proximal to *     80.60             cm/s                Rt Prx to Stent EDV         14.30             cm/s                Rt. Proximal Stent PSV      105.00            cm/s                Rt Prx Stent EDV            17.40             cm/s                Rt. Mid Stent PSV           142.00            cm/s                Rt Mid Stent EDV            32.90             cm/s                Rt Distal Stent PSV         131.00            cm/s                Rt. Distal Stent EDV        36.40             cm/s                Rt Dis To Stent PSV         111.00             cm/s                Rt distal to stent EDV      22.60             cm/s                ICA/CCA ratio               2.31                                  Right CCA STENT             1.00              cm/s                BH CVPROX RIGHT ICA HI*     1.00              CM                  BH CV MID RIGHT ICA HI*     1.00              cm               *Note: Due to a large number of results and/or encounters for the requested time period, some results have not been displayed. A complete set of results can be found in Results Review.    Discussed imaging, laboratory and assessment findings.  Patient has no acute fractures or dislocation on plain imaging.  Ultrasound shows chronic superficial thrombosis of the right leg.  Patient has some slight cellulitis present on the right lower leg from an abrasion.  We will place him on doxycycline 100 mg by mouth twice daily.  We will wrap his right leg with an Ace wrap.  He will use this until he is able to use his compression stocking.  Patient should follow-up with his PCP in the next 5 to 7 days.  Patient should return to the emergency department if his symptoms get worse or change.  Patient understands and agrees to discharge plan.    Problems Addressed:  Cellulitis of right lower leg: acute illness or injury  Contusion of right lower leg, initial encounter: acute illness or injury  Edema of right lower leg: acute illness or injury  Superficial thrombosis of leg, right: chronic illness or injury    Amount and/or Complexity of Data Reviewed  Labs: ordered. Decision-making details documented in ED Course.  Radiology: ordered. Decision-making details documented in ED Course.    Risk  Prescription drug management.        Final diagnoses:   Superficial thrombosis of leg, right   Cellulitis of right lower leg   Contusion of right lower leg, initial encounter   Edema of right lower leg       ED Disposition  ED Disposition       ED Disposition   Discharge    Condition   Stable    Comment    --               Aleksander Diez MD  [address] [id number] [phone]               Medication List        New Prescriptions      doxycycline 100 MG capsule  Commonly known as: MONODOX  Take 1 capsule by mouth 2 (Two) Times a Day for 7 days.               Where to Get Your Medications        These medications were sent to [facility]/pharmacy #[id number] - [place], [place] - [address] [address] AT INTERSECTION OF [address] [phone] [facility] [phone]   [address] [address], [facility] [address]      Phone: [phone]   doxycycline 100 MG capsule            Chavo Huizar, APRN  08/09/24 [id number]

## 2024-08-14 ENCOUNTER — OFFICE VISIT (OUTPATIENT)
Dept: CARDIOLOGY | Facility: CLINIC | Age: 89
End: 2024-08-14
Payer: MEDICARE

## 2024-08-14 ENCOUNTER — OFFICE VISIT (OUTPATIENT)
Dept: FAMILY MEDICINE CLINIC | Facility: CLINIC | Age: 89
End: 2024-08-14
Payer: MEDICARE

## 2024-08-14 VITALS
DIASTOLIC BLOOD PRESSURE: 58 MMHG | OXYGEN SATURATION: 99 % | BODY MASS INDEX: 21.6 KG/M2 | HEART RATE: 65 BPM | WEIGHT: 154.3 LBS | SYSTOLIC BLOOD PRESSURE: 128 MMHG | HEIGHT: 71 IN | RESPIRATION RATE: 18 BRPM

## 2024-08-14 VITALS
HEIGHT: 71 IN | WEIGHT: 158 LBS | OXYGEN SATURATION: 98 % | HEART RATE: 60 BPM | DIASTOLIC BLOOD PRESSURE: 72 MMHG | SYSTOLIC BLOOD PRESSURE: 112 MMHG | TEMPERATURE: 97.3 F | BODY MASS INDEX: 22.12 KG/M2

## 2024-08-14 DIAGNOSIS — I25.10 CORONARY ARTERY DISEASE INVOLVING NATIVE CORONARY ARTERY OF NATIVE HEART WITHOUT ANGINA PECTORIS: ICD-10-CM

## 2024-08-14 DIAGNOSIS — I73.9 PAD (PERIPHERAL ARTERY DISEASE): ICD-10-CM

## 2024-08-14 DIAGNOSIS — R29.6 FALLS FREQUENTLY: Primary | ICD-10-CM

## 2024-08-14 DIAGNOSIS — S80.01XD CONTUSION OF RIGHT KNEE, SUBSEQUENT ENCOUNTER: ICD-10-CM

## 2024-08-14 DIAGNOSIS — I10 BENIGN ESSENTIAL HYPERTENSION: ICD-10-CM

## 2024-08-14 DIAGNOSIS — I48.0 PAROXYSMAL A-FIB: Primary | ICD-10-CM

## 2024-08-14 DIAGNOSIS — I65.23 ASYMPTOMATIC BILATERAL CAROTID ARTERY STENOSIS: ICD-10-CM

## 2024-08-14 PROCEDURE — 1160F RVW MEDS BY RX/DR IN RCRD: CPT | Performed by: FAMILY MEDICINE

## 2024-08-14 PROCEDURE — 1126F AMNT PAIN NOTED NONE PRSNT: CPT | Performed by: FAMILY MEDICINE

## 2024-08-14 PROCEDURE — 99213 OFFICE O/P EST LOW 20 MIN: CPT | Performed by: FAMILY MEDICINE

## 2024-08-14 PROCEDURE — 1159F MED LIST DOCD IN RCRD: CPT | Performed by: FAMILY MEDICINE

## 2024-08-14 NOTE — PROGRESS NOTES
CARDIOLOGY        Patient Name: Casey Snowden Sr.  :1934  Age: 89 y.o.  Primary Cardiologist: Cecilio Armas MD  Encounter Provider:  Delroy Malcolm PA-C    Date of Service: 24        CHIEF COMPLAINT / REASON FOR OFFICE VISIT     6-week follow-up      HISTORY OF PRESENT ILLNESS       HPI  Casey Snowden Sr. is a 89 y.o. male who presents today for 6-week follow-up.        Pt has a  history significant for Parkinson's, paroxysmal atrial fibrillation, chronic diastolic heart failure, hypertension, PAD, DVT, and stroke presents for ER follow-up.  Patient presented to the ER on 2024 for hypotension.  Patient got up from his chair and was feeling fatigue and generalized weak and he checked his blood pressure and 66/40.  He has symptoms over a few minutes and called EMS and sent to the ER.  His blood pressure in the ER was 137/84.  Patient workup including CT of his head that not show anything acute.  They discussed case with Dr. Reina (On call).  Patient was to take his Norvasc only in the morning.  Patient was to follow-up in the office.  Patient did see his neurologist this morning as well.     On 24 had a another episode day while outside and again yesterday while outside.  Both episodes were he had low blood pressure.  Patient has not been drinking much water.  Patient has been outside during to these recent events.  Patient denies any associated symptoms with the lightheadedness and generalized fatigue.  No chest pain, palpitations, shortness of breath, new leg edema, orthopnea.  Patient has not had any issues with bleeding.     On 7-3-24 he continued to have fluctuations in his blood pressure.  Due to his blood pressure being on the lower side his valsartan was cut in half.  Patient has not been taking his Norvasc.  Patient blood pressure has been high in the mornings and improves throughout the day.  Patient did have a spell of dizziness while out in the garden again.  No  syncope.  Patient denies any chest pain, shortness of breath, palpitations, no edema to his legs, orthopnea, or bleeding issues.     Patient's blood pressure has improved with the recent changes.  Patient has not been guarding which has helped his falls along with his hypotension.  Patient has been minimizing, she is on her feet.  He did have a fall last Friday due to tripping over a curb patient fell onto his right knee and hands.  Patient was seen in the ER where he had x-ray of his right lower extremity.  He also had a Doppler of his right lower extremity as well.  Continues to have some swelling to his right knee and right leg from the fall.  His swelling has improved but has not been elevating or using compression.  Patient denies any chest pain, palpitations, shortness of breath.  Patient's follow-up with his PCP today       Below is a brief summary of patient's pertinent cardiac history  He was previously hospitalized with a CVA due to severe RCA stenosis.  He follows consistently with vascular surgery, he had a percutaneous revascularization procedure performed.      Initially he was discharged on apixaban 2.5 mg twice daily along with Brilinta, Brilinta was stopped after several days.  They say he just saw vascular surgery who wanted to increase him to 5 mg twice daily on apixaban but wanted our okay before doing so.  Patient previously on apixaban 2.5 mg because of DVT.     Also noted he had some problems with hypertension, he was discharged on amlodipine 5 mg daily.     He was previously hospitalized with mental status changes.  He also had swelling and drainage from his right lower extremity.  He has a history of a DVT on that side and follows with vascular surgery, we had them call vascular surgery and they recommended compliance with his compression stockings.     He has a history of metastatic squamous cell carcinoma.  He has undergone radiation therapy to his neck as well as chemotherapy.      He  presented to [facility] on 6/[facility]/19 with complaints of light headed and dizziness as well as left sided chest pain as he was working in the yard. He was found to be in atrial fibrillation with RVR, with worse than baseline anterior ST changes. He was rate controlled over night. He was also noted to have pneumonia and was started on IV levaquin. Patient was seen by Dr. Perez on the morning of 6/4. He was noted to jave troponin elevation and was transferred to [facility] for cardiac catheterization. The LAD was totally occluded and was treated with PCI and drug eluting stent placement to the LAD (3.0 x 28 mm Xience Lory drug eluting stent). Echocardiogram showed EF 40% with an apical wall motion abnormalities. He has been on carvedilol on the past, this was stopped due to bradycardia. He received metoprolol and tolerated well with HR in the 50's. At discharge this was changed to metoprolol succinate given cardiomyopathy. He is also on losartan.      Cath 6/2019  Hemodynamics:   LV: 150/8  AO: 150/64     PCI CORONARY SEGMENT: Mid LAD  PRE-STENOSIS: 100  POST-STENOSIS: 0%  LESION TYPE: c  EREN FLOW PRE/POST: 1/3  CULPRIT LESION: Yes     Estimated blood loss: Minimal  Complications: None     Conclusions:   1. Left main: Normal  2. LAD: Occluded mid segment.  EREN I flow  3. LCX:Normal  4. RCA: 30 to 40% mid vessel stenosis  5.  Normal left ventricular size.  Moderately reduced systolic function.  Ejection fraction 35%.  Mid to distal anterolateral akinesis.  Apical dyskinesis  6.  PCI of the mid LAD with a 3.0 x 28 mm Xience Lory drug-eluting stent, postdilated to high pressure with a 3.25 mm noncompliant trek balloon     Echo 6/2019  Interpretation Summary      The left ventricular cavity is mildly dilated.  Left ventricular wall thickness is consistent with mild concentric hypertrophy.  There is mild calcification of the aortic valve.  Estimated EF = 40%.  The following left ventricular wall  "segments are akinetic: apical anterior, apical lateral, apical inferior, apical septal and apex.  Left ventricular diastolic dysfunction (grade II) consistent with pseudonormalization.  Left atrial cavity size is moderately dilated.  Mild tricuspid valve regurgitation is present.  Calculated right ventricular systolic pressure from tricuspid regurgitation is 41 mmHg.  Mild pulmonic valve regurgitation is present.            He has a history of paroxysmal atrial fibrillation and has previously followed with Dr. Mercado.  He has been maintained on chronic anticoagulation.  He apparently never required rate control medicine due to baseline bradycardia.      The following portions of the patient's history were reviewed and updated as appropriate: allergies, current medications, past family history, past medical history, past social history, past surgical history and problem list.      VITAL SIGNS     Visit Vitals  /58 (BP Location: Left arm, Patient Position: Sitting, Cuff Size: Adult)   Pulse 65   Resp 18   Ht 180.3 cm (71\")   Wt 70 kg (154 lb 4.8 oz)   SpO2 99%   BMI 21.52 kg/m²       @[email or web address]  Wt Readings from Last 3 Encounters:   08/14/24 70 kg (154 lb 4.8 oz)   08/09/24 70.3 kg (155 lb)   07/03/24 68.4 kg (150 lb 11.2 oz)     Body mass index is 21.52 kg/m².        PHYSICAL EXAMINATION     Constitutional:       General: Awake.      Appearance: Not in distress. Chronically ill-appearing.   Pulmonary:      Effort: Pulmonary effort is normal.      Breath sounds: Normal breath sounds.   Cardiovascular:      Normal rate. Regular rhythm.      Murmurs: There is no murmur.   Edema:     Pretibial: 1+ edema of the left pretibial area.  Musculoskeletal:        Legs:       Comments: Presenting with soft tissue swelling noted to right lateral calf area.    Small hematoma noted to the left right anterior knee.    No erythema or cellulitic changes noted.  Motion of right lower extremity.  Compartments are " soft. Skin:     General: Skin is warm.   Neurological:      Mental Status: Alert.   Psychiatric:         Behavior: Behavior is cooperative.           REVIEWED DATA     Procedures    Cardiac Procedures:    Carotid duplex ultrasound on 11/16/2023  Interpretation Summary          Widely patent right carotid stent without residual or recurrent stenosis. No external carotid stenosis.    Left internal carotid artery demonstrates a less than 50% stenosis.     Transthoracic echo on 10/11/2023  Interpretation Summary       Left ventricular systolic function is mildly decreased. Calculated left ventricular EF = 44.7%    Left ventricular wall thickness is consistent with mild to moderate concentric hypertrophy.    The following left ventricular wall segments are hypokinetic: apical anterior and mid anteroseptal. The following left ventricular wall segments are akinetic: apical inferior, apical septal and apex.    Left ventricular diastolic function is consistent with (grade Ia w/high LAP) impaired relaxation.    There is calcification of the aortic valve.    Estimated right ventricular systolic pressure from tricuspid regurgitation is normal (<35 mmHg).      Lipid Panel          10/10/2023    04:44 5/21/2024    10:42   Lipid Panel   Total Cholesterol 138     Total Cholesterol  133    Triglycerides 52  49    HDL Cholesterol 56  62    VLDL Cholesterol 11  11    LDL Cholesterol  71  60    LDL/HDL Ratio 1.28         Lab Results   Component Value Date     08/09/2024     05/24/2024    K 4.6 08/09/2024    K 4.0 05/24/2024     08/09/2024     05/24/2024    CO2 24.8 08/09/2024    CO2 25.0 05/24/2024    BUN 21 08/09/2024    BUN 25 (H) 05/24/2024    CREATININE 0.71 (L) 08/09/2024    CREATININE 0.91 05/24/2024    EGFRIFNONA 99 06/05/2019    EGFRIFNONA 90 06/04/2019    EGFRIFAFRI >60 12/27/2022    EGFRIFAFRI >60 12/21/2022    GLUCOSE 100 (H) 08/09/2024    GLUCOSE 66 05/24/2024    CALCIUM 9.0 08/09/2024    CALCIUM 8.5  (L) 05/24/2024    PROTENTOTREF 6.2 10/03/2023    PROTENTOTREF 6.1 04/25/2017    ALBUMIN 3.9 08/09/2024    ALBUMIN 4.1 05/24/2024    BILITOT 0.8 08/09/2024    BILITOT 0.7 05/24/2024    AST 15 08/09/2024    AST 10 05/24/2024    ALT <5 08/09/2024    ALT 8 05/24/2024     Lab Results   Component Value Date    WBC 4.87 08/09/2024    WBC 7.86 05/24/2024    HGB 10.6 (L) 08/09/2024    HGB 12.3 (L) 05/24/2024    HCT 31.5 (L) 08/09/2024    HCT 36.2 (L) 05/24/2024    MCV 99.1 (H) 08/09/2024    MCV 96.8 05/24/2024     08/09/2024     05/24/2024     Lab Results   Component Value Date    PROBNP 1,193.0 05/24/2024    PROBNP 2,811.0 (H) 10/23/2022     Lab Results   Component Value Date    CKTOTAL 45 10/23/2022    TROPONINT 32 (H) 05/24/2024     Lab Results   Component Value Date    TSH 2.680 05/24/2024    TSH 2.980 05/21/2024             ASSESSMENT & PLAN     Diagnoses and all orders for this visit:      1. Atrial Fibrillation and Atrial Flutter  Assessment   The patient has paroxysmal atrial fibrillation   The patient's CHADS2-VASc score is 3   A [id number] score of 2 or more is considered a high risk for a thromboembolic event   Apixaban prescribed     Plan   Attempt to maintain sinus rhythm   Continue apixaban for antithrombotic therapy, bleeding issues discussed   Sinus bradycardia no rate control meds     2. Heart Failure  Assessment   Beta blocker not prescribed for patient reasons   Angiotensin receptor blocker (ARB) prescribed     Plan   The patient has received heart failure education on the following topics: medication instructions, symptom management and weight monitoring   The heart failure care plan was discussed with the patient today including: continuing the current program     3.  History of chronic DVT; Patient remains on chronic anticoagulation and follows with Dr. Dorado. Patient has an IVC filter in place     4.  Coronary Artery Disease  Assessment   The patient has no angina     Plan   Lifestyle  modifications discussed include adhering to a heart healthy diet, avoidance of tobacco products, maintenance of a healthy weight, medication compliance, regular exercise and regular monitoring of cholesterol and blood pressure   Toprol has been discontinued in the past because of bradycardia     Subjective - Objective   There is a history of past MI    There has been a previous stent procedure using BILL    Current antiplatelet therapy includes aspirin 81 mg        5.  Essential hypertension- he also has a history of orthostatic hypotension with his Parkinson's, blood pressure has not been jumping up on the amlodipine but sometimes they get low values particular midmorning.  Due to having high blood pressures in the morning we will add back his amlodipine 2.5 mg in the morning.     6.  At this point patient remains on the combination of aspirin and Eliquis.  He has a class I indication for antiplatelet therapy given his prior drug-eluting stent and underlying coronary disease.  He has a class I indication for chronic anticoagulation with his history of DVT as well as paroxysmal atrial fibrillation.  He had been on clopidogrel and this was switched to aspirin by neurology.     7.  Ischemic cardiomyopathy-continue current medical therapy.  As noted above he is not on beta-blockade because of sinus bradycardia.  No heart failure symptoms.     8. Hypotension - multi-factorial, autonomic r/t Parkinson's, medication induced, hypovolemia      9. Cognitive change - Follows Dr. Goodwin     Patient will continue same course.  Patient to avoid prolonged periods of standing..  Patient has no new cardiac complaints.  Patient to follow-up with orthopedics for right lower extremity injury patient to call office with any questions or concerns.        Return for Dr. Armas.    Future Appointments         Provider Department Center    8/14/2024 4:30 PM Aleksander Diez MD [facility] PRIMARY CARE [facility]    10/28/2024  10:30 AM [facility] US NIVAS VASC CART 4 [facility] NON-INV VASC [facility]    11/6/2024 1:30 PM Cecilio Armas III, MD [facility] CARDIOLOGY LAG    11/13/2024 10:30 AM Vinod Cadena MD [facility] NEUROSURGERY [facility]    11/25/2024 9:30 AM Aleksander Diez MD [facility] PRIMARY CARE [facility]    1/6/2025 9:30 AM [facility] OP VAS RM 1 [facility] NON INVASIVE VASCULAR LABORATORY [facility]    1/6/2025 10:00 AM Elizabeth Landin APRN [facility] VASCULAR SURGERY [facility]                MEDICATIONS         Discharge Medications            Accurate as of August 14, 2024 12:39 PM. If you have any questions, ask your nurse or doctor.                Continue These Medications        Instructions Start Date   amLODIPine 2.5 MG tablet  Commonly known as: NORVASC   2.5 mg, Oral, Daily      apixaban 5 MG tablet tablet  Commonly known as: ELIQUIS   5 mg, Oral, 2 Times Daily      aspirin 81 MG chewable tablet   81 mg, Oral, Daily      atorvastatin 40 MG tablet  Commonly known as: LIPITOR   40 mg, Oral, Nightly      atropine 1 % ophthalmic solution   1 drop, 3 Times Daily PRN, Orally       carbidopa-levodopa  MG per tablet  Commonly known as: SINEMET   1 tablet, Oral, 3 Times Daily      doxycycline 100 MG capsule  Commonly known as: MONODOX   100 mg, Oral, 2 Times Daily      finasteride 5 MG tablet  Commonly known as: PROSCAR   5 mg, Oral, Daily      Gemtesa 75 MG tablet  Generic drug: Vibegron   Oral      levothyroxine 75 MCG tablet  Commonly known as: SYNTHROID, LEVOTHROID   75 mcg, Oral, Every Early Morning      PreserVision AREDS capsule   1 tablet, Oral, 2 Times Daily      SYSTANE OP   1 dose, Ophthalmic, As Needed      tamsulosin 0.4 MG capsule 24 hr capsule  Commonly known as: FLOMAX   1 capsule, Oral      valsartan 40 MG tablet  Commonly known as: DIOVAN   40 mg, Oral, Daily, In the evening.      Vitamin B 12 500 MCG tablet   2 tablets, Oral, Every Early  [place]                   **Dragon Disclaimer:   Much of this encounter note is an electronic transcription/translation of spoken language to printed text. The electronic translation of spoken language may permit erroneous, or at times, nonsensical words or phrases to be inadvertently transcribed. Although I have reviewed the note for such errors, some may still exist.

## 2024-08-14 NOTE — PROGRESS NOTES
"  Subjective   Casey Snowden Sr. is a 89 y.o. male who is here for   Chief Complaint   Patient presents with    ER follow up     Right leg cellulitis, edema and contusion    Fall   .     History of Present Illness     Afshin is here in ER follow-up after a fall.  He tripped on a curb outside landing on his right side.  Injuring his right elbow right wrist right hand, right knee.  He has reoccurring falls due to chronic medical conditions and age.  Went to the emergency room on August 9.  Was seen treated x-ray CAT scan and released.  Sustained no fractures.  This point his right arm injuries are healing nicely  Right knee has a hematoma on the medial aspect and his right lower leg is swollen.    The following portions of the patient's history were reviewed and updated as appropriate: allergies, current medications, past medical history, past social history, past surgical history, and problem list.    Review of Systems    Objective   Vitals:    08/14/24 1632   BP: 112/72   BP Location: Left arm   Patient Position: Sitting   Cuff Size: Adult   Pulse: 60   Temp: 97.3 °F (36.3 °C)   SpO2: 98%   Weight: 71.7 kg (158 lb)   Height: 180.3 cm (70.98\")      Physical Exam  Vitals reviewed.   Musculoskeletal:      Right knee: Swelling, erythema, ecchymosis and bony tenderness present. Decreased range of motion. Tenderness present.      Right lower leg: Swelling present. Edema present.   Neurological:      Mental Status: He is alert.     Right elbow and wrist with healing bruising  Right knee hematoma over the medial femoral epicondyle  Right lower leg is swollen  Reviewed all his x-rays and ER workup  US Venous Doppler Lower Extremity Right (duplex) (08/09/2024 13:57)   Assessment & Plan   Diagnoses and all orders for this visit:    1. Falls frequently (Primary)    2. Contusion of right knee, subsequent encounter    We discussed fall safety again  Afshin does not want to use his cane or rollator.  Continue heat versus cold packs " for pain in the right knee  He remains on anticoagulation  Finish out the antibiotics, as his right leg is quite swollen with edema.  At risk for cellulitis    There are no Patient Instructions on file for this visit.    There are no discontinued medications.     No follow-ups on file.    Dr. Aleksander Diez  [place], [place]

## 2024-08-15 PROBLEM — R29.6 FALLS FREQUENTLY: Status: ACTIVE | Noted: 2024-08-15

## 2024-08-22 ENCOUNTER — TELEPHONE (OUTPATIENT)
Dept: FAMILY MEDICINE CLINIC | Facility: CLINIC | Age: 89
End: 2024-08-22

## 2024-08-27 ENCOUNTER — OFFICE VISIT (OUTPATIENT)
Dept: FAMILY MEDICINE CLINIC | Facility: CLINIC | Age: 89
End: 2024-08-27
Payer: MEDICARE

## 2024-08-27 VITALS
WEIGHT: 152 LBS | BODY MASS INDEX: 21.28 KG/M2 | OXYGEN SATURATION: 100 % | TEMPERATURE: 97.8 F | HEIGHT: 71 IN | HEART RATE: 64 BPM | DIASTOLIC BLOOD PRESSURE: 82 MMHG | SYSTOLIC BLOOD PRESSURE: 120 MMHG

## 2024-08-27 DIAGNOSIS — R29.6 FALLS FREQUENTLY: ICD-10-CM

## 2024-08-27 DIAGNOSIS — I77.9 PERIPHERAL ARTERIAL OCCLUSIVE DISEASE: ICD-10-CM

## 2024-08-27 DIAGNOSIS — I50.32 CHRONIC DIASTOLIC CONGESTIVE HEART FAILURE: Primary | Chronic | ICD-10-CM

## 2024-08-27 DIAGNOSIS — I11.0 HYPERTENSIVE HEART DISEASE WITH HEART FAILURE: ICD-10-CM

## 2024-08-27 DIAGNOSIS — G20.B2 PARKINSON'S DISEASE WITH DYSKINESIA AND FLUCTUATING MANIFESTATIONS: Chronic | ICD-10-CM

## 2024-08-27 PROCEDURE — 1160F RVW MEDS BY RX/DR IN RCRD: CPT | Performed by: FAMILY MEDICINE

## 2024-08-27 PROCEDURE — 99214 OFFICE O/P EST MOD 30 MIN: CPT | Performed by: FAMILY MEDICINE

## 2024-08-27 PROCEDURE — 1159F MED LIST DOCD IN RCRD: CPT | Performed by: FAMILY MEDICINE

## 2024-08-27 PROCEDURE — 1126F AMNT PAIN NOTED NONE PRSNT: CPT | Performed by: FAMILY MEDICINE

## 2024-08-27 RX ORDER — FUROSEMIDE 20 MG
20 TABLET ORAL EVERY OTHER DAY
Qty: 30 TABLET | Refills: 0 | Status: SHIPPED | OUTPATIENT
Start: 2024-08-27

## 2024-08-27 NOTE — PROGRESS NOTES
"  Subjective   Casey Snowden Sr. is a 89 y.o. male who is here for   Chief Complaint   Patient presents with    multiple falls    Edema     Right lower leg   .     History of Present Illness     Afshin brought in with his wife continues to fall.  Overall continues to decline.  His confusion is getting worse  Has multiple skin tears on arms and legs  Falls are multifactorial.    The following portions of the patient's history were reviewed and updated as appropriate: allergies, current medications, past medical history, past social history, past surgical history, and problem list.    Review of Systems    Objective   Vitals:    08/27/24 1119   BP: 120/82   BP Location: Left arm   Patient Position: Sitting   Cuff Size: Adult   Pulse: 64   Temp: 97.8 °F (36.6 °C)   SpO2: 100%   Weight: 68.9 kg (152 lb)   Height: 180.3 cm (70.98\")      Physical Exam  Thin  Skin numerous bruises  Open skin tears right elbow and both lower legs  Right lower leg is edematous and oozing serous fluid      Assessment & Plan   Diagnoses and all orders for this visit:    1. Chronic diastolic (congestive) heart failure (Primary)  -     furosemide (Lasix) 20 MG tablet; Take 1 tablet by mouth Every Other Day. Indications: Edema  Dispense: 30 tablet; Refill: 0  -     Ambulatory Referral to Palliative Care    2. Peripheral arterial occlusive disease  -     apixaban (ELIQUIS) 5 MG tablet tablet; Take 1 tablet by mouth Daily. Indications: DVT/PE (active thrombosis), Prevention of Unwanted Clot in Veins    3. Parkinson's disease with dyskinesia and fluctuating manifestations  -     Ambulatory Referral to Palliative Care    4. Falls frequently  -     Ambulatory Referral to Palliative Care    Stop the amlodipine to help with his orthostatic hypotension  Autonomic dysfunction from his Parkinson's    Lasix 20 mg every other day for the fair amount of edema in his right lower leg    Consult palliative care    Reduce his Eliquis to once a day dosing.  Very " high risk of fall and having a significant hemorrhage    Wrap up all the skin tears with Bactroban nonstick pad 4 x 4 gauze and kept in place with an Ace wrap; right arm, both lower legs  There are no Patient Instructions on file for this visit.    Medications Discontinued During This Encounter   Medication Reason    amLODIPine (NORVASC) 2.5 MG tablet     apixaban (ELIQUIS) 5 MG tablet tablet         No follow-ups on file.    Dr. Aleksadner Diez  [place], [place]

## 2024-08-29 ENCOUNTER — TELEPHONE (OUTPATIENT)
Dept: FAMILY MEDICINE CLINIC | Facility: CLINIC | Age: 89
End: 2024-08-29

## 2024-08-30 RX ORDER — VALSARTAN 40 MG/1
40 TABLET ORAL DAILY
Qty: 90 TABLET | Refills: 0 | Status: SHIPPED | OUTPATIENT
Start: 2024-08-30

## 2024-09-13 ENCOUNTER — TELEPHONE (OUTPATIENT)
Dept: FAMILY MEDICINE CLINIC | Facility: CLINIC | Age: 89
End: 2024-09-13

## 2024-09-13 NOTE — TELEPHONE ENCOUNTER
Caller: Evonne Snowden    Relationship: Emergency Contact    Best call back number: [phone]    Who are you requesting to speak with (clinical staff, provider,  specific staff member): CLINICAL STAFF    What was the call regarding: STATED THAT THEY HAD GONE AND DONE THE NEUROLOGICAL EXPERIENCE EARLIER THIS WEEK. STATED THAT THE PROVIDER THERE HAD TOLD THEM TO TAKE THE apixaban (ELIQUIS) 5 MG tablet tablet AS 2.5 MG DOSES WITH ONE IN THE MORNING AND ONE AT NIGHT. STATED THAT DR. NIXON HAD JUST RECENTLY CHANGED THEM TO JUST 5 MG IN THE MORNING SO THEY WOULD LIKE TO MAKE SURE OF WHAT THEY ARE SUPPOSED TO DO. PLEASE CALL AND ADVISE

## 2024-10-07 ENCOUNTER — TREATMENT (OUTPATIENT)
Dept: PHYSICAL THERAPY | Facility: CLINIC | Age: 89
End: 2024-10-07
Payer: MEDICARE

## 2024-10-07 DIAGNOSIS — Z91.81 AT HIGH RISK FOR FALLS: Primary | ICD-10-CM

## 2024-10-07 DIAGNOSIS — R53.1 WEAKNESS GENERALIZED: ICD-10-CM

## 2024-10-07 DIAGNOSIS — R26.89 BALANCE PROBLEM: ICD-10-CM

## 2024-10-07 DIAGNOSIS — R53.81 PHYSICAL DECONDITIONING: ICD-10-CM

## 2024-10-07 NOTE — PROGRESS NOTES
Physical Therapy Initial Evaluation and Plan of Care    [address] [facility] [address]      Patient: Casey Snowden Sr.   : 1934  Diagnosis/ICD-10 Code:  At high risk for falls [Z91.81]  Referring practitioner: Kashif Goodwin MD  Date of Initial Visit: 10/7/2024  Today's Date: 10/7/2024  Patient seen for 1 sessions           Subjective Questionnaire: LEFS:       Subjective Evaluation    History of Present Illness  Mechanism of injury: Afshin is here for general strengthening, balance due to increased falls risk.  Last fall having to go into ER back in August, he tripped on a curb outside landing on his right side. Injuring his right elbow right wrist right hand, right knee. He has reoccurring falls due to chronic medical conditions and age.  Went to the emergency room on .  Was seen treated x-ray CAT scan and released. Sustained no fractures. This point his right arm injuries are healing nicely. Right knee has a hematoma on the medial aspect and his right lower leg is swollen. Afshin does not want to use his rollator or walker. Most recent fall as of last night in the bathroom. Pt's wife reports he is more unstable at evenings. Pt reports still difficulty with swallowing.         Patient Occupation: retired, works outside in the yard a lot Quality of life: good    Pain  Current pain ratin  Progression: worsening    Social Support  Lives with: spouse    Hand dominance: right    Diagnostic Tests  No diagnostic tests performed    Treatments  Previous treatment: physical therapy and speech therapy  Patient Goals  Patient goals for therapy: decreased pain, improved balance, increased motion, increased strength, independence with ADLs/IADLs and return to sport/leisure activities           Past Medical History:   Diagnosis Date    Abdominal aortic aneurysm without rupture 10/14/2021    Acquired hypothyroidism 2022    Acute ischemic stroke 10/08/2023    Aneurysm      Arrhythmia     Atrial fibrillation     Cancer 04/2018    basil cell carcinoma    Carotid stenosis 10/29/2015    CHF (congestive heart failure)     Chronic deep vein thrombosis (DVT) of popliteal vein of right lower extremity     Clotting disorder     Coronary artery disease involving native coronary artery of native heart without angina pectoris 12/20/2018    DVT of lower extremity (deep venous thrombosis)     Hard of hearing     Hyperlipidemia     Hypertension     Localized edema 01/04/2017    Long-term use of high-risk medication     Metabolic encephalopathy 02/07/2023    Myocardial infarction 2918    LAD stent    PAD (peripheral artery disease) 09/10/2020    Parkinson's disease     Postphlebitic syndrome     Pure hypercholesterolemia     Skin tear of upper arm without complication, left, subsequent encounter     Stroke 11/02/2023      Past Surgical History:   Procedure Laterality Date    BASAL CELL CARCINOMA EXCISION  04/2018    CARDIAC CATHETERIZATION N/A 11/05/2018    Procedure: Left Heart Cath;  Surgeon: Oliverio Azar MD;  Location:  [facility] CATH INVASIVE LOCATION;  Service: Cardiovascular    CARDIAC CATHETERIZATION N/A 11/05/2018    Procedure: Coronary angiography;  Surgeon: Oliverio Azar MD;  Location: [facility] CATH INVASIVE LOCATION;  Service: Cardiovascular    CARDIAC CATHETERIZATION N/A 11/05/2018    Procedure: Left ventriculography;  Surgeon: Oliverio Azar MD;  Location:  [facility] CATH INVASIVE LOCATION;  Service: Cardiovascular    CARDIAC CATHETERIZATION N/A 06/04/2019    Procedure: Left Heart Cath;  Surgeon: Johnny Glasgow MD;  Location:  [facility] CATH INVASIVE LOCATION;  Service: Cardiovascular    CARDIAC CATHETERIZATION N/A 06/04/2019    Procedure: Coronary angiography;  Surgeon: Johnny Glasgow MD;  Location[facility] C[facility];  Service: Cardiovascular    CARDIAC CATHETERIZATION N/A 06/04/2019    Procedure: Left ventriculography;  Surgeon: Johnny Glasgow MD;   Location: [facility] CATH INVASIVE LOCATION;  Service: Cardiovascular    CARDIAC CATHETERIZATION N/A 06/04/2019    Procedure: Stent BILL coronary;  Surgeon: Johnny Glasgow MD;  Location: [facility] CATH INVASIVE LOCATION;  Service: Cardiovascular    CARDIAC SURGERY      CAROTID ARTERY ANGIOPLASTY Right 10/11/2023    with stent    CAROTID STENT      CATARACT EXTRACTION Left 08/2018    CATARACT EXTRACTION Right 09/2018    COLONOSCOPY      2011    CORONARY STENT PLACEMENT      INCISION AND DRAINAGE LEG Right 07/07/2017    Procedure: INCISION AND DRAINAGE INFECTED HEMATOMA RIGHT THIGH;  Surgeon: Valentin Peña MD;  Location: [facility] MAIN OR;  Service:     JOINT REPLACEMENT      KNEE INCISION AND DRAINAGE Right 12/27/2016    Procedure: KNEE INCISION AND DRAINAGE;  Surgeon: Triston Whitten MD;  Location: [facility] MAIN OR;  Service:     NOSE SURGERY      nose replacement    SINUS SURGERY  1960    SKIN BIOPSY      SKIN GRAFT  12/2017    TOTAL KNEE ARTHROPLASTY Right     VASCULAR SURGERY      VENA CAVA FILTER PLACEMENT            Objective          Active Range of Motion   Left Shoulder   Flexion: 130 degrees   External rotation 0°: 70 degrees     Right Shoulder   Flexion: 125 degrees   External rotation 0°: 50 degrees     Strength/Myotome Testing     Left Shoulder     Planes of Motion   Extension: 4   Abduction: 3+   External rotation at 0°: 4   Internal rotation at 0°: 4     Right Shoulder     Planes of Motion   Flexion: 3+   Abduction: 3-   External rotation at 0°: 3-   Internal rotation at 0°: 3+     Left Hip   Planes of Motion   Flexion: 4-  Abduction: 4- (seated)  Adduction: 4+ (seated)    Right Hip   Planes of Motion   Flexion: 4-  Abduction: 4- (seated)  Adduction: 4+ (seated)    Left Knee   Flexion: 4  Extension: 4+    Right Knee   Flexion: 4  Extension: 4+    Left Ankle/Foot   Dorsiflexion: 4    Right Ankle/Foot   Dorsiflexion: 4    Tests     Right Shoulder   Positive drop arm.     Lumbar Flexibility Comments:    Moderate HS tightness BLE    Ambulation     Observational Gait   Gait: crouched   Decreased walking speed, stride length, left swing time, right swing time, left step length and right step length.   Left foot contact pattern: foot flat  Right foot contact pattern: foot flat    Additional Observational Gait Details  MIC throughout clinic today for safety    Functional Assessment     Comments  5x STS: 20 sec  TUG: 15.3 sec    Mod CTSIB  Firm surface, EO: 30 sec  Firm surface, EC: 30 sec  Foam surface, EO: 30 sec  Foam surface, EC: 11 sec          Assessment & Plan       Assessment  Impairments: abnormal gait, abnormal muscle firing, abnormal muscle tone, abnormal or restricted ROM, activity intolerance, impaired balance, impaired physical strength, lacks appropriate home exercise program, pain with function, safety issue and weight-bearing intolerance   Functional limitations: carrying objects, lifting, sleeping, walking, pulling, pushing, uncomfortable because of pain, moving in bed, sitting, standing, stooping and unable to perform repetitive tasks   Assessment details: Casey Snowden Sr. is a 89 year-old male referred to physical therapy for general strengthening and frequent falls. He presents with a evolving clinical presentation.  He has comorbidities of R RTC tear, multiple falls, PD and personal factors of trying to maintain his active lifestyle and independence that may affect his progress in the plan of care.  Pt using Rwx/cane but previously not using AD. Signs and symptoms are consistent with physical therapy diagnosis of impaired mobility, endurance and balance. Patient is appropriate for skilled physical therapy in order to decrease falls, increased independence and increase ease with daily mobility.  During evaluation, pt educated on anatomy, goal of interventions, initial HEP and body mechanics to promote healthy lifestyle and improve quality of life.     Prognosis: good    Goals  Plan Goals: ST  weeks  1. Pt will be able to perform/tolerate HEP without increased symptoms  2. Increase 5x STS to 15 sec or less  3. Pt will be able to complete TUG in 13.5 sec or less to decrease falls risk with ambulatory transfers  4. Pt will be able to compete mod CTSIB to eyes closed on foam to 20 sec    LT weeks  1. Pt will be able to perform/tolerate HEP independently  2. Pt will be able to walk/stand for 20 min without a rest break and no AD to be able to navigate the grocery store and yard work  3. Pt will improve LEFS to 45/80 or less to improve subjective function during ADLs  4. Pt will improve B hip flexion, ABD and ADD strength to 5/5  5. Pt will improve B knee flexion and ext to 5/5  6. Pt will be able to hold SLS 5 sec on each leg without UE support    Plan  Therapy options: will be seen for skilled therapy services  Planned modality interventions: cryotherapy, electrical stimulation/Russian stimulation, TENS, traction, ultrasound, thermotherapy (hydrocollator packs) and dry needling  Planned therapy interventions: abdominal trunk stabilization, balance/weight-bearing training, body mechanics training, flexibility, functional ROM exercises, gait training, home exercise program, joint mobilization, manual therapy, postural training, spinal/joint mobilization, strengthening, stretching, therapeutic activities, motor coordination training, soft tissue mobilization, ADL retraining, neuromuscular re-education, transfer training and IADL retraining  Treatment plan discussed with: patient (wife)  Plan details: 2x per week for 12 weeks        Visit Diagnoses:    ICD-10-CM ICD-9-CM   1. At high risk for falls  Z91.81 V15.88   2. Physical deconditioning  R53.81 799.3   3. Weakness generalized  R53.1 780.79   4. Balance problem  R26.89 781.99       Timed:  Manual Therapy:    -     mins  74789;  Therapeutic Exercise:    20     mins  48926;     Neuromuscular Tres:    -    mins  45495;    Therapeutic Activity:     -      mins  52204;     Gait Training:      -     mins  09739;     Ultrasound:     -     mins  38641;    Electrical Stimulation:    -     mins  11808 ( );    Low Eval                       -      Mins  72771  Mod Eval                        -     Mins  64084  High Eval                       30     Mins  87780    Untimed:  Electrical Stimulation:    -     mins  54983 ( );  Mechanical Traction:    -     mins  52911;     Timed Treatment:   20   mins   Total Treatment:     50   mins    PT SIGNATURE: Lucrecia Taylor PT   [place] license: [id number]  DATE TREATMENT INITIATED: 10/7/2024    Initial Certification  Certification Period: 1/5/2025  I certify that the therapy services are furnished while this patient is under my care.  The services outlined above are required by this patient, and will be reviewed every 90 days.     PHYSICIAN: Kashif Goodwin MD      DATE:     Please sign and return via fax to [phone]. Thank you, [facility].

## 2024-10-15 ENCOUNTER — TREATMENT (OUTPATIENT)
Dept: PHYSICAL THERAPY | Facility: CLINIC | Age: 89
End: 2024-10-15
Payer: MEDICARE

## 2024-10-15 DIAGNOSIS — R53.81 PHYSICAL DECONDITIONING: ICD-10-CM

## 2024-10-15 DIAGNOSIS — Z91.81 AT HIGH RISK FOR FALLS: Primary | ICD-10-CM

## 2024-10-15 DIAGNOSIS — R53.1 WEAKNESS GENERALIZED: ICD-10-CM

## 2024-10-15 DIAGNOSIS — R26.89 BALANCE PROBLEM: ICD-10-CM

## 2024-10-15 PROCEDURE — 97530 THERAPEUTIC ACTIVITIES: CPT | Performed by: PHYSICAL THERAPIST

## 2024-10-15 PROCEDURE — 97112 NEUROMUSCULAR REEDUCATION: CPT | Performed by: PHYSICAL THERAPIST

## 2024-10-15 PROCEDURE — 97110 THERAPEUTIC EXERCISES: CPT | Performed by: PHYSICAL THERAPIST

## 2024-10-15 NOTE — PROGRESS NOTES
Physical Therapy Daily Treatment Note      Patient: Casey Snowden Sr.   : 1934  Referring practitioner: No ref. provider found  Date of Initial Visit: Type: THERAPY  Noted: 10/7/2024  Today's Date: 10/15/2024  Patient seen for 2 sessions         Casey Snowden reports: denies any pain; no soreness after last session.              Objective   See Exercise, Manual, and Modality Logs for complete treatment.       Assessment/Plan  Pt able to progress strengthening and balance exercises today; pt forgot his cane today and MIC required throughout the clinic for safety. Noted R shoulder weakness with likely RTC needing intermittent guidance for R arm with upper body exercises. Min A for dynamic gait and step training for balance with cues for foot placement and step length.        Progress per Plan of Care and Progress strengthening /stabilization /functional activity           Timed:  Manual Therapy:    -     mins  94254;  Therapeutic Exercise:    30     mins  57142;     Neuromuscular Tres:    15    mins  00558;    Therapeutic Activity:     15     mins  60556;     Gait Training:      -     mins  82795;     Ultrasound:     -     mins  29073;      Untimed:  Electrical Stimulation:    -     mins  33204 ( );  Mechanical Traction:    -     mins  73572;   Dry needling:      -     mins  35914/    Timed Treatment:   60   mins   Total Treatment:     60   mins  Lucrecia Taylor PT  Physical Therapist    License #: [id number]

## 2024-10-18 DIAGNOSIS — I50.32 CHRONIC DIASTOLIC CONGESTIVE HEART FAILURE: Chronic | ICD-10-CM

## 2024-10-18 RX ORDER — FUROSEMIDE 20 MG
20 TABLET ORAL EVERY OTHER DAY
Qty: 45 TABLET | Refills: 0 | Status: SHIPPED | OUTPATIENT
Start: 2024-10-18

## 2024-10-22 ENCOUNTER — TREATMENT (OUTPATIENT)
Dept: PHYSICAL THERAPY | Facility: CLINIC | Age: 89
End: 2024-10-22
Payer: MEDICARE

## 2024-10-22 DIAGNOSIS — R53.81 PHYSICAL DECONDITIONING: ICD-10-CM

## 2024-10-22 DIAGNOSIS — Z91.81 AT HIGH RISK FOR FALLS: Primary | ICD-10-CM

## 2024-10-22 DIAGNOSIS — R53.1 WEAKNESS GENERALIZED: ICD-10-CM

## 2024-10-22 DIAGNOSIS — R26.89 BALANCE PROBLEM: ICD-10-CM

## 2024-10-22 PROCEDURE — 97110 THERAPEUTIC EXERCISES: CPT | Performed by: PHYSICAL THERAPIST

## 2024-10-22 PROCEDURE — 97530 THERAPEUTIC ACTIVITIES: CPT | Performed by: PHYSICAL THERAPIST

## 2024-10-22 NOTE — PROGRESS NOTES
Physical Therapy Daily Treatment Note      Patient: Casey Snowden Sr.   : 1934  Referring practitioner: Kashif Goodwin MD  Date of Initial Visit: Type: THERAPY  Noted: 10/7/2024  Today's Date: 10/22/2024  Patient seen for 3 sessions         Casey Snowden reports: he wasn't sore after last session; just tired.              Objective   See Exercise, Manual, and Modality Logs for complete treatment.       Assessment/Plan  Pt able to tolerate increased reps of strengthening exercises along with addition of weighted bicep curls, weighted cable walks and step ups on 8 inch step today. MIC for standing exercises due to h/o falls and occasional instability. Cues for increased step length with cone walks and backwards walking with cable. Pt not using pain Pt denies pain or soreness at end of session.          Progress per Plan of Care and Progress strengthening /stabilization /functional activity         Timed:  Manual Therapy:    -     mins  83770;  Therapeutic Exercise:    30     mins  64673;     Neuromuscular Tres:    -    mins  23113;    Therapeutic Activity:     25     mins  89580;     Gait Training:      -     mins  89006;     Ultrasound:     -     mins  04769;      Untimed:  Electrical Stimulation:    -     mins  42689 ( );  Mechanical Traction:    -     mins  22522;   Dry needling:      -     mins  16810/    Timed Treatment:   55   mins   Total Treatment:     55   mins  Lucrecia Taylor PT  Physical Therapist    License #: [id number]

## 2024-10-28 ENCOUNTER — HOSPITAL ENCOUNTER (OUTPATIENT)
Dept: CARDIOLOGY | Facility: HOSPITAL | Age: 89
Discharge: HOME | End: 2024-10-28
Admitting: NEUROLOGICAL SURGERY
Payer: MEDICARE

## 2024-10-28 DIAGNOSIS — Z95.828 HISTORY OF RIGHT COMMON CAROTID ARTERY STENT PLACEMENT: ICD-10-CM

## 2024-10-28 DIAGNOSIS — I65.23 BILATERAL CAROTID ARTERY STENOSIS: ICD-10-CM

## 2024-10-28 DIAGNOSIS — Z98.890 HISTORY OF RIGHT COMMON CAROTID ARTERY STENT PLACEMENT: ICD-10-CM

## 2024-10-28 LAB
BH CV MID RIGHT ICA HIDDEN LRR: 1 CM
BH CV RIGHT CCA HIDDEN LRR: 1 CM/S
BH CV XLRA MEAS LEFT DIST CCA EDV: -10 CM/SEC
BH CV XLRA MEAS LEFT DIST CCA PSV: -79.2 CM/SEC
BH CV XLRA MEAS LEFT DIST ICA EDV: -11 CM/SEC
BH CV XLRA MEAS LEFT DIST ICA PSV: -56.8 CM/SEC
BH CV XLRA MEAS LEFT ICA/CCA RATIO: 1.67
BH CV XLRA MEAS LEFT MID CCA EDV: 11.1 CM/SEC
BH CV XLRA MEAS LEFT MID CCA PSV: 88.5 CM/SEC
BH CV XLRA MEAS LEFT MID ICA EDV: -13.4 CM/SEC
BH CV XLRA MEAS LEFT MID ICA PSV: -63.2 CM/SEC
BH CV XLRA MEAS LEFT PROX CCA EDV: 8.8 CM/SEC
BH CV XLRA MEAS LEFT PROX CCA PSV: 81.5 CM/SEC
BH CV XLRA MEAS LEFT PROX ECA EDV: -11.8 CM/SEC
BH CV XLRA MEAS LEFT PROX ECA PSV: -190 CM/SEC
BH CV XLRA MEAS LEFT PROX ICA EDV: 14.7 CM/SEC
BH CV XLRA MEAS LEFT PROX ICA PSV: 132 CM/SEC
BH CV XLRA MEAS LEFT PROX SCLA PSV: 130 CM/SEC
BH CV XLRA MEAS LEFT VERTEBRAL A EDV: -8.3 CM/SEC
BH CV XLRA MEAS LEFT VERTEBRAL A PSV: -37.9 CM/SEC
BH CV XLRA MEAS RIGHT DIST CCA EDV: -10.6 CM/SEC
BH CV XLRA MEAS RIGHT DIST CCA PSV: -89.1 CM/SEC
BH CV XLRA MEAS RIGHT DIST ICA EDV: -15.3 CM/SEC
BH CV XLRA MEAS RIGHT DIST ICA PSV: -82.2 CM/SEC
BH CV XLRA MEAS RIGHT ICA/CCA RATIO: 1.22
BH CV XLRA MEAS RIGHT MID CCA EDV: 9.8 CM/SEC
BH CV XLRA MEAS RIGHT MID CCA PSV: 77.8 CM/SEC
BH CV XLRA MEAS RIGHT MID ICA EDV: -14.7 CM/SEC
BH CV XLRA MEAS RIGHT MID ICA PSV: -89.7 CM/SEC
BH CV XLRA MEAS RIGHT PROX CCA EDV: -7.1 CM/SEC
BH CV XLRA MEAS RIGHT PROX CCA PSV: -61.3 CM/SEC
BH CV XLRA MEAS RIGHT PROX ECA EDV: -7.1 CM/SEC
BH CV XLRA MEAS RIGHT PROX ECA PSV: -143 CM/SEC
BH CV XLRA MEAS RIGHT PROX ICA EDV: -17.3 CM/SEC
BH CV XLRA MEAS RIGHT PROX ICA PSV: -109 CM/SEC
BH CV XLRA MEAS RIGHT PROX SCLA PSV: 105 CM/SEC
BH CV XLRA MEAS RIGHT VERTEBRAL A EDV: -7.5 CM/SEC
BH CV XLRA MEAS RIGHT VERTEBRAL A PSV: -46.7 CM/SEC
BH CVPROX RIGHT ICA HIDDEN LRR: 1 CM
LEFT ARM BP: NORMAL MMHG
RIGHT ARM BP: NORMAL MMHG

## 2024-10-28 PROCEDURE — 93880 EXTRACRANIAL BILAT STUDY: CPT

## 2024-10-28 PROCEDURE — 93880 EXTRACRANIAL BILAT STUDY: CPT | Performed by: SURGERY

## 2024-10-29 ENCOUNTER — TREATMENT (OUTPATIENT)
Dept: PHYSICAL THERAPY | Facility: CLINIC | Age: 89
End: 2024-10-29
Payer: MEDICARE

## 2024-10-29 DIAGNOSIS — R26.89 BALANCE PROBLEM: ICD-10-CM

## 2024-10-29 DIAGNOSIS — R53.81 PHYSICAL DECONDITIONING: ICD-10-CM

## 2024-10-29 DIAGNOSIS — Z91.81 AT HIGH RISK FOR FALLS: Primary | ICD-10-CM

## 2024-10-29 DIAGNOSIS — R53.1 WEAKNESS GENERALIZED: ICD-10-CM

## 2024-10-29 NOTE — PROGRESS NOTES
Physical Therapy Daily Treatment Note      Patient: Casey Snowden Sr.   : 1934  Referring practitioner: Kashif Goodwin MD  Date of Initial Visit: Type: THERAPY  Noted: 10/7/2024  Today's Date: 10/29/2024  Patient seen for 4 sessions         Casey Snowden reports: he is feeling good              Objective   See Exercise, Manual, and Modality Logs for complete treatment.       Assessment/Plan  Increased time on Nustep to continue to progress endurance and ax tolerance. Increased reps of step ups, added ankle weight to LAQ, increased reps of bicep curls; added fitter board for balance. Pt with reports of fatigue by end of session today; intermittent rest breaks as needed. MIC for gait through clinic and out to car (wife driving).        Progress per Plan of Care and Progress strengthening /stabilization /functional activity           Timed:  Manual Therapy:    -     mins  58967;  Therapeutic Exercise:    30     mins  72726;     Neuromuscular Tres:    15    mins  11597;    Therapeutic Activity:     15     mins  92720;     Gait Training:      -     mins  20529;     Ultrasound:     -     mins  92477;      Untimed:  Electrical Stimulation:    -     mins  64648 ( );  Mechanical Traction:    -     mins  30894;   Dry needling:      -     mins  93356/    Timed Treatment:   60   mins   Total Treatment:     60   mins  Lucrecia Taylor PT  Physical Therapist    License #: [id number]

## 2024-11-01 DIAGNOSIS — I77.9 PERIPHERAL ARTERIAL OCCLUSIVE DISEASE: ICD-10-CM

## 2024-11-01 DIAGNOSIS — E03.9 ACQUIRED HYPOTHYROIDISM: ICD-10-CM

## 2024-11-01 RX ORDER — APIXABAN 5 MG/1
TABLET, FILM COATED ORAL
Qty: 180 TABLET | Refills: 3 | Status: SHIPPED | OUTPATIENT
Start: 2024-11-01

## 2024-11-01 RX ORDER — LEVOTHYROXINE SODIUM 75 UG/1
TABLET ORAL
Qty: 90 TABLET | Refills: 3 | Status: SHIPPED | OUTPATIENT
Start: 2024-11-01

## 2024-11-04 ENCOUNTER — TREATMENT (OUTPATIENT)
Dept: PHYSICAL THERAPY | Facility: CLINIC | Age: 89
End: 2024-11-04
Payer: MEDICARE

## 2024-11-04 DIAGNOSIS — Z91.81 AT HIGH RISK FOR FALLS: Primary | ICD-10-CM

## 2024-11-04 DIAGNOSIS — R53.81 PHYSICAL DECONDITIONING: ICD-10-CM

## 2024-11-04 DIAGNOSIS — R53.1 WEAKNESS GENERALIZED: ICD-10-CM

## 2024-11-04 DIAGNOSIS — R26.89 BALANCE PROBLEM: ICD-10-CM

## 2024-11-04 PROCEDURE — 97110 THERAPEUTIC EXERCISES: CPT | Performed by: PHYSICAL THERAPIST

## 2024-11-04 PROCEDURE — 97112 NEUROMUSCULAR REEDUCATION: CPT | Performed by: PHYSICAL THERAPIST

## 2024-11-04 PROCEDURE — 97530 THERAPEUTIC ACTIVITIES: CPT | Performed by: PHYSICAL THERAPIST

## 2024-11-04 NOTE — PROGRESS NOTES
Physical Therapy Daily Treatment Note      Patient: Casey Snowden Sr.   : 1934  Referring practitioner: Kashif Goodwin MD  Date of Initial Visit: Type: THERAPY  Noted: 10/7/2024  Today's Date: 2024  Patient seen for 5 sessions         Casey Snowden reports: he had a good weekend; trying to work on taking down his garden fence.              Objective     /67 mmHg and HR 63 bpm     See Exercise, Manual, and Modality Logs for complete treatment.       Assessment/Plan  Pt with reported muscle fatigue during today's session needing intermittent seated rest breaks today to recover. Pt with cues for step length and foot clearance with cone walks. Cues for neutral hip rotation with hip ABD and knee ext with standing hip ext. Assistance for RUE shoulder flexion but improving. Cable walks cut short today due to report of dizziness with BP WNL; pt given candy and rest with improvement in symptoms. Pt's wife held his hand out to the car for support. Pt advised to eat a snack prior to PT sessions with pt only eating a piece of toast this morning.        Progress per Plan of Care and Progress strengthening /stabilization /functional activity         Timed:  Manual Therapy:    -     mins  94713;  Therapeutic Exercise:    30     mins  01651;     Neuromuscular Tres:    15    mins  88036;    Therapeutic Activity:     15     mins  41837;     Gait Training:      -     mins  06721;     Ultrasound:     -     mins  97866;      Untimed:  Electrical Stimulation:    -     mins  56282 ( );  Mechanical Traction:    -     mins  87479;   Dry needling:      -     mins  76539/    Timed Treatment:   60   mins   Total Treatment:     70   mins  Lucrecia Taylor PT  Physical Therapist    License #: [id number]

## 2024-11-06 ENCOUNTER — OFFICE VISIT (OUTPATIENT)
Dept: CARDIOLOGY | Facility: CLINIC | Age: 89
End: 2024-11-06
Payer: MEDICARE

## 2024-11-06 VITALS
HEIGHT: 70 IN | OXYGEN SATURATION: 99 % | HEART RATE: 61 BPM | WEIGHT: 147.5 LBS | BODY MASS INDEX: 21.11 KG/M2 | SYSTOLIC BLOOD PRESSURE: 144 MMHG | DIASTOLIC BLOOD PRESSURE: 88 MMHG

## 2024-11-06 DIAGNOSIS — Z79.01 ANTICOAGULANT LONG-TERM USE: Chronic | ICD-10-CM

## 2024-11-06 DIAGNOSIS — I82.531 CHRONIC DEEP VEIN THROMBOSIS (DVT) OF POPLITEAL VEIN OF RIGHT LOWER EXTREMITY: Chronic | ICD-10-CM

## 2024-11-06 DIAGNOSIS — I10 BENIGN ESSENTIAL HYPERTENSION: Primary | Chronic | ICD-10-CM

## 2024-11-06 DIAGNOSIS — I50.32 CHRONIC DIASTOLIC CONGESTIVE HEART FAILURE: Chronic | ICD-10-CM

## 2024-11-06 DIAGNOSIS — I25.10 CORONARY ARTERY DISEASE INVOLVING NATIVE CORONARY ARTERY OF NATIVE HEART WITHOUT ANGINA PECTORIS: Chronic | ICD-10-CM

## 2024-11-06 NOTE — PROGRESS NOTES
Subjective:     Encounter Date: 11/06/24        Patient ID: Casey Snowden Sr. is a 89 y.o. male.    Chief Complaint:PAF, CAD  History of Present Illness    Dear Dr. Diez,    I had the pleasure of seeing this patient in the office today for f/u.  He has a history of paroxysmal atrial fibrillation with elevated CHADS Vasc score as well as lower extremity DVT.  He also has a history of coronary disease with a drug-eluting stent and an ischemic cardiomyopathy.  Currently on a combination of Eliquis and Plavix.    Patient was in the emergency room 8/24/2024 for hypotension.  Meds were adjusted and had further titration as he continued to have initially some episodes with low blood pressure.    He was last seen in our office in August and at that point was doing better.    He comes in today for routine follow-up.    No more falls.  They were in to see you in August.  Patient continues to gradually lose weight, you have sent in a referral to palliative.  No chest pain or chest discomfort.  No shortness of breath.    He was previously hospitalized with a CVA due to severe RCA stenosis.  He follows consistently with vascular surgery, he had a percutaneous revascularization procedure performed.  Fortunately no cardiac issues.    He is recently hospitalized with mental status changes.  He also had called in because of swelling and drainage from his right lower extremity.  He has a history of a DVT on that side and follows with vascular surgery, we had them call vascular surgery and they recommended compliance with his compression stockings.    He has a history of metastatic squamous cell carcinoma.  He has undergone radiation therapy to his neck as well as chemotherapy.     He presented to [facility] on 6/3/19 with complaints of light headed and dizziness as well as left sided chest pain as he was working in the yard. He was found to be in atrial fibrillation with RVR, with worse than baseline anterior ST  changes. He was rate controlled over night. He was also noted to have pneumonia and was started on IV levaquin. Patient was seen by Dr. Perez on the morning of 6/4. He was noted to jave troponin elevation and was transferred to [facility] for cardiac catheterization. The LAD was totally occluded and was treated with PCI and drug eluting stent placement to the LAD (3.0 x 28 mm Xience Lory drug eluting stent). Echocardiogram showed EF 40% with an apical wall motion abnormalities. He has been on carvedilol on the past, this was stopped due to bradycardia. He received metoprolol and tolerated well with HR in the 50's. At discharge this was changed to metoprolol succinate given cardiomyopathy. He is also on losartan.     Cath 6/2019  Hemodynamics:   LV: 150/8  AO: 150/64        PCI CORONARY SEGMENT: Mid LAD  PRE-STENOSIS: 100  POST-STENOSIS: 0%  LESION TYPE: c  EREN FLOW PRE/POST: 1/3  CULPRIT LESION: Yes     Estimated blood loss: Minimal  Complications: None     Conclusions:   1. Left main: Normal  2. LAD: Occluded mid segment.  EREN I flow  3. LCX:Normal  4. RCA: 30 to 40% mid vessel stenosis  5.  Normal left ventricular size.  Moderately reduced systolic function.  Ejection fraction 35%.  Mid to distal anterolateral akinesis.  Apical dyskinesis  6.  PCI of the mid LAD with a 3.0 x 28 mm Xience Lory drug-eluting stent, postdilated to high pressure with a 3.25 mm noncompliant trek balloon     Echo 6/2019  Interpretation Summary     The left ventricular cavity is mildly dilated.  Left ventricular wall thickness is consistent with mild concentric hypertrophy.  There is mild calcification of the aortic valve.  Estimated EF = 40%.  The following left ventricular wall segments are akinetic: apical anterior, apical lateral, apical inferior, apical septal and apex.  Left ventricular diastolic dysfunction (grade II) consistent with pseudonormalization.  Left atrial cavity size is moderately dilated.  Mild  tricuspid valve regurgitation is present.  Calculated right ventricular systolic pressure from tricuspid regurgitation is 41 mmHg.  Mild pulmonic valve regurgitation is present.          He has a history of paroxysmal atrial fibrillation and has previously followed with Dr. Mercado.  He has been maintained on chronic anticoagulation.  He apparently never required rate control medicine due to baseline bradycardia.      The following portions of the patient's history were reviewed and updated as appropriate: allergies, current medications, past family history, past medical history, past social history, past surgical history and problem list.    Past Medical History:   Diagnosis Date    Abdominal aortic aneurysm without rupture 10/14/2021    Acquired hypothyroidism 08/31/2022    Acute ischemic stroke 10/08/2023    Aneurysm     Arrhythmia     Atrial fibrillation     Cancer 04/2018    basil cell carcinoma    Carotid stenosis 10/29/2015    CHF (congestive heart failure)     Chronic deep vein thrombosis (DVT) of popliteal vein of right lower extremity     Clotting disorder     Coronary artery disease involving native coronary artery of native heart without angina pectoris 12/20/2018    DVT of lower extremity (deep venous thrombosis)     Hard of hearing     Hyperlipidemia     Hypertension     Localized edema 01/04/2017    Long-term use of high-risk medication     Metabolic encephalopathy 02/07/2023    Myocardial infarction 2918    LAD stent    PAD (peripheral artery disease) 09/10/2020    Parkinson's disease     Postphlebitic syndrome     Pure hypercholesterolemia     Skin tear of upper arm without complication, left, subsequent encounter     Stroke 11/02/2023       Past Surgical History:   Procedure Laterality Date    BASAL CELL CARCINOMA EXCISION  04/2018    CARDIAC CATHETERIZATION N/A 11/05/2018    Procedure: Left Heart Cath;  Surgeon: Oliverio Azar MD;  Location: [facility] CATH INVASIVE LOCATION;  Service:  Cardiovascular    CARDIAC CATHETERIZATION N/A 11/05/2018    Procedure: Coronary angiography;  Surgeon: Oliveroi Azar MD;  Location: [facility] CATH INVASIVE LOCATION;  Service: Cardiovascular    CARDIAC CATHETERIZATION N/A 11/05/2018    Procedure: Left ventriculography;  Surgeon: Oliverio Azar MD;  Location:  [facility] CATH INVASIVE LOCATION;  Service: Cardiovascular    CARDIAC CATHETERIZATION N/A 06/04/2019    Procedure: Left Heart Cath;  Surgeon: Johnny Glasgow MD;  Location: [facility] CATH INVASIVE LOCATION;  Service: Cardiovascular    CARDIAC CATHETERIZATION N/A 06/04/2019    Procedure: Coronary angiography;  Surgeon: Johnny Glasgow MD;  Location: [facility] CATH INVASIVE LOCATION;  Service: Cardiovascular    CARDIAC CATHETERIZATION N/A 06/04/2019    Procedure: Left ventriculography;  Surgeon: Johnny Glasgow MD;  Location: [facility] CATH INVASIVE LOCATION;  Service: Cardiovascular    CARDIAC CATHETERIZATION N/A 06/04/2019    Procedure: Stent BILL coronary;  Surgeon: Johnny Glasgow MD;  Location: [facility] CATH INVASIVE LOCATION;  Service: Cardiovascular    CARDIAC SURGERY      CAROTID ARTERY ANGIOPLASTY Right 10/11/2023    with stent    CAROTID STENT      CATARACT EXTRACTION Left 08/2018    CATARACT EXTRACTION Right 09/2018    COLONOSCOPY      2011    CORONARY STENT PLACEMENT      INCISION AND DRAINAGE LEG Right 07/07/2017    Procedure: INCISION AND DRAINAGE INFECTED HEMATOMA RIGHT THIGH;  Surgeon: Valentin Peña MD;  Location: [facility] MAIN OR;  Service:     JOINT REPLACEMENT      KNEE INCISION AND DRAINAGE Right 12/27/2016    Procedure: KNEE INCISION AND DRAINAGE;  Surgeon: Triston Whitten MD;  Location: [facility] MAIN OR;  Service:     NOSE SURGERY      nose replacement    SINUS SURGERY  1960    SKIN BIOPSY      SKIN GRAFT  12/2017    TOTAL KNEE ARTHROPLASTY Right     VASCULAR SURGERY      VENA CAVA FILTER PLACEMENT             Procedures       Objective:     Vitals:    11/06/24 1336   BP:  "144/88   Pulse: 61   SpO2: 99%   Weight: 66.9 kg (147 lb 8 oz)   Height: 177.8 cm (70\")           Physical Exam  Constitutional:       General: He is not in acute distress.     Appearance: He is well-developed. He is not diaphoretic.   HENT:      Head: Normocephalic and atraumatic.      Nose: Nose normal.   Eyes:      General:         Right eye: No discharge.         Left eye: No discharge.      Conjunctiva/sclera: Conjunctivae normal.   Neck:      Thyroid: No thyromegaly.      Trachea: No tracheal deviation.   Cardiovascular:      Rate and Rhythm: Normal rate and regular rhythm.      Pulses: Normal pulses.      Heart sounds: Normal heart sounds, S1 normal and S2 normal.      No S3 sounds.   Pulmonary:      Effort: Pulmonary effort is normal. No respiratory distress.      Breath sounds: Normal breath sounds. No stridor.   Chest:      Chest wall: No tenderness.   Abdominal:      General: Bowel sounds are normal. There is no distension.      Palpations: Abdomen is soft. There is no mass.      Tenderness: There is no abdominal tenderness. There is no guarding or rebound.   Musculoskeletal:         General: No tenderness or deformity. Normal range of motion.      Cervical back: Normal range of motion and neck supple.      Right lower leg: Edema present.   Lymphadenopathy:      Cervical: No cervical adenopathy.   Skin:     General: Skin is warm and dry.      Findings: No erythema or rash.   Neurological:      Mental Status: He is alert and oriented to person, place, and time.      Deep Tendon Reflexes: Reflexes are normal and symmetric.   Psychiatric:         Thought Content: Thought content normal.         Lab Review:             Lab Results   Component Value Date    GLUCOSE 100 (H) 08/09/2024    BUN 21 08/09/2024    CREATININE 0.71 (L) 08/09/2024    EGFRIFNONA 99 06/05/2019    EGFRIFAFRI >60 12/27/2022    BCR 29.6 (H) 08/09/2024    K 4.6 08/09/2024    CO2 24.8 08/09/2024    CALCIUM 9.0 08/09/2024    PROTENTOTREF 6.2 " 10/03/2023    ALBUMIN 3.9 08/09/2024    LABIL2 2.3 10/03/2023    AST 15 08/09/2024    ALT <5 08/09/2024     Results for orders placed during the hospital encounter of 10/09/23    Adult Transthoracic Echo Complete w/ Color, Spectral and Contrast if Necessary Per Protocol    Interpretation Summary    Left ventricular systolic function is mildly decreased. Calculated left ventricular EF = 44.7%    Left ventricular wall thickness is consistent with mild to moderate concentric hypertrophy.    The following left ventricular wall segments are hypokinetic: apical anterior and mid anteroseptal. The following left ventricular wall segments are akinetic: apical inferior, apical septal and apex.    Left ventricular diastolic function is consistent with (grade Ia w/high LAP) impaired relaxation.    There is calcification of the aortic valve.    Estimated right ventricular systolic pressure from tricuspid regurgitation is normal (<35 mmHg).    CHADS-VASc Risk Assessment              7 Total Score    1 CHF    1 Hypertension    2 Age >/= 75    2 PRIOR STROKE/TIA/THROMBO    1 Vascular Disease        Criteria that do not apply:    DM    Age 65-74    Sex: Female          Carotid duplex October 28, 2024:  Study Impression        Right Other Conclusions:  Right carotid stent is patent without evidence of in-stent restenosis.           Left ICA:  Imaging indicates <50% stenosis.           Assessment:          Diagnosis Plan   1. Benign essential hypertension        2. Chronic diastolic (congestive) heart failure        3. Coronary artery disease involving native coronary artery of native heart without angina pectoris        4. Anticoagulant long-term use        5. Chronic deep vein thrombosis (DVT) of popliteal vein of right lower extremity                 Plan:       1. Atrial Fibrillation and Atrial Flutter  Assessment   The patient has paroxysmal atrial fibrillation   The patient's CHADS2-VASc score is 3   A [id number] score of 2 or  more is considered a high risk for a thromboembolic event   Apixaban prescribed    Plan   Attempt to maintain sinus rhythm   Continue apixaban for antithrombotic therapy, bleeding issues discussed   Sinus bradycardia no rate control meds    2. Heart Failure  Assessment   Beta blocker not prescribed for patient reasons   Diuretics prescribed   Angiotensin receptor blocker (ARB) prescribed    Plan   The patient has received heart failure education on the following topics: medication instructions, symptom management and weight monitoring   The heart failure care plan was discussed with the patient today including: continuing the current program    Subjective/Objective     Physical exam findings negative for peripheral edema and S3 gallop.    3.  History of chronic DVT; Patient remains on chronic anticoagulation and follows with Dr. Dorado. Patient has an IVC filter in place,  4.  Coronary Artery Disease  Assessment   The patient has no angina    Plan   Lifestyle modifications discussed include adhering to a heart healthy diet, avoidance of tobacco products, maintenance of a healthy weight, medication compliance, regular exercise and regular monitoring of cholesterol and blood pressure   Toprol has been discontinued in the past because of bradycardia    Subjective - Objective   There is a history of past MI    There has been a previous stent procedure using BILL    Current antiplatelet therapy includes aspirin 81 mg      5.  Essential hypertension- he also has a history of orthostatic hypotension with his Parkinson's-I think we need to be somewhat permissive on his blood pressure.  6.   Ischemic cardiomyopathy-continue current medical therapy.  As noted above he is not on beta-blockade because of sinus bradycardia.  No heart failure symptoms.  7.  Patient has been referred for palliative by yourself.      Thank you very much for allowing us to participate in the care of this pleasant patient.  Please don't hesitate to call  if I can be of assistance in any way.      Current Outpatient Medications:     aspirin 81 MG chewable tablet, Chew 1 tablet Daily., Disp: , Rfl:     atorvastatin (LIPITOR) 40 MG tablet, Take 1 tablet by mouth Every Night. Indications: High Amount of Fats in the Blood, Disp: 90 tablet, Rfl: 3    atropine 1 % ophthalmic solution, 1 drop 3 (Three) Times a Day As Needed (as needed orally for increased secretions). Orally   Indications: excessive drooling, Disp: , Rfl:     carbidopa-levodopa (SINEMET)  MG per tablet, Take 1 tablet by mouth 3 (Three) Times a Day. Indications: Parkinson's Disease, Disp: , Rfl:     Cyanocobalamin (Vitamin B 12) 500 MCG tablet, Take 2 tablets by mouth Every Morning. Indications: Inadequate Vitamin B12, Disp: , Rfl:     Eliquis 5 MG tablet tablet, TAKE 1 TABLET BY MOUTH TWICE  DAILY FOR DVT/PE (ACTIVE  THROMBOSIS) , PREVENTION OF  UNWANTED CLOT IN VEINS, Disp: 180 tablet, Rfl: 3    finasteride (PROSCAR) 5 MG tablet, Take 1 tablet by mouth Daily., Disp: , Rfl:     levothyroxine (SYNTHROID, LEVOTHROID) 75 MCG tablet, TAKE 1 TABLET BY MOUTH EVERY  MORNING FOR UNDERACTIVE THYROID, Disp: 90 tablet, Rfl: 3    Multiple Vitamins-Minerals (PRESERVISION AREDS) capsule, Take 1 tablet by mouth 2 (Two) Times a Day. Indications: supplement, Disp: , Rfl:     Polyethyl Glycol-Propyl Glycol (SYSTANE OP), Apply 1 dose to eye(s) as directed by provider As Needed., Disp: , Rfl:     tamsulosin (FLOMAX) 0.4 MG capsule 24 hr capsule, Take 1 capsule by mouth., Disp: , Rfl:     valsartan (DIOVAN) 40 MG tablet, TAKE 1 TABLET BY MOUTH DAILY. IN THE EVENING. INDICATIONS: HIGH BLOOD PRESSURE DISORDER, Disp: 90 tablet, Rfl: 0    Vibegron (Gemtesa) 75 MG tablet, Take  by mouth., Disp: , Rfl:     furosemide (Lasix) 20 MG tablet, Take 1 tablet by mouth Every Other Day. Indications: Edema (Patient not taking: Reported on 11/6/2024), Disp: 45 tablet, Rfl: 0

## 2024-11-11 ENCOUNTER — TELEPHONE (OUTPATIENT)
Dept: CARDIOLOGY | Facility: CLINIC | Age: 89
End: 2024-11-11
Payer: MEDICARE

## 2024-11-11 ENCOUNTER — TREATMENT (OUTPATIENT)
Dept: PHYSICAL THERAPY | Facility: CLINIC | Age: 89
End: 2024-11-11
Payer: MEDICARE

## 2024-11-11 DIAGNOSIS — Z91.81 AT HIGH RISK FOR FALLS: Primary | ICD-10-CM

## 2024-11-11 DIAGNOSIS — I77.9 PERIPHERAL ARTERIAL OCCLUSIVE DISEASE: ICD-10-CM

## 2024-11-11 DIAGNOSIS — R53.1 WEAKNESS GENERALIZED: ICD-10-CM

## 2024-11-11 DIAGNOSIS — R53.81 PHYSICAL DECONDITIONING: ICD-10-CM

## 2024-11-11 DIAGNOSIS — R26.89 BALANCE PROBLEM: ICD-10-CM

## 2024-11-11 DIAGNOSIS — I11.0 HYPERTENSIVE HEART DISEASE WITH HEART FAILURE: ICD-10-CM

## 2024-11-11 PROCEDURE — 97530 THERAPEUTIC ACTIVITIES: CPT | Performed by: PHYSICAL THERAPIST

## 2024-11-11 PROCEDURE — 97112 NEUROMUSCULAR REEDUCATION: CPT | Performed by: PHYSICAL THERAPIST

## 2024-11-11 PROCEDURE — 97110 THERAPEUTIC EXERCISES: CPT | Performed by: PHYSICAL THERAPIST

## 2024-11-11 RX ORDER — APIXABAN 5 MG/1
TABLET, FILM COATED ORAL
Qty: 180 TABLET | Refills: 3 | Status: SHIPPED | OUTPATIENT
Start: 2024-11-11

## 2024-11-11 RX ORDER — VALSARTAN 40 MG/1
40 TABLET ORAL DAILY
Qty: 90 TABLET | Refills: 3 | Status: SHIPPED | OUTPATIENT
Start: 2024-11-11

## 2024-11-11 NOTE — PROGRESS NOTES
Re-Assessment / Re-Certification        Patient: Casey Snowden Sr.   : 1934  Diagnosis/ICD-10 Code:  At high risk for falls [Z91.81]  Referring practitioner: Kashif Goodwin MD  Date of Initial Visit: Type: THERAPY  Noted: 10/7/2024  Today's Date: 2024  Patient seen for 6 sessions      Subjective:   Casey Snowden reports: he is feeling good; only fall he has had was last week working the yard.   Subjective Questionnaire: LEFS: 47/80  Clinical Progress: improved  Home Program Compliance: Yes  Treatment has included: therapeutic exercise, neuromuscular re-education, therapeutic activity, and gait training    Objective     Active Range of Motion   Left Shoulder (supine)  Flexion: 130 degrees   External rotation 0°: 70 degrees      Right Shoulder (supine)  Flexion: 125 degrees   External rotation 0°: 50 degrees      Strength/Myotome Testing      Left Shoulder      Planes of Motion   Extension: 4   Abduction: 3+   External rotation at 0°: 4   Internal rotation at 0°: 4      Right Shoulder      Planes of Motion   Flexion: 3+   Abduction: 3-   External rotation at 0°: 3-   Internal rotation at 0°: 3+      Left Hip   Planes of Motion   Flexion: 4-  Abduction: 4- (seated)  Adduction: 5 (seated)     Right Hip   Planes of Motion   Flexion: 4-  Abduction: 4- (seated)  Adduction: 5 (seated)     Left Knee   Flexion: 4  Extension: 5     Right Knee   Flexion: 4  Extension: 4+     Left Ankle/Foot   Dorsiflexion: 4     Right Ankle/Foot   Dorsiflexion: 4     Tests      Right Shoulder   Positive drop arm.      Lumbar Flexibility Comments:   Moderate HS tightness BLE     Ambulation      Observational Gait   Gait: crouched   Decreased walking speed, stride length, left swing time, right swing time, left step length and right step length.   Left foot contact pattern: foot flat  Right foot contact pattern: foot flat     Additional Observational Gait Details  MIC throughout clinic today for safety     Functional  Assessment      Comments  5x STS: 16.3 sec  TU.1 sec     Mod CTSIB  Firm surface, EO: 30 sec  Firm surface, EC: 30 sec  Foam surface, EO: 30 sec  Foam surface, EC: 13 sec      Assessment & Plan       Assessment  Impairments: abnormal gait, abnormal muscle firing, abnormal muscle tone, abnormal or restricted ROM, activity intolerance, impaired balance, impaired physical strength, lacks appropriate home exercise program, pain with function, safety issue and weight-bearing intolerance   Functional limitations: carrying objects, lifting, sleeping, walking, pulling, pushing, uncomfortable because of pain, moving in bed, sitting, standing, stooping and unable to perform repetitive tasks   Assessment details: Casey Snowden Sr. is a 89 year-old male referred to physical therapy for general strengthening and frequent falls. Most recent fall last week () working in the yard.  He presents with a evolving clinical presentation.  He has comorbidities of R RTC tear, multiple falls, PD and personal factors of trying to maintain his active lifestyle and independence that may affect his progress in the plan of care. Pt not using AD today but recommending use of walker or cane for community ambulation. Pt limited strength on R side due to history of CVA. Patient is appropriate for skilled physical therapy in order to decrease falls, increased independence and increase ease with daily mobility.  During evaluation, pt educated on anatomy, goal of interventions, initial HEP and body mechanics to promote healthy lifestyle and improve quality of life.     Prognosis: good    Goals  Plan Goals: ST weeks  1. Pt will be able to perform/tolerate HEP without increased symptoms-MET  2. Increase 5x STS to 15 sec or less-PROGRESSING  3. Pt will be able to complete TUG in 13.5 sec or less to decrease falls risk with ambulatory transfers-PROGRESSING  4. Pt will be able to compete mod CTSIB to eyes closed on foam to 20  sec-PROGRESSING    LT weeks  1. Pt will be able to perform/tolerate HEP independently-PROGRESSING  2. Pt will be able to walk/stand for 20 min without a rest break and no AD to be able to navigate the grocery store and yard work-PROGRESSING  3. Pt will improve LEFS to 45/80 or less to improve subjective function during ADLs-MET  4. Pt will improve B hip flexion, ABD and ADD strength to 5/5-PROGRESSING  5. Pt will improve B knee flexion and ext to 5/5-PROGRESSING  6. Pt will be able to hold SLS 5 sec on each leg without UE support--PROGRESSING    Plan  Therapy options: will be seen for skilled therapy services  Planned modality interventions: cryotherapy, electrical stimulation/Russian stimulation, TENS, traction, ultrasound, thermotherapy (hydrocollator packs) and dry needling  Planned therapy interventions: abdominal trunk stabilization, balance/weight-bearing training, body mechanics training, flexibility, functional ROM exercises, gait training, home exercise program, joint mobilization, manual therapy, postural training, spinal/joint mobilization, strengthening, stretching, therapeutic activities, motor coordination training, soft tissue mobilization, ADL retraining, neuromuscular re-education, transfer training and IADL retraining  Treatment plan discussed with: patient (wife)  Plan details: 2x per week for 10 weeks        Visit Diagnoses:    ICD-10-CM ICD-9-CM   1. At high risk for falls  Z91.81 V15.88   2. Physical deconditioning  R53.81 799.3   3. Weakness generalized  R53.1 780.79   4. Balance problem  R26.89 781.99       PT Signature: FANY Fu license: [id number]        Timed:  Manual Therapy:    -     mins  93636;  Therapeutic Exercise:    30     mins  70449;     Neuromuscular Tres:    15    mins  29944;    Therapeutic Activity:     15     mins  58473;     Gait Training:      -     mins  56659;     Ultrasound:     -     mins  17444;    Electrical Stimulation:    -     mins  99302 (FARIBA  );    Untimed:  Electrical Stimulation:    -     mins  11636 ( );  Mechanical Traction:    -     mins  97012; \  Dry needling:      -     mins  20560/20561    Timed Treatment:   60   mins   Total Treatment:     60   mins

## 2024-11-11 NOTE — TELEPHONE ENCOUNTER
Patient wife called stating that he is completely out of his Valsartan 40MG and the pharmacy has been trying to get it refilled. They would like it called to [facility] in [place]. AP

## 2024-11-12 NOTE — PROGRESS NOTES
Subjective   History of Present Illness: Casey Snowden Sr. is a 89 y.o. male is here today for follow-up on right ICA stenosis status post stent placement 10/2023.  He has been doing very well since his visit last year.  He denies any strokelike symptoms, change in sensation or strength, headaches or vision disturbance.  He is here today with his wife.      The following portions of the patient's history were reviewed and updated as appropriate: allergies, current medications, past family history, past medical history, past social history, past surgical history, and problem list.      Past Medical History:   Diagnosis Date    Abdominal aortic aneurysm without rupture 10/14/2021    Acquired hypothyroidism 08/31/2022    Acute ischemic stroke 10/08/2023    Aneurysm     Arrhythmia     Atrial fibrillation     Cancer 04/2018    basil cell carcinoma    Carotid stenosis 10/29/2015    CHF (congestive heart failure)     Chronic deep vein thrombosis (DVT) of popliteal vein of right lower extremity     Clotting disorder     Coronary artery disease involving native coronary artery of native heart without angina pectoris 12/20/2018    DVT of lower extremity (deep venous thrombosis)     Hard of hearing     Hyperlipidemia     Hypertension     Localized edema 01/04/2017    Long-term use of high-risk medication     Metabolic encephalopathy 02/07/2023    Myocardial infarction 2918    LAD stent    PAD (peripheral artery disease) 09/10/2020    Parkinson's disease     Postphlebitic syndrome     Pure hypercholesterolemia     Skin tear of upper arm without complication, left, subsequent encounter     Stroke 11/02/2023        Past Surgical History:   Procedure Laterality Date    BASAL CELL CARCINOMA EXCISION  04/2018    CARDIAC CATHETERIZATION N/A 11/05/2018    Procedure: Left Heart Cath;  Surgeon: Oliverio Azar MD;  Location: [facility] CATH INVASIVE LOCATION;  Service: Cardiovascular    CARDIAC CATHETERIZATION N/A 11/05/2018     Procedure: Coronary angiography;  Surgeon: Oliverio Azar MD;  Location:  [facility] CATH INVASIVE LOCATION;  Service: Cardiovascular    CARDIAC CATHETERIZATION N/A 11/05/2018    Procedure: Left ventriculography;  Surgeon: Oliverio Azar MD;  Location: [facility] CATH INVASIVE LOCATION;  Service: Cardiovascular    CARDIAC CATHETERIZATION N/A 06/04/2019    Procedure: Left Heart Cath;  Surgeon: Johnny Glasgow MD;  Location: [facility] CATH INVASIVE LOCATION;  Service: Cardiovascular    CARDIAC CATHETERIZATION N/A 06/04/2019    Procedure: Coronary angiography;  Surgeon: Johnny Glasgow MD;  Location: [facility] CATH INVASIVE LOCATION;  Service: Cardiovascular    CARDIAC CATHETERIZATION N/A 06/04/2019    Procedure: Left ventriculography;  Surgeon: Johnny Glasgow MD;  Location: [facility] CATH INVASIVE LOCATION;  Service: Cardiovascular    CARDIAC CATHETERIZATION N/A 06/04/2019    Procedure: Stent BILL coronary;  Surgeon: Johnny Glasgow MD;  Location: [facility] CATH INVASIVE LOCATION;  Service: Cardiovascular    CARDIAC SURGERY      CAROTID ARTERY ANGIOPLASTY Right 10/11/2023    with stent    CAROTID STENT      CATARACT EXTRACTION Left 08/2018    CATARACT EXTRACTION Right 09/2018    COLONOSCOPY      2011    CORONARY STENT PLACEMENT      INCISION AND DRAINAGE LEG Right 07/07/2017    Procedure: INCISION AND DRAINAGE INFECTED HEMATOMA RIGHT THIGH;  Surgeon: Valentin Peña MD;  Location: [facility] OR;  Service:     JOINT REPLACEMENT      KNEE INCISION AND DRAINAGE Right 12/27/2016    Procedure: KNEE INCISION AND DRAINAGE;  Surgeon: Triston Whitten MD;  Location: [facility] OR;  Service:     NOSE SURGERY      nose replacement    SINUS SURGERY  1960    SKIN BIOPSY      SKIN GRAFT  12/2017    TOTAL KNEE ARTHROPLASTY Right     VASCULAR SURGERY      VENA CAVA FILTER PLACEMENT            Current Outpatient Medications:     apixaban (Eliquis) 5 MG tablet tablet, TAKE 1 TABLET BY MOUTH TIMES A DAY. INDICATIONS:  DVT/PE (ACTIVE THROMBOSIS), PREVENTION OF UNWANTED CLOT IN VEINS, Disp: 180 tablet, Rfl: 3    aspirin 81 MG chewable tablet, Chew 1 tablet Daily., Disp: , Rfl:     atorvastatin (LIPITOR) 40 MG tablet, Take 1 tablet by mouth Every Night. Indications: High Amount of Fats in the Blood, Disp: 90 tablet, Rfl: 3    atropine 1 % ophthalmic solution, 1 drop 3 (Three) Times a Day As Needed (as needed orally for increased secretions). Orally   Indications: excessive drooling, Disp: , Rfl:     carbidopa-levodopa (SINEMET)  MG per tablet, Take 1 tablet by mouth 3 (Three) Times a Day. Indications: Parkinson's Disease, Disp: , Rfl:     Cyanocobalamin (Vitamin B 12) 500 MCG tablet, Take 2 tablets by mouth Every Morning. Indications: Inadequate Vitamin B12, Disp: , Rfl:     finasteride (PROSCAR) 5 MG tablet, Take 1 tablet by mouth Daily., Disp: , Rfl:     levothyroxine (SYNTHROID, LEVOTHROID) 75 MCG tablet, TAKE 1 TABLET BY MOUTH EVERY  MORNING FOR UNDERACTIVE THYROID, Disp: 90 tablet, Rfl: 3    Multiple Vitamins-Minerals (PRESERVISION AREDS) capsule, Take 1 tablet by mouth 2 (Two) Times a Day. Indications: supplement, Disp: , Rfl:     Polyethyl Glycol-Propyl Glycol (SYSTANE OP), Apply 1 dose to eye(s) as directed by provider As Needed., Disp: , Rfl:     tamsulosin (FLOMAX) 0.4 MG capsule 24 hr capsule, Take 1 capsule by mouth., Disp: , Rfl:     valsartan (DIOVAN) 40 MG tablet, TAKE 1 TABLET BY MOUTH DAILY. IN THE EVENING. INDICATIONS: HIGH BLOOD PRESSURE DISORDER, Disp: 90 tablet, Rfl: 3    Vibegron (Gemtesa) 75 MG tablet, Take  by mouth., Disp: , Rfl:     furosemide (Lasix) 20 MG tablet, Take 1 tablet by mouth Every Other Day. Indications: Edema (Patient not taking: Reported on 11/6/2024), Disp: 45 tablet, Rfl: 0     Allergies   Allergen Reactions    Penicillins Rash    Rocephin [Ceftriaxone] Rash        Social History     Socioeconomic History    Marital status:     Years of education: college grad   Tobacco Use     "Smoking status: Never     Passive exposure: Never    Smokeless tobacco: Never    Tobacco comments:     caff use   Vaping Use    Vaping status: Never Used   Substance and Sexual Activity    Alcohol use: No     Comment: none for a month    Drug use: No    Sexual activity: Not Currently     Partners: Female        Family History   Problem Relation Age of Onset    Heart attack Mother 74    Hypertension Mother     Cancer Sister     Stroke Brother     Cancer Brother     Cancer Brother     Deep vein thrombosis Neg Hx         Review of Systems   Constitutional:  Negative for chills and fatigue.   Gastrointestinal:  Negative for nausea and vomiting.   Neurological:  Negative for dizziness, speech difficulty, light-headedness and headaches.       Objective     Vitals:    24 1140   BP: 132/82   Pulse: 64   Temp: 96.9 °F (36.1 °C)   SpO2: 100%   Weight: 67.8 kg (149 lb 8 oz)   Height: 177.8 cm (70\")     Body mass index is 21.45 kg/m².    Physical exam  Awake, alert, oriented x3  Pupils equal round reactive to light  Extraocular muscles intact  Face symmetric  Speech is fluent and clear  Motor exam  Bilateral deltoids 5/5, bilateral biceps 5/5, bilateral triceps 5/5, bilateral wrist extension 5/5 bilateral hand  5/5  Bilateral hip flexion 5/5, bilateral knee extension 5/5, bilateral DF/PF 5/5  gait independent with assistance of cane  Able to detect  light touch in all 4 extremities      Assessment & Plan   Independent Review of Radiographic Studies:      I personally reviewed the images from the following studies.    Carotid Dopplers:  Right carotid stent is patent without evidence of in-stent restenosis.  Left ICA less than 50% stenosis.      Medical Decision Makin-year-old male status post right sided stent/angioplasty for carotid stenosis on 2023    He had previous radiation to his neck and was felt not to be a candidate for CEA.  He continues to do well post stent placement and denies any " strokelike symptoms, loss of strength or sensation, denies vision disturbance and headache.    Dopplers reassuring and show the stent is patent with no stent restenosis.  Left ICA less than 50% stenosis.    We will see him back in 1 year.  He has been instructed to return sooner or present to the ED should he develop any strokelike symptoms.    Diagnoses and all orders for this visit:    1. History of right common carotid artery stent placement (Primary)  -     Duplex Carotid Ultrasound CAR; Future    2. Occlusion and stenosis of right carotid artery  -     Duplex Carotid Ultrasound CAR; Future      Return in about 1 year (around 11/13/2025).    I spent 30 minutes caring for Casey Snowden SrLeonarda on this date of service. This time includes time spent by me in the following activities: preparing for the visit, reviewing tests, obtaining and/or reviewing a separately obtained history, performing a medically appropriate examination and/or evaluation, counseling and educating the patient/family/caregiver, ordering medications, tests, or procedures, referring and communicating with other health care professionals, documenting information in the medical record, independently interpreting results and communicating that information with the patient/family/caregiver, and care coordination

## 2024-11-13 ENCOUNTER — OFFICE VISIT (OUTPATIENT)
Dept: NEUROSURGERY | Facility: CLINIC | Age: 89
End: 2024-11-13
Payer: MEDICARE

## 2024-11-13 VITALS
HEART RATE: 64 BPM | OXYGEN SATURATION: 100 % | DIASTOLIC BLOOD PRESSURE: 82 MMHG | HEIGHT: 70 IN | TEMPERATURE: 96.9 F | BODY MASS INDEX: 21.4 KG/M2 | WEIGHT: 149.5 LBS | SYSTOLIC BLOOD PRESSURE: 132 MMHG

## 2024-11-13 DIAGNOSIS — Z95.828 HISTORY OF RIGHT COMMON CAROTID ARTERY STENT PLACEMENT: Primary | ICD-10-CM

## 2024-11-13 DIAGNOSIS — I65.21 OCCLUSION AND STENOSIS OF RIGHT CAROTID ARTERY: ICD-10-CM

## 2024-11-13 DIAGNOSIS — Z98.890 HISTORY OF RIGHT COMMON CAROTID ARTERY STENT PLACEMENT: Primary | ICD-10-CM

## 2024-11-25 ENCOUNTER — OFFICE VISIT (OUTPATIENT)
Dept: FAMILY MEDICINE CLINIC | Facility: CLINIC | Age: 89
End: 2024-11-25
Payer: MEDICARE

## 2024-11-25 VITALS
DIASTOLIC BLOOD PRESSURE: 84 MMHG | WEIGHT: 152 LBS | TEMPERATURE: 97.6 F | HEIGHT: 70 IN | OXYGEN SATURATION: 100 % | HEART RATE: 64 BPM | SYSTOLIC BLOOD PRESSURE: 134 MMHG | BODY MASS INDEX: 21.76 KG/M2

## 2024-11-25 DIAGNOSIS — Z23 NEED FOR INFLUENZA VACCINATION: Primary | ICD-10-CM

## 2024-11-25 DIAGNOSIS — K40.90 RIGHT INGUINAL HERNIA: ICD-10-CM

## 2024-11-25 NOTE — PROGRESS NOTES
"  Subjective   Casey Snowden Sr. is a 89 y.o. male who is here for   Chief Complaint   Patient presents with    Congestive Heart Failure    Parkinson's Disease    Groin Pain     Right   .     History of Present Illness   Afshin is brought in by his wife.  Overall stable  Parkinson's and dementia stable  His wife provides most of his care  He reports that the hernia in his right groin is getting a little bit bigger.  He still able to reduce it    The following portions of the patient's history were reviewed and updated as appropriate: allergies, current medications, past medical history, past social history, past surgical history, and problem list.    Review of Systems    Objective   Vitals:    11/25/24 1624   BP: 134/84   BP Location: Left arm   Patient Position: Sitting   Cuff Size: Adult   Pulse: 64   Temp: 97.6 °F (36.4 °C)   SpO2: 100%   Weight: 68.9 kg (152 lb)   Height: 177.8 cm (70\")      Physical Exam  Abdominal:      Hernia: Hernia is present in the right inguinal area.         Assessment & Plan   Diagnoses and all orders for this visit:    1. Need for influenza vaccination (Primary)  -     Fluzone High-Dose 65+yrs ([id number])    2. Right inguinal hernia  -     Ambulatory Referral to General Surgery    He and wife would also like to have their influenza vaccines today  They would like to discuss hernia surgery management going forward  We will send them to talk with Dr. Nelson  Nothing emergent here  Surgery would be elective  There are no Patient Instructions on file for this visit.    There are no discontinued medications.     No follow-ups on file.    Dr. Aleksander Diez  [place], [place]    "

## 2024-11-27 ENCOUNTER — TREATMENT (OUTPATIENT)
Dept: PHYSICAL THERAPY | Facility: CLINIC | Age: 89
End: 2024-11-27
Payer: MEDICARE

## 2024-11-27 DIAGNOSIS — Z91.81 AT HIGH RISK FOR FALLS: Primary | ICD-10-CM

## 2024-11-27 DIAGNOSIS — R53.81 PHYSICAL DECONDITIONING: ICD-10-CM

## 2024-11-27 DIAGNOSIS — R53.1 WEAKNESS GENERALIZED: ICD-10-CM

## 2024-11-27 DIAGNOSIS — R26.89 BALANCE PROBLEM: ICD-10-CM

## 2024-11-27 NOTE — PROGRESS NOTES
Physical Therapy Daily Treatment Note      Patient: Casey Snowden Sr.   : 1934  Referring practitioner: Kashif Goodwin MD  Date of Initial Visit: Type: THERAPY  Noted: 10/7/2024  Today's Date:  2024  Patient seen for 7 sessions         Casey Snowden reports: he skinned his hand catching himself on brick wall when he missed a step; he was holding items.               Objective   See Exercise, Manual, and Modality Logs for complete treatment.       Assessment/Plan  Pt continued to need intermittent MIC for standing strengthening and balance or use of single handrail for support for stability.  Pt with demonstration and cues for technique to maximize ROM with exercises. Cues to step length with cone walks; noted difficulty with judging distance with step overs and cones needing max cues.        Progress per Plan of Care and Progress strengthening /stabilization /functional activity           Timed:  Manual Therapy:    -     mins  07491;  Therapeutic Exercise:    30     mins  06585;     Neuromuscular Tres:    10    mins  78752;    Therapeutic Activity:     20     mins  21651;     Gait Training:      -     mins  47837;     Ultrasound:     -     mins  18975;      Untimed:  Electrical Stimulation:    -     mins  50613 ( );  Mechanical Traction:    -     mins  08209;   Dry needling:      -     mins  87914/    Timed Treatment:   60   mins   Total Treatment:     60   mins  Lucrecia Taylor PT  Physical Therapist    License #: [id number]

## 2024-12-02 ENCOUNTER — TREATMENT (OUTPATIENT)
Dept: PHYSICAL THERAPY | Facility: CLINIC | Age: 89
End: 2024-12-02
Payer: MEDICARE

## 2024-12-02 DIAGNOSIS — R26.89 BALANCE PROBLEM: ICD-10-CM

## 2024-12-02 DIAGNOSIS — R53.81 PHYSICAL DECONDITIONING: ICD-10-CM

## 2024-12-02 DIAGNOSIS — R53.1 WEAKNESS GENERALIZED: ICD-10-CM

## 2024-12-02 DIAGNOSIS — Z91.81 AT HIGH RISK FOR FALLS: Primary | ICD-10-CM

## 2024-12-02 PROCEDURE — 97110 THERAPEUTIC EXERCISES: CPT | Performed by: PHYSICAL THERAPIST

## 2024-12-02 PROCEDURE — 97530 THERAPEUTIC ACTIVITIES: CPT | Performed by: PHYSICAL THERAPIST

## 2024-12-02 NOTE — PROGRESS NOTES
Physical Therapy Daily Treatment Note      Patient: Casey Snowden Sr.   : 1934  Referring practitioner: Kashif Goodwin MD  Date of Initial Visit: Type: THERAPY  Noted: 10/7/2024  Today's Date:  2024  Patient seen for 8 sessions         Casey Snowden reports: he is doing well; had a good Thanksgiving holiday. No falls.               Objective   See Exercise, Manual, and Modality Logs for complete treatment.       Assessment/Plan  Pt needing more cues for posture and MIC throughout the clinic today due to forward head posture and anterior trunk lean. Pt with improving ROM and strength especially BLE; R arm weakness continued due to R RTC.        Progress per Plan of Care and Progress strengthening /stabilization /functional activity           Timed:  Manual Therapy:    -     mins  79217;  Therapeutic Exercise:    30     mins  75956;     Neuromuscular Tres:    -    mins  35225;    Therapeutic Activity:     30     mins  92427;     Gait Training:      -     mins  73934;     Ultrasound:     -     mins  54428;      Untimed:  Electrical Stimulation:    -     mins  99683 ( );  Mechanical Traction:    -     mins  30981;   Dry needling:      -     mins  38423/01420    Timed Treatment:   60   mins   Total Treatment:     60   mins  Lucrecia Taylor PT  Physical Therapist    License #: [id number]

## 2024-12-09 ENCOUNTER — TREATMENT (OUTPATIENT)
Dept: PHYSICAL THERAPY | Facility: CLINIC | Age: 89
End: 2024-12-09
Payer: MEDICARE

## 2024-12-09 DIAGNOSIS — Z91.81 AT HIGH RISK FOR FALLS: Primary | ICD-10-CM

## 2024-12-09 DIAGNOSIS — R53.1 WEAKNESS GENERALIZED: ICD-10-CM

## 2024-12-09 DIAGNOSIS — R53.81 PHYSICAL DECONDITIONING: ICD-10-CM

## 2024-12-09 DIAGNOSIS — R26.89 BALANCE PROBLEM: ICD-10-CM

## 2024-12-09 PROCEDURE — 97530 THERAPEUTIC ACTIVITIES: CPT | Performed by: PHYSICAL THERAPIST

## 2024-12-09 PROCEDURE — 97110 THERAPEUTIC EXERCISES: CPT | Performed by: PHYSICAL THERAPIST

## 2024-12-09 PROCEDURE — 97112 NEUROMUSCULAR REEDUCATION: CPT | Performed by: PHYSICAL THERAPIST

## 2024-12-09 NOTE — PROGRESS NOTES
Physical Therapy Daily Treatment Note      Patient: Casey Snowden Sr.   : 1934  Referring practitioner: Kashif Goodwin MD  Date of Initial Visit: Type: THERAPY  Noted: 10/7/2024  Today's Date:  2024  Patient seen for 9 sessions         Casey Snowden reports: he is feeling good; no recent falls.               Objective   See Exercise, Manual, and Modality Logs for complete treatment.       Assessment/Plan  Pt needing min A today for cone walking, sidestepping due to impaired balance. Continued cues for posture, foot clearance and step length. Pt needing intermittent rest breaks due to fatigue. Assistance for technique and maximal ROM throughout exercises especially for RUE needing intermittent assistance.        Progress per Plan of Care and Progress strengthening /stabilization /functional activity           Timed:  Manual Therapy:    -     mins  76237;  Therapeutic Exercise:    30     mins  34320;     Neuromuscular Tres:    15    mins  87522;    Therapeutic Activity:     15     mins  52724;     Gait Training:      -     mins  99003;     Ultrasound:     -     mins  10011;      Untimed:  Electrical Stimulation:    -     mins  21579 ( );  Mechanical Traction:    -     mins  45776;   Dry needling:      -     mins  36855/71051    Timed Treatment:   60   mins   Total Treatment:     70   mins  Lucrecia Taylor PT  Physical Therapist    License #: [id number]

## 2024-12-16 ENCOUNTER — TELEPHONE (OUTPATIENT)
Dept: PHYSICAL THERAPY | Facility: CLINIC | Age: 89
End: 2024-12-16

## 2024-12-16 NOTE — TELEPHONE ENCOUNTER
Caller: Evonne Snowden    Relationship: Emergency Contact         What was the call regarding: SOMETHING HAS HAPPENED TO RIGHT LEG HURTS AND CAN NOT PUT ANY WEIGHT

## 2024-12-17 ENCOUNTER — HOSPITAL ENCOUNTER (INPATIENT)
Facility: HOSPITAL | Age: 89
LOS: 23 days | Discharge: NURSING, REHAB OR LONG-TERM CARE | DRG: 073 | End: 2025-01-09
Attending: EMERGENCY MEDICINE | Admitting: INTERNAL MEDICINE
Payer: MEDICARE

## 2024-12-17 DIAGNOSIS — E44.0 MODERATE PROTEIN-CALORIE MALNUTRITION: ICD-10-CM

## 2024-12-17 DIAGNOSIS — R26.2 UNABLE TO AMBULATE: Primary | ICD-10-CM

## 2024-12-17 DIAGNOSIS — G20.A1 PARKINSON'S DISEASE, UNSPECIFIED WHETHER DYSKINESIA PRESENT, UNSPECIFIED WHETHER MANIFESTATIONS FLUCTUATE: ICD-10-CM

## 2024-12-17 DIAGNOSIS — I77.9 PERIPHERAL ARTERIAL OCCLUSIVE DISEASE: ICD-10-CM

## 2024-12-17 DIAGNOSIS — B02.8 HERPES ZOSTER WITH COMPLICATION: ICD-10-CM

## 2024-12-17 PROBLEM — B02.9 HERPES ZOSTER WITHOUT COMPLICATION: Status: ACTIVE | Noted: 2024-12-17

## 2024-12-17 LAB
ALBUMIN SERPL-MCNC: 3.5 G/DL (ref 3.5–5.2)
ALBUMIN/GLOB SERPL: 1.5 G/DL
ALP SERPL-CCNC: 80 U/L (ref 39–117)
ALT SERPL W P-5'-P-CCNC: 7 U/L (ref 1–41)
ANION GAP SERPL CALCULATED.3IONS-SCNC: 4.8 MMOL/L (ref 5–15)
AST SERPL-CCNC: 7 U/L (ref 1–40)
BASOPHILS # BLD AUTO: 0.04 10*3/MM3 (ref 0–0.2)
BASOPHILS NFR BLD AUTO: 0.9 % (ref 0–1.5)
BILIRUB SERPL-MCNC: 0.6 MG/DL (ref 0–1.2)
BILIRUB UR QL STRIP: NEGATIVE
BUN SERPL-MCNC: 16 MG/DL (ref 8–23)
BUN/CREAT SERPL: 25.8 (ref 7–25)
CALCIUM SPEC-SCNC: 8.1 MG/DL (ref 8.2–9.6)
CHLORIDE SERPL-SCNC: 107 MMOL/L (ref 98–107)
CLARITY UR: CLEAR
CO2 SERPL-SCNC: 25.2 MMOL/L (ref 22–29)
COLOR UR: YELLOW
CREAT SERPL-MCNC: 0.62 MG/DL (ref 0.76–1.27)
DEPRECATED RDW RBC AUTO: 50.9 FL (ref 37–54)
EGFRCR SERPLBLD CKD-EPI 2021: 90.8 ML/MIN/1.73
EOSINOPHIL # BLD AUTO: 0.07 10*3/MM3 (ref 0–0.4)
EOSINOPHIL NFR BLD AUTO: 1.5 % (ref 0.3–6.2)
ERYTHROCYTE [DISTWIDTH] IN BLOOD BY AUTOMATED COUNT: 14 % (ref 12.3–15.4)
GLOBULIN UR ELPH-MCNC: 2.3 GM/DL
GLUCOSE SERPL-MCNC: 112 MG/DL (ref 65–99)
GLUCOSE UR STRIP-MCNC: NEGATIVE MG/DL
HCT VFR BLD AUTO: 36.1 % (ref 37.5–51)
HGB BLD-MCNC: 11.5 G/DL (ref 13–17.7)
HGB UR QL STRIP.AUTO: NEGATIVE
IMM GRANULOCYTES # BLD AUTO: 0.01 10*3/MM3 (ref 0–0.05)
IMM GRANULOCYTES NFR BLD AUTO: 0.2 % (ref 0–0.5)
KETONES UR QL STRIP: NEGATIVE
LEUKOCYTE ESTERASE UR QL STRIP.AUTO: NEGATIVE
LYMPHOCYTES # BLD AUTO: 0.65 10*3/MM3 (ref 0.7–3.1)
LYMPHOCYTES NFR BLD AUTO: 14 % (ref 19.6–45.3)
MAGNESIUM SERPL-MCNC: 2 MG/DL (ref 1.6–2.4)
MCH RBC QN AUTO: 31.2 PG (ref 26.6–33)
MCHC RBC AUTO-ENTMCNC: 31.9 G/DL (ref 31.5–35.7)
MCV RBC AUTO: 97.8 FL (ref 79–97)
MONOCYTES # BLD AUTO: 0.41 10*3/MM3 (ref 0.1–0.9)
MONOCYTES NFR BLD AUTO: 8.8 % (ref 5–12)
NEUTROPHILS NFR BLD AUTO: 3.47 10*3/MM3 (ref 1.7–7)
NEUTROPHILS NFR BLD AUTO: 74.6 % (ref 42.7–76)
NITRITE UR QL STRIP: NEGATIVE
NRBC BLD AUTO-RTO: 0 /100 WBC (ref 0–0.2)
PH UR STRIP.AUTO: 7 [PH] (ref 5–8)
PLATELET # BLD AUTO: 144 10*3/MM3 (ref 140–450)
PMV BLD AUTO: 9.8 FL (ref 6–12)
POTASSIUM SERPL-SCNC: 3.7 MMOL/L (ref 3.5–5.2)
PROT SERPL-MCNC: 5.8 G/DL (ref 6–8.5)
PROT UR QL STRIP: NEGATIVE
RBC # BLD AUTO: 3.69 10*6/MM3 (ref 4.14–5.8)
SODIUM SERPL-SCNC: 137 MMOL/L (ref 136–145)
SP GR UR STRIP: 1.01 (ref 1–1.03)
UROBILINOGEN UR QL STRIP: NORMAL
WBC NRBC COR # BLD AUTO: 4.65 10*3/MM3 (ref 3.4–10.8)

## 2024-12-17 PROCEDURE — 99285 EMERGENCY DEPT VISIT HI MDM: CPT

## 2024-12-17 PROCEDURE — 25810000003 SODIUM CHLORIDE 0.9 % SOLUTION 250 ML FLEX CONT: Performed by: INTERNAL MEDICINE

## 2024-12-17 PROCEDURE — 36415 COLL VENOUS BLD VENIPUNCTURE: CPT

## 2024-12-17 PROCEDURE — 83735 ASSAY OF MAGNESIUM: CPT | Performed by: EMERGENCY MEDICINE

## 2024-12-17 PROCEDURE — 80053 COMPREHEN METABOLIC PANEL: CPT | Performed by: EMERGENCY MEDICINE

## 2024-12-17 PROCEDURE — 85025 COMPLETE CBC W/AUTO DIFF WBC: CPT | Performed by: EMERGENCY MEDICINE

## 2024-12-17 PROCEDURE — 25010000002 ACYCLOVIR PER 5 MG: Performed by: INTERNAL MEDICINE

## 2024-12-17 PROCEDURE — 81003 URINALYSIS AUTO W/O SCOPE: CPT | Performed by: EMERGENCY MEDICINE

## 2024-12-17 RX ORDER — CARBIDOPA/LEVODOPA 25MG-250MG
1 TABLET ORAL 3 TIMES DAILY
Status: DISCONTINUED | OUTPATIENT
Start: 2024-12-17 | End: 2024-12-20

## 2024-12-17 RX ORDER — VALACYCLOVIR HYDROCHLORIDE 500 MG/1
1000 TABLET, FILM COATED ORAL EVERY 8 HOURS SCHEDULED
Status: DISCONTINUED | OUTPATIENT
Start: 2024-12-18 | End: 2024-12-17

## 2024-12-17 RX ORDER — ATROPINE SULFATE 10 MG/ML
1 SOLUTION/ DROPS OPHTHALMIC 3 TIMES DAILY PRN
Status: DISCONTINUED | OUTPATIENT
Start: 2024-12-17 | End: 2025-01-09 | Stop reason: HOSPADM

## 2024-12-17 RX ORDER — ONDANSETRON 2 MG/ML
4 INJECTION INTRAMUSCULAR; INTRAVENOUS EVERY 6 HOURS PRN
Status: DISCONTINUED | OUTPATIENT
Start: 2024-12-17 | End: 2025-01-06

## 2024-12-17 RX ORDER — VALACYCLOVIR HYDROCHLORIDE 500 MG/1
1000 TABLET, FILM COATED ORAL ONCE
Status: DISCONTINUED | OUTPATIENT
Start: 2024-12-17 | End: 2024-12-17

## 2024-12-17 RX ORDER — ACETAMINOPHEN 160 MG/5ML
650 SOLUTION ORAL EVERY 4 HOURS PRN
Status: DISCONTINUED | OUTPATIENT
Start: 2024-12-17 | End: 2024-12-20

## 2024-12-17 RX ORDER — ACETAMINOPHEN 650 MG/1
650 SUPPOSITORY RECTAL EVERY 4 HOURS PRN
Status: DISCONTINUED | OUTPATIENT
Start: 2024-12-17 | End: 2024-12-20

## 2024-12-17 RX ORDER — SODIUM CHLORIDE 0.9 % (FLUSH) 0.9 %
10 SYRINGE (ML) INJECTION EVERY 12 HOURS SCHEDULED
Status: DISCONTINUED | OUTPATIENT
Start: 2024-12-17 | End: 2025-01-04

## 2024-12-17 RX ORDER — ACETAMINOPHEN 325 MG/1
650 TABLET ORAL EVERY 4 HOURS PRN
Status: DISCONTINUED | OUTPATIENT
Start: 2024-12-17 | End: 2024-12-20

## 2024-12-17 RX ORDER — SODIUM CHLORIDE 0.9 % (FLUSH) 0.9 %
10 SYRINGE (ML) INJECTION AS NEEDED
Status: DISCONTINUED | OUTPATIENT
Start: 2024-12-17 | End: 2025-01-04

## 2024-12-17 RX ORDER — FINASTERIDE 5 MG/1
5 TABLET, FILM COATED ORAL DAILY
Status: DISCONTINUED | OUTPATIENT
Start: 2024-12-17 | End: 2024-12-20

## 2024-12-17 RX ORDER — HYDROCODONE BITARTRATE AND ACETAMINOPHEN 5; 325 MG/1; MG/1
1 TABLET ORAL EVERY 6 HOURS PRN
Status: DISCONTINUED | OUTPATIENT
Start: 2024-12-17 | End: 2024-12-20

## 2024-12-17 RX ORDER — LEVOTHYROXINE SODIUM 75 UG/1
75 TABLET ORAL DAILY
Status: DISCONTINUED | OUTPATIENT
Start: 2024-12-17 | End: 2024-12-20

## 2024-12-17 RX ORDER — VALSARTAN 40 MG/1
40 TABLET ORAL DAILY
Status: DISCONTINUED | OUTPATIENT
Start: 2024-12-17 | End: 2024-12-29

## 2024-12-17 RX ORDER — ATORVASTATIN CALCIUM 20 MG/1
40 TABLET, FILM COATED ORAL NIGHTLY
Status: DISCONTINUED | OUTPATIENT
Start: 2024-12-17 | End: 2024-12-20

## 2024-12-17 RX ORDER — OXYBUTYNIN CHLORIDE 10 MG/1
10 TABLET, EXTENDED RELEASE ORAL DAILY
Status: DISCONTINUED | OUTPATIENT
Start: 2024-12-17 | End: 2025-01-09 | Stop reason: HOSPADM

## 2024-12-17 RX ORDER — ASPIRIN 81 MG/1
81 TABLET ORAL DAILY
Status: DISCONTINUED | OUTPATIENT
Start: 2024-12-17 | End: 2024-12-20

## 2024-12-17 RX ORDER — MULTIVIT AND MINERALS-FERROUS GLUCONATE 9 MG IRON/15 ML ORAL LIQUID 9 MG/15 ML
15 LIQUID (ML) ORAL DAILY
Status: DISCONTINUED | OUTPATIENT
Start: 2024-12-17 | End: 2024-12-18

## 2024-12-17 RX ORDER — SODIUM CHLORIDE 9 MG/ML
40 INJECTION, SOLUTION INTRAVENOUS AS NEEDED
Status: DISCONTINUED | OUTPATIENT
Start: 2024-12-17 | End: 2025-01-04

## 2024-12-17 RX ORDER — MULTIVITAMIN WITH IRON
1000 TABLET ORAL
Status: DISCONTINUED | OUTPATIENT
Start: 2024-12-18 | End: 2024-12-20

## 2024-12-17 RX ORDER — TAMSULOSIN HYDROCHLORIDE 0.4 MG/1
0.4 CAPSULE ORAL DAILY
Status: DISCONTINUED | OUTPATIENT
Start: 2024-12-17 | End: 2024-12-20

## 2024-12-17 RX ADMIN — VALACYCLOVIR HYDROCHLORIDE 1000 MG: 500 TABLET, FILM COATED ORAL at 18:08

## 2024-12-17 RX ADMIN — LEVOTHYROXINE SODIUM 75 MCG: 75 TABLET ORAL at 18:08

## 2024-12-17 RX ADMIN — VALSARTAN 40 MG: 40 TABLET, FILM COATED ORAL at 18:08

## 2024-12-17 RX ADMIN — ACETAMINOPHEN 650 MG: 325 TABLET ORAL at 20:55

## 2024-12-17 RX ADMIN — ASPIRIN 81 MG: 81 TABLET, COATED ORAL at 18:08

## 2024-12-17 RX ADMIN — Medication 15 ML: at 18:08

## 2024-12-17 RX ADMIN — FINASTERIDE 5 MG: 5 TABLET, FILM COATED ORAL at 18:08

## 2024-12-17 RX ADMIN — Medication 10 ML: at 23:16

## 2024-12-17 RX ADMIN — CARBIDOPA AND LEVODOPA 1 TABLET: 25; 250 TABLET ORAL at 20:55

## 2024-12-17 RX ADMIN — OXYBUTYNIN CHLORIDE 10 MG: 10 TABLET, EXTENDED RELEASE ORAL at 18:08

## 2024-12-17 RX ADMIN — TAMSULOSIN HYDROCHLORIDE 0.4 MG: 0.4 CAPSULE ORAL at 18:08

## 2024-12-17 RX ADMIN — ACYCLOVIR SODIUM 700 MG: 50 INJECTION, SOLUTION INTRAVENOUS at 23:48

## 2024-12-17 RX ADMIN — ATORVASTATIN CALCIUM 40 MG: 20 TABLET, FILM COATED ORAL at 20:54

## 2024-12-17 NOTE — PROGRESS NOTES
Clinical Pharmacy Services: Medication History    Casey Snowden Sr. is a 89 y.o. male presenting to [facility] for   Chief Complaint   Patient presents with    Leg Pain       He  has a past medical history of Abdominal aortic aneurysm without rupture (10/14/2021), Acquired hypothyroidism (08/31/2022), Acute ischemic stroke (10/08/2023), Aneurysm, Arrhythmia, Atrial fibrillation, Cancer (04/2018), Carotid stenosis (10/29/2015), CHF (congestive heart failure), Chronic deep vein thrombosis (DVT) of popliteal vein of right lower extremity, Clotting disorder, Coronary artery disease involving native coronary artery of native heart without angina pectoris (12/20/2018), DVT of lower extremity (deep venous thrombosis), Hard of hearing, Hyperlipidemia, Hypertension, Localized edema (01/04/2017), Long-term use of high-risk medication, Metabolic encephalopathy (02/07/2023), Myocardial infarction (2987), PAD (peripheral artery disease) (09/10/2020), Parkinson's disease, Postphlebitic syndrome, Pure hypercholesterolemia, Skin tear of upper arm without complication, left, subsequent encounter, and Stroke (11/02/2023).    Allergies as of 12/17/2024 - Reviewed 12/17/2024   Allergen Reaction Noted    Penicillins Rash 11/02/2016    Rocephin [ceftriaxone] Rash 12/26/2016       Medication information was obtained from: [facility]   Pharmacy and Phone Number:     Prior to Admission Medications       Prescriptions Last Dose Informant Patient Reported? Taking?    apixaban (Eliquis) 5 MG tablet tablet  Pharmacy No Yes    TAKE 1 TABLET BY MOUTH TIMES A DAY. INDICATIONS: DVT/PE (ACTIVE THROMBOSIS), PREVENTION OF UNWANTED CLOT IN VEINS    Patient taking differently:  Take 1 tablet by mouth Every 12 (Twelve) Hours.    aspirin 81 MG EC tablet  Other Yes Yes    Take 1 tablet by mouth Daily.    atorvastatin (LIPITOR) 40 MG tablet  Pharmacy No Yes    Take 1 tablet by mouth Every Night. Indications: High Amount of Fats in the Blood     atropine 1 % ophthalmic solution  Pharmacy Yes Yes    1-2 drops 3 (Three) Times a Day As Needed (as needed orally for increased secretions). Orally   Indications: excessive drooling    carbidopa-levodopa (SINEMET)  MG per tablet  Pharmacy Yes Yes    Take 1 tablet by mouth 3 (Three) Times a Day. Indications: Parkinson's Disease    Cyanocobalamin (Vitamin B 12) 500 MCG tablet  Other Yes Yes    Take 2 tablets by mouth Every Morning. Indications: Inadequate Vitamin B12    finasteride (PROSCAR) 5 MG tablet  Pharmacy Yes Yes    Take 1 tablet by mouth Daily.    levothyroxine (SYNTHROID, LEVOTHROID) 75 MCG tablet  Pharmacy No Yes    TAKE 1 TABLET BY MOUTH EVERY  MORNING FOR UNDERACTIVE THYROID    Patient taking differently:  Take 1 tablet by mouth Daily.    Multiple Vitamins-Minerals (PRESERVISION AREDS) capsule  Other Yes Yes    Take 1 tablet by mouth 2 (Two) Times a Day. Indications: supplement    tamsulosin (FLOMAX) 0.4 MG capsule 24 hr capsule  Pharmacy Yes Yes    Take 1 capsule by mouth Daily.    valsartan (DIOVAN) 40 MG tablet  Pharmacy No Yes    TAKE 1 TABLET BY MOUTH DAILY. IN THE EVENING. INDICATIONS: HIGH BLOOD PRESSURE DISORDER    Patient taking differently:  Take 1 tablet by mouth Daily. Indications: High Blood Pressure    Vibegron (Gemtesa) 75 MG tablet  Pharmacy Yes Yes    Take 1 tablet by mouth Daily.              Medication notes:     This medication list is complete to the best of my knowledge as of 12/17/2024    Please call if questions.    Stefan Beck  Medication History Technician   [phone]    12/17/2024 12:30 EST

## 2024-12-17 NOTE — ED PROVIDER NOTES
EMERGENCY DEPARTMENT ENCOUNTER    Room Number:  P492/1  Date of encounter:  12/17/2024  PCP: Aleksander Diez MD  Historian: Patient, spouse, daughter  Relevant information and history provided by sources other than the patient will be included below and in the ED Course.  Review of pertinent past medical records may also be included in record below and ED Course.    HPI:  Chief Complaint: Pain in right leg  A complete HPI/ROS/PMH/PSH/SH/FH are unobtainable due to: Patient has dementia.  He is elderly and frail.  He is acting at his baseline.  Context: Casey Snowden Sr. is a 89 y.o. male who presents to the ED c/o he is complained of some pain in his right leg that started yesterday morning.  Patient's spouse noted a rash that started in his right buttocks and the right leg on Monday that has progressed.  The patient complains of some pain that is behind his knee but when I actually have him localize it on exam it seems to be mainly where the shingles rash is just below his right knee on the medial aspect.  He has had no fevers or chills.  He has been having a hard time ambulating the past day because of the pain.  He ambulates only short distances at baseline with a walker.  He again has dementia he has advanced Parkinson's disease.  There is no report of any fevers or chills or coughs or colds.  No report of vomiting.  He has been compliant with Eliquis.  About 10 years ago he had a DVT in his right leg when he had a total knee replacement.  He has mild chronic swelling to his right lower extremity which is at baseline.  He has no worsening swelling.  This gentleman did receive the shingles vaccine      Previous Episodes: No  Current Symptoms: Pain in the right leg    MEDICAL HISTORY REVIEWED  Is a gentleman that has a history of fall risk as he has increased falls history of stroke in the past history of dysphagia and aspirin per patient pneumonia in the past.  Elevated cholesterol.  Also has Parkinson's  disease.  History of coronary artery disease.  History of congestive heart failure.  I did review his medicine list I can see that he is on Eliquis he is on carbidopa levodopa.  Is on several different other medicines as well that I reviewed that is not currently in [facility].      PAST MEDICAL HISTORY  Active Ambulatory Problems     Diagnosis Date Noted    Benign essential hypertension 11/02/2016    Stenosis of carotid artery 11/02/2016    Mixed hyperlipidemia 11/16/2012    Parkinson's disease 11/02/2016    Peripheral arterial occlusive disease 11/02/2016    Screening for prostate cancer 11/02/2016    Medication monitoring encounter 11/02/2016    Melanoma 11/02/2016    Chronic deep vein thrombosis (DVT) of popliteal vein of right lower extremity 11/02/2016    Uses hearing aid 11/02/2016    Paroxysmal A-fib 12/27/2016    Chronic diastolic (congestive) heart failure 07/07/2017    Anticoagulant long-term use 07/07/2017    Presence of IVC filter 07/07/2017    Medicare annual wellness visit, subsequent 11/03/2017    Iron deficiency anemia secondary to inadequate dietary iron intake 11/03/2017    Orthostatic hypotension due to Parkinson's disease 12/03/2018    Coronary artery disease involving native coronary artery of native heart without angina pectoris 12/20/2018    ST elevation myocardial infarction involving left anterior descending (LAD) coronary artery 06/05/2019    Sialorrhea 08/19/2019    Hypertensive heart disease with heart failure 03/04/2020    PAD (peripheral artery disease) 09/10/2020    Metastatic squamous cell carcinoma 12/03/2020    Secondary malignancy of lymph nodes of head, face and neck 12/02/2020    Encounter for antineoplastic immunotherapy 01/07/2021    RBD (REM behavioral disorder) 01/20/2022    Acquired hypothyroidism 08/31/2022    Aspiration pneumonia of right lung due to vomit 02/07/2023    Dysphagia, neurologic 02/15/2023    ICAO (internal carotid artery occlusion), right 10/18/2023    Arterial  ischemic stroke, ICA (internal carotid artery), right, chroni 10/18/2023    Renal mass, left 02/05/2024    Abdominal aortic aneurysm 04/22/2024    Disorientation 07/02/2024    Falls frequently 08/15/2024    Right inguinal hernia 11/25/2024     Resolved Ambulatory Problems     Diagnosis Date Noted    Deep vein thrombosis 11/16/2012    Routine adult health maintenance 11/02/2016    Cough 11/02/2016    Cellulitis of right lower extremity 12/26/2016    Sepsis 12/26/2016    Traumatic hematoma of right lower leg with infection 12/26/2016    Hospital discharge follow-up 01/04/2017    Localized edema 01/04/2017    Hypertension 11/16/2012    Hematoma 07/07/2017    Cellulitis of right lower extremity without foot 07/10/2017    Anterior chest wall pain 09/11/2018    Acute systolic heart failure 10/30/2018    Bradycardia with 31-40 beats per minute 12/03/2018    Atrial fibrillation with RVR 06/03/2019    Coronary artery disease with angina pectoris 06/04/2019    Facial contusion, subsequent encounter 01/30/2023    Skin tear of upper arm without complication, left, subsequent encounter 01/30/2023    Altered mental status, unspecified altered mental status type 02/07/2023    Metabolic encephalopathy 02/07/2023    Acute ischemic stroke 10/09/2023     Past Medical History:   Diagnosis Date    Abdominal aortic aneurysm without rupture 10/14/2021    Aneurysm     Arrhythmia     Atrial fibrillation     Cancer 04/2018    Carotid stenosis 10/29/2015    CHF (congestive heart failure)     Clotting disorder     DVT of lower extremity (deep venous thrombosis)     Hard of hearing     Hyperlipidemia     Long-term use of high-risk medication     Myocardial infarction 2918    Postphlebitic syndrome     Pure hypercholesterolemia     Stroke 11/02/2023         PAST SURGICAL HISTORY  Past Surgical History:   Procedure Laterality Date    BASAL CELL CARCINOMA EXCISION  04/2018    CARDIAC CATHETERIZATION N/A 11/05/2018    Procedure: Left Heart Cath;   Surgeon: Oliverio Azar MD;  Location[facility]AS[facility];  Service: Cardiovascular    CARDIAC CATHETERIZATION N/A 11/05/2018    Procedure: Coronary angiography;  Surgeon: Oliverio Azar MD;  [facility];  Service: Cardiovascular    CARDIAC CATHETERIZATION N/A 11/05/2018    Procedure: Left ventriculography;  Surgeon: Oliverio Azar MD;  [facility];  Service: Cardiovascular    CARDIAC CATHETERIZATION N/A 06/04/2019    Procedure: Left Heart Cath;  Surgeon: Johnny Glasgow MD;  [facility];  Service: Cardiovascular    CARDIAC CATHETERIZATION N/A 06/04/2019    Procedure: Coronary angiography;  Surgeon: Johnny Glasgow MD;  [facility];  Service: Cardiovascular    CARDIAC CATHETERIZATION N/A 06/04/2019    Procedure: Left ventriculography;  Surgeon: Johnny Glasgow MD;  Location: [facility] CATH INVASIVE LOCATION;  Service: Cardiovascular    CARDIAC CATHETERIZATION N/A 06/04/2019    Procedure: Stent BILL coronary;  Surgeon: Johnny Glasgow MD;  Location: [facility] CATH INVASIVE LOCATION;  Service: Cardiovascular    CARDIAC SURGERY      CAROTID ARTERY ANGIOPLASTY Right 10/11/2023    with stent    CAROTID STENT      CATARACT EXTRACTION Left 08/2018    CATARACT EXTRACTION Right 09/2018    COLONOSCOPY      2011    CORONARY STENT PLACEMENT      INCISION AND DRAINAGE LEG Right 07/07/2017    Procedure: INCISION AND DRAINAGE INFECTED HEMATOMA RIGHT THIGH;  Surgeon: Valentin Peña MD;  Location: [facility] OR;  Service:     JOINT REPLACEMENT      KNEE INCISION AND DRAINAGE Right 12/27/2016    Procedure: KNEE INCISION AND DRAINAGE;  Surgeon: Triston Whitten MD;  Location: [facility] OR;  Service:     NOSE SURGERY      nose replacement    SINUS SURGERY  1960    SKIN BIOPSY      SKIN GRAFT  12/2017    TOTAL KNEE ARTHROPLASTY Right     VASCULAR SURGERY      VENA CAVA FILTER  PLACEMENT           FAMILY HISTORY  Family History   Problem Relation Age of Onset    Heart attack Mother 74    Hypertension Mother     Cancer Sister     Stroke Brother     Cancer Brother     Cancer Brother     Deep vein thrombosis Neg Hx          SOCIAL HISTORY  Social History     Socioeconomic History    Marital status:     Years of education: college grad   Tobacco Use    Smoking status: Never     Passive exposure: Never    Smokeless tobacco: Never    Tobacco comments:     caff use   Vaping Use    Vaping status: Never Used   Substance and Sexual Activity    Alcohol use: No     Comment: none for a month    Drug use: No    Sexual activity: Not Currently     Partners: Female         ALLERGIES  Penicillins and Rocephin [ceftriaxone]        REVIEW OF SYSTEMS  Review of Systems     All systems reviewed and negative except for those discussed in HPI.       PHYSICAL EXAM    I have reviewed the triage vital signs and nursing notes.    ED Triage Vitals [12/17/24 0915]   Temp Heart Rate Resp BP SpO2   98.4 °F (36.9 °C) 55 20 (!) 200/78 98 %      Temp src Heart Rate Source Patient Position BP Location FiO2 (%)   Tympanic Monitor Lying -- --       GENERAL: Elderly male.  Chronically ill and frail.  .Vital signs on my initial evaluation have been reviewed here he is hypertensive.  HENT: nares patent  Head/neck/ face are symmetric without gross deformity, signs of trauma, or swelling  EYES: no scleral icterus, no conjunctival pallor.  NECK: Supple, no meningismus  CV: regular rhythm, regular rate with intact distal pulses.  RESPIRATORY: normal effort and no respiratory distress.  ABDOMEN: soft and nontender.  Back exam he has a rash that is right of midline in his right buttocks.  There is some vesicles.  Rash is very consistent with shingles.  MUSCULOSKELETAL: Mild swelling in his right leg that is chronic compared to his left leg intact distal pulses.  He has a rash very consistent with shingles with some erythema  raised vesicular lesions that follows an S2-S3 distribution.  It does not go below his calf and I do not see involvement of his foot at this time.  NEURO: alert and appropriate, moves all extremities, follows commands.  He is forgetful.  He is able to move all of his extremities at baseline.  He has generalized diffuse weakness.  SKIN: warm, dry    Vital signs and nursing notes reviewed.        LAB RESULTS  Recent Results (from the past 24 hours)   Urinalysis With Microscopic If Indicated (No Culture) - Urine, Clean Catch    Collection Time: 12/17/24 12:26 PM    Specimen: Urine, Clean Catch   Result Value Ref Range    Color, UA Yellow Yellow, Straw    Appearance, UA Clear Clear    pH, UA 7.0 5.0 - 8.0    Specific Gravity, UA 1.013 1.005 - 1.030    Glucose, UA Negative Negative    Ketones, UA Negative Negative    Bilirubin, UA Negative Negative    Blood, UA Negative Negative    Protein, UA Negative Negative    Leuk Esterase, UA Negative Negative    Nitrite, UA Negative Negative    Urobilinogen, UA 1.0 E.U./dL 0.2 - 1.0 E.U./dL   Comprehensive Metabolic Panel    Collection Time: 12/17/24 12:38 PM    Specimen: Arm, Left; Blood   Result Value Ref Range    Glucose 112 (H) 65 - 99 mg/dL    BUN 16 8 - 23 mg/dL    Creatinine 0.62 (L) 0.76 - 1.27 mg/dL    Sodium 137 136 - 145 mmol/L    Potassium 3.7 3.5 - 5.2 mmol/L    Chloride 107 98 - 107 mmol/L    CO2 25.2 22.0 - 29.0 mmol/L    Calcium 8.1 (L) 8.2 - 9.6 mg/dL    Total Protein 5.8 (L) 6.0 - 8.5 g/dL    Albumin 3.5 3.5 - 5.2 g/dL    ALT (SGPT) 7 1 - 41 U/L    AST (SGOT) 7 1 - 40 U/L    Alkaline Phosphatase 80 39 - 117 U/L    Total Bilirubin 0.6 0.0 - 1.2 mg/dL    Globulin 2.3 gm/dL    A/G Ratio 1.5 g/dL    BUN/Creatinine Ratio 25.8 (H) 7.0 - 25.0    Anion Gap 4.8 (L) 5.0 - 15.0 mmol/L    eGFR 90.8 >60.0 mL/min/1.73   Magnesium    Collection Time: 12/17/24 12:38 PM    Specimen: Arm, Left; Blood   Result Value Ref Range    Magnesium 2.0 1.6 - 2.4 mg/dL   CBC Auto Differential     Collection Time: 12/17/24 12:38 PM    Specimen: Arm, Left; Blood   Result Value Ref Range    WBC 4.65 3.40 - 10.80 10*3/mm3    RBC 3.69 (L) 4.14 - 5.80 10*6/mm3    Hemoglobin 11.5 (L) 13.0 - 17.7 g/dL    Hematocrit 36.1 (L) 37.5 - 51.0 %    MCV 97.8 (H) 79.0 - 97.0 fL    MCH 31.2 26.6 - 33.0 pg    MCHC 31.9 31.5 - 35.7 g/dL    RDW 14.0 12.3 - 15.4 %    RDW-SD 50.9 37.0 - 54.0 fl    MPV 9.8 6.0 - 12.0 fL    Platelets 144 140 - 450 10*3/mm3    Neutrophil % 74.6 42.7 - 76.0 %    Lymphocyte % 14.0 (L) 19.6 - 45.3 %    Monocyte % 8.8 5.0 - 12.0 %    Eosinophil % 1.5 0.3 - 6.2 %    Basophil % 0.9 0.0 - 1.5 %    Immature Grans % 0.2 0.0 - 0.5 %    Neutrophils, Absolute 3.47 1.70 - 7.00 10*3/mm3    Lymphocytes, Absolute 0.65 (L) 0.70 - 3.10 10*3/mm3    Monocytes, Absolute 0.41 0.10 - 0.90 10*3/mm3    Eosinophils, Absolute 0.07 0.00 - 0.40 10*3/mm3    Basophils, Absolute 0.04 0.00 - 0.20 10*3/mm3    Immature Grans, Absolute 0.01 0.00 - 0.05 10*3/mm3    nRBC 0.0 0.0 - 0.2 /100 WBC       Ordered the above labs and independently reviewed the results.        RADIOLOGY  No Radiology Exams Resulted Within Past 24 Hours    I ordered the above noted radiological studies. Reviewed by me and discussed with radiologist.  See dictation for official radiology interpretation.      PROCEDURES    Procedures      MEDICATIONS GIVEN IN ER    Medications   sodium chloride 0.9 % flush 10 mL (has no administration in time range)   carbidopa-levodopa (SINEMET)  MG per tablet 1 tablet (has no administration in time range)   atropine 1 % ophthalmic solution 1 drop (has no administration in time range)   atorvastatin (LIPITOR) tablet 40 mg (has no administration in time range)   finasteride (PROSCAR) tablet 5 mg (5 mg Oral Given 12/17/24 1808)   tamsulosin (FLOMAX) 24 hr capsule 0.4 mg (0.4 mg Oral Given 12/17/24 1808)   aspirin EC tablet 81 mg (81 mg Oral Given 12/17/24 1808)   levothyroxine (SYNTHROID, LEVOTHROID) tablet 75 mcg (75 mcg  Oral Given 12/17/24 1808)   apixaban (ELIQUIS) tablet 5 mg (has no administration in time range)   valsartan (DIOVAN) tablet 40 mg (40 mg Oral Given 12/17/24 1808)   multivitamin and minerals liquid 15 mL (15 mL Oral Given 12/17/24 1808)   vitamin B-12 (CYANOCOBALAMIN) tablet 1,000 mcg (has no administration in time range)   oxybutynin XL (DITROPAN-XL) 24 hr tablet 10 mg (10 mg Oral Given 12/17/24 1808)   valACYclovir (VALTREX) tablet 1,000 mg (has no administration in time range)   sodium chloride 0.9 % flush 10 mL (has no administration in time range)   sodium chloride 0.9 % flush 10 mL (has no administration in time range)   sodium chloride 0.9 % infusion 40 mL (has no administration in time range)   acetaminophen (TYLENOL) tablet 650 mg (has no administration in time range)     Or   acetaminophen (TYLENOL) 160 MG/5ML oral solution 650 mg (has no administration in time range)     Or   acetaminophen (TYLENOL) suppository 650 mg (has no administration in time range)   HYDROcodone-acetaminophen (NORCO) 5-325 MG per tablet 1 tablet (has no administration in time range)   ondansetron (ZOFRAN) injection 4 mg (has no administration in time range)         All labs have been independently reviewed by me.  All radiology studies have been reviewed by me and I discussed with radiologist dictating the report when indicated below.  All EKG's independently viewed and interpreted by me.  Discussion below represents my analysis of pertinent findings related to patient's condition, differential diagnosis, treatment plan and final disposition.        PROGRESS, DATA ANALYSIS, CONSULTS, AND MEDICAL DECISION MAKING    Informed patient, spouse, daughter about my clinical exam and the results of test.  This gentleman has shingles and pain from shingles involving the S2-S3 distribution.  He has been compliant with Eliquis.  He had a DVT 10 years ago.  He has no new swelling to his leg I think it would be amazingly unlikely that he has a  new DVT causing his pain in light of his clinical exam and his history.  I did offer a venous Doppler if the wife wanted to because she was concerned about that.  She is refused after my explanation.  He is had no change in his respiratory status or any complaints of chest pain or shortness of breath.  The spouse and the daughter are concerned because they feel that they cannot care for this man he cannot move they cannot get him up because of pain in that right leg.  His dementia is a contributing factor as well.  He probably would not be a good candidate for opiates given his advanced age lack of mobility Parkinson's disease and dementia.  I did offer opiates if needed.  Will cesar put the gentleman on some antivirals.  He is on Eliquis so weak cannot take nonsteroidals but Tylenol.  And Lidoderm patches.  All questions answered at this time.  I will consult [facility] given the social situations.      ED Course as of 12/17/24 1909   Tue Dec 17, 2024   1146 I did a consult with the [facility] nurse Aleksander.  He went and saw the patient.  He recommends that this patient is getting need to be admitted to rehab.  Recommends admission to A. [MM]   1348 Hemoglobin(!): 11.5  Chronic anemia [MM]   1418 I did talk with Dr. Dumont who is on for A.  Informed him of the patient's presenting symptoms and results of the test.  Informed of my consult Tatian with [facility].  Agrees to admit the patient to the hospital. [MM]      ED Course User Index  [MM] Ian Dao MD       AS OF 19:09 EST VITALS:    BP - (!) 198/87  HR - 68  TEMP - 97 °F (36.1 °C) (Oral)  02 SATS - 100%    SOCIAL DETERMINANTS OF HEALTH THAT IMPACT OR LIMIT CARE (For example..Homelessness,safe discharge, inability to obtain care, follow up, or prescriptions):      DIAGNOSIS  Final diagnoses:   Unable to ambulate   Herpes zoster with complication: S2 and S3 distribution on the right   Parkinson's disease, unspecified whether dyskinesia present, unspecified whether  manifestations fluctuate         DISPOSITION  I have reviewed the test results with my patient and explained the current treatment plan.  I answered all the patient's questions and concerns.  The patient is hemodynamically stable and is in no distress and is appropriate to be admitted to a medical/surgical floor bed at this time.            DICTATED UTILIZING DRAGON DICTATION    Note Disclaimer: At [facility], we believe that sharing information builds trust and better relationships. You are receiving this note because you recently visited [facility]. It is possible you will see health information before a provider has talked with you about it. This kind of information can be easy to misunderstand. To help you fully understand what it means for your health, we urge you to discuss this note with your provider.       Ian Dao MD  12/17/24 6301

## 2024-12-17 NOTE — ED NOTES
Nursing report ED to floor  Casey Snowden Sr.  89 y.o.  male    HPI :  HPI  Stated Reason for Visit: leg pain, rash. HTN    Chief Complaint  Chief Complaint   Patient presents with    Leg Pain       Admitting doctor:   Inessa Dumont MD    Admitting diagnosis:   The primary encounter diagnosis was Unable to ambulate. Diagnoses of Herpes zoster with complication: S2 and S3 distribution on the right and Parkinson's disease, unspecified whether dyskinesia present, unspecified whether manifestations fluctuate were also pertinent to this visit.    Code status:   Current Code Status       Date Active Code Status Order ID Comments User Context       Prior            Allergies:   Penicillins and Rocephin [ceftriaxone]    Isolation:   No active isolations    Intake and Output  No intake or output data in the 24 hours ending 12/17/24 1433    Weight:   There were no vitals filed for this visit.    Most recent vitals:   Vitals:    12/17/24 0915   BP: (!) 200/78   Patient Position: Lying   Pulse: 55   Resp: 20   Temp: 98.4 °F (36.9 °C)   TempSrc: Tympanic   SpO2: 98%       Active LDAs/IV Access:   Lines, Drains & Airways       Active LDAs       Name Placement date Placement time Site Days    Peripheral IV 08/09/24 1313 Left Antecubital 08/09/24  1313  Antecubital  130                    Labs (abnormal labs have a star):   Labs Reviewed   COMPREHENSIVE METABOLIC PANEL - Abnormal; Notable for the following components:       Result Value    Glucose 112 (*)     Creatinine 0.62 (*)     Calcium 8.1 (*)     Total Protein 5.8 (*)     BUN/Creatinine Ratio 25.8 (*)     Anion Gap 4.8 (*)     All other components within normal limits    Narrative:     GFR Categories in Chronic Kidney Disease (CKD)      GFR Category          GFR (mL/min/1.73)    Interpretation  G1                     90 or greater         Normal or high (1)  G2                      60-89                Mild decrease (1)  G3a                   45-59                Mild to  moderate decrease  G3b                   30-44                Moderate to severe decrease  G4                    15-29                Severe decrease  G5                    14 or less           Kidney failure          (1)In the absence of evidence of kidney disease, neither GFR category G1 or G2 fulfill the criteria for CKD.    eGFR calculation 2021 CKD-EPI creatinine equation, which does not include race as a factor   CBC WITH AUTO DIFFERENTIAL - Abnormal; Notable for the following components:    RBC 3.69 (*)     Hemoglobin 11.5 (*)     Hematocrit 36.1 (*)     MCV 97.8 (*)     Lymphocyte % 14.0 (*)     Lymphocytes, Absolute 0.65 (*)     All other components within normal limits   URINALYSIS W/ MICROSCOPIC IF INDICATED (NO CULTURE) - Normal    Narrative:     Urine microscopic not indicated.   MAGNESIUM - Normal   CBC AND DIFFERENTIAL    Narrative:     The following orders were created for panel order CBC & Differential.  Procedure                               Abnormality         Status                     ---------                               -----------         ------                     CBC Auto Differential[[id number]]        Abnormal            Final result                 Please view results for these tests on the individual orders.       EKG:   No orders to display       Meds given in ED:   Medications   sodium chloride 0.9 % flush 10 mL (has no administration in time range)   valACYclovir (VALTREX) tablet 1,000 mg (has no administration in time range)       Imaging results:  No radiology results for the last day    Ambulatory status:   - x1    Social issues:   Social History     Socioeconomic History    Marital status:     Years of education: college grad   Tobacco Use    Smoking status: Never     Passive exposure: Never    Smokeless tobacco: Never    Tobacco comments:     caff use   Vaping Use    Vaping status: Never Used   Substance and Sexual Activity    Alcohol use: No     Comment: none for a  month    Drug use: No    Sexual activity: Not Currently     Partners: Female       Peripheral Neurovascular  Peripheral Neurovascular (Adult)  Peripheral Neurovascular WDL: .WDL except, neurovascular assessment lower  RLE Neurovascular Assessment  Sensation RLE: tenderness present    Neuro Cognitive  Neuro Cognitive (Adult)  Cognitive/Neuro/Behavioral WDL: WDL    Learning  Learning Assessment  Learning Readiness and Ability: no barriers identified    Respiratory  Respiratory  Airway WDL: WDL  Respiratory WDL  Respiratory WDL: WDL    Abdominal Pain       Pain Assessments  Pain (Adult)  (0-10) Pain Rating: Rest: 10  (0-10) Pain Rating: Activity: 10    NIH Stroke Scale       Johnny Hilario RN  12/17/24 14:33 EST

## 2024-12-17 NOTE — ED NOTES
Pt arrives via EMS from home. Complains of pain behind his right knee that started today. Is not darby to bear weight on it. Also complains of rash to his right leg onto his buttocks and back for the last 3-4 days.

## 2024-12-17 NOTE — H&P
Internal medicine history and physical  INTERNAL MEDICINE   [facility]       Patient Identification:  Name: Casey Snowden Sr.  Age: 89 y.o.  Sex: male  :  1934  MRN: [id number]                   Primary Care Physician: Aleksander Diez MD                               Date of admission:2024    Chief Complaint: On his right buttock since  evening with rash progressing into his back of the right leg with pain and discomfort behind his right knee since yesterday    History of Present Illness:   Source of information patient himself which is limited and patient's daughter at the bedside.  Also discussion between myself and patient's ER physician.  Patient is a 89-year-old male with underlying history of Parkinson's disease, history of DVT with IVC, paroxysmal atrial fibrillation, hypothyroidism, benign prostatic hyperplasia and orthostatic hypotension and frequent falls as well as various skin cancers for which he has Mohs procedures performed by his dermatologist was in usual state of his health until  evening when his wife while changing his briefs noticed.  Rash on his right buttock.  Initially it was not thought of much but over the course of subsequent 48 hours it has progressed to involve his thigh and back of his leg with him complaining of pain and becoming increasingly confused.  With that patient was brought to the emergency room for further evaluation and was noted to have involving shingles involving the right lower extremity at the S2 and S3 dermatomal distribution.  Patient is being admitted for decline in his status and confusion with new onset shingles and further management.  Patient is able to answer simple questions but unable to remember his daughter's name though he was able to say that the person in the room with his daughter.  According to the daughter at the bedside that this behavior and response to be his questions is very unusual and  dramatically different than what he is usually in terms of conversation and mentation.  According to the daughter his major reason why he is unable to walk since yesterday is because of the pain.  While in the bed patient is able to move his both extremities.      Past Medical History:  Past Medical History:   Diagnosis Date    Abdominal aortic aneurysm without rupture 10/14/2021    Acquired hypothyroidism 08/31/2022    Acute ischemic stroke 10/08/2023    Aneurysm     Arrhythmia     Atrial fibrillation     Cancer 04/2018    basil cell carcinoma    Carotid stenosis 10/29/2015    CHF (congestive heart failure)     Chronic deep vein thrombosis (DVT) of popliteal vein of right lower extremity     Clotting disorder     Coronary artery disease involving native coronary artery of native heart without angina pectoris 12/20/2018    DVT of lower extremity (deep venous thrombosis)     Hard of hearing     Hyperlipidemia     Hypertension     Localized edema 01/04/2017    Long-term use of high-risk medication     Metabolic encephalopathy 02/07/2023    Myocardial infarction 2918    LAD stent    PAD (peripheral artery disease) 09/10/2020    Parkinson's disease     Postphlebitic syndrome     Pure hypercholesterolemia     Skin tear of upper arm without complication, left, subsequent encounter     Stroke 11/02/2023     Past Surgical History:  Past Surgical History:   Procedure Laterality Date    BASAL CELL CARCINOMA EXCISION  04/2018    CARDIAC CATHETERIZATION N/A 11/05/2018    Procedure: Left Heart Cath;  Surgeon: Oliverio Azar MD;  Location: [facility] CATH INVASIVE LOCATION;  Service: Cardiovascular    CARDIAC CATHETERIZATION N/A 11/05/2018    Procedure: Coronary angiography;  Surgeon: Oliverio Azar MD;  [facility];  Service: Cardiovascular    CARDIAC CATHETERIZATION N/A 11/05/2018    Procedure: Left ventriculography;  Surgeon: Oliverio Azar MD;  Location: [facility] CATH INVASIVE LOCATION;  Service:  Cardiovascular    CARDIAC CATHETERIZATION N/A 06/04/2019    Procedure: Left Heart Cath;  Surgeon: Johnny Glasgow MD;  [facility];  Service: Cardiovascular    CARDIAC CATHETERIZATION N/A 06/04/2019    Procedure: Coronary angiography;  Surgeon: Johnny Glasgow MD;  Location:  [facility] C[facility] INVASIVE LOC[facility];  Service: Cardiovascular    CARDIAC CATHETERIZATION N/A 06/04/2019    Procedure: Left ventriculography;  Surgeon: Johnny Glasgow MD;  [facility];  Service: Cardiovascular    CARDIAC CATHETERIZATION N/A 06/04/2019    Procedure: Stent BILL coronary;  Surgeon: Johnny Glasgow MD;  [facility];  Service: Cardiovascular    CARDIAC SURGERY      CAROTID ARTERY ANGIOPLASTY Right 10/11/2023    with stent    CAROTID STENT      CATARACT EXTRACTION Left 08/2018    CATARACT EXTRACTION Right 09/2018    COLONOSCOPY      2011    CORONARY STENT PLACEMENT      INCISION AND DRAINAGE LEG Right 07/07/2017    Procedure: INCISION AND DRAINAGE INFECTED HEMATOMA RIGHT THIGH;  Surgeon: Valentin Peña MD;  Location: [facility] MAIN OR;  Service:     JOINT REPLACEMENT      KNEE INCISION AND DRAINAGE Right 12/27/2016    Procedure: KNEE INCISION AND DRAINAGE;  Surgeon: Triston Whitten MD;  Location: [facility] MAIN OR;  Service:     NOSE SURGERY      nose replacement    SINUS SURGERY  1960    SKIN BIOPSY      SKIN GRAFT  12/2017    TOTAL KNEE ARTHROPLASTY Right     VASCULAR SURGERY      VENA CAVA FILTER PLACEMENT        Home Meds:  Medications Prior to Admission   Medication Sig Dispense Refill Last Dose/Taking    apixaban (Eliquis) 5 MG tablet tablet TAKE 1 TABLET BY MOUTH TIMES A DAY. INDICATIONS: DVT/PE (ACTIVE THROMBOSIS), PREVENTION OF UNWANTED CLOT IN VEINS (Patient taking differently: Take 1 tablet by mouth Every 12 (Twelve) Hours.) 180 tablet 3 Taking Differently    aspirin 81 MG EC tablet Take 1 tablet by mouth Daily.    Taking    atorvastatin (LIPITOR) 40 MG tablet Take 1 tablet by mouth Every Night. Indications: High Amount of Fats in the Blood 90 tablet 3 Taking    atropine 1 % ophthalmic solution 1-2 drops 3 (Three) Times a Day As Needed (as needed orally for increased secretions). Orally   Indications: excessive drooling   Taking As Needed    carbidopa-levodopa (SINEMET)  MG per tablet Take 1 tablet by mouth 3 (Three) Times a Day. Indications: Parkinson's Disease   Taking    Cyanocobalamin (Vitamin B 12) 500 MCG tablet Take 2 tablets by mouth Every Morning. Indications: Inadequate Vitamin B12   Taking    finasteride (PROSCAR) 5 MG tablet Take 1 tablet by mouth Daily.   Taking    levothyroxine (SYNTHROID, LEVOTHROID) 75 MCG tablet TAKE 1 TABLET BY MOUTH EVERY  MORNING FOR UNDERACTIVE THYROID (Patient taking differently: Take 1 tablet by mouth Daily.) 90 tablet 3 Taking Differently    Multiple Vitamins-Minerals (PRESERVISION AREDS) capsule Take 1 tablet by mouth 2 (Two) Times a Day. Indications: supplement   Taking    tamsulosin (FLOMAX) 0.4 MG capsule 24 hr capsule Take 1 capsule by mouth Daily.   Taking    valsartan (DIOVAN) 40 MG tablet TAKE 1 TABLET BY MOUTH DAILY. IN THE EVENING. INDICATIONS: HIGH BLOOD PRESSURE DISORDER (Patient taking differently: Take 1 tablet by mouth Daily. Indications: High Blood Pressure) 90 tablet 3 Taking Differently    Vibegron (Gemtesa) 75 MG tablet Take 1 tablet by mouth Daily.   Taking     Current Meds:     Current Facility-Administered Medications:     [COMPLETED] Insert Peripheral IV, , , Once **AND** sodium chloride 0.9 % flush 10 mL, 10 mL, Intravenous, PRN, Ian Dao MD    valACYclovir (VALTREX) tablet 1,000 mg, 1,000 mg, Oral, Once, Ian Dao MD  Allergies:  Allergies   Allergen Reactions    Penicillins Rash    Rocephin [Ceftriaxone] Rash     Social History:   Social History     Tobacco Use    Smoking status: Never     Passive exposure: Never    Smokeless tobacco: Never     Tobacco comments:     caff use   Substance Use Topics    Alcohol use: No     Comment: none for a month      Family History:  Family History   Problem Relation Age of Onset    Heart attack Mother 74    Hypertension Mother     Cancer Sister     Stroke Brother     Cancer Brother     Cancer Brother     Deep vein thrombosis Neg Hx           Review of Systems  See history of present illness and past medical history.  As described in the history of presenting illness      Vitals:   BP (!) 198/87 (BP Location: Left arm, Patient Position: Lying)   Pulse 68   Temp 97 °F (36.1 °C) (Oral)   Resp 20   SpO2 100%   I/O: No intake or output data in the 24 hours ending 12/17/24 0974  Exam:  Patient is examined using the personal protective equipment as per guidelines from infection control for this particular patient as enacted.  Hand washing was performed before and after patient interaction.  General Appearance:  Awake alert chronically ill nontoxic-appearing male who according to the daughter is significantly confused.   Head:    Normocephalic, without obvious abnormality, atraumatic   Eyes:    PERRL, conjunctiva/corneas clear, EOM's intact, both eyes   Ears:    Normal external ear canals, both ears   Nose:   Nares normal, septum midline, mucosa normal, no drainage    or sinus tenderness   Throat: Dry oral mucosa   Neck: Supple and no adenopathy   Back:     Symmetric, no curvature, ROM normal, no CVA tenderness   Lungs:     Clear to auscultation bilaterally, respirations unlabored   Chest Wall:    No tenderness or deformity    Heart:  S1-S2 regular   Abdomen:   Soft nontender   Extremities: Disuse atrophy of the muscles of the lower extremities noted shingles-like lesion as described below       Skin: Erythematous blisterlike lesions present involving the right gluteal posterior thigh and medial thigh area and back of his knee.   Neurologic: Parkinson's facies with sluggish mentation and response to verbal commands.  Gait  not tested.  Resting tremor is not observed.       Data Review:      I reviewed the patient's new clinical results.  Results from last 7 days   Lab Units 12/17/24  1238   WBC 10*3/mm3 4.65   HEMOGLOBIN g/dL 11.5*   PLATELETS 10*3/mm3 144     Results from last 7 days   Lab Units 12/17/24  1238   SODIUM mmol/L 137   POTASSIUM mmol/L 3.7   CHLORIDE mmol/L 107   CO2 mmol/L 25.2   BUN mg/dL 16   CREATININE mg/dL 0.62*   CALCIUM mg/dL 8.1*   GLUCOSE mg/dL 112*     No radiology results for the last 30 days.    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]  Yes    PAD (peripheral artery disease) [I73.9]  Yes    Orthostatic hypotension due to Parkinson's disease [G90.3]  Yes    Chronic diastolic (congestive) heart failure [I50.32]  Yes    Presence of IVC filter [Z95.828]  Not Applicable    Paroxysmal A-fib [I48.0]  Yes    Benign essential hypertension [I10]  Yes    Parkinson's disease [G20.A1]  Yes    Uses hearing aid [Z97.4]  Not Applicable    Chronic deep vein thrombosis (DVT) of popliteal vein of right lower extremity [I82.531]  Yes       Medical decision making/care plan: See admitting orders  Acute right lower extremity zoster involving multiple dermatomes with associated confusion and mental status changes per daughter-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  May benefit from CSF analysis but defer this decision making to neurology colleagues given his need for anticoagulation therapy for his DVT and patient has taken the dose of Eliquis earlier.  In anticipation for possible lumbar puncture  will hold his Eliquis.  Transfer patient to telemetry unit for neurochecks and fall precautions.  Parkinson's disease with recurrent falls-provide him with fall precautions continue his home regimen and monitor.  History of DVT with IVC filter in place on chronic anticoagulation therapy-hold his anticoagulation in case if he needs lumbar puncture.  Hypertension-continue his antihypertensive regimen and avoid hypotensive episodes.  History of paroxysmal atrial fibrillation-continue his current regimen and monitor.  Hypothyroidism-continue thyroid replacement therapy.  Hypertension-continue his current regimen and monitor.    Inessa Dumont MD   12/17/2024  17:34 EST    Parts of this note may be an electronic transcription/translation of spoken language to printed text using the Dragon dictation system.

## 2024-12-17 NOTE — CASE MANAGEMENT/SOCIAL WORK
Discharge Planning Assessment  [facility]     Patient Name: Casey Snowden Sr.  MRN: [id number]  Today's Date: 12/17/2024    Admit Date: 12/17/2024        Discharge Needs Assessment    No documentation.                  Discharge Plan       Row Name 12/17/24 1142       Plan    Plan Comments Spoke with patient and family at bedside, per ER MD request. Patient's wife at bedside reports that they live alone in private home with 5 steps to enter. Patient ambulates with walker at baseline but given current lower extremity pain from shingles, he is unable to ambulate or bear any weight to affected extremity (RLE). Discussed home health as an option, but explained to family that home health with not be present in the home daily to assist with ADL's. Given current debility, family and patient are agreeable to admission for pain control and to explore possible sub-acute rehab placement. J CARLOS ButlerN RN                  Continued Care and Services - Admitted Since 12/17/2024    No active coordination exists for this encounter.          Demographic Summary    No documentation.                  Functional Status    No documentation.                  Psychosocial    No documentation.                  Abuse/Neglect    No documentation.                  Legal    No documentation.                  Substance Abuse    No documentation.                  Patient Forms    No documentation.                     Aleksander Barclay, RN

## 2024-12-18 ENCOUNTER — APPOINTMENT (OUTPATIENT)
Dept: MRI IMAGING | Facility: HOSPITAL | Age: 89
DRG: 073 | End: 2024-12-18
Payer: MEDICARE

## 2024-12-18 LAB
ALBUMIN SERPL-MCNC: 3.5 G/DL (ref 3.5–5.2)
ALBUMIN/GLOB SERPL: 1.8 G/DL
ALP SERPL-CCNC: 74 U/L (ref 39–117)
ALT SERPL W P-5'-P-CCNC: <5 U/L (ref 1–41)
ANION GAP SERPL CALCULATED.3IONS-SCNC: 9 MMOL/L (ref 5–15)
AST SERPL-CCNC: 9 U/L (ref 1–40)
BASOPHILS # BLD AUTO: 0.03 10*3/MM3 (ref 0–0.2)
BASOPHILS NFR BLD AUTO: 0.6 % (ref 0–1.5)
BILIRUB SERPL-MCNC: 0.6 MG/DL (ref 0–1.2)
BUN SERPL-MCNC: 20 MG/DL (ref 8–23)
BUN/CREAT SERPL: 22.5 (ref 7–25)
CALCIUM SPEC-SCNC: 8.1 MG/DL (ref 8.2–9.6)
CHLORIDE SERPL-SCNC: 104 MMOL/L (ref 98–107)
CO2 SERPL-SCNC: 23 MMOL/L (ref 22–29)
CREAT SERPL-MCNC: 0.89 MG/DL (ref 0.76–1.27)
DEPRECATED RDW RBC AUTO: 50.5 FL (ref 37–54)
EGFRCR SERPLBLD CKD-EPI 2021: 81.4 ML/MIN/1.73
EOSINOPHIL # BLD AUTO: 0.12 10*3/MM3 (ref 0–0.4)
EOSINOPHIL NFR BLD AUTO: 2.4 % (ref 0.3–6.2)
ERYTHROCYTE [DISTWIDTH] IN BLOOD BY AUTOMATED COUNT: 14.2 % (ref 12.3–15.4)
GLOBULIN UR ELPH-MCNC: 1.9 GM/DL
GLUCOSE SERPL-MCNC: 109 MG/DL (ref 65–99)
HBA1C MFR BLD: 5.4 % (ref 4.8–5.6)
HCT VFR BLD AUTO: 33.1 % (ref 37.5–51)
HGB BLD-MCNC: 11.2 G/DL (ref 13–17.7)
IMM GRANULOCYTES # BLD AUTO: 0.01 10*3/MM3 (ref 0–0.05)
IMM GRANULOCYTES NFR BLD AUTO: 0.2 % (ref 0–0.5)
LYMPHOCYTES # BLD AUTO: 0.69 10*3/MM3 (ref 0.7–3.1)
LYMPHOCYTES NFR BLD AUTO: 13.7 % (ref 19.6–45.3)
MCH RBC QN AUTO: 32.7 PG (ref 26.6–33)
MCHC RBC AUTO-ENTMCNC: 33.8 G/DL (ref 31.5–35.7)
MCV RBC AUTO: 96.5 FL (ref 79–97)
MONOCYTES # BLD AUTO: 0.53 10*3/MM3 (ref 0.1–0.9)
MONOCYTES NFR BLD AUTO: 10.5 % (ref 5–12)
NEUTROPHILS NFR BLD AUTO: 3.65 10*3/MM3 (ref 1.7–7)
NEUTROPHILS NFR BLD AUTO: 72.6 % (ref 42.7–76)
NRBC BLD AUTO-RTO: 0 /100 WBC (ref 0–0.2)
PLATELET # BLD AUTO: 144 10*3/MM3 (ref 140–450)
PMV BLD AUTO: 9.3 FL (ref 6–12)
POTASSIUM SERPL-SCNC: 3.8 MMOL/L (ref 3.5–5.2)
PROT SERPL-MCNC: 5.4 G/DL (ref 6–8.5)
RBC # BLD AUTO: 3.43 10*6/MM3 (ref 4.14–5.8)
SODIUM SERPL-SCNC: 136 MMOL/L (ref 136–145)
WBC NRBC COR # BLD AUTO: 5.03 10*3/MM3 (ref 3.4–10.8)

## 2024-12-18 PROCEDURE — 97162 PT EVAL MOD COMPLEX 30 MIN: CPT

## 2024-12-18 PROCEDURE — 25010000002 ACYCLOVIR PER 5 MG: Performed by: INTERNAL MEDICINE

## 2024-12-18 PROCEDURE — 97110 THERAPEUTIC EXERCISES: CPT

## 2024-12-18 PROCEDURE — 80053 COMPREHEN METABOLIC PANEL: CPT | Performed by: INTERNAL MEDICINE

## 2024-12-18 PROCEDURE — 70553 MRI BRAIN STEM W/O & W/DYE: CPT

## 2024-12-18 PROCEDURE — 25510000002 GADOBENATE DIMEGLUMINE 529 MG/ML SOLUTION: Performed by: HOSPITALIST

## 2024-12-18 PROCEDURE — 85025 COMPLETE CBC W/AUTO DIFF WBC: CPT | Performed by: INTERNAL MEDICINE

## 2024-12-18 PROCEDURE — 97166 OT EVAL MOD COMPLEX 45 MIN: CPT

## 2024-12-18 PROCEDURE — A9577 INJ MULTIHANCE: HCPCS | Performed by: HOSPITALIST

## 2024-12-18 PROCEDURE — 83036 HEMOGLOBIN GLYCOSYLATED A1C: CPT | Performed by: INTERNAL MEDICINE

## 2024-12-18 PROCEDURE — 25810000003 SODIUM CHLORIDE 0.9 % SOLUTION 250 ML FLEX CONT: Performed by: INTERNAL MEDICINE

## 2024-12-18 PROCEDURE — 99222 1ST HOSP IP/OBS MODERATE 55: CPT | Performed by: PHYSICIAN ASSISTANT

## 2024-12-18 RX ADMIN — Medication 10 ML: at 09:02

## 2024-12-18 RX ADMIN — ASPIRIN 81 MG: 81 TABLET, COATED ORAL at 09:02

## 2024-12-18 RX ADMIN — FINASTERIDE 5 MG: 5 TABLET, FILM COATED ORAL at 09:02

## 2024-12-18 RX ADMIN — TAMSULOSIN HYDROCHLORIDE 0.4 MG: 0.4 CAPSULE ORAL at 09:01

## 2024-12-18 RX ADMIN — VALSARTAN 40 MG: 40 TABLET, FILM COATED ORAL at 09:02

## 2024-12-18 RX ADMIN — CYANOCOBALAMIN TAB 500 MCG 1000 MCG: 500 TAB at 09:01

## 2024-12-18 RX ADMIN — CARBIDOPA AND LEVODOPA 1 TABLET: 25; 250 TABLET ORAL at 20:23

## 2024-12-18 RX ADMIN — ACYCLOVIR SODIUM 700 MG: 50 INJECTION, SOLUTION INTRAVENOUS at 08:56

## 2024-12-18 RX ADMIN — GADOBENATE DIMEGLUMINE 13 ML: 529 INJECTION, SOLUTION INTRAVENOUS at 14:55

## 2024-12-18 RX ADMIN — OXYBUTYNIN CHLORIDE 10 MG: 10 TABLET, EXTENDED RELEASE ORAL at 09:01

## 2024-12-18 RX ADMIN — CARBIDOPA AND LEVODOPA 1 TABLET: 25; 250 TABLET ORAL at 09:02

## 2024-12-18 RX ADMIN — ATORVASTATIN CALCIUM 40 MG: 20 TABLET, FILM COATED ORAL at 20:23

## 2024-12-18 RX ADMIN — CARBIDOPA AND LEVODOPA 1 TABLET: 25; 250 TABLET ORAL at 15:59

## 2024-12-18 RX ADMIN — Medication 10 ML: at 20:36

## 2024-12-18 RX ADMIN — LEVOTHYROXINE SODIUM 75 MCG: 75 TABLET ORAL at 09:02

## 2024-12-18 RX ADMIN — ACYCLOVIR SODIUM 700 MG: 50 INJECTION, SOLUTION INTRAVENOUS at 22:37

## 2024-12-18 RX ADMIN — ACYCLOVIR SODIUM 700 MG: 50 INJECTION, SOLUTION INTRAVENOUS at 15:59

## 2024-12-18 NOTE — PLAN OF CARE
Goal Outcome Evaluation:  Plan of Care Reviewed With: spouse, patient           Outcome Evaluation: Pt is a 89 y.o male admitted to [facility] on 12/17 with a rash progressing on his back, R posterior knee pain. (+) for herpes zoster virus. He has hx of Parkinson's disease, A fib, benign prostatic hyperplasia, orthostatic hypotension, falls, skin cancer. On PT exam pt required mod/maxA for bed mobility, MOd A sit<->standing rwx, ambulated 100ft MIn A shufffling gait. Pt may benefit from skilled PT acutely for generalized strengthening, pt spouse hopeful pt to return home w resuming OP PT.    Anticipated Discharge Disposition (PT): home with assist, home with outpatient therapy services

## 2024-12-18 NOTE — PLAN OF CARE
Goal Outcome Evaluation:   VSS throughout shift. Pt more alert this shift. Pt ambulated in hallway with PT. Pt up to chair with assist x2. MRI completed. Pt has no c /o pain or nausea. Pt tolerating PO intake well. Appetite is fair. Call light in reach, bed alarm set, wife at bedside.

## 2024-12-18 NOTE — PROGRESS NOTES
Name: Casey Snowden . ADMIT: 2024   : 1934  PCP: Aleksander Diez MD    MRN: [id number] LOS: 1 days   AGE/SEX: 89 y.o. male  ROOM: [facility]     Subjective   Subjective   Patient is doing better this morning per family.  He is not restless and agitated like he was yesterday.  Still a bit confused but does have dementia at baseline.  He denies any headache or neck stiffness but is not a great historian and family says that he will never complain of pain under hardly any circumstances.  He does state that he has some discomfort in his groin due to longstanding inguinal hernia       Objective   Objective   Vital Signs  Temp:  [97 °F (36.1 °C)-98.4 °F (36.9 °C)] 98.4 °F (36.9 °C)  Heart Rate:  [59-68] 62  Resp:  [16-18] 18  BP: (129-198)/(66-88) 135/67  SpO2:  [96 %-100 %] 99 %  on   ;   Device (Oxygen Therapy): room air  Body mass index is 21.1 kg/m².  Physical Exam  Vitals reviewed.   Constitutional:       General: He is not in acute distress.     Comments: Very frail appearing   Cardiovascular:      Rate and Rhythm: Normal rate.      Heart sounds: No murmur heard.  Pulmonary:      Effort: Pulmonary effort is normal. No respiratory distress.   Abdominal:      General: There is no distension.      Palpations: Abdomen is soft.      Tenderness: There is no abdominal tenderness.   Musculoskeletal:      Right lower leg: No edema.      Left lower leg: No edema.   Skin:     Comments: Erythematous rash in the right medial thigh extending around to the posterior.  Faint tiny vesicles present    Neurological:      Mental Status: He is alert. He is disoriented.      Comments: Tremor   Psychiatric:         Mood and Affect: Mood normal.       Results Review     I reviewed the patient's new clinical results.  Results from last 7 days   Lab Units 24  0439 24  1238   WBC 10*3/mm3 5.03 4.65   HEMOGLOBIN g/dL 11.2* 11.5*   PLATELETS 10*3/mm3 144 144     Results from last 7 days   Lab Units  12/18/24  0439 12/17/24  1238   SODIUM mmol/L 136 137   POTASSIUM mmol/L 3.8 3.7   CHLORIDE mmol/L 104 107   CO2 mmol/L 23.0 25.2   BUN mg/dL 20 16   CREATININE mg/dL 0.89 0.62*   GLUCOSE mg/dL 109* 112*   EGFR mL/min/1.73 81.4 90.8     Results from last 7 days   Lab Units 12/18/24  0439 12/17/24  1238   ALBUMIN g/dL 3.5 3.5   BILIRUBIN mg/dL 0.6 0.6   ALK PHOS U/L 74 80   AST (SGOT) U/L 9 7   ALT (SGPT) U/L <5 7     Results from last 7 days   Lab Units 12/18/24  0439 12/17/24  1238   CALCIUM mg/dL 8.1* 8.1*   ALBUMIN g/dL 3.5 3.5   MAGNESIUM mg/dL  --  2.0       Hemoglobin A1C   Date/Time Value Ref Range Status   12/18/2024 0439 5.40 4.80 - 5.60 % Final       No radiology results for the last day    I have personally reviewed all medications:  Scheduled Medications  acyclovir, 10 mg/kg, Intravenous, Q8H  [Held by provider] apixaban, 5 mg, Oral, Q12H  aspirin, 81 mg, Oral, Daily  atorvastatin, 40 mg, Oral, Nightly  carbidopa-levodopa, 1 tablet, Oral, TID  finasteride, 5 mg, Oral, Daily  levothyroxine, 75 mcg, Oral, Daily  oxybutynin XL, 10 mg, Oral, Daily  sodium chloride, 10 mL, Intravenous, Q12H  tamsulosin, 0.4 mg, Oral, Daily  valsartan, 40 mg, Oral, Daily  vitamin B-12, 1,000 mcg, Oral, Q AM    Infusions   Diet  Diet: Regular/House; Texture: Mechanical Ground (NDD 2); Fluid Consistency: Thin (IDDSI 0)    I have personally reviewed:  [x]  Laboratory   []  Microbiology   [x]  Radiology   []  EKG/Telemetry  []  Cardiology/Vascular   []  Pathology    []  Records       Assessment/Plan     Active Hospital Problems    Diagnosis  POA    **Unable to ambulate [R26.2]  Yes    Herpes zoster without complication [B02.9]  Unknown    Falls frequently [R29.6]  Not Applicable    Acquired hypothyroidism [E03.9]  Yes    Secondary malignancy of lymph nodes of head, 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       89 y.o. male admitted with generalized weakness, confusion and found to have RLE rash consistent with shingles.    Continue IV acyclovir for shingles.  Not clear if altered mental status is related to this versus simple metabolic encephalopathy from dementia.  Neurology consulted, defer to them if we need to pursue lumbar puncture to rule out zoster encephalitis.  Family not inclined to pursue LP unless it is going to change our treatment plan.      PAF stable.  Eliquis on hold in case of need for procedure.  Will resume if nothing planned    Parkinson disease: Continue Sinemet.  Family reports that he has had some emerging dementia over the last year or so.  They would like to get him out of the hospital soon as possible to prevent sundowning and delirium      DVT prophy: SCDs until able to resume Eliquis  Disposition: Home with family at discharge, hopefully in the next 1 to 2 days.  Will ask PT to see and keep him mobile/assess safety at home      Ford Salazar MD  [facility]  12/18/24  10:20 EST

## 2024-12-18 NOTE — PLAN OF CARE
Goal Outcome Evaluation:  Plan of Care Reviewed With: patient           Outcome Evaluation: Pt is a 89 y.o male admitted to [facility] on 12/17 with a rash progressing on his back, R posterior knee pain. Per chart he has been lethargic- he is (+) for herpes zoster virus. He has hx of Parkinson's disease, A fib, benign prostatic hyperplasia, orthostatic hypotension, frequent falls, skin cancer. Pt is more alert this afternoon. He is able to mobilize but needs assist to get to EOB and stand. He does need intermitten ADL assistance at home. Pt is current with OP PT and goals to return home with family assistance at dc. Continued OT recommended to increase ADL independence and safety to ensure safe dc home.    Anticipated Discharge Disposition (OT): home with assist                         Chest Incubation Time: 2 Hours Detail Level: Simple Face, Ears And  Scalp Incubation Time: 1 Hour Location: Face Consent: The procedure and risks were reviewed with the patient including but not limited to: burning, pigmentary changes, pain, blistering, scabbing, redness, and the possibility of needing numerous treatments. Strict photoprotection after the procedure was also discussed. Face And Scalp Incubation Time: 1 Hour for the face and 2 Hours for the scalp Face Incubation Time: 2 Hour Frequency Of Pdt: Single Treatment Pdt Type: ELLI-U

## 2024-12-18 NOTE — PLAN OF CARE
Goal Outcome Evaluation:      Pt transferred from [facility] to [address] [place] [address] early morning of 12/18/24. Pt lethargic, responds to voice/obeys commands, RA, VSS. Family at bedside. No acute changes since arrival to unit. Pt has unilateral red, bumpy rash on back, right side of body from lower back down his legs. Neurology consult called in. Awaiting MRI to be completed. Purewick in place, brief on. Bed alarm on. Plan of care ongoing.

## 2024-12-18 NOTE — CONSULTS
Neurology Consult Note    Consult Date: 12/18/2024    Referring MD: No ref. provider found    Reason for Consult I have been asked to see the patient in neurological consultation to render advice and opinion regarding zoster encephalitis    Casey Snowden Sr. is a 89 y.o. male with past medical history of Parkinson's disease follows at [facility] movement disorder, kidney mass, hypertension, hyperlipidemia, history of right cerebral strokes secondary to ICA stenosis and stent placement in 2023, PAF on Eliquis, AAA, DVT SP IVC filter, PAD, CAD, CHF, hypothyroidism, FRANCESCA, metastatic melanoma 3 yrs ago treated with cemiplimab and radiation (follows with derm and yearly scans with heme/onc).  He comes to the ER yesterday complaining of right leg and rash consistent with zoster.  There is some confusion and for concern for disseminated zoster it was recommended neurology consult for consideration of LP and also MRI brain.  In October 2023    Pertaining to his stroke history he did have some hospital delirium.  Stroke burden of the temporal lobe, lateral thalamus and watershed of MCA PCA territory.  Pertaining to his PD he is on Sinemet 25/251 tablet 3 times daily. His pramipexole was d/brianna for psychosis.   There was some mild PD dementia documented with a note of significant decline after stroke    Wife at bedside.  She says he was confused and some agitated last night.  It was no unfamiliar.  He has had delirium in the past and similar to that.  He has some confusion at home on the regular.  No significant worsening or report of headache.  HE had c/o leg pain and he generally never c/o pain.  She reports that he really is quite strong since stroke, no much in way of L sided weakness.  It was recommended that he have a modified diet, but pt did not want this and opted for full diet despite risk.  There is renal mass that has     Past Medical/Surgical Hx:  Past Medical History:   Diagnosis Date    Abdominal aortic aneurysm  without rupture 10/14/2021    Acquired hypothyroidism 08/31/2022    Acute ischemic stroke 10/08/2023    Aneurysm     Arrhythmia     Atrial fibrillation     Cancer 04/2018    basil cell carcinoma    Carotid stenosis 10/29/2015    CHF (congestive heart failure)     Chronic deep vein thrombosis (DVT) of popliteal vein of right lower extremity     Clotting disorder     Coronary artery disease involving native coronary artery of native heart without angina pectoris 12/20/2018    DVT of lower extremity (deep venous thrombosis)     Hard of hearing     Hyperlipidemia     Hypertension     Localized edema 01/04/2017    Long-term use of high-risk medication     Metabolic encephalopathy 02/07/2023    Myocardial infarction 2918    LAD stent    PAD (peripheral artery disease) 09/10/2020    Parkinson's disease     Postphlebitic syndrome     Pure hypercholesterolemia     Skin tear of upper arm without complication, left, subsequent encounter     Stroke 11/02/2023     Past Surgical History:   Procedure Laterality Date    BASAL CELL CARCINOMA EXCISION  04/2018    CARDIAC CATHETERIZATION N/A 11/05/2018    Procedure: Left Heart Cath;  Surgeon: Oliverio Azar MD;  Location: [facility] CATH INVASIVE LOC[facility];  Service: Cardiovascular    CARDIAC CATHETERIZATION N/A 11/05/2018    Procedure: Coronary angiography;  Surgeon: Oliverio Azar MD;  Location: [facility] CATH INVASIVE LOCATION;  Service: Cardiovascular    CARDIAC CATHETERIZATION N/A 11/05/2018    Procedure: Left ventriculography;  Surgeon: Oliverio Azar MD;  Location: [facility] CATH INVASIVE LOCATION;  Service: Cardiovascular    CARDIAC CATHETERIZATION N/A 06/04/2019    Procedure: Left Heart Cath;  Surgeon: Johnny Glasgow MD;  Location: [facility] CATH INVASIVE LOC[facility];  Service: Cardiovascular    CARDIAC CATHETERIZATION N/A 06/04/2019    Procedure: Coronary angiography;  Surgeon: Johnny Glasgow MD;  Location[facility] CATH INVASIVE LOC[facility];  Service: Cardiovascular     CARDIAC CATHETERIZATION N/A 06/04/2019    Procedure: Left ventriculography;  Surgeon: Johnny Glasgow MD;  Location: [facility] CATH INVASIVE LOCATION;  Service: Cardiovascular    CARDIAC CATHETERIZATION N/A 06/04/2019    Procedure: Stent BILL coronary;  Surgeon: Johnny Glasgow MD;  Location:  [facility] CATH INVASIVE LOCATION;  Service: Cardiovascular    CARDIAC SURGERY      CAROTID ARTERY ANGIOPLASTY Right 10/11/2023    with stent    CAROTID STENT      CATARACT EXTRACTION Left 08/2018    CATARACT EXTRACTION Right 09/2018    COLONOSCOPY      2011    CORONARY STENT PLACEMENT      INCISION AND DRAINAGE LEG Right 07/07/2017    Procedure: INCISION AND DRAINAGE INFECTED HEMATOMA RIGHT THIGH;  Surgeon: Valentin Peña MD;  Location: [facility] MAIN OR;  Service:     JOINT REPLACEMENT      KNEE INCISION AND DRAINAGE Right 12/27/2016    Procedure: KNEE INCISION AND DRAINAGE;  Surgeon: Triston Whitten MD;  Location: [facility] MAIN OR;  Service:     NOSE SURGERY      nose replacement    SINUS SURGERY  1960    SKIN BIOPSY      SKIN GRAFT  12/2017    TOTAL KNEE ARTHROPLASTY Right     VASCULAR SURGERY      VENA CAVA FILTER PLACEMENT         Medications On Admission  Medications Prior to Admission   Medication Sig Dispense Refill Last Dose/Taking    apixaban (Eliquis) 5 MG tablet tablet TAKE 1 TABLET BY MOUTH TIMES A DAY. INDICATIONS: DVT/PE (ACTIVE THROMBOSIS), PREVENTION OF UNWANTED CLOT IN VEINS (Patient taking differently: Take 1 tablet by mouth Every 12 (Twelve) Hours.) 180 tablet 3 Taking Differently    aspirin 81 MG EC tablet Take 1 tablet by mouth Daily.   Taking    atorvastatin (LIPITOR) 40 MG tablet Take 1 tablet by mouth Every Night. Indications: High Amount of Fats in the Blood 90 tablet 3 Taking    atropine 1 % ophthalmic solution 1-2 drops 3 (Three) Times a Day As Needed (as needed orally for increased secretions). Orally   Indications: excessive drooling   Taking As Needed    carbidopa-levodopa (SINEMET)   MG per tablet Take 1 tablet by mouth 3 (Three) Times a Day. Indications: Parkinson's Disease   Taking    Cyanocobalamin (Vitamin B 12) 500 MCG tablet Take 2 tablets by mouth Every Morning. Indications: Inadequate Vitamin B12   Taking    finasteride (PROSCAR) 5 MG tablet Take 1 tablet by mouth Daily.   Taking    levothyroxine (SYNTHROID, LEVOTHROID) 75 MCG tablet TAKE 1 TABLET BY MOUTH EVERY  MORNING FOR UNDERACTIVE THYROID (Patient taking differently: Take 1 tablet by mouth Daily.) 90 tablet 3 Taking Differently    Multiple Vitamins-Minerals (PRESERVISION AREDS) capsule Take 1 tablet by mouth 2 (Two) Times a Day. Indications: supplement   Taking    tamsulosin (FLOMAX) 0.4 MG capsule 24 hr capsule Take 1 capsule by mouth Daily.   Taking    valsartan (DIOVAN) 40 MG tablet TAKE 1 TABLET BY MOUTH DAILY. IN THE EVENING. INDICATIONS: HIGH BLOOD PRESSURE DISORDER (Patient taking differently: Take 1 tablet by mouth Daily. Indications: High Blood Pressure) 90 tablet 3 Taking Differently    Vibegron (Gemtesa) 75 MG tablet Take 1 tablet by mouth Daily.   Taking       Allergies:  Allergies   Allergen Reactions    Penicillins Rash    Rocephin [Ceftriaxone] Rash       Social Hx:  Social History     Socioeconomic History    Marital status:     Years of education: college grad   Tobacco Use    Smoking status: Never     Passive exposure: Never    Smokeless tobacco: Never    Tobacco comments:     caff use   Vaping Use    Vaping status: Never Used   Substance and Sexual Activity    Alcohol use: No     Comment: none for a month    Drug use: No    Sexual activity: Not Currently     Partners: Female       Family Hx:  Family History   Problem Relation Age of Onset    Heart attack Mother 74    Hypertension Mother     Cancer Sister     Stroke Brother     Cancer Brother     Cancer Brother     Deep vein thrombosis Neg Hx        Review of systems  Constitutional: [No fevers, chills]  Eye: [No vision loss, diplopia]  HEENT: [no  hearing loss, vertigo]  Cardiovascular: [No Chest pain, palpitations]  Neurologic: [No weakness, numbness]  Psychiatric: [No anxiety, depression]    All other systems reviewed and are negative    Exam    /67 (BP Location: Right arm, Patient Position: Lying)   Pulse 62   Temp 98.4 °F (36.9 °C) (Axillary)   Resp 18   Wt 66.7 kg (147 lb 0.8 oz)   SpO2 99%   BMI 21.10 kg/m²   gen: NAD, vitals reviewed  Eyes: fundus sharp with no papilledema or retinal hemorrhages  HEENT: no nuchal rigidity  CVS: RRR, S1, S2  MS: oriented x3, recent/remote memory intact, normal attention/concentration, language intact, no neglect, normal fund of knowledge  CN: visual acuity grossly normal, visual fields full, PERRL, EOMI, facial sensation equal, no facial droop, hearing symmetric, palate elevates symmetrically, shoulder shrug equal, tongue midline  Motor: 5/5 throughout upper and lower extremities, normal tone  Sensation: intact to vibration and temperature throughout  Reflexes: 2+ throughout upper and lower extremities, downgoing plantars  Coordination: no dysmetria with finger to nose bilaterally  Gait: no ataxia, normal station    DATA:    Lab Results   Component Value Date    GLUCOSE 109 (H) 12/18/2024    CALCIUM 8.1 (L) 12/18/2024     12/18/2024    K 3.8 12/18/2024    CO2 23.0 12/18/2024     12/18/2024    BUN 20 12/18/2024    CREATININE 0.89 12/18/2024    EGFRIFAFRI >60 12/27/2022    EGFRIFNONA 99 06/05/2019    BCR 22.5 12/18/2024    ANIONGAP 9.0 12/18/2024     Lab Results   Component Value Date    WBC 5.03 12/18/2024    HGB 11.2 (L) 12/18/2024    HCT 33.1 (L) 12/18/2024    MCV 96.5 12/18/2024     12/18/2024     Lab Results   Component Value Date    LDL 60 05/21/2024    LDL 71 10/10/2023    LDL 44 02/07/2023     Lab Results   Component Value Date    HGBA1C 5.40 12/18/2024     Lab Results   Component Value Date    INR 1.42 (H) 08/09/2024    INR 1.33 (H) 05/24/2024    INR 1.14 (H) 10/09/2023    PROTIME  17.8 (H) 08/09/2024    PROTIME 16.7 (H) 05/24/2024    PROTIME 14.8 (H) 10/09/2023       Lab review: Sodium 137, potassium 3.7, no anion gap, creatinine 0.62, BUN 16, mag 2.0, calcium 8.1, TSH at goal previously, B12 greater than 600 in the past and folate in range, WBCs 5000, hemoglobin 11, platelets 144, UA unremarkable    Imaging review: MRI brain 10/23 showed small watershed appearing strokes of R post central gyrus, parietal lobe, and temporal lobe    Diagnoses:  1 zoster  2 Parkinson's disease  3 afib on eliquis  4 h/o stroke  5 carotid disease  6 h/o melanoma    Impression: 89 yo M w PMH of parkinson's dementia, DVT with ICV, afib on eliquis, hypothyroidism, melanoma s/p radiation and chemo 3 yrs ago who comes in with wife c/o R leg pain and exam c/w zoster rash of 2 dermatomes L2/L3 of RLE.  He was started on acyclovir.  There is increased confusion with picture c/w with delirium.  Overall low suspicion for neurologic complication and lower risk for dissemination. Discussed with wife who indicated they do no prefer LP and with that would opt to treat empirically with IV acyclovir.   Will follow on MRI brain results      Thank you for this consultation.  Discussed above plan with neuro attending, Dr. Madison who agrees with above plan.  Neurology team is available for concerns or questions.

## 2024-12-18 NOTE — THERAPY EVALUATION
Patient Name: Casey Snowden .  : 1934    MRN: [id number]                              Today's Date: 2024       Admit Date: 2024    Visit Dx:     ICD-10-CM ICD-9-CM   1. Unable to ambulate  R26.2 719.7   2. Herpes zoster with complication: S2 and S3 distribution on the right  B02.8 053.8   3. Parkinson's disease, unspecified whether dyskinesia present, unspecified whether manifestations fluctuate  G20.A1 332.0     Patient Active Problem List   Diagnosis    Benign essential hypertension    Stenosis of carotid artery    Mixed hyperlipidemia    Parkinson's disease    Peripheral arterial occlusive disease    Screening for prostate cancer    Medication monitoring encounter    Melanoma    Chronic deep vein thrombosis (DVT) of popliteal vein of right lower extremity    Uses hearing aid    Paroxysmal A-fib    Chronic diastolic (congestive) heart failure    Anticoagulant long-term use    Presence of IVC filter    Medicare annual wellness visit, subsequent    Iron deficiency anemia secondary to inadequate dietary iron intake    Orthostatic hypotension due to Parkinson's disease    Coronary artery disease involving native coronary artery of native heart without angina pectoris    ST elevation myocardial infarction involving left anterior descending (LAD) coronary artery    Sialorrhea    Hypertensive heart disease with heart failure    PAD (peripheral artery disease)    Metastatic squamous cell carcinoma    Secondary malignancy of lymph nodes of head, face and neck    Encounter for antineoplastic immunotherapy    RBD (REM behavioral disorder)    Acquired hypothyroidism    Aspiration pneumonia of right lung due to vomit    Dysphagia, neurologic    ICAO (internal carotid artery occlusion), right    Arterial ischemic stroke, ICA (internal carotid artery), right, chroni    Renal mass, left    Abdominal aortic aneurysm    Disorientation    Falls frequently    Right inguinal hernia    Unable to ambulate     Herpes zoster without complication     Past Medical History:   Diagnosis Date    Abdominal aortic aneurysm without rupture 10/14/2021    Acquired hypothyroidism 08/31/2022    Acute ischemic stroke 10/08/2023    Aneurysm     Arrhythmia     Atrial fibrillation     Cancer 04/2018    basil cell carcinoma    Carotid stenosis 10/29/2015    CHF (congestive heart failure)     Chronic deep vein thrombosis (DVT) of popliteal vein of right lower extremity     Clotting disorder     Coronary artery disease involving native coronary artery of native heart without angina pectoris 12/20/2018    DVT of lower extremity (deep venous thrombosis)     Hard of hearing     Hyperlipidemia     Hypertension     Localized edema 01/04/2017    Long-term use of high-risk medication     Metabolic encephalopathy 02/07/2023    Myocardial infarction 2918    LAD stent    PAD (peripheral artery disease) 09/10/2020    Parkinson's disease     Postphlebitic syndrome     Pure hypercholesterolemia     Skin tear of upper arm without complication, left, subsequent encounter     Stroke 11/02/2023     Past Surgical History:   Procedure Laterality Date    BASAL CELL CARCINOMA EXCISION  04/2018    CARDIAC CATHETERIZATION N/A 11/05/2018    Procedure: Left Heart Cath;  Surgeon: Oliverio Azar MD;  Location:  [facility] C[facility]VASIVE LOCATION;  Service: Cardiovascular    CARDIAC CATHETERIZATION N/A 11/05/2018    Procedure: Coronary angiography;  Surgeon: Oliverio Azar MD;  Location:  [facility] LOCATION;  Service: Cardiovascular    CARDIAC CATHETERIZATION N/A 11/05/2018    Procedure: Left ventriculography;  Surgeon: Oliverio Azar MD;  Location:  [facility]ATION;  Service: Cardiovascular    CARDIAC CATHETERIZATION N/A 06/04/2019    Procedure: Left Heart Cath;  Surgeon: Johnny Glasgow MD;  Location:  [facility]VASIVE LOCATION;  Service: Cardiovascular    CARDIAC CATHETERIZATION N/A 06/04/2019    Procedure: Coronary angiography;   Surgeon: Johnny Glasgow MD;  Location: [facility] CATH INVASIVE LOCATION;  Service: Cardiovascular    CARDIAC CATHETERIZATION N/A 06/04/2019    Procedure: Left ventriculography;  Surgeon: Johnny Glasgow MD;  Location: [facility] CATH [facility]IVE LOC[facility];  Service: Cardiovascular    CARDIAC CATHETERIZATION N/A 06/04/2019    Procedure: Stent BILL coronary;  Surgeon: Johnny Glasgow MD;  Location: [facility] CATH [facility]VASIVE LOC[facility];  Service: Cardiovascular    CARDIAC SURGERY      CAROTID ARTERY ANGIOPLASTY Right 10/11/2023    with stent    CAROTID STENT      CATARACT EXTRACTION Left 08/2018    CATARACT EXTRACTION Right 09/2018    COLONOSCOPY      2011    CORONARY STENT PLACEMENT      INCISION AND DRAINAGE LEG Right 07/07/2017    Procedure: INCISION AND DRAINAGE INFECTED HEMATOMA RIGHT THIGH;  Surgeon: Valentin Peña MD;  Location: [facility] MAIN OR;  Service:     JOINT REPLACEMENT      KNEE INCISION AND DRAINAGE Right 12/27/2016    Procedure: KNEE INCISION AND DRAINAGE;  Surgeon: Triston Whitten MD;  Location: [facility] MAIN OR;  Service:     NOSE SURGERY      nose replacement    SINUS SURGERY  1960    SKIN BIOPSY      SKIN GRAFT  12/2017    TOTAL KNEE ARTHROPLASTY Right     VASCULAR SURGERY      VENA CAVA FILTER PLACEMENT        General Information       Row Name 12/18/24 1509          OT Time and Intention    Document Type evaluation  -SM     Mode of Treatment occupational therapy;co-treatment  -       Row Name 12/18/24 1503          General Information    Patient Profile Reviewed yes  -SM     Prior Level of Function min assist:;ADL's  spouse reports assists intermittently  -SM     Existing Precautions/Restrictions fall  -SM       Row Name 12/18/24 150          Occupational Profile    Environmental Supports and Barriers (Occupational Profile) Home DME: rollator, w/c, rwx, shower chair, grab bars  -       Row Name 12/18/24 1505          Living Environment    People in Home spouse  -SM       Row Name  12/18/24 1509          Cognition    Orientation Status (Cognition) oriented x 3  -       Row Name 12/18/24 1509          Safety Issues/Impairments Affecting Functional Mobility    Impairments Affecting Function (Mobility) balance;endurance/activity tolerance;strength;coordination  -               User Key  (r) = Recorded By, (t) = Taken By, (c) = Cosigned By      Initials Name Provider Type     Tierra Hanna OT Occupational Therapist                     Mobility/ADL's       Row Name 12/18/24 1511          Bed Mobility    Bed Mobility supine-sit  -     Supine-Sit [place] (Bed Mobility) moderate assist (50% patient effort);maximum assist (25% patient effort)  -     Assistive Device (Bed Mobility) head of bed elevated;bed rails  -[facility] Name 12/18/24 1511          Transfers    Transfers sit-stand transfer  -[facility] Name 12/18/24 1511          Sit-Stand Transfer    Sit-Stand [place] (Transfers) moderate assist (50% patient effort);1 person assist;verbal cues  -     Assistive Device (Sit-Stand Transfers) walker, front-wheeled  -[facility] Name 12/18/24 1511          Functional Mobility    Functional Mobility- Ind. Level contact guard assist  -     Functional Mobility- Device walker, front-wheeled  -     Functional Mobility- Comment cues for initation, improved while up  -[facility] Name 12/18/24 1511          Activities of Daily Living    BADL Assessment/Intervention lower body dressing;feeding  -[facility] Name 12/18/24 1511          Lower Body Dressing Assessment/Training    [place] Level (Lower Body Dressing) don;socks;standby assist  -     Position (Lower Body Dressing) supported sitting  -[facility] Name 12/18/24 1511          Self-Feeding Assessment/Training    Comment, (Feeding) pt denies difficulty with feeding  -               User Key  (r) = Recorded By, (t) = Taken By, (c) = Cosigned By      Initials Name Provider Type    Tierra Griffith, OT  Occupational Therapist                   Obj/Interventions       [facility] Name 12/18/24 1513          Range of Motion Comprehensive    Comment, General Range of Motion limited ROM R shoulder, L shoulder AROM to 90 degrees. distal UE AROM WFL.  -[facility] Name 12/18/24 1513          Strength Comprehensive (MMT)    Comment, General Manual Muscle Testing (MMT) Assessment shoulder flex 2/5, distal UE AROM WFL  -[facility] Name 12/18/24 1513          Motor Skills    Motor Skills functional endurance  -     Functional Endurance fair  -[facility] Name 12/18/24 1513          Balance    Balance Assessment sitting static balance;standing static balance  -     Static Sitting Balance standby assist  -     Position, Sitting Balance sitting edge of bed  -     Static Standing Balance contact guard  -     Position/Device Used, Standing Balance supported;walker, rolling  -               User Key  (r) = Recorded By, (t) = Taken By, (c) = Cosigned By      Initials Name Provider Type    SM Tierra Hanna, OT Occupational Therapist                   Goals/Plan       [facility] Name 12/18/24 1516          Transfer Goal 1 (OT)    Activity/Assistive Device (Transfer Goal 1, OT) shower chair;toilet  -     [place] Level/Cues Needed (Transfer Goal 1, OT) standby assist  -SM     Time Frame (Transfer Goal 1, OT) short term goal (STG);2 weeks  -SM     Progress/Outcome (Transfer Goal 1, OT) goal ongoing  -SM       Row Name 12/18/24 1516          Bathing Goal 1 (OT)    Activity/Device (Bathing Goal 1, OT) bathing skills, all  -SM     [place] Level/Cues Needed (Bathing Goal 1, OT) standby assist  -SM     Time Frame (Bathing Goal 1, OT) short term goal (STG);2 weeks  -SM     Progress/Outcomes (Bathing Goal 1, OT) goal ongoing  -[facility] Name 12/18/24 1516          Dressing Goal 1 (OT)    Activity/Device (Dressing Goal 1, OT) dressing skills, all  -SM     [place]/Cues Needed (Dressing Goal 1, OT) standby assist  -SM      Time Frame (Dressing Goal 1, OT) short term goal (STG);2 weeks  -SM     Progress/Outcome (Dressing Goal 1, OT) goal ongoing  -[facility] Name 12/18/24 1516          Toileting Goal 1 (OT)    Activity/Device (Toileting Goal 1, OT) toileting skills, all  -SM     [place] Level/Cues Needed (Toileting Goal 1, OT) standby assist  -SM     Time Frame (Toileting Goal 1, OT) short term goal (STG);2 weeks  -SM     Progress/Outcome (Toileting Goal 1, OT) goal ongoing  -[facility] Name 12/18/24 1516          Grooming Goal 1 (OT)    Activity/Device (Grooming Goal 1, OT) grooming skills, all  -SM     [place] (Grooming Goal 1, OT) standby assist  -SM     Time Frame (Grooming Goal 1, OT) short term goal (STG);2 weeks  -SM     Strategies/Barriers (Grooming Goal 1, OT) standing at sink.  -[facility] Name 12/18/24 1516          Therapy Assessment/Plan (OT)    Planned Therapy Interventions (OT) activity tolerance training;adaptive equipment training;BADL retraining;cognitive/visual perception retraining;functional balance retraining;IADL retraining;occupation/activity based interventions;orthotic fabrication/fitting/training;passive ROM/stretching;patient/caregiver education/training;ROM/therapeutic exercise;strengthening exercise;transfer/mobility retraining  -               User Key  (r) = Recorded By, (t) = Taken By, (c) = Cosigned By      Initials Name Provider Type    Tierra Griffith, OT Occupational Therapist                   Clinical Impression       Row Name 12/18/24 1518          Pain Assessment    Pretreatment Pain Rating 0/10 - no pain  -SM     Posttreatment Pain Rating 0/10 - no pain  -SM       Row Name 12/18/24 1510          Plan of Care Review    Plan of Care Reviewed With patient  -     Outcome Evaluation Pt is a 89 y.o male admitted to [facility] on 12/17 with a rash progressing on his back, R posterior knee pain. Per chart he has been lethargic- he is (+) for herpes zoster virus. He has hx of  Parkinson's disease, A fib, benign prostatic hyperplasia, orthostatic hypotension, frequent falls, skin cancer. Pt is more alert this afternoon. He is able to mobilize but needs assist to get to EOB and stand. He does need intermitten ADL assistance at home. Pt is current with OP PT and goals to return home with family assistance at dc. Continued OT recommended to increase ADL independence and safety to ensure safe dc home.  -       Row Name 12/18/24 1514          Therapy Assessment/Plan (OT)    Rehab Potential (OT) good  -     Criteria for Skilled Therapeutic Interventions Met (OT) yes;skilled treatment is necessary  -     Therapy Frequency (OT) 3 times/wk  -       Row Name 12/18/24 1514          Therapy Plan Review/Discharge Plan (OT)    Anticipated Discharge Disposition (OT) home with assist  -       Row Name 12/18/24 1514          Positioning and Restraints    Pre-Treatment Position in bed  -SM     Post Treatment Position chair  -SM     In Chair reclined;call light within reach;encouraged to call for assist;exit alarm on;notified ns  -               User Key  (r) = Recorded By, (t) = Taken By, (c) = Cosigned By      Initials Name Provider Type    Tierra Griffith, OT Occupational Therapist                   Outcome Measures       Row Name 12/18/24 1516          How much help from another is currently needed...    Putting on and taking off regular lower body clothing? 3  -SM     Bathing (including washing, rinsing, and drying) 3  -SM     Toileting (which includes using toilet bed pan or urinal) 2  -SM     Putting on and taking off regular upper body clothing 3  -SM     Taking care of personal grooming (such as brushing teeth) 3  -SM     Eating meals 3  -SM     AM-PAC 6 Clicks Score (OT) 17  -SM       Row Name 12/18/24 0856 12/18/24 0430       How much help from another person do you currently need...    Turning from your back to your side while in flat bed without using bedrails? 2  -CG 3  -SC     Moving from lying on back to sitting on the side of a flat bed without bedrails? 2  -CG 3  -SC    Moving to and from a bed to a chair (including a wheelchair)? 1  -CG 3  -SC    Standing up from a chair using your arms (e.g., wheelchair, bedside chair)? 2  -CG 3  -SC    Climbing 3-5 steps with a railing? 1  -CG 1  -SC    To walk in hospital room? 1  -CG 2  -SC    AM-PAC 6 Clicks Score (PT) 9  -CG 15  -SC      Row Name 12/18/24 1516          Functional Assessment    Outcome Measure Options AM-PAC 6 Clicks Daily Activity (OT)  -               User Key  (r) = Recorded By, (t) = Taken By, (c) = Cosigned By      Initials Name Provider Type     Tierra Hanna OT Occupational Therapist    Jose Crooks, RN Registered Nurse    Mariel Webster, RN Registered Nurse                    Occupational Therapy Education       Title: PT OT SLP Therapies (In Progress)       Topic: Occupational Therapy (In Progress)       Point: ADL training (Done)       Description:   Instruct learner(s) on proper safety adaptation and remediation techniques during self care or transfers.   Instruct in proper use of assistive devices.                  Learning Progress Summary            Patient Acceptance, E, VU by  at 12/18/2024 1517    Comment: OT goals, POC                      Point: Home exercise program (Not Started)       Description:   Instruct learner(s) on appropriate technique for monitoring, assisting and/or progressing therapeutic exercises/activities.                  Learner Progress:  Not documented in this visit.              Point: Precautions (Not Started)       Description:   Instruct learner(s) on prescribed precautions during self-care and functional transfers.                  Learner Progress:  Not documented in this visit.              Point: Body mechanics (Not Started)       Description:   Instruct learner(s) on proper positioning and spine alignment during self-care, functional mobility activities  and/or exercises.                  Learner Progress:  Not documented in this visit.                              User Key       Initials Effective Dates Name Provider Type Discipline     04/02/20 -  Tierra Hanna OT Occupational Therapist OT                  OT Recommendation and Plan  Planned Therapy Interventions (OT): activity tolerance training, adaptive equipment training, BADL retraining, cognitive/visual perception retraining, functional balance retraining, IADL retraining, occupation/activity based interventions, orthotic fabrication/fitting/training, passive ROM/stretching, patient/caregiver education/training, ROM/therapeutic exercise, strengthening exercise, transfer/mobility retraining  Therapy Frequency (OT): 3 times/wk  Plan of Care Review  Plan of Care Reviewed With: patient  Outcome Evaluation: Pt is a 89 y.o male admitted to [facility] on 12/17 with a rash progressing on his back, R posterior knee pain. Per chart he has been lethargic- he is (+) for herpes zoster virus. He has hx of Parkinson's disease, A fib, benign prostatic hyperplasia, orthostatic hypotension, frequent falls, skin cancer. Pt is more alert this afternoon. He is able to mobilize but needs assist to get to EOB and stand. He does need intermitten ADL assistance at home. Pt is current with OP PT and goals to return home with family assistance at dc. Continued OT recommended to increase ADL independence and safety to ensure safe dc home.     Time Calculation:         Time Calculation- OT       Row Name 12/18/24 5137             Time Calculation- OT    OT Start Time 1428  -SM      OT Stop Time 1442  -SM      OT Time Calculation (min) 14 min  -SM      OT Received On 12/18/24  -      OT - Next Appointment 12/20/24  -      OT Goal Re-Cert Due Date 01/01/25  -         Untimed Charges    OT Eval/Re-eval Minutes 14  -SM         Total Minutes    Untimed Charges Total Minutes 14  -SM       Total Minutes 14  -SM                User Key   (r) = Recorded By, (t) = Taken By, (c) = Cosigned By      Initials Name Provider Type     Tierra Hanna, FREDDIE Occupational Therapist                  Therapy Charges for Today       Code Description Service Date Service Provider Modifiers Qty    06394416019 HC OT EVAL MOD COMPLEXITY 3 12/18/2024 Tierra Hanna OT GO 1                 Tierra Hanna OT  12/18/2024

## 2024-12-18 NOTE — THERAPY EVALUATION
Acute Care - Physical Therapy Initial Evaluation  [facility]     Patient Name: Casey Snowden Sr.  : 1934  MRN: [id number]  Today's Date: 2024      Visit Dx:     ICD-10-CM ICD-9-CM   1. Unable to ambulate  R26.2 719.7   2. Herpes zoster with complication: S2 and S3 distribution on the right  B02.8 053.8   3. Parkinson's disease, unspecified whether dyskinesia present, unspecified whether manifestations fluctuate  G20.A1 332.0     Patient Active Problem List   Diagnosis    Benign essential hypertension    Stenosis of carotid artery    Mixed hyperlipidemia    Parkinson's disease    Peripheral arterial occlusive disease    Screening for prostate cancer    Medication monitoring encounter    Melanoma    Chronic deep vein thrombosis (DVT) of popliteal vein of right lower extremity    Uses hearing aid    Paroxysmal A-fib    Chronic diastolic (congestive) heart failure    Anticoagulant long-term use    Presence of IVC filter    Medicare annual wellness visit, subsequent    Iron deficiency anemia secondary to inadequate dietary iron intake    Orthostatic hypotension due to Parkinson's disease    Coronary artery disease involving native coronary artery of native heart without angina pectoris    ST elevation myocardial infarction involving left anterior descending (LAD) coronary artery    Sialorrhea    Hypertensive heart disease with heart failure    PAD (peripheral artery disease)    Metastatic squamous cell carcinoma    Secondary malignancy of lymph nodes of head, face and neck    Encounter for antineoplastic immunotherapy    RBD (REM behavioral disorder)    Acquired hypothyroidism    Aspiration pneumonia of right lung due to vomit    Dysphagia, neurologic    ICAO (internal carotid artery occlusion), right    Arterial ischemic stroke, ICA (internal carotid artery), right, chroni    Renal mass, left    Abdominal aortic aneurysm    Disorientation    Falls frequently    Right inguinal hernia    Unable to  ambulate    Herpes zoster without complication     Past Medical History:   Diagnosis Date    Abdominal aortic aneurysm without rupture 10/14/2021    Acquired hypothyroidism 08/31/2022    Acute ischemic stroke 10/08/2023    Aneurysm     Arrhythmia     Atrial fibrillation     Cancer 04/2018    basil cell carcinoma    Carotid stenosis 10/29/2015    CHF (congestive heart failure)     Chronic deep vein thrombosis (DVT) of popliteal vein of right lower extremity     Clotting disorder     Coronary artery disease involving native coronary artery of native heart without angina pectoris 12/20/2018    DVT of lower extremity (deep venous thrombosis)     Hard of hearing     Hyperlipidemia     Hypertension     Localized edema 01/04/2017    Long-term use of high-risk medication     Metabolic encephalopathy 02/07/2023    Myocardial infarction 2918    LAD stent    PAD (peripheral artery disease) 09/10/2020    Parkinson's disease     Postphlebitic syndrome     Pure hypercholesterolemia     Skin tear of upper arm without complication, left, subsequent encounter     Stroke 11/02/2023     Past Surgical History:   Procedure Laterality Date    BASAL CELL CARCINOMA EXCISION  04/2018    CARDIAC CATHETERIZATION N/A 11/05/2018    Procedure: Left Heart Cath;  Surgeon: Oliverio Azar MD;  Location:  [facility]ASIVE [facility];  Service: Cardiovascular    CARDIAC CATHETERIZATION N/A 11/05/2018    Procedure: Coronary angiography;  Surgeon: Oliverio Azar MD;  Location:  [facility] INVASIVE [facility]ATION;  Service: Cardiovascular    CARDIAC CATHETERIZATION N/A 11/05/2018    Procedure: Left ventriculography;  Surgeon: Oliverio Azar MD;  Location:  [facility]ATION;  Service: Cardiovascular    CARDIAC CATHETERIZATION N/A 06/04/2019    Procedure: Left Heart Cath;  Surgeon: Johnny Glasgow MD;  Location:  [facility] INVASIVE LOCATION;  Service: Cardiovascular    CARDIAC CATHETERIZATION N/A 06/04/2019    Procedure: Coronary  angiography;  Surgeon: Johnny Glasgow MD;  Location: [facility] CATH INVASIVE LOCATION;  Service: Cardiovascular    CARDIAC CATHETERIZATION N/A 06/04/2019    Procedure: Left ventriculography;  Surgeon: Johnny Glasgow MD;  [facility];  Service: Cardiovascular    CARDIAC CATHETERIZATION N/A 06/04/2019    Procedure: Stent BILL coronary;  Surgeon: Johnny Glasgow MD;  Location: [facility] CATH INVASIVE LOCATION;  Service: Cardiovascular    CARDIAC SURGERY      CAROTID ARTERY ANGIOPLASTY Right 10/11/2023    with stent    CAROTID STENT      CATARACT EXTRACTION Left 08/2018    CATARACT EXTRACTION Right 09/2018    COLONOSCOPY      2011    CORONARY STENT PLACEMENT      INCISION AND DRAINAGE LEG Right 07/07/2017    Procedure: INCISION AND DRAINAGE INFECTED HEMATOMA RIGHT THIGH;  Surgeon: Valentin Peña MD;  Location: [facility] MAIN OR;  Service:     JOINT REPLACEMENT      KNEE INCISION AND DRAINAGE Right 12/27/2016    Procedure: KNEE INCISION AND DRAINAGE;  Surgeon: Triston Whitten MD;  Location: [facility] MAIN OR;  Service:     NOSE SURGERY      nose replacement    SINUS SURGERY  1960    SKIN BIOPSY      SKIN GRAFT  12/2017    TOTAL KNEE ARTHROPLASTY Right     VASCULAR SURGERY      VENA CAVA FILTER PLACEMENT       PT Assessment (Last 12 Hours)       PT Evaluation and Treatment       Row Name 12/18/24 6914          Physical Therapy Time and Intention    Subjective Information no complaints  -LH     Document Type evaluation  -     Mode of Treatment co-treatment;physical therapy;occupational therapy  -LH     Patient Effort good  -LH     Comment Co treatment medically appropriate and necessary due to patient acuity level, activity tolerance and safety of patient and staff. Evaluation established to achieve all goals in POC.  -       Row Name 12/18/24 1966          General Information    Patient Profile Reviewed yes  -LH     Prior Level of Function independent:;gait  rollator, rwx   -     Existing Precautions/Restrictions fall  -     Benefits Reviewed patient:;patient and family:  -[facility] Name 12/18/24 1555          Pain    Pretreatment Pain Rating 0/10 - no pain  -     Posttreatment Pain Rating 0/10 - no pain  -[facility] Name 12/18/24 1555          Cognition    Orientation Status (Cognition) oriented to;person;situation  -     Follows Commands (Cognition) follows one-step commands;75-90% accuracy;initiation impaired  -     Cognitive Function (Cognition) executive function deficit  -[facility] Name 12/18/24 155          Range of Motion Comprehensive    General Range of Motion bilateral lower extremity ROM WFL  -[facility] Name 12/18/24 1559          Strength (Manual Muscle Testing)    Strength (Manual Muscle Testing) strength is WFL;bilateral lower extremities  -[facility] Name 12/18/24 1553          Strength Comprehensive (MMT)    Comment, General Manual Muscle Testing (MMT) Assessment LEs grossly at least 3/5  -[facility] Name 12/18/24 3405          Bed Mobility    Bed Mobility sit-supine;supine-sit  -     Supine-Sit [place] (Bed Mobility) moderate assist (50% patient effort);maximum assist (25% patient effort)  -     Assistive Device (Bed Mobility) head of bed elevated;bed rails;repositioning sheet  -[facility] Name 12/18/24 1553          Transfers    Transfers stand-sit transfer;sit-stand transfer  -[facility] Name 12/18/24 0181          Sit-Stand Transfer    Sit-Stand [place] (Transfers) moderate assist (50% patient effort);1 person assist;verbal cues  -     Assistive Device (Sit-Stand Transfers) walker, front-wheeled  -[facility] Name 12/18/24 0451          Stand-Sit Transfer    Stand-Sit [place] (Transfers) moderate assist (50% patient effort);1 person to manage equipment  -     Assistive Device (Stand-Sit Transfers) walker, front-wheeled  -[facility] Name 12/18/24 1344          Gait/Stairs (Locomotion)    [place] Level  (Gait) minimum assist (75% patient effort);1 person to manage equipment  -     Assistive Device (Gait) walker, front-wheeled  -     Distance in Feet (Gait) 100  -LH     Pattern (Gait) step-through;step-to  -LH     Deviations/Abnormal Patterns (Gait) festinating/shuffling;weight shifting decreased;bilateral deviations  -     Bilateral Gait Deviations forward flexed posture;heel strike decreased  -       Row Name 12/18/24 6816          Balance    Static Sitting Balance contact guard;minimal assist  -     Position, Sitting Balance supported;sitting edge of bed  -     Static Standing Balance minimal assist  -     Position/Device Used, Standing Balance supported;walker, front-wheeled  -       Row Name 12/18/24 9410          Plan of Care Review    Plan of Care Reviewed With spouse;patient  -     Outcome Evaluation Pt is a 89 y.o male admitted to [facility] on 12/17 with a rash progressing on his back, R posterior knee pain. (+) for herpes zoster virus. He has hx of Parkinson's disease, A fib, benign prostatic hyperplasia, orthostatic hypotension, falls, skin cancer. On PT exam pt required mod/maxA for bed mobility, MOd A sit<->standing rwx, ambulated 100ft MIn A shufffling gait. Pt may benefit from skilled PT acutely for generalized strengthening, pt spouse hopeful pt to return home w resuming OP PT.  -       Row Name 12/18/24 1365          Positioning and Restraints    Pre-Treatment Position in bed  -     Post Treatment Position chair  -     In Chair reclined;call light within reach;encouraged to call for assist;exit alarm on;with family/caregiver  -       Row Name 12/18/24 6141          Therapy Assessment/Plan (PT)    Rehab Potential (PT) good  -     Criteria for Skilled Interventions Met (PT) yes  -     Therapy Frequency (PT) 5 times/wk  -       Row Name 12/18/24 9637          PT Evaluation Complexity    History, PT Evaluation Complexity 1-2 personal factors and/or comorbidities  -      Examination of Body Systems (PT Eval Complexity) total of 3 or more elements  -     Clinical Presentation (PT Evaluation Complexity) evolving  -     Clinical Decision Making (PT Evaluation Complexity) moderate complexity  -     Overall Complexity (PT Evaluation Complexity) moderate complexity  -       Row Name 12/18/24 1555          Physical Therapy Goals    Bed Mobility Goal Selection (PT) bed mobility, PT goal 1  -     Transfer Goal Selection (PT) transfer, PT goal 1  -     Gait Training Goal Selection (PT) gait training, PT goal 1  -       Row Name 12/18/24 1559          Bed Mobility Goal 1 (PT)    Activity/Assistive Device (Bed Mobility Goal 1, PT) bed mobility activities, all  -     [place] Level/Cues Needed (Bed Mobility Goal 1, PT) contact guard required  -     Time Frame (Bed Mobility Goal 1, PT) 2 weeks  -       Row Name 12/18/24 1557          Transfer Goal 1 (PT)    Activity/Assistive Device (Transfer Goal 1, PT) sit-to-stand/stand-to-sit;bed-to-chair/chair-to-bed;walker, rolling  -     [place] Level/Cues Needed (Transfer Goal 1, PT) contact guard required  -     Time Frame (Transfer Goal 1, PT) 2 weeks  -       Row Name 12/18/24 1556          Gait Training Goal 1 (PT)    Activity/Assistive Device (Gait Training Goal 1, PT) assistive device use;walker, rolling  -     [place] Level (Gait Training Goal 1, PT) contact guard required  -     Distance (Gait Training Goal 1, PT) 150  -     Time Frame (Gait Training Goal 1, PT) 2 weeks  -               User Key  (r) = Recorded By, (t) = Taken By, (c) = Cosigned By      Initials Name Provider Type     Alexandra Lucio, PT Physical Therapist                    Physical Therapy Education       Title: PT OT SLP Therapies (In Progress)       Topic: Physical Therapy (Done)       Point: Mobility training (Done)       Learning Progress Summary            Patient Acceptance, E, VU,NR by  at 12/18/2024 8287                       Point: Home exercise program (Done)       Learning Progress Summary            Patient Acceptance, E, VU,NR by  at 12/18/2024 1606                      Point: Body mechanics (Done)       Learning Progress Summary            Patient Acceptance, E, VU,NR by  at 12/18/2024 1606                      Point: Precautions (Done)       Learning Progress Summary            Patient Acceptance, E, VU,NR by  at 12/18/2024 1606                                      User Key       Initials Effective Dates Name Provider Type Discipline     06/16/21 -  Alexandra Lucio, PT Physical Therapist PT                  PT Recommendation and Plan  Anticipated Discharge Disposition (PT): home with assist, home with outpatient therapy services  Planned Therapy Interventions (PT): balance training, bed mobility training, gait training, home exercise program, ROM (range of motion), stair training, strengthening, stretching, transfer training  Therapy Frequency (PT): 5 times/wk  Plan of Care Reviewed With: spouse, patient  Outcome Evaluation: Pt is a 89 y.o male admitted to [facility] on 12/17 with a rash progressing on his back, R posterior knee pain. (+) for herpes zoster virus. He has hx of Parkinson's disease, A fib, benign prostatic hyperplasia, orthostatic hypotension, falls, skin cancer. On PT exam pt required mod/maxA for bed mobility, MOd A sit<->standing rwx, ambulated 100ft MIn A shufffling gait. Pt may benefit from skilled PT acutely for generalized strengthening, pt spouse hopeful pt to return home w resuming OP PT.   Outcome Measures       Row Name 12/18/24 1600             How much help from another person do you currently need...    Turning from your back to your side while in flat bed without using bedrails? 2  -LH      Moving from lying on back to sitting on the side of a flat bed without bedrails? 2  -LH      Moving to and from a bed to a chair (including a wheelchair)? 2  -LH      Standing up from a chair using your arms  (e.g., wheelchair, bedside chair)? 3  -      Climbing 3-5 steps with a railing? 3  -      To walk in hospital room? 2  -      AM-PAC 6 Clicks Score (PT) 14  -         Functional Assessment    Outcome Measure Options AM-PAC 6 Clicks Basic Mobility (PT)  -                User Key  (r) = Recorded By, (t) = Taken By, (c) = Cosigned By      Initials Name Provider Type     Alexandra Lucio, PT Physical Therapist                     Time Calculation:    PT Charges       Row Name 12/18/24 1607             Time Calculation    Start Time 1330  -      Stop Time 1355  -      Time Calculation (min) 25 min  -      PT Received On 12/18/24  -      PT - Next Appointment 12/19/24  -      PT Goal Re-Cert Due Date 01/01/25  -         Time Calculation- PT    Total Timed Code Minutes- PT 15 minute(s)  -                User Key  (r) = Recorded By, (t) = Taken By, (c) = Cosigned By      Initials Name Provider Type     Alexandra Lucio, PT Physical Therapist                  Therapy Charges for Today       Code Description Service Date Service Provider Modifiers Qty    45077562948 HC PT EVAL MOD COMPLEXITY 3 12/18/2024 Alexandra Lucio, PT GP 1    96437791249 HC PT THER PROC EA 15 MIN 12/18/2024 Alexandra Lucio, PT GP 1            PT G-Codes  Outcome Measure Options: AM-PAC 6 Clicks Basic Mobility (PT)  AM-PAC 6 Clicks Score (PT): 14  AM-PAC 6 Clicks Score (OT): 17    Alexandra Lucio PT  12/18/2024

## 2024-12-19 ENCOUNTER — TELEPHONE (OUTPATIENT)
Dept: FAMILY MEDICINE CLINIC | Facility: CLINIC | Age: 89
End: 2024-12-19
Payer: MEDICARE

## 2024-12-19 PROBLEM — N17.9 AKI (ACUTE KIDNEY INJURY): Status: ACTIVE | Noted: 2024-12-19

## 2024-12-19 LAB
ANION GAP SERPL CALCULATED.3IONS-SCNC: 15.8 MMOL/L (ref 5–15)
BACTERIA UR QL AUTO: NORMAL /HPF
BILIRUB UR QL STRIP: NEGATIVE
BUN SERPL-MCNC: 43 MG/DL (ref 8–23)
BUN/CREAT SERPL: 13 (ref 7–25)
CALCIUM SPEC-SCNC: 8 MG/DL (ref 8.2–9.6)
CHLORIDE SERPL-SCNC: 99 MMOL/L (ref 98–107)
CLARITY UR: CLEAR
CO2 SERPL-SCNC: 20.2 MMOL/L (ref 22–29)
COLOR UR: YELLOW
CREAT SERPL-MCNC: 3.31 MG/DL (ref 0.76–1.27)
CREAT UR-MCNC: 70.1 MG/DL
EGFRCR SERPLBLD CKD-EPI 2021: 17 ML/MIN/1.73
EOSINOPHIL SPEC QL MICRO: 0 % EOS/100 CELLS (ref 0–0)
GLUCOSE SERPL-MCNC: 132 MG/DL (ref 65–99)
GLUCOSE UR STRIP-MCNC: NEGATIVE MG/DL
HGB UR QL STRIP.AUTO: NEGATIVE
HYALINE CASTS UR QL AUTO: NORMAL /LPF
KETONES UR QL STRIP: NEGATIVE
LEUKOCYTE ESTERASE UR QL STRIP.AUTO: NEGATIVE
MAGNESIUM SERPL-MCNC: 2.1 MG/DL (ref 1.6–2.4)
NITRITE UR QL STRIP: NEGATIVE
PH UR STRIP.AUTO: 5.5 [PH] (ref 5–8)
POTASSIUM SERPL-SCNC: 4.2 MMOL/L (ref 3.5–5.2)
PROT UR QL STRIP: ABNORMAL
QT INTERVAL: 414 MS
QTC INTERVAL: 469 MS
RBC # UR STRIP: NORMAL /HPF
REF LAB TEST METHOD: NORMAL
SODIUM SERPL-SCNC: 135 MMOL/L (ref 136–145)
SODIUM UR-SCNC: 29 MMOL/L
SP GR UR STRIP: 1.01 (ref 1–1.03)
SQUAMOUS #/AREA URNS HPF: NORMAL /HPF
UROBILINOGEN UR QL STRIP: ABNORMAL
WBC # UR STRIP: NORMAL /HPF

## 2024-12-19 PROCEDURE — 93005 ELECTROCARDIOGRAM TRACING: CPT | Performed by: NURSE PRACTITIONER

## 2024-12-19 PROCEDURE — 82570 ASSAY OF URINE CREATININE: CPT | Performed by: INTERNAL MEDICINE

## 2024-12-19 PROCEDURE — 25810000003 SODIUM CHLORIDE 0.9 % SOLUTION: Performed by: HOSPITALIST

## 2024-12-19 PROCEDURE — 25010000002 ACYCLOVIR PER 5 MG: Performed by: INTERNAL MEDICINE

## 2024-12-19 PROCEDURE — 81001 URINALYSIS AUTO W/SCOPE: CPT | Performed by: INTERNAL MEDICINE

## 2024-12-19 PROCEDURE — 80048 BASIC METABOLIC PNL TOTAL CA: CPT | Performed by: HOSPITALIST

## 2024-12-19 PROCEDURE — 25810000003 SODIUM CHLORIDE 0.9 % SOLUTION 250 ML FLEX CONT: Performed by: INTERNAL MEDICINE

## 2024-12-19 PROCEDURE — 25010000002 HEPARIN (PORCINE) PER 1000 UNITS: Performed by: HOSPITALIST

## 2024-12-19 PROCEDURE — 83735 ASSAY OF MAGNESIUM: CPT | Performed by: HOSPITALIST

## 2024-12-19 PROCEDURE — 93010 ELECTROCARDIOGRAM REPORT: CPT | Performed by: INTERNAL MEDICINE

## 2024-12-19 PROCEDURE — 87205 SMEAR GRAM STAIN: CPT | Performed by: HOSPITALIST

## 2024-12-19 PROCEDURE — 99222 1ST HOSP IP/OBS MODERATE 55: CPT | Performed by: NURSE PRACTITIONER

## 2024-12-19 PROCEDURE — 99233 SBSQ HOSP IP/OBS HIGH 50: CPT | Performed by: PHYSICIAN ASSISTANT

## 2024-12-19 PROCEDURE — 84300 ASSAY OF URINE SODIUM: CPT | Performed by: INTERNAL MEDICINE

## 2024-12-19 RX ORDER — ENOXAPARIN SODIUM 100 MG/ML
40 INJECTION SUBCUTANEOUS ONCE
Status: DISCONTINUED | OUTPATIENT
Start: 2024-12-19 | End: 2024-12-19 | Stop reason: ALTCHOICE

## 2024-12-19 RX ORDER — SODIUM CHLORIDE 9 MG/ML
100 INJECTION, SOLUTION INTRAVENOUS CONTINUOUS
Status: ACTIVE | OUTPATIENT
Start: 2024-12-19 | End: 2024-12-20

## 2024-12-19 RX ORDER — HEPARIN SODIUM 5000 [USP'U]/ML
5000 INJECTION, SOLUTION INTRAVENOUS; SUBCUTANEOUS EVERY 12 HOURS SCHEDULED
Status: DISCONTINUED | OUTPATIENT
Start: 2024-12-19 | End: 2024-12-22

## 2024-12-19 RX ORDER — SODIUM CHLORIDE 9 MG/ML
75 INJECTION, SOLUTION INTRAVENOUS CONTINUOUS
Status: ACTIVE | OUTPATIENT
Start: 2024-12-19 | End: 2024-12-23

## 2024-12-19 RX ADMIN — SODIUM CHLORIDE 75 ML/HR: 9 INJECTION, SOLUTION INTRAVENOUS at 15:00

## 2024-12-19 RX ADMIN — SODIUM CHLORIDE 100 ML/HR: 9 INJECTION, SOLUTION INTRAVENOUS at 12:56

## 2024-12-19 RX ADMIN — HEPARIN SODIUM 5000 UNITS: 5000 INJECTION INTRAVENOUS; SUBCUTANEOUS at 22:02

## 2024-12-19 RX ADMIN — Medication 10 ML: at 22:02

## 2024-12-19 RX ADMIN — Medication 10 ML: at 09:00

## 2024-12-19 RX ADMIN — ACYCLOVIR SODIUM 700 MG: 50 INJECTION, SOLUTION INTRAVENOUS at 10:50

## 2024-12-19 NOTE — CONSULTS
Patient Name: Casey Snowden Sr.  :1934  89 y.o.    Date of Admission: 2024  Date of Consultation:  24  Encounter Provider: RIDDHI Azevedo  Place of Service: [facility] CARDIOLOGY  Referring Provider: No ref. provider found  Patient Care Team:  Aleksander Diez MD as PCP - General  Joseph Acharya MD as Consulting Physician (Ophthalmology)  Ford Dorado MD as Consulting Physician (Vascular Surgery)  Cecilio Armas III, MD as Consulting Physician (Cardiology)  Kashif Goodwin MD as Consulting Physician (Neurology)  Enoch Lares MD (Hematology)  Kayden Chance MD (Dermatology)  Vinod Cadena MD as Consulting Physician (Neurosurgery)  Johnny Castellanos MD as Consulting Physician (Urology)  Inge Pedro, PT as Physical Therapist (Physical Therapy)  Lucrecia Mccloud PT as Physical Therapist (Physical Therapy)  Nereyda Toledo, OT as Occupational Therapist (Occupational Therapy)  Junior Taylor MD as Surgeon (Orthopedic Surgery)  Casey Power MD as Surgeon (Wound Care)  Nahomi Jacobo APRN as Nurse Practitioner (Nurse Practitioner)      Chief complaint: shingles     History of Present Illness:Mr. Snowden is a 89 year old gentleman followed by Dr. Armas for paroxysmal atrial fibrillation, coronary artery disease (PCI to LAD 2019) and ischemic cardiomyopathy EF ~40% . He has carotid artery stenosis and previous stroke status post percutaneous revascularization, prior DVT (IVC filter) hypertension, Parkinson's disease and orthostasis. He saw Dr. Armas on 24. He was stable.     He came to the emergency room on  with rash - noted to be shingles as well as altered mental status. This morning - had VT for which we have been asked to  see.     He has also had an acute rise in kidney function -- creatinine went from 0.89 -- >3.31.   K 4.2. Mag 2.1.     He is resting in bed. No acute distress. Difficult  historian but no obvious complaints.             Past Medical History:   Diagnosis Date    Abdominal aortic aneurysm without rupture 10/14/2021    Acquired hypothyroidism 08/31/2022    Acute ischemic stroke 10/08/2023    Aneurysm     Arrhythmia     Atrial fibrillation     Cancer 04/2018    basil cell carcinoma    Carotid stenosis 10/29/2015    CHF (congestive heart failure)     Chronic deep vein thrombosis (DVT) of popliteal vein of right lower extremity     Clotting disorder     Coronary artery disease involving native coronary artery of native heart without angina pectoris 12/20/2018    DVT of lower extremity (deep venous thrombosis)     Hard of hearing     Hyperlipidemia     Hypertension     Localized edema 01/04/2017    Long-term use of high-risk medication     Metabolic encephalopathy 02/07/2023    Myocardial infarction 2918    LAD stent    PAD (peripheral artery disease) 09/10/2020    Parkinson's disease     Postphlebitic syndrome     Pure hypercholesterolemia     Skin tear of upper arm without complication, left, subsequent encounter     Stroke 11/02/2023       Past Surgical History:   Procedure Laterality Date    BASAL CELL CARCINOMA EXCISION  04/2018    CARDIAC CATHETERIZATION N/A 11/05/2018    Procedure: Left Heart Cath;  Surgeon: Oliverio Azar MD;  [facility];  Service: Cardiovascular    CARDIAC CATHETERIZATION N/A 11/05/2018    Procedure: Coronary angiography;  Surgeon: Oliverio Azar MD;  Location:  [facility] CATH INVASIVE LOCATION;  Service: Cardiovascular    CARDIAC CATHETERIZATION N/A 11/05/2018    Procedure: Left ventriculography;  Surgeon: Oliverio Azar MD;  Location:  [facility] CATH INVASIVE LOCATION;  Service: Cardiovascular    CARDIAC CATHETERIZATION N/A 06/04/2019    Procedure: Left Heart Cath;  Surgeon: Johnny Glasgow MD;  Location:  [facility] CATH INVASIVE LOCATION;  Service: Cardiovascular    CARDIAC CATHETERIZATION N/A 06/04/2019    Procedure: Coronary  angiography;  Surgeon: Johnny Glsagow MD;  Location:  [facility] CATH INVASIVE LOCATION;  Service: Cardiovascular    CARDIAC CATHETERIZATION N/A 06/04/2019    Procedure: Left ventriculography;  Surgeon: Johnny Glasgow MD;  Location:  [facility] CATH INVASIVE LOCATION;  Service: Cardiovascular    CARDIAC CATHETERIZATION N/A 06/04/2019    Procedure: Stent BILL coronary;  Surgeon: Johnny Glasgow MD;  Location:  [facility] CATH INVASIVE LOCATION;  Service: Cardiovascular    CARDIAC SURGERY      CAROTID ARTERY ANGIOPLASTY Right 10/11/2023    with stent    CAROTID STENT      CATARACT EXTRACTION Left 08/2018    CATARACT EXTRACTION Right 09/2018    COLONOSCOPY      2011    CORONARY STENT PLACEMENT      INCISION AND DRAINAGE LEG Right 07/07/2017    Procedure: INCISION AND DRAINAGE INFECTED HEMATOMA RIGHT THIGH;  Surgeon: Valentin Peña MD;  [facility];  Service:     JOINT REPLACEMENT      KNEE INCISION AND DRAINAGE Right 12/27/2016    Procedure: KNEE INCISION AND DRAINAGE;  Surgeon: Triston Whitten MD;  [facility];  Service:     NOSE SURGERY      nose replacement    SINUS SURGERY  1960    SKIN BIOPSY      SKIN GRAFT  12/2017    TOTAL KNEE ARTHROPLASTY Right     VASCULAR SURGERY      VENA CAVA FILTER PLACEMENT           Prior to Admission medications    Medication Sig Start Date End Date Taking? Authorizing Provider   apixaban (Eliquis) 5 MG tablet tablet TAKE 1 TABLET BY MOUTH TIMES A DAY. INDICATIONS: DVT/PE (ACTIVE THROMBOSIS), PREVENTION OF UNWANTED CLOT IN VEINS  Patient taking differently: Take 1 tablet by mouth Every 12 (Twelve) Hours. 11/11/24  Yes Cecilio Armas III, MD   aspirin 81 MG EC tablet Take 1 tablet by mouth Daily.   Yes Provider, MD Dick   atorvastatin (LIPITOR) 40 MG tablet Take 1 tablet by mouth Every Night. Indications: High Amount of Fats in the Blood 1/8/24  Yes Aleksander Diez MD   atropine 1 % ophthalmic solution 1-2 drops 3 (Three) Times a Day  As Needed (as needed orally for increased secretions). Orally   Indications: excessive drooling 12/17/15  Yes Dick Toscano MD   carbidopa-levodopa (SINEMET)  MG per tablet Take 1 tablet by mouth 3 (Three) Times a Day. Indications: Parkinson's Disease 10/29/16  Yes Dick Toscano MD   Cyanocobalamin (Vitamin B 12) 500 MCG tablet Take 2 tablets by mouth Every Morning. Indications: Inadequate Vitamin B12 10/18/23  Yes Dick Toscano MD   finasteride (PROSCAR) 5 MG tablet Take 1 tablet by mouth Daily. 1/16/24  Yes Dick Toscano MD   levothyroxine (SYNTHROID, LEVOTHROID) 75 MCG tablet TAKE 1 TABLET BY MOUTH EVERY  MORNING FOR UNDERACTIVE THYROID  Patient taking differently: Take 1 tablet by mouth Daily. 11/1/24  Yes Aleksander Diez MD   Multiple Vitamins-Minerals (PRESERVISION AREDS) capsule Take 1 tablet by mouth 2 (Two) Times a Day. Indications: supplement 10/18/23  Yes Dick Toscano MD   tamsulosin (FLOMAX) 0.4 MG capsule 24 hr capsule Take 1 capsule by mouth Daily. 1/16/24  Yes Dick Toscano MD   valsartan (DIOVAN) 40 MG tablet TAKE 1 TABLET BY MOUTH DAILY. IN THE EVENING. INDICATIONS: HIGH BLOOD PRESSURE DISORDER  Patient taking differently: Take 1 tablet by mouth Daily. Indications: High Blood Pressure 11/11/24  Yes Cecilio Armas III, MD   Vibegron (Gemtesa) 75 MG tablet Take 1 tablet by mouth Daily.   Yes ProviderDick MD       Allergies   Allergen Reactions    Penicillins Rash    Rocephin [Ceftriaxone] Rash       Social History     Socioeconomic History    Marital status:     Years of education: college grad   Tobacco Use    Smoking status: Never     Passive exposure: Never    Smokeless tobacco: Never    Tobacco comments:     caff use   Vaping Use    Vaping status: Never Used   Substance and Sexual Activity    Alcohol use: No     Comment: none for a month    Drug use: No    Sexual activity: Not Currently     Partners: Female       Family  History   Problem Relation Age of Onset    Heart attack Mother 74    Hypertension Mother     Cancer Sister     Stroke Brother     Cancer Brother     Cancer Brother     Deep vein thrombosis Neg Hx        REVIEW OF SYSTEMS:   All systems reviewed.  Pertinent positives identified in HPI.  All other systems are negative.      Objective:     Vitals:    12/18/24 1948 12/18/24 2213 12/19/24 0714 12/19/24 1100   BP: 96/55 122/54 118/51 119/53   BP Location:   Right arm Right arm   Patient Position:   Lying Lying   Pulse: 50 66     Resp: 18 18 18 17   Temp: 97.5 °F (36.4 °C) 99.1 °F (37.3 °C) 98.4 °F (36.9 °C) 98.3 °F (36.8 °C)   TempSrc: Oral Axillary Oral Oral   SpO2:   98%    Weight:         Body mass index is 21.1 kg/m².    Physical Exam:  Constitutional: He ill appearing   HENT:   Head: Normocephalic and atraumatic. Head is without contusion.   Right Ear: Hearing normal. No drainage.   Left Ear: Hearing normal. No drainage.   Nose: No nasal deformity. No epistaxis.   Eyes: Lids are normal. Right eye exhibits no exudate. Left eye exhibits no exudate.  Neck: No JVD present. .   Cardiovascular: Normal rate, regular rhythm and normal heart sounds.    Pulses:       Posterior tibial pulses are 2+ on the right side, and 2+ on the left side.   Pulmonary/Chest: Effort normal and breath sounds normal.   Abdominal: Soft. Normal appearance and bowel sounds are normal. There is no tenderness.   Musculoskeletal: Normal range of motion.        Right shoulder: He exhibits no deformity.        Left shoulder: He exhibits no deformity.   Neurological: He is alert and oriented to person, place, and time. He has normal strength.   Skin: Skin is warm, dry and intact. No rash noted.   Psychiatric: He has a normal mood and affect. His behavior is normal. Thought content normal.   Vitals reviewed      Lab Review:     Results from last 7 days   Lab Units 12/19/24  0923 12/18/24  0439   SODIUM mmol/L 135* 136   POTASSIUM mmol/L 4.2 3.8   CHLORIDE  mmol/L 99 104   CO2 mmol/L 20.2* 23.0   BUN mg/dL 43* 20   CREATININE mg/dL 3.31* 0.89   CALCIUM mg/dL 8.0* 8.1*   BILIRUBIN mg/dL  --  0.6   ALK PHOS U/L  --  74   ALT (SGPT) U/L  --  <5   AST (SGOT) U/L  --  9   GLUCOSE mg/dL 132* 109*         Results from last 7 days   Lab Units 12/18/24  0439   WBC 10*3/mm3 5.03   HEMOGLOBIN g/dL 11.2*   HEMATOCRIT % 33.1*   PLATELETS 10*3/mm3 144         Results from last 7 days   Lab Units 12/19/24  0923   MAGNESIUM mg/dL 2.1           Current Facility-Administered Medications:     acetaminophen (TYLENOL) tablet 650 mg, 650 mg, Oral, Q4H PRN, 650 mg at 12/17/24 2055 **OR** acetaminophen (TYLENOL) 160 MG/5ML oral solution 650 mg, 650 mg, Oral, Q4H PRN **OR** acetaminophen (TYLENOL) suppository 650 mg, 650 mg, Rectal, Q4H PRN, Inessa Dumont MD    acyclovir (ZOVIRAX) 700 mg in sodium chloride 0.9 % 250 mL IVPB, 10 mg/kg, Intravenous, Q8H, Inessa Dumont MD, 700 mg at 12/19/24 1050    apixaban (ELIQUIS) tablet 5 mg, 5 mg, Oral, Q12H, Ford Salazar MD    aspirin EC tablet 81 mg, 81 mg, Oral, Daily, Inessa Dumont MD, 81 mg at 12/18/24 0902    atorvastatin (LIPITOR) tablet 40 mg, 40 mg, Oral, Nightly, Inessa Dumont MD, 40 mg at 12/18/24 2023    atropine 1 % ophthalmic solution 1 drop, 1 drop, Both Eyes, TID PRN, Inessa Dumont MD    carbidopa-levodopa (SINEMET)  MG per tablet 1 tablet, 1 tablet, Oral, TID, Inessa Dumont MD, 1 tablet at 12/18/24 2023    finasteride (PROSCAR) tablet 5 mg, 5 mg, Oral, Daily, Inessa Dumont MD, 5 mg at 12/18/24 0902    HYDROcodone-acetaminophen (NORCO) 5-325 MG per tablet 1 tablet, 1 tablet, Oral, Q6H PRN, Inessa Dumont MD    levothyroxine (SYNTHROID, LEVOTHROID) tablet 75 mcg, 75 mcg, Oral, Daily, Inessa Dumont MD, 75 mcg at 12/18/24 0902    ondansetron (ZOFRAN) injection 4 mg, 4 mg, Intravenous, Q6H PRN, Inessa Dumont MD    oxybutynin XL (DITROPAN-XL) 24 hr tablet 10 mg, 10 mg, Oral, Daily, Inessa Dumont MD, 10 mg at 12/18/24 0901     [COMPLETED] Insert Peripheral IV, , , Once **AND** sodium chloride 0.9 % flush 10 mL, 10 mL, Intravenous, PRN, Ian Dao MD    sodium chloride 0.9 % flush 10 mL, 10 mL, Intravenous, Q12H, Inessa Dumont MD, 10 mL at 12/19/24 0900    sodium chloride 0.9 % flush 10 mL, 10 mL, Intravenous, PRN, Inessa Dumont MD    sodium chloride 0.9 % infusion 40 mL, 40 mL, Intravenous, PRN, Inessa Dumont MD    sodium chloride 0.9 % infusion, 100 mL/hr, Intravenous, Continuous, Ford Salazar MD    tamsulosin (FLOMAX) 24 hr capsule 0.4 mg, 0.4 mg, Oral, Daily, Inessa Dumont MD, 0.4 mg at 12/18/24 0901    valsartan (DIOVAN) tablet 40 mg, 40 mg, Oral, Daily, Inessa Dumont MD, 40 mg at 12/18/24 0902    vitamin B-12 (CYANOCOBALAMIN) tablet 1,000 mcg, 1,000 mcg, Oral, Q AM, Inessa Dumont MD, 1,000 mcg at 12/18/24 0901    Assessment and Plan:       Active Hospital Problems    Diagnosis  POA    **Unable to ambulate [R26.2]  Yes    Herpes zoster without complication [B02.9]  Unknown    Falls frequently [R29.6]  Not Applicable    Acquired hypothyroidism [E03.9]  Yes    Secondary malignancy of lymph nodes of head, 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     Shingles - acyclovir   Altered mental status  Parkinson's disease  LISY - nephrology has been consulted   NSVT - asymptomatic. Given clinical scenario. Not much to do about this. Beta blockers have been stopped in the past due to bradycardia.  Electrolytes are stable.   Hypertension - significantly hypertensive on admission. Now controlled.   Coronary artery disease , status post LAD stent in 2019.   Ischemic cardiomyopathy - GDMT limited. Does not appear to be in heart failure.    PAF - on apixaban. Reduce to 2.5 mg BID given LISY.   History of DVT , IVC filter in place   History of orthostatic hypotension , likely autonomic dysfunction.     Asymptomatic NSVT in setting of advanced age, Parkinson's disease , acute illness, acute kidney injury. Would not make any changes or order cardiac testing at this point. Discussed with Dr. Metzger.     Anila Alejo, APRN  12/19/24  12:43 EST

## 2024-12-19 NOTE — PROGRESS NOTES
[facility] Clinical Pharmacy Services: Renal Dose Adjustment    acyclovir has been appropriately renally dose adjusted based on our System P&T approved policy. Pharmacy will continue to monitor patient renal function while in-house.     Nelson Angela PharmD  Clinical Pharmacist

## 2024-12-19 NOTE — CONSULTS
[facility]   Consult Note    Patient Name: Casey Snowden Sr.  : 1934  MRN: [id number]  Primary Care Physician:  Aleksander Diez MD  Referring Physician: No ref. provider found  Date of admission: 2024    Consults  Subjective   Subjective     Reason for Consult/ Chief Complaint: dee    History of Present Illness  Casey Snowden Sr. is a 89 y.o. male with history of Parkinson's disease and dementia.  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 With no known history of renal disease. Started on iv acyclovir.  Now with dee. Cr up to 3.3 from 0.8.  with h/o bph. Currently asleep. Grandson at bedside.     Review of Systems     Personal History     Past Medical History:   Diagnosis Date   • Abdominal aortic aneurysm without rupture 10/14/2021   • Acquired hypothyroidism 2022   • Acute ischemic stroke 10/08/2023   • Aneurysm    • Arrhythmia    • Atrial fibrillation    • Cancer 2018    basil cell carcinoma   • Carotid stenosis 10/29/2015   • CHF (congestive heart failure)    • Chronic deep vein thrombosis (DVT) of popliteal vein of right lower extremity    • Clotting disorder    • Coronary artery disease involving native coronary artery of native heart without angina pectoris 2018   • DVT of lower extremity (deep venous thrombosis)    • Hard of hearing    • Hyperlipidemia    • Hypertension    • Localized edema 2017   • Long-term use of high-risk medication    • Metabolic encephalopathy 2023   • Myocardial infarction 2918    LAD stent   • PAD (peripheral artery disease) 09/10/2020   • Parkinson's disease    • Postphlebitic syndrome    • Pure hypercholesterolemia    • Skin tear of upper arm without complication, left, subsequent encounter    • Stroke 2023       Past Surgical History:   Procedure Laterality Date   • BASAL CELL CARCINOMA EXCISION  2018   • CARDIAC CATHETERIZATION N/A 2018    Procedure: Left Heart Cath;   Surgeon: Oliverio Azar MD;  [facility];  Service: Cardiovascular   • CARDIAC CATHETERIZATION N/A 11/05/2018    Procedure: Coronary angiography;  Surgeon: Oliverio Azar MD;  [facility];  Service: Cardiovascular   • CARDIAC CATHETERIZATION N/A 11/05/2018    Procedure: Left ventriculography;  Surgeon: Oliverio Azar MD;  [facility];  Service: Cardiovascular   • CARDIAC CATHETERIZATION N/A 06/04/2019    Procedure: Left Heart Cath;  Surgeon: Johnny Glasgow MD;  [facility];  Service: Cardiovascular   • CARDIAC CATHETERIZATION N/A 06/04/2019    Procedure: Coronary angiography;  Surgeon: Johnny Glasgow MD;  Location: [facility] CATH INVASIVE LOCATION;  Service: Cardiovascular   • CARDIAC CATHETERIZATION N/A 06/04/2019    Procedure: Left ventriculography;  Surgeon: Johnny Glasgow MD;  Location: [facility] CATH INVASIVE LOCATION;  Service: Cardiovascular   • CARDIAC CATHETERIZATION N/A 06/04/2019    Procedure: Stent BILL coronary;  Surgeon: Johnny Glasgow MD;  Location: [facility] CATH INVASIVE LOCATION;  Service: Cardiovascular   • CARDIAC SURGERY     • CAROTID ARTERY ANGIOPLASTY Right 10/11/2023    with stent   • CAROTID STENT     • CATARACT EXTRACTION Left 08/2018   • CATARACT EXTRACTION Right 09/2018   • COLONOSCOPY      2011   • CORONARY STENT PLACEMENT     • INCISION AND DRAINAGE LEG Right 07/07/2017    Procedure: INCISION AND DRAINAGE INFECTED HEMATOMA RIGHT THIGH;  Surgeon: Valentin Peña MD;  Location: [facility] OR;  Service:    • JOINT REPLACEMENT     • KNEE INCISION AND DRAINAGE Right 12/27/2016    Procedure: KNEE INCISION AND DRAINAGE;  Surgeon: Triston Whitten MD;  Location: [facility] OR;  Service:    • NOSE SURGERY      nose replacement   • SINUS SURGERY  1960   • SKIN BIOPSY     • SKIN GRAFT  12/2017   • TOTAL KNEE ARTHROPLASTY Right    • VASCULAR SURGERY      • VENA CAVA FILTER PLACEMENT         Family History: family history includes Cancer in his brother, brother, and sister; Heart attack (age of onset: 74) in his mother; Hypertension in his mother; Stroke in his brother. Otherwise pertinent FHx was reviewed and not pertinent to current issue.    Social History:  reports that he has never smoked. He has never been exposed to tobacco smoke. He has never used smokeless tobacco. He reports that he does not drink alcohol and does not use drugs.    Home Medications:   PreserVision AREDS, Vibegron, Vitamin B 12, apixaban, aspirin, atorvastatin, atropine, carbidopa-levodopa, finasteride, levothyroxine, tamsulosin, and valsartan    Allergies:  Allergies   Allergen Reactions   • Penicillins Rash   • Rocephin [Ceftriaxone] Rash       Objective    Objective     Vitals:  Temp:  [97.5 °F (36.4 °C)-99.1 °F (37.3 °C)] 98.3 °F (36.8 °C)  Heart Rate:  [50-66] 66  Resp:  [17-18] 17  BP: ()/(51-55) 119/53    Physical Exam  General Appearance:  In no acute distress.    HEENT: Normal HEENT exam.     Extremities: .  There is no deformity, effusion or dependent edema.    Neurological: Patient is asleep    Result Review    Result Review:  I have personally reviewed the results from the time of this admission to 12/19/2024 13:49 EST and agree with these findings:  []  Laboratory list / accordion  []  Microbiology  []  Radiology  []  EKG/Telemetry   []  Cardiology/Vascular   []  Pathology  []  Old records  []  Other:  Most notable findings include:       Assessment & Plan   Assessment / Plan     Brief Patient Summary:  Casey Snowden Sr. is a 89 y.o. male who has dee    Active Hospital Problems:  Active Hospital Problems    Diagnosis    • **Unable to ambulate    • DEE (acute kidney injury)    • Herpes zoster without complication    • Falls frequently    • Acquired hypothyroidism    • Secondary malignancy of lymph nodes of head, face and neck    • PAD (peripheral artery disease)    •  Orthostatic hypotension due to Parkinson's disease    • Chronic diastolic (congestive) heart failure    • Presence of IVC filter    • Paroxysmal A-fib    • Benign essential hypertension    • Parkinson's disease    • Uses hearing aid    • Chronic deep vein thrombosis (DVT) of popliteal vein of right lower extremity      Plan:   Patient seen and examined. Asleep in no distress. Grandson at bedside. Labs and chart reviewed. With no history of renal disease. With h/o bph and possible renal mass followed by urology. 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  On high dose acyclovir.  Was also on losartan.  Labs show cr up to 3.3 from 0.8.  losartan now d/c.  Demetri etiology unclear. Will check renal u/s to rule out obstruction. AIN vs ATN.  Will check urine studies. Recommend dosing acyclovir for gfr <10.  Will start NS. F/u labs in am    Claudia Bran MD

## 2024-12-19 NOTE — DISCHARGE PLACEMENT REQUEST
"Casey Snowden Sr. (89 y.o. Male)       Date of Birth   1934    Social Security Number       Address   [address] [place] [address]    Home Phone   [phone]    MRN   [id number]       Anglican   Jain    Marital Status                               Admission Date   12/17/24    Admission Type   Emergency    Admitting Provider   Inessa Dumont MD    Attending Provider   Ford Salazar MD    Department, Room/Bed   [address], [address]       Discharge Date       Discharge Disposition       Discharge Destination                                 Attending Provider: Ford Salazar MD    Allergies: Penicillins, Rocephin [Ceftriaxone]    Isolation: None   Infection: Herpes Zoster (12/17/24)   Code Status: CPR    Ht: 177.8 cm (70\")   Wt: 66.7 kg (147 lb 0.8 oz)    Admission Cmt: None   Principal Problem: Unable to ambulate [R26.2]                   Active Insurance as of 12/17/2024       Primary Coverage       Payor Plan Insurance Group Employer/Plan Group    [facility] MEDICARE REPLACEMENT [facility] MED ADV GROUP [id number]       Payor Plan Address Payor Plan Phone Number Payor Plan Fax Number Effective Dates    [address] [address]   1/1/2023 - None Entered    [facility] [id number]         Subscriber Name Subscriber Birth Date Member ID       CASEY SNOWDEN EDUARDO JEFFERSON 1934 [id number]                     Emergency Contacts        (Rel.) Home Phone Work Phone Mobile Phone    SpEvonne (Spouse) [phone] -- [phone]    Svetlana Sol (Daughter) [phone] -- --    Casey Snowden Jr (Son) [phone] -- --                "

## 2024-12-19 NOTE — PROGRESS NOTES
Name: Casey Snowden . ADMIT: 2024   : 1934  PCP: Aleksander Diez MD    MRN: [id number] LOS: 2 days   AGE/SEX: 89 y.o. male  ROOM: [facility]     Subjective   Subjective   Very lethargic today per wife.  Apparently he was up and talking to neighbors last evening but has been sleeping basically all day    Wife has noticed a few episodes of shaking all over, more than his usual tremor       Objective   Objective   Vital Signs  Temp:  [97.5 °F (36.4 °C)-99.1 °F (37.3 °C)] 98.3 °F (36.8 °C)  Heart Rate:  [50-66] 66  Resp:  [17-18] 17  BP: ()/(51-55) 119/53  SpO2:  [98 %] 98 %  on   ;   Device (Oxygen Therapy): room air  Body mass index is 21.1 kg/m².  Physical Exam  Vitals reviewed.   Constitutional:       General: He is not in acute distress.     Comments: Very frail appearing   HENT:      Mouth/Throat:      Mouth: Mucous membranes are dry.   Cardiovascular:      Rate and Rhythm: Normal rate.      Heart sounds: No murmur heard.  Pulmonary:      Effort: Pulmonary effort is normal. No respiratory distress.   Abdominal:      General: There is no distension.      Palpations: Abdomen is soft.      Tenderness: There is no abdominal tenderness.   Musculoskeletal:      Right lower leg: No edema.      Left lower leg: No edema.   Skin:     Comments: Vesicular rash is slightly better    Neurological:      Mental Status: He is lethargic.      Comments: He does wake up but mumbles incoherently.       Results Review     I reviewed the patient's new clinical results.  Results from last 7 days   Lab Units 24  0439 24  1238   WBC 10*3/mm3 5.03 4.65   HEMOGLOBIN g/dL 11.2* 11.5*   PLATELETS 10*3/mm3 144 144     Results from last 7 days   Lab Units 24  0923 24  0439 24  1238   SODIUM mmol/L 135* 136 137   POTASSIUM mmol/L 4.2 3.8 3.7   CHLORIDE mmol/L 99 104 107   CO2 mmol/L 20.2* 23.0 25.2   BUN mg/dL 43* 20 16   CREATININE mg/dL 3.31* 0.89 0.62*   GLUCOSE mg/dL 132* 109* 112*    EGFR mL/min/1.73 17.0* 81.4 90.8     Results from last 7 days   Lab Units 12/18/24  0439 12/17/24  1238   ALBUMIN g/dL 3.5 3.5   BILIRUBIN mg/dL 0.6 0.6   ALK PHOS U/L 74 80   AST (SGOT) U/L 9 7   ALT (SGPT) U/L <5 7     Results from last 7 days   Lab Units 12/19/24  0923 12/18/24  0439 12/17/24  1238   CALCIUM mg/dL 8.0* 8.1* 8.1*   ALBUMIN g/dL  --  3.5 3.5   MAGNESIUM mg/dL 2.1  --  2.0       Hemoglobin A1C   Date/Time Value Ref Range Status   12/18/2024 0439 5.40 4.80 - 5.60 % Final       MRI Brain With & Without Contrast    Result Date: 12/18/2024  There is no evidence of abnormal T2 FLAIR hyperintensity to suggest herpes encephalitis. There is no evidence of acute infarction, mass or of abnormal enhancement. Age-appropriate atrophy and mild to moderate small vessel ischemic disease is noted.  This report was finalized on 12/18/2024 3:29 PM by Dr. Liban Smith M.D on Workstation: [id number]       I have personally reviewed all medications:  Scheduled Medications  acyclovir, 10 mg/kg, Intravenous, Q8H  apixaban, 5 mg, Oral, Q12H  aspirin, 81 mg, Oral, Daily  atorvastatin, 40 mg, Oral, Nightly  carbidopa-levodopa, 1 tablet, Oral, TID  finasteride, 5 mg, Oral, Daily  levothyroxine, 75 mcg, Oral, Daily  oxybutynin XL, 10 mg, Oral, Daily  sodium chloride, 10 mL, Intravenous, Q12H  tamsulosin, 0.4 mg, Oral, Daily  valsartan, 40 mg, Oral, Daily  vitamin B-12, 1,000 mcg, Oral, Q AM    Infusions  sodium chloride, 100 mL/hr    Diet  Diet: Regular/House; Texture: Mechanical Ground (NDD 2); Fluid Consistency: Thin (IDDSI 0)    I have personally reviewed:  [x]  Laboratory   []  Microbiology   [x]  Radiology   []  EKG/Telemetry  []  Cardiology/Vascular   []  Pathology    []  Records       Assessment/Plan     Active Hospital Problems    Diagnosis  POA    **Unable to ambulate [R26.2]  Yes    Herpes zoster without complication [B02.9]  Unknown    Falls frequently [R29.6]  Not Applicable    Acquired hypothyroidism [E03.9]   Yes    Secondary malignancy of lymph nodes of head, 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       89 y.o. male admitted with generalized weakness, confusion and found to have RLE rash consistent with shingles.    Deterioration in mental status overnight, reason unclear.  He has had some shaking/convulsive episodes, ?tremor vs seizure versus delirium.  Presumably has metabolic encephalopathy on top of underlying dementia  - MRI negative for acute change including any evidence of encephalitis  - Neurology following.  - Meds held as he is not alert enough to take them  - Plan had been to continue IV acyclovir x 7 days but with declining renal function may need to reconsider agents. ?ID consult    LISY now.  Check bladder scan.  Possibly could be related to acyclovir, will ask nephrology to see.  - Hold Diovan and Ditropan  - Monitor urine output    PAF stable.  Eliquis being resumed since no plans for LP (assuming he can take it)  - Possible episode of nonsustained VT.  Will ask cardiology to evaluate.  Electrolytes within normal range    Parkinson disease: Continue Sinemet.       DVT prophy: Eliquis.  If unable to take it would start Lovenox  Disposition: Home with family at discharge was the plan but he is doing worse today.  Do not anticipate in the next 48 hours.  PT follow      Ford Salazar MD  [facility]  12/19/24  12:42 EST

## 2024-12-19 NOTE — TELEPHONE ENCOUNTER
Jimena with Home Health is calling requesting verbal orders for nursing and PT when patient is discharged from Hospital.    Yes okay verbal for the home health nursing and PT once patient is discharged from hospital

## 2024-12-19 NOTE — CASE MANAGEMENT/SOCIAL WORK
Continued Stay Note  [facility]     Patient Name: Casey Snowden Sr.  MRN: [id number]  Today's Date: 12/19/2024    Admit Date: 12/17/2024    Plan: Home with HH vs outpatient therapy. Family to transport.   Discharge Plan       Row Name 12/19/24 1423       Plan    Plan Home with HH vs outpatient therapy. Family to transport.    Roadmap to Recovery Yes    Patient/Family in Agreement with Plan yes    Provided Post Acute Provider List? Yes    Post Acute Provider List Home Health    Provided Post Acute Provider Quality & Resource List? Yes    Post Acute Provider Quality and Resource List Home Health    Delivered To Support Person    Method of Delivery In person                   Discharge Codes    No documentation.                 Expected Discharge Date and Time       Expected Discharge Date Expected Discharge Time    Dec 20, 2024               Tennille Carrillo RN

## 2024-12-19 NOTE — PLAN OF CARE
Goal Outcome Evaluation:     Pt A/Ox1-self, RA, VSS. No acute changes overnight. No complaints of pain, pt ambulated with PT during day 12/18/24. No UOP overnight- bladder scan volume 88 mL, no c/o urge to urinate. Brief on. Impulsiveness at times- bed alarm on, able to be verbally reoriented. Hearing aids in charging case. Call light within reach. Plan of care ongoing.

## 2024-12-19 NOTE — PROGRESS NOTES
DOS: 2024  NAME: Casey Snowden Sr.   : 1934  PCP: Aleksander Diez MD  Chief Complaint   Patient presents with    Leg Pain       Chief complaint: rash, leg pain  Subjective: Grandson at bedside.  He has been sleeping all day.  Not awake enough for PO meds, missed his morning sinemet.  No sedating meds overnight    Objective:  Vital signs: /51 (BP Location: Right arm, Patient Position: Lying)   Pulse 66   Temp 98.4 °F (36.9 °C) (Oral)   Resp 18   Wt 66.7 kg (147 lb 0.8 oz)   SpO2 98%   BMI 21.10 kg/m²      Gen: NAD, vitals reviewed  MS: drowsy, mumbles a few words in conversation no paraphasic errors, opens eyes briefly to voice, not consistently following commands  CN: difficult to test VA/VF, PERRL, no gaze deviation, facial symmetric, no dysarthria  Motor: localizes throughout, resting tremor, increased tone  Sensory: intact to light touch all 4 ext.    ROS:  No weakness, numbness  No fevers, chills      Laboratory results:  Lab Results   Component Value Date    GLUCOSE 109 (H) 2024    CALCIUM 8.1 (L) 2024     2024    K 3.8 2024    CO2 23.0 2024     2024    BUN 20 2024    CREATININE 0.89 2024    EGFRIFAFRI >60 2022    EGFRIFNONA 99 2019    BCR 22.5 2024    ANIONGAP 9.0 2024     Lab Results   Component Value Date    WBC 5.03 2024    HGB 11.2 (L) 2024    HCT 33.1 (L) 2024    MCV 96.5 2024     2024     Lab Results   Component Value Date    LDL 60 2024    LDL 71 10/10/2023    LDL 44 2023         Lab 24  0439   HEMOGLOBIN A1C 5.40        Review of labs:     Review and interpretation of imaging:   MRI w and w/o  FINDINGS: There is no evidence of restricted diffusion to suggest acute  infarction. The brain ventricles are symmetrical. There is  age-appropriate atrophy. The axial T2 FLAIR sequence demonstrates  increased signal intensity involving the  periventricular and deep white  matter of the cerebral hemispheres bilaterally consistent with mild to  moderate small vessel ischemic disease. There is no evidence of T2 FLAIR  hyperintensity involving the medial aspects of the temporal lobes,  insular cortex or limbic structures to suggest herpes encephalitis.  After contrast administration there was no evidence of abnormal  enhancement.     IMPRESSION:  There is no evidence of abnormal T2 FLAIR hyperintensity to  suggest herpes encephalitis. There is no evidence of acute infarction,  mass or of abnormal enhancement. Age-appropriate atrophy and mild to  moderate small vessel ischemic disease is noted.    Workup to date:    Diagnoses:  1 zoster  2 Parkinson's disease  3 afib on eliquis  4 h/o stroke  5 carotid disease  6 h/o melanoma     Impression: 89 yo M w PMH of parkinson's dementia, DVT with ICV, afib on eliquis, hypothyroidism, melanoma s/p radiation and chemo 3 yrs ago who comes in with wife c/o R leg pain and exam c/w zoster rash of 2 dermatomes L2/L3 of RLE.  He was started on acyclovir.  There is increased confusion with picture c/w with delirium.  Overall low suspicion for neurologic complication and lower risk for dissemination. Discussed with wife who indicated they do no prefer LP and with that would opt to treat empirically with IV acyclovir.   Will follow on MRI brain results     MRI brain shows WMD and SMD and atrophy no abnormal hyperintensity or enhancement    He is drowsy today poorly attentive.  Severe hypoactive delirium. He is missed his morning Sinemet.  If he cannot take PO will need feeding tube to avoid missed doses of levodopa.   Encouraged nurse and son to keep blinds open, TV on, proper sleep wakes cycle.        Thank you for this consultation.  Discussed above plan with neuro attending, Dr. Madison who agrees with above plan.  Neurology team is available for concerns or questions.

## 2024-12-20 ENCOUNTER — APPOINTMENT (OUTPATIENT)
Dept: ULTRASOUND IMAGING | Facility: HOSPITAL | Age: 89
DRG: 073 | End: 2024-12-20
Payer: MEDICARE

## 2024-12-20 PROBLEM — E44.0 MODERATE PROTEIN-CALORIE MALNUTRITION: Status: ACTIVE | Noted: 2024-12-20

## 2024-12-20 LAB
ANION GAP SERPL CALCULATED.3IONS-SCNC: 10.7 MMOL/L (ref 5–15)
BUN SERPL-MCNC: 54 MG/DL (ref 8–23)
BUN/CREAT SERPL: 13.6 (ref 7–25)
CALCIUM SPEC-SCNC: 7.7 MG/DL (ref 8.2–9.6)
CHLORIDE SERPL-SCNC: 106 MMOL/L (ref 98–107)
CO2 SERPL-SCNC: 19.3 MMOL/L (ref 22–29)
CREAT SERPL-MCNC: 3.97 MG/DL (ref 0.76–1.27)
DEPRECATED RDW RBC AUTO: 47.9 FL (ref 37–54)
EGFRCR SERPLBLD CKD-EPI 2021: 13.7 ML/MIN/1.73
ERYTHROCYTE [DISTWIDTH] IN BLOOD BY AUTOMATED COUNT: 14.2 % (ref 12.3–15.4)
GLUCOSE BLDC GLUCOMTR-MCNC: 118 MG/DL (ref 70–130)
GLUCOSE SERPL-MCNC: 107 MG/DL (ref 65–99)
HCT VFR BLD AUTO: 32.7 % (ref 37.5–51)
HGB BLD-MCNC: 11.2 G/DL (ref 13–17.7)
MAGNESIUM SERPL-MCNC: 2.2 MG/DL (ref 1.6–2.4)
MCH RBC QN AUTO: 32.1 PG (ref 26.6–33)
MCHC RBC AUTO-ENTMCNC: 34.3 G/DL (ref 31.5–35.7)
MCV RBC AUTO: 93.7 FL (ref 79–97)
PHOSPHATE SERPL-MCNC: 6.6 MG/DL (ref 2.5–4.5)
PLATELET # BLD AUTO: 139 10*3/MM3 (ref 140–450)
PMV BLD AUTO: 9.8 FL (ref 6–12)
POTASSIUM SERPL-SCNC: 4.1 MMOL/L (ref 3.5–5.2)
RBC # BLD AUTO: 3.49 10*6/MM3 (ref 4.14–5.8)
SODIUM SERPL-SCNC: 136 MMOL/L (ref 136–145)
TSH SERPL DL<=0.05 MIU/L-ACNC: 4.82 UIU/ML (ref 0.27–4.2)
WBC NRBC COR # BLD AUTO: 6.73 10*3/MM3 (ref 3.4–10.8)

## 2024-12-20 PROCEDURE — 83735 ASSAY OF MAGNESIUM: CPT

## 2024-12-20 PROCEDURE — 99223 1ST HOSP IP/OBS HIGH 75: CPT | Performed by: INTERNAL MEDICINE

## 2024-12-20 PROCEDURE — 25810000003 SODIUM CHLORIDE 0.9 % SOLUTION 250 ML FLEX CONT: Performed by: HOSPITALIST

## 2024-12-20 PROCEDURE — 80048 BASIC METABOLIC PNL TOTAL CA: CPT | Performed by: HOSPITALIST

## 2024-12-20 PROCEDURE — 25010000002 HEPARIN (PORCINE) PER 1000 UNITS: Performed by: HOSPITALIST

## 2024-12-20 PROCEDURE — 82948 REAGENT STRIP/BLOOD GLUCOSE: CPT

## 2024-12-20 PROCEDURE — 76775 US EXAM ABDO BACK WALL LIM: CPT

## 2024-12-20 PROCEDURE — 84443 ASSAY THYROID STIM HORMONE: CPT | Performed by: STUDENT IN AN ORGANIZED HEALTH CARE EDUCATION/TRAINING PROGRAM

## 2024-12-20 PROCEDURE — 84100 ASSAY OF PHOSPHORUS: CPT

## 2024-12-20 PROCEDURE — 99232 SBSQ HOSP IP/OBS MODERATE 35: CPT | Performed by: INTERNAL MEDICINE

## 2024-12-20 PROCEDURE — 85027 COMPLETE CBC AUTOMATED: CPT | Performed by: HOSPITALIST

## 2024-12-20 PROCEDURE — 25010000002 ACYCLOVIR PER 5 MG: Performed by: HOSPITALIST

## 2024-12-20 PROCEDURE — 25810000003 SODIUM CHLORIDE 0.9 % SOLUTION: Performed by: INTERNAL MEDICINE

## 2024-12-20 RX ORDER — ACETAMINOPHEN 325 MG/1
650 TABLET ORAL EVERY 4 HOURS PRN
Status: DISCONTINUED | OUTPATIENT
Start: 2024-12-20 | End: 2025-01-09 | Stop reason: HOSPADM

## 2024-12-20 RX ORDER — MULTIVITAMIN WITH IRON
1000 TABLET ORAL
Status: DISCONTINUED | OUTPATIENT
Start: 2024-12-21 | End: 2025-01-09 | Stop reason: HOSPADM

## 2024-12-20 RX ORDER — ACETAMINOPHEN 650 MG/1
650 SUPPOSITORY RECTAL EVERY 4 HOURS PRN
Status: DISCONTINUED | OUTPATIENT
Start: 2024-12-20 | End: 2025-01-09 | Stop reason: HOSPADM

## 2024-12-20 RX ORDER — ACETAMINOPHEN 160 MG/5ML
650 SOLUTION ORAL EVERY 4 HOURS PRN
Status: DISCONTINUED | OUTPATIENT
Start: 2024-12-20 | End: 2025-01-09 | Stop reason: HOSPADM

## 2024-12-20 RX ORDER — LEVOTHYROXINE SODIUM 75 UG/1
75 TABLET ORAL DAILY
Status: DISCONTINUED | OUTPATIENT
Start: 2024-12-21 | End: 2025-01-09 | Stop reason: HOSPADM

## 2024-12-20 RX ORDER — TERAZOSIN 1 MG/1
1 CAPSULE ORAL NIGHTLY
Status: DISCONTINUED | OUTPATIENT
Start: 2024-12-20 | End: 2025-01-09 | Stop reason: HOSPADM

## 2024-12-20 RX ORDER — ATORVASTATIN CALCIUM 20 MG/1
40 TABLET, FILM COATED ORAL NIGHTLY
Status: DISCONTINUED | OUTPATIENT
Start: 2024-12-20 | End: 2025-01-09 | Stop reason: HOSPADM

## 2024-12-20 RX ORDER — HYDROCODONE BITARTRATE AND ACETAMINOPHEN 5; 325 MG/1; MG/1
1 TABLET ORAL EVERY 6 HOURS PRN
Status: DISPENSED | OUTPATIENT
Start: 2024-12-20 | End: 2024-12-22

## 2024-12-20 RX ORDER — CARBIDOPA/LEVODOPA 25MG-250MG
1 TABLET ORAL 3 TIMES DAILY
Status: DISCONTINUED | OUTPATIENT
Start: 2024-12-20 | End: 2025-01-09 | Stop reason: HOSPADM

## 2024-12-20 RX ORDER — FINASTERIDE 5 MG/1
5 TABLET, FILM COATED ORAL DAILY
Status: DISCONTINUED | OUTPATIENT
Start: 2024-12-21 | End: 2025-01-09 | Stop reason: HOSPADM

## 2024-12-20 RX ORDER — ASPIRIN 81 MG/1
81 TABLET, CHEWABLE ORAL DAILY
Status: DISCONTINUED | OUTPATIENT
Start: 2024-12-21 | End: 2024-12-23

## 2024-12-20 RX ADMIN — ACYCLOVIR SODIUM 700 MG: 50 INJECTION, SOLUTION INTRAVENOUS at 13:39

## 2024-12-20 RX ADMIN — Medication 10 ML: at 20:32

## 2024-12-20 RX ADMIN — ATORVASTATIN CALCIUM 40 MG: 20 TABLET, FILM COATED ORAL at 20:31

## 2024-12-20 RX ADMIN — CARBIDOPA AND LEVODOPA 1 TABLET: 25; 250 TABLET ORAL at 12:12

## 2024-12-20 RX ADMIN — HEPARIN SODIUM 5000 UNITS: 5000 INJECTION INTRAVENOUS; SUBCUTANEOUS at 09:19

## 2024-12-20 RX ADMIN — CARBIDOPA AND LEVODOPA 1 TABLET: 25; 250 TABLET ORAL at 20:32

## 2024-12-20 RX ADMIN — SODIUM CHLORIDE 75 ML/HR: 9 INJECTION, SOLUTION INTRAVENOUS at 06:47

## 2024-12-20 RX ADMIN — CARBIDOPA AND LEVODOPA 1 TABLET: 25; 250 TABLET ORAL at 18:31

## 2024-12-20 RX ADMIN — FINASTERIDE 5 MG: 5 TABLET, FILM COATED ORAL at 12:11

## 2024-12-20 RX ADMIN — SODIUM CHLORIDE 75 ML/HR: 9 INJECTION, SOLUTION INTRAVENOUS at 20:53

## 2024-12-20 RX ADMIN — TERAZOSIN HYDROCHLORIDE 1 MG: 1 CAPSULE ORAL at 20:32

## 2024-12-20 RX ADMIN — HEPARIN SODIUM 5000 UNITS: 5000 INJECTION INTRAVENOUS; SUBCUTANEOUS at 20:31

## 2024-12-20 RX ADMIN — LEVOTHYROXINE SODIUM 75 MCG: 75 TABLET ORAL at 12:10

## 2024-12-20 RX ADMIN — TAMSULOSIN HYDROCHLORIDE 0.4 MG: 0.4 CAPSULE ORAL at 12:12

## 2024-12-20 RX ADMIN — ASPIRIN 81 MG: 81 TABLET, COATED ORAL at 12:13

## 2024-12-20 NOTE — PLAN OF CARE
Goal Outcome Evaluation:              Outcome Evaluation: pt lethargic today and unable to become alert enough to swallow safley. MD notified. Pt is currently NPO and SLP consulted. Dobhoff placement attempted. Pt could not tolerate. Anticipate cortrak to be placed tomorrow. Spouse concerned that previous surgery to the nose may make placement difficult (mentioned having an ENT check him before tube placement) and is also convinced that he will pull anything out that we place.

## 2024-12-20 NOTE — PROGRESS NOTES
LOS: 3 days   Patient Care Team:  Aleksander Diez MD as PCP - General  Joseph Acharya MD as Consulting Physician (Ophthalmology)  Ford Dorado MD as Consulting Physician (Vascular Surgery)  Cecilio Armas III, MD as Consulting Physician (Cardiology)  Kashif Goodwin MD as Consulting Physician (Neurology)  Enoch Lares MD (Hematology)  Kayden Chance MD (Dermatology)  Vinod Cadena MD as Consulting Physician (Neurosurgery)  Johnny Castellanos MD as Consulting Physician (Urology)  Inge Pedro, PT as Physical Therapist (Physical Therapy)  Lucrecia Mccloud, PT as Physical Therapist (Physical Therapy)  Nereyda Toledo, OT as Occupational Therapist (Occupational Therapy)  Junior Taylor MD as Surgeon (Orthopedic Surgery)  Casey Power MD as Surgeon (Wound Care)  Nahomi Jacobo APRN as Nurse Practitioner (Nurse Practitioner)    Chief Complaint: DEE    Subjective     History of Present Illness  Casey Snowden . is a 89 y.o. male with history of Parkinson's disease and dementia.  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 With no known history of renal disease. Started on iv acyclovir.  Now with dee. Cr up to 3.3 from 0.8.  with h/o bph. Currently asleep. Grandson at bedside.       Subjective  Patient still encephalopathic  Has had some large volume bladder scans, but patient has been able to void as they have moved him around. PVR has been under 300.  DHT in place.     History taken from: family and chart, aid.     Objective     Vital Sign Min/Max for last 24 hours  Temp  Min: 98.1 °F (36.7 °C)  Max: 100 °F (37.8 °C)   BP  Min: 104/61  Max: 155/92   Pulse  Min: 70  Max: 80   Resp  Min: 17  Max: 18   SpO2  Min: 95 %  Max: 99 %   No data recorded   No data recorded     Flowsheet Rows      Flowsheet Row First Filed Value   Admission Height --   Admission Weight 66.7 kg (147 lb 0.8 oz) Documented at 12/18/2024 0500       "      No intake/output data recorded.  I/O last 3 completed shifts:  In: 125 [P.O.:125]  Out: 700 [Urine:700]    Objective:  Vital signs: (most recent): Blood pressure (!) 141/119, pulse 67, temperature 97.6 °F (36.4 °C), temperature source Axillary, resp. rate 18, weight 66.7 kg (147 lb 0.8 oz), SpO2 98%.            Physical Examination: General appearance - ill appearing, frail, uncomfortable   Mental status - obtunded.   Nose - DHT in place   Chest - + coarse breath sound. Mos[place] upper airway .  Heart - normal rate, regular rhythm, normal S1, S2, no murmurs, rubs, clicks or gallops  Abdomen - soft, nontender, nondistended, no masses or organomegaly  Neurological - encephalopathic   Extremities - no edema      Results Review:     I reviewed the patient's new clinical results.    WBC WBC   Date Value Ref Range Status   12/18/2024 5.03 3.40 - 10.80 10*3/mm3 Final   12/17/2024 4.65 3.40 - 10.80 10*3/mm3 Final      HGB Hemoglobin   Date Value Ref Range Status   12/18/2024 11.2 (L) 13.0 - 17.7 g/dL Final   12/17/2024 11.5 (L) 13.0 - 17.7 g/dL Final      HCT Hematocrit   Date Value Ref Range Status   12/18/2024 33.1 (L) 37.5 - 51.0 % Final   12/17/2024 36.1 (L) 37.5 - 51.0 % Final      Platlets No results found for: \"LABPLAT\"   MCV MCV   Date Value Ref Range Status   12/18/2024 96.5 79.0 - 97.0 fL Final   12/17/2024 97.8 (H) 79.0 - 97.0 fL Final          Sodium Sodium   Date Value Ref Range Status   12/19/2024 135 (L) 136 - 145 mmol/L Final   12/18/2024 136 136 - 145 mmol/L Final   12/17/2024 137 136 - 145 mmol/L Final      Potassium Potassium   Date Value Ref Range Status   12/19/2024 4.2 3.5 - 5.2 mmol/L Final   12/18/2024 3.8 3.5 - 5.2 mmol/L Final   12/17/2024 3.7 3.5 - 5.2 mmol/L Final      Chloride Chloride   Date Value Ref Range Status   12/19/2024 99 98 - 107 mmol/L Final   12/18/2024 104 98 - 107 mmol/L Final   12/17/2024 107 98 - 107 mmol/L Final      CO2 CO2   Date Value Ref Range Status   12/19/2024 20.2 (L) " "22.0 - 29.0 mmol/L Final   12/18/2024 23.0 22.0 - 29.0 mmol/L Final   12/17/2024 25.2 22.0 - 29.0 mmol/L Final      BUN BUN   Date Value Ref Range Status   12/19/2024 43 (H) 8 - 23 mg/dL Final   12/18/2024 20 8 - 23 mg/dL Final   12/17/2024 16 8 - 23 mg/dL Final      Creatinine Creatinine   Date Value Ref Range Status   12/19/2024 3.31 (H) 0.76 - 1.27 mg/dL Final   12/18/2024 0.89 0.76 - 1.27 mg/dL Final   12/17/2024 0.62 (L) 0.76 - 1.27 mg/dL Final      Calcium Calcium   Date Value Ref Range Status   12/19/2024 8.0 (L) 8.2 - 9.6 mg/dL Final   12/18/2024 8.1 (L) 8.2 - 9.6 mg/dL Final   12/17/2024 8.1 (L) 8.2 - 9.6 mg/dL Final      PO4 No results found for: \"CAPO4\"   Albumin Albumin   Date Value Ref Range Status   12/18/2024 3.5 3.5 - 5.2 g/dL Final   12/17/2024 3.5 3.5 - 5.2 g/dL Final      Magnesium Magnesium   Date Value Ref Range Status   12/19/2024 2.1 1.6 - 2.4 mg/dL Final   12/17/2024 2.0 1.6 - 2.4 mg/dL Final      Uric Acid No results found for: \"URICACID\"     Medication Review:     Current Facility-Administered Medications:     acetaminophen (TYLENOL) tablet 650 mg, 650 mg, Oral, Q4H PRN, 650 mg at 12/17/24 2055 **OR** acetaminophen (TYLENOL) 160 MG/5ML oral solution 650 mg, 650 mg, Oral, Q4H PRN **OR** acetaminophen (TYLENOL) suppository 650 mg, 650 mg, Rectal, Q4H PRN, Inessa Dumont MD    acyclovir (ZOVIRAX) 700 mg in sodium chloride 0.9 % 250 mL IVPB, 10 mg/kg, Intravenous, Q24H, Ford Salazar MD    aspirin EC tablet 81 mg, 81 mg, Oral, Daily, Inessa Dumont MD, 81 mg at 12/18/24 0902    atorvastatin (LIPITOR) tablet 40 mg, 40 mg, Oral, Nightly, Inessa Dumont MD, 40 mg at 12/18/24 2023    atropine 1 % ophthalmic solution 1 drop, 1 drop, Both Eyes, TID PRN, Inessa Dumont MD    carbidopa-levodopa (SINEMET)  MG per tablet 1 tablet, 1 tablet, Oral, TID, Inessa Dumont MD, 1 tablet at 12/18/24 2023    finasteride (PROSCAR) tablet 5 mg, 5 mg, Oral, Daily, Inessa Dumont MD, 5 mg at 12/18/24 " 0902    heparin (porcine) 5000 UNIT/ML injection 5,000 Units, 5,000 Units, Subcutaneous, Q12H, Ford Salazar MD, 5,000 Units at 12/19/24 2202    HYDROcodone-acetaminophen (NORCO) 5-325 MG per tablet 1 tablet, 1 tablet, Oral, Q6H PRN, Inessa Dumont MD    levothyroxine (SYNTHROID, LEVOTHROID) tablet 75 mcg, 75 mcg, Oral, Daily, Inessa Dumont MD, 75 mcg at 12/18/24 0902    ondansetron (ZOFRAN) injection 4 mg, 4 mg, Intravenous, Q6H PRN, Inessa Dumont MD    [Held by provider] oxybutynin XL (DITROPAN-XL) 24 hr tablet 10 mg, 10 mg, Oral, Daily, Inessa Dumont MD, 10 mg at 12/18/24 0901    [COMPLETED] Insert Peripheral IV, , , Once **AND** sodium chloride 0.9 % flush 10 mL, 10 mL, Intravenous, PRN, Ian Dao MD    sodium chloride 0.9 % flush 10 mL, 10 mL, Intravenous, Q12H, Inessa Dumont MD, 10 mL at 12/19/24 2202    sodium chloride 0.9 % flush 10 mL, 10 mL, Intravenous, PRN, Inessa Dumont MD    sodium chloride 0.9 % infusion 40 mL, 40 mL, Intravenous, PRN, Inessa Dumont MD    sodium chloride 0.9 % infusion, 100 mL/hr, Intravenous, Continuous, Ford Salazar MD, Stopped at 12/19/24 1648    sodium chloride 0.9 % infusion, 75 mL/hr, Intravenous, Continuous, Claudia Bran MD, Last Rate: 75 mL/hr at 12/20/24 0647, 75 mL/hr at 12/20/24 0647    tamsulosin (FLOMAX) 24 hr capsule 0.4 mg, 0.4 mg, Oral, Daily, Inessa Dumont MD, 0.4 mg at 12/18/24 0901    [Held by provider] valsartan (DIOVAN) tablet 40 mg, 40 mg, Oral, Daily, Inessa Dumont MD, 40 mg at 12/18/24 0902    vitamin B-12 (CYANOCOBALAMIN) tablet 1,000 mcg, 1,000 mcg, Oral, Q AM, Inessa Dumont MD, 1,000 mcg at 12/18/24 0901     Assessment & Plan       Unable to ambulate    Benign essential hypertension    Parkinson's disease    Chronic deep vein thrombosis (DVT) of popliteal vein of right lower extremity    Uses hearing aid    Paroxysmal A-fib    Chronic diastolic (congestive) heart failure    Presence of IVC filter    Orthostatic hypotension  due to Parkinson's disease    PAD (peripheral artery disease)    Secondary malignancy of lymph nodes of head, face and neck    Acquired hypothyroidism    Falls frequently    Herpes zoster without complication    LISY (acute kidney injury)      Assessment & Plan  LISY  Metabolic acidosis   Herpes zoster   Encephalopathy   Hyperphosphatemia   Urinary retention     LISY probably form volume depletion vs related to acyclovir (but no crystals reported on UA) +/- some urinary retention   Cr worse today, hopefully some retention that seems to be improving.   Continue with IVF, can use PRN loop diuretics if evidence of volume overload, but would not DC IVF while he is still on acyclovir. Discussed with ID and family, no other alternative for treatment, dose adjust and continue IVF.  Phos elevated due to LISY. No need for binders at this time.   Renally dose all meds for GFR <10 as his GFR is virtually zero  UA is bland, some protein, no GN suspected at this time.   Check labs in am   Will follow     Cherri Parr MD  12/20/24  08:23 EST

## 2024-12-20 NOTE — PLAN OF CARE
Goal Outcome Evaluation:  Plan of Care Reviewed With: patient, friend           Outcome Evaluation: Pt still lethargic this shift. cortrak placed successfully. Feedings started. Meds given per MAR. WCTM

## 2024-12-20 NOTE — NURSING NOTE
Pt had morning meds that were not given due to NPO status. They were marked in the MAR as not given. In order to be able to pull them out of omni cell RN had to have to put 1x orders in (per pharmacy).

## 2024-12-20 NOTE — PLAN OF CARE
Goal Outcome Evaluation:      Pt oriented to self, RA, VSS. Pt remains arousable to voice/gentle stimuli, has difficulty following commands/direction cues. No acute changes overnight. Spouse at bedside. Straight cathed for urine specimen- 400mL drained. Unsuccessful attempt to insert Dobbhoff at 1915- A called and notified. Brief on. Bladder scan at 0530 measured 827 mL- attempted to straight cath but pt spontaneously voided, post void residual 300 mL. Lab work deferred for later in morning per family request. Call light within reach. Plan of care ongoing.

## 2024-12-20 NOTE — SIGNIFICANT NOTE
12/20/24 5084   OTHER   Discipline physical therapy assistant   Rehab Time/Intention   Session Not Performed other (see comments)  (Pt not appropriate for PT today. Pt sleeping when PT arrived to check on pt twice. Pt is too lethargic to participate with PT today. Will follow up over the weekend.)   Recommendation   PT - Next Appointment 12/21/24

## 2024-12-20 NOTE — PROGRESS NOTES
Patient Name: Casey Snowden .  :1934  89 y.o.      Patient Care Team:  Aleksander Diez MD as PCP - General  Marck, Joseph Youngblood MD as Consulting Physician (Ophthalmology)  Ford Dorado MD as Consulting Physician (Vascular Surgery)  Cecilio Armas III, MD as Consulting Physician (Cardiology)  Kashif Goodwin MD as Consulting Physician (Neurology)  Enoch Lares MD (Hematology)  Kayden Chance MD (Dermatology)  Vinod Cadena MD as Consulting Physician (Neurosurgery)  Johnny Castellanos MD as Consulting Physician (Urology)  Inge Pedro, PT as Physical Therapist (Physical Therapy)  Lucrecia Mccloud, PT as Physical Therapist (Physical Therapy)  Nereyda Toledo, OT as Occupational Therapist (Occupational Therapy)  Junior Taylor MD as Surgeon (Orthopedic Surgery)  Casey Power MD as Surgeon (Wound Care)  Nahomi Jacobo APRN as Nurse Practitioner (Nurse Practitioner)    Interval History:   Nutrition consult.  May need core track.    Subjective:  Following for nonsustained VT    Objective   Vital Signs  Temp:  [97.6 °F (36.4 °C)-100 °F (37.8 °C)] 97.6 °F (36.4 °C)  Heart Rate:  [70-80] 72  Resp:  [18] 18  BP: (104-169)/(61-92) 169/67    Intake/Output Summary (Last 24 hours) at 2024 1203  Last data filed at 2024 1000  Gross per 24 hour   Intake 60 ml   Output 700 ml   Net -640 ml     Flowsheet Rows      Flowsheet Row First Filed Value   Admission Height --   Admission Weight 66.7 kg (147 lb 0.8 oz) Documented at 2024 0500            Physical Exam:   Sleeping but comfortable.  Irregular.    Results Review:    Results from last 7 days   Lab Units 24  0811   SODIUM mmol/L 136   POTASSIUM mmol/L 4.1   CHLORIDE mmol/L 106   CO2 mmol/L 19.3*   BUN mg/dL 54*   CREATININE mg/dL 3.97*   GLUCOSE mg/dL 107*   CALCIUM mg/dL 7.7*         Results from last 7 days   Lab Units 24  0811   WBC 10*3/mm3 6.73   HEMOGLOBIN g/dL 11.2*   HEMATOCRIT % 32.7*    PLATELETS 10*3/mm3 139*             Results from last 7 days   Lab Units 12/20/24  0811   MAGNESIUM mg/dL 2.2             Medication Review:   acyclovir, 10 mg/kg, Intravenous, Q24H  aspirin, 81 mg, Oral, Daily  atorvastatin, 40 mg, Oral, Nightly  carbidopa-levodopa, 1 tablet, Oral, TID  finasteride, 5 mg, Oral, Daily  heparin (porcine), 5,000 Units, Subcutaneous, Q12H  levothyroxine, 75 mcg, Oral, Daily  [Held by provider] oxybutynin XL, 10 mg, Oral, Daily  sodium chloride, 10 mL, Intravenous, Q12H  tamsulosin, 0.4 mg, Oral, Daily  [Held by provider] valsartan, 40 mg, Oral, Daily  vitamin B-12, 1,000 mcg, Oral, Q AM         sodium chloride, 100 mL/hr, Last Rate: Stopped (12/19/24 1648)  sodium chloride, 75 mL/hr, Last Rate: 75 mL/hr (12/20/24 0647)        Assessment & Plan     1.  Shingles.  On acyclovir.  2.  Parkinson's/dementia/altered mental status.  I spoke with his wife.  This is new and not normal at all for him.  He lives at home and is active in the greenhouse/garden.  His wife is very concerned about his mental status.  I explained it was likely multifactorial but unlikely related to cardiac issues.  3.  Nonsustained ventricular tachycardia.  I agree with Anila Alejo's note from yesterday.  Beta-blockers have been discontinued in the past due to bradycardia.  Maintain normal electrolytes.  4.  History of coronary artery disease with a LAD stent in 2019.  5.  Ischemic cardiomyopathy.  Limited ability to use guideline directed medical therapy.  6.  Paroxysmal atrial fibrillation.  I agree with reducing the Eliquis dose to 2.5 mg twice daily.  7.  History of DVT with IVC filter.  8.  History of orthostatic hypotension  Secondary to autonomic dysfunction associated with Parkinson's.  9.  Acute kidney injury.    Continue to monitor.    Tia Urbano MD, [facility]  12/20/24  12:03 EST

## 2024-12-20 NOTE — CONSULTS
Nutrition Services    Patient Name:  Casey Snowden .  YOB: 1934  MRN: [id number]  Admit Date:  12/17/2024    Assessment Date:  12/20/24    Summary: cortrak consult and verbal TF consult.  Pt admitted to [facility] with a rash progressing into his back of the R leg with pain and discomfort behind R knee - shingles. Hx parkinson's dz, various skin cancers, heart failure. Pt made NPO 12/19. RD consulted for cortrak placement. Cortrak in R nare, marked at 80 cm, bridle in place, flushed with 60 mL water. Pt's wife present for cortrak placement.     Pt's wife reports pt is a very good eater at home. Pt with reported gradual wt loss. NFPE completed, consistent with nutrition diagnosis of malnutrition using AND/ASPEN criteria. See MSA below.     Moderate chronic disease related malnutrition related to multiple chronic diseases (heart failure, parkinson's dz) as evidenced by moderate muscle and fat wasting per NFPE.     Plan:  -initiate Isosource 1.5 @ 20 mL/hr. Advance rate by 10 mL q8 until goal reached. Goal of isosource 1.5 @ 65 mL/hr with 30 mL q4 FWF.   -Order magnesium and phosphorus levels from blood specimen collected this AM. Pt at risk of refeeding.     CLINICAL NUTRITION ASSESSMENT      Reason for Assessment Cortrak Placement, TF Assessment     Diagnosis/Problem   Unable to ambulate, shingles, parkinson's disease, NPO with need for cortrak.    Medical/Surgical History Past Medical History:   Diagnosis Date    Abdominal aortic aneurysm without rupture 10/14/2021    Acquired hypothyroidism 08/31/2022    Acute ischemic stroke 10/08/2023    Aneurysm     Arrhythmia     Atrial fibrillation     Cancer 04/2018    basil cell carcinoma    Carotid stenosis 10/29/2015    CHF (congestive heart failure)     Chronic deep vein thrombosis (DVT) of popliteal vein of right lower extremity     Clotting disorder     Coronary artery disease involving native coronary artery of native heart without angina pectoris  12/20/2018    DVT of lower extremity (deep venous thrombosis)     Hard of hearing     Hyperlipidemia     Hypertension     Localized edema 01/04/2017    Long-term use of high-risk medication     Metabolic encephalopathy 02/07/2023    Myocardial infarction 2918    LAD stent    PAD (peripheral artery disease) 09/10/2020    Parkinson's disease     Postphlebitic syndrome     Pure hypercholesterolemia     Skin tear of upper arm without complication, left, subsequent encounter     Stroke 11/02/2023       Past Surgical History:   Procedure Laterality Date    BASAL CELL CARCINOMA EXCISION  04/2018    CARDIAC CATHETERIZATION N/A 11/05/2018    Procedure: Left Heart Cath;  Surgeon: Oliverio Azar MD;  [facility];  Service: Cardiovascular    CARDIAC CATHETERIZATION N/A 11/05/2018    Procedure: Coronary angiography;  Surgeon: Oliverio Azar MD;  [facility];  Service: Cardiovascular    CARDIAC CATHETERIZATION N/A 11/05/2018    Procedure: Left ventriculography;  Surgeon: Oliverio Azar MD;  Location: [facility] CATH INVASIVE LOCATION;  Service: Cardiovascular    CARDIAC CATHETERIZATION N/A 06/04/2019    Procedure: Left Heart Cath;  Surgeon: Johnny Glasgow MD;  Location: [facility] CATH INVASIVE LOCATION;  Service: Cardiovascular    CARDIAC CATHETERIZATION N/A 06/04/2019    Procedure: Coronary angiography;  Surgeon: Johnny Glasgow MD;  [facility];  Service: Cardiovascular    CARDIAC CATHETERIZATION N/A 06/04/2019    Procedure: Left ventriculography;  Surgeon: Johnny Glasgow MD;  Location: [facility] CATH INVASIVE LOCATION;  Service: Cardiovascular    CARDIAC CATHETERIZATION N/A 06/04/2019    Procedure: Stent BILL coronary;  Surgeon: Johnny Glasgow MD;  Location: [facility] CATH INVASIVE LOCATION;  Service: Cardiovascular    CARDIAC SURGERY      CAROTID ARTERY ANGIOPLASTY Right 10/11/2023    with stent    CAROTID STENT       CATARACT EXTRACTION Left 08/2018    CATARACT EXTRACTION Right 09/2018    COLONOSCOPY      2011    CORONARY STENT PLACEMENT      INCISION AND DRAINAGE LEG Right 07/07/2017    Procedure: INCISION AND DRAINAGE INFECTED HEMATOMA RIGHT THIGH;  Surgeon: Valentin Peña MD;  Location: [facility] OR;  Service:     JOINT REPLACEMENT      KNEE INCISION AND DRAINAGE Right 12/27/2016    Procedure: KNEE INCISION AND DRAINAGE;  Surgeon: Triston Whitten MD;  Location: [facility] OR;  Service:     NOSE SURGERY      nose replacement    SINUS SURGERY  1960    SKIN BIOPSY      SKIN GRAFT  12/2017    TOTAL KNEE ARTHROPLASTY Right     VASCULAR SURGERY      VENA CAVA FILTER PLACEMENT          Anthropometrics        Current Height  Current Weight  BMI kg/m2    Weight: 66.7 kg (147 lb 0.8 oz) (12/18/24 0500)  Body mass index is 21.1 kg/m².   Adjusted BMI (if applicable)    BMI Category Normal/Healthy (18.4 - 24.9)   Ideal Body Weight (IBW) 166#   Usual Body Weight (UBW) Unknown    Weight Trend Stable since May   Weight History Wt Readings from Last 30 Encounters:   12/18/24 0500 66.7 kg (147 lb 0.8 oz)   11/25/24 1624 68.9 kg (152 lb)   11/13/24 1140 67.8 kg (149 lb 8 oz)   11/06/24 1336 66.9 kg (147 lb 8 oz)   08/27/24 1119 68.9 kg (152 lb)   08/14/24 1632 71.7 kg (158 lb)   08/14/24 1112 70 kg (154 lb 4.8 oz)   08/09/24 1256 70.3 kg (155 lb)   07/03/24 1105 68.4 kg (150 lb 11.2 oz)   07/02/24 0957 68.9 kg (152 lb)   05/29/24 1001 68.9 kg (152 lb)   05/28/24 1414 68 kg (149 lb 14.4 oz)   05/28/24 0920 68 kg (150 lb)   05/24/24 1352 79.4 kg (175 lb)   05/08/24 1355 70.3 kg (155 lb)   02/05/24 1322 74.8 kg (165 lb)   11/28/23 0909 72.1 kg (159 lb)   11/22/23 1253 76 kg (167 lb 9.6 oz)   11/20/23 1317 74.4 kg (164 lb)   11/09/23 1417 74.7 kg (164 lb 11.2 oz)   10/18/23 1256 72.6 kg (160 lb)   10/18/23 1511 75.8 kg (167 lb)   10/16/23 0601 75.9 kg (167 lb 5.3 oz)   10/15/23 0500 76.4 kg (168 lb 6.9 oz)   10/14/23 0651 78.2 kg (172 lb  6.4 oz)   10/13/23 0600 76 kg (167 lb 8.8 oz)   10/12/23 0930 73.9 kg (163 lb)   10/12/23 0529 74.1 kg (163 lb 5.8 oz)   10/11/23 1132 75.8 kg (167 lb)   10/09/23 1154 76 kg (167 lb 8.8 oz)   08/29/23 1611 77.6 kg (171 lb)   08/15/23 1120 74.8 kg (165 lb)   04/20/23 1112 75.1 kg (165 lb 8 oz)   03/31/23 1242 78 kg (172 lb)   02/15/23 1259 77.6 kg (171 lb)   02/07/23 0651 76.9 kg (169 lb 8.5 oz)   02/07/23 0431 77 kg (169 lb 12.1 oz)   02/03/23 0928 77.6 kg (171 lb)        Estimated/Assessed Needs       Energy Requirements    EST Needs, Method, Wt used 1747-6244 kcal/day (30-35 kcal/kg, 66.7 kg)       Protein Requirements    EST Needs, Method, Wt used  g/day (1.2-1.5 g/kg, 66.7 kg)       Fluid Requirements     Estimated Needs (mL/day) 6862-4392 mL. Heart failure.     Labs       Pertinent Labs    Results from last 7 days   Lab Units 12/20/24  0811 12/19/24  0923 12/18/24  0439 12/17/24  1238   SODIUM mmol/L 136 135* 136 137   POTASSIUM mmol/L 4.1 4.2 3.8 3.7   CHLORIDE mmol/L 106 99 104 107   CO2 mmol/L 19.3* 20.2* 23.0 25.2   BUN mg/dL 54* 43* 20 16   CREATININE mg/dL 3.97* 3.31* 0.89 0.62*   CALCIUM mg/dL 7.7* 8.0* 8.1* 8.1*   BILIRUBIN mg/dL  --   --  0.6 0.6   ALK PHOS U/L  --   --  74 80   ALT (SGPT) U/L  --   --  <5 7   AST (SGOT) U/L  --   --  9 7   GLUCOSE mg/dL 107* 132* 109* 112*     Results from last 7 days   Lab Units 12/20/24  0811 12/19/24  0923 12/18/24  0439 12/17/24  1238   MAGNESIUM mg/dL  --  2.1  --  2.0   HEMOGLOBIN g/dL 11.2*  --  11.2* 11.5*   HEMATOCRIT % 32.7*  --  33.1* 36.1*   WBC 10*3/mm3 6.73  --  5.03 4.65   ALBUMIN g/dL  --   --  3.5 3.5     Results from last 7 days   Lab Units 12/20/24  0811 12/18/24  0439 12/17/24  1238   PLATELETS 10*3/mm3 139* 144 144     COVID19   Date Value Ref Range Status   10/23/2022 Not Detected Not Detected - Ref. Range Final     Lab Results   Component Value Date    HGBA1C 5.40 12/18/2024          Medications           Scheduled Medications acyclovir,  10 mg/kg, Intravenous, Q24H  aspirin, 81 mg, Oral, Daily  atorvastatin, 40 mg, Oral, Nightly  carbidopa-levodopa, 1 tablet, Oral, TID  finasteride, 5 mg, Oral, Daily  heparin (porcine), 5,000 Units, Subcutaneous, Q12H  levothyroxine, 75 mcg, Oral, Daily  [Held by provider] oxybutynin XL, 10 mg, Oral, Daily  sodium chloride, 10 mL, Intravenous, Q12H  tamsulosin, 0.4 mg, Oral, Daily  [Held by provider] valsartan, 40 mg, Oral, Daily  vitamin B-12, 1,000 mcg, Oral, Q AM       Infusions sodium chloride, 100 mL/hr, Last Rate: Stopped (12/19/24 3088)  sodium chloride, 75 mL/hr, Last Rate: 75 mL/hr (12/20/24 8167)       PRN Medications   acetaminophen **OR** acetaminophen **OR** acetaminophen    atropine    HYDROcodone-acetaminophen    ondansetron    [COMPLETED] Insert Peripheral IV **AND** sodium chloride    sodium chloride    sodium chloride     Physical Findings          General Findings somnolent   Oral/Mouth Cavity tooth or teeth missing   Edema  1+ (trace), 2+ (mild)  1+ R leg  2+ R ankle, R foot   Gastrointestinal last bowel movement: 12/19   Skin  other: lesions, shingles   Tubes/Drains/Lines Cortrak, NG tube   NFPE See Malnutrition Severity Assessment, Date Completed: 12/20  NFPE completed, consistent with nutrition diagnosis of malnutrition using AND/ASPEN criteria. See MSA below.        Malnutrition Severity Assessment      Patient meets criteria for : Moderate (non-severe) Malnutrition  Malnutrition Type (Last 8 Hours)       Malnutrition Severity Assessment       Row Name 12/20/24 1102       Malnutrition Severity Assessment    Malnutrition Type Chronic Disease - Related Malnutrition      Row Name 12/20/24 1102       Muscle Loss    Loss of Muscle Mass Findings Moderate    [place] Region Severe - deep hollowing/scooping, lack of muscle to touch, facial bones well defined    Clavicle Bone Region Moderate - some protrusion in females, visible in males    Acromion Bone Region Moderate - acromion may slightly protrude     Dorsal Hand Region Moderate - slight depression      Row Name 12/20/24 1102       Fat Loss    Subcutaneous Fat Loss Findings Moderate    Orbital Region  Moderate -  somewhat hollowness, slightly dark circles    Upper Arm Region Moderate - some fat tissue, not ample  nearly severe)      Row Name 12/20/24 1102       Fluid Accumulation (Edema)    Fluid Acumulation Findings Mild  1+ and 2+ R lower extremity only    Fluid Accumulation  Mild equals 1+ pitting edema      Row Name 12/20/24 1102       Criteria Met (Must meet criteria for severity in at least 2 of these categories: M Wasting, Fat Loss, Fluid, Secondary Signs, Wt. Status, Intake)    Patient meets criteria for  Moderate (non-severe) Malnutrition                       Current Nutrition Orders & Evaluation of Intake       Oral Nutrition     Food Allergies NKFA   Current PO Diet NPO Diet NPO Type: Strict NPO   Supplement n/a   PO Evaluation     % PO Intake NPO    Factors Affecting Intake: swallow impairment     PES STATEMENT / NUTRITION DIAGNOSIS      Nutrition Dx Problem  Moderate chronic disease related malnutrition related to multiple chronic diseases (heart failure, parkinson's dz) as evidenced by moderate muscle and fat wasting per NFPE.    --  NUTRITION INTERVENTION / PLAN OF CARE      Intervention Goal(s) Initiate TF/PN and Maintain weight         RD Intervention/Action Await initiation of EN/PN         Prescription/Orders:       PO Diet       Supplements       Enteral Nutrition Initial Goal:  *initial goal conservative d/t risk of RFS     Isosource 1.5 at 20 mL/hr + 30 mL q4 water flush      End Goal:    Isosource 1.5 at 65 mL/hr + water flush per clinical course     Calories  2145 kcals (within range)    Protein  97 g (within range)    Free water  1087 mL   Flushes       The above end goal rate is for 22 hrs/day to assume interruptions for ADLs. Water flushes adjusted based on clinical picture + Rx flushes/IV fluids         Parenteral Nutrition    New  Prescription Ordered? Yes   --      Monitor/Evaluation Per protocol, Pertinent labs, EN delivery/tolerance, Weight, POC/GOC, Swallow function   Discharge Plan/Needs Pending clinical course   --    RD to follow per protocol.      Electronically signed by:  Kathryn Montano RD  12/20/24 10:48 EST

## 2024-12-20 NOTE — PROGRESS NOTES
Name: Casey Snowden . ADMIT: 2024   : 1934  PCP: Aleksander Diez MD    MRN: [id number] LOS: 3 days   AGE/SEX: 89 y.o. male  ROOM: [facility]     Subjective   Subjective     Creatinine continues to worsen. A cortrak was placed this morning and tube feeds started given his confusion. His daughter and a friend are at bedside. We discussed his care extensively and I discussed with Dr. Reed as well. It does look like he's had some urinary retention and has been receiving straight catheterizations. The patient doesn't wake during my examination.       Objective   Objective   Vital Signs  Temp:  [97.6 °F (36.4 °C)-100 °F (37.8 °C)] 97.6 °F (36.4 °C)  Heart Rate:  [67-80] 67  Resp:  [18] 18  BP: (141-169)/() 141/119  SpO2:  [95 %-99 %] 98 %  on   ;   Device (Oxygen Therapy): room air  Body mass index is 21.1 kg/m².  Physical Exam  Constitutional:       General: He is not in acute distress.     Appearance: He is ill-appearing.   Cardiovascular:      Rate and Rhythm: Normal rate and regular rhythm.      Heart sounds: Normal heart sounds.   Pulmonary:      Effort: Pulmonary effort is normal.      Breath sounds: Normal breath sounds.   Abdominal:      General: Bowel sounds are normal.      Palpations: Abdomen is soft.   Musculoskeletal:      Right lower leg: No edema.      Left lower leg: No edema.      Comments: Right leg dermatomal rash consistent with shingles         Results Review     I reviewed the patient's new clinical results.  Results from last 7 days   Lab Units 24  0811 24  0439 24  1238   WBC 10*3/mm3 6.73 5.03 4.65   HEMOGLOBIN g/dL 11.2* 11.2* 11.5*   PLATELETS 10*3/mm3 139* 144 144     Results from last 7 days   Lab Units 24  0811 24  0923 24  0439 24  1238   SODIUM mmol/L 136 135* 136 137   POTASSIUM mmol/L 4.1 4.2 3.8 3.7   CHLORIDE mmol/L 106 99 104 107   CO2 mmol/L 19.3* 20.2* 23.0 25.2   BUN mg/dL 54* 43* 20 16   CREATININE mg/dL  3.97* 3.31* 0.89 0.62*   GLUCOSE mg/dL 107* 132* 109* 112*   Estimated Creatinine Clearance: 11.7 mL/min (A) (by C-G formula based on SCr of 3.97 mg/dL (H)).  Results from last 7 days   Lab Units 12/18/24  0439 12/17/24  1238   ALBUMIN g/dL 3.5 3.5   BILIRUBIN mg/dL 0.6 0.6   ALK PHOS U/L 74 80   AST (SGOT) U/L 9 7   ALT (SGPT) U/L <5 7     Results from last 7 days   Lab Units 12/20/24  0811 12/19/24  0923 12/18/24  0439 12/17/24  1238   CALCIUM mg/dL 7.7* 8.0* 8.1* 8.1*   ALBUMIN g/dL  --   --  3.5 3.5   MAGNESIUM mg/dL 2.2 2.1  --  2.0   PHOSPHORUS mg/dL 6.6*  --   --   --        COVID19   Date Value Ref Range Status   10/23/2022 Not Detected Not Detected - Ref. Range Final     SARS-CoV-2, MARCELO   Date Value Ref Range Status   07/20/2020 Not Detected Not Detected Final     Comment:     This test was developed and its performance characteristics determined  by [employer]. This test has not been FDA cleared or  approved. This test has been authorized by FDA under an Emergency Use  Authorization (EUA). This test is only authorized for the duration of  time the declaration that circumstances exist justifying the  authorization of the emergency use of in vitro diagnostic tests for  detection of SARS-CoV-2 virus and/or diagnosis of COVID-19 infection  under section 564(b)(1) of the Act, 21 U.S.C. 360bbb-3(b)(1), unless  the authorization is terminated or revoked sooner.  When diagnostic testing is negative, the possibility of a false  negative result should be considered in the context of a patient's  recent exposures and the presence of clinical signs and symptoms  consistent with COVID-19. An individual without symptoms of COVID-19  and who is not shedding SARS-CoV-2 virus would expect to have a  negative (not detected) result in this assay.     Hemoglobin A1C   Date/Time Value Ref Range Status   12/18/2024 0439 5.40 4.80 - 5.60 % Final       MRI Brain With & Without Contrast  Narrative: MRI BRAIN W WO  CONTRAST-     HISTORY:  zoster encephalitis; R26.2-Difficulty in walking, not  elsewhere classified; B02.8-Zoster with other complications;  G20.A1-Parkinson's disease without dyskinesia, without mention of  fluctuations     COMPARISON: MRI brain 10/9/2023 and CT head 5/24/2024     TECHNIQUE: A MRI examination of the brain was performed before and after  the intravenous administration of contrast utilizing sagittal T1, axial  diffusion, T1, T2, T2 FLAIR, gradient echo T2 as well as sagittal, axial  and coronal T1 postcontrast weighted sequences. The study is hampered  somewhat by patient motion.     FINDINGS: There is no evidence of restricted diffusion to suggest acute  infarction. The brain ventricles are symmetrical. There is  age-appropriate atrophy. The axial T2 FLAIR sequence demonstrates  increased signal intensity involving the periventricular and deep white  matter of the cerebral hemispheres bilaterally consistent with mild to  moderate small vessel ischemic disease. There is no evidence of T2 FLAIR  hyperintensity involving the medial aspects of the temporal lobes,  insular cortex or limbic structures to suggest herpes encephalitis.  After contrast administration there was no evidence of abnormal  enhancement.     Impression: There is no evidence of abnormal T2 FLAIR hyperintensity to  suggest herpes encephalitis. There is no evidence of acute infarction,  mass or of abnormal enhancement. Age-appropriate atrophy and mild to  moderate small vessel ischemic disease is noted.     This report was finalized on 12/18/2024 3:29 PM by Dr. Liban Smith M.D on Workstation: [id number]       Scheduled Medications  acyclovir, 10 mg/kg, Intravenous, Q24H  [START ON 12/21/2024] aspirin, 81 mg, Nasogastric, Daily  atorvastatin, 40 mg, Nasogastric, Nightly  carbidopa-levodopa, 1 tablet, Nasogastric, TID  [START ON 12/21/2024] finasteride, 5 mg, Nasogastric, Daily  heparin (porcine), 5,000 Units, Subcutaneous,  Q12H  [START ON 12/21/2024] levothyroxine, 75 mcg, Nasogastric, Daily  [Held by provider] oxybutynin XL, 10 mg, Oral, Daily  sodium chloride, 10 mL, Intravenous, Q12H  terazosin, 1 mg, Nasogastric, Nightly  [Held by provider] valsartan, 40 mg, Oral, Daily  [START ON 12/21/2024] vitamin B-12, 1,000 mcg, Nasogastric, Q AM    Infusions  Pharmacy to Dose acyclovir (ZOVIRAX),   sodium chloride, 75 mL/hr, Last Rate: 75 mL/hr (12/20/24 0647)    Diet  NPO Diet NPO Type: Tube Feeding       Assessment/Plan     Active Hospital Problems    Diagnosis  POA    **Unable to ambulate [R26.2]  Yes    LISY (acute kidney injury) [N17.9]  Yes    Herpes zoster without complication [B02.9]  Unknown    Falls frequently [R29.6]  Not Applicable    Acquired hypothyroidism [E03.9]  Yes    Secondary malignancy of lymph nodes of head, 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       89 y.o. male admitted with Unable to ambulate.    Right lower extremity shingles infection-on IV acyclovir  LISY-there may be a component of retention. He did get straight catheterized this morning, but pvr this afternoon is only 220. Could be secondary to the IV acyclovir. Unfortunately, until we have definitively ruled out herpes encephalitis, he needs to remain on IV acyclovir  Metabolic encephalopathy-worsening. Brain MRI was negative acutely and not consistent with herpes encephalitis, but this isn't a definitive test. Discussed with   Brianna and he recommends proceeding with LP to help us determine whether an extended course of IV acyclovir is necessary in the setting of his significant LISY. Check b12, tsh  Dysphagia-predominately due to his encephalopathy. A cortrak was placed this morning and tube feeds initiated.  Non-sustained ventricular tachycardia-bb had been discontinued in the past due to bradycardia. Cardiology recommends trying to keep electrolytes normal  Coronary artery disease s/p LAD stent in 2019-asa/statin  Chronic systolic heart failure-euvolemic. Not on diuretics at baseline.  Paroxysmal afib-eliquis held for LP. Not on rate control chronically  Hypertension-valsartan on hold for LISY  Hyperlipidemia-statin  BPH-a1 blocker  Hypothyroidism-synthroid. Check tsh   History of DVT and IVC filter  Parkinson's dementia  History of stroke   History of melanoma s/p radiation and chemotherapy 3 years ago  Heparin SC for DVT prophylaxis.  Full code.  Discussed with family, nursing staff, and consulting provider.  Anticipate discharge home with home health timing yet to be determined.  Guarded prognosis. Will ask palliative care to consult.      Chilo Tyson MD  [facility]  12/20/24  15:59 EST

## 2024-12-20 NOTE — CONSULTS
"Referring Provider: Dr Whitfield    Reason for Consultation: zoster infection, possibly encephalitis, significant LISY     History of present illness:  Casey Snowden . is a 89 y.o. who I am asked to evaluate and give opinion for \"zoster infection, possibly encephalitis, significant LISY.\" History is obtained from the patient's daughter, Dr Tyson, he was brought to the emergency department by his family on 12/17/2024 due to pain in the right leg and inability to walk.  He had a and review of the old medical records which I summarize/synthesize as follows: Raised rash on his right leg and lower back and they were concerned about shingles.  He also noticed that he was having increased confusion.  He was admitted by the hospitalist service and started on intravenous acyclovir given concerns for possible zoster encephalitis.  Neurology evaluated the patient.  The patient had an MRI that did not show any acute process that would be suggestive of a herpes virus.  Therefore neurology recommended treatment with acyclovir but did not recommend an LP.    It sounds like his mental status has only continued to worsen.  Unfortunately while his creatinine was normal at admission (0.62), it increased to 3.3 yesterday morning and has risen to 3.97 today.  He is getting a workup for LISY.  There were no eosinophils in his urine.  His urine sodium was 29.  Nephrology is on the case now and they are noted and workup are pending.    Past Medical History:   Diagnosis Date    Abdominal aortic aneurysm without rupture 10/14/2021    Acquired hypothyroidism 08/31/2022    Acute ischemic stroke 10/08/2023    Aneurysm     Arrhythmia     Atrial fibrillation     Cancer 04/2018    basil cell carcinoma    Carotid stenosis 10/29/2015    CHF (congestive heart failure)     Chronic deep vein thrombosis (DVT) of popliteal vein of right lower extremity     Clotting disorder     Coronary artery disease involving native coronary artery of native heart " without angina pectoris 12/20/2018    DVT of lower extremity (deep venous thrombosis)     Hard of hearing     Hyperlipidemia     Hypertension     Localized edema 01/04/2017    Long-term use of high-risk medication     Metabolic encephalopathy 02/07/2023    Myocardial infarction 2918    LAD stent    PAD (peripheral artery disease) 09/10/2020    Parkinson's disease     Postphlebitic syndrome     Pure hypercholesterolemia     Skin tear of upper arm without complication, left, subsequent encounter     Stroke 11/02/2023       Past Surgical History:   Procedure Laterality Date    BASAL CELL CARCINOMA EXCISION  04/2018    CARDIAC CATHETERIZATION N/A 11/05/2018    Procedure: Left Heart Cath;  Surgeon: Oliverio Azar MD;  Location: [facility] CATH INVASIVE LOCATION;  Service: Cardiovascular    CARDIAC CATHETERIZATION N/A 11/05/2018    Procedure: Coronary angiography;  Surgeon: Oliverio Azar MD;  Location: [facility] CATH INVASIVE LOCATION;  Service: Cardiovascular    CARDIAC CATHETERIZATION N/A 11/05/2018    Procedure: Left ventriculography;  Surgeon: Oliverio Azar MD;  Location: [facility] CATH INVASIVE LOCATION;  Service: Cardiovascular    CARDIAC CATHETERIZATION N/A 06/04/2019    Procedure: Left Heart Cath;  Surgeon: Johnny Glasgow MD;  Location: [facility] CATH INVASIVE LOCATION;  Service: Cardiovascular    CARDIAC CATHETERIZATION N/A 06/04/2019    Procedure: Coronary angiography;  Surgeon: Johnny Glasgow MD;  Location: [facility] CATH INVASIVE LOC[facility];  Service: Cardiovascular    CARDIAC CATHETERIZATION N/A 06/04/2019    Procedure: Left ventriculography;  Surgeon: Johnny Glasgow MD;  Location: [facility] CATH INVASIVE LOCATION;  Service: Cardiovascular    CARDIAC CATHETERIZATION N/A 06/04/2019    Procedure: Stent BILL coronary;  Surgeon: Johnny Glasgow MD;  Location: [facility] CATH INVASIVE LOCATION;  Service: Cardiovascular    CARDIAC SURGERY      CAROTID ARTERY ANGIOPLASTY Right 10/11/2023    with stent     CAROTID STENT      CATARACT EXTRACTION Left 08/2018    CATARACT EXTRACTION Right 09/2018    COLONOSCOPY      2011    CORONARY STENT PLACEMENT      INCISION AND DRAINAGE LEG Right 07/07/2017    Procedure: INCISION AND DRAINAGE INFECTED HEMATOMA RIGHT THIGH;  Surgeon: Valentin Peña MD;  Location:  [facility];  Service:     JOINT REPLACEMENT      KNEE INCISION AND DRAINAGE Right 12/27/2016    Procedure: KNEE INCISION AND DRAINAGE;  Surgeon: Triston Whitten MD;  Location: [facility] MAIN OR;  Service:     NOSE SURGERY      nose replacement    SINUS SURGERY  1960    SKIN BIOPSY      SKIN GRAFT  12/2017    TOTAL KNEE ARTHROPLASTY Right     VASCULAR SURGERY      VENA CAVA FILTER PLACEMENT         Antibiotic allergies and intolerances:  None    Medications:    Current Facility-Administered Medications:     acetaminophen (TYLENOL) tablet 650 mg, 650 mg, Nasogastric, Q4H PRN **OR** acetaminophen (TYLENOL) 160 MG/5ML oral solution 650 mg, 650 mg, Nasogastric, Q4H PRN **OR** acetaminophen (TYLENOL) suppository 650 mg, 650 mg, Rectal, Q4H PRN, Chilo Tyson MD    acyclovir (ZOVIRAX) 700 mg in sodium chloride 0.9 % 250 mL IVPB, 10 mg/kg, Intravenous, Q24H, Ford Salazar MD, 700 mg at 12/20/24 1339    [START ON 12/21/2024] aspirin chewable tablet 81 mg, 81 mg, Nasogastric, Daily, Chilo Tyson MD    atorvastatin (LIPITOR) tablet 40 mg, 40 mg, Nasogastric, Nightly, Chilo Tyson MD    atropine 1 % ophthalmic solution 1 drop, 1 drop, Both Eyes, TID PRN, Inessa Dumont MD    carbidopa-levodopa (SINEMET)  MG per tablet 1 tablet, 1 tablet, Nasogastric, TID, Chilo Tyson MD    [START ON 12/21/2024] finasteride (PROSCAR) tablet 5 mg, 5 mg, Nasogastric, Daily, Chilo Tyson MD    heparin (porcine) 5000 UNIT/ML injection 5,000 Units, 5,000 Units, Subcutaneous, Q12H, Ford Salazar MD, 5,000 Units at 12/20/24 0919    HYDROcodone-acetaminophen (NORCO) 5-325 MG per tablet 1 tablet, 1 tablet,  Nasogastric, Q6H PRN, Chilo Tyson MD    [START ON 12/21/2024] levothyroxine (SYNTHROID, LEVOTHROID) tablet 75 mcg, 75 mcg, Nasogastric, Daily, Chilo Tyson MD    ondansetron (ZOFRAN) injection 4 mg, 4 mg, Intravenous, Q6H PRN, Inessa Dumont MD    [Held by provider] oxybutynin XL (DITROPAN-XL) 24 hr tablet 10 mg, 10 mg, Oral, Daily, Inessa Dumont MD, 10 mg at 12/18/24 0901    [COMPLETED] Insert Peripheral IV, , , Once **AND** sodium chloride 0.9 % flush 10 mL, 10 mL, Intravenous, PRN, Ian Dao MD    sodium chloride 0.9 % flush 10 mL, 10 mL, Intravenous, Q12H, Inessa Dumont MD, 10 mL at 12/19/24 2202    sodium chloride 0.9 % flush 10 mL, 10 mL, Intravenous, PRN, Inessa Dumont MD    sodium chloride 0.9 % infusion 40 mL, 40 mL, Intravenous, PRN, Inessa Dumont MD    sodium chloride 0.9 % infusion, 75 mL/hr, Intravenous, Continuous, Claudia Bran MD, Last Rate: 75 mL/hr at 12/20/24 0647, 75 mL/hr at 12/20/24 0647    terazosin (HYTRIN) capsule 1 mg, 1 mg, Nasogastric, Nightly, Chilo Tyson MD    [Held by provider] valsartan (DIOVAN) tablet 40 mg, 40 mg, Oral, Daily, TimInessa german MD, 40 mg at 12/18/24 0902    [START ON 12/21/2024] vitamin B-12 (CYANOCOBALAMIN) tablet 1,000 mcg, 1,000 mcg, Nasogastric, Q AM, Chilo Tyson MD      Objective   Vital Signs   Temp:  [97.6 °F (36.4 °C)-100 °F (37.8 °C)] 97.6 °F (36.4 °C)  Heart Rate:  [67-80] 67  Resp:  [18] 18  BP: (104-169)/() 141/119    Physical Exam:   General: Confused, lethargic, does not answer any question  Eyes: no scleral icterus  ENT: Dry due to open mouth breathing  Cardiovascular: NR  Respiratory: no wheezing  GI: Abdomen is soft  :  no Cat catheter  Skin: Rash down the right leg consistent with shingles  Neurological: Alert and oriented x 0  Psychiatric: Confused  Vasc: PIV w/o erythema    Labs:     Lab Results   Component Value Date    WBC 6.73 12/20/2024    HGB 11.2 (L) 12/20/2024    HCT 32.7 (L) 12/20/2024    MCV 93.7 12/20/2024    PLT  139 (L) 12/20/2024       Lab Results   Component Value Date    GLUCOSE 107 (H) 12/20/2024    BUN 54 (H) 12/20/2024    CREATININE 3.97 (H) 12/20/2024    EGFRIFNONA 99 06/05/2019    EGFRIFAFRI >60 12/27/2022    BCR 13.6 12/20/2024    CO2 19.3 (L) 12/20/2024    CALCIUM 7.7 (L) 12/20/2024    PROTENTOTREF 6.2 10/03/2023    ALBUMIN 3.5 12/18/2024    LABIL2 2.3 10/03/2023    AST 9 12/18/2024    ALT <5 12/18/2024     Urine Eos negative  Aj 29  UA 0-2 WBCs    Microbiology:  None so far    Radiology:  MRI brain without evidence for any T2 flair hyperintensity to suggest herpes encephalitis per my review of the radiology report.  It does show age-appropriate atrophy and mild to moderate small vessel ischemic disease.    ASSESSMENT/PLAN:  Encephalopathy  Parkinson's disease  Varicella infection  Acute kidney injury    Given his worsening mental status without a clear explanation, I recommend that we obtain a lumbar puncture and test for VZV.  I do not think he has a bacterial meningitis.  I think while waiting on the LP result we must continue him on intravenous acyclovir despite the acute kidney injury.  His frequency will be changed to once a day and he is now getting intravenous hydration.  I will check a BMP daily for monitoring.  If LP comes back negative for infection, then we could consider changing him to oral valacyclovir.    Discussed with his daughter as well as Dr. Tyson about my recommendations    Recommend appropriate precautions for potential disseminated VZV until we have data to show otherwise.    ID will follow.

## 2024-12-20 NOTE — CASE MANAGEMENT/SOCIAL WORK
Continued Stay Note  [facility]     Patient Name: Casey Snowden Sr.  MRN: [id number]  Today's Date: 12/20/2024    Admit Date: 12/17/2024    Plan: TBD based on hospital course   Discharge Plan       Row Name 12/20/24 1502       Plan    Plan TBD based on hospital course    Patient/Family in Agreement with Plan yes    Plan Comments Chart reviewed. Patient started with new tube feeds today via Cortrak. CCP to follow for discharge needs.                   Discharge Codes    No documentation.                 Expected Discharge Date and Time       Expected Discharge Date Expected Discharge Time    Dec 22, 2024               Tennille Carrillo RN

## 2024-12-20 NOTE — PROGRESS NOTES
[facility] Clinical Pharmacy Services: acyclovir Consult     Casey Snowden Sr. has a pharmacy consult to dose acyclovir per Dr Reed's request.   Duration: 10 days    Indication: varicella zoster infection     Relevant clinical data and objective history reviewed:  89 y.o. male   66.7 kg (147 lb 0.8 oz)  Estimated Creatinine Clearance: 11.7 mL/min (A) (by C-G formula based on SCr of 3.97 mg/dL (H)).    Past Medical History:   Diagnosis Date    Abdominal aortic aneurysm without rupture 10/14/2021    Acquired hypothyroidism 08/31/2022    Acute ischemic stroke 10/08/2023    Aneurysm     Arrhythmia     Atrial fibrillation     Cancer 04/2018    basil cell carcinoma    Carotid stenosis 10/29/2015    CHF (congestive heart failure)     Chronic deep vein thrombosis (DVT) of popliteal vein of right lower extremity     Clotting disorder     Coronary artery disease involving native coronary artery of native heart without angina pectoris 12/20/2018    DVT of lower extremity (deep venous thrombosis)     Hard of hearing     Hyperlipidemia     Hypertension     Localized edema 01/04/2017    Long-term use of high-risk medication     Metabolic encephalopathy 02/07/2023    Myocardial infarction 2918    LAD stent    PAD (peripheral artery disease) 09/10/2020    Parkinson's disease     Postphlebitic syndrome     Pure hypercholesterolemia     Skin tear of upper arm without complication, left, subsequent encounter     Stroke 11/02/2023     is allergic to penicillins and rocephin [ceftriaxone].  Assessment/Plan    Will continue will current dose of acyclovir, which has already been adjusted for any patient specific factors. Pharmacy will continue to follow.     Nelson Angela PharmD  Clinical Pharmacist

## 2024-12-21 ENCOUNTER — APPOINTMENT (OUTPATIENT)
Dept: GENERAL RADIOLOGY | Facility: HOSPITAL | Age: 89
DRG: 073 | End: 2024-12-21
Payer: MEDICARE

## 2024-12-21 ENCOUNTER — APPOINTMENT (OUTPATIENT)
Dept: NEUROLOGY | Facility: HOSPITAL | Age: 89
DRG: 073 | End: 2024-12-21
Payer: MEDICARE

## 2024-12-21 LAB
ANION GAP SERPL CALCULATED.3IONS-SCNC: 17 MMOL/L (ref 5–15)
APPEARANCE CSF: CLEAR
APPEARANCE CSF: CLEAR
ARTERIAL PATENCY WRIST A: ABNORMAL
ATMOSPHERIC PRESS: 755.9 MMHG
BASE EXCESS BLDA CALC-SCNC: -0.9 MMOL/L (ref 0–2)
BDY SITE: ABNORMAL
BUN SERPL-MCNC: 62 MG/DL (ref 8–23)
BUN/CREAT SERPL: 19.4 (ref 7–25)
C GATTII+NEOFOR DNA CSF QL NAA+NON-PROBE: NOT DETECTED
CALCIUM SPEC-SCNC: 8.1 MG/DL (ref 8.2–9.6)
CHLORIDE SERPL-SCNC: 106 MMOL/L (ref 98–107)
CMV DNA CSF QL NAA+PROBE: NOT DETECTED
CO2 BLDA-SCNC: 24.5 MMOL/L (ref 23–27)
CO2 SERPL-SCNC: 21 MMOL/L (ref 22–29)
COLOR CSF: COLORLESS
COLOR CSF: COLORLESS
COLOR SPUN CSF: COLORLESS
CREAT SERPL-MCNC: 3.19 MG/DL (ref 0.76–1.27)
DEPRECATED RDW RBC AUTO: 49.2 FL (ref 37–54)
E COLI K1 DNA CSF QL NAA+NON-PROBE: NOT DETECTED
EGFRCR SERPLBLD CKD-EPI 2021: 17.8 ML/MIN/1.73
ERYTHROCYTE [DISTWIDTH] IN BLOOD BY AUTOMATED COUNT: 14.4 % (ref 12.3–15.4)
EV RNA CSF QL NAA+PROBE: NOT DETECTED
GLUCOSE BLDC GLUCOMTR-MCNC: 149 MG/DL (ref 70–130)
GLUCOSE BLDC GLUCOMTR-MCNC: 151 MG/DL (ref 70–130)
GLUCOSE BLDC GLUCOMTR-MCNC: 164 MG/DL (ref 70–130)
GLUCOSE BLDC GLUCOMTR-MCNC: 166 MG/DL (ref 70–130)
GLUCOSE BLDC GLUCOMTR-MCNC: 176 MG/DL (ref 70–130)
GLUCOSE CSF-MCNC: 85 MG/DL (ref 40–70)
GLUCOSE SERPL-MCNC: 170 MG/DL (ref 65–99)
GP B STREP DNA SPEC QL NAA+PROBE: NOT DETECTED
HAEM INFLU SEROTYP DNA SPEC NAA+PROBE: NOT DETECTED
HCO3 BLDA-SCNC: 23.4 MMOL/L (ref 22–28)
HCT VFR BLD AUTO: 34.2 % (ref 37.5–51)
HEMODILUTION: NO
HGB BLD-MCNC: 11.9 G/DL (ref 13–17.7)
HHV6 DNA CSF QL NAA+PROBE: NOT DETECTED
HOLD SPECIMEN: NORMAL
HSV1 DNA CSF QL NAA+PROBE: NOT DETECTED
HSV2 DNA CSF QL NAA+PROBE: NOT DETECTED
L MONOCYTOG RRNA SPEC QL PROBE: NOT DETECTED
LYMPHOCYTES NFR CSF MANUAL: 84 %
LYMPHOCYTES NFR CSF MANUAL: 86 %
MCH RBC QN AUTO: 32.8 PG (ref 26.6–33)
MCHC RBC AUTO-ENTMCNC: 34.8 G/DL (ref 31.5–35.7)
MCV RBC AUTO: 94.2 FL (ref 79–97)
METHOD: ABNORMAL
METHOD: ABNORMAL
MODALITY: ABNORMAL
MONOCYTES NFR CSF MANUAL: 12 %
MONOCYTES NFR CSF MANUAL: 16 %
N MEN DNA SPEC QL NAA+PROBE: NOT DETECTED
NEUTROPHILS NFR CSF MICRO: 2 %
NUC CELL # CSF MANUAL: 107 /MM3 (ref 0–5)
NUC CELL # CSF MANUAL: 152 /MM3 (ref 0–5)
PARECHOVIRUS A RNA CSF QL NAA+NON-PROBE: NOT DETECTED
PCO2 BLDA: 36.8 MM HG (ref 35–45)
PH BLDA: 7.41 PH UNITS (ref 7.35–7.45)
PLATELET # BLD AUTO: 155 10*3/MM3 (ref 140–450)
PMV BLD AUTO: 10 FL (ref 6–12)
PO2 BLDA: 72.7 MM HG (ref 80–100)
POTASSIUM SERPL-SCNC: 4 MMOL/L (ref 3.5–5.2)
PROT CSF-MCNC: 67.6 MG/DL (ref 15–45)
RBC # BLD AUTO: 3.63 10*6/MM3 (ref 4.14–5.8)
RBC # CSF MANUAL: 113 /MM3 (ref 0–0)
RBC # CSF MANUAL: 6 /MM3 (ref 0–0)
S PNEUM DNA CSF QL NAA+NON-PROBE: NOT DETECTED
SAO2 % BLDCOA: 94.7 % (ref 92–98.5)
SODIUM SERPL-SCNC: 144 MMOL/L (ref 136–145)
TOTAL RATE: 24 BREATHS/MINUTE
TUBE # CSF: 1
TUBE # CSF: 4
VIT B12 BLD-MCNC: 1064 PG/ML (ref 211–946)
VZV DNA CSF QL NAA+PROBE: DETECTED
WBC NRBC COR # BLD AUTO: 8.6 10*3/MM3 (ref 3.4–10.8)

## 2024-12-21 PROCEDURE — 95819 EEG AWAKE AND ASLEEP: CPT

## 2024-12-21 PROCEDURE — 87015 SPECIMEN INFECT AGNT CONCNTJ: CPT | Performed by: INTERNAL MEDICINE

## 2024-12-21 PROCEDURE — 87798 DETECT AGENT NOS DNA AMP: CPT | Performed by: STUDENT IN AN ORGANIZED HEALTH CARE EDUCATION/TRAINING PROGRAM

## 2024-12-21 PROCEDURE — 99232 SBSQ HOSP IP/OBS MODERATE 35: CPT | Performed by: INTERNAL MEDICINE

## 2024-12-21 PROCEDURE — 87070 CULTURE OTHR SPECIMN AEROBIC: CPT | Performed by: INTERNAL MEDICINE

## 2024-12-21 PROCEDURE — 25810000003 SODIUM CHLORIDE 0.9 % SOLUTION: Performed by: INTERNAL MEDICINE

## 2024-12-21 PROCEDURE — 80048 BASIC METABOLIC PNL TOTAL CA: CPT | Performed by: INTERNAL MEDICINE

## 2024-12-21 PROCEDURE — 25010000002 HEPARIN (PORCINE) PER 1000 UNITS: Performed by: HOSPITALIST

## 2024-12-21 PROCEDURE — 25810000003 SODIUM CHLORIDE 0.9 % SOLUTION 250 ML FLEX CONT: Performed by: INTERNAL MEDICINE

## 2024-12-21 PROCEDURE — 25010000002 LIDOCAINE 1 % SOLUTION: Performed by: STUDENT IN AN ORGANIZED HEALTH CARE EDUCATION/TRAINING PROGRAM

## 2024-12-21 PROCEDURE — 99232 SBSQ HOSP IP/OBS MODERATE 35: CPT | Performed by: PSYCHIATRY & NEUROLOGY

## 2024-12-21 PROCEDURE — 82945 GLUCOSE OTHER FLUID: CPT | Performed by: INTERNAL MEDICINE

## 2024-12-21 PROCEDURE — 87205 SMEAR GRAM STAIN: CPT | Performed by: INTERNAL MEDICINE

## 2024-12-21 PROCEDURE — 84157 ASSAY OF PROTEIN OTHER: CPT | Performed by: INTERNAL MEDICINE

## 2024-12-21 PROCEDURE — 89051 BODY FLUID CELL COUNT: CPT | Performed by: INTERNAL MEDICINE

## 2024-12-21 PROCEDURE — 009U3ZX DRAINAGE OF SPINAL CANAL, PERCUTANEOUS APPROACH, DIAGNOSTIC: ICD-10-PCS | Performed by: RADIOLOGY

## 2024-12-21 PROCEDURE — 82803 BLOOD GASES ANY COMBINATION: CPT | Performed by: STUDENT IN AN ORGANIZED HEALTH CARE EDUCATION/TRAINING PROGRAM

## 2024-12-21 PROCEDURE — 85027 COMPLETE CBC AUTOMATED: CPT | Performed by: STUDENT IN AN ORGANIZED HEALTH CARE EDUCATION/TRAINING PROGRAM

## 2024-12-21 PROCEDURE — 95816 EEG AWAKE AND DROWSY: CPT | Performed by: PSYCHIATRY & NEUROLOGY

## 2024-12-21 PROCEDURE — 36600 WITHDRAWAL OF ARTERIAL BLOOD: CPT | Performed by: STUDENT IN AN ORGANIZED HEALTH CARE EDUCATION/TRAINING PROGRAM

## 2024-12-21 PROCEDURE — 82948 REAGENT STRIP/BLOOD GLUCOSE: CPT

## 2024-12-21 PROCEDURE — 82607 VITAMIN B-12: CPT | Performed by: STUDENT IN AN ORGANIZED HEALTH CARE EDUCATION/TRAINING PROGRAM

## 2024-12-21 PROCEDURE — 25010000002 ACYCLOVIR PER 5 MG: Performed by: INTERNAL MEDICINE

## 2024-12-21 PROCEDURE — 87483 CNS DNA AMP PROBE TYPE 12-25: CPT | Performed by: INTERNAL MEDICINE

## 2024-12-21 PROCEDURE — 99232 SBSQ HOSP IP/OBS MODERATE 35: CPT | Performed by: NURSE PRACTITIONER

## 2024-12-21 RX ORDER — HYDRALAZINE HYDROCHLORIDE 25 MG/1
25 TABLET, FILM COATED ORAL ONCE
Status: COMPLETED | OUTPATIENT
Start: 2024-12-21 | End: 2024-12-21

## 2024-12-21 RX ORDER — LORAZEPAM 2 MG/ML
2 INJECTION INTRAMUSCULAR
Status: ACTIVE | OUTPATIENT
Start: 2024-12-21 | End: 2024-12-26

## 2024-12-21 RX ORDER — GUAIFENESIN 600 MG/1
600 TABLET, EXTENDED RELEASE ORAL EVERY 12 HOURS SCHEDULED
Status: DISCONTINUED | OUTPATIENT
Start: 2024-12-21 | End: 2024-12-31

## 2024-12-21 RX ORDER — LORAZEPAM 2 MG/ML
2 INJECTION INTRAMUSCULAR ONCE
Status: DISCONTINUED | OUTPATIENT
Start: 2024-12-21 | End: 2024-12-21

## 2024-12-21 RX ORDER — LEVETIRACETAM 500 MG/5ML
500 INJECTION, SOLUTION, CONCENTRATE INTRAVENOUS EVERY 24 HOURS
Status: DISCONTINUED | OUTPATIENT
Start: 2024-12-21 | End: 2024-12-21

## 2024-12-21 RX ORDER — LIDOCAINE HYDROCHLORIDE 10 MG/ML
10 INJECTION, SOLUTION INFILTRATION; PERINEURAL ONCE
Status: COMPLETED | OUTPATIENT
Start: 2024-12-21 | End: 2024-12-21

## 2024-12-21 RX ORDER — GUAIFENESIN 600 MG/1
600 TABLET, EXTENDED RELEASE ORAL EVERY 12 HOURS SCHEDULED
Status: DISCONTINUED | OUTPATIENT
Start: 2024-12-21 | End: 2024-12-21

## 2024-12-21 RX ADMIN — HYDROCODONE BITARTRATE AND ACETAMINOPHEN 1 TABLET: 5; 325 TABLET ORAL at 14:26

## 2024-12-21 RX ADMIN — ATORVASTATIN CALCIUM 40 MG: 20 TABLET, FILM COATED ORAL at 21:58

## 2024-12-21 RX ADMIN — ASPIRIN 81 MG: 81 TABLET, CHEWABLE ORAL at 08:50

## 2024-12-21 RX ADMIN — CARBIDOPA AND LEVODOPA 1 TABLET: 25; 250 TABLET ORAL at 08:50

## 2024-12-21 RX ADMIN — LIDOCAINE HYDROCHLORIDE 5 ML: 10 INJECTION, SOLUTION INFILTRATION; PERINEURAL at 08:20

## 2024-12-21 RX ADMIN — SODIUM CHLORIDE 75 ML/HR: 9 INJECTION, SOLUTION INTRAVENOUS at 13:28

## 2024-12-21 RX ADMIN — CARBIDOPA AND LEVODOPA 1 TABLET: 25; 250 TABLET ORAL at 21:58

## 2024-12-21 RX ADMIN — Medication 10 ML: at 08:51

## 2024-12-21 RX ADMIN — HYDRALAZINE HYDROCHLORIDE 25 MG: 25 TABLET ORAL at 22:52

## 2024-12-21 RX ADMIN — LEVOTHYROXINE SODIUM 75 MCG: 75 TABLET ORAL at 05:06

## 2024-12-21 RX ADMIN — HEPARIN SODIUM 5000 UNITS: 5000 INJECTION INTRAVENOUS; SUBCUTANEOUS at 08:50

## 2024-12-21 RX ADMIN — HEPARIN SODIUM 5000 UNITS: 5000 INJECTION INTRAVENOUS; SUBCUTANEOUS at 21:59

## 2024-12-21 RX ADMIN — ACYCLOVIR SODIUM 700 MG: 50 INJECTION, SOLUTION INTRAVENOUS at 10:36

## 2024-12-21 RX ADMIN — Medication 1000 MCG: at 05:06

## 2024-12-21 RX ADMIN — Medication 10 ML: at 22:05

## 2024-12-21 RX ADMIN — GUAIFENESIN 600 MG: 600 TABLET, MULTILAYER, EXTENDED RELEASE ORAL at 14:52

## 2024-12-21 RX ADMIN — TERAZOSIN HYDROCHLORIDE 1 MG: 1 CAPSULE ORAL at 21:58

## 2024-12-21 RX ADMIN — CARBIDOPA AND LEVODOPA 1 TABLET: 25; 250 TABLET ORAL at 14:26

## 2024-12-21 RX ADMIN — FINASTERIDE 5 MG: 5 TABLET, FILM COATED ORAL at 08:51

## 2024-12-21 NOTE — PLAN OF CARE
Pt dox4, bedridden, tube feeds running at 50, due to be inc at 2100.  Acyclovir given. NS @ 75. LP done today; plans to meet with palliative some time during stay.

## 2024-12-21 NOTE — PLAN OF CARE
Goal Outcome Evaluation:           Progress: declining  Outcome Evaluation: Pt nonverbal at this time. Son remains at bedside. TF per at 30 and goal at 60. Palliative consult ordered by MD. FARMER. No indications of pain at this time.

## 2024-12-21 NOTE — PROGRESS NOTES
LOS: 4 days   Patient Care Team:  Aleksander Diez MD as PCP - General  Marck, Joseph Youngblood MD as Consulting Physician (Ophthalmology)  Ford Dorado MD as Consulting Physician (Vascular Surgery)  Cecilio Armas III, MD as Consulting Physician (Cardiology)  Kashif Goodwin MD as Consulting Physician (Neurology)  Enoch Lares MD (Hematology)  Kayden Chance MD (Dermatology)  Vinod Cadena MD as Consulting Physician (Neurosurgery)  Johnny Castellanos MD as Consulting Physician (Urology)  Inge Pedro, PT as Physical Therapist (Physical Therapy)  Lucrecia Mccloud, PT as Physical Therapist (Physical Therapy)  Nereyda Toledo, OT as Occupational Therapist (Occupational Therapy)  Junior Taylor MD as Surgeon (Orthopedic Surgery)  Casey Power MD as Surgeon (Wound Care)  Nahomi Jacobo APRN as Nurse Practitioner (Nurse Practitioner)      Chief Complaint: Follow-up NSVT, CAD, PAF, cardiomyopathy       Interval History: History obtained from family at bedside. Patient unable to answer questions. He had a lumbar puncture this morning.       Objective   Vital Signs  Temp:  [97.2 °F (36.2 °C)-98.6 °F (37 °C)] 98.1 °F (36.7 °C)  Heart Rate:  [67-73] 70  Resp:  [18-21] 20  BP: (127-151)/() 147/94    Intake/Output Summary (Last 24 hours) at 12/21/2024 1029  Last data filed at 12/21/2024 0200  Gross per 24 hour   Intake 323 ml   Output --   Net 323 ml         Physical Exam:  Constitutional: ill appearing  HENT: Oropharynx clear and membrane moist  Eyes: Normal conjunctiva, no sclera icterus.  Neck: Supple, no carotid bruit bilaterally.  Cardiovascular: Regular rate and rhythm, No Murmur, No bilateral lower extremity edema.  Pulmonary: Normal respiratory effort, normal lung sounds, no wheezing.  Abdominal: Soft, nontender, no hepatosplenomegaly, liver is non-pulsatile.  Neurological: Confused  Skin: Warm, dry, no ecchymosis, no rash.  Psych: BAYRON      Results Review:      Results  from last 7 days   Lab Units 12/21/24  0625 12/20/24  0811 12/19/24  0923   SODIUM mmol/L 144 136 135*   POTASSIUM mmol/L 4.0 4.1 4.2   CHLORIDE mmol/L 106 106 99   CO2 mmol/L 21.0* 19.3* 20.2*   BUN mg/dL 62* 54* 43*   CREATININE mg/dL 3.19* 3.97* 3.31*   GLUCOSE mg/dL 170* 107* 132*   CALCIUM mg/dL 8.1* 7.7* 8.0*         Results from last 7 days   Lab Units 12/21/24  0625 12/20/24  0811 12/18/24  0439   WBC 10*3/mm3 8.60 6.73 5.03   HEMOGLOBIN g/dL 11.9* 11.2* 11.2*   HEMATOCRIT % 34.2* 32.7* 33.1*   PLATELETS 10*3/mm3 155 139* 144             Results from last 7 days   Lab Units 12/20/24  0811   MAGNESIUM mg/dL 2.2           I reviewed the patient's new clinical results.  I personally viewed and interpreted the patient's EKG/Telemetry data        Medication Review:   acyclovir, 10 mg/kg, Intravenous, Q24H  aspirin, 81 mg, Nasogastric, Daily  atorvastatin, 40 mg, Nasogastric, Nightly  carbidopa-levodopa, 1 tablet, Nasogastric, TID  finasteride, 5 mg, Nasogastric, Daily  heparin (porcine), 5,000 Units, Subcutaneous, Q12H  levothyroxine, 75 mcg, Nasogastric, Daily  [Held by provider] oxybutynin XL, 10 mg, Oral, Daily  sodium chloride, 10 mL, Intravenous, Q12H  terazosin, 1 mg, Nasogastric, Nightly  [Held by provider] valsartan, 40 mg, Oral, Daily  vitamin B-12, 1,000 mcg, Nasogastric, Q AM        Pharmacy to Dose acyclovir (ZOVIRAX),   sodium chloride, 75 mL/hr, Last Rate: 75 mL/hr (12/20/24 2053)        Assessment & Plan       Unable to ambulate    Benign essential hypertension    Parkinson's disease    Chronic deep vein thrombosis (DVT) of popliteal vein of right lower extremity    Uses hearing aid    Paroxysmal A-fib    Chronic diastolic (congestive) heart failure    Presence of IVC filter    Orthostatic hypotension due to Parkinson's disease    PAD (peripheral artery disease)    Secondary malignancy of lymph nodes of head, face and neck    Acquired hypothyroidism    Falls frequently    Herpes zoster without  complication    LISY (acute kidney injury)    Moderate protein-calorie malnutrition    Shingles.  On acyclovir   Altered mental status/Parkinson's disease. According to the family this is new and not his baseline.   LISY.  Per nephrology.  NSVT. Asymptomatic. Beta blocker d/c'd in the past due to bradycardia.   Hypertension.  Significantly hypertensive on admission. Now controlled.   Coronary artery disease.  Status post LAD stent in 2019. No angina.  Ischemic cardiomyopathy. GDMT limited due to kidney function. No evidence of CHF.  PAF. Maintaining SR. On low dose apixaban.  History of DVT. IVC filter in place   History of orthostatic hypotension. Likely autonomic dysfunction.     Leidy Tyson, RIDDHI  12/21/24  10:29 EST

## 2024-12-21 NOTE — PROGRESS NOTES
LOS: 4 days   Patient Care Team:  Aleksander Diez MD as PCP - General  Joseph Acharya MD as Consulting Physician (Ophthalmology)  Ford Dorado MD as Consulting Physician (Vascular Surgery)  Cecilio Armas III, MD as Consulting Physician (Cardiology)  Kashif Goodwin MD as Consulting Physician (Neurology)  Enoch Lares MD (Hematology)  Kayden Chance MD (Dermatology)  Vinod Cadena MD as Consulting Physician (Neurosurgery)  Johnny Castellanos MD as Consulting Physician (Urology)  Inge Pedro, PT as Physical Therapist (Physical Therapy)  Lucrecia Mccloud, PT as Physical Therapist (Physical Therapy)  Nereyda Toledo, OT as Occupational Therapist (Occupational Therapy)  Junior Taylor MD as Surgeon (Orthopedic Surgery)  Casey Power MD as Surgeon (Wound Care)  Nahomi Jacobo APRN as Nurse Practitioner (Nurse Practitioner)    Chief Complaint: DEE    Subjective     History of Present Illness  Casey Snowden . is a 89 y.o. male with history of Parkinson's disease and dementia.  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 With no known history of renal disease. Started on iv acyclovir.  Now with dee. Cr up to 3.3 from 0.8.  with h/o bph. Currently asleep. Grandson at bedside.       Subjective  Patient still encephalopathic  Has had some large volume bladder scans, but patient has been able to void as they have moved him around. PVR has been under 300.  DHT in place.     12/21 - underwent LP this am. No other acute events overnight. Continues to be confused. Family at bedside     History taken from: family and chart    Objective     Vital Sign Min/Max for last 24 hours  Temp  Min: 97.2 °F (36.2 °C)  Max: 98.6 °F (37 °C)   BP  Min: 127/70  Max: 169/67   Pulse  Min: 67  Max: 80   Resp  Min: 18  Max: 21   SpO2  Min: 92 %  Max: 99 %   No data recorded   Weight  Min: 66 kg (145 lb 8.1 oz)  Max: 66 kg (145 lb 8.1 oz)  "    Flowsheet Rows      Flowsheet Row First Filed Value   Admission Height --   Admission Weight 66.7 kg (147 lb 0.8 oz) Documented at 12/18/2024 0500            No intake/output data recorded.  I/O last 3 completed shifts:  In: 443 [Other:180; NG/GT:263]  Out: 700 [Urine:700]    Objective:  Vital signs: (most recent): Blood pressure 147/94, pulse 70, temperature 98.1 °F (36.7 °C), temperature source Oral, resp. rate 20, weight 66 kg (145 lb 8.1 oz), SpO2 97%.            Physical Examination: General appearance - ill appearing, frail, uncomfortable   Mental status - obtunded.   Nose - DHT in place   Chest - + coarse breath sound. [place] upper airway .  Heart - normal rate, regular rhythm, normal S1, S2, no murmurs, rubs, clicks or gallops  Abdomen - soft, nontender, nondistended, no masses or organomegaly  Neurological - encephalopathic   Extremities - no edema      Results Review:     I reviewed the patient's new clinical results.    WBC WBC   Date Value Ref Range Status   12/21/2024 8.60 3.40 - 10.80 10*3/mm3 Final   12/20/2024 6.73 3.40 - 10.80 10*3/mm3 Final      HGB Hemoglobin   Date Value Ref Range Status   12/21/2024 11.9 (L) 13.0 - 17.7 g/dL Final   12/20/2024 11.2 (L) 13.0 - 17.7 g/dL Final      HCT Hematocrit   Date Value Ref Range Status   12/21/2024 34.2 (L) 37.5 - 51.0 % Final   12/20/2024 32.7 (L) 37.5 - 51.0 % Final      Platlets No results found for: \"LABPLAT\"   MCV MCV   Date Value Ref Range Status   12/21/2024 94.2 79.0 - 97.0 fL Final   12/20/2024 93.7 79.0 - 97.0 fL Final          Sodium Sodium   Date Value Ref Range Status   12/20/2024 136 136 - 145 mmol/L Final   12/19/2024 135 (L) 136 - 145 mmol/L Final      Potassium Potassium   Date Value Ref Range Status   12/20/2024 4.1 3.5 - 5.2 mmol/L Final   12/19/2024 4.2 3.5 - 5.2 mmol/L Final      Chloride Chloride   Date Value Ref Range Status   12/20/2024 106 98 - 107 mmol/L Final   12/19/2024 99 98 - 107 mmol/L Final      CO2 CO2   Date Value Ref " "Range Status   12/20/2024 19.3 (L) 22.0 - 29.0 mmol/L Final   12/19/2024 20.2 (L) 22.0 - 29.0 mmol/L Final      BUN BUN   Date Value Ref Range Status   12/20/2024 54 (H) 8 - 23 mg/dL Final   12/19/2024 43 (H) 8 - 23 mg/dL Final      Creatinine Creatinine   Date Value Ref Range Status   12/20/2024 3.97 (H) 0.76 - 1.27 mg/dL Final   12/19/2024 3.31 (H) 0.76 - 1.27 mg/dL Final      Calcium Calcium   Date Value Ref Range Status   12/20/2024 7.7 (L) 8.2 - 9.6 mg/dL Final   12/19/2024 8.0 (L) 8.2 - 9.6 mg/dL Final      PO4 No results found for: \"CAPO4\"   Albumin No results found for: \"ALBUMIN\"     Magnesium Magnesium   Date Value Ref Range Status   12/20/2024 2.2 1.6 - 2.4 mg/dL Final   12/19/2024 2.1 1.6 - 2.4 mg/dL Final      Uric Acid No results found for: \"URICACID\"     Medication Review:     Current Facility-Administered Medications:     acetaminophen (TYLENOL) tablet 650 mg, 650 mg, Nasogastric, Q4H PRN **OR** acetaminophen (TYLENOL) 160 MG/5ML oral solution 650 mg, 650 mg, Nasogastric, Q4H PRN **OR** acetaminophen (TYLENOL) suppository 650 mg, 650 mg, Rectal, Q4H PRN, Chilo Tyson MD    acyclovir (ZOVIRAX) 700 mg in sodium chloride 0.9 % 250 mL IVPB, 10 mg/kg, Intravenous, Q24H, Delroy Reed MD, 700 mg at 12/20/24 1339    aspirin chewable tablet 81 mg, 81 mg, Nasogastric, Daily, Chilo Tyson MD    atorvastatin (LIPITOR) tablet 40 mg, 40 mg, Nasogastric, Nightly, Chilo Tyson MD, 40 mg at 12/20/24 2031    atropine 1 % ophthalmic solution 1 drop, 1 drop, Both Eyes, TID PRN, Tim, Jawed, MD    carbidopa-levodopa (SINEMET)  MG per tablet 1 tablet, 1 tablet, Nasogastric, TID, Chilo Tyson MD, 1 tablet at 12/20/24 2032    finasteride (PROSCAR) tablet 5 mg, 5 mg, Nasogastric, Daily, Chilo Tyson MD    heparin (porcine) 5000 UNIT/ML injection 5,000 Units, 5,000 Units, Subcutaneous, Q12H, Ford Salazar MD, 5,000 Units at 12/20/24 2031    HYDROcodone-acetaminophen (NORCO) 5-325 MG " per tablet 1 tablet, 1 tablet, Nasogastric, Q6H PRN, Chilo Tyson MD    levothyroxine (SYNTHROID, LEVOTHROID) tablet 75 mcg, 75 mcg, Nasogastric, Daily, Chilo Tyson MD, 75 mcg at 12/21/24 0506    ondansetron (ZOFRAN) injection 4 mg, 4 mg, Intravenous, Q6H PRN, Inessa Dumont MD    [Held by provider] oxybutynin XL (DITROPAN-XL) 24 hr tablet 10 mg, 10 mg, Oral, Daily, Inessa Dumont MD, 10 mg at 12/18/24 0901    Pharmacy to Dose acyclovir (ZOVIRAX), , Not Applicable, Continuous PRN, Delroy Reed MD    [COMPLETED] Insert Peripheral IV, , , Once **AND** sodium chloride 0.9 % flush 10 mL, 10 mL, Intravenous, PRN, Ian Dao MD    sodium chloride 0.9 % flush 10 mL, 10 mL, Intravenous, Q12H, Inessa Dumont MD, 10 mL at 12/20/24 2032    sodium chloride 0.9 % flush 10 mL, 10 mL, Intravenous, PRN, Inessa Dumont MD    sodium chloride 0.9 % infusion 40 mL, 40 mL, Intravenous, PRN, Inessa Dumont MD    sodium chloride 0.9 % infusion, 75 mL/hr, Intravenous, Continuous, Claudia Bran MD, Last Rate: 75 mL/hr at 12/20/24 2053, 75 mL/hr at 12/20/24 2053    terazosin (HYTRIN) capsule 1 mg, 1 mg, Nasogastric, Nightly, Chilo Tyson MD, 1 mg at 12/20/24 2032    [Held by provider] valsartan (DIOVAN) tablet 40 mg, 40 mg, Oral, Daily, Inessa Dumont MD, 40 mg at 12/18/24 0902    vitamin B-12 (CYANOCOBALAMIN) tablet 1,000 mcg, 1,000 mcg, Nasogastric, Q AM, Chilo Tyson MD, 1,000 mcg at 12/21/24 0506     Assessment & Plan       Unable to ambulate    Benign essential hypertension    Parkinson's disease    Chronic deep vein thrombosis (DVT) of popliteal vein of right lower extremity    Uses hearing aid    Paroxysmal A-fib    Chronic diastolic (congestive) heart failure    Presence of IVC filter    Orthostatic hypotension due to Parkinson's disease    PAD (peripheral artery disease)    Secondary malignancy of lymph nodes of head, face and neck    Acquired hypothyroidism    Falls frequently    Herpes zoster without  complication    LISY (acute kidney injury)    Moderate protein-calorie malnutrition      Assessment & Plan  LISY  Metabolic acidosis   Herpes zoster   Encephalopathy   Hyperphosphatemia   Urinary retention     LISY probably form volume depletion vs related to acyclovir (but no crystals reported on UA) +/- some urinary retention. Given there were no crystals in the urine, and urinary retention, improvement with IVF, I suspect acyclovir is less likely to be the cause of his LISY.     Continue with IVF, can use PRN loop diuretics if evidence of volume overload, but would not DC IVF while he is still on acyclovir. Discussed with ID and family yesterday and no other alternative for treatment if he has VZV encephalitis. He should remain on IVF while on acyclovir and adjust for GFR <10  Phos elevated due to LISY. No need for binders at this time. Check anay am.   Renally dose all meds for GFR <10   UA is bland, some protein, no GN suspected at this time.   Check labs in am   Will follow     Cherri Parr MD  12/21/24  07:08 EST

## 2024-12-21 NOTE — PROGRESS NOTES
Neurology Progress Note    Reason for visit  Follow up for encephalopathy    Interval History  Since last seen, patient developed LISY and LP was needed to confirm or rule out VZV encephalitis to determine duration of therapy.  LP completed today.      Medications:  Scheduled Meds:acyclovir, 10 mg/kg, Intravenous, Q24H  aspirin, 81 mg, Nasogastric, Daily  atorvastatin, 40 mg, Nasogastric, Nightly  carbidopa-levodopa, 1 tablet, Nasogastric, TID  finasteride, 5 mg, Nasogastric, Daily  guaiFENesin, 600 mg, Oral, Q12H  heparin (porcine), 5,000 Units, Subcutaneous, Q12H  levETIRAcetam, 500 mg, Intravenous, Q24H  levothyroxine, 75 mcg, Nasogastric, Daily  LORazepam, 2 mg, Intravenous, Once  [Held by provider] oxybutynin XL, 10 mg, Oral, Daily  sodium chloride, 10 mL, Intravenous, Q12H  terazosin, 1 mg, Nasogastric, Nightly  [Held by provider] valsartan, 40 mg, Oral, Daily  vitamin B-12, 1,000 mcg, Nasogastric, Q AM      Continuous Infusions:Pharmacy to Dose acyclovir (ZOVIRAX),   sodium chloride, 75 mL/hr, Last Rate: 75 mL/hr (12/21/24 1328)      PRN Meds:.  acetaminophen **OR** acetaminophen **OR** acetaminophen    atropine    HYDROcodone-acetaminophen    LORazepam    ondansetron    Pharmacy to Dose acyclovir (ZOVIRAX)    [COMPLETED] Insert Peripheral IV **AND** sodium chloride    sodium chloride    sodium chloride    Review of Systems:   Review of Systems   Unable to perform ROS: Mental status change     Vital Signs  Temp:  [97.9 °F (36.6 °C)-98.6 °F (37 °C)] 97.9 °F (36.6 °C)  Heart Rate:  [67-79] 67  Resp:  [20-21] 20  BP: (127-191)/() 164/82    Physical Exam:  Constitutional: appears ill  HEENT:  no drooling  CVS:  Regular rate and rhythm.    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    Addressed all questions from wife and daughter.     Will follow.       Claudia Madison MD  12/21/24  18:05 EST

## 2024-12-21 NOTE — SIGNIFICANT NOTE
12/21/24 1341   OTHER   Discipline physical therapist   Rehab Time/Intention   Session Not Performed other (see comments);patient unavailable for treatment  (pt off floor at this time, will follow up)   Recommendation   PT - Next Appointment 12/22/24

## 2024-12-21 NOTE — PROGRESS NOTES
Name: Casey Snowden . ADMIT: 2024   : 1934  PCP: Aleksander Diez MD    MRN: [id number] LOS: 4 days   AGE/SEX: 89 y.o. male  ROOM: [facility]     Subjective   Subjective     No events overnight. He remains very encephalopathic. His daughter, wife, and another family member were at bedside. I discussed the results of the LP with them, though the pcr studies weren't available at that time. It does look like varicella is confirmed on the LP. Creatinine is a bit better today.        Objective   Objective   Vital Signs  Temp:  [97.2 °F (36.2 °C)-98.6 °F (37 °C)] 98.1 °F (36.7 °C)  Heart Rate:  [67-73] 70  Resp:  [18-21] 20  BP: (127-151)/() 147/94  SpO2:  [92 %-98 %] 97 %  on   ;   Device (Oxygen Therapy): room air  Body mass index is 20.88 kg/m².  Physical Exam  Constitutional:       General: He is not in acute distress.     Appearance: He is ill-appearing.   Cardiovascular:      Rate and Rhythm: Normal rate and regular rhythm.      Heart sounds: Normal heart sounds.   Pulmonary:      Effort: Pulmonary effort is normal.      Breath sounds: Normal breath sounds.   Abdominal:      General: Bowel sounds are normal.      Palpations: Abdomen is soft.   Musculoskeletal:      Right lower leg: No edema.      Left lower leg: No edema.      Comments: Right leg dermatomal rash consistent with shingles   Neurological:      Mental Status: He is disoriented.      Comments: Awake, but minimally responsive         Results Review     I reviewed the patient's new clinical results.  Results from last 7 days   Lab Units 24  0625 24  0811 24  0439 24  1238   WBC 10*3/mm3 8.60 6.73 5.03 4.65   HEMOGLOBIN g/dL 11.9* 11.2* 11.2* 11.5*   PLATELETS 10*3/mm3 155 139* 144 144     Results from last 7 days   Lab Units 24  0625 24  0811 24  0923 24  0439   SODIUM mmol/L 144 136 135* 136   POTASSIUM mmol/L 4.0 4.1 4.2 3.8   CHLORIDE mmol/L 106 106 99 104   CO2 mmol/L 21.0*  19.3* 20.2* 23.0   BUN mg/dL 62* 54* 43* 20   CREATININE mg/dL 3.19* 3.97* 3.31* 0.89   GLUCOSE mg/dL 170* 107* 132* 109*   Estimated Creatinine Clearance: 14.4 mL/min (A) (by C-G formula based on SCr of 3.19 mg/dL (H)).  Results from last 7 days   Lab Units 12/18/24  0439 12/17/24  1238   ALBUMIN g/dL 3.5 3.5   BILIRUBIN mg/dL 0.6 0.6   ALK PHOS U/L 74 80   AST (SGOT) U/L 9 7   ALT (SGPT) U/L <5 7     Results from last 7 days   Lab Units 12/21/24  0625 12/20/24  0811 12/19/24  0923 12/18/24  0439 12/17/24  1238   CALCIUM mg/dL 8.1* 7.7* 8.0* 8.1* 8.1*   ALBUMIN g/dL  --   --   --  3.5 3.5   MAGNESIUM mg/dL  --  2.2 2.1  --  2.0   PHOSPHORUS mg/dL  --  6.6*  --   --   --        COVID19   Date Value Ref Range Status   10/23/2022 Not Detected Not Detected - Ref. Range Final     SARS-CoV-2, MARCELO   Date Value Ref Range Status   07/20/2020 Not Detected Not Detected Final     Comment:     This test was developed and its performance characteristics determined  by [employer]. This test has not been FDA cleared or  approved. This test has been authorized by FDA under an Emergency Use  Authorization (EUA). This test is only authorized for the duration of  time the declaration that circumstances exist justifying the  authorization of the emergency use of in vitro diagnostic tests for  detection of SARS-CoV-2 virus and/or diagnosis of COVID-19 infection  under section 564(b)(1) of the Act, 21 U.S.C. 360bbb-3(b)(1), unless  the authorization is terminated or revoked sooner.  When diagnostic testing is negative, the possibility of a false  negative result should be considered in the context of a patient's  recent exposures and the presence of clinical signs and symptoms  consistent with COVID-19. An individual without symptoms of COVID-19  and who is not shedding SARS-CoV-2 virus would expect to have a  negative (not detected) result in this assay.     Glucose   Date/Time Value Ref Range Status   12/21/2024 1101 166 (H)  70 - 130 mg/dL Final   12/21/2024 0619 149 (H) 70 - 130 mg/dL Final   12/21/2024 0012 151 (H) 70 - 130 mg/dL Final   12/20/2024 1832 118 70 - 130 mg/dL Final       IR LUMBAR PUNCTURE DIAGNOSTIC  LUMBAR PUNCTURE FOR DIAGNOSIS UNDER FLUOROSCOPIC GUIDANCE     HISTORY: 89-year-old male with shingles. Severe confusion.     Following sterile prep and local anesthetic, a 20-gauge needle was  inserted into the lumbar subarachnoid space at L2-3 by Dr. Lange. 10  cc of clear colorless CSF was obtained and sent for studies as  requested. The patient tolerated the procedure well and there were no  immediate complications.     1 image was obtained and the fluoroscopy time measures 6 seconds. The  dose Kerma measures 3 mGy.     This report was finalized on 12/21/2024 9:02 AM by Dr. Feliciano Lange M.D on Workstation: [id number]       Scheduled Medications  acyclovir, 10 mg/kg, Intravenous, Q24H  aspirin, 81 mg, Nasogastric, Daily  atorvastatin, 40 mg, Nasogastric, Nightly  carbidopa-levodopa, 1 tablet, Nasogastric, TID  finasteride, 5 mg, Nasogastric, Daily  heparin (porcine), 5,000 Units, Subcutaneous, Q12H  levothyroxine, 75 mcg, Nasogastric, Daily  [Held by provider] oxybutynin XL, 10 mg, Oral, Daily  sodium chloride, 10 mL, Intravenous, Q12H  terazosin, 1 mg, Nasogastric, Nightly  [Held by provider] valsartan, 40 mg, Oral, Daily  vitamin B-12, 1,000 mcg, Nasogastric, Q AM    Infusions  Pharmacy to Dose acyclovir (ZOVIRAX),   sodium chloride, 75 mL/hr, Last Rate: 75 mL/hr (12/20/24 2053)    Diet  NPO Diet NPO Type: Tube Feeding       Assessment/Plan     Active Hospital Problems    Diagnosis  POA    **Unable to ambulate [R26.2]  Yes    Moderate protein-calorie malnutrition [E44.0]  Yes    LISY (acute kidney injury) [N17.9]  Yes    Herpes zoster without complication [B02.9]  Unknown    Falls frequently [R29.6]  Not Applicable    Acquired hypothyroidism [E03.9]  Yes    Secondary malignancy of lymph nodes of head, 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       89 y.o. male admitted with Unable to ambulate.    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 Duration per ID. Will check an EEG. Monitor for seizure activity.  LISY-there may be a component of retention. He did get straight catheterized on 12/20024, but pvr this later in the afternoon was only 220. The IV acyclovir may be contributing, but there were no crystals on urinalysis. Unfortunately with the varicella encephalitis, he'll need to remain on IV acyclovir. He's on IVF per nephrology as well. Continue to monitor periodic PVRs. Creatinine is improved to 3.2 from 4 yesterday.  Metabolic encephalopathy-I think this is predominantly related to zoster encephalitis, though the LISY could be contributory.  Dysphagia-predominately due to his encephalopathy. A cortrak is in place and tube feeds are being administered   Non-sustained ventricular tachycardia-bb had been discontinued in the past due to bradycardia. Cardiology recommends trying to keep electrolytes normal  Coronary artery disease s/p LAD stent in 2019-asa/statin  Chronic systolic heart failure-euvolemic. Not on diuretics at baseline.  Paroxysmal afib-eliquis held for LP. Not on rate control chronically. Will consider restarting eliquis tomorrow  Hypertension-valsartan on hold for LISY  Hyperlipidemia-statin  BPH-a1  blocker  Hypothyroidism-synthroid.  History of DVT and IVC filter  Parkinson's dementia  History of stroke   History of melanoma s/p radiation and chemotherapy 3 years ago  Heparin SC for DVT prophylaxis.  DNR.  Discussed with family, nursing staff, and consulting provider.  Anticipate discharge home with home health timing yet to be determined.  Viral encephalitis has a ok prognosis with about a 10% mortality rate, but about 15% of survivors have permanent neurologic deficits. With his history of parkinson's dementia and stroke history and age, I did ask that palliative care evaluate the patient    Addendum: called by RN, just after eeg was completed, the patient starting having a generalized tremor and a staring spell concerning for a seizure. I've ordered prn iv ativan and really adjusted keppra. I've asked the RN to alert neurology.    Chilo Tyson MD  [facility]  12/21/24  12:08 EST

## 2024-12-21 NOTE — PROGRESS NOTES
LOS: 4 days     Chief Complaint: Concern for VZV encephalitis    Interval History: Afebrile, per family at bedside patient seems to have short episodes when he seems to be more responsive.  No diarrhea rash stable    Vital Signs  Temp:  [97.2 °F (36.2 °C)-98.6 °F (37 °C)] 98.1 °F (36.7 °C)  Heart Rate:  [67-73] 70  Resp:  [18-21] 20  BP: (127-151)/() 147/94    Physical Exam:  General: Ill-appearing  Cardiovascular: RRR, no lower extremity edema  Respiratory: Bibasilar crackles  GI: Soft, NT/ND,   Skin: Rash on right lower extremity    Antibiotics:  Acyclovir 10 mg/kg IV every 24 hours     Results Review:    Lab Results   Component Value Date    WBC 8.60 12/21/2024    HGB 11.9 (L) 12/21/2024    HCT 34.2 (L) 12/21/2024    MCV 94.2 12/21/2024     12/21/2024     Lab Results   Component Value Date    GLUCOSE 170 (H) 12/21/2024    BUN 62 (H) 12/21/2024    CREATININE 3.19 (H) 12/21/2024    EGFRIFNONA 99 06/05/2019    EGFRIFAFRI >60 12/27/2022    BCR 19.4 12/21/2024    CO2 21.0 (L) 12/21/2024    CALCIUM 8.1 (L) 12/21/2024    PROTENTOTREF 6.2 10/03/2023    ALBUMIN 3.5 12/18/2024    LABIL2 2.3 10/03/2023    AST 9 12/18/2024    ALT <5 12/18/2024     CSF with 152 nucleated cells 84% lymphocytes 2 neutrophils glucose 85 protein 67    Microbiology:  12/21 CSF cx P  Meningitis encephalitis panel positive for VZV    Assessment & Plan   VZV encephalopathy  Parkinson's disease  Varicella infection  Acute kidney injury    CSF consistent with viral meningitis.  Meningitis encephalitis panel positive for VZV.  Continue acyclovir 10 mg/kg IV every 24 hours renally dosed while carefully monitoring renal function.  Anticipate a 14-day course of IV therapy.    We will review the patient's chart tomorrow and plan to see again on Monday.  Please call with questions or concern

## 2024-12-22 ENCOUNTER — APPOINTMENT (OUTPATIENT)
Dept: GENERAL RADIOLOGY | Facility: HOSPITAL | Age: 89
DRG: 073 | End: 2024-12-22
Payer: MEDICARE

## 2024-12-22 LAB
ANION GAP SERPL CALCULATED.3IONS-SCNC: 16.5 MMOL/L (ref 5–15)
BUN SERPL-MCNC: 53 MG/DL (ref 8–23)
BUN/CREAT SERPL: 28.3 (ref 7–25)
CALCIUM SPEC-SCNC: 8.2 MG/DL (ref 8.2–9.6)
CHLORIDE SERPL-SCNC: 109 MMOL/L (ref 98–107)
CHOLEST SERPL-MCNC: 102 MG/DL (ref 0–200)
CO2 SERPL-SCNC: 22.5 MMOL/L (ref 22–29)
CREAT SERPL-MCNC: 1.87 MG/DL (ref 0.76–1.27)
DEPRECATED RDW RBC AUTO: 49.6 FL (ref 37–54)
EGFRCR SERPLBLD CKD-EPI 2021: 33.7 ML/MIN/1.73
ERYTHROCYTE [DISTWIDTH] IN BLOOD BY AUTOMATED COUNT: 14.5 % (ref 12.3–15.4)
GLUCOSE BLDC GLUCOMTR-MCNC: 138 MG/DL (ref 70–130)
GLUCOSE BLDC GLUCOMTR-MCNC: 160 MG/DL (ref 70–130)
GLUCOSE BLDC GLUCOMTR-MCNC: 173 MG/DL (ref 70–130)
GLUCOSE BLDC GLUCOMTR-MCNC: 220 MG/DL (ref 70–130)
GLUCOSE BLDC GLUCOMTR-MCNC: 228 MG/DL (ref 70–130)
GLUCOSE SERPL-MCNC: 167 MG/DL (ref 65–99)
HCT VFR BLD AUTO: 31.4 % (ref 37.5–51)
HDLC SERPL-MCNC: 50 MG/DL (ref 40–60)
HGB BLD-MCNC: 11 G/DL (ref 13–17.7)
LDLC SERPL CALC-MCNC: 39 MG/DL (ref 0–100)
LDLC/HDLC SERPL: 0.83 {RATIO}
MCH RBC QN AUTO: 32.5 PG (ref 26.6–33)
MCHC RBC AUTO-ENTMCNC: 35 G/DL (ref 31.5–35.7)
MCV RBC AUTO: 92.9 FL (ref 79–97)
PLATELET # BLD AUTO: 158 10*3/MM3 (ref 140–450)
PMV BLD AUTO: 9.5 FL (ref 6–12)
POTASSIUM SERPL-SCNC: 3.8 MMOL/L (ref 3.5–5.2)
RBC # BLD AUTO: 3.38 10*6/MM3 (ref 4.14–5.8)
SODIUM SERPL-SCNC: 148 MMOL/L (ref 136–145)
TRIGL SERPL-MCNC: 52 MG/DL (ref 0–150)
VLDLC SERPL-MCNC: 13 MG/DL (ref 5–40)
WBC NRBC COR # BLD AUTO: 8.28 10*3/MM3 (ref 3.4–10.8)

## 2024-12-22 PROCEDURE — 25810000003 SODIUM CHLORIDE 0.9 % SOLUTION 250 ML FLEX CONT: Performed by: INTERNAL MEDICINE

## 2024-12-22 PROCEDURE — 99232 SBSQ HOSP IP/OBS MODERATE 35: CPT | Performed by: NURSE PRACTITIONER

## 2024-12-22 PROCEDURE — 80061 LIPID PANEL: CPT | Performed by: INTERNAL MEDICINE

## 2024-12-22 PROCEDURE — 85027 COMPLETE CBC AUTOMATED: CPT | Performed by: STUDENT IN AN ORGANIZED HEALTH CARE EDUCATION/TRAINING PROGRAM

## 2024-12-22 PROCEDURE — 82948 REAGENT STRIP/BLOOD GLUCOSE: CPT

## 2024-12-22 PROCEDURE — 99232 SBSQ HOSP IP/OBS MODERATE 35: CPT | Performed by: INTERNAL MEDICINE

## 2024-12-22 PROCEDURE — 80048 BASIC METABOLIC PNL TOTAL CA: CPT | Performed by: INTERNAL MEDICINE

## 2024-12-22 PROCEDURE — 99231 SBSQ HOSP IP/OBS SF/LOW 25: CPT | Performed by: PSYCHIATRY & NEUROLOGY

## 2024-12-22 PROCEDURE — 25010000002 HEPARIN (PORCINE) PER 1000 UNITS: Performed by: HOSPITALIST

## 2024-12-22 PROCEDURE — 71045 X-RAY EXAM CHEST 1 VIEW: CPT

## 2024-12-22 PROCEDURE — 63710000001 INSULIN REGULAR HUMAN PER 5 UNITS: Performed by: STUDENT IN AN ORGANIZED HEALTH CARE EDUCATION/TRAINING PROGRAM

## 2024-12-22 PROCEDURE — 25010000002 ACYCLOVIR PER 5 MG: Performed by: INTERNAL MEDICINE

## 2024-12-22 PROCEDURE — 25810000003 SODIUM CHLORIDE 0.9 % SOLUTION: Performed by: INTERNAL MEDICINE

## 2024-12-22 RX ORDER — IBUPROFEN 600 MG/1
1 TABLET ORAL
Status: DISCONTINUED | OUTPATIENT
Start: 2024-12-22 | End: 2025-01-09 | Stop reason: HOSPADM

## 2024-12-22 RX ORDER — DEXTROSE MONOHYDRATE 25 G/50ML
25 INJECTION, SOLUTION INTRAVENOUS
Status: DISCONTINUED | OUTPATIENT
Start: 2024-12-22 | End: 2025-01-09 | Stop reason: HOSPADM

## 2024-12-22 RX ORDER — AMLODIPINE BESYLATE 5 MG/1
5 TABLET ORAL DAILY
Status: DISCONTINUED | OUTPATIENT
Start: 2024-12-22 | End: 2024-12-30

## 2024-12-22 RX ORDER — NICOTINE POLACRILEX 4 MG
15 LOZENGE BUCCAL
Status: DISCONTINUED | OUTPATIENT
Start: 2024-12-22 | End: 2025-01-09 | Stop reason: HOSPADM

## 2024-12-22 RX ADMIN — GUAIFENESIN 600 MG: 600 TABLET, MULTILAYER, EXTENDED RELEASE ORAL at 09:51

## 2024-12-22 RX ADMIN — SODIUM CHLORIDE 75 ML/HR: 9 INJECTION, SOLUTION INTRAVENOUS at 01:48

## 2024-12-22 RX ADMIN — INSULIN HUMAN 3 UNITS: 100 INJECTION, SOLUTION PARENTERAL at 14:40

## 2024-12-22 RX ADMIN — AMLODIPINE BESYLATE 5 MG: 5 TABLET ORAL at 14:39

## 2024-12-22 RX ADMIN — ACYCLOVIR SODIUM 700 MG: 50 INJECTION, SOLUTION INTRAVENOUS at 09:50

## 2024-12-22 RX ADMIN — FINASTERIDE 5 MG: 5 TABLET, FILM COATED ORAL at 09:51

## 2024-12-22 RX ADMIN — CARBIDOPA AND LEVODOPA 1 TABLET: 25; 250 TABLET ORAL at 14:39

## 2024-12-22 RX ADMIN — APIXABAN 2.5 MG: 2.5 TABLET, FILM COATED ORAL at 14:39

## 2024-12-22 RX ADMIN — Medication 10 ML: at 21:24

## 2024-12-22 RX ADMIN — HEPARIN SODIUM 5000 UNITS: 5000 INJECTION INTRAVENOUS; SUBCUTANEOUS at 09:50

## 2024-12-22 RX ADMIN — Medication 1000 MCG: at 06:16

## 2024-12-22 RX ADMIN — TERAZOSIN HYDROCHLORIDE 1 MG: 1 CAPSULE ORAL at 21:23

## 2024-12-22 RX ADMIN — INSULIN HUMAN 2 UNITS: 100 INJECTION, SOLUTION PARENTERAL at 17:09

## 2024-12-22 RX ADMIN — CARBIDOPA AND LEVODOPA 1 TABLET: 25; 250 TABLET ORAL at 21:23

## 2024-12-22 RX ADMIN — ATORVASTATIN CALCIUM 40 MG: 20 TABLET, FILM COATED ORAL at 21:23

## 2024-12-22 RX ADMIN — SODIUM CHLORIDE 75 ML/HR: 9 INJECTION, SOLUTION INTRAVENOUS at 14:39

## 2024-12-22 RX ADMIN — LEVOTHYROXINE SODIUM 75 MCG: 75 TABLET ORAL at 06:16

## 2024-12-22 RX ADMIN — Medication 10 ML: at 09:50

## 2024-12-22 RX ADMIN — CARBIDOPA AND LEVODOPA 1 TABLET: 25; 250 TABLET ORAL at 09:51

## 2024-12-22 RX ADMIN — GUAIFENESIN 600 MG: 600 TABLET, MULTILAYER, EXTENDED RELEASE ORAL at 21:24

## 2024-12-22 RX ADMIN — ASPIRIN 81 MG: 81 TABLET, CHEWABLE ORAL at 09:51

## 2024-12-22 RX ADMIN — APIXABAN 2.5 MG: 2.5 TABLET, FILM COATED ORAL at 21:23

## 2024-12-22 NOTE — PROGRESS NOTES
LOS: 5 days   Patient Care Team:  Aleksander Diez MD as PCP - General  Marck, Joseph Youngblood MD as Consulting Physician (Ophthalmology)  Ford Dorado MD as Consulting Physician (Vascular Surgery)  Cecilio Armas III, MD as Consulting Physician (Cardiology)  Kashif Goodwin MD as Consulting Physician (Neurology)  Enoch Lares MD (Hematology)  Kayden Chance MD (Dermatology)  Vinod Cadena MD as Consulting Physician (Neurosurgery)  Johnny Castellanos MD as Consulting Physician (Urology)  Inge Pedro, PT as Physical Therapist (Physical Therapy)  Lucrecia Mccloud, PT as Physical Therapist (Physical Therapy)  Nereyda Toledo, OT as Occupational Therapist (Occupational Therapy)  Junior Taylor MD as Surgeon (Orthopedic Surgery)  Casey Power MD as Surgeon (Wound Care)  Nahomi Jacobo APRN as Nurse Practitioner (Nurse Practitioner)      Chief Complaint: Follow-up NSVT, CAD, PAF, cardiomyopathy       Interval History: Discussed with family at the bedside. Patient is sleeping. They are concerned about his blood pressure.       Objective   Vital Signs  Temp:  [97.9 °F (36.6 °C)-100 °F (37.8 °C)] 100 °F (37.8 °C)  Heart Rate:  [67-79] 72  Resp:  [20] 20  BP: (122-205)/() 153/60    Intake/Output Summary (Last 24 hours) at 12/22/2024 1326  Last data filed at 12/22/2024 0200  Gross per 24 hour   Intake 825 ml   Output --   Net 825 ml         Physical Exam:  Constitutional: ill appearing  HENT: Oropharynx clear and membrane moist  Eyes: Normal conjunctiva, no sclera icterus.  Neck: Supple, no carotid bruit bilaterally.  Cardiovascular: Regular rate and rhythm, No Murmur, No bilateral lower extremity edema.  Pulmonary: Normal respiratory effort, normal lung sounds, no wheezing.  Abdominal: Soft, nontender, no hepatosplenomegaly, liver is non-pulsatile.  Neurological: Confused  Skin: Warm, dry, no ecchymosis, no rash.  Psych: BAYRON      Results Review:      Results from last 7 days    Lab Units 12/22/24  0635 12/21/24  0625 12/20/24  0811   SODIUM mmol/L 148* 144 136   POTASSIUM mmol/L 3.8 4.0 4.1   CHLORIDE mmol/L 109* 106 106   CO2 mmol/L 22.5 21.0* 19.3*   BUN mg/dL 53* 62* 54*   CREATININE mg/dL 1.87* 3.19* 3.97*   GLUCOSE mg/dL 167* 170* 107*   CALCIUM mg/dL 8.2 8.1* 7.7*         Results from last 7 days   Lab Units 12/22/24  0635 12/21/24  0625 12/20/24  0811   WBC 10*3/mm3 8.28 8.60 6.73   HEMOGLOBIN g/dL 11.0* 11.9* 11.2*   HEMATOCRIT % 31.4* 34.2* 32.7*   PLATELETS 10*3/mm3 158 155 139*             Results from last 7 days   Lab Units 12/20/24  0811   MAGNESIUM mg/dL 2.2           I reviewed the patient's new clinical results.  I personally viewed and interpreted the patient's EKG/Telemetry data        Medication Review:   acyclovir, 10 mg/kg, Intravenous, Q24H  amLODIPine, 5 mg, Oral, Daily  apixaban, 2.5 mg, Oral, Q12H  aspirin, 81 mg, Nasogastric, Daily  atorvastatin, 40 mg, Nasogastric, Nightly  carbidopa-levodopa, 1 tablet, Nasogastric, TID  finasteride, 5 mg, Nasogastric, Daily  guaiFENesin, 600 mg, Oral, Q12H  insulin regular, 2-7 Units, Subcutaneous, Q6H  levothyroxine, 75 mcg, Nasogastric, Daily  [Held by provider] oxybutynin XL, 10 mg, Oral, Daily  sodium chloride, 10 mL, Intravenous, Q12H  terazosin, 1 mg, Nasogastric, Nightly  [Held by provider] valsartan, 40 mg, Oral, Daily  vitamin B-12, 1,000 mcg, Nasogastric, Q AM        Pharmacy to Dose acyclovir (ZOVIRAX),   sodium chloride, 75 mL/hr, Last Rate: 75 mL/hr (12/22/24 0148)        Assessment & Plan       Unable to ambulate    Benign essential hypertension    Parkinson's disease    Chronic deep vein thrombosis (DVT) of popliteal vein of right lower extremity    Uses hearing aid    Paroxysmal A-fib    Chronic diastolic (congestive) heart failure    Presence of IVC filter    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   LP confirmed VZV meningoencephalitis. On acyclovir   Altered mental status/metabolic encephalopathy. Secondary to #1.  LISY.  Per nephrology. They suspect due to volume depletion and retention. Remains on IVF.   NSVT. Asymptomatic. Beta blocker d/c'd in the past due to bradycardia.   Hypertension.  Significantly hypertensive on admission. It had improved, but was significantly elevated again overnight. Valsartan is on hold secondary to #3. Start amlodipine.   Coronary artery disease.  Status post LAD stent in 2019. No angina.  Ischemic cardiomyopathy. GDMT limited due to kidney function. He has been on IVFs and sounds a little wet on exam. CXR ordered. Per nephology, may use loop diuretics if needed.   PAF. Maintaining SR. On low dose apixaban.  History of DVT. IVC filter in place   History of orthostatic hypotension. Likely autonomic dysfunction.       Leidy Tyson, RIDDHI  12/22/24  13:26 EST

## 2024-12-22 NOTE — PROGRESS NOTES
LOS: 5 days     Chief Complaint: Concern for VZV encephalitis    Interval History: Afebrile, not doing as well per the family.  Not really waking up.  Rash on legs stable.  No new rashes no diarrhea    Vital Signs  Temp:  [97.9 °F (36.6 °C)-100 °F (37.8 °C)] 100 °F (37.8 °C)  Heart Rate:  [67-79] 72  Resp:  [20] 20  BP: (122-205)/() 153/60    Physical Exam:  General: Ill-appearing  Cardiovascular: RRR, no lower extremity edema  Respiratory: Bibasilar crackles  GI: Soft, NT/ND,   Skin: Rash on right lower extremity    Antibiotics:  Acyclovir 10 mg/kg IV every 24 hours     Results Review:    Lab Results   Component Value Date    WBC 8.28 12/22/2024    HGB 11.0 (L) 12/22/2024    HCT 31.4 (L) 12/22/2024    MCV 92.9 12/22/2024     12/22/2024     Lab Results   Component Value Date    GLUCOSE 167 (H) 12/22/2024    BUN 53 (H) 12/22/2024    CREATININE 1.87 (H) 12/22/2024    EGFRIFNONA 99 06/05/2019    EGFRIFAFRI >60 12/27/2022    BCR 28.3 (H) 12/22/2024    CO2 22.5 12/22/2024    CALCIUM 8.2 12/22/2024    PROTENTOTREF 6.2 10/03/2023    ALBUMIN 3.5 12/18/2024    LABIL2 2.3 10/03/2023    AST 9 12/18/2024    ALT <5 12/18/2024     CSF with 152 nucleated cells 84% lymphocytes 2 neutrophils glucose 85 protein 67    Microbiology:  12/21 CSF cx P  Meningitis encephalitis panel positive for VZV    Assessment & Plan   VZV encephalopathy  Parkinson's disease  Varicella infection  Acute kidney injury  Hypernatremia     Continue acyclovir 10 mg/kg IV every 24 hours renally dosed.  Creatinine is improving.  As creatinine continues to improve we will need to readjust desciclovir dosing.  Fever curve trending up.  Continue to monitor

## 2024-12-22 NOTE — CODE DOCUMENTATION
Patient Name:  Casey Snowden .  YOB: 1934  MRN:  [id number]  Admit Date:  12/17/2024    Visit Diagnoses:     ICD-10-CM ICD-9-CM   1. Unable to ambulate  R26.2 719.7   2. Herpes zoster with complication: S2 and S3 distribution on the right  B02.8 053.8   3. Parkinson's disease, unspecified whether dyskinesia present, unspecified whether manifestations fluctuate  G20.A1 332.0       Reason For Rapid:   altered mental status    RN Communicated With:   primary RN updated Dr. Tyson; Nereyda RN spoke w/ Neuro MD Madison      Rapid Outcome:   stay on 5N    Communication From Rapid Team:    RRT called for altered mental status, pt non-verbal and staff were questioning seizure d/t jerking movements in extremities. Pt able to respond to pain and has no deviation in vital signs. To our assessment, pt appears to be having encephalopathic jerking which is consistent w/ previously documented diagnosis. Conferred w/ Dr. Madison who states she saw jerking during his assessment and didn't feel it was seizure activity. Keep on 5N for now.       Most Recent Vital Signs  Temp:  [97.9 °F (36.6 °C)-100 °F (37.8 °C)] 100 °F (37.8 °C)  Heart Rate:  [67-79] 72  Resp:  [20] 20  BP: (122-205)/() 153/60  SpO2:  [90 %-100 %] 95 %  on   ;   Device (Oxygen Therapy): room air    Labs:      Glucose   Date/Time Value Ref Range Status   12/22/2024 0617 138 (H) 70 - 130 mg/dL Final   12/22/2024 0035 228 (H) 70 - 130 mg/dL Final   12/21/2024 2103 164 (H) 70 - 130 mg/dL Final   12/21/2024 1615 176 (H) 70 - 130 mg/dL Final   12/21/2024 1101 166 (H) 70 - 130 mg/dL Final   12/21/2024 0619 149 (H) 70 - 130 mg/dL Final   12/21/2024 0012 151 (H) 70 - 130 mg/dL Final     Site   Date Value Ref Range Status   12/21/2024 Right Brachial  Final     Gutierrez's Test   Date Value Ref Range Status   12/21/2024 N/A  Final     pH, Arterial   Date Value Ref Range Status   12/21/2024 7.411 7.350 - 7.450 pH units Final     pCO2, Arterial   Date Value Ref  Range Status   12/21/2024 36.8 35.0 - 45.0 mm Hg Final     pO2, Arterial   Date Value Ref Range Status   12/21/2024 72.7 (L) 80.0 - 100.0 mm Hg Final     HCO3, Arterial   Date Value Ref Range Status   12/21/2024 23.4 22.0 - 28.0 mmol/L Final     Base Excess, Arterial   Date Value Ref Range Status   12/21/2024 -0.9 (L) 0.0 - 2.0 mmol/L Final     Comment:     Serial Number: [id number]:  [id number]     O2 Saturation, Arterial   Date Value Ref Range Status   12/21/2024 94.7 92.0 - 98.5 % Final     CO2 Content   Date Value Ref Range Status   12/21/2024 24.5 23 - 27 mmol/L Final     Barometric Pressure for Blood Gas   Date Value Ref Range Status   12/21/2024 755.9000 mmHg Final     Modality   Date Value Ref Range Status   12/21/2024 Room Air  Final     Results from last 7 days   Lab Units 12/22/24  0635 12/21/24  0625 12/20/24  0811 12/18/24  0439   WBC 10*3/mm3 8.28 8.60 6.73 5.03   HEMOGLOBIN g/dL 11.0* 11.9* 11.2* 11.2*   PLATELETS 10*3/mm3 158 155 139* 144     Results from last 7 days   Lab Units 12/22/24  0635 12/21/24  0625 12/20/24  0811 12/19/24  0923 12/18/24  0439 12/17/24  1238   SODIUM mmol/L 148* 144 136   < > 136 137   POTASSIUM mmol/L 3.8 4.0 4.1   < > 3.8 3.7   CHLORIDE mmol/L 109* 106 106   < > 104 107   CO2 mmol/L 22.5 21.0* 19.3*   < > 23.0 25.2   BUN mg/dL 53* 62* 54*   < > 20 16   CREATININE mg/dL 1.87* 3.19* 3.97*   < > 0.89 0.62*   GLUCOSE mg/dL 167* 170* 107*   < > 109* 112*   ALBUMIN g/dL  --   --   --   --  3.5 3.5   BILIRUBIN mg/dL  --   --   --   --  0.6 0.6   ALK PHOS U/L  --   --   --   --  74 80   AST (SGOT) U/L  --   --   --   --  9 7   ALT (SGPT) U/L  --   --   --   --  <5 7    < > = values in this interval not displayed.   Estimated Creatinine Clearance: 25.5 mL/min (A) (by C-G formula based on SCr of 1.87 mg/dL (H)).          Results from last 7 days   Lab Units 12/21/24  1649   PH, ARTERIAL pH units 7.411   PO2 ART mm Hg 72.7*   PCO2, ARTERIAL mm Hg 36.8   HCO3 ART mmol/L 23.4   O2  "SATURATION ART % 94.7   MODALITY  Room Air   No results found for: \"STREPPNEUAG\", \"LEGANTIGENUR\"        NIH Stroke Scale:   n/a                                                         Please refer to full rapid documentation on summary page under Index / Code Timeline  "

## 2024-12-22 NOTE — PLAN OF CARE
Problem: Adult Inpatient Plan of Care  Goal: Patient-Specific Goal (Individualized)  Outcome: Not Progressing  Goal: Absence of Hospital-Acquired Illness or Injury  Outcome: Not Progressing  Intervention: Identify and Manage Fall Risk  Recent Flowsheet Documentation  Taken 12/22/2024 0200 by Melly Rutledge RN  Safety Promotion/Fall Prevention:   clutter free environment maintained   fall prevention program maintained   lighting adjusted   room organization consistent   safety round/check completed  Intervention: Prevent Skin Injury  Recent Flowsheet Documentation  Taken 12/22/2024 0200 by Melly Rutledge RN  Body Position:   tilted   left  Skin Protection: incontinence pads utilized  Intervention: Prevent Infection  Recent Flowsheet Documentation  Taken 12/22/2024 0200 by Melly Rutledge RN  Infection Prevention:   environmental surveillance performed   hand hygiene promoted   rest/sleep promoted   single patient room provided   equipment surfaces disinfected  Goal: Optimal Comfort and Wellbeing  Outcome: Not Progressing  Intervention: Provide Person-Centered Care  Recent Flowsheet Documentation  Taken 12/22/2024 0200 by Melly Rultedge RN  Trust Relationship/Rapport:   care explained   emotional support provided   reassurance provided   thoughts/feelings acknowledged  Goal: Readiness for Transition of Care  Outcome: Not Progressing     Problem: Fall Injury Risk  Goal: Absence of Fall and Fall-Related Injury  Outcome: Not Progressing  Intervention: Identify and Manage Contributors  Recent Flowsheet Documentation  Taken 12/22/2024 0200 by Melly Rutledge RN  Medication Review/Management: medications reviewed  Intervention: Promote Injury-Free Environment  Recent Flowsheet Documentation  Taken 12/22/2024 0200 by Melly Rutledge RN  Safety Promotion/Fall Prevention:   clutter free environment maintained   fall prevention program maintained   lighting adjusted   room organization consistent   safety round/check completed      Problem: Pain Acute  Goal: Optimal Pain Control and Function  Outcome: Not Progressing  Intervention: Optimize Psychosocial Wellbeing  Recent Flowsheet Documentation  Taken 12/22/2024 0200 by Melly Rutledge RN  Supportive Measures: (family member at bedside) active listening utilized  Intervention: Prevent or Manage Pain  Recent Flowsheet Documentation  Taken 12/22/2024 0200 by Melly Rutledge RN  Medication Review/Management: medications reviewed     Problem: Enteral Nutrition  Goal: Absence of Aspiration Signs and Symptoms  Outcome: Not Progressing  Intervention: Minimize Aspiration Risk  Recent Flowsheet Documentation  Taken 12/22/2024 0200 by Melly Rutledge RN  Head of Bed (HOB) Positioning: HOB at 30-45 degrees  Goal: Safe, Effective Therapy Delivery  Outcome: Not Progressing  Goal: Feeding Tolerance  Outcome: Not Progressing   Goal Outcome Evaluation:  Plan of Care Reviewed With: patient, spouse        Progress: no change  Outcome Evaluation: pt does not respond or follow commands. Family member ( spouse) at the bedside. TF at goal 60 ml/hr.

## 2024-12-22 NOTE — PROGRESS NOTES
LOS: 5 days   Patient Care Team:  Aleksander Diez MD as PCP - General  Joseph Acharya MD as Consulting Physician (Ophthalmology)  Ford Dorado MD as Consulting Physician (Vascular Surgery)  Cecilio Armas III, MD as Consulting Physician (Cardiology)  Kashif Goodwin MD as Consulting Physician (Neurology)  Enoch Lares MD (Hematology)  Kayden Chance MD (Dermatology)  Vinod Cadena MD as Consulting Physician (Neurosurgery)  Johnny Castellanos MD as Consulting Physician (Urology)  Inge Pedro, PT as Physical Therapist (Physical Therapy)  Lucrecia Mccloud, PT as Physical Therapist (Physical Therapy)  Nereyda Toledo, OT as Occupational Therapist (Occupational Therapy)  Junior Taylor MD as Surgeon (Orthopedic Surgery)  Casey Power MD as Surgeon (Wound Care)  Nahomi Jacobo APRN as Nurse Practitioner (Nurse Practitioner)    Chief Complaint: DEE    Subjective     History of Present Illness  Casey Snowden . is a 89 y.o. male with history of Parkinson's disease and dementia.  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 With no known history of renal disease. Started on iv acyclovir.  Now with dee. Cr up to 3.3 from 0.8.  with h/o bph. Currently asleep. Grandson at bedside.       Subjective  Patient still encephalopathic  Has had some large volume bladder scans, but patient has been able to void as they have moved him around. PVR has been under 300.  DHT in place.     12/21 - underwent LP this am. No other acute events overnight. Continues to be confused. 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     History taken from: family and chart    Objective     Vital Sign Min/Max for last 24 hours  Temp  Min: 97.9 °F (36.6 °C)  Max: 99.9 °F (37.7 °C)   BP  Min: 122/102  Max: 205/63   Pulse  Min: 67  Max: 79   Resp  Min: 20  Max: 20   SpO2  Min: 90 %  Max: 100 %   No data recorded   Weight  Min: 68.7 kg  "(151 lb 7.3 oz)  Max: 68.7 kg (151 lb 7.3 oz)     Flowsheet Rows      Flowsheet Row First Filed Value   Admission Height --   Admission Weight 66.7 kg (147 lb 0.8 oz) Documented at 12/18/2024 0500            I/O this shift:  In: 825 [Other:60; NG/GT:765]  Out: -   I/O last 3 completed shifts:  In: 443 [Other:180; NG/GT:263]  Out: -     Objective:  Vital signs: (most recent): Blood pressure 153/60, pulse 72, temperature 100 °F (37.8 °C), temperature source Oral, resp. rate 20, height 177.8 cm (70\"), weight 68.7 kg (151 lb 7.3 oz), SpO2 95%.            Physical Examination: General appearance - ill appearing, frail, uncomfortable   Mental status - obtunded.   Nose - DHT in place   Chest - + coarse breath sound. Mosly upper airway .  Heart - normal rate, regular rhythm, normal S1, S2, no murmurs, rubs, clicks or gallops  Abdomen - soft, nontender, nondistended, no masses or organomegaly  Neurological - encephalopathic   Extremities - no edema      Results Review:     I reviewed the patient's new clinical results.    WBC WBC   Date Value Ref Range Status   12/21/2024 8.60 3.40 - 10.80 10*3/mm3 Final   12/20/2024 6.73 3.40 - 10.80 10*3/mm3 Final      HGB Hemoglobin   Date Value Ref Range Status   12/21/2024 11.9 (L) 13.0 - 17.7 g/dL Final   12/20/2024 11.2 (L) 13.0 - 17.7 g/dL Final      HCT Hematocrit   Date Value Ref Range Status   12/21/2024 34.2 (L) 37.5 - 51.0 % Final   12/20/2024 32.7 (L) 37.5 - 51.0 % Final      Platlets No results found for: \"LABPLAT\"   MCV MCV   Date Value Ref Range Status   12/21/2024 94.2 79.0 - 97.0 fL Final   12/20/2024 93.7 79.0 - 97.0 fL Final          Sodium Sodium   Date Value Ref Range Status   12/21/2024 144 136 - 145 mmol/L Final   12/20/2024 136 136 - 145 mmol/L Final   12/19/2024 135 (L) 136 - 145 mmol/L Final      Potassium Potassium   Date Value Ref Range Status   12/21/2024 4.0 3.5 - 5.2 mmol/L Final   12/20/2024 4.1 3.5 - 5.2 mmol/L Final   12/19/2024 4.2 3.5 - 5.2 mmol/L Final " "     Chloride Chloride   Date Value Ref Range Status   12/21/2024 106 98 - 107 mmol/L Final   12/20/2024 106 98 - 107 mmol/L Final   12/19/2024 99 98 - 107 mmol/L Final      CO2 CO2   Date Value Ref Range Status   12/21/2024 21.0 (L) 22.0 - 29.0 mmol/L Final   12/20/2024 19.3 (L) 22.0 - 29.0 mmol/L Final   12/19/2024 20.2 (L) 22.0 - 29.0 mmol/L Final      BUN BUN   Date Value Ref Range Status   12/21/2024 62 (H) 8 - 23 mg/dL Final   12/20/2024 54 (H) 8 - 23 mg/dL Final   12/19/2024 43 (H) 8 - 23 mg/dL Final      Creatinine Creatinine   Date Value Ref Range Status   12/21/2024 3.19 (H) 0.76 - 1.27 mg/dL Final   12/20/2024 3.97 (H) 0.76 - 1.27 mg/dL Final   12/19/2024 3.31 (H) 0.76 - 1.27 mg/dL Final      Calcium Calcium   Date Value Ref Range Status   12/21/2024 8.1 (L) 8.2 - 9.6 mg/dL Final   12/20/2024 7.7 (L) 8.2 - 9.6 mg/dL Final   12/19/2024 8.0 (L) 8.2 - 9.6 mg/dL Final      PO4 No results found for: \"CAPO4\"   Albumin No results found for: \"ALBUMIN\"     Magnesium Magnesium   Date Value Ref Range Status   12/20/2024 2.2 1.6 - 2.4 mg/dL Final   12/19/2024 2.1 1.6 - 2.4 mg/dL Final      Uric Acid No results found for: \"URICACID\"     Medication Review:     Current Facility-Administered Medications:     acetaminophen (TYLENOL) tablet 650 mg, 650 mg, Nasogastric, Q4H PRN **OR** acetaminophen (TYLENOL) 160 MG/5ML oral solution 650 mg, 650 mg, Nasogastric, Q4H PRN **OR** acetaminophen (TYLENOL) suppository 650 mg, 650 mg, Rectal, Q4H PRN, Tyson, Chilo, MD    acyclovir (ZOVIRAX) 700 mg in sodium chloride 0.9 % 250 mL IVPB, 10 mg/kg, Intravenous, Q24H, Delroy Reed MD, 700 mg at 12/21/24 1036    aspirin chewable tablet 81 mg, 81 mg, Nasogastric, Daily, Chilo Tyson MD, 81 mg at 12/21/24 0850    atorvastatin (LIPITOR) tablet 40 mg, 40 mg, Nasogastric, Nightly, Chilo Tyson MD, 40 mg at 12/21/24 9175    atropine 1 % ophthalmic solution 1 drop, 1 drop, Both Eyes, TID PRN, Inessa Dumont MD    " carbidopa-levodopa (SINEMET)  MG per tablet 1 tablet, 1 tablet, Nasogastric, TID, Chilo Tyson MD, 1 tablet at 12/21/24 2158    finasteride (PROSCAR) tablet 5 mg, 5 mg, Nasogastric, Daily, Chilo Tyson MD, 5 mg at 12/21/24 0851    guaiFENesin (MUCINEX) 12 hr tablet 600 mg, 600 mg, Oral, Q12H, Chilo Tyson MD, 600 mg at 12/21/24 1452    heparin (porcine) 5000 UNIT/ML injection 5,000 Units, 5,000 Units, Subcutaneous, Q12H, Ford Salazar MD, 5,000 Units at 12/21/24 2159    HYDROcodone-acetaminophen (NORCO) 5-325 MG per tablet 1 tablet, 1 tablet, Nasogastric, Q6H PRN, Chilo Tyson MD, 1 tablet at 12/21/24 1426    levothyroxine (SYNTHROID, LEVOTHROID) tablet 75 mcg, 75 mcg, Nasogastric, Daily, Chilo Tyson MD, 75 mcg at 12/22/24 0616    LORazepam (ATIVAN) injection 2 mg, 2 mg, Intravenous, Q5 Min PRN, Chilo Tyson MD    ondansetron (ZOFRAN) injection 4 mg, 4 mg, Intravenous, Q6H PRN, Inessa Dumont MD    [Held by provider] oxybutynin XL (DITROPAN-XL) 24 hr tablet 10 mg, 10 mg, Oral, Daily, Inessa Dumont MD, 10 mg at 12/18/24 0901    Pharmacy to Dose acyclovir (ZOVIRAX), , Not Applicable, Continuous PRN, Delroy Reed MD    [COMPLETED] Insert Peripheral IV, , , Once **AND** sodium chloride 0.9 % flush 10 mL, 10 mL, Intravenous, PRN, Ian Dao MD    sodium chloride 0.9 % flush 10 mL, 10 mL, Intravenous, Q12H, Inessa Dumont MD, 10 mL at 12/21/24 2205    sodium chloride 0.9 % flush 10 mL, 10 mL, Intravenous, PRN, Inessa Dumont MD    sodium chloride 0.9 % infusion 40 mL, 40 mL, Intravenous, PRN, Inessa Dumont MD    sodium chloride 0.9 % infusion, 75 mL/hr, Intravenous, Continuous, Claudia Bran MD, Last Rate: 75 mL/hr at 12/22/24 0148, 75 mL/hr at 12/22/24 0148    terazosin (HYTRIN) capsule 1 mg, 1 mg, Nasogastric, Nightly, Chilo Tyson MD, 1 mg at 12/21/24 4298    [Held by provider] valsartan (DIOVAN) tablet 40 mg, 40 mg, Oral, Daily, Inessa Dumont MD, 40 mg at 12/18/24  0902    vitamin B-12 (CYANOCOBALAMIN) tablet 1,000 mcg, 1,000 mcg, Nasogastric, Q AM, Chilo Tyson MD, 1,000 mcg at 12/22/24 0616     Assessment & Plan       Unable to ambulate    Benign essential hypertension    Parkinson's disease    Chronic deep vein thrombosis (DVT) of popliteal vein of right lower extremity    Uses hearing aid    Paroxysmal A-fib    Chronic diastolic (congestive) heart failure    Presence of IVC filter    Orthostatic hypotension due to Parkinson's disease    PAD (peripheral artery disease)    Secondary malignancy of lymph nodes of head, face and neck    Acquired hypothyroidism    Falls frequently    Herpes zoster without complication    LISY (acute kidney injury)    Moderate protein-calorie malnutrition      Assessment & Plan  LISY  Metabolic acidosis   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 Acyclovir less (no crystals in UA, improving with IVF).     He has confirmed VZV meningoencephalitis and will need a total of 14 day of IV acyclovir.   He should continue to have IVF. Can use loop diuretics if concerns for volume overload. Alternatively, we could do NS @ 125 ml/hr 1 hour prior to acyclovir infusion and 6 hours after.   Continue to renally dose.     He now has hypernatremia and is about 2 liters of free water deficit.   Will start free water per tube.     UA is bland, some protein, no GN suspected at this time.   Check labs in am   Will follow     Cherri Parr MD  12/22/24  06:49 EST

## 2024-12-22 NOTE — PLAN OF CARE
Goal Outcome Evaluation:  Plan of Care Reviewed With: patient        Progress: declining  Outcome Evaluation: vss. telemetry monitor, sr/sa. room air. q2 turn. iv fluids, acyclovir iv given. tube feeds via cortrak at goal, tolerating. oral care and frequent oral suctioning. patient unable to clear his own secretions.

## 2024-12-22 NOTE — NURSING NOTE
Took over care at 1500. Called rapid at 1620 for suspicious seizure activity. Patients eyes fixated. arms stiff and unable to lift, blink reflex was intact though. Rapid team called Neuro and they both believe this is encephalopathy. Continued to monitor rest of shift.

## 2024-12-22 NOTE — PROGRESS NOTES
Name: Casey Snowden . ADMIT: 2024   : 1934  PCP: Aleksander Diez MD    MRN: [id number] LOS: 5 days   AGE/SEX: 89 y.o. male  ROOM: [facility]     Subjective   Subjective     Yesterday, he had a spell where his arms were shaking and he tensed his back and was staring straight ahead. There was concern for seizure initially, but neurology thought that it was more related to his encephalopathy from VZV and AEDs weren't administered. He appears slightly more responsive today and opens his eyes and is tracking me in the room some. His creatinine is improved. His wife and daughter are at bedside.       Objective   Objective   Vital Signs  Temp:  [97.9 °F (36.6 °C)-100 °F (37.8 °C)] 100 °F (37.8 °C)  Heart Rate:  [67-79] 72  Resp:  [20] 20  BP: (122-205)/() 153/60  SpO2:  [90 %-100 %] 95 %  on   ;   Device (Oxygen Therapy): room air  Body mass index is 21.73 kg/m².  Physical Exam  Constitutional:       General: He is not in acute distress.     Appearance: He is ill-appearing.   Cardiovascular:      Rate and Rhythm: Normal rate and regular rhythm.      Heart sounds: Normal heart sounds.   Pulmonary:      Effort: Pulmonary effort is normal.      Breath sounds: Normal breath sounds.   Abdominal:      General: Bowel sounds are normal.      Palpations: Abdomen is soft.   Musculoskeletal:      Right lower leg: No edema.      Left lower leg: No edema.      Comments: Right leg dermatomal rash consistent with shingles   Neurological:      Mental Status: He is disoriented.      Comments: Awake, but minimally responsive         Results Review     I reviewed the patient's new clinical results.  Results from last 7 days   Lab Units 24  0635 24  0625 24  0824  0439   WBC 10*3/mm3 8.28 8.60 6.73 5.03   HEMOGLOBIN g/dL 11.0* 11.9* 11.2* 11.2*   PLATELETS 10*3/mm3 158 155 139* 144     Results from last 7 days   Lab Units 24  0635 24  0625 24  0811 24  0923    SODIUM mmol/L 148* 144 136 135*   POTASSIUM mmol/L 3.8 4.0 4.1 4.2   CHLORIDE mmol/L 109* 106 106 99   CO2 mmol/L 22.5 21.0* 19.3* 20.2*   BUN mg/dL 53* 62* 54* 43*   CREATININE mg/dL 1.87* 3.19* 3.97* 3.31*   GLUCOSE mg/dL 167* 170* 107* 132*   Estimated Creatinine Clearance: 25.5 mL/min (A) (by C-G formula based on SCr of 1.87 mg/dL (H)).  Results from last 7 days   Lab Units 12/18/24  0439 12/17/24  1238   ALBUMIN g/dL 3.5 3.5   BILIRUBIN mg/dL 0.6 0.6   ALK PHOS U/L 74 80   AST (SGOT) U/L 9 7   ALT (SGPT) U/L <5 7     Results from last 7 days   Lab Units 12/22/24  0635 12/21/24  0625 12/20/24  0811 12/19/24  0923 12/18/24  0439 12/17/24  1238   CALCIUM mg/dL 8.2 8.1* 7.7* 8.0* 8.1* 8.1*   ALBUMIN g/dL  --   --   --   --  3.5 3.5   MAGNESIUM mg/dL  --   --  2.2 2.1  --  2.0   PHOSPHORUS mg/dL  --   --  6.6*  --   --   --        COVID19   Date Value Ref Range Status   10/23/2022 Not Detected Not Detected - Ref. Range Final     SARS-CoV-2, MARCELO   Date Value Ref Range Status   07/20/2020 Not Detected Not Detected Final     Comment:     This test was developed and its performance characteristics determined  by [employer]. This test has not been FDA cleared or  approved. This test has been authorized by FDA under an Emergency Use  Authorization (EUA). This test is only authorized for the duration of  time the declaration that circumstances exist justifying the  authorization of the emergency use of in vitro diagnostic tests for  detection of SARS-CoV-2 virus and/or diagnosis of COVID-19 infection  under section 564(b)(1) of the Act, 21 U.S.C. 360bbb-3(b)(1), unless  the authorization is terminated or revoked sooner.  When diagnostic testing is negative, the possibility of a false  negative result should be considered in the context of a patient's  recent exposures and the presence of clinical signs and symptoms  consistent with COVID-19. An individual without symptoms of COVID-19  and who is not shedding  SARS-CoV-2 virus would expect to have a  negative (not detected) result in this assay.     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    Evaluate for possible risk for seizures.      Technical description:  This is a 21 channel digital EEG recording that   began on 1317 and ended on 1343.  Electrodes are placed based on the 10-20   international electrode placement system.      Background:  The EEG recording demonstrates moderate diffuse background   slowing with mainly theta frequencies and occasional triphasic waves seen.    No definitive PDR is seen.  The patient enters the drowsy state, but no   well formed stage 2 sleep architecture is present.  Hyperventilation was   not performed but photic stimulation was performed.  There are no   asymmetries between the two hemispheres.  No definitive interictal   activity is present.    The EKG monitor shows a heart rate is around 70 beats per minute.    Clinical interpretation:  This routine EEG is abnormal due to the presence   of moderate diffuse background slowing with occasional triphasic appearing   waves seen.  The results of this study are nonspecific, but may indicate a   moderate encephalopathy vs bi-hemispheric dysfunction, the presence of   triphasic waves can be seen in toxic metabolic etiologies for   encephalopathy including herpes related encephalopathy.  No definitive   epileptogenic activity, seizure activity, or focal slowing is present.    Careful clinical correlation is advised.     IR  LUMBAR PUNCTURE DIAGNOSTIC  LUMBAR PUNCTURE FOR DIAGNOSIS UNDER FLUOROSCOPIC GUIDANCE     HISTORY: 89-year-old male with shingles. Severe confusion.     Following sterile prep and local anesthetic, a 20-gauge needle was  inserted into the lumbar subarachnoid space at L2-3 by Dr. Lange. 10  cc of clear colorless CSF was obtained and sent for studies as  requested. The patient tolerated the procedure well and there were no  immediate complications.     1 image was obtained and the fluoroscopy time measures 6 seconds. The  dose Kerma measures 3 mGy.     This report was finalized on 12/21/2024 9:02 AM by Dr. Feliciano Lange M.D on Workstation: [id number]       Scheduled Medications  acyclovir, 10 mg/kg, Intravenous, Q24H  aspirin, 81 mg, Nasogastric, Daily  atorvastatin, 40 mg, Nasogastric, Nightly  carbidopa-levodopa, 1 tablet, Nasogastric, TID  finasteride, 5 mg, Nasogastric, Daily  guaiFENesin, 600 mg, Oral, Q12H  heparin (porcine), 5,000 Units, Subcutaneous, Q12H  levothyroxine, 75 mcg, Nasogastric, Daily  [Held by provider] oxybutynin XL, 10 mg, Oral, Daily  sodium chloride, 10 mL, Intravenous, Q12H  terazosin, 1 mg, Nasogastric, Nightly  [Held by provider] valsartan, 40 mg, Oral, Daily  vitamin B-12, 1,000 mcg, Nasogastric, Q AM    Infusions  Pharmacy to Dose acyclovir (ZOVIRAX),   sodium chloride, 75 mL/hr, Last Rate: 75 mL/hr (12/22/24 0148)    Diet  NPO Diet NPO Type: Tube Feeding       Assessment/Plan     Active Hospital Problems    Diagnosis  POA    **Unable to ambulate [R26.2]  Yes    Moderate protein-calorie malnutrition [E44.0]  Yes    LISY (acute kidney injury) [N17.9]  Yes    Herpes zoster without complication [B02.9]  Unknown    Falls frequently [R29.6]  Not Applicable    Acquired hypothyroidism [E03.9]  Yes    Secondary malignancy of lymph nodes of head, face and neck [C77.0]  Yes    PAD (peripheral artery disease) [I73.9]  Yes    Orthostatic hypotension due to Parkinson's disease [G90.3]  Yes     Chronic diastolic (congestive) heart failure [I50.32]  Yes    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       89 y.o. male admitted with Unable to ambulate.    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 ID plans 14 days of therapy.   LISY-there may be a component of retention. He did get straight catheterized on 12/20024, but pvr this later in the afternoon was only 220. The IV acyclovir may be contributing, but there were no crystals on urinalysis. Unfortunately with the varicella encephalitis, he'll need to remain on IV acyclovir. He's on IVF per nephrology as well. Continue to monitor periodic PVRs. Creatinine is improved to 1.87 today  Hypernatremia-nephrology has started some free water through his feeding tube  Metabolic encephalopathy-I think this is predominantly related to zoster encephalitis, though the LISY could be contributory. EEG was negative for epileptic activity.  Dysphagia-predominately due to his encephalopathy. A cortrak is in place and tube feeds are being administered. Started on guaifenesin for secretions.  Non-sustained ventricular tachycardia-bb had been discontinued in the past due to bradycardia. Cardiology recommends trying to keep electrolytes normal  Coronary artery disease s/p LAD stent in 2019-asa/statin  Chronic systolic heart failure-euvolemic. Not on diuretics at baseline.  Paroxysmal afib-eliquis held for LP that was performed on Saturday morning. Not on rate control chronically. Restart eliquis today at a dose adjusted for his LISY  Hyperglycemia-A1c is 5.4. likely related to his tube  feeds. Will start sliding scale.  Hypertension-valsartan on hold for LISY  Hyperlipidemia-statin  BPH-a1 blocker  Hypothyroidism-synthroid.  History of DVT and IVC filter  Parkinson's dementia  History of stroke   History of melanoma s/p radiation and chemotherapy 3 years ago  Heparin SC for DVT prophylaxis.  DNR.  Discussed with spouse, family, and nursing staff.  Anticipate discharge home with home health timing yet to be determined.  Viral encephalitis has a ok prognosis with about a 10% mortality rate, but about 15% of survivors have permanent neurologic deficits. With his history of parkinson's dementia, LISY, stroke history, and age, I did ask that palliative care evaluate the patient    Chilo Tyson MD  [facility]  12/22/24  12:06 EST

## 2024-12-23 LAB
ANION GAP SERPL CALCULATED.3IONS-SCNC: 11.1 MMOL/L (ref 5–15)
BUN SERPL-MCNC: 47 MG/DL (ref 8–23)
BUN/CREAT SERPL: 39.5 (ref 7–25)
CALCIUM SPEC-SCNC: 8.4 MG/DL (ref 8.2–9.6)
CHLORIDE SERPL-SCNC: 112 MMOL/L (ref 98–107)
CO2 SERPL-SCNC: 23.9 MMOL/L (ref 22–29)
CREAT SERPL-MCNC: 1.19 MG/DL (ref 0.76–1.27)
EGFRCR SERPLBLD CKD-EPI 2021: 58 ML/MIN/1.73
GLUCOSE BLDC GLUCOMTR-MCNC: 142 MG/DL (ref 70–130)
GLUCOSE BLDC GLUCOMTR-MCNC: 146 MG/DL (ref 70–130)
GLUCOSE BLDC GLUCOMTR-MCNC: 156 MG/DL (ref 70–130)
GLUCOSE BLDC GLUCOMTR-MCNC: 173 MG/DL (ref 70–130)
GLUCOSE BLDC GLUCOMTR-MCNC: 174 MG/DL (ref 70–130)
GLUCOSE SERPL-MCNC: 124 MG/DL (ref 65–99)
POTASSIUM SERPL-SCNC: 3.5 MMOL/L (ref 3.5–5.2)
SODIUM SERPL-SCNC: 147 MMOL/L (ref 136–145)

## 2024-12-23 PROCEDURE — 97530 THERAPEUTIC ACTIVITIES: CPT

## 2024-12-23 PROCEDURE — 25810000003 SODIUM CHLORIDE 0.9 % SOLUTION: Performed by: INTERNAL MEDICINE

## 2024-12-23 PROCEDURE — 25010000002 ACYCLOVIR PER 5 MG: Performed by: INTERNAL MEDICINE

## 2024-12-23 PROCEDURE — 99232 SBSQ HOSP IP/OBS MODERATE 35: CPT | Performed by: INTERNAL MEDICINE

## 2024-12-23 PROCEDURE — 63710000001 INSULIN REGULAR HUMAN PER 5 UNITS: Performed by: STUDENT IN AN ORGANIZED HEALTH CARE EDUCATION/TRAINING PROGRAM

## 2024-12-23 PROCEDURE — 25810000003 SODIUM CHLORIDE 0.9 % SOLUTION 250 ML FLEX CONT: Performed by: INTERNAL MEDICINE

## 2024-12-23 PROCEDURE — 80048 BASIC METABOLIC PNL TOTAL CA: CPT | Performed by: INTERNAL MEDICINE

## 2024-12-23 PROCEDURE — 99233 SBSQ HOSP IP/OBS HIGH 50: CPT | Performed by: INTERNAL MEDICINE

## 2024-12-23 PROCEDURE — 92610 EVALUATE SWALLOWING FUNCTION: CPT

## 2024-12-23 PROCEDURE — 82948 REAGENT STRIP/BLOOD GLUCOSE: CPT

## 2024-12-23 RX ORDER — CARVEDILOL 3.12 MG/1
3.12 TABLET ORAL 2 TIMES DAILY WITH MEALS
Status: DISCONTINUED | OUTPATIENT
Start: 2024-12-23 | End: 2024-12-23

## 2024-12-23 RX ADMIN — INSULIN HUMAN 2 UNITS: 100 INJECTION, SOLUTION PARENTERAL at 00:19

## 2024-12-23 RX ADMIN — SODIUM CHLORIDE 75 ML/HR: 9 INJECTION, SOLUTION INTRAVENOUS at 03:35

## 2024-12-23 RX ADMIN — Medication 1000 MCG: at 06:17

## 2024-12-23 RX ADMIN — AMLODIPINE BESYLATE 5 MG: 5 TABLET ORAL at 09:41

## 2024-12-23 RX ADMIN — CARBIDOPA AND LEVODOPA 1 TABLET: 25; 250 TABLET ORAL at 16:58

## 2024-12-23 RX ADMIN — ASPIRIN 81 MG: 81 TABLET, CHEWABLE ORAL at 09:41

## 2024-12-23 RX ADMIN — LEVOTHYROXINE SODIUM 75 MCG: 75 TABLET ORAL at 06:18

## 2024-12-23 RX ADMIN — Medication 10 ML: at 09:23

## 2024-12-23 RX ADMIN — CARBIDOPA AND LEVODOPA 1 TABLET: 25; 250 TABLET ORAL at 09:24

## 2024-12-23 RX ADMIN — INSULIN HUMAN 2 UNITS: 100 INJECTION, SOLUTION PARENTERAL at 16:57

## 2024-12-23 RX ADMIN — FINASTERIDE 5 MG: 5 TABLET, FILM COATED ORAL at 09:25

## 2024-12-23 RX ADMIN — APIXABAN 5 MG: 5 TABLET, FILM COATED ORAL at 22:03

## 2024-12-23 RX ADMIN — CARBIDOPA AND LEVODOPA 1 TABLET: 25; 250 TABLET ORAL at 22:03

## 2024-12-23 RX ADMIN — ATORVASTATIN CALCIUM 40 MG: 20 TABLET, FILM COATED ORAL at 22:03

## 2024-12-23 RX ADMIN — GUAIFENESIN 600 MG: 600 TABLET, MULTILAYER, EXTENDED RELEASE ORAL at 22:04

## 2024-12-23 RX ADMIN — APIXABAN 5 MG: 5 TABLET, FILM COATED ORAL at 09:23

## 2024-12-23 RX ADMIN — SODIUM CHLORIDE 700 MG: 9 INJECTION, SOLUTION INTRAVENOUS at 11:36

## 2024-12-23 RX ADMIN — INSULIN HUMAN 2 UNITS: 100 INJECTION, SOLUTION PARENTERAL at 06:17

## 2024-12-23 RX ADMIN — SODIUM CHLORIDE 700 MG: 9 INJECTION, SOLUTION INTRAVENOUS at 23:06

## 2024-12-23 RX ADMIN — GUAIFENESIN 600 MG: 600 TABLET, MULTILAYER, EXTENDED RELEASE ORAL at 09:23

## 2024-12-23 NOTE — PROGRESS NOTES
Neurology Progress Note    Reason for visit  Follow up for encephalopathy    Interval History  Seizure like activity yesterday. Keppra given  and EEG checked.  No further seizure like activity, Keppra stopped    Medications:  Scheduled Meds:acyclovir, 10 mg/kg, Intravenous, Q24H  amLODIPine, 5 mg, Oral, Daily  apixaban, 2.5 mg, Oral, Q12H  aspirin, 81 mg, Nasogastric, Daily  atorvastatin, 40 mg, Nasogastric, Nightly  carbidopa-levodopa, 1 tablet, Nasogastric, TID  finasteride, 5 mg, Nasogastric, Daily  guaiFENesin, 600 mg, Oral, Q12H  insulin regular, 2-7 Units, Subcutaneous, Q6H  levothyroxine, 75 mcg, Nasogastric, Daily  [Held by provider] oxybutynin XL, 10 mg, Oral, Daily  sodium chloride, 10 mL, Intravenous, Q12H  terazosin, 1 mg, Nasogastric, Nightly  [Held by provider] valsartan, 40 mg, Oral, Daily  vitamin B-12, 1,000 mcg, Nasogastric, Q AM      Continuous Infusions:Pharmacy to Dose acyclovir (ZOVIRAX),   sodium chloride, 75 mL/hr, Last Rate: 75 mL/hr (12/22/24 1439)      PRN Meds:.  acetaminophen **OR** acetaminophen **OR** acetaminophen    atropine    dextrose    dextrose    glucagon (human recombinant)    LORazepam    ondansetron    Pharmacy to Dose acyclovir (ZOVIRAX)    [COMPLETED] Insert Peripheral IV **AND** sodium chloride    sodium chloride    sodium chloride    Review of Systems:   Review of Systems   Unable to perform ROS: Mental status change     Vital Signs  Temp:  [98.2 °F (36.8 °C)-100 °F (37.8 °C)] 98.2 °F (36.8 °C)  Heart Rate:  [72-77] 75  Resp:  [18-20] 18  BP: (122-205)/() 192/60    Physical Exam:  Constitutional: appears ill  HEENT:  no drooling  CVS:  Regular rate and rhythm.    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    Neurology signing off, please call if needed further.      Claudia Madison MD  12/22/24  20:59 EST

## 2024-12-23 NOTE — PROGRESS NOTES
[facility]    Patient Name: Casey Snowden Sr.  :1934  89 y.o.  LOS: 6  Encounter Provider: Jose Norris MD      Patient Care Team:  Aleksander Diez MD as PCP - Joseph Gómez MD as Consulting Physician (Ophthalmology)  Ford Dorado MD as Consulting Physician (Vascular Surgery)  Cecilio Armas III, MD as Consulting Physician (Cardiology)  Kashif Goodwin MD as Consulting Physician (Neurology)  Enoch Lares MD (Hematology)  Kayden Chance MD (Dermatology)  Vinod Cadena MD as Consulting Physician (Neurosurgery)  Johnny Castellanos MD as Consulting Physician (Urology)  Inge Pedro, PT as Physical Therapist (Physical Therapy)  Lucrecia Mccloud PT as Physical Therapist (Physical Therapy)  Nereyda Toledo OT as Occupational Therapist (Occupational Therapy)  Junior Taylor MD as Surgeon (Orthopedic Surgery)  Casey Power MD as Surgeon (Wound Care)  Nahomi Jacobo APRN as Nurse Practitioner (Nurse Practitioner)    Chief Complaint/Consult question:  89 year old gentleman followed by Dr. Armas for paroxysmal atrial fibrillation, coronary artery disease (PCI to LAD 2019) and ischemic cardiomyopathy EF ~40% . He has carotid artery stenosis and previous stroke status post percutaneous revascularization, prior DVT (IVC filter) hypertension, Parkinson's disease and orthostasis. He saw Dr. Armas on 24. He was stable.      He came to the emergency room on  with rash - noted to be shingles as well as altered mental status. This morning - had VT for which we have been asked to  see.      He has also had an acute rise in kidney function -- creatinine went from 0.89 -- >3.31. K 4.2. Mag 2.1.      Interval History: No acute complaints, slightly more responsive on exam.  Continues to receive enteral feeding via Cortrak.       Objective   Vital Signs  Temp:  [98.2 °F (36.8 °C)-99.1 °F (37.3 °C)] 99.1 °F (37.3 °C)  Heart Rate:  [75-76]  "76  Resp:  [16-18] 16  BP: (158-192)/(60-78) 173/62    Intake/Output Summary (Last 24 hours) at 12/23/2024 1224  Last data filed at 12/23/2024 0600  Gross per 24 hour   Intake 2752 ml   Output --   Net 2752 ml     Flowsheet Rows      Flowsheet Row First Filed Value   Admission Height 177.8 cm (70\") Documented at 12/22/2024 0725   Admission Weight 66.7 kg (147 lb 0.8 oz) Documented at 12/18/2024 0500              Vitals and nursing note reviewed.   Constitutional:       Appearance: Not in distress. Frail. Chronically ill-appearing.   Pulmonary:      Effort: Pulmonary effort is normal.   Cardiovascular:      PMI at left midclavicular line. Normal rate. Regular rhythm.      Murmurs: There is no murmur.   Edema:     Peripheral edema absent.   Abdominal:      General: Bowel sounds are normal. There is no distension.   Neurological:      Mental Status: Confused.           Pertinent Test Results:  Results from last 7 days   Lab Units 12/23/24  0705 12/22/24  0635 12/21/24  0625 12/20/24  0811 12/19/24  0923 12/18/24  0439 12/17/24  1238   SODIUM mmol/L 147* 148* 144 136 135* 136 137   POTASSIUM mmol/L 3.5 3.8 4.0 4.1 4.2 3.8 3.7   CHLORIDE mmol/L 112* 109* 106 106 99 104 107   CO2 mmol/L 23.9 22.5 21.0* 19.3* 20.2* 23.0 25.2   BUN mg/dL 47* 53* 62* 54* 43* 20 16   CREATININE mg/dL 1.19 1.87* 3.19* 3.97* 3.31* 0.89 0.62*   GLUCOSE mg/dL 124* 167* 170* 107* 132* 109* 112*   CALCIUM mg/dL 8.4 8.2 8.1* 7.7* 8.0* 8.1* 8.1*   AST (SGOT) U/L  --   --   --   --   --  9 7   ALT (SGPT) U/L  --   --   --   --   --  <5 7         Results from last 7 days   Lab Units 12/22/24  0635 12/21/24  0625 12/20/24  0811 12/18/24  0439 12/17/24  1238   WBC 10*3/mm3 8.28 8.60 6.73 5.03 4.65   HEMOGLOBIN g/dL 11.0* 11.9* 11.2* 11.2* 11.5*   HEMATOCRIT % 31.4* 34.2* 32.7* 33.1* 36.1*   PLATELETS 10*3/mm3 158 155 139* 144 144         Results from last 7 days   Lab Units 12/20/24  0811 12/19/24  0923 12/17/24  1238   MAGNESIUM mg/dL 2.2 2.1 2.0 "     Results from last 7 days   Lab Units 12/22/24  0635   CHOLESTEROL mg/dL 102   TRIGLYCERIDES mg/dL 52   HDL CHOL mg/dL 50         Results from last 7 days   Lab Units 12/20/24  0811   TSH uIU/mL 4.820*           Medication Review:   acyclovir, 10 mg/kg, Intravenous, Q12H  amLODIPine, 5 mg, Oral, Daily  apixaban, 5 mg, Oral, Q12H  aspirin, 81 mg, Nasogastric, Daily  atorvastatin, 40 mg, Nasogastric, Nightly  carbidopa-levodopa, 1 tablet, Nasogastric, TID  finasteride, 5 mg, Nasogastric, Daily  guaiFENesin, 600 mg, Oral, Q12H  insulin regular, 2-7 Units, Subcutaneous, Q6H  levothyroxine, 75 mcg, Nasogastric, Daily  [Held by provider] oxybutynin XL, 10 mg, Oral, Daily  sodium chloride, 10 mL, Intravenous, Q12H  terazosin, 1 mg, Nasogastric, Nightly  [Held by provider] valsartan, 40 mg, Oral, Daily  vitamin B-12, 1,000 mcg, Nasogastric, Q AM         Pharmacy to Dose acyclovir (ZOVIRAX),   sodium chloride, 75 mL/hr, Last Rate: 75 mL/hr (12/23/24 0335)        Assessment & Plan     Active Hospital Problems    Diagnosis  POA    **Unable to ambulate [R26.2]  Yes    Moderate protein-calorie malnutrition [E44.0]  Yes    LISY (acute kidney injury) [N17.9]  Yes    Herpes zoster without complication [B02.9]  Unknown    Falls frequently [R29.6]  Not Applicable    Acquired hypothyroidism [E03.9]  Yes    Secondary malignancy of lymph nodes of head, 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         Shingles/varicella encephalitis.   LP confirmed VZV meningoencephalitis. On acyclovir, management per primary service and ID consultant team.  Coronary artery disease.  Status post LAD stent in 2019. No angina.  BP and lipid control, see below.  On both Eliquis 5 bid and aspirin 81, patient is at very high risk for fall in the setting of VZV encephalitis on top of Parkinson disease, will discontinue aspirin 81 at this time.  Ischemic cardiomyopathy.  Last echo a year ago 10/2023 showed LVEF 45%, moderate concentric LVH.  GDMT limited due to kidney function.   PAF.  [id number] score 5, on Eliquis 5 bid while stopping aspirin as above.  Maintaining SR.   Altered mental status/metabolic encephalopathy. Secondary to #1.  LISY.  Per nephrology. They suspect due to volume depletion and retention, now resolved with supportive care including cautious IV fluid.   NSVT. Asymptomatic. Beta blocker d/c'd in the past due to bradycardia.   Hypertension.  Holding home valsartan 40 daily due to LISY, started on amlodipine 5 daily.  Hyperlipidemia: Continue home atorvastatin 40 daily.  That this admission 12/2024 showed HDL 50, LDL 39, well-controlled.  History of carotid stenting  History of DVT. IVC filter in place   History of orthostatic hypotension. Likely autonomic dysfunction.       Jose Norris MD  [facility]  12/23/24  12:24 EST

## 2024-12-23 NOTE — PLAN OF CARE
Goal Outcome Evaluation:      Pt resting in bed , Room air , SR on tele, TF at 65 ml/h . IVF continues to infuse. Oral suction provided, turn q 2. Incontinent. Plan of care is ongoing.

## 2024-12-23 NOTE — THERAPY TREATMENT NOTE
Patient Name: Casey Snowden .  : 1934    MRN: [id number]                              Today's Date: 2024       Admit Date: 2024    Visit Dx:     ICD-10-CM ICD-9-CM   1. Unable to ambulate  R26.2 719.7   2. Herpes zoster with complication: S2 and S3 distribution on the right  B02.8 053.8   3. Parkinson's disease, unspecified whether dyskinesia present, unspecified whether manifestations fluctuate  G20.A1 332.0     Patient Active Problem List   Diagnosis    Benign essential hypertension    Stenosis of carotid artery    Mixed hyperlipidemia    Parkinson's disease    Peripheral arterial occlusive disease    Screening for prostate cancer    Medication monitoring encounter    Melanoma    Chronic deep vein thrombosis (DVT) of popliteal vein of right lower extremity    Uses hearing aid    Paroxysmal A-fib    Chronic diastolic (congestive) heart failure    Anticoagulant long-term use    Presence of IVC filter    Medicare annual wellness visit, subsequent    Iron deficiency anemia secondary to inadequate dietary iron intake    Orthostatic hypotension due to Parkinson's disease    Coronary artery disease involving native coronary artery of native heart without angina pectoris    ST elevation myocardial infarction involving left anterior descending (LAD) coronary artery    Sialorrhea    Hypertensive heart disease with heart failure    PAD (peripheral artery disease)    Metastatic squamous cell carcinoma    Secondary malignancy of lymph nodes of head, face and neck    Encounter for antineoplastic immunotherapy    RBD (REM behavioral disorder)    Acquired hypothyroidism    Aspiration pneumonia of right lung due to vomit    Dysphagia, neurologic    ICAO (internal carotid artery occlusion), right    Arterial ischemic stroke, ICA (internal carotid artery), right, chroni    Renal mass, left    Abdominal aortic aneurysm    Disorientation    Falls frequently    Right inguinal hernia    Unable to ambulate     Herpes zoster without complication    LISY (acute kidney injury)    Moderate protein-calorie malnutrition     Past Medical History:   Diagnosis Date    Abdominal aortic aneurysm without rupture 10/14/2021    Acquired hypothyroidism 08/31/2022    Acute ischemic stroke 10/08/2023    Aneurysm     Arrhythmia     Atrial fibrillation     Cancer 04/2018    basil cell carcinoma    Carotid stenosis 10/29/2015    CHF (congestive heart failure)     Chronic deep vein thrombosis (DVT) of popliteal vein of right lower extremity     Clotting disorder     Coronary artery disease involving native coronary artery of native heart without angina pectoris 12/20/2018    DVT of lower extremity (deep venous thrombosis)     Hard of hearing     Hyperlipidemia     Hypertension     Localized edema 01/04/2017    Long-term use of high-risk medication     Metabolic encephalopathy 02/07/2023    Myocardial infarction 2918    LAD stent    PAD (peripheral artery disease) 09/10/2020    Parkinson's disease     Postphlebitic syndrome     Pure hypercholesterolemia     Skin tear of upper arm without complication, left, subsequent encounter     Stroke 11/02/2023     Past Surgical History:   Procedure Laterality Date    BASAL CELL CARCINOMA EXCISION  04/2018    CARDIAC CATHETERIZATION N/A 11/05/2018    Procedure: Left Heart Cath;  Surgeon: Oliverio Azar MD;  Location:  [facility] CATH INVASIVE LOCATION;  Service: Cardiovascular    CARDIAC CATHETERIZATION N/A 11/05/2018    Procedure: Coronary angiography;  Surgeon: Oliverio Azar MD;  Location: [facility] C[facility] LOC[facility];  Service: Cardiovascular    CARDIAC CATHETERIZATION N/A 11/05/2018    Procedure: Left ventriculography;  Surgeon: Oliverio Azar MD;  Location:  [facility] CATH INVASIVE LOCATION;  Service: Cardiovascular    CARDIAC CATHETERIZATION N/A 06/04/2019    Procedure: Left Heart Cath;  Surgeon: Johnny Glasgow MD;  [facility];  Service: Cardiovascular    CARDIAC  CATHETERIZATION N/A 06/04/2019    Procedure: Coronary angiography;  Surgeon: Johnny Glasgow MD;  Location: [facility] CATH INVASIVE LOCATION;  Service: Cardiovascular    CARDIAC CATHETERIZATION N/A 06/04/2019    Procedure: Left ventriculography;  Surgeon: Johnny Glasgow MD;  Location: [facility] CATH INVASIVE LOCATION;  Service: Cardiovascular    CARDIAC CATHETERIZATION N/A 06/04/2019    Procedure: Stent BILL coronary;  Surgeon: Johnny Glasgow MD;  Location: [facility] CATH INVASIVE LOCATION;  Service: Cardiovascular    CARDIAC SURGERY      CAROTID ARTERY ANGIOPLASTY Right 10/11/2023    with stent    CAROTID STENT      CATARACT EXTRACTION Left 08/2018    CATARACT EXTRACTION Right 09/2018    COLONOSCOPY      2011    CORONARY STENT PLACEMENT      INCISION AND DRAINAGE LEG Right 07/07/2017    Procedure: INCISION AND DRAINAGE INFECTED HEMATOMA RIGHT THIGH;  Surgeon: Valentin Peña MD;  Location: [facility] OR;  Service:     JOINT REPLACEMENT      KNEE INCISION AND DRAINAGE Right 12/27/2016    Procedure: KNEE INCISION AND DRAINAGE;  Surgeon: Triston Whitten MD;  Location: [facility] OR;  Service:     NOSE SURGERY      nose replacement    SINUS SURGERY  1960    SKIN BIOPSY      SKIN GRAFT  12/2017    TOTAL KNEE ARTHROPLASTY Right     VASCULAR SURGERY      VENA CAVA FILTER PLACEMENT        General Information       Row Name 12/23/24 1420          OT Time and Intention    Document Type therapy note (daily note)  -SM     Mode of Treatment occupational therapy;co-treatment  -       Row Name 12/23/24 1427          General Information    Patient Profile Reviewed yes  -SM     Existing Precautions/Restrictions fall;NPO  -SM       Row Name 12/23/24 1422          Cognition    Orientation Status (Cognition) oriented to;person  minimally conversational, following simple commands with frequent cues ~50% of the time  -SM       Row Name 12/23/24 1424          Safety Issues/Impairments Affecting Functional Mobility     Safety Issues Affecting Function (Mobility) ability to follow commands  -     Impairments Affecting Function (Mobility) balance;endurance/activity tolerance;strength;coordination;cognition  -     Comment, Safety Issues/Impairments (Mobility) PT/OT cotreatment medically appropriate and necessary due to patient acuity level, to maximize therapeutic benefit due to impaired act tolerance, and for safety of patient and staff. Treatment focused on progression of care and goals established in POC.  -               User Key  (r) = Recorded By, (t) = Taken By, (c) = Cosigned By      Initials Name Provider Type     Tierra Hanna, FREDDIE Occupational Therapist                     Mobility/ADL's       Row Name 12/23/24 1421          Bed Mobility    Bed Mobility sit-supine  -     Supine-Sit [place] (Bed Mobility) maximum assist (25% patient effort);2 person assist  -     Sit-Supine [place] (Bed Mobility) maximum assist (25% patient effort);2 person assist  -     Assistive Device (Bed Mobility) head of bed elevated;repositioning sheet  -       Row Name 12/23/24 1421          Sit-Stand Transfer    Sit-Stand [place] (Transfers) moderate assist (50% patient effort);2 person assist;verbal cues  -     Comment, (Sit-Stand Transfer) HHAX2, 2 reps, pt unable to come to a full upright stand  -       Row Name 12/23/24 1421          Functional Mobility    Functional Mobility- Ind. Level moderate assist (50% patient effort);2 person assist required;maximum assist (25% patient effort)  -     Functional Mobility- Comment pt only tolerates 2-3 side steps today. On 2nd attempt to is very fearful of falling and sits back down onto bed.  -       Row Name 12/23/24 1421          Activities of Daily Living    BADL Assessment/Intervention toileting  -       Row Name 12/23/24 1421          Lower Body Dressing Assessment/Training    [place] Level (Lower Body Dressing) socks;dependent (less than 25%  patient effort)  -       Row Name 12/23/24 1421          Self-Feeding Assessment/Training    Comment, (Feeding) now NPO  -       Row Name 12/23/24 1421          Toileting Assessment/Training    [place] Level (Toileting) dependent (less than 25% patient effort)  -               User Key  (r) = Recorded By, (t) = Taken By, (c) = Cosigned By      Initials Name Provider Type     Tierra Hanna OT Occupational Therapist                   Obj/Interventions       Row Name 12/23/24 1422          Motor Skills    Therapeutic Exercise --  attempted UE exercises- no initation to complete  -       Row Name 12/23/24 1422          Balance    Static Sitting Balance standby assist  -     Position, Sitting Balance sitting edge of bed  -     Static Standing Balance 2-person assist  -     Position/Device Used, Standing Balance supported  -               User Key  (r) = Recorded By, (t) = Taken By, (c) = Cosigned By      Initials Name Provider Type     Tierra Hanna OT Occupational Therapist                   Goals/Plan    No documentation.                  Clinical Impression       Row Name 12/23/24 1424          Pain Assessment    Pre/Posttreatment Pain Comment c/o of back, R knee pain from shingles- doesnt rate  -       Row Name 12/23/24 1424          Plan of Care Review    Plan of Care Reviewed With patient;family  -     Progress declining  -     Outcome Evaluation Pt is seen today by OT. He has had a large decline since last week/last treatment session. He had a rapid response called over the weekend for AMS/lethargy. 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 Today pt follows a few commands. He is able to sit EOB and stand from the bed with assistance of 2. He has had an acute decline in all ADLs and has not been able to transfer out of bed for ADLs with [facility] staff. Pt may need SNF now at [place].  -       Row Name 12/23/24 1426          Therapy Plan  Review/Discharge Plan (OT)    Anticipated Discharge Disposition (OT) skilled nursing facility  -       Row Name 12/23/24 1424          Positioning and Restraints    Pre-Treatment Position in bed  -     Post Treatment Position bed  -SM     In Bed fowlers;call light within reach;encouraged to call for assist;exit alarm on;notified nsg;with family/caregiver  -               User Key  (r) = Recorded By, (t) = Taken By, (c) = Cosigned By      Initials Name Provider Type    Tierra Griffith OT Occupational Therapist                   Outcome Measures       Row Name 12/23/24 1427          How much help from another is currently needed...    Putting on and taking off regular lower body clothing? 1  -SM     Bathing (including washing, rinsing, and drying) 2  -SM     Toileting (which includes using toilet bed pan or urinal) 1  -SM     Putting on and taking off regular upper body clothing 2  -SM     Taking care of personal grooming (such as brushing teeth) 2  -SM     Eating meals 1  -SM     AM-PAC 6 Clicks Score (OT) 9  -       Row Name 12/23/24 1427          Functional Assessment    Outcome Measure Options AM-PAC 6 Clicks Daily Activity (OT)  -               User Key  (r) = Recorded By, (t) = Taken By, (c) = Cosigned By      Initials Name Provider Type    Tierra Griffith OT Occupational Therapist                    Occupational Therapy Education       Title: PT OT SLP Therapies (In Progress)       Topic: Occupational Therapy (In Progress)       Point: ADL training (Done)       Description:   Instruct learner(s) on proper safety adaptation and remediation techniques during self care or transfers.   Instruct in proper use of assistive devices.                  Learning Progress Summary            Patient Acceptance, E, VU by  at 12/18/2024 1517    Comment: OT goals, POC                      Point: Home exercise program (Not Started)       Description:   Instruct learner(s) on appropriate technique for  monitoring, assisting and/or progressing therapeutic exercises/activities.                  Learner Progress:  Not documented in this visit.              Point: Precautions (Not Started)       Description:   Instruct learner(s) on prescribed precautions during self-care and functional transfers.                  Learner Progress:  Not documented in this visit.              Point: Body mechanics (Not Started)       Description:   Instruct learner(s) on proper positioning and spine alignment during self-care, functional mobility activities and/or exercises.                  Learner Progress:  Not documented in this visit.                              User Key       Initials Effective Dates Name Provider Type Discipline     04/02/20 -  Tierra Hanna OT Occupational Therapist OT                  OT Recommendation and Plan  Planned Therapy Interventions (OT): activity tolerance training, adaptive equipment training, BADL retraining, cognitive/visual perception retraining, functional balance retraining, IADL retraining, occupation/activity based interventions, orthotic fabrication/fitting/training, passive ROM/stretching, patient/caregiver education/training, ROM/therapeutic exercise, strengthening exercise, transfer/mobility retraining  Therapy Frequency (OT): 3 times/wk  Plan of Care Review  Plan of Care Reviewed With: patient, family  Progress: declining  Outcome Evaluation: Pt is seen today by OT. He has had a large decline since last week/last treatment session. He had a rapid response called over the weekend for AMS/lethargy. 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 Today pt follows a few commands. He is able to sit EOB and stand from the bed with assistance of 2. He has had an acute decline in all ADLs and has not been able to transfer out of bed for ADLs with [facility] staff. Pt may need SNF now at [place].     Time Calculation:         Time Calculation- OT       Row Name  12/23/24 1427             Time Calculation- OT    OT Start Time 1338  -SM      OT Stop Time 1356  -SM      OT Time Calculation (min) 18 min  -SM      Total Timed Code Minutes- OT 18 minute(s)  -SM      OT Received On 12/23/24  -      OT - Next Appointment 12/26/24  -         Timed Charges    60344 - OT Therapeutic Activity Minutes 18  -SM         Total Minutes    Timed Charges Total Minutes 18  -SM       Total Minutes 18  -SM                User Key  (r) = Recorded By, (t) = Taken By, (c) = Cosigned By      Initials Name Provider Type    Tierra Griffith OT Occupational Therapist                  Therapy Charges for Today       Code Description Service Date Service Provider Modifiers Qty    10123287773 HC OT THERAPEUTIC ACT EA 15 MIN 12/23/2024 Tierra Hanna OT GO 1                 Tierra Hanna OT  12/23/2024

## 2024-12-23 NOTE — PROGRESS NOTES
Name: Casey Snowden . ADMIT: 2024   : 1934  PCP: Aleksander Diez MD    MRN: [id number] LOS: 6 days   AGE/SEX: 89 y.o. male  ROOM: [facility]     Subjective   Subjective   He looks better. Family at bedside happy with progress       Objective   Objective   Vital Signs  Temp:  [98.2 °F (36.8 °C)-99.1 °F (37.3 °C)] 99.1 °F (37.3 °C)  Heart Rate:  [75-76] 76  Resp:  [16-18] 16  BP: (158-192)/(60-78) 173/62  SpO2:  [95 %-99 %] 95 %  on   ;   Device (Oxygen Therapy): room air  Body mass index is 21 kg/m².  Physical Exam  Vitals reviewed.   Constitutional:       General: He is not in acute distress.     Comments: Very frail appearing   Cardiovascular:      Rate and Rhythm: Normal rate.      Heart sounds: No murmur heard.  Pulmonary:      Effort: Pulmonary effort is normal. No respiratory distress.   Abdominal:      General: There is no distension.      Palpations: Abdomen is soft.      Tenderness: There is no abdominal tenderness.   Musculoskeletal:      Right lower leg: No edema.      Left lower leg: No edema.   Skin:     General: Skin is warm and dry.      Findings: No rash.      Comments: Vesicular rash improved but present   Neurological:      Mental Status: He is alert.      Comments: Slow to answer questions but told me his name and said few full sentences       Results Review     I reviewed the patient's new clinical results.  Results from last 7 days   Lab Units 24  0635 24  0625 24  0811 24  0439   WBC 10*3/mm3 8.28 8.60 6.73 5.03   HEMOGLOBIN g/dL 11.0* 11.9* 11.2* 11.2*   PLATELETS 10*3/mm3 158 155 139* 144     Results from last 7 days   Lab Units 24  0705 24  0635 24  0625 24  0811   SODIUM mmol/L 147* 148* 144 136   POTASSIUM mmol/L 3.5 3.8 4.0 4.1   CHLORIDE mmol/L 112* 109* 106 106   CO2 mmol/L 23.9 22.5 21.0* 19.3*   BUN mg/dL 47* 53* 62* 54*   CREATININE mg/dL 1.19 1.87* 3.19* 3.97*   GLUCOSE mg/dL 124* 167* 170* 107*   EGFR mL/min/1.73  58.0* 33.7* 17.8* 13.7*     Results from last 7 days   Lab Units 12/18/24  0439 12/17/24  1238   ALBUMIN g/dL 3.5 3.5   BILIRUBIN mg/dL 0.6 0.6   ALK PHOS U/L 74 80   AST (SGOT) U/L 9 7   ALT (SGPT) U/L <5 7     Results from last 7 days   Lab Units 12/23/24  0705 12/22/24  0635 12/21/24  0625 12/20/24  0811 12/19/24  0923 12/18/24  0439 12/17/24  1238   CALCIUM mg/dL 8.4 8.2 8.1* 7.7* 8.0* 8.1* 8.1*   ALBUMIN g/dL  --   --   --   --   --  3.5 3.5   MAGNESIUM mg/dL  --   --   --  2.2 2.1  --  2.0   PHOSPHORUS mg/dL  --   --   --  6.6*  --   --   --        Glucose   Date/Time Value Ref Range Status   12/23/2024 1036 142 (H) 70 - 130 mg/dL Final   12/23/2024 0551 173 (H) 70 - 130 mg/dL Final   12/23/2024 0010 156 (H) 70 - 130 mg/dL Final   12/22/2024 2039 173 (H) 70 - 130 mg/dL Final   12/22/2024 1552 160 (H) 70 - 130 mg/dL Final   12/22/2024 1109 220 (H) 70 - 130 mg/dL Final   12/22/2024 0617 138 (H) 70 - 130 mg/dL Final       XR Chest 1 View    Result Date: 12/22/2024  Enteric tube coursing below the diaphragm and outside the exam field-of-view. Moderate left pleural effusion. Adjacent hazy left lower lobe opacities, atelectasis versus infiltrate. No pneumothorax. Normal size cardiac silhouette. Nondilated, distinct central pulmonary vasculature without findings to suggest redistribution/edema. No focal osseous abnormality.  This report was finalized on 12/22/2024 2:50 PM by Dr. Ford Levy M.D on Workstation: [id number]       I have personally reviewed all medications:  Scheduled Medications  acyclovir, 10 mg/kg, Intravenous, Q12H  amLODIPine, 5 mg, Oral, Daily  apixaban, 5 mg, Oral, Q12H  atorvastatin, 40 mg, Nasogastric, Nightly  carbidopa-levodopa, 1 tablet, Nasogastric, TID  finasteride, 5 mg, Nasogastric, Daily  guaiFENesin, 600 mg, Oral, Q12H  insulin regular, 2-7 Units, Subcutaneous, Q6H  levothyroxine, 75 mcg, Nasogastric, Daily  [Held by provider] oxybutynin XL, 10 mg, Oral, Daily  sodium chloride, 10  mL, Intravenous, Q12H  terazosin, 1 mg, Nasogastric, Nightly  [Held by provider] valsartan, 40 mg, Oral, Daily  vitamin B-12, 1,000 mcg, Nasogastric, Q AM    Infusions  Pharmacy to Dose acyclovir (ZOVIRAX),     Diet  NPO Diet NPO Type: Tube Feeding    I have personally reviewed:  [x]  Laboratory   []  Microbiology   [x]  Radiology   []  EKG/Telemetry  []  Cardiology/Vascular   []  Pathology    []  Records       Assessment/Plan     Active Hospital Problems    Diagnosis  POA    **Unable to ambulate [R26.2]  Yes    Moderate protein-calorie malnutrition [E44.0]  Yes    LISY (acute kidney injury) [N17.9]  Yes    Herpes zoster without complication [B02.9]  Unknown    Falls frequently [R29.6]  Not Applicable    Acquired hypothyroidism [E03.9]  Yes    Secondary malignancy of lymph nodes of head, 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       89 y.o. male admitted with generalized weakness, confusion and found to have RLE rash consistent with shingles.    VZV encephalitis.  Status post LP  -Continue acyclovir x 14 days.  Renally dosed as appropriate  - MRI negative for acute change including any evidence of encephalitis  - Neurology signed off.  -Speech to see regarding appropriateness for any diet trials.  Continue feeding via core track for now    LISY/hypernatremia: Improved   -Felt secondary to dehydration/urine retention and not from acyclovir as crystals negative.    - Continue to hold Diovan  and Ditropan  - Monitor urine output    CAD/PAF/NSVT: Stable.  Continue Eliquis.  Discussed with Dr. Norris, no need for aspirin.  No BB due to previous bradycardia    Parkinson disease: Continue Sinemet.     HTN uncontrolled.  Hesitant to get overly aggressive with correction given penchant for orthostasis and PD.  Monitor for now      DVT prophy: Eliquis.    Disposition: Plan TBD.  He will not be ready for another few days      Ford Salazar MD  [facility]  12/23/24  15:48 EST

## 2024-12-23 NOTE — PROGRESS NOTES
LOS: 6 days   Patient Care Team:  Aleksander Diez MD as PCP - General  Joseph Acharya MD as Consulting Physician (Ophthalmology)  Ford Dorado MD as Consulting Physician (Vascular Surgery)  Cecilio Armas III, MD as Consulting Physician (Cardiology)  Kashif Goodwin MD as Consulting Physician (Neurology)  Enoch Lares MD (Hematology)  Kayden Chance MD (Dermatology)  Vinod Cadena MD as Consulting Physician (Neurosurgery)  Johnny Castellanos MD as Consulting Physician (Urology)  Inge Pedro, PT as Physical Therapist (Physical Therapy)  Lucrecia Mccloud, PT as Physical Therapist (Physical Therapy)  Nereyda Toledo, OT as Occupational Therapist (Occupational Therapy)  Junior Taylor MD as Surgeon (Orthopedic Surgery)  Casey Power MD as Surgeon (Wound Care)  Nahomi Jacobo APRN as Nurse Practitioner (Nurse Practitioner)    Chief Complaint: DEE    Subjective     History of Present Illness  Casey Snowden . is a 89 y.o. male with history of Parkinson's disease and dementia.  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 With no known history of renal disease. Started on iv acyclovir.  Now with dee. Cr up to 3.3 from 0.8.  with h/o bph. Currently asleep. Grandson at bedside.       Subjective  Patient still encephalopathic  Has had some large volume bladder scans, but patient has been able to void as they have moved him around. PVR has been under 300.  DHT in place.     12/21 - underwent LP this am. No other acute events overnight. Continues to be confused. 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     12/23 no acute events. Seems to be doing a bit better today.    History taken from: family and chart    Objective     Vital Sign Min/Max for last 24 hours  Temp  Min: 98.2 °F (36.8 °C)  Max: 100 °F (37.8 °C)   BP  Min: 153/60  Max: 192/60   Pulse  Min: 72  Max: 76   Resp  Min: 18  Max: 20   SpO2   "Min: 95 %  Max: 99 %   No data recorded   Weight  Min: 66.4 kg (146 lb 6.2 oz)  Max: 66.4 kg (146 lb 6.2 oz)     Flowsheet Rows      Flowsheet Row First Filed Value   Admission Height --   Admission Weight 66.7 kg (147 lb 0.8 oz) Documented at 12/18/2024 0500            I/O this shift:  In: 2752 [Other:510; NG/GT:2242]  Out: -   I/O last 3 completed shifts:  In: 855 [Other:90; NG/GT:765]  Out: -     Objective:  Vital signs: (most recent): Blood pressure 173/62, pulse 76, temperature 99.1 °F (37.3 °C), temperature source Oral, resp. rate 16, height 177.8 cm (70\"), weight 66.4 kg (146 lb 6.2 oz), SpO2 95%.            Physical Examination: General appearance - ill appearing, frail, looks slighlty better today  Mental status - obtunded.   Nose - DHT in place   Chest - + coarse breath sound. Mos[place] upper airway .  Heart - normal rate, regular rhythm, normal S1, S2, no murmurs, rubs, clicks or gallops  Abdomen - soft, nontender, nondistended, no masses or organomegaly  Neurological - encephalopathic   Extremities - no edema      Results Review:     I reviewed the patient's new clinical results.    WBC WBC   Date Value Ref Range Status   12/22/2024 8.28 3.40 - 10.80 10*3/mm3 Final   12/21/2024 8.60 3.40 - 10.80 10*3/mm3 Final   12/20/2024 6.73 3.40 - 10.80 10*3/mm3 Final      HGB Hemoglobin   Date Value Ref Range Status   12/22/2024 11.0 (L) 13.0 - 17.7 g/dL Final   12/21/2024 11.9 (L) 13.0 - 17.7 g/dL Final   12/20/2024 11.2 (L) 13.0 - 17.7 g/dL Final      HCT Hematocrit   Date Value Ref Range Status   12/22/2024 31.4 (L) 37.5 - 51.0 % Final   12/21/2024 34.2 (L) 37.5 - 51.0 % Final   12/20/2024 32.7 (L) 37.5 - 51.0 % Final      Platlets No results found for: \"LABPLAT\"   MCV MCV   Date Value Ref Range Status   12/22/2024 92.9 79.0 - 97.0 fL Final   12/21/2024 94.2 79.0 - 97.0 fL Final   12/20/2024 93.7 79.0 - 97.0 fL Final          Sodium Sodium   Date Value Ref Range Status   12/22/2024 148 (H) 136 - 145 mmol/L Final " "  12/21/2024 144 136 - 145 mmol/L Final   12/20/2024 136 136 - 145 mmol/L Final      Potassium Potassium   Date Value Ref Range Status   12/22/2024 3.8 3.5 - 5.2 mmol/L Final   12/21/2024 4.0 3.5 - 5.2 mmol/L Final   12/20/2024 4.1 3.5 - 5.2 mmol/L Final      Chloride Chloride   Date Value Ref Range Status   12/22/2024 109 (H) 98 - 107 mmol/L Final   12/21/2024 106 98 - 107 mmol/L Final   12/20/2024 106 98 - 107 mmol/L Final      CO2 CO2   Date Value Ref Range Status   12/22/2024 22.5 22.0 - 29.0 mmol/L Final   12/21/2024 21.0 (L) 22.0 - 29.0 mmol/L Final   12/20/2024 19.3 (L) 22.0 - 29.0 mmol/L Final      BUN BUN   Date Value Ref Range Status   12/22/2024 53 (H) 8 - 23 mg/dL Final   12/21/2024 62 (H) 8 - 23 mg/dL Final   12/20/2024 54 (H) 8 - 23 mg/dL Final      Creatinine Creatinine   Date Value Ref Range Status   12/22/2024 1.87 (H) 0.76 - 1.27 mg/dL Final   12/21/2024 3.19 (H) 0.76 - 1.27 mg/dL Final   12/20/2024 3.97 (H) 0.76 - 1.27 mg/dL Final      Calcium Calcium   Date Value Ref Range Status   12/22/2024 8.2 8.2 - 9.6 mg/dL Final   12/21/2024 8.1 (L) 8.2 - 9.6 mg/dL Final   12/20/2024 7.7 (L) 8.2 - 9.6 mg/dL Final      PO4 No results found for: \"CAPO4\"   Albumin No results found for: \"ALBUMIN\"     Magnesium Magnesium   Date Value Ref Range Status   12/20/2024 2.2 1.6 - 2.4 mg/dL Final      Uric Acid No results found for: \"URICACID\"     Medication Review:     Current Facility-Administered Medications:     acetaminophen (TYLENOL) tablet 650 mg, 650 mg, Nasogastric, Q4H PRN **OR** acetaminophen (TYLENOL) 160 MG/5ML oral solution 650 mg, 650 mg, Nasogastric, Q4H PRN **OR** acetaminophen (TYLENOL) suppository 650 mg, 650 mg, Rectal, Q4H PRN, Chilo Tyson MD    acyclovir (ZOVIRAX) 700 mg in sodium chloride 0.9 % 250 mL IVPB, 10 mg/kg, Intravenous, Q24H, Delroy Reed MD, 700 mg at 12/22/24 0950    amLODIPine (NORVASC) tablet 5 mg, 5 mg, Oral, Daily, Leidy Tyson APRN, 5 mg at 12/22/24 " 1439    apixaban (ELIQUIS) tablet 2.5 mg, 2.5 mg, Oral, Q12H, Chilo Tyson MD, 2.5 mg at 12/22/24 2123    aspirin chewable tablet 81 mg, 81 mg, Nasogastric, Daily, Chilo Tyson MD, 81 mg at 12/22/24 0951    atorvastatin (LIPITOR) tablet 40 mg, 40 mg, Nasogastric, Nightly, Chilo Tyson MD, 40 mg at 12/22/24 2123    atropine 1 % ophthalmic solution 1 drop, 1 drop, Both Eyes, TID PRN, TimInessa MD    carbidopa-levodopa (SINEMET)  MG per tablet 1 tablet, 1 tablet, Nasogastric, TID, Chilo Tyson MD, 1 tablet at 12/22/24 2123    dextrose (D50W) (25 g/50 mL) IV injection 25 g, 25 g, Intravenous, Q15 Min PRN, Chilo Tyson MD    dextrose (GLUTOSE) oral gel 15 g, 15 g, Oral, Q15 Min PRN, Chilo Tyson MD    finasteride (PROSCAR) tablet 5 mg, 5 mg, Nasogastric, Daily, Chilo Tyson MD, 5 mg at 12/22/24 0951    glucagon (GLUCAGEN) injection 1 mg, 1 mg, Intramuscular, Q15 Min PRN, Chilo Tyson MD    guaiFENesin (MUCINEX) 12 hr tablet 600 mg, 600 mg, Oral, Q12H, Chilo Tyson MD, 600 mg at 12/22/24 2124    insulin regular (humuLIN R,novoLIN R) injection 2-7 Units, 2-7 Units, Subcutaneous, Q6H, Chilo Tyson MD, 2 Units at 12/23/24 0617    levothyroxine (SYNTHROID, LEVOTHROID) tablet 75 mcg, 75 mcg, Nasogastric, Daily, Chilo Tyson MD, 75 mcg at 12/23/24 0618    LORazepam (ATIVAN) injection 2 mg, 2 mg, Intravenous, Q5 Min PRN, Chilo Tyson MD    ondansetron (ZOFRAN) injection 4 mg, 4 mg, Intravenous, Q6H PRN, Tim, Jawed, MD    [Held by provider] oxybutynin XL (DITROPAN-XL) 24 hr tablet 10 mg, 10 mg, Oral, Daily, Inessa Dumont MD, 10 mg at 12/18/24 0901    Pharmacy to Dose acyclovir (ZOVIRAX), , Not Applicable, Continuous PRN, Delroy Reed MD    [COMPLETED] Insert Peripheral IV, , , Once **AND** sodium chloride 0.9 % flush 10 mL, 10 mL, Intravenous, PRN, Ian Dao MD    sodium chloride 0.9 % flush 10 mL, 10 mL, Intravenous, Q12H, Inessa Dumont MD, 10 mL at 12/22/24 2124    sodium  chloride 0.9 % flush 10 mL, 10 mL, Intravenous, PRN, Inessa Dumont MD    sodium chloride 0.9 % infusion 40 mL, 40 mL, Intravenous, PRN, Inessa Dumont MD    sodium chloride 0.9 % infusion, 75 mL/hr, Intravenous, Continuous, Claudia Bran MD, Last Rate: 75 mL/hr at 12/23/24 0335, 75 mL/hr at 12/23/24 0335    terazosin (HYTRIN) capsule 1 mg, 1 mg, Nasogastric, Nightly, Chilo Tyson MD, 1 mg at 12/22/24 2123    [Held by provider] valsartan (DIOVAN) tablet 40 mg, 40 mg, Oral, Daily, TimInessa german MD, 40 mg at 12/18/24 0902    vitamin B-12 (CYANOCOBALAMIN) tablet 1,000 mcg, 1,000 mcg, Nasogastric, Q AM, Chilo Tyson MD, 1,000 mcg at 12/23/24 0617     Assessment & Plan       Unable to ambulate    Benign essential hypertension    Parkinson's disease    Chronic deep vein thrombosis (DVT) of popliteal vein of right lower extremity    Uses hearing aid    Paroxysmal A-fib    Chronic diastolic (congestive) heart failure    Presence of IVC filter    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     LISY likely due to volume depletion and retention. Acyclovir less (no crystals in UA, improving with IVF).     He has confirmed VZV meningoencephalitis and will need a total of 14 day of IV acyclovir.     He should continue to have IVF. Can use loop diuretics if concerns for volume overload. Alternatively, we could do NS @ 125 ml/hr 1 hour prior to acyclovir infusion and 6 hours after.   Continue to renally dose, ok to BID dosing.     Increase free water per tube today.      UA is bland, some protein, no GN suspected at this time.   Check labs in am   Will follow     Cherri Parr MD  12/23/24  06:48 EST

## 2024-12-23 NOTE — PLAN OF CARE
Goal Outcome Evaluation:  Plan of Care Reviewed With: patient        Progress: no change  Outcome Evaluation: vss. telemetry monitor, sr with pvcs. confused. q2 turn. incontinence care. room air. speech reconsulted, cortrak with tube feedings at goal. family at bedside.

## 2024-12-23 NOTE — NURSING NOTE
12/23/24 1050   Wound 12/20/24 1616 Left posterior hand   Placement Date/Time: 12/20/24 1616   Side: Left  Orientation: posterior  Location: hand  Primary Wound Type: Skin Tear   Dressing Appearance intact;dried drainage  (kerlix, vaseline gauze)   Base moist;red  (skin tear with partial flap intact)   Periwound ecchymotic  (drying skin tear to forearm)   Wound Length (cm) 4 cm   Wound Width (cm) 0.8 cm   Wound Depth (cm) 0 cm   Wound Surface Area (cm^2) 3.2 cm^2   Wound Volume (cm^3) 0 cm^3   Drainage Amount scant  (old drainage to gauze)   Care, Wound cleansed with;sterile normal saline  (soaked dressing off with NS)   Dressing Care dressing changed  (1 vaseline gauze with added packet of vaseline to hand, vaseline gauze to forearm dried skin tear, kerlix hand and up the forearm)     Wound/ostomy - consult received regarding a skin tear to posterior hand, dressing in place with old drainage. Dressing removed carefully, skin ecchymotic, thin, bleeds easily related to anticoagulant use, skin tear with partial flap remaining and some skin loss, no redness or bleeding. Recommend to manage with vaseline gauze and added vaseline to prevent adherence, wrap with kerlix hand and up the forearm, a drying skin tear with scattered ecchymosis to the foreamr, keeping wrapped with kerlix will provide protection.

## 2024-12-23 NOTE — PLAN OF CARE
Goal Outcome Evaluation:  Plan of Care Reviewed With: patient, family        Progress: declining  Outcome Evaluation: Pt is seen today by OT. He has had a large decline since last week/last treatment session. He had a rapid response called over the weekend for AMS/lethargy. 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 Today pt follows a few commands. He is able to sit EOB and stand from the bed with assistance of 2. He has had an acute decline in all ADLs and has not been able to transfer out of bed for ADLs with [facility] staff. Pt may need SNF now at [place].    Anticipated Discharge Disposition (OT): skilled nursing facility

## 2024-12-23 NOTE — THERAPY EVALUATION
Acute Care - Speech Language Pathology   Swallow Initial Evaluation [facility]     Patient Name: Casey Snowden Sr.  : 1934  MRN: [id number]  Today's Date: 2024               Admit Date: 2024    Visit Dx:     ICD-10-CM ICD-9-CM   1. Unable to ambulate  R26.2 719.7   2. Herpes zoster with complication: S2 and S3 distribution on the right  B02.8 053.8   3. Parkinson's disease, unspecified whether dyskinesia present, unspecified whether manifestations fluctuate  G20.A1 332.0     Patient Active Problem List   Diagnosis    Benign essential hypertension    Stenosis of carotid artery    Mixed hyperlipidemia    Parkinson's disease    Peripheral arterial occlusive disease    Screening for prostate cancer    Medication monitoring encounter    Melanoma    Chronic deep vein thrombosis (DVT) of popliteal vein of right lower extremity    Uses hearing aid    Paroxysmal A-fib    Chronic diastolic (congestive) heart failure    Anticoagulant long-term use    Presence of IVC filter    Medicare annual wellness visit, subsequent    Iron deficiency anemia secondary to inadequate dietary iron intake    Orthostatic hypotension due to Parkinson's disease    Coronary artery disease involving native coronary artery of native heart without angina pectoris    ST elevation myocardial infarction involving left anterior descending (LAD) coronary artery    Sialorrhea    Hypertensive heart disease with heart failure    PAD (peripheral artery disease)    Metastatic squamous cell carcinoma    Secondary malignancy of lymph nodes of head, face and neck    Encounter for antineoplastic immunotherapy    RBD (REM behavioral disorder)    Acquired hypothyroidism    Aspiration pneumonia of right lung due to vomit    Dysphagia, neurologic    ICAO (internal carotid artery occlusion), right    Arterial ischemic stroke, ICA (internal carotid artery), right, chroni    Renal mass, left    Abdominal aortic aneurysm    Disorientation    Falls  frequently    Right inguinal hernia    Unable to ambulate    Herpes zoster without complication    LISY (acute kidney injury)    Moderate protein-calorie malnutrition     Past Medical History:   Diagnosis Date    Abdominal aortic aneurysm without rupture 10/14/2021    Acquired hypothyroidism 08/31/2022    Acute ischemic stroke 10/08/2023    Aneurysm     Arrhythmia     Atrial fibrillation     Cancer 04/2018    basil cell carcinoma    Carotid stenosis 10/29/2015    CHF (congestive heart failure)     Chronic deep vein thrombosis (DVT) of popliteal vein of right lower extremity     Clotting disorder     Coronary artery disease involving native coronary artery of native heart without angina pectoris 12/20/2018    DVT of lower extremity (deep venous thrombosis)     Hard of hearing     Hyperlipidemia     Hypertension     Localized edema 01/04/2017    Long-term use of high-risk medication     Metabolic encephalopathy 02/07/2023    Myocardial infarction 2918    LAD stent    PAD (peripheral artery disease) 09/10/2020    Parkinson's disease     Postphlebitic syndrome     Pure hypercholesterolemia     Skin tear of upper arm without complication, left, subsequent encounter     Stroke 11/02/2023     Past Surgical History:   Procedure Laterality Date    BASAL CELL CARCINOMA EXCISION  04/2018    CARDIAC CATHETERIZATION N/A 11/05/2018    Procedure: Left Heart Cath;  Surgeon: Oliverio Azar MD;  Location: [facility] CATH INVASIVE LOCATION;  Service: Cardiovascular    CARDIAC CATHETERIZATION N/A 11/05/2018    Procedure: Coronary angiography;  Surgeon: Oliverio Azar MD;  Location: [facility] CATH INVASIVE LOCATION;  Service: Cardiovascular    CARDIAC CATHETERIZATION N/A 11/05/2018    Procedure: Left ventriculography;  Surgeon: Oliverio Azar MD;  Location: [facility] CATH INVASIVE LOCATION;  Service: Cardiovascular    CARDIAC CATHETERIZATION N/A 06/04/2019    Procedure: Left Heart Cath;  Surgeon: Johnny Glasgow MD;  Location: [facility]  CATH INVASIVE LOCATION;  Service: Cardiovascular    CARDIAC CATHETERIZATION N/A 06/04/2019    Procedure: Coronary angiography;  Surgeon: Johnny Glasgow MD;  Location: [facility] CATH INVASIVE LOCATION;  Service: Cardiovascular    CARDIAC CATHETERIZATION N/A 06/04/2019    Procedure: Left ventriculography;  Surgeon: Johnny Glasgow MD;  Location: [facility] CATH INVASIVE LOCATION;  Service: Cardiovascular    CARDIAC CATHETERIZATION N/A 06/04/2019    Procedure: Stent BILL coronary;  Surgeon: Johnny Glasgow MD;  Location: [facility] CATH INVASIVE LOCATION;  Service: Cardiovascular    CARDIAC SURGERY      CAROTID ARTERY ANGIOPLASTY Right 10/11/2023    with stent    CAROTID STENT      CATARACT EXTRACTION Left 08/2018    CATARACT EXTRACTION Right 09/2018    COLONOSCOPY      2011    CORONARY STENT PLACEMENT      INCISION AND DRAINAGE LEG Right 07/07/2017    Procedure: INCISION AND DRAINAGE INFECTED HEMATOMA RIGHT THIGH;  Surgeon: Valentin Peña MD;  Location: [facility] MAIN OR;  Service:     JOINT REPLACEMENT      KNEE INCISION AND DRAINAGE Right 12/27/2016    Procedure: KNEE INCISION AND DRAINAGE;  Surgeon: Triston Whitten MD;  Location: [facility] MAIN OR;  Service:     NOSE SURGERY      nose replacement    SINUS SURGERY  1960    SKIN BIOPSY      SKIN GRAFT  12/2017    TOTAL KNEE ARTHROPLASTY Right     VASCULAR SURGERY      VENA CAVA FILTER PLACEMENT         SLP Recommendation and Plan  SLP Swallowing Diagnosis: suspected pharyngeal dysphagia (12/23/24 1500)  SLP Diet Recommendation: ice chips between meals after oral care, with supervision (12/23/24 1500)     SLP Rec. for Method of Medication Administration: meds via alternate route (12/23/24 1500)     Monitor for Signs of Aspiration: yes, notify SLP if any concerns (12/23/24 1500)  Recommended Diagnostics: reassess via clinical swallow evaluation (12/23/24 1500)           Therapy Frequency (Swallow): PRN (12/23/24 1500)  Predicted Duration Therapy  Intervention (Days): until discharge (12/23/24 1500)  Oral Care Recommendations: Before ice/water, Oral Care BID/PRN (12/23/24 1500)                                        Outcome Evaluation: Patient seen for clinical swallow assessment. Pt lethargic, but voicing at times. Resting with open mouth posture. Son present. Initially, dry voice demonstrated, but wetness developed as assessment progressed. Unable to trigger a volitional swallow with cues. Pt closed mouth readily around spoon. Adequate bolus manipulation observed, but delayed weak swallow triggered. Increased wet voice appreciated. PO not appropriate at this time. Of note, patient has VFSS completed 12/2024 which revealed mod-severe dysphagia. Pt refused diet modifications at that time and does not have a hx of pna within the past year per son. Occasionally will choke on foods like peanuts.      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, PD  -        Current Method of Nutrition NPO;nasogastric feedings  -        Precautions/Limitations, Vision difficult to assess  -        Precautions/Limitations, Hearing difficult to assess  -        Prior Level of Function-Swallowing other (see comments)  hx dysphagia  -        Plans/Goals Discussed with patient  -        Barriers to Rehab previous functional deficit;cognitive status;medically complex  -           Pain    Pre/Posttreatment Pain Comment No visible discomfort, no report of pain  -SH           Oral Motor Structure and Function    Dentition Assessment natural, present and adequate  -           Oral Musculature and Cranial Nerve Assessment    Oral Motor General Assessment unable to  assess;generalized oral motor weakness  -           Clinical Swallow Eval    Clinical Swallow Evaluation Summary Patient seen for clinical swallow assessment. Pt lethargic, but voicing at times. Resting with open mouth posture. Son present. Initially, dry voice demonstrated, but wetness developed as assessment progressed. Unable to trigger a volitional swallow with cues. Pt closed mouth readily around spoon. Adequate bolus manipulation observed, but delayed weak swallow triggered. Increased wet voice appreciated. PO not appropriate at this time. Of note, patient has VFSS completed 12/2024 which revealed mod-severe dysphagia. Pt refused diet modifications at that time and does not have a hx of pna within the past year per son. Occasionally will choke on foods like peanuts. Will follow for PO readiness. Can allow 1-2 ice chips after oral care for patient comfort.  -           SLP Evaluation Clinical Impression    SLP Swallowing Diagnosis suspected pharyngeal dysphagia  -           Recommendations    Therapy Frequency (Swallow) PRN  -        Predicted Duration Therapy Intervention (Days) until discharge  -[facility] Diet Recommendation ice chips between meals after oral care, with supervision  -        Recommended Diagnostics reassess via clinical swallow evaluation  -        Oral Care Recommendations Before ice/water;Oral Care BID/PRN  -        SLP Rec. for Method of Medication Administration meds via alternate route  -        Monitor for Signs of Aspiration yes;notify SLP if any concerns  -                  User Key  (r) = Recorded By, (t) = Taken By, (c) = Cosigned By      Initials Name Effective Dates     Tierra Mims, SLP 01/05/24 -                     EDUCATION  The patient has been educated in the following areas:   Dysphagia (Swallowing Impairment).                Time Calculation:    Time Calculation- SLP       Row Name 12/23/24 1527             Time Calculation- [facility]    SLP Start Time  1400  -      SLP Received On 12/23/24  -                User Key  (r) = Recorded By, (t) = Taken By, (c) = Cosigned By      Initials Name Provider Type    Tierra Lozano SLP Speech and Language Pathologist                    Therapy Charges for Today       Code Description Service Date Service Provider Modifiers Qty    [id number]  ST EVAL ORAL PHARYNG SWALLOW 5 12/23/2024 Tierra Mims SLP GN 1                 DULCE Villarreal  12/23/2024

## 2024-12-23 NOTE — THERAPY TREATMENT NOTE
Patient Name: Casey Snowden .  : 1934    MRN: [id number]                              Today's Date: 2024       Admit Date: 2024    Visit Dx:     ICD-10-CM ICD-9-CM   1. Unable to ambulate  R26.2 719.7   2. Herpes zoster with complication: S2 and S3 distribution on the right  B02.8 053.8   3. Parkinson's disease, unspecified whether dyskinesia present, unspecified whether manifestations fluctuate  G20.A1 332.0     Patient Active Problem List   Diagnosis    Benign essential hypertension    Stenosis of carotid artery    Mixed hyperlipidemia    Parkinson's disease    Peripheral arterial occlusive disease    Screening for prostate cancer    Medication monitoring encounter    Melanoma    Chronic deep vein thrombosis (DVT) of popliteal vein of right lower extremity    Uses hearing aid    Paroxysmal A-fib    Chronic diastolic (congestive) heart failure    Anticoagulant long-term use    Presence of IVC filter    Medicare annual wellness visit, subsequent    Iron deficiency anemia secondary to inadequate dietary iron intake    Orthostatic hypotension due to Parkinson's disease    Coronary artery disease involving native coronary artery of native heart without angina pectoris    ST elevation myocardial infarction involving left anterior descending (LAD) coronary artery    Sialorrhea    Hypertensive heart disease with heart failure    PAD (peripheral artery disease)    Metastatic squamous cell carcinoma    Secondary malignancy of lymph nodes of head, face and neck    Encounter for antineoplastic immunotherapy    RBD (REM behavioral disorder)    Acquired hypothyroidism    Aspiration pneumonia of right lung due to vomit    Dysphagia, neurologic    ICAO (internal carotid artery occlusion), right    Arterial ischemic stroke, ICA (internal carotid artery), right, chroni    Renal mass, left    Abdominal aortic aneurysm    Disorientation    Falls frequently    Right inguinal hernia    Unable to ambulate     Herpes zoster without complication    LISY (acute kidney injury)    Moderate protein-calorie malnutrition     Past Medical History:   Diagnosis Date    Abdominal aortic aneurysm without rupture 10/14/2021    Acquired hypothyroidism 08/31/2022    Acute ischemic stroke 10/08/2023    Aneurysm     Arrhythmia     Atrial fibrillation     Cancer 04/2018    basil cell carcinoma    Carotid stenosis 10/29/2015    CHF (congestive heart failure)     Chronic deep vein thrombosis (DVT) of popliteal vein of right lower extremity     Clotting disorder     Coronary artery disease involving native coronary artery of native heart without angina pectoris 12/20/2018    DVT of lower extremity (deep venous thrombosis)     Hard of hearing     Hyperlipidemia     Hypertension     Localized edema 01/04/2017    Long-term use of high-risk medication     Metabolic encephalopathy 02/07/2023    Myocardial infarction 2918    LAD stent    PAD (peripheral artery disease) 09/10/2020    Parkinson's disease     Postphlebitic syndrome     Pure hypercholesterolemia     Skin tear of upper arm without complication, left, subsequent encounter     Stroke 11/02/2023     Past Surgical History:   Procedure Laterality Date    BASAL CELL CARCINOMA EXCISION  04/2018    CARDIAC CATHETERIZATION N/A 11/05/2018    Procedure: Left Heart Cath;  Surgeon: Oliverio Azar MD;  Location:  [facility] C[facility] INVASIVE LOCATION;  Service: Cardiovascular    CARDIAC CATHETERIZATION N/A 11/05/2018    Procedure: Coronary angiography;  Surgeon: Oliverio Azar MD;  Location: [facility] CATH [facility] LOC[facility];  Service: Cardiovascular    CARDIAC CATHETERIZATION N/A 11/05/2018    Procedure: Left ventriculography;  Surgeon: Oliverio Azar MD;  Location:  [facility] CATH INVASIVE LOCATION;  Service: Cardiovascular    CARDIAC CATHETERIZATION N/A 06/04/2019    Procedure: Left Heart Cath;  Surgeon: Johnny Glasgow MD;  [facility];  Service: Cardiovascular    CARDIAC  CATHETERIZATION N/A 06/04/2019    Procedure: Coronary angiography;  Surgeon: Johnny lGasgow MD;  Location: [facility] CATH INVASIVE LOCATION;  Service: Cardiovascular    CARDIAC CATHETERIZATION N/A 06/04/2019    Procedure: Left ventriculography;  Surgeon: Johnny Glasgow MD;  Location: [facility] CATH INVASIVE LOCATION;  Service: Cardiovascular    CARDIAC CATHETERIZATION N/A 06/04/2019    Procedure: Stent BILL coronary;  Surgeon: Johnny Glasgow MD;  Location: [facility] CATH INVASIVE LOCATION;  Service: Cardiovascular    CARDIAC SURGERY      CAROTID ARTERY ANGIOPLASTY Right 10/11/2023    with stent    CAROTID STENT      CATARACT EXTRACTION Left 08/2018    CATARACT EXTRACTION Right 09/2018    COLONOSCOPY      2011    CORONARY STENT PLACEMENT      INCISION AND DRAINAGE LEG Right 07/07/2017    Procedure: INCISION AND DRAINAGE INFECTED HEMATOMA RIGHT THIGH;  Surgeon: Valentin Peña MD;  Location: [facility] OR;  Service:     JOINT REPLACEMENT      KNEE INCISION AND DRAINAGE Right 12/27/2016    Procedure: KNEE INCISION AND DRAINAGE;  Surgeon: Triston Whitten MD;  Location: [facility] OR;  Service:     NOSE SURGERY      nose replacement    SINUS SURGERY  1960    SKIN BIOPSY      SKIN GRAFT  12/2017    TOTAL KNEE ARTHROPLASTY Right     VASCULAR SURGERY      VENA CAVA FILTER PLACEMENT        General Information       [facility] Name 12/23/24 1547          Physical Therapy Time and Intention    Document Type therapy note (daily note)  -     Mode of Treatment individual therapy;physical therapy  -       Row Name 12/23/24 154          General Information    Patient Profile Reviewed yes  -     Existing Precautions/Restrictions fall;NPO  -       Row Name 12/23/24 6801          Cognition    Orientation Status (Cognition) oriented to;person  Largely nonverbal, inconsistent command following  -       Row Name 12/23/24 6842          Safety Issues/Impairments Affecting Functional Mobility    Impairments  Affecting Function (Mobility) balance;endurance/activity tolerance;strength;coordination;cognition  -               User Key  (r) = Recorded By, (t) = Taken By, (c) = Cosigned By      Initials Name Provider Type    Debbie Montes PT Physical Therapist                   Mobility       Row Name 12/23/24 1549          Bed Mobility    Bed Mobility supine-sit;sit-supine  -     Supine-Sit Lamar (Bed Mobility) maximum assist (25% patient effort);2 person assist  -     Sit-Supine Lamar (Bed Mobility) maximum assist (25% patient effort);2 person assist  -     Assistive Device (Bed Mobility) head of bed elevated;repositioning sheet  -       Row Name 12/23/24 1549          Sit-Stand Transfer    Sit-Stand Lamar (Transfers) moderate assist (50% patient effort);2 person assist;verbal cues  -     Assistive Device (Sit-Stand Transfers) walker, front-wheeled  -     Comment, (Sit-Stand Transfer) Poor standing balance, forward flexed, unable to coordinate side steps to HOB  -               User Key  (r) = Recorded By, (t) = Taken By, (c) = Cosigned By      Initials Name Provider Type    Debbie Montes PT Physical Therapist                   Obj/Interventions       Row Name 12/23/24 1551          Balance    Comment, Balance SBA sitting EOB, mod A x2 standing  -               User Key  (r) = Recorded By, (t) = Taken By, (c) = Cosigned By      Initials Name Provider Type     Debbie Connolly PT Physical Therapist                   Goals/Plan    No documentation.                  Clinical Impression       Row Name 12/23/24 1551          Pain    Pretreatment Pain Rating 0/10 - no pain  -     Posttreatment Pain Rating 0/10 - no pain  -       Row Name 12/23/24 1551          Plan of Care Review    Plan of Care Reviewed With patient  -     Progress declining  -     Outcome Evaluation Pt seen for PT this PM and continues to require Ax2 for all mobility. Pt transferred to EOB with max  A x2 and progressed to CGA-SBA for sitting balance. Pt requiring heavy cues for LE exercises and inconsistently following commands. Pt completed 2 STS with mod A x2 and HHA - forward flexed and unable to correct. Difficulty sequencing any steps to HOB, pivoting hips higher slightly with each sit. Pt fatigues quickly, returned to supine with max A x2 and left with needs met. PT will continue to follow, anticipate DC to SNF.  -       Row Name 12/23/24 1555          Positioning and Restraints    Pre-Treatment Position in bed  -     Post Treatment Position bed  -     In Bed fowlers;call light within reach;encouraged to call for assist;exit alarm on;with family/caregiver  -               User Key  (r) = Recorded By, (t) = Taken By, (c) = Cosigned By      Initials Name Provider Type     Debbie Connolly, PT Physical Therapist                   Outcome Measures       Row Name 12/23/24 5880          How much help from another person do you currently need...    Turning from your back to your side while in flat bed without using bedrails? 2  -     Moving from lying on back to sitting on the side of a flat bed without bedrails? 2  -BH     Moving to and from a bed to a chair (including a wheelchair)? 1  -BH     Standing up from a chair using your arms (e.g., wheelchair, bedside chair)? 2  -BH     Climbing 3-5 steps with a railing? 1  -BH     To walk in hospital room? 1  -     AM-PAC 6 Clicks Score (PT) 9  -     Highest Level of Mobility Goal 3 --> Sit at edge of bed  -       Row Name 12/23/24 1556 12/23/24 1427       Functional Assessment    Outcome Measure Options AM-PAC 6 Clicks Basic Mobility (PT)  - AM-PAC 6 Clicks Daily Activity (OT)  -              User Key  (r) = Recorded By, (t) = Taken By, (c) = Cosigned By      Initials Name Provider Type    Tierra Griffiht OT Occupational Therapist     Debbie Connolly, PT Physical Therapist                                 Physical Therapy Education        Title: PT OT SLP Therapies (In Progress)       Topic: Physical Therapy (In Progress)       Point: Mobility training (In Progress)       Learning Progress Summary            Patient Acceptance, E,TB,D, NL,NR by  at 12/23/2024 1556    Acceptance, E, VU,NR by  at 12/18/2024 1606                      Point: Home exercise program (In Progress)       Learning Progress Summary            Patient Acceptance, E,TB,D, NL,NR by  at 12/23/2024 1556    Acceptance, E, VU,NR by  at 12/18/2024 1606                      Point: Body mechanics (In Progress)       Learning Progress Summary            Patient Acceptance, E,TB,D, NL,NR by  at 12/23/2024 1556    Acceptance, E, VU,NR by  at 12/18/2024 1606                      Point: Precautions (In Progress)       Learning Progress Summary            Patient Acceptance, E,TB,D, NL,NR by  at 12/23/2024 1556    Acceptance, E, VU,NR by  at 12/18/2024 1606                                      User Key       Initials Effective Dates Name Provider Type Discipline     06/16/21 -  Alexandra Lucio, PT Physical Therapist PT     04/08/22 -  Debbie Connolly, PT Physical Therapist PT                  PT Recommendation and Plan     Progress: declining  Outcome Evaluation: Pt seen for PT this PM and continues to require Ax2 for all mobility. Pt transferred to EOB with max A x2 and progressed to CGA-SBA for sitting balance. Pt requiring heavy cues for LE exercises and inconsistently following commands. Pt completed 2 STS with mod A x2 and HHA - forward flexed and unable to correct. Difficulty sequencing any steps to HOB, pivoting hips higher slightly with each sit. Pt fatigues quickly, returned to supine with max A x2 and left with needs met. PT will continue to follow, anticipate DC to SNF.     Time Calculation:         PT Charges       Row Name 12/23/24 1556             Time Calculation    Start Time 1339  -      Stop Time 1357  -      Time Calculation (min) 18 min  -       PT Received On 12/23/24  -      PT - Next Appointment 12/26/24  -         Time Calculation- PT    Total Timed Code Minutes- PT 18 minute(s)  -         Timed Charges    68419 - PT Therapeutic Activity Minutes 18  -         Total Minutes    Timed Charges Total Minutes 18  -       Total Minutes 18  -                User Key  (r) = Recorded By, (t) = Taken By, (c) = Cosigned By      Initials Name Provider Type     Debbie Connolly, PT Physical Therapist                  Therapy Charges for Today       Code Description Service Date Service Provider Modifiers Qty    [id number]  PT THERAPEUTIC ACT EA 15 MIN 12/23/2024 Debbie Connolly, PT GP 1            PT G-Codes  Outcome Measure Options: AM-PAC 6 Clicks Basic Mobility (PT)  AM-PAC 6 Clicks Score (PT): 9  AM-PAC 6 Clicks Score (OT): 9  PT Discharge Summary  Anticipated Discharge Disposition (PT): skilled nursing facility    Debbie Connolly PT  12/23/2024

## 2024-12-23 NOTE — PLAN OF CARE
Goal Outcome Evaluation:  Plan of Care Reviewed With: patient           Outcome Evaluation: Patient seen for clinical swallow assessment. Pt lethargic, but voicing at times. Resting with open mouth posture. Son present. Initially, dry voice demonstrated, but wetness developed as assessment progressed. Unable to trigger a volitional swallow with cues. Pt closed mouth readily around spoon. Adequate bolus manipulation observed, but delayed weak swallow triggered. Increased wet voice appreciated. PO not appropriate at this time. Of note, patient has had VFSS completed 12/2024 which revealed mod-severe dysphagia. Pt refused diet modifications at that time and does not have a hx of pna within the past year per son. Occasionally will choke on foods like peanuts. Will follow for PO readiness. Can allow 1-2 ice chips after oral care for patient comfort.

## 2024-12-23 NOTE — PROGRESS NOTES
Nutrition Services    Patient Name: Casey Snowden .  YOB: 1934  MRN: [id number]  Admission date: 12/17/2024    PROGRESS NOTE      Encounter Information: Checking in on TF tolerance. Nephrology following, adjusting FWF with hypernatremia.        PO Diet: NPO Diet NPO Type: Tube Feeding   PO Supplements: -   PO Intake:  -       Current nutrition support: Isosource 1.5 @ 65 mL/hr with 170 mL q4 FWF (flushes per nephrology)   Nutrition support review: Infusing at goal as ordered per documentation        Labs (reviewed below): Hypernatremia - nephrology following, managing FWF       GI Function:  + BM 12/22       Nutrition Intervention Updates: Continue TF at goal as ordered. Flushes per nephrology.        Results from last 7 days   Lab Units 12/23/24  0705 12/22/24  0635 12/21/24  0625 12/19/24 0923 12/18/24 0439 12/17/24  1238   SODIUM mmol/L 147* 148* 144   < > 136 137   POTASSIUM mmol/L 3.5 3.8 4.0   < > 3.8 3.7   CHLORIDE mmol/L 112* 109* 106   < > 104 107   CO2 mmol/L 23.9 22.5 21.0*   < > 23.0 25.2   BUN mg/dL 47* 53* 62*   < > 20 16   CREATININE mg/dL 1.19 1.87* 3.19*   < > 0.89 0.62*   CALCIUM mg/dL 8.4 8.2 8.1*   < > 8.1* 8.1*   BILIRUBIN mg/dL  --   --   --   --  0.6 0.6   ALK PHOS U/L  --   --   --   --  74 80   ALT (SGPT) U/L  --   --   --   --  <5 7   AST (SGOT) U/L  --   --   --   --  9 7   GLUCOSE mg/dL 124* 167* 170*   < > 109* 112*    < > = values in this interval not displayed.     Results from last 7 days   Lab Units 12/22/24  0635 12/21/24  0625 12/20/24  0811 12/19/24  0923 12/18/24 0439 12/17/24  1238   MAGNESIUM mg/dL  --   --  2.2 2.1  --  2.0   PHOSPHORUS mg/dL  --   --  6.6*  --   --   --    HEMOGLOBIN g/dL 11.0*   < > 11.2*  --    < > 11.5*   HEMATOCRIT % 31.4*   < > 32.7*  --    < > 36.1*   TRIGLYCERIDES mg/dL 52  --   --   --   --   --     < > = values in this interval not displayed.     COVID19   Date Value Ref Range Status   10/23/2022 Not Detected Not Detected - Ref.  Range Final     Lab Results   Component Value Date    HGBA1C 5.40 12/18/2024       Wt Readings from Last 10 Encounters:   12/23/24 0500 66.4 kg (146 lb 6.2 oz)   12/22/24 0510 68.7 kg (151 lb 7.3 oz)   12/21/24 0500 66 kg (145 lb 8.1 oz)   12/18/24 0500 66.7 kg (147 lb 0.8 oz)   11/25/24 1624 68.9 kg (152 lb)   11/13/24 1140 67.8 kg (149 lb 8 oz)   11/06/24 1336 66.9 kg (147 lb 8 oz)   08/27/24 1119 68.9 kg (152 lb)   08/14/24 1632 71.7 kg (158 lb)   08/14/24 1112 70 kg (154 lb 4.8 oz)   08/09/24 1256 70.3 kg (155 lb)   07/03/24 1105 68.4 kg (150 lb 11.2 oz)   07/02/24 0957 68.9 kg (152 lb)       RD to follow up per protocol.    Electronically signed by:  Kathryn Montano RD  12/23/24 09:06 EST

## 2024-12-23 NOTE — PROGRESS NOTES
LOS: 6 days     Chief Complaint: F/U  VZV encephalitis    Subj: No acute events.  D/W his son at the bedside.  Mental status is improving but certainly not yet back to baseline.  His kidney function is much better today.    Meds:    Current Facility-Administered Medications:     acetaminophen (TYLENOL) tablet 650 mg, 650 mg, Nasogastric, Q4H PRN **OR** acetaminophen (TYLENOL) 160 MG/5ML oral solution 650 mg, 650 mg, Nasogastric, Q4H PRN **OR** acetaminophen (TYLENOL) suppository 650 mg, 650 mg, Rectal, Q4H PRN, Chilo Tyson MD    acyclovir (ZOVIRAX) 700 mg in sodium chloride 0.9 % 250 mL IVPB, 10 mg/kg, Intravenous, Q12H, Delroy Reed MD    amLODIPine (NORVASC) tablet 5 mg, 5 mg, Oral, Daily, Leidy Tyson APRN, 5 mg at 12/23/24 0941    apixaban (ELIQUIS) tablet 5 mg, 5 mg, Oral, Q12H, Ford Salazar MD, 5 mg at 12/23/24 0923    aspirin chewable tablet 81 mg, 81 mg, Nasogastric, Daily, Chilo Tyson MD, 81 mg at 12/23/24 0941    atorvastatin (LIPITOR) tablet 40 mg, 40 mg, Nasogastric, Nightly, Chilo Tyson MD, 40 mg at 12/22/24 2123    atropine 1 % ophthalmic solution 1 drop, 1 drop, Both Eyes, TID PRN, Inessa Dumont MD    carbidopa-levodopa (SINEMET)  MG per tablet 1 tablet, 1 tablet, Nasogastric, TID, Chilo Tyson MD, 1 tablet at 12/23/24 0924    dextrose (D50W) (25 g/50 mL) IV injection 25 g, 25 g, Intravenous, Q15 Min PRN, Chilo Tyson MD    dextrose (GLUTOSE) oral gel 15 g, 15 g, Oral, Q15 Min PRN, Chilo Tyson MD    finasteride (PROSCAR) tablet 5 mg, 5 mg, Nasogastric, Daily, Chilo Tyson MD, 5 mg at 12/23/24 0925    glucagon (GLUCAGEN) injection 1 mg, 1 mg, Intramuscular, Q15 Min PRN, Chilo Tyson MD    guaiFENesin (MUCINEX) 12 hr tablet 600 mg, 600 mg, Oral, Q12H, Chilo Tyson MD, 600 mg at 12/23/24 0923    insulin regular (humuLIN R,novoLIN R) injection 2-7 Units, 2-7 Units, Subcutaneous, Q6H, Chilo Tyson MD, 2 Units at 12/23/24 0617     levothyroxine (SYNTHROID, LEVOTHROID) tablet 75 mcg, 75 mcg, Nasogastric, Daily, Chilo Tyson MD, 75 mcg at 12/23/24 0618    LORazepam (ATIVAN) injection 2 mg, 2 mg, Intravenous, Q5 Min PRN, Chilo Tyson MD    ondansetron (ZOFRAN) injection 4 mg, 4 mg, Intravenous, Q6H PRN, Inessa Dumont MD    [Held by provider] oxybutynin XL (DITROPAN-XL) 24 hr tablet 10 mg, 10 mg, Oral, Daily, Inessa Dumont MD, 10 mg at 12/18/24 0901    Pharmacy to Dose acyclovir (ZOVIRAX), , Not Applicable, Continuous PRN, Delroy Reed MD    [COMPLETED] Insert Peripheral IV, , , Once **AND** sodium chloride 0.9 % flush 10 mL, 10 mL, Intravenous, PRN, Ian Dao MD    sodium chloride 0.9 % flush 10 mL, 10 mL, Intravenous, Q12H, Inessa Dumont MD, 10 mL at 12/23/24 0923    sodium chloride 0.9 % flush 10 mL, 10 mL, Intravenous, PRN, Inessa Dumont MD    sodium chloride 0.9 % infusion 40 mL, 40 mL, Intravenous, PRN, Inessa Dumont MD    sodium chloride 0.9 % infusion, 75 mL/hr, Intravenous, Continuous, Claudia Bran MD, Last Rate: 75 mL/hr at 12/23/24 0335, 75 mL/hr at 12/23/24 0335    terazosin (HYTRIN) capsule 1 mg, 1 mg, Nasogastric, Nightly, Chilo Tyson MD, 1 mg at 12/22/24 2123    [Held by provider] valsartan (DIOVAN) tablet 40 mg, 40 mg, Oral, Daily, Inessa Dumont MD, 40 mg at 12/18/24 0902    vitamin B-12 (CYANOCOBALAMIN) tablet 1,000 mcg, 1,000 mcg, Nasogastric, Q AM, Chilo Tyson MD, 1,000 mcg at 12/23/24 0617      Vital Signs  Temp:  [98.2 °F (36.8 °C)-99.1 °F (37.3 °C)] 99.1 °F (37.3 °C)  Heart Rate:  [75-76] 76  Resp:  [16-18] 16  BP: (158-192)/(60-78) 173/62    Physical Exam:  General: Ill-appearing, raises eyebrows to voice but does not answer any questions for me  ENT: Core track in nares  Cardiovascular: Normal rate  Respiratory: Mild bilateral lower lobe rales  GI: Soft, not tender or distended  Skin: Rash on right lower extremity       Results Review:    CBC, BMP, CSF studies reviewed today  Lab Results    Component Value Date    WBC 8.28 12/22/2024    HGB 11.0 (L) 12/22/2024    HCT 31.4 (L) 12/22/2024    MCV 92.9 12/22/2024     12/22/2024     Lab Results   Component Value Date    GLUCOSE 124 (H) 12/23/2024    BUN 47 (H) 12/23/2024    CREATININE 1.19 12/23/2024    EGFRIFNONA 99 06/05/2019    EGFRIFAFRI >60 12/27/2022    BCR 39.5 (H) 12/23/2024    CO2 23.9 12/23/2024    CALCIUM 8.4 12/23/2024    PROTENTOTREF 6.2 10/03/2023    ALBUMIN 3.5 12/18/2024    LABIL2 2.3 10/03/2023    AST 9 12/18/2024    ALT <5 12/18/2024     CSF with 152 nucleated cells 84% lymphocytes 2 neutrophils glucose 85 protein 67    Microbiology:  12/21 CSF Cx: Negative to date  Meningitis encephalitis panel positive for VZV    Radiology:  CXR personally reviewed and shows what is most likely atelectasis in the left lower lobe    Assessment & Plan   VZV encephalopathy  Parkinson's disease  Acute kidney injury -better  Hypernatremia    His mental status is slowly trending for the better.  I told his son that it is difficult to predict his trajectory especially given his age and underlying Parkinson's disease.  Thankfully his kidney function is back to normal.    Continue acyclovir dosed by pharmacy (currently every 12 hours) with stop date 1/3/2025 to complete a 14-day total course.    Check BMP daily while on acyclovir for close monitoring.    ID will follow.  Case and plan was discussed with Dr. Tyson.

## 2024-12-23 NOTE — PLAN OF CARE
Goal Outcome Evaluation:  Plan of Care Reviewed With: patient        Progress: declining  Outcome Evaluation: Pt seen for PT this PM and continues to require Ax2 for all mobility. Pt transferred to EOB with max A x2 and progressed to CGA-SBA for sitting balance. Pt requiring heavy cues for LE exercises and inconsistently following commands. Pt completed 2 STS with mod A x2 and HHA - forward flexed and unable to correct. Difficulty sequencing any steps to HOB, pivoting hips higher slightly with each sit. Pt fatigues quickly, returned to supine with max A x2 and left with needs met. PT will continue to follow, anticipate DC to SNF.    Anticipated Discharge Disposition (PT): skilled nursing facility

## 2024-12-24 ENCOUNTER — APPOINTMENT (OUTPATIENT)
Dept: GENERAL RADIOLOGY | Facility: HOSPITAL | Age: 89
DRG: 073 | End: 2024-12-24
Payer: MEDICARE

## 2024-12-24 LAB
ANION GAP SERPL CALCULATED.3IONS-SCNC: 9.8 MMOL/L (ref 5–15)
BACTERIA SPEC AEROBE CULT: NORMAL
BUN SERPL-MCNC: 44 MG/DL (ref 8–23)
BUN/CREAT SERPL: 39.6 (ref 7–25)
CALCIUM SPEC-SCNC: 8.2 MG/DL (ref 8.2–9.6)
CHLORIDE SERPL-SCNC: 116 MMOL/L (ref 98–107)
CO2 SERPL-SCNC: 24.2 MMOL/L (ref 22–29)
CREAT SERPL-MCNC: 1.11 MG/DL (ref 0.76–1.27)
EGFRCR SERPLBLD CKD-EPI 2021: 63.1 ML/MIN/1.73
GLUCOSE BLDC GLUCOMTR-MCNC: 152 MG/DL (ref 70–130)
GLUCOSE BLDC GLUCOMTR-MCNC: 164 MG/DL (ref 70–130)
GLUCOSE BLDC GLUCOMTR-MCNC: 182 MG/DL (ref 70–130)
GLUCOSE SERPL-MCNC: 173 MG/DL (ref 65–99)
GRAM STN SPEC: NORMAL
GRAM STN SPEC: NORMAL
POTASSIUM SERPL-SCNC: 3.5 MMOL/L (ref 3.5–5.2)
SODIUM SERPL-SCNC: 150 MMOL/L (ref 136–145)
VZV DNA CSF QL NAA+PROBE: POSITIVE

## 2024-12-24 PROCEDURE — 25010000002 ACYCLOVIR PER 5 MG: Performed by: INTERNAL MEDICINE

## 2024-12-24 PROCEDURE — 25010000002 MEROPENEM PER 100 MG: Performed by: HOSPITALIST

## 2024-12-24 PROCEDURE — 87040 BLOOD CULTURE FOR BACTERIA: CPT | Performed by: HOSPITALIST

## 2024-12-24 PROCEDURE — 99232 SBSQ HOSP IP/OBS MODERATE 35: CPT | Performed by: INTERNAL MEDICINE

## 2024-12-24 PROCEDURE — 25010000003 DEXTROSE 5 % SOLUTION: Performed by: INTERNAL MEDICINE

## 2024-12-24 PROCEDURE — 99498 ADVNCD CARE PLAN ADDL 30 MIN: CPT | Performed by: NURSE PRACTITIONER

## 2024-12-24 PROCEDURE — 71045 X-RAY EXAM CHEST 1 VIEW: CPT

## 2024-12-24 PROCEDURE — 82948 REAGENT STRIP/BLOOD GLUCOSE: CPT

## 2024-12-24 PROCEDURE — 63710000001 INSULIN REGULAR HUMAN PER 5 UNITS: Performed by: STUDENT IN AN ORGANIZED HEALTH CARE EDUCATION/TRAINING PROGRAM

## 2024-12-24 PROCEDURE — 99232 SBSQ HOSP IP/OBS MODERATE 35: CPT | Performed by: NURSE PRACTITIONER

## 2024-12-24 PROCEDURE — 25810000003 SODIUM CHLORIDE 0.9 % SOLUTION 250 ML FLEX CONT: Performed by: INTERNAL MEDICINE

## 2024-12-24 PROCEDURE — 99221 1ST HOSP IP/OBS SF/LOW 40: CPT | Performed by: NURSE PRACTITIONER

## 2024-12-24 PROCEDURE — 25810000003 SODIUM CHLORIDE 0.9 % SOLUTION: Performed by: INTERNAL MEDICINE

## 2024-12-24 PROCEDURE — 80048 BASIC METABOLIC PNL TOTAL CA: CPT | Performed by: INTERNAL MEDICINE

## 2024-12-24 PROCEDURE — 99497 ADVNCD CARE PLAN 30 MIN: CPT | Performed by: NURSE PRACTITIONER

## 2024-12-24 RX ORDER — SODIUM CHLORIDE 9 MG/ML
100 INJECTION, SOLUTION INTRAVENOUS CONTINUOUS
Status: ACTIVE | OUTPATIENT
Start: 2024-12-24 | End: 2024-12-25

## 2024-12-24 RX ORDER — DEXTROSE MONOHYDRATE 50 MG/ML
100 INJECTION, SOLUTION INTRAVENOUS CONTINUOUS
Status: ACTIVE | OUTPATIENT
Start: 2024-12-24 | End: 2024-12-24

## 2024-12-24 RX ADMIN — GUAIFENESIN 600 MG: 600 TABLET, MULTILAYER, EXTENDED RELEASE ORAL at 21:33

## 2024-12-24 RX ADMIN — APIXABAN 5 MG: 5 TABLET, FILM COATED ORAL at 08:56

## 2024-12-24 RX ADMIN — ATORVASTATIN CALCIUM 40 MG: 20 TABLET, FILM COATED ORAL at 21:32

## 2024-12-24 RX ADMIN — Medication 10 ML: at 23:28

## 2024-12-24 RX ADMIN — MEROPENEM 1000 MG: 1 INJECTION, POWDER, FOR SOLUTION INTRAVENOUS at 23:11

## 2024-12-24 RX ADMIN — AMLODIPINE BESYLATE 5 MG: 5 TABLET ORAL at 08:56

## 2024-12-24 RX ADMIN — GUAIFENESIN 600 MG: 600 TABLET, MULTILAYER, EXTENDED RELEASE ORAL at 08:56

## 2024-12-24 RX ADMIN — TERAZOSIN HYDROCHLORIDE 1 MG: 1 CAPSULE ORAL at 23:28

## 2024-12-24 RX ADMIN — ACETAMINOPHEN 650 MG: 650 LIQUID ORAL at 23:28

## 2024-12-24 RX ADMIN — SODIUM CHLORIDE 100 ML/HR: 9 INJECTION, SOLUTION INTRAVENOUS at 11:36

## 2024-12-24 RX ADMIN — FINASTERIDE 5 MG: 5 TABLET, FILM COATED ORAL at 08:56

## 2024-12-24 RX ADMIN — CARBIDOPA AND LEVODOPA 1 TABLET: 25; 250 TABLET ORAL at 16:55

## 2024-12-24 RX ADMIN — CARBIDOPA AND LEVODOPA 1 TABLET: 25; 250 TABLET ORAL at 21:32

## 2024-12-24 RX ADMIN — SODIUM CHLORIDE 100 ML/HR: 9 INJECTION, SOLUTION INTRAVENOUS at 21:42

## 2024-12-24 RX ADMIN — LEVOTHYROXINE SODIUM 75 MCG: 75 TABLET ORAL at 05:17

## 2024-12-24 RX ADMIN — CARBIDOPA AND LEVODOPA 1 TABLET: 25; 250 TABLET ORAL at 08:56

## 2024-12-24 RX ADMIN — INSULIN HUMAN 2 UNITS: 100 INJECTION, SOLUTION PARENTERAL at 16:56

## 2024-12-24 RX ADMIN — Medication 10 ML: at 08:56

## 2024-12-24 RX ADMIN — SODIUM CHLORIDE 700 MG: 9 INJECTION, SOLUTION INTRAVENOUS at 08:57

## 2024-12-24 RX ADMIN — SODIUM CHLORIDE 700 MG: 9 INJECTION, SOLUTION INTRAVENOUS at 21:33

## 2024-12-24 RX ADMIN — Medication 1000 MCG: at 05:17

## 2024-12-24 RX ADMIN — APIXABAN 5 MG: 5 TABLET, FILM COATED ORAL at 21:32

## 2024-12-24 RX ADMIN — MEROPENEM 1000 MG: 1 INJECTION, POWDER, FOR SOLUTION INTRAVENOUS at 17:53

## 2024-12-24 RX ADMIN — INSULIN HUMAN 2 UNITS: 100 INJECTION, SOLUTION PARENTERAL at 11:52

## 2024-12-24 RX ADMIN — DEXTROSE MONOHYDRATE 100 ML/HR: 50 INJECTION, SOLUTION INTRAVENOUS at 11:36

## 2024-12-24 NOTE — PROGRESS NOTES
LOS: 7 days   Patient Care Team:  Aleksander Diez MD as PCP - General  Joseph Acharya MD as Consulting Physician (Ophthalmology)  Ford Dorado MD as Consulting Physician (Vascular Surgery)  Cecilio Armas III, MD as Consulting Physician (Cardiology)  Kashif Goodwin MD as Consulting Physician (Neurology)  Enoch Lares MD (Hematology)  Kayden Chance MD (Dermatology)  Vinod Cadena MD as Consulting Physician (Neurosurgery)  Johnny Castellanos MD as Consulting Physician (Urology)  Inge Pedro, PT as Physical Therapist (Physical Therapy)  Lucrecia Mccloud, PT as Physical Therapist (Physical Therapy)  Nereyda Toledo, OT as Occupational Therapist (Occupational Therapy)  Junior Taylor MD as Surgeon (Orthopedic Surgery)  Casey Power MD as Surgeon (Wound Care)  Nahomi Jacobo APRN as Nurse Practitioner (Nurse Practitioner)    Chief Complaint: DEE    Subjective     History of Present Illness  Casey Snowden . is a 89 y.o. male with history of Parkinson's disease and dementia.  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 With no known history of renal disease. Started on iv acyclovir.  Now with dee. Cr up to 3.3 from 0.8.  with h/o bph. Currently asleep. Grandson at bedside.       Subjective  Patient still encephalopathic  Has had some large volume bladder scans, but patient has been able to void as they have moved him around. PVR has been under 300.  DHT in place.     12/21 - underwent LP this am. No other acute events overnight. Continues to be confused. 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     12/23 no acute events. Seems to be doing a bit better today.    12/24 seems to have had a difficult night. Now being bathe.     History taken from: family and chart    Objective     Vital Sign Min/Max for last 24 hours  Temp  Min: 97.7 °F (36.5 °C)  Max: 99.8 °F (37.7 °C)   BP  Min: 133/64  Max:  "181/79   Pulse  Min: 73  Max: 89   Resp  Min: 16  Max: 18   SpO2  Min: 94 %  Max: 99 %   No data recorded   Weight  Min: 65.4 kg (144 lb 2.9 oz)  Max: 65.4 kg (144 lb 2.9 oz)     Flowsheet Rows      Flowsheet Row First Filed Value   Admission Height --   Admission Weight 66.7 kg (147 lb 0.8 oz) Documented at 12/18/2024 0500            No intake/output data recorded.  I/O last 3 completed shifts:  In: 2812 [Other:570; NG/GT:2242]  Out: -     Objective:  Vital signs: (most recent): Blood pressure (!) 181/79, pulse 89, temperature 97.7 °F (36.5 °C), temperature source Oral, resp. rate 18, height 177.8 cm (70\"), weight 65.4 kg (144 lb 2.9 oz), SpO2 94%.            Physical Examination: General appearance - ill appearing, frail, looks slighlty better today  Mental status - obtunded.   Nose - DHT in place   Chest - + coarse breath sound. [place] upper airway .  Heart - normal rate, regular rhythm, normal S1, S2, no murmurs, rubs, clicks or gallops  Abdomen - soft, nontender, nondistended, no masses or organomegaly  Neurological - encephalopathic   Extremities - no edema      Results Review:     I reviewed the patient's new clinical results.    WBC WBC   Date Value Ref Range Status   12/22/2024 8.28 3.40 - 10.80 10*3/mm3 Final      HGB Hemoglobin   Date Value Ref Range Status   12/22/2024 11.0 (L) 13.0 - 17.7 g/dL Final      HCT Hematocrit   Date Value Ref Range Status   12/22/2024 31.4 (L) 37.5 - 51.0 % Final      Platlets No results found for: \"LABPLAT\"   MCV MCV   Date Value Ref Range Status   12/22/2024 92.9 79.0 - 97.0 fL Final          Sodium Sodium   Date Value Ref Range Status   12/24/2024 150 (H) 136 - 145 mmol/L Final   12/23/2024 147 (H) 136 - 145 mmol/L Final   12/22/2024 148 (H) 136 - 145 mmol/L Final      Potassium Potassium   Date Value Ref Range Status   12/24/2024 3.5 3.5 - 5.2 mmol/L Final   12/23/2024 3.5 3.5 - 5.2 mmol/L Final   12/22/2024 3.8 3.5 - 5.2 mmol/L Final      Chloride Chloride   Date Value Ref " "Range Status   12/24/2024 116 (H) 98 - 107 mmol/L Final   12/23/2024 112 (H) 98 - 107 mmol/L Final   12/22/2024 109 (H) 98 - 107 mmol/L Final      CO2 CO2   Date Value Ref Range Status   12/24/2024 24.2 22.0 - 29.0 mmol/L Final   12/23/2024 23.9 22.0 - 29.0 mmol/L Final   12/22/2024 22.5 22.0 - 29.0 mmol/L Final      BUN BUN   Date Value Ref Range Status   12/24/2024 44 (H) 8 - 23 mg/dL Final   12/23/2024 47 (H) 8 - 23 mg/dL Final   12/22/2024 53 (H) 8 - 23 mg/dL Final      Creatinine Creatinine   Date Value Ref Range Status   12/24/2024 1.11 0.76 - 1.27 mg/dL Final   12/23/2024 1.19 0.76 - 1.27 mg/dL Final   12/22/2024 1.87 (H) 0.76 - 1.27 mg/dL Final      Calcium Calcium   Date Value Ref Range Status   12/24/2024 8.2 8.2 - 9.6 mg/dL Final   12/23/2024 8.4 8.2 - 9.6 mg/dL Final   12/22/2024 8.2 8.2 - 9.6 mg/dL Final      PO4 No results found for: \"CAPO4\"   Albumin No results found for: \"ALBUMIN\"     Magnesium No results found for: \"MG\"     Uric Acid No results found for: \"URICACID\"     Medication Review:     Current Facility-Administered Medications:     acetaminophen (TYLENOL) tablet 650 mg, 650 mg, Nasogastric, Q4H PRN **OR** acetaminophen (TYLENOL) 160 MG/5ML oral solution 650 mg, 650 mg, Nasogastric, Q4H PRN **OR** acetaminophen (TYLENOL) suppository 650 mg, 650 mg, Rectal, Q4H PRN, Chilo Tyson MD    acyclovir (ZOVIRAX) 700 mg in sodium chloride 0.9 % 250 mL IVPB, 10 mg/kg, Intravenous, Q12H, Delroy Reed MD, 700 mg at 12/23/24 2306    amLODIPine (NORVASC) tablet 5 mg, 5 mg, Oral, Daily, Leidy Tyson APRN, 5 mg at 12/23/24 0941    apixaban (ELIQUIS) tablet 5 mg, 5 mg, Oral, Q12H, Ford Salazar MD, 5 mg at 12/23/24 2203    atorvastatin (LIPITOR) tablet 40 mg, 40 mg, Nasogastric, Nightly, Chilo Tyson MD, 40 mg at 12/23/24 2203    atropine 1 % ophthalmic solution 1 drop, 1 drop, Both Eyes, TID PRN, Inessa Dumont MD    carbidopa-levodopa (SINEMET)  MG per tablet 1 " tablet, 1 tablet, Nasogastric, TID, Chilo Tyson MD, 1 tablet at 12/23/24 2203    dextrose (D50W) (25 g/50 mL) IV injection 25 g, 25 g, Intravenous, Q15 Min PRN, Chilo Tyson MD    dextrose (GLUTOSE) oral gel 15 g, 15 g, Oral, Q15 Min PRN, Chilo Tyson MD    finasteride (PROSCAR) tablet 5 mg, 5 mg, Nasogastric, Daily, Chilo Tyson MD, 5 mg at 12/23/24 0925    glucagon (GLUCAGEN) injection 1 mg, 1 mg, Intramuscular, Q15 Min PRN, Chilo Tyson MD    guaiFENesin (MUCINEX) 12 hr tablet 600 mg, 600 mg, Oral, Q12H, Chilo Tyson MD, 600 mg at 12/23/24 2204    insulin regular (humuLIN R,novoLIN R) injection 2-7 Units, 2-7 Units, Subcutaneous, Q6H, Chilo Tyson MD, 2 Units at 12/23/24 1657    levothyroxine (SYNTHROID, LEVOTHROID) tablet 75 mcg, 75 mcg, Nasogastric, Daily, Chilo Tyson MD, 75 mcg at 12/24/24 0517    LORazepam (ATIVAN) injection 2 mg, 2 mg, Intravenous, Q5 Min PRN, Chilo Tyson MD    ondansetron (ZOFRAN) injection 4 mg, 4 mg, Intravenous, Q6H PRN, Inessa Dumont MD    [Held by provider] oxybutynin XL (DITROPAN-XL) 24 hr tablet 10 mg, 10 mg, Oral, Daily, Inessa Dumont MD, 10 mg at 12/18/24 0901    Pharmacy to Dose acyclovir (ZOVIRAX), , Not Applicable, Continuous PRN, Delroy Reed MD    [COMPLETED] Insert Peripheral IV, , , Once **AND** sodium chloride 0.9 % flush 10 mL, 10 mL, Intravenous, PRN, Ian Dao MD    sodium chloride 0.9 % flush 10 mL, 10 mL, Intravenous, Q12H, Inessa Dumont MD, 10 mL at 12/23/24 0923    sodium chloride 0.9 % flush 10 mL, 10 mL, Intravenous, PRN, Inessa Dumont MD    sodium chloride 0.9 % infusion 40 mL, 40 mL, Intravenous, PRN, Inessa Dumont MD    terazosin (HYTRIN) capsule 1 mg, 1 mg, Nasogastric, Nightly, Chilo Tyson MD, 1 mg at 12/22/24 2123    [Held by provider] valsartan (DIOVAN) tablet 40 mg, 40 mg, Oral, Daily, nIessa Dumont MD, 40 mg at 12/18/24 0902    vitamin B-12 (CYANOCOBALAMIN) tablet 1,000 mcg, 1,000 mcg, Nasogastric, Q AM, Jah  MD Chilo, 1,000 mcg at 12/24/24 0517     Assessment & Plan       Unable to ambulate    Benign essential hypertension    Parkinson's disease    Chronic deep vein thrombosis (DVT) of popliteal vein of right lower extremity    Uses hearing aid    Paroxysmal A-fib    Chronic diastolic (congestive) heart failure    Presence of IVC filter    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     LISY likely due to volume depletion and retention. Acyclovir less (no crystals in UA, improving with IVF).     He has confirmed VZV meningoencephalitis and will need a total of 14 day of IV acyclovir.     He should continue to have IVF. Can use loop diuretics if concerns for volume overload. Alternatively, we could do NS @ 125 ml/hr 1 hour prior to acyclovir infusion and 6 hours after.   Continue to renally dose, ok to BID dosing.     Na worse today: will add 100 cc of free water prior and after his meds.       Discussed with RN, will plan for a second IV and start D5W at 100 ml/hr for 10 hours.     Also discussed with Dr. Salazar and Dr Reed.     UA is bland, some protein, no GN suspected at this time.   Check labs in am   Will follow     Cherri Parr MD  12/24/24  08:15 EST

## 2024-12-24 NOTE — PLAN OF CARE
Goal Outcome Evaluation:              Outcome Evaluation: vss, RA at this time, congested, chest x-ray completed, q2h turn, coretrak in place, feeding at goal rate and tolerating well, increased fluids this shift per nephrology, pt lethargic this shift and did not speak much. palliative met with family and declined needs at this time. airborne precautions maintained.

## 2024-12-24 NOTE — PROGRESS NOTES
"[facility]    Patient Name: Casey Snowden .  :1934  89 y.o.  LOS: 7  Encounter Provider: RIDDHI Knox      Patient Care Team:  Aleksander Diez MD as PCP - General  Marck, Joseph Youngblood MD as Consulting Physician (Ophthalmology)  Ford Dorado MD as Consulting Physician (Vascular Surgery)  Cecilio Armas III, MD as Consulting Physician (Cardiology)  Ksahif Goodwin MD as Consulting Physician (Neurology)  Enoch Lares MD (Hematology)  Kayden Chance MD (Dermatology)  Vinod Cadena MD as Consulting Physician (Neurosurgery)  Jhonny Castellanos MD as Consulting Physician (Urology)  Inge Pedro, PT as Physical Therapist (Physical Therapy)  Lucrecia Mccloud PT as Physical Therapist (Physical Therapy)  Nereyda Toledo, OT as Occupational Therapist (Occupational Therapy)  Junior Taylor MD as Surgeon (Orthopedic Surgery)  Casey Power MD as Surgeon (Wound Care)  Nahomi Jacobo APRN as Nurse Practitioner (Nurse Practitioner)    Chief Complaint: Follow-up NSVT, CAD, PAF, cardiomyopathy    Interval History: Nonverbal.  Per daughter patient had difficult time breathing overnight and sounds \"junky\"       Objective   Vital Signs  Temp:  [97.7 °F (36.5 °C)-99.8 °F (37.7 °C)] 97.7 °F (36.5 °C)  Heart Rate:  [73-89] 89  Resp:  [16-18] 18  BP: (133-181)/(59-79) 181/79    Intake/Output Summary (Last 24 hours) at 2024 0834  Last data filed at 2024 1450  Gross per 24 hour   Intake 60 ml   Output --   Net 60 ml     Flowsheet Rows      Flowsheet Row First Filed Value   Admission Height 177.8 cm (70\") Documented at 2024 0725   Admission Weight 66.7 kg (147 lb 0.8 oz) Documented at 2024 0500              Constitutional:       Appearance: Normal appearance. Well-developed.   Eyes:      Conjunctiva/sclera: Conjunctivae normal.   Neck:      Vascular: No carotid bruit.   Pulmonary:      Effort: Pulmonary effort is normal.      " Breath sounds: Normal breath sounds.      Comments: Upper airway congestion  Cardiovascular:      Normal rate. Regular rhythm. Normal S1. Normal S2.       Murmurs: There is no murmur.      No gallop.  No click. No rub.   Edema:     Peripheral edema absent.   Musculoskeletal: Normal range of motion. Skin:     General: Skin is warm and dry.   Neurological:      Mental Status: Alert and oriented to person, place, and time.      GCS: GCS eye subscore is 4. GCS verbal subscore is 2. GCS motor subscore is 3.           Pertinent Test Results:  Results from last 7 days   Lab Units 12/24/24  0547 12/23/24  0705 12/22/24  0635 12/21/24  0625 12/20/24  0811 12/19/24  0923 12/18/24  0439 12/17/24  1238   SODIUM mmol/L 150* 147* 148* 144 136 135* 136 137   POTASSIUM mmol/L 3.5 3.5 3.8 4.0 4.1 4.2 3.8 3.7   CHLORIDE mmol/L 116* 112* 109* 106 106 99 104 107   CO2 mmol/L 24.2 23.9 22.5 21.0* 19.3* 20.2* 23.0 25.2   BUN mg/dL 44* 47* 53* 62* 54* 43* 20 16   CREATININE mg/dL 1.11 1.19 1.87* 3.19* 3.97* 3.31* 0.89 0.62*   GLUCOSE mg/dL 173* 124* 167* 170* 107* 132* 109* 112*   CALCIUM mg/dL 8.2 8.4 8.2 8.1* 7.7* 8.0* 8.1* 8.1*   AST (SGOT) U/L  --   --   --   --   --   --  9 7   ALT (SGPT) U/L  --   --   --   --   --   --  <5 7         Results from last 7 days   Lab Units 12/22/24  0635 12/21/24  0625 12/20/24  0811 12/18/24  0439 12/17/24  1238   WBC 10*3/mm3 8.28 8.60 6.73 5.03 4.65   HEMOGLOBIN g/dL 11.0* 11.9* 11.2* 11.2* 11.5*   HEMATOCRIT % 31.4* 34.2* 32.7* 33.1* 36.1*   PLATELETS 10*3/mm3 158 155 139* 144 144         Results from last 7 days   Lab Units 12/20/24  0811 12/19/24  0923 12/17/24  1238   MAGNESIUM mg/dL 2.2 2.1 2.0     Results from last 7 days   Lab Units 12/22/24  0635   CHOLESTEROL mg/dL 102   TRIGLYCERIDES mg/dL 52   HDL CHOL mg/dL 50         Results from last 7 days   Lab Units 12/20/24  0811   TSH uIU/mL 4.820*           Medication Review:   acyclovir, 10 mg/kg, Intravenous, Q12H  amLODIPine, 5 mg, Oral,  Daily  apixaban, 5 mg, Oral, Q12H  atorvastatin, 40 mg, Nasogastric, Nightly  carbidopa-levodopa, 1 tablet, Nasogastric, TID  finasteride, 5 mg, Nasogastric, Daily  guaiFENesin, 600 mg, Oral, Q12H  insulin regular, 2-7 Units, Subcutaneous, Q6H  levothyroxine, 75 mcg, Nasogastric, Daily  [Held by provider] oxybutynin XL, 10 mg, Oral, Daily  sodium chloride, 10 mL, Intravenous, Q12H  terazosin, 1 mg, Nasogastric, Nightly  [Held by provider] valsartan, 40 mg, Oral, Daily  vitamin B-12, 1,000 mcg, Nasogastric, Q AM         Pharmacy to Dose acyclovir (ZOVIRAX),         Assessment & Plan     Active Hospital Problems    Diagnosis  POA    **Unable to ambulate [R26.2]  Yes    Moderate protein-calorie malnutrition [E44.0]  Yes    LISY (acute kidney injury) [N17.9]  Yes    Herpes zoster without complication [B02.9]  Unknown    Falls frequently [R29.6]  Not Applicable    Acquired hypothyroidism [E03.9]  Yes    Secondary malignancy of lymph nodes of head, 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  ID following  AMS/metabolic encephalopathy secondary to #1  LISY  Nephrology following  NSVT  Asymptomatic  Beta-blockers discontinued in the past secondary to bradycardia  Hypertension  Valsartan held secondary to #3  Continue amlodipine 5 mg/day  CAD  BILL to LAD in 2019  Denies angina  Ischemic cardiomyopathy  GDMT is limited secondary to #3  above  If he becomes volume overloaded secondary to IV fluid use nephrology states okay to use loop diuretics  PAF  Heart rate controlled, no beta-blocker secondary to history of bradycardia  On home apixaban, was also on aspirin but due to risk of falls this was discontinued  Hyperlipidemia  Adequate control, continue atorvastatin  History of carotid stenting  History of DVT with IVC filter in place  History of orthostatic hypotension secondary to autonomic dysfunction  Parkinson's disease           RIDDHI Knox  [facility]   [facility]  [address] [place] [address]  Office: [phone]    12/24/24  08:34 EST

## 2024-12-24 NOTE — PROGRESS NOTES
"    Name: Casey Snowden . ADMIT: 2024   : 1934  PCP: Aleksander Diez MD    MRN: [id number] LOS: 7 days   AGE/SEX: 89 y.o. male  ROOM: [facility]     Subjective   Subjective   He looks more lethargic today.  Family notes increased cough       Objective   Objective   Vital Signs  Temp:  [97.7 °F (36.5 °C)-99.8 °F (37.7 °C)] 98.1 °F (36.7 °C)  Heart Rate:  [73-89] 89  Resp:  [18] 18  BP: (133-181)/(59-79) 148/79  SpO2:  [94 %-99 %] 94 %  on   ;   Device (Oxygen Therapy): room air  Body mass index is 20.69 kg/m².  Physical Exam  Vitals reviewed.   Constitutional:       General: He is not in acute distress.     Comments: Very frail appearing.  Opens his eyes but will not communicate today, other than mouthing his name \"Afshin\"   Cardiovascular:      Rate and Rhythm: Normal rate.      Heart sounds: No murmur heard.  Pulmonary:      Effort: No respiratory distress.      Breath sounds: Rhonchi present.   Abdominal:      General: There is no distension.      Palpations: Abdomen is soft.      Tenderness: There is no abdominal tenderness.   Musculoskeletal:      Right lower leg: No edema.      Left lower leg: No edema.   Skin:     General: Skin is warm and dry.      Findings: No rash.      Comments: Vesicular rash improved but present       Results Review     I reviewed the patient's new clinical results.  Results from last 7 days   Lab Units 24  0635 24  0625 24  0811 24  0439   WBC 10*3/mm3 8.28 8.60 6.73 5.03   HEMOGLOBIN g/dL 11.0* 11.9* 11.2* 11.2*   PLATELETS 10*3/mm3 158 155 139* 144     Results from last 7 days   Lab Units 24  0547 24  0705 24  0635 24  0625   SODIUM mmol/L 150* 147* 148* 144   POTASSIUM mmol/L 3.5 3.5 3.8 4.0   CHLORIDE mmol/L 116* 112* 109* 106   CO2 mmol/L 24.2 23.9 22.5 21.0*   BUN mg/dL 44* 47* 53* 62*   CREATININE mg/dL 1.11 1.19 1.87* 3.19*   GLUCOSE mg/dL 173* 124* 167* 170*   EGFR mL/min/1.73 63.1 58.0* 33.7* 17.8*     Results from " last 7 days   Lab Units 12/18/24  0439   ALBUMIN g/dL 3.5   BILIRUBIN mg/dL 0.6   ALK PHOS U/L 74   AST (SGOT) U/L 9   ALT (SGPT) U/L <5     Results from last 7 days   Lab Units 12/24/24  0547 12/23/24  0705 12/22/24  0635 12/21/24  0625 12/20/24  0811 12/19/24  0923 12/18/24  0439   CALCIUM mg/dL 8.2 8.4 8.2 8.1* 7.7* 8.0* 8.1*   ALBUMIN g/dL  --   --   --   --   --   --  3.5   MAGNESIUM mg/dL  --   --   --   --  2.2 2.1  --    PHOSPHORUS mg/dL  --   --   --   --  6.6*  --   --        Glucose   Date/Time Value Ref Range Status   12/24/2024 1135 164 (H) 70 - 130 mg/dL Final   12/24/2024 0607 152 (H) 70 - 130 mg/dL Final   12/23/2024 2344 146 (H) 70 - 130 mg/dL Final   12/23/2024 1643 174 (H) 70 - 130 mg/dL Final   12/23/2024 1036 142 (H) 70 - 130 mg/dL Final   12/23/2024 0551 173 (H) 70 - 130 mg/dL Final   12/23/2024 0010 156 (H) 70 - 130 mg/dL Final       No radiology results for the last day    I have personally reviewed all medications:  Scheduled Medications  acyclovir, 10 mg/kg, Intravenous, Q12H  amLODIPine, 5 mg, Oral, Daily  apixaban, 5 mg, Oral, Q12H  atorvastatin, 40 mg, Nasogastric, Nightly  carbidopa-levodopa, 1 tablet, Nasogastric, TID  finasteride, 5 mg, Nasogastric, Daily  guaiFENesin, 600 mg, Oral, Q12H  insulin regular, 2-7 Units, Subcutaneous, Q6H  levothyroxine, 75 mcg, Nasogastric, Daily  [Held by provider] oxybutynin XL, 10 mg, Oral, Daily  sodium chloride, 10 mL, Intravenous, Q12H  terazosin, 1 mg, Nasogastric, Nightly  [Held by provider] valsartan, 40 mg, Oral, Daily  vitamin B-12, 1,000 mcg, Nasogastric, Q AM    Infusions  dextrose, 100 mL/hr, Last Rate: 100 mL/hr (12/24/24 1136)  Pharmacy to Dose acyclovir (ZOVIRAX),   sodium chloride, 100 mL/hr, Last Rate: 100 mL/hr (12/24/24 1136)    Diet  NPO Diet NPO Type: Tube Feeding    I have personally reviewed:  [x]  Laboratory   []  Microbiology   [x]  Radiology   []  EKG/Telemetry  []  Cardiology/Vascular   []  Pathology    []  Records        Assessment/Plan     Active Hospital Problems    Diagnosis  POA    **Unable to ambulate [R26.2]  Yes    Moderate protein-calorie malnutrition [E44.0]  Yes    LISY (acute kidney injury) [N17.9]  Yes    Herpes zoster without complication [B02.9]  Unknown    Falls frequently [R29.6]  Not Applicable    Acquired hypothyroidism [E03.9]  Yes    Secondary malignancy of lymph nodes of head, 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       89 y.o. male admitted with generalized weakness, confusion and found to have RLE rash consistent with shingles.    Cough/congestion: Checked chest x-ray which shows some new infiltrates.  Will check blood cultures and start antibiotics, likely aspirating    VZV encephalitis.  Status post LP  -Continue acyclovir x 14 days.  Renally dosed as appropriate  - MRI negative for acute change including any evidence of encephalitis  - Neurology signed off.  -Speech following regarding appropriateness for any diet trials.  Continue feeding via core track for now but will have to address PEG if he does not start to improve mentally    LISY/hypernatremia: Remains elevated.  Fluids increased per nephrology, discussed with Dr. Parr  -[place] secondary to dehydration/urine retention and not from acyclovir as crystals negative.    - Continue to hold Diovan and Ditropan  - Monitor urine output    CAD/PAF/NSVT: Stable.  Continue Eliquis, no need for aspirin per cardiology.  No BB due to  previous bradycardia    Parkinson disease: Continue Sinemet.     HTN uncontrolled at times.  Hesitant to get overly aggressive with correction given penchant for orthostasis and PD.  Monitor for now      DVT prophy: Eliquis.    Disposition: Plan TBD.        Ford Salazar MD  [facility]  12/24/24  14:27 EST

## 2024-12-24 NOTE — CASE MANAGEMENT/SOCIAL WORK
Continued Stay Note  [facility]     Patient Name: Casey Snowden .  MRN: [id number]  Today's Date: 12/24/2024    Admit Date: 12/17/2024    Plan: TBD based on hospital course.   Discharge Plan       Row Name 12/24/24 0922       Plan    Plan TBD based on hospital course.    Plan Comments Chart reviewed. Patient with confusion and cortrak for nutrition. Patient will need permanent nutrition prior to discharge. Discharge planning is TBD based on hospital course.                   Discharge Codes    No documentation.                 Expected Discharge Date and Time       Expected Discharge Date Expected Discharge Time    Dec 24, 2024               Tennille Carrillo RN

## 2024-12-24 NOTE — PROGRESS NOTES
[facility] Clinical Pharmacy Services: Merrem Consult    Pt Name: Casey Snowden Sr.   : 1934  Weight: 65.4 kg (144 lb 2.9 oz)  Antibiotic: Merrem  Indication: Pneumonia    Relevant clinical data and objective history reviewed:    Past Medical History:   Diagnosis Date    Abdominal aortic aneurysm without rupture 10/14/2021    Acquired hypothyroidism 2022    Acute ischemic stroke 10/08/2023    Aneurysm     Arrhythmia     Atrial fibrillation     Cancer 2018    basil cell carcinoma    Carotid stenosis 10/29/2015    CHF (congestive heart failure)     Chronic deep vein thrombosis (DVT) of popliteal vein of right lower extremity     Clotting disorder     Coronary artery disease involving native coronary artery of native heart without angina pectoris 2018    DVT of lower extremity (deep venous thrombosis)     Hard of hearing     Hyperlipidemia     Hypertension     Localized edema 2017    Long-term use of high-risk medication     Metabolic encephalopathy 2023    Myocardial infarction 2918    LAD stent    PAD (peripheral artery disease) 09/10/2020    Parkinson's disease     Postphlebitic syndrome     Pure hypercholesterolemia     Skin tear of upper arm without complication, left, subsequent encounter     Stroke 2023     Creatinine   Date Value Ref Range Status   2024 1.11 0.76 - 1.27 mg/dL Final   2024 1.19 0.76 - 1.27 mg/dL Final   2024 1.87 (H) 0.76 - 1.27 mg/dL Final   10/09/2023 0.90 0.60 - 1.30 mg/dL Final     Comment:     Serial Number: [id number]:  [id number]   2022 0.71 (L) 0.73 - 1.18 mg/dL Final   2022 0.80 0.6 - 1.3 mg/dL Final   2022 0.77 0.73 - 1.18 mg/dL Final     BUN   Date Value Ref Range Status   2024 44 (H) 8 - 23 mg/dL Final   2022 16 8 - 26 mg/dL Final     Estimated Creatinine Clearance: 40.9 mL/min (by C-G formula based on SCr of 1.11 mg/dL).    Lab Results   Component Value Date    WBC 8.28 2024     Temp  Readings from Last 3 Encounters:   12/24/24 98.1 °F (36.7 °C) (Oral)   11/25/24 97.6 °F (36.4 °C)   11/13/24 96.9 °F (36.1 °C)      Assessment/Plan    Ordered merrem 1 gram iv q12h  for a total of 5 days. Will monitor and adjust if culture data or pertinent lab values indicate this is best for the patient.     Thank you for this consult and please contact pharmacy with any questions or concerns.     Marsha Sanchez, [facility]  Clinical Pharmacist

## 2024-12-24 NOTE — CONSULTS
.            [facility]    Palliative Care Initial Consult   Attending Physician: Ford Salazar*  Referring Provider: Dr Tyson    Reason for Referral: assistance with clarification of goals of care and hospice referral or discussion  Family/Support: spouse (Evonne)  and children (Svetlana and Casey)     Code Status and Medical Interventions: No CPR (Do Not Attempt to Resuscitate); Full Support   Ordered at: 12/21/24 1006     Level Of Support Discussed With:    Next of Kin (If No Surrogate)     Code Status (Patient has no pulse and is not breathing):    No CPR (Do Not Attempt to Resuscitate)     Medical Interventions (Patient has pulse or is breathing):    Full Support     Goals of Care: TBD.    HPI:   89 y.o. male  with history of parkinson's dementia  disease, history of DVT with IVC, paroxysmal atrial fibrillation, hypothyroidism, benign prostatic hyperplasia,  orthostatic hypotension, and various skin cancers. He resided at home prior to admission.   Patient presented to [facility] on 12/17/2024 related to rash noted on his back, that progressed with pain. Workup in ER, noted patient to have shingles involving the right lower extremity at the S2 and S3 dermatomal distribution. He was given pain management and started on IV acyclovir. Consults placed for neurology, cardiology (NSVT), nephrology (LISY) , and infectious disease (zoster infection and encephalitis). He went for MRI of brain on 12/18/2024 and no acute findings. His hospital course was complicated with acute kidney injury and worsening mentation. Therefore,  decision was to proceed with lumbar puncture. This was performed on 12/21/2024 that revealed VZV. He has dysphagia and required coretrak for artifical nutrition. He was most recently evaluated by ST on 12/23/2024 and they recommend NPO. Due to his clinical situation the palliative care team was consulted for support with goals of care  conversation .   Palliative Care Spoke With: family  Functional Status: Pt seen for PT this PM and continues to require Ax2 for all mobility. Pt transferred to EOB with max A x2 and progressed to CGA-SBA for sitting balance. Pt requiring heavy cues for LE exercises and inconsistently following commands. Pt completed 2 STS with mod A x2 and HHA - forward flexed and unable to correct. Difficulty sequencing any steps to HOB, pivoting hips higher slightly with each sit. Pt fatigues quickly, returned to supine with max A x2 and left with needs met per PT notes on 2024  Due to the Palliative Care Topics Discussed: palliative care, goals of care, care options, resuscitation status, [facility] scattered bed status, and discharge options we will establish an advance care plan.   Advance Care Planning   Advance Care Planning Discussion: see below          Review of Systems   Unable to perform ROS: Mental status change       1- Pain Assessment  Nonverbal Indicators of Pain: nonverbal indicators absent  CPOT Facial Expression: 0-->relaxed, neutral  CPOT Body Movements: 0-->absence of movements  CPOT Muscle Tension: 0-->relaxed  Ventilator Compliance/Vocalization: 0-->talking in normal tone or no sound  CPOT Score: 0  PAINAD Breathin-->normal  PAINAD Negative Vocalization: 0-->none  PAINAD Facial Expression: 0-->smiling or inexpressive  PAINAD Body Language: 0-->relaxed  PAINAD Consolability: 0-->no need to console  PAINAD Score: 0  Pain Location: nose    Past Medical History:   Diagnosis Date    Abdominal aortic aneurysm without rupture 10/14/2021    Acquired hypothyroidism 2022    Acute ischemic stroke 10/08/2023    Aneurysm     Arrhythmia     Atrial fibrillation     Cancer 2018    basil cell carcinoma    Carotid stenosis 10/29/2015    CHF (congestive heart failure)     Chronic deep vein thrombosis (DVT) of popliteal vein of right lower extremity     Clotting disorder     Coronary artery disease  involving native coronary artery of native heart without angina pectoris 12/20/2018    DVT of lower extremity (deep venous thrombosis)     Hard of hearing     Hyperlipidemia     Hypertension     Localized edema 01/04/2017    Long-term use of high-risk medication     Metabolic encephalopathy 02/07/2023    Myocardial infarction 2918    LAD stent    PAD (peripheral artery disease) 09/10/2020    Parkinson's disease     Postphlebitic syndrome     Pure hypercholesterolemia     Skin tear of upper arm without complication, left, subsequent encounter     Stroke 11/02/2023     Past Surgical History:   Procedure Laterality Date    BASAL CELL CARCINOMA EXCISION  04/2018    CARDIAC CATHETERIZATION N/A 11/05/2018    Procedure: Left Heart Cath;  Surgeon: Oliverio Azar MD;  Location: [facility] C[facility] INVASIVE LOCATION;  Service: Cardiovascular    CARDIAC CATHETERIZATION N/A 11/05/2018    Procedure: Coronary angiography;  Surgeon: Oliverio Azar MD;  Location:  [facility] CATH INVASIVE LOC[facility];  Service: Cardiovascular    CARDIAC CATHETERIZATION N/A 11/05/2018    Procedure: Left ventriculography;  Surgeon: Oliverio Azar MD;  Location: [facility] CATH [facility]VASIVE LOCATION;  Service: Cardiovascular    CARDIAC CATHETERIZATION N/A 06/04/2019    Procedure: Left Heart Cath;  Surgeon: Johnny Glasgow MD;  Location: [facility] CATH INVASIVE LOCATION;  Service: Cardiovascular    CARDIAC CATHETERIZATION N/A 06/04/2019    Procedure: Coronary angiography;  Surgeon: Johnny Glasgow MD;  Location: [facility] CATH INVASIVE LOCATION;  Service: Cardiovascular    CARDIAC CATHETERIZATION N/A 06/04/2019    Procedure: Left ventriculography;  Surgeon: Johnny Glasgow MD;  Location: [facility] CATH INVASIVE LOCATION;  Service: Cardiovascular    CARDIAC CATHETERIZATION N/A 06/04/2019    Procedure: Stent BILL coronary;  Surgeon: Johnny Glasgow MD;  Location: [facility] CATH INVASIVE LOCATION;  Service: Cardiovascular    CARDIAC SURGERY      CAROTID  ARTERY ANGIOPLASTY Right 10/11/2023    with stent    CAROTID STENT      CATARACT EXTRACTION Left 08/2018    CATARACT EXTRACTION Right 09/2018    COLONOSCOPY      2011    CORONARY STENT PLACEMENT      INCISION AND DRAINAGE LEG Right 07/07/2017    Procedure: INCISION AND DRAINAGE INFECTED HEMATOMA RIGHT THIGH;  Surgeon: Valentin Peña MD;  Location: [facility] MAIN OR;  Service:     JOINT REPLACEMENT      KNEE INCISION AND DRAINAGE Right 12/27/2016    Procedure: KNEE INCISION AND DRAINAGE;  Surgeon: Triston Whitten MD;  Location: [facility] OR;  Service:     NOSE SURGERY      nose replacement    SINUS SURGERY  1960    SKIN BIOPSY      SKIN GRAFT  12/2017    TOTAL KNEE ARTHROPLASTY Right     VASCULAR SURGERY      VENA CAVA FILTER PLACEMENT       Social History     Socioeconomic History    Marital status:     Years of education: college grad   Tobacco Use    Smoking status: Never     Passive exposure: Never    Smokeless tobacco: Never    Tobacco comments:     caff use   Vaping Use    Vaping status: Never Used   Substance and Sexual Activity    Alcohol use: No     Comment: none for a month    Drug use: No    Sexual activity: Not Currently     Partners: Female       Current Facility-Administered Medications   Medication Dose Route Frequency Provider Last Rate Last Admin    acetaminophen (TYLENOL) tablet 650 mg  650 mg Nasogastric Q4H PRN Chilo Tyson MD        Or    acetaminophen (TYLENOL) 160 MG/5ML oral solution 650 mg  650 mg Nasogastric Q4H PRN Chilo Tyson MD        Or    acetaminophen (TYLENOL) suppository 650 mg  650 mg Rectal Q4H PRN Chilo Tyson MD        acyclovir (ZOVIRAX) 700 mg in sodium chloride 0.9 % 250 mL IVPB  10 mg/kg Intravenous Q12H Delroy Reed MD   700 mg at 12/24/24 0857    amLODIPine (NORVASC) tablet 5 mg  5 mg Oral Daily Leidy Tyson APRN   5 mg at 12/24/24 0856    apixaban (ELIQUIS) tablet 5 mg  5 mg Oral Q12H Ford Salazar MD   5 mg at  12/24/24 0856    atorvastatin (LIPITOR) tablet 40 mg  40 mg Nasogastric Nightly Chilo Tyson MD   40 mg at 12/23/24 2203    atropine 1 % ophthalmic solution 1 drop  1 drop Both Eyes TID PRN Inessa Dumont MD        carbidopa-levodopa (SINEMET)  MG per tablet 1 tablet  1 tablet Nasogastric TID Chilo Tyson MD   1 tablet at 12/24/24 0856    dextrose (D50W) (25 g/50 mL) IV injection 25 g  25 g Intravenous Q15 Min PRN Chilo Tyson MD        dextrose (GLUTOSE) oral gel 15 g  15 g Oral Q15 Min PRN Chilo Tyson MD        finasteride (PROSCAR) tablet 5 mg  5 mg Nasogastric Daily Chilo Tyson MD   5 mg at 12/24/24 0856    glucagon (GLUCAGEN) injection 1 mg  1 mg Intramuscular Q15 Min PRN Chilo Tyson MD        guaiFENesin (MUCINEX) 12 hr tablet 600 mg  600 mg Oral Q12H Chilo Tyson MD   600 mg at 12/24/24 0856    insulin regular (humuLIN R,novoLIN R) injection 2-7 Units  2-7 Units Subcutaneous Q6H Chilo Tyson MD   2 Units at 12/23/24 1657    levothyroxine (SYNTHROID, LEVOTHROID) tablet 75 mcg  75 mcg Nasogastric Daily Chilo Tyson MD   75 mcg at 12/24/24 0517    LORazepam (ATIVAN) injection 2 mg  2 mg Intravenous Q5 Min PRN Chilo Tyson MD        ondansetron (ZOFRAN) injection 4 mg  4 mg Intravenous Q6H PRN Inessa Dumont MD        [Held by provider] oxybutynin XL (DITROPAN-XL) 24 hr tablet 10 mg  10 mg Oral Daily Inessa Dumont MD   10 mg at 12/18/24 0901    Pharmacy to Dose acyclovir (ZOVIRAX)   Not Applicable Continuous PRN Delroy Reed MD        sodium chloride 0.9 % flush 10 mL  10 mL Intravenous PRN Ian Dao MD        sodium chloride 0.9 % flush 10 mL  10 mL Intravenous Q12H Inessa Dumont MD   10 mL at 12/24/24 0856    sodium chloride 0.9 % flush 10 mL  10 mL Intravenous PRN Inessa Dumont MD        sodium chloride 0.9 % infusion 40 mL  40 mL Intravenous PRN Inessa Dumont MD        terazosin (HYTRIN) capsule 1 mg  1 mg Nasogastric Nightly Chilo Tyson MD   1 mg at 12/22/24  "2123    [Held by provider] valsartan (DIOVAN) tablet 40 mg  40 mg Oral Daily Inessa Dumont MD   40 mg at 12/18/24 0902    vitamin B-12 (CYANOCOBALAMIN) tablet 1,000 mcg  1,000 mcg Nasogastric Q AM Chilo Tyson MD   1,000 mcg at 12/24/24 0517     Pharmacy to Dose acyclovir (ZOVIRAX),         acetaminophen **OR** acetaminophen **OR** acetaminophen    atropine    dextrose    dextrose    glucagon (human recombinant)    LORazepam    ondansetron    Pharmacy to Dose acyclovir (ZOVIRAX)    [COMPLETED] Insert Peripheral IV **AND** sodium chloride    sodium chloride    sodium chloride    Allergies   Allergen Reactions    Penicillins Rash    Rocephin [Ceftriaxone] Rash     Attest that current medications reviewed  including but not limited to prescriptions, over-the counter, herbals and vitamin/mineral/dietary (nutritional) supplements for name, route of administration, type, dose and frequency and are current using all immediate resources available at time of dictation.      Intake/Output Summary (Last 24 hours) at 12/24/2024 1011  Last data filed at 12/23/2024 1450  Gross per 24 hour   Intake 30 ml   Output --   Net 30 ml       Physical Exam:    Diagnostics: Reviewed  BP (!) 181/79 (BP Location: Right arm, Patient Position: Lying)   Pulse 89   Temp 97.7 °F (36.5 °C) (Oral)   Resp 18   Ht 177.8 cm (70\")   Wt 65.4 kg (144 lb 2.9 oz)   SpO2 94%   BMI 20.69 kg/m²     Constitutional:       Appearance: Not in distress. Frail. Acutely ill-appearing.      Comments: NGT right nare   Pulmonary:      Effort: Pulmonary effort is normal.   Edema:     Peripheral edema absent.   Neurological:      Mental Status: Lethargic and confused.   Psychiatric:         Cognition and Memory: Cognition is impaired. Memory is not impaired.       Patient status: Disease state: Deteriorating despite treatments.  Functional status: Palliative Performance Scale Score: Performance 30% based on the following measures: Ambulation: Totally bed bound, " Activity and Evidence of Disease: Unable to do any work, extensive evidence of disease, Self-Care: Total care required,  Intake: Reduced, LOC: Full, drowsy or confusion   Nutritional status: Albumin  3.5 on 12/18/2024 Body mass index is 20.69 kg/m².   Family support: The patient receives support from his wife and children..  Advance Directives: Advance Directive Status: Patient has advance directive, copy requested   POA/Healthcare surrogate-spouse- Evonne.        Impression/Problem List:    VZV encephalitis   Dysphagia   Hypernatremia  Parkinson's dementia     Chronic diastolic heart failure  Atrial fibrillation   Hypothyroidism  Peripheral artery disease  Chronic deep vein thrombosis of right lower extremity with IVC filter        Recommendations/Plan:  1. Provide support with goals of care        2.  Palliative care encounter   The patient is unable to participate in goals of care conversation due to impaired cognition. His daughter, Svetlana was at bedside.  She does inform that patient has an advance directive and her mother, Evonne is healthcare surrogate. She has not yet provided us a copy. Svetlana provided me with detailed history of patient prior to hospitalization. He has had progressive decline in memory and muscular in last several months. He resides at home with support of spouse and two children (Svetlana and Casey). He needs assistance with bathing, clothing. He uses an walker for safety. We spent a significant amount of time reviewing hospital course. She has a very good understanding of his condition. She understands current prognosis is guarded. We discussed in detail dementia as well and disease progression. We spent some time talking about current artifical nutrition  (coretrak vs PEG placement). She is aware coretrak is temporary. We also discussed aspiration concerns, along with comfort diet vs modified diet depending on patient clinical course. She told me the patient previously didn't like modified  diet.  I also provided her with information regarding palliative care, hospice care/comfort care, and inpatient palliative care unit. She informed me that her mother told her a couple days ago she wasn't interested in palliative care, but she feels her mother could benefit from our support. She will plan to talk with her later tonight when she comes in and I will have PC team follow up with her mother on Thursday at her request. She does confirm patient is NO CPR and full support, which his chart currently reflects. I have recommended further conversation amongst her family regarding ventilator if respiratory decline. I did offer spiritual consult and she reports their  has been visiting. The patient is [facility]. At this time, the goal is to continue with current level of care with hopes of clinical improvement. She was appreciative of conversation and support provided. I spoke with Dr Salazar and JEAN CLAUDE Herrera.     Thank you for this consult and allowing us to participate in patient's plan of care. Palliative Care Team will continue to follow patient.     Time spent:30 minutes spent reviewing medical and medication records, assessing and examining patient, discussing with family, answering questions, providing some guidance about a plan and documentation of care, and coordinating care with other healthcare members, with > 50% time spent face to face.   60 additional minutes spent on advance care planning, goals of care and code status discussions.     RIDDHI Jim  12/24/2024  10:11 EST

## 2024-12-24 NOTE — PROGRESS NOTES
LOS: 7 days     Chief Complaint: F/U  VZV encephalitis    Subj: No acute events.  D/W his daughter at the bedside.  Mental status continues to improve overall but waxes and wanes.  Kidney function remains normal.    Meds:    Current Facility-Administered Medications:     acetaminophen (TYLENOL) tablet 650 mg, 650 mg, Nasogastric, Q4H PRN **OR** acetaminophen (TYLENOL) 160 MG/5ML oral solution 650 mg, 650 mg, Nasogastric, Q4H PRN **OR** acetaminophen (TYLENOL) suppository 650 mg, 650 mg, Rectal, Q4H PRN, Chilo Tyson MD    acyclovir (ZOVIRAX) 700 mg in sodium chloride 0.9 % 250 mL IVPB, 10 mg/kg, Intravenous, Q12H, Delroy Reed MD, 700 mg at 12/24/24 0857    amLODIPine (NORVASC) tablet 5 mg, 5 mg, Oral, Daily, Leidy Tyson APRN, 5 mg at 12/24/24 0856    apixaban (ELIQUIS) tablet 5 mg, 5 mg, Oral, Q12H, Ford Salazar MD, 5 mg at 12/24/24 0856    atorvastatin (LIPITOR) tablet 40 mg, 40 mg, Nasogastric, Nightly, Chilo Tyson MD, 40 mg at 12/23/24 2203    atropine 1 % ophthalmic solution 1 drop, 1 drop, Both Eyes, TID PRN, Inessa Dumont MD    carbidopa-levodopa (SINEMET)  MG per tablet 1 tablet, 1 tablet, Nasogastric, TID, Chilo Tyson MD, 1 tablet at 12/24/24 0856    dextrose (D50W) (25 g/50 mL) IV injection 25 g, 25 g, Intravenous, Q15 Min PRN, Chilo Tyson MD    dextrose (GLUTOSE) oral gel 15 g, 15 g, Oral, Q15 Min PRN, Chilo Tyson MD    finasteride (PROSCAR) tablet 5 mg, 5 mg, Nasogastric, Daily, Chilo Tyson MD, 5 mg at 12/24/24 0856    glucagon (GLUCAGEN) injection 1 mg, 1 mg, Intramuscular, Q15 Min PRN, Chilo Tyson MD    guaiFENesin (MUCINEX) 12 hr tablet 600 mg, 600 mg, Oral, Q12H, Chilo Tyson MD, 600 mg at 12/24/24 0856    insulin regular (humuLIN R,novoLIN R) injection 2-7 Units, 2-7 Units, Subcutaneous, Q6H, Chilo Tyson MD, 2 Units at 12/23/24 1657    levothyroxine (SYNTHROID, LEVOTHROID) tablet 75 mcg, 75 mcg, Nasogastric, Daily, Chilo Tyson,  MD, 75 mcg at 12/24/24 0517    LORazepam (ATIVAN) injection 2 mg, 2 mg, Intravenous, Q5 Min PRN, Chilo Tyson MD    ondansetron (ZOFRAN) injection 4 mg, 4 mg, Intravenous, Q6H PRN, Inessa Dumont MD    [Held by provider] oxybutynin XL (DITROPAN-XL) 24 hr tablet 10 mg, 10 mg, Oral, Daily, Inessa Dumont MD, 10 mg at 12/18/24 0901    Pharmacy to Dose acyclovir (ZOVIRAX), , Not Applicable, Continuous PRN, Delroy Reed MD    [COMPLETED] Insert Peripheral IV, , , Once **AND** sodium chloride 0.9 % flush 10 mL, 10 mL, Intravenous, PRN, Ian Dao MD    sodium chloride 0.9 % flush 10 mL, 10 mL, Intravenous, Q12H, Inessa Dumont MD, 10 mL at 12/24/24 0856    sodium chloride 0.9 % flush 10 mL, 10 mL, Intravenous, PRN, Inessa Dumont MD    sodium chloride 0.9 % infusion 40 mL, 40 mL, Intravenous, PRN, Inessa Dumont MD    terazosin (HYTRIN) capsule 1 mg, 1 mg, Nasogastric, Nightly, Chilo Tyson MD, 1 mg at 12/22/24 2123    [Held by provider] valsartan (DIOVAN) tablet 40 mg, 40 mg, Oral, Daily, Inessa Dumont MD, 40 mg at 12/18/24 0902    vitamin B-12 (CYANOCOBALAMIN) tablet 1,000 mcg, 1,000 mcg, Nasogastric, Q AM, Chilo Tyson MD, 1,000 mcg at 12/24/24 0517      Vital Signs  Temp:  [97.7 °F (36.5 °C)-99.8 °F (37.7 °C)] 97.7 °F (36.5 °C)  Heart Rate:  [73-89] 89  Resp:  [18] 18  BP: (133-181)/(59-79) 181/79    Physical Exam:  General: Ill-appearing, does not make eye contact or answer questions for me  ENT: Core track in nares  Cardiovascular: Normal rate  Respiratory: Mild bilateral lower lobe rales  GI: Soft, not tender or distended  Skin: Rash on right lower extremity is improving       Results Review:    CBC, potassium, and creatinine reviewed today  Lab Results   Component Value Date    WBC 8.28 12/22/2024    HGB 11.0 (L) 12/22/2024    HCT 31.4 (L) 12/22/2024    MCV 92.9 12/22/2024     12/22/2024     Lab Results   Component Value Date    K 3.5 12/24/2024     Lab Results   Component Value Date     CREATININE 1.11 12/24/2024       CSF with 152 nucleated cells 84% lymphocytes 2 neutrophils glucose 85 protein 67    Microbiology:  12/21 CSF Cx: Negative to date  12/21 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  I told his daughter that it is difficult to predict his trajectory especially given his age and underlying Parkinson's disease.  Thankfully his kidney function is back to normal.    Continue acyclovir dosed by pharmacy (currently every 12 hours) with stop date 1/3/2025 to complete a 14-day total course.    Check BMP daily while on acyclovir for close monitoring.    ID will follow and see again on 12/26/2024 so please call me at [phone] if any ID questions in the interim.

## 2024-12-25 LAB
ANION GAP SERPL CALCULATED.3IONS-SCNC: 8 MMOL/L (ref 5–15)
BUN SERPL-MCNC: 44 MG/DL (ref 8–23)
BUN/CREAT SERPL: 42.7 (ref 7–25)
CALCIUM SPEC-SCNC: 7.9 MG/DL (ref 8.2–9.6)
CHLORIDE SERPL-SCNC: 118 MMOL/L (ref 98–107)
CO2 SERPL-SCNC: 25 MMOL/L (ref 22–29)
CREAT SERPL-MCNC: 1.03 MG/DL (ref 0.76–1.27)
DEPRECATED RDW RBC AUTO: 49.9 FL (ref 37–54)
EGFRCR SERPLBLD CKD-EPI 2021: 69 ML/MIN/1.73
ERYTHROCYTE [DISTWIDTH] IN BLOOD BY AUTOMATED COUNT: 14.1 % (ref 12.3–15.4)
GLUCOSE BLDC GLUCOMTR-MCNC: 120 MG/DL (ref 70–130)
GLUCOSE BLDC GLUCOMTR-MCNC: 140 MG/DL (ref 70–130)
GLUCOSE BLDC GLUCOMTR-MCNC: 159 MG/DL (ref 70–130)
GLUCOSE BLDC GLUCOMTR-MCNC: 182 MG/DL (ref 70–130)
GLUCOSE SERPL-MCNC: 159 MG/DL (ref 65–99)
HCT VFR BLD AUTO: 30.2 % (ref 37.5–51)
HGB BLD-MCNC: 10.1 G/DL (ref 13–17.7)
MCH RBC QN AUTO: 32.4 PG (ref 26.6–33)
MCHC RBC AUTO-ENTMCNC: 33.4 G/DL (ref 31.5–35.7)
MCV RBC AUTO: 96.8 FL (ref 79–97)
PLATELET # BLD AUTO: 166 10*3/MM3 (ref 140–450)
PMV BLD AUTO: 10 FL (ref 6–12)
POTASSIUM SERPL-SCNC: 3.4 MMOL/L (ref 3.5–5.2)
QT INTERVAL: 259 MS
QTC INTERVAL: 436 MS
RBC # BLD AUTO: 3.12 10*6/MM3 (ref 4.14–5.8)
SODIUM SERPL-SCNC: 148 MMOL/L (ref 136–145)
SODIUM SERPL-SCNC: 151 MMOL/L (ref 136–145)
WBC NRBC COR # BLD AUTO: 8.76 10*3/MM3 (ref 3.4–10.8)

## 2024-12-25 PROCEDURE — 84295 ASSAY OF SERUM SODIUM: CPT | Performed by: INTERNAL MEDICINE

## 2024-12-25 PROCEDURE — 80048 BASIC METABOLIC PNL TOTAL CA: CPT | Performed by: INTERNAL MEDICINE

## 2024-12-25 PROCEDURE — 25010000002 AMIODARONE IN DEXTROSE 5% 360-4.14 MG/200ML-% SOLUTION: Performed by: INTERNAL MEDICINE

## 2024-12-25 PROCEDURE — 93010 ELECTROCARDIOGRAM REPORT: CPT | Performed by: INTERNAL MEDICINE

## 2024-12-25 PROCEDURE — 25010000002 MEROPENEM PER 100 MG: Performed by: HOSPITALIST

## 2024-12-25 PROCEDURE — 82948 REAGENT STRIP/BLOOD GLUCOSE: CPT

## 2024-12-25 PROCEDURE — 93005 ELECTROCARDIOGRAM TRACING: CPT | Performed by: INTERNAL MEDICINE

## 2024-12-25 PROCEDURE — 25810000003 SODIUM CHLORIDE 0.9 % SOLUTION 250 ML FLEX CONT: Performed by: INTERNAL MEDICINE

## 2024-12-25 PROCEDURE — 25010000002 DIGOXIN PER 500 MCG: Performed by: PHYSICIAN ASSISTANT

## 2024-12-25 PROCEDURE — 99232 SBSQ HOSP IP/OBS MODERATE 35: CPT | Performed by: PHYSICIAN ASSISTANT

## 2024-12-25 PROCEDURE — 85027 COMPLETE CBC AUTOMATED: CPT | Performed by: HOSPITALIST

## 2024-12-25 PROCEDURE — 25010000002 ACYCLOVIR PER 5 MG: Performed by: INTERNAL MEDICINE

## 2024-12-25 PROCEDURE — 63710000001 INSULIN REGULAR HUMAN PER 5 UNITS: Performed by: STUDENT IN AN ORGANIZED HEALTH CARE EDUCATION/TRAINING PROGRAM

## 2024-12-25 PROCEDURE — 25010000002 DIGOXIN PER 500 MCG: Performed by: INTERNAL MEDICINE

## 2024-12-25 PROCEDURE — 25010000002 AMIODARONE IN DEXTROSE 5% 150-4.21 MG/100ML-% SOLUTION: Performed by: INTERNAL MEDICINE

## 2024-12-25 PROCEDURE — 25010000003 DEXTROSE 5 % SOLUTION: Performed by: INTERNAL MEDICINE

## 2024-12-25 RX ORDER — DIGOXIN 0.25 MG/ML
250 INJECTION INTRAMUSCULAR; INTRAVENOUS ONCE
Status: COMPLETED | OUTPATIENT
Start: 2024-12-25 | End: 2024-12-25

## 2024-12-25 RX ORDER — DEXTROSE MONOHYDRATE 50 MG/ML
125 INJECTION, SOLUTION INTRAVENOUS CONTINUOUS
Status: DISCONTINUED | OUTPATIENT
Start: 2024-12-25 | End: 2024-12-26

## 2024-12-25 RX ADMIN — INSULIN HUMAN 2 UNITS: 100 INJECTION, SOLUTION PARENTERAL at 01:06

## 2024-12-25 RX ADMIN — TERAZOSIN HYDROCHLORIDE 1 MG: 1 CAPSULE ORAL at 20:41

## 2024-12-25 RX ADMIN — METOPROLOL TARTRATE 5 MG: 5 INJECTION INTRAVENOUS at 12:40

## 2024-12-25 RX ADMIN — CARBIDOPA AND LEVODOPA 1 TABLET: 25; 250 TABLET ORAL at 20:41

## 2024-12-25 RX ADMIN — DEXTROSE MONOHYDRATE 125 ML/HR: 50 INJECTION, SOLUTION INTRAVENOUS at 18:06

## 2024-12-25 RX ADMIN — GUAIFENESIN 600 MG: 600 TABLET, MULTILAYER, EXTENDED RELEASE ORAL at 10:01

## 2024-12-25 RX ADMIN — INSULIN HUMAN 2 UNITS: 100 INJECTION, SOLUTION PARENTERAL at 06:23

## 2024-12-25 RX ADMIN — ATORVASTATIN CALCIUM 40 MG: 20 TABLET, FILM COATED ORAL at 20:41

## 2024-12-25 RX ADMIN — APIXABAN 5 MG: 5 TABLET, FILM COATED ORAL at 20:41

## 2024-12-25 RX ADMIN — APIXABAN 5 MG: 5 TABLET, FILM COATED ORAL at 10:01

## 2024-12-25 RX ADMIN — GUAIFENESIN 600 MG: 600 TABLET, MULTILAYER, EXTENDED RELEASE ORAL at 20:42

## 2024-12-25 RX ADMIN — SODIUM CHLORIDE 700 MG: 9 INJECTION, SOLUTION INTRAVENOUS at 20:48

## 2024-12-25 RX ADMIN — DIGOXIN 250 MCG: 0.25 INJECTION INTRAMUSCULAR; INTRAVENOUS at 15:32

## 2024-12-25 RX ADMIN — MEROPENEM 1000 MG: 1 INJECTION, POWDER, FOR SOLUTION INTRAVENOUS at 22:47

## 2024-12-25 RX ADMIN — DEXTROSE MONOHYDRATE 125 ML/HR: 50 INJECTION, SOLUTION INTRAVENOUS at 10:02

## 2024-12-25 RX ADMIN — AMIODARONE HYDROCHLORIDE 150 MG: 1.5 INJECTION, SOLUTION INTRAVENOUS at 18:22

## 2024-12-25 RX ADMIN — Medication 10 ML: at 21:42

## 2024-12-25 RX ADMIN — Medication 10 ML: at 10:01

## 2024-12-25 RX ADMIN — CARBIDOPA AND LEVODOPA 1 TABLET: 25; 250 TABLET ORAL at 18:05

## 2024-12-25 RX ADMIN — AMLODIPINE BESYLATE 5 MG: 5 TABLET ORAL at 10:01

## 2024-12-25 RX ADMIN — SODIUM CHLORIDE 700 MG: 9 INJECTION, SOLUTION INTRAVENOUS at 10:01

## 2024-12-25 RX ADMIN — FINASTERIDE 5 MG: 5 TABLET, FILM COATED ORAL at 10:01

## 2024-12-25 RX ADMIN — AMIODARONE HYDROCHLORIDE 1 MG/MIN: 1.8 INJECTION, SOLUTION INTRAVENOUS at 18:33

## 2024-12-25 RX ADMIN — CARBIDOPA AND LEVODOPA 1 TABLET: 25; 250 TABLET ORAL at 10:01

## 2024-12-25 RX ADMIN — LEVOTHYROXINE SODIUM 75 MCG: 75 TABLET ORAL at 05:48

## 2024-12-25 RX ADMIN — Medication 1000 MCG: at 05:48

## 2024-12-25 RX ADMIN — DIGOXIN 250 MCG: 0.25 INJECTION INTRAMUSCULAR; INTRAVENOUS at 14:44

## 2024-12-25 RX ADMIN — INSULIN HUMAN 2 UNITS: 100 INJECTION, SOLUTION PARENTERAL at 12:40

## 2024-12-25 RX ADMIN — MEROPENEM 1000 MG: 1 INJECTION, POWDER, FOR SOLUTION INTRAVENOUS at 12:40

## 2024-12-25 RX ADMIN — METOPROLOL TARTRATE 5 MG: 1 INJECTION, SOLUTION INTRAVENOUS at 09:46

## 2024-12-25 NOTE — PROGRESS NOTES
LOS: 8 days   Patient Care Team:  Aleksander Diez MD as PCP - General  Joseph Acharya MD as Consulting Physician (Ophthalmology)  Ford Dorado MD as Consulting Physician (Vascular Surgery)  Cecilio Armas III, MD as Consulting Physician (Cardiology)  Kashif Goodwin MD as Consulting Physician (Neurology)  Enoch Lares MD (Hematology)  Kayden Chance MD (Dermatology)  Vinod Cadena MD as Consulting Physician (Neurosurgery)  Johnny Castellanos MD as Consulting Physician (Urology)  Inge Pedro, PT as Physical Therapist (Physical Therapy)  Lucrecia Mccloud, PT as Physical Therapist (Physical Therapy)  Nereyda Toledo, OT as Occupational Therapist (Occupational Therapy)  Junior Taylor MD as Surgeon (Orthopedic Surgery)  Casey Power MD as Surgeon (Wound Care)  Nahomi Jacobo APRN as Nurse Practitioner (Nurse Practitioner)    Chief Complaint: DEE    Subjective     History of Present Illness  Casey Snowden . is a 89 y.o. male with history of Parkinson's disease and dementia.  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 With no known history of renal disease. Started on iv acyclovir.  Now with dee. Cr up to 3.3 from 0.8.  with h/o bph. Currently asleep. Grandson at bedside.       Subjective  Patient still encephalopathic  Has had some large volume bladder scans, but patient has been able to void as they have moved him around. PVR has been under 300.  DHT in place.     12/21 - underwent LP this am. No other acute events overnight. Continues to be confused. 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     12/23 no acute events. Seems to be doing a bit better today.    12/24 seems to have had a difficult night. Now being bathe.     12/25 no acute events. Overall, patient looks better today. Second IV access accomplished.     History taken from: family and chart    Objective     Vital Sign Min/Max for  "last 24 hours  Temp  Min: 98.1 °F (36.7 °C)  Max: 100.4 °F (38 °C)   BP  Min: 146/62  Max: 171/66   Pulse  Min: 75  Max: 87   Resp  Min: 18  Max: 22   SpO2  Min: 95 %  Max: 98 %   No data recorded   Weight  Min: 70 kg (154 lb 5.2 oz)  Max: 70 kg (154 lb 5.2 oz)     Flowsheet Rows      Flowsheet Row First Filed Value   Admission Height --   Admission Weight 66.7 kg (147 lb 0.8 oz) Documented at 12/18/2024 0500            No intake/output data recorded.  I/O last 3 completed shifts:  In: 5270 [I.V.:1100; Other:2000; NG/GT:1820; IV Piggyback:350]  Out: -     Objective:  Vital signs: (most recent): Blood pressure 104/73, pulse (!) 154, temperature 97.5 °F (36.4 °C), temperature source Oral, resp. rate 20, height 177.8 cm (70\"), weight 70 kg (154 lb 5.2 oz), SpO2 96%.            Physical Examination: General appearance - ill appearing, frail, looks better today. Very dry looking   Mental status - more awake.    Nose - DHT in place   Chest - + coarse breath sound. Mos[place] upper airway .  Heart - normal rate, regular rhythm, normal S1, S2, no murmurs, rubs, clicks or gallops  Abdomen - soft, nontender, nondistended, no masses or organomegaly  Neurological - improving, trying to talk, followed simple commands.   Extremities - no edema      Results Review:     I reviewed the patient's new clinical results.    WBC WBC   Date Value Ref Range Status   12/25/2024 8.76 3.40 - 10.80 10*3/mm3 Final      HGB Hemoglobin   Date Value Ref Range Status   12/25/2024 10.1 (L) 13.0 - 17.7 g/dL Final      HCT Hematocrit   Date Value Ref Range Status   12/25/2024 30.2 (L) 37.5 - 51.0 % Final      Platlets No results found for: \"LABPLAT\"   MCV MCV   Date Value Ref Range Status   12/25/2024 96.8 79.0 - 97.0 fL Final          Sodium Sodium   Date Value Ref Range Status   12/25/2024 151 (H) 136 - 145 mmol/L Final   12/24/2024 150 (H) 136 - 145 mmol/L Final   12/23/2024 147 (H) 136 - 145 mmol/L Final      Potassium Potassium   Date Value Ref Range " "Status   12/25/2024 3.4 (L) 3.5 - 5.2 mmol/L Final   12/24/2024 3.5 3.5 - 5.2 mmol/L Final   12/23/2024 3.5 3.5 - 5.2 mmol/L Final      Chloride Chloride   Date Value Ref Range Status   12/25/2024 118 (H) 98 - 107 mmol/L Final   12/24/2024 116 (H) 98 - 107 mmol/L Final   12/23/2024 112 (H) 98 - 107 mmol/L Final      CO2 CO2   Date Value Ref Range Status   12/25/2024 25.0 22.0 - 29.0 mmol/L Final   12/24/2024 24.2 22.0 - 29.0 mmol/L Final   12/23/2024 23.9 22.0 - 29.0 mmol/L Final      BUN BUN   Date Value Ref Range Status   12/25/2024 44 (H) 8 - 23 mg/dL Final   12/24/2024 44 (H) 8 - 23 mg/dL Final   12/23/2024 47 (H) 8 - 23 mg/dL Final      Creatinine Creatinine   Date Value Ref Range Status   12/25/2024 1.03 0.76 - 1.27 mg/dL Final   12/24/2024 1.11 0.76 - 1.27 mg/dL Final   12/23/2024 1.19 0.76 - 1.27 mg/dL Final      Calcium Calcium   Date Value Ref Range Status   12/25/2024 7.9 (L) 8.2 - 9.6 mg/dL Final   12/24/2024 8.2 8.2 - 9.6 mg/dL Final   12/23/2024 8.4 8.2 - 9.6 mg/dL Final      PO4 No results found for: \"CAPO4\"   Albumin No results found for: \"ALBUMIN\"     Magnesium No results found for: \"MG\"     Uric Acid No results found for: \"URICACID\"     Medication Review:     Current Facility-Administered Medications:     acetaminophen (TYLENOL) tablet 650 mg, 650 mg, Nasogastric, Q4H PRN **OR** acetaminophen (TYLENOL) 160 MG/5ML oral solution 650 mg, 650 mg, Nasogastric, Q4H PRN, 650 mg at 12/24/24 2328 **OR** acetaminophen (TYLENOL) suppository 650 mg, 650 mg, Rectal, Q4H PRN, Chilo Tyson MD    acyclovir (ZOVIRAX) 700 mg in sodium chloride 0.9 % 250 mL IVPB, 10 mg/kg, Intravenous, Q12H, Delroy Reed MD, 700 mg at 12/24/24 2133    amLODIPine (NORVASC) tablet 5 mg, 5 mg, Oral, Daily, Leidy Tyson APRN, 5 mg at 12/24/24 0856    apixaban (ELIQUIS) tablet 5 mg, 5 mg, Oral, Q12H, Ford Salazar MD, 5 mg at 12/24/24 2132    atorvastatin (LIPITOR) tablet 40 mg, 40 mg, Nasogastric, " Nightly, Chilo Tyson MD, 40 mg at 12/24/24 2132    atropine 1 % ophthalmic solution 1 drop, 1 drop, Both Eyes, TID PRN, Inessa Dumont MD    carbidopa-levodopa (SINEMET)  MG per tablet 1 tablet, 1 tablet, Nasogastric, TID, Chilo Tyson MD, 1 tablet at 12/24/24 2132    dextrose (D50W) (25 g/50 mL) IV injection 25 g, 25 g, Intravenous, Q15 Min PRN, Chilo Tyson MD    dextrose (D5W) 5 % infusion, 125 mL/hr, Intravenous, Continuous, Cherri Parr MD    dextrose (GLUTOSE) oral gel 15 g, 15 g, Oral, Q15 Min PRN, Chilo Tyson MD    finasteride (PROSCAR) tablet 5 mg, 5 mg, Nasogastric, Daily, Chilo Tyosn MD, 5 mg at 12/24/24 0856    glucagon (GLUCAGEN) injection 1 mg, 1 mg, Intramuscular, Q15 Min PRN, Chilo Tyson MD    guaiFENesin (MUCINEX) 12 hr tablet 600 mg, 600 mg, Oral, Q12H, Chilo Tyson MD, 600 mg at 12/24/24 2133    insulin regular (humuLIN R,novoLIN R) injection 2-7 Units, 2-7 Units, Subcutaneous, Q6H, Chilo Tyson MD, 2 Units at 12/25/24 0623    levothyroxine (SYNTHROID, LEVOTHROID) tablet 75 mcg, 75 mcg, Nasogastric, Daily, Chilo Tyson MD, 75 mcg at 12/25/24 0548    LORazepam (ATIVAN) injection 2 mg, 2 mg, Intravenous, Q5 Min PRN, Chilo Tyson MD    meropenem (MERREM) 1,000 mg in sodium chloride 0.9 % 100 mL MBP, 1,000 mg, Intravenous, Q12H, Ford Salazar MD, 1,000 mg at 12/24/24 2311    ondansetron (ZOFRAN) injection 4 mg, 4 mg, Intravenous, Q6H PRN, Inessa Dumont MD    [Held by provider] oxybutynin XL (DITROPAN-XL) 24 hr tablet 10 mg, 10 mg, Oral, Daily, Inessa Dumont MD, 10 mg at 12/18/24 0901    Pharmacy to Dose acyclovir (ZOVIRAX), , Not Applicable, Continuous PRN, Delroy Reed MD    Pharmacy to Dose meropenem (MERREM), , Not Applicable, Continuous PRN, Ford Salazar MD    [COMPLETED] Insert Peripheral IV, , , Once **AND** sodium chloride 0.9 % flush 10 mL, 10 mL, Intravenous, PRN, aIn Dao MD    sodium chloride 0.9 % flush 10  mL, 10 mL, Intravenous, Q12H, Inessa Dumont MD, 10 mL at 12/24/24 2328    sodium chloride 0.9 % flush 10 mL, 10 mL, Intravenous, PRN, Inessa Dumont MD    sodium chloride 0.9 % infusion 40 mL, 40 mL, Intravenous, PRN, Inessa Dumont MD    sodium chloride 0.9 % infusion, 100 mL/hr, Intravenous, Continuous, Cherri Parr MD, Last Rate: 100 mL/hr at 12/24/24 2142, 100 mL/hr at 12/24/24 2142    terazosin (HYTRIN) capsule 1 mg, 1 mg, Nasogastric, Nightly, Chilo Tyson MD, 1 mg at 12/24/24 2328    [Held by provider] valsartan (DIOVAN) tablet 40 mg, 40 mg, Oral, Daily, Inessa Dumont MD, 40 mg at 12/18/24 0902    vitamin B-12 (CYANOCOBALAMIN) tablet 1,000 mcg, 1,000 mcg, Nasogastric, Q AM, Chilo Tyson MD, 1,000 mcg at 12/25/24 0548     Assessment & Plan       Unable to ambulate    Benign essential hypertension    Parkinson's disease    Chronic deep vein thrombosis (DVT) of popliteal vein of right lower extremity    Uses hearing aid    Paroxysmal A-fib    Chronic diastolic (congestive) heart failure    Presence of IVC filter    Orthostatic hypotension due to Parkinson's disease    PAD (peripheral artery disease)    Secondary malignancy of lymph nodes of head, face and neck    Acquired hypothyroidism    Falls frequently    Herpes zoster without complication    LISY (acute kidney injury)    Moderate protein-calorie malnutrition      Assessment & Plan  LISY  Metabolic acidosis   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 Acyclovir less (no crystals in UA, improving with IVF).     He has confirmed VZV meningoencephalitis and will need a total of 14 day of IV acyclovir.     He should continue to have IVF. Can use loop diuretics if concerns for volume overload. Alternatively, we could do NS @ 125 ml/hr 1 hour prior to acyclovir infusion and 6 hours after.    Continue to renally dose, ok to BID dosing.     Na worse today will increase free water per tube and will give more D5W, conituous this time. Check na around noon and adjust as needed.     Replace K per tube, per protocol       UA is bland, some protein, no GN suspected at this time.   Check labs in am   Will follow     Cherri Parr MD  12/25/24  08:35 EST

## 2024-12-25 NOTE — PROGRESS NOTES
Name: Casey Snowden . ADMIT: 2024   : 1934  PCP: Aleksander Diez MD    MRN: [id number] LOS: 8 days   AGE/SEX: 89 y.o. male  ROOM: [facility]     Subjective   Subjective   Wife is at bedside.  She reports that he is a lot more interactive than he has been previously today.  However his heart rate has been as high as the 170s.  Blood pressure is a bit lower than where it has been previously however the wife states that his blood pressure is actually well-controlled for his baseline.     Objective   Objective   Vital Signs  Temp:  [97.5 °F (36.4 °C)-100.4 °F (38 °C)] 97.5 °F (36.4 °C)  Heart Rate:  [] 173  Resp:  [20-22] 20  BP: ()/(62-74) 94/62  SpO2:  [95 %-99 %] 96 %  on   ;   Device (Oxygen Therapy): room air  Body mass index is 22.14 kg/m².  Physical Exam  Vitals reviewed.   Constitutional:       General: He is not in acute distress.  Cardiovascular:      Rate and Rhythm: Normal rate.      Heart sounds: No murmur heard.  Pulmonary:      Effort: No respiratory distress.      Breath sounds: Rhonchi present.   Abdominal:      General: There is no distension.      Palpations: Abdomen is soft.      Tenderness: There is no abdominal tenderness.   Musculoskeletal:      Right lower leg: No edema.      Left lower leg: No edema.   Skin:     General: Skin is warm and dry.      Findings: No rash.      Comments: Vesicular rash improved but present   Neurological:      Mental Status: He is alert.       Results Review     I reviewed the patient's new clinical results.  Results from last 7 days   Lab Units 24  0457 24  0635 24  0625 24  0811   WBC 10*3/mm3 8.76 8.28 8.60 6.73   HEMOGLOBIN g/dL 10.1* 11.0* 11.9* 11.2*   PLATELETS 10*3/mm3 166 158 155 139*     Results from last 7 days   Lab Units 24  1210 24  0457 24  0547 24  0705 24  0635   SODIUM mmol/L 148* 151* 150* 147* 148*   POTASSIUM mmol/L  --  3.4* 3.5 3.5 3.8   CHLORIDE mmol/L  --   118* 116* 112* 109*   CO2 mmol/L  --  25.0 24.2 23.9 22.5   BUN mg/dL  --  44* 44* 47* 53*   CREATININE mg/dL  --  1.03 1.11 1.19 1.87*   GLUCOSE mg/dL  --  159* 173* 124* 167*   EGFR mL/min/1.73  --  69.0 63.1 58.0* 33.7*           Results from last 7 days   Lab Units 12/25/24  0457 12/24/24  0547 12/23/24  0705 12/22/24  0635 12/21/24  0625 12/20/24  0811 12/19/24  0923   CALCIUM mg/dL 7.9* 8.2 8.4 8.2   < > 7.7* 8.0*   MAGNESIUM mg/dL  --   --   --   --   --  2.2 2.1   PHOSPHORUS mg/dL  --   --   --   --   --  6.6*  --     < > = values in this interval not displayed.       Glucose   Date/Time Value Ref Range Status   12/25/2024 1120 182 (H) 70 - 130 mg/dL Final   12/25/2024 0540 159 (H) 70 - 130 mg/dL Final   12/25/2024 0003 140 (H) 70 - 130 mg/dL Final   12/24/2024 1606 182 (H) 70 - 130 mg/dL Final   12/24/2024 1135 164 (H) 70 - 130 mg/dL Final   12/24/2024 0607 152 (H) 70 - 130 mg/dL Final   12/23/2024 2344 146 (H) 70 - 130 mg/dL Final       No radiology results for the last day    I have personally reviewed all medications:  Scheduled Medications  acyclovir, 10 mg/kg, Intravenous, Q12H  amLODIPine, 5 mg, Oral, Daily  apixaban, 5 mg, Oral, Q12H  atorvastatin, 40 mg, Nasogastric, Nightly  carbidopa-levodopa, 1 tablet, Nasogastric, TID  digoxin, 250 mcg, Intravenous, Once  finasteride, 5 mg, Nasogastric, Daily  guaiFENesin, 600 mg, Oral, Q12H  insulin regular, 2-7 Units, Subcutaneous, Q6H  levothyroxine, 75 mcg, Nasogastric, Daily  meropenem, 1,000 mg, Intravenous, Q12H  [Held by provider] oxybutynin XL, 10 mg, Oral, Daily  sodium chloride, 10 mL, Intravenous, Q12H  terazosin, 1 mg, Nasogastric, Nightly  [Held by provider] valsartan, 40 mg, Oral, Daily  vitamin B-12, 1,000 mcg, Nasogastric, Q AM    Infusions  dextrose, 125 mL/hr, Last Rate: 125 mL/hr (12/25/24 [facility])  Pharmacy to Dose acyclovir (ZOVIRAX),   Pharmacy to Dose meropenem (MERREM),     Diet  NPO Diet NPO Type: Tube Feeding    I have personally  reviewed:  [x]  Laboratory   []  Microbiology   [x]  Radiology   []  EKG/Telemetry  []  Cardiology/Vascular   []  Pathology    []  Records       Assessment/Plan     Active Hospital Problems    Diagnosis  POA    **Unable to ambulate [R26.2]  Yes    Moderate protein-calorie malnutrition [E44.0]  Yes    LISY (acute kidney injury) [N17.9]  Yes    Herpes zoster without complication [B02.9]  Unknown    Falls frequently [R29.6]  Not Applicable    Acquired hypothyroidism [E03.9]  Yes    Secondary malignancy of lymph nodes of head, 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       89 y.o. male admitted with generalized weakness, confusion and found to have RLE rash consistent with shingles.    Cough/congestion: Checked chest x-ray which shows some new infiltrates.  Will check blood cultures and start antibiotics, likely aspirating    VZV encephalitis.  Status post LP  -Continue acyclovir x 14 days.  Renally dosed as appropriate  - MRI negative for acute change including any evidence of encephalitis  - Neurology signed off.  -Speech following regarding appropriateness for any diet trials.  Continue feeding via core track for now but will have to address PEG if he does not start to improve mentally    LISY/hypernatremia: Remains elevated.  -Felt secondary to dehydration/urine retention and not from acyclovir as crystals negative.    - Continue to hold Diovan and Ditropan  - Monitor urine output    Atrial  fibrillation with RVR  Continue Eliquis, no need for aspirin per cardiology.  No BB due to previous bradycardia. Spot dosed IV metoprolol for RVR    Parkinson disease: Continue Sinemet.     DVT prophy: Eliquis.    Disposition: Plan TBD.        Hernán Saldivar MD  [facility]  12/25/24  14:39 EST

## 2024-12-25 NOTE — PROGRESS NOTES
"[facility]    Patient Name: Casey Snowden .  :1934  89 y.o.  LOS: 8  Encounter Provider: Delroy Malcolm PA-C      Patient Care Team:  Aleksander Diez MD as PCP - General  Marck, Joseph Youngblood MD as Consulting Physician (Ophthalmology)  Ford Dorado MD as Consulting Physician (Vascular Surgery)  Cecilio Armas III, MD as Consulting Physician (Cardiology)  Kashif Goodwin MD as Consulting Physician (Neurology)  Enoch Lares MD (Hematology)  Kayden Chance MD (Dermatology)  Vinod Cadena MD as Consulting Physician (Neurosurgery)  Johnny Castellanos MD as Consulting Physician (Urology)  Inge Pedro, PT as Physical Therapist (Physical Therapy)  Lucrecia Mccloud PT as Physical Therapist (Physical Therapy)  Nereyda Toledo, OT as Occupational Therapist (Occupational Therapy)  Junior Taylor MD as Surgeon (Orthopedic Surgery)  Casey Power MD as Surgeon (Wound Care)  Nahomi Jacobo APRN as Nurse Practitioner (Nurse Practitioner)    Chief Complaint:  Follow up for VZV meningoencephalitis and VT    Interval History: Patient resting comfortably in bed.  Ill-appearing but no distress.       Objective   Vital Signs  Temp:  [97.5 °F (36.4 °C)-100.4 °F (38 °C)] 97.5 °F (36.4 °C)  Heart Rate:  [] 154  Resp:  [18-22] 20  BP: (104-171)/(62-79) 104/73    Intake/Output Summary (Last 24 hours) at 2024 1133  Last data filed at 2024 0651  Gross per 24 hour   Intake 5070 ml   Output --   Net 5070 ml     Flowsheet Rows      Flowsheet Row First Filed Value   Admission Height 177.8 cm (70\") Documented at 2024 0725   Admission Weight 66.7 kg (147 lb 0.8 oz) Documented at 2024 0500              Constitutional:       Appearance: Not in distress. Ill-appearing and chronically ill-appearing.      Comments: Arouses to voice   Pulmonary:      Effort: Tachypnea (mild) present.   Cardiovascular:      Tachycardia present. Irregularly " irregular rhythm.   Skin:     Coloration: Skin is not pale.   Psychiatric:         Behavior: Behavior is cooperative.           Pertinent Test Results:  Results from last 7 days   Lab Units 12/25/24  0457 12/24/24  0547 12/23/24  0705 12/22/24  0635 12/21/24  0625 12/20/24  0811 12/19/24  0923   SODIUM mmol/L 151* 150* 147* 148* 144 136 135*   POTASSIUM mmol/L 3.4* 3.5 3.5 3.8 4.0 4.1 4.2   CHLORIDE mmol/L 118* 116* 112* 109* 106 106 99   CO2 mmol/L 25.0 24.2 23.9 22.5 21.0* 19.3* 20.2*   BUN mg/dL 44* 44* 47* 53* 62* 54* 43*   CREATININE mg/dL 1.03 1.11 1.19 1.87* 3.19* 3.97* 3.31*   GLUCOSE mg/dL 159* 173* 124* 167* 170* 107* 132*   CALCIUM mg/dL 7.9* 8.2 8.4 8.2 8.1* 7.7* 8.0*         Results from last 7 days   Lab Units 12/25/24  0457 12/22/24  0635 12/21/24  0625 12/20/24  0811   WBC 10*3/mm3 8.76 8.28 8.60 6.73   HEMOGLOBIN g/dL 10.1* 11.0* 11.9* 11.2*   HEMATOCRIT % 30.2* 31.4* 34.2* 32.7*   PLATELETS 10*3/mm3 166 158 155 139*         Results from last 7 days   Lab Units 12/20/24  0811 12/19/24  0923   MAGNESIUM mg/dL 2.2 2.1     Results from last 7 days   Lab Units 12/22/24  0635   CHOLESTEROL mg/dL 102   TRIGLYCERIDES mg/dL 52   HDL CHOL mg/dL 50         Results from last 7 days   Lab Units 12/20/24  0811   TSH uIU/mL 4.820*           Medication Review:   acyclovir, 10 mg/kg, Intravenous, Q12H  amLODIPine, 5 mg, Oral, Daily  apixaban, 5 mg, Oral, Q12H  atorvastatin, 40 mg, Nasogastric, Nightly  carbidopa-levodopa, 1 tablet, Nasogastric, TID  finasteride, 5 mg, Nasogastric, Daily  guaiFENesin, 600 mg, Oral, Q12H  insulin regular, 2-7 Units, Subcutaneous, Q6H  levothyroxine, 75 mcg, Nasogastric, Daily  meropenem, 1,000 mg, Intravenous, Q12H  [Held by provider] oxybutynin XL, 10 mg, Oral, Daily  sodium chloride, 10 mL, Intravenous, Q12H  terazosin, 1 mg, Nasogastric, Nightly  [Held by provider] valsartan, 40 mg, Oral, Daily  vitamin B-12, 1,000 mcg, Nasogastric, Q AM         dextrose, 125 mL/hr, Last Rate: 125  mL/hr (12/25/24 1002)  Pharmacy to Dose acyclovir (ZOVIRAX),   Pharmacy to Dose meropenem (MERREM),   sodium chloride, 100 mL/hr, Last Rate: 100 mL/hr (12/24/24 2142)      Ultrasound renal bilaterally on 12/20/2024  IMPRESSION:     1. Large solid mass seen lateral to and abutting the left kidney.  Recommend dedicated renal CT or MRI to further evaluate this mass.  2. Bilateral simple renal cysts.  3. No hydronephrosis.  4. Mild prostatomegaly    Assessment & Plan     Unable to ambulate    Benign essential hypertension    Parkinson's disease    Chronic deep vein thrombosis (DVT) of popliteal vein of right lower extremity    Uses hearing aid    Paroxysmal A-fib    Chronic diastolic (congestive) heart failure    Presence of IVC filter    Orthostatic hypotension due to Parkinson's disease    PAD (peripheral artery disease)    Secondary malignancy of lymph nodes of head, face and neck    Acquired hypothyroidism    Falls frequently    Herpes zoster without complication    LISY (acute kidney injury)    Moderate protein-calorie malnutrition    Pneumonia      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  ID following. Started on meropenem for pneumonia  AMS/metabolic encephalopathy secondary to #1  LISY  Nephrology following  NSVT  Asymptomatic  Beta-blockers discontinued in the past secondary to bradycardia  Hypertension  Valsartan held secondary to #3  Continue amlodipine 5 mg/day  CAD  BILL to LAD in 2019  Denies angina  Ischemic cardiomyopathy  GDMT is limited secondary to #3 above  If he becomes volume overloaded secondary to IV fluid use nephrology states okay to use loop diuretics  PAF  Heart rate elevated. Pt has IV metoprolol ordered from hospital service. HR around 113 currently.  Likely related to volume depletion and retention. Pt had NS increased to 125mg/hr per nephrology.   On home apixaban, was also on aspirin but due to risk of falls this was discontinued  Hyperlipidemia  Adequate control,  continue atorvastatin  History of carotid stenting  History of DVT with IVC filter in place  History of orthostatic hypotension secondary to autonomic dysfunction  Parkinson's disease              Delroy Malcolm PA-C  [facility]   [facility]  [address]  [place] [address]  Office: [phone]    12/25/24  11:33 EST

## 2024-12-25 NOTE — CONSULTS
visited pt and spouse during a rapid response.  provided supportive conversation and prayer.      Pastoral care remains available.

## 2024-12-25 NOTE — PROGRESS NOTES
Brief cardiology update/progress note    Patient has been in A-fib with RVR most of the day with ventricular rate in the 140-170s, which has not improved despite 2 doses of IV digoxin and other supportive measures including fluid.  His BP has been soft with MAP around 60s, though patient himself denies any acute symptoms.      I spoke twice at length with multiple family members including wife, son, daughter and grandson and explained that patient is becoming more critically ill, and this may or may not be reversible especially in the context of multiple comorbidities.  Patient is DNR but family would like to at least for now continue aggressive medical therapy and are agreeable to trial of IV amiodarone with the understanding that there may be additional hemodynamic compromise and specifically the risk of significant bradycardia.  They are also willing to transfer patient to ICU if needed, but understand that things may continue to decline and they need to actively discuss the possibility of comfort care if that were to occur.  All family members appear to be in agreement and voiced understanding appreciation.    Cristofer Norris MD. PhD. FACC

## 2024-12-25 NOTE — PLAN OF CARE
Problem: Adult Inpatient Plan of Care  Goal: Plan of Care Review  Outcome: Progressing  Flowsheets (Taken 12/25/2024 0633)  Outcome Evaluation: Pt has a lot of secretions, pt suctioned several times on this shift. Airborne precautions maintained. Pt been turned Q 2 hours. Spouse at thebedside.  Plan of Care Reviewed With:   patient   spouse     Problem: Adult Inpatient Plan of Care  Goal: Absence of Hospital-Acquired Illness or Injury  Intervention: Prevent Infection  Recent Flowsheet Documentation  Taken 12/25/2024 0426 by Melly Rutledge, RN  Infection Prevention:   environmental surveillance performed   hand hygiene promoted   rest/sleep promoted   single patient room provided   equipment surfaces disinfected  Taken 12/25/2024 0200 by Melly Rutledge, RN  Infection Prevention:   environmental surveillance performed   hand hygiene promoted   rest/sleep promoted   single patient room provided   equipment surfaces disinfected  Taken 12/24/2024 2145 by Melly Rutledge, RN  Infection Prevention:   environmental surveillance performed   hand hygiene promoted   rest/sleep promoted   single patient room provided   equipment surfaces disinfected  Taken 12/24/2024 2000 by Melly Rutledge, RN  Infection Prevention:   environmental surveillance performed   hand hygiene promoted   rest/sleep promoted   single patient room provided   equipment surfaces disinfected   Goal Outcome Evaluation:  Plan of Care Reviewed With: patient, spouse        Progress: no change  Outcome Evaluation: Pt has a lot of secretions, pt suctioned several times on this shift. Airborne precautions maintained. Pt been turned Q 2 hours. Spouse at thebedside.

## 2024-12-25 NOTE — CODE DOCUMENTATION
Patient Name:  Casey Snowden .  YOB: 1934  MRN:  [id number]  Admit Date:  12/17/2024    Visit Diagnoses:     ICD-10-CM ICD-9-CM   1. Unable to ambulate  R26.2 719.7   2. Herpes zoster with complication: S2 and S3 distribution on the right  B02.8 053.8   3. Parkinson's disease, unspecified whether dyskinesia present, unspecified whether manifestations fluctuate  G20.A1 332.0       Reason For Rapid:   tachycardia, hypotension via automatic BP cuff, altered mental status    RN Communicated With:   primary RN updated Dr. Saldivar;  spoke w/ Vernell Rivera RN w/ Dr. Barnes (cards)      Rapid Outcome:  stay on 5N for now, discuss goals of care further    Communication From Rapid Team:    Patient has been struggling with afib rvr, tachycardia 150-170s all day. Pt having BPs via automatic cuff in 80s (maps still 64-67). Checked multiple manual cuff pressures, they are all systolic above 90. Pt at his current baseline mental status, room air w/ acceptable O2 sats. No signs of cardiogenic shock. Pt does appear to be uncomfortable and having some discomfort w/ oral secretions.     Pt is DNR and full treatment. Had long conversation with wife about goals of care - if he decompensates, do we move to ICU and start vasopressors? Do I put him on ventilator? What kind of extraordinary measures do you want us to do on this 89yo man w/ multiple co-morbid conditions? Wife is tearful, but shares w/ me that she doesn't want Afshin to suffer. She states that Afshin was a very independent  and . She says that their living willing is very explicit. Afshin would not want to be kept alive by artificial means. Wife then states that she's just not ready to let him go. Wife called her children and asked them to come and meet with her and staff to discuss next steps.       Most Recent Vital Signs  Temp:  [97.5 °F (36.4 °C)-100.4 °F (38 °C)] 97.5 °F (36.4 °C)  Heart Rate:  [] 155  Resp:  [20-22] 20  BP:  "()/() 124/64  SpO2:  [95 %-99 %] 97 %  on   ;   Device (Oxygen Therapy): room air    Labs:      Glucose   Date/Time Value Ref Range Status   12/25/2024 1454 120 70 - 130 mg/dL Final   12/25/2024 1120 182 (H) 70 - 130 mg/dL Final   12/25/2024 0540 159 (H) 70 - 130 mg/dL Final   12/25/2024 0003 140 (H) 70 - 130 mg/dL Final   12/24/2024 1606 182 (H) 70 - 130 mg/dL Final   12/24/2024 1135 164 (H) 70 - 130 mg/dL Final   12/24/2024 0607 152 (H) 70 - 130 mg/dL Final     No results found for: \"SITE\", \"ALLENTEST\", \"PHART\", \"MGD1XKA\", \"PO2ART\", \"BFY5SAD\", \"BASEEXCESS\", \"M7HBXMVS\", \"HGBBG\", \"HCTABG\", \"OXYHEMOGLOBI\", \"METHHGBN\", \"CARBOXYHGB\", \"CO2CT\", \"BAROMETRIC\", \"MODALITY\", \"FIO2\"  Results from last 7 days   Lab Units 12/25/24  0457 12/22/24  0635 12/21/24  0625 12/20/24  0811   WBC 10*3/mm3 8.76 8.28 8.60 6.73   HEMOGLOBIN g/dL 10.1* 11.0* 11.9* 11.2*   PLATELETS 10*3/mm3 166 158 155 139*     Results from last 7 days   Lab Units 12/25/24  1210 12/25/24  0457 12/24/24  0547 12/23/24  0705   SODIUM mmol/L 148* 151* 150* 147*   POTASSIUM mmol/L  --  3.4* 3.5 3.5   CHLORIDE mmol/L  --  118* 116* 112*   CO2 mmol/L  --  25.0 24.2 23.9   BUN mg/dL  --  44* 44* 47*   CREATININE mg/dL  --  1.03 1.11 1.19   GLUCOSE mg/dL  --  159* 173* 124*   Estimated Creatinine Clearance: 47.2 mL/min (by C-G formula based on SCr of 1.03 mg/dL).          Results from last 7 days   Lab Units 12/21/24  1649   PH, ARTERIAL pH units 7.411   PO2 ART mm Hg 72.7*   PCO2, ARTERIAL mm Hg 36.8   HCO3 ART mmol/L 23.4   O2 SATURATION ART % 94.7   MODALITY  Room Air   No results found for: \"STREPPNEUAG\", \"LEGANTIGENUR\"        NIH Stroke Scale:   n/a                                                         Please refer to full rapid documentation on summary page under Index / Code Timeline  "

## 2024-12-25 NOTE — PROGRESS NOTES
"[facility] Clinical Pharmacy Services: Meropenem Consult     Casey Snowden . has a pharmacy consult to dose meropenem day 2/5 per Dr. Guzman's request.     Indication: Pneumonia     Relevant clinical data and objective history reviewed:  89 y.o. male 177.8 cm (70\") 70 kg (154 lb 5.2 oz)  Estimated Creatinine Clearance: 47.2 mL/min (by C-G formula based on SCr of 1.03 mg/dL).    Past Medical History:   Diagnosis Date    Abdominal aortic aneurysm without rupture 10/14/2021    Acquired hypothyroidism 08/31/2022    Acute ischemic stroke 10/08/2023    Aneurysm     Arrhythmia     Atrial fibrillation     Cancer 04/2018    basil cell carcinoma    Carotid stenosis 10/29/2015    CHF (congestive heart failure)     Chronic deep vein thrombosis (DVT) of popliteal vein of right lower extremity     Clotting disorder     Coronary artery disease involving native coronary artery of native heart without angina pectoris 12/20/2018    DVT of lower extremity (deep venous thrombosis)     Hard of hearing     Hyperlipidemia     Hypertension     Localized edema 01/04/2017    Long-term use of high-risk medication     Metabolic encephalopathy 02/07/2023    Myocardial infarction 2918    LAD stent    PAD (peripheral artery disease) 09/10/2020    Parkinson's disease     Postphlebitic syndrome     Pure hypercholesterolemia     Skin tear of upper arm without complication, left, subsequent encounter     Stroke 11/02/2023     is allergic to penicillins and rocephin [ceftriaxone].  Assessment/Plan    Blood Culture (12/24): pending    Will continue patient on meropenem 1000 mg q 12 hrs, which has been adjusted for any patient specific factors.  Pharmacy will continue to monitor and adjust if culture data or pertinent lab values indicate this is best for the patient.    Thank you for this consult and please contact pharmacy with any questions or concerns.     Joe Olivas, [facility]  Clinical Pharmacist   "

## 2024-12-26 LAB
ANION GAP SERPL CALCULATED.3IONS-SCNC: 12 MMOL/L (ref 5–15)
BUN SERPL-MCNC: 48 MG/DL (ref 8–23)
BUN/CREAT SERPL: 49 (ref 7–25)
CALCIUM SPEC-SCNC: 7.4 MG/DL (ref 8.2–9.6)
CHLORIDE SERPL-SCNC: 107 MMOL/L (ref 98–107)
CO2 SERPL-SCNC: 21 MMOL/L (ref 22–29)
CREAT SERPL-MCNC: 0.98 MG/DL (ref 0.76–1.27)
DEPRECATED RDW RBC AUTO: 49.8 FL (ref 37–54)
EGFRCR SERPLBLD CKD-EPI 2021: 73.3 ML/MIN/1.73
ERYTHROCYTE [DISTWIDTH] IN BLOOD BY AUTOMATED COUNT: 14.2 % (ref 12.3–15.4)
GLUCOSE BLDC GLUCOMTR-MCNC: 110 MG/DL (ref 70–130)
GLUCOSE BLDC GLUCOMTR-MCNC: 116 MG/DL (ref 70–130)
GLUCOSE BLDC GLUCOMTR-MCNC: 125 MG/DL (ref 70–130)
GLUCOSE BLDC GLUCOMTR-MCNC: 166 MG/DL (ref 70–130)
GLUCOSE BLDC GLUCOMTR-MCNC: 182 MG/DL (ref 70–130)
GLUCOSE SERPL-MCNC: 174 MG/DL (ref 65–99)
HCT VFR BLD AUTO: 29.3 % (ref 37.5–51)
HGB BLD-MCNC: 9.9 G/DL (ref 13–17.7)
MCH RBC QN AUTO: 32.8 PG (ref 26.6–33)
MCHC RBC AUTO-ENTMCNC: 33.8 G/DL (ref 31.5–35.7)
MCV RBC AUTO: 97 FL (ref 79–97)
PLATELET # BLD AUTO: 168 10*3/MM3 (ref 140–450)
PMV BLD AUTO: 10.8 FL (ref 6–12)
POTASSIUM SERPL-SCNC: 3.2 MMOL/L (ref 3.5–5.2)
POTASSIUM SERPL-SCNC: 4.5 MMOL/L (ref 3.5–5.2)
PROCALCITONIN SERPL-MCNC: 0.61 NG/ML (ref 0–0.25)
QT INTERVAL: 272 MS
QTC INTERVAL: 420 MS
RBC # BLD AUTO: 3.02 10*6/MM3 (ref 4.14–5.8)
SODIUM SERPL-SCNC: 140 MMOL/L (ref 136–145)
WBC NRBC COR # BLD AUTO: 6.39 10*3/MM3 (ref 3.4–10.8)

## 2024-12-26 PROCEDURE — 25010000002 MEROPENEM PER 100 MG: Performed by: HOSPITALIST

## 2024-12-26 PROCEDURE — 84145 PROCALCITONIN (PCT): CPT | Performed by: INTERNAL MEDICINE

## 2024-12-26 PROCEDURE — 25010000002 AMIODARONE IN DEXTROSE 5% 360-4.14 MG/200ML-% SOLUTION: Performed by: INTERNAL MEDICINE

## 2024-12-26 PROCEDURE — 99233 SBSQ HOSP IP/OBS HIGH 50: CPT | Performed by: INTERNAL MEDICINE

## 2024-12-26 PROCEDURE — 25010000002 ACYCLOVIR PER 5 MG: Performed by: INTERNAL MEDICINE

## 2024-12-26 PROCEDURE — 99232 SBSQ HOSP IP/OBS MODERATE 35: CPT | Performed by: INTERNAL MEDICINE

## 2024-12-26 PROCEDURE — 93010 ELECTROCARDIOGRAM REPORT: CPT | Performed by: INTERNAL MEDICINE

## 2024-12-26 PROCEDURE — 97530 THERAPEUTIC ACTIVITIES: CPT

## 2024-12-26 PROCEDURE — 80048 BASIC METABOLIC PNL TOTAL CA: CPT | Performed by: INTERNAL MEDICINE

## 2024-12-26 PROCEDURE — 63710000001 INSULIN REGULAR HUMAN PER 5 UNITS: Performed by: STUDENT IN AN ORGANIZED HEALTH CARE EDUCATION/TRAINING PROGRAM

## 2024-12-26 PROCEDURE — 25010000003 DEXTROSE 5 % SOLUTION: Performed by: INTERNAL MEDICINE

## 2024-12-26 PROCEDURE — 84132 ASSAY OF SERUM POTASSIUM: CPT | Performed by: INTERNAL MEDICINE

## 2024-12-26 PROCEDURE — 85027 COMPLETE CBC AUTOMATED: CPT | Performed by: STUDENT IN AN ORGANIZED HEALTH CARE EDUCATION/TRAINING PROGRAM

## 2024-12-26 PROCEDURE — 82948 REAGENT STRIP/BLOOD GLUCOSE: CPT

## 2024-12-26 PROCEDURE — 93005 ELECTROCARDIOGRAM TRACING: CPT | Performed by: INTERNAL MEDICINE

## 2024-12-26 PROCEDURE — 25810000003 SODIUM CHLORIDE 0.9 % SOLUTION 250 ML FLEX CONT: Performed by: INTERNAL MEDICINE

## 2024-12-26 RX ORDER — POTASSIUM CHLORIDE 1.5 G/1.58G
40 POWDER, FOR SOLUTION ORAL EVERY 4 HOURS
Status: COMPLETED | OUTPATIENT
Start: 2024-12-26 | End: 2024-12-26

## 2024-12-26 RX ADMIN — METOPROLOL TARTRATE 5 MG: 5 INJECTION INTRAVENOUS at 09:30

## 2024-12-26 RX ADMIN — CARBIDOPA AND LEVODOPA 1 TABLET: 25; 250 TABLET ORAL at 20:33

## 2024-12-26 RX ADMIN — Medication 10 ML: at 20:33

## 2024-12-26 RX ADMIN — MEROPENEM 1000 MG: 1 INJECTION, POWDER, FOR SOLUTION INTRAVENOUS at 21:39

## 2024-12-26 RX ADMIN — METOPROLOL TARTRATE 5 MG: 5 INJECTION INTRAVENOUS at 01:56

## 2024-12-26 RX ADMIN — ATORVASTATIN CALCIUM 40 MG: 20 TABLET, FILM COATED ORAL at 20:32

## 2024-12-26 RX ADMIN — ACYCLOVIR SODIUM 700 MG: 50 INJECTION, SOLUTION INTRAVENOUS at 18:13

## 2024-12-26 RX ADMIN — LEVOTHYROXINE SODIUM 75 MCG: 75 TABLET ORAL at 06:18

## 2024-12-26 RX ADMIN — CARBIDOPA AND LEVODOPA 1 TABLET: 25; 250 TABLET ORAL at 18:14

## 2024-12-26 RX ADMIN — GUAIFENESIN 600 MG: 600 TABLET, MULTILAYER, EXTENDED RELEASE ORAL at 09:15

## 2024-12-26 RX ADMIN — FINASTERIDE 5 MG: 5 TABLET, FILM COATED ORAL at 09:15

## 2024-12-26 RX ADMIN — AMIODARONE HYDROCHLORIDE 0.5 MG/MIN: 1.8 INJECTION, SOLUTION INTRAVENOUS at 00:32

## 2024-12-26 RX ADMIN — AMIODARONE HYDROCHLORIDE 0.5 MG/MIN: 1.8 INJECTION, SOLUTION INTRAVENOUS at 10:50

## 2024-12-26 RX ADMIN — TERAZOSIN HYDROCHLORIDE 1 MG: 1 CAPSULE ORAL at 20:33

## 2024-12-26 RX ADMIN — SODIUM CHLORIDE 700 MG: 9 INJECTION, SOLUTION INTRAVENOUS at 09:16

## 2024-12-26 RX ADMIN — APIXABAN 5 MG: 5 TABLET, FILM COATED ORAL at 20:33

## 2024-12-26 RX ADMIN — Medication 1000 MCG: at 06:18

## 2024-12-26 RX ADMIN — CARBIDOPA AND LEVODOPA 1 TABLET: 25; 250 TABLET ORAL at 09:15

## 2024-12-26 RX ADMIN — POTASSIUM CHLORIDE 40 MEQ: 1.5 POWDER, FOR SOLUTION ORAL at 09:16

## 2024-12-26 RX ADMIN — AMIODARONE HYDROCHLORIDE 0.5 MG/MIN: 1.8 INJECTION, SOLUTION INTRAVENOUS at 18:14

## 2024-12-26 RX ADMIN — MEROPENEM 1000 MG: 1 INJECTION, POWDER, FOR SOLUTION INTRAVENOUS at 11:46

## 2024-12-26 RX ADMIN — AMLODIPINE BESYLATE 5 MG: 5 TABLET ORAL at 09:15

## 2024-12-26 RX ADMIN — INSULIN HUMAN 2 UNITS: 100 INJECTION, SOLUTION PARENTERAL at 00:41

## 2024-12-26 RX ADMIN — DEXTROSE MONOHYDRATE 125 ML/HR: 50 INJECTION, SOLUTION INTRAVENOUS at 04:11

## 2024-12-26 RX ADMIN — APIXABAN 5 MG: 5 TABLET, FILM COATED ORAL at 09:15

## 2024-12-26 RX ADMIN — GUAIFENESIN 600 MG: 600 TABLET, MULTILAYER, EXTENDED RELEASE ORAL at 20:33

## 2024-12-26 RX ADMIN — INSULIN HUMAN 2 UNITS: 100 INJECTION, SOLUTION PARENTERAL at 06:19

## 2024-12-26 RX ADMIN — Medication 10 ML: at 10:01

## 2024-12-26 RX ADMIN — POTASSIUM CHLORIDE 40 MEQ: 1.5 POWDER, FOR SOLUTION ORAL at 14:33

## 2024-12-26 NOTE — PLAN OF CARE
"Goal Outcome Evaluation:  Plan of Care Reviewed With: patient, spouse           Outcome Evaluation: Pt agreed to \"moving around\" , pt alert to person only, pt cindy bed mob w/max 2, pt cindy STS x4 total w/seated rests, 3 w/rwx, then 1 w/ HHA-2, pt able to take ~4 side steps toward HOB to L side verbal and non-verbal cues required throughout, plans SNU at DC , wife present and supportive    Anticipated Discharge Disposition (PT): skilled nursing facility                        "

## 2024-12-26 NOTE — NURSING NOTE
Inpatient Palliative Care Follow-Up    I visited with patient's wife (Evonne) and daughter (Svetlana) at the bedside. We had a lengthy discussion regarding goals of care. I took time to explain difference between aggressive measures and comfort care. We discussed inpatient Palliative care on [address] scatter bed, and [facility]. Evonne expressed that she would really like to get the patient home. I explained that patient could come to [address] [facility] if comfort is the goal and [facility] if safe and appropriate.     We also discussed intubation should that become medically necessary. At this point in time they are not ready to make a decision about this. They state that the feeding tube isn't bothering him so they do not want to discontinue it at this time.     Evonne and Svetlana will take time to think and discuss. Palliative care will continue to follow.

## 2024-12-26 NOTE — CASE MANAGEMENT/SOCIAL WORK
Continued Stay Note  [facility]     Patient Name: Casey Snowden Sr.  MRN: [id number]  Today's Date: 12/26/2024    Admit Date: 12/17/2024    Plan: TBD based on hospital course.   Discharge Plan       Row Name 12/26/24 1553       Plan    Plan TBD based on hospital course.    Patient/Family in Agreement with Plan yes    Plan Comments Chart reviewed. Spoke with patient's spouse via outbound call. Introduced self, reviewed current discharge planning and barriers for discharge. Spouse states that discharge plan is TBD based on hospital course.                   Discharge Codes    No documentation.                 Expected Discharge Date and Time       Expected Discharge Date Expected Discharge Time    Dec 27, 2024               Tennille Carrillo RN

## 2024-12-26 NOTE — PROGRESS NOTES
Name: Casey Snowden . ADMIT: 2024   : 1934  PCP: Aleksander Diez MD    MRN: [id number] LOS: 9 days   AGE/SEX: 89 y.o. male  ROOM: [facility]     Subjective   Subjective   A-fin with RVR has been difficult to control. He is alert and oriented. Family is at bedside.      Objective   Objective   Vital Signs  Temp:  [98.1 °F (36.7 °C)-98.7 °F (37.1 °C)] 98.1 °F (36.7 °C)  Heart Rate:  [104-173] 118  Resp:  [18-22] 18  BP: ()/() 112/80  SpO2:  [96 %-98 %] 97 %  on   ;   Device (Oxygen Therapy): room air  Body mass index is 23.57 kg/m².  Physical Exam  Vitals reviewed.   Constitutional:       General: He is not in acute distress.  Eyes:      Extraocular Movements: Extraocular movements intact.      Pupils: Pupils are equal, round, and reactive to light.   Cardiovascular:      Rate and Rhythm: Normal rate.      Heart sounds: No murmur heard.  Pulmonary:      Effort: No respiratory distress.      Breath sounds: Rhonchi present.   Abdominal:      General: There is no distension.      Palpations: Abdomen is soft.      Tenderness: There is no abdominal tenderness.   Musculoskeletal:      Right lower leg: No edema.      Left lower leg: No edema.   Skin:     General: Skin is warm and dry.      Findings: No rash.   Neurological:      Mental Status: He is alert.       Results Review     I reviewed the patient's new clinical results.  Results from last 7 days   Lab Units 24  0646 24  0457 24  0635 24  0625   WBC 10*3/mm3 6.39 8.76 8.28 8.60   HEMOGLOBIN g/dL 9.9* 10.1* 11.0* 11.9*   PLATELETS 10*3/mm3 168 166 158 155     Results from last 7 days   Lab Units 24  0646 24  1210 24  0457 24  0547 24  0705   SODIUM mmol/L 140 148* 151* 150* 147*   POTASSIUM mmol/L 3.2*  --  3.4* 3.5 3.5   CHLORIDE mmol/L 107  --  118* 116* 112*   CO2 mmol/L 21.0*  --  25.0 24.2 23.9   BUN mg/dL 48*  --  44* 44* 47*   CREATININE mg/dL 0.98  --  1.03 1.11 1.19    GLUCOSE mg/dL 174*  --  159* 173* 124*   EGFR mL/min/1.73 73.3  --  69.0 63.1 58.0*           Results from last 7 days   Lab Units 12/26/24  0646 12/25/24  0457 12/24/24  0547 12/23/24  0705 12/21/24  0625 12/20/24  0811   CALCIUM mg/dL 7.4* 7.9* 8.2 8.4   < > 7.7*   MAGNESIUM mg/dL  --   --   --   --   --  2.2   PHOSPHORUS mg/dL  --   --   --   --   --  6.6*    < > = values in this interval not displayed.     Results from last 7 days   Lab Units 12/26/24  0646   PROCALCITONIN ng/mL 0.61*     Glucose   Date/Time Value Ref Range Status   12/26/2024 1150 116 70 - 130 mg/dL Final   12/26/2024 0606 182 (H) 70 - 130 mg/dL Final   12/26/2024 0031 166 (H) 70 - 130 mg/dL Final   12/25/2024 1454 120 70 - 130 mg/dL Final   12/25/2024 1120 182 (H) 70 - 130 mg/dL Final   12/25/2024 0540 159 (H) 70 - 130 mg/dL Final   12/25/2024 0003 140 (H) 70 - 130 mg/dL Final       No radiology results for the last day    I have personally reviewed all medications:  Scheduled Medications  acyclovir, 10 mg/kg, Intravenous, Q8H  amLODIPine, 5 mg, Oral, Daily  apixaban, 5 mg, Oral, Q12H  atorvastatin, 40 mg, Nasogastric, Nightly  carbidopa-levodopa, 1 tablet, Nasogastric, TID  finasteride, 5 mg, Nasogastric, Daily  guaiFENesin, 600 mg, Oral, Q12H  insulin regular, 2-7 Units, Subcutaneous, Q6H  levothyroxine, 75 mcg, Nasogastric, Daily  meropenem, 1,000 mg, Intravenous, Q12H  [Held by provider] oxybutynin XL, 10 mg, Oral, Daily  potassium chloride, 40 mEq, Oral, Q4H  sodium chloride, 10 mL, Intravenous, Q12H  terazosin, 1 mg, Nasogastric, Nightly  [Held by provider] valsartan, 40 mg, Oral, Daily  vitamin B-12, 1,000 mcg, Nasogastric, Q AM    Infusions  amiodarone, 0.5 mg/min, Last Rate: 0.5 mg/min (12/26/24 1050)  Pharmacy to Dose acyclovir (ZOVIRAX),     Diet  NPO Diet NPO Type: Tube Feeding    I have personally reviewed:  [x]  Laboratory   []  Microbiology   [x]  Radiology   []  EKG/Telemetry  []  Cardiology/Vascular   []  Pathology    []   Records       Assessment/Plan     Active Hospital Problems    Diagnosis  POA    **Unable to ambulate [R26.2]  Yes    Moderate protein-calorie malnutrition [E44.0]  Yes    LISY (acute kidney injury) [N17.9]  Yes    Herpes zoster without complication [B02.9]  Unknown    Falls frequently [R29.6]  Not Applicable    Acquired hypothyroidism [E03.9]  Yes    Secondary malignancy of lymph nodes of head, 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       89 y.o. male admitted with generalized weakness, confusion and found to have RLE rash consistent with shingles.    Cough/congestion: Checked chest x-ray which shows some new infiltrates.  On meropenem for 5 day course     VZV encephalitis.  Status post LP  -Continue acyclovir x 14 days.  Renally dosed as appropriate  - MRI negative for acute change including any evidence of encephalitis  - Neurology signed off.  -Speech following regarding appropriateness for any diet trials.  Continue feeding via core track for now but will have to address PEG if he does not start to improve mentally    LISY/hypernatremia: Remains elevated.  -Felt secondary to dehydration/urine retention and not from acyclovir as crystals negative.    - Continue to hold Diovan and Ditropan  - Monitor urine output    Atrial fibrillation with RVR  Continue Eliquis, no need for aspirin per cardiology.  No BB due to previous bradycardia. Spot dosed IV metoprolol for RVR. Now on amiodarone ggt      Parkinson disease: Continue Sinemet.     DVT prophy: Eliquis.    Disposition: Plan TBD.        Hernán Saldivar MD  [facility]  12/26/24  13:54 EST

## 2024-12-26 NOTE — PLAN OF CARE
Goal Outcome Evaluation:      Pt remains confused, RA, HR elevated, BP stable. No acute changes overnight. Daughter at bedside. Amiodarone gtt infusing at 0.5 mg/min. D5W infusing at 125 mL/hr. Tubefeed running at goal- 65 mL/hr. HR regular, maintaining 140s, pt asymptomatic. PRN metoprolol administered x1- brought rate down to 90s-110s, irregular. Cardiology called, per Dr. Malcolm no new orders, continue to monitor. Call light within reach. Bed alarm on. Plan of care ongoing.

## 2024-12-26 NOTE — PLAN OF CARE
"Goal Outcome Evaluation:  Aox1, oriented to self. Afib 's since 0900. Two doses of IV metoprolol given per Dr. Saldivar. Cardiology paged at 0900, 1115, and 1430 due to Afib, no orders given. Rapid response called at 1530 due to persistent Afib with no orders from cardiology. Deemed stable per rapid team. Two doses of IV digoxin given. Dr. Norris attempted to transfer patient to ICU at 1700, discontinued per  due to patient not being critical enough. Dr. Norris and Dr. Saldivar aware, per Dr. Norris \"if he gets hypotensive then he needs ICU transfer\". Amiodarone drip started per Dr. Norris, at 1800, HR now in 140's. Wife bedside, ongoing discussion with family about goals of care. Manual BP cuff bedside. Care provided per MD orders.     "

## 2024-12-26 NOTE — PROGRESS NOTES
LOS: 9 days     Chief Complaint: F/U  VZV encephalitis    Subj: No acute events.  No fever in the past 24 hours.  He was started on meropenem since I last saw him for possible pneumonia.  He is on room air.  His mental status today is the best that I have seen him.  His main issue over the past 24 hours has been A-fib with RVR.      Meds:    Current Facility-Administered Medications:     acetaminophen (TYLENOL) tablet 650 mg, 650 mg, Nasogastric, Q4H PRN **OR** acetaminophen (TYLENOL) 160 MG/5ML oral solution 650 mg, 650 mg, Nasogastric, Q4H PRN, 650 mg at 24 2328 **OR** acetaminophen (TYLENOL) suppository 650 mg, 650 mg, Rectal, Q4H PRN, Chilo Tyson MD    acyclovir (ZOVIRAX) 700 mg in sodium chloride 0.9 % 250 mL IVPB, 10 mg/kg, Intravenous, Q8H, Delroy Reed MD    [COMPLETED] amiodarone 150 mg in 100 mL D5W (loading dose), 150 mg, Intravenous, Once, 150 mg at 24 1822 **FOLLOWED BY** [] amiodarone 360 mg in 200 mL D5W infusion, 1 mg/min, Intravenous, Continuous, Stopped at 24 0033 **FOLLOWED BY** amiodarone 360 mg in 200 mL D5W infusion, 0.5 mg/min, Intravenous, Continuous, Jose Norris MD, Last Rate: 16.67 mL/hr at 24 0032, 0.5 mg/min at 24 0032    amLODIPine (NORVASC) tablet 5 mg, 5 mg, Oral, Daily, Leidy Tyson APRN, 5 mg at 2415    apixaban (ELIQUIS) tablet 5 mg, 5 mg, Oral, Q12H, Ford Salazar MD, 5 mg at 2415    atorvastatin (LIPITOR) tablet 40 mg, 40 mg, Nasogastric, Nightly, Chilo Tyson MD, 40 mg at 24    atropine 1 % ophthalmic solution 1 drop, 1 drop, Both Eyes, TID PRN, TimInessa MD    carbidopa-levodopa (SINEMET)  MG per tablet 1 tablet, 1 tablet, Nasogastric, TID, Chilo Tyson MD, 1 tablet at 24 0915    dextrose (D50W) (25 g/50 mL) IV injection 25 g, 25 g, Intravenous, Q15 Min PRN, Chilo Tyson MD    dextrose (GLUTOSE) oral gel 15 g, 15 g, Oral, Q15 Min PRN, Chilo Tyson,  MD    finasteride (PROSCAR) tablet 5 mg, 5 mg, Nasogastric, Daily, Chilo Tyson MD, 5 mg at 12/26/24 0915    glucagon (GLUCAGEN) injection 1 mg, 1 mg, Intramuscular, Q15 Min PRN, Chilo Tyson MD    guaiFENesin (MUCINEX) 12 hr tablet 600 mg, 600 mg, Oral, Q12H, Chilo Tyson MD, 600 mg at 12/26/24 0915    insulin regular (humuLIN R,novoLIN R) injection 2-7 Units, 2-7 Units, Subcutaneous, Q6H, Chilo Tyson MD, 2 Units at 12/26/24 0619    levothyroxine (SYNTHROID, LEVOTHROID) tablet 75 mcg, 75 mcg, Nasogastric, Daily, Chilo Tyson MD, 75 mcg at 12/26/24 0618    LORazepam (ATIVAN) injection 2 mg, 2 mg, Intravenous, Q5 Min PRN, Chilo Tyson MD    meropenem (MERREM) 1,000 mg in sodium chloride 0.9 % 100 mL MBP, 1,000 mg, Intravenous, Q12H, Ford Salazar MD, 1,000 mg at 12/25/24 2247    metoprolol tartrate (LOPRESSOR) injection 5 mg, 5 mg, Intravenous, Q6H PRN, Hernán Saldivar MD, 5 mg at 12/26/24 0930    ondansetron (ZOFRAN) injection 4 mg, 4 mg, Intravenous, Q6H PRN, Inessa Dumont MD    [Held by provider] oxybutynin XL (DITROPAN-XL) 24 hr tablet 10 mg, 10 mg, Oral, Daily, Inessa Dumont MD, 10 mg at 12/18/24 0901    Pharmacy to Dose acyclovir (ZOVIRAX), , Not Applicable, Continuous PRN, Delroy Reed MD    potassium chloride (KLOR-CON) packet 40 mEq, 40 mEq, Oral, Q4H, Joe Lyn MD, 40 mEq at 12/26/24 0916    Potassium Replacement - Follow Nurse / BPA Driven Protocol, , Not Applicable, PRN, Joe Lyn MD    [COMPLETED] Insert Peripheral IV, , , Once **AND** sodium chloride 0.9 % flush 10 mL, 10 mL, Intravenous, PRN, Ian Dao MD    sodium chloride 0.9 % flush 10 mL, 10 mL, Intravenous, Q12H, Inessa Dumont MD, 10 mL at 12/26/24 1001    sodium chloride 0.9 % flush 10 mL, 10 mL, Intravenous, PRN, Inessa Dumont MD    sodium chloride 0.9 % infusion 40 mL, 40 mL, Intravenous, PRN, Inessa Dumont MD    terazosin (HYTRIN) capsule 1 mg, 1 mg, Nasogastric, Nightly,  Chilo Tyson MD, 1 mg at 12/25/24 2041    [Held by provider] valsartan (DIOVAN) tablet 40 mg, 40 mg, Oral, Daily, TimInessa MD, 40 mg at 12/18/24 0902    vitamin B-12 (CYANOCOBALAMIN) tablet 1,000 mcg, 1,000 mcg, Nasogastric, Q AM, Chilo Tyson MD, 1,000 mcg at 12/26/24 0618      Vital Signs  Temp:  [98.1 °F (36.7 °C)-98.7 °F (37.1 °C)] 98.1 °F (36.7 °C)  Heart Rate:  [111-173] 111  Resp:  [18-22] 18  BP: ()/() 112/80    Physical Exam:  General: Ill-appearing, opens eyes to voice and makes eye contact  ENT: Cor track in nares  Cardiovascular: Tachycardic with irregularly irregular rhythm  Respiratory: Rales are improved; no wheezing  GI: Soft, mildly distended but not tender; no rebound or guarding  Skin: Rash on right lower extremity is improving       Results Review:    CBC, BMP, procalcitonin, blood cultures and CSF cultures reviewed today  Lab Results   Component Value Date    WBC 6.39 12/26/2024    HGB 9.9 (L) 12/26/2024    HCT 29.3 (L) 12/26/2024    MCV 97.0 12/26/2024     12/26/2024     Lab Results   Component Value Date    GLUCOSE 174 (H) 12/26/2024    CALCIUM 7.4 (L) 12/26/2024     12/26/2024    K 3.2 (L) 12/26/2024    CO2 21.0 (L) 12/26/2024     12/26/2024    BUN 48 (H) 12/26/2024    CREATININE 0.98 12/26/2024    EGFRRESULT 83.1 05/21/2024    EGFR 73.3 12/26/2024    BCR 49.0 (H) 12/26/2024    ANIONGAP 12.0 12/26/2024       Procalcitonin 0.6    12/21/2024 LP studies:  152 nucleated cells (84% lymphocytes 2 neutrophils)  glucose 85   protein 67    Microbiology:  12/21 CSF Cx: Negative, final  12/21 CSF meningitis encephalitis panel positive for VZV  12/24 BCx: NGTD    New radiology:  12/24 CXR personally reviewed and shows what appears to be an infiltrate in the left lower lobe    Assessment & Plan   VZV encephalopathy  Parkinson's disease  Acute kidney injury -resolved  Hypernatremia -resolved  Fever in adult -better  Probable left lower lobe pneumonia  Atrial  fibrillation with rapid ventricular response    My opinion is that he has a mixed picture in terms of improvement.  His mental status and his renal function are improved.  However, he is now dealing with A-fib and RVR and has what appears to be a left lower lobe pneumonia.    For VZV encephalitis, continue renally dosed acyclovir for 14-day course with stop date 1/3/2025.  Reviewed pharmacy note and this has now been adjusted to every 8 hours dosing given normal renal function.    For probable left lower lobe pneumonia with multiple antibiotic allergies and intolerances, I agree with meropenem 1 g IV every 8 hours for 5-day course with stop date 12/28/2024.    Thanks to cardiology team for their management of his atrial fibrillation with RVR.    D/W Dr. Saldivar.  ID will follow.

## 2024-12-26 NOTE — PROGRESS NOTES
[facility] Clinical Pharmacy Services: Acyclovir Consult    Pt Name: Casey Snowden Sr.   : 1934  Weight: 74.5 kg (164 lb 3.9 oz)  Antibiotic: Acyclovir  Indication: VZV encephalopathy    Relevant clinical data and objective history reviewed:    Vitals:    24 0153 24 0520 24 0658 24 0930   BP: 113/62 110/74  112/80   BP Location:  Right arm     Patient Position:  Lying     Pulse:  (!) 144  (!) 142   Resp:  20     Temp:  98.6 °F (37 °C)     TempSrc:  Axillary     SpO2:  97%        Creatinine   Date Value Ref Range Status   2024 0.98 0.76 - 1.27 mg/dL Final   2024 1.03 0.76 - 1.27 mg/dL Final   2024 1.11 0.76 - 1.27 mg/dL Final     BUN   Date Value Ref Range Status   2024 48 (H) 8 - 23 mg/dL Final     Estimated Creatinine Clearance: 52.8 mL/min (by C-G formula based on SCr of 0.98 mg/dL).    Lab Results   Component Value Date    WBC 6.39 2024     Temp Readings from Last 3 Encounters:   24 98.6 °F (37 °C) (Axillary)      Assessment/Plan    Renal function continues to improve. Since CrCl >50 mL/min, will change back to 700 mg IV every 8 hours. Will continue to trend renal function.  Encourage hydration with fluids during treatment to prevent toxic accumulation; monitor for s/sxn of toxicity including increase in SCr and signs of neurotoxicity.      Bessy Escalante, Pharm.D., [facility]   Clinical Pharmacist

## 2024-12-26 NOTE — PROGRESS NOTES
" LOS: 9 days     Chief Complaint: LISY    Subjective     History of Present Illness  Casey Snowden Sr. is a 89 y.o. male with history of Parkinson's disease and dementia.  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        Subjective  Patient still encephalopathic  Has had some large volume bladder scans, but patient has been able to void as they have moved him around. PVR has been under 300.  DHT in place.     12/21 - underwent LP this am. No other acute events overnight. Continues to be confused. 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     12/23 no acute events. Seems to be doing a bit better today.    12/24 seems to have had a difficult night. Now being bathe.     12/25 no acute events. Overall, patient looks better today. Second IV access accomplished.     12/26: Oriented to person place, recognizes family  Dobbhoff tube remains, on tube feeds, no new complaints      History taken from: family and chart    Objective     Vital Sign Min/Max for last 24 hours  Temp  Min: 98.1 °F (36.7 °C)  Max: 98.7 °F (37.1 °C)   BP  Min: 78/57  Max: 153/127   Pulse  Min: 104  Max: 173   Resp  Min: 18  Max: 22   SpO2  Min: 96 %  Max: 98 %   No data recorded   Weight  Min: 74.5 kg (164 lb 3.9 oz)  Max: 74.5 kg (164 lb 3.9 oz)     Flowsheet Rows      Flowsheet Row First Filed Value   Admission Height --   Admission Weight 66.7 kg (147 lb 0.8 oz) Documented at 12/18/2024 0500            No intake/output data recorded.  I/O last 3 completed shifts:  In: 6106 [I.V.:1100; Other:2000; NG/GT:2656; IV Piggyback:350]  Out: -     Objective:  Vital signs: (most recent): Blood pressure 112/80, pulse 114, temperature 98.1 °F (36.7 °C), temperature source Oral, resp. rate 18, height 177.8 cm (70\"), weight 74.5 kg (164 lb 3.9 oz), SpO2 97%.            Physical Examination: General appearance - ill appearing, frail, chronically ill  Mental status - more awake.    Nose - DHT " "in place   Chest - + coarse breath sound. [place] upper airway .  Heart - normal rate, regular rhythm, normal S1, S2, no murmurs, rubs, clicks or gallops  Abdomen - soft, nontender, nondistended, no masses or organomegaly  Neurological - improving, trying to talk, followed simple commands.   Extremities - no edema      Results Review:     I reviewed the patient's new clinical results.    WBC WBC   Date Value Ref Range Status   12/26/2024 6.39 3.40 - 10.80 10*3/mm3 Final   12/25/2024 8.76 3.40 - 10.80 10*3/mm3 Final      HGB Hemoglobin   Date Value Ref Range Status   12/26/2024 9.9 (L) 13.0 - 17.7 g/dL Final   12/25/2024 10.1 (L) 13.0 - 17.7 g/dL Final      HCT Hematocrit   Date Value Ref Range Status   12/26/2024 29.3 (L) 37.5 - 51.0 % Final   12/25/2024 30.2 (L) 37.5 - 51.0 % Final      Platlets No results found for: \"LABPLAT\"   MCV MCV   Date Value Ref Range Status   12/26/2024 97.0 79.0 - 97.0 fL Final   12/25/2024 96.8 79.0 - 97.0 fL Final          Sodium Sodium   Date Value Ref Range Status   12/26/2024 140 136 - 145 mmol/L Final   12/25/2024 148 (H) 136 - 145 mmol/L Final   12/25/2024 151 (H) 136 - 145 mmol/L Final   12/24/2024 150 (H) 136 - 145 mmol/L Final      Potassium Potassium   Date Value Ref Range Status   12/26/2024 3.2 (L) 3.5 - 5.2 mmol/L Final   12/25/2024 3.4 (L) 3.5 - 5.2 mmol/L Final   12/24/2024 3.5 3.5 - 5.2 mmol/L Final      Chloride Chloride   Date Value Ref Range Status   12/26/2024 107 98 - 107 mmol/L Final   12/25/2024 118 (H) 98 - 107 mmol/L Final   12/24/2024 116 (H) 98 - 107 mmol/L Final      CO2 CO2   Date Value Ref Range Status   12/26/2024 21.0 (L) 22.0 - 29.0 mmol/L Final   12/25/2024 25.0 22.0 - 29.0 mmol/L Final   12/24/2024 24.2 22.0 - 29.0 mmol/L Final      BUN BUN   Date Value Ref Range Status   12/26/2024 48 (H) 8 - 23 mg/dL Final   12/25/2024 44 (H) 8 - 23 mg/dL Final   12/24/2024 44 (H) 8 - 23 mg/dL Final      Creatinine Creatinine   Date Value Ref Range Status   12/26/2024 " "0.98 0.76 - 1.27 mg/dL Final   12/25/2024 1.03 0.76 - 1.27 mg/dL Final   12/24/2024 1.11 0.76 - 1.27 mg/dL Final      Calcium Calcium   Date Value Ref Range Status   12/26/2024 7.4 (L) 8.2 - 9.6 mg/dL Final   12/25/2024 7.9 (L) 8.2 - 9.6 mg/dL Final   12/24/2024 8.2 8.2 - 9.6 mg/dL Final      PO4 No results found for: \"CAPO4\"   Albumin No results found for: \"ALBUMIN\"     Magnesium No results found for: \"MG\"     Uric Acid No results found for: \"URICACID\"     Medication Review:   Current medications reviewed, orders reviewed and updated    Assessment & Plan     Assessment & Plan  LISY, Prerenal and improved  Metabolic acidosis, stabilizing  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  Prior baseline 0.7  Replace potassium per protocol, via Dobbhoff tube  Continue tube feeds with increased free water  Stop D5W  Continue to hold Diovan and monitor BP trends on amlodipine  Blood pressure currently stable, perhaps even a bit overcontrolled but will monitor trends    He has confirmed VZV meningoencephalitis and will need a total of 14 day of IV acyclovir.    IV fluids with acyclovir infusion  Okay to use electrolyte replacement protocol with stabilizing GFR  Shaina with family at bedside    UA is bland, some protein, no GN suspected at this time.       Joe Lyn MD  12/26/24  12:08 EST            "

## 2024-12-26 NOTE — THERAPY TREATMENT NOTE
Patient Name: Casey Snowden .  : 1934    MRN: [id number]                              Today's Date: 2024       Admit Date: 2024    Visit Dx:     ICD-10-CM ICD-9-CM   1. Unable to ambulate  R26.2 719.7   2. Herpes zoster with complication: S2 and S3 distribution on the right  B02.8 053.8   3. Parkinson's disease, unspecified whether dyskinesia present, unspecified whether manifestations fluctuate  G20.A1 332.0     Patient Active Problem List   Diagnosis    Benign essential hypertension    Stenosis of carotid artery    Mixed hyperlipidemia    Parkinson's disease    Peripheral arterial occlusive disease    Screening for prostate cancer    Medication monitoring encounter    Melanoma    Chronic deep vein thrombosis (DVT) of popliteal vein of right lower extremity    Uses hearing aid    Paroxysmal A-fib    Chronic diastolic (congestive) heart failure    Anticoagulant long-term use    Presence of IVC filter    Medicare annual wellness visit, subsequent    Iron deficiency anemia secondary to inadequate dietary iron intake    Orthostatic hypotension due to Parkinson's disease    Coronary artery disease involving native coronary artery of native heart without angina pectoris    ST elevation myocardial infarction involving left anterior descending (LAD) coronary artery    Sialorrhea    Hypertensive heart disease with heart failure    PAD (peripheral artery disease)    Metastatic squamous cell carcinoma    Secondary malignancy of lymph nodes of head, face and neck    Encounter for antineoplastic immunotherapy    RBD (REM behavioral disorder)    Acquired hypothyroidism    Aspiration pneumonia of right lung due to vomit    Dysphagia, neurologic    ICAO (internal carotid artery occlusion), right    Arterial ischemic stroke, ICA (internal carotid artery), right, chroni    Renal mass, left    Abdominal aortic aneurysm    Disorientation    Falls frequently    Right inguinal hernia    Unable to ambulate     Herpes zoster without complication    LISY (acute kidney injury)    Moderate protein-calorie malnutrition     Past Medical History:   Diagnosis Date    Abdominal aortic aneurysm without rupture 10/14/2021    Acquired hypothyroidism 08/31/2022    Acute ischemic stroke 10/08/2023    Aneurysm     Arrhythmia     Atrial fibrillation     Cancer 04/2018    basil cell carcinoma    Carotid stenosis 10/29/2015    CHF (congestive heart failure)     Chronic deep vein thrombosis (DVT) of popliteal vein of right lower extremity     Clotting disorder     Coronary artery disease involving native coronary artery of native heart without angina pectoris 12/20/2018    DVT of lower extremity (deep venous thrombosis)     Hard of hearing     Hyperlipidemia     Hypertension     Localized edema 01/04/2017    Long-term use of high-risk medication     Metabolic encephalopathy 02/07/2023    Myocardial infarction 2918    LAD stent    PAD (peripheral artery disease) 09/10/2020    Parkinson's disease     Postphlebitic syndrome     Pure hypercholesterolemia     Skin tear of upper arm without complication, left, subsequent encounter     Stroke 11/02/2023     Past Surgical History:   Procedure Laterality Date    BASAL CELL CARCINOMA EXCISION  04/2018    CARDIAC CATHETERIZATION N/A 11/05/2018    Procedure: Left Heart Cath;  Surgeon: Oliverio Azar MD;  Location:  [facility] C[facility] INVASIVE LOC[facility];  Service: Cardiovascular    CARDIAC CATHETERIZATION N/A 11/05/2018    Procedure: Coronary angiography;  Surgeon: Oliverio Azar MD;  Location: [facility] CATH [facility] LOC[facility];  Service: Cardiovascular    CARDIAC CATHETERIZATION N/A 11/05/2018    Procedure: Left ventriculography;  Surgeon: Oliverio Azar MD;  Location:  [facility] CATH INVASIVE LOCATION;  Service: Cardiovascular    CARDIAC CATHETERIZATION N/A 06/04/2019    Procedure: Left Heart Cath;  Surgeon: Johnny Glasgow MD;  Location: [facility];  Service: Cardiovascular    CARDIAC  CATHETERIZATION N/A 06/04/2019    Procedure: Coronary angiography;  Surgeon: Johnny Glasgow MD;  Location: [facility] CATH INVASIVE LOCATION;  Service: Cardiovascular    CARDIAC CATHETERIZATION N/A 06/04/2019    Procedure: Left ventriculography;  Surgeon: Johnny Glasgow MD;  Location: [facility] CATH INVASIVE LOCATION;  Service: Cardiovascular    CARDIAC CATHETERIZATION N/A 06/04/2019    Procedure: Stent BILL coronary;  Surgeon: Johnny Glasgow MD;  Location: [facility] CATH INVASIVE LOCATION;  Service: Cardiovascular    CARDIAC SURGERY      CAROTID ARTERY ANGIOPLASTY Right 10/11/2023    with stent    CAROTID STENT      CATARACT EXTRACTION Left 08/2018    CATARACT EXTRACTION Right 09/2018    COLONOSCOPY      2011    CORONARY STENT PLACEMENT      INCISION AND DRAINAGE LEG Right 07/07/2017    Procedure: INCISION AND DRAINAGE INFECTED HEMATOMA RIGHT THIGH;  Surgeon: Valentin Peña MD;  Location: [facility] OR;  Service:     JOINT REPLACEMENT      KNEE INCISION AND DRAINAGE Right 12/27/2016    Procedure: KNEE INCISION AND DRAINAGE;  Surgeon: Triston Whitten MD;  Location: [facility] OR;  Service:     NOSE SURGERY      nose replacement    SINUS SURGERY  1960    SKIN BIOPSY      SKIN GRAFT  12/2017    TOTAL KNEE ARTHROPLASTY Right     VASCULAR SURGERY      VENA CAVA FILTER PLACEMENT        General Information       Row Name 12/26/24 1623          Physical Therapy Time and Intention    Document Type therapy note (daily note)  -     Mode of Treatment individual therapy;physical therapy  -       Row Name 12/26/24 1623          General Information    Patient Profile Reviewed yes  -     Existing Precautions/Restrictions fall  -       Row Name 12/26/24 1623          Living Environment    People in Home spouse  -       Row Name 12/26/24 1623          Cognition    Orientation Status (Cognition) oriented to;person  -       Row Name 12/26/24 1625          Safety Issues/Impairments Affecting  Functional Mobility    Safety Issues Affecting Function (Mobility) ability to follow commands;judgment;positioning of assistive device;problem-solving;safety precaution awareness  -     Impairments Affecting Function (Mobility) balance;cognition;coordination;endurance/activity tolerance  -               User Key  (r) = Recorded By, (t) = Taken By, (c) = Cosigned By      Initials Name Provider Type    Tia Lechuga PTA Physical Therapist Assistant                   Mobility       Row Name 12/26/24 1630          Bed Mobility    Supine-Sit [place] (Bed Mobility) 2 person assist;maximum assist (25% patient effort);verbal cues;nonverbal cues (demo/gesture)  -     Sit-Supine [place] (Bed Mobility) 2 person assist;maximum assist (25% patient effort);verbal cues;nonverbal cues (demo/gesture)  -     Assistive Device (Bed Mobility) head of bed elevated;repositioning sheet  -     Comment, (Bed Mobility) post lean, difficulty scooting forw to EOB; confused-easily off task  -       Row Name 12/26/24 1630          Bed-Chair Transfer    Bed-Chair [place] (Transfers) not tested  -     Comment, (Bed-Chair Transfer) unable today, difficulty following directions  -       Row Name 12/26/24 1630          Sit-Stand Transfer    Sit-Stand [place] (Transfers) 2 person assist;maximum assist (25% patient effort);verbal cues;nonverbal cues (demo/gesture)  -     Assistive Device (Sit-Stand Transfers) walker, front-wheeled  -     Comment, (Sit-Stand Transfer) poor posture , forw flexed, multiple cues for same task, pt attempted x3 w/rwx, then another time w/HHA-2  -       Row Name 12/26/24 1630          Gait/Stairs (Locomotion)    [place] Level (Gait) 2 person assist;maximum assist (25% patient effort)  -     Assistive Device (Gait) --  HHA-2  -     Distance in Feet (Gait) --  3 side steps to L toward HOB  -     Deviations/Abnormal Patterns (Gait) festinating/shuffling  -      "Bilateral Gait Deviations forward flexed posture  -               User Key  (r) = Recorded By, (t) = Taken By, (c) = Cosigned By      Initials Name Provider Type    Tia Lechuga PTA Physical Therapist Assistant                   Obj/Interventions    No documentation.                  Goals/Plan    No documentation.                  Clinical Impression       Row Name 12/26/24 1636          Pain    Pretreatment Pain Rating 0/10 - no pain  -     Posttreatment Pain Rating 0/10 - no pain  -       Row Name 12/26/24 1636          Plan of Care Review    Plan of Care Reviewed With patient;spouse  -     Outcome Evaluation Pt agreed to \"moving around\" , pt alert to person only, pt cindy bed mob w/max 2, pt cindy STS x4 total w/seated rests, 3 w/rwx, then 1 w/ HHA-2, pt able to take ~4 side steps toward HOB to L side verbal and non-verbal cues required throughout, plans SNU at [place] , wife present and supportive  -[facility] Name 12/26/24 1636          Therapy Assessment/Plan (PT)    Rehab Potential (PT) limited  -     Criteria for Skilled Interventions Met (PT) yes  -[facility] Name 12/26/24 1636          Positioning and Restraints    Pre-Treatment Position in bed  -     Post Treatment Position bed  -JM     In Bed fowlers;call light within reach;encouraged to call for assist;exit alarm on;with family/caregiver;notified nsg;heels elevated;RUE elevated;LUE elevated  -               User Key  (r) = Recorded By, (t) = Taken By, (c) = Cosigned By      Initials Name Provider Type    Tia Lechuga PTA Physical Therapist Assistant                   Outcome Measures       Row Name 12/26/24 1637          How much help from another person do you currently need...    Turning from your back to your side while in flat bed without using bedrails? 2  -JM     Moving from lying on back to sitting on the side of a flat bed without bedrails? 2  -JM     Moving to and from a bed to a chair (including a wheelchair)? 1  -JM  "    Standing up from a chair using your arms (e.g., wheelchair, bedside chair)? 2  -JM     Climbing 3-5 steps with a railing? 1  -     To walk in hospital room? 1  -STEVENSON     AM-PAC 6 Clicks Score (PT) 9  -     Highest Level of Mobility Goal 3 --> Sit at edge of bed  -               User Key  (r) = Recorded By, (t) = Taken By, (c) = Cosigned By      Initials Name Provider Type    Tia Lechuga PTA Physical Therapist Assistant                                 Physical Therapy Education       Title: PT OT SLP Therapies (In Progress)       Topic: Physical Therapy (In Progress)       Point: Mobility training (In Progress)       Learning Progress Summary            Patient Acceptance, E,D, NR by  at 12/26/2024 1641    Acceptance, E,TB,D, NL,NR by  at 12/23/2024 1556    Acceptance, E, VU,NR by  at 12/18/2024 1606   Family Acceptance, E,D, NR by  at 12/26/2024 1641                      Point: Home exercise program (In Progress)       Learning Progress Summary            Patient Acceptance, E,D, NR by  at 12/26/2024 1641    Acceptance, E,TB,D, NL,NR by  at 12/23/2024 1556    Acceptance, E, VU,NR by  at 12/18/2024 1606   Family Acceptance, E,D, NR by  at 12/26/2024 1641                      Point: Body mechanics (In Progress)       Learning Progress Summary            Patient Acceptance, E,D, NR by  at 12/26/2024 1641    Acceptance, E,TB,D, NL,NR by  at 12/23/2024 1556    Acceptance, E, VU,NR by  at 12/18/2024 1606   Family Acceptance, E,D, NR by  at 12/26/2024 1641                      Point: Precautions (In Progress)       Learning Progress Summary            Patient Acceptance, E,D, NR by  at 12/26/2024 1641    Acceptance, E,TB,D, NL,NR by  at 12/23/2024 1556    Acceptance, E, VU,NR by  at 12/18/2024 1606   Family Acceptance, E,D, NR by  at 12/26/2024 1641                                      User Key       Initials Effective Dates Name Provider Type [facility] 06/16/21 -   "Alexandra Lucio, PT Physical Therapist PT     03/07/18 -  Tia Encarnacion PTA Physical Therapist Assistant PT     04/08/22 -  Debbie Connolly, PT Physical Therapist PT                  PT Recommendation and Plan     Outcome Evaluation: Pt agreed to \"moving around\" , pt alert to person only, pt cindy bed mob w/max 2, pt cindy STS x4 total w/seated rests, 3 w/rwx, then 1 w/ HHA-2, pt able to take ~4 side steps toward HOB to L side verbal and non-verbal cues required throughout, plans SNU at [place] , wife present and supportive     Time Calculation:         PT Charges       Row Name 12/26/24 1641             Time Calculation    Start Time 1348  -      Stop Time 1427  -      Time Calculation (min) 39 min  -      PT Received On 12/26/24  -STEVENSON      PT - Next Appointment 12/27/24  -                User Key  (r) = Recorded By, (t) = Taken By, (c) = Cosigned By      Initials Name Provider Type     Tia Encarnacion PTA Physical Therapist Assistant                  Therapy Charges for Today       Code Description Service Date Service Provider Modifiers Qty    39909864913 HC PT THERAPEUTIC ACT EA 15 MIN 12/26/2024 Tia Encarnacion PTA GP 3    35580214776 HC PT THER SUPP EA 15 MIN 12/26/2024 Tia Encarnacion PTA GP 3            PT G-Codes  Outcome Measure Options: AM-PAC 6 Clicks Basic Mobility (PT)  AM-PAC 6 Clicks Score (PT): 9  AM-PAC 6 Clicks Score (OT): 9  PT Discharge Summary  Anticipated Discharge Disposition (PT): skilled nursing facility    Tia Encarnacion PTA  12/26/2024    "

## 2024-12-26 NOTE — PROGRESS NOTES
[facility]    Patient Name: Casey Snowden .  :1934  89 y.o.  LOS: 9  Encounter Provider: Jose Norris MD      Patient Care Team:  Aleksander Diez MD as PCP - Joseph Gómez MD as Consulting Physician (Ophthalmology)  Ford Dorado MD as Consulting Physician (Vascular Surgery)  Cecilio Armas III, MD as Consulting Physician (Cardiology)  Kashif Goodwin MD as Consulting Physician (Neurology)  Enoch Lares MD (Hematology)  Kayden Chance MD (Dermatology)  Vinod Cadena MD as Consulting Physician (Neurosurgery)  Johnny Castellanos MD as Consulting Physician (Urology)  Inge Pedro, PT as Physical Therapist (Physical Therapy)  Lucrecia Mccloud PT as Physical Therapist (Physical Therapy)  Nereyda Toledo OT as Occupational Therapist (Occupational Therapy)  Junior Taylor MD as Surgeon (Orthopedic Surgery)  Casey Power MD as Surgeon (Wound Care)  Nahomi Jacobo APRN as Nurse Practitioner (Nurse Practitioner)    Chief Complaint/Consult question:  89 year old gentleman followed by Dr. Armas for paroxysmal atrial fibrillation, coronary artery disease (PCI to LAD 2019) and ischemic cardiomyopathy EF ~40% . He has carotid artery stenosis and previous stroke status post percutaneous revascularization, prior DVT (IVC filter) hypertension, Parkinson's disease and orthostasis. He saw Dr. Armas on 24. He was stable.      He came to the emergency room on  with rash - noted to be shingles as well as altered mental status. This morning - had VT for which we have been asked to  see.  Patient remained frail but stable throughout the hospital course but yesterday went back into A-fib with RVR in the 140-170s with soft BP.     Interval History: Went back into A-fib with RVR in the 140-170s yesterday, IV digoxin x 2 administered but ineffective.  Lengthy goals of care discussion held with multiple family members, and IV amiodarone  "was initiated.  Patient remains in RVR this morning in the 140s. No acute events but continues to be confused and lethargic today.  Spoke with outpatient cardiologist Dr. Armas who agrees that comfort centered care would be most appropriate.       Objective   Vital Signs  Temp:  [98.1 °F (36.7 °C)-98.7 °F (37.1 °C)] 98.6 °F (37 °C)  Heart Rate:  [142-173] 142  Resp:  [20-22] 20  BP: ()/() 112/80    Intake/Output Summary (Last 24 hours) at 12/26/2024 0933  Last data filed at 12/26/2024 0658  Gross per 24 hour   Intake 2816 ml   Output --   Net 2816 ml     Flowsheet Rows      Flowsheet Row First Filed Value   Admission Height 177.8 cm (70\") Documented at 12/22/2024 0725   Admission Weight 66.7 kg (147 lb 0.8 oz) Documented at 12/18/2024 0500              Vitals and nursing note reviewed.   Constitutional:       Appearance: Not in distress. Frail. Chronically ill-appearing.   Pulmonary:      Effort: Pulmonary effort is normal.   Cardiovascular:      PMI at left midclavicular line. Tachycardia present. Irregular rhythm.      Murmurs: There is no murmur.      Comments: + JVD 12cm.  Edema:     Peripheral edema absent.   Abdominal:      General: Bowel sounds are normal. There is no distension.   Neurological:      Mental Status: Lethargic and confused.           Pertinent Test Results:  Results from last 7 days   Lab Units 12/26/24  0646 12/25/24  1210 12/25/24  0457 12/24/24  0547 12/23/24  0705 12/22/24  0635 12/21/24  0625 12/20/24  0811   SODIUM mmol/L 140 148* 151* 150* 147* 148* 144 136   POTASSIUM mmol/L 3.2*  --  3.4* 3.5 3.5 3.8 4.0 4.1   CHLORIDE mmol/L 107  --  118* 116* 112* 109* 106 106   CO2 mmol/L 21.0*  --  25.0 24.2 23.9 22.5 21.0* 19.3*   BUN mg/dL 48*  --  44* 44* 47* 53* 62* 54*   CREATININE mg/dL 0.98  --  1.03 1.11 1.19 1.87* 3.19* 3.97*   GLUCOSE mg/dL 174*  --  159* 173* 124* 167* 170* 107*   CALCIUM mg/dL 7.4*  --  7.9* 8.2 8.4 8.2 8.1* 7.7*         Results from last 7 days   Lab Units " 12/26/24  0646 12/25/24  0457 12/22/24  0635 12/21/24  0625 12/20/24  0811   WBC 10*3/mm3 6.39 8.76 8.28 8.60 6.73   HEMOGLOBIN g/dL 9.9* 10.1* 11.0* 11.9* 11.2*   HEMATOCRIT % 29.3* 30.2* 31.4* 34.2* 32.7*   PLATELETS 10*3/mm3 168 166 158 155 139*         Results from last 7 days   Lab Units 12/20/24  0811   MAGNESIUM mg/dL 2.2     Results from last 7 days   Lab Units 12/22/24  0635   CHOLESTEROL mg/dL 102   TRIGLYCERIDES mg/dL 52   HDL CHOL mg/dL 50         Results from last 7 days   Lab Units 12/20/24  0811   TSH uIU/mL 4.820*           Medication Review:   acyclovir, 10 mg/kg, Intravenous, Q12H  amLODIPine, 5 mg, Oral, Daily  apixaban, 5 mg, Oral, Q12H  atorvastatin, 40 mg, Nasogastric, Nightly  carbidopa-levodopa, 1 tablet, Nasogastric, TID  finasteride, 5 mg, Nasogastric, Daily  guaiFENesin, 600 mg, Oral, Q12H  insulin regular, 2-7 Units, Subcutaneous, Q6H  levothyroxine, 75 mcg, Nasogastric, Daily  meropenem, 1,000 mg, Intravenous, Q12H  [Held by provider] oxybutynin XL, 10 mg, Oral, Daily  potassium chloride, 40 mEq, Oral, Q4H  sodium chloride, 10 mL, Intravenous, Q12H  terazosin, 1 mg, Nasogastric, Nightly  [Held by provider] valsartan, 40 mg, Oral, Daily  vitamin B-12, 1,000 mcg, Nasogastric, Q AM         amiodarone, 0.5 mg/min, Last Rate: 0.5 mg/min (12/26/24 0032)  Pharmacy to Dose acyclovir (ZOVIRAX),         Assessment & Plan     Active Hospital Problems    Diagnosis  POA    **Unable to ambulate [R26.2]  Yes    Moderate protein-calorie malnutrition [E44.0]  Yes    LSIY (acute kidney injury) [N17.9]  Yes    Herpes zoster without complication [B02.9]  Unknown    Falls frequently [R29.6]  Not Applicable    Acquired hypothyroidism [E03.9]  Yes    Secondary malignancy of lymph nodes of head, 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        A-fib with RVR.  Patient went into RVR yesterday in the 140-170s and was refractory to IV digoxin.  After lengthy discussion, we elected to start him on IV amiodarone but with only marginal response this morning.  I suspect this is in large part due to his severe multi organ illness and declining health.  We will continue IV amiodarone drip for now but this is likely futile in the bigger scheme of things.  [id number] score 5, on Eliquis 5 bid, new for now but may reassess depending on evolving goals of care.  Shingles/varicella encephalitis.   LP confirmed VZV meningoencephalitis. On acyclovir, management per primary service and ID consultant team.  Coronary artery disease.  Status post LAD stent in 2019. No angina.  BP and lipid control, see below.  On both Eliquis 5 bid, aspirin 85 was discontinued given fall risk.  Ischemic cardiomyopathy.  Last echo a year ago 10/2023 showed LVEF 45%, moderate concentric LVH.  GDMT limited due to kidney function.   Altered mental status/metabolic encephalopathy. Secondary to #1.  LISY.  Per nephrology. They suspect due to volume depletion and retention, now resolved with supportive care including cautious IV fluid.   Hypertension.  Holding home valsartan 40 daily due to LISY, started on amlodipine 5 daily.  Hyperlipidemia: On home atorvastatin 40 daily  History of carotid stenting  History of DVT. IVC filter in place   History of orthostatic hypotension. Likely autonomic dysfunction.     Plan of care discussed with hospitalist Dr. Dutton, ID provider Dr. Lauren, and outpatient cardiologist Dr. Armas.  At this time, I  think further aggressive medical therapy is futile and hospice would be most appropriate.    Jose Norris MD  [facility]  12/26/24  12:24 EST

## 2024-12-27 ENCOUNTER — APPOINTMENT (OUTPATIENT)
Dept: GENERAL RADIOLOGY | Facility: HOSPITAL | Age: 89
DRG: 073 | End: 2024-12-27
Payer: MEDICARE

## 2024-12-27 LAB
ALBUMIN SERPL-MCNC: 2.3 G/DL (ref 3.5–5.2)
ALP SERPL-CCNC: 70 U/L (ref 39–117)
ALT SERPL W P-5'-P-CCNC: 33 U/L (ref 1–41)
ANION GAP SERPL CALCULATED.3IONS-SCNC: 10.5 MMOL/L (ref 5–15)
AST SERPL-CCNC: 87 U/L (ref 1–40)
BILIRUB CONJ SERPL-MCNC: <0.2 MG/DL (ref 0–0.3)
BILIRUB INDIRECT SERPL-MCNC: ABNORMAL MG/DL
BILIRUB SERPL-MCNC: 0.2 MG/DL (ref 0–1.2)
BUN SERPL-MCNC: 49 MG/DL (ref 8–23)
BUN/CREAT SERPL: 49.5 (ref 7–25)
CALCIUM SPEC-SCNC: 7.6 MG/DL (ref 8.2–9.6)
CHLORIDE SERPL-SCNC: 109 MMOL/L (ref 98–107)
CO2 SERPL-SCNC: 21.5 MMOL/L (ref 22–29)
CREAT SERPL-MCNC: 0.99 MG/DL (ref 0.76–1.27)
DEPRECATED RDW RBC AUTO: 48.8 FL (ref 37–54)
EGFRCR SERPLBLD CKD-EPI 2021: 72.4 ML/MIN/1.73
ERYTHROCYTE [DISTWIDTH] IN BLOOD BY AUTOMATED COUNT: 14 % (ref 12.3–15.4)
GLUCOSE BLDC GLUCOMTR-MCNC: 115 MG/DL (ref 70–130)
GLUCOSE BLDC GLUCOMTR-MCNC: 131 MG/DL (ref 70–130)
GLUCOSE BLDC GLUCOMTR-MCNC: 133 MG/DL (ref 70–130)
GLUCOSE BLDC GLUCOMTR-MCNC: 143 MG/DL (ref 70–130)
GLUCOSE SERPL-MCNC: 147 MG/DL (ref 65–99)
HCT VFR BLD AUTO: 30.9 % (ref 37.5–51)
HGB BLD-MCNC: 10.4 G/DL (ref 13–17.7)
MAGNESIUM SERPL-MCNC: 1.5 MG/DL (ref 1.6–2.4)
MCH RBC QN AUTO: 32.5 PG (ref 26.6–33)
MCHC RBC AUTO-ENTMCNC: 33.7 G/DL (ref 31.5–35.7)
MCV RBC AUTO: 96.6 FL (ref 79–97)
PHOSPHATE SERPL-MCNC: 3.5 MG/DL (ref 2.5–4.5)
PLATELET # BLD AUTO: 189 10*3/MM3 (ref 140–450)
PMV BLD AUTO: 10.9 FL (ref 6–12)
POTASSIUM SERPL-SCNC: 4.2 MMOL/L (ref 3.5–5.2)
PROT SERPL-MCNC: 5.3 G/DL (ref 6–8.5)
QT INTERVAL: 368 MS
QTC INTERVAL: 368 MS
RBC # BLD AUTO: 3.2 10*6/MM3 (ref 4.14–5.8)
SODIUM SERPL-SCNC: 141 MMOL/L (ref 136–145)
WBC NRBC COR # BLD AUTO: 7.65 10*3/MM3 (ref 3.4–10.8)

## 2024-12-27 PROCEDURE — 85027 COMPLETE CBC AUTOMATED: CPT | Performed by: INTERNAL MEDICINE

## 2024-12-27 PROCEDURE — 84100 ASSAY OF PHOSPHORUS: CPT | Performed by: INTERNAL MEDICINE

## 2024-12-27 PROCEDURE — 25010000002 AMIODARONE IN DEXTROSE 5% 360-4.14 MG/200ML-% SOLUTION: Performed by: INTERNAL MEDICINE

## 2024-12-27 PROCEDURE — 99232 SBSQ HOSP IP/OBS MODERATE 35: CPT | Performed by: INTERNAL MEDICINE

## 2024-12-27 PROCEDURE — 99232 SBSQ HOSP IP/OBS MODERATE 35: CPT | Performed by: NURSE PRACTITIONER

## 2024-12-27 PROCEDURE — 25010000002 ACYCLOVIR PER 5 MG: Performed by: INTERNAL MEDICINE

## 2024-12-27 PROCEDURE — 74230 X-RAY XM SWLNG FUNCJ C+: CPT

## 2024-12-27 PROCEDURE — 92526 ORAL FUNCTION THERAPY: CPT

## 2024-12-27 PROCEDURE — 82948 REAGENT STRIP/BLOOD GLUCOSE: CPT

## 2024-12-27 PROCEDURE — 25010000002 MEROPENEM PER 100 MG: Performed by: HOSPITALIST

## 2024-12-27 PROCEDURE — 80076 HEPATIC FUNCTION PANEL: CPT | Performed by: INTERNAL MEDICINE

## 2024-12-27 PROCEDURE — 92611 MOTION FLUOROSCOPY/SWALLOW: CPT

## 2024-12-27 PROCEDURE — 93005 ELECTROCARDIOGRAM TRACING: CPT | Performed by: INTERNAL MEDICINE

## 2024-12-27 PROCEDURE — 80048 BASIC METABOLIC PNL TOTAL CA: CPT | Performed by: INTERNAL MEDICINE

## 2024-12-27 PROCEDURE — 25810000003 SODIUM CHLORIDE 0.9 % SOLUTION 250 ML FLEX CONT: Performed by: INTERNAL MEDICINE

## 2024-12-27 PROCEDURE — 93010 ELECTROCARDIOGRAM REPORT: CPT | Performed by: INTERNAL MEDICINE

## 2024-12-27 PROCEDURE — 83735 ASSAY OF MAGNESIUM: CPT | Performed by: INTERNAL MEDICINE

## 2024-12-27 PROCEDURE — 25010000002 MAGNESIUM SULFATE 2 GM/50ML SOLUTION: Performed by: INTERNAL MEDICINE

## 2024-12-27 PROCEDURE — 97535 SELF CARE MNGMENT TRAINING: CPT

## 2024-12-27 RX ORDER — MAGNESIUM SULFATE HEPTAHYDRATE 40 MG/ML
2 INJECTION, SOLUTION INTRAVENOUS
Status: COMPLETED | OUTPATIENT
Start: 2024-12-27 | End: 2024-12-27

## 2024-12-27 RX ORDER — AMIODARONE HYDROCHLORIDE 200 MG/1
200 TABLET ORAL ONCE
Status: COMPLETED | OUTPATIENT
Start: 2024-12-27 | End: 2024-12-27

## 2024-12-27 RX ORDER — AMIODARONE HYDROCHLORIDE 200 MG/1
200 TABLET ORAL EVERY 12 HOURS SCHEDULED
Status: DISCONTINUED | OUTPATIENT
Start: 2024-12-28 | End: 2024-12-31

## 2024-12-27 RX ORDER — AMIODARONE HYDROCHLORIDE 200 MG/1
200 TABLET ORAL
Status: DISCONTINUED | OUTPATIENT
Start: 2024-12-27 | End: 2024-12-27

## 2024-12-27 RX ORDER — AMIODARONE HYDROCHLORIDE 200 MG/1
200 TABLET ORAL EVERY 12 HOURS SCHEDULED
Status: DISCONTINUED | OUTPATIENT
Start: 2024-12-27 | End: 2024-12-27

## 2024-12-27 RX ADMIN — ACYCLOVIR SODIUM 700 MG: 50 INJECTION, SOLUTION INTRAVENOUS at 09:47

## 2024-12-27 RX ADMIN — CARBIDOPA AND LEVODOPA 1 TABLET: 25; 250 TABLET ORAL at 20:49

## 2024-12-27 RX ADMIN — GUAIFENESIN 600 MG: 600 TABLET, MULTILAYER, EXTENDED RELEASE ORAL at 09:47

## 2024-12-27 RX ADMIN — MAGNESIUM SULFATE HEPTAHYDRATE 2 G: 40 INJECTION, SOLUTION INTRAVENOUS at 17:04

## 2024-12-27 RX ADMIN — APIXABAN 5 MG: 5 TABLET, FILM COATED ORAL at 20:49

## 2024-12-27 RX ADMIN — AMLODIPINE BESYLATE 5 MG: 5 TABLET ORAL at 09:46

## 2024-12-27 RX ADMIN — Medication 10 ML: at 09:47

## 2024-12-27 RX ADMIN — GUAIFENESIN 600 MG: 600 TABLET, MULTILAYER, EXTENDED RELEASE ORAL at 20:49

## 2024-12-27 RX ADMIN — BARIUM SULFATE 50 ML: 400 SUSPENSION ORAL at 14:44

## 2024-12-27 RX ADMIN — LEVOTHYROXINE SODIUM 75 MCG: 75 TABLET ORAL at 06:26

## 2024-12-27 RX ADMIN — AMIODARONE HYDROCHLORIDE 200 MG: 200 TABLET ORAL at 17:04

## 2024-12-27 RX ADMIN — Medication 10 ML: at 20:50

## 2024-12-27 RX ADMIN — ACYCLOVIR SODIUM 700 MG: 50 INJECTION, SOLUTION INTRAVENOUS at 17:04

## 2024-12-27 RX ADMIN — ATORVASTATIN CALCIUM 40 MG: 20 TABLET, FILM COATED ORAL at 20:49

## 2024-12-27 RX ADMIN — CARBIDOPA AND LEVODOPA 1 TABLET: 25; 250 TABLET ORAL at 09:46

## 2024-12-27 RX ADMIN — AMIODARONE HYDROCHLORIDE 0.5 MG/MIN: 1.8 INJECTION, SOLUTION INTRAVENOUS at 09:47

## 2024-12-27 RX ADMIN — ACYCLOVIR SODIUM 700 MG: 50 INJECTION, SOLUTION INTRAVENOUS at 00:26

## 2024-12-27 RX ADMIN — MEROPENEM 1000 MG: 1 INJECTION, POWDER, FOR SOLUTION INTRAVENOUS at 11:27

## 2024-12-27 RX ADMIN — MAGNESIUM SULFATE HEPTAHYDRATE 2 G: 40 INJECTION, SOLUTION INTRAVENOUS at 18:34

## 2024-12-27 RX ADMIN — CARBIDOPA AND LEVODOPA 1 TABLET: 25; 250 TABLET ORAL at 17:04

## 2024-12-27 RX ADMIN — Medication 1000 MCG: at 06:26

## 2024-12-27 RX ADMIN — BARIUM SULFATE 55 ML: 0.81 POWDER, FOR SUSPENSION ORAL at 14:44

## 2024-12-27 RX ADMIN — APIXABAN 5 MG: 5 TABLET, FILM COATED ORAL at 09:47

## 2024-12-27 RX ADMIN — MAGNESIUM SULFATE HEPTAHYDRATE 2 G: 40 INJECTION, SOLUTION INTRAVENOUS at 14:15

## 2024-12-27 RX ADMIN — MEROPENEM 1000 MG: 1 INJECTION, POWDER, FOR SOLUTION INTRAVENOUS at 23:20

## 2024-12-27 RX ADMIN — FINASTERIDE 5 MG: 5 TABLET, FILM COATED ORAL at 09:47

## 2024-12-27 NOTE — PROGRESS NOTES
[facility] Clinical Pharmacy Services: Acyclovir Consult    Pt Name: Casey Snowden Sr.   : 1934  Weight: 75.5 kg (166 lb 7.2 oz)  Antibiotic: Acyclovir  Indication: VZV encephalitis    Relevant clinical data and objective history reviewed:    Vitals:    24 2226 24 0403 24 0505 24 0806   BP: 103/61  102/66 110/73   BP Location:   Right arm Right arm   Patient Position:   Lying Lying   Pulse:    116   Resp: 18   18   Temp: 98.9 °F (37.2 °C)   97.5 °F (36.4 °C)   TempSrc: Axillary   Oral   SpO2:    97%      Creatinine   Date Value Ref Range Status   2024 0.99 0.76 - 1.27 mg/dL Final   2024 0.98 0.76 - 1.27 mg/dL Final   2024 1.03 0.76 - 1.27 mg/dL Final     BUN   Date Value Ref Range Status   2024 49 (H) 8 - 23 mg/dL Final     Estimated Creatinine Clearance: 53 mL/min (by C-G formula based on SCr of 0.99 mg/dL).    Lab Results   Component Value Date    WBC 7.65 2024     Temp Readings from Last 3 Encounters:   24 97.5 °F (36.4 °C) (Oral)      Assessment/Plan    Renal function remains stable today and CrCl is still >50 mL/min. Continue Acyclovir 700 mg IV every 8 hours. Will continue to trend renal function  Encourage hydration with fluids during treatment to prevent toxic accumulation; monitor for s/sxn of toxicity including increase in SCr and signs of neurotoxicity.      Thank you for this consult and please contact pharmacy with any questions or concerns.     Bessy Escalante, Pharm.D., [facility]   Clinical Pharmacist

## 2024-12-27 NOTE — CONSULTS
Nutrition Services    Patient Name:  Casey Snowden .  YOB: 1934  MRN: [id number]  Admit Date:  12/17/2024    Assessment Date:  12/27/24    Summary: TF follow up  Checking in on TF tolerance. Palliative care following - family opting to leave cortrak in place at this time. Family wanting to get pt home. Pt starting on abx for possible PNA. Nephrology adjusted FWF 12/25 r/t hypernatremia. RD visited pt, pt's wife at bedside. TF infusing as ordered. Wife without questions at this time.     Plan:  -continue TF at goal, Isosource 1.5 @ 65 mL/hr with 250 mL q4 FWF (flushes per nephrology)    CLINICAL NUTRITION ASSESSMENT      Reason for Assessment Follow-up Protocol     Diagnosis/Problem   Unable to ambulate, shingles, parkinson's disease, NPO with cortrak for EN.   Medical/Surgical History Past Medical History:   Diagnosis Date    Abdominal aortic aneurysm without rupture 10/14/2021    Acquired hypothyroidism 08/31/2022    Acute ischemic stroke 10/08/2023    Aneurysm     Arrhythmia     Atrial fibrillation     Cancer 04/2018    basil cell carcinoma    Carotid stenosis 10/29/2015    CHF (congestive heart failure)     Chronic deep vein thrombosis (DVT) of popliteal vein of right lower extremity     Clotting disorder     Coronary artery disease involving native coronary artery of native heart without angina pectoris 12/20/2018    DVT of lower extremity (deep venous thrombosis)     Hard of hearing     Hyperlipidemia     Hypertension     Localized edema 01/04/2017    Long-term use of high-risk medication     Metabolic encephalopathy 02/07/2023    Myocardial infarction 2918    LAD stent    PAD (peripheral artery disease) 09/10/2020    Parkinson's disease     Postphlebitic syndrome     Pure hypercholesterolemia     Skin tear of upper arm without complication, left, subsequent encounter     Stroke 11/02/2023       Past Surgical History:   Procedure Laterality Date    BASAL CELL CARCINOMA EXCISION  04/2018     CARDIAC CATHETERIZATION N/A 11/05/2018    Procedure: Left Heart Cath;  Surgeon: Oliverio Azar MD;  [facility];  Service: Cardiovascular    CARDIAC CATHETERIZATION N/A 11/05/2018    Procedure: Coronary angiography;  Surgeon: Oliverio Azar MD;  Location[facility];  Service: Cardiovascular    CARDIAC CATHETERIZATION N/A 11/05/2018    Procedure: Left ventriculography;  Surgeon: Oliverio Azar MD;  [facility];  Service: Cardiovascular    CARDIAC CATHETERIZATION N/A 06/04/2019    Procedure: Left Heart Cath;  Surgeon: Johnny Glasgow MD;  [facility];  Service: Cardiovascular    CARDIAC CATHETERIZATION N/A 06/04/2019    Procedure: Coronary angiography;  Surgeon: Johnny Glasgow MD;  [facility];  Service: Cardiovascular    CARDIAC CATHETERIZATION N/A 06/04/2019    Procedure: Left ventriculography;  Surgeon: Johnny Glasgow MD;  Location: [facility] CATH INVASIVE LOCATION;  Service: Cardiovascular    CARDIAC CATHETERIZATION N/A 06/04/2019    Procedure: Stent BILL coronary;  Surgeon: Johnny Glasgow MD;  Location: [facility] CATH INVASIVE LOCATION;  Service: Cardiovascular    CARDIAC SURGERY      CAROTID ARTERY ANGIOPLASTY Right 10/11/2023    with stent    CAROTID STENT      CATARACT EXTRACTION Left 08/2018    CATARACT EXTRACTION Right 09/2018    COLONOSCOPY      2011    CORONARY STENT PLACEMENT      INCISION AND DRAINAGE LEG Right 07/07/2017    Procedure: INCISION AND DRAINAGE INFECTED HEMATOMA RIGHT THIGH;  Surgeon: Valentin Peña MD;  Location: [facility] OR;  Service:     JOINT REPLACEMENT      KNEE INCISION AND DRAINAGE Right 12/27/2016    Procedure: KNEE INCISION AND DRAINAGE;  Surgeon: Triston Whitten MD;  Location: [facility] MAIN OR;  Service:     NOSE SURGERY      nose replacement    SINUS SURGERY  1960    SKIN BIOPSY      SKIN GRAFT  12/2017    TOTAL  "KNEE ARTHROPLASTY Right     VASCULAR SURGERY      VENA CAVA FILTER PLACEMENT          Anthropometrics        Current Height  Current Weight  BMI kg/m2 Height: 177.8 cm (70\")  Weight: 75.5 kg (166 lb 7.2 oz) (12/27/24 0403)  Body mass index is 23.88 kg/m².   Adjusted BMI (if applicable)    BMI Category Normal/Healthy (18.4 - 24.9)   Ideal Body Weight (IBW) 166#   Usual Body Weight (UBW) Unknown    Weight Trend Other: wt fluctuating 145-166# this admission, net fluid gain of ~13.8L this admission per I/O documentation      Weight History Wt Readings from Last 30 Encounters:   12/27/24 0403 75.5 kg (166 lb 7.2 oz)   12/26/24 0658 74.5 kg (164 lb 3.9 oz)   12/25/24 0536 70 kg (154 lb 5.2 oz)   12/24/24 0505 65.4 kg (144 lb 2.9 oz)   12/23/24 0500 66.4 kg (146 lb 6.2 oz)   12/22/24 0510 68.7 kg (151 lb 7.3 oz)   12/21/24 0500 66 kg (145 lb 8.1 oz)   12/18/24 0500 66.7 kg (147 lb 0.8 oz)   11/25/24 1624 68.9 kg (152 lb)   11/13/24 1140 67.8 kg (149 lb 8 oz)   11/06/24 1336 66.9 kg (147 lb 8 oz)   08/27/24 1119 68.9 kg (152 lb)   08/14/24 1632 71.7 kg (158 lb)   08/14/24 1112 70 kg (154 lb 4.8 oz)   08/09/24 1256 70.3 kg (155 lb)   07/03/24 1105 68.4 kg (150 lb 11.2 oz)   07/02/24 0957 68.9 kg (152 lb)   05/29/24 1001 68.9 kg (152 lb)   05/28/24 1414 68 kg (149 lb 14.4 oz)   05/28/24 0920 68 kg (150 lb)   05/24/24 1352 79.4 kg (175 lb)   05/08/24 1355 70.3 kg (155 lb)   02/05/24 1322 74.8 kg (165 lb)   11/28/23 0909 72.1 kg (159 lb)   11/22/23 1253 76 kg (167 lb 9.6 oz)   11/20/23 1317 74.4 kg (164 lb)   11/09/23 1417 74.7 kg (164 lb 11.2 oz)   10/18/23 1256 72.6 kg (160 lb)   10/18/23 1511 75.8 kg (167 lb)   10/16/23 0601 75.9 kg (167 lb 5.3 oz)   10/15/23 0500 76.4 kg (168 lb 6.9 oz)   10/14/23 0651 78.2 kg (172 lb 6.4 oz)   10/13/23 0600 76 kg (167 lb 8.8 oz)   10/12/23 0930 73.9 kg (163 lb)   10/12/23 0529 74.1 kg (163 lb 5.8 oz)   10/11/23 1132 75.8 kg (167 lb)   10/09/23 1154 76 kg (167 lb 8.8 oz)   08/29/23 1611 " 77.6 kg (171 lb)   08/15/23 1120 74.8 kg (165 lb)   04/20/23 1112 75.1 kg (165 lb 8 oz)   03/31/23 1242 78 kg (172 lb)   02/15/23 1259 77.6 kg (171 lb)   02/07/23 0651 76.9 kg (169 lb 8.5 oz)   02/07/23 0431 77 kg (169 lb 12.1 oz)   02/03/23 0928 77.6 kg (171 lb)        Estimated/Assessed Needs    Estimated 12/20, remains appropriate    Energy Requirements    EST Needs, Method, Wt used 1222-8698 kcal/day (30-35 kcal/kg, 66.7 kg)       Protein Requirements    EST Needs, Method, Wt used  g/day (1.2-1.5 g/kg, 66.7 kg)       Fluid Requirements     Estimated Needs (mL/day) 2906-6606 mL. Heart failure.     Labs       Pertinent Labs    Results from last 7 days   Lab Units 12/27/24  0518 12/26/24  1745 12/26/24  0646 12/25/24  1210 12/25/24  0457   SODIUM mmol/L 141  --  140 148* 151*   POTASSIUM mmol/L 4.2 4.5 3.2*  --  3.4*   CHLORIDE mmol/L 109*  --  107  --  118*   CO2 mmol/L 21.5*  --  21.0*  --  25.0   BUN mg/dL 49*  --  48*  --  44*   CREATININE mg/dL 0.99  --  0.98  --  1.03   CALCIUM mg/dL 7.6*  --  7.4*  --  7.9*   BILIRUBIN mg/dL 0.2  --   --   --   --    ALK PHOS U/L 70  --   --   --   --    ALT (SGPT) U/L 33  --   --   --   --    AST (SGOT) U/L 87*  --   --   --   --    GLUCOSE mg/dL 147*  --  174*  --  159*     Results from last 7 days   Lab Units 12/27/24  0518 12/25/24  0457 12/22/24  0635 12/21/24  0625 12/20/24  0811   MAGNESIUM mg/dL 1.5*  --   --   --  2.2   PHOSPHORUS mg/dL 3.5  --   --   --  6.6*   HEMOGLOBIN g/dL 10.4*   < > 11.0*   < > 11.2*   HEMATOCRIT % 30.9*   < > 31.4*   < > 32.7*   WBC 10*3/mm3 7.65   < > 8.28   < > 6.73   TRIGLYCERIDES mg/dL  --   --  52  --   --    ALBUMIN g/dL 2.3*  --   --   --   --     < > = values in this interval not displayed.     Results from last 7 days   Lab Units 12/27/24  0518 12/26/24  0646 12/25/24  0457 12/22/24  0635 12/21/24  0625   PLATELETS 10*3/mm3 189 168 166 158 155     COVID19   Date Value Ref Range Status   10/23/2022 Not Detected Not Detected -  Ref. Range Final     Lab Results   Component Value Date    HGBA1C 5.40 12/18/2024          Medications           Scheduled Medications acyclovir, 10 mg/kg, Intravenous, Q8H  amLODIPine, 5 mg, Oral, Daily  apixaban, 5 mg, Oral, Q12H  atorvastatin, 40 mg, Nasogastric, Nightly  carbidopa-levodopa, 1 tablet, Nasogastric, TID  finasteride, 5 mg, Nasogastric, Daily  guaiFENesin, 600 mg, Oral, Q12H  insulin regular, 2-7 Units, Subcutaneous, Q6H  levothyroxine, 75 mcg, Nasogastric, Daily  meropenem, 1,000 mg, Intravenous, Q12H  [Held by provider] oxybutynin XL, 10 mg, Oral, Daily  sodium chloride, 10 mL, Intravenous, Q12H  terazosin, 1 mg, Nasogastric, Nightly  [Held by provider] valsartan, 40 mg, Oral, Daily  vitamin B-12, 1,000 mcg, Nasogastric, Q AM       Infusions amiodarone, 0.5 mg/min, Last Rate: 0.5 mg/min (12/27/24 0018)  Pharmacy to Dose acyclovir (ZOVIRAX),        PRN Medications   acetaminophen **OR** acetaminophen **OR** acetaminophen    atropine    dextrose    dextrose    glucagon (human recombinant)    metoprolol tartrate    ondansetron    Pharmacy to Dose acyclovir (ZOVIRAX)    Potassium Replacement - Follow Nurse / BPA Driven Protocol    [COMPLETED] Insert Peripheral IV **AND** sodium chloride    sodium chloride    sodium chloride     Physical Findings          General Findings room air   Oral/Mouth Cavity tooth or teeth missing   Edema  1+ (trace)  1+ arms, R leg, R ankle, R foot   Gastrointestinal last bowel movement: 12/27   Skin  other: lesions, shingles   Tubes/Drains/Lines Cortrak, NG tube   NFPE See Malnutrition Severity Assessment, Date Completed: 12/20  NFPE completed, consistent with nutrition diagnosis of malnutrition using AND/ASPEN criteria. See MSA below.        Malnutrition Severity Assessment      Patient meets criteria for : Moderate (non-severe) Malnutrition           Current Nutrition Orders & Evaluation of Intake       Oral Nutrition     Food Allergies NKFA   Current PO Diet NPO Diet NPO  Type: Tube Feeding   Supplement n/a   PO Evaluation     % PO Intake NPO    Factors Affecting Intake: swallow impairment     PES STATEMENT / NUTRITION DIAGNOSIS      Nutrition Dx Problem  Moderate chronic disease related malnutrition related to multiple chronic diseases (heart failure, parkinson's dz) as evidenced by moderate muscle and fat wasting per NFPE.    --  NUTRITION INTERVENTION / PLAN OF CARE      Intervention Goal(s) Initiate TF/PN and Maintain weight         RD Intervention/Action Await initiation of EN/PN         Prescription/Orders:       PO Diet       Supplements       Enteral Nutrition   End Goal:    Isosource 1.5 at 65 mL/hr + 250 mL q4 FWF (flushes adjusted by MD)     Calories  2145 kcals (within range)    Protein  97 g (within range)    Free water  1087 mL   Flushes  1500 mL     The above end goal rate is for 22 hrs/day to assume interruptions for ADLs. Water flushes adjusted based on clinical picture + Rx flushes/IV fluids         Parenteral Nutrition    New Prescription Ordered? No changes at this time   --      Monitor/Evaluation Per protocol, Pertinent labs, EN delivery/tolerance, Weight, POC/GOC, Swallow function   Discharge Plan/Needs Pending clinical course   --    RD to follow per protocol.      Electronically signed by:  Kathryn Montano RD  12/27/24 07:55 EST

## 2024-12-27 NOTE — PLAN OF CARE
Goal Outcome Evaluation:              Outcome Evaluation: Video swallow study completed. Tracheal aspiration of pharyngeal residue (amount of aspiration was neither-trace-nor-gross) with honey thick liquids and pudding. Delayed cough response. Appears acute on chronic dysphagia for pt living w parkinson's disease; prior CVA; acuity of illness.     Diet texture recommendation: NPO x ice chips after oral care with supervision. Suggest continue NGT pending GOC w medical team. Repeat VFSS per treating SLP/MD, when deemed appropriate.     Meds: non-oral.     Aspiration precautions: 1:1 supervision with ice chips. Fully upright.        Anticipated Discharge Disposition (SLP): unknown, anticipate therapy at next level of care          SLP Swallowing Diagnosis: moderate, oral dysphagia, pharyngeal dysphagia (12/27/24 0340)

## 2024-12-27 NOTE — NURSING NOTE
Inpatient palliative care follow-up:    I met with the patient and his wife (Evonne) at the bedside. The patient converted out of Afib today. He is more alert and was able to answer all of my questions appropriately. He denies any pain, SOA, or nausea. Evonne notified me that they are not going to pursue any palliative care measures at this time and they want a full treatment towards recovery. She thanked me and verbalized that she will notify someone if things change and our services are necessary. Palliative care will sign off at this time.

## 2024-12-27 NOTE — PLAN OF CARE
Goal Outcome Evaluation:              Outcome Evaluation: Clinical swallow re-eval completed. Likely acute on chronic dysphagia with pt living w Parkison's and prior CVA. Consistent cough w thin liquids. Suggest VFSS to evaluate complete oropharyngeal swallow function. Will plan to complete this date. Diet textures: NPO w NGT x ice chips w supervision. Pending VFSS results/recs. Meds: non-oral               SLP Swallowing Diagnosis: suspected pharyngeal dysphagia (12/27/24 1201)

## 2024-12-27 NOTE — PROGRESS NOTES
LOS: 10 days     Chief Complaint: F/U  VZV encephalitis    History from the chart and patient's wife.    Subj: He was agitated last evening but eventually was able to fall asleep.  He remains on room air.  He is afebrile.  He is tolerating meropenem and acyclovir without any apparent side effects.  He remains in atrial fibrillation on an amiodarone drip.      Meds:    Current Facility-Administered Medications:     acetaminophen (TYLENOL) tablet 650 mg, 650 mg, Nasogastric, Q4H PRN **OR** acetaminophen (TYLENOL) 160 MG/5ML oral solution 650 mg, 650 mg, Nasogastric, Q4H PRN, 650 mg at 248 **OR** acetaminophen (TYLENOL) suppository 650 mg, 650 mg, Rectal, Q4H PRN, Chilo Tyson MD    acyclovir (ZOVIRAX) 700 mg in sodium chloride 0.9 % 250 mL IVPB, 10 mg/kg, Intravenous, Q8H, Delroy Reed MD, 700 mg at 24 0026    [] amiodarone 360 mg in 200 mL D5W infusion, 0.5 mg/min, Intravenous, Continuous, Last Rate: 16.67 mL/hr at 24, 0.5 mg/min at 24 **FOLLOWED BY** amiodarone 360 mg in 200 mL D5W infusion, 0.5 mg/min, Intravenous, Continuous, Cecilio Armas III, MD, Last Rate: 16.67 mL/hr at 24 0018, 0.5 mg/min at 24 0018    amLODIPine (NORVASC) tablet 5 mg, 5 mg, Oral, Daily, Leidy Tyson APRN, 5 mg at 24    apixaban (ELIQUIS) tablet 5 mg, 5 mg, Oral, Q12H, Ford Salazar MD, 5 mg at 24    atorvastatin (LIPITOR) tablet 40 mg, 40 mg, Nasogastric, Nightly, Chilo Tyson MD, 40 mg at 24    atropine 1 % ophthalmic solution 1 drop, 1 drop, Both Eyes, TID PRN, TimInessa MD    carbidopa-levodopa (SINEMET)  MG per tablet 1 tablet, 1 tablet, Nasogastric, TID, Chilo Tyson MD, 1 tablet at 24    dextrose (D50W) (25 g/50 mL) IV injection 25 g, 25 g, Intravenous, Q15 Min PRN, Chilo Tyson MD    dextrose (GLUTOSE) oral gel 15 g, 15 g, Oral, Q15 Min PRN, Chilo Tyson MD    finasteride  (PROSCAR) tablet 5 mg, 5 mg, Nasogastric, Daily, Chilo Tyson MD, 5 mg at 12/26/24 0915    glucagon (GLUCAGEN) injection 1 mg, 1 mg, Intramuscular, Q15 Min PRN, Chilo Tyson MD    guaiFENesin (MUCINEX) 12 hr tablet 600 mg, 600 mg, Oral, Q12H, Chilo Tyson MD, 600 mg at 12/26/24 2033    insulin regular (humuLIN R,novoLIN R) injection 2-7 Units, 2-7 Units, Subcutaneous, Q6H, Chilo Tyson MD, 2 Units at 12/26/24 0619    levothyroxine (SYNTHROID, LEVOTHROID) tablet 75 mcg, 75 mcg, Nasogastric, Daily, Chilo Tyson MD, 75 mcg at 12/27/24 0626    meropenem (MERREM) 1,000 mg in sodium chloride 0.9 % 100 mL MBP, 1,000 mg, Intravenous, Q12H, Ford Salazar MD, 1,000 mg at 12/26/24 2139    metoprolol tartrate (LOPRESSOR) injection 5 mg, 5 mg, Intravenous, Q6H PRN, Hernán Saldivar MD, 5 mg at 12/26/24 0930    ondansetron (ZOFRAN) injection 4 mg, 4 mg, Intravenous, Q6H PRN, Inessa Dumont MD    [Held by provider] oxybutynin XL (DITROPAN-XL) 24 hr tablet 10 mg, 10 mg, Oral, Daily, Inessa Dumont MD, 10 mg at 12/18/24 0901    Pharmacy to Dose acyclovir (ZOVIRAX), , Not Applicable, Continuous PRN, Delroy Reed MD    Potassium Replacement - Follow Nurse / BPA Driven Protocol, , Not Applicable, PRN, Joe Lyn MD    [COMPLETED] Insert Peripheral IV, , , Once **AND** sodium chloride 0.9 % flush 10 mL, 10 mL, Intravenous, PRN, Ian Dao MD    sodium chloride 0.9 % flush 10 mL, 10 mL, Intravenous, Q12H, Inessa Dumont MD, 10 mL at 12/26/24 2033    sodium chloride 0.9 % flush 10 mL, 10 mL, Intravenous, PRN, Inessa Dumont MD    sodium chloride 0.9 % infusion 40 mL, 40 mL, Intravenous, PRN, Inessa Dumont MD    terazosin (HYTRIN) capsule 1 mg, 1 mg, Nasogastric, Nightly, Chilo Tyson MD, 1 mg at 12/26/24 2033    [Held by provider] valsartan (DIOVAN) tablet 40 mg, 40 mg, Oral, Daily, Inessa Dumont MD, 40 mg at 12/18/24 0902    vitamin B-12 (CYANOCOBALAMIN) tablet 1,000 mcg, 1,000 mcg,  Nasogastric, Q AM, Chilo Tyson MD, 1,000 mcg at 12/27/24 0626      Vital Signs  Temp:  [98.1 °F (36.7 °C)-98.9 °F (37.2 °C)] 98.9 °F (37.2 °C)  Heart Rate:  [] 67  Resp:  [18-20] 18  BP: (102-119)/(61-80) 102/66    Physical Exam:  General: Ill-appearing, opens eyes to voice and makes eye contact, does speak to his wife  ENT: Cor track in nares  Cardiovascular: Normal rate with irregularly irregular rhythm  Respiratory: Rales are improved; no wheezing  GI: Soft, mildly distended but not tender; no rebound or guarding  Skin: Rash on right lower extremity is improving       Results Review:    CBC, BMP, blood cultures and CSF cultures reviewed today  Lab Results   Component Value Date    WBC 7.65 12/27/2024    HGB 10.4 (L) 12/27/2024    HCT 30.9 (L) 12/27/2024    MCV 96.6 12/27/2024     12/27/2024     Lab Results   Component Value Date    GLUCOSE 147 (H) 12/27/2024    CALCIUM 7.6 (L) 12/27/2024     12/27/2024    K 4.2 12/27/2024    CO2 21.5 (L) 12/27/2024     (H) 12/27/2024    BUN 49 (H) 12/27/2024    CREATININE 0.99 12/27/2024    EGFRRESULT 83.1 05/21/2024    EGFR 72.4 12/27/2024    BCR 49.5 (H) 12/27/2024    ANIONGAP 10.5 12/27/2024       Procalcitonin 0.6    12/21/2024 LP studies:  152 nucleated cells (84% lymphocytes 2 neutrophils)  glucose 85   protein 67    Microbiology:  12/21 CSF Cx: Negative, final  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  His A-fib appears to be under better control at least from a rate standpoint.  He also appears more comfortable and  interactive to me today.    For VZV encephalitis, continue renally dosed acyclovir for 14-day course with stop date 1/3/2025. See pharmacy note for details.       For probable left lower lobe pneumonia with multiple antibiotic allergies and intolerances, I agree with meropenem 1 g IV every 8 hours for 5-day course with stop date 12/28/2024.    If patient ready to DC prior to the acyclovir stop date then I would recommend a midline catheter for the remainder of his course at rehab.    Thank you for allowing me to be involved in the care of this patient. Infectious diseases will sign off at this time with antibiotics plan in place, but please call me at [phone] if any further ID questions or new ID concerns.

## 2024-12-27 NOTE — PROGRESS NOTES
" LOS: 10 days     Chief Complaint: LISY    Subjective     History of Present Illness  Casey Snowden Sr. is a 89 y.o. male with history of Parkinson's disease and dementia.  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        Subjective  Patient still encephalopathic  Has had some large volume bladder scans, but patient has been able to void as they have moved him around. PVR has been under 300.  DHT in place.     12/21 - underwent LP this am. No other acute events overnight. Continues to be confused. 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     12/23 no acute events. Seems to be doing a bit better today.    12/24 seems to have had a difficult night. Now being bathe.     12/25 no acute events. Overall, patient looks better today. Second IV access accomplished.     12/26: Oriented to person place, recognizes family  Dobbhoff tube remains, on tube feeds, no new complaints    12/27: Still confused today but his family states he was much more awake alert and conversant yesterday  Remains on tube feeds with free water, on amnio drip    History taken from: family and chart    Objective     Vital Sign Min/Max for last 24 hours  Temp  Min: 97.5 °F (36.4 °C)  Max: 98.9 °F (37.2 °C)   BP  Min: 102/66  Max: 119/79   Pulse  Min: 67  Max: 118   Resp  Min: 18  Max: 20   SpO2  Min: 97 %  Max: 97 %   No data recorded   Weight  Min: 75.5 kg (166 lb 7.2 oz)  Max: 75.5 kg (166 lb 7.2 oz)     Flowsheet Rows      Flowsheet Row First Filed Value   Admission Height --   Admission Weight 66.7 kg (147 lb 0.8 oz) Documented at 12/18/2024 0500            No intake/output data recorded.  I/O last 3 completed shifts:  In: 3420 [Other:1525; NG/GT:0185]  Out: -     Objective:  Vital signs: (most recent): Blood pressure 110/73, pulse 116, temperature 97.5 °F (36.4 °C), temperature source Oral, resp. rate 18, height 177.8 cm (70\"), weight 75.5 kg (166 lb 7.2 oz), SpO2 97%.  " "          Physical Examination: General appearance - ill appearing, frail, chronically ill  Mental status - more awake.    Nose - DHT in place   Chest -good air movement, scattered coarse breath sounds.  Heart - normal rate, regular rhythm, normal S1, S2, no murmurs, rubs, clicks or gallops  Abdomen - soft, nontender, nondistended, no masses or organomegaly  Neurological -no focal deficits, oriented x 3  Extremities -trace/1+ edema      Results Review:     I reviewed the patient's new clinical results.    WBC WBC   Date Value Ref Range Status   12/27/2024 7.65 3.40 - 10.80 10*3/mm3 Final   12/26/2024 6.39 3.40 - 10.80 10*3/mm3 Final   12/25/2024 8.76 3.40 - 10.80 10*3/mm3 Final      HGB Hemoglobin   Date Value Ref Range Status   12/27/2024 10.4 (L) 13.0 - 17.7 g/dL Final   12/26/2024 9.9 (L) 13.0 - 17.7 g/dL Final   12/25/2024 10.1 (L) 13.0 - 17.7 g/dL Final      HCT Hematocrit   Date Value Ref Range Status   12/27/2024 30.9 (L) 37.5 - 51.0 % Final   12/26/2024 29.3 (L) 37.5 - 51.0 % Final   12/25/2024 30.2 (L) 37.5 - 51.0 % Final      Platlets No results found for: \"LABPLAT\"   MCV MCV   Date Value Ref Range Status   12/27/2024 96.6 79.0 - 97.0 fL Final   12/26/2024 97.0 79.0 - 97.0 fL Final   12/25/2024 96.8 79.0 - 97.0 fL Final          Sodium Sodium   Date Value Ref Range Status   12/27/2024 141 136 - 145 mmol/L Final   12/26/2024 140 136 - 145 mmol/L Final   12/25/2024 148 (H) 136 - 145 mmol/L Final   12/25/2024 151 (H) 136 - 145 mmol/L Final      Potassium Potassium   Date Value Ref Range Status   12/27/2024 4.2 3.5 - 5.2 mmol/L Final   12/26/2024 4.5 3.5 - 5.2 mmol/L Final   12/26/2024 3.2 (L) 3.5 - 5.2 mmol/L Final   12/25/2024 3.4 (L) 3.5 - 5.2 mmol/L Final      Chloride Chloride   Date Value Ref Range Status   12/27/2024 109 (H) 98 - 107 mmol/L Final   12/26/2024 107 98 - 107 mmol/L Final   12/25/2024 118 (H) 98 - 107 mmol/L Final      CO2 CO2   Date Value Ref Range Status   12/27/2024 21.5 (L) 22.0 - 29.0 " "mmol/L Final   12/26/2024 21.0 (L) 22.0 - 29.0 mmol/L Final   12/25/2024 25.0 22.0 - 29.0 mmol/L Final      BUN BUN   Date Value Ref Range Status   12/27/2024 49 (H) 8 - 23 mg/dL Final   12/26/2024 48 (H) 8 - 23 mg/dL Final   12/25/2024 44 (H) 8 - 23 mg/dL Final      Creatinine Creatinine   Date Value Ref Range Status   12/27/2024 0.99 0.76 - 1.27 mg/dL Final   12/26/2024 0.98 0.76 - 1.27 mg/dL Final   12/25/2024 1.03 0.76 - 1.27 mg/dL Final      Calcium Calcium   Date Value Ref Range Status   12/27/2024 7.6 (L) 8.2 - 9.6 mg/dL Final   12/26/2024 7.4 (L) 8.2 - 9.6 mg/dL Final   12/25/2024 7.9 (L) 8.2 - 9.6 mg/dL Final      PO4 No results found for: \"CAPO4\"   Albumin Albumin   Date Value Ref Range Status   12/27/2024 2.3 (L) 3.5 - 5.2 g/dL Final        Magnesium Magnesium   Date Value Ref Range Status   12/27/2024 1.5 (L) 1.6 - 2.4 mg/dL Final        Uric Acid No results found for: \"URICACID\"     Medication Review:   Current medications reviewed, orders reviewed and updated    Assessment & Plan     Assessment & Plan  LISY, Prerenal and improved  Metabolic acidosis, stabilizing  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  Will monitor renal function closely with treatment for any signs of acyclovir nephrotoxicity  Okay to use electrolyte replacement protocol with stabilizing GFR  Discussed with family at bedside        Joe Lny MD  12/27/24  10:07 EST            "

## 2024-12-27 NOTE — PROGRESS NOTES
"    Patient Name: Casey Snowden .  :1934  89 y.o.      Patient Care Team:  Aleksander Diez MD as PCP - General  Marck, Joseph Youngblood MD as Consulting Physician (Ophthalmology)  Ford Dorado MD as Consulting Physician (Vascular Surgery)  Cecilio Armas III, MD as Consulting Physician (Cardiology)  Kashif Goodwin MD as Consulting Physician (Neurology)  Enoch Lares MD (Hematology)  Kayden Chance MD (Dermatology)  Vinod Cadena MD as Consulting Physician (Neurosurgery)  Johnny Castellanos MD as Consulting Physician (Urology)  Inge Pedro PT as Physical Therapist (Physical Therapy)  Lucrecia Mccloud PT as Physical Therapist (Physical Therapy)  Nereyda Toledo, OT as Occupational Therapist (Occupational Therapy)  Junior Taylor MD as Surgeon (Orthopedic Surgery)  Casey Power MD as Surgeon (Wound Care)  Nahomi Jacobo APRN as Nurse Practitioner (Nurse Practitioner)    Chief Complaint: follow up AF , NSVT    Interval History:    He is now in SR. Evidently he was much more alert yesterday. He is pretty somnolent currently.     Objective   Vital Signs  Temp:  [97.5 °F (36.4 °C)-98.9 °F (37.2 °C)] 97.9 °F (36.6 °C)  Heart Rate:  [] 117  Resp:  [18] 18  BP: (102-124)/(61-84) 124/84    Intake/Output Summary (Last 24 hours) at 2024 1519  Last data filed at 2024 0800  Gross per 24 hour   Intake 2017 ml   Output --   Net 2017 ml     Flowsheet Rows      Flowsheet Row First Filed Value   Admission Height 177.8 cm (70\") Documented at 2024 0725   Admission Weight 66.7 kg (147 lb 0.8 oz) Documented at 2024 0500            Physical Exam:   General Appearance:    Alert, cooperative, in no acute distress   Lungs:     Clear to auscultation.  Normal respiratory effort and rate.      Heart:    Regular rhythm and normal rate, normal S1 and S2, no murmurs, gallops or rubs.     Chest Wall:    No abnormalities observed   Abdomen:     Soft, nontender, " positive bowel sounds.     Extremities:   no cyanosis, clubbing or edema.  No marked joint deformities.  Adequate musculoskeletal strength.       Results Review:    Results from last 7 days   Lab Units 12/27/24  0518   SODIUM mmol/L 141   POTASSIUM mmol/L 4.2   CHLORIDE mmol/L 109*   CO2 mmol/L 21.5*   BUN mg/dL 49*   CREATININE mg/dL 0.99   GLUCOSE mg/dL 147*   CALCIUM mg/dL 7.6*         Results from last 7 days   Lab Units 12/27/24  0518   WBC 10*3/mm3 7.65   HEMOGLOBIN g/dL 10.4*   HEMATOCRIT % 30.9*   PLATELETS 10*3/mm3 189         Results from last 7 days   Lab Units 12/27/24  0518   MAGNESIUM mg/dL 1.5*     Results from last 7 days   Lab Units 12/22/24  0635   CHOLESTEROL mg/dL 102   TRIGLYCERIDES mg/dL 52   HDL CHOL mg/dL 50   LDL CHOL mg/dL 39               Medication Review:   acyclovir, 10 mg/kg, Intravenous, Q8H  amLODIPine, 5 mg, Oral, Daily  apixaban, 5 mg, Oral, Q12H  atorvastatin, 40 mg, Nasogastric, Nightly  carbidopa-levodopa, 1 tablet, Nasogastric, TID  finasteride, 5 mg, Nasogastric, Daily  guaiFENesin, 600 mg, Oral, Q12H  insulin regular, 2-7 Units, Subcutaneous, Q6H  levothyroxine, 75 mcg, Nasogastric, Daily  magnesium sulfate, 2 g, Intravenous, Q2H  meropenem, 1,000 mg, Intravenous, Q12H  [Held by provider] oxybutynin XL, 10 mg, Oral, Daily  sodium chloride, 10 mL, Intravenous, Q12H  terazosin, 1 mg, Nasogastric, Nightly  [Held by provider] valsartan, 40 mg, Oral, Daily  vitamin B-12, 1,000 mcg, Nasogastric, Q AM         amiodarone, 0.5 mg/min, Last Rate: 0.5 mg/min (12/27/24 0917)  Pharmacy to Dose acyclovir (ZOVIRAX),         Assessment & Plan   VZV encephalopathy   Altered mental status  Parkinson's disease  LISY - resolved.   NSVT - asymptomatic.   Hypertension - significantly hypertensive on admission. Now controlled.   Coronary artery disease , status post LAD stent in 2019.   Ischemic cardiomyopathy - GDMT limited. Does not appear to be in heart failure.    PAF - on apixaban. 5 mg BID.    History of DVT , IVC filter in place   History of orthostatic hypotension , likely autonomic dysfunction.      Back in SR. Will transition to oral amiodarone. AF was resistant to rate controlling medications and has had bradycardia in the past.     BP is controlled. Continue same.     For video swallow today. Feeding tube in place.     RIDDHI Azevedo  [facility]  12/27/24  15:19 EST

## 2024-12-27 NOTE — PROGRESS NOTES
Name: Casey Snowden . ADMIT: 2024   : 1934  PCP: Aleksander Diez MD    MRN: [id number] LOS: 10 days   AGE/SEX: 89 y.o. male  ROOM: [facility]     Subjective   Subjective     Now in sinus rhythm. Reportedly he is much more alert and interactive in the afternoons.      Objective   Objective   Vital Signs  Temp:  [97.5 °F (36.4 °C)-98.9 °F (37.2 °C)] 97.5 °F (36.4 °C)  Heart Rate:  [] 116  Resp:  [18-20] 18  BP: (102-119)/(61-79) 110/73  SpO2:  [97 %] 97 %  on   ;   Device (Oxygen Therapy): room air  Body mass index is 23.88 kg/m².  Physical Exam  Constitutional:       General: He is not in acute distress.  Eyes:      Extraocular Movements: Extraocular movements intact.      Pupils: Pupils are equal, round, and reactive to light.   Cardiovascular:      Rate and Rhythm: Normal rate and regular rhythm.      Heart sounds: No murmur heard.  Pulmonary:      Effort: No respiratory distress.      Breath sounds: Rhonchi present.   Abdominal:      General: There is no distension.      Palpations: Abdomen is soft.      Tenderness: There is no abdominal tenderness.   Musculoskeletal:      Right lower leg: No edema.      Left lower leg: No edema.   Skin:     General: Skin is warm and dry.      Findings: No rash.   Neurological:      Mental Status: He is alert.       Results Review     I reviewed the patient's new clinical results.  Results from last 7 days   Lab Units 24  0518 24  0646 24  0457 24  0635   WBC 10*3/mm3 7.65 6.39 8.76 8.28   HEMOGLOBIN g/dL 10.4* 9.9* 10.1* 11.0*   PLATELETS 10*3/mm3 189 168 166 158     Results from last 7 days   Lab Units 24  0518 24  1745 24  0646 24  1210 24  0457 24  0547   SODIUM mmol/L 141  --  140 148* 151* 150*   POTASSIUM mmol/L 4.2 4.5 3.2*  --  3.4* 3.5   CHLORIDE mmol/L 109*  --  107  --  118* 116*   CO2 mmol/L 21.5*  --  21.0*  --  25.0 24.2   BUN mg/dL 49*  --  48*  --  44* 44*   CREATININE mg/dL  0.99  --  0.98  --  1.03 1.11   GLUCOSE mg/dL 147*  --  174*  --  159* 173*   EGFR mL/min/1.73 72.4  --  73.3  --  69.0 63.1     Results from last 7 days   Lab Units 12/27/24  0518   ALBUMIN g/dL 2.3*   BILIRUBIN mg/dL 0.2   ALK PHOS U/L 70   AST (SGOT) U/L 87*   ALT (SGPT) U/L 33       Results from last 7 days   Lab Units 12/27/24  0518 12/26/24  0646 12/25/24  0457 12/24/24  0547   CALCIUM mg/dL 7.6* 7.4* 7.9* 8.2   ALBUMIN g/dL 2.3*  --   --   --    MAGNESIUM mg/dL 1.5*  --   --   --    PHOSPHORUS mg/dL 3.5  --   --   --      Results from last 7 days   Lab Units 12/26/24  0646   PROCALCITONIN ng/mL 0.61*     Glucose   Date/Time Value Ref Range Status   12/27/2024 1126 133 (H) 70 - 130 mg/dL Final   12/27/2024 0506 143 (H) 70 - 130 mg/dL Final   12/26/2024 2335 125 70 - 130 mg/dL Final   12/26/2024 1812 110 70 - 130 mg/dL Final   12/26/2024 1150 116 70 - 130 mg/dL Final   12/26/2024 0606 182 (H) 70 - 130 mg/dL Final   12/26/2024 0031 166 (H) 70 - 130 mg/dL Final       No radiology results for the last day    I have personally reviewed all medications:  Scheduled Medications  acyclovir, 10 mg/kg, Intravenous, Q8H  amLODIPine, 5 mg, Oral, Daily  apixaban, 5 mg, Oral, Q12H  atorvastatin, 40 mg, Nasogastric, Nightly  carbidopa-levodopa, 1 tablet, Nasogastric, TID  finasteride, 5 mg, Nasogastric, Daily  guaiFENesin, 600 mg, Oral, Q12H  insulin regular, 2-7 Units, Subcutaneous, Q6H  levothyroxine, 75 mcg, Nasogastric, Daily  magnesium sulfate, 2 g, Intravenous, Q2H  meropenem, 1,000 mg, Intravenous, Q12H  [Held by provider] oxybutynin XL, 10 mg, Oral, Daily  sodium chloride, 10 mL, Intravenous, Q12H  terazosin, 1 mg, Nasogastric, Nightly  [Held by provider] valsartan, 40 mg, Oral, Daily  vitamin B-12, 1,000 mcg, Nasogastric, Q AM    Infusions  amiodarone, 0.5 mg/min, Last Rate: 0.5 mg/min (12/27/24 0934)  Pharmacy to Dose acyclovir (ZOVIRAX),     Diet  NPO Diet NPO Type: Tube Feeding    I have personally reviewed:  [x]   Laboratory   []  Microbiology   [x]  Radiology   []  EKG/Telemetry  []  Cardiology/Vascular   []  Pathology    []  Records       Assessment/Plan     Active Hospital Problems    Diagnosis  POA    **Unable to ambulate [R26.2]  Yes    Moderate protein-calorie malnutrition [E44.0]  Yes    LISY (acute kidney injury) [N17.9]  Yes    Herpes zoster without complication [B02.9]  Unknown    Falls frequently [R29.6]  Not Applicable    Acquired hypothyroidism [E03.9]  Yes    Secondary malignancy of lymph nodes of head, 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       89 y.o. male admitted with generalized weakness, confusion and found to have RLE rash consistent with shingles.    Cough/congestion: Checked chest x-ray which shows some new infiltrates.  On meropenem for 5 day course     VZV encephalitis.  Status post LP  -Continue acyclovir x 14 days.  Renally dosed as appropriate  - MRI negative for acute change including any evidence of encephalitis  - Neurology signed off.  -Speech following regarding appropriateness for any diet trials.  Continue feeding via core track for now but will have to address PEG if he does not start to improve mentally. Speech to see today     LISY/hypernatremia: Remains elevated.  -Felt secondary to dehydration/urine retention and not from acyclovir as crystals negative.    - Continue to hold Diovan and Ditropan  - Monitor urine output    Atrial fibrillation with RVR  Continue  Eliquis, no need for aspirin per cardiology.  No BB due to previous bradycardia. Spot dosed IV metoprolol for RVR. Now on amiodarone ggt. Cardiology following      Parkinson disease: Continue Sinemet.     DVT prophy: Eliquis.    Disposition: Plan TBD.        Hernán Saldivar MD  [facility]  12/27/24  11:35 EST

## 2024-12-27 NOTE — PLAN OF CARE
JEAN CLAUDE Abarca paged the answering service.  Patient having brief runs of nonsustained ventricular tachycardia approximately every 30 minutes.  Patient symptom-free.  Remains on amiodarone drip.  Continue to monitor and cardiology will see tomorrow.

## 2024-12-27 NOTE — MBS/VFSS/FEES
Acute Care - Speech Language Pathology   Swallow Initial Evaluation [facility]     Patient Name: Casey Snowden Sr.  : 1934  MRN: [id number]  Today's Date: 2024               Admit Date: 2024    Visit Dx:     ICD-10-CM ICD-9-CM   1. Unable to ambulate  R26.2 719.7   2. Herpes zoster with complication: S2 and S3 distribution on the right  B02.8 053.8   3. Parkinson's disease, unspecified whether dyskinesia present, unspecified whether manifestations fluctuate  G20.A1 332.0     Patient Active Problem List   Diagnosis    Benign essential hypertension    Stenosis of carotid artery    Mixed hyperlipidemia    Parkinson's disease    Peripheral arterial occlusive disease    Screening for prostate cancer    Medication monitoring encounter    Melanoma    Chronic deep vein thrombosis (DVT) of popliteal vein of right lower extremity    Uses hearing aid    Paroxysmal A-fib    Chronic diastolic (congestive) heart failure    Anticoagulant long-term use    Presence of IVC filter    Medicare annual wellness visit, subsequent    Iron deficiency anemia secondary to inadequate dietary iron intake    Orthostatic hypotension due to Parkinson's disease    Coronary artery disease involving native coronary artery of native heart without angina pectoris    ST elevation myocardial infarction involving left anterior descending (LAD) coronary artery    Sialorrhea    Hypertensive heart disease with heart failure    PAD (peripheral artery disease)    Metastatic squamous cell carcinoma    Secondary malignancy of lymph nodes of head, face and neck    Encounter for antineoplastic immunotherapy    RBD (REM behavioral disorder)    Acquired hypothyroidism    Aspiration pneumonia of right lung due to vomit    Dysphagia, neurologic    ICAO (internal carotid artery occlusion), right    Arterial ischemic stroke, ICA (internal carotid artery), right, chroni    Renal mass, left    Abdominal aortic aneurysm    Disorientation    Falls  frequently    Right inguinal hernia    Unable to ambulate    Herpes zoster without complication    LISY (acute kidney injury)    Moderate protein-calorie malnutrition     Past Medical History:   Diagnosis Date    Abdominal aortic aneurysm without rupture 10/14/2021    Acquired hypothyroidism 08/31/2022    Acute ischemic stroke 10/08/2023    Aneurysm     Arrhythmia     Atrial fibrillation     Cancer 04/2018    basil cell carcinoma    Carotid stenosis 10/29/2015    CHF (congestive heart failure)     Chronic deep vein thrombosis (DVT) of popliteal vein of right lower extremity     Clotting disorder     Coronary artery disease involving native coronary artery of native heart without angina pectoris 12/20/2018    DVT of lower extremity (deep venous thrombosis)     Hard of hearing     Hyperlipidemia     Hypertension     Localized edema 01/04/2017    Long-term use of high-risk medication     Metabolic encephalopathy 02/07/2023    Myocardial infarction 2918    LAD stent    PAD (peripheral artery disease) 09/10/2020    Parkinson's disease     Postphlebitic syndrome     Pure hypercholesterolemia     Skin tear of upper arm without complication, left, subsequent encounter     Stroke 11/02/2023     Past Surgical History:   Procedure Laterality Date    BASAL CELL CARCINOMA EXCISION  04/2018    CARDIAC CATHETERIZATION N/A 11/05/2018    Procedure: Left Heart Cath;  Surgeon: Oliverio Azar MD;  Location: [facility] CATH INVASIVE LOCATION;  Service: Cardiovascular    CARDIAC CATHETERIZATION N/A 11/05/2018    Procedure: Coronary angiography;  Surgeon: Oliverio Azar MD;  Location: [facility] CATH INVASIVE LOCATION;  Service: Cardiovascular    CARDIAC CATHETERIZATION N/A 11/05/2018    Procedure: Left ventriculography;  Surgeon: Oliverio Azar MD;  Location: [facility] CATH INVASIVE LOCATION;  Service: Cardiovascular    CARDIAC CATHETERIZATION N/A 06/04/2019    Procedure: Left Heart Cath;  Surgeon: Johnny Glasgow MD;  Location: [facility]  CATH INVASIVE LOCATION;  Service: Cardiovascular    CARDIAC CATHETERIZATION N/A 06/04/2019    Procedure: Coronary angiography;  Surgeon: Johnny Glasgow MD;  Location: [facility] CATH INVASIVE LOCATION;  Service: Cardiovascular    CARDIAC CATHETERIZATION N/A 06/04/2019    Procedure: Left ventriculography;  Surgeon: Johnny Glasgow MD;  Location: [facility] CATH INVASIVE LOCATION;  Service: Cardiovascular    CARDIAC CATHETERIZATION N/A 06/04/2019    Procedure: Stent BILL coronary;  Surgeon: Johnny Glasgow MD;  Location: [facility] CATH INVASIVE LOCATION;  Service: Cardiovascular    CARDIAC SURGERY      CAROTID ARTERY ANGIOPLASTY Right 10/11/2023    with stent    CAROTID STENT      CATARACT EXTRACTION Left 08/2018    CATARACT EXTRACTION Right 09/2018    COLONOSCOPY      2011    CORONARY STENT PLACEMENT      INCISION AND DRAINAGE LEG Right 07/07/2017    Procedure: INCISION AND DRAINAGE INFECTED HEMATOMA RIGHT THIGH;  Surgeon: Valentin Peña MD;  Location: [facility] MAIN OR;  Service:     JOINT REPLACEMENT      KNEE INCISION AND DRAINAGE Right 12/27/2016    Procedure: KNEE INCISION AND DRAINAGE;  Surgeon: Triston Whitten MD;  Location: [facility] MAIN OR;  Service:     NOSE SURGERY      nose replacement    SINUS SURGERY  1960    SKIN BIOPSY      SKIN GRAFT  12/2017    TOTAL KNEE ARTHROPLASTY Right     VASCULAR SURGERY      VENA CAVA FILTER PLACEMENT         SLP Recommendation and Plan  SLP Swallowing Diagnosis: moderate, oral dysphagia, pharyngeal dysphagia (12/27/24 1447)  SLP Diet Recommendation: ice chips between meals after oral care, with supervision (12/27/24 1447)  Recommended Precautions and Strategies: upright posture during/after eating, 1:1 supervision (for ice chips) (12/27/24 1447)  SLP Rec. for Method of Medication Administration: meds via alternate route (12/27/24 1447)     Monitor for Signs of Aspiration: yes, notify SLP if any concerns (12/27/24 1447)  Recommended Diagnostics: VFSS (MBS)  (defer to treating SLP to discern appropriate timing of repeat VFSS) (12/27/24 1447)  Swallow Criteria for Skilled Therapeutic Interventions Met: demonstrates skilled criteria (12/27/24 1447)  Anticipated Discharge Disposition (SLP): unknown, anticipate therapy at next level of care (12/27/24 1447)  Rehab Potential/Prognosis, Swallowing: re-evaluate goals as necessary (12/27/24 1447)  Therapy Frequency (Swallow): PRN (12/27/24 1447)  Predicted Duration Therapy Intervention (Days): until discharge (12/27/24 1447)  Oral Care Recommendations: Oral Care BID/PRN, Before ice/water (12/27/24 1447)                                        Outcome Evaluation: Video swallow study completed. Tracheal aspiration of pharyngeal residue (amount of aspiration was neither-trace-nor-gross) with honey thick liquids and pudding. Delayed cough response. Appears acute on chronic dysphagia for pt living w parkinson's disease; prior CVA; acuity of illness. Diet texture recommendation: NPO x ice chips after oral care with supervision. Suggest continue NGT pending GOC w medical team. Repeat VFSS per treating SLP/MD, when deemed appropriate. Meds: non-oral. Aspiration precautions: 1:1 supervision with ice chips. Fully upright.      SWALLOW EVALUATION (Last 72 Hours)       SLP Adult Swallow Evaluation       Row Name 12/27/24 1447       Rehab Evaluation    Document Type evaluation  -BB    Subjective Information no complaints  -BB    Patient Effort fair  -BB       General Information    Current Method of Nutrition NPO;nasogastric feedings  -BB    Precautions/Limitations, Vision difficult to assess  -BB    Precautions/Limitations, Hearing difficult to assess;other (see comments)  [place]  -BB    Prior Level of Function-Swallowing --    Plans/Goals Discussed with spouse/S.O.;agreed upon  -BB    Barriers to Rehab previous functional deficit;cognitive status;medically complex  -BB    Patient's Goals for Discharge patient did not state  -BB    Family Goals  for Discharge --       Pain    Pretreatment Pain Rating 0/10 - no pain  -BB    Posttreatment Pain Rating 0/10 - no pain  -BB       Oral Motor Structure and Function    Dentition Assessment natural, present and adequate;missing teeth  -BB    Secretion Management WNL/WFL  -BB    Mucosal Quality moist, healthy  -BB       Oral Musculature and Cranial Nerve Assessment    Oral Motor General Assessment unable to assess  -BB       General Eating/Swallowing Observations    Respiratory Support Currently in Use room air  -BB    Eating/Swallowing Skills fed by SLP  -BB    Positioning During Eating upright 90 degree;upright in bed  -BB    Utensils Used --    Consistencies Trialed --       Clinical Swallow Eval    Clinical Swallow Evaluation Summary --       MBS/VFSS    Utensils Used spoon  -BB    Consistencies Trialed pureed;honey-thick liquids  -BB       MBS/VFSS Interpretation    VFSS Summary Oral phase most notable for posterior escape of bolus before the swallow.     Pharyngeal phase most notable for tracheal aspiration after the swallow 2/2 pharyngeal residue (neither trace nor gross) with honey thick liquids via tsp and pudding. Delayed cough response. Mimimal to absent epiglottic inversion. 50-90% post-swallow residue.     Penetration Aspiration Scale (PAS).   PAS 7 (Material enters the airway, passes below the vocal folds, and is not ejected from the trachea despite effort): honey tsp, pudding.     Cursory view of the esophagus not completed 2/2 logistical reasons not related to impairment.  -BB       MBSImP Score    MBSImP Score Completed? Yes  -BB    Materials presented per Standard Protocol? No  -BB    How was standard protocol modified? Protocol modified:  -BB       Oral Impairment Domain    Component 2- Tongue Control During Bolus Hold 2: Posterior escape of less that half of bolus  -BB    Component 3- Bolus Prep/Mastication --  solid texture not provided  -BB    Component 4- Bolus Transport/Lingual Motion 2:Slowed  tongue motion  -BB    Component 5- Oral Residue 2: Residue collection on oral structures  -BB    Component 6- Initiation of Pharyngeal Swallow 3: Bolus head in pyriforms  -BB       Pharyngeal Impairment Domain    Component 7- Soft Palate Elevation 0: No bolus between soft palate (SP)/pharyngeal wall (PW)  -BB    Component 8- Laryngeal Elevation 0: Complete superior movement of thyroid cartilage with complete approximation of arytenoids to epiglottic petiole  -BB    Component 9- Anterior Hyoid Excursion 1: Partial anterior movement  -BB    Component 10- Epiglottic Movement 2: No inversion  -BB    Component 11- Laryngeal Vestibular Closure- Height of Swallow 1: Incomplete- narrow column air/contrast in laryngeal vestibule  -BB    Component 12- Pharyngeal Stripping Wave 1: Present- diminished  -BB    Component 13- Pharyngeal Contraction (A/P View Only) --  not assessed  -BB    Component 14- PE Segment Opening 1: Partial distension/partial duration- partial obstruction of flow  -BB    Component 15- Tongue Base Retraction 2: Narrow column of contrast or air between TB and PW  -BB    Component 16- Pharyngeal Residue 2: Collection of residue within or on pharyngeal structures  -BB    Component 16 Location Vallecula;Pharyngeal wall  -BB       SLP Communication to Radiology    Severity Level of Dysphagia severe dysphagia  per DIGEST score  -BB    Summary Statement Radiologist, Dr. Levy, present. Tracheal aspiration of pharyngeal residue (amount of aspiration was neither-trace-nor-gross) with honey thick liquids and pudding. Delayed cough response.  -BB       SLP Evaluation Clinical Impression    SLP Swallowing Diagnosis moderate;oral dysphagia;pharyngeal dysphagia  -BB    Rehab Potential/Prognosis, Swallowing re-evaluate goals as necessary  -BB    Swallow Criteria for Skilled Therapeutic Interventions Met demonstrates skilled criteria  -BB       Recommendations    Therapy Frequency (Swallow) PRN  -BB    Predicted Duration  Therapy Intervention (Days) until discharge  -BB    SLP Diet Recommendation ice chips between meals after oral care, with supervision  -BB    Recommended Diagnostics VFSS (MBS)  defer to treating SLP to discern appropriate timing of repeat VFSS  -BB    Recommended Precautions and Strategies upright posture during/after eating;1:1 supervision  for ice chips  -BB    Oral Care Recommendations Oral Care BID/PRN;Before ice/water  -BB    SLP Rec. for Method of Medication Administration meds via alternate route  -BB    Monitor for Signs of Aspiration yes;notify SLP if any concerns  -BB    Anticipated Discharge Disposition (SLP) unknown;anticipate therapy at next level of care  -BB              User Key  (r) = Recorded By, (t) = Taken By, (c) = Cosigned By      Initials Name Effective Dates    Óscar Vega SLP 02/19/23 -                     EDUCATION  The patient has been educated in the following areas:   Dysphagia (Swallowing Impairment). Discussed results/recs of this VFSS w pt's spouse. Pt's spouse verbalized understanding. All questions answered. Updated MD/RN.               Time Calculation:    Time Calculation- SLP       Row Name 12/27/24 1513 12/27/24 1212          Time Calculation- SLP    SLP Start Time 1400  -BB 0900  -BB     SLP Stop Time 1530  -BB 1000  -BB     SLP Time Calculation (min) 90 min  -BB 60 min  -BB     SLP Received On 12/27/24  -BB 12/27/24  -BB        Untimed Charges    15347-FW Motion Fluoro Eval Swallow Minutes 90  -BB --     58566-VD Treatment Swallow Minutes -- 60  -BB        Total Minutes    Untimed Charges Total Minutes 90  -BB 60  -BB      Total Minutes 90  -BB 60  -BB               User Key  (r) = Recorded By, (t) = Taken By, (c) = Cosigned By      Initials Name Provider Type    Óscar Vega, SLP Speech and Language Pathologist                    Therapy Charges for Today       Code Description Service Date Service Provider Modifiers Qty    25992193010 HC ST TREATMENT SWALLOW 4  12/27/2024 Óscar Dennis, SLP GN 1    30332851522 HC ST MOTION FLUORO EVAL SWALLOW 6 12/27/2024 Óscar Dennis, SLP GN 1                 DULCE Zabala  12/27/2024

## 2024-12-27 NOTE — SIGNIFICANT NOTE
12/27/24 1341   OTHER   Discipline occupational therapist   Rehab Time/Intention   Session Not Performed   (RN reports transport called to take to [facility].  NP going in to patient's room.  RN aware OT will not be able to return again today.)   Recommendation   OT - Next Appointment 12/30/24

## 2024-12-27 NOTE — THERAPY RE-EVALUATION
Acute Care - Speech Language Pathology   Swallow Re-Evaluation [facility]     Patient Name: Casey Snowden Sr.  : 1934  MRN: [id number]  Today's Date: 2024               Admit Date: 2024    Visit Dx:     ICD-10-CM ICD-9-CM   1. Unable to ambulate  R26.2 719.7   2. Herpes zoster with complication: S2 and S3 distribution on the right  B02.8 053.8   3. Parkinson's disease, unspecified whether dyskinesia present, unspecified whether manifestations fluctuate  G20.A1 332.0     Patient Active Problem List   Diagnosis    Benign essential hypertension    Stenosis of carotid artery    Mixed hyperlipidemia    Parkinson's disease    Peripheral arterial occlusive disease    Screening for prostate cancer    Medication monitoring encounter    Melanoma    Chronic deep vein thrombosis (DVT) of popliteal vein of right lower extremity    Uses hearing aid    Paroxysmal A-fib    Chronic diastolic (congestive) heart failure    Anticoagulant long-term use    Presence of IVC filter    Medicare annual wellness visit, subsequent    Iron deficiency anemia secondary to inadequate dietary iron intake    Orthostatic hypotension due to Parkinson's disease    Coronary artery disease involving native coronary artery of native heart without angina pectoris    ST elevation myocardial infarction involving left anterior descending (LAD) coronary artery    Sialorrhea    Hypertensive heart disease with heart failure    PAD (peripheral artery disease)    Metastatic squamous cell carcinoma    Secondary malignancy of lymph nodes of head, face and neck    Encounter for antineoplastic immunotherapy    RBD (REM behavioral disorder)    Acquired hypothyroidism    Aspiration pneumonia of right lung due to vomit    Dysphagia, neurologic    ICAO (internal carotid artery occlusion), right    Arterial ischemic stroke, ICA (internal carotid artery), right, chroni    Renal mass, left    Abdominal aortic aneurysm    Disorientation    Falls  frequently    Right inguinal hernia    Unable to ambulate    Herpes zoster without complication    LISY (acute kidney injury)    Moderate protein-calorie malnutrition     Past Medical History:   Diagnosis Date    Abdominal aortic aneurysm without rupture 10/14/2021    Acquired hypothyroidism 08/31/2022    Acute ischemic stroke 10/08/2023    Aneurysm     Arrhythmia     Atrial fibrillation     Cancer 04/2018    basil cell carcinoma    Carotid stenosis 10/29/2015    CHF (congestive heart failure)     Chronic deep vein thrombosis (DVT) of popliteal vein of right lower extremity     Clotting disorder     Coronary artery disease involving native coronary artery of native heart without angina pectoris 12/20/2018    DVT of lower extremity (deep venous thrombosis)     Hard of hearing     Hyperlipidemia     Hypertension     Localized edema 01/04/2017    Long-term use of high-risk medication     Metabolic encephalopathy 02/07/2023    Myocardial infarction 2918    LAD stent    PAD (peripheral artery disease) 09/10/2020    Parkinson's disease     Postphlebitic syndrome     Pure hypercholesterolemia     Skin tear of upper arm without complication, left, subsequent encounter     Stroke 11/02/2023     Past Surgical History:   Procedure Laterality Date    BASAL CELL CARCINOMA EXCISION  04/2018    CARDIAC CATHETERIZATION N/A 11/05/2018    Procedure: Left Heart Cath;  Surgeon: Oliverio Azar MD;  Location: [facility] CATH INVASIVE LOCATION;  Service: Cardiovascular    CARDIAC CATHETERIZATION N/A 11/05/2018    Procedure: Coronary angiography;  Surgeon: Oliverio Azar MD;  Location: [facility] CATH INVASIVE LOCATION;  Service: Cardiovascular    CARDIAC CATHETERIZATION N/A 11/05/2018    Procedure: Left ventriculography;  Surgeon: Oliverio Azar MD;  Location: [facility] CATH INVASIVE LOCATION;  Service: Cardiovascular    CARDIAC CATHETERIZATION N/A 06/04/2019    Procedure: Left Heart Cath;  Surgeon: Johnny Glasgow MD;  Location: [facility]  CATH INVASIVE LOCATION;  Service: Cardiovascular    CARDIAC CATHETERIZATION N/A 06/04/2019    Procedure: Coronary angiography;  Surgeon: Johnny Glasgow MD;  Location: [facility] CATH INVASIVE LOCATION;  Service: Cardiovascular    CARDIAC CATHETERIZATION N/A 06/04/2019    Procedure: Left ventriculography;  Surgeon: Johnny Glasgow MD;  Location: [facility] CATH INVASIVE LOCATION;  Service: Cardiovascular    CARDIAC CATHETERIZATION N/A 06/04/2019    Procedure: Stent BILL coronary;  Surgeon: Johnny Glasgow MD;  Location: [facility] CATH INVASIVE LOCATION;  Service: Cardiovascular    CARDIAC SURGERY      CAROTID ARTERY ANGIOPLASTY Right 10/11/2023    with stent    CAROTID STENT      CATARACT EXTRACTION Left 08/2018    CATARACT EXTRACTION Right 09/2018    COLONOSCOPY      2011    CORONARY STENT PLACEMENT      INCISION AND DRAINAGE LEG Right 07/07/2017    Procedure: INCISION AND DRAINAGE INFECTED HEMATOMA RIGHT THIGH;  Surgeon: Valentin Peña MD;  Location: [facility] MAIN OR;  Service:     JOINT REPLACEMENT      KNEE INCISION AND DRAINAGE Right 12/27/2016    Procedure: KNEE INCISION AND DRAINAGE;  Surgeon: Triston Whitten MD;  Location: [facility] MAIN OR;  Service:     NOSE SURGERY      nose replacement    SINUS SURGERY  1960    SKIN BIOPSY      SKIN GRAFT  12/2017    TOTAL KNEE ARTHROPLASTY Right     VASCULAR SURGERY      VENA CAVA FILTER PLACEMENT         SLP Recommendation and Plan  SLP Swallowing Diagnosis: suspected pharyngeal dysphagia (12/27/24 1201)  SLP Diet Recommendation: ice chips between meals after oral care, with supervision (12/27/24 1201)  Recommended Precautions and Strategies: upright posture during/after eating (12/27/24 1201)  SLP Rec. for Method of Medication Administration: meds via alternate route (12/27/24 1201)     Monitor for Signs of Aspiration: yes, notify SLP if any concerns (12/27/24 1201)  Recommended Diagnostics: VFSS (MBS) (12/27/24 1201)  Swallow Criteria for Skilled  Therapeutic Interventions Met: demonstrates skilled criteria (12/27/24 1201)     Rehab Potential/Prognosis, Swallowing: adequate, monitor progress closely (12/27/24 1201)  Therapy Frequency (Swallow): PRN (12/27/24 1201)  Predicted Duration Therapy Intervention (Days): until discharge (12/27/24 1201)  Oral Care Recommendations: Before ice/water, Oral Care BID/PRN (12/27/24 1201)                                        Outcome Evaluation: Clinical swallow re-eval completed. Likely acute on chronic dysphagia with pt living w Parkison's and prior CVA. Consistent cough w thin liquids. Suggest VFSS to evaluate complete oropharyngeal swallow function. Will plan to complete this date. Diet textures: NPO w NGT x ice chips w supervision. Pending VFSS results/recs. Meds: non-oral      SWALLOW EVALUATION (Last 72 Hours)       SLP Adult Swallow Evaluation       Row Name 12/27/24 1201                   Rehab Evaluation    Document Type re-evaluation  -BB        Subjective Information no complaints  -BB        Patient Effort good  -BB           General Information    Current Method of Nutrition NPO;nasogastric feedings  -BB        Precautions/Limitations, Vision difficult to assess  -BB        Precautions/Limitations, Hearing other (see comments)  [place]  -BB        Prior Level of Function-Swallowing --  hx dysphagia  -BB        Plans/Goals Discussed with spouse/S.O.;agreed upon  -BB        Barriers to Rehab previous functional deficit;cognitive status;medically complex  -BB        Patient's Goals for Discharge patient did not state  -BB        Family Goals for Discharge patient able to return to PO diet  -BB           Pain    Pretreatment Pain Rating 0/10 - no pain  -BB        Posttreatment Pain Rating 0/10 - no pain  -BB           Oral Motor Structure and Function    Dentition Assessment natural, present and adequate;missing teeth  -BB        Secretion Management WNL/WFL  -BB        Mucosal Quality moist, healthy  -BB            General Eating/Swallowing Observations    Respiratory Support Currently in Use room air  -BB        Eating/Swallowing Skills fed by SLP  -BB        Positioning During Eating upright 90 degree;upright in bed  -BB        Utensils Used spoon;straw  -BB        Consistencies Trialed pureed;ice chips;nectar/syrup-thick liquids;thin liquids  -BB           Clinical Swallow Eval    Clinical Swallow Evaluation Summary Clinical swallow re-eval completed. Spouse at bedside.    Cognition: Alert. Pt able to follow basic commands.   Expressive communication: minimal.   Voice: Vocal quality appears mildly breathy. Vocal loudness appears good.   Cough: Appears fair to strong.  Affect: Pleasant.   Speech: Intelligible.  Patient agreeable to PO trials.   Dysphagia symptoms: pt's wife reports pt tolerates soft to chew (minced meats), thin liquids at baseline despite hx dysphagia w no adverse pulmonary events. Dysphagia signs: consistent cough with thin. Cough with nectar via straw. Not observed with puree or nectar via tsp.     Assessment / Plan: Likely acute on chronic dysphagia with pt living w Parkison's and prior CVA. Consistent cough w thin liquids. Suggest VFSS to evaluate complete oropharyngeal swallow function. Will plan to complete this date.  -BB           SLP Evaluation Clinical Impression    SLP Swallowing Diagnosis suspected pharyngeal dysphagia  -BB        Rehab Potential/Prognosis, Swallowing adequate, monitor progress closely  -BB        Swallow Criteria for Skilled Therapeutic Interventions Met demonstrates skilled criteria  -BB           Recommendations    Therapy Frequency (Swallow) PRN  -BB        Predicted Duration Therapy Intervention (Days) until discharge  -BB        SLP Diet Recommendation ice chips between meals after oral care, with supervision  -BB        Recommended Diagnostics VFSS (MBS)  -BB        Recommended Precautions and Strategies upright posture during/after eating  -BB        Oral Care  Recommendations Before ice/water;Oral Care BID/PRN  -BB        SLP Rec. for Method of Medication Administration meds via alternate route  -BB        Monitor for Signs of Aspiration yes;notify SLP if any concerns  -BB                  User Key  (r) = Recorded By, (t) = Taken By, (c) = Cosigned By      Initials Name Effective Dates    Óscar Vega SLP 02/19/23 -                     EDUCATION  The patient has been educated in the following areas:   Dysphagia (Swallowing Impairment).                Time Calculation:    Time Calculation- SLP       Row Name 12/27/24 1212             Time Calculation- SLP    SLP Start Time 0900  -BB      SLP Stop Time 1000  -BB      SLP Time Calculation (min) 60 min  -BB      SLP Received On 12/27/24  -BB         Untimed Charges    69818-GW Treatment Swallow Minutes 60  -BB         Total Minutes    Untimed Charges Total Minutes 60  -BB       Total Minutes 60  -BB                User Key  (r) = Recorded By, (t) = Taken By, (c) = Cosigned By      Initials Name Provider Type    Óscar Vega SLP Speech and Language Pathologist                    Therapy Charges for Today       Code Description Service Date Service Provider Modifiers Qty    [id number]  ST TREATMENT SWALLOW 4 12/27/2024 Óscar Dennis SLP GN 1                 DULCE Zabala  12/27/2024

## 2024-12-27 NOTE — PLAN OF CARE
Goal Outcome Evaluation:      Pt remains confused, RA, VSS. No acute changes overnight. Spouse at bedside. Amiodarone gtt infusing at 0.5 mg/min. HR irregular, maintaining mostly low 100s-120s. 2 new PIVs placed- difficult maintaining IV access. Tubefeed running at goal- 65 mL/hr. Pt continues to need orally suctioned multiple time throughout shift. Call light within reach. Bed alarm on. Plan of care ongoing.

## 2024-12-28 LAB
ALBUMIN SERPL-MCNC: 2.5 G/DL (ref 3.5–5.2)
ANION GAP SERPL CALCULATED.3IONS-SCNC: 8 MMOL/L (ref 5–15)
BUN SERPL-MCNC: 44 MG/DL (ref 8–23)
BUN/CREAT SERPL: 46.8 (ref 7–25)
CALCIUM SPEC-SCNC: 7.6 MG/DL (ref 8.2–9.6)
CHLORIDE SERPL-SCNC: 110 MMOL/L (ref 98–107)
CO2 SERPL-SCNC: 24 MMOL/L (ref 22–29)
CREAT SERPL-MCNC: 0.94 MG/DL (ref 0.76–1.27)
DEPRECATED RDW RBC AUTO: 50.4 FL (ref 37–54)
EGFRCR SERPLBLD CKD-EPI 2021: 77 ML/MIN/1.73
ERYTHROCYTE [DISTWIDTH] IN BLOOD BY AUTOMATED COUNT: 14 % (ref 12.3–15.4)
GLUCOSE BLDC GLUCOMTR-MCNC: 118 MG/DL (ref 70–130)
GLUCOSE BLDC GLUCOMTR-MCNC: 127 MG/DL (ref 70–130)
GLUCOSE BLDC GLUCOMTR-MCNC: 133 MG/DL (ref 70–130)
GLUCOSE BLDC GLUCOMTR-MCNC: 137 MG/DL (ref 70–130)
GLUCOSE SERPL-MCNC: 122 MG/DL (ref 65–99)
HCT VFR BLD AUTO: 31 % (ref 37.5–51)
HGB BLD-MCNC: 10.2 G/DL (ref 13–17.7)
MAGNESIUM SERPL-MCNC: 2.2 MG/DL (ref 1.6–2.4)
MCH RBC QN AUTO: 32.8 PG (ref 26.6–33)
MCHC RBC AUTO-ENTMCNC: 32.9 G/DL (ref 31.5–35.7)
MCV RBC AUTO: 99.7 FL (ref 79–97)
PHOSPHATE SERPL-MCNC: 2.8 MG/DL (ref 2.5–4.5)
PLATELET # BLD AUTO: 218 10*3/MM3 (ref 140–450)
PMV BLD AUTO: 10.8 FL (ref 6–12)
POTASSIUM SERPL-SCNC: 4.5 MMOL/L (ref 3.5–5.2)
RBC # BLD AUTO: 3.11 10*6/MM3 (ref 4.14–5.8)
SODIUM SERPL-SCNC: 142 MMOL/L (ref 136–145)
WBC NRBC COR # BLD AUTO: 7.3 10*3/MM3 (ref 3.4–10.8)

## 2024-12-28 PROCEDURE — 25810000003 SODIUM CHLORIDE 0.9 % SOLUTION 250 ML FLEX CONT: Performed by: INTERNAL MEDICINE

## 2024-12-28 PROCEDURE — 80069 RENAL FUNCTION PANEL: CPT | Performed by: STUDENT IN AN ORGANIZED HEALTH CARE EDUCATION/TRAINING PROGRAM

## 2024-12-28 PROCEDURE — 82948 REAGENT STRIP/BLOOD GLUCOSE: CPT

## 2024-12-28 PROCEDURE — 99232 SBSQ HOSP IP/OBS MODERATE 35: CPT | Performed by: NURSE PRACTITIONER

## 2024-12-28 PROCEDURE — 25010000002 MEROPENEM PER 100 MG: Performed by: HOSPITALIST

## 2024-12-28 PROCEDURE — 83735 ASSAY OF MAGNESIUM: CPT | Performed by: INTERNAL MEDICINE

## 2024-12-28 PROCEDURE — 25010000002 ACYCLOVIR PER 5 MG: Performed by: INTERNAL MEDICINE

## 2024-12-28 PROCEDURE — 85027 COMPLETE CBC AUTOMATED: CPT | Performed by: STUDENT IN AN ORGANIZED HEALTH CARE EDUCATION/TRAINING PROGRAM

## 2024-12-28 PROCEDURE — 25010000002 MEROPENEM PER 100 MG: Performed by: INTERNAL MEDICINE

## 2024-12-28 RX ADMIN — MEROPENEM 1000 MG: 1 INJECTION, POWDER, FOR SOLUTION INTRAVENOUS at 10:00

## 2024-12-28 RX ADMIN — CARBIDOPA AND LEVODOPA 1 TABLET: 25; 250 TABLET ORAL at 09:58

## 2024-12-28 RX ADMIN — AMIODARONE HYDROCHLORIDE 200 MG: 200 TABLET ORAL at 09:59

## 2024-12-28 RX ADMIN — FINASTERIDE 5 MG: 5 TABLET, FILM COATED ORAL at 09:59

## 2024-12-28 RX ADMIN — CARBIDOPA AND LEVODOPA 1 TABLET: 25; 250 TABLET ORAL at 16:00

## 2024-12-28 RX ADMIN — Medication 10 ML: at 21:00

## 2024-12-28 RX ADMIN — ATORVASTATIN CALCIUM 40 MG: 20 TABLET, FILM COATED ORAL at 20:56

## 2024-12-28 RX ADMIN — GUAIFENESIN 600 MG: 600 TABLET, MULTILAYER, EXTENDED RELEASE ORAL at 20:56

## 2024-12-28 RX ADMIN — Medication 1000 MCG: at 09:59

## 2024-12-28 RX ADMIN — Medication 10 ML: at 09:59

## 2024-12-28 RX ADMIN — ACYCLOVIR SODIUM 700 MG: 50 INJECTION, SOLUTION INTRAVENOUS at 09:59

## 2024-12-28 RX ADMIN — AMLODIPINE BESYLATE 5 MG: 5 TABLET ORAL at 09:59

## 2024-12-28 RX ADMIN — ACYCLOVIR SODIUM 700 MG: 50 INJECTION, SOLUTION INTRAVENOUS at 04:24

## 2024-12-28 RX ADMIN — APIXABAN 5 MG: 5 TABLET, FILM COATED ORAL at 20:56

## 2024-12-28 RX ADMIN — APIXABAN 5 MG: 5 TABLET, FILM COATED ORAL at 09:59

## 2024-12-28 RX ADMIN — GUAIFENESIN 600 MG: 600 TABLET, MULTILAYER, EXTENDED RELEASE ORAL at 09:59

## 2024-12-28 RX ADMIN — CARBIDOPA AND LEVODOPA 1 TABLET: 25; 250 TABLET ORAL at 20:55

## 2024-12-28 RX ADMIN — ACYCLOVIR SODIUM 700 MG: 50 INJECTION, SOLUTION INTRAVENOUS at 18:27

## 2024-12-28 RX ADMIN — MEROPENEM 1000 MG: 1 INJECTION, POWDER, FOR SOLUTION INTRAVENOUS at 17:13

## 2024-12-28 RX ADMIN — AMIODARONE HYDROCHLORIDE 200 MG: 200 TABLET ORAL at 20:55

## 2024-12-28 RX ADMIN — LEVOTHYROXINE SODIUM 75 MCG: 75 TABLET ORAL at 09:59

## 2024-12-28 NOTE — PLAN OF CARE
Goal Outcome Evaluation:  Plan of Care Reviewed With: patient, family           Outcome Evaluation: No acute changes. VSS. Cortrak at 80cm runnung at goal of 65mL/hr. Room air. AO to self. Meds given per MAR. WCTM.

## 2024-12-28 NOTE — PROGRESS NOTES
Hospital Follow Up    LOS:  LOS: 11 days   Patient Name: Casey Snowden Sr.  Age/Sex: 89 y.o. male  : 1934  MRN: [id number]    Day of Service: 24   Length of Stay: 11  Encounter Provider: RIDDHI William  Place of Service: [facility] CARDIOLOGY  Patient Care Team:  Aleksander Diez MD as PCP - General  Marck, Joseph Youngblood MD as Consulting Physician (Ophthalmology)  Ford Dorado MD as Consulting Physician (Vascular Surgery)  Cecilio Armas III, MD as Consulting Physician (Cardiology)  Kashif Goodwin MD as Consulting Physician (Neurology)  Enoch Lares MD (Hematology)  Kayden Chance MD (Dermatology)  Vinod Caedna MD as Consulting Physician (Neurosurgery)  Johnny Castellanos MD as Consulting Physician (Urology)  Inge Pedro, PT as Physical Therapist (Physical Therapy)  Lucrecia Mccloud PT as Physical Therapist (Physical Therapy)  Nereyda Toledo, OT as Occupational Therapist (Occupational Therapy)  Junior Taylor MD as Surgeon (Orthopedic Surgery)  Casey Power MD as Surgeon (Wound Care)  Nahomi Jacobo APRN as Nurse Practitioner (Nurse Practitioner)    Subjective:     Chief Complaint:  Afib    Interval History: weak and frail. No complaints today    Objective:     Objective:  Temp:  [97.3 °F (36.3 °C)-98.8 °F (37.1 °C)] 98.2 °F (36.8 °C)  Heart Rate:  [64-67] 64  Resp:  [16-20] 18  BP: (120-136)/(51-67) 120/51     Intake/Output Summary (Last 24 hours) at 2024 1634  Last data filed at 2024 204  Gross per 24 hour   Intake 30 ml   Output --   Net 30 ml     Body mass index is 23.47 kg/m².      24  0658 24  0403 24  0408   Weight: 74.5 kg (164 lb 3.9 oz) 75.5 kg (166 lb 7.2 oz) 74.2 kg (163 lb 9.3 oz)     Weight change: -1.3 kg (-2 lb 13.9 oz)    Physical Exam:   General Appearance:    Awake alert and oriented in no acute distress.   Color:  Skin:  Neuro:  HEENT:    Lungs:     Pink  Warm  and dry  No focal, motor or sensory deficits  Neck supple, pupils equal, round and reactive. No JVD, No Bruit  Clear to auscultation,respirations regular, even and                  unlabored    Heart:    Regular rate and rhythm, S1 and S2, no murmur, no gallop, no rub. No edema, DP/PT pulses are 2+   Chest Wall:    No abnormalities observed   Abdomen:     Normal bowel sounds, no masses, no organomegaly, soft        non-tender, non-distended, no guarding, no ascites noted   Extremities:   Moves all extremities well, no edema, no cyanosis, no redness       Lab Review:   Results from last 7 days   Lab Units 12/28/24  1359 12/27/24  0518   SODIUM mmol/L 142 141   POTASSIUM mmol/L 4.5 4.2   CHLORIDE mmol/L 110* 109*   CO2 mmol/L 24.0 21.5*   BUN mg/dL 44* 49*   CREATININE mg/dL 0.94 0.99   GLUCOSE mg/dL 122* 147*   CALCIUM mg/dL 7.6* 7.6*   AST (SGOT) U/L  --  87*   ALT (SGPT) U/L  --  33         Results from last 7 days   Lab Units 12/28/24  1359 12/27/24  0518   WBC 10*3/mm3 7.30 7.65   HEMOGLOBIN g/dL 10.2* 10.4*   HEMATOCRIT % 31.0* 30.9*   PLATELETS 10*3/mm3 218 189         Results from last 7 days   Lab Units 12/28/24  1359 12/27/24  0518   MAGNESIUM mg/dL 2.2 1.5*     Results from last 7 days   Lab Units 12/22/24  0635   CHOLESTEROL mg/dL 102   TRIGLYCERIDES mg/dL 52   HDL CHOL mg/dL 50             I reviewed the patient's new clinical results.  I personally viewed and interpreted the patient's EKG  Current Medications:   Scheduled Meds:acyclovir, 10 mg/kg, Intravenous, Q8H  amiodarone, 200 mg, Oral, Q12H  amLODIPine, 5 mg, Oral, Daily  apixaban, 5 mg, Oral, Q12H  atorvastatin, 40 mg, Nasogastric, Nightly  carbidopa-levodopa, 1 tablet, Nasogastric, TID  finasteride, 5 mg, Nasogastric, Daily  guaiFENesin, 600 mg, Oral, Q12H  insulin regular, 2-7 Units, Subcutaneous, Q6H  levothyroxine, 75 mcg, Nasogastric, Daily  meropenem, 1,000 mg, Intravenous, Q8H  [Held by provider] oxybutynin XL, 10 mg, Oral, Daily  sodium  chloride, 10 mL, Intravenous, Q12H  terazosin, 1 mg, Nasogastric, Nightly  [Held by provider] valsartan, 40 mg, Oral, Daily  vitamin B-12, 1,000 mcg, Nasogastric, Q AM      Continuous Infusions:Pharmacy to Dose acyclovir (ZOVIRAX),         Allergies:  Allergies   Allergen Reactions    Penicillins Rash    Rocephin [Ceftriaxone] Rash       Assessment/Plan:        Encephalopathy  Parkinson's Disease  Paroxysmal afib- SR on amiodarone, no bradycardia at this time. Anticoagulated with Eliquis 5mg BID conitnue with this  LISY- resolved- monitor labs  Coronary artery disease- LAD stent 2019  Ischemic Cardiomyopathy- GDMT limited not in HF at this time  Autonomic dysfunction. BP stable at this time.        RIDDHI William  12/28/24  16:34 EST  Electronically signed by RIDDHI William, 12/28/24, 4:34 PM EST.

## 2024-12-28 NOTE — PROGRESS NOTES
LOS: 11 days     Chief Complaint: LISY    Subjective     History of Present Illness  Casey Snowden Sr. is a 89 y.o. male with history of Parkinson's disease and dementia.  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        Subjective  Patient still encephalopathic  Has had some large volume bladder scans, but patient has been able to void as they have moved him around. PVR has been under 300.  DHT in place.     12/21 - underwent LP this am. No other acute events overnight. Continues to be confused. 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     12/23 no acute events. Seems to be doing a bit better today.    12/24 seems to have had a difficult night. Now being bathe.     12/25 no acute events. Overall, patient looks better today. Second IV access accomplished.     12/26: Oriented to person place, recognizes family  Dobbhoff tube remains, on tube feeds, no new complaints    12/27: Still confused today but his family states he was much more awake alert and conversant yesterday  Remains on tube feeds with free water, on amnio drip    12/28: No new complaints or events, did not pass a swallow study and remains on tube feeds, labs pending.  Family states he was not as alert yesterday    History taken from: family and chart    Objective     Vital Sign Min/Max for last 24 hours  Temp  Min: 97.3 °F (36.3 °C)  Max: 98.8 °F (37.1 °C)   BP  Min: 124/56  Max: 136/54   Pulse  Min: 64  Max: 117   Resp  Min: 16  Max: 20   SpO2  Min: 96 %  Max: 99 %   No data recorded   Weight  Min: 74.2 kg (163 lb 9.3 oz)  Max: 74.2 kg (163 lb 9.3 oz)     Flowsheet Rows      Flowsheet Row First Filed Value   Admission Height --   Admission Weight 66.7 kg (147 lb 0.8 oz) Documented at 12/18/2024 0500            No intake/output data recorded.  I/O last 3 completed shifts:  In: 2047 [Other:805; NG/GT:1242]  Out: -     Objective:  Vital signs: (most recent): Blood pressure  "130/67, pulse 67, temperature 98.8 °F (37.1 °C), temperature source Oral, resp. rate 18, height 177.8 cm (70\"), weight 74.2 kg (163 lb 9.3 oz), SpO2 98%.            Physical Examination: General appearance - ill appearing, frail, chronically ill  Mental status - more awake.    Nose - DHT in place   Chest -good air movement, scattered coarse breath sounds.  Heart - normal rate, regular rhythm, normal S1, S2, no murmurs, rubs, clicks or gallops  Abdomen - soft, nontender, nondistended, no masses or organomegaly  Neurological -no focal deficits  Extremities -trace/1+ edema      Results Review:     I reviewed the patient's new clinical results.    WBC WBC   Date Value Ref Range Status   12/27/2024 7.65 3.40 - 10.80 10*3/mm3 Final   12/26/2024 6.39 3.40 - 10.80 10*3/mm3 Final      HGB Hemoglobin   Date Value Ref Range Status   12/27/2024 10.4 (L) 13.0 - 17.7 g/dL Final   12/26/2024 9.9 (L) 13.0 - 17.7 g/dL Final      HCT Hematocrit   Date Value Ref Range Status   12/27/2024 30.9 (L) 37.5 - 51.0 % Final   12/26/2024 29.3 (L) 37.5 - 51.0 % Final      Platlets No results found for: \"LABPLAT\"   MCV MCV   Date Value Ref Range Status   12/27/2024 96.6 79.0 - 97.0 fL Final   12/26/2024 97.0 79.0 - 97.0 fL Final          Sodium Sodium   Date Value Ref Range Status   12/27/2024 141 136 - 145 mmol/L Final   12/26/2024 140 136 - 145 mmol/L Final   12/25/2024 148 (H) 136 - 145 mmol/L Final      Potassium Potassium   Date Value Ref Range Status   12/27/2024 4.2 3.5 - 5.2 mmol/L Final   12/26/2024 4.5 3.5 - 5.2 mmol/L Final   12/26/2024 3.2 (L) 3.5 - 5.2 mmol/L Final      Chloride Chloride   Date Value Ref Range Status   12/27/2024 109 (H) 98 - 107 mmol/L Final   12/26/2024 107 98 - 107 mmol/L Final      CO2 CO2   Date Value Ref Range Status   12/27/2024 21.5 (L) 22.0 - 29.0 mmol/L Final   12/26/2024 21.0 (L) 22.0 - 29.0 mmol/L Final      BUN BUN   Date Value Ref Range Status   12/27/2024 49 (H) 8 - 23 mg/dL Final   12/26/2024 48 (H) " "8 - 23 mg/dL Final      Creatinine Creatinine   Date Value Ref Range Status   12/27/2024 0.99 0.76 - 1.27 mg/dL Final   12/26/2024 0.98 0.76 - 1.27 mg/dL Final      Calcium Calcium   Date Value Ref Range Status   12/27/2024 7.6 (L) 8.2 - 9.6 mg/dL Final   12/26/2024 7.4 (L) 8.2 - 9.6 mg/dL Final      PO4 No results found for: \"CAPO4\"   Albumin Albumin   Date Value Ref Range Status   12/27/2024 2.3 (L) 3.5 - 5.2 g/dL Final        Magnesium Magnesium   Date Value Ref Range Status   12/27/2024 1.5 (L) 1.6 - 2.4 mg/dL Final        Uric Acid No results found for: \"URICACID\"     Medication Review:   Current medications reviewed, orders reviewed and updated    Assessment & Plan     Assessment & Plan  LISY, Prerenal and improved  Metabolic acidosis, stabilizing  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 labs today are pending  Continue current tube feeds with free water  Sodium 141 yesterday  Monitor off IV fluids    Continue to hold Diovan and monitor BP trends on amlodipine  Rate control per cardiology: Now on oral amiodarone     total of 14 day of IV acyclovir planned.  Will monitor renal function closely with treatment for any signs of acyclovir nephrotoxicity  Okay to use electrolyte replacement protocol with stabilizing GFR  Discussed with family at bedside        Joe Lyn MD  12/28/24  09:10 EST            "

## 2024-12-28 NOTE — PROGRESS NOTES
Name: Casey Snowden . ADMIT: 2024   : 1934  PCP: Aleksander Diez MD    MRN: [id number] LOS: 11 days   AGE/SEX: 89 y.o. male  ROOM: [facility]     Subjective   Subjective     Failed swallow study yesterday.      Objective   Objective   Vital Signs  Temp:  [97.3 °F (36.3 °C)-98.8 °F (37.1 °C)] 98.8 °F (37.1 °C)  Heart Rate:  [] 67  Resp:  [16-20] 18  BP: (124-136)/(54-84) 130/67  SpO2:  [96 %-99 %] 98 %  on   ;   Device (Oxygen Therapy): room air  Body mass index is 23.47 kg/m².  Physical Exam  Constitutional:       General: He is not in acute distress.  Eyes:      Extraocular Movements: Extraocular movements intact.      Pupils: Pupils are equal, round, and reactive to light.   Cardiovascular:      Rate and Rhythm: Normal rate and regular rhythm.      Heart sounds: No murmur heard.  Pulmonary:      Effort: No respiratory distress.      Breath sounds: Rhonchi present.      Comments: Upper airway secretions   Abdominal:      General: There is no distension.      Palpations: Abdomen is soft.      Tenderness: There is no abdominal tenderness.   Musculoskeletal:      Right lower leg: No edema.      Left lower leg: No edema.   Skin:     General: Skin is warm and dry.      Findings: No rash.   Neurological:      Mental Status: He is alert.       Results Review     I reviewed the patient's new clinical results.  Results from last 7 days   Lab Units 24  0518 24  0646 24  0457 24  0635   WBC 10*3/mm3 7.65 6.39 8.76 8.28   HEMOGLOBIN g/dL 10.4* 9.9* 10.1* 11.0*   PLATELETS 10*3/mm3 189 168 166 158     Results from last 7 days   Lab Units 24  0518 24  1745 24  0646 24  1210 24  0457 24  0547   SODIUM mmol/L 141  --  140 148* 151* 150*   POTASSIUM mmol/L 4.2 4.5 3.2*  --  3.4* 3.5   CHLORIDE mmol/L 109*  --  107  --  118* 116*   CO2 mmol/L 21.5*  --  21.0*  --  25.0 24.2   BUN mg/dL 49*  --  48*  --  44* 44*   CREATININE mg/dL 0.99  --  0.98   --  1.03 1.11   GLUCOSE mg/dL 147*  --  174*  --  159* 173*   EGFR mL/min/1.73 72.4  --  73.3  --  69.0 63.1     Results from last 7 days   Lab Units 12/27/24  0518   ALBUMIN g/dL 2.3*   BILIRUBIN mg/dL 0.2   ALK PHOS U/L 70   AST (SGOT) U/L 87*   ALT (SGPT) U/L 33       Results from last 7 days   Lab Units 12/27/24  0518 12/26/24  0646 12/25/24  0457 12/24/24  0547   CALCIUM mg/dL 7.6* 7.4* 7.9* 8.2   ALBUMIN g/dL 2.3*  --   --   --    MAGNESIUM mg/dL 1.5*  --   --   --    PHOSPHORUS mg/dL 3.5  --   --   --      Results from last 7 days   Lab Units 12/26/24  0646   PROCALCITONIN ng/mL 0.61*     Glucose   Date/Time Value Ref Range Status   12/28/2024 0540 133 (H) 70 - 130 mg/dL Final   12/27/2024 2324 131 (H) 70 - 130 mg/dL Final   12/27/2024 1814 115 70 - 130 mg/dL Final   12/27/2024 1126 133 (H) 70 - 130 mg/dL Final   12/27/2024 0506 143 (H) 70 - 130 mg/dL Final   12/26/2024 2335 125 70 - 130 mg/dL Final   12/26/2024 1812 110 70 - 130 mg/dL Final       No radiology results for the last day    I have personally reviewed all medications:  Scheduled Medications  acyclovir, 10 mg/kg, Intravenous, Q8H  amiodarone, 200 mg, Oral, Q12H  amLODIPine, 5 mg, Oral, Daily  apixaban, 5 mg, Oral, Q12H  atorvastatin, 40 mg, Nasogastric, Nightly  carbidopa-levodopa, 1 tablet, Nasogastric, TID  finasteride, 5 mg, Nasogastric, Daily  guaiFENesin, 600 mg, Oral, Q12H  insulin regular, 2-7 Units, Subcutaneous, Q6H  levothyroxine, 75 mcg, Nasogastric, Daily  meropenem, 1,000 mg, Intravenous, Q12H  [Held by provider] oxybutynin XL, 10 mg, Oral, Daily  sodium chloride, 10 mL, Intravenous, Q12H  terazosin, 1 mg, Nasogastric, Nightly  [Held by provider] valsartan, 40 mg, Oral, Daily  vitamin B-12, 1,000 mcg, Nasogastric, Q AM    Infusions  Pharmacy to Dose acyclovir (ZOVIRAX),     Diet  NPO Diet NPO Type: Tube Feeding    I have personally reviewed:  [x]  Laboratory   []  Microbiology   [x]  Radiology   []  EKG/Telemetry  []   Cardiology/Vascular   []  Pathology    []  Records       Assessment/Plan     Active Hospital Problems    Diagnosis  POA    **Unable to ambulate [R26.2]  Yes    Moderate protein-calorie malnutrition [E44.0]  Yes    LISY (acute kidney injury) [N17.9]  Yes    Herpes zoster without complication [B02.9]  Unknown    Falls frequently [R29.6]  Not Applicable    Acquired hypothyroidism [E03.9]  Yes    Secondary malignancy of lymph nodes of head, 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       89 y.o. male admitted with generalized weakness, confusion and found to have RLE rash consistent with shingles.    Cough/congestion: Checked chest x-ray which shows some new infiltrates.  On meropenem for 5 day course ending on 12/29    VZV encephalitis.  Status post LP  -Continue acyclovir x 14 days.  Renally dosed as appropriate  - MRI negative for acute change including any evidence of encephalitis  - Neurology signed off.  -Speech following regarding appropriateness for any diet trials.  Continue feeding via core track for now but will have to address PEG if he does not start to improve mentally.   -Failed swallow evaluation on 12/28; will keep NGT over the weekend, hopefully repeat on Monday. If he fails again patient and family want PEG    LISY/hypernatremia: Remains elevated.  -Felt secondary to dehydration/urine retention and not from acyclovir as crystals negative.    - Continue to hold Diovan and  Ditropan  - Monitor urine output    Atrial fibrillation with RVR  Continue Eliquis, no need for aspirin per cardiology.  No BB due to previous bradycardia. Spot dosed IV metoprolol for RVR. Transitioned from amiodarone ggt to PO.     Parkinson disease: Continue Sinemet.     DVT prophy: Eliquis.    Disposition: Plan TBD.        Hernán Saldivar MD  [facility]  12/28/24  10:56 EST

## 2024-12-28 NOTE — PLAN OF CARE
Goal Outcome Evaluation:  Plan of Care Reviewed With: patient, family        Progress: no change  Outcome Evaluation: VSS, turn q 2 hours, frequent oral care and suctioning, IV abx, tolerating tube feeding, remains in SR, PO amio ordered, bed alarm on, daughter at bedside

## 2024-12-29 LAB
ALBUMIN SERPL-MCNC: 2.5 G/DL (ref 3.5–5.2)
ANION GAP SERPL CALCULATED.3IONS-SCNC: 6 MMOL/L (ref 5–15)
BACTERIA SPEC AEROBE CULT: NORMAL
BACTERIA SPEC AEROBE CULT: NORMAL
BUN SERPL-MCNC: 41 MG/DL (ref 8–23)
BUN/CREAT SERPL: 51.9 (ref 7–25)
CALCIUM SPEC-SCNC: 7.8 MG/DL (ref 8.2–9.6)
CHLORIDE SERPL-SCNC: 115 MMOL/L (ref 98–107)
CO2 SERPL-SCNC: 23 MMOL/L (ref 22–29)
CREAT SERPL-MCNC: 0.79 MG/DL (ref 0.76–1.27)
DEPRECATED RDW RBC AUTO: 50.2 FL (ref 37–54)
EGFRCR SERPLBLD CKD-EPI 2021: 84.4 ML/MIN/1.73
ERYTHROCYTE [DISTWIDTH] IN BLOOD BY AUTOMATED COUNT: 14.3 % (ref 12.3–15.4)
GLUCOSE BLDC GLUCOMTR-MCNC: 120 MG/DL (ref 70–130)
GLUCOSE BLDC GLUCOMTR-MCNC: 134 MG/DL (ref 70–130)
GLUCOSE BLDC GLUCOMTR-MCNC: 136 MG/DL (ref 70–130)
GLUCOSE BLDC GLUCOMTR-MCNC: 144 MG/DL (ref 70–130)
GLUCOSE SERPL-MCNC: 135 MG/DL (ref 65–99)
HCT VFR BLD AUTO: 30.7 % (ref 37.5–51)
HGB BLD-MCNC: 10.3 G/DL (ref 13–17.7)
MCH RBC QN AUTO: 32.7 PG (ref 26.6–33)
MCHC RBC AUTO-ENTMCNC: 33.6 G/DL (ref 31.5–35.7)
MCV RBC AUTO: 97.5 FL (ref 79–97)
PHOSPHATE SERPL-MCNC: 2.3 MG/DL (ref 2.5–4.5)
PLATELET # BLD AUTO: 231 10*3/MM3 (ref 140–450)
PMV BLD AUTO: 11.1 FL (ref 6–12)
POTASSIUM SERPL-SCNC: 4.5 MMOL/L (ref 3.5–5.2)
RBC # BLD AUTO: 3.15 10*6/MM3 (ref 4.14–5.8)
SODIUM SERPL-SCNC: 144 MMOL/L (ref 136–145)
WBC NRBC COR # BLD AUTO: 7.38 10*3/MM3 (ref 3.4–10.8)

## 2024-12-29 PROCEDURE — 82948 REAGENT STRIP/BLOOD GLUCOSE: CPT

## 2024-12-29 PROCEDURE — 25010000002 ENOXAPARIN PER 10 MG: Performed by: STUDENT IN AN ORGANIZED HEALTH CARE EDUCATION/TRAINING PROGRAM

## 2024-12-29 PROCEDURE — 25010000002 MEROPENEM PER 100 MG: Performed by: INTERNAL MEDICINE

## 2024-12-29 PROCEDURE — 25010000003 DEXTROSE 5 % SOLUTION: Performed by: INTERNAL MEDICINE

## 2024-12-29 PROCEDURE — 99232 SBSQ HOSP IP/OBS MODERATE 35: CPT | Performed by: STUDENT IN AN ORGANIZED HEALTH CARE EDUCATION/TRAINING PROGRAM

## 2024-12-29 PROCEDURE — 80069 RENAL FUNCTION PANEL: CPT | Performed by: INTERNAL MEDICINE

## 2024-12-29 PROCEDURE — 97530 THERAPEUTIC ACTIVITIES: CPT

## 2024-12-29 PROCEDURE — 85027 COMPLETE CBC AUTOMATED: CPT | Performed by: STUDENT IN AN ORGANIZED HEALTH CARE EDUCATION/TRAINING PROGRAM

## 2024-12-29 PROCEDURE — 25810000003 SODIUM CHLORIDE 0.9 % SOLUTION 250 ML FLEX CONT: Performed by: INTERNAL MEDICINE

## 2024-12-29 PROCEDURE — 25010000002 ACYCLOVIR PER 5 MG: Performed by: INTERNAL MEDICINE

## 2024-12-29 RX ORDER — ENOXAPARIN SODIUM 100 MG/ML
1 INJECTION SUBCUTANEOUS EVERY 12 HOURS
Status: DISCONTINUED | OUTPATIENT
Start: 2024-12-29 | End: 2025-01-04

## 2024-12-29 RX ORDER — DEXTROSE MONOHYDRATE 50 MG/ML
100 INJECTION, SOLUTION INTRAVENOUS CONTINUOUS
Status: ACTIVE | OUTPATIENT
Start: 2024-12-29 | End: 2024-12-29

## 2024-12-29 RX ADMIN — MEROPENEM 1000 MG: 1 INJECTION, POWDER, FOR SOLUTION INTRAVENOUS at 01:23

## 2024-12-29 RX ADMIN — CARBIDOPA AND LEVODOPA 1 TABLET: 25; 250 TABLET ORAL at 16:37

## 2024-12-29 RX ADMIN — GUAIFENESIN 600 MG: 600 TABLET, MULTILAYER, EXTENDED RELEASE ORAL at 20:20

## 2024-12-29 RX ADMIN — Medication 10 ML: at 20:20

## 2024-12-29 RX ADMIN — LEVOTHYROXINE SODIUM 75 MCG: 75 TABLET ORAL at 06:11

## 2024-12-29 RX ADMIN — ACYCLOVIR SODIUM 700 MG: 50 INJECTION, SOLUTION INTRAVENOUS at 02:21

## 2024-12-29 RX ADMIN — APIXABAN 5 MG: 5 TABLET, FILM COATED ORAL at 09:40

## 2024-12-29 RX ADMIN — CARBIDOPA AND LEVODOPA 1 TABLET: 25; 250 TABLET ORAL at 09:40

## 2024-12-29 RX ADMIN — ACYCLOVIR SODIUM 700 MG: 50 INJECTION, SOLUTION INTRAVENOUS at 20:19

## 2024-12-29 RX ADMIN — FINASTERIDE 5 MG: 5 TABLET, FILM COATED ORAL at 09:41

## 2024-12-29 RX ADMIN — ACYCLOVIR SODIUM 700 MG: 50 INJECTION, SOLUTION INTRAVENOUS at 09:41

## 2024-12-29 RX ADMIN — MEROPENEM 1000 MG: 1 INJECTION, POWDER, FOR SOLUTION INTRAVENOUS at 09:41

## 2024-12-29 RX ADMIN — Medication 1000 MCG: at 06:12

## 2024-12-29 RX ADMIN — AMLODIPINE BESYLATE 5 MG: 5 TABLET ORAL at 09:41

## 2024-12-29 RX ADMIN — CARBIDOPA AND LEVODOPA 1 TABLET: 25; 250 TABLET ORAL at 20:19

## 2024-12-29 RX ADMIN — AMIODARONE HYDROCHLORIDE 200 MG: 200 TABLET ORAL at 09:41

## 2024-12-29 RX ADMIN — AMIODARONE HYDROCHLORIDE 200 MG: 200 TABLET ORAL at 20:20

## 2024-12-29 RX ADMIN — TERAZOSIN HYDROCHLORIDE 1 MG: 1 CAPSULE ORAL at 20:20

## 2024-12-29 RX ADMIN — ENOXAPARIN SODIUM 70 MG: 100 INJECTION SUBCUTANEOUS at 20:19

## 2024-12-29 RX ADMIN — ATORVASTATIN CALCIUM 40 MG: 20 TABLET, FILM COATED ORAL at 20:19

## 2024-12-29 RX ADMIN — DEXTROSE 100 ML/HR: 5 SOLUTION INTRAVENOUS at 13:14

## 2024-12-29 RX ADMIN — GUAIFENESIN 600 MG: 600 TABLET, MULTILAYER, EXTENDED RELEASE ORAL at 09:40

## 2024-12-29 NOTE — PLAN OF CARE
Goal Outcome Evaluation:  Plan of Care Reviewed With: patient, family           Outcome Evaluation: No acute changes today. VSS. Oral care provided. meds given per MAR. Family at bedside. WCTM.

## 2024-12-29 NOTE — PROGRESS NOTES
"[facility] Clinical Pharmacy Services: Enoxaparin Consult    Casey Snowden Sr. has a pharmacy consult to dose full-dose enoxaparin per Dr. Saldivar's request.     Indication: A Fib - requiring full anticoagulation  Home Anticoagulation: Eliquis 5mg BID     Relevant clinical data and objective history reviewed:  89 y.o. male 177.8 cm (70\") 74.9 kg (165 lb 2 oz)   Body mass index is 23.69 kg/m².   Results from last 7 days   Lab Units 12/29/24  0548   PLATELETS 10*3/mm3 231     Estimated Creatinine Clearance: 65.8 mL/min (by C-G formula based on SCr of 0.79 mg/dL).    Assessment/Plan    Will start patient on  70mg (1mg/kg) subcutaneous every 12 hours, adjusted for renal function. Consult order will be discontinued but pharmacy will continue to follow.     Jaden Lorenzo [facility]  Clinical Pharmacist   "

## 2024-12-29 NOTE — PLAN OF CARE
Goal Outcome Evaluation:              Outcome Evaluation: Pt agreeable to PT treatment. Wife present in room. Requires max A x2 for bed mobility and STS from elevated bed with FWW. Anticipate SNF at [place].    Anticipated Discharge Disposition (PT): skilled nursing facility

## 2024-12-29 NOTE — PROGRESS NOTES
LOS: 12 days     Chief Complaint: LISY    Subjective     History of Present Illness  Casey Snowden Sr. is a 89 y.o. male with history of Parkinson's disease and dementia.  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        Subjective  Patient still encephalopathic  Has had some large volume bladder scans, but patient has been able to void as they have moved him around. PVR has been under 300.  DHT in place.     12/21 - underwent LP this am. No other acute events overnight. Continues to be confused. 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     12/23 no acute events. Seems to be doing a bit better today.    12/24 seems to have had a difficult night. Now being bathe.     12/25 no acute events. Overall, patient looks better today. Second IV access accomplished.     12/26: Oriented to person place, recognizes family  Dobbhoff tube remains, on tube feeds, no new complaints    12/27: Still confused today but his family states he was much more awake alert and conversant yesterday  Remains on tube feeds with free water, on amnio drip    12/28: No new complaints or events, did not pass a swallow study and remains on tube feeds, labs pending.  Family states he was not as alert yesterday    12/29: Seen and examined at bedside, family at bedside, discussed plan with family.  More awake, still confused, remains on tube feeds overnight    History taken from: family and chart    Objective     Vital Sign Min/Max for last 24 hours  Temp  Min: 97.3 °F (36.3 °C)  Max: 98.8 °F (37.1 °C)   BP  Min: 120/51  Max: 144/64   Pulse  Min: 62  Max: 64   Resp  Min: 18  Max: 18   SpO2  Min: 96 %  Max: 99 %   No data recorded   Weight  Min: 74.9 kg (165 lb 2 oz)  Max: 74.9 kg (165 lb 2 oz)     Flowsheet Rows      Flowsheet Row First Filed Value   Admission Height --   Admission Weight 66.7 kg (147 lb 0.8 oz) Documented at 12/18/2024 0500            No intake/output data  "recorded.  I/O last 3 completed shifts:  In: 1293 [Other:190; NG/GT:1103]  Out: 0     Objective:  Vital signs: (most recent): Blood pressure 131/58, pulse 62, temperature 98.8 °F (37.1 °C), temperature source Oral, resp. rate 18, height 177.8 cm (70\"), weight 74.9 kg (165 lb 2 oz), SpO2 99%.            Physical Examination: General appearance - ill appearing, frail, chronically ill  Mental status - more awake.    Nose - DHT in place   Chest -good air movement, scattered coarse breath sounds.  Heart - normal rate, regular rhythm, normal S1, S2, no murmurs, rubs, clicks or gallops  Abdomen - soft, nontender, nondistended, no masses or organomegaly  Neurological -no focal deficits  Extremities -trace/1+ edema      Results Review:     I reviewed the patient's new clinical results.    WBC WBC   Date Value Ref Range Status   12/29/2024 7.38 3.40 - 10.80 10*3/mm3 Final   12/28/2024 7.30 3.40 - 10.80 10*3/mm3 Final   12/27/2024 7.65 3.40 - 10.80 10*3/mm3 Final      HGB Hemoglobin   Date Value Ref Range Status   12/29/2024 10.3 (L) 13.0 - 17.7 g/dL Final   12/28/2024 10.2 (L) 13.0 - 17.7 g/dL Final   12/27/2024 10.4 (L) 13.0 - 17.7 g/dL Final      HCT Hematocrit   Date Value Ref Range Status   12/29/2024 30.7 (L) 37.5 - 51.0 % Final   12/28/2024 31.0 (L) 37.5 - 51.0 % Final   12/27/2024 30.9 (L) 37.5 - 51.0 % Final      Platlets No results found for: \"LABPLAT\"   MCV MCV   Date Value Ref Range Status   12/29/2024 97.5 (H) 79.0 - 97.0 fL Final   12/28/2024 99.7 (H) 79.0 - 97.0 fL Final   12/27/2024 96.6 79.0 - 97.0 fL Final          Sodium Sodium   Date Value Ref Range Status   12/29/2024 144 136 - 145 mmol/L Final   12/28/2024 142 136 - 145 mmol/L Final   12/27/2024 141 136 - 145 mmol/L Final      Potassium Potassium   Date Value Ref Range Status   12/29/2024 4.5 3.5 - 5.2 mmol/L Final   12/28/2024 4.5 3.5 - 5.2 mmol/L Final   12/27/2024 4.2 3.5 - 5.2 mmol/L Final   12/26/2024 4.5 3.5 - 5.2 mmol/L Final      Chloride Chloride " "  Date Value Ref Range Status   12/29/2024 115 (H) 98 - 107 mmol/L Final   12/28/2024 110 (H) 98 - 107 mmol/L Final   12/27/2024 109 (H) 98 - 107 mmol/L Final      CO2 CO2   Date Value Ref Range Status   12/29/2024 23.0 22.0 - 29.0 mmol/L Final   12/28/2024 24.0 22.0 - 29.0 mmol/L Final   12/27/2024 21.5 (L) 22.0 - 29.0 mmol/L Final      BUN BUN   Date Value Ref Range Status   12/29/2024 41 (H) 8 - 23 mg/dL Final   12/28/2024 44 (H) 8 - 23 mg/dL Final   12/27/2024 49 (H) 8 - 23 mg/dL Final      Creatinine Creatinine   Date Value Ref Range Status   12/29/2024 0.79 0.76 - 1.27 mg/dL Final   12/28/2024 0.94 0.76 - 1.27 mg/dL Final   12/27/2024 0.99 0.76 - 1.27 mg/dL Final      Calcium Calcium   Date Value Ref Range Status   12/29/2024 7.8 (L) 8.2 - 9.6 mg/dL Final   12/28/2024 7.6 (L) 8.2 - 9.6 mg/dL Final   12/27/2024 7.6 (L) 8.2 - 9.6 mg/dL Final      PO4 No results found for: \"CAPO4\"   Albumin Albumin   Date Value Ref Range Status   12/29/2024 2.5 (L) 3.5 - 5.2 g/dL Final   12/28/2024 2.5 (L) 3.5 - 5.2 g/dL Final   12/27/2024 2.3 (L) 3.5 - 5.2 g/dL Final        Magnesium Magnesium   Date Value Ref Range Status   12/28/2024 2.2 1.6 - 2.4 mg/dL Final   12/27/2024 1.5 (L) 1.6 - 2.4 mg/dL Final        Uric Acid No results found for: \"URICACID\"     Medication Review:   Current medications reviewed, orders reviewed and updated    Assessment & Plan     Assessment & Plan  LISY, Prerenal and improved  Metabolic acidosis, stabilizing  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  Will discontinue Diovan    Rate control per cardiology: Now on oral amiodarone  Monitor renal function closely on IV acyclovir    Okay to use electrolyte " replacement protocol with stabilizing GFR  Discussed with family at bedside        Joe Lyn MD  12/29/24  11:17 EST

## 2024-12-29 NOTE — THERAPY TREATMENT NOTE
Patient Name: Casey Snowden .  : 1934    MRN: [id number]                              Today's Date: 2024       Admit Date: 2024    Visit Dx:     ICD-10-CM ICD-9-CM   1. Unable to ambulate  R26.2 719.7   2. Herpes zoster with complication: S2 and S3 distribution on the right  B02.8 053.8   3. Parkinson's disease, unspecified whether dyskinesia present, unspecified whether manifestations fluctuate  G20.A1 332.0     Patient Active Problem List   Diagnosis    Benign essential hypertension    Stenosis of carotid artery    Mixed hyperlipidemia    Parkinson's disease    Peripheral arterial occlusive disease    Screening for prostate cancer    Medication monitoring encounter    Melanoma    Chronic deep vein thrombosis (DVT) of popliteal vein of right lower extremity    Uses hearing aid    Paroxysmal A-fib    Chronic diastolic (congestive) heart failure    Anticoagulant long-term use    Presence of IVC filter    Medicare annual wellness visit, subsequent    Iron deficiency anemia secondary to inadequate dietary iron intake    Orthostatic hypotension due to Parkinson's disease    Coronary artery disease involving native coronary artery of native heart without angina pectoris    ST elevation myocardial infarction involving left anterior descending (LAD) coronary artery    Sialorrhea    Hypertensive heart disease with heart failure    PAD (peripheral artery disease)    Metastatic squamous cell carcinoma    Secondary malignancy of lymph nodes of head, face and neck    Encounter for antineoplastic immunotherapy    RBD (REM behavioral disorder)    Acquired hypothyroidism    Aspiration pneumonia of right lung due to vomit    Dysphagia, neurologic    ICAO (internal carotid artery occlusion), right    Arterial ischemic stroke, ICA (internal carotid artery), right, chroni    Renal mass, left    Abdominal aortic aneurysm    Disorientation    Falls frequently    Right inguinal hernia    Unable to ambulate     Herpes zoster without complication    LISY (acute kidney injury)    Moderate protein-calorie malnutrition     Past Medical History:   Diagnosis Date    Abdominal aortic aneurysm without rupture 10/14/2021    Acquired hypothyroidism 08/31/2022    Acute ischemic stroke 10/08/2023    Aneurysm     Arrhythmia     Atrial fibrillation     Cancer 04/2018    basil cell carcinoma    Carotid stenosis 10/29/2015    CHF (congestive heart failure)     Chronic deep vein thrombosis (DVT) of popliteal vein of right lower extremity     Clotting disorder     Coronary artery disease involving native coronary artery of native heart without angina pectoris 12/20/2018    DVT of lower extremity (deep venous thrombosis)     Hard of hearing     Hyperlipidemia     Hypertension     Localized edema 01/04/2017    Long-term use of high-risk medication     Metabolic encephalopathy 02/07/2023    Myocardial infarction 2918    LAD stent    PAD (peripheral artery disease) 09/10/2020    Parkinson's disease     Postphlebitic syndrome     Pure hypercholesterolemia     Skin tear of upper arm without complication, left, subsequent encounter     Stroke 11/02/2023     Past Surgical History:   Procedure Laterality Date    BASAL CELL CARCINOMA EXCISION  04/2018    CARDIAC CATHETERIZATION N/A 11/05/2018    Procedure: Left Heart Cath;  Surgeon: Oliverio Azar MD;  Location:  [facility] C[facility] INVASIVE LOCATION;  Service: Cardiovascular    CARDIAC CATHETERIZATION N/A 11/05/2018    Procedure: Coronary angiography;  Surgeon: Oliverio Azar MD;  Location: [facility] CATH INVASIVE LOC[facility];  Service: Cardiovascular    CARDIAC CATHETERIZATION N/A 11/05/2018    Procedure: Left ventriculography;  Surgeon: Oliverio Azar MD;  Location:  [facility] CATH INVASIVE LOCATION;  Service: Cardiovascular    CARDIAC CATHETERIZATION N/A 06/04/2019    Procedure: Left Heart Cath;  Surgeon: Johnny Glasgow MD;  Location: [facility] C[facility];  Service: Cardiovascular    CARDIAC  CATHETERIZATION N/A 06/04/2019    Procedure: Coronary angiography;  Surgeon: Johnny Glasgow MD;  Location: [facility] CATH INVASIVE LOCATION;  Service: Cardiovascular    CARDIAC CATHETERIZATION N/A 06/04/2019    Procedure: Left ventriculography;  Surgeon: Johnny Glasgow MD;  Location: [facility] CATH INVASIVE LOCATION;  Service: Cardiovascular    CARDIAC CATHETERIZATION N/A 06/04/2019    Procedure: Stent BILL coronary;  Surgeon: Johnny Glasgow MD;  Location: [facility] CATH INVASIVE LOCATION;  Service: Cardiovascular    CARDIAC SURGERY      CAROTID ARTERY ANGIOPLASTY Right 10/11/2023    with stent    CAROTID STENT      CATARACT EXTRACTION Left 08/2018    CATARACT EXTRACTION Right 09/2018    COLONOSCOPY      2011    CORONARY STENT PLACEMENT      INCISION AND DRAINAGE LEG Right 07/07/2017    Procedure: INCISION AND DRAINAGE INFECTED HEMATOMA RIGHT THIGH;  Surgeon: Valentin Peña MD;  Location: [facility] OR;  Service:     JOINT REPLACEMENT      KNEE INCISION AND DRAINAGE Right 12/27/2016    Procedure: KNEE INCISION AND DRAINAGE;  Surgeon: Triston Whitten MD;  Location: [facility] OR;  Service:     NOSE SURGERY      nose replacement    SINUS SURGERY  1960    SKIN BIOPSY      SKIN GRAFT  12/2017    TOTAL KNEE ARTHROPLASTY Right     VASCULAR SURGERY      VENA CAVA FILTER PLACEMENT        General Information       Row Name 12/29/24 1228          Physical Therapy Time and Intention    Document Type therapy note (daily note)  -CC     Mode of Treatment individual therapy;physical therapy  -       Row Name 12/29/24 1228          General Information    Patient Profile Reviewed yes  -CC     Existing Precautions/Restrictions fall  -CC       Row Name 12/29/24 1228          Living Environment    People in Home spouse  -CC       Row Name 12/29/24 1228          Cognition    Orientation Status (Cognition) oriented to;person  -CC       Row Name 12/29/24 1228          Safety Issues/Impairments Affecting  Functional Mobility    Impairments Affecting Function (Mobility) balance;cognition;coordination;endurance/activity tolerance  -CC               User Key  (r) = Recorded By, (t) = Taken By, (c) = Cosigned By      Initials Name Provider Type    Ginger Altamirano PT Physical Therapist                   Mobility       Row Name 12/29/24 1230          Bed Mobility    Supine-Sit [place] (Bed Mobility) 2 person assist;maximum assist (25% patient effort);verbal cues;nonverbal cues (demo/gesture)  -CC     Sit-Supine [place] (Bed Mobility) 2 person assist;maximum assist (25% patient effort);verbal cues;nonverbal cues (demo/gesture)  -CC     Assistive Device (Bed Mobility) head of bed elevated;repositioning sheet  -CC       Row Name 12/29/24 1230          Bed-Chair Transfer    Bed-Chair [place] (Transfers) not tested  -CC       Row Name 12/29/24 1230          Sit-Stand Transfer    Sit-Stand [place] (Transfers) 2 person assist;maximum assist (25% patient effort);verbal cues;nonverbal cues (demo/gesture)  -CC     Assistive Device (Sit-Stand Transfers) walker, front-wheeled  -CC     Comment, (Sit-Stand Transfer) x2 trials from elevated bed. Forward flexed posture, unable to achieve full standing despite cues.  -CC               User Key  (r) = Recorded By, (t) = Taken By, (c) = Cosigned By      Initials Name Provider Type    Ginger Altamirano PT Physical Therapist                   Obj/Interventions    No documentation.                  Goals/Plan    No documentation.                  Clinical Impression       Row Name 12/29/24 1231          Plan of Care Review    Outcome Evaluation Pt agreeable to PT treatment. Wife present in room. Requires max A x2 for bed mobility and STS from elevated bed with FWW. Anticipate SNF at [place].  -CC       Row Name 12/29/24 1231          Positioning and Restraints    Pre-Treatment Position in bed  -CC     Post Treatment Position bed  -CC     In Bed supine;call light  within reach;encouraged to call for assist;exit alarm on;with family/caregiver;with other staff  -               User Key  (r) = Recorded By, (t) = Taken By, (c) = Cosigned By      Initials Name Provider Type    Ginger Altamirano PT Physical Therapist                   Outcome Measures       Row Name 12/29/24 1233          How much help from another person do you currently need...    Turning from your back to your side while in flat bed without using bedrails? 2  -CC     Moving from lying on back to sitting on the side of a flat bed without bedrails? 2  -CC     Moving to and from a bed to a chair (including a wheelchair)? 1  -CC     Standing up from a chair using your arms (e.g., wheelchair, bedside chair)? 2  -CC     Climbing 3-5 steps with a railing? 1  -CC     To walk in hospital room? 1  -CC     AM-PAC 6 Clicks Score (PT) 9  -CC     Highest Level of Mobility Goal 3 --> Sit at edge of bed  -       Row Name 12/29/24 1233          Functional Assessment    Outcome Measure Options AM-PAC 6 Clicks Basic Mobility (PT)  -               User Key  (r) = Recorded By, (t) = Taken By, (c) = Cosigned By      Initials Name Provider Type    Ginger Altamirano PT Physical Therapist                                 Physical Therapy Education       Title: PT OT SLP Therapies (In Progress)       Topic: Physical Therapy (In Progress)       Point: Mobility training (In Progress)       Learning Progress Summary            Patient Acceptance, E,D, NR by  at 12/26/2024 1641    Acceptance, E,TB,D, NL,NR by  at 12/23/2024 1556    Acceptance, E, VU,NR by  at 12/18/2024 1606   Family Acceptance, E,D, NR by  at 12/26/2024 1641                      Point: Home exercise program (In Progress)       Learning Progress Summary            Patient Acceptance, E,D, NR by  at 12/26/2024 1641    Acceptance, E,TB,D, NL,NR by  at 12/23/2024 1556    Acceptance, E, VU,NR by  at 12/18/2024 1606   Family Acceptance, E,D, NR by   at 12/26/2024 1641                      Point: Body mechanics (In Progress)       Learning Progress Summary            Patient Acceptance, E,D, NR by  at 12/26/2024 1641    Acceptance, E,TB,D, NL,NR by  at 12/23/2024 1556    Acceptance, E, VU,NR by  at 12/18/2024 1606   Family Acceptance, E,D, NR by  at 12/26/2024 1641                      Point: Precautions (In Progress)       Learning Progress Summary            Patient Acceptance, E,D, NR by  at 12/26/2024 1641    Acceptance, E,TB,D, NL,NR by  at 12/23/2024 1556    Acceptance, E, VU,NR by  at 12/18/2024 1606   Family Acceptance, E,D, NR by  at 12/26/2024 1641                                      User Key       Initials Effective Dates Name Provider Type Discipline     06/16/21 -  Alexandra Lucio, PT Physical Therapist PT     03/07/18 -  Tia Encarnacion PTA Physical Therapist Assistant PT     04/08/22 -  Debbie Connolly, PT Physical Therapist PT                  PT Recommendation and Plan     Outcome Evaluation: Pt agreeable to PT treatment. Wife present in room. Requires max A x2 for bed mobility and STS from elevated bed with FWW. Anticipate SNF at [place].     Time Calculation:         PT Charges       Row Name 12/29/24 1235             Time Calculation    Start Time 1146  -CC      Stop Time 1205  -CC      Time Calculation (min) 19 min  -CC      PT Received On 12/29/24  -CC      PT - Next Appointment 12/30/24  -CC         Time Calculation- PT    Total Timed Code Minutes- PT 19 minute(s)  -CC         Timed Charges    19756 - PT Therapeutic Activity Minutes 19  -CC         Total Minutes    Timed Charges Total Minutes 19  -CC       Total Minutes 19  -CC                User Key  (r) = Recorded By, (t) = Taken By, (c) = Cosigned By      Initials Name Provider Type    Ginger Altamirano, PT Physical Therapist                  Therapy Charges for Today       Code Description Service Date Service Provider Modifiers Qty    [id number]  PT  THERAPEUTIC ACT EA 15 MIN 12/29/2024 Ginger Mccullough, PT GP 1            PT G-Codes  Outcome Measure Options: AM-PAC 6 Clicks Basic Mobility (PT)  AM-PAC 6 Clicks Score (PT): 9  AM-PAC 6 Clicks Score (OT): 9  PT Discharge Summary  Anticipated Discharge Disposition (PT): skilled nursing facility    Ginger Mccullough, FANY  12/29/2024

## 2024-12-29 NOTE — PROGRESS NOTES
Patient Name: Casey Snowden .  Age/Sex: 89 y.o. male  : 1934  MRN: [id number]    Date of Admission: 2024  Date of Encounter Visit: 24  Encounter Provider: Aleksander Norris MD   Place of Service: [facility]    Admit Diagnosis: Unable to ambulate [R26.2]  Herpes zoster with complication [B02.8]  Parkinson's disease, unspecified whether dyskinesia present, unspecified whether manifestations fluctuate [G20.A1]    Subjective:     No acute events overnight.  No acute complaints.    Initially admitted for 2024 for concerns of rash.  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    Patient was also found to have A-fib with RVR.  However he is now in sinus rhythm.    Current Medications:    Current Facility-Administered Medications:     acetaminophen (TYLENOL) tablet 650 mg, 650 mg, Nasogastric, Q4H PRN **OR** acetaminophen (TYLENOL) 160 MG/5ML oral solution 650 mg, 650 mg, Nasogastric, Q4H PRN, 650 mg at 248 **OR** acetaminophen (TYLENOL) suppository 650 mg, 650 mg, Rectal, Q4H PRN, Chilo Tyson MD    acyclovir (ZOVIRAX) 700 mg in sodium chloride 0.9 % 250 mL IVPB, 10 mg/kg, Intravenous, Q8H, Delroy Reed MD, 700 mg at 24 09    amiodarone (PACERONE) tablet 200 mg, 200 mg, Oral, Q12H, Anila Alejo APRN, 200 mg at 24 0941    amLODIPine (NORVASC) tablet 5 mg, 5 mg, Oral, Daily, Leidy Tyson APRN, 5 mg at 24 0941    apixaban (ELIQUIS) tablet 5 mg, 5 mg, Oral, Q12H, Ford Salazar MD, 5 mg at 24 09    atorvastatin (LIPITOR) tablet 40 mg, 40 mg, Nasogastric, Nightly, Chilo Tyson MD, 40 mg at 24    atropine 1 % ophthalmic solution 1 drop, 1 drop, Both Eyes, TID PRN, Inessa Dumont MD    carbidopa-levodopa (SINEMET)  MG per tablet 1 tablet, 1 tablet, Nasogastric, TID, Chilo Tyson MD, 1 tablet at 24 0940    dextrose (D50W) (25 g/50 mL)  IV injection 25 g, 25 g, Intravenous, Q15 Min PRN, Chilo Tyson MD    dextrose (GLUTOSE) oral gel 15 g, 15 g, Oral, Q15 Min PRN, Chilo Tyson MD    finasteride (PROSCAR) tablet 5 mg, 5 mg, Nasogastric, Daily, Chilo Tyson MD, 5 mg at 12/29/24 0941    glucagon (GLUCAGEN) injection 1 mg, 1 mg, Intramuscular, Q15 Min PRN, Chilo Tyson MD    guaiFENesin (MUCINEX) 12 hr tablet 600 mg, 600 mg, Oral, Q12H, Chilo Tyson MD, 600 mg at 12/29/24 0940    insulin regular (humuLIN R,novoLIN R) injection 2-7 Units, 2-7 Units, Subcutaneous, Q6H, Chilo Tyson MD, 2 Units at 12/26/24 0619    levothyroxine (SYNTHROID, LEVOTHROID) tablet 75 mcg, 75 mcg, Nasogastric, Daily, Chilo Tyson MD, 75 mcg at 12/29/24 0611    Magnesium Standard Dose Replacement - Follow Nurse / BPA Driven Protocol, , Not Applicable, PRN, Joe Lyn MD    meropenem (MERREM) 1,000 mg in sodium chloride 0.9 % 100 mL MBP, 1,000 mg, Intravenous, Q8H, Delroy Reed MD, 1,000 mg at 12/29/24 0941    metoprolol tartrate (LOPRESSOR) injection 5 mg, 5 mg, Intravenous, Q6H PRN, Hernán Saldivar MD, 5 mg at 12/26/24 0930    ondansetron (ZOFRAN) injection 4 mg, 4 mg, Intravenous, Q6H PRN, Inessa Dumont MD    [Held by provider] oxybutynin XL (DITROPAN-XL) 24 hr tablet 10 mg, 10 mg, Oral, Daily, Inessa Dumont MD, 10 mg at 12/18/24 0901    Pharmacy to Dose acyclovir (ZOVIRAX), , Not Applicable, Continuous PRN, Delroy Reed MD    Potassium Replacement - Follow Nurse / BPA Driven Protocol, , Not Applicable, PRN, Joe Lyn MD    [COMPLETED] Insert Peripheral IV, , , Once **AND** sodium chloride 0.9 % flush 10 mL, 10 mL, Intravenous, PRN, Ian Dao MD    sodium chloride 0.9 % flush 10 mL, 10 mL, Intravenous, Q12H, TimInessa MD, 10 mL at 12/28/24 2100    sodium chloride 0.9 % flush 10 mL, 10 mL, Intravenous, PRN, Inessa Dumont MD    sodium chloride 0.9 % infusion 40 mL, 40 mL, Intravenous, PRN, Tim, Jawobed,  MD    terazosin (HYTRIN) capsule 1 mg, 1 mg, Nasogastric, Nightly, Chilo Tyson MD, 1 mg at 12/26/24 2033    vitamin B-12 (CYANOCOBALAMIN) tablet 1,000 mcg, 1,000 mcg, Nasogastric, Q AM, Chilo Tyson MD, 1,000 mcg at 12/29/24 0612    Prior to Admission Medications:  Medications Prior to Admission   Medication Sig Dispense Refill Last Dose/Taking    apixaban (Eliquis) 5 MG tablet tablet TAKE 1 TABLET BY MOUTH TIMES A DAY. INDICATIONS: DVT/PE (ACTIVE THROMBOSIS), PREVENTION OF UNWANTED CLOT IN VEINS (Patient taking differently: Take 1 tablet by mouth Every 12 (Twelve) Hours.) 180 tablet 3 Taking Differently    aspirin 81 MG EC tablet Take 1 tablet by mouth Daily.   Taking    atorvastatin (LIPITOR) 40 MG tablet Take 1 tablet by mouth Every Night. Indications: High Amount of Fats in the Blood 90 tablet 3 Taking    atropine 1 % ophthalmic solution 1-2 drops 3 (Three) Times a Day As Needed (as needed orally for increased secretions). Orally   Indications: excessive drooling   Taking As Needed    carbidopa-levodopa (SINEMET)  MG per tablet Take 1 tablet by mouth 3 (Three) Times a Day. Indications: Parkinson's Disease   Taking    Cyanocobalamin (Vitamin B 12) 500 MCG tablet Take 2 tablets by mouth Every Morning. Indications: Inadequate Vitamin B12   Taking    finasteride (PROSCAR) 5 MG tablet Take 1 tablet by mouth Daily.   Taking    levothyroxine (SYNTHROID, LEVOTHROID) 75 MCG tablet TAKE 1 TABLET BY MOUTH EVERY  MORNING FOR UNDERACTIVE THYROID (Patient taking differently: Take 1 tablet by mouth Daily.) 90 tablet 3 Taking Differently    Multiple Vitamins-Minerals (PRESERVISION AREDS) capsule Take 1 tablet by mouth 2 (Two) Times a Day. Indications: supplement   Taking    tamsulosin (FLOMAX) 0.4 MG capsule 24 hr capsule Take 1 capsule by mouth Daily.   Taking    valsartan (DIOVAN) 40 MG tablet TAKE 1 TABLET BY MOUTH DAILY. IN THE EVENING. INDICATIONS: HIGH BLOOD PRESSURE DISORDER (Patient taking differently: Take 1  tablet by mouth Daily. Indications: High Blood Pressure) 90 tablet 3 Taking Differently    Vibegron (Gemtesa) 75 MG tablet Take 1 tablet by mouth Daily.   Taking       Past Medical History     Past Medical History:   Diagnosis Date    Abdominal aortic aneurysm without rupture 10/14/2021    Acquired hypothyroidism 08/31/2022    Acute ischemic stroke 10/08/2023    Aneurysm     Arrhythmia     Atrial fibrillation     Cancer 04/2018    basil cell carcinoma    Carotid stenosis 10/29/2015    CHF (congestive heart failure)     Chronic deep vein thrombosis (DVT) of popliteal vein of right lower extremity     Clotting disorder     Coronary artery disease involving native coronary artery of native heart without angina pectoris 12/20/2018    DVT of lower extremity (deep venous thrombosis)     Hard of hearing     Hyperlipidemia     Hypertension     Localized edema 01/04/2017    Long-term use of high-risk medication     Metabolic encephalopathy 02/07/2023    Myocardial infarction 2918    LAD stent    PAD (peripheral artery disease) 09/10/2020    Parkinson's disease     Postphlebitic syndrome     Pure hypercholesterolemia     Skin tear of upper arm without complication, left, subsequent encounter     Stroke 11/02/2023     Past Surgical History:   Procedure Laterality Date    BASAL CELL CARCINOMA EXCISION  04/2018    CARDIAC CATHETERIZATION N/A 11/05/2018    Procedure: Left Heart Cath;  Surgeon: Oliverio Azar MD;  Location:  [facility];  Service: Cardiovascular    CARDIAC CATHETERIZATION N/A 11/05/2018    Procedure: Coronary angiography;  Surgeon: Oliverio Azar MD;  Location:  [facility];  Service: Cardiovascular    CARDIAC CATHETERIZATION N/A 11/05/2018    Procedure: Left ventriculography;  Surgeon: Oliverio Azar MD;  Location:  [facility]ATION;  Service: Cardiovascular    CARDIAC CATHETERIZATION N/A 06/04/2019    Procedure: Left Heart Cath;  Surgeon: Johnny Glasgow  MD;  Location: [facility] CATH INVASIVE LOCATION;  Service: Cardiovascular    CARDIAC CATHETERIZATION N/A 06/04/2019    Procedure: Coronary angiography;  Surgeon: Johnny Glasgow MD;  Location: [facility] CATH INVASIVE LOCATION;  Service: Cardiovascular    CARDIAC CATHETERIZATION N/A 06/04/2019    Procedure: Left ventriculography;  Surgeon: Johnny Glasgow MD;  Location: [facility] CATH INVASIVE LOCATION;  Service: Cardiovascular    CARDIAC CATHETERIZATION N/A 06/04/2019    Procedure: Stent BILL coronary;  Surgeon: Johnny Glasgow MD;  Location: [facility] CATH INVASIVE LOCATION;  Service: Cardiovascular    CARDIAC SURGERY      CAROTID ARTERY ANGIOPLASTY Right 10/11/2023    with stent    CAROTID STENT      CATARACT EXTRACTION Left 08/2018    CATARACT EXTRACTION Right 09/2018    COLONOSCOPY      2011    CORONARY STENT PLACEMENT      INCISION AND DRAINAGE LEG Right 07/07/2017    Procedure: INCISION AND DRAINAGE INFECTED HEMATOMA RIGHT THIGH;  Surgeon: Valentin Peña MD;  Location: [facility] MAIN OR;  Service:     JOINT REPLACEMENT      KNEE INCISION AND DRAINAGE Right 12/27/2016    Procedure: KNEE INCISION AND DRAINAGE;  Surgeon: Triston Whitten MD;  Location: [facility] MAIN OR;  Service:     NOSE SURGERY      nose replacement    SINUS SURGERY  1960    SKIN BIOPSY      SKIN GRAFT  12/2017    TOTAL KNEE ARTHROPLASTY Right     VASCULAR SURGERY      VENA CAVA FILTER PLACEMENT         Family History     Family History   Problem Relation Age of Onset    Heart attack Mother 74    Hypertension Mother     Cancer Sister     Stroke Brother     Cancer Brother     Cancer Brother     Deep vein thrombosis Neg Hx          Social History     Social History     Socioeconomic History    Marital status:     Years of education: college grad   Tobacco Use    Smoking status: Never     Passive exposure: Never    Smokeless tobacco: Never    Tobacco comments:     caff use   Vaping Use    Vaping status: Never Used   Substance and  Sexual Activity    Alcohol use: No     Comment: none for a month    Drug use: No    Sexual activity: Not Currently     Partners: Female       Allergies:  Allergies   Allergen Reactions    Penicillins Rash    Rocephin [Ceftriaxone] Rash            Objective:     Objective:  Temp:  [97.3 °F (36.3 °C)-98.8 °F (37.1 °C)] 98.8 °F (37.1 °C)  Heart Rate:  [62-64] 62  Resp:  [18] 18  BP: (120-144)/(51-64) 131/58    Intake/Output Summary (Last 24 hours) at 12/29/2024 1012  Last data filed at 12/29/2024 0000  Gross per 24 hour   Intake 1233 ml   Output 0 ml   Net 1233 ml     Body mass index is 23.69 kg/m².      12/27/24  0403 12/28/24  0408 12/29/24  0519   Weight: 75.5 kg (166 lb 7.2 oz) 74.2 kg (163 lb 9.3 oz) 74.9 kg (165 lb 2 oz)                 Physical Exam:   Gen: Well nourished; Alert  Skin: no visible rashes and no abnormalities with palpation  Eyes: no evidence of visual defects and normal sclera  Ears: no abnormalities.  Mouth: normal teeth and appropriately moist mucous membranes  Chest: clear to auscultation. There is normal respiratory effort and expansion.  CV: examination showed a regular rhythm. S1 and S2 were normal.   Abd: no visible distension.   Neurologic:Cranial nerves appear intact; Sensation normal; no localizing signs    Lab Review:     Results from last 7 days   Lab Units 12/29/24  0548 12/28/24  1359 12/27/24  0518   SODIUM mmol/L 144 142 141   POTASSIUM mmol/L 4.5 4.5 4.2   CHLORIDE mmol/L 115* 110* 109*   CO2 mmol/L 23.0 24.0 21.5*   BUN mg/dL 41* 44* 49*   CREATININE mg/dL 0.79 0.94 0.99   GLUCOSE mg/dL 135* 122* 147*   CALCIUM mg/dL 7.8* 7.6* 7.6*           Results from last 7 days   Lab Units 12/29/24  0548   WBC 10*3/mm3 7.38   HEMOGLOBIN g/dL 10.3*   HEMATOCRIT % 30.7*   PLATELETS 10*3/mm3 231             Results from last 7 days   Lab Units 12/28/24  1359   MAGNESIUM mg/dL 2.2                       Imaging:    chest X-ray    Results for orders placed during the hospital encounter of  10/09/23    Adult Transthoracic Echo Complete w/ Color, Spectral and Contrast if Necessary Per Protocol    Interpretation Summary    Left ventricular systolic function is mildly decreased. Calculated left ventricular EF = 44.7%    Left ventricular wall thickness is consistent with mild to moderate concentric hypertrophy.    The following left ventricular wall segments are hypokinetic: apical anterior and mid anteroseptal. The following left ventricular wall segments are akinetic: apical inferior, apical septal and apex.    Left ventricular diastolic function is consistent with (grade Ia w/high LAP) impaired relaxation.    There is calcification of the aortic valve.    Estimated right ventricular systolic pressure from tricuspid regurgitation is normal (<35 mmHg).        I personally viewed and interpreted the patient's EKG/Telemetry data.    Assessment/ Plan:     Unable to ambulate    Benign essential hypertension    Parkinson's disease    Chronic deep vein thrombosis (DVT) of popliteal vein of right lower extremity    Uses hearing aid    Paroxysmal A-fib    Chronic diastolic (congestive) heart failure    Presence of IVC filter    Orthostatic hypotension due to Parkinson's disease    PAD (peripheral artery disease)    Secondary malignancy of lymph nodes of head, face and neck    Acquired hypothyroidism    Falls frequently    Herpes zoster without complication    LISY (acute kidney injury)    Moderate protein-calorie malnutrition      # A-fib, paroxysmal.  - This was likely exacerbated by his acute illness.  - We will continue amiodarone 200 twice daily.  Transition to 200 daily at discharge.  - Continue apixaban 5 mg twice daily.  - Blood pressure is borderline for adding beta-blockers at this time.  Would hold off unless he has a repeat episode of RVR.    # CAD, s/p LAD stent 2019.  # Ischemic heart failure.  Not in heart failure at this time.  - Continue atorvastatin  - Continue antihypertensives.      Aleksander Norris,  MD [facility]  12/29/24  10:12 EST

## 2024-12-29 NOTE — PLAN OF CARE
Goal Outcome Evaluation:    Patient alert to self only, voided multiple times but to BM overnight. Turned every 2 hours and airborne isolation and safety precautions maintained.

## 2024-12-29 NOTE — PROGRESS NOTES
Name: Casey Snowden . ADMIT: 2024   : 1934  PCP: Aleksander Diez MD    MRN: [id number] LOS: 12 days   AGE/SEX: 89 y.o. male  ROOM: [facility]     Subjective   Subjective     No significant changes yesterday.  Apparently he had friends visiting yesterday and he was much more interactive and animated.     Objective   Objective   Vital Signs  Temp:  [97.3 °F (36.3 °C)-98.8 °F (37.1 °C)] 98.8 °F (37.1 °C)  Heart Rate:  [62-64] 62  Resp:  [18] 18  BP: (120-144)/(51-64) 131/58  SpO2:  [96 %-99 %] 99 %  on   ;   Device (Oxygen Therapy): room air  Body mass index is 23.69 kg/m².  Physical Exam  Constitutional:       General: He is not in acute distress.  HENT:      Head:      Comments: NGT  Eyes:      Extraocular Movements: Extraocular movements intact.      Pupils: Pupils are equal, round, and reactive to light.   Cardiovascular:      Rate and Rhythm: Normal rate and regular rhythm.      Heart sounds: No murmur heard.  Pulmonary:      Effort: No respiratory distress.   Abdominal:      General: There is no distension.      Palpations: Abdomen is soft.      Tenderness: There is no abdominal tenderness.   Musculoskeletal:      Right lower leg: No edema.      Left lower leg: No edema.   Skin:     General: Skin is warm and dry.      Findings: No rash.   Neurological:      Mental Status: He is alert.       Results Review     I reviewed the patient's new clinical results.  Results from last 7 days   Lab Units 24  0548 24  1359 24  0518 24  0646   WBC 10*3/mm3 7.38 7.30 7.65 6.39   HEMOGLOBIN g/dL 10.3* 10.2* 10.4* 9.9*   PLATELETS 10*3/mm3 231 218 189 168     Results from last 7 days   Lab Units 24  0548 24  1359 24  0518 24  1745 24  0646   SODIUM mmol/L 144 142 141  --  140   POTASSIUM mmol/L 4.5 4.5 4.2 4.5 3.2*   CHLORIDE mmol/L 115* 110* 109*  --  107   CO2 mmol/L 23.0 24.0 21.5*  --  21.0*   BUN mg/dL 41* 44* 49*  --  48*   CREATININE mg/dL 0.79  0.94 0.99  --  0.98   GLUCOSE mg/dL 135* 122* 147*  --  174*   EGFR mL/min/1.73 84.4 77.0 72.4  --  73.3     Results from last 7 days   Lab Units 12/29/24  0548 12/28/24  1359 12/27/24  0518   ALBUMIN g/dL 2.5* 2.5* 2.3*   BILIRUBIN mg/dL  --   --  0.2   ALK PHOS U/L  --   --  70   AST (SGOT) U/L  --   --  87*   ALT (SGPT) U/L  --   --  33       Results from last 7 days   Lab Units 12/29/24  0548 12/28/24  1359 12/27/24  0518 12/26/24  0646   CALCIUM mg/dL 7.8* 7.6* 7.6* 7.4*   ALBUMIN g/dL 2.5* 2.5* 2.3*  --    MAGNESIUM mg/dL  --  2.2 1.5*  --    PHOSPHORUS mg/dL 2.3* 2.8 3.5  --      Results from last 7 days   Lab Units 12/26/24  0646   PROCALCITONIN ng/mL 0.61*     Glucose   Date/Time Value Ref Range Status   12/29/2024 1037 144 (H) 70 - 130 mg/dL Final   12/29/2024 0546 136 (H) 70 - 130 mg/dL Final   12/28/2024 2328 118 70 - 130 mg/dL Final   12/28/2024 1636 127 70 - 130 mg/dL Final   12/28/2024 1112 137 (H) 70 - 130 mg/dL Final   12/28/2024 0540 133 (H) 70 - 130 mg/dL Final   12/27/2024 2324 131 (H) 70 - 130 mg/dL Final       No radiology results for the last day    I have personally reviewed all medications:  Scheduled Medications  acyclovir, 10 mg/kg, Intravenous, Q8H  amiodarone, 200 mg, Oral, Q12H  amLODIPine, 5 mg, Oral, Daily  apixaban, 5 mg, Oral, Q12H  atorvastatin, 40 mg, Nasogastric, Nightly  carbidopa-levodopa, 1 tablet, Nasogastric, TID  finasteride, 5 mg, Nasogastric, Daily  guaiFENesin, 600 mg, Oral, Q12H  insulin regular, 2-7 Units, Subcutaneous, Q6H  levothyroxine, 75 mcg, Nasogastric, Daily  [Held by provider] oxybutynin XL, 10 mg, Oral, Daily  sodium chloride, 10 mL, Intravenous, Q12H  terazosin, 1 mg, Nasogastric, Nightly  vitamin B-12, 1,000 mcg, Nasogastric, Q AM    Infusions  dextrose, 100 mL/hr  Pharmacy to Dose acyclovir (ZOVIRAX),     Diet  NPO Diet NPO Type: Tube Feeding    I have personally reviewed:  [x]  Laboratory   []  Microbiology   [x]  Radiology   []  EKG/Telemetry  []   Cardiology/Vascular   []  Pathology    []  Records       Assessment/Plan     Active Hospital Problems    Diagnosis  POA    **Unable to ambulate [R26.2]  Yes    Moderate protein-calorie malnutrition [E44.0]  Yes    LISY (acute kidney injury) [N17.9]  Yes    Herpes zoster without complication [B02.9]  Unknown    Falls frequently [R29.6]  Not Applicable    Acquired hypothyroidism [E03.9]  Yes    Secondary malignancy of lymph nodes of head, 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       89 y.o. male admitted with generalized weakness, confusion and found to have RLE rash consistent with shingles.    Cough/congestion: Checked chest x-ray which shows some new infiltrates.  On meropenem for 5 day course ending on 12/29    VZV encephalitis.  Status post LP  -Continue acyclovir x 14 days.  Renally dosed as appropriate  - MRI negative for acute change including any evidence of encephalitis  - Neurology signed off.  -Speech following regarding appropriateness for any diet trials.  Continue feeding via core track for now but will have to address PEG if he does not start to improve mentally.   -Failed swallow evaluation on 12/28; will keep NGT over the weekend, hopefully repeat on Monday. If he fails again patient and family want PEG. Continue Tfs today    LISY/hypernatremia: Improving.   - Continue to hold Diovan and Ditropan  - Monitor urine output    Atrial fibrillation with RVR  Continue Eliquis, no  need for aspirin per cardiology.  No BB due to previous bradycardia. Spot dosed IV metoprolol for RVR. Transitioned from amiodarone ggt to PO.     Parkinson disease: Continue Sinemet.     DVT prophy: Eliquis.  Hold today and transition to lovenox in case he needs a PEG   Disposition: Plan TBD.        Hernán Saldivar MD  [facility]  12/29/24  12:47 EST

## 2024-12-30 LAB
ALBUMIN SERPL-MCNC: 2.3 G/DL (ref 3.5–5.2)
ANION GAP SERPL CALCULATED.3IONS-SCNC: 8 MMOL/L (ref 5–15)
BUN SERPL-MCNC: 30 MG/DL (ref 8–23)
BUN/CREAT SERPL: 47.6 (ref 7–25)
CALCIUM SPEC-SCNC: 7.4 MG/DL (ref 8.2–9.6)
CHLORIDE SERPL-SCNC: 111 MMOL/L (ref 98–107)
CO2 SERPL-SCNC: 21 MMOL/L (ref 22–29)
CREAT SERPL-MCNC: 0.63 MG/DL (ref 0.76–1.27)
DEPRECATED RDW RBC AUTO: 48.3 FL (ref 37–54)
EGFRCR SERPLBLD CKD-EPI 2021: 90.4 ML/MIN/1.73
ERYTHROCYTE [DISTWIDTH] IN BLOOD BY AUTOMATED COUNT: 13.7 % (ref 12.3–15.4)
GLUCOSE BLDC GLUCOMTR-MCNC: 119 MG/DL (ref 70–130)
GLUCOSE BLDC GLUCOMTR-MCNC: 144 MG/DL (ref 70–130)
GLUCOSE BLDC GLUCOMTR-MCNC: 83 MG/DL (ref 70–130)
GLUCOSE SERPL-MCNC: 118 MG/DL (ref 65–99)
HCT VFR BLD AUTO: 31.1 % (ref 37.5–51)
HGB BLD-MCNC: 10.3 G/DL (ref 13–17.7)
MAGNESIUM SERPL-MCNC: 1.8 MG/DL (ref 1.6–2.4)
MCH RBC QN AUTO: 32 PG (ref 26.6–33)
MCHC RBC AUTO-ENTMCNC: 33.1 G/DL (ref 31.5–35.7)
MCV RBC AUTO: 96.6 FL (ref 79–97)
PHOSPHATE SERPL-MCNC: 2 MG/DL (ref 2.5–4.5)
PLATELET # BLD AUTO: 242 10*3/MM3 (ref 140–450)
PMV BLD AUTO: 11 FL (ref 6–12)
POTASSIUM SERPL-SCNC: 4.3 MMOL/L (ref 3.5–5.2)
RBC # BLD AUTO: 3.22 10*6/MM3 (ref 4.14–5.8)
SODIUM SERPL-SCNC: 140 MMOL/L (ref 136–145)
WBC NRBC COR # BLD AUTO: 8.55 10*3/MM3 (ref 3.4–10.8)

## 2024-12-30 PROCEDURE — 92526 ORAL FUNCTION THERAPY: CPT

## 2024-12-30 PROCEDURE — 80069 RENAL FUNCTION PANEL: CPT | Performed by: INTERNAL MEDICINE

## 2024-12-30 PROCEDURE — 85027 COMPLETE CBC AUTOMATED: CPT | Performed by: STUDENT IN AN ORGANIZED HEALTH CARE EDUCATION/TRAINING PROGRAM

## 2024-12-30 PROCEDURE — 82948 REAGENT STRIP/BLOOD GLUCOSE: CPT

## 2024-12-30 PROCEDURE — 25010000002 ENOXAPARIN PER 10 MG: Performed by: STUDENT IN AN ORGANIZED HEALTH CARE EDUCATION/TRAINING PROGRAM

## 2024-12-30 PROCEDURE — 97168 OT RE-EVAL EST PLAN CARE: CPT

## 2024-12-30 PROCEDURE — 99232 SBSQ HOSP IP/OBS MODERATE 35: CPT | Performed by: INTERNAL MEDICINE

## 2024-12-30 PROCEDURE — 97530 THERAPEUTIC ACTIVITIES: CPT

## 2024-12-30 PROCEDURE — 83735 ASSAY OF MAGNESIUM: CPT | Performed by: STUDENT IN AN ORGANIZED HEALTH CARE EDUCATION/TRAINING PROGRAM

## 2024-12-30 PROCEDURE — 25010000002 ACYCLOVIR PER 5 MG: Performed by: INTERNAL MEDICINE

## 2024-12-30 PROCEDURE — 25810000003 SODIUM CHLORIDE 0.9 % SOLUTION 250 ML FLEX CONT: Performed by: INTERNAL MEDICINE

## 2024-12-30 RX ORDER — METOPROLOL SUCCINATE 25 MG/1
25 TABLET, EXTENDED RELEASE ORAL
Status: DISCONTINUED | OUTPATIENT
Start: 2024-12-30 | End: 2024-12-31

## 2024-12-30 RX ADMIN — Medication 10 ML: at 09:14

## 2024-12-30 RX ADMIN — TERAZOSIN HYDROCHLORIDE 1 MG: 1 CAPSULE ORAL at 21:23

## 2024-12-30 RX ADMIN — LEVOTHYROXINE SODIUM 75 MCG: 75 TABLET ORAL at 05:26

## 2024-12-30 RX ADMIN — Medication 1000 MCG: at 05:26

## 2024-12-30 RX ADMIN — METOPROLOL SUCCINATE 25 MG: 25 TABLET, EXTENDED RELEASE ORAL at 17:45

## 2024-12-30 RX ADMIN — DIBASIC SODIUM PHOSPHATE, MONOBASIC POTASSIUM PHOSPHATE AND MONOBASIC SODIUM PHOSPHATE 2 TABLET: 852; 155; 130 TABLET ORAL at 21:23

## 2024-12-30 RX ADMIN — ACYCLOVIR SODIUM 700 MG: 50 INJECTION, SOLUTION INTRAVENOUS at 03:27

## 2024-12-30 RX ADMIN — ATORVASTATIN CALCIUM 40 MG: 20 TABLET, FILM COATED ORAL at 21:23

## 2024-12-30 RX ADMIN — ACYCLOVIR SODIUM 700 MG: 50 INJECTION, SOLUTION INTRAVENOUS at 09:14

## 2024-12-30 RX ADMIN — AMLODIPINE BESYLATE 5 MG: 5 TABLET ORAL at 09:13

## 2024-12-30 RX ADMIN — AMIODARONE HYDROCHLORIDE 200 MG: 200 TABLET ORAL at 09:13

## 2024-12-30 RX ADMIN — CARBIDOPA AND LEVODOPA 1 TABLET: 25; 250 TABLET ORAL at 21:23

## 2024-12-30 RX ADMIN — ENOXAPARIN SODIUM 70 MG: 100 INJECTION SUBCUTANEOUS at 21:23

## 2024-12-30 RX ADMIN — FINASTERIDE 5 MG: 5 TABLET, FILM COATED ORAL at 09:13

## 2024-12-30 RX ADMIN — ACYCLOVIR SODIUM 700 MG: 50 INJECTION, SOLUTION INTRAVENOUS at 17:46

## 2024-12-30 RX ADMIN — AMIODARONE HYDROCHLORIDE 200 MG: 200 TABLET ORAL at 21:23

## 2024-12-30 RX ADMIN — Medication 10 ML: at 21:24

## 2024-12-30 RX ADMIN — GUAIFENESIN 600 MG: 600 TABLET, MULTILAYER, EXTENDED RELEASE ORAL at 21:23

## 2024-12-30 RX ADMIN — GUAIFENESIN 600 MG: 600 TABLET, MULTILAYER, EXTENDED RELEASE ORAL at 09:13

## 2024-12-30 RX ADMIN — CARBIDOPA AND LEVODOPA 1 TABLET: 25; 250 TABLET ORAL at 17:45

## 2024-12-30 RX ADMIN — ENOXAPARIN SODIUM 70 MG: 100 INJECTION SUBCUTANEOUS at 10:30

## 2024-12-30 RX ADMIN — CARBIDOPA AND LEVODOPA 1 TABLET: 25; 250 TABLET ORAL at 09:13

## 2024-12-30 NOTE — PROGRESS NOTES
"CC: Paroxysmal atrial fibrillation  Interval History: No new acute events overnight      Vital Signs  Temp:  [97.3 °F (36.3 °C)-98.8 °F (37.1 °C)] 98.4 °F (36.9 °C)  Heart Rate:  [57-64] 64  Resp:  [18] 18  BP: (138-148)/(58-70) 148/68    Intake/Output Summary (Last 24 hours) at 12/30/2024 1433  Last data filed at 12/30/2024 0914  Gross per 24 hour   Intake 2306 ml   Output --   Net 2306 ml     Flowsheet Rows      Flowsheet Row First Filed Value   Admission Height 177.8 cm (70\") Documented at 12/22/2024 0725   Admission Weight 66.7 kg (147 lb 0.8 oz) Documented at 12/18/2024 0500            PHYSICAL EXAM:  General: No acute distress  Resp:NL Rate, symmetric chest expansion,unlabored, clear  CV:NL rate and rhythm, NL PMI, NL S1 and S2, no Murmur, no gallop, no rub, No JVD.   ABD:Nl sounds, no masses or tenderness, nondistended, no guarding or rebound  Neuro: Alert but confused  Extr: Weak extremities    Results Review:    Results from last 7 days   Lab Units 12/30/24  1100   SODIUM mmol/L 140   POTASSIUM mmol/L 4.3   CHLORIDE mmol/L 111*   CO2 mmol/L 21.0*   BUN mg/dL 30*   CREATININE mg/dL 0.63*   GLUCOSE mg/dL 118*   CALCIUM mg/dL 7.4*         Results from last 7 days   Lab Units 12/30/24  1100   WBC 10*3/mm3 8.55   HEMOGLOBIN g/dL 10.3*   HEMATOCRIT % 31.1*   PLATELETS 10*3/mm3 242             Results from last 7 days   Lab Units 12/30/24  1100   MAGNESIUM mg/dL 1.8         I reviewed the patient's new clinical results.  I personally viewed and interpreted the patient's EKG/Telemetry data        Medication Review:   Meds reviewed    Pharmacy to Dose acyclovir (ZOVIRAX),         Assessment/Plan      # A-fib, paroxysmal.  - This was likely exacerbated by his acute illness.  - We will continue amiodarone 200 twice daily.  Transition to 200 daily at discharge.  - Continue apixaban 5 mg twice daily.       # CAD, s/p LAD stent 2019.  # Ischemic cardiomyopathy  - Continue atorvastatin    Started on amlodipine for " hypertension  I have switched amlodipine to metoprolol to help with cardiomyopathy as well as A-fib.      No acute events overnight  Remains in sinus rhythm  Continue current cardiac care  Cardiology sign off but call with questions.  I have discussed patient with his nurse.    Carlos Amaral MD  12/30/24  14:33 EST

## 2024-12-30 NOTE — PROGRESS NOTES
LOS: 13 days     Chief Complaint: LISY    Subjective     History of Present Illness  Casey Snowden Sr. is a 89 y.o. male with history of Parkinson's disease and dementia.  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        Subjective  Patient still encephalopathic  Has had some large volume bladder scans, but patient has been able to void as they have moved him around. PVR has been under 300.  DHT in place.     12/21 - underwent LP this am. No other acute events overnight. Continues to be confused. 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     12/23 no acute events. Seems to be doing a bit better today.    12/24 seems to have had a difficult night. Now being bathe.     12/25 no acute events. Overall, patient looks better today. Second IV access accomplished.     12/26: Oriented to person place, recognizes family  Dobbhoff tube remains, on tube feeds, no new complaints    12/27: Still confused today but his family states he was much more awake alert and conversant yesterday  Remains on tube feeds with free water, on amnio drip    12/28: No new complaints or events, did not pass a swallow study and remains on tube feeds, labs pending.  Family states he was not as alert yesterday    12/29: Seen and examined at bedside, family at bedside, discussed plan with family.  More awake, still confused, remains on tube feeds overnight    12/30: Repeat swallow study pending, no new complaints, family at bedside    History taken from: family and chart    Objective     Vital Sign Min/Max for last 24 hours  Temp  Min: 97.3 °F (36.3 °C)  Max: 98.8 °F (37.1 °C)   BP  Min: 138/58  Max: 148/68   Pulse  Min: 57  Max: 64   Resp  Min: 18  Max: 18   SpO2  Min: 97 %  Max: 100 %   No data recorded   Weight  Min: 78.6 kg (173 lb 4.5 oz)  Max: 78.6 kg (173 lb 4.5 oz)     Flowsheet Rows      Flowsheet Row First Filed Value   Admission Height --   Admission Weight 66.7 kg  "(147 lb 0.8 oz) Documented at 12/18/2024 0500            I/O this shift:  In: 250 [IV Piggyback:250]  Out: -   I/O last 3 completed shifts:  In: 3259 [Other:1075; NG/GT:2184]  Out: 0     Objective:  Vital signs: (most recent): Blood pressure 148/68, pulse 64, temperature 98.4 °F (36.9 °C), temperature source Axillary, resp. rate 18, height 177.8 cm (70\"), weight 78.6 kg (173 lb 4.5 oz), SpO2 97%.            Physical Examination: General appearance - ill appearing, frail, chronically ill  Mental status - more awake.    Nose - DHT in place   Chest -good air movement, scattered coarse breath sounds.  Heart - normal rate, regular rhythm, normal S1, S2, no murmurs, rubs, clicks or gallops  Abdomen - soft, nontender, nondistended, no masses or organomegaly  Neurological -no focal deficits  Extremities -trace/1+ edema      Results Review:     I reviewed the patient's new clinical results.    WBC WBC   Date Value Ref Range Status   12/30/2024 8.55 3.40 - 10.80 10*3/mm3 Final   12/29/2024 7.38 3.40 - 10.80 10*3/mm3 Final   12/28/2024 7.30 3.40 - 10.80 10*3/mm3 Final      HGB Hemoglobin   Date Value Ref Range Status   12/30/2024 10.3 (L) 13.0 - 17.7 g/dL Final   12/29/2024 10.3 (L) 13.0 - 17.7 g/dL Final   12/28/2024 10.2 (L) 13.0 - 17.7 g/dL Final      HCT Hematocrit   Date Value Ref Range Status   12/30/2024 31.1 (L) 37.5 - 51.0 % Final   12/29/2024 30.7 (L) 37.5 - 51.0 % Final   12/28/2024 31.0 (L) 37.5 - 51.0 % Final      Platlets No results found for: \"LABPLAT\"   MCV MCV   Date Value Ref Range Status   12/30/2024 96.6 79.0 - 97.0 fL Final   12/29/2024 97.5 (H) 79.0 - 97.0 fL Final   12/28/2024 99.7 (H) 79.0 - 97.0 fL Final          Sodium Sodium   Date Value Ref Range Status   12/30/2024 140 136 - 145 mmol/L Final   12/29/2024 144 136 - 145 mmol/L Final   12/28/2024 142 136 - 145 mmol/L Final      Potassium Potassium   Date Value Ref Range Status   12/30/2024 4.3 3.5 - 5.2 mmol/L Final   12/29/2024 4.5 3.5 - 5.2 mmol/L " "Final   12/28/2024 4.5 3.5 - 5.2 mmol/L Final      Chloride Chloride   Date Value Ref Range Status   12/30/2024 111 (H) 98 - 107 mmol/L Final   12/29/2024 115 (H) 98 - 107 mmol/L Final   12/28/2024 110 (H) 98 - 107 mmol/L Final      CO2 CO2   Date Value Ref Range Status   12/30/2024 21.0 (L) 22.0 - 29.0 mmol/L Final   12/29/2024 23.0 22.0 - 29.0 mmol/L Final   12/28/2024 24.0 22.0 - 29.0 mmol/L Final      BUN BUN   Date Value Ref Range Status   12/30/2024 30 (H) 8 - 23 mg/dL Final   12/29/2024 41 (H) 8 - 23 mg/dL Final   12/28/2024 44 (H) 8 - 23 mg/dL Final      Creatinine Creatinine   Date Value Ref Range Status   12/30/2024 0.63 (L) 0.76 - 1.27 mg/dL Final   12/29/2024 0.79 0.76 - 1.27 mg/dL Final   12/28/2024 0.94 0.76 - 1.27 mg/dL Final      Calcium Calcium   Date Value Ref Range Status   12/30/2024 7.4 (L) 8.2 - 9.6 mg/dL Final   12/29/2024 7.8 (L) 8.2 - 9.6 mg/dL Final   12/28/2024 7.6 (L) 8.2 - 9.6 mg/dL Final      PO4 No results found for: \"CAPO4\"   Albumin Albumin   Date Value Ref Range Status   12/30/2024 2.3 (L) 3.5 - 5.2 g/dL Final   12/29/2024 2.5 (L) 3.5 - 5.2 g/dL Final   12/28/2024 2.5 (L) 3.5 - 5.2 g/dL Final        Magnesium Magnesium   Date Value Ref Range Status   12/30/2024 1.8 1.6 - 2.4 mg/dL Final   12/28/2024 2.2 1.6 - 2.4 mg/dL Final        Uric Acid No results found for: \"URICACID\"     Medication Review:   Current medications reviewed, orders reviewed and updated    Assessment & Plan     Assessment & Plan  LISY, Prerenal and improved  Metabolic acidosis, stabilizing  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  Okay to use protocol with " normal GFR    Rate control per cardiology: Now on oral amiodarone  Monitor renal function closely on IV acyclovir  Discussed with family at bedside        Joe Lyn MD  12/30/24  15:00 EST

## 2024-12-30 NOTE — PLAN OF CARE
Goal Outcome Evaluation:              Outcome Evaluation: Clinical swallow re-eval completed. Likely acute on chronic dysphagia with pt living w [facility] and prior CVA. VFSS 12/27 showed severe dysphagia w aspiration of pudding and honey thick liquid; pt's wife reports that pt was lethargic after bed bath immediately prior to VFSS on 12/27. Pt with decreased wakefulness, level of alertness this AM 2/2 reported lack of sleep last night. Pt's wife/MD request consideration of repeat VFSS, next date, if pt more awake/alert. Will plan for clinical re-eval next date to assess for readiness for repeat VFSS. MD and pt's wife report plan for PEG, if pt unable to safely resume oral diet after swallow eval. SLP following closely.    Anticipated Discharge Disposition (SLP): unknown, anticipate therapy at next level of care          SLP Swallowing Diagnosis: severe, pharyngeal dysphagia (12/30/24 [id number])

## 2024-12-30 NOTE — PLAN OF CARE
Goal Outcome Evaluation:  Plan of Care Reviewed With: patient, spouse        Progress: no change  Outcome Evaluation: Pt found in bed figiting with sheets. He follows simple commands but inconsistantly. Pt requires max/dep X2 assist to achieve EOB sitting and was not able to stand today. Pt has difficulty maintaining his sitting balance and has a heavy R sided lean. We attempted to correct with weight shifting to the L side and weight bearing down to LUE as well as cueing pt to reach to his L side with only brief improvement in sitting balance.    Anticipated Discharge Disposition (OT): skilled nursing facility

## 2024-12-30 NOTE — THERAPY RE-EVALUATION
Acute Care - Speech Language Pathology   Swallow Re-Evaluation [facility]     Patient Name: Casey Snowden Sr.  : 1934  MRN: [id number]  Today's Date: 2024               Admit Date: 2024    Visit Dx:     ICD-10-CM ICD-9-CM   1. Unable to ambulate  R26.2 719.7   2. Herpes zoster with complication: S2 and S3 distribution on the right  B02.8 053.8   3. Parkinson's disease, unspecified whether dyskinesia present, unspecified whether manifestations fluctuate  G20.A1 332.0     Patient Active Problem List   Diagnosis    Benign essential hypertension    Stenosis of carotid artery    Mixed hyperlipidemia    Parkinson's disease    Peripheral arterial occlusive disease    Screening for prostate cancer    Medication monitoring encounter    Melanoma    Chronic deep vein thrombosis (DVT) of popliteal vein of right lower extremity    Uses hearing aid    Paroxysmal A-fib    Chronic diastolic (congestive) heart failure    Anticoagulant long-term use    Presence of IVC filter    Medicare annual wellness visit, subsequent    Iron deficiency anemia secondary to inadequate dietary iron intake    Orthostatic hypotension due to Parkinson's disease    Coronary artery disease involving native coronary artery of native heart without angina pectoris    ST elevation myocardial infarction involving left anterior descending (LAD) coronary artery    Sialorrhea    Hypertensive heart disease with heart failure    PAD (peripheral artery disease)    Metastatic squamous cell carcinoma    Secondary malignancy of lymph nodes of head, face and neck    Encounter for antineoplastic immunotherapy    RBD (REM behavioral disorder)    Acquired hypothyroidism    Aspiration pneumonia of right lung due to vomit    Dysphagia, neurologic    ICAO (internal carotid artery occlusion), right    Arterial ischemic stroke, ICA (internal carotid artery), right, chroni    Renal mass, left    Abdominal aortic aneurysm    Disorientation    Falls  frequently    Right inguinal hernia    Unable to ambulate    Herpes zoster without complication    LISY (acute kidney injury)    Moderate protein-calorie malnutrition     Past Medical History:   Diagnosis Date    Abdominal aortic aneurysm without rupture 10/14/2021    Acquired hypothyroidism 08/31/2022    Acute ischemic stroke 10/08/2023    Aneurysm     Arrhythmia     Atrial fibrillation     Cancer 04/2018    basil cell carcinoma    Carotid stenosis 10/29/2015    CHF (congestive heart failure)     Chronic deep vein thrombosis (DVT) of popliteal vein of right lower extremity     Clotting disorder     Coronary artery disease involving native coronary artery of native heart without angina pectoris 12/20/2018    DVT of lower extremity (deep venous thrombosis)     Hard of hearing     Hyperlipidemia     Hypertension     Localized edema 01/04/2017    Long-term use of high-risk medication     Metabolic encephalopathy 02/07/2023    Myocardial infarction 2918    LAD stent    PAD (peripheral artery disease) 09/10/2020    Parkinson's disease     Postphlebitic syndrome     Pure hypercholesterolemia     Skin tear of upper arm without complication, left, subsequent encounter     Stroke 11/02/2023     Past Surgical History:   Procedure Laterality Date    BASAL CELL CARCINOMA EXCISION  04/2018    CARDIAC CATHETERIZATION N/A 11/05/2018    Procedure: Left Heart Cath;  Surgeon: Oliverio Azar MD;  Location: [facility] CATH INVASIVE LOCATION;  Service: Cardiovascular    CARDIAC CATHETERIZATION N/A 11/05/2018    Procedure: Coronary angiography;  Surgeon: Oliverio Azar MD;  Location: [facility] CATH INVASIVE LOCATION;  Service: Cardiovascular    CARDIAC CATHETERIZATION N/A 11/05/2018    Procedure: Left ventriculography;  Surgeon: Oliverio Azar MD;  Location: [facility] CATH INVASIVE LOCATION;  Service: Cardiovascular    CARDIAC CATHETERIZATION N/A 06/04/2019    Procedure: Left Heart Cath;  Surgeon: Johnny Glasgow MD;  Location: [facility]  CATH INVASIVE LOCATION;  Service: Cardiovascular    CARDIAC CATHETERIZATION N/A 06/04/2019    Procedure: Coronary angiography;  Surgeon: Johnny Glasgow MD;  Location: [facility] CATH INVASIVE LOCATION;  Service: Cardiovascular    CARDIAC CATHETERIZATION N/A 06/04/2019    Procedure: Left ventriculography;  Surgeon: Johnny Glasgow MD;  Location: [facility] CATH INVASIVE LOCATION;  Service: Cardiovascular    CARDIAC CATHETERIZATION N/A 06/04/2019    Procedure: Stent BILL coronary;  Surgeon: Johnny Glasgow MD;  Location: [facility] CATH INVASIVE LOCATION;  Service: Cardiovascular    CARDIAC SURGERY      CAROTID ARTERY ANGIOPLASTY Right 10/11/2023    with stent    CAROTID STENT      CATARACT EXTRACTION Left 08/2018    CATARACT EXTRACTION Right 09/2018    COLONOSCOPY      2011    CORONARY STENT PLACEMENT      INCISION AND DRAINAGE LEG Right 07/07/2017    Procedure: INCISION AND DRAINAGE INFECTED HEMATOMA RIGHT THIGH;  Surgeon: Valentin Peña MD;  Location: [facility] MAIN OR;  Service:     JOINT REPLACEMENT      KNEE INCISION AND DRAINAGE Right 12/27/2016    Procedure: KNEE INCISION AND DRAINAGE;  Surgeon: Triston Whitten MD;  Location: [facility] MAIN OR;  Service:     NOSE SURGERY      nose replacement    SINUS SURGERY  1960    SKIN BIOPSY      SKIN GRAFT  12/2017    TOTAL KNEE ARTHROPLASTY Right     VASCULAR SURGERY      VENA CAVA FILTER PLACEMENT         SLP Recommendation and Plan  SLP Swallowing Diagnosis: severe, pharyngeal dysphagia (12/30/24 0959)  SLP Diet Recommendation: ice chips between meals after oral care, with supervision (12/30/24 0959)  Recommended Precautions and Strategies: upright posture during/after eating, 1:1 supervision (12/30/24 0959)  SLP Rec. for Method of Medication Administration: meds via alternate route (12/30/24 0959)     Monitor for Signs of Aspiration: yes, notify SLP if any concerns (12/30/24 0959)  Recommended Diagnostics: VFSS (MBS) (defer to treating SLP regarding  appropriate timing of repeat instrumental exam) (12/30/24 0959)  Swallow Criteria for Skilled Therapeutic Interventions Met: demonstrates skilled criteria (12/30/24 0959)  Anticipated Discharge Disposition (SLP): unknown, anticipate therapy at next level of care (12/30/24 0959)  Rehab Potential/Prognosis, Swallowing: re-evaluate goals as necessary (12/30/24 0959)  Therapy Frequency (Swallow): PRN (12/30/24 0959)  Predicted Duration Therapy Intervention (Days): until discharge (12/30/24 0959)  Oral Care Recommendations: Suction toothbrush, Oral Care BID/PRN (12/30/24 0959)                                        Outcome Evaluation: Clinical swallow re-eval completed. Likely acute on chronic dysphagia with pt living w [facility] and prior CVA. VFSS 12/27 showed severe dysphagia w aspiration of pudding and honey thick liqudi; pt's wife reports that pt was lethargic after bed bath immediately prior to VFSS on 12/27. Pt with decreased wakefulness, level of alertness this AM 2/2 reported lack of sleep last night. Pt's wife/MD request consideration of repeat VFSS, next date, if pt more awake/alert. Will plan for clinical re-eval next date to assess for readiness for repeat VFSS. MD and pt's wife report plan for PEG, if pt unable to safely resume oral diet. SLP following closely.      SWALLOW EVALUATION (Last 72 Hours)       SLP Adult Swallow Evaluation       Row Name 12/30/24 0959 12/27/24 1447 12/27/24 1201             Rehab Evaluation    Document Type re-evaluation  -BB evaluation  -BB re-evaluation  -BB      Subjective Information no complaints  -BB no complaints  -BB no complaints  -BB      Patient Effort fair  -BB fair  -BB good  -BB         General Information    Patient Profile Reviewed yes  -BB -- --      Current Method of Nutrition NPO;nasogastric feedings  -BB NPO;nasogastric feedings  -BB NPO;nasogastric feedings  -BB      Precautions/Limitations, Vision difficult to assess  -BB difficult to assess  -BB difficult  to assess  -BB      Precautions/Limitations, Hearing difficult to assess;other (see comments)  -BB difficult to assess;other (see comments)  [place]  -BB other (see comments)  [place]  -BB      Prior Level of Function-Swallowing -- -- --  hx dysphagia  -BB      Plans/Goals Discussed with spouse/S.O.;agreed upon  -BB spouse/S.O.;agreed upon  -BB spouse/S.O.;agreed upon  -BB      Barriers to Rehab previous functional deficit;cognitive status;medically complex  -BB previous functional deficit;cognitive status;medically complex  -BB previous functional deficit;cognitive status;medically complex  -BB      Patient's Goals for Discharge patient did not state  -BB patient did not state  -BB patient did not state  -BB      Family Goals for Discharge patient able to return to PO diet;patient able to return to regular diet  -BB -- patient able to return to PO diet  -BB         Pain    Pretreatment Pain Rating 0/10 - no pain  -BB 0/10 - no pain  -BB 0/10 - no pain  -BB      Posttreatment Pain Rating 0/10 - no pain  -BB 0/10 - no pain  -BB 0/10 - no pain  -BB         Oral Motor Structure and Function    Dentition Assessment natural, present and adequate;missing teeth  -BB natural, present and adequate;missing teeth  -BB natural, present and adequate;missing teeth  -BB      Secretion Management WNL/WFL  -BB WNL/WFL  -BB WNL/WFL  -BB      Mucosal Quality moist, healthy  -BB moist, healthy  -BB moist, healthy  -BB         Oral Musculature and Cranial Nerve Assessment    Oral Motor General Assessment unable to assess  -BB unable to assess  -BB --         General Eating/Swallowing Observations    Respiratory Support Currently in Use room air  -BB room air  -BB room air  -BB      Eating/Swallowing Skills fed by SLP  -BB fed by SLP  -BB fed by SLP  -BB      Positioning During Eating upright 90 degree;upright in bed  -BB upright 90 degree;upright in bed  -BB upright 90 degree;upright in bed  -BB      Utensils Used spoon  -BB -- spoon;straw  -BB       Consistencies Trialed honey-thick liquids;ice chips  -BB -- pureed;ice chips;nectar/syrup-thick liquids;thin liquids  -BB         Clinical Swallow Eval    Clinical Swallow Evaluation Summary Clinical swallow re-eval completed. Spouse at bedside and reports that pt did not sleep much last night.   Cognition: Pt required intermittent verbal or tactile cues to maintain level of alertness/wakefulness.  Pt able to follow very basic commands.   Expressive communication: minimal.   Voice: Vocal quality appears mildly breathy. Vocal loudness appears good.   Cough: Appears fair.  Affect: Pleasant.   Speech: Intelligible.  Patient agreeable to PO trials.   Dysphagia symptoms: pt's wife reports pt tolerates soft to chew (minced meats), thin liquids at baseline despite hx dysphagia w no adverse pulmonary events. Dysphagia signs: consistent cough with ice chips. Delayed cough / throat clear with honey tsp.     Assessment / Plan: Likely acute on chronic dysphagia with pt living w [facility] and prior CVA. VFSS 12/27 showed severe dysphagia w aspiration of pudding and honey thick liqudi; pt's wife reports that pt was lethargic after bed bath immediately prior to VFSS on 12/27. Pt with decreased wakefulness, level of alertness this AM 2/2 reported lack of sleep last night. Pt's wife/MD request consideration of repeat VFSS, next date, if pt more awake/alert. Will plan for clinical re-eval next date to assess for readiness for repeat VFSS. MD and pt's wife report plan for PEG, if pt unable to safely resume oral diet. SLP following closely.  -BB -- Clinical swallow re-eval completed. Spouse at bedside.    Cognition: Alert. Pt able to follow basic commands.   Expressive communication: minimal.   Voice: Vocal quality appears mildly breathy. Vocal loudness appears good.   Cough: Appears fair to strong.  Affect: Pleasant.   Speech: Intelligible.  Patient agreeable to PO trials.   Dysphagia symptoms: pt's wife reports pt tolerates  soft to chew (minced meats), thin liquids at baseline despite hx dysphagia w no adverse pulmonary events. Dysphagia signs: consistent cough with thin. Cough with nectar via straw. Not observed with puree or nectar via tsp.     Assessment / Plan: Likely acute on chronic dysphagia with pt living w [facility] and prior CVA. Consistent cough w thin liquids. Suggest VFSS to evaluate complete oropharyngeal swallow function. Will plan to complete this date.  -BB         MBS/VFSS    Utensils Used -- spoon  -BB --      Consistencies Trialed -- pureed;honey-thick liquids  -BB --         MBS/VFSS Interpretation    VFSS Summary -- Oral phase most notable for posterior escape of bolus before the swallow. Pharyngeal phase most notable for tracheal aspiration after the swallow 2/2 pharyngeal residue (neither trace nor gross) with honey thick liquids via tsp and pudding. Delayed cough response. Mimimal to absent epiglottic inversion. 50-90% post-swallow residue. Penetration Aspiration Scale (PAS). PAS 7 (Material enters the airway, passes below the vocal folds, and is not ejected from the trachea despite effort): honey tsp, pudding. Cursory view of the esophagus not completed 2/2 logistical reasons not related to impairment.  -BB --         MBSImP Score    MBSImP Score Completed? -- Yes  -BB --      Materials presented per Standard Protocol? -- No  -BB --      How was standard protocol modified? -- Protocol modified:  -BB --         Oral Impairment Domain    Component 2- Tongue Control During Bolus Hold -- 2: Posterior escape of less that half of bolus  -BB --      Component 3- Bolus Prep/Mastication -- --  solid texture not provided  -BB --      Component 4- Bolus Transport/Lingual Motion -- 2:Slowed tongue motion  -BB --      Component 5- Oral Residue -- 2: Residue collection on oral structures  -BB --      Component 6- Initiation of Pharyngeal Swallow -- 3: Bolus head in pyriforms  -BB --         Pharyngeal Impairment Domain     Component 7- Soft Palate Elevation -- 0: No bolus between soft palate (SP)/pharyngeal wall (PW)  -BB --      Component 8- Laryngeal Elevation -- 0: Complete superior movement of thyroid cartilage with complete approximation of arytenoids to epiglottic petiole  -BB --      Component 9- Anterior Hyoid Excursion -- 1: Partial anterior movement  -BB --      Component 10- Epiglottic Movement -- 2: No inversion  -BB --      Component 11- Laryngeal Vestibular Closure- Height of Swallow -- 1: Incomplete- narrow column air/contrast in laryngeal vestibule  -BB --      Component 12- Pharyngeal Stripping Wave -- 1: Present- diminished  -BB --      Component 13- Pharyngeal Contraction (A/P View Only) -- --  not assessed  -BB --      Component 14- PE Segment Opening -- 1: Partial distension/partial duration- partial obstruction of flow  -BB --      Component 15- Tongue Base Retraction -- 2: Narrow column of contrast or air between TB and PW  -BB --      Component 16- Pharyngeal Residue -- 2: Collection of residue within or on pharyngeal structures  -BB --      Component 16 Location -- Vallecula;Pharyngeal wall  -BB --         SLP Communication to Radiology    Severity Level of Dysphagia -- severe dysphagia  per DIGEST score  -BB --      Summary Statement -- Radiologist, Dr. Levy, present. Tracheal aspiration of pharyngeal residue (amount of aspiration was neither-trace-nor-gross) with honey thick liquids and pudding. Delayed cough response.  -BB --         SLP Evaluation Clinical Impression    SLP Swallowing Diagnosis severe;pharyngeal dysphagia  -BB moderate;oral dysphagia;pharyngeal dysphagia  -BB suspected pharyngeal dysphagia  -BB      Functional Impact risk of aspiration/pneumonia  -BB -- --      Rehab Potential/Prognosis, Swallowing re-evaluate goals as necessary  -BB re-evaluate goals as necessary  -BB adequate, monitor progress closely  -BB      Swallow Criteria for Skilled Therapeutic Interventions Met demonstrates  skilled criteria  -BB demonstrates skilled criteria  -BB demonstrates skilled criteria  -BB         Recommendations    Therapy Frequency (Swallow) PRN  -BB PRN  -BB PRN  -BB      Predicted Duration Therapy Intervention (Days) until discharge  -BB until discharge  -BB until discharge  -BB      SLP Diet Recommendation ice chips between meals after oral care, with supervision  -BB ice chips between meals after oral care, with supervision  -BB ice chips between meals after oral care, with supervision  -BB      Recommended Diagnostics VFSS (MBS)  defer to treating SLP regarding appropriate timing of repeat instrumental exam  -BB VFSS (MBS)  defer to treating SLP to discern appropriate timing of repeat VFSS  -BB VFSS (MBS)  -BB      Recommended Precautions and Strategies upright posture during/after eating;1:1 supervision  -BB upright posture during/after eating;1:1 supervision  for ice chips  -BB upright posture during/after eating  -BB      Oral Care Recommendations Suction toothbrush;Oral Care BID/PRN  -BB Oral Care BID/PRN;Before ice/water  -BB Before ice/water;Oral Care BID/PRN  -BB      SLP Rec. for Method of Medication Administration meds via alternate route  -BB meds via alternate route  -BB meds via alternate route  -BB      Monitor for Signs of Aspiration yes;notify SLP if any concerns  -BB yes;notify SLP if any concerns  -BB yes;notify SLP if any concerns  -BB      Anticipated Discharge Disposition (SLP) unknown;anticipate therapy at next level of care  -BB unknown;anticipate therapy at next level of care  -BB --                User Key  (r) = Recorded By, (t) = Taken By, (c) = Cosigned By      Initials Name Effective Dates    BB Óscar Dennis, DULCE 02/19/23 -                     EDUCATION  The patient has been educated in the following areas:   Dysphagia (Swallowing Impairment) Oral Care/Hydration NPO rationale.                Time Calculation:    Time Calculation- SLP       Row Name 12/30/24 1282              Time Calculation- SLP    SLP Start Time 0900  -BB      SLP Stop Time 1000  -BB      SLP Time Calculation (min) 60 min  -BB      SLP Received On 12/30/24  -BB         Untimed Charges    32055-BV Treatment Swallow Minutes 60  -BB         Total Minutes    Untimed Charges Total Minutes 60  -BB       Total Minutes 60  -BB                User Key  (r) = Recorded By, (t) = Taken By, (c) = Cosigned By      Initials Name Provider Type    Óscar Vega, DULCE Speech and Language Pathologist                    Therapy Charges for Today       Code Description Service Date Service Provider Modifiers Qty    [id number]  ST TREATMENT SWALLOW 4 12/30/2024 Óscar Dennis, SLP GN 1                 DULCE Zabala  12/30/2024

## 2024-12-30 NOTE — PLAN OF CARE
Goal Outcome Evaluation:         Pt in for rash, tube feeds infusing at goal rate, vitals stable , bed low, wheels locked, call light in reach

## 2024-12-30 NOTE — PROGRESS NOTES
Name: Casey Snowden . ADMIT: 2024   : 1934  PCP: Aleksander Diez MD    MRN: [id number] LOS: 13 days   AGE/SEX: 89 y.o. male  ROOM: [facility] (formerly [facility])     Subjective   Subjective     Agitated once again overnight.      Objective   Objective   Vital Signs  Temp:  [97.3 °F (36.3 °C)-98.8 °F (37.1 °C)] 98.8 °F (37.1 °C)  Heart Rate:  [57-59] 57  Resp:  [18] 18  BP: (123-140)/(58-70) 140/70  SpO2:  [92 %-100 %] 100 %  on   ;   Device (Oxygen Therapy): room air  Body mass index is 24.86 kg/m².  Physical Exam  Constitutional:       General: He is not in acute distress.  HENT:      Head:      Comments: NGT  Eyes:      Extraocular Movements: Extraocular movements intact.      Pupils: Pupils are equal, round, and reactive to light.   Cardiovascular:      Rate and Rhythm: Normal rate and regular rhythm.      Heart sounds: No murmur heard.  Pulmonary:      Effort: No respiratory distress.   Abdominal:      General: There is no distension.      Palpations: Abdomen is soft.      Tenderness: There is no abdominal tenderness.   Musculoskeletal:      Right lower leg: No edema.      Left lower leg: No edema.   Skin:     General: Skin is warm and dry.      Findings: No rash.   Neurological:      Mental Status: He is alert.       Results Review     I reviewed the patient's new clinical results.  Results from last 7 days   Lab Units 24  0548 24  1359 24  0518 24  0646   WBC 10*3/mm3 7.38 7.30 7.65 6.39   HEMOGLOBIN g/dL 10.3* 10.2* 10.4* 9.9*   PLATELETS 10*3/mm3 231 218 189 168     Results from last 7 days   Lab Units 24  0548 24  1359 24  0518 24  1745 24  0646   SODIUM mmol/L 144 142 141  --  140   POTASSIUM mmol/L 4.5 4.5 4.2 4.5 3.2*   CHLORIDE mmol/L 115* 110* 109*  --  107   CO2 mmol/L 23.0 24.0 21.5*  --  21.0*   BUN mg/dL 41* 44* 49*  --  48*   CREATININE mg/dL 0.79 0.94 0.99  --  0.98   GLUCOSE mg/dL 135* 122* 147*  --  174*   EGFR mL/min/1.73 84.4 77.0 72.4   --  73.3     Results from last 7 days   Lab Units 12/29/24  0548 12/28/24  1359 12/27/24  0518   ALBUMIN g/dL 2.5* 2.5* 2.3*   BILIRUBIN mg/dL  --   --  0.2   ALK PHOS U/L  --   --  70   AST (SGOT) U/L  --   --  87*   ALT (SGPT) U/L  --   --  33       Results from last 7 days   Lab Units 12/29/24  0548 12/28/24  1359 12/27/24  0518 12/26/24  0646   CALCIUM mg/dL 7.8* 7.6* 7.6* 7.4*   ALBUMIN g/dL 2.5* 2.5* 2.3*  --    MAGNESIUM mg/dL  --  2.2 1.5*  --    PHOSPHORUS mg/dL 2.3* 2.8 3.5  --      Results from last 7 days   Lab Units 12/26/24  0646   PROCALCITONIN ng/mL 0.61*     Glucose   Date/Time Value Ref Range Status   12/30/2024 1041 144 (H) 70 - 130 mg/dL Final   12/30/2024 0519 119 70 - 130 mg/dL Final   12/29/2024 2337 134 (H) 70 - 130 mg/dL Final   12/29/2024 1629 120 70 - 130 mg/dL Final   12/29/2024 1037 144 (H) 70 - 130 mg/dL Final   12/29/2024 0546 136 (H) 70 - 130 mg/dL Final   12/28/2024 2328 118 70 - 130 mg/dL Final       No radiology results for the last day    I have personally reviewed all medications:  Scheduled Medications  acyclovir, 10 mg/kg, Intravenous, Q8H  amiodarone, 200 mg, Oral, Q12H  amLODIPine, 5 mg, Oral, Daily  [Held by provider] apixaban, 5 mg, Oral, Q12H  atorvastatin, 40 mg, Nasogastric, Nightly  carbidopa-levodopa, 1 tablet, Nasogastric, TID  enoxaparin, 1 mg/kg, Subcutaneous, Q12H  finasteride, 5 mg, Nasogastric, Daily  guaiFENesin, 600 mg, Oral, Q12H  insulin regular, 2-7 Units, Subcutaneous, Q6H  levothyroxine, 75 mcg, Nasogastric, Daily  [Held by provider] oxybutynin XL, 10 mg, Oral, Daily  sodium chloride, 10 mL, Intravenous, Q12H  terazosin, 1 mg, Nasogastric, Nightly  vitamin B-12, 1,000 mcg, Nasogastric, Q AM    Infusions  Pharmacy to Dose acyclovir (ZOVIRAX),     Diet  NPO Diet NPO Type: Tube Feeding    I have personally reviewed:  [x]  Laboratory   []  Microbiology   [x]  Radiology   []  EKG/Telemetry  []  Cardiology/Vascular   []  Pathology    []  Records        Assessment/Plan     Active Hospital Problems    Diagnosis  POA    **Unable to ambulate [R26.2]  Yes    Moderate protein-calorie malnutrition [E44.0]  Yes    LISY (acute kidney injury) [N17.9]  Yes    Herpes zoster without complication [B02.9]  Unknown    Falls frequently [R29.6]  Not Applicable    Acquired hypothyroidism [E03.9]  Yes    Secondary malignancy of lymph nodes of head, 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       89 y.o. male admitted with generalized weakness, confusion and found to have RLE rash consistent with shingles.    Severe pharyngeal dysphagia  Noted plans for repeat swallow evaluation. If he fails he will need a PEG.     Cough/congestion: Checked chest x-ray which shows some new infiltrates.  On meropenem for 5 day course ending on 12/29    VZV encephalitis.  Status post LP  -Continue acyclovir x 14 days.  Renally dosed as appropriate, end date 1/3/24  - MRI negative for acute change including any evidence of encephalitis  - Neurology signed off.    Pneumonia  Completed a 5 day course of meropenem     LISY/hypernatremia: resolved .   - Continue to hold Diovan and Ditropan  - Monitor urine output    Atrial fibrillation with RVR  Continue AC, no need for aspirin per cardiology.  No BB due to previous bradycardia. Spot dosed IV metoprolol for RVR. Transitioned from amiodarone ggt to PO.     Parkinson disease: Continue Sinemet.     DVT prophy: therapeutic  lovenox in  case he needs a PEG   Disposition: Plan TBD.        Hernán Saldivar MD  [facility]  12/30/24  11:10 EST

## 2024-12-30 NOTE — THERAPY TREATMENT NOTE
Patient Name: Casey Snowden .  : 1934    MRN: [id number]                              Today's Date: 2024       Admit Date: 2024    Visit Dx:     ICD-10-CM ICD-9-CM   1. Unable to ambulate  R26.2 719.7   2. Herpes zoster with complication: S2 and S3 distribution on the right  B02.8 053.8   3. Parkinson's disease, unspecified whether dyskinesia present, unspecified whether manifestations fluctuate  G20.A1 332.0     Patient Active Problem List   Diagnosis    Benign essential hypertension    Stenosis of carotid artery    Mixed hyperlipidemia    Parkinson's disease    Peripheral arterial occlusive disease    Screening for prostate cancer    Medication monitoring encounter    Melanoma    Chronic deep vein thrombosis (DVT) of popliteal vein of right lower extremity    Uses hearing aid    Paroxysmal A-fib    Chronic diastolic (congestive) heart failure    Anticoagulant long-term use    Presence of IVC filter    Medicare annual wellness visit, subsequent    Iron deficiency anemia secondary to inadequate dietary iron intake    Orthostatic hypotension due to Parkinson's disease    Coronary artery disease involving native coronary artery of native heart without angina pectoris    ST elevation myocardial infarction involving left anterior descending (LAD) coronary artery    Sialorrhea    Hypertensive heart disease with heart failure    PAD (peripheral artery disease)    Metastatic squamous cell carcinoma    Secondary malignancy of lymph nodes of head, face and neck    Encounter for antineoplastic immunotherapy    RBD (REM behavioral disorder)    Acquired hypothyroidism    Aspiration pneumonia of right lung due to vomit    Dysphagia, neurologic    ICAO (internal carotid artery occlusion), right    Arterial ischemic stroke, ICA (internal carotid artery), right, chroni    Renal mass, left    Abdominal aortic aneurysm    Disorientation    Falls frequently    Right inguinal hernia    Unable to ambulate     Herpes zoster without complication    LISY (acute kidney injury)    Moderate protein-calorie malnutrition     Past Medical History:   Diagnosis Date    Abdominal aortic aneurysm without rupture 10/14/2021    Acquired hypothyroidism 08/31/2022    Acute ischemic stroke 10/08/2023    Aneurysm     Arrhythmia     Atrial fibrillation     Cancer 04/2018    basil cell carcinoma    Carotid stenosis 10/29/2015    CHF (congestive heart failure)     Chronic deep vein thrombosis (DVT) of popliteal vein of right lower extremity     Clotting disorder     Coronary artery disease involving native coronary artery of native heart without angina pectoris 12/20/2018    DVT of lower extremity (deep venous thrombosis)     Hard of hearing     Hyperlipidemia     Hypertension     Localized edema 01/04/2017    Long-term use of high-risk medication     Metabolic encephalopathy 02/07/2023    Myocardial infarction 2918    LAD stent    PAD (peripheral artery disease) 09/10/2020    Parkinson's disease     Postphlebitic syndrome     Pure hypercholesterolemia     Skin tear of upper arm without complication, left, subsequent encounter     Stroke 11/02/2023     Past Surgical History:   Procedure Laterality Date    BASAL CELL CARCINOMA EXCISION  04/2018    CARDIAC CATHETERIZATION N/A 11/05/2018    Procedure: Left Heart Cath;  Surgeon: Oliverio Azar MD;  Location:  [facility] C[facility] INVASIVE LOCATION;  Service: Cardiovascular    CARDIAC CATHETERIZATION N/A 11/05/2018    Procedure: Coronary angiography;  Surgeon: Oliverio Azar MD;  [facility];  Service: Cardiovascular    CARDIAC CATHETERIZATION N/A 11/05/2018    Procedure: Left ventriculography;  Surgeon: Oliverio Azar MD;  Location:  [facility] CATH INVASIVE LOCATION;  Service: Cardiovascular    CARDIAC CATHETERIZATION N/A 06/04/2019    Procedure: Left Heart Cath;  Surgeon: Johnny Glasgow MD;  [facility];  Service: Cardiovascular    CARDIAC  CATHETERIZATION N/A 06/04/2019    Procedure: Coronary angiography;  Surgeon: Johnny Glasgow MD;  Location: [facility] CATH INVASIVE LOCATION;  Service: Cardiovascular    CARDIAC CATHETERIZATION N/A 06/04/2019    Procedure: Left ventriculography;  Surgeon: Johnny Glasgow MD;  Location: [facility] CATH INVASIVE LOCATION;  Service: Cardiovascular    CARDIAC CATHETERIZATION N/A 06/04/2019    Procedure: Stent BILL coronary;  Surgeon: Johnny Glasgow MD;  Location: [facility] CATH INVASIVE LOCATION;  Service: Cardiovascular    CARDIAC SURGERY      CAROTID ARTERY ANGIOPLASTY Right 10/11/2023    with stent    CAROTID STENT      CATARACT EXTRACTION Left 08/2018    CATARACT EXTRACTION Right 09/2018    COLONOSCOPY      2011    CORONARY STENT PLACEMENT      INCISION AND DRAINAGE LEG Right 07/07/2017    Procedure: INCISION AND DRAINAGE INFECTED HEMATOMA RIGHT THIGH;  Surgeon: Valentin Peña MD;  Location: [facility] OR;  Service:     JOINT REPLACEMENT      KNEE INCISION AND DRAINAGE Right 12/27/2016    Procedure: KNEE INCISION AND DRAINAGE;  Surgeon: Triston Whitten MD;  Location: [facility] OR;  Service:     NOSE SURGERY      nose replacement    SINUS SURGERY  1960    SKIN BIOPSY      SKIN GRAFT  12/2017    TOTAL KNEE ARTHROPLASTY Right     VASCULAR SURGERY      VENA CAVA FILTER PLACEMENT        General Information       Row Name 12/30/24 1519          OT Time and Intention    Document Type therapy note (daily note)  -SM     Mode of Treatment occupational therapy;individual therapy  -SM     Patient Effort fair  -SM       Row Name 12/30/24 1519          General Information    Existing Precautions/Restrictions fall;NPO  -SM       Row Name 12/30/24 1519          Cognition    Orientation Status (Cognition) oriented to;person  -SM       Row Name 12/30/24 1519          Safety Issues/Impairments Affecting Functional Mobility    Safety Issues Affecting Function (Mobility) ability to follow commands;insight into  deficits/self-awareness;awareness of need for assistance;judgment  -     Impairments Affecting Function (Mobility) balance;cognition;coordination;endurance/activity tolerance;strength;postural/trunk control  -               User Key  (r) = Recorded By, (t) = Taken By, (c) = Cosigned By      Initials Name Provider Type    Tierra Griffith OT Occupational Therapist                     Mobility/ADL's       Row Name 12/30/24 1519          Bed Mobility    Supine-Sit Coleman (Bed Mobility) maximum assist (25% patient effort);dependent (less than 25% patient effort);2 person assist  -SM     Sit-Supine Coleman (Bed Mobility) maximum assist (25% patient effort);dependent (less than 25% patient effort);2 person assist  -SM       Row Name 12/30/24 1519          Sit-Stand Transfer    Sit-Stand Coleman (Transfers) maximum assist (25% patient effort);dependent (less than 25% patient effort);2 person assist  -SM     Comment, (Sit-Stand Transfer) attempted twice, pt requires knees blocked. HHAX2, unable to fully clear the bed on both attempts  -       Row Name 12/30/24 1519          Activities of Daily Living    BADL Assessment/Intervention grooming  -       Row Name 12/30/24 1519          Lower Body Dressing Assessment/Training    Coleman Level (Lower Body Dressing) socks;dependent (less than 25% patient effort)  -       Row Name 12/30/24 1519          Toileting Assessment/Training    Coleman Level (Toileting) dependent (less than 25% patient effort)  -       Row Name 12/30/24 1519          Grooming Assessment/Training    Coleman Level (Grooming) wash face, hands;maximum assist (25% patient effort);verbal cues  -     Position (Grooming) edge of bed sitting  -               User Key  (r) = Recorded By, (t) = Taken By, (c) = Cosigned By      Initials Name Provider Type    Tierra Griffith OT Occupational Therapist                   Obj/Interventions       Row Name 12/30/24 1526           Balance    Static Sitting Balance moderate assist;maximum assist  -     Position, Sitting Balance sitting edge of bed  -     Static Standing Balance dependent;2-person assist  -     Position/Device Used, Standing Balance supported  -     Balance Interventions occupation based/functional task;weight shifting activity;highly challenging  -               User Key  (r) = Recorded By, (t) = Taken By, (c) = Cosigned By      Initials Name Provider Type    Tierra Griffith OT Occupational Therapist                   Goals/Plan    No documentation.                  Clinical Impression       Row Name 12/30/24 1521          Pain Assessment    Pretreatment Pain Rating 0/10 - no pain  -     Posttreatment Pain Rating 0/10 - no pain  -       Row Name 12/30/24 1521          Plan of Care Review    Plan of Care Reviewed With patient;spouse  -     Progress no change  -     Outcome Evaluation Pt found in bed figiting with sheets. He follows simple commands but inconsistantly. Pt requires max/dep X2 assist to achieve EOB sitting and was not able to stand today. Pt has difficulty maintaining his sitting balance and has a heavy R sided lean. We attempted to correct with weight shifting to the L side and weight bearing down to LUE as well as cueing pt to reach to his L side with only brief improvement in sitting balance.  -       Row Name 12/30/24 1521          Therapy Plan Review/Discharge Plan (OT)    Anticipated Discharge Disposition (OT) skilled nursing facility  -       Row Name 12/30/24 1521          Positioning and Restraints    Pre-Treatment Position in bed  -     Post Treatment Position bed  -SM     In Bed fowlers;call light within reach;exit alarm on;encouraged to call for assist;notified nsg;with family/caregiver  -               User Key  (r) = Recorded By, (t) = Taken By, (c) = Cosigned By      Initials Name Provider Type    Tierra Griffith OT Occupational Therapist                    Outcome Measures       Row Name 12/30/24 1523          How much help from another is currently needed...    Putting on and taking off regular lower body clothing? 1  -SM     Bathing (including washing, rinsing, and drying) 1  -SM     Toileting (which includes using toilet bed pan or urinal) 1  -SM     Putting on and taking off regular upper body clothing 2  -SM     Taking care of personal grooming (such as brushing teeth) 2  -SM     Eating meals 1  -SM     AM-PAC 6 Clicks Score (OT) 8  -SM       Row Name 12/30/24 1523          Functional Assessment    Outcome Measure Options AM-PAC 6 Clicks Daily Activity (OT)  -               User Key  (r) = Recorded By, (t) = Taken By, (c) = Cosigned By      Initials Name Provider Type    Tierra Griffith OT Occupational Therapist                    Occupational Therapy Education       Title: PT OT SLP Therapies (In Progress)       Topic: Occupational Therapy (In Progress)       Point: ADL training (Done)       Description:   Instruct learner(s) on proper safety adaptation and remediation techniques during self care or transfers.   Instruct in proper use of assistive devices.                  Learning Progress Summary            Patient Acceptance, E, VU by  at 12/18/2024 7357    Comment: OT goals, POC                      Point: Home exercise program (Not Started)       Description:   Instruct learner(s) on appropriate technique for monitoring, assisting and/or progressing therapeutic exercises/activities.                  Learner Progress:  Not documented in this visit.              Point: Precautions (Not Started)       Description:   Instruct learner(s) on prescribed precautions during self-care and functional transfers.                  Learner Progress:  Not documented in this visit.              Point: Body mechanics (Not Started)       Description:   Instruct learner(s) on proper positioning and spine alignment during self-care, functional mobility activities  and/or exercises.                  Learner Progress:  Not documented in this visit.                              User Key       Initials Effective Dates Name Provider Type Discipline     04/02/20 -  Tierra Hanna OT Occupational Therapist OT                  OT Recommendation and Plan  Planned Therapy Interventions (OT): activity tolerance training, adaptive equipment training, BADL retraining, cognitive/visual perception retraining, functional balance retraining, IADL retraining, occupation/activity based interventions, orthotic fabrication/fitting/training, passive ROM/stretching, patient/caregiver education/training, ROM/therapeutic exercise, strengthening exercise, transfer/mobility retraining  Therapy Frequency (OT): 3 times/wk  Plan of Care Review  Plan of Care Reviewed With: patient, spouse  Progress: no change  Outcome Evaluation: Pt found in bed figiting with sheets. He follows simple commands but inconsistantly. Pt requires max/dep X2 assist to achieve EOB sitting and was not able to stand today. Pt has difficulty maintaining his sitting balance and has a heavy R sided lean. We attempted to correct with weight shifting to the L side and weight bearing down to LUE as well as cueing pt to reach to his L side with only brief improvement in sitting balance.     Time Calculation:         Time Calculation- OT       Row Name 12/30/24 1524             Time Calculation- OT    OT Start Time 1443  -SM      OT Stop Time 1503  -SM      OT Time Calculation (min) 20 min  -SM      Total Timed Code Minutes- OT 20 minute(s)  -SM      OT Received On 12/30/24  -      OT - Next Appointment 12/31/24  -         Timed Charges    79770 - OT Therapeutic Activity Minutes 20  -SM         Total Minutes    Timed Charges Total Minutes 20  -SM       Total Minutes 20  -SM                User Key  (r) = Recorded By, (t) = Taken By, (c) = Cosigned By      Initials Name Provider Type     Tierra Hanna OT Occupational  Therapist                  Therapy Charges for Today       Code Description Service Date Service Provider Modifiers Qty    [id number]  OT THERAPEUTIC ACT EA 15 MIN 12/30/2024 Tierra Hanna OT GO 1    [id number]  OT RE-EVAL 2 12/30/2024 Tierra Hanna OT GO 1                 Tierra Hanna OT  12/30/2024

## 2024-12-31 LAB
ALBUMIN SERPL-MCNC: 2.3 G/DL (ref 3.5–5.2)
ANION GAP SERPL CALCULATED.3IONS-SCNC: 7 MMOL/L (ref 5–15)
BUN SERPL-MCNC: 32 MG/DL (ref 8–23)
BUN/CREAT SERPL: 48.5 (ref 7–25)
CALCIUM SPEC-SCNC: 7.6 MG/DL (ref 8.2–9.6)
CHLORIDE SERPL-SCNC: 110 MMOL/L (ref 98–107)
CO2 SERPL-SCNC: 20 MMOL/L (ref 22–29)
CREAT SERPL-MCNC: 0.66 MG/DL (ref 0.76–1.27)
DEPRECATED RDW RBC AUTO: 51 FL (ref 37–54)
EGFRCR SERPLBLD CKD-EPI 2021: 89.1 ML/MIN/1.73
ERYTHROCYTE [DISTWIDTH] IN BLOOD BY AUTOMATED COUNT: 14.5 % (ref 12.3–15.4)
GLUCOSE BLDC GLUCOMTR-MCNC: 103 MG/DL (ref 70–130)
GLUCOSE BLDC GLUCOMTR-MCNC: 109 MG/DL (ref 70–130)
GLUCOSE BLDC GLUCOMTR-MCNC: 118 MG/DL (ref 70–130)
GLUCOSE BLDC GLUCOMTR-MCNC: 122 MG/DL (ref 70–130)
GLUCOSE BLDC GLUCOMTR-MCNC: 127 MG/DL (ref 70–130)
GLUCOSE SERPL-MCNC: 119 MG/DL (ref 65–99)
HCT VFR BLD AUTO: 30.7 % (ref 37.5–51)
HGB BLD-MCNC: 10 G/DL (ref 13–17.7)
MCH RBC QN AUTO: 31.5 PG (ref 26.6–33)
MCHC RBC AUTO-ENTMCNC: 32.6 G/DL (ref 31.5–35.7)
MCV RBC AUTO: 96.8 FL (ref 79–97)
PHOSPHATE SERPL-MCNC: 2.6 MG/DL (ref 2.5–4.5)
PLATELET # BLD AUTO: 219 10*3/MM3 (ref 140–450)
PMV BLD AUTO: 11.8 FL (ref 6–12)
POTASSIUM SERPL-SCNC: 5.1 MMOL/L (ref 3.5–5.2)
RBC # BLD AUTO: 3.17 10*6/MM3 (ref 4.14–5.8)
SODIUM SERPL-SCNC: 137 MMOL/L (ref 136–145)
WBC NRBC COR # BLD AUTO: 9.19 10*3/MM3 (ref 3.4–10.8)

## 2024-12-31 PROCEDURE — 82948 REAGENT STRIP/BLOOD GLUCOSE: CPT

## 2024-12-31 PROCEDURE — 25810000003 SODIUM CHLORIDE 0.9 % SOLUTION 250 ML FLEX CONT: Performed by: INTERNAL MEDICINE

## 2024-12-31 PROCEDURE — 25010000002 ACYCLOVIR PER 5 MG: Performed by: INTERNAL MEDICINE

## 2024-12-31 PROCEDURE — 25010000002 ENOXAPARIN PER 10 MG: Performed by: STUDENT IN AN ORGANIZED HEALTH CARE EDUCATION/TRAINING PROGRAM

## 2024-12-31 PROCEDURE — 97530 THERAPEUTIC ACTIVITIES: CPT

## 2024-12-31 PROCEDURE — 85027 COMPLETE CBC AUTOMATED: CPT | Performed by: STUDENT IN AN ORGANIZED HEALTH CARE EDUCATION/TRAINING PROGRAM

## 2024-12-31 PROCEDURE — 80069 RENAL FUNCTION PANEL: CPT | Performed by: INTERNAL MEDICINE

## 2024-12-31 RX ORDER — AMIODARONE HYDROCHLORIDE 200 MG/1
200 TABLET ORAL EVERY 12 HOURS SCHEDULED
Status: DISCONTINUED | OUTPATIENT
Start: 2024-12-31 | End: 2025-01-09 | Stop reason: HOSPADM

## 2024-12-31 RX ORDER — METOPROLOL TARTRATE 25 MG/1
12.5 TABLET, FILM COATED ORAL EVERY 12 HOURS SCHEDULED
Status: DISCONTINUED | OUTPATIENT
Start: 2024-12-31 | End: 2025-01-09 | Stop reason: HOSPADM

## 2024-12-31 RX ORDER — GUAIFENESIN 200 MG/10ML
200 LIQUID ORAL EVERY 4 HOURS
Status: DISCONTINUED | OUTPATIENT
Start: 2024-12-31 | End: 2025-01-09 | Stop reason: HOSPADM

## 2024-12-31 RX ADMIN — ENOXAPARIN SODIUM 70 MG: 100 INJECTION SUBCUTANEOUS at 20:57

## 2024-12-31 RX ADMIN — AMIODARONE HYDROCHLORIDE 200 MG: 200 TABLET ORAL at 21:03

## 2024-12-31 RX ADMIN — Medication 1000 MCG: at 06:06

## 2024-12-31 RX ADMIN — GUAIFENESIN 200 MG: 100 LIQUID ORAL at 21:02

## 2024-12-31 RX ADMIN — Medication 10 ML: at 10:17

## 2024-12-31 RX ADMIN — CARBIDOPA AND LEVODOPA 1 TABLET: 25; 250 TABLET ORAL at 10:16

## 2024-12-31 RX ADMIN — GUAIFENESIN 600 MG: 600 TABLET, MULTILAYER, EXTENDED RELEASE ORAL at 10:16

## 2024-12-31 RX ADMIN — AMIODARONE HYDROCHLORIDE 200 MG: 200 TABLET ORAL at 10:16

## 2024-12-31 RX ADMIN — TERAZOSIN HYDROCHLORIDE 1 MG: 1 CAPSULE ORAL at 20:58

## 2024-12-31 RX ADMIN — DIBASIC SODIUM PHOSPHATE, MONOBASIC POTASSIUM PHOSPHATE AND MONOBASIC SODIUM PHOSPHATE 2 TABLET: 852; 155; 130 TABLET ORAL at 12:06

## 2024-12-31 RX ADMIN — ATORVASTATIN CALCIUM 40 MG: 20 TABLET, FILM COATED ORAL at 21:02

## 2024-12-31 RX ADMIN — CARBIDOPA AND LEVODOPA 1 TABLET: 25; 250 TABLET ORAL at 21:03

## 2024-12-31 RX ADMIN — ENOXAPARIN SODIUM 70 MG: 100 INJECTION SUBCUTANEOUS at 10:17

## 2024-12-31 RX ADMIN — ACYCLOVIR SODIUM 700 MG: 50 INJECTION, SOLUTION INTRAVENOUS at 10:17

## 2024-12-31 RX ADMIN — DIBASIC SODIUM PHOSPHATE, MONOBASIC POTASSIUM PHOSPHATE AND MONOBASIC SODIUM PHOSPHATE 2 TABLET: 852; 155; 130 TABLET ORAL at 10:16

## 2024-12-31 RX ADMIN — LEVOTHYROXINE SODIUM 75 MCG: 75 TABLET ORAL at 06:06

## 2024-12-31 RX ADMIN — FINASTERIDE 5 MG: 5 TABLET, FILM COATED ORAL at 10:16

## 2024-12-31 RX ADMIN — GUAIFENESIN 200 MG: 100 LIQUID ORAL at 14:31

## 2024-12-31 RX ADMIN — ACYCLOVIR SODIUM 700 MG: 50 INJECTION, SOLUTION INTRAVENOUS at 18:06

## 2024-12-31 RX ADMIN — Medication 10 ML: at 21:04

## 2024-12-31 RX ADMIN — ACYCLOVIR SODIUM 700 MG: 50 INJECTION, SOLUTION INTRAVENOUS at 02:26

## 2024-12-31 RX ADMIN — GUAIFENESIN 200 MG: 100 LIQUID ORAL at 16:45

## 2024-12-31 RX ADMIN — METOPROLOL TARTRATE 12.5 MG: 25 TABLET, FILM COATED ORAL at 21:03

## 2024-12-31 RX ADMIN — CARBIDOPA AND LEVODOPA 1 TABLET: 25; 250 TABLET ORAL at 16:45

## 2024-12-31 NOTE — PLAN OF CARE
Goal Outcome Evaluation:              Outcome Evaluation: Thorough chart review completed. Pt well known to this clinician and speech therapy department. Followed with speech therapy in home health and outpatient settings to target moderate-severe oropharyngeal dysphagia. At the time of discharge, pt was at risk to aspirate all consistencies and chose PO diet (soft to chew/thins) for QOL reasons. Most recent VFSS performed 12/27/24 revealed aspiration of honey thick liquids and puree consistency. 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 Feel patient's dysphagia is chronic and safest recommendation is NPO.

## 2024-12-31 NOTE — PLAN OF CARE
Goal Outcome Evaluation:  Plan of Care Reviewed With: patient, child        Progress: no change  Outcome Evaluation: Pt lethargic upon entry , dtr present and encouraging pt to participate, pt cindy supine-sit w/max/dep of 2 at times, pt cindy sitting EOB ~12 min, worked on lateral trunk stretching exer , elbow push-ups, head and trunk upright, pt was having difficulty paying attention, did have dtr cue pt at times since staff was masked and thought he could understand her voice best, pt not completely on task , very rigid in all extremities today, pt did participate with LAQs EOB but required assist to complete 5 reps, unsure of complete plans but dtr mentioned a PEG placement , then SNU , they are not ready to decide on palliative yet    Anticipated Discharge Disposition (PT): skilled nursing facility

## 2024-12-31 NOTE — PLAN OF CARE
Goal Outcome Evaluation:         Pt alert to self.  NPO, tube feedings tolerated well.  Family at bedside.

## 2024-12-31 NOTE — PLAN OF CARE
Goal Outcome Evaluation:  Aox1, oriented to self. Abx given. Tube feds 65 at goal, water flush changed to 300 q4h. Dressings changed today. No signs or symptoms of distress. Care provided per MD orders.

## 2024-12-31 NOTE — THERAPY TREATMENT NOTE
Patient Name: Casey Snowden .  : 1934    MRN: [id number]                              Today's Date: 2024       Admit Date: 2024    Visit Dx:     ICD-10-CM ICD-9-CM   1. Unable to ambulate  R26.2 719.7   2. Herpes zoster with complication: S2 and S3 distribution on the right  B02.8 053.8   3. Parkinson's disease, unspecified whether dyskinesia present, unspecified whether manifestations fluctuate  G20.A1 332.0     Patient Active Problem List   Diagnosis    Benign essential hypertension    Stenosis of carotid artery    Mixed hyperlipidemia    Parkinson's disease    Peripheral arterial occlusive disease    Screening for prostate cancer    Medication monitoring encounter    Melanoma    Chronic deep vein thrombosis (DVT) of popliteal vein of right lower extremity    Uses hearing aid    Paroxysmal A-fib    Chronic diastolic (congestive) heart failure    Anticoagulant long-term use    Presence of IVC filter    Medicare annual wellness visit, subsequent    Iron deficiency anemia secondary to inadequate dietary iron intake    Orthostatic hypotension due to Parkinson's disease    Coronary artery disease involving native coronary artery of native heart without angina pectoris    ST elevation myocardial infarction involving left anterior descending (LAD) coronary artery    Sialorrhea    Hypertensive heart disease with heart failure    PAD (peripheral artery disease)    Metastatic squamous cell carcinoma    Secondary malignancy of lymph nodes of head, face and neck    Encounter for antineoplastic immunotherapy    RBD (REM behavioral disorder)    Acquired hypothyroidism    Aspiration pneumonia of right lung due to vomit    Dysphagia, neurologic    ICAO (internal carotid artery occlusion), right    Arterial ischemic stroke, ICA (internal carotid artery), right, chroni    Renal mass, left    Abdominal aortic aneurysm    Disorientation    Falls frequently    Right inguinal hernia    Unable to ambulate     Herpes zoster without complication    LISY (acute kidney injury)    Moderate protein-calorie malnutrition     Past Medical History:   Diagnosis Date    Abdominal aortic aneurysm without rupture 10/14/2021    Acquired hypothyroidism 08/31/2022    Acute ischemic stroke 10/08/2023    Aneurysm     Arrhythmia     Atrial fibrillation     Cancer 04/2018    basil cell carcinoma    Carotid stenosis 10/29/2015    CHF (congestive heart failure)     Chronic deep vein thrombosis (DVT) of popliteal vein of right lower extremity     Clotting disorder     Coronary artery disease involving native coronary artery of native heart without angina pectoris 12/20/2018    DVT of lower extremity (deep venous thrombosis)     Hard of hearing     Hyperlipidemia     Hypertension     Localized edema 01/04/2017    Long-term use of high-risk medication     Metabolic encephalopathy 02/07/2023    Myocardial infarction 2918    LAD stent    PAD (peripheral artery disease) 09/10/2020    Parkinson's disease     Postphlebitic syndrome     Pure hypercholesterolemia     Skin tear of upper arm without complication, left, subsequent encounter     Stroke 11/02/2023     Past Surgical History:   Procedure Laterality Date    BASAL CELL CARCINOMA EXCISION  04/2018    CARDIAC CATHETERIZATION N/A 11/05/2018    Procedure: Left Heart Cath;  Surgeon: Oliverio Azar MD;  Location:  [facility] C[facility] INVASIVE LOCATION;  Service: Cardiovascular    CARDIAC CATHETERIZATION N/A 11/05/2018    Procedure: Coronary angiography;  Surgeon: Oliverio Azar MD;  [facility];  Service: Cardiovascular    CARDIAC CATHETERIZATION N/A 11/05/2018    Procedure: Left ventriculography;  Surgeon: Oliverio Azar MD;  Location:  [facility] CATH INVASIVE LOCATION;  Service: Cardiovascular    CARDIAC CATHETERIZATION N/A 06/04/2019    Procedure: Left Heart Cath;  Surgeon: Johnny Glasgow MD;  [facility];  Service: Cardiovascular    CARDIAC  CATHETERIZATION N/A 06/04/2019    Procedure: Coronary angiography;  Surgeon: Johnny Glasgow MD;  Location: [facility] CATH INVASIVE LOCATION;  Service: Cardiovascular    CARDIAC CATHETERIZATION N/A 06/04/2019    Procedure: Left ventriculography;  Surgeon: Johnny Glasgow MD;  Location: [facility] CATH INVASIVE LOCATION;  Service: Cardiovascular    CARDIAC CATHETERIZATION N/A 06/04/2019    Procedure: Stent BILL coronary;  Surgeon: Johnny Glasgow MD;  Location: [facility] CATH INVASIVE LOCATION;  Service: Cardiovascular    CARDIAC SURGERY      CAROTID ARTERY ANGIOPLASTY Right 10/11/2023    with stent    CAROTID STENT      CATARACT EXTRACTION Left 08/2018    CATARACT EXTRACTION Right 09/2018    COLONOSCOPY      2011    CORONARY STENT PLACEMENT      INCISION AND DRAINAGE LEG Right 07/07/2017    Procedure: INCISION AND DRAINAGE INFECTED HEMATOMA RIGHT THIGH;  Surgeon: Valentin Peña MD;  Location: [facility] OR;  Service:     JOINT REPLACEMENT      KNEE INCISION AND DRAINAGE Right 12/27/2016    Procedure: KNEE INCISION AND DRAINAGE;  Surgeon: Triston Whitten MD;  Location: [facility] OR;  Service:     NOSE SURGERY      nose replacement    SINUS SURGERY  1960    SKIN BIOPSY      SKIN GRAFT  12/2017    TOTAL KNEE ARTHROPLASTY Right     VASCULAR SURGERY      VENA CAVA FILTER PLACEMENT        General Information       Row Name 12/31/24 1640          Physical Therapy Time and Intention    Document Type therapy note (daily note)  -     Mode of Treatment individual therapy;physical therapy  -       Row Name 12/31/24 1640          General Information    Patient Profile Reviewed yes  -     Existing Precautions/Restrictions fall;NPO  -       Row Name 12/31/24 1640          Living Environment    People in Home spouse  -       Row Name 12/31/24 1640          Cognition    Orientation Status (Cognition) oriented to;person  -       Row Name 12/31/24 1640          Safety Issues/Impairments Affecting  Functional Mobility    Safety Issues Affecting Function (Mobility) ability to follow commands;judgment;problem-solving;safety precaution awareness;safety precautions follow-through/compliance  -     Impairments Affecting Function (Mobility) balance;cognition;coordination;endurance/activity tolerance;postural/trunk control;strength  very rigid today all extremities  -               User Key  (r) = Recorded By, (t) = Taken By, (c) = Cosigned By      Initials Name Provider Type    Tia Lechuga PTA Physical Therapist Assistant                   Mobility       Row Name 12/31/24 1642          Bed Mobility    Supine-Sit Banks (Bed Mobility) 2 person assist;maximum assist (25% patient effort);verbal cues;nonverbal cues (demo/gesture)  -     Sit-Supine Banks (Bed Mobility) 2 person assist;maximum assist (25% patient effort);verbal cues;nonverbal cues (demo/gesture)  -     Assistive Device (Bed Mobility) head of bed elevated;repositioning sheet  -       Row Name 12/31/24 1642          Bed-Chair Transfer    Bed-Chair Banks (Transfers) not tested  -JM       Row Name 12/31/24 1642          Sit-Stand Transfer    Sit-Stand Banks (Transfers) unable to assess  -     Comment, (Sit-Stand Transfer) poor attention and poor posture limiting , unsafe to attempt standing today  -[facility] Name 12/31/24 1642          Gait/Stairs (Locomotion)    Assistive Device (Gait) --  HHA-2  -               User Key  (r) = Recorded By, (t) = Taken By, (c) = Cosigned By      Initials Name Provider Type    Tia Lechuga PTA Physical Therapist Assistant                   Obj/Interventions       Row Name 12/31/24 1644          Motor Skills    Therapeutic Exercise --  LAQs x5 reps, constant cues and some assist to perf  -       Row Name 12/31/24 1644          Balance    Static Sitting Balance moderate assist;maximum assist;2-person assist  varied  -     Position, Sitting Balance sitting edge of  bed  -               User Key  (r) = Recorded By, (t) = Taken By, (c) = Cosigned By      Initials Name Provider Type    Tia Lechuga PTA Physical Therapist Assistant                   Goals/Plan    No documentation.                  Clinical Impression       Row Name 12/31/24 1645          Pain    Pretreatment Pain Rating 0/10 - no pain  -     Posttreatment Pain Rating 0/10 - no pain  -       Row Name 12/31/24 1645          Plan of Care Review    Plan of Care Reviewed With patient;child  -     Progress no change  -     Outcome Evaluation Pt lethargic upon entry , dtr present and encouraging pt to participate, pt cindy supine-sit w/max/dep of 2 at times, pt cindy sitting EOB ~12 min, worked on lateral trunk stretching exer , elbow push-ups, head and trunk upright, pt was having difficulty paying attention, did have dtr cue pt at times since staff was masked and thought he could understand her voice best, pt not completely on task , very rigid in all extremities today, pt did participate with LAQs EOB but required assist to complete 5 reps, unsure of complete plans but dtr mentioned a PEG placement , then SNU , they are not ready to decide on palliative yet  -       Row Name 12/31/24 1645          Therapy Assessment/Plan (PT)    Rehab Potential (PT) limited  -     Criteria for Skilled Interventions Met (PT) yes  -[facility] Name 12/31/24 1645          Positioning and Restraints    Pre-Treatment Position in bed  -     Post Treatment Position bed  -     In Bed fowlers;call light within reach;encouraged to call for assist;exit alarm on;with family/caregiver  -               User Key  (r) = Recorded By, (t) = Taken By, (c) = Cosigned By      Initials Name Provider Type    Tia Lechuga PTA Physical Therapist Assistant                   Outcome Measures       Row Name 12/31/24 1653 12/31/24 1016       How much help from another person do you currently need...    Turning from your back to  your side while in flat bed without using bedrails? 2  -STEVENSON 2  -ES    Moving from lying on back to sitting on the side of a flat bed without bedrails? 2  -STEVENSON 2  -ES    Moving to and from a bed to a chair (including a wheelchair)? 1  -STEVENSON 2  -ES    Standing up from a chair using your arms (e.g., wheelchair, bedside chair)? 1  -STEVENSON 1  -ES    Climbing 3-5 steps with a railing? 1  -STEVENSON 1  -ES    To walk in hospital room? 1  -STEVENSON 1  -ES    AM-PAC 6 Clicks Score (PT) 8  - 9  -ES    Highest Level of Mobility Goal 3 --> Sit at edge of bed  -JM 3 --> Sit at edge of bed  -ES              User Key  (r) = Recorded By, (t) = Taken By, (c) = Cosigned By      Initials Name Provider Type    Tia Lechuga PTA Physical Therapist Assistant    Irais Mcguire RN Registered Nurse                                 Physical Therapy Education       Title: PT OT SLP Therapies (In Progress)       Topic: Physical Therapy (In Progress)       Point: Mobility training (In Progress)       Learning Progress Summary            Patient Acceptance, D,E, NR by STEVENSON at 12/31/2024 1654    Acceptance, E,D, NR by STEVENSON at 12/26/2024 1641    Acceptance, E,TB,D, NL,NR by  at 12/23/2024 1556    Acceptance, E, VU,NR by  at 12/18/2024 1606   Family Acceptance, D,E, NR by STEVENSON at 12/31/2024 1654    Acceptance, E,D, NR by STEVENSON at 12/26/2024 1641                      Point: Home exercise program (In Progress)       Learning Progress Summary            Patient Acceptance, D,E, NR by STEVENSON at 12/31/2024 1654    Acceptance, E,D, NR by STEVENSON at 12/26/2024 1641    Acceptance, E,TB,D, NL,NR by  at 12/23/2024 1556    Acceptance, E, VU,NR by  at 12/18/2024 1606   Family Acceptance, D,E, NR by STEVENSON at 12/31/2024 1654    Acceptance, E,D, NR by STEVENSON at 12/26/2024 1641                      Point: Body mechanics (In Progress)       Learning Progress Summary            Patient Acceptance, D,E, NR by STEVENSON at 12/31/2024 1654    Acceptance, E,D, NR by STEVENSON at 12/26/2024 1640     Acceptance, E,TB,D, NL,NR by  at 12/23/2024 1556    Acceptance, E, VU,NR by  at 12/18/2024 1606   Family Acceptance, D,E, NR by  at 12/31/2024 1654    Acceptance, E,D, NR by  at 12/26/2024 1641                      Point: Precautions (In Progress)       Learning Progress Summary            Patient Acceptance, D,E, NR by  at 12/31/2024 1654    Acceptance, E,D, NR by  at 12/26/2024 1641    Acceptance, E,TB,D, NL,NR by  at 12/23/2024 1556    Acceptance, E, VU,NR by  at 12/18/2024 1606   Family Acceptance, D,E, NR by  at 12/31/2024 1654    Acceptance, E,D, NR by  at 12/26/2024 1641                                      User Key       Initials Effective Dates Name Provider Type Discipline     06/16/21 -  Alexandra Lucio, PT Physical Therapist PT     03/07/18 -  Tia Encarnacion PTA Physical Therapist Assistant PT     04/08/22 -  Debbie Connolly, PT Physical Therapist PT                  PT Recommendation and Plan     Progress: no change  Outcome Evaluation: Pt lethargic upon entry , dtr present and encouraging pt to participate, pt cindy supine-sit w/max/dep of 2 at times, pt cindy sitting EOB ~12 min, worked on lateral trunk stretching exer , elbow push-ups, head and trunk upright, pt was having difficulty paying attention, did have dtr cue pt at times since staff was masked and thought he could understand her voice best, pt not completely on task , very rigid in all extremities today, pt did participate with LAQs EOB but required assist to complete 5 reps, unsure of complete plans but dtr mentioned a PEG placement , then SNU , they are not ready to decide on palliative yet     Time Calculation:         PT Charges       Row Name 12/31/24 1654             Time Calculation    Start Time 1340  -      Stop Time 1415  -      Time Calculation (min) 35 min  -      PT Received On 12/31/24  -      PT - Next Appointment 01/02/25  -                User Key  (r) = Recorded By, (t) = Taken By, (c) =  Cosigned By      Initials Name Provider Type    Tia Lechuga PTA Physical Therapist Assistant                  Therapy Charges for Today       Code Description Service Date Service Provider Modifiers Qty    92897727131 HC PT THERAPEUTIC ACT EA 15 MIN 12/31/2024 Tia Encarnacion PTA GP 2    96974583660 HC PT THER SUPP EA 15 MIN 12/31/2024 Tia Encarnacion PTA GP 2            PT G-Codes  Outcome Measure Options: AM-PAC 6 Clicks Daily Activity (OT)  AM-PAC 6 Clicks Score (PT): 8  AM-PAC 6 Clicks Score (OT): 8  PT Discharge Summary  Anticipated Discharge Disposition (PT): skilled nursing facility    Tia Encarnacion PTA  12/31/2024

## 2024-12-31 NOTE — CONSULTS
Nutrition Services    Patient Name:  Casey Snowden Sr.  YOB: 1934  MRN: [id number]  Admit Date:  12/17/2024    Assessment Date:  12/31/24    Summary: TF follow up  RN reports pt tolerating formula/feedings well.  Family at bedside report pt does not want to have a BM using the bedpan.  Verified the formula, rate and flushes in room at bedside    Weight continues to increase greater than feedings provide, currently up 16.5 kg since admission    TF are at goal, Isosource 1.5 @ 65 mL/hr with 350 mL q4 FWF (flushes per nephrology) meeting 100% kcal and protein needs    CLINICAL NUTRITION ASSESSMENT      Reason for Assessment Follow-up Protocol     Diagnosis/Problem   Unable to ambulate, shingles, parkinson's disease, NPO with cortrak for EN.   Medical/Surgical History Past Medical History:   Diagnosis Date    Abdominal aortic aneurysm without rupture 10/14/2021    Acquired hypothyroidism 08/31/2022    Acute ischemic stroke 10/08/2023    Aneurysm     Arrhythmia     Atrial fibrillation     Cancer 04/2018    basil cell carcinoma    Carotid stenosis 10/29/2015    CHF (congestive heart failure)     Chronic deep vein thrombosis (DVT) of popliteal vein of right lower extremity     Clotting disorder     Coronary artery disease involving native coronary artery of native heart without angina pectoris 12/20/2018    DVT of lower extremity (deep venous thrombosis)     Hard of hearing     Hyperlipidemia     Hypertension     Localized edema 01/04/2017    Long-term use of high-risk medication     Metabolic encephalopathy 02/07/2023    Myocardial infarction 2918    LAD stent    PAD (peripheral artery disease) 09/10/2020    Parkinson's disease     Postphlebitic syndrome     Pure hypercholesterolemia     Skin tear of upper arm without complication, left, subsequent encounter     Stroke 11/02/2023       Past Surgical History:   Procedure Laterality Date    BASAL CELL CARCINOMA EXCISION  04/2018    CARDIAC CATHETERIZATION  N/A 11/05/2018    Procedure: Left Heart Cath;  Surgeon: Oliverio Azar MD;  Location: [facility] C[facility]ASIVE LOC[facility];  Service: Cardiovascular    CARDIAC CATHETERIZATION N/A 11/05/2018    Procedure: Coronary angiography;  Surgeon: Oliverio Azar MD;  Location:  [facility] C[facility] INVASIVE LOCATION;  Service: Cardiovascular    CARDIAC CATHETERIZATION N/A 11/05/2018    Procedure: Left ventriculography;  Surgeon: Oliverio Azar MD;  [facility];  Service: Cardiovascular    CARDIAC CATHETERIZATION N/A 06/04/2019    Procedure: Left Heart Cath;  Surgeon: Johnny Glasgow MD;  [facility];  Service: Cardiovascular    CARDIAC CATHETERIZATION N/A 06/04/2019    Procedure: Coronary angiography;  Surgeon: Johnny Glasgow MD;  [facility];  Service: Cardiovascular    CARDIAC CATHETERIZATION N/A 06/04/2019    Procedure: Left ventriculography;  Surgeon: Johnny Glasgow MD;  Location: [facility] CATH INVASIVE LOCATION;  Service: Cardiovascular    CARDIAC CATHETERIZATION N/A 06/04/2019    Procedure: Stent BILL coronary;  Surgeon: Johnny Glasgow MD;  Location: [facility] CATH INVASIVE LOCATION;  Service: Cardiovascular    CARDIAC SURGERY      CAROTID ARTERY ANGIOPLASTY Right 10/11/2023    with stent    CAROTID STENT      CATARACT EXTRACTION Left 08/2018    CATARACT EXTRACTION Right 09/2018    COLONOSCOPY      2011    CORONARY STENT PLACEMENT      INCISION AND DRAINAGE LEG Right 07/07/2017    Procedure: INCISION AND DRAINAGE INFECTED HEMATOMA RIGHT THIGH;  Surgeon: Valentin Peña MD;  Location: [facility] OR;  Service:     JOINT REPLACEMENT      KNEE INCISION AND DRAINAGE Right 12/27/2016    Procedure: KNEE INCISION AND DRAINAGE;  Surgeon: Triston Whitten MD;  Location: [facility] OR;  Service:     NOSE SURGERY      nose replacement    SINUS SURGERY  1960    SKIN BIOPSY      SKIN GRAFT  12/2017    TOTAL KNEE ARTHROPLASTY Right  "    VASCULAR SURGERY      VENA CAVA FILTER PLACEMENT          Anthropometrics        Current Height  Current Weight  BMI kg/m2 Height: 177.8 cm (70\")  Weight: 83.2 kg (183 lb 6.8 oz) (12/31/24 0600)  Body mass index is 26.32 kg/m².   Adjusted BMI (if applicable)    BMI Category Normal/Healthy (18.4 - 24.9)   Ideal Body Weight (IBW) 166#   Usual Body Weight (UBW) Unknown    Weight Trend Other: wt fluctuating 145-166# this admission, net fluid gain of ~13.8L this admission per I/O documentation      Weight History Wt Readings from Last 30 Encounters:   12/31/24 0600 83.2 kg (183 lb 6.8 oz)   12/30/24 0412 78.6 kg (173 lb 4.5 oz)   12/29/24 0519 74.9 kg (165 lb 2 oz)   12/28/24 0408 74.2 kg (163 lb 9.3 oz)   12/27/24 0403 75.5 kg (166 lb 7.2 oz)   12/26/24 0658 74.5 kg (164 lb 3.9 oz)   12/25/24 0536 70 kg (154 lb 5.2 oz)   12/24/24 0505 65.4 kg (144 lb 2.9 oz)   12/23/24 0500 66.4 kg (146 lb 6.2 oz)   12/22/24 0510 68.7 kg (151 lb 7.3 oz)   12/21/24 0500 66 kg (145 lb 8.1 oz)   12/18/24 0500 66.7 kg (147 lb 0.8 oz)   11/25/24 1624 68.9 kg (152 lb)   11/13/24 1140 67.8 kg (149 lb 8 oz)   11/06/24 1336 66.9 kg (147 lb 8 oz)   08/27/24 1119 68.9 kg (152 lb)   08/14/24 1632 71.7 kg (158 lb)   08/14/24 1112 70 kg (154 lb 4.8 oz)   08/09/24 1256 70.3 kg (155 lb)   07/03/24 1105 68.4 kg (150 lb 11.2 oz)   07/02/24 0957 68.9 kg (152 lb)   05/29/24 1001 68.9 kg (152 lb)   05/28/24 1414 68 kg (149 lb 14.4 oz)   05/28/24 0920 68 kg (150 lb)   05/24/24 1352 79.4 kg (175 lb)   05/08/24 1355 70.3 kg (155 lb)   02/05/24 1322 74.8 kg (165 lb)   11/28/23 0909 72.1 kg (159 lb)   11/22/23 1253 76 kg (167 lb 9.6 oz)   11/20/23 1317 74.4 kg (164 lb)   11/09/23 1417 74.7 kg (164 lb 11.2 oz)   10/18/23 1256 72.6 kg (160 lb)   10/18/23 1511 75.8 kg (167 lb)   10/16/23 0601 75.9 kg (167 lb 5.3 oz)   10/15/23 0500 76.4 kg (168 lb 6.9 oz)   10/14/23 0651 78.2 kg (172 lb 6.4 oz)   10/13/23 0600 76 kg (167 lb 8.8 oz)   10/12/23 0930 73.9 kg (163 " lb)   10/12/23 0529 74.1 kg (163 lb 5.8 oz)   10/11/23 1132 75.8 kg (167 lb)   10/09/23 1154 76 kg (167 lb 8.8 oz)   08/29/23 1611 77.6 kg (171 lb)   08/15/23 1120 74.8 kg (165 lb)   04/20/23 1112 75.1 kg (165 lb 8 oz)   03/31/23 1242 78 kg (172 lb)   02/15/23 1259 77.6 kg (171 lb)   02/07/23 0651 76.9 kg (169 lb 8.5 oz)   02/07/23 0431 77 kg (169 lb 12.1 oz)   02/03/23 0928 77.6 kg (171 lb)        Estimated/Assessed Needs    Estimated 12/20, remains appropriate    Energy Requirements    EST Needs, Method, Wt used 5763-2397 kcal/day (30-35 kcal/kg, 66.7 kg)       Protein Requirements    EST Needs, Method, Wt used  g/day (1.2-1.5 g/kg, 66.7 kg)       Fluid Requirements     Estimated Needs (mL/day) 7862-3099 mL. Heart failure.     Labs       Pertinent Labs    Results from last 7 days   Lab Units 12/30/24  1100 12/29/24  0548 12/28/24  1359 12/27/24  0518   SODIUM mmol/L 140 144 142 141   POTASSIUM mmol/L 4.3 4.5 4.5 4.2   CHLORIDE mmol/L 111* 115* 110* 109*   CO2 mmol/L 21.0* 23.0 24.0 21.5*   BUN mg/dL 30* 41* 44* 49*   CREATININE mg/dL 0.63* 0.79 0.94 0.99   CALCIUM mg/dL 7.4* 7.8* 7.6* 7.6*   BILIRUBIN mg/dL  --   --   --  0.2   ALK PHOS U/L  --   --   --  70   ALT (SGPT) U/L  --   --   --  33   AST (SGOT) U/L  --   --   --  87*   GLUCOSE mg/dL 118* 135* 122* 147*     Results from last 7 days   Lab Units 12/31/24  0708 12/30/24  1100 12/29/24  0548 12/28/24  1359 12/27/24  0518   MAGNESIUM mg/dL  --  1.8  --  2.2 1.5*   PHOSPHORUS mg/dL  --  2.0*   < > 2.8 3.5   HEMOGLOBIN g/dL 10.0* 10.3*   < > 10.2* 10.4*   HEMATOCRIT % 30.7* 31.1*   < > 31.0* 30.9*   WBC 10*3/mm3 9.19 8.55   < > 7.30 7.65   ALBUMIN g/dL  --  2.3*   < > 2.5* 2.3*    < > = values in this interval not displayed.     Results from last 7 days   Lab Units 12/31/24  0708 12/30/24  1100 12/29/24  0548 12/28/24  1359 12/27/24  0518   PLATELETS 10*3/mm3 219 242 231 218 189     COVID19   Date Value Ref Range Status   10/23/2022 Not Detected Not  Detected - Ref. Range Final     Lab Results   Component Value Date    HGBA1C 5.40 12/18/2024          Medications           Scheduled Medications acyclovir, 10 mg/kg, Intravenous, Q8H  amiodarone, 200 mg, Oral, Q12H  [Held by provider] apixaban, 5 mg, Oral, Q12H  atorvastatin, 40 mg, Nasogastric, Nightly  carbidopa-levodopa, 1 tablet, Nasogastric, TID  enoxaparin, 1 mg/kg, Subcutaneous, Q12H  finasteride, 5 mg, Nasogastric, Daily  guaiFENesin, 600 mg, Oral, Q12H  insulin regular, 2-7 Units, Subcutaneous, Q6H  levothyroxine, 75 mcg, Nasogastric, Daily  metoprolol succinate XL, 25 mg, Oral, Q24H  [Held by provider] oxybutynin XL, 10 mg, Oral, Daily  phosphorus, 500 mg, Oral, 4x Daily  sodium chloride, 10 mL, Intravenous, Q12H  terazosin, 1 mg, Nasogastric, Nightly  vitamin B-12, 1,000 mcg, Nasogastric, Q AM       Infusions       PRN Medications   acetaminophen **OR** acetaminophen **OR** acetaminophen    atropine    dextrose    dextrose    glucagon (human recombinant)    Magnesium Standard Dose Replacement - Follow Nurse / BPA Driven Protocol    metoprolol tartrate    ondansetron    Potassium Replacement - Follow Nurse / BPA Driven Protocol    [COMPLETED] Insert Peripheral IV **AND** sodium chloride    sodium chloride    sodium chloride     Physical Findings          General Findings appeared asleep, room air, other: did not respond to RD presence or conversation with family   Oral/Mouth Cavity tooth or teeth missing   Edema  1+ (trace), 2+ (mild), 3+ (moderate)  1+ arms, R leg, R ankle, R foot   Gastrointestinal last bowel movement: 12/29   Skin  other: lesions, shingles   Tubes/Drains/Lines Cortrak, NG tube   NFPE See Malnutrition Severity Assessment, Date Completed: 12/20  NFPE completed, consistent with nutrition diagnosis of moderate malnutrition using AND/ASPEN criteria of subcutaneous fat and muscle mass loss.        Malnutrition Severity Assessment      Patient meets criteria for : Moderate (non-severe)  Malnutrition           Current Nutrition Orders & Evaluation of Intake       Oral Nutrition     Food Allergies NKFA   Current PO Diet NPO Diet NPO Type: Tube Feeding   Supplement n/a   PO Evaluation     % PO Intake NPO    Factors Affecting Intake: swallow impairment     PES STATEMENT / NUTRITION DIAGNOSIS      Nutrition Dx Problem  Moderate chronic disease related malnutrition related to multiple chronic diseases (heart failure, parkinson's dz) as evidenced by moderate muscle and fat wasting per NFPE.    --  NUTRITION INTERVENTION / PLAN OF CARE      Intervention Goal(s) Initiate TF/PN and Maintain weight         RD Intervention/Action Await initiation of EN/PN         Prescription/Orders:       PO Diet       Supplements       Enteral Nutrition   End Goal:    Isosource 1.5 at 65 mL/hr + 250 mL q4 FWF (flushes adjusted by MD)     Calories  2145 kcals (within range)    Protein  97 g (within range)    Free water  1087 mL   Flushes  1500 mL     The above end goal rate is for 22 hrs/day to assume interruptions for ADLs. Water flushes adjusted based on clinical picture + Rx flushes/IV fluids         Parenteral Nutrition    New Prescription Ordered? No changes at this time   --      Monitor/Evaluation Per protocol, Pertinent labs, EN delivery/tolerance, Weight, POC/GOC, Swallow function   Discharge Plan/Needs Pending clinical course   --    RD to follow per protocol.      Electronically signed by:  Valentín Hsu RD  12/31/24 08:43 EST

## 2024-12-31 NOTE — DISCHARGE PLACEMENT REQUEST
"Casey Snowden Sr. (89 y.o. Male)       Date of Birth   1934    Social Security Number       Address   [address] [place] [address]    Home Phone   [phone]    MRN   [id number]       Religious   Holiness    Marital Status                               Admission Date   12/17/24    Admission Type   Emergency    Admitting Provider   Inessa Dumont MD    Attending Provider   Hernán Saldivar MD    Department, Room/Bed   [address], [address]       Discharge Date       Discharge Disposition       Discharge Destination                                 Attending Provider: Hernán Saldivar MD    Allergies: Penicillins, Rocephin [Ceftriaxone]    Isolation: Airborne, Contact   Infection: Disseminated Herpes Zoster (12/20/24)   Code Status: No CPR    Ht: 177.8 cm (70\")   Wt: 83.2 kg (183 lb 6.8 oz)    Admission Cmt: None   Principal Problem: Unable to ambulate [R26.2]                   Active Insurance as of 12/17/2024       Primary Coverage       Payor Plan Insurance Group Employer/Plan Group    [facility] MEDICARE REPLACEMENT [facility] MED ADV GROUP [id number]       Payor Plan Address Payor Plan Phone Number Payor Plan Fax Number Effective Dates    [address] [address]   1/1/2023 - None Entered    [facility] [id number]         Subscriber Name Subscriber Birth Date Member ID       CASEY SNOWDEN EDUARDO JEFFERSON 1934 [id number]                     Emergency Contacts        (Rel.) Home Phone Work Phone Mobile Phone    SpEvonne (Spouse) [phone] -- [phone]    Svetlana Sol (Daughter) [phone] -- --    Casey Snowden Jr (Son) [phone] -- --                "

## 2024-12-31 NOTE — PROGRESS NOTES
Discussed with Dr. Saldivar. Family is considering palliative care.  Will hold on seeing today and he will reach out if PEG tube desired.  I will complete the consult order, please reach out via the office or Chat if family would like to proceed with PEG tube.

## 2024-12-31 NOTE — ACP (ADVANCE CARE PLANNING)
12/31/2024, 1312 - Spoke with Evonne, the patient's spouse, to schedule a family meeting at her request.  She wants her son and daughter to attend the goals of care family meeting as well.  They will be available this Thursday or Friday.  Advised her that the PC Consult Team will follow up with them on Thursday, 01/02/2025.  Evonne was agreeable to this plan.  MD, RN, and CCP sampson.    [place], MSN, RN, CHPN

## 2024-12-31 NOTE — CASE MANAGEMENT/SOCIAL WORK
Continued Stay Note  [facility]     Patient Name: Casey Snowden Sr.  MRN: [id number]  Today's Date: 12/31/2024    Admit Date: 12/17/2024    Plan: TBD based on hospital course.   Discharge Plan       Row Name 12/31/24 1036       Plan    Plan TBD based on hospital course.    Patient/Family in Agreement with Plan yes    Plan Comments Spoke with patient's wife via outbound call. Introduced self and reviewed discharge plan. Spouse states that they are thinking about going palliative. TBD based on clinical course. New palliative consult placed.                   Discharge Codes    No documentation.                 Expected Discharge Date and Time       Expected Discharge Date Expected Discharge Time    Asa 3, 2025               Tennille Carrillo RN

## 2024-12-31 NOTE — PROGRESS NOTES
Name: Casey Snowden . ADMIT: 2024   : 1934  PCP: Aleksander Diez MD    MRN: [id number] LOS: 14 days   AGE/SEX: 89 y.o. male  ROOM: [facility]     Subjective   Subjective     Examined at bedside.  He was agitated at night and was try to get out of bed and really did not sleep much.  This morning he is very much asleep.  The NGT remains in place.  Discussed with the patient's wife and daughter about next steps moving forward.     Objective   Objective   Vital Signs  Temp:  [97.7 °F (36.5 °C)-98.8 °F (37.1 °C)] 98.8 °F (37.1 °C)  Heart Rate:  [55-82] 82  Resp:  [18] 18  BP: (102-148)/(58-76) 102/64  SpO2:  [94 %-97 %] 94 %  on   ;   Device (Oxygen Therapy): room air  Body mass index is 26.32 kg/m².  Physical Exam  Constitutional:       General: He is not in acute distress.  HENT:      Head:      Comments: NGT  Eyes:      Extraocular Movements: Extraocular movements intact.      Pupils: Pupils are equal, round, and reactive to light.   Cardiovascular:      Rate and Rhythm: Normal rate and regular rhythm.      Heart sounds: No murmur heard.  Pulmonary:      Effort: No respiratory distress.   Abdominal:      General: There is no distension.      Palpations: Abdomen is soft.      Tenderness: There is no abdominal tenderness.   Musculoskeletal:      Right lower leg: No edema.      Left lower leg: No edema.   Skin:     General: Skin is warm and dry.      Findings: No rash.   Neurological:      Mental Status: He is alert. He is disoriented.       Results Review     I reviewed the patient's new clinical results.  Results from last 7 days   Lab Units 24  0708 24  1100 24  0548 24  1359   WBC 10*3/mm3 9.19 8.55 7.38 7.30   HEMOGLOBIN g/dL 10.0* 10.3* 10.3* 10.2*   PLATELETS 10*3/mm3 219 242 231 218     Results from last 7 days   Lab Units 24  0921 24  1100 24  0548 24  1359   SODIUM mmol/L 137 140 144 142   POTASSIUM mmol/L 5.1 4.3 4.5 4.5   CHLORIDE mmol/L  110* 111* 115* 110*   CO2 mmol/L 20.0* 21.0* 23.0 24.0   BUN mg/dL 32* 30* 41* 44*   CREATININE mg/dL 0.66* 0.63* 0.79 0.94   GLUCOSE mg/dL 119* 118* 135* 122*   EGFR mL/min/1.73 89.1 90.4 84.4 77.0     Results from last 7 days   Lab Units 12/31/24 0921 12/30/24 1100 12/29/24  0548 12/28/24  1359 12/27/24  0518   ALBUMIN g/dL 2.3* 2.3* 2.5* 2.5* 2.3*   BILIRUBIN mg/dL  --   --   --   --  0.2   ALK PHOS U/L  --   --   --   --  70   AST (SGOT) U/L  --   --   --   --  87*   ALT (SGPT) U/L  --   --   --   --  33       Results from last 7 days   Lab Units 12/31/24 0921 12/30/24 1100 12/29/24  0548 12/28/24  1359 12/27/24  0518   CALCIUM mg/dL 7.6* 7.4* 7.8* 7.6* 7.6*   ALBUMIN g/dL 2.3* 2.3* 2.5* 2.5* 2.3*   MAGNESIUM mg/dL  --  1.8  --  2.2 1.5*   PHOSPHORUS mg/dL 2.6 2.0* 2.3* 2.8 3.5     Results from last 7 days   Lab Units 12/26/24  0646   PROCALCITONIN ng/mL 0.61*     Glucose   Date/Time Value Ref Range Status   12/31/2024 1048 122 70 - 130 mg/dL Final   12/31/2024 0604 109 70 - 130 mg/dL Final   12/31/2024 0020 118 70 - 130 mg/dL Final   12/30/2024 1735 83 70 - 130 mg/dL Final   12/30/2024 1041 144 (H) 70 - 130 mg/dL Final   12/30/2024 0519 119 70 - 130 mg/dL Final   12/29/2024 2337 134 (H) 70 - 130 mg/dL Final       No radiology results for the last day    I have personally reviewed all medications:  Scheduled Medications  acyclovir, 10 mg/kg, Intravenous, Q8H  amiodarone, 200 mg, Nasogastric, Q12H  [Held by provider] apixaban, 5 mg, Oral, Q12H  atorvastatin, 40 mg, Nasogastric, Nightly  carbidopa-levodopa, 1 tablet, Nasogastric, TID  enoxaparin, 1 mg/kg, Subcutaneous, Q12H  finasteride, 5 mg, Nasogastric, Daily  guaifenesin, 200 mg, Nasogastric, Q4H  insulin regular, 2-7 Units, Subcutaneous, Q6H  levothyroxine, 75 mcg, Nasogastric, Daily  metoprolol tartrate, 12.5 mg, Nasogastric, Q12H  [Held by provider] oxybutynin XL, 10 mg, Oral, Daily  phosphorus, 500 mg, Oral, 4x Daily  sodium chloride, 10 mL,  Intravenous, Q12H  terazosin, 1 mg, Nasogastric, Nightly  vitamin B-12, 1,000 mcg, Nasogastric, Q AM    Infusions     Diet  NPO Diet NPO Type: Tube Feeding    I have personally reviewed:  [x]  Laboratory   []  Microbiology   [x]  Radiology   []  EKG/Telemetry  []  Cardiology/Vascular   []  Pathology    []  Records       Assessment/Plan     Active Hospital Problems    Diagnosis  POA    **Unable to ambulate [R26.2]  Yes    Moderate protein-calorie malnutrition [E44.0]  Yes    LISY (acute kidney injury) [N17.9]  Yes    Herpes zoster without complication [B02.9]  Unknown    Falls frequently [R29.6]  Not Applicable    Acquired hypothyroidism [E03.9]  Yes    Secondary malignancy of lymph nodes of head, 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       89 y.o. male admitted with generalized weakness, confusion and found to have RLE rash consistent with shingles.    Severe pharyngeal dysphagia  From speech therapy discussion, he has a chronic aspiration risk but previously chose to take in food by mouth for quality-of-life reasons.  VFSS would not be appropriate right now and the safest recommendation is to remain NPO.  General surgery has been consulted for possible PEG tube placement.    Cough/congestion: Checked chest x-ray which shows some new infiltrates.  On meropenem for 5 day course ending on 12/29    VZV encephalitis.  Status post LP  -Continue acyclovir x 14 days.  Renally dosed as  appropriate, end date 1/3/24  - MRI negative for acute change including any evidence of encephalitis  - Neurology signed off.    Pneumonia  Completed a 5 day course of meropenem     LISY/hypernatremia: resolved .   - Continue to hold Diovan and Ditropan  - Monitor urine output    Atrial fibrillation with RVR  Continue AC, no need for aspirin per cardiology.  No BB due to previous bradycardia. Spot dosed IV metoprolol for RVR. Transitioned from amiodarone ggt to PO.     Parkinson disease: Continue Sinemet.     DVT prophy: therapeutic  lovenox in case he needs a PEG   Disposition: Plan TBD.          Hernán Saldivar MD  [facility]  12/31/24  12:25 EST

## 2024-12-31 NOTE — PROGRESS NOTES
LOS: 14 days     Chief Complaint: LISY    Subjective     History of Present Illness  Casey Snowden Sr. is a 89 y.o. male with history of Parkinson's disease and dementia.  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        Subjective  Patient still encephalopathic  Has had some large volume bladder scans, but patient has been able to void as they have moved him around. PVR has been under 300.  DHT in place.     12/21 - underwent LP this am. No other acute events overnight. Continues to be confused. 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     12/23 no acute events. Seems to be doing a bit better today.    12/24 seems to have had a difficult night. Now being bathe.     12/25 no acute events. Overall, patient looks better today. Second IV access accomplished.     12/26: Oriented to person place, recognizes family  Dobbhoff tube remains, on tube feeds, no new complaints    12/27: Still confused today but his family states he was much more awake alert and conversant yesterday  Remains on tube feeds with free water, on amnio drip    12/28: No new complaints or events, did not pass a swallow study and remains on tube feeds, labs pending.  Family states he was not as alert yesterday    12/29: Seen and examined at bedside, family at bedside, discussed plan with family.  More awake, still confused, remains on tube feeds overnight    12/30: Repeat swallow study pending, no new complaints, family at bedside    12/31: Family planning for PEG tube, no new complaints otherwise    History taken from: family and chart    Objective     Vital Sign Min/Max for last 24 hours  Temp  Min: 97.7 °F (36.5 °C)  Max: 98.8 °F (37.1 °C)   BP  Min: 102/64  Max: 148/68   Pulse  Min: 55  Max: 82   Resp  Min: 18  Max: 18   SpO2  Min: 94 %  Max: 97 %   No data recorded   Weight  Min: 83.2 kg (183 lb 6.8 oz)  Max: 83.2 kg (183 lb 6.8 oz)     Flowsheet Rows      Flowsheet Row  "First Filed Value   Admission Height --   Admission Weight 66.7 kg (147 lb 0.8 oz) Documented at 12/18/2024 0500            No intake/output data recorded.  I/O last 3 completed shifts:  In: 4714 [Other:2116; NG/GT:1848; IV Piggyback:750]  Out: 0     Objective:  Vital signs: (most recent): Blood pressure 102/64, pulse 82, temperature 98.8 °F (37.1 °C), temperature source Axillary, resp. rate 18, height 177.8 cm (70\"), weight 83.2 kg (183 lb 6.8 oz), SpO2 94%.            Physical Examination: General appearance - ill appearing, frail, chronically ill  Mental status - more awake.    Nose - DHT in place   Chest -good air movement, scattered coarse breath sounds.  Heart - normal rate, regular rhythm, normal S1, S2, no murmurs, rubs, clicks or gallops  Abdomen - soft, nontender, nondistended, no masses or organomegaly  Neurological -no focal deficits  Extremities -trace/1+ edema      Results Review:     I reviewed the patient's new clinical results.    WBC WBC   Date Value Ref Range Status   12/31/2024 9.19 3.40 - 10.80 10*3/mm3 Final   12/30/2024 8.55 3.40 - 10.80 10*3/mm3 Final   12/29/2024 7.38 3.40 - 10.80 10*3/mm3 Final   12/28/2024 7.30 3.40 - 10.80 10*3/mm3 Final      HGB Hemoglobin   Date Value Ref Range Status   12/31/2024 10.0 (L) 13.0 - 17.7 g/dL Final   12/30/2024 10.3 (L) 13.0 - 17.7 g/dL Final   12/29/2024 10.3 (L) 13.0 - 17.7 g/dL Final   12/28/2024 10.2 (L) 13.0 - 17.7 g/dL Final      HCT Hematocrit   Date Value Ref Range Status   12/31/2024 30.7 (L) 37.5 - 51.0 % Final   12/30/2024 31.1 (L) 37.5 - 51.0 % Final   12/29/2024 30.7 (L) 37.5 - 51.0 % Final   12/28/2024 31.0 (L) 37.5 - 51.0 % Final      Platlets No results found for: \"LABPLAT\"   MCV MCV   Date Value Ref Range Status   12/31/2024 96.8 79.0 - 97.0 fL Final   12/30/2024 96.6 79.0 - 97.0 fL Final   12/29/2024 97.5 (H) 79.0 - 97.0 fL Final   12/28/2024 99.7 (H) 79.0 - 97.0 fL Final          Sodium Sodium   Date Value Ref Range Status   12/31/2024 " "137 136 - 145 mmol/L Final   12/30/2024 140 136 - 145 mmol/L Final   12/29/2024 144 136 - 145 mmol/L Final   12/28/2024 142 136 - 145 mmol/L Final      Potassium Potassium   Date Value Ref Range Status   12/31/2024 5.1 3.5 - 5.2 mmol/L Final     Comment:     Slight hemolysis detected by analyzer. Result may be falsely elevated.   12/30/2024 4.3 3.5 - 5.2 mmol/L Final   12/29/2024 4.5 3.5 - 5.2 mmol/L Final   12/28/2024 4.5 3.5 - 5.2 mmol/L Final      Chloride Chloride   Date Value Ref Range Status   12/31/2024 110 (H) 98 - 107 mmol/L Final   12/30/2024 111 (H) 98 - 107 mmol/L Final   12/29/2024 115 (H) 98 - 107 mmol/L Final   12/28/2024 110 (H) 98 - 107 mmol/L Final      CO2 CO2   Date Value Ref Range Status   12/31/2024 20.0 (L) 22.0 - 29.0 mmol/L Final   12/30/2024 21.0 (L) 22.0 - 29.0 mmol/L Final   12/29/2024 23.0 22.0 - 29.0 mmol/L Final   12/28/2024 24.0 22.0 - 29.0 mmol/L Final      BUN BUN   Date Value Ref Range Status   12/31/2024 32 (H) 8 - 23 mg/dL Final   12/30/2024 30 (H) 8 - 23 mg/dL Final   12/29/2024 41 (H) 8 - 23 mg/dL Final   12/28/2024 44 (H) 8 - 23 mg/dL Final      Creatinine Creatinine   Date Value Ref Range Status   12/31/2024 0.66 (L) 0.76 - 1.27 mg/dL Final   12/30/2024 0.63 (L) 0.76 - 1.27 mg/dL Final   12/29/2024 0.79 0.76 - 1.27 mg/dL Final   12/28/2024 0.94 0.76 - 1.27 mg/dL Final      Calcium Calcium   Date Value Ref Range Status   12/31/2024 7.6 (L) 8.2 - 9.6 mg/dL Final   12/30/2024 7.4 (L) 8.2 - 9.6 mg/dL Final   12/29/2024 7.8 (L) 8.2 - 9.6 mg/dL Final   12/28/2024 7.6 (L) 8.2 - 9.6 mg/dL Final      PO4 No results found for: \"CAPO4\"   Albumin Albumin   Date Value Ref Range Status   12/31/2024 2.3 (L) 3.5 - 5.2 g/dL Final   12/30/2024 2.3 (L) 3.5 - 5.2 g/dL Final   12/29/2024 2.5 (L) 3.5 - 5.2 g/dL Final   12/28/2024 2.5 (L) 3.5 - 5.2 g/dL Final        Magnesium Magnesium   Date Value Ref Range Status   12/30/2024 1.8 1.6 - 2.4 mg/dL Final   12/28/2024 2.2 1.6 - 2.4 mg/dL Final      " "  Uric Acid No results found for: \"URICACID\"     Medication Review:   Current medications reviewed, orders reviewed and updated    Assessment & Plan     Assessment & Plan  LISY, Prerenal and improved  Metabolic acidosis, stabilizing  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

## 2025-01-01 ENCOUNTER — APPOINTMENT (OUTPATIENT)
Dept: GENERAL RADIOLOGY | Facility: HOSPITAL | Age: OVER 89
DRG: 189 | End: 2025-01-01
Payer: MEDICARE

## 2025-01-01 ENCOUNTER — HOSPITAL ENCOUNTER (EMERGENCY)
Facility: HOSPITAL | Age: OVER 89
Discharge: ANOTHER HOSPITAL | DRG: 189 | End: 2025-01-19
Attending: STUDENT IN AN ORGANIZED HEALTH CARE EDUCATION/TRAINING PROGRAM | Admitting: STUDENT IN AN ORGANIZED HEALTH CARE EDUCATION/TRAINING PROGRAM
Payer: MEDICARE

## 2025-01-01 ENCOUNTER — HOSPITAL ENCOUNTER (INPATIENT)
Facility: HOSPITAL | Age: OVER 89
LOS: 6 days | End: 2025-01-25
Attending: HOSPITALIST | Admitting: INTERNAL MEDICINE
Payer: MEDICARE

## 2025-01-01 ENCOUNTER — APPOINTMENT (OUTPATIENT)
Dept: CARDIOLOGY | Facility: HOSPITAL | Age: OVER 89
End: 2025-01-01
Payer: MEDICARE

## 2025-01-01 ENCOUNTER — APPOINTMENT (OUTPATIENT)
Dept: CT IMAGING | Facility: HOSPITAL | Age: OVER 89
End: 2025-01-01
Payer: MEDICARE

## 2025-01-01 ENCOUNTER — APPOINTMENT (OUTPATIENT)
Dept: GENERAL RADIOLOGY | Facility: HOSPITAL | Age: OVER 89
End: 2025-01-01
Payer: MEDICARE

## 2025-01-01 VITALS
HEART RATE: 72 BPM | SYSTOLIC BLOOD PRESSURE: 98 MMHG | RESPIRATION RATE: 14 BRPM | BODY MASS INDEX: 24.13 KG/M2 | WEIGHT: 178.13 LBS | HEIGHT: 72 IN | TEMPERATURE: 99.3 F | DIASTOLIC BLOOD PRESSURE: 46 MMHG | OXYGEN SATURATION: 77 %

## 2025-01-01 VITALS
TEMPERATURE: 97.9 F | WEIGHT: 183 LBS | BODY MASS INDEX: 24.79 KG/M2 | OXYGEN SATURATION: 95 % | HEIGHT: 72 IN | HEART RATE: 59 BPM | RESPIRATION RATE: 24 BRPM | SYSTOLIC BLOOD PRESSURE: 166 MMHG | DIASTOLIC BLOOD PRESSURE: 90 MMHG

## 2025-01-01 DIAGNOSIS — J81.0 ACUTE PULMONARY EDEMA: ICD-10-CM

## 2025-01-01 DIAGNOSIS — R60.1 ANASARCA: Primary | ICD-10-CM

## 2025-01-01 LAB
ALBUMIN SERPL-MCNC: 2.2 G/DL (ref 3.5–5.2)
ALBUMIN SERPL-MCNC: 2.3 G/DL (ref 3.5–5.2)
ALBUMIN SERPL-MCNC: 2.3 G/DL (ref 3.5–5.2)
ALBUMIN SERPL-MCNC: 2.9 G/DL (ref 3.5–5.2)
ALBUMIN SERPL-MCNC: 2.9 G/DL (ref 3.5–5.2)
ALBUMIN SERPL-MCNC: 3.2 G/DL (ref 3.5–5.2)
ALBUMIN/GLOB SERPL: 0.9 G/DL
ALP SERPL-CCNC: 81 U/L (ref 39–117)
ALT SERPL W P-5'-P-CCNC: 22 U/L (ref 1–41)
ANION GAP SERPL CALCULATED.3IONS-SCNC: 10.8 MMOL/L (ref 5–15)
ANION GAP SERPL CALCULATED.3IONS-SCNC: 5.1 MMOL/L (ref 5–15)
ANION GAP SERPL CALCULATED.3IONS-SCNC: 7.6 MMOL/L (ref 5–15)
ANION GAP SERPL CALCULATED.3IONS-SCNC: 7.8 MMOL/L (ref 5–15)
ANION GAP SERPL CALCULATED.3IONS-SCNC: 9 MMOL/L (ref 5–15)
ANION GAP SERPL CALCULATED.3IONS-SCNC: 9.2 MMOL/L (ref 5–15)
ANION GAP SERPL CALCULATED.3IONS-SCNC: 9.7 MMOL/L (ref 5–15)
AORTIC DIMENSIONLESS INDEX: 0.5 (DI)
ARTERIAL PATENCY WRIST A: ABNORMAL
AST SERPL-CCNC: 31 U/L (ref 1–40)
ATMOSPHERIC PRESS: 745 MMHG
AV MEAN PRESS GRAD SYS DOP V1V2: 8 MMHG
AV VMAX SYS DOP: 193 CM/SEC
B PARAPERT DNA SPEC QL NAA+PROBE: NOT DETECTED
B PERT DNA SPEC QL NAA+PROBE: NOT DETECTED
BASE EXCESS BLDA CALC-SCNC: 5.2 MMOL/L (ref 0–2)
BASOPHILS # BLD AUTO: 0.01 10*3/MM3 (ref 0–0.2)
BASOPHILS # BLD AUTO: 0.01 10*3/MM3 (ref 0–0.2)
BASOPHILS # BLD AUTO: 0.02 10*3/MM3 (ref 0–0.2)
BASOPHILS NFR BLD AUTO: 0.1 % (ref 0–1.5)
BASOPHILS NFR BLD AUTO: 0.2 % (ref 0–1.5)
BASOPHILS NFR BLD AUTO: 0.2 % (ref 0–1.5)
BDY SITE: ABNORMAL
BH CV ECHO MEAS - AO MAX PG: 14.9 MMHG
BH CV ECHO MEAS - AO ROOT DIAM: 3.5 CM
BH CV ECHO MEAS - AO V2 VTI: 43.1 CM
BH CV ECHO MEAS - AVA(I,D): 1.55 CM2
BH CV ECHO MEAS - EDV(CUBED): 166.4 ML
BH CV ECHO MEAS - EDV(MOD-SP2): 177 ML
BH CV ECHO MEAS - EDV(MOD-SP4): 186 ML
BH CV ECHO MEAS - EF(MOD-SP2): 52.5 %
BH CV ECHO MEAS - EF(MOD-SP4): 44.6 %
BH CV ECHO MEAS - ESV(CUBED): 51.8 ML
BH CV ECHO MEAS - ESV(MOD-SP2): 84 ML
BH CV ECHO MEAS - ESV(MOD-SP4): 103 ML
BH CV ECHO MEAS - FS: 32.2 %
BH CV ECHO MEAS - IVS/LVPW: 0.75 CM
BH CV ECHO MEAS - IVSD: 0.9 CM
BH CV ECHO MEAS - LAT PEAK E' VEL: 7.5 CM/SEC
BH CV ECHO MEAS - LV DIASTOLIC VOL/BSA (35-75): 93.8 CM2
BH CV ECHO MEAS - LV MASS(C)D: 227.4 GRAMS
BH CV ECHO MEAS - LV MAX PG: 3.8 MMHG
BH CV ECHO MEAS - LV MEAN PG: 2 MMHG
BH CV ECHO MEAS - LV SYSTOLIC VOL/BSA (12-30): 51.9 CM2
BH CV ECHO MEAS - LV V1 MAX: 97.9 CM/SEC
BH CV ECHO MEAS - LV V1 VTI: 20.1 CM
BH CV ECHO MEAS - LVIDD: 5.5 CM
BH CV ECHO MEAS - LVIDS: 3.7 CM
BH CV ECHO MEAS - LVOT AREA: 3.3 CM2
BH CV ECHO MEAS - LVOT DIAM: 2.06 CM
BH CV ECHO MEAS - LVPWD: 1.2 CM
BH CV ECHO MEAS - MED PEAK E' VEL: 4.9 CM/SEC
BH CV ECHO MEAS - MR MAX PG: 32.5 MMHG
BH CV ECHO MEAS - MR MAX VEL: 285 CM/SEC
BH CV ECHO MEAS - MV A DUR: 0.07 SEC
BH CV ECHO MEAS - MV A MAX VEL: 56.9 CM/SEC
BH CV ECHO MEAS - MV DEC SLOPE: 515.2 CM/SEC2
BH CV ECHO MEAS - MV DEC TIME: 0.19 SEC
BH CV ECHO MEAS - MV E MAX VEL: 126.1 CM/SEC
BH CV ECHO MEAS - MV E/A: 2.22
BH CV ECHO MEAS - MV MAX PG: 5.2 MMHG
BH CV ECHO MEAS - MV MEAN PG: 1.91 MMHG
BH CV ECHO MEAS - MV P1/2T: 67.9 MSEC
BH CV ECHO MEAS - MV V2 VTI: 36.5 CM
BH CV ECHO MEAS - MVA(P1/2T): 3.2 CM2
BH CV ECHO MEAS - MVA(VTI): 1.83 CM2
BH CV ECHO MEAS - PA ACC TIME: 0.11 SEC
BH CV ECHO MEAS - PA V2 MAX: 88.2 CM/SEC
BH CV ECHO MEAS - PULM A REVS DUR: 0.12 SEC
BH CV ECHO MEAS - PULM A REVS VEL: 43 CM/SEC
BH CV ECHO MEAS - PULM DIAS VEL: 19.4 CM/SEC
BH CV ECHO MEAS - PULM S/D: 1.35
BH CV ECHO MEAS - PULM SYS VEL: 26.1 CM/SEC
BH CV ECHO MEAS - RV MAX PG: 3.2 MMHG
BH CV ECHO MEAS - RV V1 MAX: 89.4 CM/SEC
BH CV ECHO MEAS - RV V1 VTI: 16.1 CM
BH CV ECHO MEAS - SV(LVOT): 66.7 ML
BH CV ECHO MEAS - SV(MOD-SP2): 93 ML
BH CV ECHO MEAS - SV(MOD-SP4): 83 ML
BH CV ECHO MEAS - SVI(LVOT): 33.6 ML/M2
BH CV ECHO MEAS - SVI(MOD-SP2): 46.9 ML/M2
BH CV ECHO MEAS - SVI(MOD-SP4): 41.9 ML/M2
BH CV ECHO MEAS - TAPSE (>1.6): 2.8 CM
BH CV ECHO MEAS - TR MAX PG: 7.4 MMHG
BH CV ECHO MEAS - TR MAX VEL: 135.7 CM/SEC
BH CV ECHO MEASUREMENTS AVERAGE E/E' RATIO: 20.34
BH CV XLRA - RV BASE: 3.9 CM
BH CV XLRA - TDI S': 9.8 CM/SEC
BILIRUB SERPL-MCNC: 0.3 MG/DL (ref 0–1.2)
BODY TEMPERATURE: 37
BUN SERPL-MCNC: 18 MG/DL (ref 8–23)
BUN SERPL-MCNC: 19 MG/DL (ref 8–23)
BUN SERPL-MCNC: 21 MG/DL (ref 8–23)
BUN SERPL-MCNC: 30 MG/DL (ref 8–23)
BUN SERPL-MCNC: 31 MG/DL (ref 8–23)
BUN SERPL-MCNC: 39 MG/DL (ref 8–23)
BUN SERPL-MCNC: 39 MG/DL (ref 8–23)
BUN/CREAT SERPL: 26.1 (ref 7–25)
BUN/CREAT SERPL: 27 (ref 7–25)
BUN/CREAT SERPL: 27.6 (ref 7–25)
BUN/CREAT SERPL: 31.7 (ref 7–25)
BUN/CREAT SERPL: 35.8 (ref 7–25)
BUN/CREAT SERPL: 40 (ref 7–25)
BUN/CREAT SERPL: 44.8 (ref 7–25)
C PNEUM DNA NPH QL NAA+NON-PROBE: NOT DETECTED
CALCIUM SPEC-SCNC: 7.4 MG/DL (ref 8.2–9.6)
CALCIUM SPEC-SCNC: 7.6 MG/DL (ref 8.2–9.6)
CALCIUM SPEC-SCNC: 7.7 MG/DL (ref 8.2–9.6)
CALCIUM SPEC-SCNC: 7.8 MG/DL (ref 8.2–9.6)
CALCIUM SPEC-SCNC: 8.1 MG/DL (ref 8.2–9.6)
CALCIUM SPEC-SCNC: 8.1 MG/DL (ref 8.2–9.6)
CALCIUM SPEC-SCNC: 8.2 MG/DL (ref 8.2–9.6)
CHLORIDE SERPL-SCNC: 103 MMOL/L (ref 98–107)
CHLORIDE SERPL-SCNC: 105 MMOL/L (ref 98–107)
CHLORIDE SERPL-SCNC: 97 MMOL/L (ref 98–107)
CHLORIDE SERPL-SCNC: 98 MMOL/L (ref 98–107)
CHLORIDE SERPL-SCNC: 99 MMOL/L (ref 98–107)
CO2 SERPL-SCNC: 23.9 MMOL/L (ref 22–29)
CO2 SERPL-SCNC: 24.8 MMOL/L (ref 22–29)
CO2 SERPL-SCNC: 25.4 MMOL/L (ref 22–29)
CO2 SERPL-SCNC: 26.2 MMOL/L (ref 22–29)
CO2 SERPL-SCNC: 28.2 MMOL/L (ref 22–29)
CO2 SERPL-SCNC: 28.3 MMOL/L (ref 22–29)
CO2 SERPL-SCNC: 29 MMOL/L (ref 22–29)
CREAT SERPL-MCNC: 0.6 MG/DL (ref 0.76–1.27)
CREAT SERPL-MCNC: 0.69 MG/DL (ref 0.76–1.27)
CREAT SERPL-MCNC: 0.75 MG/DL (ref 0.76–1.27)
CREAT SERPL-MCNC: 0.76 MG/DL (ref 0.76–1.27)
CREAT SERPL-MCNC: 0.87 MG/DL (ref 0.76–1.27)
CREAT SERPL-MCNC: 1.09 MG/DL (ref 0.76–1.27)
CREAT SERPL-MCNC: 1.15 MG/DL (ref 0.76–1.27)
DEPRECATED RDW RBC AUTO: 48.5 FL (ref 37–54)
DEPRECATED RDW RBC AUTO: 49.2 FL (ref 37–54)
DEPRECATED RDW RBC AUTO: 49.7 FL (ref 37–54)
DEPRECATED RDW RBC AUTO: 50.9 FL (ref 37–54)
DEPRECATED RDW RBC AUTO: 55.6 FL (ref 37–54)
EGFRCR SERPLBLD CKD-EPI 2021: 60.5 ML/MIN/1.73
EGFRCR SERPLBLD CKD-EPI 2021: 64.5 ML/MIN/1.73
EGFRCR SERPLBLD CKD-EPI 2021: 82 ML/MIN/1.73
EGFRCR SERPLBLD CKD-EPI 2021: 85.4 ML/MIN/1.73
EGFRCR SERPLBLD CKD-EPI 2021: 85.7 ML/MIN/1.73
EGFRCR SERPLBLD CKD-EPI 2021: 87.9 ML/MIN/1.73
EGFRCR SERPLBLD CKD-EPI 2021: 91.7 ML/MIN/1.73
EOSINOPHIL # BLD AUTO: 0.05 10*3/MM3 (ref 0–0.4)
EOSINOPHIL # BLD AUTO: 0.13 10*3/MM3 (ref 0–0.4)
EOSINOPHIL # BLD AUTO: 0.16 10*3/MM3 (ref 0–0.4)
EOSINOPHIL NFR BLD AUTO: 0.4 % (ref 0.3–6.2)
EOSINOPHIL NFR BLD AUTO: 1.7 % (ref 0.3–6.2)
EOSINOPHIL NFR BLD AUTO: 2.8 % (ref 0.3–6.2)
ERYTHROCYTE [DISTWIDTH] IN BLOOD BY AUTOMATED COUNT: 13.9 % (ref 12.3–15.4)
ERYTHROCYTE [DISTWIDTH] IN BLOOD BY AUTOMATED COUNT: 13.9 % (ref 12.3–15.4)
ERYTHROCYTE [DISTWIDTH] IN BLOOD BY AUTOMATED COUNT: 14.1 % (ref 12.3–15.4)
ERYTHROCYTE [DISTWIDTH] IN BLOOD BY AUTOMATED COUNT: 14.3 % (ref 12.3–15.4)
ERYTHROCYTE [DISTWIDTH] IN BLOOD BY AUTOMATED COUNT: 15.3 % (ref 12.3–15.4)
FERRITIN SERPL-MCNC: 356 NG/ML (ref 30–400)
FLUAV SUBTYP SPEC NAA+PROBE: NOT DETECTED
FLUBV RNA ISLT QL NAA+PROBE: NOT DETECTED
GEN 5 1HR TROPONIN T REFLEX: 35 NG/L
GLOBULIN UR ELPH-MCNC: 3.1 GM/DL
GLUCOSE BLDC GLUCOMTR-MCNC: 110 MG/DL (ref 70–130)
GLUCOSE BLDC GLUCOMTR-MCNC: 116 MG/DL (ref 70–130)
GLUCOSE BLDC GLUCOMTR-MCNC: 132 MG/DL (ref 70–130)
GLUCOSE BLDC GLUCOMTR-MCNC: 134 MG/DL (ref 70–130)
GLUCOSE BLDC GLUCOMTR-MCNC: 146 MG/DL (ref 70–130)
GLUCOSE SERPL-MCNC: 120 MG/DL (ref 65–99)
GLUCOSE SERPL-MCNC: 125 MG/DL (ref 65–99)
GLUCOSE SERPL-MCNC: 161 MG/DL (ref 65–99)
GLUCOSE SERPL-MCNC: 171 MG/DL (ref 65–99)
GLUCOSE SERPL-MCNC: 198 MG/DL (ref 65–99)
GLUCOSE SERPL-MCNC: 83 MG/DL (ref 65–99)
GLUCOSE SERPL-MCNC: 90 MG/DL (ref 65–99)
HADV DNA SPEC NAA+PROBE: NOT DETECTED
HCO3 BLDA-SCNC: 28.7 MMOL/L (ref 20–26)
HCOV 229E RNA SPEC QL NAA+PROBE: NOT DETECTED
HCOV HKU1 RNA SPEC QL NAA+PROBE: NOT DETECTED
HCOV NL63 RNA SPEC QL NAA+PROBE: NOT DETECTED
HCOV OC43 RNA SPEC QL NAA+PROBE: DETECTED
HCT VFR BLD AUTO: 21.4 % (ref 37.5–51)
HCT VFR BLD AUTO: 21.5 % (ref 37.5–51)
HCT VFR BLD AUTO: 23.7 % (ref 37.5–51)
HCT VFR BLD AUTO: 28.9 % (ref 37.5–51)
HCT VFR BLD AUTO: 30.3 % (ref 37.5–51)
HEMOCCULT STL QL: NEGATIVE
HGB BLD-MCNC: 10 G/DL (ref 13–17.7)
HGB BLD-MCNC: 7.2 G/DL (ref 13–17.7)
HGB BLD-MCNC: 7.2 G/DL (ref 13–17.7)
HGB BLD-MCNC: 8 G/DL (ref 13–17.7)
HGB BLD-MCNC: 9.7 G/DL (ref 13–17.7)
HGB BLDA-MCNC: 9.8 G/DL (ref 14–18)
HMPV RNA NPH QL NAA+NON-PROBE: NOT DETECTED
HOLD SPECIMEN: NORMAL
HOLD SPECIMEN: NORMAL
HPIV1 RNA ISLT QL NAA+PROBE: NOT DETECTED
HPIV2 RNA SPEC QL NAA+PROBE: NOT DETECTED
HPIV3 RNA NPH QL NAA+PROBE: NOT DETECTED
HPIV4 P GENE NPH QL NAA+PROBE: NOT DETECTED
IMM GRANULOCYTES # BLD AUTO: 0.02 10*3/MM3 (ref 0–0.05)
IMM GRANULOCYTES # BLD AUTO: 0.04 10*3/MM3 (ref 0–0.05)
IMM GRANULOCYTES # BLD AUTO: 0.08 10*3/MM3 (ref 0–0.05)
IMM GRANULOCYTES NFR BLD AUTO: 0.4 % (ref 0–0.5)
IMM GRANULOCYTES NFR BLD AUTO: 0.5 % (ref 0–0.5)
IMM GRANULOCYTES NFR BLD AUTO: 0.7 % (ref 0–0.5)
INR PPP: 1.31 (ref 0.9–1.1)
IRON 24H UR-MRATE: 14 MCG/DL (ref 59–158)
IRON SATN MFR SERPL: 9 % (ref 20–50)
LEFT ATRIUM VOLUME INDEX: 29.3 ML/M2
LV EF BIPLANE MOD: 48.1 %
LYMPHOCYTES # BLD AUTO: 0.38 10*3/MM3 (ref 0.7–3.1)
LYMPHOCYTES # BLD AUTO: 0.52 10*3/MM3 (ref 0.7–3.1)
LYMPHOCYTES # BLD AUTO: 0.63 10*3/MM3 (ref 0.7–3.1)
LYMPHOCYTES NFR BLD AUTO: 11.2 % (ref 19.6–45.3)
LYMPHOCYTES NFR BLD AUTO: 4.7 % (ref 19.6–45.3)
LYMPHOCYTES NFR BLD AUTO: 4.8 % (ref 19.6–45.3)
Lab: ABNORMAL
M PNEUMO IGG SER IA-ACNC: NOT DETECTED
MCH RBC QN AUTO: 32.8 PG (ref 26.6–33)
MCH RBC QN AUTO: 32.9 PG (ref 26.6–33)
MCH RBC QN AUTO: 32.9 PG (ref 26.6–33)
MCH RBC QN AUTO: 33 PG (ref 26.6–33)
MCH RBC QN AUTO: 33.1 PG (ref 26.6–33)
MCHC RBC AUTO-ENTMCNC: 33 G/DL (ref 31.5–35.7)
MCHC RBC AUTO-ENTMCNC: 33.5 G/DL (ref 31.5–35.7)
MCHC RBC AUTO-ENTMCNC: 33.6 G/DL (ref 31.5–35.7)
MCHC RBC AUTO-ENTMCNC: 33.6 G/DL (ref 31.5–35.7)
MCHC RBC AUTO-ENTMCNC: 33.8 G/DL (ref 31.5–35.7)
MCV RBC AUTO: 97.6 FL (ref 79–97)
MCV RBC AUTO: 97.7 FL (ref 79–97)
MCV RBC AUTO: 97.9 FL (ref 79–97)
MCV RBC AUTO: 98.6 FL (ref 79–97)
MCV RBC AUTO: 99.7 FL (ref 79–97)
MODALITY: ABNORMAL
MONOCYTES # BLD AUTO: 0.47 10*3/MM3 (ref 0.1–0.9)
MONOCYTES # BLD AUTO: 0.56 10*3/MM3 (ref 0.1–0.9)
MONOCYTES # BLD AUTO: 0.6 10*3/MM3 (ref 0.1–0.9)
MONOCYTES NFR BLD AUTO: 5.4 % (ref 5–12)
MONOCYTES NFR BLD AUTO: 6 % (ref 5–12)
MONOCYTES NFR BLD AUTO: 9.9 % (ref 5–12)
MRSA DNA SPEC QL NAA+PROBE: NORMAL
NEUTROPHILS NFR BLD AUTO: 4.27 10*3/MM3 (ref 1.7–7)
NEUTROPHILS NFR BLD AUTO: 6.81 10*3/MM3 (ref 1.7–7)
NEUTROPHILS NFR BLD AUTO: 75.5 % (ref 42.7–76)
NEUTROPHILS NFR BLD AUTO: 86.9 % (ref 42.7–76)
NEUTROPHILS NFR BLD AUTO: 88.6 % (ref 42.7–76)
NEUTROPHILS NFR BLD AUTO: 9.91 10*3/MM3 (ref 1.7–7)
NRBC BLD AUTO-RTO: 0 /100 WBC (ref 0–0.2)
NRBC BLD AUTO-RTO: 0 /100 WBC (ref 0–0.2)
NT-PROBNP SERPL-MCNC: 2242 PG/ML (ref 0–1800)
PCO2 BLDA: 37.1 MM HG (ref 35–45)
PCO2 TEMP ADJ BLD: 37.1 MM HG (ref 35–45)
PH BLDA: 7.5 PH UNITS (ref 7.35–7.45)
PH, TEMP CORRECTED: 7.5 PH UNITS (ref 7.35–7.45)
PHOSPHATE SERPL-MCNC: 2.7 MG/DL (ref 2.5–4.5)
PHOSPHATE SERPL-MCNC: 2.9 MG/DL (ref 2.5–4.5)
PHOSPHATE SERPL-MCNC: 3.8 MG/DL (ref 2.5–4.5)
PLATELET # BLD AUTO: 143 10*3/MM3 (ref 140–450)
PLATELET # BLD AUTO: 144 10*3/MM3 (ref 140–450)
PLATELET # BLD AUTO: 158 10*3/MM3 (ref 140–450)
PLATELET # BLD AUTO: 186 10*3/MM3 (ref 140–450)
PLATELET # BLD AUTO: 196 10*3/MM3 (ref 140–450)
PMV BLD AUTO: 10.1 FL (ref 6–12)
PMV BLD AUTO: 10.2 FL (ref 6–12)
PMV BLD AUTO: 9.3 FL (ref 6–12)
PMV BLD AUTO: 9.7 FL (ref 6–12)
PMV BLD AUTO: 9.8 FL (ref 6–12)
PO2 BLDA: 70.4 MM HG (ref 83–108)
PO2 TEMP ADJ BLD: 70.4 MM HG (ref 83–108)
POTASSIUM SERPL-SCNC: 3.5 MMOL/L (ref 3.5–5.2)
POTASSIUM SERPL-SCNC: 3.8 MMOL/L (ref 3.5–5.2)
POTASSIUM SERPL-SCNC: 3.9 MMOL/L (ref 3.5–5.2)
POTASSIUM SERPL-SCNC: 4.1 MMOL/L (ref 3.5–5.2)
POTASSIUM SERPL-SCNC: 4.1 MMOL/L (ref 3.5–5.2)
POTASSIUM SERPL-SCNC: 4.2 MMOL/L (ref 3.5–5.2)
POTASSIUM SERPL-SCNC: 4.3 MMOL/L (ref 3.5–5.2)
PROCALCITONIN SERPL-MCNC: 0.38 NG/ML (ref 0–0.25)
PROCALCITONIN SERPL-MCNC: 18.6 NG/ML (ref 0–0.25)
PROCALCITONIN SERPL-MCNC: 35 NG/ML (ref 0–0.25)
PROCALCITONIN SERPL-MCNC: 63.7 NG/ML (ref 0–0.25)
PROT SERPL-MCNC: 6 G/DL (ref 6–8.5)
PROTHROMBIN TIME: 16.7 SECONDS (ref 12.1–15)
QT INTERVAL: 512 MS
QTC INTERVAL: 475 MS
RBC # BLD AUTO: 2.18 10*6/MM3 (ref 4.14–5.8)
RBC # BLD AUTO: 2.19 10*6/MM3 (ref 4.14–5.8)
RBC # BLD AUTO: 2.42 10*6/MM3 (ref 4.14–5.8)
RBC # BLD AUTO: 2.96 10*6/MM3 (ref 4.14–5.8)
RBC # BLD AUTO: 3.04 10*6/MM3 (ref 4.14–5.8)
RHINOVIRUS RNA SPEC NAA+PROBE: NOT DETECTED
RSV RNA NPH QL NAA+NON-PROBE: NOT DETECTED
SAO2 % BLDCOA: 96.3 % (ref 94–99)
SARS-COV-2 RNA NPH QL NAA+NON-PROBE: NOT DETECTED
SODIUM SERPL-SCNC: 131 MMOL/L (ref 136–145)
SODIUM SERPL-SCNC: 133 MMOL/L (ref 136–145)
SODIUM SERPL-SCNC: 133 MMOL/L (ref 136–145)
SODIUM SERPL-SCNC: 134 MMOL/L (ref 136–145)
SODIUM SERPL-SCNC: 135 MMOL/L (ref 136–145)
SODIUM SERPL-SCNC: 138 MMOL/L (ref 136–145)
SODIUM SERPL-SCNC: 141 MMOL/L (ref 136–145)
TIBC SERPL-MCNC: 150 MCG/DL (ref 298–536)
TRANSFERRIN SERPL-MCNC: 101 MG/DL (ref 200–360)
TROPONIN T % DELTA: 3
TROPONIN T NUMERIC DELTA: 1 NG/L
TROPONIN T SERPL HS-MCNC: 34 NG/L
TSH SERPL DL<=0.05 MIU/L-ACNC: 17.8 UIU/ML (ref 0.27–4.2)
WBC NRBC COR # BLD AUTO: 10.17 10*3/MM3 (ref 3.4–10.8)
WBC NRBC COR # BLD AUTO: 11.18 10*3/MM3 (ref 3.4–10.8)
WBC NRBC COR # BLD AUTO: 5.65 10*3/MM3 (ref 3.4–10.8)
WBC NRBC COR # BLD AUTO: 5.96 10*3/MM3 (ref 3.4–10.8)
WBC NRBC COR # BLD AUTO: 7.84 10*3/MM3 (ref 3.4–10.8)
WHOLE BLOOD HOLD COAG: NORMAL
WHOLE BLOOD HOLD SPECIMEN: NORMAL

## 2025-01-01 PROCEDURE — 97535 SELF CARE MNGMENT TRAINING: CPT

## 2025-01-01 PROCEDURE — 25010000002 PIPERACILLIN SOD-TAZOBACTAM PER 1 G: Performed by: INTERNAL MEDICINE

## 2025-01-01 PROCEDURE — 25010000002 MORPHINE PER 10 MG: Performed by: HOSPITALIST

## 2025-01-01 PROCEDURE — 94799 UNLISTED PULMONARY SVC/PX: CPT

## 2025-01-01 PROCEDURE — 25010000002 ALBUMIN HUMAN 25% PER 50 ML: Performed by: INTERNAL MEDICINE

## 2025-01-01 PROCEDURE — 70450 CT HEAD/BRAIN W/O DYE: CPT

## 2025-01-01 PROCEDURE — 80069 RENAL FUNCTION PANEL: CPT | Performed by: INTERNAL MEDICINE

## 2025-01-01 PROCEDURE — 85025 COMPLETE CBC W/AUTO DIFF WBC: CPT | Performed by: STUDENT IN AN ORGANIZED HEALTH CARE EDUCATION/TRAINING PROGRAM

## 2025-01-01 PROCEDURE — 94761 N-INVAS EAR/PLS OXIMETRY MLT: CPT

## 2025-01-01 PROCEDURE — 80048 BASIC METABOLIC PNL TOTAL CA: CPT

## 2025-01-01 PROCEDURE — 84145 PROCALCITONIN (PCT): CPT | Performed by: INTERNAL MEDICINE

## 2025-01-01 PROCEDURE — 82803 BLOOD GASES ANY COMBINATION: CPT

## 2025-01-01 PROCEDURE — 93306 TTE W/DOPPLER COMPLETE: CPT | Performed by: INTERNAL MEDICINE

## 2025-01-01 PROCEDURE — 93005 ELECTROCARDIOGRAM TRACING: CPT | Performed by: STUDENT IN AN ORGANIZED HEALTH CARE EDUCATION/TRAINING PROGRAM

## 2025-01-01 PROCEDURE — 85610 PROTHROMBIN TIME: CPT | Performed by: STUDENT IN AN ORGANIZED HEALTH CARE EDUCATION/TRAINING PROGRAM

## 2025-01-01 PROCEDURE — 0202U NFCT DS 22 TRGT SARS-COV-2: CPT

## 2025-01-01 PROCEDURE — 36600 WITHDRAWAL OF ARTERIAL BLOOD: CPT

## 2025-01-01 PROCEDURE — 97166 OT EVAL MOD COMPLEX 45 MIN: CPT

## 2025-01-01 PROCEDURE — 99232 SBSQ HOSP IP/OBS MODERATE 35: CPT | Performed by: INTERNAL MEDICINE

## 2025-01-01 PROCEDURE — 93010 ELECTROCARDIOGRAM REPORT: CPT | Performed by: INTERNAL MEDICINE

## 2025-01-01 PROCEDURE — 25010000002 DIAZEPAM PER 5 MG: Performed by: HOSPITALIST

## 2025-01-01 PROCEDURE — 25010000002 ACYCLOVIR PER 5 MG: Performed by: INTERNAL MEDICINE

## 2025-01-01 PROCEDURE — 96374 THER/PROPH/DIAG INJ IV PUSH: CPT

## 2025-01-01 PROCEDURE — 99222 1ST HOSP IP/OBS MODERATE 55: CPT | Performed by: NURSE PRACTITIONER

## 2025-01-01 PROCEDURE — 85027 COMPLETE CBC AUTOMATED: CPT

## 2025-01-01 PROCEDURE — 82272 OCCULT BLD FECES 1-3 TESTS: CPT | Performed by: INTERNAL MEDICINE

## 2025-01-01 PROCEDURE — 25510000001 PERFLUTREN 6.52 MG/ML SUSPENSION 2 ML VIAL

## 2025-01-01 PROCEDURE — 25010000002 FUROSEMIDE PER 20 MG: Performed by: INTERNAL MEDICINE

## 2025-01-01 PROCEDURE — 25010000002 GLYCOPYRROLATE 0.2 MG/ML SOLUTION: Performed by: INTERNAL MEDICINE

## 2025-01-01 PROCEDURE — 80053 COMPREHEN METABOLIC PANEL: CPT | Performed by: STUDENT IN AN ORGANIZED HEALTH CARE EDUCATION/TRAINING PROGRAM

## 2025-01-01 PROCEDURE — 25010000002 FUROSEMIDE PER 20 MG: Performed by: STUDENT IN AN ORGANIZED HEALTH CARE EDUCATION/TRAINING PROGRAM

## 2025-01-01 PROCEDURE — 25010000002 GLYCOPYRROLATE 0.2 MG/ML SOLUTION: Performed by: HOSPITALIST

## 2025-01-01 PROCEDURE — 83540 ASSAY OF IRON: CPT | Performed by: INTERNAL MEDICINE

## 2025-01-01 PROCEDURE — 25010000002 HYDROMORPHONE 1 MG/ML SOLUTION: Performed by: HOSPITALIST

## 2025-01-01 PROCEDURE — 82728 ASSAY OF FERRITIN: CPT | Performed by: INTERNAL MEDICINE

## 2025-01-01 PROCEDURE — 94664 DEMO&/EVAL PT USE INHALER: CPT

## 2025-01-01 PROCEDURE — P9047 ALBUMIN (HUMAN), 25%, 50ML: HCPCS | Performed by: INTERNAL MEDICINE

## 2025-01-01 PROCEDURE — 85025 COMPLETE CBC W/AUTO DIFF WBC: CPT | Performed by: INTERNAL MEDICINE

## 2025-01-01 PROCEDURE — 84443 ASSAY THYROID STIM HORMONE: CPT | Performed by: NURSE PRACTITIONER

## 2025-01-01 PROCEDURE — 93306 TTE W/DOPPLER COMPLETE: CPT

## 2025-01-01 PROCEDURE — 84484 ASSAY OF TROPONIN QUANT: CPT | Performed by: STUDENT IN AN ORGANIZED HEALTH CARE EDUCATION/TRAINING PROGRAM

## 2025-01-01 PROCEDURE — 94760 N-INVAS EAR/PLS OXIMETRY 1: CPT

## 2025-01-01 PROCEDURE — 97530 THERAPEUTIC ACTIVITIES: CPT

## 2025-01-01 PROCEDURE — 84466 ASSAY OF TRANSFERRIN: CPT | Performed by: INTERNAL MEDICINE

## 2025-01-01 PROCEDURE — 25010000002 GLYCOPYRROLATE 0.2 MG/ML SOLUTION: Performed by: NURSE PRACTITIONER

## 2025-01-01 PROCEDURE — 36415 COLL VENOUS BLD VENIPUNCTURE: CPT

## 2025-01-01 PROCEDURE — 87641 MR-STAPH DNA AMP PROBE: CPT | Performed by: INTERNAL MEDICINE

## 2025-01-01 PROCEDURE — 25010000002 ENOXAPARIN PER 10 MG: Performed by: STUDENT IN AN ORGANIZED HEALTH CARE EDUCATION/TRAINING PROGRAM

## 2025-01-01 PROCEDURE — 25810000003 SODIUM CHLORIDE 0.9 % SOLUTION 250 ML FLEX CONT: Performed by: INTERNAL MEDICINE

## 2025-01-01 PROCEDURE — 71045 X-RAY EXAM CHEST 1 VIEW: CPT

## 2025-01-01 PROCEDURE — 97162 PT EVAL MOD COMPLEX 30 MIN: CPT

## 2025-01-01 PROCEDURE — 97112 NEUROMUSCULAR REEDUCATION: CPT

## 2025-01-01 PROCEDURE — 85027 COMPLETE CBC AUTOMATED: CPT | Performed by: INTERNAL MEDICINE

## 2025-01-01 PROCEDURE — 84145 PROCALCITONIN (PCT): CPT

## 2025-01-01 PROCEDURE — 83880 ASSAY OF NATRIURETIC PEPTIDE: CPT

## 2025-01-01 PROCEDURE — 87040 BLOOD CULTURE FOR BACTERIA: CPT | Performed by: INTERNAL MEDICINE

## 2025-01-01 PROCEDURE — 99222 1ST HOSP IP/OBS MODERATE 55: CPT | Performed by: SURGERY

## 2025-01-01 PROCEDURE — 82948 REAGENT STRIP/BLOOD GLUCOSE: CPT

## 2025-01-01 PROCEDURE — 99285 EMERGENCY DEPT VISIT HI MDM: CPT | Performed by: STUDENT IN AN ORGANIZED HEALTH CARE EDUCATION/TRAINING PROGRAM

## 2025-01-01 RX ORDER — FINASTERIDE 5 MG/1
5 TABLET, FILM COATED ORAL DAILY
Status: DISCONTINUED | OUTPATIENT
Start: 2025-01-01 | End: 2025-01-01 | Stop reason: HOSPADM

## 2025-01-01 RX ORDER — MORPHINE SULFATE 2 MG/ML
6 INJECTION, SOLUTION INTRAMUSCULAR; INTRAVENOUS
Status: DISCONTINUED | OUTPATIENT
Start: 2025-01-01 | End: 2025-01-01 | Stop reason: HOSPADM

## 2025-01-01 RX ORDER — LORAZEPAM 2 MG/ML
0.5 CONCENTRATE ORAL
Status: DISCONTINUED | OUTPATIENT
Start: 2025-01-01 | End: 2025-01-01 | Stop reason: HOSPADM

## 2025-01-01 RX ORDER — DIAZEPAM 10 MG/2ML
2.5 INJECTION, SOLUTION INTRAMUSCULAR; INTRAVENOUS
Status: DISCONTINUED | OUTPATIENT
Start: 2025-01-01 | End: 2025-01-01 | Stop reason: HOSPADM

## 2025-01-01 RX ORDER — SODIUM CHLORIDE FOR INHALATION 7 %
4 VIAL, NEBULIZER (ML) INHALATION
Status: DISCONTINUED | OUTPATIENT
Start: 2025-01-01 | End: 2025-01-01

## 2025-01-01 RX ORDER — LORAZEPAM 2 MG/ML
1 CONCENTRATE ORAL
Status: DISCONTINUED | OUTPATIENT
Start: 2025-01-01 | End: 2025-01-01 | Stop reason: HOSPADM

## 2025-01-01 RX ORDER — PROMETHAZINE HYDROCHLORIDE 25 MG/1
6.25 TABLET ORAL EVERY 4 HOURS PRN
Status: DISCONTINUED | OUTPATIENT
Start: 2025-01-01 | End: 2025-01-01 | Stop reason: HOSPADM

## 2025-01-01 RX ORDER — DIPHENOXYLATE HYDROCHLORIDE AND ATROPINE SULFATE 2.5; .025 MG/1; MG/1
1 TABLET ORAL
Status: DISCONTINUED | OUTPATIENT
Start: 2025-01-01 | End: 2025-01-01 | Stop reason: HOSPADM

## 2025-01-01 RX ORDER — LORAZEPAM 1 MG/1
2 TABLET ORAL
Status: DISCONTINUED | OUTPATIENT
Start: 2025-01-01 | End: 2025-01-01 | Stop reason: HOSPADM

## 2025-01-01 RX ORDER — SODIUM CHLORIDE 0.9 % (FLUSH) 0.9 %
10 SYRINGE (ML) INJECTION EVERY 12 HOURS SCHEDULED
Status: DISCONTINUED | OUTPATIENT
Start: 2025-01-01 | End: 2025-01-01 | Stop reason: HOSPADM

## 2025-01-01 RX ORDER — MORPHINE SULFATE 20 MG/ML
20 SOLUTION ORAL
Status: DISCONTINUED | OUTPATIENT
Start: 2025-01-01 | End: 2025-01-01 | Stop reason: HOSPADM

## 2025-01-01 RX ORDER — ACETAMINOPHEN 650 MG/1
650 SUPPOSITORY RECTAL EVERY 4 HOURS PRN
Status: DISCONTINUED | OUTPATIENT
Start: 2025-01-01 | End: 2025-01-01 | Stop reason: HOSPADM

## 2025-01-01 RX ORDER — CARBIDOPA/LEVODOPA 25MG-250MG
1 TABLET ORAL 3 TIMES DAILY
Status: DISCONTINUED | OUTPATIENT
Start: 2025-01-01 | End: 2025-01-01 | Stop reason: HOSPADM

## 2025-01-01 RX ORDER — SCOPOLAMINE 1 MG/3D
1 PATCH, EXTENDED RELEASE TRANSDERMAL
Status: DISCONTINUED | OUTPATIENT
Start: 2025-01-01 | End: 2025-01-01 | Stop reason: HOSPADM

## 2025-01-01 RX ORDER — IPRATROPIUM BROMIDE AND ALBUTEROL SULFATE 2.5; .5 MG/3ML; MG/3ML
3 SOLUTION RESPIRATORY (INHALATION)
Status: DISCONTINUED | OUTPATIENT
Start: 2025-01-01 | End: 2025-01-01

## 2025-01-01 RX ORDER — GLYCOPYRROLATE 0.2 MG/ML
0.1 INJECTION INTRAMUSCULAR; INTRAVENOUS ONCE
Status: COMPLETED | OUTPATIENT
Start: 2025-01-01 | End: 2025-01-01

## 2025-01-01 RX ORDER — MORPHINE SULFATE 2 MG/ML
4 INJECTION, SOLUTION INTRAMUSCULAR; INTRAVENOUS
Status: DISCONTINUED | OUTPATIENT
Start: 2025-01-01 | End: 2025-01-01 | Stop reason: HOSPADM

## 2025-01-01 RX ORDER — MORPHINE SULFATE 20 MG/ML
5 SOLUTION ORAL
Status: DISCONTINUED | OUTPATIENT
Start: 2025-01-01 | End: 2025-01-01 | Stop reason: HOSPADM

## 2025-01-01 RX ORDER — GLYCOPYRROLATE 0.2 MG/ML
0.4 INJECTION INTRAMUSCULAR; INTRAVENOUS
Status: DISCONTINUED | OUTPATIENT
Start: 2025-01-01 | End: 2025-01-01 | Stop reason: HOSPADM

## 2025-01-01 RX ORDER — ACETAMINOPHEN 325 MG/1
650 TABLET ORAL EVERY 4 HOURS PRN
Status: DISCONTINUED | OUTPATIENT
Start: 2025-01-01 | End: 2025-01-01 | Stop reason: HOSPADM

## 2025-01-01 RX ORDER — KETOROLAC TROMETHAMINE 15 MG/ML
15 INJECTION, SOLUTION INTRAMUSCULAR; INTRAVENOUS EVERY 6 HOURS PRN
Status: DISCONTINUED | OUTPATIENT
Start: 2025-01-01 | End: 2025-01-01 | Stop reason: HOSPADM

## 2025-01-01 RX ORDER — LORAZEPAM 2 MG/ML
2 CONCENTRATE ORAL
Status: DISCONTINUED | OUTPATIENT
Start: 2025-01-01 | End: 2025-01-01 | Stop reason: HOSPADM

## 2025-01-01 RX ORDER — PROMETHAZINE HYDROCHLORIDE 12.5 MG/1
6.25 SUPPOSITORY RECTAL EVERY 4 HOURS PRN
Status: DISCONTINUED | OUTPATIENT
Start: 2025-01-01 | End: 2025-01-01 | Stop reason: HOSPADM

## 2025-01-01 RX ORDER — BISACODYL 10 MG
10 SUPPOSITORY, RECTAL RECTAL DAILY PRN
Status: DISCONTINUED | OUTPATIENT
Start: 2025-01-01 | End: 2025-01-01 | Stop reason: HOSPADM

## 2025-01-01 RX ORDER — BISACODYL 5 MG/1
5 TABLET, DELAYED RELEASE ORAL DAILY PRN
Status: DISCONTINUED | OUTPATIENT
Start: 2025-01-01 | End: 2025-01-01 | Stop reason: HOSPADM

## 2025-01-01 RX ORDER — LORAZEPAM 0.5 MG/1
0.5 TABLET ORAL
Status: DISCONTINUED | OUTPATIENT
Start: 2025-01-01 | End: 2025-01-01 | Stop reason: HOSPADM

## 2025-01-01 RX ORDER — ATORVASTATIN CALCIUM 20 MG/1
40 TABLET, FILM COATED ORAL NIGHTLY
Status: DISCONTINUED | OUTPATIENT
Start: 2025-01-01 | End: 2025-01-01 | Stop reason: HOSPADM

## 2025-01-01 RX ORDER — ACETAMINOPHEN 160 MG/5ML
650 SOLUTION ORAL EVERY 4 HOURS PRN
Status: DISCONTINUED | OUTPATIENT
Start: 2025-01-01 | End: 2025-01-01 | Stop reason: HOSPADM

## 2025-01-01 RX ORDER — ALBUMIN (HUMAN) 12.5 G/50ML
25 SOLUTION INTRAVENOUS EVERY 6 HOURS
Status: COMPLETED | OUTPATIENT
Start: 2025-01-01 | End: 2025-01-01

## 2025-01-01 RX ORDER — AMOXICILLIN 250 MG
2 CAPSULE ORAL 2 TIMES DAILY PRN
Status: DISCONTINUED | OUTPATIENT
Start: 2025-01-01 | End: 2025-01-01 | Stop reason: HOSPADM

## 2025-01-01 RX ORDER — TAMSULOSIN HYDROCHLORIDE 0.4 MG/1
0.4 CAPSULE ORAL DAILY
Status: DISCONTINUED | OUTPATIENT
Start: 2025-01-01 | End: 2025-01-01

## 2025-01-01 RX ORDER — SODIUM CHLORIDE 9 MG/ML
40 INJECTION, SOLUTION INTRAVENOUS AS NEEDED
Status: DISCONTINUED | OUTPATIENT
Start: 2025-01-01 | End: 2025-01-01 | Stop reason: HOSPADM

## 2025-01-01 RX ORDER — FUROSEMIDE 20 MG/1
20 TABLET ORAL DAILY
COMMUNITY

## 2025-01-01 RX ORDER — DIAZEPAM 10 MG/2ML
5 INJECTION, SOLUTION INTRAMUSCULAR; INTRAVENOUS
Status: DISCONTINUED | OUTPATIENT
Start: 2025-01-01 | End: 2025-01-01 | Stop reason: HOSPADM

## 2025-01-01 RX ORDER — ATROPINE SULFATE 10 MG/ML
2 SOLUTION/ DROPS OPHTHALMIC 2 TIMES DAILY PRN
Status: DISCONTINUED | OUTPATIENT
Start: 2025-01-01 | End: 2025-01-01 | Stop reason: HOSPADM

## 2025-01-01 RX ORDER — DIAZEPAM 10 MG/2ML
10 INJECTION, SOLUTION INTRAMUSCULAR; INTRAVENOUS
Status: DISCONTINUED | OUTPATIENT
Start: 2025-01-01 | End: 2025-01-01 | Stop reason: HOSPADM

## 2025-01-01 RX ORDER — ONDANSETRON 2 MG/ML
4 INJECTION INTRAMUSCULAR; INTRAVENOUS EVERY 6 HOURS PRN
Status: DISCONTINUED | OUTPATIENT
Start: 2025-01-01 | End: 2025-01-01 | Stop reason: HOSPADM

## 2025-01-01 RX ORDER — GUAIFENESIN 200 MG/10ML
200 LIQUID ORAL 4 TIMES DAILY PRN
Status: DISCONTINUED | OUTPATIENT
Start: 2025-01-01 | End: 2025-01-01 | Stop reason: HOSPADM

## 2025-01-01 RX ORDER — VALSARTAN 40 MG/1
40 TABLET ORAL NIGHTLY
Status: DISCONTINUED | OUTPATIENT
Start: 2025-01-01 | End: 2025-01-01

## 2025-01-01 RX ORDER — FUROSEMIDE 10 MG/ML
20 INJECTION INTRAMUSCULAR; INTRAVENOUS EVERY 12 HOURS
Status: DISCONTINUED | OUTPATIENT
Start: 2025-01-01 | End: 2025-01-01

## 2025-01-01 RX ORDER — TERAZOSIN 5 MG/1
5 CAPSULE ORAL NIGHTLY
Status: DISCONTINUED | OUTPATIENT
Start: 2025-01-01 | End: 2025-01-01

## 2025-01-01 RX ORDER — HYDROMORPHONE HYDROCHLORIDE 1 MG/ML
0.5 INJECTION, SOLUTION INTRAMUSCULAR; INTRAVENOUS; SUBCUTANEOUS
Status: DISCONTINUED | OUTPATIENT
Start: 2025-01-01 | End: 2025-01-01 | Stop reason: HOSPADM

## 2025-01-01 RX ORDER — OXYBUTYNIN CHLORIDE 5 MG/1
5 TABLET ORAL 2 TIMES DAILY
Status: DISCONTINUED | OUTPATIENT
Start: 2025-01-01 | End: 2025-01-01 | Stop reason: HOSPADM

## 2025-01-01 RX ORDER — LEVOTHYROXINE SODIUM 88 UG/1
88 TABLET ORAL DAILY
Status: DISCONTINUED | OUTPATIENT
Start: 2025-01-01 | End: 2025-01-01 | Stop reason: HOSPADM

## 2025-01-01 RX ORDER — POLYETHYLENE GLYCOL 3350 17 G/17G
17 POWDER, FOR SOLUTION ORAL DAILY PRN
Status: DISCONTINUED | OUTPATIENT
Start: 2025-01-01 | End: 2025-01-01 | Stop reason: HOSPADM

## 2025-01-01 RX ORDER — MORPHINE SULFATE 2 MG/ML
2 INJECTION, SOLUTION INTRAMUSCULAR; INTRAVENOUS
Status: DISCONTINUED | OUTPATIENT
Start: 2025-01-01 | End: 2025-01-01 | Stop reason: HOSPADM

## 2025-01-01 RX ORDER — FUROSEMIDE 10 MG/ML
80 INJECTION INTRAMUSCULAR; INTRAVENOUS ONCE
Status: COMPLETED | OUTPATIENT
Start: 2025-01-01 | End: 2025-01-01

## 2025-01-01 RX ORDER — MORPHINE SULFATE 20 MG/ML
10 SOLUTION ORAL
Status: DISCONTINUED | OUTPATIENT
Start: 2025-01-01 | End: 2025-01-01 | Stop reason: HOSPADM

## 2025-01-01 RX ORDER — ONDANSETRON 4 MG/1
4 TABLET, ORALLY DISINTEGRATING ORAL EVERY 6 HOURS PRN
Status: DISCONTINUED | OUTPATIENT
Start: 2025-01-01 | End: 2025-01-01 | Stop reason: HOSPADM

## 2025-01-01 RX ORDER — AMIODARONE HYDROCHLORIDE 200 MG/1
200 TABLET ORAL DAILY
Status: DISCONTINUED | OUTPATIENT
Start: 2025-01-01 | End: 2025-01-01 | Stop reason: HOSPADM

## 2025-01-01 RX ORDER — HYDROCORTISONE 25 MG/G
1 CREAM TOPICAL EVERY 8 HOURS SCHEDULED
Status: DISCONTINUED | OUTPATIENT
Start: 2025-01-01 | End: 2025-01-01 | Stop reason: HOSPADM

## 2025-01-01 RX ORDER — LORAZEPAM 1 MG/1
1 TABLET ORAL
Status: DISCONTINUED | OUTPATIENT
Start: 2025-01-01 | End: 2025-01-01 | Stop reason: HOSPADM

## 2025-01-01 RX ORDER — METOPROLOL TARTRATE 25 MG/1
12.5 TABLET, FILM COATED ORAL 2 TIMES DAILY
Status: DISCONTINUED | OUTPATIENT
Start: 2025-01-01 | End: 2025-01-01 | Stop reason: HOSPADM

## 2025-01-01 RX ORDER — GLYCOPYRROLATE 0.2 MG/ML
0.2 INJECTION INTRAMUSCULAR; INTRAVENOUS
Status: DISCONTINUED | OUTPATIENT
Start: 2025-01-01 | End: 2025-01-01 | Stop reason: HOSPADM

## 2025-01-01 RX ORDER — SODIUM CHLORIDE 0.9 % (FLUSH) 0.9 %
10 SYRINGE (ML) INJECTION AS NEEDED
Status: DISCONTINUED | OUTPATIENT
Start: 2025-01-01 | End: 2025-01-01 | Stop reason: HOSPADM

## 2025-01-01 RX ORDER — LEVOTHYROXINE SODIUM 75 UG/1
75 TABLET ORAL DAILY
Status: DISCONTINUED | OUTPATIENT
Start: 2025-01-01 | End: 2025-01-01

## 2025-01-01 RX ORDER — GUAIFENESIN 600 MG/1
1200 TABLET, EXTENDED RELEASE ORAL EVERY 12 HOURS SCHEDULED
Status: DISCONTINUED | OUTPATIENT
Start: 2025-01-01 | End: 2025-01-01 | Stop reason: ALTCHOICE

## 2025-01-01 RX ORDER — GUAIFENESIN 200 MG/10ML
400 LIQUID ORAL EVERY 4 HOURS
Status: DISCONTINUED | OUTPATIENT
Start: 2025-01-01 | End: 2025-01-01 | Stop reason: HOSPADM

## 2025-01-01 RX ORDER — FUROSEMIDE 10 MG/ML
INJECTION INTRAMUSCULAR; INTRAVENOUS
Status: DISCONTINUED
Start: 2025-01-01 | End: 2025-01-01 | Stop reason: HOSPADM

## 2025-01-01 RX ORDER — NITROGLYCERIN 0.4 MG/1
0.4 TABLET SUBLINGUAL
Status: DISCONTINUED | OUTPATIENT
Start: 2025-01-01 | End: 2025-01-01

## 2025-01-01 RX ADMIN — METOPROLOL TARTRATE 12.5 MG: 25 TABLET, FILM COATED ORAL at 21:46

## 2025-01-01 RX ADMIN — ATORVASTATIN CALCIUM 40 MG: 20 TABLET, FILM COATED ORAL at 20:25

## 2025-01-01 RX ADMIN — GLYCOPYRROLATE 0.4 MG: 0.2 INJECTION INTRAMUSCULAR; INTRAVENOUS at 08:20

## 2025-01-01 RX ADMIN — IPRATROPIUM BROMIDE AND ALBUTEROL SULFATE 3 ML: 2.5; .5 SOLUTION RESPIRATORY (INHALATION) at 07:11

## 2025-01-01 RX ADMIN — HYDROCORTISONE 1 APPLICATION: 25 CREAM TOPICAL at 05:07

## 2025-01-01 RX ADMIN — IPRATROPIUM BROMIDE AND ALBUTEROL SULFATE 3 ML: 2.5; .5 SOLUTION RESPIRATORY (INHALATION) at 10:39

## 2025-01-01 RX ADMIN — GUAIFENESIN 200 MG: 100 LIQUID ORAL at 16:38

## 2025-01-01 RX ADMIN — OXYBUTYNIN CHLORIDE 5 MG: 5 TABLET ORAL at 10:50

## 2025-01-01 RX ADMIN — TERAZOSIN HYDROCHLORIDE 1 MG: 1 CAPSULE ORAL at 21:46

## 2025-01-01 RX ADMIN — FINASTERIDE 5 MG: 5 TABLET, FILM COATED ORAL at 11:51

## 2025-01-01 RX ADMIN — CARBIDOPA AND LEVODOPA 1 TABLET: 25; 250 TABLET ORAL at 16:22

## 2025-01-01 RX ADMIN — ALBUMIN (HUMAN) 25 G: 0.25 INJECTION, SOLUTION INTRAVENOUS at 00:18

## 2025-01-01 RX ADMIN — IPRATROPIUM BROMIDE AND ALBUTEROL SULFATE 3 ML: 2.5; .5 SOLUTION RESPIRATORY (INHALATION) at 15:30

## 2025-01-01 RX ADMIN — Medication 4 ML: at 07:11

## 2025-01-01 RX ADMIN — ALBUMIN (HUMAN) 25 G: 0.25 INJECTION, SOLUTION INTRAVENOUS at 00:00

## 2025-01-01 RX ADMIN — Medication 10 ML: at 10:06

## 2025-01-01 RX ADMIN — OXYBUTYNIN CHLORIDE 5 MG: 5 TABLET ORAL at 21:31

## 2025-01-01 RX ADMIN — OXYBUTYNIN CHLORIDE 5 MG: 5 TABLET ORAL at 20:26

## 2025-01-01 RX ADMIN — CARBIDOPA AND LEVODOPA 1 TABLET: 25; 250 TABLET ORAL at 20:30

## 2025-01-01 RX ADMIN — PIPERACILLIN AND TAZOBACTAM 3.38 G: 3; .375 INJECTION, POWDER, FOR SOLUTION INTRAVENOUS at 18:08

## 2025-01-01 RX ADMIN — APIXABAN 5 MG: 5 TABLET, FILM COATED ORAL at 11:51

## 2025-01-01 RX ADMIN — MORPHINE SULFATE 4 MG: 2 INJECTION, SOLUTION INTRAMUSCULAR; INTRAVENOUS at 01:41

## 2025-01-01 RX ADMIN — CARBIDOPA AND LEVODOPA 1 TABLET: 25; 250 TABLET ORAL at 16:38

## 2025-01-01 RX ADMIN — FUROSEMIDE 80 MG: 10 INJECTION, SOLUTION INTRAVENOUS at 17:05

## 2025-01-01 RX ADMIN — Medication 10 ML: at 21:11

## 2025-01-01 RX ADMIN — CARBIDOPA AND LEVODOPA 1 TABLET: 25; 250 TABLET ORAL at 16:01

## 2025-01-01 RX ADMIN — CARBIDOPA AND LEVODOPA 1 TABLET: 25; 250 TABLET ORAL at 21:46

## 2025-01-01 RX ADMIN — GUAIFENESIN 400 MG: 100 LIQUID ORAL at 18:56

## 2025-01-01 RX ADMIN — ALBUMIN (HUMAN) 25 G: 0.25 INJECTION, SOLUTION INTRAVENOUS at 18:43

## 2025-01-01 RX ADMIN — APIXABAN 5 MG: 5 TABLET, FILM COATED ORAL at 20:37

## 2025-01-01 RX ADMIN — PIPERACILLIN AND TAZOBACTAM 3.38 G: 3; .375 INJECTION, POWDER, FOR SOLUTION INTRAVENOUS at 03:28

## 2025-01-01 RX ADMIN — GLYCOPYRROLATE 0.2 MG: 0.2 INJECTION INTRAMUSCULAR; INTRAVENOUS at 13:59

## 2025-01-01 RX ADMIN — GUAIFENESIN 400 MG: 100 LIQUID ORAL at 14:02

## 2025-01-01 RX ADMIN — AMIODARONE HYDROCHLORIDE 200 MG: 200 TABLET ORAL at 11:52

## 2025-01-01 RX ADMIN — ALBUMIN (HUMAN) 25 G: 0.25 INJECTION, SOLUTION INTRAVENOUS at 18:00

## 2025-01-01 RX ADMIN — Medication 10 ML: at 09:09

## 2025-01-01 RX ADMIN — GUAIFENESIN 400 MG: 100 LIQUID ORAL at 03:37

## 2025-01-01 RX ADMIN — FINASTERIDE 5 MG: 5 TABLET, FILM COATED ORAL at 09:58

## 2025-01-01 RX ADMIN — GUAIFENESIN 400 MG: 100 LIQUID ORAL at 06:27

## 2025-01-01 RX ADMIN — GUAIFENESIN 400 MG: 100 LIQUID ORAL at 16:07

## 2025-01-01 RX ADMIN — OXYBUTYNIN CHLORIDE 5 MG: 5 TABLET ORAL at 21:40

## 2025-01-01 RX ADMIN — METOPROLOL TARTRATE 12.5 MG: 25 TABLET, FILM COATED ORAL at 21:40

## 2025-01-01 RX ADMIN — GUAIFENESIN 400 MG: 100 LIQUID ORAL at 21:31

## 2025-01-01 RX ADMIN — ENOXAPARIN SODIUM 70 MG: 100 INJECTION SUBCUTANEOUS at 21:45

## 2025-01-01 RX ADMIN — OXYBUTYNIN CHLORIDE 5 MG: 5 TABLET ORAL at 09:44

## 2025-01-01 RX ADMIN — LEVOTHYROXINE SODIUM 75 MCG: 75 TABLET ORAL at 05:14

## 2025-01-01 RX ADMIN — Medication 4 ML: at 20:44

## 2025-01-01 RX ADMIN — GLYCOPYRROLATE 0.1 MG: 0.2 INJECTION INTRAMUSCULAR; INTRAVENOUS at 12:13

## 2025-01-01 RX ADMIN — ATORVASTATIN CALCIUM 40 MG: 20 TABLET, FILM COATED ORAL at 21:40

## 2025-01-01 RX ADMIN — Medication 10 ML: at 10:00

## 2025-01-01 RX ADMIN — DIAZEPAM 2.5 MG: 5 INJECTION INTRAMUSCULAR; INTRAVENOUS at 13:44

## 2025-01-01 RX ADMIN — FINASTERIDE 5 MG: 5 TABLET, FILM COATED ORAL at 09:06

## 2025-01-01 RX ADMIN — CARBIDOPA AND LEVODOPA 1 TABLET: 25; 250 TABLET ORAL at 10:04

## 2025-01-01 RX ADMIN — ATORVASTATIN CALCIUM 40 MG: 20 TABLET, FILM COATED ORAL at 20:37

## 2025-01-01 RX ADMIN — PIPERACILLIN AND TAZOBACTAM 3.38 G: 3; .375 INJECTION, POWDER, FOR SOLUTION INTRAVENOUS at 03:37

## 2025-01-01 RX ADMIN — CARBIDOPA AND LEVODOPA 1 TABLET: 25; 250 TABLET ORAL at 20:38

## 2025-01-01 RX ADMIN — FUROSEMIDE 20 MG: 10 INJECTION, SOLUTION INTRAMUSCULAR; INTRAVENOUS at 10:51

## 2025-01-01 RX ADMIN — HYDROCORTISONE 1 APPLICATION: 25 CREAM TOPICAL at 21:31

## 2025-01-01 RX ADMIN — ALBUMIN (HUMAN) 25 G: 0.25 INJECTION, SOLUTION INTRAVENOUS at 06:27

## 2025-01-01 RX ADMIN — APIXABAN 5 MG: 5 TABLET, FILM COATED ORAL at 21:40

## 2025-01-01 RX ADMIN — FUROSEMIDE 20 MG: 10 INJECTION, SOLUTION INTRAMUSCULAR; INTRAVENOUS at 16:06

## 2025-01-01 RX ADMIN — OXYBUTYNIN CHLORIDE 5 MG: 5 TABLET ORAL at 20:38

## 2025-01-01 RX ADMIN — GLYCOPYRROLATE 0.2 MG: 0.2 INJECTION INTRAMUSCULAR; INTRAVENOUS at 04:17

## 2025-01-01 RX ADMIN — ENOXAPARIN SODIUM 70 MG: 100 INJECTION SUBCUTANEOUS at 10:05

## 2025-01-01 RX ADMIN — GUAIFENESIN 400 MG: 100 LIQUID ORAL at 16:22

## 2025-01-01 RX ADMIN — GUAIFENESIN 400 MG: 100 LIQUID ORAL at 03:34

## 2025-01-01 RX ADMIN — ALBUMIN (HUMAN) 25 G: 0.25 INJECTION, SOLUTION INTRAVENOUS at 06:11

## 2025-01-01 RX ADMIN — FUROSEMIDE 20 MG: 10 INJECTION, SOLUTION INTRAMUSCULAR; INTRAVENOUS at 16:34

## 2025-01-01 RX ADMIN — AMIODARONE HYDROCHLORIDE 200 MG: 200 TABLET ORAL at 09:06

## 2025-01-01 RX ADMIN — GLYCOPYRROLATE 0.1 MG: 0.2 INJECTION INTRAMUSCULAR; INTRAVENOUS at 04:01

## 2025-01-01 RX ADMIN — GUAIFENESIN 200 MG: 100 LIQUID ORAL at 10:05

## 2025-01-01 RX ADMIN — IPRATROPIUM BROMIDE AND ALBUTEROL SULFATE 3 ML: 2.5; .5 SOLUTION RESPIRATORY (INHALATION) at 15:52

## 2025-01-01 RX ADMIN — IPRATROPIUM BROMIDE AND ALBUTEROL SULFATE 3 ML: 2.5; .5 SOLUTION RESPIRATORY (INHALATION) at 20:41

## 2025-01-01 RX ADMIN — MORPHINE SULFATE 4 MG: 2 INJECTION, SOLUTION INTRAMUSCULAR; INTRAVENOUS at 20:41

## 2025-01-01 RX ADMIN — MORPHINE SULFATE 2 MG: 2 INJECTION, SOLUTION INTRAMUSCULAR; INTRAVENOUS at 15:08

## 2025-01-01 RX ADMIN — OXYBUTYNIN CHLORIDE 5 MG: 5 TABLET ORAL at 09:57

## 2025-01-01 RX ADMIN — ACETAMINOPHEN 650 MG: 650 SUPPOSITORY RECTAL at 12:32

## 2025-01-01 RX ADMIN — GUAIFENESIN 200 MG: 100 LIQUID ORAL at 21:46

## 2025-01-01 RX ADMIN — Medication 10 ML: at 21:41

## 2025-01-01 RX ADMIN — AMIODARONE HYDROCHLORIDE 200 MG: 200 TABLET ORAL at 10:04

## 2025-01-01 RX ADMIN — ACETAMINOPHEN 325MG 650 MG: 325 TABLET ORAL at 20:37

## 2025-01-01 RX ADMIN — METOPROLOL TARTRATE 12.5 MG: 25 TABLET, FILM COATED ORAL at 11:53

## 2025-01-01 RX ADMIN — METOPROLOL TARTRATE 12.5 MG: 25 TABLET, FILM COATED ORAL at 20:26

## 2025-01-01 RX ADMIN — Medication 4 ML: at 07:16

## 2025-01-01 RX ADMIN — APIXABAN 5 MG: 5 TABLET, FILM COATED ORAL at 09:57

## 2025-01-01 RX ADMIN — GUAIFENESIN 400 MG: 100 LIQUID ORAL at 09:07

## 2025-01-01 RX ADMIN — Medication 4 ML: at 20:25

## 2025-01-01 RX ADMIN — HYDROCORTISONE 1 APPLICATION: 25 CREAM TOPICAL at 21:41

## 2025-01-01 RX ADMIN — MORPHINE SULFATE 2 MG: 2 INJECTION, SOLUTION INTRAMUSCULAR; INTRAVENOUS at 12:46

## 2025-01-01 RX ADMIN — ACYCLOVIR SODIUM 700 MG: 50 INJECTION, SOLUTION INTRAVENOUS at 18:12

## 2025-01-01 RX ADMIN — LEVOTHYROXINE SODIUM 75 MCG: 0.07 TABLET ORAL at 11:51

## 2025-01-01 RX ADMIN — APIXABAN 5 MG: 5 TABLET, FILM COATED ORAL at 09:06

## 2025-01-01 RX ADMIN — GUAIFENESIN 400 MG: 100 LIQUID ORAL at 06:12

## 2025-01-01 RX ADMIN — CARBIDOPA AND LEVODOPA 1 TABLET: 25; 250 TABLET ORAL at 11:51

## 2025-01-01 RX ADMIN — IPRATROPIUM BROMIDE AND ALBUTEROL SULFATE 3 ML: 2.5; .5 SOLUTION RESPIRATORY (INHALATION) at 19:17

## 2025-01-01 RX ADMIN — Medication 10 ML: at 09:44

## 2025-01-01 RX ADMIN — LEVOTHYROXINE SODIUM 88 MCG: 88 TABLET ORAL at 09:44

## 2025-01-01 RX ADMIN — ATORVASTATIN CALCIUM 40 MG: 20 TABLET, FILM COATED ORAL at 21:31

## 2025-01-01 RX ADMIN — GUAIFENESIN 400 MG: 100 LIQUID ORAL at 06:15

## 2025-01-01 RX ADMIN — IPRATROPIUM BROMIDE AND ALBUTEROL SULFATE 3 ML: 2.5; .5 SOLUTION RESPIRATORY (INHALATION) at 11:16

## 2025-01-01 RX ADMIN — GUAIFENESIN 400 MG: 100 LIQUID ORAL at 12:13

## 2025-01-01 RX ADMIN — PERFLUTREN 2 ML: 6.52 INJECTION, SUSPENSION INTRAVENOUS at 11:33

## 2025-01-01 RX ADMIN — GUAIFENESIN 200 MG: 100 LIQUID ORAL at 05:15

## 2025-01-01 RX ADMIN — IPRATROPIUM BROMIDE AND ALBUTEROL SULFATE 3 ML: 2.5; .5 SOLUTION RESPIRATORY (INHALATION) at 20:21

## 2025-01-01 RX ADMIN — FUROSEMIDE 20 MG: 10 INJECTION, SOLUTION INTRAMUSCULAR; INTRAVENOUS at 12:13

## 2025-01-01 RX ADMIN — CARBIDOPA AND LEVODOPA 1 TABLET: 25; 250 TABLET ORAL at 09:57

## 2025-01-01 RX ADMIN — HYDROMORPHONE HYDROCHLORIDE 1 MG: 1 INJECTION, SOLUTION INTRAMUSCULAR; INTRAVENOUS; SUBCUTANEOUS at 04:17

## 2025-01-01 RX ADMIN — GUAIFENESIN 400 MG: 100 LIQUID ORAL at 18:09

## 2025-01-01 RX ADMIN — ACETAMINOPHEN 650 MG: 650 SUPPOSITORY RECTAL at 03:36

## 2025-01-01 RX ADMIN — GUAIFENESIN 400 MG: 100 LIQUID ORAL at 05:07

## 2025-01-01 RX ADMIN — GUAIFENESIN 400 MG: 100 LIQUID ORAL at 03:28

## 2025-01-01 RX ADMIN — Medication 10 ML: at 20:26

## 2025-01-01 RX ADMIN — CARBIDOPA AND LEVODOPA 1 TABLET: 25; 250 TABLET ORAL at 09:44

## 2025-01-01 RX ADMIN — Medication 12.5 MG: at 09:06

## 2025-01-01 RX ADMIN — Medication 10 ML: at 20:38

## 2025-01-01 RX ADMIN — IPRATROPIUM BROMIDE AND ALBUTEROL SULFATE 3 ML: 2.5; .5 SOLUTION RESPIRATORY (INHALATION) at 08:07

## 2025-01-01 RX ADMIN — ACETAMINOPHEN 325MG 650 MG: 325 TABLET ORAL at 20:35

## 2025-01-01 RX ADMIN — HYDROCORTISONE 1 APPLICATION: 25 CREAM TOPICAL at 06:28

## 2025-01-01 RX ADMIN — TERAZOSIN HYDROCHLORIDE 5 MG: 5 CAPSULE ORAL at 20:29

## 2025-01-01 RX ADMIN — METOPROLOL TARTRATE 12.5 MG: 25 TABLET, FILM COATED ORAL at 09:43

## 2025-01-01 RX ADMIN — DIAZEPAM 2.5 MG: 5 INJECTION INTRAMUSCULAR; INTRAVENOUS at 00:39

## 2025-01-01 RX ADMIN — CARBIDOPA AND LEVODOPA 1 TABLET: 25; 250 TABLET ORAL at 21:40

## 2025-01-01 RX ADMIN — Medication 10 ML: at 12:59

## 2025-01-01 RX ADMIN — DIAZEPAM 5 MG: 5 INJECTION INTRAMUSCULAR; INTRAVENOUS at 04:17

## 2025-01-01 RX ADMIN — CARBIDOPA AND LEVODOPA 1 TABLET: 25; 250 TABLET ORAL at 16:06

## 2025-01-01 RX ADMIN — APIXABAN 5 MG: 5 TABLET, FILM COATED ORAL at 20:26

## 2025-01-01 RX ADMIN — AMIODARONE HYDROCHLORIDE 200 MG: 200 TABLET ORAL at 09:59

## 2025-01-01 RX ADMIN — DIAZEPAM 5 MG: 5 INJECTION INTRAMUSCULAR; INTRAVENOUS at 08:21

## 2025-01-01 RX ADMIN — IPRATROPIUM BROMIDE AND ALBUTEROL SULFATE 3 ML: 2.5; .5 SOLUTION RESPIRATORY (INHALATION) at 11:54

## 2025-01-01 RX ADMIN — AMIODARONE HYDROCHLORIDE 200 MG: 200 TABLET ORAL at 21:46

## 2025-01-01 RX ADMIN — GUAIFENESIN 400 MG: 100 LIQUID ORAL at 02:45

## 2025-01-01 RX ADMIN — AMIODARONE HYDROCHLORIDE 200 MG: 200 TABLET ORAL at 09:43

## 2025-01-01 RX ADMIN — FINASTERIDE 5 MG: 5 TABLET, FILM COATED ORAL at 10:04

## 2025-01-01 RX ADMIN — ACYCLOVIR SODIUM 700 MG: 50 INJECTION, SOLUTION INTRAVENOUS at 10:04

## 2025-01-01 RX ADMIN — ACYCLOVIR SODIUM 700 MG: 50 INJECTION, SOLUTION INTRAVENOUS at 01:45

## 2025-01-01 RX ADMIN — DIAZEPAM 2.5 MG: 5 INJECTION INTRAMUSCULAR; INTRAVENOUS at 20:41

## 2025-01-01 RX ADMIN — GUAIFENESIN 200 MG: 100 LIQUID ORAL at 01:47

## 2025-01-01 RX ADMIN — MORPHINE SULFATE 2 MG: 2 INJECTION, SOLUTION INTRAMUSCULAR; INTRAVENOUS at 18:23

## 2025-01-01 RX ADMIN — Medication 1000 MCG: at 05:14

## 2025-01-01 RX ADMIN — Medication 10 ML: at 09:38

## 2025-01-01 RX ADMIN — FINASTERIDE 5 MG: 5 TABLET, FILM COATED ORAL at 09:43

## 2025-01-01 RX ADMIN — HYDROMORPHONE HYDROCHLORIDE 1 MG: 1 INJECTION, SOLUTION INTRAMUSCULAR; INTRAVENOUS; SUBCUTANEOUS at 08:20

## 2025-01-01 RX ADMIN — MORPHINE SULFATE 4 MG: 2 INJECTION, SOLUTION INTRAMUSCULAR; INTRAVENOUS at 00:39

## 2025-01-01 RX ADMIN — ATORVASTATIN CALCIUM 40 MG: 20 TABLET, FILM COATED ORAL at 21:46

## 2025-01-01 RX ADMIN — METOPROLOL TARTRATE 12.5 MG: 25 TABLET, FILM COATED ORAL at 09:58

## 2025-01-01 RX ADMIN — METOPROLOL TARTRATE 12.5 MG: 25 TABLET, FILM COATED ORAL at 10:04

## 2025-01-01 RX ADMIN — GUAIFENESIN 400 MG: 100 LIQUID ORAL at 21:40

## 2025-01-01 RX ADMIN — LEVOTHYROXINE SODIUM 75 MCG: 0.07 TABLET ORAL at 09:06

## 2025-01-01 RX ADMIN — PIPERACILLIN AND TAZOBACTAM 3.38 G: 3; .375 INJECTION, POWDER, FOR SOLUTION INTRAVENOUS at 12:41

## 2025-01-01 RX ADMIN — CARBIDOPA AND LEVODOPA 1 TABLET: 25; 250 TABLET ORAL at 21:31

## 2025-01-01 RX ADMIN — HYDROCORTISONE 1 APPLICATION: 25 CREAM TOPICAL at 16:07

## 2025-01-01 RX ADMIN — METOPROLOL TARTRATE 12.5 MG: 25 TABLET, FILM COATED ORAL at 21:31

## 2025-01-01 RX ADMIN — HYDROCORTISONE 1 APPLICATION: 25 CREAM TOPICAL at 14:02

## 2025-01-01 RX ADMIN — ALBUMIN (HUMAN) 25 G: 0.25 INJECTION, SOLUTION INTRAVENOUS at 13:21

## 2025-01-01 RX ADMIN — IPRATROPIUM BROMIDE AND ALBUTEROL SULFATE 3 ML: 2.5; .5 SOLUTION RESPIRATORY (INHALATION) at 07:15

## 2025-01-01 RX ADMIN — APIXABAN 5 MG: 5 TABLET, FILM COATED ORAL at 21:31

## 2025-01-01 RX ADMIN — Medication 10 ML: at 21:31

## 2025-01-01 RX ADMIN — GUAIFENESIN 200 MG: 200 SOLUTION ORAL at 20:31

## 2025-01-01 RX ADMIN — Medication 10 ML: at 21:47

## 2025-01-01 RX ADMIN — LEVOTHYROXINE SODIUM 75 MCG: 0.07 TABLET ORAL at 09:57

## 2025-01-01 RX ADMIN — PIPERACILLIN AND TAZOBACTAM 3.38 G: 3; .375 INJECTION, POWDER, FOR SOLUTION INTRAVENOUS at 11:59

## 2025-01-01 RX ADMIN — DIAZEPAM 2.5 MG: 5 INJECTION INTRAMUSCULAR; INTRAVENOUS at 16:49

## 2025-01-01 RX ADMIN — APIXABAN 5 MG: 5 TABLET, FILM COATED ORAL at 09:43

## 2025-01-01 RX ADMIN — FUROSEMIDE 20 MG: 10 INJECTION, SOLUTION INTRAMUSCULAR; INTRAVENOUS at 05:30

## 2025-01-01 RX ADMIN — PIPERACILLIN AND TAZOBACTAM 3.38 G: 3; .375 INJECTION, POWDER, FOR SOLUTION INTRAVENOUS at 19:11

## 2025-01-01 RX ADMIN — GUAIFENESIN 400 MG: 100 LIQUID ORAL at 20:55

## 2025-01-01 RX ADMIN — GUAIFENESIN 400 MG: 100 LIQUID ORAL at 10:00

## 2025-01-01 RX ADMIN — CARBIDOPA AND LEVODOPA 1 TABLET: 25; 250 TABLET ORAL at 16:34

## 2025-01-01 RX ADMIN — CARBIDOPA AND LEVODOPA 1 TABLET: 25; 250 TABLET ORAL at 09:06

## 2025-01-01 RX ADMIN — GUAIFENESIN 200 MG: 100 LIQUID ORAL at 14:25

## 2025-01-01 RX ADMIN — IPRATROPIUM BROMIDE AND ALBUTEROL SULFATE 3 ML: 2.5; .5 SOLUTION RESPIRATORY (INHALATION) at 15:16

## 2025-01-01 NOTE — PLAN OF CARE
Goal Outcome Evaluation:  Aox1, to self, VSS. Tube feeds Isosource 1.5 going at 65 mL/hr goal, water flush changed to 250 q4h today by nephro. Family bedside. Patient continues to be restless but cooperative. Palliative team to follow up for family meeting tomorrow, per note. Care provided per MD orders.

## 2025-01-01 NOTE — PROGRESS NOTES
Name: Casey Snowden . ADMIT: 2024   : 1934  PCP: Aleksander Diez MD    MRN: [id number] LOS: 15 days   AGE/SEX: 89 y.o. male  ROOM: [facility]     Subjective   Subjective     No new complaints. Actually he was pretty lucid today when I woke him up     Objective   Objective   Vital Signs  Temp:  [97.5 °F (36.4 °C)-98.7 °F (37.1 °C)] 97.5 °F (36.4 °C)  Heart Rate:  [50-70] 50  Resp:  [17-18] 17  BP: (111-138)/(47-67) 111/47  SpO2:  [95 %-100 %] 96 %  on   ;   Device (Oxygen Therapy): room air  Body mass index is 26.67 kg/m².  Physical Exam  Constitutional:       General: He is not in acute distress.  HENT:      Head:      Comments: NGT  Eyes:      Extraocular Movements: Extraocular movements intact.      Pupils: Pupils are equal, round, and reactive to light.   Cardiovascular:      Rate and Rhythm: Normal rate and regular rhythm.      Heart sounds: No murmur heard.  Pulmonary:      Effort: No respiratory distress.   Abdominal:      General: There is no distension.      Palpations: Abdomen is soft.      Tenderness: There is no abdominal tenderness.   Musculoskeletal:      Right lower leg: No edema.      Left lower leg: No edema.   Skin:     General: Skin is warm and dry.      Findings: No rash.   Neurological:      Mental Status: He is alert. He is disoriented.     Exam revealed and updated on 25  Results Review     I reviewed the patient's new clinical results.  Results from last 7 days   Lab Units 24  0708 24  1100 24  0548 24  1359   WBC 10*3/mm3 9.19 8.55 7.38 7.30   HEMOGLOBIN g/dL 10.0* 10.3* 10.3* 10.2*   PLATELETS 10*3/mm3 219 242 231 218     Results from last 7 days   Lab Units 25  0600 24  0921 24  1100 24  0548   SODIUM mmol/L 134* 137 140 144   POTASSIUM mmol/L 4.2 5.1 4.3 4.5   CHLORIDE mmol/L 105 110* 111* 115*   CO2 mmol/L 23.9 20.0* 21.0* 23.0   BUN mg/dL 30* 32* 30* 41*   CREATININE mg/dL 0.75* 0.66* 0.63* 0.79   GLUCOSE mg/dL  125* 119* 118* 135*   EGFR mL/min/1.73 85.7 89.1 90.4 84.4     Results from last 7 days   Lab Units 01/01/25  0600 12/31/24  0921 12/30/24  1100 12/29/24  0548 12/28/24  1359 12/27/24  0518   ALBUMIN g/dL 2.3* 2.3* 2.3* 2.5*   < > 2.3*   BILIRUBIN mg/dL  --   --   --   --   --  0.2   ALK PHOS U/L  --   --   --   --   --  70   AST (SGOT) U/L  --   --   --   --   --  87*   ALT (SGPT) U/L  --   --   --   --   --  33    < > = values in this interval not displayed.       Results from last 7 days   Lab Units 01/01/25  0600 12/31/24  0921 12/30/24  1100 12/29/24  0548 12/28/24  1359 12/27/24  0518   CALCIUM mg/dL 7.4* 7.6* 7.4* 7.8* 7.6* 7.6*   ALBUMIN g/dL 2.3* 2.3* 2.3* 2.5* 2.5* 2.3*   MAGNESIUM mg/dL  --   --  1.8  --  2.2 1.5*   PHOSPHORUS mg/dL 2.9 2.6 2.0* 2.3* 2.8 3.5     Results from last 7 days   Lab Units 12/26/24  0646   PROCALCITONIN ng/mL 0.61*     Glucose   Date/Time Value Ref Range Status   01/01/2025 1141 146 (H) 70 - 130 mg/dL Final   01/01/2025 0530 132 (H) 70 - 130 mg/dL Final   01/01/2025 0014 116 70 - 130 mg/dL Final   12/31/2024 2030 103 70 - 130 mg/dL Final   12/31/2024 1708 127 70 - 130 mg/dL Final   12/31/2024 1048 122 70 - 130 mg/dL Final   12/31/2024 0604 109 70 - 130 mg/dL Final       No radiology results for the last day    I have personally reviewed all medications:  Scheduled Medications  acyclovir, 10 mg/kg, Intravenous, Q8H  amiodarone, 200 mg, Nasogastric, Q12H  [Held by provider] apixaban, 5 mg, Oral, Q12H  atorvastatin, 40 mg, Nasogastric, Nightly  carbidopa-levodopa, 1 tablet, Nasogastric, TID  enoxaparin, 1 mg/kg, Subcutaneous, Q12H  finasteride, 5 mg, Nasogastric, Daily  guaifenesin, 200 mg, Nasogastric, Q4H  insulin regular, 2-7 Units, Subcutaneous, Q6H  levothyroxine, 75 mcg, Nasogastric, Daily  metoprolol tartrate, 12.5 mg, Nasogastric, Q12H  [Held by provider] oxybutynin XL, 10 mg, Oral, Daily  sodium chloride, 10 mL, Intravenous, Q12H  terazosin, 1 mg, Nasogastric,  Nightly  vitamin B-12, 1,000 mcg, Nasogastric, Q AM    Infusions     Diet  NPO Diet NPO Type: Tube Feeding    I have personally reviewed:  [x]  Laboratory   []  Microbiology   [x]  Radiology   []  EKG/Telemetry  []  Cardiology/Vascular   []  Pathology    []  Records       Assessment/Plan     Active Hospital Problems    Diagnosis  POA    **Unable to ambulate [R26.2]  Yes    Moderate protein-calorie malnutrition [E44.0]  Yes    LISY (acute kidney injury) [N17.9]  Yes    Herpes zoster without complication [B02.9]  Unknown    Falls frequently [R29.6]  Not Applicable    Acquired hypothyroidism [E03.9]  Yes    Secondary malignancy of lymph nodes of head, 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       89 y.o. male admitted with generalized weakness, confusion and found to have RLE rash consistent with shingles.    Severe pharyngeal dysphagia  From speech therapy discussion, he has a chronic aspiration risk but previously chose to take in food by mouth for quality-of-life reasons.  VFSS would not be appropriate right now and the safest recommendation is to remain NPO.  General surgery has been consulted for possible PEG tube placement.    Cough/congestion: Checked chest x-ray which shows some new infiltrates.  On meropenem for 5 day course ending on 12/29    VZV encephalitis.  Status post LP  -Continue acyclovir x 14 days.  Renally dosed as appropriate, end date 1/3/24  - MRI negative for acute  change including any evidence of encephalitis  - Neurology signed off.    Pneumonia  Completed a 5 day course of meropenem     LISY/hypernatremia: resolved .   - Continue to hold Diovan and Ditropan  - Monitor urine output    Atrial fibrillation with RVR  Continue AC, no need for aspirin per cardiology.  No BB due to previous bradycardia. Spot dosed IV metoprolol for RVR. Transitioned from amiodarone ggt to PO.     Parkinson disease: Continue Sinemet.     DVT prophy: therapeutic  lovenox in case he needs a PEG   Disposition: Plan TBD.          Hernán Saldivar MD  [facility]  01/01/25  13:00 EST

## 2025-01-01 NOTE — CONSULTS
General Surgery H&P/Consultation      Impression/Plan: 89-year-old gentleman with ongoing dysphagia admitted with herpes encephalitis.  Discussed with his wife at bedside the procedure of PEG tube placement.  Discussed the risk and rationale for the procedure.  Discussion of risk included, but not limited to, damage to other structures in the abdomen and early dislodgment of the PEG tube.  She would like to discuss this with her children before making a decision on whether or not to proceed.  I will follow-up with her again tomorrow.    CC: Dysphagia    HPI:   Mr. Casey Snowden Sr. is a 89 y.o. male that presented to the hospital with progressive rash of the right lower extremity consistent with shingles.  He subsequently developed varicella-zoster encephalitis.  History of dysphagia and following a CVA had worked with speech therapy to improve moderate to severe oropharyngeal dysphagia.  Per speech therapy last note he had elected to proceed with p.o. diet for quality of life reasons.  His most recent VFSS showed aspiration of honey thick liquids and puréed consistency.  He is unable to tolerate p.o. currently and recommendation remains for n.p.o. with supplemental nutrition for safest mechanism of feeding.  His wife reports that he had made progress following his stroke and prior to coming to the hospital was eating 3 meals per day and he was active.  She is not sure exactly of how they would like to proceed and is also planning to meet with palliative to get more information regarding their services.    Past Medical History:   Past Medical History:   Diagnosis Date    Abdominal aortic aneurysm without rupture 10/14/2021    Acquired hypothyroidism 08/31/2022    Acute ischemic stroke 10/08/2023    Aneurysm     Arrhythmia     Atrial fibrillation     Cancer 04/2018    basil cell carcinoma    Carotid stenosis 10/29/2015    CHF (congestive heart failure)     Chronic deep vein thrombosis (DVT) of popliteal vein of  right lower extremity     Clotting disorder     Coronary artery disease involving native coronary artery of native heart without angina pectoris 12/20/2018    DVT of lower extremity (deep venous thrombosis)     Hard of hearing     Hyperlipidemia     Hypertension     Localized edema 01/04/2017    Long-term use of high-risk medication     Metabolic encephalopathy 02/07/2023    Myocardial infarction 2918    LAD stent    PAD (peripheral artery disease) 09/10/2020    Parkinson's disease     Postphlebitic syndrome     Pure hypercholesterolemia     Skin tear of upper arm without complication, left, subsequent encounter     Stroke 11/02/2023       Past Surgical History:   Past Surgical History:   Procedure Laterality Date    BASAL CELL CARCINOMA EXCISION  04/2018    CARDIAC CATHETERIZATION N/A 11/05/2018    Procedure: Left Heart Cath;  Surgeon: Oliverio Azar MD;  Location: [facility] CATH INVASIVE LOCATION;  Service: Cardiovascular    CARDIAC CATHETERIZATION N/A 11/05/2018    Procedure: Coronary angiography;  Surgeon: Oliverio Azar MD;  Location: [facility] CATH INVASIVE LOCATION;  Service: Cardiovascular    CARDIAC CATHETERIZATION N/A 11/05/2018    Procedure: Left ventriculography;  Surgeon: Oliverio Azar MD;  Location: [facility] CATH INVASIVE LOCATION;  Service: Cardiovascular    CARDIAC CATHETERIZATION N/A 06/04/2019    Procedure: Left Heart Cath;  Surgeon: Johnny Glasgow MD;  Location: [facility] CATH INVASIVE LOCATION;  Service: Cardiovascular    CARDIAC CATHETERIZATION N/A 06/04/2019    Procedure: Coronary angiography;  Surgeon: Johnny Glasgow MD;  Location: [facility] CATH INVASIVE LOCATION;  Service: Cardiovascular    CARDIAC CATHETERIZATION N/A 06/04/2019    Procedure: Left ventriculography;  Surgeon: Johnny Glasgow MD;  Location: [facility] CATH INVASIVE LOCATION;  Service: Cardiovascular    CARDIAC CATHETERIZATION N/A 06/04/2019    Procedure: Stent BILL coronary;  Surgeon: Johnny Glasgow MD;   Location: [facility];  Service: Cardiovascular    CARDIAC SURGERY      CAROTID ARTERY ANGIOPLASTY Right 10/11/2023    with stent    CAROTID STENT      CATARACT EXTRACTION Left 08/2018    CATARACT EXTRACTION Right 09/2018    COLONOSCOPY      2011    CORONARY STENT PLACEMENT      INCISION AND DRAINAGE LEG Right 07/07/2017    Procedure: INCISION AND DRAINAGE INFECTED HEMATOMA RIGHT THIGH;  Surgeon: Valentin Peña MD;  Location: [facility] OR;  Service:     JOINT REPLACEMENT      KNEE INCISION AND DRAINAGE Right 12/27/2016    Procedure: KNEE INCISION AND DRAINAGE;  Surgeon: Triston Whitten MD;  Location: [facility] OR;  Service:     NOSE SURGERY      nose replacement    SINUS SURGERY  1960    SKIN BIOPSY      SKIN GRAFT  12/2017    TOTAL KNEE ARTHROPLASTY Right     VASCULAR SURGERY      VENA CAVA FILTER PLACEMENT         Medications:  Medications Prior to Admission   Medication Sig Dispense Refill Last Dose/Taking    apixaban (Eliquis) 5 MG tablet tablet TAKE 1 TABLET BY MOUTH TIMES A DAY. INDICATIONS: DVT/PE (ACTIVE THROMBOSIS), PREVENTION OF UNWANTED CLOT IN VEINS (Patient taking differently: Take 1 tablet by mouth Every 12 (Twelve) Hours.) 180 tablet 3 Taking Differently    aspirin 81 MG EC tablet Take 1 tablet by mouth Daily.   Taking    atorvastatin (LIPITOR) 40 MG tablet Take 1 tablet by mouth Every Night. Indications: High Amount of Fats in the Blood 90 tablet 3 Taking    atropine 1 % ophthalmic solution 1-2 drops 3 (Three) Times a Day As Needed (as needed orally for increased secretions). Orally   Indications: excessive drooling   Taking As Needed    carbidopa-levodopa (SINEMET)  MG per tablet Take 1 tablet by mouth 3 (Three) Times a Day. Indications: Parkinson's Disease   Taking    Cyanocobalamin (Vitamin B 12) 500 MCG tablet Take 2 tablets by mouth Every Morning. Indications: Inadequate Vitamin B12   Taking    finasteride (PROSCAR) 5 MG tablet Take 1 tablet by mouth Daily.    Taking    levothyroxine (SYNTHROID, LEVOTHROID) 75 MCG tablet TAKE 1 TABLET BY MOUTH EVERY  MORNING FOR UNDERACTIVE THYROID (Patient taking differently: Take 1 tablet by mouth Daily.) 90 tablet 3 Taking Differently    Multiple Vitamins-Minerals (PRESERVISION AREDS) capsule Take 1 tablet by mouth 2 (Two) Times a Day. Indications: supplement   Taking    tamsulosin (FLOMAX) 0.4 MG capsule 24 hr capsule Take 1 capsule by mouth Daily.   Taking    valsartan (DIOVAN) 40 MG tablet TAKE 1 TABLET BY MOUTH DAILY. IN THE EVENING. INDICATIONS: HIGH BLOOD PRESSURE DISORDER (Patient taking differently: Take 1 tablet by mouth Daily. Indications: High Blood Pressure) 90 tablet 3 Taking Differently    Vibegron (Gemtesa) 75 MG tablet Take 1 tablet by mouth Daily.   Taking         Current Facility-Administered Medications:     acetaminophen (TYLENOL) tablet 650 mg, 650 mg, Nasogastric, Q4H PRN **OR** acetaminophen (TYLENOL) 160 MG/5ML oral solution 650 mg, 650 mg, Nasogastric, Q4H PRN, 650 mg at 12/24/24 2328 **OR** acetaminophen (TYLENOL) suppository 650 mg, 650 mg, Rectal, Q4H PRN, Chilo Tyson MD    acyclovir (ZOVIRAX) 700 mg in sodium chloride 0.9 % 250 mL IVPB, 10 mg/kg, Intravenous, Q8H, Delroy Reed MD, 700 mg at 01/01/25 1004    amiodarone (PACERONE) tablet 200 mg, 200 mg, Nasogastric, Q12H, Hernán Saldivar MD, 200 mg at 01/01/25 1004    [Held by provider] apixaban (ELIQUIS) tablet 5 mg, 5 mg, Oral, Q12H, Ford Salazar MD, 5 mg at 12/29/24 0940    atorvastatin (LIPITOR) tablet 40 mg, 40 mg, Nasogastric, Nightly, Chilo Tyson MD, 40 mg at 12/31/24 2102    atropine 1 % ophthalmic solution 1 drop, 1 drop, Both Eyes, TID PRN, TimInessa MD    carbidopa-levodopa (SINEMET)  MG per tablet 1 tablet, 1 tablet, Nasogastric, TID, Chilo Tyson MD, 1 tablet at 01/01/25 1004    dextrose (D50W) (25 g/50 mL) IV injection 25 g, 25 g, Intravenous, Q15 Min PRN, Chilo Tyson MD    dextrose (GLUTOSE) oral  gel 15 g, 15 g, Oral, Q15 Min PRN, Chilo Tyson MD    Enoxaparin Sodium (LOVENOX) syringe 70 mg, 1 mg/kg, Subcutaneous, Q12H, Hernán Saldivar MD, 70 mg at 01/01/25 1005    finasteride (PROSCAR) tablet 5 mg, 5 mg, Nasogastric, Daily, Chilo Tyson MD, 5 mg at 01/01/25 1004    glucagon (GLUCAGEN) injection 1 mg, 1 mg, Intramuscular, Q15 Min PRN, Chilo Tyson MD    guaifenesin (ROBITUSSIN) 100 MG/5ML liquid 200 mg, 200 mg, Nasogastric, Q4H, Hernán Saldivar MD, 200 mg at 01/01/25 1425    insulin regular (humuLIN R,novoLIN R) injection 2-7 Units, 2-7 Units, Subcutaneous, Q6H, Chilo Tyson MD, 2 Units at 12/26/24 0619    levothyroxine (SYNTHROID, LEVOTHROID) tablet 75 mcg, 75 mcg, Nasogastric, Daily, Chilo Tyson MD, 75 mcg at 01/01/25 0514    Magnesium Standard Dose Replacement - Follow Nurse / BPA Driven Protocol, , Not Applicable, PRN, Joe Lyn MD    metoprolol tartrate (LOPRESSOR) injection 5 mg, 5 mg, Intravenous, Q6H PRN, Hernán Saldivar MD, 5 mg at 12/26/24 0930    metoprolol tartrate (LOPRESSOR) tablet 12.5 mg, 12.5 mg, Nasogastric, Q12H, Carlos Amaral MD, 12.5 mg at 01/01/25 1004    ondansetron (ZOFRAN) injection 4 mg, 4 mg, Intravenous, Q6H PRN, Inessa Dumont MD    [Held by provider] oxybutynin XL (DITROPAN-XL) 24 hr tablet 10 mg, 10 mg, Oral, Daily, Inessa Dumont MD, 10 mg at 12/18/24 0901    Potassium Replacement - Follow Nurse / BPA Driven Protocol, , Not Applicable, PRN, Joe Lyn MD    [COMPLETED] Insert Peripheral IV, , , Once **AND** sodium chloride 0.9 % flush 10 mL, 10 mL, Intravenous, PRN, Ian Dao MD    sodium chloride 0.9 % flush 10 mL, 10 mL, Intravenous, Q12H, Inessa Dumont MD, 10 mL at 01/01/25 1006    sodium chloride 0.9 % flush 10 mL, 10 mL, Intravenous, PRN, Inessa Dumont MD    sodium chloride 0.9 % infusion 40 mL, 40 mL, Intravenous, PRN, Inessa Dumont MD    terazosin (HYTRIN) capsule 1 mg, 1 mg, Nasogastric, Nightly, Chilo Tyson MD, 1 mg at  12/31/24 2058    vitamin B-12 (CYANOCOBALAMIN) tablet 1,000 mcg, 1,000 mcg, Nasogastric, Q AM, Chilo Tyson MD, 1,000 mcg at 01/01/25 0514     Allergies:   Allergies   Allergen Reactions    Penicillins Rash    Rocephin [Ceftriaxone] Rash       Social History:   Social History     Socioeconomic History    Marital status:     Years of education: college grad   Tobacco Use    Smoking status: Never     Passive exposure: Never    Smokeless tobacco: Never    Tobacco comments:     caff use   Vaping Use    Vaping status: Never Used   Substance and Sexual Activity    Alcohol use: No     Comment: none for a month    Drug use: No    Sexual activity: Not Currently     Partners: Female       Family History:   Family History   Problem Relation Age of Onset    Heart attack Mother 74    Hypertension Mother     Cancer Sister     Stroke Brother     Cancer Brother     Cancer Brother     Deep vein thrombosis Neg Hx        Review of Systems:  A comprehensive review of systems was negative except for the following positives: Per HPI    Physical Exam:   Vitals:    01/01/25 1144   BP: 111/47   Pulse: 50   Resp: 17   Temp: 97.5 °F (36.4 °C)   SpO2: 96%     BMI: Body mass index is 26.67 kg/m².   84.3 kg (185 lb 13.6 oz)      Intake/Output Summary (Last 24 hours) at 1/1/2025 1452  Last data filed at 1/1/2025 1200  Gross per 24 hour   Intake 3173 ml   Output 700 ml   Net 2473 ml       GENERAL: no acute distress, awake and alert  RESPIRATORY: symmetric excursion bilaterally, normal work of breathing  CARDIOVASCULAR: Regular rate, well perfused  GASTROINTESTINAL: Soft, nontender, no scars of the upper abdomen      Pertinent labs:   Results from last 7 days   Lab Units 12/31/24  0708 12/30/24  1100 12/29/24  0548 12/28/24  1359 12/27/24  0518 12/26/24  0646   WBC 10*3/mm3 9.19 8.55 7.38 7.30 7.65 6.39   HEMOGLOBIN g/dL 10.0* 10.3* 10.3* 10.2* 10.4* 9.9*   HEMATOCRIT % 30.7* 31.1* 30.7* 31.0* 30.9* 29.3*   PLATELETS 10*3/mm3 219 242 231  218 189 168     Results from last 7 days   Lab Units 01/01/25  0600 12/31/24  0921 12/30/24  1100 12/28/24  1359 12/27/24  0518   SODIUM mmol/L 134* 137 140   < > 141   POTASSIUM mmol/L 4.2 5.1 4.3   < > 4.2   CHLORIDE mmol/L 105 110* 111*   < > 109*   CO2 mmol/L 23.9 20.0* 21.0*   < > 21.5*   BUN mg/dL 30* 32* 30*   < > 49*   CREATININE mg/dL 0.75* 0.66* 0.63*   < > 0.99   CALCIUM mg/dL 7.4* 7.6* 7.4*   < > 7.6*   BILIRUBIN mg/dL  --   --   --   --  0.2   ALK PHOS U/L  --   --   --   --  70   ALT (SGPT) U/L  --   --   --   --  33   AST (SGOT) U/L  --   --   --   --  87*   GLUCOSE mg/dL 125* 119* 118*   < > 147*    < > = values in this interval not displayed.       IMAGING:  CT chest from 2/7/2023 with small hiatal hernia.  Most of stomach appears intra-abdominal          Konstantin Persaud MD  General and Endoscopic Surgery  [facility]    [address], [address]  [place] [id number]  P: [phone]  F: [phone]

## 2025-01-02 LAB
ALBUMIN SERPL-MCNC: 2.2 G/DL (ref 3.5–5.2)
ANION GAP SERPL CALCULATED.3IONS-SCNC: 7.9 MMOL/L (ref 5–15)
BUN SERPL-MCNC: 31 MG/DL (ref 8–23)
BUN/CREAT SERPL: 41.3 (ref 7–25)
CALCIUM SPEC-SCNC: 7.6 MG/DL (ref 8.2–9.6)
CHLORIDE SERPL-SCNC: 106 MMOL/L (ref 98–107)
CO2 SERPL-SCNC: 22.1 MMOL/L (ref 22–29)
CREAT SERPL-MCNC: 0.75 MG/DL (ref 0.76–1.27)
EGFRCR SERPLBLD CKD-EPI 2021: 85.7 ML/MIN/1.73
GLUCOSE BLDC GLUCOMTR-MCNC: 126 MG/DL (ref 70–130)
GLUCOSE BLDC GLUCOMTR-MCNC: 128 MG/DL (ref 70–130)
GLUCOSE BLDC GLUCOMTR-MCNC: 154 MG/DL (ref 70–130)
GLUCOSE BLDC GLUCOMTR-MCNC: 96 MG/DL (ref 70–130)
GLUCOSE SERPL-MCNC: 134 MG/DL (ref 65–99)
PHOSPHATE SERPL-MCNC: 3 MG/DL (ref 2.5–4.5)
POTASSIUM SERPL-SCNC: 4.2 MMOL/L (ref 3.5–5.2)
SODIUM SERPL-SCNC: 136 MMOL/L (ref 136–145)

## 2025-01-02 PROCEDURE — 80069 RENAL FUNCTION PANEL: CPT | Performed by: INTERNAL MEDICINE

## 2025-01-02 PROCEDURE — 25810000003 SODIUM CHLORIDE 0.9 % SOLUTION 250 ML FLEX CONT: Performed by: INTERNAL MEDICINE

## 2025-01-02 PROCEDURE — 82948 REAGENT STRIP/BLOOD GLUCOSE: CPT

## 2025-01-02 PROCEDURE — 25010000002 FUROSEMIDE PER 20 MG: Performed by: STUDENT IN AN ORGANIZED HEALTH CARE EDUCATION/TRAINING PROGRAM

## 2025-01-02 PROCEDURE — 63710000001 INSULIN REGULAR HUMAN PER 5 UNITS: Performed by: STUDENT IN AN ORGANIZED HEALTH CARE EDUCATION/TRAINING PROGRAM

## 2025-01-02 PROCEDURE — 99231 SBSQ HOSP IP/OBS SF/LOW 25: CPT | Performed by: SURGERY

## 2025-01-02 PROCEDURE — 25010000002 ENOXAPARIN PER 10 MG: Performed by: STUDENT IN AN ORGANIZED HEALTH CARE EDUCATION/TRAINING PROGRAM

## 2025-01-02 PROCEDURE — 25010000002 ACYCLOVIR PER 5 MG: Performed by: INTERNAL MEDICINE

## 2025-01-02 RX ORDER — FUROSEMIDE 10 MG/ML
20 INJECTION INTRAMUSCULAR; INTRAVENOUS ONCE
Status: COMPLETED | OUTPATIENT
Start: 2025-01-02 | End: 2025-01-02

## 2025-01-02 RX ADMIN — GUAIFENESIN 200 MG: 100 LIQUID ORAL at 20:39

## 2025-01-02 RX ADMIN — GUAIFENESIN 200 MG: 100 LIQUID ORAL at 06:35

## 2025-01-02 RX ADMIN — CARBIDOPA AND LEVODOPA 1 TABLET: 25; 250 TABLET ORAL at 08:19

## 2025-01-02 RX ADMIN — ACYCLOVIR SODIUM 700 MG: 50 INJECTION, SOLUTION INTRAVENOUS at 20:37

## 2025-01-02 RX ADMIN — Medication 10 ML: at 20:43

## 2025-01-02 RX ADMIN — AMIODARONE HYDROCHLORIDE 200 MG: 200 TABLET ORAL at 08:19

## 2025-01-02 RX ADMIN — GUAIFENESIN 200 MG: 100 LIQUID ORAL at 01:55

## 2025-01-02 RX ADMIN — ENOXAPARIN SODIUM 70 MG: 100 INJECTION SUBCUTANEOUS at 08:19

## 2025-01-02 RX ADMIN — GUAIFENESIN 200 MG: 100 LIQUID ORAL at 17:34

## 2025-01-02 RX ADMIN — FUROSEMIDE 20 MG: 10 INJECTION, SOLUTION INTRAMUSCULAR; INTRAVENOUS at 13:21

## 2025-01-02 RX ADMIN — METOPROLOL TARTRATE 12.5 MG: 25 TABLET, FILM COATED ORAL at 20:38

## 2025-01-02 RX ADMIN — ACYCLOVIR SODIUM 700 MG: 50 INJECTION, SOLUTION INTRAVENOUS at 10:34

## 2025-01-02 RX ADMIN — CARBIDOPA AND LEVODOPA 1 TABLET: 25; 250 TABLET ORAL at 20:38

## 2025-01-02 RX ADMIN — AMIODARONE HYDROCHLORIDE 200 MG: 200 TABLET ORAL at 20:37

## 2025-01-02 RX ADMIN — GUAIFENESIN 200 MG: 100 LIQUID ORAL at 08:19

## 2025-01-02 RX ADMIN — FINASTERIDE 5 MG: 5 TABLET, FILM COATED ORAL at 08:19

## 2025-01-02 RX ADMIN — CARBIDOPA AND LEVODOPA 1 TABLET: 25; 250 TABLET ORAL at 17:34

## 2025-01-02 RX ADMIN — ATORVASTATIN CALCIUM 40 MG: 20 TABLET, FILM COATED ORAL at 20:37

## 2025-01-02 RX ADMIN — ACYCLOVIR SODIUM 700 MG: 50 INJECTION, SOLUTION INTRAVENOUS at 01:56

## 2025-01-02 RX ADMIN — GUAIFENESIN 200 MG: 100 LIQUID ORAL at 13:21

## 2025-01-02 RX ADMIN — Medication 1000 MCG: at 06:36

## 2025-01-02 RX ADMIN — INSULIN HUMAN 2 UNITS: 100 INJECTION, SOLUTION PARENTERAL at 11:50

## 2025-01-02 RX ADMIN — LEVOTHYROXINE SODIUM 75 MCG: 75 TABLET ORAL at 06:35

## 2025-01-02 RX ADMIN — TERAZOSIN HYDROCHLORIDE 1 MG: 1 CAPSULE ORAL at 20:38

## 2025-01-02 RX ADMIN — Medication 10 ML: at 08:19

## 2025-01-02 NOTE — PLAN OF CARE
Problem: Adult Inpatient Plan of Care  Goal: Plan of Care Review  Outcome: Progressing  Flowsheets (Taken 1/2/2025 1704)  Progress: no change  Outcome Evaluation: VSS, room air, less lethargic throughout shift, oriented x2, plan for PEG tube placement tomorrow.  Plan of Care Reviewed With: patient  Goal: Patient-Specific Goal (Individualized)  Outcome: Progressing  Goal: Absence of Hospital-Acquired Illness or Injury  Outcome: Progressing  Intervention: Identify and Manage Fall Risk  Recent Flowsheet Documentation  Taken 1/2/2025 1600 by Guerita Powell RN  Safety Promotion/Fall Prevention:   activity supervised   assistive device/personal items within reach   clutter free environment maintained   fall prevention program maintained   nonskid shoes/slippers when out of bed   room organization consistent   safety round/check completed  Taken 1/2/2025 1345 by Guerita Powell RN  Safety Promotion/Fall Prevention:   activity supervised   assistive device/personal items within reach   clutter free environment maintained   fall prevention program maintained   nonskid shoes/slippers when out of bed   room organization consistent   safety round/check completed  Taken 1/2/2025 1200 by Guerita Powell RN  Safety Promotion/Fall Prevention:   activity supervised   assistive device/personal items within reach   clutter free environment maintained   fall prevention program maintained   nonskid shoes/slippers when out of bed   room organization consistent   safety round/check completed  Taken 1/2/2025 1030 by Guerita Powell RN  Safety Promotion/Fall Prevention:   activity supervised   assistive device/personal items within reach   clutter free environment maintained   fall prevention program maintained   nonskid shoes/slippers when out of bed   room organization consistent   safety round/check completed  Taken 1/2/2025 0817 by Guerita Powell RN  Safety Promotion/Fall Prevention:   activity supervised    assistive device/personal items within reach   clutter free environment maintained   fall prevention program maintained   nonskid shoes/slippers when out of bed   room organization consistent   safety round/check completed  Taken 1/2/2025 0800 by Guerita Powell RN  Safety Promotion/Fall Prevention:   activity supervised   assistive device/personal items within reach   clutter free environment maintained   fall prevention program maintained   nonskid shoes/slippers when out of bed   room organization consistent   safety round/check completed  Intervention: Prevent Skin Injury  Recent Flowsheet Documentation  Taken 1/2/2025 1600 by Guerita Powell RN  Body Position:   left   tilted   heels elevated  Taken 1/2/2025 1345 by Guerita Powell RN  Body Position:   supine   heels elevated   legs elevated  Taken 1/2/2025 1200 by Guerita Powell RN  Body Position:   right   turned   heels elevated   legs elevated  Taken 1/2/2025 1030 by Guerita Powell RN  Body Position:   turned   left   heels elevated   legs elevated  Taken 1/2/2025 0800 by Guerita Powell RN  Body Position:   supine   turned   legs elevated   heels elevated  Goal: Optimal Comfort and Wellbeing  Outcome: Progressing  Goal: Readiness for Transition of Care  Outcome: Progressing     Problem: Fall Injury Risk  Goal: Absence of Fall and Fall-Related Injury  Outcome: Progressing  Intervention: Promote Injury-Free Environment  Recent Flowsheet Documentation  Taken 1/2/2025 1600 by Guerita Powell RN  Safety Promotion/Fall Prevention:   activity supervised   assistive device/personal items within reach   clutter free environment maintained   fall prevention program maintained   nonskid shoes/slippers when out of bed   room organization consistent   safety round/check completed  Taken 1/2/2025 1345 by Guerita Powell RN  Safety Promotion/Fall Prevention:   activity supervised   assistive device/personal items within reach   clutter  free environment maintained   fall prevention program maintained   nonskid shoes/slippers when out of bed   room organization consistent   safety round/check completed  Taken 1/2/2025 1200 by Guerita Powell RN  Safety Promotion/Fall Prevention:   activity supervised   assistive device/personal items within reach   clutter free environment maintained   fall prevention program maintained   nonskid shoes/slippers when out of bed   room organization consistent   safety round/check completed  Taken 1/2/2025 1030 by Guerita Powell RN  Safety Promotion/Fall Prevention:   activity supervised   assistive device/personal items within reach   clutter free environment maintained   fall prevention program maintained   nonskid shoes/slippers when out of bed   room organization consistent   safety round/check completed  Taken 1/2/2025 0817 by Guerita Powell RN  Safety Promotion/Fall Prevention:   activity supervised   assistive device/personal items within reach   clutter free environment maintained   fall prevention program maintained   nonskid shoes/slippers when out of bed   room organization consistent   safety round/check completed  Taken 1/2/2025 0800 by Guerita Powell RN  Safety Promotion/Fall Prevention:   activity supervised   assistive device/personal items within reach   clutter free environment maintained   fall prevention program maintained   nonskid shoes/slippers when out of bed   room organization consistent   safety round/check completed     Problem: Pain Acute  Goal: Optimal Pain Control and Function  Outcome: Progressing     Problem: Skin Injury Risk Increased  Goal: Skin Health and Integrity  Outcome: Progressing  Intervention: Optimize Skin Protection  Recent Flowsheet Documentation  Taken 1/2/2025 1600 by Guerita Powell RN  Head of Bed (HOB) Positioning: HOB at 30-45 degrees  Taken 1/2/2025 1200 by Guerita Powell RN  Head of Bed (HOB) Positioning: HOB at 30-45 degrees  Taken  1/2/2025 1030 by Guerita Powell RN  Head of Bed (HOB) Positioning: HOB at 30-45 degrees  Taken 1/2/2025 0800 by Guerita Powell RN  Pressure Reduction Techniques:   frequent weight shift encouraged   heels elevated off bed   weight shift assistance provided  Head of Bed (HOB) Positioning: HOB at 30-45 degrees  Pressure Reduction Devices: specialty bed utilized     Problem: Comorbidity Management  Goal: Maintenance of Behavioral Health Symptom Control  Outcome: Progressing  Goal: Maintenance of Heart Failure Symptom Control  Outcome: Progressing     Problem: Malnutrition  Goal: Improved Nutritional Intake  Outcome: Progressing     Problem: Enteral Nutrition  Goal: Absence of Aspiration Signs and Symptoms  Outcome: Progressing  Intervention: Minimize Aspiration Risk  Recent Flowsheet Documentation  Taken 1/2/2025 1600 by Guerita Powell RN  Head of Bed (HOB) Positioning: HOB at 30-45 degrees  Taken 1/2/2025 1200 by Guerita Powell RN  Head of Bed (HOB) Positioning: HOB at 30-45 degrees  Taken 1/2/2025 1030 by Guerita Powell RN  Head of Bed (HOB) Positioning: HOB at 30-45 degrees  Taken 1/2/2025 0800 by Guerita Powell RN  Head of Bed (HOB) Positioning: HOB at 30-45 degrees  Goal: Safe, Effective Therapy Delivery  Outcome: Progressing  Goal: Feeding Tolerance  Outcome: Progressing   Goal Outcome Evaluation:  Plan of Care Reviewed With: patient        Progress: no change  Outcome Evaluation: VSS, room air, less lethargic throughout shift, oriented x2, plan for PEG tube placement tomorrow.

## 2025-01-02 NOTE — PLAN OF CARE
Goal Outcome Evaluation:      Pt remains confused, RA, VSS- HR 40s-50s, DBP soft. No acute changes overnight. Daughter at bedside. PM metoprolol and terazosin administered per RIDDHI Adame with DBP <60. Bed alarm on. Plan of care ongoing.

## 2025-01-02 NOTE — PROGRESS NOTES
Name: Casey Snowden . ADMIT: 2024   : 1934  PCP: Aleksander Diez MD    MRN: [id number] LOS: 16 days   AGE/SEX: 89 y.o. male  ROOM: [facility]     Subjective   Subjective     Mentation remains stable. Discussed with daughter. Discussed PEG tube.      Objective   Objective   Vital Signs  Temp:  [97.5 °F (36.4 °C)-99.1 °F (37.3 °C)] 98.9 °F (37.2 °C)  Heart Rate:  [44-50] 44  Resp:  [16-18] 18  BP: (110-119)/(47-82) 119/82  SpO2:  [96 %-98 %] 98 %  on   ;   Device (Oxygen Therapy): room air  Body mass index is 26.76 kg/m².  Physical Exam  Constitutional:       General: He is not in acute distress.  HENT:      Head:      Comments: NGT  Eyes:      Extraocular Movements: Extraocular movements intact.      Pupils: Pupils are equal, round, and reactive to light.   Cardiovascular:      Rate and Rhythm: Normal rate and regular rhythm.      Heart sounds: No murmur heard.  Pulmonary:      Effort: No respiratory distress.   Abdominal:      General: There is no distension.      Palpations: Abdomen is soft.      Tenderness: There is no abdominal tenderness.   Musculoskeletal:      Right lower leg: Edema present.      Left lower leg: Edema present.   Skin:     General: Skin is warm and dry.      Findings: No rash.   Neurological:      Mental Status: He is alert. He is disoriented.     Exam revealed and updated on 25  Results Review     I reviewed the patient's new clinical results.  Results from last 7 days   Lab Units 24  0708 24  1100 24  0548 24  1359   WBC 10*3/mm3 9.19 8.55 7.38 7.30   HEMOGLOBIN g/dL 10.0* 10.3* 10.3* 10.2*   PLATELETS 10*3/mm3 219 242 231 218     Results from last 7 days   Lab Units 25  0534 25  0600 24  0921 24  1100   SODIUM mmol/L 136 134* 137 140   POTASSIUM mmol/L 4.2 4.2 5.1 4.3   CHLORIDE mmol/L 106 105 110* 111*   CO2 mmol/L 22.1 23.9 20.0* 21.0*   BUN mg/dL 31* 30* 32* 30*   CREATININE mg/dL 0.75* 0.75* 0.66* 0.63*   GLUCOSE  mg/dL 134* 125* 119* 118*   EGFR mL/min/1.73 85.7 85.7 89.1 90.4     Results from last 7 days   Lab Units 01/02/25  0534 01/01/25 0600 12/31/24  0921 12/30/24  1100 12/28/24  1359 12/27/24  0518   ALBUMIN g/dL 2.2* 2.3* 2.3* 2.3*   < > 2.3*   BILIRUBIN mg/dL  --   --   --   --   --  0.2   ALK PHOS U/L  --   --   --   --   --  70   AST (SGOT) U/L  --   --   --   --   --  87*   ALT (SGPT) U/L  --   --   --   --   --  33    < > = values in this interval not displayed.       Results from last 7 days   Lab Units 01/02/25  0534 01/01/25 0600 12/31/24 0921 12/30/24 1100 12/29/24  0548 12/28/24  1359 12/27/24  0518   CALCIUM mg/dL 7.6* 7.4* 7.6* 7.4*   < > 7.6* 7.6*   ALBUMIN g/dL 2.2* 2.3* 2.3* 2.3*   < > 2.5* 2.3*   MAGNESIUM mg/dL  --   --   --  1.8  --  2.2 1.5*   PHOSPHORUS mg/dL 3.0 2.9 2.6 2.0*   < > 2.8 3.5    < > = values in this interval not displayed.           Glucose   Date/Time Value Ref Range Status   01/02/2025 1037 154 (H) 70 - 130 mg/dL Final   01/02/2025 0516 126 70 - 130 mg/dL Final   01/01/2025 2331 134 (H) 70 - 130 mg/dL Final   01/01/2025 1710 110 70 - 130 mg/dL Final   01/01/2025 1141 146 (H) 70 - 130 mg/dL Final   01/01/2025 0530 132 (H) 70 - 130 mg/dL Final   01/01/2025 0014 116 70 - 130 mg/dL Final       No radiology results for the last day    I have personally reviewed all medications:  Scheduled Medications  acyclovir, 10 mg/kg, Intravenous, Q8H  amiodarone, 200 mg, Nasogastric, Q12H  [Held by provider] apixaban, 5 mg, Oral, Q12H  atorvastatin, 40 mg, Nasogastric, Nightly  carbidopa-levodopa, 1 tablet, Nasogastric, TID  enoxaparin, 1 mg/kg, Subcutaneous, Q12H  finasteride, 5 mg, Nasogastric, Daily  guaifenesin, 200 mg, Nasogastric, Q4H  insulin regular, 2-7 Units, Subcutaneous, Q6H  levothyroxine, 75 mcg, Nasogastric, Daily  metoprolol tartrate, 12.5 mg, Nasogastric, Q12H  [Held by provider] oxybutynin XL, 10 mg, Oral, Daily  sodium chloride, 10 mL, Intravenous, Q12H  terazosin, 1 mg,  Nasogastric, Nightly  vitamin B-12, 1,000 mcg, Nasogastric, Q AM    Infusions     Diet  NPO Diet NPO Type: Tube Feeding    I have personally reviewed:  [x]  Laboratory   []  Microbiology   [x]  Radiology   []  EKG/Telemetry  []  Cardiology/Vascular   []  Pathology    []  Records       Assessment/Plan     Active Hospital Problems    Diagnosis  POA    **Unable to ambulate [R26.2]  Yes    Moderate protein-calorie malnutrition [E44.0]  Yes    LISY (acute kidney injury) [N17.9]  Yes    Herpes zoster without complication [B02.9]  Unknown    Falls frequently [R29.6]  Not Applicable    Acquired hypothyroidism [E03.9]  Yes    Secondary malignancy of lymph nodes of head, 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       89 y.o. male admitted with generalized weakness, confusion and found to have RLE rash consistent with shingles.    Severe pharyngeal dysphagia  From speech therapy discussion, he has a chronic aspiration risk but previously chose to take in food by mouth for quality-of-life reasons.  VFSS would not be appropriate right now and the safest recommendation is to remain NPO.  General surgery has been consulted for possible PEG tube placement.    BLE edema  -lasix 20mg IV x 1    Cough/congestion: Checked chest x-ray which shows some new infiltrates.  On meropenem for 5 day course ending on 12/29    VZV encephalitis.  Status post LP  -Continue acyclovir x 14 days.  Renally dosed as appropriate,  end date 1/3/24  - MRI negative for acute change including any evidence of encephalitis  - Neurology signed off.    Pneumonia  Completed a 5 day course of meropenem     LISY/hypernatremia: resolved .   - Continue to hold Diovan and Ditropan  - Monitor urine output    Atrial fibrillation with RVR  Continue AC, no need for aspirin per cardiology.  No BB due to previous bradycardia. Spot dosed IV metoprolol for RVR. Transitioned from amiodarone ggt to PO.     Parkinson disease: Continue Sinemet.     DVT prophy: therapeutic  lovenox in case he needs a PEG   Disposition: Plan TBD.          Hernán Saldivar MD  [facility]  01/02/25  11:02 EST

## 2025-01-02 NOTE — PROGRESS NOTES
LOS: 16 days     Chief Complaint: LISY    Subjective     History of Present Illness  Casey Snowden Sr. is a 89 y.o. male with history of Parkinson's disease and dementia.  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        Subjective  Patient still encephalopathic  Has had some large volume bladder scans, but patient has been able to void as they have moved him around. PVR has been under 300.  DHT in place.     12/21 - underwent LP this am. No other acute events overnight. Continues to be confused. 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     12/23 no acute events. Seems to be doing a bit better today.    12/24 seems to have had a difficult night. Now being bathe.     12/25 no acute events. Overall, patient looks better today. Second IV access accomplished.     12/26: Oriented to person place, recognizes family  Dobbhoff tube remains, on tube feeds, no new complaints    12/27: Still confused today but his family states he was much more awake alert and conversant yesterday  Remains on tube feeds with free water, on amnio drip    12/28: No new complaints or events, did not pass a swallow study and remains on tube feeds, labs pending.  Family states he was not as alert yesterday    12/29: Seen and examined at bedside, family at bedside, discussed plan with family.  More awake, still confused, remains on tube feeds overnight    12/30: Repeat swallow study pending, no new complaints, family at bedside    12/31: Family planning for PEG tube, no new complaints otherwise    1/2: Patient minimally responsive, nonverbal.  Family has decided to place PEG tube and pursue nursing home placement    History taken from: family and chart    Objective     Vital Sign Min/Max for last 24 hours  Temp  Min: 97.9 °F (36.6 °C)  Max: 99.1 °F (37.3 °C)   BP  Min: 110/50  Max: 119/82   Pulse  Min: 44  Max: 51   Resp  Min: 16  Max: 18   SpO2  Min: 96 %  Max: 98 %   No  "data recorded   Weight  Min: 84.6 kg (186 lb 8.2 oz)  Max: 84.6 kg (186 lb 8.2 oz)     Flowsheet Rows      Flowsheet Row First Filed Value   Admission Height --   Admission Weight 66.7 kg (147 lb 0.8 oz) Documented at 12/18/2024 0500            No intake/output data recorded.  I/O last 3 completed shifts:  In: 4133 [Other:2175; NG/GT:1958]  Out: 1050 [Urine:1050]    Objective:  Vital signs: (most recent): Blood pressure 115/96, pulse 51, temperature 98.7 °F (37.1 °C), temperature source Oral, resp. rate 18, height 177.8 cm (70\"), weight 84.6 kg (186 lb 8.2 oz), SpO2 96%.            Physical Examination: General appearance - ill appearing, frail, chronically ill  Mental status - more awake.    Nose - DHT in place   Chest -good air movement, scattered coarse breath sounds.  Heart - normal rate, regular rhythm, normal S1, S2, no murmurs, rubs, clicks or gallops  Abdomen - soft, nontender, nondistended, no masses or organomegaly  Neurological -no focal deficits  Extremities -trace/1+ edema      Results Review:     I reviewed the patient's new clinical results.    WBC WBC   Date Value Ref Range Status   12/31/2024 9.19 3.40 - 10.80 10*3/mm3 Final      HGB Hemoglobin   Date Value Ref Range Status   12/31/2024 10.0 (L) 13.0 - 17.7 g/dL Final      HCT Hematocrit   Date Value Ref Range Status   12/31/2024 30.7 (L) 37.5 - 51.0 % Final      Platlets No results found for: \"LABPLAT\"   MCV MCV   Date Value Ref Range Status   12/31/2024 96.8 79.0 - 97.0 fL Final          Sodium Sodium   Date Value Ref Range Status   01/02/2025 136 136 - 145 mmol/L Final   01/01/2025 134 (L) 136 - 145 mmol/L Final   12/31/2024 137 136 - 145 mmol/L Final      Potassium Potassium   Date Value Ref Range Status   01/02/2025 4.2 3.5 - 5.2 mmol/L Final   01/01/2025 4.2 3.5 - 5.2 mmol/L Final   12/31/2024 5.1 3.5 - 5.2 mmol/L Final     Comment:     Slight hemolysis detected by analyzer. Result may be falsely elevated.      Chloride Chloride   Date Value " "Ref Range Status   01/02/2025 106 98 - 107 mmol/L Final   01/01/2025 105 98 - 107 mmol/L Final   12/31/2024 110 (H) 98 - 107 mmol/L Final      CO2 CO2   Date Value Ref Range Status   01/02/2025 22.1 22.0 - 29.0 mmol/L Final   01/01/2025 23.9 22.0 - 29.0 mmol/L Final   12/31/2024 20.0 (L) 22.0 - 29.0 mmol/L Final      BUN BUN   Date Value Ref Range Status   01/02/2025 31 (H) 8 - 23 mg/dL Final   01/01/2025 30 (H) 8 - 23 mg/dL Final   12/31/2024 32 (H) 8 - 23 mg/dL Final      Creatinine Creatinine   Date Value Ref Range Status   01/02/2025 0.75 (L) 0.76 - 1.27 mg/dL Final   01/01/2025 0.75 (L) 0.76 - 1.27 mg/dL Final   12/31/2024 0.66 (L) 0.76 - 1.27 mg/dL Final      Calcium Calcium   Date Value Ref Range Status   01/02/2025 7.6 (L) 8.2 - 9.6 mg/dL Final   01/01/2025 7.4 (L) 8.2 - 9.6 mg/dL Final   12/31/2024 7.6 (L) 8.2 - 9.6 mg/dL Final      PO4 No results found for: \"CAPO4\"   Albumin Albumin   Date Value Ref Range Status   01/02/2025 2.2 (L) 3.5 - 5.2 g/dL Final   01/01/2025 2.3 (L) 3.5 - 5.2 g/dL Final   12/31/2024 2.3 (L) 3.5 - 5.2 g/dL Final        Magnesium No results found for: \"MG\"       Uric Acid No results found for: \"URICACID\"     Medication Review:   Current medications reviewed, orders reviewed and updated    Assessment & Plan     Assessment & Plan  LISY, Prerenal and improved  Metabolic acidosis, stabilizing  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  They now wish for PEG tube and " nursing home placement      Joe Lyn MD  01/02/25  16:53 EST

## 2025-01-02 NOTE — CONSULTS
"Purpose of the visit was to evaluate for: goals of care/advanced care planning, support for patient/family, pain/symptom management, withdrawal of interventions, transfer to comfort care bed/unit, and comfort care. Spoke with MD and RN as well as family and discussed palliative care, goals of care, care options, and [facility].        Assessment:    Patient is appropriate for inpatient palliative care.     - Unable to ambulate  - Moderate protein-calorie malnutrition  - LISY  - Herpes zoster without complication  - Frequent falls  - Secondary malignancy of lymph nodes of head, face, and neck  - PAD  - Orthostatic hypotension   - Parkinson's disease  - Chronic diastolic congestive heart failure  - Presence of IVC filter  - Paroxysmal A-fib  - Chronic DVT    PPS 10%        Cultural and spiritual needs have been assessed.         Recommendations/Plan: No changes to goals of care at this time.         Other Comments: I spoke with patient's wife (Evonne) on the phone for 25 minutes. We again clarified the difference between palliative care and discharge planning / case management. Evonne is struggling with the decision of whether or not to have a PEG tube placed for patient. She explains that she will not be able to care for him at home, and home is where he wants to go. She doesn't think he would want to go to a nursing home. We discussed his health decline at length and how much recovery is really likely at this point given his current status and comorbid conditions. She frequently comes back to how he has \"bounced back\" before. I did take time to further explain comfort measures. We are happy to assist if the decision is made to decline PEG tube. Palliative care will continue to follow.   "

## 2025-01-02 NOTE — NURSING NOTE
Per discussion with bedside RN and note from CCP, family has decided to pursue PEG placement and rehab. Palliative care will sign off. Please re-consult we can be of service.

## 2025-01-02 NOTE — DISCHARGE PLACEMENT REQUEST
"Casey Snowden Sr. (89 y.o. Male)       Date of Birth   1934    Social Security Number       Address   [address] [place] [address]    Home Phone   [phone]    MRN   [id number]       [employer]   [employer]    Marital Status                               Admission Date   12/17/24    Admission Type   Emergency    Admitting Provider   Inessa Dumont MD    Attending Provider   Hernán Saldivar MD    Department, Room/Bed   [address], [address]       Discharge Date       Discharge Disposition       Discharge Destination                                 Attending Provider: Hernán Saldivar MD    Allergies: Penicillins, Rocephin [Ceftriaxone]    Isolation: Airborne, Contact   Infection: Disseminated Herpes Zoster (12/20/24)   Code Status: No CPR    Ht: 177.8 cm (70\")   Wt: 84.6 kg (186 lb 8.2 oz)    Admission Cmt: None   Principal Problem: Unable to ambulate [R26.2]                   Active Insurance as of 12/17/2024       Primary Coverage       Payor Plan Insurance Group Employer/Plan Group    [facility] MEDICARE REPLACEMENT [facility] MED ADV GROUP [id number]       Payor Plan Address Payor Plan Phone Number Payor Plan Fax Number Effective Dates    [address] [address]   1/1/2023 - None Entered    [facility] [id number]         Subscriber Name Subscriber Birth Date Member ID       CASEY SNOWDEN EDUARDO JEFFERSON 1934 [id number]                     Emergency Contacts        (Rel.) Home Phone Work Phone Mobile Phone    SpEvonne (Spouse) [phone] -- [phone]    Svetlana Sol (Daughter) [phone] -- --    Casey Snowden Jr (Son) [phone] -- --                "

## 2025-01-02 NOTE — PROGRESS NOTES
Continued Stay Note  [facility]     Patient Name: Casey Snowden Sr.  MRN: [id number]  Today's Date: 1/2/2025    Admit Date: 12/17/2024    Plan: SNF  referrals pending   Discharge Plan       Row Name 01/02/25 1617       Plan    Plan SNF  referrals pending    Plan Comments Spoke iwyogi pt. pt's eife and family at bedside upon request of pt's wife.  Pt's wife stated that plan suzi lbe for pt to get a PEG tube and then go to SNF for rehab.  Pt's wife requested referrals to 1: [facility] 2: [facility] 3: [facility].  Pt will require insurance pre cert before he can go to rehab.  CCP will follow up with SNF to see who may be able to [place] and get insurnace auth started                   Discharge Codes    No documentation.                 Expected Discharge Date and Time       Expected Discharge Date Expected Discharge Time    Asa 3, 2025               POLO Perez

## 2025-01-03 ENCOUNTER — ANESTHESIA (OUTPATIENT)
Dept: GASTROENTEROLOGY | Facility: HOSPITAL | Age: OVER 89
End: 2025-01-03
Payer: MEDICARE

## 2025-01-03 ENCOUNTER — ANESTHESIA EVENT (OUTPATIENT)
Dept: GASTROENTEROLOGY | Facility: HOSPITAL | Age: OVER 89
End: 2025-01-03
Payer: MEDICARE

## 2025-01-03 LAB
ALBUMIN SERPL-MCNC: 2.5 G/DL (ref 3.5–5.2)
ANION GAP SERPL CALCULATED.3IONS-SCNC: 6 MMOL/L (ref 5–15)
BUN SERPL-MCNC: 35 MG/DL (ref 8–23)
BUN/CREAT SERPL: 45.5 (ref 7–25)
CALCIUM SPEC-SCNC: 7.8 MG/DL (ref 8.2–9.6)
CHLORIDE SERPL-SCNC: 109 MMOL/L (ref 98–107)
CO2 SERPL-SCNC: 24 MMOL/L (ref 22–29)
CREAT SERPL-MCNC: 0.77 MG/DL (ref 0.76–1.27)
EGFRCR SERPLBLD CKD-EPI 2021: 85 ML/MIN/1.73
GLUCOSE BLDC GLUCOMTR-MCNC: 105 MG/DL (ref 70–130)
GLUCOSE BLDC GLUCOMTR-MCNC: 72 MG/DL (ref 70–130)
GLUCOSE BLDC GLUCOMTR-MCNC: 98 MG/DL (ref 70–130)
GLUCOSE SERPL-MCNC: 91 MG/DL (ref 65–99)
PHOSPHATE SERPL-MCNC: 3.3 MG/DL (ref 2.5–4.5)
POTASSIUM SERPL-SCNC: 4.2 MMOL/L (ref 3.5–5.2)
SODIUM SERPL-SCNC: 139 MMOL/L (ref 136–145)

## 2025-01-03 PROCEDURE — 82948 REAGENT STRIP/BLOOD GLUCOSE: CPT

## 2025-01-03 PROCEDURE — 80069 RENAL FUNCTION PANEL: CPT | Performed by: INTERNAL MEDICINE

## 2025-01-03 PROCEDURE — 25810000003 SODIUM CHLORIDE 0.9 % SOLUTION 250 ML FLEX CONT: Performed by: INTERNAL MEDICINE

## 2025-01-03 PROCEDURE — 43246 EGD PLACE GASTROSTOMY TUBE: CPT | Performed by: SURGERY

## 2025-01-03 PROCEDURE — 25010000002 PROPOFOL 10 MG/ML EMULSION: Performed by: NURSE ANESTHETIST, CERTIFIED REGISTERED

## 2025-01-03 PROCEDURE — 25010000002 LIDOCAINE 2% SOLUTION: Performed by: NURSE ANESTHETIST, CERTIFIED REGISTERED

## 2025-01-03 PROCEDURE — 25810000003 LACTATED RINGERS PER 1000 ML: Performed by: NURSE ANESTHETIST, CERTIFIED REGISTERED

## 2025-01-03 PROCEDURE — 25010000002 CEFOXITIN PER 1 G: Performed by: SURGERY

## 2025-01-03 PROCEDURE — 0DH63UZ INSERTION OF FEEDING DEVICE INTO STOMACH, PERCUTANEOUS APPROACH: ICD-10-PCS | Performed by: SURGERY

## 2025-01-03 PROCEDURE — 25010000002 PHENYLEPHRINE 10 MG/ML SOLUTION: Performed by: NURSE ANESTHETIST, CERTIFIED REGISTERED

## 2025-01-03 PROCEDURE — 25010000002 ACYCLOVIR PER 5 MG: Performed by: INTERNAL MEDICINE

## 2025-01-03 RX ORDER — SODIUM CHLORIDE 9 MG/ML
30 INJECTION, SOLUTION INTRAVENOUS CONTINUOUS PRN
Status: ACTIVE | OUTPATIENT
Start: 2025-01-03 | End: 2025-01-03

## 2025-01-03 RX ORDER — DIPHENHYDRAMINE HYDROCHLORIDE 50 MG/ML
12.5 INJECTION INTRAMUSCULAR; INTRAVENOUS
Status: DISCONTINUED | OUTPATIENT
Start: 2025-01-03 | End: 2025-01-03 | Stop reason: HOSPADM

## 2025-01-03 RX ORDER — PROMETHAZINE HYDROCHLORIDE 25 MG/1
25 TABLET ORAL ONCE AS NEEDED
Status: DISCONTINUED | OUTPATIENT
Start: 2025-01-03 | End: 2025-01-03 | Stop reason: HOSPADM

## 2025-01-03 RX ORDER — PHENYLEPHRINE HYDROCHLORIDE 10 MG/ML
INJECTION INTRAVENOUS AS NEEDED
Status: DISCONTINUED | OUTPATIENT
Start: 2025-01-03 | End: 2025-01-03 | Stop reason: SURG

## 2025-01-03 RX ORDER — LIDOCAINE HYDROCHLORIDE 20 MG/ML
INJECTION, SOLUTION INFILTRATION; PERINEURAL AS NEEDED
Status: DISCONTINUED | OUTPATIENT
Start: 2025-01-03 | End: 2025-01-03 | Stop reason: SURG

## 2025-01-03 RX ORDER — PROPOFOL 10 MG/ML
VIAL (ML) INTRAVENOUS AS NEEDED
Status: DISCONTINUED | OUTPATIENT
Start: 2025-01-03 | End: 2025-01-03 | Stop reason: SURG

## 2025-01-03 RX ORDER — EPHEDRINE SULFATE 50 MG/ML
INJECTION, SOLUTION INTRAVENOUS AS NEEDED
Status: DISCONTINUED | OUTPATIENT
Start: 2025-01-03 | End: 2025-01-03 | Stop reason: SURG

## 2025-01-03 RX ORDER — SODIUM CHLORIDE, SODIUM LACTATE, POTASSIUM CHLORIDE, CALCIUM CHLORIDE 600; 310; 30; 20 MG/100ML; MG/100ML; MG/100ML; MG/100ML
INJECTION, SOLUTION INTRAVENOUS CONTINUOUS PRN
Status: DISCONTINUED | OUTPATIENT
Start: 2025-01-03 | End: 2025-01-03 | Stop reason: SURG

## 2025-01-03 RX ORDER — IPRATROPIUM BROMIDE AND ALBUTEROL SULFATE 2.5; .5 MG/3ML; MG/3ML
3 SOLUTION RESPIRATORY (INHALATION) ONCE AS NEEDED
Status: DISCONTINUED | OUTPATIENT
Start: 2025-01-03 | End: 2025-01-03 | Stop reason: HOSPADM

## 2025-01-03 RX ORDER — FLUMAZENIL 0.1 MG/ML
0.2 INJECTION INTRAVENOUS AS NEEDED
Status: DISCONTINUED | OUTPATIENT
Start: 2025-01-03 | End: 2025-01-03 | Stop reason: HOSPADM

## 2025-01-03 RX ORDER — PROMETHAZINE HYDROCHLORIDE 25 MG/1
25 SUPPOSITORY RECTAL ONCE AS NEEDED
Status: DISCONTINUED | OUTPATIENT
Start: 2025-01-03 | End: 2025-01-03 | Stop reason: HOSPADM

## 2025-01-03 RX ORDER — ONDANSETRON 2 MG/ML
4 INJECTION INTRAMUSCULAR; INTRAVENOUS ONCE AS NEEDED
Status: DISCONTINUED | OUTPATIENT
Start: 2025-01-03 | End: 2025-01-03 | Stop reason: HOSPADM

## 2025-01-03 RX ORDER — NALOXONE HCL 0.4 MG/ML
0.2 VIAL (ML) INJECTION AS NEEDED
Status: DISCONTINUED | OUTPATIENT
Start: 2025-01-03 | End: 2025-01-03 | Stop reason: HOSPADM

## 2025-01-03 RX ADMIN — ATORVASTATIN CALCIUM 40 MG: 20 TABLET, FILM COATED ORAL at 22:05

## 2025-01-03 RX ADMIN — GUAIFENESIN 200 MG: 100 LIQUID ORAL at 22:05

## 2025-01-03 RX ADMIN — CARBIDOPA AND LEVODOPA 1 TABLET: 25; 250 TABLET ORAL at 22:05

## 2025-01-03 RX ADMIN — PHENYLEPHRINE HYDROCHLORIDE 25 MCG: 10 INJECTION INTRAVENOUS at 14:14

## 2025-01-03 RX ADMIN — ACYCLOVIR SODIUM 700 MG: 50 INJECTION, SOLUTION INTRAVENOUS at 02:23

## 2025-01-03 RX ADMIN — SODIUM CHLORIDE 30 ML/HR: 9 INJECTION, SOLUTION INTRAVENOUS at 13:47

## 2025-01-03 RX ADMIN — CARBIDOPA AND LEVODOPA 1 TABLET: 25; 250 TABLET ORAL at 10:02

## 2025-01-03 RX ADMIN — CARBIDOPA AND LEVODOPA 1 TABLET: 25; 250 TABLET ORAL at 16:36

## 2025-01-03 RX ADMIN — METOPROLOL TARTRATE 12.5 MG: 25 TABLET, FILM COATED ORAL at 22:06

## 2025-01-03 RX ADMIN — SODIUM CHLORIDE 40 ML: 9 INJECTION, SOLUTION INTRAVENOUS at 10:45

## 2025-01-03 RX ADMIN — PROPOFOL 120 MCG/KG/MIN: 10 INJECTION, EMULSION INTRAVENOUS at 13:55

## 2025-01-03 RX ADMIN — LIDOCAINE HYDROCHLORIDE 80 MG: 20 INJECTION, SOLUTION INFILTRATION; PERINEURAL at 13:55

## 2025-01-03 RX ADMIN — GUAIFENESIN 200 MG: 100 LIQUID ORAL at 17:46

## 2025-01-03 RX ADMIN — AMIODARONE HYDROCHLORIDE 200 MG: 200 TABLET ORAL at 22:05

## 2025-01-03 RX ADMIN — CEFOXITIN SODIUM 2 G: 2 POWDER, FOR SOLUTION INTRAVENOUS at 13:38

## 2025-01-03 RX ADMIN — Medication 10 ML: at 10:16

## 2025-01-03 RX ADMIN — METOPROLOL TARTRATE 12.5 MG: 25 TABLET, FILM COATED ORAL at 10:02

## 2025-01-03 RX ADMIN — EPHEDRINE SULFATE 10 MG: 50 INJECTION INTRAMUSCULAR; INTRAVENOUS; SUBCUTANEOUS at 14:16

## 2025-01-03 RX ADMIN — Medication 10 ML: at 22:06

## 2025-01-03 RX ADMIN — ACYCLOVIR SODIUM 700 MG: 50 INJECTION, SOLUTION INTRAVENOUS at 10:45

## 2025-01-03 RX ADMIN — TERAZOSIN HYDROCHLORIDE 1 MG: 1 CAPSULE ORAL at 22:05

## 2025-01-03 RX ADMIN — AMIODARONE HYDROCHLORIDE 200 MG: 200 TABLET ORAL at 10:03

## 2025-01-03 RX ADMIN — PROPOFOL 80 MG: 10 INJECTION, EMULSION INTRAVENOUS at 13:54

## 2025-01-03 NOTE — PLAN OF CARE
Goal Outcome Evaluation:       Pt remains confused, RA, VSS- HR 40s-50s, DBP soft. No acute changes overnight. Son at bedside. PM metoprolol and terazosin administered per RIDDHI Adame with DBP <60. EGD with PEG insertion scheduled for 13:25. Tubefeed and all meds via NG held since midnight. Current family at bedside did not feel comfortable signing procedure consent form, form printed and in chart, ready to sign. Plan of care ongoing.

## 2025-01-03 NOTE — PROGRESS NOTES
LOS: 17 days     Chief Complaint: LISY    Subjective     History of Present Illness  Casey Snowden Sr. is a 89 y.o. male with history of Parkinson's disease and dementia.  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        Subjective  Patient still encephalopathic  Has had some large volume bladder scans, but patient has been able to void as they have moved him around. PVR has been under 300.  DHT in place.     12/21 - underwent LP this am. No other acute events overnight. Continues to be confused. 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     12/23 no acute events. Seems to be doing a bit better today.    12/24 seems to have had a difficult night. Now being bathe.     12/25 no acute events. Overall, patient looks better today. Second IV access accomplished.     12/26: Oriented to person place, recognizes family  Dobbhoff tube remains, on tube feeds, no new complaints    12/27: Still confused today but his family states he was much more awake alert and conversant yesterday  Remains on tube feeds with free water, on amnio drip    12/28: No new complaints or events, did not pass a swallow study and remains on tube feeds, labs pending.  Family states he was not as alert yesterday    12/29: Seen and examined at bedside, family at bedside, discussed plan with family.  More awake, still confused, remains on tube feeds overnight    12/30: Repeat swallow study pending, no new complaints, family at bedside    12/31: Family planning for PEG tube, no new complaints otherwise    1/2: Patient minimally responsive, nonverbal.  Family has decided to place PEG tube and pursue nursing home placement    1/3: Clinically about the same today, if anything seems a bit less responsive, family at bedside, PEG placement planned, tube feeds on hold    History taken from: family and chart    Objective     Vital Sign Min/Max for last 24 hours  Temp  Min: 97.5 °F (36.4  "°C)  Max: 98.4 °F (36.9 °C)   BP  Min: 110/55  Max: 129/63   Pulse  Min: 53  Max: 86   Resp  Min: 16  Max: 18   SpO2  Min: 92 %  Max: 100 %   No data recorded   No data recorded     Flowsheet Rows      Flowsheet Row First Filed Value   Admission Height --   Admission Weight 66.7 kg (147 lb 0.8 oz) Documented at 12/18/2024 0500            No intake/output data recorded.  I/O last 3 completed shifts:  In: 3934 [Other:1485; NG/GT:2449]  Out: 350 [Urine:350]    Objective:  Vital signs: (most recent): Blood pressure 128/54, pulse 53, temperature 98.4 °F (36.9 °C), temperature source Axillary, resp. rate 18, height 177.8 cm (70\"), weight 84.6 kg (186 lb 8.2 oz), SpO2 92%.            Physical Examination: General appearance - ill appearing, frail, chronically ill  Mental status - more awake.    Nose - DHT in place   Chest -good air movement, scattered coarse breath sounds.  Heart - normal rate, regular rhythm, normal S1, S2, no murmurs, rubs, clicks or gallops  Abdomen - soft, nontender, nondistended, no masses or organomegaly  Neurological -no focal deficits  Extremities -trace/1+ edema      Results Review:     I reviewed the patient's new clinical results.    WBC No results found for: \"WBC\"     HGB No results found for: \"HGB\"     HCT No results found for: \"HCT\"     Platlets No results found for: \"LABPLAT\"   MCV No results found for: \"MCV\"         Sodium Sodium   Date Value Ref Range Status   01/03/2025 139 136 - 145 mmol/L Final   01/02/2025 136 136 - 145 mmol/L Final   01/01/2025 134 (L) 136 - 145 mmol/L Final      Potassium Potassium   Date Value Ref Range Status   01/03/2025 4.2 3.5 - 5.2 mmol/L Final   01/02/2025 4.2 3.5 - 5.2 mmol/L Final   01/01/2025 4.2 3.5 - 5.2 mmol/L Final      Chloride Chloride   Date Value Ref Range Status   01/03/2025 109 (H) 98 - 107 mmol/L Final   01/02/2025 106 98 - 107 mmol/L Final   01/01/2025 105 98 - 107 mmol/L Final      CO2 CO2   Date Value Ref Range Status   01/03/2025 24.0 22.0 - " "29.0 mmol/L Final   01/02/2025 22.1 22.0 - 29.0 mmol/L Final   01/01/2025 23.9 22.0 - 29.0 mmol/L Final      BUN BUN   Date Value Ref Range Status   01/03/2025 35 (H) 8 - 23 mg/dL Final   01/02/2025 31 (H) 8 - 23 mg/dL Final   01/01/2025 30 (H) 8 - 23 mg/dL Final      Creatinine Creatinine   Date Value Ref Range Status   01/03/2025 0.77 0.76 - 1.27 mg/dL Final   01/02/2025 0.75 (L) 0.76 - 1.27 mg/dL Final   01/01/2025 0.75 (L) 0.76 - 1.27 mg/dL Final      Calcium Calcium   Date Value Ref Range Status   01/03/2025 7.8 (L) 8.2 - 9.6 mg/dL Final   01/02/2025 7.6 (L) 8.2 - 9.6 mg/dL Final   01/01/2025 7.4 (L) 8.2 - 9.6 mg/dL Final      PO4 No results found for: \"CAPO4\"   Albumin Albumin   Date Value Ref Range Status   01/03/2025 2.5 (L) 3.5 - 5.2 g/dL Final   01/02/2025 2.2 (L) 3.5 - 5.2 g/dL Final   01/01/2025 2.3 (L) 3.5 - 5.2 g/dL Final        Magnesium No results found for: \"MG\"       Uric Acid No results found for: \"URICACID\"     Medication Review:   Current medications reviewed, orders reviewed and updated    Assessment & Plan     Assessment & Plan  LISY, Prerenal and improved  Metabolic acidosis, stabilizing  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  Encephalopathy, slow improvement  Hyperphosphatemia   Urinary retention   Hypernatremia-   Hypokalemia- new       Plan:  Renal function, volume and electrolytes at goal  Sodium up to 139  Edema/third spacing is due to low albumin, caution with diuresis  Probably best to hold diuretics unless respiratory issue  Continue current free water dosage when tube feeds are restarted    BP at goal on current regimen, off ARB  Continue electrolyte replacement, as needed  PEG tube placement this afternoon with eventual discharge to rehab  Acyclovir now complete      Joe Lyn MD  01/03/25  13:14 EST            "

## 2025-01-03 NOTE — ANESTHESIA POSTPROCEDURE EVALUATION
Patient: Casey Snowden Sr.    Procedure Summary       Date: 01/03/25 Room / Location:  [facility] /  [facility]    Anesthesia Start: 1350 Anesthesia Stop: 1421    Procedure: ESOPHAGOGASTRODUODENOSCOPY WITH PERCUTANEOUS ENDOSCOPIC GASTROSTOMY TUBE INSERTION (Esophagus) Diagnosis:       Moderate protein-calorie malnutrition      (Moderate protein-calorie malnutrition [E44.0])    Surgeons: Konstantin Persaud MD Provider: Roxanne Sweeney MD    Anesthesia Type: MAC ASA Status: 4            Anesthesia Type: MAC    Vitals  Vitals Value Taken Time   /60 01/03/25 1438   Temp     Pulse 55 01/03/25 1438   Resp     SpO2 97 % 01/03/25 1438           Post Anesthesia Care and Evaluation    Patient location during evaluation: bedside  Patient participation: complete - patient participated  Level of consciousness: awake and alert  Pain management: adequate    Airway patency: patent  Anesthetic complications: No anesthetic complications  PONV Status: controlled  Cardiovascular status: acceptable  Respiratory status: acceptable  Hydration status: acceptable

## 2025-01-03 NOTE — ANESTHESIA PREPROCEDURE EVALUATION
" Anesthesia Evaluation     Patient summary reviewed and Nursing notes reviewed   NPO Solid Status: > 8 hours  NPO Liquid Status: > 2 hours           Airway   Mallampati: II  TM distance: >3 FB  Neck ROM: full  Possible difficult intubation  Dental      Pulmonary    Cardiovascular     ECG reviewed  PT is on anticoagulation therapy  Patient on routine beta blocker and Beta blocker given within 24 hours of surgery    (+) hypertension, past MI , CAD, dysrhythmias Atrial Fib, Paroxysmal Atrial Fib, CHF Diastolic >=55%, PVD, DVT, hyperlipidemia,  carotid artery disease      Neuro/Psych  (+) Parkinson's disease, CVA (acute with associated dysphagia, high aspiration risk but has been swallowing food until recently, for QOL reasons)  GI/Hepatic/Renal/Endo    (+) renal disease- ARF, thyroid problem hypothyroidism    Musculoskeletal     Abdominal    Substance History      OB/GYN          Other      history of cancer    ROS/Med Hx Other: Disseminated herpes zoster with encephalitis                Anesthesia Plan    ASA 4     MAC     (I have reviewed the patient's history and chart with the patient, including all pertinent laboratory results and imaging. I have explained the risks of anesthesia including but not limited to dental damage, corneal abrasion, nerve injury, MI, stroke, aspiration, and death. Patient has agreed to proceed.      /54 (BP Location: Right arm, Patient Position: Lying)   Pulse 53   Temp 36.9 °C (98.4 °F) (Axillary)   Resp 18   Ht 177.8 cm (70\")   Wt 84.6 kg (186 lb 8.2 oz)   SpO2 92%   BMI 26.76 kg/m²   )  intravenous induction     Anesthetic plan, risks, benefits, and alternatives have been provided, discussed and informed consent has been obtained with: patient.      CODE STATUS:    Level Of Support Discussed With: Next of Kin (If No Surrogate)  Code Status (Patient has no pulse and is not breathing): No CPR (Do Not Attempt to Resuscitate)  Medical Interventions (Patient has pulse or is " breathing): Full Support

## 2025-01-03 NOTE — PLAN OF CARE
Goal Outcome Evaluation:  Plan of Care Reviewed With: patient, spouse        Progress: improving  Outcome Evaluation: VSS,room air,PEG tube was placed and feeding is running at the goal rate,no bleeding,abdominal binder in place.Stage II sacral wound was founded,dressing applied and consult with wound care was placed.

## 2025-01-03 NOTE — SIGNIFICANT NOTE
01/03/25 1337   OTHER   Discipline physical therapist   Rehab Time/Intention   Session Not Performed other (see comments)  (Pt with scheduled PEG tube placement today, PT will follow up 1/6)   Recommendation   PT - Next Appointment 01/06/25

## 2025-01-03 NOTE — PROGRESS NOTES
IMPRESSION & PLAN:  89-year-old gentleman admitted with ongoing dysphagia and herpes encephalitis.  He is more lucid today.  He and family have discussed PEG tube placement and they would like to proceed with placement.  Risk and rationale for procedure been discussed.  Hold tube feeds at midnight and hold Lovenox until following the procedure.    CC:    Chief Complaint   Patient presents with    Leg Pain         HPI: They would like to proceed with PEG tube placement.  He is more lucid and interactive today.      PE:    VS:   Vitals:    01/02/25 1918   BP: 124/47   Pulse:    Resp: 17   Temp: 97.5 °F (36.4 °C)   SpO2:           Intake/Output Summary (Last 24 hours) at 1/2/2025 2109  Last data filed at 1/2/2025 2043  Gross per 24 hour   Intake 3611 ml   Output 350 ml   Net 3261 ml        CONST: Awake, alert  LUNGS: symmetric excursion, normal inspiratory effort  CV: regular rate, well perfused  Abdomen: Soft, nondistended      LABS:  Results from last 7 days   Lab Units 12/31/24  0708 12/30/24  1100 12/29/24  0548 12/28/24  1359 12/27/24  0518   WBC 10*3/mm3 9.19 8.55 7.38 7.30 7.65   HEMOGLOBIN g/dL 10.0* 10.3* 10.3* 10.2* 10.4*   HEMATOCRIT % 30.7* 31.1* 30.7* 31.0* 30.9*   PLATELETS 10*3/mm3 219 242 231 218 189     Results from last 7 days   Lab Units 01/02/25  0534 01/01/25  0600 12/31/24  0921 12/28/24  1359 12/27/24  0518   SODIUM mmol/L 136 134* 137   < > 141   POTASSIUM mmol/L 4.2 4.2 5.1   < > 4.2   CHLORIDE mmol/L 106 105 110*   < > 109*   CO2 mmol/L 22.1 23.9 20.0*   < > 21.5*   BUN mg/dL 31* 30* 32*   < > 49*   CREATININE mg/dL 0.75* 0.75* 0.66*   < > 0.99   CALCIUM mg/dL 7.6* 7.4* 7.6*   < > 7.6*   BILIRUBIN mg/dL  --   --   --   --  0.2   ALK PHOS U/L  --   --   --   --  70   ALT (SGPT) U/L  --   --   --   --  33   AST (SGOT) U/L  --   --   --   --  87*   GLUCOSE mg/dL 134* 125* 119*   < > 147*    < > = values in this interval not displayed.

## 2025-01-03 NOTE — ANESTHESIA POSTPROCEDURE EVALUATION
Patient: Casey Snowden Sr.    Procedure Summary       Date: 01/03/25 Room / Location:  [facility]    Anesthesia Start: 1350 Anesthesia Stop: 1421    Procedure: ESOPHAGOGASTRODUODENOSCOPY WITH PERCUTANEOUS ENDOSCOPIC GASTROSTOMY TUBE INSERTION (Esophagus) Diagnosis:       Moderate protein-calorie malnutrition      (Moderate protein-calorie malnutrition [E44.0])    Surgeons: Konstantin Persaud MD Provider: Roxanne Sweeney MD    Anesthesia Type: MAC ASA Status: 4            Anesthesia Type: MAC    Vitals  Vitals Value Taken Time   /53 01/03/25 1422   Temp     Pulse 56 01/03/25 1422   Resp     SpO2 100 % 01/03/25 1422           Post Anesthesia Care and Evaluation    Patient location during evaluation: bedside  Patient participation: complete - patient participated  Level of consciousness: awake and alert  Pain management: adequate    Airway patency: patent  Anesthetic complications: No anesthetic complications  PONV Status: controlled  Cardiovascular status: acceptable  Respiratory status: acceptable  Hydration status: acceptable

## 2025-01-03 NOTE — OP NOTE
DATE OF OPERATION: 1/3/2025      SURGEON:   Konstantin Persaud MD     PREOPERATIVE DIAGNOSIS: Dysphagia     POSTOPERATIVE DIAGNOSIS: Same     Findings: PEG tube secured at 1.5 cm at skin    PROCEDURE PERFORMED: Esophagus gastroduodenoscopy with percutaneous endoscopic gastrostomy tube placement     ANESTHESIA: Mac     SPECIMEN: None     DRAINS: None     BLOOD LOSS: Minimal     INDICATIONS FOR OPERATION: Mr. Casey Snowden Sr. is a 89 y.o.  with herpes encephalitis, history of CVA, stroke.  He has ongoing dysphagia and PEG tube was requested.  Risk and rationale for the procedure were discussed with him and his next of kin.  They agreed to proceed.      OPERATIVE REPORT: Patient was brought to the endoscopy suite.  Timeout was performed.  Preoperative antibiotics were administered.    A gastroscope was placed into the oropharynx under direct visualization was passed into the stomach.  A light reflex was noted in the left upper quadrant and a one-to-one impulse was seen on manual palpation.  The site for the PEG tube was marked and then prepped.  A needle was introduced into the stomach under direct visualization while aspirating for air.  Air was aspirated into the syringe as the needle was seen entering the stomach.  A wire was placed through the cannula and a snare was used to grab the wire.  This was brought out through the mouth and the push PEG was advanced over top of the wire and passed through the abdominal wall. The PEG tube was pulled into place and secured at 1.5 cm at the skin.  The gastroscope was reintroduced into the oropharynx and the site of the PEG tube placement was inspected and was noted to be hemostatic.  He tolerated the procedure well.       Konstantin Persaud M.D.  General and Endoscopic Surgery  [facility]     [address] [place] [address], [address]  [place], [place], [address]  P: [phone]  F: [phone]

## 2025-01-04 LAB
ALBUMIN SERPL-MCNC: 2.2 G/DL (ref 3.5–5.2)
ANION GAP SERPL CALCULATED.3IONS-SCNC: 9 MMOL/L (ref 5–15)
BUN SERPL-MCNC: 35 MG/DL (ref 8–23)
BUN/CREAT SERPL: 38 (ref 7–25)
CALCIUM SPEC-SCNC: 7.7 MG/DL (ref 8.2–9.6)
CHLORIDE SERPL-SCNC: 110 MMOL/L (ref 98–107)
CO2 SERPL-SCNC: 23 MMOL/L (ref 22–29)
CREAT SERPL-MCNC: 0.92 MG/DL (ref 0.76–1.27)
EGFRCR SERPLBLD CKD-EPI 2021: 79 ML/MIN/1.73
GLUCOSE BLDC GLUCOMTR-MCNC: 129 MG/DL (ref 70–130)
GLUCOSE BLDC GLUCOMTR-MCNC: 154 MG/DL (ref 70–130)
GLUCOSE BLDC GLUCOMTR-MCNC: 193 MG/DL (ref 70–130)
GLUCOSE BLDC GLUCOMTR-MCNC: 200 MG/DL (ref 70–130)
GLUCOSE SERPL-MCNC: 195 MG/DL (ref 65–99)
PHOSPHATE SERPL-MCNC: 2.9 MG/DL (ref 2.5–4.5)
POTASSIUM SERPL-SCNC: 4.7 MMOL/L (ref 3.5–5.2)
SODIUM SERPL-SCNC: 142 MMOL/L (ref 136–145)

## 2025-01-04 PROCEDURE — 99231 SBSQ HOSP IP/OBS SF/LOW 25: CPT | Performed by: SURGERY

## 2025-01-04 PROCEDURE — 82948 REAGENT STRIP/BLOOD GLUCOSE: CPT

## 2025-01-04 PROCEDURE — 63710000001 INSULIN REGULAR HUMAN PER 5 UNITS: Performed by: SURGERY

## 2025-01-04 PROCEDURE — 80069 RENAL FUNCTION PANEL: CPT | Performed by: SURGERY

## 2025-01-04 RX ADMIN — INSULIN HUMAN 3 UNITS: 100 INJECTION, SOLUTION PARENTERAL at 13:36

## 2025-01-04 RX ADMIN — INSULIN HUMAN 2 UNITS: 100 INJECTION, SOLUTION PARENTERAL at 18:25

## 2025-01-04 RX ADMIN — METOPROLOL TARTRATE 12.5 MG: 25 TABLET, FILM COATED ORAL at 20:23

## 2025-01-04 RX ADMIN — METOPROLOL TARTRATE 12.5 MG: 25 TABLET, FILM COATED ORAL at 09:58

## 2025-01-04 RX ADMIN — CARBIDOPA AND LEVODOPA 1 TABLET: 25; 250 TABLET ORAL at 09:59

## 2025-01-04 RX ADMIN — CARBIDOPA AND LEVODOPA 1 TABLET: 25; 250 TABLET ORAL at 16:01

## 2025-01-04 RX ADMIN — Medication 1000 MCG: at 05:43

## 2025-01-04 RX ADMIN — ACETAMINOPHEN 650 MG: 325 TABLET ORAL at 20:22

## 2025-01-04 RX ADMIN — ACETAMINOPHEN 650 MG: 650 LIQUID ORAL at 10:00

## 2025-01-04 RX ADMIN — TERAZOSIN HYDROCHLORIDE 1 MG: 1 CAPSULE ORAL at 20:22

## 2025-01-04 RX ADMIN — GUAIFENESIN 200 MG: 100 LIQUID ORAL at 13:36

## 2025-01-04 RX ADMIN — CARBIDOPA AND LEVODOPA 1 TABLET: 25; 250 TABLET ORAL at 20:22

## 2025-01-04 RX ADMIN — ACETAMINOPHEN 650 MG: 650 LIQUID ORAL at 02:40

## 2025-01-04 RX ADMIN — GUAIFENESIN 200 MG: 100 LIQUID ORAL at 20:22

## 2025-01-04 RX ADMIN — AMIODARONE HYDROCHLORIDE 200 MG: 200 TABLET ORAL at 09:58

## 2025-01-04 RX ADMIN — GUAIFENESIN 200 MG: 100 LIQUID ORAL at 05:42

## 2025-01-04 RX ADMIN — ACETAMINOPHEN 650 MG: 650 LIQUID ORAL at 16:00

## 2025-01-04 RX ADMIN — FINASTERIDE 5 MG: 5 TABLET, FILM COATED ORAL at 09:59

## 2025-01-04 RX ADMIN — GUAIFENESIN 200 MG: 100 LIQUID ORAL at 16:01

## 2025-01-04 RX ADMIN — Medication 10 ML: at 13:22

## 2025-01-04 RX ADMIN — ATORVASTATIN CALCIUM 40 MG: 20 TABLET, FILM COATED ORAL at 20:22

## 2025-01-04 RX ADMIN — GUAIFENESIN 200 MG: 100 LIQUID ORAL at 00:52

## 2025-01-04 RX ADMIN — AMIODARONE HYDROCHLORIDE 200 MG: 200 TABLET ORAL at 20:23

## 2025-01-04 RX ADMIN — LEVOTHYROXINE SODIUM 75 MCG: 75 TABLET ORAL at 05:43

## 2025-01-04 RX ADMIN — GUAIFENESIN 200 MG: 100 LIQUID ORAL at 09:59

## 2025-01-04 RX ADMIN — INSULIN HUMAN 2 UNITS: 100 INJECTION, SOLUTION PARENTERAL at 05:43

## 2025-01-04 NOTE — PROGRESS NOTES
IMPRESSION & PLAN:  89-year-old gentleman postoperative day 1 following EGD with PEG insertion.  Abdominal binder to be in place at all times.  Okay to use PEG tube.  Bolster loosened to between 2 and 2-1/2 cm today.    CC:    Chief Complaint   Patient presents with    Leg Pain         HPI: He was pulled with the tube overnight.  Currently tolerating tube feeds.  Pain controlled with as needed Tylenol.    ROS:   Per HPI      PE:    VS:   Vitals:    01/04/25 0800   BP: 117/52   Pulse: 54   Resp: 18   Temp: 97.9 °F (36.6 °C)   SpO2: 97%          Intake/Output Summary (Last 24 hours) at 1/4/2025 1204  Last data filed at 1/4/2025 0639  Gross per 24 hour   Intake 2353 ml   Output --   Net 2353 ml        CONST: Ill-appearing  Abdomen: Soft, PEG tube in good position and rotates freely, PEG tube bolster placed at 2 and half centimeters at the skin      LABS:  Results from last 7 days   Lab Units 12/31/24  0708 12/30/24  1100 12/29/24  0548 12/28/24  1359   WBC 10*3/mm3 9.19 8.55 7.38 7.30   HEMOGLOBIN g/dL 10.0* 10.3* 10.3* 10.2*   HEMATOCRIT % 30.7* 31.1* 30.7* 31.0*   PLATELETS 10*3/mm3 219 242 231 218     Results from last 7 days   Lab Units 01/04/25  0601 01/03/25  0436 01/02/25  0534   SODIUM mmol/L 142 139 136   POTASSIUM mmol/L 4.7 4.2 4.2   CHLORIDE mmol/L 110* 109* 106   CO2 mmol/L 23.0 24.0 22.1   BUN mg/dL 35* 35* 31*   CREATININE mg/dL 0.92 0.77 0.75*   CALCIUM mg/dL 7.7* 7.8* 7.6*   GLUCOSE mg/dL 195* 91 134*

## 2025-01-04 NOTE — PROGRESS NOTES
Name: Casey Snowden . ADMIT: 2024   : 1934  PCP: Aleksander Diez MD    MRN: [id number] LOS: 18 days   AGE/SEX: 89 y.o. male  ROOM: [facility]     Subjective   Subjective   Daughter at bedside.  She feels is little weaker today little more inattentive and sleeping more.  Has been complaining of some discomfort at PEG tube site.     Objective   Objective   Vital Signs  Temp:  [97.3 °F (36.3 °C)-97.9 °F (36.6 °C)] 97.5 °F (36.4 °C)  Heart Rate:  [54-73] 54  Resp:  [18] 18  BP: (117-152)/(44-54) 144/51  SpO2:  [95 %-97 %] 97 %  on   ;   Device (Oxygen Therapy): room air  Body mass index is 26.76 kg/m².  Physical Exam  Constitutional:       General: He is not in acute distress.     Appearance: He is ill-appearing.   Cardiovascular:      Rate and Rhythm: Normal rate and regular rhythm.      Heart sounds: No murmur heard.  Pulmonary:      Effort: No respiratory distress.   Abdominal:      General: There is no distension.      Palpations: Abdomen is soft.      Tenderness: There is abdominal tenderness.      Comments: PEG tube in place   Musculoskeletal:      Right lower leg: Edema present.      Left lower leg: Edema present.   Skin:     General: Skin is warm and dry.      Findings: No rash.   Neurological:      Mental Status: He is alert. He is disoriented.       Results Review     I reviewed the patient's new clinical results.  Results from last 7 days   Lab Units 24  0708 24  1100 24  0548   WBC 10*3/mm3 9.19 8.55 7.38   HEMOGLOBIN g/dL 10.0* 10.3* 10.3*   PLATELETS 10*3/mm3 219 242 231     Results from last 7 days   Lab Units 25  0601 25  0436 25  0534 25  0600   SODIUM mmol/L 142 139 136 134*   POTASSIUM mmol/L 4.7 4.2 4.2 4.2   CHLORIDE mmol/L 110* 109* 106 105   CO2 mmol/L 23.0 24.0 22.1 23.9   BUN mg/dL 35* 35* 31* 30*   CREATININE mg/dL 0.92 0.77 0.75* 0.75*   GLUCOSE mg/dL 195* 91 134* 125*   EGFR mL/min/1.73 79.0 85.0 85.7 85.7     Results from last 7  days   Lab Units 01/04/25  0601 01/03/25  0436 01/02/25  0534 01/01/25  0600   ALBUMIN g/dL 2.2* 2.5* 2.2* 2.3*       Results from last 7 days   Lab Units 01/04/25  0601 01/03/25  0436 01/02/25  0534 01/01/25  0600 12/31/24  0921 12/30/24  1100   CALCIUM mg/dL 7.7* 7.8* 7.6* 7.4*   < > 7.4*   ALBUMIN g/dL 2.2* 2.5* 2.2* 2.3*   < > 2.3*   MAGNESIUM mg/dL  --   --   --   --   --  1.8   PHOSPHORUS mg/dL 2.9 3.3 3.0 2.9   < > 2.0*    < > = values in this interval not displayed.           Glucose   Date/Time Value Ref Range Status   01/04/2025 1112 200 (H) 70 - 130 mg/dL Final   01/04/2025 0524 193 (H) 70 - 130 mg/dL Final   01/04/2025 0033 129 70 - 130 mg/dL Final   01/03/2025 1712 72 70 - 130 mg/dL Final   01/03/2025 1048 98 70 - 130 mg/dL Final   01/03/2025 0528 105 70 - 130 mg/dL Final   01/02/2025 2328 128 70 - 130 mg/dL Final       No radiology results for the last day    I have personally reviewed all medications:  Scheduled Medications  amiodarone, 200 mg, Nasogastric, Q12H  [Held by provider] apixaban, 5 mg, Oral, Q12H  atorvastatin, 40 mg, Nasogastric, Nightly  carbidopa-levodopa, 1 tablet, Nasogastric, TID  [Held by provider] enoxaparin, 1 mg/kg, Subcutaneous, Q12H  finasteride, 5 mg, Nasogastric, Daily  guaifenesin, 200 mg, Nasogastric, Q4H  insulin regular, 2-7 Units, Subcutaneous, Q6H  levothyroxine, 75 mcg, Nasogastric, Daily  metoprolol tartrate, 12.5 mg, Nasogastric, Q12H  [Held by provider] oxybutynin XL, 10 mg, Oral, Daily  sodium chloride, 10 mL, Intravenous, Q12H  terazosin, 1 mg, Nasogastric, Nightly  vitamin B-12, 1,000 mcg, Nasogastric, Q AM    Infusions     Diet  NPO Diet NPO Type: Tube Feeding    I have personally reviewed:  [x]  Laboratory   []  Microbiology   [x]  Radiology   []  EKG/Telemetry  []  Cardiology/Vascular   []  Pathology    []  Records       Assessment/Plan     Active Hospital Problems    Diagnosis  POA    **Unable to ambulate [R26.2]  Yes    Moderate protein-calorie malnutrition  [E44.0]  Yes    LISY (acute kidney injury) [N17.9]  Yes    Herpes zoster without complication [B02.9]  Unknown    Falls frequently [R29.6]  Not Applicable    Acquired hypothyroidism [E03.9]  Yes    Secondary malignancy of lymph nodes of head, 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       89 y.o. male admitted with generalized weakness, confusion and found to have RLE rash consistent with shingles.    Completed acyclovir  PEG tube placed yesterday  Tolerating tube feeds  Referrals made to SNU facilities    Will reconsult physical therapy.  Not seen since 12/31.  Seems to be regressing and getting weaker again.  Will need rehab.      Severe pharyngeal dysphagia  S/p PEG tube 1/3    BLE edema  -lasix 20mg IV x 1 given 1/2    VZV encephalitis.   -Completed 14 days of acyclovir  - MRI negative   - Neurology and ID have signed off.    Pneumonia  Completed a 5 day course of meropenem     LISY/hypernatremia: resolved .   - Continue to hold Diovan and Ditropan  - Monitor urine output    Atrial fibrillation with RVR  Continue AC, no need for aspirin per cardiology.  No BB due to previous bradycardia. Spot dosed IV metoprolol for RVR. Transitioned from amiodarone ggt to PO.     Parkinson disease: Continue Sinemet.       Eliquis (home med) for DVT prophylaxis.  Currently held due to PEG tube.  Will discuss with Dr. Persaud when can resume Lovenox or Eliquis.  DNR.  Discussed with patient, family, and  consulting provider.  Anticipate discharge to SNU facility next week.  Expected Discharge Date: 1/7/2025; Expected Discharge Time:       Brian Doss MD  [facility]  01/04/25  16:24 EST

## 2025-01-04 NOTE — PLAN OF CARE
Goal Outcome Evaluation:  Plan of Care Reviewed With: patient, family        Progress: improving  Outcome Evaluation: VSS,HR occasionally  low today (55),room air.PEG tube is fine,no nausea,vomiting or residual,just little pain in that area for the surgery.

## 2025-01-04 NOTE — PROGRESS NOTES
LOS: 18 days     Chief Complaint: LISY    Subjective     History of Present Illness  Casey Snowden Sr. is a 89 y.o. male with history of Parkinson's disease and dementia.  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        Subjective  Patient still encephalopathic  Has had some large volume bladder scans, but patient has been able to void as they have moved him around. PVR has been under 300.  DHT in place.     12/21 - underwent LP this am. No other acute events overnight. Continues to be confused. 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     12/23 no acute events. Seems to be doing a bit better today.    12/24 seems to have had a difficult night. Now being bathe.     12/25 no acute events. Overall, patient looks better today. Second IV access accomplished.     12/26: Oriented to person place, recognizes family  Dobbhoff tube remains, on tube feeds, no new complaints    12/27: Still confused today but his family states he was much more awake alert and conversant yesterday  Remains on tube feeds with free water, on amnio drip    12/28: No new complaints or events, did not pass a swallow study and remains on tube feeds, labs pending.  Family states he was not as alert yesterday    12/29: Seen and examined at bedside, family at bedside, discussed plan with family.  More awake, still confused, remains on tube feeds overnight    12/30: Repeat swallow study pending, no new complaints, family at bedside    12/31: Family planning for PEG tube, no new complaints otherwise    1/2: Patient minimally responsive, nonverbal.  Family has decided to place PEG tube and pursue nursing home placement    1/3: Clinically about the same today, if anything seems a bit less responsive, family at bedside, PEG placement planned, tube feeds on hold    1/4  Pt noted sleepy, in no distress at all. Family at bed side. Says he was restless last night.    History taken  "from: family and chart    Objective     Vital Sign Min/Max for last 24 hours  Temp  Min: 97.3 °F (36.3 °C)  Max: 98.3 °F (36.8 °C)   BP  Min: 98/50  Max: 152/54   Pulse  Min: 51  Max: 73   Resp  Min: 18  Max: 18   SpO2  Min: 95 %  Max: 100 %   Flow (L/min) (Oxygen Therapy)  Min: 10  Max: 10   No data recorded     Flowsheet Rows      Flowsheet Row First Filed Value   Admission Height --   Admission Weight 66.7 kg (147 lb 0.8 oz) Documented at 12/18/2024 0500            No intake/output data recorded.  I/O last 3 completed shifts:  In: 3771 [I.V.:400; Other:1365; NG/GT:2006]  Out: -     Objective:  Vital signs: (most recent): Blood pressure 117/52, pulse 54, temperature 97.9 °F (36.6 °C), temperature source Oral, resp. rate 18, height 177.8 cm (70\"), weight 84.6 kg (186 lb 8.2 oz), SpO2 97%.            Physical Examination: General appearance - ill appearing, frail, chronically ill  Mental status - more awake.    Nose - DHT in place   Chest -good air movement, scattered coarse breath sounds.  Heart - normal rate, regular rhythm, normal S1, S2, no murmurs, rubs, clicks or gallops  Abdomen - soft, nontender, nondistended, no masses or organomegaly  Neurological -no focal deficits  Extremities -trace/1+ edema      Results Review:     I reviewed the patient's new clinical results.    WBC No results found for: \"WBC\"     HGB No results found for: \"HGB\"     HCT No results found for: \"HCT\"     Platlets No results found for: \"LABPLAT\"   MCV No results found for: \"MCV\"         Sodium Sodium   Date Value Ref Range Status   01/04/2025 142 136 - 145 mmol/L Final   01/03/2025 139 136 - 145 mmol/L Final   01/02/2025 136 136 - 145 mmol/L Final      Potassium Potassium   Date Value Ref Range Status   01/04/2025 4.7 3.5 - 5.2 mmol/L Final   01/03/2025 4.2 3.5 - 5.2 mmol/L Final   01/02/2025 4.2 3.5 - 5.2 mmol/L Final      Chloride Chloride   Date Value Ref Range Status   01/04/2025 110 (H) 98 - 107 mmol/L Final   01/03/2025 109 (H) 98 " "- 107 mmol/L Final   01/02/2025 106 98 - 107 mmol/L Final      CO2 CO2   Date Value Ref Range Status   01/04/2025 23.0 22.0 - 29.0 mmol/L Final   01/03/2025 24.0 22.0 - 29.0 mmol/L Final   01/02/2025 22.1 22.0 - 29.0 mmol/L Final      BUN BUN   Date Value Ref Range Status   01/04/2025 35 (H) 8 - 23 mg/dL Final   01/03/2025 35 (H) 8 - 23 mg/dL Final   01/02/2025 31 (H) 8 - 23 mg/dL Final      Creatinine Creatinine   Date Value Ref Range Status   01/04/2025 0.92 0.76 - 1.27 mg/dL Final   01/03/2025 0.77 0.76 - 1.27 mg/dL Final   01/02/2025 0.75 (L) 0.76 - 1.27 mg/dL Final      Calcium Calcium   Date Value Ref Range Status   01/04/2025 7.7 (L) 8.2 - 9.6 mg/dL Final   01/03/2025 7.8 (L) 8.2 - 9.6 mg/dL Final   01/02/2025 7.6 (L) 8.2 - 9.6 mg/dL Final      PO4 No results found for: \"CAPO4\"   Albumin Albumin   Date Value Ref Range Status   01/04/2025 2.2 (L) 3.5 - 5.2 g/dL Final   01/03/2025 2.5 (L) 3.5 - 5.2 g/dL Final   01/02/2025 2.2 (L) 3.5 - 5.2 g/dL Final        Magnesium No results found for: \"MG\"       Uric Acid No results found for: \"URICACID\"     Medication Review:   Current medications reviewed, orders reviewed and updated    Assessment & Plan     Assessment & Plan  LISY, Prerenal and improved  Metabolic acidosis, stabilizing  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  Hypokalemia- replaced.      Plan:  Renal function, volume and electrolytes at goal  Sodium now noted 142  Noted on tube feeds ... Tolerating.  Hypervolemia related to third spacing ...... Continue to monitor  Caution with diuretic therapy realizing intravascular   Monitor electrolytes.   Discussed with Family at bed side.    BP at goal on current regimen, off ARB  Continue electrolyte replacement, as needed  PEG tube placement this afternoon with eventual discharge to rehab  Acyclovir now complete      David Gomez MD  01/04/25  13:11 " EST

## 2025-01-04 NOTE — PLAN OF CARE
Goal Outcome Evaluation:      Pt remains confused, RA, VSS- DBP remains soft. POD 1 following EGD with PEG insertion. No acute changes overnight. Spouse at bedside. Pt observed at times restless- fidgeting with blanket, gown, reaching and grabbing PEG tube, PRN pain medication given and observed to be effective. Abd binder in place. Abd tenderness upon palpation, observed by pt behavior upon palpation- slight moaning, reaching for abd. Spouse declined some attempts at repositioning and brief checks, stating pt was resting she did not want him disturbed. DBP parameters on scheduled metoprolol and terazosin modified to DBP<50 per Ailin Mejia, RIDDHI. Plan of care ongoing.

## 2025-01-05 LAB
ALBUMIN SERPL-MCNC: 2.3 G/DL (ref 3.5–5.2)
ANION GAP SERPL CALCULATED.3IONS-SCNC: 9 MMOL/L (ref 5–15)
BUN SERPL-MCNC: 38 MG/DL (ref 8–23)
BUN/CREAT SERPL: 46.9 (ref 7–25)
CALCIUM SPEC-SCNC: 7.9 MG/DL (ref 8.2–9.6)
CHLORIDE SERPL-SCNC: 108 MMOL/L (ref 98–107)
CO2 SERPL-SCNC: 23 MMOL/L (ref 22–29)
CREAT SERPL-MCNC: 0.81 MG/DL (ref 0.76–1.27)
DEPRECATED RDW RBC AUTO: 49.8 FL (ref 37–54)
EGFRCR SERPLBLD CKD-EPI 2021: 83.8 ML/MIN/1.73
ERYTHROCYTE [DISTWIDTH] IN BLOOD BY AUTOMATED COUNT: 13.9 % (ref 12.3–15.4)
GLUCOSE BLDC GLUCOMTR-MCNC: 126 MG/DL (ref 70–130)
GLUCOSE BLDC GLUCOMTR-MCNC: 135 MG/DL (ref 70–130)
GLUCOSE BLDC GLUCOMTR-MCNC: 147 MG/DL (ref 70–130)
GLUCOSE BLDC GLUCOMTR-MCNC: 152 MG/DL (ref 70–130)
GLUCOSE BLDC GLUCOMTR-MCNC: 171 MG/DL (ref 70–130)
GLUCOSE SERPL-MCNC: 144 MG/DL (ref 65–99)
HCT VFR BLD AUTO: 24.8 % (ref 37.5–51)
HGB BLD-MCNC: 8 G/DL (ref 13–17.7)
MCH RBC QN AUTO: 32 PG (ref 26.6–33)
MCHC RBC AUTO-ENTMCNC: 32.3 G/DL (ref 31.5–35.7)
MCV RBC AUTO: 99.2 FL (ref 79–97)
PHOSPHATE SERPL-MCNC: 3 MG/DL (ref 2.5–4.5)
PLATELET # BLD AUTO: 199 10*3/MM3 (ref 140–450)
PMV BLD AUTO: 10.9 FL (ref 6–12)
POTASSIUM SERPL-SCNC: 4.5 MMOL/L (ref 3.5–5.2)
RBC # BLD AUTO: 2.5 10*6/MM3 (ref 4.14–5.8)
SODIUM SERPL-SCNC: 140 MMOL/L (ref 136–145)
WBC NRBC COR # BLD AUTO: 7.78 10*3/MM3 (ref 3.4–10.8)

## 2025-01-05 PROCEDURE — 82948 REAGENT STRIP/BLOOD GLUCOSE: CPT

## 2025-01-05 PROCEDURE — 80069 RENAL FUNCTION PANEL: CPT | Performed by: SURGERY

## 2025-01-05 PROCEDURE — 63710000001 INSULIN REGULAR HUMAN PER 5 UNITS: Performed by: SURGERY

## 2025-01-05 PROCEDURE — 97530 THERAPEUTIC ACTIVITIES: CPT

## 2025-01-05 PROCEDURE — 85027 COMPLETE CBC AUTOMATED: CPT | Performed by: HOSPITALIST

## 2025-01-05 RX ADMIN — GUAIFENESIN 200 MG: 100 LIQUID ORAL at 17:23

## 2025-01-05 RX ADMIN — GUAIFENESIN 200 MG: 100 LIQUID ORAL at 20:38

## 2025-01-05 RX ADMIN — GUAIFENESIN 200 MG: 100 LIQUID ORAL at 05:24

## 2025-01-05 RX ADMIN — CARBIDOPA AND LEVODOPA 1 TABLET: 25; 250 TABLET ORAL at 17:23

## 2025-01-05 RX ADMIN — GUAIFENESIN 200 MG: 100 LIQUID ORAL at 08:39

## 2025-01-05 RX ADMIN — LEVOTHYROXINE SODIUM 75 MCG: 75 TABLET ORAL at 05:25

## 2025-01-05 RX ADMIN — INSULIN HUMAN 2 UNITS: 100 INJECTION, SOLUTION PARENTERAL at 08:34

## 2025-01-05 RX ADMIN — ACETAMINOPHEN 650 MG: 325 TABLET ORAL at 01:28

## 2025-01-05 RX ADMIN — AMIODARONE HYDROCHLORIDE 200 MG: 200 TABLET ORAL at 20:39

## 2025-01-05 RX ADMIN — FINASTERIDE 5 MG: 5 TABLET, FILM COATED ORAL at 08:41

## 2025-01-05 RX ADMIN — CARBIDOPA AND LEVODOPA 1 TABLET: 25; 250 TABLET ORAL at 20:39

## 2025-01-05 RX ADMIN — GUAIFENESIN 200 MG: 100 LIQUID ORAL at 13:49

## 2025-01-05 RX ADMIN — Medication 1000 MCG: at 05:25

## 2025-01-05 RX ADMIN — METOPROLOL TARTRATE 12.5 MG: 25 TABLET, FILM COATED ORAL at 20:39

## 2025-01-05 RX ADMIN — ACETAMINOPHEN 650 MG: 650 LIQUID ORAL at 08:39

## 2025-01-05 RX ADMIN — APIXABAN 5 MG: 5 TABLET, FILM COATED ORAL at 20:39

## 2025-01-05 RX ADMIN — AMIODARONE HYDROCHLORIDE 200 MG: 200 TABLET ORAL at 08:39

## 2025-01-05 RX ADMIN — CARBIDOPA AND LEVODOPA 1 TABLET: 25; 250 TABLET ORAL at 08:39

## 2025-01-05 RX ADMIN — APIXABAN 5 MG: 5 TABLET, FILM COATED ORAL at 08:41

## 2025-01-05 RX ADMIN — GUAIFENESIN 200 MG: 100 LIQUID ORAL at 01:11

## 2025-01-05 RX ADMIN — ATORVASTATIN CALCIUM 40 MG: 20 TABLET, FILM COATED ORAL at 20:39

## 2025-01-05 RX ADMIN — METOPROLOL TARTRATE 12.5 MG: 25 TABLET, FILM COATED ORAL at 08:41

## 2025-01-05 RX ADMIN — TERAZOSIN HYDROCHLORIDE 1 MG: 1 CAPSULE ORAL at 20:38

## 2025-01-05 NOTE — PROGRESS NOTES
Name: Casey Snowden . ADMIT: 2024   : 1934  PCP: Aleksander Diez MD    MRN: [id number] LOS: 19 days   AGE/SEX: 89 y.o. male  ROOM: [facility]     Subjective   Subjective   Today than yesterday.  Eyes open and denies any specific complaints.  Tolerating tube feedings.     Objective   Objective   Vital Signs  Temp:  [97.3 °F (36.3 °C)-97.9 °F (36.6 °C)] 97.3 °F (36.3 °C)  Heart Rate:  [50-54] 50  Resp:  [16-18] 16  BP: (101-144)/(51-55) 101/55  SpO2:  [97 %-99 %] 98 %  on   ;   Device (Oxygen Therapy): room air  Body mass index is 26.76 kg/m².  Physical Exam  Constitutional:       General: He is not in acute distress.     Appearance: He is ill-appearing.   Cardiovascular:      Rate and Rhythm: Normal rate and regular rhythm.      Heart sounds: No murmur heard.  Pulmonary:      Effort: No respiratory distress.   Abdominal:      General: There is no distension.      Palpations: Abdomen is soft.      Tenderness: There is abdominal tenderness.      Comments: PEG tube in place   Musculoskeletal:      Right lower leg: Edema present.      Left lower leg: Edema present.   Skin:     General: Skin is warm and dry.      Findings: No rash.   Neurological:      Mental Status: He is alert. He is disoriented.       Results Review     I reviewed the patient's new clinical results.  Results from last 7 days   Lab Units 24  0708 24  1100 24  0548   WBC 10*3/mm3 9.19 8.55 7.38   HEMOGLOBIN g/dL 10.0* 10.3* 10.3*   PLATELETS 10*3/mm3 219 242 231     Results from last 7 days   Lab Units 25  0601 25  0436 25  0534 25  0600   SODIUM mmol/L 142 139 136 134*   POTASSIUM mmol/L 4.7 4.2 4.2 4.2   CHLORIDE mmol/L 110* 109* 106 105   CO2 mmol/L 23.0 24.0 22.1 23.9   BUN mg/dL 35* 35* 31* 30*   CREATININE mg/dL 0.92 0.77 0.75* 0.75*   GLUCOSE mg/dL 195* 91 134* 125*   EGFR mL/min/1.73 79.0 85.0 85.7 85.7     Results from last 7 days   Lab Units 25  0601 25  0436  01/02/25  0534 01/01/25  0600   ALBUMIN g/dL 2.2* 2.5* 2.2* 2.3*       Results from last 7 days   Lab Units 01/04/25  0601 01/03/25  0436 01/02/25  0534 01/01/25  0600 12/31/24  0921 12/30/24  1100   CALCIUM mg/dL 7.7* 7.8* 7.6* 7.4*   < > 7.4*   ALBUMIN g/dL 2.2* 2.5* 2.2* 2.3*   < > 2.3*   MAGNESIUM mg/dL  --   --   --   --   --  1.8   PHOSPHORUS mg/dL 2.9 3.3 3.0 2.9   < > 2.0*    < > = values in this interval not displayed.           Glucose   Date/Time Value Ref Range Status   01/05/2025 0003 152 (H) 70 - 130 mg/dL Final   01/04/2025 1808 154 (H) 70 - 130 mg/dL Final   01/04/2025 1112 200 (H) 70 - 130 mg/dL Final   01/04/2025 0524 193 (H) 70 - 130 mg/dL Final   01/04/2025 0033 129 70 - 130 mg/dL Final   01/03/2025 1712 72 70 - 130 mg/dL Final   01/03/2025 1048 98 70 - 130 mg/dL Final       No radiology results for the last day    I have personally reviewed all medications:  Scheduled Medications  amiodarone, 200 mg, Nasogastric, Q12H  apixaban, 5 mg, Oral, Q12H  atorvastatin, 40 mg, Nasogastric, Nightly  carbidopa-levodopa, 1 tablet, Nasogastric, TID  finasteride, 5 mg, Nasogastric, Daily  guaifenesin, 200 mg, Nasogastric, Q4H  insulin regular, 2-7 Units, Subcutaneous, Q6H  levothyroxine, 75 mcg, Nasogastric, Daily  metoprolol tartrate, 12.5 mg, Nasogastric, Q12H  [Held by provider] oxybutynin XL, 10 mg, Oral, Daily  terazosin, 1 mg, Nasogastric, Nightly  vitamin B-12, 1,000 mcg, Nasogastric, Q AM    Infusions     Diet  NPO Diet NPO Type: Tube Feeding    I have personally reviewed:  [x]  Laboratory   []  Microbiology   [x]  Radiology   []  EKG/Telemetry  []  Cardiology/Vascular   []  Pathology    []  Records       Assessment/Plan     Active Hospital Problems    Diagnosis  POA    **Unable to ambulate [R26.2]  Yes    Moderate protein-calorie malnutrition [E44.0]  Yes    LISY (acute kidney injury) [N17.9]  Yes    Herpes zoster without complication [B02.9]  Unknown    Falls frequently [R29.6]  Not Applicable     Acquired hypothyroidism [E03.9]  Yes    Secondary malignancy of lymph nodes of head, 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       89 y.o. male admitted with generalized weakness, confusion and found to have RLE rash consistent with shingles.    AFVSS  Normal O2 sats  Labs pending  Blood sugars acceptable on tube feedings  Still not seen by physical therapy, reconsulted      Completed acyclovir  PEG tube placed 1/3  Tolerating tube feeds  Referrals made to SNU facilities      Severe pharyngeal dysphagia  S/p PEG tube 1/3    BLE edema  -lasix 20mg IV x 1 given 1/2    VZV encephalitis.   -Completed 14 days of acyclovir  - MRI negative   - Neurology and ID have signed off.    Pneumonia  Completed a 5 day course of meropenem     LISY/hypernatremia: resolved .   - Continue to hold Diovan and Ditropan  - Monitor urine output    Atrial fibrillation with RVR  Continue AC, no need for aspirin per cardiology.  No BB due to previous bradycardia. Spot dosed IV metoprolol for RVR. Transitioned from amiodarone ggt to PO.     Parkinson disease: Continue Sinemet.       Eliquis (home med) for DVT prophylaxis.  Currently held due to PEG tube.  Will discuss with Dr. Persaud when can resume Lovenox or Eliquis.  DNR.  Discussed with patient, family, and consulting provider.  Anticipate discharge to SNU facility next week.  Expected Discharge Date: 1/7/2025; Expected Discharge Time:       Brian Doss  MD [facility]  01/05/25  05:36 EST

## 2025-01-05 NOTE — THERAPY TREATMENT NOTE
Patient Name: Casey Snowden .  : 1934    MRN: [id number]                              Today's Date: 2025       Admit Date: 2024    Visit Dx:     ICD-10-CM ICD-9-CM   1. Unable to ambulate  R26.2 719.7   2. Herpes zoster with complication: S2 and S3 distribution on the right  B02.8 053.8   3. Parkinson's disease, unspecified whether dyskinesia present, unspecified whether manifestations fluctuate  G20.A1 332.0   4. Moderate protein-calorie malnutrition  E44.0 263.0     Patient Active Problem List   Diagnosis    Benign essential hypertension    Stenosis of carotid artery    Mixed hyperlipidemia    Parkinson's disease    Peripheral arterial occlusive disease    Screening for prostate cancer    Medication monitoring encounter    Melanoma    Chronic deep vein thrombosis (DVT) of popliteal vein of right lower extremity    Uses hearing aid    Paroxysmal A-fib    Chronic diastolic (congestive) heart failure    Anticoagulant long-term use    Presence of IVC filter    Medicare annual wellness visit, subsequent    Iron deficiency anemia secondary to inadequate dietary iron intake    Orthostatic hypotension due to Parkinson's disease    Coronary artery disease involving native coronary artery of native heart without angina pectoris    ST elevation myocardial infarction involving left anterior descending (LAD) coronary artery    Sialorrhea    Hypertensive heart disease with heart failure    PAD (peripheral artery disease)    Metastatic squamous cell carcinoma    Secondary malignancy of lymph nodes of head, face and neck    Encounter for antineoplastic immunotherapy    RBD (REM behavioral disorder)    Acquired hypothyroidism    Aspiration pneumonia of right lung due to vomit    Dysphagia, neurologic    ICAO (internal carotid artery occlusion), right    Arterial ischemic stroke, ICA (internal carotid artery), right, chroni    Renal mass, left    Abdominal aortic aneurysm    Disorientation    Falls frequently     Right inguinal hernia    Unable to ambulate    Herpes zoster without complication    LISY (acute kidney injury)    Moderate protein-calorie malnutrition     Past Medical History:   Diagnosis Date    Abdominal aortic aneurysm without rupture 10/14/2021    Acquired hypothyroidism 08/31/2022    Acute ischemic stroke 10/08/2023    Aneurysm     Arrhythmia     Atrial fibrillation     Cancer 04/2018    basil cell carcinoma    Carotid stenosis 10/29/2015    CHF (congestive heart failure)     Chronic deep vein thrombosis (DVT) of popliteal vein of right lower extremity     Clotting disorder     Coronary artery disease involving native coronary artery of native heart without angina pectoris 12/20/2018    DVT of lower extremity (deep venous thrombosis)     Hard of hearing     Hyperlipidemia     Hypertension     Localized edema 01/04/2017    Long-term use of high-risk medication     Metabolic encephalopathy 02/07/2023    Myocardial infarction 2918    LAD stent    PAD (peripheral artery disease) 09/10/2020    Parkinson's disease     Postphlebitic syndrome     Pure hypercholesterolemia     Skin tear of upper arm without complication, left, subsequent encounter     Stroke 11/02/2023     Past Surgical History:   Procedure Laterality Date    BASAL CELL CARCINOMA EXCISION  04/2018    CARDIAC CATHETERIZATION N/A 11/05/2018    Procedure: Left Heart Cath;  Surgeon: Oliverio Azar MD;  Location:  [facility] C[facility] INVASIVE LOCATION;  Service: Cardiovascular    CARDIAC CATHETERIZATION N/A 11/05/2018    Procedure: Coronary angiography;  Surgeon: Oliverio Azar MD;  Location:  [facility] IN[facility] LOCATION;  Service: Cardiovascular    CARDIAC CATHETERIZATION N/A 11/05/2018    Procedure: Left ventriculography;  Surgeon: Oliverio Azar MD;  Location:  [facility] INVASIVE LOCATION;  Service: Cardiovascular    CARDIAC CATHETERIZATION N/A 06/04/2019    Procedure: Left Heart Cath;  Surgeon: Johnny Glasgow MD;  [facility] C[facility];  Service: Cardiovascular    CARDIAC CATHETERIZATION N/A 06/04/2019    Procedure: Coronary angiography;  Surgeon: Johnny Glasgow MD;  Location:  [facility] CATH INVASIVE LOCATION;  Service: Cardiovascular    CARDIAC CATHETERIZATION N/A 06/04/2019    Procedure: Left ventriculography;  Surgeon: Johnny Glasgow MD;  Location:  [facility]ATH INVASIVE LOCATION;  Service: Cardiovascular    CARDIAC CATHETERIZATION N/A 06/04/2019    Procedure: Stent BILL coronary;  Surgeon: Johnny Glasgow MD;  [facility];  Service: Cardiovascular    CARDIAC SURGERY      CAROTID ARTERY ANGIOPLASTY Right 10/11/2023    with stent    CAROTID STENT      CATARACT EXTRACTION Left 08/2018    CATARACT EXTRACTION Right 09/2018    COLONOSCOPY      2011    CORONARY STENT PLACEMENT      INCISION AND DRAINAGE LEG Right 07/07/2017    Procedure: INCISION AND DRAINAGE INFECTED HEMATOMA RIGHT THIGH;  Surgeon: Valentin Peña MD;  Location: [facility] MAIN OR;  Service:     JOINT REPLACEMENT      KNEE INCISION AND DRAINAGE Right 12/27/2016    Procedure: KNEE INCISION AND DRAINAGE;  Surgeon: Triston Whitten MD;  Location: [facility] MAIN OR;  Service:     NOSE SURGERY      nose replacement    SINUS SURGERY  1960    SKIN BIOPSY      SKIN GRAFT  12/2017    TOTAL KNEE ARTHROPLASTY Right     VASCULAR SURGERY      VENA CAVA FILTER PLACEMENT        General Information       Row Name 01/05/25 0816          Physical Therapy Time and Intention    Document Type therapy note (daily note)  -TAMMY     Mode of Treatment individual therapy;physical therapy  tech assisted  -TAMMY       Row Name 01/05/25 0816          General Information    Patient Profile Reviewed yes  -TAMMY     Existing Precautions/Restrictions fall;NPO  recent  PEG placement  -TAMMY     Barriers to Rehab medically complex;previous functional deficit  -TAMMY       Row Name 01/05/25 0816          Cognition    Orientation Status (Cognition) oriented to;person  -TAMMY        Row Name 01/05/25 0816          Safety Issues/Impairments Affecting Functional Mobility    Impairments Affecting Function (Mobility) balance;cognition;coordination;endurance/activity tolerance;postural/trunk control;strength  -               User Key  (r) = Recorded By, (t) = Taken By, (c) = Cosigned By      Initials Name Provider Type    Ashley Rizo, PT Physical Therapist                   Mobility       Row Name 01/05/25 0818          Bed Mobility    Supine-Sit [place] (Bed Mobility) maximum assist (25% patient effort);dependent (less than 25% patient effort);2 person assist  -TAMMY     Sit-Supine [place] (Bed Mobility) 2 person assist;maximum assist (25% patient effort);verbal cues;nonverbal cues (demo/gesture)  -TAMMY     Assistive Device (Bed Mobility) head of bed elevated;repositioning sheet  -       Row Name 01/05/25 0818          Sit-Stand Transfer    Sit-Stand [place] (Transfers) moderate assist (50% patient effort);maximum assist (25% patient effort);2 person assist  -TAMMY     Comment, (Sit-Stand Transfer) HHA of 2  -TAMMY       Row Name 01/05/25 0818          Gait/Stairs (Locomotion)    Distance in Feet (Gait) --  took 1 sidestep to R to HOB  -               User Key  (r) = Recorded By, (t) = Taken By, (c) = Cosigned By      Initials Name Provider Type    Ashley Rizo, PT Physical Therapist                   Obj/Interventions       Row Name 01/05/25 0907          Motor Skills    Therapeutic Exercise knee;ankle  -       Row Name 01/05/25 0907          Knee (Therapeutic Exercise)    Knee (Therapeutic Exercise) AROM (active range of motion)  -     Knee AROM (Therapeutic Exercise) left;right;LAQ (long arc quad);5 repetitions;sitting  partial range B with AAROM on R  -TAMMY       Row Name 01/05/25 0907          Ankle (Therapeutic Exercise)    Ankle (Therapeutic Exercise) AROM (active range of motion)  -     Ankle AROM (Therapeutic Exercise)  bilateral;dorsiflexion;plantarflexion;supine;5 repetitions  -TAMMY       Row Name 01/05/25 0907          Balance    Static Sitting Balance standby assist;contact guard  sat on EOB for ~15 minutes, waiting for RN to look at skin on back; while pt sat, he rocked forward and back, able to maintain balance.  -TAMMY     Position, Sitting Balance sitting edge of bed;unsupported  -TAMMY     Static Standing Balance moderate assist;2-person assist  CG bracing knees; flexed forward  -TAMMY     Position/Device Used, Standing Balance supported  via HHA x 2  -TAMMY               User Key  (r) = Recorded By, (t) = Taken By, (c) = Cosigned By      Initials Name Provider Type    Ashley Rizo, PT Physical Therapist                   Goals/Plan       Row Name 01/05/25 0912          Bed Mobility Goal 1 (PT)    Activity/Assistive Device (Bed Mobility Goal 1, PT) bed mobility activities, all  -TAMMY     [place] Level/Cues Needed (Bed Mobility Goal 1, PT) contact guard required  -TAMMY     Time Frame (Bed Mobility Goal 1, PT) 2 weeks  -TAMMY     Progress/Outcomes (Bed Mobility Goal 1, PT) goal ongoing  -TAMMY       Row Name 01/05/25 0912          Transfer Goal 1 (PT)    Activity/Assistive Device (Transfer Goal 1, PT) sit-to-stand/stand-to-sit;bed-to-chair/chair-to-bed;walker, rolling  -TAMMY     [place] Level/Cues Needed (Transfer Goal 1, PT) contact guard required  -TAMMY     Time Frame (Transfer Goal 1, PT) 2 weeks  -TAMMY     Progress/Outcome (Transfer Goal 1, PT) goal ongoing  -TAMMY       Row Name 01/05/25 0912          Gait Training Goal 1 (PT)    Activity/Assistive Device (Gait Training Goal 1, PT) assistive device use;walker, rolling  -TAMMY     [place] Level (Gait Training Goal 1, PT) contact guard required  -TAMMY     Distance (Gait Training Goal 1, PT) 10  -TAMMY     Time Frame (Gait Training Goal 1, PT) 2 weeks  -TAMMY     Progress/Outcome (Gait Training Goal 1, PT) goal ongoing  -TAMMY               User Key  (r) = Recorded By, (t) = Taken By, (c) =  Cosigned By      Initials Name Provider Type    Ashley Rizo, PT Physical Therapist                   Clinical Impression       Row Name 01/05/25 0911          Pain    Pretreatment Pain Rating 3/10  -TAMMY     Posttreatment Pain Rating 3/10  -TAMMY     Pain Location abdomen  site of PEG placement on 1/3/25. Abdominal binder in place  -TAMMY     Pain Management Interventions activity modification encouraged;exercise or physical activity utilized  -       Row Name 01/05/25 0911          Therapy Assessment/Plan (PT)    Rehab Potential (PT) fair  -TAMMY     Criteria for Skilled Interventions Met (PT) yes  -TAMMY     Therapy Frequency (PT) 3 times/wk  -TAMMY       Row Name 01/05/25 0911          Positioning and Restraints    Pre-Treatment Position in bed  -TAMMY     Post Treatment Position bed  -TAMMY     In Bed supine;with nsg  -TAMMY               User Key  (r) = Recorded By, (t) = Taken By, (c) = Cosigned By      Initials Name Provider Type    Ashley Rizo, PT Physical Therapist                   Outcome Measures       Row Name 01/05/25 0913          How much help from another person do you currently need...    Turning from your back to your side while in flat bed without using bedrails? 1  -TAMMY     Moving from lying on back to sitting on the side of a flat bed without bedrails? 1  -TAMMY     Moving to and from a bed to a chair (including a wheelchair)? 1  -TAMMY     Standing up from a chair using your arms (e.g., wheelchair, bedside chair)? 1  -TAMMY     Climbing 3-5 steps with a railing? 1  -TAMMY     To walk in hospital room? 1  -TAMMY     AM-PAC 6 Clicks Score (PT) 6  -TAMMY     Highest Level of Mobility Goal 2 --> Bed activities/dependent transfer  -TAMMY               User Key  (r) = Recorded By, (t) = Taken By, (c) = Cosigned By      Initials Name Provider Type    Ashley Rizo, PT Physical Therapist                                 Physical Therapy Education       Title: PT OT SLP Therapies (In Progress)       Topic: Physical Therapy  (In Progress)       Point: Mobility training (In Progress)       Learning Progress Summary            Patient Acceptance, E,TB, VU,NR by  at 1/5/2025 0914    Acceptance, D,E, NR by STEVENSON at 12/31/2024 1654    Acceptance, E,D, NR by  at 12/26/2024 1641    Acceptance, E,TB,D, NL,NR by  at 12/23/2024 1556    Acceptance, E, VU,NR by  at 12/18/2024 1606   Family Acceptance, D,E, NR by STEVENSON at 12/31/2024 1654    Acceptance, E,D, NR by STEVENSON at 12/26/2024 1641                      Point: Home exercise program (In Progress)       Learning Progress Summary            Patient Acceptance, D,E, NR by STEVENSON at 12/31/2024 1654    Acceptance, E,D, NR by STEVENSON at 12/26/2024 1641    Acceptance, E,TB,D, NL,NR by  at 12/23/2024 1556    Acceptance, E, VU,NR by  at 12/18/2024 1606   Family Acceptance, D,E, NR by STEVENSON at 12/31/2024 1654    Acceptance, E,D, NR by  at 12/26/2024 1641                      Point: Body mechanics (In Progress)       Learning Progress Summary            Patient Acceptance, D,E, NR by STEVENSON at 12/31/2024 1654    Acceptance, E,D, NR by STEVENSON at 12/26/2024 1641    Acceptance, E,TB,D, NL,NR by  at 12/23/2024 1556    Acceptance, E, VU,NR by  at 12/18/2024 1606   Family Acceptance, D,E, NR by STEVENSON at 12/31/2024 1654    Acceptance, E,D, NR by STEVENSON at 12/26/2024 1641                      Point: Precautions (In Progress)       Learning Progress Summary            Patient Acceptance, D,E, NR by STEVENSON at 12/31/2024 1654    Acceptance, E,D, NR by STEVENSON at 12/26/2024 1641    Acceptance, E,TB,D, NL,NR by  at 12/23/2024 1556    Acceptance, E, VU,NR by  at 12/18/2024 1606   Family Acceptance, D,E, NR by STEVENSON at 12/31/2024 1654    Acceptance, E,D, NR by STEVENSON at 12/26/2024 1641                                      User Key       Initials Effective Dates Name Provider Type Discipline     06/16/21 -  Ashley Nayak, PT Physical Therapist PT     06/16/21 -  Alexandra Lucio, PT Physical Therapist PT     03/07/18 -  Tia Encarnacion PTA  Physical Therapist Assistant PT     04/08/22 -  Debbie Connolly PT Physical Therapist PT                  PT Recommendation and Plan           Time Calculation:         PT Charges       Row Name 01/05/25 0916             Time Calculation    Start Time 0805  -      Stop Time 0830  -      Time Calculation (min) 25 min  -      PT Received On 01/05/25  -TAMMY      PT - Next Appointment 01/06/25  -         Time Calculation- PT    Total Timed Code Minutes- PT 25 minute(s)  -                User Key  (r) = Recorded By, (t) = Taken By, (c) = Cosigned By      Initials Name Provider Type    Ashley Rizo, PT Physical Therapist                  Therapy Charges for Today       Code Description Service Date Service Provider Modifiers Qty    [id number] HC PT THERAPEUTIC ACT EA 15 MIN 1/5/2025 Ashley Nayak, PT GP 2            PT G-Codes  Outcome Measure Options: AM-PAC 6 Clicks Daily Activity (OT)  AM-PAC 6 Clicks Score (PT): 6  AM-PAC 6 Clicks Score (OT): 8  PT Discharge Summary  Anticipated Discharge Disposition (PT): skilled nursing facility    Ashley Nayak, PT  1/5/2025

## 2025-01-05 NOTE — PLAN OF CARE
Goal Outcome Evaluation:      Pt seen following PEG placement 2 days ago with pt c/o abdominal pain of 3 but did not interfere with session. Pt transferred to sitting with max/dep of 2 and sat for ~ 15 minutes SBA/CG doing some seated LE exercises. Pt transferred to stand mod of 2 with HHA standing ~ 20 seconds and even took 1 sidestep to HOB. Pt will benefit from continued PT and goal at d/c to SNU.              Anticipated Discharge Disposition (PT): skilled nursing facility

## 2025-01-05 NOTE — PLAN OF CARE
Goal Outcome Evaluation:  Plan of Care Reviewed With: patient        Progress: improving  Outcome Evaluation: vss,no nausea or vomiting,pt stable

## 2025-01-05 NOTE — PROGRESS NOTES
LOS: 19 days     Chief Complaint: LISY    Subjective     History of Present Illness  Casey Snowden Sr. is a 89 y.o. male with history of Parkinson's disease and dementia.  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        Subjective  Patient still encephalopathic  Has had some large volume bladder scans, but patient has been able to void as they have moved him around. PVR has been under 300.  DHT in place.     12/21 - underwent LP this am. No other acute events overnight. Continues to be confused. 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     12/23 no acute events. Seems to be doing a bit better today.    12/24 seems to have had a difficult night. Now being bathe.     12/25 no acute events. Overall, patient looks better today. Second IV access accomplished.     12/26: Oriented to person place, recognizes family  Dobbhoff tube remains, on tube feeds, no new complaints    12/27: Still confused today but his family states he was much more awake alert and conversant yesterday  Remains on tube feeds with free water, on amnio drip    12/28: No new complaints or events, did not pass a swallow study and remains on tube feeds, labs pending.  Family states he was not as alert yesterday    12/29: Seen and examined at bedside, family at bedside, discussed plan with family.  More awake, still confused, remains on tube feeds overnight    12/30: Repeat swallow study pending, no new complaints, family at bedside    12/31: Family planning for PEG tube, no new complaints otherwise    1/2: Patient minimally responsive, nonverbal.  Family has decided to place PEG tube and pursue nursing home placement    1/3: Clinically about the same today, if anything seems a bit less responsive, family at bedside, PEG placement planned, tube feeds on hold    1/4  Pt noted sleepy, in no distress at all. Family at bed side. Says he was restless last night.    1/5  Patient noted  "comfortable and pleasant. Has no new complaint. Family member at bed side.     History taken from: family and chart    Objective     Vital Sign Min/Max for last 24 hours  Temp  Min: 97.3 °F (36.3 °C)  Max: 98.2 °F (36.8 °C)   BP  Min: 101/55  Max: 129/52   Pulse  Min: 50  Max: 60   Resp  Min: 16  Max: 18   SpO2  Min: 93 %  Max: 99 %   No data recorded   No data recorded     Flowsheet Rows      Flowsheet Row First Filed Value   Admission Height --   Admission Weight 66.7 kg (147 lb 0.8 oz) Documented at 12/18/2024 0500            No intake/output data recorded.  I/O last 3 completed shifts:  In: 2658 [Other:1280; NG/GT:1378]  Out: -     Objective:  Vital signs: (most recent): Blood pressure 119/60, pulse 55, temperature 98.2 °F (36.8 °C), resp. rate 18, height 177.8 cm (70\"), weight 84.6 kg (186 lb 8.2 oz), SpO2 93%.            Physical Examination: General appearance - ill appearing, frail, chronically ill  Mental status - more awake.    Nose - DHT in place   Chest -good air movement, scattered coarse breath sounds.  Heart - normal rate, regular rhythm, normal S1, S2, no murmurs, rubs, clicks or gallops  Abdomen - soft, nontender, nondistended, no masses or organomegaly  Neurological -no focal deficits  Extremities -trace/1+ edema      Results Review:     I reviewed the patient's new clinical results.    WBC WBC   Date Value Ref Range Status   01/05/2025 7.78 3.40 - 10.80 10*3/mm3 Final        HGB Hemoglobin   Date Value Ref Range Status   01/05/2025 8.0 (L) 13.0 - 17.7 g/dL Final        HCT Hematocrit   Date Value Ref Range Status   01/05/2025 24.8 (L) 37.5 - 51.0 % Final        Platlets No results found for: \"LABPLAT\"   MCV MCV   Date Value Ref Range Status   01/05/2025 99.2 (H) 79.0 - 97.0 fL Final            Sodium Sodium   Date Value Ref Range Status   01/05/2025 140 136 - 145 mmol/L Final   01/04/2025 142 136 - 145 mmol/L Final   01/03/2025 139 136 - 145 mmol/L Final      Potassium Potassium   Date Value Ref " "Range Status   01/05/2025 4.5 3.5 - 5.2 mmol/L Final   01/04/2025 4.7 3.5 - 5.2 mmol/L Final   01/03/2025 4.2 3.5 - 5.2 mmol/L Final      Chloride Chloride   Date Value Ref Range Status   01/05/2025 108 (H) 98 - 107 mmol/L Final   01/04/2025 110 (H) 98 - 107 mmol/L Final   01/03/2025 109 (H) 98 - 107 mmol/L Final      CO2 CO2   Date Value Ref Range Status   01/05/2025 23.0 22.0 - 29.0 mmol/L Final   01/04/2025 23.0 22.0 - 29.0 mmol/L Final   01/03/2025 24.0 22.0 - 29.0 mmol/L Final      BUN BUN   Date Value Ref Range Status   01/05/2025 38 (H) 8 - 23 mg/dL Final   01/04/2025 35 (H) 8 - 23 mg/dL Final   01/03/2025 35 (H) 8 - 23 mg/dL Final      Creatinine Creatinine   Date Value Ref Range Status   01/05/2025 0.81 0.76 - 1.27 mg/dL Final   01/04/2025 0.92 0.76 - 1.27 mg/dL Final   01/03/2025 0.77 0.76 - 1.27 mg/dL Final      Calcium Calcium   Date Value Ref Range Status   01/05/2025 7.9 (L) 8.2 - 9.6 mg/dL Final   01/04/2025 7.7 (L) 8.2 - 9.6 mg/dL Final   01/03/2025 7.8 (L) 8.2 - 9.6 mg/dL Final      PO4 No results found for: \"CAPO4\"   Albumin Albumin   Date Value Ref Range Status   01/05/2025 2.3 (L) 3.5 - 5.2 g/dL Final   01/04/2025 2.2 (L) 3.5 - 5.2 g/dL Final   01/03/2025 2.5 (L) 3.5 - 5.2 g/dL Final        Magnesium No results found for: \"MG\"       Uric Acid No results found for: \"URICACID\"     Medication Review:   Current medications reviewed, orders reviewed and updated    Assessment & Plan     Assessment & Plan  LISY, Prerenal and improved  Metabolic acidosis, stabilizing  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  Hypokalemia- replaced.  Mild Azotemia       Plan:  Renal function, volume and electrolytes at goal  Sodium now noted 140  Noted on tube feeds ... Tolerating.  Hypervolemia related to third spacing ...... Continue to monitor  Caution with diuretic therapy realizing intravascular   Monitor electrolytes. "   Discussed with Family at bed side.    BP at goal on current regimen, off ARB  Continue electrolyte replacement, as needed  PEG tube placement this afternoon with eventual discharge to rehab  Acyclovir now complete      David Gomez MD  01/05/25  17:58 EST

## 2025-01-06 LAB
ALBUMIN SERPL-MCNC: 2.4 G/DL (ref 3.5–5.2)
ANION GAP SERPL CALCULATED.3IONS-SCNC: 8 MMOL/L (ref 5–15)
BUN SERPL-MCNC: 33 MG/DL (ref 8–23)
BUN/CREAT SERPL: 43.4 (ref 7–25)
CALCIUM SPEC-SCNC: 7.9 MG/DL (ref 8.2–9.6)
CHLORIDE SERPL-SCNC: 106 MMOL/L (ref 98–107)
CO2 SERPL-SCNC: 23 MMOL/L (ref 22–29)
CREAT SERPL-MCNC: 0.76 MG/DL (ref 0.76–1.27)
DEPRECATED RDW RBC AUTO: 47 FL (ref 37–54)
EGFRCR SERPLBLD CKD-EPI 2021: 85.4 ML/MIN/1.73
ERYTHROCYTE [DISTWIDTH] IN BLOOD BY AUTOMATED COUNT: 13.7 % (ref 12.3–15.4)
GLUCOSE BLDC GLUCOMTR-MCNC: 120 MG/DL (ref 70–130)
GLUCOSE BLDC GLUCOMTR-MCNC: 127 MG/DL (ref 70–130)
GLUCOSE BLDC GLUCOMTR-MCNC: 139 MG/DL (ref 70–130)
GLUCOSE BLDC GLUCOMTR-MCNC: 157 MG/DL (ref 70–130)
GLUCOSE SERPL-MCNC: 120 MG/DL (ref 65–99)
HCT VFR BLD AUTO: 24.9 % (ref 37.5–51)
HGB BLD-MCNC: 8.5 G/DL (ref 13–17.7)
MCH RBC QN AUTO: 33.2 PG (ref 26.6–33)
MCHC RBC AUTO-ENTMCNC: 34.1 G/DL (ref 31.5–35.7)
MCV RBC AUTO: 97.3 FL (ref 79–97)
PHOSPHATE SERPL-MCNC: 3.2 MG/DL (ref 2.5–4.5)
PLATELET # BLD AUTO: 230 10*3/MM3 (ref 140–450)
PMV BLD AUTO: 10.7 FL (ref 6–12)
POTASSIUM SERPL-SCNC: 4.6 MMOL/L (ref 3.5–5.2)
RBC # BLD AUTO: 2.56 10*6/MM3 (ref 4.14–5.8)
SODIUM SERPL-SCNC: 137 MMOL/L (ref 136–145)
WBC NRBC COR # BLD AUTO: 6.62 10*3/MM3 (ref 3.4–10.8)

## 2025-01-06 PROCEDURE — 82948 REAGENT STRIP/BLOOD GLUCOSE: CPT

## 2025-01-06 PROCEDURE — 63710000001 INSULIN REGULAR HUMAN PER 5 UNITS: Performed by: SURGERY

## 2025-01-06 PROCEDURE — 85027 COMPLETE CBC AUTOMATED: CPT | Performed by: HOSPITALIST

## 2025-01-06 PROCEDURE — 80069 RENAL FUNCTION PANEL: CPT | Performed by: SURGERY

## 2025-01-06 PROCEDURE — 25010000002 FUROSEMIDE PER 20 MG: Performed by: INTERNAL MEDICINE

## 2025-01-06 RX ORDER — QUETIAPINE FUMARATE 25 MG/1
25 TABLET, FILM COATED ORAL NIGHTLY PRN
Status: DISCONTINUED | OUTPATIENT
Start: 2025-01-06 | End: 2025-01-09 | Stop reason: HOSPADM

## 2025-01-06 RX ORDER — FUROSEMIDE 10 MG/ML
20 INJECTION INTRAMUSCULAR; INTRAVENOUS ONCE
Status: COMPLETED | OUTPATIENT
Start: 2025-01-06 | End: 2025-01-06

## 2025-01-06 RX ADMIN — GUAIFENESIN 200 MG: 100 LIQUID ORAL at 05:05

## 2025-01-06 RX ADMIN — INSULIN HUMAN 2 UNITS: 100 INJECTION, SOLUTION PARENTERAL at 12:48

## 2025-01-06 RX ADMIN — CARBIDOPA AND LEVODOPA 1 TABLET: 25; 250 TABLET ORAL at 20:26

## 2025-01-06 RX ADMIN — ATORVASTATIN CALCIUM 40 MG: 20 TABLET, FILM COATED ORAL at 20:26

## 2025-01-06 RX ADMIN — GUAIFENESIN 200 MG: 100 LIQUID ORAL at 01:27

## 2025-01-06 RX ADMIN — GUAIFENESIN 200 MG: 100 LIQUID ORAL at 12:49

## 2025-01-06 RX ADMIN — METOPROLOL TARTRATE 12.5 MG: 25 TABLET, FILM COATED ORAL at 20:25

## 2025-01-06 RX ADMIN — AMIODARONE HYDROCHLORIDE 200 MG: 200 TABLET ORAL at 20:25

## 2025-01-06 RX ADMIN — LEVOTHYROXINE SODIUM 75 MCG: 75 TABLET ORAL at 05:04

## 2025-01-06 RX ADMIN — GUAIFENESIN 200 MG: 100 LIQUID ORAL at 09:56

## 2025-01-06 RX ADMIN — GUAIFENESIN 200 MG: 100 LIQUID ORAL at 20:25

## 2025-01-06 RX ADMIN — FINASTERIDE 5 MG: 5 TABLET, FILM COATED ORAL at 09:56

## 2025-01-06 RX ADMIN — TERAZOSIN HYDROCHLORIDE 1 MG: 1 CAPSULE ORAL at 20:25

## 2025-01-06 RX ADMIN — Medication 1000 MCG: at 05:05

## 2025-01-06 RX ADMIN — CARBIDOPA AND LEVODOPA 1 TABLET: 25; 250 TABLET ORAL at 09:56

## 2025-01-06 RX ADMIN — APIXABAN 5 MG: 5 TABLET, FILM COATED ORAL at 20:27

## 2025-01-06 RX ADMIN — GUAIFENESIN 200 MG: 100 LIQUID ORAL at 15:46

## 2025-01-06 RX ADMIN — APIXABAN 5 MG: 5 TABLET, FILM COATED ORAL at 09:56

## 2025-01-06 RX ADMIN — FUROSEMIDE 20 MG: 10 INJECTION, SOLUTION INTRAMUSCULAR; INTRAVENOUS at 13:18

## 2025-01-06 RX ADMIN — CARBIDOPA AND LEVODOPA 1 TABLET: 25; 250 TABLET ORAL at 15:46

## 2025-01-06 RX ADMIN — METOPROLOL TARTRATE 12.5 MG: 25 TABLET, FILM COATED ORAL at 09:56

## 2025-01-06 NOTE — NURSING NOTE
Reason for Visit: WOC Team consult for sacral wound    Pt resting in bed with wife at bedside. He has had an extended hospital stay. She reports pt has been very restless and all over the bed overnight, pulling at his gown, tubes, and O2 sensor. He is alert, but not oriented. Requires assistance of 2 to turn and reposition, he is incontinent of bowel and bladder, his scrotum and mony LE's are edematous. Heels are negative for breakdown or erythema. Sacral skin as noted below, this is related to friction, sacral Optifoam dressing is in place for protection. He is on a Pro Plus mattress for adequate pressure redistribution.      01/06/25 1042   Wound 01/03/25 1805 sacral spine   Placement Date/Time: 01/03/25 1805   Location: sacral spine  Primary Wound Type: Pressure Injury  Present on Original Admission: No   Base pink;moist  (Skin tear with partial loss of skin flap to gluteal. This is located on the fatty part of his buttocks and wife reports he has been very restless, scooting all over the bed. This is related to friction.)   Periwound intact;dry;pink   Periwound Temperature warm   Periwound Skin Turgor soft   Drainage Characteristics/Odor serosanguineous   Drainage Amount scant   Care, Wound   (peeled back Optifoam to assess wound)   Skin Interventions   Pressure Reduction Devices specialty bed utilized  (Pro Plus mattress)   Pressure Reduction Techniques heels elevated off bed;positioned off wounds;pressure points protected   Skin Protection silicone foam dressing in place     Treatment Plan/Recommendations: Continue Pro Plus mattress, staff can add a turning wedge to better off-load his sacrum. Continue with sacral Optifoam dressing for protection. Heels should be off-loaded with heel boots. Barrier cream should be utilized in groin/ perineal area with a pillow case to lift and support his scrotum. Wound care and prevention standing orders placed into [facility]. Discussed with RN and his wife at bedside.     Wound  Team Follow up Plan: WOCN will follow 2x week for sacral wound assessment.

## 2025-01-06 NOTE — PROGRESS NOTES
Name: Casey Snowden . ADMIT: 2024   : 1934  PCP: Aleksander Diez MD    MRN: [id number] LOS: 20 days   AGE/SEX: 89 y.o. male  ROOM: [facility]     Subjective   Subjective   Wife at bedside.  States he did not sleep at all last night very fidgety trying to get out of bed pulling at sheets.  Is awake during my assessment and denies complaints but obviously confused.     Objective   Objective   Vital Signs  Temp:  [97.7 °F (36.5 °C)-98.6 °F (37 °C)] 98.6 °F (37 °C)  Heart Rate:  [53-59] 57  Resp:  [16-18] 16  BP: (119-147)/(55-75) 147/75  SpO2:  [93 %-98 %] 96 %  on   ;   Device (Oxygen Therapy): room air  Body mass index is 26.76 kg/m².  Physical Exam  Constitutional:       General: He is not in acute distress.     Appearance: He is ill-appearing.   Cardiovascular:      Rate and Rhythm: Normal rate and regular rhythm.      Heart sounds: No murmur heard.  Pulmonary:      Effort: No respiratory distress.   Abdominal:      General: There is no distension.      Palpations: Abdomen is soft.      Tenderness: There is abdominal tenderness.      Comments: PEG tube in place   Musculoskeletal:      Right lower leg: Edema present.      Left lower leg: Edema present.   Skin:     General: Skin is warm and dry.      Findings: No rash.   Neurological:      Mental Status: He is alert. He is disoriented.       Results Review     I reviewed the patient's new clinical results.  Results from last 7 days   Lab Units 25  0655 24  0708 24  1100   WBC 10*3/mm3 7.78 9.19 8.55   HEMOGLOBIN g/dL 8.0* 10.0* 10.3*   PLATELETS 10*3/mm3 199 219 242     Results from last 7 days   Lab Units 25  0654 25  0601 25  0436 25  0534   SODIUM mmol/L 140 142 139 136   POTASSIUM mmol/L 4.5 4.7 4.2 4.2   CHLORIDE mmol/L 108* 110* 109* 106   CO2 mmol/L 23.0 23.0 24.0 22.1   BUN mg/dL 38* 35* 35* 31*   CREATININE mg/dL 0.81 0.92 0.77 0.75*   GLUCOSE mg/dL 144* 195* 91 134*   EGFR mL/min/1.73 83.8 79.0  85.0 85.7     Results from last 7 days   Lab Units 01/05/25  0654 01/04/25  0601 01/03/25  0436 01/02/25  0534   ALBUMIN g/dL 2.3* 2.2* 2.5* 2.2*       Results from last 7 days   Lab Units 01/05/25  0654 01/04/25  0601 01/03/25  0436 01/02/25  0534 12/31/24  0921 12/30/24  1100   CALCIUM mg/dL 7.9* 7.7* 7.8* 7.6*   < > 7.4*   ALBUMIN g/dL 2.3* 2.2* 2.5* 2.2*   < > 2.3*   MAGNESIUM mg/dL  --   --   --   --   --  1.8   PHOSPHORUS mg/dL 3.0 2.9 3.3 3.0   < > 2.0*    < > = values in this interval not displayed.           Glucose   Date/Time Value Ref Range Status   01/06/2025 0505 139 (H) 70 - 130 mg/dL Final   01/05/2025 2341 135 (H) 70 - 130 mg/dL Final   01/05/2025 1755 126 70 - 130 mg/dL Final   01/05/2025 1332 147 (H) 70 - 130 mg/dL Final   01/05/2025 0636 171 (H) 70 - 130 mg/dL Final   01/05/2025 0003 152 (H) 70 - 130 mg/dL Final   01/04/2025 1808 154 (H) 70 - 130 mg/dL Final       No radiology results for the last day    I have personally reviewed all medications:  Scheduled Medications  amiodarone, 200 mg, Nasogastric, Q12H  apixaban, 5 mg, Oral, Q12H  atorvastatin, 40 mg, Nasogastric, Nightly  carbidopa-levodopa, 1 tablet, Nasogastric, TID  finasteride, 5 mg, Nasogastric, Daily  guaifenesin, 200 mg, Nasogastric, Q4H  insulin regular, 2-7 Units, Subcutaneous, Q6H  levothyroxine, 75 mcg, Nasogastric, Daily  metoprolol tartrate, 12.5 mg, Nasogastric, Q12H  [Held by provider] oxybutynin XL, 10 mg, Oral, Daily  terazosin, 1 mg, Nasogastric, Nightly  vitamin B-12, 1,000 mcg, Nasogastric, Q AM    Infusions     Diet  NPO Diet NPO Type: Tube Feeding    I have personally reviewed:  [x]  Laboratory   []  Microbiology   [x]  Radiology   []  EKG/Telemetry  []  Cardiology/Vascular   []  Pathology    []  Records       Assessment/Plan     Active Hospital Problems    Diagnosis  POA   • **Unable to ambulate [R26.2]  Yes   • Moderate protein-calorie malnutrition [E44.0]  Yes   • LISY (acute kidney injury) [N17.9]  Yes   • Herpes  zoster without complication [B02.9]  Unknown   • Falls frequently [R29.6]  Not Applicable   • Acquired hypothyroidism [E03.9]  Yes   • Secondary malignancy of lymph nodes of head, 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       89 y.o. male admitted with generalized weakness, confusion and found to have RLE rash consistent with shingles.    AFVSS  Normal O2 sats  Labs pending  Hemoglobin low yesterday  BS well-controlled on TFs (not diabetic)  Was able to stand at bedside with physical therapy yesterday  Will need SNU    Follow-up labs  Will try Zyprexa this evening if needed.  Wife/family had been hesitant to give any sedating medications given his Parkinson's.    Hopefully he can participate again with physical therapy today.  CCP to follow-up for SNU placement hopefully one day this week.       Severe pharyngeal dysphagia  S/p PEG tube 1/3    BLE edema  -lasix 20mg IV x 1 given 1/2    VZV encephalitis.   -Completed 14 days of acyclovir  - MRI negative   - Neurology and ID have signed off.    Pneumonia  Completed a 5 day course of meropenem     LISY/hypernatremia: resolved .   - Continue to hold Diovan and Ditropan  - Monitor urine output    Atrial fibrillation with RVR  Continue AC, no need for aspirin per cardiology.  No BB due to previous bradycardia. Spot dosed IV metoprolol for RVR. Transitioned from amiodarone ggt to PO.     Parkinson disease: Continue Sinemet.       Eliquis  (home med) for DVT prophylaxis.  Currently held due to PEG tube.  Will discuss with Dr. Persaud when can resume Lovenox or Eliquis.  DNR.  Discussed with patient, family, and consulting provider.  Anticipate discharge to SNU facility once arrangements have been made.  Expected Discharge Date: 1/7/2025; Expected Discharge Time:       Brian Doss MD  [facility]  01/06/25  08:59 EST

## 2025-01-06 NOTE — PROGRESS NOTES
Nutrition Services    Patient Name: Casey Snowden .  YOB: 1934  MRN: [id number]  Admission date: 12/17/2024    PROGRESS NOTE      Encounter Information: Checking in on TF tolerance. Pt s/p PEG placement 1/3.        PO Diet: NPO Diet NPO Type: Tube Feeding   PO Supplements: -   PO Intake:  -       Current nutrition support: Isosource 1.5 @ 65 mL/hr with 250 mL q4 FWF (flushes per nephrology)   Nutrition support review: Infusing at goal as ordered per documentation        Labs (reviewed below): Reviewed, management per attending; some hyperglycemia - monitor need to transition to DM specific formula       GI Function:  Liquid BM 1/5       Nutrition Intervention Updates: Continue TF at goal as ordered. Flushes per nephrology.     Plan to transition to bolus feeds closer to discharge.        Results from last 7 days   Lab Units 01/06/25  0818 01/05/25  0654 01/04/25  0601   SODIUM mmol/L 137 140 142   POTASSIUM mmol/L 4.6 4.5 4.7   CHLORIDE mmol/L 106 108* 110*   CO2 mmol/L 23.0 23.0 23.0   BUN mg/dL 33* 38* 35*   CREATININE mg/dL 0.76 0.81 0.92   CALCIUM mg/dL 7.9* 7.9* 7.7*   GLUCOSE mg/dL 120* 144* 195*     Results from last 7 days   Lab Units 01/06/25  0818   PHOSPHORUS mg/dL 3.2   HEMOGLOBIN g/dL 8.5*   HEMATOCRIT % 24.9*     COVID19   Date Value Ref Range Status   10/23/2022 Not Detected Not Detected - Ref. Range Final     Lab Results   Component Value Date    HGBA1C 5.40 12/18/2024       Wt Readings from Last 10 Encounters:   01/02/25 0426 84.6 kg (186 lb 8.2 oz)   01/01/25 0520 84.3 kg (185 lb 13.6 oz)   12/31/24 0600 83.2 kg (183 lb 6.8 oz)   12/30/24 0412 78.6 kg (173 lb 4.5 oz)   12/29/24 0519 74.9 kg (165 lb 2 oz)   12/28/24 0408 74.2 kg (163 lb 9.3 oz)   12/27/24 0403 75.5 kg (166 lb 7.2 oz)   12/26/24 0658 74.5 kg (164 lb 3.9 oz)   12/25/24 0536 70 kg (154 lb 5.2 oz)   12/24/24 0505 65.4 kg (144 lb 2.9 oz)   12/23/24 0500 66.4 kg (146 lb 6.2 oz)   12/22/24 0510 68.7 kg (151 lb 7.3 oz)    12/21/24 0500 66 kg (145 lb 8.1 oz)   12/18/24 0500 66.7 kg (147 lb 0.8 oz)   11/25/24 1624 68.9 kg (152 lb)   11/13/24 1140 67.8 kg (149 lb 8 oz)   11/06/24 1336 66.9 kg (147 lb 8 oz)   08/27/24 1119 68.9 kg (152 lb)   08/14/24 1632 71.7 kg (158 lb)   08/14/24 1112 70 kg (154 lb 4.8 oz)   08/09/24 1256 70.3 kg (155 lb)   07/03/24 1105 68.4 kg (150 lb 11.2 oz)   07/02/24 0957 68.9 kg (152 lb)       RD to follow up per protocol.    Electronically signed by:  Kathryn Montano RD  01/06/25 12:42 EST

## 2025-01-06 NOTE — CASE MANAGEMENT/SOCIAL WORK
Continued Stay Note  [facility]     Patient Name: Casey Snowden Sr.  MRN: [id number]  Today's Date: 1/6/2025    Admit Date: 12/17/2024    Plan: SNF, referrals pending. Will also need [facility] pre-cert approval   Discharge Plan       Row Name 01/06/25 1419       Plan    Plan SNF, referrals pending. Will also need [facility] pre-cert approval    Plan Comments CCP spoke with Myesha about bed availability at SNF referrals. She said she will check and follow up with CCP. The patient will also need a [facility] pre-cert approved. MATT WATTS.                   Discharge Codes    No documentation.                 Expected Discharge Date and Time       Expected Discharge Date Expected Discharge Time    Jan 7, 2025

## 2025-01-06 NOTE — PLAN OF CARE
Patient is A&OX1-2, follows simple commands. AF Bradycardia on tele, RA during the day, skin impairments, PEG, LBM 1/6 incontinent of urine. New psych consult, called and left a voicemail for consultation, awaiting response.     Airborne precautions DC. Fall and safety precautions in place.     Problem: Adult Inpatient Plan of Care  Goal: Plan of Care Review  Outcome: Progressing     Problem: Skin Injury Risk Increased  Goal: Skin Health and Integrity  Outcome: Progressing     Problem: Fall Injury Risk  Goal: Absence of Fall and Fall-Related Injury  Outcome: Progressing  Intervention: Promote Injury-Free Environment  Recent Flowsheet Documentation  Taken 1/6/2025 0800 by Amanda Ortega RN  Safety Promotion/Fall Prevention: safety round/check completed     Problem: Adult Inpatient Plan of Care  Goal: Absence of Hospital-Acquired Illness or Injury  Intervention: Identify and Manage Fall Risk  Recent Flowsheet Documentation  Taken 1/6/2025 0800 by Amanda Ortega RN  Safety Promotion/Fall Prevention: safety round/check completed     Problem: Fall Injury Risk  Goal: Absence of Fall and Fall-Related Injury  Intervention: Promote Injury-Free Environment  Recent Flowsheet Documentation  Taken 1/6/2025 0800 by Amanda Ortega RN  Safety Promotion/Fall Prevention: safety round/check completed   Goal Outcome Evaluation:

## 2025-01-06 NOTE — PROGRESS NOTES
LOS: 20 days     Chief Complaint: LISY    Subjective     History of Present Illness  Casey Snowden Sr. is a 89 y.o. male with history of Parkinson's disease and dementia.  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        Subjective  Patient still encephalopathic  Has had some large volume bladder scans, but patient has been able to void as they have moved him around. PVR has been under 300.  DHT in place.     12/21 - underwent LP this am. No other acute events overnight. Continues to be confused. 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     12/23 no acute events. Seems to be doing a bit better today.    12/24 seems to have had a difficult night. Now being bathe.     12/25 no acute events. Overall, patient looks better today. Second IV access accomplished.     12/26: Oriented to person place, recognizes family  Dobbhoff tube remains, on tube feeds, no new complaints    12/27: Still confused today but his family states he was much more awake alert and conversant yesterday  Remains on tube feeds with free water, on amnio drip    12/28: No new complaints or events, did not pass a swallow study and remains on tube feeds, labs pending.  Family states he was not as alert yesterday    12/29: Seen and examined at bedside, family at bedside, discussed plan with family.  More awake, still confused, remains on tube feeds overnight    12/30: Repeat swallow study pending, no new complaints, family at bedside    12/31: Family planning for PEG tube, no new complaints otherwise    1/2: Patient minimally responsive, nonverbal.  Family has decided to place PEG tube and pursue nursing home placement    1/3: Clinically about the same today, if anything seems a bit less responsive, family at bedside, PEG placement planned, tube feeds on hold    1/4  Pt noted sleepy, in no distress at all. Family at bed side. Says he was restless last night.    1/5  Patient noted  "comfortable and pleasant. Has no new complaint. Family member at bed side.     1/6 wife at bedside, feels like pt MS is worse, also worsening edema     History taken from: family and chart    Objective     Vital Sign Min/Max for last 24 hours  Temp  Min: 97.7 °F (36.5 °C)  Max: 98.6 °F (37 °C)   BP  Min: 119/60  Max: 147/75   Pulse  Min: 53  Max: 59   Resp  Min: 16  Max: 18   SpO2  Min: 93 %  Max: 98 %   No data recorded   No data recorded     Flowsheet Rows      Flowsheet Row First Filed Value   Admission Height --   Admission Weight 66.7 kg (147 lb 0.8 oz) Documented at 12/18/2024 0500            I/O this shift:  In: 444 [Other:250; NG/GT:194]  Out: -   I/O last 3 completed shifts:  In: 1517 [Other:692; NG/GT:825]  Out: -     Objective:  Vital signs: (most recent): Blood pressure 147/75, pulse 57, temperature 98.6 °F (37 °C), temperature source Axillary, resp. rate 16, height 177.8 cm (70\"), weight 84.6 kg (186 lb 8.2 oz), SpO2 96%.            Alert frail mild agitation  NAD   eomi no icterus   Moist mucus membranes   No jvd reg s1s2 no murmur   Clear anteriorly no rales/rhonchi/wheeze   Soft NTND + bs   ++ edema   Warm dry no rash         Results Review:     I reviewed the patient's new clinical results.    WBC WBC   Date Value Ref Range Status   01/06/2025 6.62 3.40 - 10.80 10*3/mm3 Final   01/05/2025 7.78 3.40 - 10.80 10*3/mm3 Final        HGB Hemoglobin   Date Value Ref Range Status   01/06/2025 8.5 (L) 13.0 - 17.7 g/dL Final   01/05/2025 8.0 (L) 13.0 - 17.7 g/dL Final        HCT Hematocrit   Date Value Ref Range Status   01/06/2025 24.9 (L) 37.5 - 51.0 % Final   01/05/2025 24.8 (L) 37.5 - 51.0 % Final        Platlets No results found for: \"LABPLAT\"   MCV MCV   Date Value Ref Range Status   01/06/2025 97.3 (H) 79.0 - 97.0 fL Final   01/05/2025 99.2 (H) 79.0 - 97.0 fL Final            Sodium Sodium   Date Value Ref Range Status   01/06/2025 137 136 - 145 mmol/L Final   01/05/2025 140 136 - 145 mmol/L Final " "  01/04/2025 142 136 - 145 mmol/L Final      Potassium Potassium   Date Value Ref Range Status   01/06/2025 4.6 3.5 - 5.2 mmol/L Final   01/05/2025 4.5 3.5 - 5.2 mmol/L Final   01/04/2025 4.7 3.5 - 5.2 mmol/L Final      Chloride Chloride   Date Value Ref Range Status   01/06/2025 106 98 - 107 mmol/L Final   01/05/2025 108 (H) 98 - 107 mmol/L Final   01/04/2025 110 (H) 98 - 107 mmol/L Final      CO2 CO2   Date Value Ref Range Status   01/06/2025 23.0 22.0 - 29.0 mmol/L Final   01/05/2025 23.0 22.0 - 29.0 mmol/L Final   01/04/2025 23.0 22.0 - 29.0 mmol/L Final      BUN BUN   Date Value Ref Range Status   01/06/2025 33 (H) 8 - 23 mg/dL Final   01/05/2025 38 (H) 8 - 23 mg/dL Final   01/04/2025 35 (H) 8 - 23 mg/dL Final      Creatinine Creatinine   Date Value Ref Range Status   01/06/2025 0.76 0.76 - 1.27 mg/dL Final   01/05/2025 0.81 0.76 - 1.27 mg/dL Final   01/04/2025 0.92 0.76 - 1.27 mg/dL Final      Calcium Calcium   Date Value Ref Range Status   01/06/2025 7.9 (L) 8.2 - 9.6 mg/dL Final   01/05/2025 7.9 (L) 8.2 - 9.6 mg/dL Final   01/04/2025 7.7 (L) 8.2 - 9.6 mg/dL Final      PO4 No results found for: \"CAPO4\"   Albumin Albumin   Date Value Ref Range Status   01/06/2025 2.4 (L) 3.5 - 5.2 g/dL Final   01/05/2025 2.3 (L) 3.5 - 5.2 g/dL Final   01/04/2025 2.2 (L) 3.5 - 5.2 g/dL Final        Magnesium No results found for: \"MG\"       Uric Acid No results found for: \"URICACID\"     Medication Review:   Current medications reviewed, orders reviewed and updated    Assessment & Plan     Assessment & Plan  LISY, Prerenal and improved  Metabolic acidosis, stabilizing  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  Hypokalemia- replaced.  Mild Azotemia   Hypoalbuminemia   Afib with rvr       Plan:  Renal function, volume and electrolytes at goal  Cr at 0.76, Na at 137   Continue tube feeds   Edema likely related to low albumin   Will " redose lasix today as edema worse and BP is up now     BP on b-blocker, off ARB  Continue electrolyte replacement, as needed    Dose all medications for GFR >50   Minimize all nephrotoxic agents including IV dye and NSAIDs  Monitor electrolytes and volume   Call with any questions or concerns   Rasheeda Lyn MD   Office phone number [phone]  Answering service phone number: [phone]        Rasheeda Lyn MD  01/06/25  12:38 EST

## 2025-01-06 NOTE — PLAN OF CARE
Goal Outcome Evaluation:                      VITALS STABLE BED LOW, CALL LIGHT IN REACH

## 2025-01-07 LAB
ALBUMIN SERPL-MCNC: 2.4 G/DL (ref 3.5–5.2)
ANION GAP SERPL CALCULATED.3IONS-SCNC: 6 MMOL/L (ref 5–15)
BUN SERPL-MCNC: 35 MG/DL (ref 8–23)
BUN/CREAT SERPL: 42.7 (ref 7–25)
CALCIUM SPEC-SCNC: 8 MG/DL (ref 8.2–9.6)
CHLORIDE SERPL-SCNC: 107 MMOL/L (ref 98–107)
CO2 SERPL-SCNC: 25 MMOL/L (ref 22–29)
CREAT SERPL-MCNC: 0.82 MG/DL (ref 0.76–1.27)
DEPRECATED RDW RBC AUTO: 50.3 FL (ref 37–54)
EGFRCR SERPLBLD CKD-EPI 2021: 83.4 ML/MIN/1.73
ERYTHROCYTE [DISTWIDTH] IN BLOOD BY AUTOMATED COUNT: 14.1 % (ref 12.3–15.4)
GLUCOSE BLDC GLUCOMTR-MCNC: 123 MG/DL (ref 70–130)
GLUCOSE BLDC GLUCOMTR-MCNC: 138 MG/DL (ref 70–130)
GLUCOSE BLDC GLUCOMTR-MCNC: 138 MG/DL (ref 70–130)
GLUCOSE BLDC GLUCOMTR-MCNC: 152 MG/DL (ref 70–130)
GLUCOSE BLDC GLUCOMTR-MCNC: 154 MG/DL (ref 70–130)
GLUCOSE SERPL-MCNC: 132 MG/DL (ref 65–99)
HCT VFR BLD AUTO: 26.6 % (ref 37.5–51)
HGB BLD-MCNC: 8.5 G/DL (ref 13–17.7)
MCH RBC QN AUTO: 32.3 PG (ref 26.6–33)
MCHC RBC AUTO-ENTMCNC: 32 G/DL (ref 31.5–35.7)
MCV RBC AUTO: 101.1 FL (ref 79–97)
PHOSPHATE SERPL-MCNC: 3.5 MG/DL (ref 2.5–4.5)
PLATELET # BLD AUTO: 235 10*3/MM3 (ref 140–450)
PMV BLD AUTO: 10.5 FL (ref 6–12)
POTASSIUM SERPL-SCNC: 4.6 MMOL/L (ref 3.5–5.2)
RBC # BLD AUTO: 2.63 10*6/MM3 (ref 4.14–5.8)
SODIUM SERPL-SCNC: 138 MMOL/L (ref 136–145)
WBC NRBC COR # BLD AUTO: 5.46 10*3/MM3 (ref 3.4–10.8)

## 2025-01-07 PROCEDURE — 97112 NEUROMUSCULAR REEDUCATION: CPT

## 2025-01-07 PROCEDURE — 25010000002 ALBUMIN HUMAN 25% PER 50 ML: Performed by: INTERNAL MEDICINE

## 2025-01-07 PROCEDURE — P9047 ALBUMIN (HUMAN), 25%, 50ML: HCPCS | Performed by: INTERNAL MEDICINE

## 2025-01-07 PROCEDURE — 25010000002 FUROSEMIDE PER 20 MG: Performed by: INTERNAL MEDICINE

## 2025-01-07 PROCEDURE — 82948 REAGENT STRIP/BLOOD GLUCOSE: CPT

## 2025-01-07 PROCEDURE — 80069 RENAL FUNCTION PANEL: CPT | Performed by: SURGERY

## 2025-01-07 PROCEDURE — 97530 THERAPEUTIC ACTIVITIES: CPT

## 2025-01-07 PROCEDURE — 85027 COMPLETE CBC AUTOMATED: CPT | Performed by: HOSPITALIST

## 2025-01-07 RX ORDER — ALBUMIN (HUMAN) 12.5 G/50ML
25 SOLUTION INTRAVENOUS ONCE
Status: COMPLETED | OUTPATIENT
Start: 2025-01-07 | End: 2025-01-07

## 2025-01-07 RX ORDER — FUROSEMIDE 10 MG/ML
40 INJECTION INTRAMUSCULAR; INTRAVENOUS ONCE
Status: COMPLETED | OUTPATIENT
Start: 2025-01-07 | End: 2025-01-07

## 2025-01-07 RX ADMIN — GUAIFENESIN 200 MG: 100 LIQUID ORAL at 04:45

## 2025-01-07 RX ADMIN — METOPROLOL TARTRATE 12.5 MG: 25 TABLET, FILM COATED ORAL at 20:55

## 2025-01-07 RX ADMIN — CARBIDOPA AND LEVODOPA 1 TABLET: 25; 250 TABLET ORAL at 09:45

## 2025-01-07 RX ADMIN — ALBUMIN (HUMAN) 25 G: 0.25 INJECTION, SOLUTION INTRAVENOUS at 13:56

## 2025-01-07 RX ADMIN — APIXABAN 5 MG: 5 TABLET, FILM COATED ORAL at 20:56

## 2025-01-07 RX ADMIN — LEVOTHYROXINE SODIUM 75 MCG: 75 TABLET ORAL at 06:25

## 2025-01-07 RX ADMIN — FUROSEMIDE 40 MG: 10 INJECTION, SOLUTION INTRAMUSCULAR; INTRAVENOUS at 13:55

## 2025-01-07 RX ADMIN — CARBIDOPA AND LEVODOPA 1 TABLET: 25; 250 TABLET ORAL at 20:56

## 2025-01-07 RX ADMIN — GUAIFENESIN 200 MG: 100 LIQUID ORAL at 20:54

## 2025-01-07 RX ADMIN — APIXABAN 5 MG: 5 TABLET, FILM COATED ORAL at 09:45

## 2025-01-07 RX ADMIN — ATORVASTATIN CALCIUM 40 MG: 20 TABLET, FILM COATED ORAL at 20:56

## 2025-01-07 RX ADMIN — CARBIDOPA AND LEVODOPA 1 TABLET: 25; 250 TABLET ORAL at 16:12

## 2025-01-07 RX ADMIN — GUAIFENESIN 200 MG: 100 LIQUID ORAL at 23:58

## 2025-01-07 RX ADMIN — AMIODARONE HYDROCHLORIDE 200 MG: 200 TABLET ORAL at 09:45

## 2025-01-07 RX ADMIN — GUAIFENESIN 200 MG: 100 LIQUID ORAL at 13:56

## 2025-01-07 RX ADMIN — GUAIFENESIN 200 MG: 100 LIQUID ORAL at 00:34

## 2025-01-07 RX ADMIN — AMIODARONE HYDROCHLORIDE 200 MG: 200 TABLET ORAL at 20:54

## 2025-01-07 RX ADMIN — GUAIFENESIN 200 MG: 100 LIQUID ORAL at 16:11

## 2025-01-07 RX ADMIN — GUAIFENESIN 200 MG: 100 LIQUID ORAL at 09:45

## 2025-01-07 RX ADMIN — METOPROLOL TARTRATE 12.5 MG: 25 TABLET, FILM COATED ORAL at 09:46

## 2025-01-07 RX ADMIN — Medication 1000 MCG: at 06:25

## 2025-01-07 RX ADMIN — TERAZOSIN HYDROCHLORIDE 1 MG: 1 CAPSULE ORAL at 20:57

## 2025-01-07 RX ADMIN — FINASTERIDE 5 MG: 5 TABLET, FILM COATED ORAL at 09:45

## 2025-01-07 NOTE — PLAN OF CARE
Goal Outcome Evaluation:  Plan of Care Reviewed With: patient, spouse           Outcome Evaluation: Pt seen and tolerated OT/PT cotx this AM. Upon entering room spouse verb'd he has been asking to get up OOB to the chair. Spouse also verb'd she is upset with the lack of therapy since admission. Pt has had mutliple medical issues and recent PEG surgery with fluctuation per notes w/ function and status. Pt is awake, but slow to comprehend instructions and respond. Pt was inconsistently following one step commands in the bed today, but upon sitting EOB, and practiciing transfer training AX2 to chair, Pt more alert and responsive. Pt still was only able to follow <50% simple commands today w/ multiple repititions required. Pt required increased time, cues, and Ax2 required for stands and transfers. Pt appears motivated and has good family support, continue OT services with DC REC: SNU when medically stable.    Anticipated Discharge Disposition (OT): skilled nursing facility

## 2025-01-07 NOTE — PLAN OF CARE
Goal Outcome Evaluation:  Plan of Care Reviewed With: patient, spouse        Progress: no change  Outcome Evaluation: Pt seen for PT/OT co-tx this AM, wife is upset about lack of therapy this admission. Pt with several medical issues and waxing/waning cognition/alertness making consistent participation difficult and slow progress. However, pt alert and agreeable to therapy today. Wife wanting pt to sit up in chair - educated about safety concerns and poor tolerance to increased time upright at this time. Pt required mod-max A x2 for transfer to EOB, CGA-mod A range for sitting balance - worsening with fatigue. PT/OT completed max A x2 squat pivot to chair where pt sat for a couple minutes while nsg aid changed bed linens. Attempted LE exercises but pt inconsistently following commands - was able to complete a couple with poor strength/ROM noted. Pt squat-pivoted back to bed max A x2 and completed 2 additional stands from EOB with mod A x2 - forward flexed posture with impaired hip extension and poor standing tolerance overall - stood ~45 seconds at most with heavy cueing. Pt assisted back to supine mod-max A x2 and repositioned in bed, left with needs met. PT will continue to follow, anticipate DC to SNF.    Anticipated Discharge Disposition (PT): skilled nursing facility

## 2025-01-07 NOTE — CONSULTS
IDENTIFYING INFORMATION: The patient is a 89-year-old white male admitted on 12/17/2024 with shingles encephalopathy.  He has had an arduous downward course since.    CHIEF COMPLAINT: None given    INFORMANT: Wife and chart    RELIABILITY: Fair next field    HISTORY OF PRESENT ILLNESS: The patient is a 89-year-old white male with a history of Parkinson's disease of 10 years duration and dementia of 1 years duration.  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  Wife reports that the patient had been considered for palliative care but then seemed to rally a couple of weeks ago leading to placement of a G-tube.  When seen today the patient is awake and alert but is virtually noncommunicative.  He does not appear to be responding to internal stimuli.  Wife is requesting that neurology be reinvolved, but seems to understand that the patient's decline is such that she may wish to reconsider a palliative transfer.  She reports that he has had periods of anger regarding the location of his keys and wallet and has been on occasion mildly assistant to hands-on care but is generally not been much in the way of a management issue.    PAST PSYCHIATRIC HISTORY: As above    PAST MEDICAL HISTORY: History of carotid disease, history of stroke, history of A-fib, Parkinson's disease, history of melanoma    MEDICATIONS:   Current Facility-Administered Medications   Medication Dose Route Frequency Provider Last Rate Last Admin    acetaminophen (TYLENOL) tablet 650 mg  650 mg Nasogastric Q4H PRN Konstantin Persaud MD   650 mg at 01/05/25 0128    Or    acetaminophen (TYLENOL) 160 MG/5ML oral solution 650 mg  650 mg Nasogastric Q4H PRN Konstantin Persaud MD   650 mg at 01/05/25 0839    Or    acetaminophen (TYLENOL) suppository 650 mg  650 mg Rectal Q4H PRN Konstantin Persaud MD        albumin human 25 % IV SOLN 25 g  25 g Intravenous Once Cherri Parr MD        amiodarone (PACERONE) tablet 200  mg  200 mg Nasogastric Q12H Konstantin Persaud MD   200 mg at 01/07/25 0945    apixaban (ELIQUIS) tablet 5 mg  5 mg Oral Q12H Konstantin Persaud MD   5 mg at 01/07/25 0945    atorvastatin (LIPITOR) tablet 40 mg  40 mg Nasogastric Nightly Konstantin Persaud MD   40 mg at 01/06/25 2026    atropine 1 % ophthalmic solution 1 drop  1 drop Both Eyes TID PRN Konstantin Persaud MD        carbidopa-levodopa (SINEMET)  MG per tablet 1 tablet  1 tablet Nasogastric TID Konstantin Persaud MD   1 tablet at 01/07/25 0945    dextrose (D50W) (25 g/50 mL) IV injection 25 g  25 g Intravenous Q15 Min PRN Konstantin Persaud MD        dextrose (GLUTOSE) oral gel 15 g  15 g Oral Q15 Min PRN Konstantin Persaud MD        finasteride (PROSCAR) tablet 5 mg  5 mg Nasogastric Daily Konstantin Persaud MD   5 mg at 01/07/25 0945    furosemide (LASIX) injection 40 mg  40 mg Intravenous Once Cherri Parr MD        glucagon (GLUCAGEN) injection 1 mg  1 mg Intramuscular Q15 Min PRN Konstantin Persaud MD        guaifenesin (ROBITUSSIN) 100 MG/5ML liquid 200 mg  200 mg Nasogastric Q4H Konstantin Persaud MD   200 mg at 01/07/25 0945    insulin regular (humuLIN R,novoLIN R) injection 2-7 Units  2-7 Units Subcutaneous Q6H Konstantin Persaud MD   2 Units at 01/06/25 1248    levothyroxine (SYNTHROID, LEVOTHROID) tablet 75 mcg  75 mcg Nasogastric Daily Konstantin Persaud MD   75 mcg at 01/07/25 0625    Magnesium Standard Dose Replacement - Follow Nurse / BPA Driven Protocol   Not Applicable PRN Konstantin Persaud MD        metoprolol tartrate (LOPRESSOR) injection 5 mg  5 mg Intravenous Q6H PRN Konstatnin Persaud MD   5 mg at 12/26/24 0930    metoprolol tartrate (LOPRESSOR) tablet 12.5 mg  12.5 mg Nasogastric Q12H Ailin Mejia APRN   12.5 mg at 01/07/25 0946    [Held by provider] oxybutynin XL (DITROPAN-XL) 24 hr tablet 10 mg  10 mg Oral Daily Konstantin Persaud MD   10 mg at 12/18/24 0901    Potassium Replacement - Follow Nurse / BPA Driven Protocol    Not Applicable PRN Konstantin Persaud MD        QUEtiapine (SEROquel) tablet 25 mg  25 mg Oral Nightly PRN Brian Doss MD        terazosin (HYTRIN) capsule 1 mg  1 mg Nasogastric Nightly Ailin Mejia APRN   1 mg at 01/06/25 2025    vitamin B-12 (CYANOCOBALAMIN) tablet 1,000 mcg  1,000 mcg Nasogastric Q AM Konstantin Persaud MD   1,000 mcg at 01/07/25 0625         ALLERGIES: Penicillin Rocephin    FAMILY HISTORY: Not obtained    SOCIAL HISTORY: The patient is a retired  who worked until the age of 80.  He lives with his wife.    MENTAL STATUS EXAM: The patient is a thin ill-appearing  male dressed in hospital garb.  He is awake and alert and tracks with his eyes but his speech is largely nonsensical.  He does not appear to be responding to internal stimuli.    ASSETS/LIABILITIES: Supportive wife/advancing health issues    DIAGNOSTIC IMPRESSION: Dementia associated with Parkinson's disease, medical problems as noted previously    PLAN: The patient's wife seems more open to going the palliative route at this point.  I would recommend continuation of Seroquel but will not add any other medications at this point.  As noted previously, the patient's wife does wish for neurology to be reinvolved in the patient's care.

## 2025-01-07 NOTE — CASE MANAGEMENT/SOCIAL WORK
Post-Acute Authorization Submission      Post Acute Pre-Cert Documentation  Request Submitted by Facility - Type:: Hospital  Post-Acute Authorization Type Submitted:: SNF  Date Post Acute Pre-Cert Inititated per Facility: 01/07/25  Accepting Facility: [facility] Discharge Date Requested: 01/08/25  All Clinicals Submitted?: Yes  Had Accepting Facility at Time of Submission: Yes  Response Communicated to:: , Accepting Facility Liaison  Authorization Number:: PENDING [id number]              LISA Paul

## 2025-01-07 NOTE — THERAPY TREATMENT NOTE
Patient Name: Casey Snowden .  : 1934    MRN: [id number]                              Today's Date: 2025       Admit Date: 2024    Visit Dx:     ICD-10-CM ICD-9-CM   1. Unable to ambulate  R26.2 719.7   2. Herpes zoster with complication: S2 and S3 distribution on the right  B02.8 053.8   3. Parkinson's disease, unspecified whether dyskinesia present, unspecified whether manifestations fluctuate  G20.A1 332.0   4. Moderate protein-calorie malnutrition  E44.0 263.0     Patient Active Problem List   Diagnosis    Benign essential hypertension    Stenosis of carotid artery    Mixed hyperlipidemia    Parkinson's disease    Peripheral arterial occlusive disease    Screening for prostate cancer    Medication monitoring encounter    Melanoma    Chronic deep vein thrombosis (DVT) of popliteal vein of right lower extremity    Uses hearing aid    Paroxysmal A-fib    Chronic diastolic (congestive) heart failure    Anticoagulant long-term use    Presence of IVC filter    Medicare annual wellness visit, subsequent    Iron deficiency anemia secondary to inadequate dietary iron intake    Orthostatic hypotension due to Parkinson's disease    Coronary artery disease involving native coronary artery of native heart without angina pectoris    ST elevation myocardial infarction involving left anterior descending (LAD) coronary artery    Sialorrhea    Hypertensive heart disease with heart failure    PAD (peripheral artery disease)    Metastatic squamous cell carcinoma    Secondary malignancy of lymph nodes of head, face and neck    Encounter for antineoplastic immunotherapy    RBD (REM behavioral disorder)    Acquired hypothyroidism    Aspiration pneumonia of right lung due to vomit    Dysphagia, neurologic    ICAO (internal carotid artery occlusion), right    Arterial ischemic stroke, ICA (internal carotid artery), right, chroni    Renal mass, left    Abdominal aortic aneurysm    Disorientation    Falls frequently     Right inguinal hernia    Unable to ambulate    Herpes zoster without complication    LISY (acute kidney injury)    Moderate protein-calorie malnutrition     Past Medical History:   Diagnosis Date    Abdominal aortic aneurysm without rupture 10/14/2021    Acquired hypothyroidism 08/31/2022    Acute ischemic stroke 10/08/2023    Aneurysm     Arrhythmia     Atrial fibrillation     Cancer 04/2018    basil cell carcinoma    Carotid stenosis 10/29/2015    CHF (congestive heart failure)     Chronic deep vein thrombosis (DVT) of popliteal vein of right lower extremity     Clotting disorder     Coronary artery disease involving native coronary artery of native heart without angina pectoris 12/20/2018    DVT of lower extremity (deep venous thrombosis)     Hard of hearing     Hyperlipidemia     Hypertension     Localized edema 01/04/2017    Long-term use of high-risk medication     Metabolic encephalopathy 02/07/2023    Myocardial infarction 2918    LAD stent    PAD (peripheral artery disease) 09/10/2020    Parkinson's disease     Postphlebitic syndrome     Pure hypercholesterolemia     Skin tear of upper arm without complication, left, subsequent encounter     Stroke 11/02/2023     Past Surgical History:   Procedure Laterality Date    BASAL CELL CARCINOMA EXCISION  04/2018    CARDIAC CATHETERIZATION N/A 11/05/2018    Procedure: Left Heart Cath;  Surgeon: Oliverio Azar MD;  Location:  [facility] INVASIVE [facility]ATION;  Service: Cardiovascular    CARDIAC CATHETERIZATION N/A 11/05/2018    Procedure: Coronary angiography;  Surgeon: Oliverio Azar MD;  Location:  [facility]ATION;  Service: Cardiovascular    CARDIAC CATHETERIZATION N/A 11/05/2018    Procedure: Left ventriculography;  Surgeon: Oliverio Azar MD;  Location:  [facility] IN[facility]ATION;  Service: Cardiovascular    CARDIAC CATHETERIZATION N/A 06/04/2019    Procedure: Left Heart Cath;  Surgeon: Johnny Glasgow MD;  [facility];  Service: Cardiovascular    CARDIAC CATHETERIZATION N/A 06/04/2019    Procedure: Coronary angiography;  Surgeon: Johnny Glasgow MD;  Location:  [facility] INVASIVE LOCATION;  Service: Cardiovascular    CARDIAC CATHETERIZATION N/A 06/04/2019    Procedure: Left ventriculography;  Surgeon: Johnny Glasgow MD;  Location:  [facility] INVASIVE LOCATION;  Service: Cardiovascular    CARDIAC CATHETERIZATION N/A 06/04/2019    Procedure: Stent BILL coronary;  Surgeon: Johnny Glasgow MD;  [facility];  Service: Cardiovascular    CARDIAC SURGERY      CAROTID ARTERY ANGIOPLASTY Right 10/11/2023    with stent    CAROTID STENT      CATARACT EXTRACTION Left 08/2018    CATARACT EXTRACTION Right 09/2018    COLONOSCOPY      2011    CORONARY STENT PLACEMENT      INCISION AND DRAINAGE LEG Right 07/07/2017    Procedure: INCISION AND DRAINAGE INFECTED HEMATOMA RIGHT THIGH;  Surgeon: Valentin Peña MD;  Location: [facility] MAIN OR;  Service:     JOINT REPLACEMENT      KNEE INCISION AND DRAINAGE Right 12/27/2016    Procedure: KNEE INCISION AND DRAINAGE;  Surgeon: Triston Whitten MD;  Location: [facility] MAIN OR;  Service:     NOSE SURGERY      nose replacement    SINUS SURGERY  1960    SKIN BIOPSY      SKIN GRAFT  12/2017    TOTAL KNEE ARTHROPLASTY Right     VASCULAR SURGERY      VENA CAVA FILTER PLACEMENT        General Information       [facility] Name 01/07/25 1308          Physical Therapy Time and Intention    Document Type therapy note (daily note)  -     Mode of Treatment physical therapy;co-treatment;occupational therapy  -       Row Name 01/07/25 1308          General Information    Patient Profile Reviewed yes  -     Existing Precautions/Restrictions fall;NPO  PEG  -       Row Name 01/07/25 1308          Cognition    Orientation Status (Cognition) oriented to;person  -       Row Name 01/07/25 1308          Safety Issues/Impairments Affecting Functional  Mobility    Impairments Affecting Function (Mobility) balance;cognition;coordination;endurance/activity tolerance;postural/trunk control;strength  -     Comment, Safety Issues/Impairments (Mobility) Co treatment medically appropriate and necessary due to patient acuity level, activity tolerance and safety of patient and staff. Treatment is focusing on progression of care and goals established in the POC.  [facility]               User Key  (r) = Recorded By, (t) = Taken By, (c) = Cosigned By      Initials Name Provider Type     Debbie Connolly PT Physical Therapist                   Mobility       Row Name 01/07/25 1309          Bed Mobility    Bed Mobility supine-sit;sit-supine  -     Supine-Sit Stow (Bed Mobility) maximum assist (25% patient effort);2 person assist;moderate assist (50% patient effort);verbal cues  -     Sit-Supine Stow (Bed Mobility) moderate assist (50% patient effort);maximum assist (25% patient effort);2 person assist;verbal cues  -     Assistive Device (Bed Mobility) head of bed elevated;repositioning sheet;bed rails  -       Row Name 01/07/25 1309          Bed-Chair Transfer    Bed-Chair Stow (Transfers) moderate assist (50% patient effort);maximum assist (25% patient effort);verbal cues;2 person assist  -     Assistive Device (Bed-Chair Transfers) other (see comments)  [facility] x2  -     Comment, (Bed-Chair Transfer) Squat pivot to chair, aid present for linen change, squat pivot back to bed  -       Row Name 01/07/25 1309          Sit-Stand Transfer    Sit-Stand Stow (Transfers) moderate assist (50% patient effort);2 person assist;verbal cues  -     Assistive Device (Sit-Stand Transfers) other (see comments)  [facility] x2  -     Comment, (Sit-Stand Transfer) 4x STS total - 3 from EOB, 1 from chair  -               User Key  (r) = Recorded By, (t) = Taken By, (c) = Cosigned By      Initials Name Provider Type     Debbie Connolly PT Physical  Therapist                   Obj/Interventions       Row Name 01/07/25 1311          Motor Skills    Therapeutic Exercise --  AAROM BLE, inconsistent command following, poor strength/limited ROM  -       Row Name 01/07/25 1311          Balance    Balance Assessment sitting dynamic balance;sitting static balance;standing static balance;standing dynamic balance  Simultaneous filing. User may be unaware of other data.  -     Static Sitting Balance contact guard;verbal cues  Simultaneous filing. User may be unaware of other data.  -     Dynamic Sitting Balance minimal assist;moderate assist;verbal cues  -     Position, Sitting Balance supported;sitting edge of bed  Simultaneous filing. User may be unaware of other data.  -     Static Standing Balance moderate assist;maximum assist;verbal cues  Simultaneous filing. User may be unaware of other data.  -     Dynamic Standing Balance moderate assist;maximum assist;verbal cues  -     Position/Device Used, Standing Balance supported;other (see comments)  [facility] x2 Simultaneous filing. User may be unaware of other data.  -     Balance Interventions sitting;standing;sit to stand;supported;static;dynamic  -     Comment, Balance CGA-mod range for sitting balance, R lateral lean and intermittent posterior lean with fatigue - heavy cues to correct with difficulty maintaining  Simultaneous filing. User may be unaware of other data.  -               User Key  (r) = Recorded By, (t) = Taken By, (c) = Cosigned By      Initials Name Provider Type     Debbie Connolly PT Physical Therapist                   Goals/Plan    No documentation.                  Clinical Impression       Row Name 01/07/25 1313          Pain    Pre/Posttreatment Pain Comment No c/o pain throughout session  -       Row Name 01/07/25 1313          Plan of Care Review    Plan of Care Reviewed With patient;spouse  -     Progress no change  -     Outcome Evaluation Pt seen for PT/OT co-tx  this AM, wife is upset about lack of therapy this admission. Pt with several medical issues and waxing/waning cognition/alertness making consistent participation difficult and slow progress. However, pt alert and agreeable to therapy today. Wife wanting pt to sit up in chair - educated about safety concerns and poor tolerance to increased time upright at this time. Pt required mod-max A x2 for transfer to EOB, CGA-mod A range for sitting balance - worsening with fatigue. PT/OT completed max A x2 squat pivot to chair where pt sat for a couple minutes while nsg aid changed bed linens. Attempted LE exercises but pt inconsistently following commands - was able to complete a couple with poor strength/ROM noted. Pt squat-pivoted back to bed max A x2 and completed 2 additional stands from EOB with mod A x2 - forward flexed posture with impaired hip extension and poor standing tolerance overall - stood ~45 seconds at most with heavy cueing. Pt assisted back to supine mod-max A x2 and repositioned in bed, left with needs met. PT will continue to follow, anticipate DC to SNF.  -       Row Name 01/07/25 1311          Positioning and Restraints    Pre-Treatment Position in bed  -BH     Post Treatment Position bed  -BH     In Bed notified nsg;fowlers;call light within reach;encouraged to call for assist;exit alarm on;with family/caregiver  -               User Key  (r) = Recorded By, (t) = Taken By, (c) = Cosigned By      Initials Name Provider Type     Debbie Connolly, PT Physical Therapist                   Outcome Measures       Row Name 01/07/25 9754          How much help from another person do you currently need...    Turning from your back to your side while in flat bed without using bedrails? 2  -BH     Moving from lying on back to sitting on the side of a flat bed without bedrails? 2  -BH     Moving to and from a bed to a chair (including a wheelchair)? 2  -BH     Standing up from a chair using your arms (e.g.,  wheelchair, bedside chair)? 2  -     Climbing 3-5 steps with a railing? 1  -     To walk in hospital room? 1  -     AM-PAC 6 Clicks Score (PT) 10  -     Highest Level of Mobility Goal 4 --> Transfer to chair/commode  -       Row Name 01/07/25 1318          Functional Assessment    Outcome Measure Options AM-PAC 6 Clicks Basic Mobility (PT)  -               User Key  (r) = Recorded By, (t) = Taken By, (c) = Cosigned By      Initials Name Provider Type     Debbie Connolly, PT Physical Therapist                                 Physical Therapy Education       Title: PT OT SLP Therapies (In Progress)       Topic: Physical Therapy (In Progress)       Point: Mobility training (In Progress)       Learning Progress Summary            Patient Acceptance, E,TB,D, NL,NR by  at 1/7/2025 1318    Acceptance, E,TB, VU,NR by  at 1/5/2025 0914    Acceptance, D,E, NR by  at 12/31/2024 1654    Acceptance, E,D, NR by  at 12/26/2024 1641    Acceptance, E,TB,D, NL,NR by  at 12/23/2024 1556    Acceptance, E, VU,NR by  at 12/18/2024 1606   Family Acceptance, D,E, NR by  at 12/31/2024 1654    Acceptance, E,D, NR by  at 12/26/2024 1641                      Point: Home exercise program (In Progress)       Learning Progress Summary            Patient Acceptance, E,TB,D, NL,NR by  at 1/7/2025 1318    Acceptance, D,E, NR by  at 12/31/2024 1654    Acceptance, E,D, NR by  at 12/26/2024 1641    Acceptance, E,TB,D, NL,NR by  at 12/23/2024 1556    Acceptance, E, VU,NR by  at 12/18/2024 1606   Family Acceptance, D,E, NR by  at 12/31/2024 1654    Acceptance, E,D, NR by  at 12/26/2024 1641                      Point: Body mechanics (In Progress)       Learning Progress Summary            Patient Acceptance, E,TB,D, NL,NR by  at 1/7/2025 1318    Acceptance, D,E, NR by STEVENSON at 12/31/2024 1654    Acceptance, E,D, NR by STEVENSON at 12/26/2024 1641    Acceptance, E,TB,D, NL,NR by  at 12/23/2024 1556    Acceptance, E,  VU,NR by  at 12/18/2024 1606   Family Acceptance, D,E, NR by  at 12/31/2024 1654    Acceptance, E,D, NR by  at 12/26/2024 1641                      Point: Precautions (In Progress)       Learning Progress Summary            Patient Acceptance, E,TB,D, NL,NR by  at 1/7/2025 1318    Acceptance, D,E, NR by  at 12/31/2024 1654    Acceptance, E,D, NR by  at 12/26/2024 1641    Acceptance, E,TB,D, NL,NR by  at 12/23/2024 1556    Acceptance, E, VU,NR by  at 12/18/2024 1606   Family Acceptance, D,E, NR by  at 12/31/2024 1654    Acceptance, E,D, NR by  at 12/26/2024 1641                                      User Key       Initials Effective Dates Name Provider Type Discipline     06/16/21 -  Ashley Nayak, PT Physical Therapist PT     06/16/21 -  Alexandra Lucio, PT Physical Therapist PT     03/07/18 -  Tia Encarnacion PTA Physical Therapist Assistant PT     04/08/22 -  Debbie Connolly, PT Physical Therapist PT                  PT Recommendation and Plan     Progress: no change  Outcome Evaluation: Pt seen for PT/OT co-tx this AM, wife is upset about lack of therapy this admission. Pt with several medical issues and waxing/waning cognition/alertness making consistent participation difficult and slow progress. However, pt alert and agreeable to therapy today. Wife wanting pt to sit up in chair - educated about safety concerns and poor tolerance to increased time upright at this time. Pt required mod-max A x2 for transfer to EOB, CGA-mod A range for sitting balance - worsening with fatigue. PT/OT completed max A x2 squat pivot to chair where pt sat for a couple minutes while nsg aid changed bed linens. Attempted LE exercises but pt inconsistently following commands - was able to complete a couple with poor strength/ROM noted. Pt squat-pivoted back to bed max A x2 and completed 2 additional stands from EOB with mod A x2 - forward flexed posture with impaired hip extension and poor standing  tolerance overall - stood ~45 seconds at most with heavy cueing. Pt assisted back to supine mod-max A x2 and repositioned in bed, left with needs met. PT will continue to follow, anticipate DC to SNF.     Time Calculation:         PT Charges       Row Name 01/07/25 1319             Time Calculation    Start Time 1022  -      Stop Time 1050  -      Time Calculation (min) 28 min  -      PT Received On 01/07/25  -      PT - Next Appointment 01/09/25  -      PT Goal Re-Cert Due Date 01/12/25  -         Time Calculation- PT    Total Timed Code Minutes- PT 28 minute(s)  -         Timed Charges    74757 - PT Therapeutic Activity Minutes 28  -BH         Total Minutes    Timed Charges Total Minutes 28  -BH       Total Minutes 28  -BH                User Key  (r) = Recorded By, (t) = Taken By, (c) = Cosigned By      Initials Name Provider Type     Debbie Connolly, PT Physical Therapist                  Therapy Charges for Today       Code Description Service Date Service Provider Modifiers Qty    51587921068 HC PT THERAPEUTIC ACT EA 15 MIN 1/7/2025 Debbie Connolly, PT GP 2            PT G-Codes  Outcome Measure Options: AM-PAC 6 Clicks Basic Mobility (PT)  AM-PAC 6 Clicks Score (PT): 10  AM-PAC 6 Clicks Score (OT): 8  PT Discharge Summary  Anticipated Discharge Disposition (PT): skilled nursing facility    Debbie Connolly PT  1/7/2025

## 2025-01-07 NOTE — PROGRESS NOTES
Name: Casey Snowden . ADMIT: 2024   : 1934  PCP: Aleksander Diez MD    MRN: [id number] LOS: 21 days   AGE/SEX: 89 y.o. male  ROOM: [facility]     Subjective   Subjective   Patient in and out of sleeping.  Wife thinks he slept a little better last night.  Having tremors.     Objective   Objective   Vital Signs  Temp:  [97.3 °F (36.3 °C)-98.2 °F (36.8 °C)] 98.2 °F (36.8 °C)  Heart Rate:  [50-56] 53  Resp:  [15-16] 16  BP: (134-155)/() 134/67  SpO2:  [94 %-100 %] 100 %  on   ;   Device (Oxygen Therapy): room air  Body mass index is 26.57 kg/m².  Physical Exam  Constitutional:       General: He is not in acute distress.     Appearance: He is ill-appearing.   Cardiovascular:      Rate and Rhythm: Normal rate and regular rhythm.      Heart sounds: No murmur heard.  Pulmonary:      Effort: No respiratory distress.   Abdominal:      General: There is no distension.      Palpations: Abdomen is soft.      Tenderness: There is abdominal tenderness.      Comments: PEG tube in place   Musculoskeletal:      Right lower leg: Edema present.      Left lower leg: Edema present.   Skin:     General: Skin is warm and dry.      Findings: No rash.   Neurological:      Mental Status: He is alert. He is disoriented.       Results Review     I reviewed the patient's new clinical results.  Results from last 7 days   Lab Units 25  0619 25  0818 25  0655   WBC 10*3/mm3 5.46 6.62 7.78   HEMOGLOBIN g/dL 8.5* 8.5* 8.0*   PLATELETS 10*3/mm3 235 230 199     Results from last 7 days   Lab Units 25  0619 25  0818 25  0654 25  0601   SODIUM mmol/L 138 137 140 142   POTASSIUM mmol/L 4.6 4.6 4.5 4.7   CHLORIDE mmol/L 107 106 108* 110*   CO2 mmol/L 25.0 23.0 23.0 23.0   BUN mg/dL 35* 33* 38* 35*   CREATININE mg/dL 0.82 0.76 0.81 0.92   GLUCOSE mg/dL 132* 120* 144* 195*   EGFR mL/min/1.73 83.4 85.4 83.8 79.0     Results from last 7 days   Lab Units 25  0619 25  0818  01/05/25  0654 01/04/25  0601   ALBUMIN g/dL 2.4* 2.4* 2.3* 2.2*       Results from last 7 days   Lab Units 01/07/25  0619 01/06/25  0818 01/05/25  0654 01/04/25  0601   CALCIUM mg/dL 8.0* 7.9* 7.9* 7.7*   ALBUMIN g/dL 2.4* 2.4* 2.3* 2.2*   PHOSPHORUS mg/dL 3.5 3.2 3.0 2.9           Glucose   Date/Time Value Ref Range Status   01/07/2025 1122 138 (H) 70 - 130 mg/dL Final   01/07/2025 0615 154 (H) 70 - 130 mg/dL Final   01/06/2025 2348 127 70 - 130 mg/dL Final   01/06/2025 1818 120 70 - 130 mg/dL Final   01/06/2025 1213 157 (H) 70 - 130 mg/dL Final   01/06/2025 0505 139 (H) 70 - 130 mg/dL Final   01/05/2025 2341 135 (H) 70 - 130 mg/dL Final       No radiology results for the last day    I have personally reviewed all medications:  Scheduled Medications  amiodarone, 200 mg, Nasogastric, Q12H  apixaban, 5 mg, Oral, Q12H  atorvastatin, 40 mg, Nasogastric, Nightly  carbidopa-levodopa, 1 tablet, Nasogastric, TID  finasteride, 5 mg, Nasogastric, Daily  guaifenesin, 200 mg, Nasogastric, Q4H  insulin regular, 2-7 Units, Subcutaneous, Q6H  levothyroxine, 75 mcg, Nasogastric, Daily  metoprolol tartrate, 12.5 mg, Nasogastric, Q12H  [Held by provider] oxybutynin XL, 10 mg, Oral, Daily  terazosin, 1 mg, Nasogastric, Nightly  vitamin B-12, 1,000 mcg, Nasogastric, Q AM    Infusions     Diet  NPO Diet NPO Type: Tube Feeding    I have personally reviewed:  [x]  Laboratory   []  Microbiology   [x]  Radiology   []  EKG/Telemetry  []  Cardiology/Vascular   []  Pathology    []  Records       Assessment/Plan     Active Hospital Problems    Diagnosis  POA   • **Unable to ambulate [R26.2]  Yes   • Moderate protein-calorie malnutrition [E44.0]  Yes   • LISY (acute kidney injury) [N17.9]  Yes   • Herpes zoster without complication [B02.9]  Unknown   • Falls frequently [R29.6]  Not Applicable   • Acquired hypothyroidism [E03.9]  Yes   • Secondary malignancy of lymph nodes of head, face and neck [C77.0]  Yes   • PAD (peripheral artery disease)  [I73.9]  Yes   • Orthostatic hypotension due to Parkinson's disease [G90.3]  Yes   • Chronic diastolic (congestive) heart failure [I50.32]  Yes   • 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       89 y.o. male admitted with generalized weakness, confusion and found to have RLE rash consistent with shingles.    Afebrile, VSS  Hemoglobin stable 8.5  BMP stable  Blood sugar stable  Did not receive Seroquel last night  Psychiatry consulted due to family concerns of worsening mental status      I feel patient having significant hospital delirium due to his age, comorbidities, recent encephalitis, prolonged hospitalization, etc.  Delirium precautions ordered.  Continue to try to keep him awake during the day and sleeping at night.  He did work with physical therapy which is good.  He needs to get out of hospital and onto rehab ASAP.  CCP working on it reportedly will need precert.  Wife expresses concerns regarding his metoprolol and amiodarone.  These are being managed by cardiology.      Severe pharyngeal dysphagia  S/p PEG tube 1/3  Tolerating tube feedings    BLE edema  -lasix 20mg IV x 1 given 1/2    VZV encephalitis.   - Completed 14 days of acyclovir  - MRI negative   - Neurology and ID have signed off.    Pneumonia  Completed a 5 day course of meropenem     LISY/hypernatremia: resolved .   - Continue to hold Diovan and Ditropan  - Monitor urine output    Atrial fibrillation with RVR  Continue AC, no need for aspirin per cardiology.    Currently on metoprolol and amiodarone with mild intermittent bradycardia  Previous issues with bradycardia secondary to beta-blocker    Parkinson disease:  Continue Sinemet.       Eliquis (home med) for DVT prophylaxis.    DNR.  Discussed with patient, family, and consulting provider.  Anticipate discharge to SNU facility once arrangements have been made.  Will need precert.  Expected Discharge Date: 1/8/2025; Expected Discharge Time:       Brian Doss MD  [facility]  01/07/25  11:37 EST

## 2025-01-07 NOTE — CASE MANAGEMENT/SOCIAL WORK
Continued Stay Note  [facility]     Patient Name: Casey Snowden .  MRN: [id number]  Today's Date: 1/7/2025    Admit Date: 12/17/2024    Plan: [facility]. Pre cert submitted and pending. Wife to have private pay caregivers stay with pt at night. Needs Stretcher transport.   Discharge Plan       Row Name 01/07/25 1615       Plan    Plan [facility]. Pre cert submitted and pending. Wife to have private pay caregivers stay with pt at night. Needs Stretcher transport.    Patient/Family in Agreement with Plan yes    Plan Comments Per Loida, [facility] has a bed and can accept once pre cert received. Submitted for Pre Cert and pending.  Loida spoke with wife and wife aware that she will need to provide private pay caregivers to stay with pt at night. In home caregiver list given to wife. Pt has new PEG and receiving TF. Requires stretcher transport at D/C. Pal Kaur RN-BC                   Discharge Codes    No documentation.                 Expected Discharge Date and Time       Expected Discharge Date Expected Discharge Time    Jan 8, 2025               Pal Kaur RN

## 2025-01-07 NOTE — THERAPY TREATMENT NOTE
Patient Name: Casey Snowden .  : 1934    MRN: [id number]                              Today's Date: 2025       Admit Date: 2024    Visit Dx:     ICD-10-CM ICD-9-CM   1. Unable to ambulate  R26.2 719.7   2. Herpes zoster with complication: S2 and S3 distribution on the right  B02.8 053.8   3. Parkinson's disease, unspecified whether dyskinesia present, unspecified whether manifestations fluctuate  G20.A1 332.0   4. Moderate protein-calorie malnutrition  E44.0 263.0     Patient Active Problem List   Diagnosis    Benign essential hypertension    Stenosis of carotid artery    Mixed hyperlipidemia    Parkinson's disease    Peripheral arterial occlusive disease    Screening for prostate cancer    Medication monitoring encounter    Melanoma    Chronic deep vein thrombosis (DVT) of popliteal vein of right lower extremity    Uses hearing aid    Paroxysmal A-fib    Chronic diastolic (congestive) heart failure    Anticoagulant long-term use    Presence of IVC filter    Medicare annual wellness visit, subsequent    Iron deficiency anemia secondary to inadequate dietary iron intake    Orthostatic hypotension due to Parkinson's disease    Coronary artery disease involving native coronary artery of native heart without angina pectoris    ST elevation myocardial infarction involving left anterior descending (LAD) coronary artery    Sialorrhea    Hypertensive heart disease with heart failure    PAD (peripheral artery disease)    Metastatic squamous cell carcinoma    Secondary malignancy of lymph nodes of head, face and neck    Encounter for antineoplastic immunotherapy    RBD (REM behavioral disorder)    Acquired hypothyroidism    Aspiration pneumonia of right lung due to vomit    Dysphagia, neurologic    ICAO (internal carotid artery occlusion), right    Arterial ischemic stroke, ICA (internal carotid artery), right, chroni    Renal mass, left    Abdominal aortic aneurysm    Disorientation    Falls frequently     Right inguinal hernia    Unable to ambulate    Herpes zoster without complication    LISY (acute kidney injury)    Moderate protein-calorie malnutrition     Past Medical History:   Diagnosis Date    Abdominal aortic aneurysm without rupture 10/14/2021    Acquired hypothyroidism 08/31/2022    Acute ischemic stroke 10/08/2023    Aneurysm     Arrhythmia     Atrial fibrillation     Cancer 04/2018    basil cell carcinoma    Carotid stenosis 10/29/2015    CHF (congestive heart failure)     Chronic deep vein thrombosis (DVT) of popliteal vein of right lower extremity     Clotting disorder     Coronary artery disease involving native coronary artery of native heart without angina pectoris 12/20/2018    DVT of lower extremity (deep venous thrombosis)     Hard of hearing     Hyperlipidemia     Hypertension     Localized edema 01/04/2017    Long-term use of high-risk medication     Metabolic encephalopathy 02/07/2023    Myocardial infarction 2918    LAD stent    PAD (peripheral artery disease) 09/10/2020    Parkinson's disease     Postphlebitic syndrome     Pure hypercholesterolemia     Skin tear of upper arm without complication, left, subsequent encounter     Stroke 11/02/2023     Past Surgical History:   Procedure Laterality Date    BASAL CELL CARCINOMA EXCISION  04/2018    CARDIAC CATHETERIZATION N/A 11/05/2018    Procedure: Left Heart Cath;  Surgeon: Oliverio Azar MD;  Location:  [facility] C[facility] INVASIVE LOCATION;  Service: Cardiovascular    CARDIAC CATHETERIZATION N/A 11/05/2018    Procedure: Coronary angiography;  Surgeon: Oliverio Azar MD;  Location:  [facility] CATH INVASIVE LOCATION;  Service: Cardiovascular    CARDIAC CATHETERIZATION N/A 11/05/2018    Procedure: Left ventriculography;  Surgeon: Oliverio Azar MD;  Location:  [facility] CATH INVASIVE LOCATION;  Service: Cardiovascular    CARDIAC CATHETERIZATION N/A 06/04/2019    Procedure: Left Heart Cath;  Surgeon: Johnny Glasgow MD;  [facility];  Service: Cardiovascular    CARDIAC CATHETERIZATION N/A 06/04/2019    Procedure: Coronary angiography;  Surgeon: Johnny Glasgow MD;  Location:  [facility] CATH INVASIVE LOCATION;  Service: Cardiovascular    CARDIAC CATHETERIZATION N/A 06/04/2019    Procedure: Left ventriculography;  Surgeon: Johnny Glasgow MD;  [facility];  Service: Cardiovascular    CARDIAC CATHETERIZATION N/A 06/04/2019    Procedure: Stent BILL coronary;  Surgeon: Johnny Glasgow MD;  [facility];  Service: Cardiovascular    CARDIAC SURGERY      CAROTID ARTERY ANGIOPLASTY Right 10/11/2023    with stent    CAROTID STENT      CATARACT EXTRACTION Left 08/2018    CATARACT EXTRACTION Right 09/2018    COLONOSCOPY      2011    CORONARY STENT PLACEMENT      INCISION AND DRAINAGE LEG Right 07/07/2017    Procedure: INCISION AND DRAINAGE INFECTED HEMATOMA RIGHT THIGH;  Surgeon: Valentin Peña MD;  Location: [facility] MAIN OR;  Service:     JOINT REPLACEMENT      KNEE INCISION AND DRAINAGE Right 12/27/2016    Procedure: KNEE INCISION AND DRAINAGE;  Surgeon: Triston Whitten MD;  Location: [facility] MAIN OR;  Service:     NOSE SURGERY      nose replacement    SINUS SURGERY  1960    SKIN BIOPSY      SKIN GRAFT  12/2017    TOTAL KNEE ARTHROPLASTY Right     VASCULAR SURGERY      VENA CAVA FILTER PLACEMENT        General Information       Row Name 01/07/25 1304          OT Time and Intention    Subjective Information no complaints  -BC     Document Type therapy note (daily note)  -BC     Mode of Treatment individual therapy;physical therapy  -BC     Patient Effort adequate  -BC       Row Name 01/07/25 1304          General Information    Patient Profile Reviewed yes  -BC       Row Name 01/07/25 1304          Cognition    Orientation Status (Cognition) oriented to;person  -BC       Row Name 01/07/25 130          Safety Issues/Impairments Affecting Functional Mobility     Impairments Affecting Function (Mobility) balance;cognition;coordination;endurance/activity tolerance;postural/trunk control;strength  -BC     Cognitive Impairments, Mobility Safety/Performance attention;safety precaution follow-through;safety precaution awareness;judgment;sequencing abilities  -BC     Comment, Safety Issues/Impairments (Mobility) Cotx with PT/OT for safety w/ bed mobility, concern for tolerance to active participation and due to decreased endurance.  -BC               User Key  (r) = Recorded By, (t) = Taken By, (c) = Cosigned By      Initials Name Provider Type    BC Kevin Pisano OT Occupational Therapist                     Mobility/ADL's       Row Name 01/07/25 1305          Bed Mobility    Bed Mobility supine-sit;sit-supine  -BC     Supine-Sit [place] (Bed Mobility) maximum assist (25% patient effort);2 person assist;moderate assist (50% patient effort)  -BC     Sit-Supine [place] (Bed Mobility) 2 person assist;maximum assist (25% patient effort);nonverbal cues (demo/gesture);moderate assist (50% patient effort)  -BC     Bed Mobility, Safety Issues cognitive deficits limit understanding;decreased use of arms for pushing/pulling;decreased use of legs for bridging/pushing;impaired trunk control for bed mobility  -BC     Assistive Device (Bed Mobility) head of bed elevated;bed rails;repositioning sheet  -BC     Comment, (Bed Mobility) Cues and assist w/ sequence of mvmts, Pt w/ difficulty w/ initiating tasks. Sup>sit w/ AX2.  -BC       Row Name 01/07/25 1305          Transfers    Transfers sit-stand transfer;stand-sit transfer;bed-chair transfer  -BC       Row Name 01/07/25 1305          Bed-Chair Transfer    Bed-Chair [place] (Transfers) maximum assist (25% patient effort);2 person assist  -BC     Comment, (Bed-Chair Transfer) For future OOB tolerance, transfer training, increase cognitive attention and balance training compled squat pivot transfer EOB to chair. Sat up  supported ~5 mins and completed simple step command following with increased arousal/alertness once OOB. Cont'd w/ squat-pivot transfer back to EOB for safety concerns leaving Pt unattended in chair.  -BC       Row Name 01/07/25 1305          Sit-Stand Transfer    Sit-Stand [place] (Transfers) moderate assist (50% patient effort);maximum assist (25% patient effort);2 person assist  -BC     Comment, (Sit-Stand Transfer) two stands w/ arm in arm assist w/ max cues for anterior lean, weightshifting. Pt required verbal and tactile cues for trunk extension but difficulty w/ coming to full standing ~50-75% up. Standing tolerance ~10-20 sec intervals with increased fatigue as time progressed. completed 4x.  -BC       Row Name 01/07/25 1303          Stand-Sit Transfer    Stand-Sit [place] (Transfers) moderate assist (50% patient effort);maximum assist (25% patient effort);2 person assist  -BC       Row Name 01/07/25 1301          Activities of Daily Living    BADL Assessment/Intervention --  attempting seated single step command for future ADL participation. Difficulty comprehending instructions.  -BC       Row Name 01/07/25 1309          Toileting Assessment/Training    [place] Level (Toileting) dependent (less than 25% patient effort)  -BC     Position (Toileting) supported standing  -BC     Comment, (Toileting) sit<>stands from EOB w/ total A required for brief mgmt doff/don.  -BC               User Key  (r) = Recorded By, (t) = Taken By, (c) = Cosigned By      Initials Name Provider Type    Kevin Freitas OT Occupational Therapist                   Obj/Interventions       Row Name 01/07/25 1310          Balance    Balance Assessment sitting static balance;standing static balance;standing dynamic balance  -BC     Static Sitting Balance contact guard;verbal cues  -BC     Dynamic Sitting Balance minimal assist;moderate assist  -BC     Position, Sitting Balance unsupported;sitting edge of bed  -BC      Static Standing Balance moderate assist;maximum assist;verbal cues  -BC     Dynamic Standing Balance moderate assist;maximum assist  -BC     Position/Device Used, Standing Balance supported;unsupported  -BC     Comment, Balance balance fluctuating while seated EOB w/ CGA to mod A overall. R lateral and posterior lean noted with max cues and tactile assist to return to midline. L lateral WB'ing through forearm and back up to midline twice with ability to maintain close SBA for ~5-10 sec intervals but overall CGA to mod A. sitting balance training EOB ~12 mins. Standing balance training w/ visual and verbal aides for midlien orientation and only able to come ~50-75% to full stand with ~10-45 sec intervals.  -BC               User Key  (r) = Recorded By, (t) = Taken By, (c) = Cosigned By      Initials Name Provider Type    eKvin Freitas, OT Occupational Therapist                   Goals/Plan    No documentation.                  Clinical Impression       Row Name 01/07/25 1319          Pain Assessment    Pre/Posttreatment Pain Comment no c/o pain  -BC       Row Name 01/07/25 1319          Plan of Care Review    Plan of Care Reviewed With patient;spouse  -BC     Outcome Evaluation Pt seen and tolerated OT/PT cotx this AM. Upon entering room spouse verb'd he has been asking to get up OOB to the chair. Spouse also verb'd she is upset with the lack of therapy since admission. Pt has had mutliple medical issues and recent PEG surgery with fluctuation per notes w/ function and status. Pt is awake, but slow to comprehend instructions and respond. Pt was inconsistently following one step commands in the bed today, but upon sitting EOB, and practiciing transfer training AX2 to chair, Pt more alert and responsive. Pt still was only able to follow <50% simple commands today w/ multiple repititions required. Pt required increased time, cues, and Ax2 required for stands and transfers. Pt appears motivated and has good family  support, continue OT services with [facility] REC: SNU when medically stable.  -BC       Row Name 01/07/25 1319          Therapy Assessment/Plan (OT)    Rehab Potential (OT) good  -BC     Criteria for Skilled Therapeutic Interventions Met (OT) yes;skilled treatment is necessary  -BC       Row Name 01/07/25 1319          Therapy Plan Review/Discharge Plan (OT)    Anticipated Discharge Disposition (OT) [facility] nursing facility  -BC       Row Name 01/07/25 1319          Vital Signs    O2 Delivery Pre Treatment room air  -BC     Pre Patient Position Supine  -BC     Intra Patient Position Standing  -BC     Post Patient Position Supine  -BC       Row Name 01/07/25 1319          Positioning and Restraints    Pre-Treatment Position in bed  -BC     Post Treatment Position bed  -BC     In Bed notified nsg;fowlers;call light within reach;encouraged to call for assist;exit alarm on;with family/caregiver  -BC               User Key  (r) = Recorded By, (t) = Taken By, (c) = Cosigned By      Initials Name Provider Type    BC Kevin Pisano, FREDDIE Occupational Therapist                   Outcome Measures       Row Name 01/07/25 1326          How much help from another is currently needed...    Putting on and taking off regular lower body clothing? 1  -BC     Bathing (including washing, rinsing, and drying) 1  -BC     Toileting (which includes using toilet bed pan or urinal) 1  -BC     Putting on and taking off regular upper body clothing 2  -BC     Taking care of personal grooming (such as brushing teeth) 2  -BC     Eating meals 1  -BC     AM-PAC 6 Clicks Score (OT) 8  -BC       Row Name 01/07/25 1318          How much help from another person do you currently need...    Turning from your back to your side while in flat bed without using bedrails? 2  -BH     Moving from lying on back to sitting on the side of a flat bed without bedrails? 2  -BH     Moving to and from a bed to a chair (including a wheelchair)? 2  -BH     Standing up from a  chair using your arms (e.g., wheelchair, bedside chair)? 2  -     Climbing 3-5 steps with a railing? 1  -     To walk in hospital room? 1  -     AM-PAC 6 Clicks Score (PT) 10  -     Highest Level of Mobility Goal 4 --> Transfer to chair/commode  -       Row Name 01/07/25 1326 01/07/25 1318       Functional Assessment    Outcome Measure Options AM-PAC 6 Clicks Daily Activity (OT)  -BC AM-PAC 6 Clicks Basic Mobility (PT)  -              User Key  (r) = Recorded By, (t) = Taken By, (c) = Cosigned By      Initials Name Provider Type     Debbie Connolly, PT Physical Therapist    BC Kevin Pisano, OT Occupational Therapist                    Occupational Therapy Education       Title: PT OT SLP Therapies (In Progress)       Topic: Occupational Therapy (In Progress)       Point: ADL training (Done)       Description:   Instruct learner(s) on proper safety adaptation and remediation techniques during self care or transfers.   Instruct in proper use of assistive devices.                  Learning Progress Summary            Patient Acceptance, E, VU by  at 12/18/2024 1517    Comment: OT goals, POC                      Point: Home exercise program (Not Started)       Description:   Instruct learner(s) on appropriate technique for monitoring, assisting and/or progressing therapeutic exercises/activities.                  Learner Progress:  Not documented in this visit.              Point: Precautions (In Progress)       Description:   Instruct learner(s) on prescribed precautions during self-care and functional transfers.                  Learning Progress Summary            Patient Acceptance, E, NR by LA at 1/3/2025 1705   Family Acceptance, E, VU by JOHNATHON at 1/6/2025 0228                      Point: Body mechanics (Not Started)       Description:   Instruct learner(s) on proper positioning and spine alignment during self-care, functional mobility activities and/or exercises.                  Learner  Progress:  Not documented in this visit.                              User Key       Initials Effective Dates Name Provider Type Discipline     04/02/20 -  Tierra Hanna OT Occupational Therapist OT    JJ 09/21/23 -  Bjorn Lizama, RN Registered Nurse Nurse    LA 12/10/24 -  Charo Patino, RN Registered Nurse Nurse                  OT Recommendation and Plan     Plan of Care Review  Plan of Care Reviewed With: patient, spouse  Outcome Evaluation: Pt seen and tolerated OT/PT cotx this AM. Upon entering room spouse verb'd he has been asking to get up OOB to the chair. Spouse also verb'd she is upset with the lack of therapy since admission. Pt has had mutliple medical issues and recent PEG surgery with fluctuation per notes w/ function and status. Pt is awake, but slow to comprehend instructions and respond. Pt was inconsistently following one step commands in the bed today, but upon sitting EOB, and practiciing transfer training AX2 to chair, Pt more alert and responsive. Pt still was only able to follow <50% simple commands today w/ multiple repititions required. Pt required increased time, cues, and Ax2 required for stands and transfers. Pt appears motivated and has good family support, continue OT services with [facility]: SNU when medically stable.     Time Calculation:         Time Calculation- OT       Row Name 01/07/25 1318             Time Calculation- OT    OT Start Time 1025  -BC      OT Stop Time 1051  -BC      OT Time Calculation (min) 26 min  -BC      Total Timed Code Minutes- OT 26 minute(s)  -BC      OT Received On 01/07/25  -BC      OT - Next Appointment 01/08/25  -BC      OT Goal Re-Cert Due Date 01/21/25  -BC         Timed Charges    59015 -  OT Neuromuscular Reeducation Minutes 26  -BC         Total Minutes    Timed Charges Total Minutes 26  -BC       Total Minutes 26  -BC                User Key  (r) = Recorded By, (t) = Taken By, (c) = Cosigned By      Initials Name Provider Type     Kevin Freitas OT Occupational Therapist                  Therapy Charges for Today       Code Description Service Date Service Provider Modifiers Qty    [id number] HC OT NEUROMUSC RE EDUCATION EA 15 MIN 1/7/2025 Kevin Pisano OT GO 2                 Kevin Pisano OT  1/7/2025

## 2025-01-07 NOTE — PROGRESS NOTES
LOS: 21 days     Chief Complaint: LISY    Subjective     History of Present Illness  Casey Snowden Sr. is a 89 y.o. male with history of Parkinson's disease and dementia.  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        Subjective  Patient still encephalopathic  Has had some large volume bladder scans, but patient has been able to void as they have moved him around. PVR has been under 300.  DHT in place.     12/21 - underwent LP this am. No other acute events overnight. Continues to be confused. 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     12/23 no acute events. Seems to be doing a bit better today.    12/24 seems to have had a difficult night. Now being bathe.     12/25 no acute events. Overall, patient looks better today. Second IV access accomplished.     12/26: Oriented to person place, recognizes family  Dobbhoff tube remains, on tube feeds, no new complaints    12/27: Still confused today but his family states he was much more awake alert and conversant yesterday  Remains on tube feeds with free water, on amnio drip    12/28: No new complaints or events, did not pass a swallow study and remains on tube feeds, labs pending.  Family states he was not as alert yesterday    12/29: Seen and examined at bedside, family at bedside, discussed plan with family.  More awake, still confused, remains on tube feeds overnight    12/30: Repeat swallow study pending, no new complaints, family at bedside    12/31: Family planning for PEG tube, no new complaints otherwise    1/2: Patient minimally responsive, nonverbal.  Family has decided to place PEG tube and pursue nursing home placement    1/3: Clinically about the same today, if anything seems a bit less responsive, family at bedside, PEG placement planned, tube feeds on hold    1/4  Pt noted sleepy, in no distress at all. Family at bed side. Says he was restless last night.    1/5  Patient noted  "comfortable and pleasant. Has no new complaint. Family member at bed side.     1/6 wife at bedside, feels like pt MS is worse, also worsening edema     1/7 patient still encephalopathic.no acute events noted.     History taken from:chart    Objective     Vital Sign Min/Max for last 24 hours  Temp  Min: 97.3 °F (36.3 °C)  Max: 98.2 °F (36.8 °C)   BP  Min: 134/67  Max: 155/104   Pulse  Min: 50  Max: 56   Resp  Min: 15  Max: 16   SpO2  Min: 94 %  Max: 100 %   No data recorded   Weight  Min: 84 kg (185 lb 3 oz)  Max: 84 kg (185 lb 3 oz)     Flowsheet Rows      Flowsheet Row First Filed Value   Admission Height --   Admission Weight 66.7 kg (147 lb 0.8 oz) Documented at 12/18/2024 0500            No intake/output data recorded.  I/O last 3 completed shifts:  In: 2461 [Other:1442; NG/GT:1019]  Out: -     Objective:  Vital signs: (most recent): Blood pressure 134/67, pulse 53, temperature 98.2 °F (36.8 °C), temperature source Oral, resp. rate 16, height 177.8 cm (70\"), weight 84 kg (185 lb 3 oz), SpO2 100%.             frail NAD   eomi no icterus   Moist mucus membranes   No jvd reg s1s2 no murmur   Clear anteriorly no rales/rhonchi/wheeze   Soft NTND + bs   ++ edema   Warm dry no rash         Results Review:     I reviewed the patient's new clinical results.    WBC WBC   Date Value Ref Range Status   01/07/2025 5.46 3.40 - 10.80 10*3/mm3 Final   01/06/2025 6.62 3.40 - 10.80 10*3/mm3 Final   01/05/2025 7.78 3.40 - 10.80 10*3/mm3 Final        HGB Hemoglobin   Date Value Ref Range Status   01/07/2025 8.5 (L) 13.0 - 17.7 g/dL Final   01/06/2025 8.5 (L) 13.0 - 17.7 g/dL Final   01/05/2025 8.0 (L) 13.0 - 17.7 g/dL Final        HCT Hematocrit   Date Value Ref Range Status   01/07/2025 26.6 (L) 37.5 - 51.0 % Final   01/06/2025 24.9 (L) 37.5 - 51.0 % Final   01/05/2025 24.8 (L) 37.5 - 51.0 % Final        Platlets No results found for: \"LABPLAT\"   MCV MCV   Date Value Ref Range Status   01/07/2025 101.1 (H) 79.0 - 97.0 fL Final " "  01/06/2025 97.3 (H) 79.0 - 97.0 fL Final   01/05/2025 99.2 (H) 79.0 - 97.0 fL Final            Sodium Sodium   Date Value Ref Range Status   01/07/2025 138 136 - 145 mmol/L Final   01/06/2025 137 136 - 145 mmol/L Final   01/05/2025 140 136 - 145 mmol/L Final      Potassium Potassium   Date Value Ref Range Status   01/07/2025 4.6 3.5 - 5.2 mmol/L Final   01/06/2025 4.6 3.5 - 5.2 mmol/L Final   01/05/2025 4.5 3.5 - 5.2 mmol/L Final      Chloride Chloride   Date Value Ref Range Status   01/07/2025 107 98 - 107 mmol/L Final   01/06/2025 106 98 - 107 mmol/L Final   01/05/2025 108 (H) 98 - 107 mmol/L Final      CO2 CO2   Date Value Ref Range Status   01/07/2025 25.0 22.0 - 29.0 mmol/L Final   01/06/2025 23.0 22.0 - 29.0 mmol/L Final   01/05/2025 23.0 22.0 - 29.0 mmol/L Final      BUN BUN   Date Value Ref Range Status   01/07/2025 35 (H) 8 - 23 mg/dL Final   01/06/2025 33 (H) 8 - 23 mg/dL Final   01/05/2025 38 (H) 8 - 23 mg/dL Final      Creatinine Creatinine   Date Value Ref Range Status   01/07/2025 0.82 0.76 - 1.27 mg/dL Final   01/06/2025 0.76 0.76 - 1.27 mg/dL Final   01/05/2025 0.81 0.76 - 1.27 mg/dL Final      Calcium Calcium   Date Value Ref Range Status   01/07/2025 8.0 (L) 8.2 - 9.6 mg/dL Final   01/06/2025 7.9 (L) 8.2 - 9.6 mg/dL Final   01/05/2025 7.9 (L) 8.2 - 9.6 mg/dL Final      PO4 No results found for: \"CAPO4\"   Albumin Albumin   Date Value Ref Range Status   01/07/2025 2.4 (L) 3.5 - 5.2 g/dL Final   01/06/2025 2.4 (L) 3.5 - 5.2 g/dL Final   01/05/2025 2.3 (L) 3.5 - 5.2 g/dL Final        Magnesium No results found for: \"MG\"       Uric Acid No results found for: \"URICACID\"     Medication Review:   Current medications reviewed, orders reviewed and updated    Assessment & Plan     Assessment & Plan  LISY, Prerenal and improved  Metabolic acidosis, stabilizing  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  Hypokalemia- replaced.  Mild Azotemia   Hypoalbuminemia   Afib with rvr       Plan:  Renal function, volume and electrolytes at goal  Cr at 0.76, Na at 137   Continue tube feeds   Edema likely related to low albumin   Will redose lasix today with albumin and see if this helps.     BP on b-blocker, off ARB  Continue electrolyte replacement, as needed    Dose all medications for GFR >50   Minimize all nephrotoxic agents including IV dye and NSAIDs  Monitor electrolytes and volume   Call with any questions or concerns           Cherri Parr MD  01/07/25  09:58 EST

## 2025-01-08 LAB
ALBUMIN SERPL-MCNC: 2.4 G/DL (ref 3.5–5.2)
ANION GAP SERPL CALCULATED.3IONS-SCNC: 8.7 MMOL/L (ref 5–15)
BUN SERPL-MCNC: 31 MG/DL (ref 8–23)
BUN/CREAT SERPL: 39.7 (ref 7–25)
CALCIUM SPEC-SCNC: 8.1 MG/DL (ref 8.2–9.6)
CHLORIDE SERPL-SCNC: 103 MMOL/L (ref 98–107)
CO2 SERPL-SCNC: 26.3 MMOL/L (ref 22–29)
CREAT SERPL-MCNC: 0.78 MG/DL (ref 0.76–1.27)
EGFRCR SERPLBLD CKD-EPI 2021: 84.7 ML/MIN/1.73
GLUCOSE BLDC GLUCOMTR-MCNC: 106 MG/DL (ref 70–130)
GLUCOSE BLDC GLUCOMTR-MCNC: 140 MG/DL (ref 70–130)
GLUCOSE BLDC GLUCOMTR-MCNC: 156 MG/DL (ref 70–130)
GLUCOSE SERPL-MCNC: 108 MG/DL (ref 65–99)
PHOSPHATE SERPL-MCNC: 3.8 MG/DL (ref 2.5–4.5)
POTASSIUM SERPL-SCNC: 4.1 MMOL/L (ref 3.5–5.2)
SODIUM SERPL-SCNC: 138 MMOL/L (ref 136–145)
WHOLE BLOOD HOLD SPECIMEN: NORMAL

## 2025-01-08 PROCEDURE — 82948 REAGENT STRIP/BLOOD GLUCOSE: CPT

## 2025-01-08 PROCEDURE — 25010000002 FUROSEMIDE PER 20 MG: Performed by: HOSPITALIST

## 2025-01-08 PROCEDURE — P9047 ALBUMIN (HUMAN), 25%, 50ML: HCPCS | Performed by: HOSPITALIST

## 2025-01-08 PROCEDURE — 25010000002 ALBUMIN HUMAN 25% PER 50 ML: Performed by: HOSPITALIST

## 2025-01-08 PROCEDURE — 63710000001 INSULIN REGULAR HUMAN PER 5 UNITS: Performed by: SURGERY

## 2025-01-08 PROCEDURE — 80069 RENAL FUNCTION PANEL: CPT | Performed by: SURGERY

## 2025-01-08 RX ORDER — ALBUMIN (HUMAN) 12.5 G/50ML
25 SOLUTION INTRAVENOUS ONCE
Status: COMPLETED | OUTPATIENT
Start: 2025-01-08 | End: 2025-01-08

## 2025-01-08 RX ORDER — FUROSEMIDE 10 MG/ML
40 INJECTION INTRAMUSCULAR; INTRAVENOUS ONCE
Status: COMPLETED | OUTPATIENT
Start: 2025-01-08 | End: 2025-01-08

## 2025-01-08 RX ADMIN — GUAIFENESIN 200 MG: 100 LIQUID ORAL at 09:44

## 2025-01-08 RX ADMIN — CARBIDOPA AND LEVODOPA 1 TABLET: 25; 250 TABLET ORAL at 21:08

## 2025-01-08 RX ADMIN — APIXABAN 5 MG: 5 TABLET, FILM COATED ORAL at 21:08

## 2025-01-08 RX ADMIN — GUAIFENESIN 200 MG: 100 LIQUID ORAL at 16:36

## 2025-01-08 RX ADMIN — FINASTERIDE 5 MG: 5 TABLET, FILM COATED ORAL at 09:45

## 2025-01-08 RX ADMIN — APIXABAN 5 MG: 5 TABLET, FILM COATED ORAL at 09:45

## 2025-01-08 RX ADMIN — CARBIDOPA AND LEVODOPA 1 TABLET: 25; 250 TABLET ORAL at 16:36

## 2025-01-08 RX ADMIN — INSULIN HUMAN 2 UNITS: 100 INJECTION, SOLUTION PARENTERAL at 05:20

## 2025-01-08 RX ADMIN — LEVOTHYROXINE SODIUM 75 MCG: 75 TABLET ORAL at 05:20

## 2025-01-08 RX ADMIN — METOPROLOL TARTRATE 12.5 MG: 25 TABLET, FILM COATED ORAL at 09:44

## 2025-01-08 RX ADMIN — ATORVASTATIN CALCIUM 40 MG: 20 TABLET, FILM COATED ORAL at 21:09

## 2025-01-08 RX ADMIN — GUAIFENESIN 200 MG: 100 LIQUID ORAL at 05:20

## 2025-01-08 RX ADMIN — Medication 1000 MCG: at 05:20

## 2025-01-08 RX ADMIN — FUROSEMIDE 40 MG: 10 INJECTION, SOLUTION INTRAMUSCULAR; INTRAVENOUS at 16:36

## 2025-01-08 RX ADMIN — GUAIFENESIN 200 MG: 100 LIQUID ORAL at 12:35

## 2025-01-08 RX ADMIN — TERAZOSIN HYDROCHLORIDE 1 MG: 1 CAPSULE ORAL at 21:08

## 2025-01-08 RX ADMIN — ALBUMIN (HUMAN) 25 G: 0.25 INJECTION, SOLUTION INTRAVENOUS at 16:38

## 2025-01-08 RX ADMIN — CARBIDOPA AND LEVODOPA 1 TABLET: 25; 250 TABLET ORAL at 09:45

## 2025-01-08 RX ADMIN — AMIODARONE HYDROCHLORIDE 200 MG: 200 TABLET ORAL at 21:08

## 2025-01-08 RX ADMIN — AMIODARONE HYDROCHLORIDE 200 MG: 200 TABLET ORAL at 09:45

## 2025-01-08 NOTE — PLAN OF CARE
Goal Outcome Evaluation:      No acute events for this shift. Call light within reach. Daughter at bedside. Plan of care ongoing. Wound care done.

## 2025-01-08 NOTE — PROGRESS NOTES
I spoke with daughter who states that family is choosing to pursue skilled nursing placement.  Little else to add, I will sign off.

## 2025-01-08 NOTE — CASE MANAGEMENT/SOCIAL WORK
Post-Acute Authorization Submission      Post Acute Pre-Cert Documentation  Request Submitted by Facility - Type:: Hospital  Post-Acute Authorization Type Submitted:: SNF  Date Post Acute Pre-Cert Inititated per Facility: 01/07/25  Accepting Facility: [facility] Discharge Date Requested: 01/08/25  All Clinicals Submitted?: Yes  Had Accepting Facility at Time of Submission: Yes  Response Communicated to::   Authorization Number:: PENDING [id number]              Steven Schmidt, LISA

## 2025-01-08 NOTE — PROGRESS NOTES
Name: Casey Snowden . ADMIT: 2024   : 1934  PCP: Aleksander Diez MD    MRN: [id number] LOS: 22 days   AGE/SEX: 89 y.o. male  ROOM: [facility]     Subjective   Subjective   Seemingly no new issues.  Still intermittently confused trying to get out of bed etc.  Denies any specific complaints other than some occasional abdominal pain.       Objective   Objective   Vital Signs  Temp:  [97.3 °F (36.3 °C)-97.9 °F (36.6 °C)] 97.9 °F (36.6 °C)  Heart Rate:  [48-57] 57  Resp:  [18] 18  BP: (123-146)/(48-61) 145/61  SpO2:  [100 %] 100 %  on  Flow (L/min) (Oxygen Therapy):  [3] 3;   Device (Oxygen Therapy): nasal cannula  Body mass index is 26.13 kg/m².  Physical Exam  Constitutional:       General: He is not in acute distress.     Appearance: He is ill-appearing.   Cardiovascular:      Rate and Rhythm: Normal rate and regular rhythm.      Heart sounds: No murmur heard.  Pulmonary:      Effort: No respiratory distress.   Abdominal:      General: There is no distension.      Palpations: Abdomen is soft.      Tenderness: There is abdominal tenderness.      Comments: PEG tube in place   Musculoskeletal:      Right lower leg: Edema present.      Left lower leg: Edema present.   Skin:     General: Skin is warm and dry.      Findings: No rash.   Neurological:      Mental Status: He is alert. He is disoriented.       Results Review     I reviewed the patient's new clinical results.  Results from last 7 days   Lab Units 25  0619 25  0818 25  0655   WBC 10*3/mm3 5.46 6.62 7.78   HEMOGLOBIN g/dL 8.5* 8.5* 8.0*   PLATELETS 10*3/mm3 235 230 199     Results from last 7 days   Lab Units 25  0646 25  0619 25  0818 25  0654   SODIUM mmol/L 138 138 137 140   POTASSIUM mmol/L 4.1 4.6 4.6 4.5   CHLORIDE mmol/L 103 107 106 108*   CO2 mmol/L 26.3 25.0 23.0 23.0   BUN mg/dL 31* 35* 33* 38*   CREATININE mg/dL 0.78 0.82 0.76 0.81   GLUCOSE mg/dL 108* 132* 120* 144*   EGFR mL/min/1.73 84.7  83.4 85.4 83.8     Results from last 7 days   Lab Units 01/08/25  0646 01/07/25  0619 01/06/25  0818 01/05/25  0654   ALBUMIN g/dL 2.4* 2.4* 2.4* 2.3*       Results from last 7 days   Lab Units 01/08/25  0646 01/07/25  0619 01/06/25  0818 01/05/25  0654   CALCIUM mg/dL 8.1* 8.0* 7.9* 7.9*   ALBUMIN g/dL 2.4* 2.4* 2.4* 2.3*   PHOSPHORUS mg/dL 3.8 3.5 3.2 3.0           Glucose   Date/Time Value Ref Range Status   01/08/2025 1117 140 (H) 70 - 130 mg/dL Final   01/08/2025 0515 156 (H) 70 - 130 mg/dL Final   01/07/2025 2321 138 (H) 70 - 130 mg/dL Final   01/07/2025 1839 123 70 - 130 mg/dL Final   01/07/2025 1548 152 (H) 70 - 130 mg/dL Final   01/07/2025 1122 138 (H) 70 - 130 mg/dL Final   01/07/2025 0615 154 (H) 70 - 130 mg/dL Final       No radiology results for the last day    I have personally reviewed all medications:  Scheduled Medications  amiodarone, 200 mg, Nasogastric, Q12H  apixaban, 5 mg, Oral, Q12H  atorvastatin, 40 mg, Nasogastric, Nightly  carbidopa-levodopa, 1 tablet, Nasogastric, TID  finasteride, 5 mg, Nasogastric, Daily  guaifenesin, 200 mg, Nasogastric, Q4H  insulin regular, 2-7 Units, Subcutaneous, Q6H  levothyroxine, 75 mcg, Nasogastric, Daily  metoprolol tartrate, 12.5 mg, Nasogastric, Q12H  [Held by provider] oxybutynin XL, 10 mg, Oral, Daily  terazosin, 1 mg, Nasogastric, Nightly  vitamin B-12, 1,000 mcg, Nasogastric, Q AM    Infusions     Diet  NPO Diet NPO Type: Tube Feeding    I have personally reviewed:  [x]  Laboratory   []  Microbiology   [x]  Radiology   []  EKG/Telemetry  []  Cardiology/Vascular   []  Pathology    []  Records       Assessment/Plan     Active Hospital Problems    Diagnosis  POA    **Unable to ambulate [R26.2]  Yes    Moderate protein-calorie malnutrition [E44.0]  Yes    LISY (acute kidney injury) [N17.9]  Yes    Herpes zoster without complication [B02.9]  Unknown    Falls frequently [R29.6]  Not Applicable    Acquired hypothyroidism [E03.9]  Yes    Secondary malignancy of  lymph nodes of head, 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       89 y.o. male admitted with generalized weakness, confusion and found to have RLE rash consistent with shingles.    AFVSS  100% on 3 L  Labs and blood sugar stable  Seen by psychiatry yesterday  Seroquel ordered PRN but not requested last evening  Wife requesting neurology to see again      Discussed with daughter at bedside.  Condition remains overall about the same.  He is very weak and unable to do much with physical therapy.  He seems to be tolerating his tube feedings.  Likely best be served by discharging to rehab facility.  He was seen by psychiatry yesterday with no change in medications.  Delirium precautions in place but not sure being followed.  Per wife request will ask neurology to see again before discharge.  Prognosis remains quite poor which I think the family is aware of.       Severe pharyngeal dysphagia  S/p PEG tube 1/3  Tolerating tube feedings    BLE edema  -Will redose Lasix with albumin    VZV encephalitis.   - Completed 14 days of acyclovir  - MRI negative   - Neurology and ID have signed off.    Pneumonia  Completed a 5 day course of meropenem     LISY/hypernatremia: resolved .   - Continue to hold Diovan and Ditropan  - Monitor urine output    Atrial fibrillation with RVR  Continue AC, no need for aspirin per cardiology.    Currently on metoprolol and amiodarone with mild intermittent  bradycardia  Previous issues with bradycardia secondary to beta-blocker    Parkinson disease: Continue Sinemet.       Eliquis (home med) for DVT prophylaxis.    DNR.  Discussed with patient, family, and nursing staff.  Anticipate discharge To [place] [facility] once precert has been obtained.    Expected Discharge Date: 1/9/2025; Expected Discharge Time:       Brian Doss MD  [facility]  01/08/25  14:39 EST

## 2025-01-08 NOTE — CASE MANAGEMENT/SOCIAL WORK
Continued Stay Note  [facility]     Patient Name: Casey Snowden Sr.  MRN: [id number]  Today's Date: 1/8/2025    Admit Date: 12/17/2024    Plan: Pre-cert pending for [facility].   Discharge Plan       Row Name 01/08/25 1552       Plan    Plan Pre-cert pending for [facility].    Patient/Family in Agreement with Plan yes    Plan Comments Spoke with spouse via outbound call. Reviewed current discharge plan to [facility] once pre-cert approved. Message from Steven/ Post acute authorization stating pre-cert still pending. CCP to follow for discharge planning.                   Discharge Codes    No documentation.                 Expected Discharge Date and Time       Expected Discharge Date Expected Discharge Time    Jan 9, 2025               Tennille Carrillo RN

## 2025-01-08 NOTE — CONSULTS
Nutrition Services    Patient Name:  Casey Snowden .  YOB: 1934  MRN: [id number]  Admit Date:  12/17/2024    Assessment Date:  01/08/25    Summary: TF follow up  Checking in on TF tolerance. 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 Psych signing off. Plan for pt to discharge to SNF.    See bolus TF recommendations at the bottom of this note.    Plan:  -continue TF at goal as ordered (continuous TF) while pt remains inpatient    CLINICAL NUTRITION ASSESSMENT      Reason for Assessment Follow-up Protocol     Diagnosis/Problem   Unable to ambulate, shingles, parkinson's disease, NPO with PEG for nutrition    Medical/Surgical History Past Medical History:   Diagnosis Date    Abdominal aortic aneurysm without rupture 10/14/2021    Acquired hypothyroidism 08/31/2022    Acute ischemic stroke 10/08/2023    Aneurysm     Arrhythmia     Atrial fibrillation     Cancer 04/2018    basil cell carcinoma    Carotid stenosis 10/29/2015    CHF (congestive heart failure)     Chronic deep vein thrombosis (DVT) of popliteal vein of right lower extremity     Clotting disorder     Coronary artery disease involving native coronary artery of native heart without angina pectoris 12/20/2018    DVT of lower extremity (deep venous thrombosis)     Hard of hearing     Hyperlipidemia     Hypertension     Localized edema 01/04/2017    Long-term use of high-risk medication     Metabolic encephalopathy 02/07/2023    Myocardial infarction 2918    LAD stent    PAD (peripheral artery disease) 09/10/2020    Parkinson's disease     Postphlebitic syndrome     Pure hypercholesterolemia     Skin tear of upper arm without complication, left, subsequent encounter     Stroke 11/02/2023       Past Surgical History:   Procedure Laterality Date    BASAL CELL CARCINOMA EXCISION  04/2018    CARDIAC CATHETERIZATION N/A 11/05/2018    Procedure: Left Heart Cath;  Surgeon: Oliverio Azar MD;  Location: [facility] CATH INVASIVE LOCATION;  Service:  Cardiovascular    CARDIAC CATHETERIZATION N/A 11/05/2018    Procedure: Coronary angiography;  Surgeon: Oliverio Azar MD;  Location: [facility] CATH INVASIVE LOCATION;  Service: Cardiovascular    CARDIAC CATHETERIZATION N/A 11/05/2018    Procedure: Left ventriculography;  Surgeon: Oliverio Azar MD;  Location: [facility] CATH INVASIVE LOCATION;  Service: Cardiovascular    CARDIAC CATHETERIZATION N/A 06/04/2019    Procedure: Left Heart Cath;  Surgeon: Johnny Glasgow MD;  Location: [facility] CATH INVASIVE LOCATION;  Service: Cardiovascular    CARDIAC CATHETERIZATION N/A 06/04/2019    Procedure: Coronary angiography;  Surgeon: Johnny Glasgow MD;  Location: [facility] CATH INVASIVE LOCATION;  Service: Cardiovascular    CARDIAC CATHETERIZATION N/A 06/04/2019    Procedure: Left ventriculography;  Surgeon: Johnny Glasgow MD;  Location: [facility] CATH INVASIVE LOCATION;  Service: Cardiovascular    CARDIAC CATHETERIZATION N/A 06/04/2019    Procedure: Stent BILL coronary;  Surgeon: Johnny Glasgow MD;  Location: [facility] CATH INVASIVE LOCATION;  Service: Cardiovascular    CARDIAC SURGERY      CAROTID ARTERY ANGIOPLASTY Right 10/11/2023    with stent    CAROTID STENT      CATARACT EXTRACTION Left 08/2018    CATARACT EXTRACTION Right 09/2018    COLONOSCOPY      2011    CORONARY STENT PLACEMENT      INCISION AND DRAINAGE LEG Right 07/07/2017    Procedure: INCISION AND DRAINAGE INFECTED HEMATOMA RIGHT THIGH;  Surgeon: Valentin Peña MD;  Location: [facility] MAIN OR;  Service:     JOINT REPLACEMENT      KNEE INCISION AND DRAINAGE Right 12/27/2016    Procedure: KNEE INCISION AND DRAINAGE;  Surgeon: Triston Whitten MD;  Location: [facility] MAIN OR;  Service:     NOSE SURGERY      nose replacement    SINUS SURGERY  1960    SKIN BIOPSY      SKIN GRAFT  12/2017    TOTAL KNEE ARTHROPLASTY Right     VASCULAR SURGERY      VENA CAVA FILTER PLACEMENT          Anthropometrics        Current Height  Current Weight  BMI kg/m2  "Height: 177.8 cm (70\")  Weight: 82.6 kg (182 lb 1.6 oz) (01/08/25 0515)  Body mass index is 26.13 kg/m².   Adjusted BMI (if applicable)    BMI Category Normal/Healthy (18.4 - 24.9)   Ideal Body Weight (IBW) 166#   Usual Body Weight (UBW) Unknown    Weight Trend Other: wt fluctuating 145-186# this admission, net fluid gain of ~23L since 12/25 per I/O documentation      Weight History Wt Readings from Last 30 Encounters:   01/08/25 0515 82.6 kg (182 lb 1.6 oz)   01/07/25 0500 84 kg (185 lb 3 oz)   01/02/25 0426 84.6 kg (186 lb 8.2 oz)   01/01/25 0520 84.3 kg (185 lb 13.6 oz)   12/31/24 0600 83.2 kg (183 lb 6.8 oz)   12/30/24 0412 78.6 kg (173 lb 4.5 oz)   12/29/24 0519 74.9 kg (165 lb 2 oz)   12/28/24 0408 74.2 kg (163 lb 9.3 oz)   12/27/24 0403 75.5 kg (166 lb 7.2 oz)   12/26/24 0658 74.5 kg (164 lb 3.9 oz)   12/25/24 0536 70 kg (154 lb 5.2 oz)   12/24/24 0505 65.4 kg (144 lb 2.9 oz)   12/23/24 0500 66.4 kg (146 lb 6.2 oz)   12/22/24 0510 68.7 kg (151 lb 7.3 oz)   12/21/24 0500 66 kg (145 lb 8.1 oz)   12/18/24 0500 66.7 kg (147 lb 0.8 oz)   11/25/24 1624 68.9 kg (152 lb)   11/13/24 1140 67.8 kg (149 lb 8 oz)   11/06/24 1336 66.9 kg (147 lb 8 oz)   08/27/24 1119 68.9 kg (152 lb)   08/14/24 1632 71.7 kg (158 lb)   08/14/24 1112 70 kg (154 lb 4.8 oz)   08/09/24 1256 70.3 kg (155 lb)   07/03/24 1105 68.4 kg (150 lb 11.2 oz)   07/02/24 0957 68.9 kg (152 lb)   05/29/24 1001 68.9 kg (152 lb)   05/28/24 1414 68 kg (149 lb 14.4 oz)   05/28/24 0920 68 kg (150 lb)   05/24/24 1352 79.4 kg (175 lb)   05/08/24 1355 70.3 kg (155 lb)   02/05/24 1322 74.8 kg (165 lb)   11/28/23 0909 72.1 kg (159 lb)   11/22/23 1253 76 kg (167 lb 9.6 oz)   11/20/23 1317 74.4 kg (164 lb)   11/09/23 1417 74.7 kg (164 lb 11.2 oz)   10/18/23 1256 72.6 kg (160 lb)   10/18/23 1511 75.8 kg (167 lb)   10/16/23 0601 75.9 kg (167 lb 5.3 oz)   10/15/23 0500 76.4 kg (168 lb 6.9 oz)   10/14/23 0651 78.2 kg (172 lb 6.4 oz)   10/13/23 0600 76 kg (167 lb 8.8 oz) "   10/12/23 0930 73.9 kg (163 lb)   10/12/23 0529 74.1 kg (163 lb 5.8 oz)   10/11/23 1132 75.8 kg (167 lb)   10/09/23 1154 76 kg (167 lb 8.8 oz)   08/29/23 1611 77.6 kg (171 lb)   08/15/23 1120 74.8 kg (165 lb)   04/20/23 1112 75.1 kg (165 lb 8 oz)   03/31/23 1242 78 kg (172 lb)   02/15/23 1259 77.6 kg (171 lb)   02/07/23 0651 76.9 kg (169 lb 8.5 oz)   02/07/23 0431 77 kg (169 lb 12.1 oz)   02/03/23 0928 77.6 kg (171 lb)        Estimated/Assessed Needs    Estimated 12/20, remains appropriate    Energy Requirements    EST Needs, Method, Wt used 5900-5817 kcal/day (30-35 kcal/kg, 66.7 kg)       Protein Requirements    EST Needs, Method, Wt used  g/day (1.2-1.5 g/kg, 66.7 kg)       Fluid Requirements     Estimated Needs (mL/day) 6807-7745 mL/day. Heart failure.     Labs       Pertinent Labs    Results from last 7 days   Lab Units 01/08/25  0646 01/07/25  0619 01/06/25  0818   SODIUM mmol/L 138 138 137   POTASSIUM mmol/L 4.1 4.6 4.6   CHLORIDE mmol/L 103 107 106   CO2 mmol/L 26.3 25.0 23.0   BUN mg/dL 31* 35* 33*   CREATININE mg/dL 0.78 0.82 0.76   CALCIUM mg/dL 8.1* 8.0* 7.9*   GLUCOSE mg/dL 108* 132* 120*     Results from last 7 days   Lab Units 01/08/25  0646 01/07/25  0619   PHOSPHORUS mg/dL 3.8 3.5   HEMOGLOBIN g/dL  --  8.5*   HEMATOCRIT %  --  26.6*   WBC 10*3/mm3  --  5.46   ALBUMIN g/dL 2.4* 2.4*     Results from last 7 days   Lab Units 01/07/25  0619 01/06/25  0818 01/05/25  0655   PLATELETS 10*3/mm3 235 230 199     COVID19   Date Value Ref Range Status   10/23/2022 Not Detected Not Detected - Ref. Range Final     Lab Results   Component Value Date    HGBA1C 5.40 12/18/2024          Medications           Scheduled Medications amiodarone, 200 mg, Nasogastric, Q12H  apixaban, 5 mg, Oral, Q12H  atorvastatin, 40 mg, Nasogastric, Nightly  carbidopa-levodopa, 1 tablet, Nasogastric, TID  finasteride, 5 mg, Nasogastric, Daily  guaifenesin, 200 mg, Nasogastric, Q4H  insulin regular, 2-7 Units, Subcutaneous,  Q6H  levothyroxine, 75 mcg, Nasogastric, Daily  metoprolol tartrate, 12.5 mg, Nasogastric, Q12H  [Held by provider] oxybutynin XL, 10 mg, Oral, Daily  terazosin, 1 mg, Nasogastric, Nightly  vitamin B-12, 1,000 mcg, Nasogastric, Q AM       Infusions       PRN Medications   acetaminophen **OR** acetaminophen **OR** acetaminophen    atropine    dextrose    dextrose    glucagon (human recombinant)    Magnesium Standard Dose Replacement - Follow Nurse / BPA Driven Protocol    metoprolol tartrate    Potassium Replacement - Follow Nurse / BPA Driven Protocol    QUEtiapine     Physical Findings          General Findings on oxygen therapy   Oral/Mouth Cavity tooth or teeth missing   Edema  2+ (mild), 3+ (moderate)  2+ arms  3+ scrotum, legs, ankles, feet   Gastrointestinal last bowel movement: 1/7   Skin  other: lesions, shingles   Tubes/Drains/Lines PEG   NFPE See Malnutrition Severity Assessment, Date Completed: 12/20  NFPE completed, consistent with nutrition diagnosis of moderate malnutrition using AND/ASPEN criteria of subcutaneous fat and muscle mass loss.        Malnutrition Severity Assessment      Patient meets criteria for : Moderate (non-severe) Malnutrition           Current Nutrition Orders & Evaluation of Intake       Oral Nutrition     Food Allergies NKFA   Current PO Diet NPO Diet NPO Type: Tube Feeding   Supplement n/a   PO Evaluation     % PO Intake NPO    Factors Affecting Intake: swallow impairment     PES STATEMENT / NUTRITION DIAGNOSIS      Nutrition Dx Problem  Moderate chronic disease related malnutrition related to multiple chronic diseases (heart failure, parkinson's dz) as evidenced by moderate muscle and fat wasting per NFPE.    --  NUTRITION INTERVENTION / PLAN OF CARE      Intervention Goal(s) Maintain TF/PN and Maintain weight         RD Intervention/Action Continue to monitor         Prescription/Orders:       PO Diet       Supplements       Enteral Nutrition   End Goal:    Isosource 1.5 at 65  mL/hr + 250 mL q4 FWF (flushes adjusted by MD)     Calories  2145 kcals (within range)    Protein  97 g (within range)    Free water  1087 mL   Flushes  1500 mL     The above end goal rate is for 22 hrs/day to assume interruptions for ADLs. Water flushes adjusted based on clinical picture + Rx flushes/IV fluids     ----------------------------------------------------------------------------------------    If preparing to discharge, recommend transition to bolus feeds.    End Goal:    Isosource 1.5, 6 cartons daily (1500 mL)     -> Consider giving 375 mL at 0800, 250 mL at 1100, 250 mL at 1400, 250 mL at 1700, and 375 mL at 2000.    Recommend flushing tube with 120 mL water before and after each bolus     Calories  2250 kcal (within range)   Protein  102 g (102% upper end of range)   Free water  1146 mL   Flushes  1200 mL     Water flushes adjusted based on clinical picture + Rx flushes/IV fluids           Parenteral Nutrition    New Prescription Ordered? No changes at this time   --      Monitor/Evaluation Per protocol, Pertinent labs, EN delivery/tolerance, Weight, POC/GOC, Swallow function   Discharge Plan/Needs Pending clinical course   --    RD to follow per protocol.      Electronically signed by:  Kathryn Montano RD  01/08/25 12:40 EST

## 2025-01-08 NOTE — PROGRESS NOTES
[facility]     Progress Note    Patient Name: Casey Snowden Sr.  : 1934  MRN: [id number]  Primary Care Physician:  Aleksander Diez MD  Date of admission: 2024    Subjective   Subjective     Chief Complaint: dee    History of Present Illness  Patient with dee and edema    Review of Systems    Objective   Objective     Vitals:   Temp:  [97.3 °F (36.3 °C)-97.9 °F (36.6 °C)] 97.9 °F (36.6 °C)  Heart Rate:  [48-57] 57  Resp:  [15-18] 18  BP: (123-148)/(48-63) 145/61  Flow (L/min) (Oxygen Therapy):  [2-3] 3    Physical Exam   General Appearance:  In no acute distress.    HEENT: Normal HEENT exam.     Extremities: .  Trace lower extremity edema.  2+ right upper extremity edema  Neurological: Patient is lethargic     Result Review    Result Review:  I have personally reviewed the results from the time of this admission to 2025 09:18 EST and agree with these findings:  []  Laboratory list / accordion  []  Microbiology  []  Radiology  []  EKG/Telemetry   []  Cardiology/Vascular   []  Pathology  []  Old records  []  Other:  Most notable findings include:       Assessment & Plan   Assessment / Plan     Brief Patient Summary:  Casey Snowden Sr. is a 89 y.o. male who has dee and edema    Active Hospital Problems:  Active Hospital Problems    Diagnosis     **Unable to ambulate     Moderate protein-calorie malnutrition     DEE (acute kidney injury)     Herpes zoster without complication     Falls frequently     Acquired hypothyroidism     Secondary malignancy of lymph nodes of head, face and neck     PAD (peripheral artery disease)     Orthostatic hypotension due to Parkinson's disease     Chronic diastolic (congestive) heart failure     Presence of IVC filter     Paroxysmal A-fib     Benign essential hypertension     Parkinson's disease     Uses hearing aid     Chronic deep vein thrombosis (DVT) of popliteal vein of right lower extremity      Plan:   DEE, Prerenal and improved  Metabolic acidosis,  stabilizing  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  Hypokalemia- replaced.  Mild Azotemia   Hypoalbuminemia   Afib with rvr     Patient seen and examined. Lethargic. Family at bedside. Labs and chart reviewed. Renal function normal.  With trace lower extremity edema. Continue lasix as needed. With RUE edema.  D/w nurse.  Likely u/s today. Will follow    Claudia Bran MD

## 2025-01-09 VITALS
DIASTOLIC BLOOD PRESSURE: 50 MMHG | HEIGHT: 70 IN | TEMPERATURE: 97.5 F | OXYGEN SATURATION: 99 % | SYSTOLIC BLOOD PRESSURE: 149 MMHG | WEIGHT: 180.34 LBS | BODY MASS INDEX: 25.82 KG/M2 | HEART RATE: 49 BPM | RESPIRATION RATE: 18 BRPM

## 2025-01-09 LAB
ALBUMIN SERPL-MCNC: 2.5 G/DL (ref 3.5–5.2)
ANION GAP SERPL CALCULATED.3IONS-SCNC: 7.1 MMOL/L (ref 5–15)
BUN SERPL-MCNC: 29 MG/DL (ref 8–23)
BUN/CREAT SERPL: 37.2 (ref 7–25)
CALCIUM SPEC-SCNC: 8.2 MG/DL (ref 8.2–9.6)
CHLORIDE SERPL-SCNC: 104 MMOL/L (ref 98–107)
CO2 SERPL-SCNC: 27.9 MMOL/L (ref 22–29)
CREAT SERPL-MCNC: 0.78 MG/DL (ref 0.76–1.27)
DEPRECATED RDW RBC AUTO: 48.8 FL (ref 37–54)
EGFRCR SERPLBLD CKD-EPI 2021: 84.7 ML/MIN/1.73
ERYTHROCYTE [DISTWIDTH] IN BLOOD BY AUTOMATED COUNT: 13.6 % (ref 12.3–15.4)
GLUCOSE BLDC GLUCOMTR-MCNC: 117 MG/DL (ref 70–130)
GLUCOSE BLDC GLUCOMTR-MCNC: 127 MG/DL (ref 70–130)
GLUCOSE BLDC GLUCOMTR-MCNC: 153 MG/DL (ref 70–130)
GLUCOSE BLDC GLUCOMTR-MCNC: 167 MG/DL (ref 70–130)
GLUCOSE SERPL-MCNC: 154 MG/DL (ref 65–99)
HCT VFR BLD AUTO: 25.5 % (ref 37.5–51)
HGB BLD-MCNC: 8.3 G/DL (ref 13–17.7)
MCH RBC QN AUTO: 32.7 PG (ref 26.6–33)
MCHC RBC AUTO-ENTMCNC: 32.5 G/DL (ref 31.5–35.7)
MCV RBC AUTO: 100.4 FL (ref 79–97)
PHOSPHATE SERPL-MCNC: 3.3 MG/DL (ref 2.5–4.5)
PLATELET # BLD AUTO: 209 10*3/MM3 (ref 140–450)
PMV BLD AUTO: 10.6 FL (ref 6–12)
POTASSIUM SERPL-SCNC: 4 MMOL/L (ref 3.5–5.2)
RBC # BLD AUTO: 2.54 10*6/MM3 (ref 4.14–5.8)
SODIUM SERPL-SCNC: 139 MMOL/L (ref 136–145)
WBC NRBC COR # BLD AUTO: 5.11 10*3/MM3 (ref 3.4–10.8)

## 2025-01-09 PROCEDURE — 97110 THERAPEUTIC EXERCISES: CPT

## 2025-01-09 PROCEDURE — 63710000001 INSULIN REGULAR HUMAN PER 5 UNITS: Performed by: SURGERY

## 2025-01-09 PROCEDURE — 82948 REAGENT STRIP/BLOOD GLUCOSE: CPT

## 2025-01-09 PROCEDURE — 85027 COMPLETE CBC AUTOMATED: CPT | Performed by: HOSPITALIST

## 2025-01-09 PROCEDURE — 97530 THERAPEUTIC ACTIVITIES: CPT

## 2025-01-09 PROCEDURE — 80069 RENAL FUNCTION PANEL: CPT | Performed by: SURGERY

## 2025-01-09 RX ORDER — ACETAMINOPHEN 325 MG/1
650 TABLET ORAL EVERY 4 HOURS PRN
Status: ON HOLD
Start: 2025-01-09

## 2025-01-09 RX ORDER — ATORVASTATIN CALCIUM 40 MG/1
40 TABLET, FILM COATED ORAL NIGHTLY
Status: ON HOLD
Start: 2025-01-09

## 2025-01-09 RX ORDER — FINASTERIDE 5 MG/1
5 TABLET, FILM COATED ORAL DAILY
Status: ON HOLD
Start: 2025-01-09

## 2025-01-09 RX ORDER — METOPROLOL TARTRATE 25 MG/1
12.5 TABLET, FILM COATED ORAL 2 TIMES DAILY
Status: ON HOLD
Start: 2025-01-09

## 2025-01-09 RX ORDER — VIT A/VIT C/VIT E/ZINC/COPPER 4296-226
1 CAPSULE ORAL 2 TIMES DAILY
Status: ON HOLD
Start: 2025-01-09

## 2025-01-09 RX ORDER — CARBIDOPA/LEVODOPA 25MG-250MG
1 TABLET ORAL 3 TIMES DAILY
Status: ON HOLD
Start: 2025-01-09

## 2025-01-09 RX ORDER — VIBEGRON 75 MG/1
75 TABLET, FILM COATED ORAL DAILY
Status: ON HOLD
Start: 2025-01-09

## 2025-01-09 RX ORDER — AMIODARONE HYDROCHLORIDE 200 MG/1
200 TABLET ORAL 2 TIMES DAILY
Start: 2025-01-09 | End: 2025-01-09

## 2025-01-09 RX ORDER — AMIODARONE HYDROCHLORIDE 200 MG/1
200 TABLET ORAL DAILY
Status: ON HOLD
Start: 2025-01-09

## 2025-01-09 RX ORDER — GUAIFENESIN 200 MG/10ML
200 LIQUID ORAL 4 TIMES DAILY PRN
Status: ON HOLD
Start: 2025-01-09

## 2025-01-09 RX ADMIN — INSULIN HUMAN 2 UNITS: 100 INJECTION, SOLUTION PARENTERAL at 06:36

## 2025-01-09 RX ADMIN — INSULIN HUMAN 2 UNITS: 100 INJECTION, SOLUTION PARENTERAL at 13:48

## 2025-01-09 RX ADMIN — GUAIFENESIN 200 MG: 100 LIQUID ORAL at 06:09

## 2025-01-09 RX ADMIN — AMIODARONE HYDROCHLORIDE 200 MG: 200 TABLET ORAL at 09:38

## 2025-01-09 RX ADMIN — GUAIFENESIN 200 MG: 100 LIQUID ORAL at 13:46

## 2025-01-09 RX ADMIN — GUAIFENESIN 200 MG: 100 LIQUID ORAL at 17:36

## 2025-01-09 RX ADMIN — FINASTERIDE 5 MG: 5 TABLET, FILM COATED ORAL at 09:38

## 2025-01-09 RX ADMIN — Medication 1000 MCG: at 06:10

## 2025-01-09 RX ADMIN — APIXABAN 5 MG: 5 TABLET, FILM COATED ORAL at 09:38

## 2025-01-09 RX ADMIN — CARBIDOPA AND LEVODOPA 1 TABLET: 25; 250 TABLET ORAL at 09:39

## 2025-01-09 RX ADMIN — LEVOTHYROXINE SODIUM 75 MCG: 75 TABLET ORAL at 06:10

## 2025-01-09 RX ADMIN — METOPROLOL TARTRATE 12.5 MG: 25 TABLET, FILM COATED ORAL at 09:38

## 2025-01-09 RX ADMIN — CARBIDOPA AND LEVODOPA 1 TABLET: 25; 250 TABLET ORAL at 17:36

## 2025-01-09 NOTE — DISCHARGE SUMMARY
Patient Name: Casey Snowden Sr.  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        Hospital Course     Mr. Snowden is a 89 y.o. male with a history of multiple chronic medical conditions who presented to [facility] initially complaining of confusion and a rash.  Please see the admitting history and physical for further details.  He was found to have shingles and was admitted to the hospital for further evaluation and treatment.  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  He was treated with 14 days of acyclovir and developed significant LISY.  He was seen by nephrology.  Acyclovir was dosed renally.  Losartan was discontinued and he was given IV fluids.  Fortunately had full recovery of his renal function.  He has some  generalized edema which is primarily nutritional.  He has low albumin.  He has received a few doses of Lasix here with some improvement.  Per nephrology he would be okay for as needed diuretic at rehab facility.  Due to severe dysphagia general surgery placed a PEG tube.  Tolerating tube feedings.  He has had waxing and waning mental status likely due to his Parkinson's, dementia and hospital delirium on top of his other multiple medical comorbidities.  At this point he seems stable and in need of rehab.  Plan will be to discharge to skilled nursing facility for rehabilitative care.  Family is hopeful to be able to start him on a diet and maybe eventually have the PEG tube removed.      Day of Discharge     Subjective:  See today's progress note    Physical Exam:  Temp:  [97.7 °F (36.5 °C)-98.2 °F (36.8 °C)] 97.7 °F (36.5 °C)  Heart Rate:  [48-53] 51  Resp:  [16-20] 18  BP: (118-154)/(51-62) 154/51  Body mass index is 25.88 kg/m².  Physical Exam    Consultants     Consult Orders (all) (From admission, onward)       Start     Ordered    01/08/25 0843  Inpatient Neurology Consult General  Once        Specialty:  Neurology  Provider:  Claudia Madison MD    01/08/25 0842    01/06/25 1403  Inpatient Psychiatrist Consult  Once        Specialty:  Psychiatry  Provider:  Kartik Mcrae III, MD    01/06/25 1403    12/31/24 1039  Inpatient Palliative Care Team Consult  Once        Provider:  (Not yet assigned)    12/31/24 1039    12/31/24 0953  Inpatient General Surgery Consult  Once        Specialty:  General Surgery  Provider:  Lisseth Weller MD    12/31/24 0952    12/20/24 1611  Inpatient Palliative Care Team Consult  Once        Provider:  (Not yet assigned)    12/20/24 1610    12/20/24 1422  Inpatient Infectious Diseases Consult  Once        Specialty:  Infectious Diseases  Provider:  Delroy Reed MD    12/20/24 1422    12/19/24 2332  Inpatient Nutrition Consult  Once        Provider:   (Not yet assigned)    12/19/24 2332    12/19/24 1217  Inpatient Cardiology Consult  Once        Specialty:  Cardiology  Provider:  Cecilio Armas III, MD    12/19/24 1216    12/19/24 1215  Inpatient Nephrology Consult  Once        Specialty:  Nephrology  Provider:  Joe Lyn MD    12/19/24 1215    12/18/24 0534  Inpatient Case Management  Consult  Once        Provider:  (Not yet assigned)    12/18/24 0533    12/17/24 2228  Inpatient Neurology Consult General  Once        Specialty:  Neurology  Provider:  Claudia Madison MD    12/17/24 2228                  Procedures     ESOPHAGOGASTRODUODENOSCOPY WITH PERCUTANEOUS ENDOSCOPIC GASTROSTOMY TUBE INSERTION    Imaging Results (All)       Procedure Component Value Units Date/Time    FL Video Swallow Single Contrast [[id number]] Collected: 12/27/24 2040     Updated: 12/27/24 2044    Narrative:      VIDEO SWALLOWING EXAMINATION BY SPEECH PATHOLOGY     Clinical: 89-year-old male with dysphagia.     Video swallowing examination performed under the direction of speech  pathology. Imaging reviewed by radiologist who concurs with the  findings.     Speech pathology summary:  Radiologist, Dr. Levy, present. Tracheal  aspiration of pharyngeal residue (amount of aspiration was  neither-trace-nor-gross) with honey thick liquids and pudding. Delayed  cough response.       Ft 1 min 46 sec ?4.88 mGy      This report was finalized on 12/27/2024 8:41 PM by Dr. Maddie Knight M.D  on Workstation: [id number]       XR Chest 1 View [[id number]] Collected: 12/24/24 1111     Updated: 12/24/24 1116    Narrative:      XR CHEST 1 VW-     Clinical: Wheezing and congestion     COMPARISON examination 12/22/2024     FINDINGS: There is a feeding tube in position as before, the right lung  is clear. The cardiomediastinal silhouette is stable. New infiltrate in  the left perihilar region extending into the left mid and lower lung  zone. There is vague opacity demonstrated  along the left costophrenic  sulcus as before, infiltrate/atelectasis or small pleural effusion. The  remainder is unremarkable.     This report was finalized on 12/24/2024 11:12 AM by Dr. Lico Dupree M.D on Workstation: [id number]       XR Chest 1 View [[id number]] Collected: 12/22/24 1447     Updated: 12/22/24 1453    Narrative:      XR CHEST 1 VW-     INDICATION: Concern for volume overload     COMPARISON: Chest radiographs dating back to 7/7/2017       Impression:      Enteric tube coursing below the diaphragm and outside the  exam field-of-view. Moderate left pleural effusion. Adjacent hazy left  lower lobe opacities, atelectasis versus infiltrate. No pneumothorax.  Normal size cardiac silhouette. Nondilated, distinct central pulmonary  vasculature without findings to suggest redistribution/edema. No focal  osseous abnormality.     This report was finalized on 12/22/2024 2:50 PM by Dr. Ford Levy M.D on Workstation: [id number]       IR LUMBAR PUNCTURE DIAGNOSTIC [[id number]] Collected: 12/21/24 0901     Updated: 12/21/24 0905    Narrative:      LUMBAR PUNCTURE FOR DIAGNOSIS UNDER FLUOROSCOPIC GUIDANCE     HISTORY: 89-year-old male with shingles. Severe confusion.     Following sterile prep and local anesthetic, a 20-gauge needle was  inserted into the lumbar subarachnoid space at L2-3 by Dr. Lange. 10  cc of clear colorless CSF was obtained and sent for studies as  requested. The patient tolerated the procedure well and there were no  immediate complications.     1 image was obtained and the fluoroscopy time measures 6 seconds. The  dose Kerma measures 3 mGy.     This report was finalized on 12/21/2024 9:02 AM by Dr. Feliciano Lange M.D on Workstation: [id number]       US Renal Bilateral [[id number]] Collected: 12/20/24 1810     Updated: 12/20/24 1820    Narrative:      ULTRASOUND RENAL BILATERAL     INDICATION: Acute renal insufficiency     COMPARISON: CT chest February 7, 2023     TECHNIQUE:  Real-time sonographic evaluation of the kidneys with  grayscale and color-flow imaging. Representative images were saved for  review.     FINDINGS:     RIGHT KIDNEY: 9.4 cm in length. Multiple simple appearing cysts, largest  in the inferior pole measuring 1.7 x 1.7 x 1.8 cm. No solid mass  identified. No hydronephrosis. Small amount of perinephric edema.     LEFT KIDNEY: 10.7 cm in length. Multiple simple cysts, largest in the  mid kidney measuring 1.6 x 1.8 x 1.6 cm. No hydronephrosis. Large,  mildly hyperechoic to kidney, solid mass seen lateral to the kidney  measuring 9.5 x 6.9 x 6.7 cm, with internal color signal/vascularity,  extends to the renal cortex. Small amount of perinephric edema.     BLADDER: Normal contour. Bilateral ureteral jets not seen.      Prostatomegaly with the prostate measuring 3.7 x 4.8 x 3.6 cm, with mild  mass effect on the bladder apex.     Other: Small right pleural effusion.          Impression:         1. Large solid mass seen lateral to and abutting the left kidney.  Recommend dedicated renal CT or MRI to further evaluate this mass.  2. Bilateral simple renal cysts.  3. No hydronephrosis.  4. Mild prostatomegaly              This report was finalized on 12/20/2024 6:17 PM by Dr. Aleksander Elkins M.D on Workstation: [id number]       MRI Brain With & Without Contrast [[id number]] Collected: 12/18/24 1519     Updated: 12/18/24 1532    Narrative:      MRI BRAIN W WO CONTRAST-     HISTORY:  zoster encephalitis; R26.2-Difficulty in walking, not  elsewhere classified; B02.8-Zoster with other complications;  G20.A1-Parkinson's disease without dyskinesia, without mention of  fluctuations     COMPARISON: MRI brain 10/9/2023 and CT head 5/24/2024     TECHNIQUE: A MRI examination of the brain was performed before and after  the intravenous administration of contrast utilizing sagittal T1, axial  diffusion, T1, T2, T2 FLAIR, gradient echo T2 as well as sagittal, axial  and coronal T1  postcontrast weighted sequences. The study is hampered  somewhat by patient motion.     FINDINGS: There is no evidence of restricted diffusion to suggest acute  infarction. The brain ventricles are symmetrical. There is  age-appropriate atrophy. The axial T2 FLAIR sequence demonstrates  increased signal intensity involving the periventricular and deep white  matter of the cerebral hemispheres bilaterally consistent with mild to  moderate small vessel ischemic disease. There is no evidence of T2 FLAIR  hyperintensity involving the medial aspects of the temporal lobes,  insular cortex or limbic structures to suggest herpes encephalitis.  After contrast administration there was no evidence of abnormal  enhancement.       Impression:      There is no evidence of abnormal T2 FLAIR hyperintensity to  suggest herpes encephalitis. There is no evidence of acute infarction,  mass or of abnormal enhancement. Age-appropriate atrophy and mild to  moderate small vessel ischemic disease is noted.     This report was finalized on 12/18/2024 3:29 PM by Dr. Liban Smith M.D on Workstation: [id number]             Pertinent Labs     Results from last 7 days   Lab Units 01/09/25 0407 01/07/25 0619 01/06/25  0818 01/05/25  0655   WBC 10*3/mm3 5.11 5.46 6.62 7.78   HEMOGLOBIN g/dL 8.3* 8.5* 8.5* 8.0*   PLATELETS 10*3/mm3 209 235 230 199     Results from last 7 days   Lab Units 01/09/25 0406 01/08/25 0646 01/07/25 0619 01/06/25  0818   SODIUM mmol/L 139 138 138 137   POTASSIUM mmol/L 4.0 4.1 4.6 4.6   CHLORIDE mmol/L 104 103 107 106   CO2 mmol/L 27.9 26.3 25.0 23.0   BUN mg/dL 29* 31* 35* 33*   CREATININE mg/dL 0.78 0.78 0.82 0.76   GLUCOSE mg/dL 154* 108* 132* 120*   EGFR mL/min/1.73 84.7 84.7 83.4 85.4     Results from last 7 days   Lab Units 01/09/25 0406 01/08/25 0646 01/07/25  0619 01/06/25  0818   ALBUMIN g/dL 2.5* 2.4* 2.4* 2.4*     Results from last 7 days   Lab Units 01/09/25 0406 01/08/25 0646 01/07/25  0619  "01/06/25  0818   CALCIUM mg/dL 8.2 8.1* 8.0* 7.9*   ALBUMIN g/dL 2.5* 2.4* 2.4* 2.4*   PHOSPHORUS mg/dL 3.3 3.8 3.5 3.2               Invalid input(s): \"LDLCALC\"          Test Results Pending at Discharge     Pending Results       None              Discharge Details        Discharge Medications        New Medications        Instructions Start Date   acetaminophen 325 MG tablet  Commonly known as: TYLENOL   650 mg, Per G Tube, Every 4 Hours PRN      amiodarone 200 MG tablet  Commonly known as: PACERONE   200 mg, Per G Tube, Daily      guaifenesin 100 MG/5ML liquid  Commonly known as: ROBITUSSIN   200 mg, Per G Tube, 4 Times Daily PRN      metoprolol tartrate 25 MG tablet  Commonly known as: LOPRESSOR   12.5 mg, Per G Tube, 2 Times Daily             Changes to Medications        Instructions Start Date   apixaban 5 MG tablet tablet  Commonly known as: Eliquis  What changed: See the new instructions.   5 mg, Per G Tube, Every 12 Hours Scheduled      atorvastatin 40 MG tablet  Commonly known as: LIPITOR  What changed: how to take this   40 mg, Per G Tube, Nightly      carbidopa-levodopa  MG per tablet  Commonly known as: SINEMET  What changed: how to take this   1 tablet, Per G Tube, 3 Times Daily      finasteride 5 MG tablet  Commonly known as: PROSCAR  What changed: how to take this   5 mg, Per G Tube, Daily      Gemtesa 75 MG tablet  Generic drug: Vibegron  What changed: how to take this   75 mg, Per G Tube, Daily      levothyroxine 75 MCG tablet  Commonly known as: SYNTHROID, LEVOTHROID  What changed: See the new instructions.   TAKE 1 TABLET BY MOUTH EVERY  MORNING FOR UNDERACTIVE THYROID      PreserVision AREDS capsule  What changed: how to take this   1 tablet, Per G Tube, 2 Times Daily             Continue These Medications        Instructions Start Date   atropine 1 % ophthalmic solution   1-2 drops, 3 Times Daily PRN      tamsulosin 0.4 MG capsule 24 hr capsule  Commonly known as: FLOMAX   1 capsule, " Daily      Vitamin B 12 500 MCG tablet   2 tablets, Every Early Morning             Stop These Medications      aspirin 81 MG EC tablet     valsartan 40 MG tablet  Commonly known as: DIOVAN              Allergies   Allergen Reactions    Penicillins Rash    Rocephin [Ceftriaxone] Rash       Discharge Disposition:  Skilled Nursing Facility (DC - External)      Discharge Diet:  Diet Order   Procedures    NPO Diet NPO Type: Tube Feeding       Discharge Activity:   Activity Instructions       Activity as Tolerated              CODE STATUS:    Code Status and Medical Interventions: No CPR (Do Not Attempt to Resuscitate); Full Support   Ordered at: 12/21/24 1006     Level Of Support Discussed With:    Next of Kin (If No Surrogate)     Code Status (Patient has no pulse and is not breathing):    No CPR (Do Not Attempt to Resuscitate)     Medical Interventions (Patient has pulse or is breathing):    Full Support       Future Appointments   Date Time Provider Department Center   1/31/2025 11:00 AM Cecilio Armas III, MD [facility]   4/2/2025 10:30 AM Cecilio Armas III, MD [facility]   11/11/2025 11:00 AM Tiffanie Sheldon APRN [facility]     Additional Instructions for the Follow-ups that You Need to Schedule       Discharge Follow-up with PCP   As directed       Currently Documented PCP:    Aleksander Diez MD    PCP Phone Number:    [phone]     Follow Up Details: 1 to 2 weeks (or sooner if problems)               Contact information for follow-up providers       Aleksander Diez MD .    Specialty: Family Medicine  Why: 1 to 2 weeks (or sooner if problems)  Contact information:  [address] [facility] [address] [phone]                       Contact information for after-discharge care       Destination       [facility] .    Service: Skilled Nursing  Contact information:  [address] [facility] [id number] [phone]                                    Additional Instructions for the Follow-ups that You Need to Schedule       Discharge Follow-up with PCP   As directed       Currently Documented PCP:    Aleksander Diez MD    PCP Phone Number:    [phone]     Follow Up Details: 1 to 2 weeks (or sooner if problems)            Time Spent on Discharge:  Greater than 30 minutes      Brian Doss MD  [facility]  01/09/25  12:28 EST

## 2025-01-09 NOTE — PLAN OF CARE
Goal Outcome Evaluation:        Problem: Adult Inpatient Plan of Care  Goal: Plan of Care Review  Outcome: Progressing     Problem: Skin Injury Risk Increased  Goal: Skin Health and Integrity  Outcome: Progressing     Problem: Fall Injury Risk  Goal: Absence of Fall and Fall-Related Injury  Outcome: Progressing     Problem: Pain Acute  Goal: Optimal Pain Control and Function  Intervention: Prevent or Manage Pain  Recent Flowsheet Documentation  Taken 1/8/2025 2001 by Aleksander Kellogg, RN  Sensory Stimulation Regulation: care clustered  Sleep/Rest Enhancement: awakenings minimized

## 2025-01-09 NOTE — CONSULTS
I was asked to assist the patient and spouse to complete an EMS/DNR.  It has been completed and placed in chart.

## 2025-01-09 NOTE — PLAN OF CARE
Goal Outcome Evaluation:  Plan of Care Reviewed With: patient        Progress: improving  Outcome Evaluation: Pt tolerated treatment well this date. Required mod-max A x2 for bed mobility and to stand. Pt completed 5 stands total, requiring slightly less assist by last stand. Knees buckled as pt was maintaining standing balance each time. Pt also completed a few seated LE exercises, and was encouraged to continue bed exercises on own during the day.    Anticipated Discharge Disposition (PT): skilled nursing facility

## 2025-01-09 NOTE — PROGRESS NOTES
Name: Casey Snowden . ADMIT: 2024   : 1934  PCP: Aleksander Diez MD    MRN: [id number] LOS: 23 days   AGE/SEX: 89 y.o. male  ROOM: [facility]     Subjective   Subjective   Seems a little more alert today.       Objective   Objective   Vital Signs  Temp:  [97.9 °F (36.6 °C)-98.2 °F (36.8 °C)] 97.9 °F (36.6 °C)  Heart Rate:  [48-53] 48  Resp:  [16-20] 18  BP: (118-154)/(51-62) 154/51  SpO2:  [96 %-100 %] 100 %  on  Flow (L/min) (Oxygen Therapy):  [2-3] 2;   Device (Oxygen Therapy): nasal cannula  Body mass index is 25.88 kg/m².  Physical Exam  Constitutional:       General: He is not in acute distress.     Appearance: He is ill-appearing.   Cardiovascular:      Rate and Rhythm: Normal rate and regular rhythm.      Heart sounds: No murmur heard.  Pulmonary:      Effort: No respiratory distress.   Abdominal:      General: There is no distension.      Palpations: Abdomen is soft.      Tenderness: There is abdominal tenderness.      Comments: PEG tube in place   Musculoskeletal:      Right lower leg: Edema present.      Left lower leg: Edema present.   Skin:     General: Skin is warm and dry.      Findings: Bruising present.   Neurological:      Mental Status: He is alert.       Results Review     I reviewed the patient's new clinical results.  Results from last 7 days   Lab Units 25  0407 25  0619 25  0818 25  0655   WBC 10*3/mm3 5.11 5.46 6.62 7.78   HEMOGLOBIN g/dL 8.3* 8.5* 8.5* 8.0*   PLATELETS 10*3/mm3 209 235 230 199     Results from last 7 days   Lab Units 25  0406 25  0646 25  0619 25  0818   SODIUM mmol/L 139 138 138 137   POTASSIUM mmol/L 4.0 4.1 4.6 4.6   CHLORIDE mmol/L 104 103 107 106   CO2 mmol/L 27.9 26.3 25.0 23.0   BUN mg/dL 29* 31* 35* 33*   CREATININE mg/dL 0.78 0.78 0.82 0.76   GLUCOSE mg/dL 154* 108* 132* 120*   EGFR mL/min/1.73 84.7 84.7 83.4 85.4     Results from last 7 days   Lab Units 25  0406 25  0646  01/07/25  0619 01/06/25  0818   ALBUMIN g/dL 2.5* 2.4* 2.4* 2.4*       Results from last 7 days   Lab Units 01/09/25  0406 01/08/25  0646 01/07/25  0619 01/06/25  0818   CALCIUM mg/dL 8.2 8.1* 8.0* 7.9*   ALBUMIN g/dL 2.5* 2.4* 2.4* 2.4*   PHOSPHORUS mg/dL 3.3 3.8 3.5 3.2           Glucose   Date/Time Value Ref Range Status   01/09/2025 0619 153 (H) 70 - 130 mg/dL Final   01/09/2025 0110 117 70 - 130 mg/dL Final   01/08/2025 1825 106 70 - 130 mg/dL Final   01/08/2025 1117 140 (H) 70 - 130 mg/dL Final   01/08/2025 0515 156 (H) 70 - 130 mg/dL Final   01/07/2025 2321 138 (H) 70 - 130 mg/dL Final   01/07/2025 1839 123 70 - 130 mg/dL Final       No radiology results for the last day    I have personally reviewed all medications:  Scheduled Medications  amiodarone, 200 mg, Nasogastric, Q12H  apixaban, 5 mg, Oral, Q12H  atorvastatin, 40 mg, Nasogastric, Nightly  carbidopa-levodopa, 1 tablet, Nasogastric, TID  finasteride, 5 mg, Nasogastric, Daily  guaifenesin, 200 mg, Nasogastric, Q4H  insulin regular, 2-7 Units, Subcutaneous, Q6H  levothyroxine, 75 mcg, Nasogastric, Daily  metoprolol tartrate, 12.5 mg, Nasogastric, Q12H  [Held by provider] oxybutynin XL, 10 mg, Oral, Daily  terazosin, 1 mg, Nasogastric, Nightly  vitamin B-12, 1,000 mcg, Nasogastric, Q AM    Infusions     Diet  NPO Diet NPO Type: Tube Feeding    I have personally reviewed:  [x]  Laboratory   []  Microbiology   [x]  Radiology   []  EKG/Telemetry  []  Cardiology/Vascular   []  Pathology    []  Records       Assessment/Plan     Active Hospital Problems    Diagnosis  POA    **Unable to ambulate [R26.2]  Yes    Moderate protein-calorie malnutrition [E44.0]  Yes    LISY (acute kidney injury) [N17.9]  Yes    Herpes zoster without complication [B02.9]  Unknown    Falls frequently [R29.6]  Not Applicable    Acquired hypothyroidism [E03.9]  Yes    Secondary malignancy of lymph nodes of head, 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       89 y.o. male admitted with generalized weakness, confusion and found to have RLE rash consistent with shingles.    Afebrile VSS  Still occasional brief drop in heart rate to upper 40s  Labs stable  Blood sugars well-controlled  Tolerating tube feedings      Plan discharge to rehab facility today if can arrange transportation.  Discussed with wife.      Severe pharyngeal dysphagia  S/p PEG tube 1/3  Tolerating tube feedings    BLE edema  -Will redose Lasix with albumin    VZV encephalitis.   - Completed 14 days of acyclovir  - MRI negative   - Neurology and ID have signed off.    Pneumonia  Completed a 5 day course of meropenem     LISY/hypernatremia: resolved .   - Continue to hold Diovan and Ditropan  - Monitor urine output    Atrial fibrillation with RVR  Continue AC, no need for aspirin per cardiology.    Currently on metoprolol and amiodarone with mild intermittent bradycardia  Previous issues with bradycardia secondary to beta-blocker    Parkinson disease: Continue Sinemet.       Eliquis (home med) for DVT prophylaxis.    DNR.  Discussed with patient, family, nursing staff, and CCP.  Anticipate discharge To [facility] today.  Expected Discharge Date: 1/9/2025; Expected Discharge Time:       Brian Doss MD  [facility]  01/09/25  08:23 EST

## 2025-01-09 NOTE — CASE MANAGEMENT/SOCIAL WORK
Post-Acute Authorization Submission      Post Acute Pre-Cert Documentation  Request Submitted by Facility - Type:: Hospital  Post-Acute Authorization Type Submitted:: SNF  Date Post Acute Pre-Cert Inititated per Facility: 01/07/25  Date Post Acute Pre-Cert Completed: 01/09/25  Accepting Facility: [facility] Discharge Date Requested: 01/08/25  All Clinicals Submitted?: Yes  Had Accepting Facility at Time of Submission: Yes  Response Received from Insurance?: Approval  Response Communicated to:: , Accepting Facility Liaison, Accepting Facility Auth Department  Authorization Number:: APPROVED [id number]  Post Acute Pre-Cert Initiated Comment: VALID TO ADMIT UPTO 1/11/25.              Steven Schmidt, PCT

## 2025-01-09 NOTE — THERAPY TREATMENT NOTE
[address]  eMERGENCY dEPARTMENT eNCOUnter   Independent Attestation     Pt Name: Eugene Cedillo  MRN: [id number]  [place] 1961  Date of evaluation: 3/10/23       Eugene Cedillo is a 64 y.o. male who presents with Back Pain        Based on the medical record, the care appears appropriate. I was personally available for consultation in the Emergency Department.     Adelina Krueger MD  Attending Emergency  Physician                Adelina Krueger MD  03/10/23 8621 Patient Name: Casey Snowden .  : 1934    MRN: [id number]                              Today's Date: 2025       Admit Date: 2024    Visit Dx:     ICD-10-CM ICD-9-CM   1. Unable to ambulate  R26.2 719.7   2. Herpes zoster with complication: S2 and S3 distribution on the right  B02.8 053.8   3. Parkinson's disease, unspecified whether dyskinesia present, unspecified whether manifestations fluctuate  G20.A1 332.0   4. Moderate protein-calorie malnutrition  E44.0 263.0   5. Peripheral arterial occlusive disease  I77.9 444.22     Patient Active Problem List   Diagnosis    Benign essential hypertension    Stenosis of carotid artery    Mixed hyperlipidemia    Parkinson's disease    Peripheral arterial occlusive disease    Screening for prostate cancer    Medication monitoring encounter    Melanoma    Chronic deep vein thrombosis (DVT) of popliteal vein of right lower extremity    Uses hearing aid    Paroxysmal A-fib    Chronic diastolic (congestive) heart failure    Anticoagulant long-term use    Presence of IVC filter    Medicare annual wellness visit, subsequent    Iron deficiency anemia secondary to inadequate dietary iron intake    Orthostatic hypotension due to Parkinson's disease    Coronary artery disease involving native coronary artery of native heart without angina pectoris    ST elevation myocardial infarction involving left anterior descending (LAD) coronary artery    Sialorrhea    Hypertensive heart disease with heart failure    PAD (peripheral artery disease)    Metastatic squamous cell carcinoma    Secondary malignancy of lymph nodes of head, face and neck    Encounter for antineoplastic immunotherapy    RBD (REM behavioral disorder)    Acquired hypothyroidism    Aspiration pneumonia of right lung due to vomit    Dysphagia, neurologic    ICAO (internal carotid artery occlusion), right    Arterial ischemic stroke, ICA (internal carotid artery), right, chroni    Renal mass, left     Abdominal aortic aneurysm    Disorientation    Falls frequently    Right inguinal hernia    Unable to ambulate    Herpes zoster without complication    LISY (acute kidney injury)    Moderate protein-calorie malnutrition     Past Medical History:   Diagnosis Date    Abdominal aortic aneurysm without rupture 10/14/2021    Acquired hypothyroidism 08/31/2022    Acute ischemic stroke 10/08/2023    Aneurysm     Arrhythmia     Atrial fibrillation     Cancer 04/2018    basil cell carcinoma    Carotid stenosis 10/29/2015    CHF (congestive heart failure)     Chronic deep vein thrombosis (DVT) of popliteal vein of right lower extremity     Clotting disorder     Coronary artery disease involving native coronary artery of native heart without angina pectoris 12/20/2018    DVT of lower extremity (deep venous thrombosis)     Hard of hearing     Hyperlipidemia     Hypertension     Localized edema 01/04/2017    Long-term use of high-risk medication     Metabolic encephalopathy 02/07/2023    Myocardial infarction 2918    LAD stent    PAD (peripheral artery disease) 09/10/2020    Parkinson's disease     Postphlebitic syndrome     Pure hypercholesterolemia     Skin tear of upper arm without complication, left, subsequent encounter     Stroke 11/02/2023     Past Surgical History:   Procedure Laterality Date    BASAL CELL CARCINOMA EXCISION  04/2018    CARDIAC CATHETERIZATION N/A 11/05/2018    Procedure: Left Heart Cath;  Surgeon: Oliverio Azar MD;  Location:  [facility] CATH INVASIVE LOCATION;  Service: Cardiovascular    CARDIAC CATHETERIZATION N/A 11/05/2018    Procedure: Coronary angiography;  Surgeon: Oliverio Azar MD;  Location:  [facility] CATH INVASIVE LOCATION;  Service: Cardiovascular    CARDIAC CATHETERIZATION N/A 11/05/2018    Procedure: Left ventriculography;  Surgeon: Oliverio Azar MD;  Location:  [facility] CATH INVASIVE LOCATION;  Service: Cardiovascular    CARDIAC CATHETERIZATION N/A 06/04/2019    Procedure: Left Heart Cath;   Surgeon: Johnny Glasgow MD;  Location: [facility] CATH INVASIVE LOCATION;  Service: Cardiovascular    CARDIAC CATHETERIZATION N/A 06/04/2019    Procedure: Coronary angiography;  Surgeon: Johnny Glasgow MD;  Location: [facility] CATH INVASIVE LOCATION;  Service: Cardiovascular    CARDIAC CATHETERIZATION N/A 06/04/2019    Procedure: Left ventriculography;  Surgeon: Johnny Glasgow MD;  Location: [facility] CATH INVASIVE LOCATION;  Service: Cardiovascular    CARDIAC CATHETERIZATION N/A 06/04/2019    Procedure: Stent BILL coronary;  Surgeon: Johnny Glasgow MD;  Location: [facility] CATH INVASIVE LOCATION;  Service: Cardiovascular    CARDIAC SURGERY      CAROTID ARTERY ANGIOPLASTY Right 10/11/2023    with stent    CAROTID STENT      CATARACT EXTRACTION Left 08/2018    CATARACT EXTRACTION Right 09/2018    COLONOSCOPY      2011    CORONARY STENT PLACEMENT      INCISION AND DRAINAGE LEG Right 07/07/2017    Procedure: INCISION AND DRAINAGE INFECTED HEMATOMA RIGHT THIGH;  Surgeon: Valentin Peña MD;  Location: [facility] OR;  Service:     JOINT REPLACEMENT      KNEE INCISION AND DRAINAGE Right 12/27/2016    Procedure: KNEE INCISION AND DRAINAGE;  Surgeon: Triston Whitten MD;  Location: [facility] OR;  Service:     NOSE SURGERY      nose replacement    SINUS SURGERY  1960    SKIN BIOPSY      SKIN GRAFT  12/2017    TOTAL KNEE ARTHROPLASTY Right     VASCULAR SURGERY      VENA CAVA FILTER PLACEMENT        General Information       Row Name 01/09/25 1310          Physical Therapy Time and Intention    Document Type therapy note (daily note)  -     Mode of Treatment physical therapy  -       Row Name 01/09/25 1310          General Information    Existing Precautions/Restrictions fall;oxygen therapy device and L/min  -       Row Name 01/09/25 1310          Cognition    Orientation Status (Cognition) oriented to;person;place  -               User Key  (r) = Recorded By, (t) = Taken By, (c) = Cosigned  By      Initials Name Provider Type    Tierra Johnson PTA Physical Therapist Assistant                   Mobility       Row Name 01/09/25 1310          Bed Mobility    Bed Mobility supine-sit;sit-supine  -     Supine-Sit [place] (Bed Mobility) maximum assist (25% patient effort);2 person assist  -     Sit-Supine [place] (Bed Mobility) moderate assist (50% patient effort)  -     Assistive Device (Bed Mobility) bed rails;head of bed elevated  -       Row Name 01/09/25 1310          Sit-Stand Transfer    Sit-Stand [place] (Transfers) moderate assist (50% patient effort);maximum assist (25% patient effort);2 person assist  -     Assistive Device (Sit-Stand Transfers) --  HHA  -     Comment, (Sit-Stand Transfer) completed 5 stands, slightly less assist required by the last, though knees buckled after maintaining several seconds  -               User Key  (r) = Recorded By, (t) = Taken By, (c) = Cosigned By      Initials Name Provider Type    Tierra Johnson PTA Physical Therapist Assistant                   Obj/Interventions       [facility] Name 01/09/25 1313          Motor Skills    Therapeutic Exercise --  seated AP, LAQ x10 reps  -               User Key  (r) = Recorded By, (t) = Taken By, (c) = Cosigned By      Initials Name Provider Type    Tierra Johnson PTA Physical Therapist Assistant                   Goals/Plan    No documentation.                  Clinical Impression       [facility] Name 01/09/25 1313          Pain    Pretreatment Pain Rating 0/10 - no pain  -     Posttreatment Pain Rating 0/10 - no pain  -SM       Row Name 01/09/25 1313          Positioning and Restraints    Pre-Treatment Position in bed  -     Post Treatment Position bed  -SM     In Bed supine;call light within reach;encouraged to call for assist;exit alarm on;with family/caregiver  -               User Key  (r) = Recorded By, (t) = Taken By, (c) = Cosigned By      Initials Name Provider  Type     Tierra Encarnacion PTA Physical Therapist Assistant                   Outcome Measures       Row Name 01/09/25 1314          How much help from another person do you currently need...    Turning from your back to your side while in flat bed without using bedrails? 2  -SM     Moving from lying on back to sitting on the side of a flat bed without bedrails? 2  -SM     Moving to and from a bed to a chair (including a wheelchair)? 1  -SM     Standing up from a chair using your arms (e.g., wheelchair, bedside chair)? 2  -SM     Climbing 3-5 steps with a railing? 1  -SM     To walk in hospital room? 1  -SM     AM-PAC 6 Clicks Score (PT) 9  -SM     Highest Level of Mobility Goal 3 --> Sit at edge of bed  -       Row Name 01/09/25 1314          Functional Assessment    Outcome Measure Options AM-PAC 6 Clicks Basic Mobility (PT)  -               User Key  (r) = Recorded By, (t) = Taken By, (c) = Cosigned By      Initials Name Provider Type    Tierra Johnson PTA Physical Therapist Assistant                                 Physical Therapy Education       Title: PT OT SLP Therapies (In Progress)       Topic: Physical Therapy (In Progress)       Point: Mobility training (In Progress)       Learning Progress Summary            Patient Acceptance, E,D, NR by  at 1/9/2025 1314    Acceptance, E,TB,D, NL,NR by  at 1/7/2025 1318    Acceptance, E,TB, VU,NR by TAMMY at 1/5/2025 0914    Acceptance, D,E, NR by STEVENSON at 12/31/2024 1654    Acceptance, E,D, NR by STEVENSON at 12/26/2024 1641    Acceptance, E,TB,D, NL,NR by  at 12/23/2024 1556    Acceptance, E, VU,NR by  at 12/18/2024 1606   Family Acceptance, D,E, NR by STEVENSON at 12/31/2024 1654    Acceptance, E,D, NR by STEVENSON at 12/26/2024 1641                      Point: Home exercise program (In Progress)       Learning Progress Summary            Patient Acceptance, E,D, NR by  at 1/9/2025 1314    Acceptance, E,TB,D, NL,NR by  at 1/7/2025 1318    Acceptance, D,E, NR by   at 12/31/2024 1654    Acceptance, E,D, NR by  at 12/26/2024 1641    Acceptance, E,TB,D, NL,NR by  at 12/23/2024 1556    Acceptance, E, VU,NR by  at 12/18/2024 1606   Family Acceptance, D,E, NR by  at 12/31/2024 1654    Acceptance, E,D, NR by  at 12/26/2024 1641                      Point: Body mechanics (In Progress)       Learning Progress Summary            Patient Acceptance, E,D, NR by  at 1/9/2025 1314    Acceptance, E,TB,D, NL,NR by  at 1/7/2025 1318    Acceptance, D,E, NR by  at 12/31/2024 1654    Acceptance, E,D, NR by  at 12/26/2024 1641    Acceptance, E,TB,D, NL,NR by  at 12/23/2024 1556    Acceptance, E, VU,NR by  at 12/18/2024 1606   Family Acceptance, D,E, NR by  at 12/31/2024 1654    Acceptance, E,D, NR by  at 12/26/2024 1641                      Point: Precautions (In Progress)       Learning Progress Summary            Patient Acceptance, E,D, NR by  at 1/9/2025 1314    Acceptance, E,TB,D, NL,NR by  at 1/7/2025 1318    Acceptance, D,E, NR by  at 12/31/2024 1654    Acceptance, E,D, NR by  at 12/26/2024 1641    Acceptance, E,TB,D, NL,NR by  at 12/23/2024 1556    Acceptance, E, VU,NR by  at 12/18/2024 1606   Family Acceptance, D,E, NR by  at 12/31/2024 1654    Acceptance, E,D, NR by  at 12/26/2024 1641                                      User Key       Initials Effective Dates Name Provider Type Discipline     06/16/21 -  Ashley Nayak, PT Physical Therapist PT     06/16/21 -  Alexandra Lucio, PT Physical Therapist PT     03/07/18 -  Tia Encarnacion PTA Physical Therapist Assistant PT     03/07/18 -  Tierra Encarnacion, PTA Physical Therapist Assistant PT     04/08/22 -  Debbie Connolly PT Physical Therapist PT                  PT Recommendation and Plan     Progress: improving  Outcome Evaluation: Pt tolerated treatment well this date. Required mod-max A x2 for bed mobility and to stand. Pt completed 5 stands total, requiring slightly less  assist by last stand. Knees buckled as pt was maintaining standing balance each time. Pt also completed a few seated LE exercises, and was encouraged to continue bed exercises on own during the day.     Time Calculation:         PT Charges       Row Name 01/09/25 1319             Time Calculation    Start Time 1114  -      Stop Time 1142  -      Time Calculation (min) 28 min  -      PT Received On 01/09/25  -      PT - Next Appointment 01/10/25  -                User Key  (r) = Recorded By, (t) = Taken By, (c) = Cosigned By      Initials Name Provider Type     Tierra Encarnacion PTA Physical Therapist Assistant                  Therapy Charges for Today       Code Description Service Date Service Provider Modifiers Qty    42413433023 HC PT THERAPEUTIC ACT EA 15 MIN 1/9/2025 Tierra Encarnacion PTA GP 1    45097959743 HC PT THER PROC EA 15 MIN 1/9/2025 Tierra Encarnacion PTA GP 1    39663162423 HC PT THER SUPP EA 15 MIN 1/9/2025 Tierra Encarnacion PTA GP 2            PT G-Codes  Outcome Measure Options: AM-PAC 6 Clicks Basic Mobility (PT)  AM-PAC 6 Clicks Score (PT): 9  AM-PAC 6 Clicks Score (OT): 8  PT Discharge Summary  Anticipated Discharge Disposition (PT): skilled nursing facility    Tierra Encarnacion PTA  1/9/2025

## 2025-01-09 NOTE — PROGRESS NOTES
[facility]     Progress Note    Patient Name: Casey Snowden Sr.  : 1934  MRN: [id number]  Primary Care Physician:  Aleksander Diez MD  Date of admission: 2024    Subjective   Subjective     Chief Complaint: dee    History of Present Illness  Patient with dee and edema    Review of Systems    Objective   Objective     Vitals:   Temp:  [97.7 °F (36.5 °C)-98.2 °F (36.8 °C)] 97.7 °F (36.5 °C)  Heart Rate:  [48-53] 51  Resp:  [16-20] 18  BP: (118-154)/(51-62) 154/51  Flow (L/min) (Oxygen Therapy):  [2-3] 2    Physical Exam       Result Review    Result Review:  I have personally reviewed the results from the time of this admission to 2025 10:53 EST and agree with these findings:  []  Laboratory list / accordion  []  Microbiology  []  Radiology  []  EKG/Telemetry   []  Cardiology/Vascular   []  Pathology  []  Old records  []  Other:  Most notable findings include:       Assessment & Plan   Assessment / Plan     Brief Patient Summary:  Casey Snowedn Sr. is a 89 y.o. male who has dee and edema    Active Hospital Problems:  Active Hospital Problems    Diagnosis     **Unable to ambulate     Moderate protein-calorie malnutrition     DEE (acute kidney injury)     Herpes zoster without complication     Falls frequently     Acquired hypothyroidism     Secondary malignancy of lymph nodes of head, face and neck     PAD (peripheral artery disease)     Orthostatic hypotension due to Parkinson's disease     Chronic diastolic (congestive) heart failure     Presence of IVC filter     Paroxysmal A-fib     Benign essential hypertension     Parkinson's disease     Uses hearing aid     Chronic deep vein thrombosis (DVT) of popliteal vein of right lower extremity      Plan:   DEE, Prerenal and improved  Metabolic acidosis, stabilizing  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  Hypokalemia- replaced.  Mild  Azotemia   Hypoalbuminemia   Afib with rvr     Patient chart reviewed. Renal function and electrolytes stable.  May use diuretics as needed. Will follow as needed    Claudia Bran MD

## 2025-01-09 NOTE — NURSING NOTE
Reason for Visit: WOC Team follow up to assess prior gluteal wound.   Patient resting in bed with tube feeds running. He is confused but calm and cooperative. Wife sitting in chair at bedside. Friction injury to buttock appears to be healing and improved, compared to photo taken when first noted. Stable and without drainage. A sacral optifoam dsg is in place and clean and dry. He is currently on a Pro Plus mattress for appropriate pressure redistribution.       Treatment Plan/Recommendations: Continue specialty mattress and sacral optifoam dressing as wound is resolving. Heels should be offloaded at all times. Continue wound care and prevention orders already in epic.       Wound Team Follow up Plan: Discussed with spouse at bedside and she is agreeable to plan.  Our dept will sign off. Please re-consult with any concerns or feel free to call our dept.

## 2025-01-10 NOTE — CASE MANAGEMENT/SOCIAL WORK
Case Management Discharge Note      Final Note: [facility] via [facility]    Provided Post Acute Provider List?: Yes  Post Acute Provider List: Home Health  Provided Post Acute Provider Quality & Resource List?: Yes  Post Acute Provider Quality and Resource List: Home Health  Delivered To: Support Person  Method of Delivery: In person    Selected Continued Care - Discharged on 1/9/2025 Admission date: 12/17/2024 - Discharge disposition: Skilled Nursing Facility (DC - External)      Destination Coordination complete.      Service Provider Services Address Phone Fax Patient Preferred    [facility] [address] [place] [address] RD, [place] [address] [phone] --              Durable Medical Equipment    No services have been selected for the patient.                Dialysis/Infusion    No services have been selected for the patient.                Home Medical Care    No services have been selected for the patient.                Therapy    No services have been selected for the patient.                Community Resources    No services have been selected for the patient.                Community & DME    No services have been selected for the patient.                         Final Discharge Disposition Code: 03 - skilled nursing facility (SNF)

## 2025-01-19 PROBLEM — J81.0 ACUTE PULMONARY EDEMA: Status: ACTIVE | Noted: 2025-01-19

## 2025-01-19 PROBLEM — R60.1 ANASARCA: Status: ACTIVE | Noted: 2025-01-19

## 2025-01-19 NOTE — ED PROVIDER NOTES
Subjective   History of Present Illness  This is a 89-year-old male with a past medical history recent hospitalization for herpes encephalitis, it sounds like he was intubated,  and had a NG tube which transition to a G-tube placement and was hospitalized for over 3 weeks patient presents today for a complaint of whole body swelling,.  He also has a continued rash on his abdomen which is now crusted over from the herpes infection.  Per wife states that the patient has not gotten worse since his last hospitalization.  However she notes that when he was discharged, the hospital staff had discussions about making the patient palliative care.  She denied this and that she wanted to take the patient home and attempt to get better at home        Review of Systems    Past Medical History:   Diagnosis Date    Abdominal aortic aneurysm without rupture 10/14/2021    Acquired hypothyroidism 08/31/2022    Acute ischemic stroke 10/08/2023    Aneurysm     Arrhythmia     Atrial fibrillation     Cancer 04/2018    basil cell carcinoma    Carotid stenosis 10/29/2015    CHF (congestive heart failure)     Chronic deep vein thrombosis (DVT) of popliteal vein of right lower extremity     Clotting disorder     Coronary artery disease involving native coronary artery of native heart without angina pectoris 12/20/2018    DVT of lower extremity (deep venous thrombosis)     Hard of hearing     Hyperlipidemia     Hypertension     Localized edema 01/04/2017    Long-term use of high-risk medication     Metabolic encephalopathy 02/07/2023    Myocardial infarction 2918    LAD stent    PAD (peripheral artery disease) 09/10/2020    Parkinson's disease     Postphlebitic syndrome     Pure hypercholesterolemia     Skin tear of upper arm without complication, left, subsequent encounter     Stroke 11/02/2023       Allergies   Allergen Reactions    Penicillins Rash    Rocephin [Ceftriaxone] Rash       Past Surgical History:   Procedure Laterality Date     BASAL CELL CARCINOMA EXCISION  04/2018    CARDIAC CATHETERIZATION N/A 11/05/2018    Procedure: Left Heart Cath;  Surgeon: Oliverio Azar MD;  Location: [facility] CATH INVASIVE LOCATION;  Service: Cardiovascular    CARDIAC CATHETERIZATION N/A 11/05/2018    Procedure: Coronary angiography;  Surgeon: Oliverio Azar MD;  [facility];  Service: Cardiovascular    CARDIAC CATHETERIZATION N/A 11/05/2018    Procedure: Left ventriculography;  Surgeon: Oliverio Azar MD;  Location: [facility] CATH INVASIVE LOCATION;  Service: Cardiovascular    CARDIAC CATHETERIZATION N/A 06/04/2019    Procedure: Left Heart Cath;  Surgeon: Johnny Glasgow MD;  Location: [facility] CATH INVASIVE LOCATION;  Service: Cardiovascular    CARDIAC CATHETERIZATION N/A 06/04/2019    Procedure: Coronary angiography;  Surgeon: Johnny Glasgow MD;  Location: [facility] CATH INVASIVE LOCATION;  Service: Cardiovascular    CARDIAC CATHETERIZATION N/A 06/04/2019    Procedure: Left ventriculography;  Surgeon: Johnny Glasgow MD;  Location: [facility] INVASIVE LOCATION;  Service: Cardiovascular    CARDIAC CATHETERIZATION N/A 06/04/2019    Procedure: Stent BILL coronary;  Surgeon: Johnny Glasgow MD;  Location: [facility] CATH INVASIVE LOCATION;  Service: Cardiovascular    CARDIAC SURGERY      CAROTID ARTERY ANGIOPLASTY Right 10/11/2023    with stent    CAROTID STENT      CATARACT EXTRACTION Left 08/2018    CATARACT EXTRACTION Right 09/2018    COLONOSCOPY      2011    CORONARY STENT PLACEMENT      ENDOSCOPY W/ PEG TUBE PLACEMENT N/A 1/3/2025    Procedure: ESOPHAGOGASTRODUODENOSCOPY WITH PERCUTANEOUS ENDOSCOPIC GASTROSTOMY TUBE INSERTION;  Surgeon: Konstantin Persaud MD;  Location: [facility] ENDOSCOPY;  Service: General;  Laterality: N/A;  pre: dysphagia  post: same    INCISION AND DRAINAGE LEG Right 07/07/2017    Procedure: INCISION AND DRAINAGE INFECTED HEMATOMA RIGHT THIGH;  Surgeon: Valentin Peña MD;  Location:   [facility];  Service:     JOINT REPLACEMENT      KNEE INCISION AND DRAINAGE Right 12/27/2016    Procedure: KNEE INCISION AND DRAINAGE;  Surgeon: Triston Whitten MD;  Location: [facility] MAIN OR;  Service:     NOSE SURGERY      nose replacement    SINUS SURGERY  1960    SKIN BIOPSY      SKIN GRAFT  12/2017    TOTAL KNEE ARTHROPLASTY Right     VASCULAR SURGERY      VENA CAVA FILTER PLACEMENT         Family History   Problem Relation Age of Onset    Heart attack Mother 74    Hypertension Mother     Cancer Sister     Stroke Brother     Cancer Brother     Cancer Brother     Deep vein thrombosis Neg Hx        Social History     Socioeconomic History    Marital status:     Years of education: college grad   Tobacco Use    Smoking status: Never     Passive exposure: Never    Smokeless tobacco: Never    Tobacco comments:     caff use   Vaping Use    Vaping status: Never Used   Substance and Sexual Activity    Alcohol use: No     Comment: none for a month    Drug use: No    Sexual activity: Not Currently     Partners: Female           Objective   Physical Exam  Vitals and nursing note reviewed. Exam conducted with a chaperone present.   Constitutional:       General: He is not in acute distress.     Appearance: He is ill-appearing (Acute on chronically). He is not toxic-appearing or diaphoretic.      Comments: Anasarcic, noisy breathing, laying in bed with decreased movement but has spontaneous movement of all extremities   HENT:      Head: Normocephalic and atraumatic.      Right Ear: Tympanic membrane normal.      Left Ear: Tympanic membrane normal.      Nose: Nose normal.      Mouth/Throat:      Pharynx: Oropharynx is clear. No oropharyngeal exudate or posterior oropharyngeal erythema.   Eyes:      Extraocular Movements: Extraocular movements intact.      Conjunctiva/sclera: Conjunctivae normal.      Pupils: Pupils are equal, round, and reactive to light.   Cardiovascular:      Rate and Rhythm: Regular rhythm.  Bradycardia present.   Pulmonary:      Effort: Pulmonary effort is normal. No respiratory distress.      Breath sounds: Normal breath sounds. No stridor. No wheezing, rhonchi or rales.   Abdominal:      General: Abdomen is flat. There is no distension.      Tenderness: There is no abdominal tenderness. There is no right CVA tenderness, left CVA tenderness, guarding or rebound.      Comments: 1+ pitting edema   Musculoskeletal:         General: No swelling, tenderness, deformity or signs of injury. Normal range of motion.      Cervical back: Normal range of motion. No rigidity or tenderness.      Right lower leg: Edema present.      Left lower leg: Edema present.   Skin:     General: Skin is warm and dry.      Capillary Refill: Capillary refill takes less than 2 seconds.      Findings: No erythema or rash.      Comments: There crusted lesions on the trunk on an erythematous base, no active herpes infection   Neurological:      General: No focal deficit present.      Mental Status: He is alert and oriented to person, place, and time. Mental status is at baseline.      Cranial Nerves: No cranial nerve deficit.      Sensory: No sensory deficit.      Motor: No weakness.   Psychiatric:         Mood and Affect: Mood normal.         Procedures           ED Course  ED Course as of 01/19/25 1751   Sun Jan 19, 2025   1750 Spoke with Dr. Doss about patient's case, patient to be admitted for continuity care due to patient preference for admission to [facility] versus [facility] or [facility] due to increased availability of resources.  Patient be admitted for pulmonary edema and anasarca currently pending 80 Lasix.  Accepting physician was Dr. Doss however patient to be admitted to Dr. talamantes [AK]      ED Course User Index  [AK] Kavon Reeves MD                                                       Medical Decision Making  This is a 89-year-old male presenting anasarcic, with signs of respiratory distress as he has noisy  breathing likely from pulmonary edema versus increased sputum in his throat.  He also has impressive bilateral lower extremity pitting edema.  Will start him on 80 of Lasix as he is taking Lasix at home and wife reports adequate diuresis however patient has presenting severely hypervolemic.  Patient will need admission for evaluation by cardiology and nephrology for an echocardiogram and needs to have the capacity to perform a heart cath which would be less likely at his age and given his chronic medical conditions    Anticipate admission to [facility] as wife did not want patient admitted to [facility] for continued care due to concern that they wanted him placed on palliative care.    Amount and/or Complexity of Data Reviewed  Labs: ordered.  Radiology: ordered.  ECG/medicine tests: ordered.    Risk  Prescription drug management.        Final diagnoses:   Anasarca   Acute pulmonary edema       ED Disposition  ED Disposition       ED Disposition   Transfer to Another Facility     Condition   --    Comment   --               No follow-up provider specified.       Medication List        Changed      levothyroxine 75 MCG tablet  Commonly known as: SYNTHROID, LEVOTHROID  TAKE 1 TABLET BY MOUTH EVERY  MORNING FOR UNDERACTIVE THYROID  What changed: See the new instructions.                 Kavon Reeves MD  01/19/25 1690

## 2025-01-20 PROBLEM — J81.1 PULMONARY EDEMA: Status: ACTIVE | Noted: 2025-01-20

## 2025-01-20 PROBLEM — B34.2 CORONAVIRUS INFECTION: Status: ACTIVE | Noted: 2025-01-01

## 2025-01-20 NOTE — H&P
Patient Name:  Casey Snowden Sr.  YOB: 1934  MRN:  [id number]  Admit Date:  1/19/2025  Patient Care Team:  Aleksander Diez MD as PCP - General  Joseph Acharya MD as Consulting Physician (Ophthalmology)  Ford Dorado MD as Consulting Physician (Vascular Surgery)  Cecilio Armas III, MD as Consulting Physician (Cardiology)  Kashif Goodwin MD as Consulting Physician (Neurology)  Enoch Lares MD (Hematology)  Kayden Chance MD (Dermatology)  Vinod Cadena MD as Consulting Physician (Neurosurgery)  Johnny Castellanos MD as Consulting Physician (Urology)  Inge Pedro PT as Physical Therapist (Physical Therapy)  Lucrecia Mccloud PT as Physical Therapist (Physical Therapy)  Nereyda Toledo OT as Occupational Therapist (Occupational Therapy)  Junior Taylor MD as Surgeon (Orthopedic Surgery)  Casey Power MD as Surgeon (Wound Care)  Nahomi Jacobo APRN as Nurse Practitioner (Nurse Practitioner)      Subjective   History Present Illness       History of Present Illness    Mr. Snowden is a 89-year-old male with a past medical history of hypertension, paroxysmal A-fib on Eliquis, congestive heart failure, and Parkinson's disease presented to [facility] with complaints of whole body swelling.  He was transferred to [facility] for further management of symptoms.  Patient moaning in bed intermittently and unable to answer questions.  Wife at bedside to provide all of history.  Ms. Snowden reports patient was in the hospital for 3 weeks with encephalitis and he has been at the [facility] re[facility] for approximately 1 week.  She reported that he had edema to his arms, abdomen, and legs today and this prompted him to go to the emergency room.  She reports that he has been getting up in a chair with the use of a lift at the [facility].  She reports that he does make conversation and did not want palliative care because his goal was to get  up and walk again.  She reports that he failed his speech dysphagia screening and he is currently still receiving tube feeding.  She is unable to recall the name of the tube feeding at this time.      Review of Systems   Reason unable to perform ROS: Complete review of systems rest patient is unable to answer questions.        Personal History     Past Medical History:   Diagnosis Date    Abdominal aortic aneurysm without rupture 10/14/2021    Acquired hypothyroidism 08/31/2022    Acute ischemic stroke 10/08/2023    Aneurysm     Arrhythmia     Atrial fibrillation     Cancer 04/2018    basil cell carcinoma    Carotid stenosis 10/29/2015    CHF (congestive heart failure)     Chronic deep vein thrombosis (DVT) of popliteal vein of right lower extremity     Clotting disorder     Coronary artery disease involving native coronary artery of native heart without angina pectoris 12/20/2018    DVT of lower extremity (deep venous thrombosis)     Dysphasia 12/2024    Hard of hearing     Hyperlipidemia     Hypertension     Localized edema 01/04/2017    Long-term use of high-risk medication     Metabolic encephalopathy 02/07/2023    Myocardial infarction 2918    LAD stent    PAD (peripheral artery disease) 09/10/2020    Parkinson's disease     Postphlebitic syndrome     Pure hypercholesterolemia     Shingles 2024    Skin tear of upper arm without complication, left, subsequent encounter     Stroke 11/02/2023    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     Past Surgical History:   Procedure Laterality Date    BASAL CELL CARCINOMA EXCISION  04/2018    CARDIAC CATHETERIZATION N/A 11/05/2018    Procedure: Left Heart Cath;  Surgeon: Oliverio Azar MD;  Location:  [facility];  Service: Cardiovascular    CARDIAC CATHETERIZATION N/A 11/05/2018    Procedure: Coronary angiography;  Surgeon: Oliverio Azar MD;  Location:  [facility];  Service: Cardiovascular    CARDIAC CATHETERIZATION N/A 11/05/2018     Procedure: Left ventriculography;  Surgeon: Oliverio Azar MD;  Location: [facility] CATH INVASIVE LOCATION;  Service: Cardiovascular    CARDIAC CATHETERIZATION N/A 06/04/2019    Procedure: Left Heart Cath;  Surgeon: Johnny Glasgow MD;  Location: [facility] CATH INVASIVE LOCATION;  Service: Cardiovascular    CARDIAC CATHETERIZATION N/A 06/04/2019    Procedure: Coronary angiography;  Surgeon: Johnny Glasgow MD;  Location: [facility] CATH INVASIVE LOCATION;  Service: Cardiovascular    CARDIAC CATHETERIZATION N/A 06/04/2019    Procedure: Left ventriculography;  Surgeon: Johnny Glasgow MD;  Location: [facility] CATH INVASIVE LOCATION;  Service: Cardiovascular    CARDIAC CATHETERIZATION N/A 06/04/2019    Procedure: Stent BILL coronary;  Surgeon: Johnny Glasgow MD;  Location: [facility] CATH INVASIVE LOCATION;  Service: Cardiovascular    CARDIAC SURGERY      CAROTID ARTERY ANGIOPLASTY Right 10/11/2023    with stent    CAROTID STENT      CATARACT EXTRACTION Left 08/2018    CATARACT EXTRACTION Right 09/2018    COLONOSCOPY      2011    CORONARY STENT PLACEMENT      ENDOSCOPY W/ PEG TUBE PLACEMENT N/A 1/3/2025    Procedure: ESOPHAGOGASTRODUODENOSCOPY WITH PERCUTANEOUS ENDOSCOPIC GASTROSTOMY TUBE INSERTION;  Surgeon: Konstantin Persaud MD;  Location: [facility] ENDOSCOPY;  Service: General;  Laterality: N/A;  pre: dysphagia  post: same    INCISION AND DRAINAGE LEG Right 07/07/2017    Procedure: INCISION AND DRAINAGE INFECTED HEMATOMA RIGHT THIGH;  Surgeon: Valentin Peña MD;  Location: [facility] OR;  Service:     JOINT REPLACEMENT      KNEE INCISION AND DRAINAGE Right 12/27/2016    Procedure: KNEE INCISION AND DRAINAGE;  Surgeon: Triston Whitten MD;  Location: [facility] MAIN OR;  Service:     NOSE SURGERY      nose replacement    SINUS SURGERY  1960    SKIN BIOPSY      SKIN GRAFT  12/2017    TOTAL KNEE ARTHROPLASTY Right     VASCULAR SURGERY      VENA CAVA FILTER PLACEMENT       Family History   Problem Relation  Age of Onset    Heart attack Mother 74    Hypertension Mother     Cancer Sister     Stroke Brother     Cancer Brother     Cancer Brother     Deep vein thrombosis Neg Hx      Social History     Tobacco Use    Smoking status: Never     Passive exposure: Never    Smokeless tobacco: Never    Tobacco comments:     caff use   Vaping Use    Vaping status: Never Used   Substance Use Topics    Alcohol use: No     Comment: none for a month    Drug use: No     Current Facility-Administered Medications on File Prior to Encounter   Medication Dose Route Frequency Provider Last Rate Last Admin    [COMPLETED] furosemide (LASIX) injection 80 mg  80 mg Intravenous Once Kavon Reeves MD   80 mg at 01/19/25 1705    [DISCONTINUED] sodium chloride 0.9 % flush 10 mL  10 mL Intravenous PRN Kavon Reeves MD         Current Outpatient Medications on File Prior to Encounter   Medication Sig Dispense Refill    acetaminophen (TYLENOL) 325 MG tablet Administer 2 tablets per G tube Every 4 (Four) Hours As Needed for Mild Pain.      amiodarone (PACERONE) 200 MG tablet Administer 1 tablet per G tube Daily.      apixaban (Eliquis) 5 MG tablet tablet Administer 1 tablet per G tube Every 12 (Twelve) Hours.      atorvastatin (LIPITOR) 40 MG tablet Administer 1 tablet per G tube Every Night. Indications: High Amount of Fats in the Blood      atropine 1 % ophthalmic solution 1-2 drops 3 (Three) Times a Day As Needed (as needed orally for increased secretions). Orally   Indications: excessive drooling      carbidopa-levodopa (SINEMET)  MG per tablet Administer 1 tablet per G tube 3 (Three) Times a Day. Indications: Parkinson's Disease      Cyanocobalamin (Vitamin B 12) 500 MCG tablet Take 2 tablets by mouth Every Morning. Indications: Inadequate Vitamin B12      finasteride (PROSCAR) 5 MG tablet Administer 1 tablet per G tube Daily.      furosemide (LASIX) 20 MG tablet Take 1 tablet by mouth Daily.      guaifenesin (ROBITUSSIN) 100 MG/5ML  liquid Administer 10 mL per G tube 4 (Four) Times a Day As Needed for Cough.      levothyroxine (SYNTHROID, LEVOTHROID) 75 MCG tablet TAKE 1 TABLET BY MOUTH EVERY  MORNING FOR UNDERACTIVE THYROID (Patient taking differently: Take 1 tablet by mouth Daily.) 90 tablet 3    metoprolol tartrate (LOPRESSOR) 25 MG tablet Administer 0.5 tablets per G tube 2 (Two) Times a Day.      Multiple Vitamins-Minerals (PreserVision AREDS) capsule Administer 1 tablet per G tube 2 (Two) Times a Day. Indications: supplement      tamsulosin (FLOMAX) 0.4 MG capsule 24 hr capsule Take 1 capsule by mouth Daily.      Vibegron (Gemtesa) 75 MG tablet Administer 1 tablet per G tube Daily.       Allergies   Allergen Reactions    Penicillins Rash    Rocephin [Ceftriaxone] Rash       Objective    Objective     Vital Signs  Temp:  [97.5 °F (36.4 °C)-97.9 °F (36.6 °C)] 97.9 °F (36.6 °C)  Heart Rate:  [51-59] 55  Resp:  [18-24] 18  BP: (153-178)/(66-90) 168/89  SpO2:  [93 %-100 %] 100 %  on  Flow (L/min) (Oxygen Therapy):  [2] 2;   Device (Oxygen Therapy): nasal cannula  There is no height or weight on file to calculate BMI.    Physical Exam  Vitals and nursing note reviewed.   Constitutional:       Appearance: He is ill-appearing.      Comments: Moaning intermittently.   Cardiovascular:      Rate and Rhythm: Normal rate and regular rhythm.      Heart sounds: Normal heart sounds.   Pulmonary:      Effort: No respiratory distress.      Comments: Anterior upper lobes clear, faint crackles noted to left lower lobe, diminished lung sounds to right lower lobe  Abdominal:      General: Bowel sounds are normal. There is no distension.      Palpations: Abdomen is soft.      Tenderness: There is no abdominal tenderness.      Comments: PEG tube noted to left upper quadrant small amount of erythema surrounding site.   Musculoskeletal:      Right lower leg: 3+ Edema present.      Left lower leg: 3+ Edema present.   Skin:     General: Skin is warm and dry.       Coloration: Skin is pale.      Comments: Numerous scabbed areas noted to abdomen         Results Review:  I reviewed the patient's new clinical results.  I reviewed the patient's new imaging results.  I reviewed the patient's other test results.  I personally viewed the patient's EKG/Telemetry data      Lab Results (last 24 hours)       Procedure Component Value Units Date/Time    CBC & Differential [[id number]]  (Abnormal) Collected: 01/19/25 1643    Specimen: Blood from Arm, Left Updated: 01/19/25 1654    Narrative:      The following orders were created for panel order CBC & Differential.  Procedure                               Abnormality         Status                     ---------                               -----------         ------                     CBC Auto Differential[[id number]]        Abnormal            Final result                 Please view results for these tests on the individual orders.    Comprehensive Metabolic Panel [[id number]]  (Abnormal) Collected: 01/19/25 1643    Specimen: Blood from Arm, Left Updated: 01/19/25 1710     Glucose 83 mg/dL      BUN 19 mg/dL      Creatinine 0.60 mg/dL      Sodium 131 mmol/L      Potassium 4.1 mmol/L      Chloride 98 mmol/L      CO2 25.4 mmol/L      Calcium 8.2 mg/dL      Total Protein 6.0 g/dL      Albumin 2.9 g/dL      ALT (SGPT) 22 U/L      AST (SGOT) 31 U/L      Alkaline Phosphatase 81 U/L      Total Bilirubin 0.3 mg/dL      Globulin 3.1 gm/dL      A/G Ratio 0.9 g/dL      BUN/Creatinine Ratio 31.7     Anion Gap 7.6 mmol/L      eGFR 91.7 mL/min/1.73     Narrative:      GFR Categories in Chronic Kidney Disease (CKD)      GFR Category          GFR (mL/min/1.73)    Interpretation  G1                     90 or greater         Normal or high (1)  G2                      60-89                Mild decrease (1)  G3a                   45-59                Mild to moderate decrease  G3b                   30-44                Moderate to severe decrease  G4                     15-29                Severe decrease  G5                    14 or less           Kidney failure          (1)In the absence of evidence of kidney disease, neither GFR category G1 or G2 fulfill the criteria for CKD.    eGFR calculation 2021 CKD-EPI creatinine equation, which does not include race as a factor    High Sensitivity Troponin T [[id number]]  (Abnormal) Collected: 01/19/25 1643    Specimen: Blood from Arm, Left Updated: 01/19/25 1710     HS Troponin T 34 ng/L     Narrative:      High Sensitive Troponin T Reference Range:  <14.0 ng/L- Negative Female for AMI  <22.0 ng/L- Negative Male for AMI  >=14 - Abnormal Female indicating possible myocardial injury.  >=22 - Abnormal Male indicating possible myocardial injury.   Clinicians would have to utilize clinical acumen, EKG, Troponin, and serial changes to determine if it is an Acute Myocardial Infarction or myocardial injury due to an underlying chronic condition.         Protime-INR [[id number]]  (Abnormal) Collected: 01/19/25 1643    Specimen: Blood from Arm, Left Updated: 01/19/25 1703     Protime 16.7 Seconds      INR 1.31    Narrative:      Therapeutic Ranges for INR: 2.0-3.0 (PT 20-30)                              2.5-3.5 (PT 25-34)    CBC Auto Differential [[id number]]  (Abnormal) Collected: 01/19/25 1643    Specimen: Blood from Arm, Left Updated: 01/19/25 1654     WBC 5.65 10*3/mm3      RBC 3.04 10*6/mm3      Hemoglobin 10.0 g/dL      Hematocrit 30.3 %      MCV 99.7 fL      MCH 32.9 pg      MCHC 33.0 g/dL      RDW 15.3 %      RDW-SD 55.6 fl      MPV 9.7 fL      Platelets 186 10*3/mm3      Neutrophil % 75.5 %      Lymphocyte % 11.2 %      Monocyte % 9.9 %      Eosinophil % 2.8 %      Basophil % 0.2 %      Immature Grans % 0.4 %      Neutrophils, Absolute 4.27 10*3/mm3      Lymphocytes, Absolute 0.63 10*3/mm3      Monocytes, Absolute 0.56 10*3/mm3      Eosinophils, Absolute 0.16 10*3/mm3      Basophils, Absolute 0.01 10*3/mm3      Immature Grans,  Absolute 0.02 10*3/mm3     Blood Gas, Arterial - [[id number]]  (Abnormal) Collected: 01/19/25 1735    Specimen: Arterial Blood Updated: 01/19/25 1743     Site Right Brachial     Gutierrez's Test N/A     pH, Arterial 7.497 pH units      Comment: 83 Value above reference range        pCO2, Arterial 37.1 mm Hg      pO2, Arterial 70.4 mm Hg      Comment: 84 Value below reference range        HCO3, Arterial 28.7 mmol/L      Comment: 83 Value above reference range        Base Excess, Arterial 5.2 mmol/L      Comment: 83 Value above reference range        O2 Saturation, Arterial 96.3 %      Hemoglobin, Blood Gas 9.8 g/dL      Comment: 84 Value below reference range        Temperature 37.0     Barometric Pressure for Blood Gas 745 mmHg      Modality RA     Collected by [id number]     Comment: Meter: [id number]     :  [id number]        pCO2, Temperature Corrected 37.1 mm Hg      pH, Temp Corrected 7.497 pH Units      pO2, Temperature Corrected 70.4 mm Hg     High Sensitivity Troponin T 1Hr [[id number]]  (Abnormal) Collected: 01/19/25 1820    Specimen: Blood from Arm, Left Updated: 01/19/25 1839     HS Troponin T 35 ng/L      Troponin T Numeric Delta 1 ng/L      Troponin T % Delta 3    Narrative:      High Sensitive Troponin T Reference Range:  <14.0 ng/L- Negative Female for AMI  <22.0 ng/L- Negative Male for AMI  >=14 - Abnormal Female indicating possible myocardial injury.  >=22 - Abnormal Male indicating possible myocardial injury.   Clinicians would have to utilize clinical acumen, EKG, Troponin, and serial changes to determine if it is an Acute Myocardial Infarction or myocardial injury due to an underlying chronic condition.         BNP [[id number]]  (Abnormal) Collected: 01/19/25 1820    Specimen: Blood from Arm, Left Updated: 01/19/25 2150     proBNP 2,242.0 pg/mL     Narrative:      This assay is used as an aid in the diagnosis of individuals suspected of having heart failure. It can be used as an aid in the  diagnosis of acute decompensated heart failure (ADHF) in patients presenting with signs and symptoms of ADHF to the emergency department (ED). In addition, NT-proBNP of <300 pg/mL indicates ADHF is not likely.    Age Range Result Interpretation  NT-proBNP Concentration (pg/mL:      <50             Positive            >450                   Gray                 300-450                    Negative             <300    50-75           Positive            >900                  Gray                300-900                  Negative            <300      >75             Positive            >1800                  Gray                300-1800                  Negative            <300    Basic Metabolic Panel [[id number]]  (Abnormal) Collected: 01/20/25 0055    Specimen: Blood Updated: 01/20/25 0127     Glucose 90 mg/dL      BUN 18 mg/dL      Creatinine 0.69 mg/dL      Sodium 135 mmol/L      Potassium 4.1 mmol/L      Chloride 97 mmol/L      CO2 29.0 mmol/L      Calcium 8.1 mg/dL      BUN/Creatinine Ratio 26.1     Anion Gap 9.0 mmol/L      eGFR 87.9 mL/min/1.73     Narrative:      GFR Categories in Chronic Kidney Disease (CKD)      GFR Category          GFR (mL/min/1.73)    Interpretation  G1                     90 or greater         Normal or high (1)  G2                      60-89                Mild decrease (1)  G3a                   45-59                Mild to moderate decrease  G3b                   30-44                Moderate to severe decrease  G4                    15-29                Severe decrease  G5                    14 or less           Kidney failure          (1)In the absence of evidence of kidney disease, neither GFR category G1 or G2 fulfill the criteria for CKD.    eGFR calculation 2021 CKD-EPI creatinine equation, which does not include race as a factor    CBC (No Diff) [[id number]]  (Abnormal) Collected: 01/20/25 0055    Specimen: Blood Updated: 01/20/25 0108     WBC 5.96 10*3/mm3      RBC 2.96 10*6/mm3   "    Hemoglobin 9.7 g/dL      Hematocrit 28.9 %      MCV 97.6 fL      MCH 32.8 pg      MCHC 33.6 g/dL      RDW 14.1 %      RDW-SD 49.2 fl      MPV 9.3 fL      Platelets 196 10*3/mm3     Procalcitonin [[id number]]  (Abnormal) Collected: 01/20/25 0055    Specimen: Blood Updated: 01/20/25 0134     Procalcitonin 0.38 ng/mL     Narrative:      As a Marker for Sepsis (Non-Neonates):    1. <0.5 ng/mL represents a low risk of severe sepsis and/or septic shock.  2. >2 ng/mL represents a high risk of severe sepsis and/or septic shock.    As a Marker for Lower Respiratory Tract Infections that require antibiotic therapy:    PCT on Admission    Antibiotic Therapy       6-12 Hrs later    >0.5                Strongly Recommended  >0.25 - <0.5        Recommended   0.1 - 0.25          Discouraged              Remeasure/reassess PCT  <0.1                Strongly Discouraged     Remeasure/reassess PCT    As 28 day mortality risk marker: \"Change in Procalcitonin Result\" (>80% or <=80%) if Day 0 (or Day 1) and Day 4 values are available. Refer to http://[email or web address]-pct-[email or web address]    Change in PCT <=80%  A decrease of PCT levels below or equal to 80% defines a positive change in PCT test result representing a higher risk for 28-day all-cause mortality of patients diagnosed with severe sepsis for septic shock.    Change in PCT >80%  A decrease of PCT levels of more than 80% defines a negative change in PCT result representing a lower risk for 28-day all-cause mortality of patients diagnosed with severe sepsis or septic shock.       Respiratory Panel PCR w/COVID-19(SARS-CoV-2) [facility] In-House, NP Swab in UTM/VTM, 2 HR TAT - Swab, Nasopharynx [[id number]] Collected: 01/20/25 0125    Specimen: Swab from Nasopharynx Updated: 01/20/25 0131            Imaging Results (Last 24 Hours)       ** No results found for the last 24 hours. **            Results for orders placed during the hospital encounter of 10/09/23    Adult " Transthoracic Echo Complete w/ Color, Spectral and Contrast if Necessary Per Protocol    Interpretation Summary    Left ventricular systolic function is mildly decreased. Calculated left ventricular EF = 44.7%    Left ventricular wall thickness is consistent with mild to moderate concentric hypertrophy.    The following left ventricular wall segments are hypokinetic: apical anterior and mid anteroseptal. The following left ventricular wall segments are akinetic: apical inferior, apical septal and apex.    Left ventricular diastolic function is consistent with (grade Ia w/high LAP) impaired relaxation.    There is calcification of the aortic valve.    Estimated right ventricular systolic pressure from tricuspid regurgitation is normal (<35 mmHg).    Telemetry Scan   Final Result        Assessment/Plan   Assessment & Plan   Active Hospital Problems    Diagnosis  POA    **Acute pulmonary edema [J81.0]  Yes    Anasarca [R60.1]  Yes    Chronic diastolic (congestive) heart failure [I50.32]  Yes    Anticoagulant long-term use [Z79.01]  Not Applicable    Paroxysmal A-fib [I48.0]  Yes    Benign essential hypertension [I10]  Yes    Parkinson's disease [G20.A1]  Yes      Resolved Hospital Problems   No resolved problems to display.     Mr. Snowden is a 89-year-old male with a past medical history of hypertension, paroxysmal A-fib on Eliquis, congestive heart failure, and Parkinson's disease presented to [facility] with complaints of whole body swelling.  He was transferred to [facility] for further management of symptoms.  Patient moaning in bed intermittently and unable to answer questions.  Wife at bedside to provide all of history.  Ms. Snowden reports patient was in the hospital for 3 weeks with encephalitis and he has been at the [facility] re[facility] for approximately 1 week.  She reported that he had edema to his arms, abdomen, and legs today and this prompted him to go to the emergency room.     Acute  pulmonary edema  Anasarca  Congestive heart failure  -Chest x-ray-enlarged cardiac silhouette, increased airspace opacities throughout both lung could be pulmonary edema or multifocal pneumonia, right pleural effusion  -BNP 2242  -Received Lasix 80 mg IV in the ED  -Check respiratory viral panel and procalcitonin  -Consult cardiology  -Check echocardiogram  -Strict I&O  -Daily weight  -Telemetry monitoring  -Continuous pulse oximetry  -Supplemental oxygen as needed      Parkinson's disease  -Takes Sinemet at home, will continue    Paroxysmal A-fib  -EKG-sinus rhythm  -Currently takes Eliquis at home, will continue      Hypertension   -Most recent blood pressure 168/89, stable  -Monitor blood pressure    Will review and continue home meds once home medication reconciliation completed by nursing.    Long discussion with Ms. Snowden at bedside regarding CODE STATUS.  Ms. Snowden decided that she would like no chest compressions but is okay with intubation until she speaks with her other family.   Discussed palliative care with Ms. Snowden.  She reports that she spoke with palliative care on the patient's last admission, and is not interested in their services at this time.    SCDs for DVT prophylaxis.  Limited code: No chest compressions but okay with intubation  Discussed with patient and family.      RIDHDI Alvarado  [facility]  01/20/25  02:35 EST

## 2025-01-20 NOTE — PLAN OF CARE
A&Ox1-0. Admitted for pulmonary edema and anasarca. Remained with +4 pitting edema to bilateral lower extremities. Concern for redness around peg tube.   Problem: Adult Inpatient Plan of Care  Goal: Plan of Care Review  Outcome: Not Progressing   Goal Outcome Evaluation:

## 2025-01-20 NOTE — CONSULTS
Nutrition Services    Patient Name:  Casey Snowden .  YOB: 1934  MRN: [id number]  Admit Date:  1/19/2025Assessment Date:  01/20/25    CLINICAL NUTRITION ASSESSMENT    Comments: Consult for tube feeding assessment    89 yoM admitted with whole body swelling. PMH: hypertension, paroxysmal A-fib on Eliquis, congestive heart failure, and Parkinson's disease. Was in the hospital the last 3 weeks with encephalitis and then rehab for 1 week after. Reliant on tube feeding d/t dysphagia. PEG in place.   Labs and meds reviewed.     Initial Goal:  *initial goal conservative d/t risk of RFS     Nutren 1.5 at 10mL/hr + 30mL q6hr water flush      End Goal:    Nutren 1.5 at 60 mL/hr + 30mL q6hr water flush      Calories  1980 kcals (100%)    Protein  90 g (100%)    Free water  1003 mL   Flushes  120ml     The above end goal rate is for 22 hrs/day to assume interruptions for ADLs. Water flushes adjusted based on clinical picture + Rx flushes/IV fluids     PLAN  - initiate TF: Nutren 1.5 @10ml/hr and advance as tolerted 10ml q4 to goal rate of 60ml/hr   - FWF 30ml q6  - monitor tolerance/refeeding and replace electrolytes per protocol     RD to follow tolerance, labs and clinical course.     Encounter Information         Reason for Encounter TFA    Admitting Diagnosis Acute pulmonary edema     Pertinent Medical History See above     Current Issues Dysphagia, PEG tube feedings      Current Nutrition Orders & Evaluation of Intake       Oral Nutrition     Food Allergies NKFA   Current PO Diet NPO Diet NPO Type: Strict NPO   Supplement -   PO Evaluation      % PO Intake NPO   --  Anthropometrics          Height    Weight    Weight: 77.1 kg (169 lb 15.6 oz) (01/20/25 0600)    BMI kg/m2 Body mass index is 23.05 kg/m².    BMI Category Normal/Healthy (18.4 - 24.9)    Weight History  Wt Readings from Last 15 Encounters:   01/20/25 0600 77.1 kg (169 lb 15.6 oz)   01/19/25 1545 83 kg (183 lb)   01/09/25 0415 81.8 kg (180 lb  5.4 oz)   01/08/25 0515 82.6 kg (182 lb 1.6 oz)   01/07/25 0500 84 kg (185 lb 3 oz)   01/02/25 0426 84.6 kg (186 lb 8.2 oz)   01/01/25 0520 84.3 kg (185 lb 13.6 oz)   12/31/24 0600 83.2 kg (183 lb 6.8 oz)   12/30/24 0412 78.6 kg (173 lb 4.5 oz)   12/29/24 0519 74.9 kg (165 lb 2 oz)   12/28/24 0408 74.2 kg (163 lb 9.3 oz)   12/27/24 0403 75.5 kg (166 lb 7.2 oz)   12/26/24 0658 74.5 kg (164 lb 3.9 oz)   12/25/24 0536 70 kg (154 lb 5.2 oz)   12/24/24 0505 65.4 kg (144 lb 2.9 oz)   12/23/24 0500 66.4 kg (146 lb 6.2 oz)   12/22/24 0510 68.7 kg (151 lb 7.3 oz)   12/21/24 0500 66 kg (145 lb 8.1 oz)   12/18/24 0500 66.7 kg (147 lb 0.8 oz)   11/25/24 1624 68.9 kg (152 lb)   11/13/24 1140 67.8 kg (149 lb 8 oz)   11/06/24 1336 66.9 kg (147 lb 8 oz)   08/27/24 1119 68.9 kg (152 lb)   08/14/24 1632 71.7 kg (158 lb)   08/14/24 1112 70 kg (154 lb 4.8 oz)   08/09/24 1256 70.3 kg (155 lb)   07/03/24 1105 68.4 kg (150 lb 11.2 oz)   07/02/24 0957 68.9 kg (152 lb)   05/29/24 1001 68.9 kg (152 lb)   05/28/24 1414 68 kg (149 lb 14.4 oz)   05/28/24 0920 68 kg (150 lb)        Estimated/Assessed Needs        Energy Requirements    Weight for Calculation 77.1 kg   Method for Estimation  25-30 kcal/kg   EST Needs (kcal/day) 0960-6490        Protein Requirements    Weight for Calculation 77.1 kg   EST Protein Needs (g/kg) 1.0 - 1.2 gm/kg   EST Daily Needs (g/day) 77-92        Fluid Requirements     Method for Estimation 1 mL/kcal    Estimated Needs (mL/day)          Physical Findings          Physical Appearance alert, disoriented, flat affect, on oxygen therapy   Oral/Mouth Cavity tooth or teeth missing   Edema  2+ (mild), 3+ (moderate), 4+ (severe)   Gastrointestinal abdominal distension   Skin  pressure injury: sacral spine st 1; right back and rught upper thoracic spine rash    Tubes/Drains/Lines PEG   NFPE Not indicated at this time     Labs        Pertinent Labs Reviewed, listed below     Results from last 7 days   Lab Units  01/20/25 0055 01/19/25  1643   SODIUM mmol/L 135* 131*   POTASSIUM mmol/L 4.1 4.1   CHLORIDE mmol/L 97* 98   CO2 mmol/L 29.0 25.4   BUN mg/dL 18 19   CREATININE mg/dL 0.69* 0.60*   CALCIUM mg/dL 8.1* 8.2   BILIRUBIN mg/dL  --  0.3   ALK PHOS U/L  --  81   ALT (SGPT) U/L  --  22   AST (SGOT) U/L  --  31   GLUCOSE mg/dL 90 83     Results from last 7 days   Lab Units 01/20/25 0055 01/19/25  1643   HEMOGLOBIN g/dL 9.7* 10.0*   HEMATOCRIT % 28.9* 30.3*   WBC 10*3/mm3 5.96 5.65   ALBUMIN g/dL  --  2.9*     Results from last 7 days   Lab Units 01/20/25 0055 01/19/25  1643   INR   --  1.31*   PLATELETS 10*3/mm3 196 186     COVID19   Date Value Ref Range Status   01/20/2025 Not Detected Not Detected - Ref. Range Final     Lab Results   Component Value Date    HGBA1C 5.40 12/18/2024          Medications            Scheduled Medications amiodarone, 200 mg, Per G Tube, Daily  apixaban, 5 mg, Per G Tube, Q12H  atorvastatin, 40 mg, Per G Tube, Nightly  carbidopa-levodopa, 1 tablet, Per G Tube, TID  finasteride, 5 mg, Per G Tube, Daily  levothyroxine, 75 mcg, Per G Tube, Daily  metoprolol tartrate, 12.5 mg, Per G Tube, BID  oxybutynin, 5 mg, Per G Tube, BID  sodium chloride, 10 mL, Intravenous, Q12H        Infusions      PRN Medications   acetaminophen    senna-docusate sodium **AND** polyethylene glycol **AND** bisacodyl **AND** bisacodyl    guaifenesin    nitroglycerin    ondansetron ODT **OR** ondansetron    sodium chloride    sodium chloride     PES STATEMENT / NUTRITION DIAGNOSIS      Nutrition Dx Problem  Problem: Swallowing Difficulty  Etiology: Medical Diagnosis - dysphagia   Signs/Symptoms: NPO, Report/Observation, and SLP/Swallow Evaluation    Comment:      PLAN OF CARE  Intervention Goal        Intervention Goal(s) Meet estimated needs, Initiate TF/PN, Tolerate TF/PN at goal, and No significant weight loss     Nutrition Intervention         RD Action Await initiation of EN/PN, Continue to monitor, Care plan reviewed,  and Recommend/order: EN     Nutrition Prescription          Recommendation       Enteral Prescription:     Enteral Route PEG    TF Delivery Method Continuous    Enteral Product Nutren 1.5    Modular None    Propofol Rate/Kcal     TF Start Rate/Volume  10 mL    TF Goal Rate/Volume 60 mL     Free Water Flush 30 mL Q 4    TF Provision at Goal:          Calories 1980 kcal, meets 100 % needs         Protein  90 gm protein, meets 100 % needs         Fluid (mL) 1003 mL free water + 120 mL in flushes    Prescription Ordered Yes     Monitor/Evaluation        Monitor Per protocol     RD to follow up per protocol.    Electronically signed by:  Sandy Sparrow RD  01/20/25 11:19 EST

## 2025-01-20 NOTE — CASE MANAGEMENT/SOCIAL WORK
Discharge Planning Assessment  [facility]     Patient Name: Casey Snowden Sr.  MRN: [id number]  Today's Date: 1/20/2025    Admit Date: 1/19/2025    Plan: Return to [facility] if able   Discharge Needs Assessment       Row Name 01/20/25 1427       Living Environment    People in Home facility resident    Current Living Arrangements extended care facility    Potentially Unsafe Housing Conditions none    In the past 12 months has the electric, gas, oil, or water company threatened to shut off services in your home? No    Primary Care Provided by other (see comments)    Provides Primary Care For no one    Family Caregiver if Needed spouse;other (see comments)    Family Caregiver Names Evonne Snowden wife [phone]    Quality of Family Relationships supportive    Able to Return to Prior Arrangements yes       Resource/Environmental Concerns    Resource/Environmental Concerns none    Transportation Concerns none       Transportation Needs    In the past 12 months, has lack of transportation kept you from medical appointments or from getting medications? no    In the past 12 months, has lack of transportation kept you from meetings, work, or from getting things needed for daily living? No       Food Insecurity    Within the past 12 months, the food you bought just didn't last and you didn't have money to get more. Never true       Transition Planning    Patient/Family Anticipates Transition to long-term care facility    Patient/Family Anticipated Services at Transition skilled nursing    Transportation Anticipated other (see comments)       Discharge Needs Assessment    Readmission Within the Last 30 Days no previous admission in last 30 days    Equipment Currently Used at Home walker, rolling    Concerns to be Addressed discharge planning    Do you want help finding or keeping work or a job? Patient unable to answer    Do you want help with school or training? For example, starting or completing job  training or getting a high school diploma, GED or equivalent Patient unable to answer    Anticipated Changes Related to Illness inability to care for self    Equipment Needed After Discharge none    Provided Post Acute Provider List? N/A    N/A Provider List Comment Requested to return to [facility]    Provided Post Acute Provider Quality & Resource List? N/A    Offered/Gave Vendor List no    Current Discharge Risk cognitively impaired;physical impairment                   Discharge Plan       Row Name 01/20/25 1431       Plan    Plan Return to [facility], if able    Patient/Family in Agreement with Plan yes    Plan Comments IMM1/20/2025. Met with patient and family at bedside. Patient unable to respond to discharge planning questions. Face sheet verified. Patient had been a resident at [facility] and family requests patient to return if able. Spoke with Livia she will follow. Evonne Snowden wife [phone] is his health care surrogate. Will continue to monitor for new or changing discharge needs Geovanna PRETTY RN CCP                  Continued Care and Services - Admitted Since 1/19/2025       Destination       Service Provider Request Status Services Address Phone Fax Patient Preferred    [facility] Pending - Request Sent -- [address] [place] [address] [phone] --                  Selected Continued Care - Prior Encounters Includes continued care and service providers with selected services from prior encounters from 10/21/2024 to 1/20/2025      Discharged on 1/9/2025 Admission date: 12/17/2024 - Discharge disposition: Skilled Nursing Facility (DC - External)      Destination       Service Provider Services Address Phone Fax Patient Preferred    [facility] [address] [place] [address] [phone] --                          Expected Discharge Date and Time       Expected Discharge  Date Expected Discharge Time    Jan 23, 2025            Demographic Summary       Row Name 01/20/25 1427       General Information    Admission Type inpatient    Arrived From emergency department    Required Notices Provided Important Message from Medicare    Referral Source admission list    Reason for Consult discharge planning    Preferred Language English       Contact Information    Permission Granted to Share Info With family/designee  Evonne Snowden wife [phone]                   Functional Status       Row Name 01/20/25 1427       Functional Status    Usual Activity Tolerance poor    Current Activity Tolerance poor       Functional Status, IADL    Medications completely dependent    Meal Preparation completely dependent    Housekeeping completely dependent    Laundry completely dependent    Shopping completely dependent    If for any reason you need help with day-to-day activities such as bathing, preparing meals, shopping, managing finances, etc., do you get the help you need? Patient unable to answer       Mental Status    General Appearance WDL WDL       Mental Status Summary    Recent Changes in Mental Status/Cognitive Functioning unable to assess       Employment/    Employment Status retired                   Psychosocial    No documentation.                  Abuse/Neglect    No documentation.                  Legal    No documentation.                  Substance Abuse    No documentation.                  Patient Forms    No documentation.                     Geovanna Rivas RN

## 2025-01-20 NOTE — PLAN OF CARE
Goal Outcome Evaluation:  Plan of Care Reviewed With: patient        Progress: no change  Outcome Evaluation: pt cont to have 4+ edema in BLE, on 2L NC, oriented to self only, spouse at bedside, echo complete today, will cont to monitor                             Problem: Adult Inpatient Plan of Care  Goal: Plan of Care Review  Outcome: Not Progressing  Flowsheets (Taken 1/20/2025 1559)  Progress: no change  Outcome Evaluation: pt cont to have 4+ edema in BLE, on 2L NC, oriented to self only, spouse at bedside, echo complete today, will cont to monitor  Plan of Care Reviewed With: patient  Goal: Patient-Specific Goal (Individualized)  Outcome: Not Progressing  Goal: Absence of Hospital-Acquired Illness or Injury  Outcome: Not Progressing  Intervention: Identify and Manage Fall Risk  Recent Flowsheet Documentation  Taken 1/20/2025 1558 by Stephanie López, RN  Safety Promotion/Fall Prevention:   activity supervised   assistive device/personal items within reach   clutter free environment maintained   fall prevention program maintained   nonskid shoes/slippers when out of bed   room organization consistent   safety round/check completed  Taken 1/20/2025 1400 by Stephanie López, RN  Safety Promotion/Fall Prevention:   activity supervised   assistive device/personal items within reach   clutter free environment maintained   fall prevention program maintained   nonskid shoes/slippers when out of bed   room organization consistent   safety round/check completed  Taken 1/20/2025 1221 by Stephanie López, RN  Safety Promotion/Fall Prevention:   activity supervised   assistive device/personal items within reach   clutter free environment maintained   fall prevention program maintained   nonskid shoes/slippers when out of bed   room organization consistent   safety round/check completed  Taken 1/20/2025 1000 by Stephanie López, RN  Safety Promotion/Fall Prevention:   activity supervised   assistive device/personal items within  reach   clutter free environment maintained   fall prevention program maintained   nonskid shoes/slippers when out of bed   room organization consistent   safety round/check completed  Taken 1/20/2025 0800 by Stephanie López RN  Safety Promotion/Fall Prevention:   activity supervised   assistive device/personal items within reach   clutter free environment maintained   fall prevention program maintained   nonskid shoes/slippers when out of bed   room organization consistent   safety round/check completed  Intervention: Prevent Infection  Recent Flowsheet Documentation  Taken 1/20/2025 1558 by Stephanie López RN  Infection Prevention: single patient room provided  Taken 1/20/2025 1400 by Stephanie López RN  Infection Prevention: single patient room provided  Taken 1/20/2025 1221 by Stephanie López RN  Infection Prevention: single patient room provided  Taken 1/20/2025 1000 by Stephanie López RN  Infection Prevention: single patient room provided  Taken 1/20/2025 0800 by Stephanie López RN  Infection Prevention: single patient room provided  Goal: Optimal Comfort and Wellbeing  Outcome: Not Progressing  Intervention: Provide Person-Centered Care  Recent Flowsheet Documentation  Taken 1/20/2025 1400 by Stephanie López RN  Trust Relationship/Rapport:   care explained   thoughts/feelings acknowledged  Taken 1/20/2025 0800 by Stephanie López RN  Trust Relationship/Rapport: care explained  Goal: Readiness for Transition of Care  Outcome: Not Progressing  Intervention: Mutually Develop Transition Plan  Recent Flowsheet Documentation  Taken 1/20/2025 1230 by Stephanie López RN  Equipment Currently Used at Home: walker, rolling

## 2025-01-20 NOTE — CONSULTS
[facility]                                                                                             Nephrology Initial Consult Note    Patient Identification:  Name: Casey Snowden Sr. MRN: [id number]  Age: 89 y.o. : 1934  Sex: male  Date:2025    Requesting Physician: As per consult order.  Reason for Consultation: Anasarca  Information from:patient/ family/ chart      History of Present Illness: This is a 89 y.o. year old male well-known to me from a previous admission last month.  That was actually fairly prolonged admission and we started following him on  due to LISY.  Creatinine peaked at 3.3 from a prior baseline of 0.8.  He was on acyclovir at the time but fortunately this turned out to be prerenal and his renal function had returned to baseline by discharge.  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  He developed urinary retention, hypernatremia that improved with increased free water.  He does have hypertension but his Diovan was on hold.  He received several doses of IV diuretics during that hospital stay with most of his swelling was attributed to third spacing from his low albumin.  Chest x-ray did not demonstrate pulmonary edema during that hospitalization.  Family has changed his CODE STATUS to DNR but their expectations about recovery still seem a bit unrealistic.    The following medical history and medications personally reviewed by me:    Problem List:     Acute pulmonary edema    Benign essential hypertension    Parkinson's disease    Paroxysmal A-fib    Chronic diastolic (congestive) heart failure    Anticoagulant long-term use    Anasarca    Coronavirus infection      Past Medical History:  Past Medical History:   Diagnosis Date    Abdominal aortic aneurysm without rupture 10/14/2021    Acquired hypothyroidism 2022     Acute ischemic stroke 10/08/2023    Aneurysm     Arrhythmia     Atrial fibrillation     Cancer 04/2018    basil cell carcinoma    Carotid stenosis 10/29/2015    CHF (congestive heart failure)     Chronic deep vein thrombosis (DVT) of popliteal vein of right lower extremity     Clotting disorder     Coronary artery disease involving native coronary artery of native heart without angina pectoris 12/20/2018    DVT of lower extremity (deep venous thrombosis)     Dysphasia 12/2024    Hard of hearing     Hyperlipidemia     Hypertension     Localized edema 01/04/2017    Long-term use of high-risk medication     Metabolic encephalopathy 02/07/2023    Myocardial infarction 2918    LAD stent    PAD (peripheral artery disease) 09/10/2020    Parkinson's disease     Postphlebitic syndrome     Pure hypercholesterolemia     Shingles 2024    Skin tear of upper arm without complication, left, subsequent encounter     Stroke 11/02/2023    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       Past Surgical History:  Past Surgical History:   Procedure Laterality Date    BASAL CELL CARCINOMA EXCISION  04/2018    CARDIAC CATHETERIZATION N/A 11/05/2018    Procedure: Left Heart Cath;  Surgeon: Oliverio Azar MD;  Location: [facility] CATH INVASIVE LOCATION;  Service: Cardiovascular    CARDIAC CATHETERIZATION N/A 11/05/2018    Procedure: Coronary angiography;  Surgeon: Oliverio Azar MD;  [facility];  Service: Cardiovascular    CARDIAC CATHETERIZATION N/A 11/05/2018    Procedure: Left ventriculography;  Surgeon: Oliverio Azar MD;  Location: [facility] CATH INVASIVE LOCATION;  Service: Cardiovascular    CARDIAC CATHETERIZATION N/A 06/04/2019    Procedure: Left Heart Cath;  Surgeon: Johnny Glasgow MD;  Location: [facility] CATH INVASIVE LOCATION;  Service: Cardiovascular    CARDIAC CATHETERIZATION N/A 06/04/2019    Procedure: Coronary angiography;  Surgeon: Johnny Glasgow MD;  Location: [facility] CATH INVASIVE LOCATION;   Service: Cardiovascular    CARDIAC CATHETERIZATION N/A 06/04/2019    Procedure: Left ventriculography;  Surgeon: Johnny Glasgow MD;  [facility];  Service: Cardiovascular    CARDIAC CATHETERIZATION N/A 06/04/2019    Procedure: Stent BILL coronary;  Surgeon: Johnny Glasgow MD;  Location:  [facility] C[facility]VASIVE [facility];  Service: Cardiovascular    CARDIAC SURGERY      CAROTID ARTERY ANGIOPLASTY Right 10/11/2023    with stent    CAROTID STENT      CATARACT EXTRACTION Left 08/2018    CATARACT EXTRACTION Right 09/2018    COLONOSCOPY      2011    CORONARY STENT PLACEMENT      ENDOSCOPY W/ PEG TUBE PLACEMENT N/A 1/3/2025    Procedure: ESOPHAGOGASTRODUODENOSCOPY WITH PERCUTANEOUS ENDOSCOPIC GASTROSTOMY TUBE INSERTION;  Surgeon: Konstantin Persaud MD;  Location: [facility] ENDOSCOPY;  Service: General;  Laterality: N/A;  pre: dysphagia  post: same    INCISION AND DRAINAGE LEG Right 07/07/2017    Procedure: INCISION AND DRAINAGE INFECTED HEMATOMA RIGHT THIGH;  Surgeon: Valentin Peña MD;  Location: [facility] MAIN OR;  Service:     JOINT REPLACEMENT      KNEE INCISION AND DRAINAGE Right 12/27/2016    Procedure: KNEE INCISION AND DRAINAGE;  Surgeon: Triston Whitten MD;  Location: [facility] MAIN OR;  Service:     NOSE SURGERY      nose replacement    SINUS SURGERY  1960    SKIN BIOPSY      SKIN GRAFT  12/2017    TOTAL KNEE ARTHROPLASTY Right     VASCULAR SURGERY      VENA CAVA FILTER PLACEMENT          Home Meds:   Medications Prior to Admission   Medication Sig Dispense Refill Last Dose/Taking    acetaminophen (TYLENOL) 325 MG tablet Administer 2 tablets per G tube Every 4 (Four) Hours As Needed for Mild Pain.       amiodarone (PACERONE) 200 MG tablet Administer 1 tablet per G tube Daily.       apixaban (Eliquis) 5 MG tablet tablet Administer 1 tablet per G tube Every 12 (Twelve) Hours.       atorvastatin (LIPITOR) 40 MG tablet Administer 1 tablet per G tube Every Night. Indications: High  Amount of Fats in the Blood       atropine 1 % ophthalmic solution 1-2 drops 3 (Three) Times a Day As Needed (as needed orally for increased secretions). Orally   Indications: excessive drooling       carbidopa-levodopa (SINEMET)  MG per tablet Administer 1 tablet per G tube 3 (Three) Times a Day. Indications: Parkinson's Disease       Cyanocobalamin (Vitamin B 12) 500 MCG tablet Take 2 tablets by mouth Every Morning. Indications: Inadequate Vitamin B12       finasteride (PROSCAR) 5 MG tablet Administer 1 tablet per G tube Daily.       furosemide (LASIX) 20 MG tablet Take 1 tablet by mouth Daily.       guaifenesin (ROBITUSSIN) 100 MG/5ML liquid Administer 10 mL per G tube 4 (Four) Times a Day As Needed for Cough.       levothyroxine (SYNTHROID, LEVOTHROID) 75 MCG tablet TAKE 1 TABLET BY MOUTH EVERY  MORNING FOR UNDERACTIVE THYROID (Patient taking differently: Take 1 tablet by mouth Daily.) 90 tablet 3     metoprolol tartrate (LOPRESSOR) 25 MG tablet Administer 0.5 tablets per G tube 2 (Two) Times a Day.       Multiple Vitamins-Minerals (PreserVision AREDS) capsule Administer 1 tablet per G tube 2 (Two) Times a Day. Indications: supplement       tamsulosin (FLOMAX) 0.4 MG capsule 24 hr capsule Take 1 capsule by mouth Daily.       Vibegron (Gemtesa) 75 MG tablet Administer 1 tablet per G tube Daily.          Current Meds:   Current Facility-Administered Medications   Medication Dose Route Frequency Provider Last Rate Last Admin    acetaminophen (TYLENOL) tablet 650 mg  650 mg Oral Q4H PRN Joanna Mishra, RIDDHI        amiodarone (PACERONE) tablet 200 mg  200 mg Per G Tube Daily Triston Cowart MD   200 mg at 01/20/25 1152    apixaban (ELIQUIS) tablet 5 mg  5 mg Per G Tube Q12H Triston Cowart MD   5 mg at 01/20/25 1151    atorvastatin (LIPITOR) tablet 40 mg  40 mg Per G Tube Nightly Triston Cowart MD        sennosides-docusate (PERICOLACE) 8.6-50 MG per tablet 2 tablet  2  tablet Oral BID PRN Joanna Mishra APRN        And    polyethylene glycol (MIRALAX) packet 17 g  17 g Oral Daily PRN Joanna Mishra APRN        And    bisacodyl (DULCOLAX) EC tablet 5 mg  5 mg Oral Daily PRN Joanna Mishra APRN        And    bisacodyl (DULCOLAX) suppository 10 mg  10 mg Rectal Daily PRN Joanna Mishra, APRN        carbidopa-levodopa (SINEMET)  MG per tablet 1 tablet  1 tablet Per G Tube TID Triston Cowart MD   1 tablet at 01/20/25 1151    finasteride (PROSCAR) tablet 5 mg  5 mg Per G Tube Daily Triston Cowart MD   5 mg at 01/20/25 1151    guaiFENesin (MUCINEX) 12 hr tablet 1,200 mg  1,200 mg Oral Q12H Triston Cowart MD        guaifenesin (ROBITUSSIN) 100 MG/5ML liquid 200 mg  200 mg Per G Tube 4x Daily PRN Triston Cowart MD        levothyroxine (SYNTHROID, LEVOTHROID) tablet 75 mcg  75 mcg Per G Tube Daily Triston Cowart MD   75 mcg at 01/20/25 1151    metoprolol tartrate (LOPRESSOR) tablet 12.5 mg  12.5 mg Per G Tube BID Triston Cowart MD   12.5 mg at 01/20/25 1153    nitroglycerin (NITROSTAT) SL tablet 0.4 mg  0.4 mg Sublingual Q5 Min PRN Joanna Mishra APRN        ondansetron ODT (ZOFRAN-ODT) disintegrating tablet 4 mg  4 mg Oral Q6H PRN Joanna Mishra APRABDOUL        Or    ondansetron (ZOFRAN) injection 4 mg  4 mg Intravenous Q6H PRN Joanna Mishra APRABDOUL        oxybutynin (DITROPAN) tablet 5 mg  5 mg Per G Tube BID Triston Cowart MD        sodium chloride 0.9 % flush 10 mL  10 mL Intravenous Q12H Joanna Mishra APRN   10 mL at 01/20/25 0938    sodium chloride 0.9 % flush 10 mL  10 mL Intravenous PRN Joanna Mishra APRN        sodium chloride 0.9 % infusion 40 mL  40 mL Intravenous PRN Joanna Mishra APRN        terazosin (HYTRIN) capsule 5 mg  5 mg Per G Tube Nightly Triston Cowart MD           Allergies:  Allergies   Allergen Reactions    Penicillins  "Rash    Rocephin [Ceftriaxone] Rash       Social History:   Social History     Socioeconomic History    Marital status:     Years of education: college grad   Tobacco Use    Smoking status: Never     Passive exposure: Never    Smokeless tobacco: Never    Tobacco comments:     caff use   Vaping Use    Vaping status: Never Used   Substance and Sexual Activity    Alcohol use: No     Comment: none for a month    Drug use: No    Sexual activity: Not Currently     Partners: Female        Family History:  Family History   Problem Relation Age of Onset    Heart attack Mother 74    Hypertension Mother     Cancer Sister     Stroke Brother     Cancer Brother     Cancer Brother     Deep vein thrombosis Neg Hx         Review of Systems: as per HPI, in addition:    Patient is too weak and confused to give any ROS  Physical Exam:  Vitals:   Temp (24hrs), Av.7 °F (36.5 °C), Min:97.5 °F (36.4 °C), Max:97.9 °F (36.6 °C)    /65 (BP Location: Right arm, Patient Position: Lying)   Pulse 53   Temp 97.7 °F (36.5 °C) (Oral)   Resp 18   Ht 182.9 cm (72\")   Wt 76.7 kg (169 lb)   SpO2 98%   BMI 22.92 kg/m²   Intake/Output:     Intake/Output Summary (Last 24 hours) at 2025 1410  Last data filed at 2025 1227  Gross per 24 hour   Intake 0 ml   Output 700 ml   Net -700 ml        Wt Readings from Last 1 Encounters:   25 1132 76.7 kg (169 lb)   25 0600 77.1 kg (169 lb 15.6 oz)       Exam:    General Appearance:  Awakens to voice, chronically ill-appearing   Head and Face:  Normocephalic, atraumatic   Eyes:  No icterus   ENMT: Moist mucosa   Neck: Supple  no jugular venous distention   Pulmonary:  Respiratory effort: Normal  Auscultation of lungs: Clear bilaterally  No wheezes or rhonchi  Good air movement, good expansion   Chest wall:  No tenderness or deformity   Cardiovascular:  Auscultation of the heart: Normal rhythm, no murmurs  1-2+ edema of bilateral lower extremities   Abdomen:  Abdomen: soft, " nontender, normal bowel sounds    Musculoskeletal: No joint swelling or gross deformities    Skin: Skin inspection and palpation: No rash   Lymphatic: No focal lymphadenopathy   Psychiatric: Judgement and insight: normal  Orientation to person place and time: normal  Mood and affect: normal       DATA:  Radiology and Labs:  The following labs independently reviewed by me, additional AM labs ordered  Old records independently reviewed showing previous LISY from prerenal  The following radiologic studies independently viewed by me, findings chest x-ray personally viewed I also personally viewed his previous chest x-ray from last admission, there is a notable change in the increased pulmonary vasculature  Interval notes, chart personally reviewed by me.  I have reviewed and summarized old records as detailed above  Plan of care discussed with patient himself at bedside, family provides most of the history  New problems include pulmonary edema      Risk/ complexity of medical care/ medical decision making: High complexity, IV diuretic management  Chronic illness with severe exacerbation or progression      Labs:   Recent Results (from the past 24 hours)   Comprehensive Metabolic Panel    Collection Time: 01/19/25  4:43 PM    Specimen: Arm, Left; Blood   Result Value Ref Range    Glucose 83 65 - 99 mg/dL    BUN 19 8 - 23 mg/dL    Creatinine 0.60 (L) 0.76 - 1.27 mg/dL    Sodium 131 (L) 136 - 145 mmol/L    Potassium 4.1 3.5 - 5.2 mmol/L    Chloride 98 98 - 107 mmol/L    CO2 25.4 22.0 - 29.0 mmol/L    Calcium 8.2 8.2 - 9.6 mg/dL    Total Protein 6.0 6.0 - 8.5 g/dL    Albumin 2.9 (L) 3.5 - 5.2 g/dL    ALT (SGPT) 22 1 - 41 U/L    AST (SGOT) 31 1 - 40 U/L    Alkaline Phosphatase 81 39 - 117 U/L    Total Bilirubin 0.3 0.0 - 1.2 mg/dL    Globulin 3.1 gm/dL    A/G Ratio 0.9 g/dL    BUN/Creatinine Ratio 31.7 (H) 7.0 - 25.0    Anion Gap 7.6 5.0 - 15.0 mmol/L    eGFR 91.7 >60.0 mL/min/1.73   High Sensitivity Troponin T    Collection  Time: 01/19/25  4:43 PM    Specimen: Arm, Left; Blood   Result Value Ref Range    HS Troponin T 34 (H) <22 ng/L   Protime-INR    Collection Time: 01/19/25  4:43 PM    Specimen: Arm, Left; Blood   Result Value Ref Range    Protime 16.7 (H) 12.1 - 15.0 Seconds    INR 1.31 (H) 0.90 - 1.10   Green Top (Gel)    Collection Time: 01/19/25  4:43 PM   Result Value Ref Range    Extra Tube Hold for add-ons.    Lavender Top    Collection Time: 01/19/25  4:43 PM   Result Value Ref Range    Extra Tube hold for add-on    Gold Top - SST    Collection Time: 01/19/25  4:43 PM   Result Value Ref Range    Extra Tube Hold for add-ons.    Light Blue Top    Collection Time: 01/19/25  4:43 PM   Result Value Ref Range    Extra Tube Hold for add-ons.    CBC Auto Differential    Collection Time: 01/19/25  4:43 PM    Specimen: Arm, Left; Blood   Result Value Ref Range    WBC 5.65 3.40 - 10.80 10*3/mm3    RBC 3.04 (L) 4.14 - 5.80 10*6/mm3    Hemoglobin 10.0 (L) 13.0 - 17.7 g/dL    Hematocrit 30.3 (L) 37.5 - 51.0 %    MCV 99.7 (H) 79.0 - 97.0 fL    MCH 32.9 26.6 - 33.0 pg    MCHC 33.0 31.5 - 35.7 g/dL    RDW 15.3 12.3 - 15.4 %    RDW-SD 55.6 (H) 37.0 - 54.0 fl    MPV 9.7 6.0 - 12.0 fL    Platelets 186 140 - 450 10*3/mm3    Neutrophil % 75.5 42.7 - 76.0 %    Lymphocyte % 11.2 (L) 19.6 - 45.3 %    Monocyte % 9.9 5.0 - 12.0 %    Eosinophil % 2.8 0.3 - 6.2 %    Basophil % 0.2 0.0 - 1.5 %    Immature Grans % 0.4 0.0 - 0.5 %    Neutrophils, Absolute 4.27 1.70 - 7.00 10*3/mm3    Lymphocytes, Absolute 0.63 (L) 0.70 - 3.10 10*3/mm3    Monocytes, Absolute 0.56 0.10 - 0.90 10*3/mm3    Eosinophils, Absolute 0.16 0.00 - 0.40 10*3/mm3    Basophils, Absolute 0.01 0.00 - 0.20 10*3/mm3    Immature Grans, Absolute 0.02 0.00 - 0.05 10*3/mm3   ECG 12 Lead Chest Pain    Collection Time: 01/19/25  4:47 PM   Result Value Ref Range    QT Interval 512 ms    QTC Interval 475 ms   Blood Gas, Arterial -    Collection Time: 01/19/25  5:35 PM    Specimen: Arterial Blood    Result Value Ref Range    Site Right Brachial     Gutierrez's Test N/A     pH, Arterial 7.497 (H) 7.350 - 7.450 pH units    pCO2, Arterial 37.1 35.0 - 45.0 mm Hg    pO2, Arterial 70.4 (L) 83.0 - 108.0 mm Hg    HCO3, Arterial 28.7 (H) 20.0 - 26.0 mmol/L    Base Excess, Arterial 5.2 (H) 0.0 - 2.0 mmol/L    O2 Saturation, Arterial 96.3 94.0 - 99.0 %    Hemoglobin, Blood Gas 9.8 (L) 14 - 18 g/dL    Temperature 37.0     Barometric Pressure for Blood Gas 745 mmHg    Modality RA     Collected by [id number]     pCO2, Temperature Corrected 37.1 35 - 45 mm Hg    pH, Temp Corrected 7.497 (H) 7.350 - 7.450 pH Units    pO2, Temperature Corrected 70.4 (L) 83 - 108 mm Hg   High Sensitivity Troponin T 1Hr    Collection Time: 01/19/25  6:20 PM    Specimen: Arm, Left; Blood   Result Value Ref Range    HS Troponin T 35 (H) <22 ng/L    Troponin T Numeric Delta 1 ng/L    Troponin T % Delta 3 Abnormal if >/= 20%   BNP    Collection Time: 01/19/25  6:20 PM    Specimen: Arm, Left; Blood   Result Value Ref Range    proBNP 2,242.0 (H) 0.0 - 1,800.0 pg/mL   Basic Metabolic Panel    Collection Time: 01/20/25 12:55 AM    Specimen: Blood   Result Value Ref Range    Glucose 90 65 - 99 mg/dL    BUN 18 8 - 23 mg/dL    Creatinine 0.69 (L) 0.76 - 1.27 mg/dL    Sodium 135 (L) 136 - 145 mmol/L    Potassium 4.1 3.5 - 5.2 mmol/L    Chloride 97 (L) 98 - 107 mmol/L    CO2 29.0 22.0 - 29.0 mmol/L    Calcium 8.1 (L) 8.2 - 9.6 mg/dL    BUN/Creatinine Ratio 26.1 (H) 7.0 - 25.0    Anion Gap 9.0 5.0 - 15.0 mmol/L    eGFR 87.9 >60.0 mL/min/1.73   CBC (No Diff)    Collection Time: 01/20/25 12:55 AM    Specimen: Blood   Result Value Ref Range    WBC 5.96 3.40 - 10.80 10*3/mm3    RBC 2.96 (L) 4.14 - 5.80 10*6/mm3    Hemoglobin 9.7 (L) 13.0 - 17.7 g/dL    Hematocrit 28.9 (L) 37.5 - 51.0 %    MCV 97.6 (H) 79.0 - 97.0 fL    MCH 32.8 26.6 - 33.0 pg    MCHC 33.6 31.5 - 35.7 g/dL    RDW 14.1 12.3 - 15.4 %    RDW-SD 49.2 37.0 - 54.0 fl    MPV 9.3 6.0 - 12.0 fL    Platelets 196  140 - 450 10*3/mm3   Procalcitonin    Collection Time: 01/20/25 12:55 AM    Specimen: Blood   Result Value Ref Range    Procalcitonin 0.38 (H) 0.00 - 0.25 ng/mL   Respiratory Panel PCR w/COVID-19(SARS-CoV-2) [facility] In-House, NP Swab in UTM/VTM, 2 HR TAT - Swab, Nasopharynx    Collection Time: 01/20/25  1:25 AM    Specimen: Nasopharynx; Swab   Result Value Ref Range    ADENOVIRUS, PCR Not Detected Not Detected    Coronavirus 229E Not Detected Not Detected    Coronavirus HKU1 Not Detected Not Detected    Coronavirus NL63 Not Detected Not Detected    Coronavirus OC43 Detected (A) Not Detected    COVID19 Not Detected Not Detected - Ref. Range    Human Metapneumovirus Not Detected Not Detected    Human Rhinovirus/Enterovirus Not Detected Not Detected    Influenza A PCR Not Detected Not Detected    Influenza B PCR Not Detected Not Detected    Parainfluenza Virus 1 Not Detected Not Detected    Parainfluenza Virus 2 Not Detected Not Detected    Parainfluenza Virus 3 Not Detected Not Detected    Parainfluenza Virus 4 Not Detected Not Detected    RSV, PCR Not Detected Not Detected    Bordetella pertussis pcr Not Detected Not Detected    Bordetella parapertussis PCR Not Detected Not Detected    Chlamydophila pneumoniae PCR Not Detected Not Detected    Mycoplasma pneumo by PCR Not Detected Not Detected   Adult Transthoracic Echo Complete W/ Cont if Necessary Per Protocol    Collection Time: 01/20/25 11:32 AM   Result Value Ref Range    EF(MOD-bp) 48.1 %    LVIDd 5.5 cm    LVIDs 3.7 cm    IVSd 0.90 cm    LVPWd 1.20 cm    FS 32.2 %    IVS/LVPW 0.75 cm    ESV(cubed) 51.8 ml    LV Sys Vol (BSA corrected) 51.9 cm2    EDV(cubed) 166.4 ml    LV Neri Vol (BSA corrected) 93.8 cm2    LV mass(C)d 227.4 grams    LVOT area 3.3 cm2    LVOT diam 2.06 cm    EDV(MOD-sp2) 177.0 ml    EDV(MOD-sp4) 186.0 ml    ESV(MOD-sp2) 84.0 ml    ESV(MOD-sp4) 103.0 ml    SV(MOD-sp2) 93.0 ml    SV(MOD-sp4) 83.0 ml    SVi(MOD-SP2) 46.9 ml/m2     SVi(MOD-SP4) 41.9 ml/m2    SVi (LVOT) 33.6 ml/m2    EF(MOD-sp2) 52.5 %    EF(MOD-sp4) 44.6 %    MV E max pato 126.1 cm/sec    MV A max pato 56.9 cm/sec    MV dec time 0.19 sec    MV E/A 2.22     Pulm A Revs Dur 0.12 sec    MV A dur 0.07 sec    LA ESV Index (BP) 29.3 ml/m2    Med Peak E' Pato 4.9 cm/sec    Lat Peak E' Pato 7.5 cm/sec    TR max pato 135.7 cm/sec    Avg E/e' ratio 20.34     SV(LVOT) 66.7 ml    RV Base 3.9 cm    TAPSE (>1.6) 2.8 cm    RV S' 9.8 cm/sec    Pulm Sys Pato 26.1 cm/sec    Pulm Neri Pato 19.4 cm/sec    Pulm S/D 1.35     Pulm A Revs Pato 43.0 cm/sec    LV V1 max 97.9 cm/sec    LV V1 max PG 3.8 mmHg    LV V1 mean PG 2.00 mmHg    LV V1 VTI 20.1 cm    Ao pk pato 193.0 cm/sec    Ao max PG 14.9 mmHg    Ao mean PG 8.0 mmHg    Ao V2 VTI 43.1 cm    EDMUNDO(I,D) 1.55 cm2    MV max PG 5.2 mmHg    MV mean PG 1.91 mmHg    MV V2 VTI 36.5 cm    MV P1/2t 67.9 msec    MVA(P1/2t) 3.2 cm2    MVA(VTI) 1.83 cm2    MV dec slope 515.2 cm/sec2    MR max pato 285.0 cm/sec    MR max PG 32.5 mmHg    TR max PG 7.4 mmHg    RV V1 max PG 3.2 mmHg    RV V1 max 89.4 cm/sec    RV V1 VTI 16.1 cm    PA V2 max 88.2 cm/sec    PA acc time 0.11 sec    Ao root diam 3.5 cm    Dimensionless Index 0.50 (DI)       Radiology:  Imaging Results (Last 24 Hours)       ** No results found for the last 24 hours. **                 ASSESSMENT:   Volume overload/anasarca, multifactorial.  Does now have a low EF    Acute pulmonary edema    Benign essential hypertension    Parkinson's disease    Paroxysmal A-fib    Chronic diastolic (congestive) heart failure    Anticoagulant long-term use    Anasarca    Coronavirus infection        DISCUSSION/PLAN:   Will start scheduled IV diuretics  I compared his chest x-ray this admission to previous chest x-rays and he now has much more prominent pulmonary vasculature consistent with pulmonary edema  Much of his anasarca is also due to low albumin for poor nutrition    Will monitor renal function, volume, electrolytes  closely while on diuretic therapy  Monitor daily weights, check strict I's and O's  Currently on tube feeds, takes nothing by mouth.  Hold free water for now due to mild hyponatremia  Discussed plan with family at bedside who states understanding and agreement  He is now DNR.  Guarded prognosis    I appreciate the consult request.  Please send me a secure chat message with any nonurgent questions regarding patient care.  For any urgent patient care issues please call my office number below.      Joe Lyn MD  [facility]  Office phone number: [phone]  Answering service phone number: [phone]      1/20/2025        Dictation via Dragon dictation software

## 2025-01-20 NOTE — H&P
Nurse practitioner H&P attestation: I have reviewed the history and plan as obtained by RIDDHI Browne and performed my own independent history. I have personally examined the patient. My exam confirms her physical findings and I agree with the plan as listed above, with the addition of the following:     This is a 89-year-old male who presented to the emergency room at [facility] from his rehab facility with findings of acute onset swelling in the legs, scrotum, abdomen, and arms.  Last night he reported some shortness of breath.  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  He has a history of hypertension, A-fib, diastolic heart failure, Parkinson's disease.  Patient unable provide any meaningful history.  All information comes from his wife and daughter at the bedside.  They state that he was doing reasonably well at rehab and showing some progression in his mobility and stable cognition until 2 days ago when he began swelling acutely.  He has not made any progress with swallowing and remains dependent on tube feeds.  Workup in the emergency room was consistent with anasarca and pulmonary edema.  He was given 80 mg of IV Lasix and transferred to this facility for admission.  On exam he appears both acutely and chronically ill.  He looks very weak and deconditioned.  He is awake and alert and seems reasonably oriented to me at this time and his family at the bedside feels that he is not showing any real signs of confusion.  His breath sounds are diminished with upper airway congestion as well as crackles bilaterally.  No increased respiratory rate or work of breathing.  Heart is regular.  Abdominal exam reveals body wall edema and PEG tube.  He has 3+ pitting edema of the lower extremities particularly below the knee as well as some swelling in the arms and scrotal edema.  He went down for echocardiogram this morning with results pending.   Cardiology was consulted last night.  Will also asked nephrology to come on board as they managed his diuretics last admission.  Dietitian has been consulted for tube feeding recommendations moving forward.  Will have therapy services see him.  Will place him on aggressive pulmonary hygiene measures.  He tested positive for non-COVID type coronavirus for which we will provide supportive care.  He is currently requiring 2 L of oxygen and was not using this most recently at rehab.  Wean as tolerated.  Last night his wife did state that she would not like for him to receive chest compressions in a code situation but would want him intubated.  Palliative care was discussed at that time and she also spoke with palliative care service during previous admission but she is not interested in their services right now and wishes to maintain aggressive care with hopes of getting him back to rehab and eventually return to a functional life.  However, I do feel that his prognosis is quite guarded and he would have a tremendous amount to overcome to achieve this.  We will see how he progresses with additional plans based on his clinical course.    Triston Cowart MD  [facility]  01/20/25  12:40 EST

## 2025-01-20 NOTE — CONSULTS
Patient Name: Casey Snowden Sr.  :1934  89 y.o.    Date of Admission: 2025  Date of Consultation:  25  Encounter Provider: RIDDHI Azevedo  Place of Service: [facility] CARDIOLOGY  Referring Provider: Kavon Reeves MD  Patient Care Team:  Aleksander Diez MD as PCP - General  Marck, Joseph Youngblood MD as Consulting Physician (Ophthalmology)  Ford Dorado MD as Consulting Physician (Vascular Surgery)  Cecilio Armas III, MD as Consulting Physician (Cardiology)  Kashif Goodwin MD as Consulting Physician (Neurology)  Enoch Lares MD (Hematology)  Kayden Chance MD (Dermatology)  Vinod Cadena MD as Consulting Physician (Neurosurgery)  Johnny Castellanos MD as Consulting Physician (Urology)  Inge Pedro, PT as Physical Therapist (Physical Therapy)  Lucrecia Mccloud PT as Physical Therapist (Physical Therapy)  Nereyda Toledo OT as Occupational Therapist (Occupational Therapy)  Junior Taylor MD as Surgeon (Orthopedic Surgery)  Casey Power MD as Surgeon (Wound Care)  Nahomi Jacobo APRN as Nurse Practitioner (Nurse Practitioner)      Chief complaint: swelling    History of Present Illness:  Mr. Snowden is a 89 year old gentleman followed by Dr. Armas for paroxysmal atrial fibrillation, coronary artery disease (PCI to LAD 2019), ischemic cardiomyopathy and NSVT. His last echo was in 2023 and showed LVEF 45% with akinetic apex and anterior hypokinesis.     He had a prolonged hospitalization from 24 to 2025. He was initially admitted for confusion and rash, diagnosed with VZV meningoencephalitis. 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 He had NSVT as well as atrial fibrillation with RVR. He was started on amiodarone and discharged on 200 mg daily. He had low albumin and edema for which he received diuretics. Lasix as needed was recommended at  discharge to rehab. He had a PEG placed during this admission for severe dysphagia.     Per family, he was doing relatively well at rehab. He was getting up to a wheel chair (by lift). He was participating in therapy and stood several times.     2 days ago, he began swelling quite rapidly.     He was taken to the emergency room in [place] yesterday and transferred to [facility] for further treatment. proBNP 2242. Similar to prior. HS troponin 35. Albumin 2.9. He has been started on IV diuretics. Na 131 improve to 135 this morning.     He had an echo this morning that was technically difficult due to patient positioning and confusion. LVEF appeared to be low normal , around 50%. Wall motion abnormalities appears similar to prior. Moderate calcification of the aortic valve.     The patient is sleeping and did not arouse during my exam. EKG poor quality with respiratory variation.     Echo 1/20/25    The study is technically difficult for diagnosis. The quality of the study is limited due to patient body habitus, patient positioning and pt confusedwith poor acoustic windows in the subcostal window.    Left ventricular systolic function is low normal. Calculated left ventricular EF = 48.1%    The following left ventricular wall segments are hypokinetic: apical septal and mid anteroseptal. The following left ventricular wall segments are akinetic: apical inferior and apex.    Left ventricular diastolic function is consistent with (grade I) impaired relaxation.    There is moderate calcification of the aortic valve.    Aortic valve area is 1.6 cm2.    Aortic valve maximum pressure gradient is 15 mmHg. Aortic valve mean pressure gradient is 8 mmHg.      Past Medical History:   Diagnosis Date    Abdominal aortic aneurysm without rupture 10/14/2021    Acquired hypothyroidism 08/31/2022    Acute ischemic stroke 10/08/2023    Aneurysm     Arrhythmia     Atrial fibrillation     Cancer 04/2018    basil cell  carcinoma    Carotid stenosis 10/29/2015    CHF (congestive heart failure)     Chronic deep vein thrombosis (DVT) of popliteal vein of right lower extremity     Clotting disorder     Coronary artery disease involving native coronary artery of native heart without angina pectoris 12/20/2018    DVT of lower extremity (deep venous thrombosis)     Dysphasia 12/2024    Hard of hearing     Hyperlipidemia     Hypertension     Localized edema 01/04/2017    Long-term use of high-risk medication     Metabolic encephalopathy 02/07/2023    Myocardial infarction 2918    LAD stent    PAD (peripheral artery disease) 09/10/2020    Parkinson's disease     Postphlebitic syndrome     Pure hypercholesterolemia     Shingles 2024    Skin tear of upper arm without complication, left, subsequent encounter     Stroke 11/02/2023    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       Past Surgical History:   Procedure Laterality Date    BASAL CELL CARCINOMA EXCISION  04/2018    CARDIAC CATHETERIZATION N/A 11/05/2018    Procedure: Left Heart Cath;  Surgeon: Oliverio Azar MD;  [facility];  Service: Cardiovascular    CARDIAC CATHETERIZATION N/A 11/05/2018    Procedure: Coronary angiography;  Surgeon: Oliverio Azar MD;  [facility];  Service: Cardiovascular    CARDIAC CATHETERIZATION N/A 11/05/2018    Procedure: Left ventriculography;  Surgeon: Oliverio Azar MD;  Location:  [facility] CATH INVASIVE LOC[facility];  Service: Cardiovascular    CARDIAC CATHETERIZATION N/A 06/04/2019    Procedure: Left Heart Cath;  Surgeon: Johnny Glasgow MD;  Location:  [facility] CATH INVASIVE LOCATION;  Service: Cardiovascular    CARDIAC CATHETERIZATION N/A 06/04/2019    Procedure: Coronary angiography;  Surgeon: Johnny Glasgow MD;  [facility];  Service: Cardiovascular    CARDIAC CATHETERIZATION N/A 06/04/2019    Procedure: Left ventriculography;  Surgeon: Johnny Glasgow MD;   Location: [facility] CATH INVASIVE LOCATION;  Service: Cardiovascular    CARDIAC CATHETERIZATION N/A 06/04/2019    Procedure: Stent BILL coronary;  Surgeon: Johnny Glasgow MD;  Location: [facility] CATH INVASIVE LOCATION;  Service: Cardiovascular    CARDIAC SURGERY      CAROTID ARTERY ANGIOPLASTY Right 10/11/2023    with stent    CAROTID STENT      CATARACT EXTRACTION Left 08/2018    CATARACT EXTRACTION Right 09/2018    COLONOSCOPY      2011    CORONARY STENT PLACEMENT      ENDOSCOPY W/ PEG TUBE PLACEMENT N/A 1/3/2025    Procedure: ESOPHAGOGASTRODUODENOSCOPY WITH PERCUTANEOUS ENDOSCOPIC GASTROSTOMY TUBE INSERTION;  Surgeon: Konstantin Persaud MD;  Location: [facility] ENDOSCOPY;  Service: General;  Laterality: N/A;  pre: dysphagia  post: same    INCISION AND DRAINAGE LEG Right 07/07/2017    Procedure: INCISION AND DRAINAGE INFECTED HEMATOMA RIGHT THIGH;  Surgeon: Valentin Peña MD;  Location: [facility] MAIN OR;  Service:     JOINT REPLACEMENT      KNEE INCISION AND DRAINAGE Right 12/27/2016    Procedure: KNEE INCISION AND DRAINAGE;  Surgeon: Triston Whitten MD;  Location: [facility] MAIN OR;  Service:     NOSE SURGERY      nose replacement    SINUS SURGERY  1960    SKIN BIOPSY      SKIN GRAFT  12/2017    TOTAL KNEE ARTHROPLASTY Right     VASCULAR SURGERY      VENA CAVA FILTER PLACEMENT           Prior to Admission medications    Medication Sig Start Date End Date Taking? Authorizing Provider   acetaminophen (TYLENOL) 325 MG tablet Administer 2 tablets per G tube Every 4 (Four) Hours As Needed for Mild Pain. 1/9/25   Brian Doss MD   amiodarone (PACERONE) 200 MG tablet Administer 1 tablet per G tube Daily. 1/9/25   Brian Doss MD   apixaban (Eliquis) 5 MG tablet tablet Administer 1 tablet per G tube Every 12 (Twelve) Hours. 1/9/25   Brian Doss MD   atorvastatin (LIPITOR) 40 MG tablet Administer 1 tablet per G tube Every Night. Indications: High Amount of Fats in the Blood 1/9/25   Brian Doss MD   atropine 1  % ophthalmic solution 1-2 drops 3 (Three) Times a Day As Needed (as needed orally for increased secretions). Orally   Indications: excessive drooling 12/17/15   Dick Toscano MD   carbidopa-levodopa (SINEMET)  MG per tablet Administer 1 tablet per G tube 3 (Three) Times a Day. Indications: Parkinson's Disease 1/9/25   Brian Doss MD   Cyanocobalamin (Vitamin B 12) 500 MCG tablet Take 2 tablets by mouth Every Morning. Indications: Inadequate Vitamin B12 10/18/23   Dick Toscano MD   finasteride (PROSCAR) 5 MG tablet Administer 1 tablet per G tube Daily. 1/9/25   Brian Doss MD   furosemide (LASIX) 20 MG tablet Take 1 tablet by mouth Daily.    Dick Toscano MD   guaifenesin (ROBITUSSIN) 100 MG/5ML liquid Administer 10 mL per G tube 4 (Four) Times a Day As Needed for Cough. 1/9/25   Brian Doss MD   levothyroxine (SYNTHROID, LEVOTHROID) 75 MCG tablet TAKE 1 TABLET BY MOUTH EVERY  MORNING FOR UNDERACTIVE THYROID  Patient taking differently: Take 1 tablet by mouth Daily. 11/1/24   Aleksander Diez MD   metoprolol tartrate (LOPRESSOR) 25 MG tablet Administer 0.5 tablets per G tube 2 (Two) Times a Day. 1/9/25   Brian Doss MD   Multiple Vitamins-Minerals (PreserVision AREDS) capsule Administer 1 tablet per G tube 2 (Two) Times a Day. Indications: supplement 1/9/25   Brian Doss MD   tamsulosin (FLOMAX) 0.4 MG capsule 24 hr capsule Take 1 capsule by mouth Daily. 1/16/24   Dick Toscano MD   Vibegron (Gemtesa) 75 MG tablet Administer 1 tablet per G tube Daily. 1/9/25   Brian Doss MD       Allergies   Allergen Reactions    Penicillins Rash    Rocephin [Ceftriaxone] Rash       Social History     Socioeconomic History    Marital status:     Years of education: college grad   Tobacco Use    Smoking status: Never     Passive exposure: Never    Smokeless tobacco: Never    Tobacco comments:     caff use   Vaping Use    Vaping status: Never Used   Substance and  "Sexual Activity    Alcohol use: No     Comment: none for a month    Drug use: No    Sexual activity: Not Currently     Partners: Female       Family History   Problem Relation Age of Onset    Heart attack Mother 74    Hypertension Mother     Cancer Sister     Stroke Brother     Cancer Brother     Cancer Brother     Deep vein thrombosis Neg Hx        REVIEW OF SYSTEMS:   All systems reviewed.  Pertinent positives identified in HPI.  All other systems are negative.      Objective:     Vitals:    01/19/25 2325 01/20/25 0600 01/20/25 0720 01/20/25 1132   BP: 168/89  166/65    BP Location: Right arm  Right arm    Patient Position: Lying  Lying    Pulse:   53    Resp: 18  18    Temp:   97.7 °F (36.5 °C)    TempSrc:   Oral    SpO2:   98%    Weight:  77.1 kg (169 lb 15.6 oz)  76.7 kg (169 lb)   Height:    182.9 cm (72\")     Body mass index is 22.92 kg/m².    Physical Exam:  Constitutional: asleep  HENT:   Head: Normocephalic and atraumatic. Head is without contusion.   Right Ear: Hearing normal. No drainage.   Left Ear: Hearing normal. No drainage.   Nose: No nasal deformity. No epistaxis.   Eyes: Lids are normal. Right eye exhibits no exudate. Left eye exhibits no exudate.  Neck: No JVD present.   Cardiovascular: Normal rate, regular rhythm and normal heart sounds.    Pulses:       Posterior tibial pulses are 2+ on the right side, and 2+ on the left side.   Pulmonary/Chest: Effort normal and breath sounds normal.   Abdominal: Soft. Normal appearance and bowel sounds are normal. There is no tenderness.   Musculoskeletal: Normal range of motion.        Right shoulder: He exhibits no deformity.        Left shoulder: He exhibits no deformity.   Neurological: He is alert and oriented to person, place, and time. He has normal strength.   Skin: Skin is warm, dry and intact. No rash noted.   Psychiatric: unable to assess.   Vitals reviewed      Lab Review:     Results from last 7 days   Lab Units 01/20/25  0055 01/19/25  1643 "   SODIUM mmol/L 135* 131*   POTASSIUM mmol/L 4.1 4.1   CHLORIDE mmol/L 97* 98   CO2 mmol/L 29.0 25.4   BUN mg/dL 18 19   CREATININE mg/dL 0.69* 0.60*   CALCIUM mg/dL 8.1* 8.2   BILIRUBIN mg/dL  --  0.3   ALK PHOS U/L  --  81   ALT (SGPT) U/L  --  22   AST (SGOT) U/L  --  31   GLUCOSE mg/dL 90 83     Results from last 7 days   Lab Units 01/19/25  1820 01/19/25  1643   HSTROP T ng/L 35* 34*     Results from last 7 days   Lab Units 01/20/25  0055   WBC 10*3/mm3 5.96   HEMOGLOBIN g/dL 9.7*   HEMATOCRIT % 28.9*   PLATELETS 10*3/mm3 196     Results from last 7 days   Lab Units 01/19/25  1643   INR  1.31*                     Current Facility-Administered Medications:     acetaminophen (TYLENOL) tablet 650 mg, 650 mg, Oral, Q4H PRN, Joanna Mishra APRN    amiodarone (PACERONE) tablet 200 mg, 200 mg, Per G Tube, Daily, Triston Cowart MD, 200 mg at 01/20/25 1152    apixaban (ELIQUIS) tablet 5 mg, 5 mg, Per G Tube, Q12H, Triston Cowart MD, 5 mg at 01/20/25 1151    atorvastatin (LIPITOR) tablet 40 mg, 40 mg, Per G Tube, Nightly, Triston Cowart MD    sennosides-docusate (PERICOLACE) 8.6-50 MG per tablet 2 tablet, 2 tablet, Oral, BID PRN **AND** polyethylene glycol (MIRALAX) packet 17 g, 17 g, Oral, Daily PRN **AND** bisacodyl (DULCOLAX) EC tablet 5 mg, 5 mg, Oral, Daily PRN **AND** bisacodyl (DULCOLAX) suppository 10 mg, 10 mg, Rectal, Daily PRN, Joanna Mishra, APRN    carbidopa-levodopa (SINEMET)  MG per tablet 1 tablet, 1 tablet, Per G Tube, TID, Triston Cowart MD, 1 tablet at 01/20/25 1151    finasteride (PROSCAR) tablet 5 mg, 5 mg, Per G Tube, Daily, Triston Cowart MD, 5 mg at 01/20/25 1151    furosemide (LASIX) injection 20 mg, 20 mg, Intravenous, Q12H, Joe Lyn MD    guaiFENesin (MUCINEX) 12 hr tablet 1,200 mg, 1,200 mg, Oral, Q12H, Triston Cowart MD    guaifenesin (ROBITUSSIN) 100 MG/5ML liquid 200 mg, 200 mg, Per G Tube,  4x Daily PRN, Triston Cowart MD    levothyroxine (SYNTHROID, LEVOTHROID) tablet 75 mcg, 75 mcg, Per G Tube, Daily, Triston Cowart MD, 75 mcg at 01/20/25 1151    metoprolol tartrate (LOPRESSOR) tablet 12.5 mg, 12.5 mg, Per G Tube, BID, Triston Cowart MD, 12.5 mg at 01/20/25 1153    nitroglycerin (NITROSTAT) SL tablet 0.4 mg, 0.4 mg, Sublingual, Q5 Min PRN, Joanna Mishra APRABDOUL    ondansetron ODT (ZOFRAN-ODT) disintegrating tablet 4 mg, 4 mg, Oral, Q6H PRN **OR** ondansetron (ZOFRAN) injection 4 mg, 4 mg, Intravenous, Q6H PRN, Joanna Mishra APRABDOUL    oxybutynin (DITROPAN) tablet 5 mg, 5 mg, Per G Tube, BID, Triston Cowart MD    sodium chloride 0.9 % flush 10 mL, 10 mL, Intravenous, Q12H, Joanna Mishra APRN, 10 mL at 01/20/25 0938    sodium chloride 0.9 % flush 10 mL, 10 mL, Intravenous, PRN, Joanna Mishra, APRN    sodium chloride 0.9 % infusion 40 mL, 40 mL, Intravenous, PRN, Joanna Mishra, APRN    terazosin (HYTRIN) capsule 5 mg, 5 mg, Per G Tube, Nightly, Triston Cowart MD    Assessment and Plan:       Active Hospital Problems    Diagnosis  POA    **Acute pulmonary edema [J81.0]  Yes    Coronavirus infection [B34.2]  Unknown    Anasarca [R60.1]  Yes    Chronic diastolic (congestive) heart failure [I50.32]  Yes    Anticoagulant long-term use [Z79.01]  Not Applicable    Paroxysmal A-fib [I48.0]  Yes    Benign essential hypertension [I10]  Yes    Parkinson's disease [G20.A1]  Yes      Resolved Hospital Problems   No resolved problems to display.     Anasarca , likely multifactorial however suspect hypoalbuminemia and immobility are the major driving factors. He had an echocardiogram this morning that showed low normal LVEF ~50% though technically difficult.   Chronic HFimpEF due to ischemic cardiomyopathy, previous LVEF 40% in 2023.   PAF, on amiodarone - started last admission. AF was resistant to AV heather blockers and difficult to  control. He is anticoagulated with apixaban 5 mg BID.   History of NSVT  Hypertension  Coronary artery disease, prior LAD stent in 2019  Recent prolonged hospitalization due to VZV encephalopathy   Dysphagia status post PEG tube.   History of orthostatic hypotension, likely some autonomic dysfunction due to Parkinson's disease.     IV diuretics. Nephrology is following as well.   Repeat EKG to assess QT.   BP is high. Will follow. May need to adjust antihypertensives.       Anila Alejo, APRN  01/20/25  14:21 EST

## 2025-01-20 NOTE — DISCHARGE PLACEMENT REQUEST
"Casey Snowden Sr. (89 y.o. Male)       Date of Birth   1934    Social Security Number       Address   [address] [place] [address]    Home Phone   [phone]    MRN   [id number]       Pentecostal   Jew    Marital Status                               Admission Date   1/19/25    Admission Type   Urgent    Admitting Provider   Triston Cowart MD    Attending Provider   Triston Cowart MD    Department, Room/Bed   [address], [address]       Discharge Date       Discharge Disposition       Discharge Destination                                 Attending Provider: Triston Cowart MD    Allergies: Penicillins, Rocephin [Ceftriaxone]    Isolation: Contact Drop   Infection: None   Code Status: No CPR    Ht: 182.9 cm (72\")   Wt: 76.7 kg (169 lb)    Admission Cmt: None   Principal Problem: Acute pulmonary edema [J81.0]                   Active Insurance as of 1/19/2025       Primary Coverage       Payor Plan Insurance Group Employer/Plan Group    [facility] MEDICARE REPLACEMENT [facility] MED ADV GROUP [id number]       Payor Plan Address Payor Plan Phone Number Payor Plan Fax Number Effective Dates    [address] [address]   1/1/2023 - None Entered    [facility] [id number]         Subscriber Name Subscriber Birth Date Member ID       CASEY SNOWDEN EDUARDO SR. 1934 [id number]                     Emergency Contacts        (Rel.) Home Phone Work Phone Mobile Phone    Evonne Snowden (Spouse) [phone] -- [phone]    Svetlana Sol (Daughter) [phone] -- --    Casey Snowden Jr (Son) [phone] -- --                "

## 2025-01-21 NOTE — PLAN OF CARE
Goal Outcome Evaluation:  Plan of Care Reviewed With: patient           Outcome Evaluation: Pt is a 89 yo M admitted from SNF with acute pulmonary edema, CHF, and anasarca, hx of Parkinsons. Pt's wife present on eval and helpful with hx - pt with length admission recently with encephalitis and DC to rehab. Wife states staff was using a lift to get pt to a WC and he was working on standing with therapy. Pt was independent living with his wife in December. Pt presents to PT with impaired strength, endurance, and balance limiting overall mobility. Pt transferred to EOB with max A x2 - max cues for posture correction with little improvement. Noted L lateral lean both in sitting and standing, worsening forward flexed posture with fatigue. Unable to sequence side steps, tolerated standing for ~5-10 seconds at a time. Pt returned to supine max A x2 and completed rolling with max A x1 to change brief - noted pt actively having a BM. Rolled onto fresh brief and encouraged wife to call out for change in a few minutes. PT will continue to follow, anticipate return to SNF at [place]. Per chart, pt with poor prognosis but wife not ready to move forward with palliative care - hopeful for return to PLOF.    Anticipated Discharge Disposition (PT): skilled nursing facility, extended care facility

## 2025-01-21 NOTE — PLAN OF CARE
Goal Outcome Evaluation:  Plan of Care Reviewed With: patient        Progress: improving  Outcome Evaluation: Pt is a 89 y/o M admitted to [facility] 1/19 from rehab with pulmonary edema, BLE edema 3+ to 4+, anasarca. Pt also w/ CHF, Afib, HTN, and hx of Parkinsons Disease. Pt presents w/ decreased ADL IND, impaired balance, bed mobility IND, and would benefit from skilled therapy services to increased ADL IND with DC REC: back to SNU when medically stable to return to highest level of IND/function.    Anticipated Discharge Disposition (OT): skilled nursing facility

## 2025-01-21 NOTE — PLAN OF CARE
Problem: Adult Inpatient Plan of Care  Goal: Plan of Care Review  Outcome: Progressing  Flowsheets (Taken 1/21/2025 0444)  Outcome Evaluation: Pt oriented to self only. BP was low during night after night time dose of terazosyn and metoprolol. POC- diuresis, fall precautions, aspiration precautions, PEG tube feeding.  Plan of Care Reviewed With: patient  Goal: Absence of Hospital-Acquired Illness or Injury  Outcome: Progressing  Intervention: Identify and Manage Fall Risk  Recent Flowsheet Documentation  Taken 1/21/2025 0443 by Eun Danielle RN  Safety Promotion/Fall Prevention:   assistive device/personal items within reach   clutter free environment maintained   fall prevention program maintained   nonskid shoes/slippers when out of bed   room organization consistent   safety round/check completed  Taken 1/21/2025 0241 by Eun Danielle RN  Safety Promotion/Fall Prevention:   assistive device/personal items within reach   clutter free environment maintained   fall prevention program maintained   nonskid shoes/slippers when out of bed   room organization consistent   safety round/check completed  Taken 1/21/2025 0035 by Eun Danielle RN  Safety Promotion/Fall Prevention:   assistive device/personal items within reach   clutter free environment maintained   fall prevention program maintained   nonskid shoes/slippers when out of bed   room organization consistent   safety round/check completed  Taken 1/20/2025 2235 by Eun Danielle RN  Safety Promotion/Fall Prevention:   assistive device/personal items within reach   clutter free environment maintained   fall prevention program maintained   nonskid shoes/slippers when out of bed   safety round/check completed   room organization consistent  Taken 1/20/2025 2035 by Eun Danielle, RN  Safety Promotion/Fall Prevention:   activity supervised   assistive device/personal items within reach   clutter free environment maintained   fall prevention program maintained    lighting adjusted   nonskid shoes/slippers when out of bed   room organization consistent   safety round/check completed  Intervention: Prevent Skin Injury  Recent Flowsheet Documentation  Taken 1/20/2025 2035 by Eun Danielle RN  Skin Protection:   incontinence pads utilized   transparent dressing maintained   skin sealant/moisture barrier applied  Intervention: Prevent and Manage VTE (Venous Thromboembolism) Risk  Recent Flowsheet Documentation  Taken 1/20/2025 2035 by Eun Danielle RN  VTE Prevention/Management: patient refused intervention  Intervention: Prevent Infection  Recent Flowsheet Documentation  Taken 1/20/2025 2035 by Eun Danielle RN  Infection Prevention:   environmental surveillance performed   hand hygiene promoted   personal protective equipment utilized   rest/sleep promoted   single patient room provided  Goal: Optimal Comfort and Wellbeing  Outcome: Progressing  Intervention: Provide Person-Centered Care  Recent Flowsheet Documentation  Taken 1/20/2025 2035 by Eun Danielle RN  Trust Relationship/Rapport:   care explained   questions answered   questions encouraged  Goal: Readiness for Transition of Care  Outcome: Progressing     Problem: Fall Injury Risk  Goal: Absence of Fall and Fall-Related Injury  Outcome: Progressing  Intervention: Identify and Manage Contributors  Recent Flowsheet Documentation  Taken 1/20/2025 2035 by Eun Danielle RN  Medication Review/Management: medications reviewed  Intervention: Promote Injury-Free Environment  Recent Flowsheet Documentation  Taken 1/21/2025 0443 by Eun Danielle RN  Safety Promotion/Fall Prevention:   assistive device/personal items within reach   clutter free environment maintained   fall prevention program maintained   nonskid shoes/slippers when out of bed   room organization consistent   safety round/check completed  Taken 1/21/2025 0241 by Eun Danielle RN  Safety Promotion/Fall Prevention:   assistive device/personal items within  reach   clutter free environment maintained   fall prevention program maintained   nonskid shoes/slippers when out of bed   room organization consistent   safety round/check completed  Taken 1/21/2025 0035 by Eun Danielle RN  Safety Promotion/Fall Prevention:   assistive device/personal items within reach   clutter free environment maintained   fall prevention program maintained   nonskid shoes/slippers when out of bed   room organization consistent   safety round/check completed  Taken 1/20/2025 2235 by Eun Danielle RN  Safety Promotion/Fall Prevention:   assistive device/personal items within reach   clutter free environment maintained   fall prevention program maintained   nonskid shoes/slippers when out of bed   safety round/check completed   room organization consistent  Taken 1/20/2025 2035 by Eun Danielle RN  Safety Promotion/Fall Prevention:   activity supervised   assistive device/personal items within reach   clutter free environment maintained   fall prevention program maintained   lighting adjusted   nonskid shoes/slippers when out of bed   room organization consistent   safety round/check completed     Problem: Skin Injury Risk Increased  Goal: Skin Health and Integrity  Outcome: Progressing  Intervention: Optimize Skin Protection  Recent Flowsheet Documentation  Taken 1/21/2025 0443 by Eun Danielle RN  Activity Management: (asleep) other (see comments)  Head of Bed (HOB) Positioning: HOB at 30-45 degrees  Taken 1/21/2025 0241 by Eun Danielle RN  Activity Management: (asleep) other (see comments)  Head of Bed (HOB) Positioning: HOB at 30-45 degrees  Taken 1/21/2025 0035 by Eun Danielle RN  Activity Management: (asleep) other (see comments)  Head of Bed (HOB) Positioning: HOB at 30-45 degrees  Taken 1/20/2025 2235 by Eun Danielle, RN  Activity Management: activity minimized  Head of Bed (HOB) Positioning: HOB at 30-45 degrees  Taken 1/20/2025 2035 by Eun Danielle RN  Activity Management:  activity encouraged  Pressure Reduction Techniques:   frequent weight shift encouraged   heels elevated off bed   weight shift assistance provided   pressure points protected  Head of Bed (HOB) Positioning: HOB at 30-45 degrees  Pressure Reduction Devices:   foam padding utilized   heel offloading device utilized   positioning supports utilized   specialty bed utilized  Skin Protection:   incontinence pads utilized   transparent dressing maintained   skin sealant/moisture barrier applied     Problem: Confusion Acute  Goal: Optimal Cognitive Function  Outcome: Progressing     Problem: Fluid Volume Excess  Goal: Fluid Balance  Outcome: Progressing  Intervention: Monitor and Manage Hypervolemia  Recent Flowsheet Documentation  Taken 1/20/2025 2035 by Eun Danielle, RN  Skin Protection:   incontinence pads utilized   transparent dressing maintained   skin sealant/moisture barrier applied   Goal Outcome Evaluation:  Plan of Care Reviewed With: patient           Outcome Evaluation: Pt oriented to self only. BP was low during night after night time dose of terazosyn and metoprolol. POC- diuresis, fall precautions, aspiration precautions, PEG tube feeding.

## 2025-01-21 NOTE — THERAPY EVALUATION
Patient Name: Casey Snowden Sr.  : 1934    MRN: [id number]                              Today's Date: 2025       Admit Date: 2025    Visit Dx: No diagnosis found.  Patient Active Problem List   Diagnosis    Benign essential hypertension    Stenosis of carotid artery    Mixed hyperlipidemia    Parkinson's disease    Peripheral arterial occlusive disease    Screening for prostate cancer    Medication monitoring encounter    Melanoma    Chronic deep vein thrombosis (DVT) of popliteal vein of right lower extremity    Uses hearing aid    Paroxysmal A-fib    Chronic diastolic (congestive) heart failure    Anticoagulant long-term use    Presence of IVC filter    Medicare annual wellness visit, subsequent    Iron deficiency anemia secondary to inadequate dietary iron intake    Orthostatic hypotension due to Parkinson's disease    Coronary artery disease involving native coronary artery of native heart without angina pectoris    ST elevation myocardial infarction involving left anterior descending (LAD) coronary artery    Sialorrhea    Hypertensive heart disease with heart failure    PAD (peripheral artery disease)    Metastatic squamous cell carcinoma    Secondary malignancy of lymph nodes of head, face and neck    Encounter for antineoplastic immunotherapy    RBD (REM behavioral disorder)    Acquired hypothyroidism    Aspiration pneumonia of right lung due to vomit    Dysphagia, neurologic    ICAO (internal carotid artery occlusion), right    Arterial ischemic stroke, ICA (internal carotid artery), right, chroni    Renal mass, left    Abdominal aortic aneurysm    Disorientation    Falls frequently    Right inguinal hernia    Unable to ambulate    Herpes zoster without complication    LISY (acute kidney injury)    Moderate protein-calorie malnutrition    Acute pulmonary edema    Anasarca    Coronavirus infection     Past Medical History:   Diagnosis Date    Abdominal aortic aneurysm without rupture  10/14/2021    Acquired hypothyroidism 08/31/2022    Acute ischemic stroke 10/08/2023    Aneurysm     Arrhythmia     Atrial fibrillation     Cancer 04/2018    basil cell carcinoma    Carotid stenosis 10/29/2015    CHF (congestive heart failure)     Chronic deep vein thrombosis (DVT) of popliteal vein of right lower extremity     Clotting disorder     Coronary artery disease involving native coronary artery of native heart without angina pectoris 12/20/2018    DVT of lower extremity (deep venous thrombosis)     Dysphasia 12/2024    Hard of hearing     Hyperlipidemia     Hypertension     Localized edema 01/04/2017    Long-term use of high-risk medication     Metabolic encephalopathy 02/07/2023    Myocardial infarction 2918    LAD stent    PAD (peripheral artery disease) 09/10/2020    Parkinson's disease     Postphlebitic syndrome     Pure hypercholesterolemia     Shingles 2024    Skin tear of upper arm without complication, left, subsequent encounter     Stroke 11/02/2023    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 CATH INVASIVE LOCATION;  Service: Cardiovascular    CARDIAC CATHETERIZATION N/A 11/05/2018    Procedure: Coronary angiography;  Surgeon: Oliverio Azar MD;  Location: [facility] CATH INVASIVE LOCATION;  Service: Cardiovascular    CARDIAC CATHETERIZATION N/A 11/05/2018    Procedure: Left ventriculography;  Surgeon: Oliverio Azar MD;  Location: [facility] CATH INVASIVE LOCATION;  Service: Cardiovascular    CARDIAC CATHETERIZATION N/A 06/04/2019    Procedure: Left Heart Cath;  Surgeon: Johnny Glasgow MD;  Location: [facility] CATH INVASIVE LOCATION;  Service: Cardiovascular    CARDIAC CATHETERIZATION N/A 06/04/2019    Procedure: Coronary angiography;  Surgeon: Johnny Glasgow MD;  Location: [facility]  CATH INVASIVE LOCATION;  Service: Cardiovascular    CARDIAC CATHETERIZATION N/A 06/04/2019    Procedure: Left ventriculography;  Surgeon: Johnny Glasgow MD;  Location: [facility] CATH INVASIVE LOCATION;  Service: Cardiovascular    CARDIAC CATHETERIZATION N/A 06/04/2019    Procedure: Stent BILL coronary;  Surgeon: Johnny Glasgow MD;  Location: [facility] CATH INVASIVE LOCATION;  Service: Cardiovascular    CARDIAC SURGERY      CAROTID ARTERY ANGIOPLASTY Right 10/11/2023    with stent    CAROTID STENT      CATARACT EXTRACTION Left 08/2018    CATARACT EXTRACTION Right 09/2018    COLONOSCOPY      2011    CORONARY STENT PLACEMENT      ENDOSCOPY W/ PEG TUBE PLACEMENT N/A 1/3/2025    Procedure: ESOPHAGOGASTRODUODENOSCOPY WITH PERCUTANEOUS ENDOSCOPIC GASTROSTOMY TUBE INSERTION;  Surgeon: Konstantin Persaud MD;  Location: [facility] ENDOSCOPY;  Service: General;  Laterality: N/A;  pre: dysphagia  post: same    INCISION AND DRAINAGE LEG Right 07/07/2017    Procedure: INCISION AND DRAINAGE INFECTED HEMATOMA RIGHT THIGH;  Surgeon: Valentin Peña MD;  Location: [facility] MAIN OR;  Service:     JOINT REPLACEMENT      KNEE INCISION AND DRAINAGE Right 12/27/2016    Procedure: KNEE INCISION AND DRAINAGE;  Surgeon: Triston Whitten MD;  Location: [facility] MAIN OR;  Service:     NOSE SURGERY      nose replacement    SINUS SURGERY  1960    SKIN BIOPSY      SKIN GRAFT  12/2017    TOTAL KNEE ARTHROPLASTY Right     VASCULAR SURGERY      VENA CAVA FILTER PLACEMENT        General Information       Row Name 01/21/25 1244          OT Time and Intention    Mode of Treatment occupational therapy;physical therapy;co-treatment  -BC     Patient Effort adequate  -BC       Row Name 01/21/25 1244          General Information    Patient Profile Reviewed yes  -BC     Prior Level of Function --  used a mechanical lift while at rehab to get to  and worked on standing in the gym. Prior to December was (I) w/ ADLs at home w/ spouse  -BC     Existing  Precautions/Restrictions fall  -BC     Barriers to Rehab medically complex;previous functional deficit  -BC       Row Name 01/21/25 1244          Living Environment    People in Home other (see comments)  prior to Decemeber 2024 was at home w spouse, came from rehab.  -BC       Row Name 01/21/25 1244          Cognition    Orientation Status (Cognition) oriented to;person  very garbled speech, difficulty w/ language  -BC       Row Name 01/21/25 1244          Safety Issues/Impairments Affecting Functional Mobility    Comment, Safety Issues/Impairments (Mobility) PT/OT cotreatment medically appropriate and necessary due to patient acuity level, to maximize therapeutic benefit due to impaired act tolerance, and for safety of patient and staff. Treatment focused on progression of care and goals established in POC.  -BC               User Key  (r) = Recorded By, (t) = Taken By, (c) = Cosigned By      Initials Name Provider Type    Kevin Freitas OT Occupational Therapist                     Mobility/ADL's       Row Name 01/21/25 1246          Bed Mobility    Bed Mobility supine-sit;sit-supine;rolling right;rolling left  -BC     Rolling Left [place] (Bed Mobility) maximum assist (25% patient effort);2 person assist  -BC     Rolling Right [place] (Bed Mobility) maximum assist (25% patient effort);2 person assist  -BC     Supine-Sit [place] (Bed Mobility) maximum assist (25% patient effort);2 person assist  -BC     Sit-Supine [place] (Bed Mobility) maximum assist (25% patient effort);2 person assist  -BC     Bed Mobility, Safety Issues cognitive deficits limit understanding;decreased use of legs for bridging/pushing;decreased use of arms for pushing/pulling;impaired trunk control for bed mobility  -BC     Assistive Device (Bed Mobility) bed rails;head of bed elevated  -BC     Comment, (Bed Mobility) max A [place] help w/ BUEs reaching, BLEs towards EOB and trunk to sit up. L lateral lean w/ decreased  head control, L lateral neck flexion and flexed c spine. Max cues.  -BC       Row Name 01/21/25 1246          Transfers    Transfers sit-stand transfer;stand-sit transfer  -BC       Row Name 01/21/25 1246          Sit-Stand Transfer    Sit-Stand [place] (Transfers) maximum assist (25% patient effort);2 person assist  -BC     Comment, (Sit-Stand Transfer) max arm in arm assist x2 to stand 3x from EOB. Max cues with able to clear backside but unable to facilaite trunk extension for erect posture. Foward flexed head, shoulders standing and each stand increased fatigue less tolerance ~10-30 sec intervals.  -BC       Row Name 01/21/25 1246          Stand-Sit Transfer    Stand-Sit [place] (Transfers) maximum assist (25% patient effort);2 person assist  -[facility] Name 01/21/25 1246          Functional Mobility    Patient was able to Ambulate no, other medical factors prevent ambulation  -BC     Reason Patient was unable to Ambulate Excessive Weakness  -[facility] Name 01/21/25 124          Activities of Daily Living    BADL Assessment/Intervention lower body dressing;grooming;toileting  -[facility] Name 01/21/25 124          Lower Body Dressing Assessment/Training    [place] Level (Lower Body Dressing) don;socks;doff;dependent (less than 25% patient effort)  -BC     Position (Lower Body Dressing) sitting up in bed  -[facility] Name 01/21/25 1246          Grooming Assessment/Training    [place] Level (Grooming) dependent (less than 25% patient effort)  -BC     Position (Grooming) edge of bed sitting  -BC     Comment, (Grooming) bringing [place] to mouth to clean dep A  -[facility] Name 01/21/25 124          Toileting Assessment/Training    [place] Level (Toileting) toileting skills;dependent (less than 25% patient effort)  -BC     Position (Toileting) edge of bed sitting;supine  -BC     Comment, (Toileting) incontinent of bowel sitting EOB and cont'd supine in bed, rolling L/R  assist of 2 for brief mgmt. Left in bed w/ brief on to finish toileting and notified spouse to hit call bell for clean up  -BC               User Key  (r) = Recorded By, (t) = Taken By, (c) = Cosigned By      Initials Name Provider Type    Kevin Freitas OT Occupational Therapist                   Obj/Interventions       Row Name 01/21/25 1252          Sensory Assessment (Somatosensory)    Sensory Assessment (Somatosensory) unable/difficult to assess  -BC       Row Name 01/21/25 1252          Range of Motion Comprehensive    General Range of Motion upper extremity range of motion deficits identified;lower extremity range of motion deficits identified  -BC     Comment, General Range of Motion gross weakness BUE/BLEs impacting full AROM  -BC       Row Name 01/21/25 1252          Strength Comprehensive (MMT)    General Manual Muscle Testing (MMT) Assessment lower extremity strength deficits identified  -BC     Comment, General Manual Muscle Testing (MMT) Assessment gross weakness 2+ to 3+/5  -BC       Row Name 01/21/25 1252          Balance    Balance Assessment sitting static balance;sitting dynamic balance;standing static balance;standing dynamic balance  -BC     Static Sitting Balance contact guard;minimal assist;verbal cues  -BC     Dynamic Sitting Balance moderate assist  -BC     Position, Sitting Balance unsupported;sitting edge of bed  -BC     Static Standing Balance maximum assist;2-person assist  -BC     Dynamic Standing Balance maximum assist;2-person assist  -BC     Position/Device Used, Standing Balance supported  -BC     Balance Interventions sitting;standing;supported  -BC     Comment, Balance unsupported sitting balance training w/ L lateral lean and mod A for balance initially and max cues but once hips repositioned and with increased time/practice, asim improved static to close SBA. Standing AX2 provided mony support w/ gross weakness impacting ability to come to full stand and demo'd w decreased  standing balance.  -BC               User Key  (r) = Recorded By, (t) = Taken By, (c) = Cosigned By      Initials Name Provider Type    Kevin Freitas, OT Occupational Therapist                   Goals/Plan       Row Name 01/21/25 1257          Bed Mobility Goal 1 (OT)    Activity/Assistive Device (Bed Mobility Goal 1, OT) bed mobility activities, all  -BC     [place] Level/Cues Needed (Bed Mobility Goal 1, OT) moderate assist (50-74% patient effort)  -BC     Time Frame (Bed Mobility Goal 1, OT) short term goal (STG);2 weeks  -BC     Progress/Outcomes (Bed Mobility Goal 1, OT) new goal  -BC       Row Name 01/21/25 1257          Transfer Goal 1 (OT)    Activity/Assistive Device (Transfer Goal 1, OT) sit-to-stand/stand-to-sit  -BC     [place] Level/Cues Needed (Transfer Goal 1, OT) moderate assist (50-74% patient effort)  -BC     Time Frame (Transfer Goal 1, OT) short term goal (STG);2 weeks  -BC     Progress/Outcome (Transfer Goal 1, OT) new goal  -BC       Row Name 01/21/25 1257          Dressing Goal 1 (OT)    Activity/Device (Dressing Goal 1, OT) upper body dressing  -BC     [place]/Cues Needed (Dressing Goal 1, OT) minimum assist (75% or more patient effort)  -BC     Time Frame (Dressing Goal 1, OT) short term goal (STG);2 weeks  -BC     Progress/Outcome (Dressing Goal 1, OT) new goal  -BC       Row Name 01/21/25 1257          Grooming Goal 1 (OT)    Activity/Device (Grooming Goal 1, OT) grooming skills, all  -BC     [place] (Grooming Goal 1, OT) minimum assist (75% or more patient effort)  -BC     Time Frame (Grooming Goal 1, OT) 2 weeks;short term goal (STG)  -BC     Progress/Outcome (Grooming Goal 1, OT) new goal  -BC       Row Name 01/21/25 1257          Therapy Assessment/Plan (OT)    Planned Therapy Interventions (OT) activity tolerance training;BADL retraining;cognitive/visual perception retraining;functional balance retraining;IADL retraining;neuromuscular control/coordination  retraining;occupation/activity based interventions;patient/caregiver education/training;ROM/therapeutic exercise;strengthening exercise;transfer/mobility retraining  -BC               User Key  (r) = Recorded By, (t) = Taken By, (c) = Cosigned By      Initials Name Provider Type    BC Kevin Pisano, FREDDIE Occupational Therapist                   Clinical Impression       Row Name 01/21/25 1257          Pain Assessment    Pretreatment Pain Rating 0/10 - no pain  -BC     Posttreatment Pain Rating 0/10 - no pain  -BC       Row Name 01/21/25 1254          Plan of Care Review    Plan of Care Reviewed With patient  -BC     Progress improving  -BC     Outcome Evaluation Pt is a 89 y/o M admitted to [facility] 1/19 from rehab with pulmonary edema, BLE edema 3+ to 4+, anasarca. Pt also w/ CHF, Afib, HTN, and hx of Parkinsons Disease. Pt presents w/ decreased ADL IND, impaired balance, bed mobility IND, and would benefit from skilled therapy services to increased ADL IND with DC REC: back to SNU when medically stable to return to highest level of IND/function.  -BC       Row Name 01/21/25 1254          Therapy Assessment/Plan (OT)    Rehab Potential (OT) fair  -BC     Criteria for Skilled Therapeutic Interventions Met (OT) yes;meets criteria  -BC     Therapy Frequency (OT) 3 times/wk  -BC     Predicted Duration of Therapy Intervention (OT) 2 weeks  -BC       Row Name 01/21/25 1258          Therapy Plan Review/Discharge Plan (OT)    Anticipated Discharge Disposition (OT) [facility] nursing facility  -BC       Row Name 01/21/25 1254          Vital Signs    O2 Delivery Pre Treatment room air  -BC     O2 Delivery Intra Treatment room air  -BC     O2 Delivery Post Treatment room air  -BC     Pre Patient Position Supine  -BC     Intra Patient Position Standing  -BC     Post Patient Position Supine  -BC       Row Name 01/21/25 1254          Positioning and Restraints    Pre-Treatment Position in bed  -BC     Post Treatment Position bed  -BC      In Bed fowlers;call light within reach;encouraged to call for assist;exit alarm on;with family/caregiver  -BC               User Key  (r) = Recorded By, (t) = Taken By, (c) = Cosigned By      Initials Name Provider Type    Kevin Freitas OT Occupational Therapist                   Outcome Measures       Row Name 01/21/25 1257          How much help from another is currently needed...    Putting on and taking off regular lower body clothing? 1  -BC     Bathing (including washing, rinsing, and drying) 1  -BC     Toileting (which includes using toilet bed pan or urinal) 1  -BC     Putting on and taking off regular upper body clothing 2  -BC     Taking care of personal grooming (such as brushing teeth) 1  -BC     Eating meals 1  -BC     AM-PAC 6 Clicks Score (OT) 7  -BC       Row Name 01/21/25 0800          How much help from another person do you currently need...    Turning from your back to your side while in flat bed without using bedrails? 1  -JS     Moving from lying on back to sitting on the side of a flat bed without bedrails? 1  -JS     Moving to and from a bed to a chair (including a wheelchair)? 1  -JS     Standing up from a chair using your arms (e.g., wheelchair, bedside chair)? 1  -JS     Climbing 3-5 steps with a railing? 1  -JS       Row Name 01/21/25 1257          Modified [place] Scale    Modified [place] Scale 1 - No significant disability despite symptoms.  Able to carry out all usual duties and activities.  -BC       Row Name 01/21/25 1257          Functional Assessment    Outcome Measure Options AM-PAC 6 Clicks Daily Activity (OT);Modified [place]  -BC               User Key  (r) = Recorded By, (t) = Taken By, (c) = Cosigned By      Initials Name Provider Type    Michael Anders, RN Registered Nurse    Kevin Freitas OT Occupational Therapist                    Occupational Therapy Education       Title: PT OT SLP Therapies (In Progress)       Topic: Occupational Therapy (In Progress)        Point: ADL training (Done)       Description:   Instruct learner(s) on proper safety adaptation and remediation techniques during self care or transfers.   Instruct in proper use of assistive devices.                  Learning Progress Summary            Patient Acceptance, E,TB, VU by BC at 1/21/2025 3405    Comment: role of OT at acute level, POC, ADL participation, bed mobility practice, DC recs/planning, cont teaching                      Point: Home exercise program (Not Started)       Description:   Instruct learner(s) on appropriate technique for monitoring, assisting and/or progressing therapeutic exercises/activities.                  Learner Progress:  Not documented in this visit.              Point: Precautions (Not Started)       Description:   Instruct learner(s) on prescribed precautions during self-care and functional transfers.                  Learner Progress:  Not documented in this visit.              Point: Body mechanics (Not Started)       Description:   Instruct learner(s) on proper positioning and spine alignment during self-care, functional mobility activities and/or exercises.                  Learner Progress:  Not documented in this visit.                              User Key       Initials Effective Dates Name Provider Type Discipline    BC 07/29/24 -  Kevin Pisano OT Occupational Therapist OT                  OT Recommendation and Plan  Planned Therapy Interventions (OT): activity tolerance training, BADL retraining, cognitive/visual perception retraining, functional balance retraining, IADL retraining, neuromuscular control/coordination retraining, occupation/activity based interventions, patient/caregiver education/training, ROM/therapeutic exercise, strengthening exercise, transfer/mobility retraining  Therapy Frequency (OT): 3 times/wk  Plan of Care Review  Plan of Care Reviewed With: patient  Progress: improving  Outcome Evaluation: Pt is a 89 y/o M admitted to [facility] 1/19  from rehab with pulmonary edema, BLE edema 3+ to 4+, anasarca. Pt also w/ CHF, Afib, HTN, and hx of Parkinsons Disease. Pt presents w/ decreased ADL IND, impaired balance, bed mobility IND, and would benefit from skilled therapy services to increased ADL IND with DC REC: back to SNU when medically stable to return to highest level of IND/function.     Time Calculation:   Evaluation Complexity (OT)  Review Occupational Profile/Medical/Therapy History Complexity: expanded/moderate complexity  Assessment, Occupational Performance/Identification of Deficit Complexity: 3-5 performance deficits  Clinical Decision Making Complexity (OT): detailed assessment/moderate complexity  Overall Complexity of Evaluation (OT): moderate complexity     Time Calculation- OT       Row Name 01/21/25 1258             Time Calculation- OT    OT Start Time 1030  -BC      OT Stop Time 1052  -BC      OT Time Calculation (min) 22 min  -BC      Total Timed Code Minutes- OT 12 minute(s)  -BC      OT Received On 01/21/25  -BC      OT - Next Appointment 01/22/25  -BC      OT Goal Re-Cert Due Date 02/04/25  -BC         Timed Charges    16804 - OT Therapeutic Activity Minutes 12  -BC         Total Minutes    Timed Charges Total Minutes 12  -BC       Total Minutes 12  -BC                User Key  (r) = Recorded By, (t) = Taken By, (c) = Cosigned By      Initials Name Provider Type    BC Kevin Pisano OT Occupational Therapist                  Therapy Charges for Today       Code Description Service Date Service Provider Modifiers Qty    [id number]  OT THERAPEUTIC ACT EA 15 MIN 1/21/2025 Kevin Pisano OT GO 1    [id number]  OT EVAL MOD COMPLEXITY 3 1/21/2025 Kevin Pisano OT GO 1                 Kevin Pisano OT  1/21/2025

## 2025-01-21 NOTE — PROGRESS NOTES
[facility]     Progress Note    Patient Name: Casey Snowden Sr.  : 1934  MRN: [id number]  Primary Care Physician:  Aleksander Diez MD  Date of admission: 2025    Subjective   Subjective     Chief Complaint: anasarca    History of Present Illness  Patient with anasarca    Review of Systems    Objective   Objective     Vitals:   Temp:  [97.9 °F (36.6 °C)-98.7 °F (37.1 °C)] 98.7 °F (37.1 °C)  Heart Rate:  [52-58] 58  Resp:  [16-18] 16  BP: ()/(51-75) 103/75  Flow (L/min) (Oxygen Therapy):  [2] 2    Physical Exam   General Appearance:  In no acute distress.    HEENT: Normal HEENT exam.     Extremities: .  2+ edema.    Neurological: Patient is awake    Result Review    Result Review:  I have personally reviewed the results from the time of this admission to 2025 07:57 EST and agree with these findings:  []  Laboratory list / accordion  []  Microbiology  []  Radiology  []  EKG/Telemetry   []  Cardiology/Vascular   []  Pathology  []  Old records  []  Other:  Most notable findings include:       Assessment & Plan   Assessment / Plan     Brief Patient Summary:  Casey Snowden Sr. is a 89 y.o. male who has anasarca    Active Hospital Problems:  Active Hospital Problems    Diagnosis     **Acute pulmonary edema     Coronavirus infection     Anasarca     Chronic diastolic (congestive) heart failure     Anticoagulant long-term use     Paroxysmal A-fib     Benign essential hypertension     Parkinson's disease      Plan:   Volume overload/anasarca, multifactorial.  Does now have a low EF    Acute pulmonary edema    Benign essential hypertension    Parkinson's disease    Paroxysmal A-fib    Chronic diastolic (congestive) heart failure    Anticoagulant long-term use    Anasarca    Coronavirus infection    Patient awake, no distress. Wife at bedside. Labs and chart reviewed. Edema noted.  Lasix held last night and this am due to low bp.  D/w nurse. Bp improved. Ok to give lasix. May need ge if  PVR is over 200.  Renal function stable. Will follow    Claudia Bran MD

## 2025-01-21 NOTE — PROGRESS NOTES
LOS: 2 days   Patient Care Team:  Aleksander Diez MD as PCP - General  Marck, Joseph Youngblood MD as Consulting Physician (Ophthalmology)  Ford Dorado MD as Consulting Physician (Vascular Surgery)  Cecilio Armas III, MD as Consulting Physician (Cardiology)  Kashif Goodwin MD as Consulting Physician (Neurology)  Enoch Lares MD (Hematology)  Kayden Chance MD (Dermatology)  Vinod Cadena MD as Consulting Physician (Neurosurgery)  Johnny Castellanos MD as Consulting Physician (Urology)  Inge Pedro, PT as Physical Therapist (Physical Therapy)  Lucrecia Mccloud, PT as Physical Therapist (Physical Therapy)  Nereyda Toledo, OT as Occupational Therapist (Occupational Therapy)  Junior Taylor MD as Surgeon (Orthopedic Surgery)  Casey Power MD as Surgeon (Wound Care)  Nahomi Jacobo APRN as Nurse Practitioner (Nurse Practitioner)    Chief Complaint:     Follow-up anasarca    Interval History:     He said he was dizzy today but denies any difficulty breathing.  He denies chest pain.    Objective   Vital Signs  Temp:  [97.8 °F (36.6 °C)-98.7 °F (37.1 °C)] 97.8 °F (36.6 °C)  Heart Rate:  [52-67] 67  Resp:  [16-18] 16  BP: ()/(51-75) 110/64    Intake/Output Summary (Last 24 hours) at 1/21/2025 1102  Last data filed at 1/21/2025 0900  Gross per 24 hour   Intake 120 ml   Output 2450 ml   Net -2330 ml       Comfortable NAD  Neck supple, no JVD or thyromegaly appreciated  S1/S2 RRR, no m/r/g  Lungs CTA B, normal effort  Abdomen S/NT/ND (+) BS, no HSM appreciated  Extremities warm, no clubbing, cyanosis, 3+ lower extremity edema  No visible or palpable skin lesions  A/Ox4, mood and affect appropriate    Results Review:      Results from last 7 days   Lab Units 01/21/25  0429 01/20/25  0055 01/19/25  1643   SODIUM mmol/L 133* 135* 131*   POTASSIUM mmol/L 3.5 4.1 4.1   CHLORIDE mmol/L 99 97* 98   CO2 mmol/L 26.2 29.0 25.4   BUN mg/dL 21 18 19   CREATININE mg/dL 0.76 0.69*  0.60*   GLUCOSE mg/dL 120* 90 83   CALCIUM mg/dL 7.7* 8.1* 8.2     Results from last 7 days   Lab Units 01/19/25  1820 01/19/25  1643   HSTROP T ng/L 35* 34*     Results from last 7 days   Lab Units 01/20/25  0055 01/19/25  1643   WBC 10*3/mm3 5.96 5.65   HEMOGLOBIN g/dL 9.7* 10.0*   HEMATOCRIT % 28.9* 30.3*   PLATELETS 10*3/mm3 196 186     Results from last 7 days   Lab Units 01/19/25  1643   INR  1.31*                   I reviewed the patient's new clinical results.  I personally viewed and interpreted the patient's EKG/Telemetry data        Medication Review:   amiodarone, 200 mg, Per G Tube, Daily  apixaban, 5 mg, Per G Tube, Q12H  atorvastatin, 40 mg, Per G Tube, Nightly  carbidopa-levodopa, 1 tablet, Per G Tube, TID  finasteride, 5 mg, Per G Tube, Daily  furosemide, 20 mg, Intravenous, Q12H  guaifenesin, 400 mg, Per PEG Tube, Q4H  levothyroxine, 75 mcg, Per G Tube, Daily  metoprolol tartrate, 12.5 mg, Per G Tube, BID  oxybutynin, 5 mg, Per G Tube, BID  sodium chloride, 10 mL, Intravenous, Q12H             Assessment & Plan       Acute pulmonary edema    Benign essential hypertension    Parkinson's disease    Paroxysmal A-fib    Chronic diastolic (congestive) heart failure    Anticoagulant long-term use    Anasarca    Coronavirus infection    Anasarca , likely multifactorial however suspect hypoalbuminemia, HFrEF and immobility are the major driving factors.   Chronic HFrEF due to ischemic cardiomyopathy, LVEF 40% with apical akinesis and anteroseptal akinesis/aneurysm.   PAF, on amiodarone - started last admission. AF was resistant to AV heather blockers and difficult to control. He is anticoagulated with apixaban 5 mg BID.   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   History of orthostatic hypotension, likely some autonomic dysfunction due to  Parkinson's disease.   Parkinson's    Vital stable, remains on 2 L of nasal cannula oxygen.  I had to valsartan 20 mg nightly and spironolactone 12.5 mg in the morning to treat heart failure.    Continue to monitor.    Tennille Willams MD  01/21/25  11:02 EST

## 2025-01-21 NOTE — THERAPY EVALUATION
Patient Name: Casey Snowden Sr.  : 1934    MRN: [id number]                              Today's Date: 2025       Admit Date: 2025    Visit Dx: No diagnosis found.  Patient Active Problem List   Diagnosis    Benign essential hypertension    Stenosis of carotid artery    Mixed hyperlipidemia    Parkinson's disease    Peripheral arterial occlusive disease    Screening for prostate cancer    Medication monitoring encounter    Melanoma    Chronic deep vein thrombosis (DVT) of popliteal vein of right lower extremity    Uses hearing aid    Paroxysmal A-fib    Chronic diastolic (congestive) heart failure    Anticoagulant long-term use    Presence of IVC filter    Medicare annual wellness visit, subsequent    Iron deficiency anemia secondary to inadequate dietary iron intake    Orthostatic hypotension due to Parkinson's disease    Coronary artery disease involving native coronary artery of native heart without angina pectoris    ST elevation myocardial infarction involving left anterior descending (LAD) coronary artery    Sialorrhea    Hypertensive heart disease with heart failure    PAD (peripheral artery disease)    Metastatic squamous cell carcinoma    Secondary malignancy of lymph nodes of head, face and neck    Encounter for antineoplastic immunotherapy    RBD (REM behavioral disorder)    Acquired hypothyroidism    Aspiration pneumonia of right lung due to vomit    Dysphagia, neurologic    ICAO (internal carotid artery occlusion), right    Arterial ischemic stroke, ICA (internal carotid artery), right, chroni    Renal mass, left    Abdominal aortic aneurysm    Disorientation    Falls frequently    Right inguinal hernia    Unable to ambulate    Herpes zoster without complication    LISY (acute kidney injury)    Moderate protein-calorie malnutrition    Acute pulmonary edema    Anasarca    Coronavirus infection     Past Medical History:   Diagnosis Date    Abdominal aortic aneurysm without rupture  10/14/2021    Acquired hypothyroidism 08/31/2022    Acute ischemic stroke 10/08/2023    Aneurysm     Arrhythmia     Atrial fibrillation     Cancer 04/2018    basil cell carcinoma    Carotid stenosis 10/29/2015    CHF (congestive heart failure)     Chronic deep vein thrombosis (DVT) of popliteal vein of right lower extremity     Clotting disorder     Coronary artery disease involving native coronary artery of native heart without angina pectoris 12/20/2018    DVT of lower extremity (deep venous thrombosis)     Dysphasia 12/2024    Hard of hearing     Hyperlipidemia     Hypertension     Localized edema 01/04/2017    Long-term use of high-risk medication     Metabolic encephalopathy 02/07/2023    Myocardial infarction 2918    LAD stent    PAD (peripheral artery disease) 09/10/2020    Parkinson's disease     Postphlebitic syndrome     Pure hypercholesterolemia     Shingles 2024    Skin tear of upper arm without complication, left, subsequent encounter     Stroke 11/02/2023    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     Past Surgical History:   Procedure Laterality Date    BASAL CELL CARCINOMA EXCISION  04/2018    CARDIAC CATHETERIZATION N/A 11/05/2018    Procedure: Left Heart Cath;  Surgeon: Oliverio Azar MD;  Location: [facility] CATH INVASIVE LOCATION;  Service: Cardiovascular    CARDIAC CATHETERIZATION N/A 11/05/2018    Procedure: Coronary angiography;  Surgeon: Oliverio Azar MD;  Location: [facility] CATH INVASIVE LOCATION;  Service: Cardiovascular    CARDIAC CATHETERIZATION N/A 11/05/2018    Procedure: Left ventriculography;  Surgeon: Oliveiro Azar MD;  Location: [facility] CATH INVASIVE LOCATION;  Service: Cardiovascular    CARDIAC CATHETERIZATION N/A 06/04/2019    Procedure: Left Heart Cath;  Surgeon: Johnny Glasgow MD;  Location: [facility] CATH INVASIVE LOCATION;  Service: Cardiovascular    CARDIAC CATHETERIZATION N/A 06/04/2019    Procedure: Coronary angiography;  Surgeon: Johnny Glasgow MD;  Location: [facility]  CATH INVASIVE LOCATION;  Service: Cardiovascular    CARDIAC CATHETERIZATION N/A 06/04/2019    Procedure: Left ventriculography;  Surgeon: Johnny Glasgow MD;  Location: [facility] CATH INVASIVE LOCATION;  Service: Cardiovascular    CARDIAC CATHETERIZATION N/A 06/04/2019    Procedure: Stent BILL coronary;  Surgeon: Johnny Glasgow MD;  Location: [facility] CATH INVASIVE LOCATION;  Service: Cardiovascular    CARDIAC SURGERY      CAROTID ARTERY ANGIOPLASTY Right 10/11/2023    with stent    CAROTID STENT      CATARACT EXTRACTION Left 08/2018    CATARACT EXTRACTION Right 09/2018    COLONOSCOPY      2011    CORONARY STENT PLACEMENT      ENDOSCOPY W/ PEG TUBE PLACEMENT N/A 1/3/2025    Procedure: ESOPHAGOGASTRODUODENOSCOPY WITH PERCUTANEOUS ENDOSCOPIC GASTROSTOMY TUBE INSERTION;  Surgeon: Konstantin Persaud MD;  Location: [facility] ENDOSCOPY;  Service: General;  Laterality: N/A;  pre: dysphagia  post: same    INCISION AND DRAINAGE LEG Right 07/07/2017    Procedure: INCISION AND DRAINAGE INFECTED HEMATOMA RIGHT THIGH;  Surgeon: Valentin Peña MD;  Location: [facility] MAIN OR;  Service:     JOINT REPLACEMENT      KNEE INCISION AND DRAINAGE Right 12/27/2016    Procedure: KNEE INCISION AND DRAINAGE;  Surgeon: Triston Whitten MD;  Location: [facility] MAIN OR;  Service:     NOSE SURGERY      nose replacement    SINUS SURGERY  1960    SKIN BIOPSY      SKIN GRAFT  12/2017    TOTAL KNEE ARTHROPLASTY Right     VASCULAR SURGERY      VENA CAVA FILTER PLACEMENT        General Information       Row Name 01/21/25 1247          Physical Therapy Time and Intention    Document Type evaluation  -     Mode of Treatment co-treatment;physical therapy;occupational therapy  -       Row Name 01/21/25 1247          General Information    Patient Profile Reviewed yes  -     Prior Level of Function --  Lift transfers to  at rehab, working on standing with therapy. Was independent living at home with wife in December  -     Existing  Precautions/Restrictions fall  -     Barriers to Rehab medically complex;previous functional deficit  -       Row Name 01/21/25 1247          Living Environment    People in Home facility resident  Admit from SNF  -       Row Name 01/21/25 1247          Cognition    Orientation Status (Cognition) oriented to;person  Garbled, difficult to understand  -       Row Name 01/21/25 1247          Safety Issues/Impairments Affecting Functional Mobility    Impairments Affecting Function (Mobility) balance;endurance/activity tolerance;strength;range of motion (ROM);postural/trunk control  -     Comment, Safety Issues/Impairments (Mobility) Co treatment medically appropriate and necessary due to patient acuity level, activity tolerance and safety of patient and staff. Evaluation established to achieve all goals in POC.  -               User Key  (r) = Recorded By, (t) = Taken By, (c) = Cosigned By      Initials Name Provider Type    Debbie Montes PT Physical Therapist                   Mobility       Row Name 01/21/25 1248          Bed Mobility    Bed Mobility supine-sit;sit-supine  -     Supine-Sit [place] (Bed Mobility) maximum assist (25% patient effort);2 person assist;verbal cues  -     Sit-Supine [place] (Bed Mobility) maximum assist (25% patient effort);2 person assist;verbal cues  -     Assistive Device (Bed Mobility) head of bed elevated  -       Row Name 01/21/25 1248          Sit-Stand Transfer    Sit-Stand [place] (Transfers) maximum assist (25% patient effort);2 person assist  -     Assistive Device (Sit-Stand Transfers) other (see comments)  HHA x2  -     Comment, (Sit-Stand Transfer) 3x STS from EOB  -               User Key  (r) = Recorded By, (t) = Taken By, (c) = Cosigned By      Initials Name Provider Type    Debbie Montes PT Physical Therapist                   Obj/Interventions       Row Name 01/21/25 1249          Range of Motion Comprehensive     General Range of Motion lower extremity range of motion deficits identified  -       Row Name 01/21/25 1249          Strength Comprehensive (MMT)    General Manual Muscle Testing (MMT) Assessment lower extremity strength deficits identified  -     Comment, General Manual Muscle Testing (MMT) Assessment Generalized weakness  -       Row Name 01/21/25 1249          Balance    Balance Assessment sitting static balance;sitting dynamic balance;standing static balance;standing dynamic balance  -     Static Sitting Balance minimal assist;verbal cues  -     Dynamic Sitting Balance moderate assist;verbal cues  -     Position, Sitting Balance sitting edge of bed;supported  -     Static Standing Balance maximum assist;2-person assist;verbal cues  -     Dynamic Standing Balance maximum assist;2-person assist;verbal cues  -     Position/Device Used, Standing Balance supported;other (see comments)  HHA x2  -     Balance Interventions sitting;standing;sit to stand;supported;static;dynamic  -[facility] Name 01/21/25 1249          Sensory Assessment (Somatosensory)    Sensory Assessment (Somatosensory) unable/difficult to assess  -               User Key  (r) = Recorded By, (t) = Taken By, (c) = Cosigned By      Initials Name Provider Type     Debbie Connolly, PT Physical Therapist                   Goals/Plan       [facility] Name 01/21/25 1314          Bed Mobility Goal 1 (PT)    Activity/Assistive Device (Bed Mobility Goal 1, PT) bed mobility activities, all  -     [place] Level/Cues Needed (Bed Mobility Goal 1, PT) minimum assist (75% or more patient effort)  -     Time Frame (Bed Mobility Goal 1, PT) 2 weeks  -       Row Name 01/21/25 1314          Transfer Goal 1 (PT)    Activity/Assistive Device (Transfer Goal 1, PT) transfers, all  -     [place] Level/Cues Needed (Transfer Goal 1, PT) minimum assist (75% or more patient effort)  -     Time Frame (Transfer Goal 1, PT) 2 weeks  -        Row Name 01/21/25 1314          Gait Training Goal 1 (PT)    Activity/Assistive Device (Gait Training Goal 1, PT) gait (walking locomotion)  -     [place] Level (Gait Training Goal 1, PT) minimum assist (75% or more patient effort)  -     Distance (Gait Training Goal 1, PT) 10ft  -     Time Frame (Gait Training Goal 1, PT) 2 weeks  -       Row Name 01/21/25 1314          Therapy Assessment/Plan (PT)    Planned Therapy Interventions (PT) balance training;bed mobility training;gait training;home exercise program;patient/family education;ROM (range of motion);stair training;strengthening;stretching;transfer training  -               User Key  (r) = Recorded By, (t) = Taken By, (c) = Cosigned By      Initials Name Provider Type     Debbie Connolly, PT Physical Therapist                   Clinical Impression       Row Name 01/21/25 1251          Pain    Pretreatment Pain Rating 0/10 - no pain  -     Posttreatment Pain Rating 0/10 - no pain  -       Row Name 01/21/25 1251          Plan of Care Review    Plan of Care Reviewed With patient  -     Outcome Evaluation Pt is a 89 yo M admitted from SNF with acute pulmonary edema, CHF, and anasarca, hx of Parkinsons. Pt's wife present on eval and helpful with hx - pt with length admission recently with encephalitis and DC to rehab. Wife states staff was using a lift to get pt to a  and he was working on standing with therapy. Pt was independent living with his wife in December. Pt presents to PT with impaired strength, endurance, and balance limiting overall mobility. Pt transferred to EOB with max A x2 - max cues for posture correction with little improvement. Noted L lateral lean both in sitting and standing, worsening forward flexed posture with fatigue. Unable to sequence side steps, tolerated standing for ~5-10 seconds at a time. Pt returned to supine max A x2 and completed rolling with max A x1 to change brief - noted pt actively having a BM.  Rolled onto fresh brief and encouraged wife to call out for change in a few minutes. PT will continue to follow, anticipate return to SNF at [place]. Per chart, pt with poor prognosis but wife not ready to move forward with palliative care - hopeful for return to PLOF.  -       Row Name 01/21/25 1251          Therapy Assessment/Plan (PT)    Patient/Family Therapy Goals Statement (PT) Return to PLOF  -     Rehab Potential (PT) limited  -     Criteria for Skilled Interventions Met (PT) yes  -     Therapy Frequency (PT) 3 times/wk  -       Row Name 01/21/25 1251          Vital Signs    O2 Delivery Pre Treatment supplemental O2  -     O2 Delivery Intra Treatment supplemental O2  -     O2 Delivery Post Treatment supplemental O2  -       Row Name 01/21/25 1251          Positioning and Restraints    Pre-Treatment Position in bed  -     Post Treatment Position bed  -     In Bed fowlers;call light within reach;encouraged to call for assist;exit alarm on;with family/caregiver  -               User Key  (r) = Recorded By, (t) = Taken By, (c) = Cosigned By      Initials Name Provider Type     Debbie Connolly, PT Physical Therapist                   Outcome Measures       Row Name 01/21/25 1314 01/21/25 0800       How much help from another person do you currently need...    Turning from your back to your side while in flat bed without using bedrails? 2  - 1  -JS    Moving from lying on back to sitting on the side of a flat bed without bedrails? 2  - 1  -JS    Moving to and from a bed to a chair (including a wheelchair)? 1  - 1  -JS    Standing up from a chair using your arms (e.g., wheelchair, bedside chair)? 2  - 1  -JS    Climbing 3-5 steps with a railing? 1  - 1  -JS    To walk in hospital room? 1  - --    AM-PAC 6 Clicks Score (PT) 9  - --    Highest Level of Mobility Goal 3 --> Sit at edge of bed  - --      Row Name 01/21/25 1257          Modified [place] Scale    Modified [place] Scale 1  - No significant disability despite symptoms.  Able to carry out all usual duties and activities.  -BC       Row Name 01/21/25 1314 01/21/25 1257       Functional Assessment    Outcome Measure Options AM-PAC 6 Clicks Basic Mobility (PT)  - AM-PAC 6 Clicks Daily Activity (OT);Modified [place]  -BC              User Key  (r) = Recorded By, (t) = Taken By, (c) = Cosigned By      Initials Name Provider Type    Michael Anders, RN Registered Nurse     Debbie Connolly, PT Physical Therapist    BC Kevin Pisano, OT Occupational Therapist                                 Physical Therapy Education       Title: PT OT SLP Therapies (In Progress)       Topic: Physical Therapy (In Progress)       Point: Mobility training (In Progress)       Learning Progress Summary            Patient Acceptance, E,TB,D, NR,NL by  at 1/21/2025 1315                      Point: Home exercise program (Not Started)       Learner Progress:  Not documented in this visit.              Point: Body mechanics (In Progress)       Learning Progress Summary            Patient Acceptance, E,TB,D, NR,NL by  at 1/21/2025 1315                      Point: Precautions (In Progress)       Learning Progress Summary            Patient Acceptance, E,TB,D, NR,NL by  at 1/21/2025 1315                                      User Key       Initials Effective Dates Name Provider Type Discipline     04/08/22 -  Debbie Connolly PT Physical Therapist PT                  PT Recommendation and Plan  Planned Therapy Interventions (PT): balance training, bed mobility training, gait training, home exercise program, patient/family education, ROM (range of motion), stair training, strengthening, stretching, transfer training  Outcome Evaluation: Pt is a 89 yo M admitted from SNF with acute pulmonary edema, CHF, and anasarca, hx of Parkinsons. Pt's wife present on eval and helpful with hx - pt with length admission recently with encephalitis and DC to rehab. Wife  [facility] staff was using a lift to get pt to a  and he was working on standing with therapy. Pt was independent living with his wife in December. Pt presents to PT with impaired strength, endurance, and balance limiting overall mobility. Pt transferred to EOB with max A x2 - max cues for posture correction with little improvement. Noted L lateral lean both in sitting and standing, worsening forward flexed posture with fatigue. Unable to sequence side steps, tolerated standing for ~5-10 seconds at a time. Pt returned to supine max A x2 and completed rolling with max A x1 to change brief - noted pt actively having a BM. Rolled onto fresh brief and encouraged wife to call out for change in a few minutes. PT will continue to follow, anticipate return to SNF at [place]. Per chart, pt with poor prognosis but wife not ready to move forward with palliative care - hopeful for return to PLOF.     Time Calculation:   PT Evaluation Complexity  History, PT Evaluation Complexity: 1-2 personal factors and/or comorbidities  Examination of Body Systems (PT Eval Complexity): total of 3 or more elements  Clinical Presentation (PT Evaluation Complexity): evolving  Clinical Decision Making (PT Evaluation Complexity): moderate complexity  Overall Complexity (PT Evaluation Complexity): moderate complexity     PT Charges       Row Name 01/21/25 1315             Time Calculation    Start Time 1030  -      Stop Time 1052  -      Time Calculation (min) 22 min  -      PT Received On 01/21/25  -      PT - Next Appointment 01/23/25  -      PT Goal Re-Cert Due Date 02/04/25  -         Time Calculation- PT    Total Timed Code Minutes- PT 18 minute(s)  -         Timed Charges    24680 - PT Therapeutic Activity Minutes 18  -BH         Total Minutes    Timed Charges Total Minutes 18  -BH       Total Minutes 18  -BH                User Key  (r) = Recorded By, (t) = Taken By, (c) = Cosigned By      Initials Name Provider Type    KATIE Connolly  Debbie PRIEST, PT Physical Therapist                  Therapy Charges for Today       Code Description Service Date Service Provider Modifiers Qty    92064698512 HC PT THERAPEUTIC ACT EA 15 MIN 1/21/2025 Debbie Connolly, PT GP 1    22544599720 HC PT EVAL MOD COMPLEXITY 3 1/21/2025 Debbie Connolly, PT GP 1            PT G-Codes  Outcome Measure Options: AM-PAC 6 Clicks Basic Mobility (PT)  AM-PAC 6 Clicks Score (PT): 9  AM-PAC 6 Clicks Score (OT): 7  Modified [place] Scale: 1 - No significant disability despite symptoms.  Able to carry out all usual duties and activities.  PT Discharge Summary  Anticipated Discharge Disposition (PT): skilled nursing facility, extended care facility    Debbie Connolly PT  1/21/2025

## 2025-01-21 NOTE — PLAN OF CARE
Goal Outcome Evaluation:  Plan of Care Reviewed With: patient        Progress: no change  Outcome Evaluation: Pt confused, tremors at times, and restless this afternoon, tense, bp went up and allowed to calm with daughter helping , finally calm some and bp improved, 101/66, afebrile, cindy increased in tf rate, now at 60 GR, voided large amount, bladder scan 121, 91. 2L O2/nc satting 94%, nad, bed alarm on. oral care, pericare and bed bath given this shift. sr 72.  Lasix inj given.

## 2025-01-21 NOTE — PROGRESS NOTES
" LOS: 2 days     Name: Casey Snowden .  Age: 89 y.o.  Sex: male  :  1934  MRN: [id number]         Primary Care Physician: Aleksander Diez MD    Subjective   Subjective  His wife is at the bedside and feels that the patient is less responsive today.  Still with a cough and they are suctioning him frequently.  Still very swollen.  Blood pressure dropped last evening and he was unable to receive p.m. dose of Lasix.    Objective   Vital Signs  Temp:  [97.8 °F (36.6 °C)-98.7 °F (37.1 °C)] 97.8 °F (36.6 °C)  Heart Rate:  [52-67] 67  Resp:  [16-18] 16  BP: ()/(51-75) 110/64  Body mass index is 24.46 kg/m².    Objective:  General Appearance:  Comfortable and in no acute distress (Looks both acutely and chronically ill.  Very weak and deconditioned.  Very malnourished.  Has a terminal appearance).    Vital signs: (most recent): Blood pressure 110/64, pulse 67, temperature 97.8 °F (36.6 °C), temperature source Oral, resp. rate 16, height 182.9 cm (72\"), weight 81.8 kg (180 lb 5.4 oz), SpO2 94%.    Lungs:  Normal effort and normal respiratory rate.  He is not in respiratory distress.  There are rales, decreased breath sounds and rhonchi.    Heart: Normal rate.  Regular rhythm.    Abdomen: Abdomen is soft.  Bowel sounds are normal.   There is no abdominal tenderness.     Extremities: There is dependent edema.  There is no local swelling.    Neurological: Patient is alert and oriented to person, place and time.    Skin:  Warm and dry.                Results Review:       I reviewed the patient's new clinical results.    Results from last 7 days   Lab Units 25  0055 25  1643   WBC 10*3/mm3 5.96 5.65   HEMOGLOBIN g/dL 9.7* 10.0*   PLATELETS 10*3/mm3 196 186     Results from last 7 days   Lab Units 25  0429 25  0055 25  1643   SODIUM mmol/L 133* 135* 131*   POTASSIUM mmol/L 3.5 4.1 4.1   CHLORIDE mmol/L 99 97* 98   CO2 mmol/L 26.2 29.0 25.4   BUN mg/dL 21 18 19   CREATININE mg/dL " 0.76 0.69* 0.60*   CALCIUM mg/dL 7.7* 8.1* 8.2   GLUCOSE mg/dL 120* 90 83     Results from last 7 days   Lab Units 01/19/25  1643   INR  1.31*             Scheduled Meds:   amiodarone, 200 mg, Per G Tube, Daily  apixaban, 5 mg, Per G Tube, Q12H  atorvastatin, 40 mg, Per G Tube, Nightly  carbidopa-levodopa, 1 tablet, Per G Tube, TID  finasteride, 5 mg, Per G Tube, Daily  furosemide, 20 mg, Intravenous, Q12H  guaifenesin, 400 mg, Per PEG Tube, Q4H  levothyroxine, 75 mcg, Per G Tube, Daily  metoprolol tartrate, 12.5 mg, Per G Tube, BID  oxybutynin, 5 mg, Per G Tube, BID  sodium chloride, 10 mL, Intravenous, Q12H      PRN Meds:     acetaminophen    senna-docusate sodium **AND** polyethylene glycol **AND** bisacodyl **AND** bisacodyl    guaifenesin    nitroglycerin    ondansetron ODT **OR** ondansetron    sodium chloride    sodium chloride  Continuous Infusions:       Assessment & Plan   Active Hospital Problems    Diagnosis  POA    **Acute pulmonary edema [J81.0]  Yes    Coronavirus infection [B34.2]  Unknown    Anasarca [R60.1]  Yes    Chronic diastolic (congestive) heart failure [I50.32]  Yes    Anticoagulant long-term use [Z79.01]  Not Applicable    Paroxysmal A-fib [I48.0]  Yes    Benign essential hypertension [I10]  Yes    Parkinson's disease [G20.A1]  Yes      Resolved Hospital Problems   No resolved problems to display.       Assessment & Plan    -IV diuresis ongoing with Lasix 20 mg every 12 hours.  Appreciate assistance from nephrology.  His anasarca is multifactorial and being greatly influenced by his hypoalbuminemia.  -Soft blood pressures last evening noted.  I am going to stop the terazosin.  -He is having some urinary retention and required straight cath overnight last night.  Plan in place from urology to anchor a Cat if he has retention greater than 200 cc  -Appreciate assistance from cardiology  -Continue supportive care for coronavirus infection.  Utilizing pulmonary hygiene measures is much as  patient can tolerate and participate.  I am going to start scheduled DuoNebs to see if we can get greater airway clearance.  -I discussed extensively with his wife at the bedside his guarded prognosis.  She states she is not ready to relinquish optimism that he can achieve meaningful recovery but does feel as if it is waning.  I was very honest with her and told her that this repeat hospitalization is a poor prognostic sign.  She expresses understanding and agreement with that.  Will continue to address this with her on a daily basis as I do think that he is palliative care appropriate but she is not ready to make that decision.    Dispo  TBD    Expected Discharge Date: 1/23/2025; Expected Discharge Time:      Triston Cowart MD  [facility]  01/21/25  11:34 EST

## 2025-01-22 NOTE — PLAN OF CARE
Goal Outcome Evaluation:      VSS. BP low throughout day. Chest xray done. Bladder scan done, 107 mL. On 4L NC. Patient awake and talking periodically throughout day.

## 2025-01-22 NOTE — PROGRESS NOTES
Patient required 4L for sleeping with mouth open.  Crackles auscultated on right side, wheeze on left consistent with previous assessment.  Spoke at length with wife/family at bedside to provide assurance regarding the patients respiratory status.  Will continue to monitor throughout the night.

## 2025-01-22 NOTE — PROGRESS NOTES
" LOS: 3 days     Name: Casey Snowden Sr.  Age: 89 y.o.  Sex: male  :  1934  MRN: [id number]         Primary Care Physician: Aleksander Diez MD    Subjective   Subjective  Temperature of 100.8 last night.  Currently on 3.5 L of oxygen.  Wife reports that he has ongoing upper airway congestion.    Objective   Vital Signs  Temp:  [98.6 °F (37 °C)-100.8 °F (38.2 °C)] 99 °F (37.2 °C)  Heart Rate:  [55-78] 74  Resp:  [18-24] 24  BP: ()/(42-70) 110/55  Body mass index is 23.59 kg/m².    Objective:  General Appearance:  Comfortable and in no acute distress (He has a terminally ill appearance to me).    Vital signs: (most recent): Blood pressure 110/55, pulse 67, temperature 99 °F (37.2 °C), temperature source Oral, resp. rate 20, height 182.9 cm (72\"), weight 78.9 kg (173 lb 15.1 oz), SpO2 100%.    Lungs:  Normal effort and normal respiratory rate.  He is not in respiratory distress.  There are rales and decreased breath sounds.    Heart: Normal rate.  Regular rhythm.    Abdomen: Abdomen is soft.  Bowel sounds are normal.   There is no abdominal tenderness.     Extremities: There is no dependent edema or local swelling.    Neurological: Patient is alert and oriented to person, place and time.    Skin:  Warm and dry.                Results Review:       I reviewed the patient's new clinical results.    Results from last 7 days   Lab Units 25  1048 25  0055 25  1643   WBC 10*3/mm3 11.18* 5.96 5.65   HEMOGLOBIN g/dL 8.0* 9.7* 10.0*   PLATELETS 10*3/mm3 158 196 186     Results from last 7 days   Lab Units 25  0301 25  0429 25  0055 25  1643   SODIUM mmol/L 133* 133* 135* 131*   POTASSIUM mmol/L 3.8 3.5 4.1 4.1   CHLORIDE mmol/L 99 99 97* 98   CO2 mmol/L 24.8 26.2 29.0 25.4   BUN mg/dL 31* 21 18 19   CREATININE mg/dL 1.15 0.76 0.69* 0.60*   CALCIUM mg/dL 7.6* 7.7* 8.1* 8.2   GLUCOSE mg/dL 198* 120* 90 83     Results from last 7 days   Lab Units 25  1643   INR  " 1.31*             Scheduled Meds:   amiodarone, 200 mg, Per G Tube, Daily  apixaban, 5 mg, Per G Tube, Q12H  atorvastatin, 40 mg, Per G Tube, Nightly  carbidopa-levodopa, 1 tablet, Per G Tube, TID  finasteride, 5 mg, Per G Tube, Daily  furosemide, 20 mg, Intravenous, Q12H  guaifenesin, 400 mg, Per PEG Tube, Q4H  ipratropium-albuterol, 3 mL, Nebulization, 4x Daily - RT  levothyroxine, 75 mcg, Per G Tube, Daily  metoprolol tartrate, 12.5 mg, Per G Tube, BID  oxybutynin, 5 mg, Per G Tube, BID  sodium chloride, 10 mL, Intravenous, Q12H  spironolactone, 12.5 mg, Per G Tube, QAM  valsartan, 40 mg, Per G Tube, Nightly      PRN Meds:     acetaminophen    senna-docusate sodium **AND** polyethylene glycol **AND** bisacodyl **AND** bisacodyl    guaifenesin    nitroglycerin    ondansetron ODT **OR** ondansetron    sodium chloride    sodium chloride  Continuous Infusions:       Assessment & Plan   Active Hospital Problems    Diagnosis  POA    **Acute pulmonary edema [J81.0]  Yes    Coronavirus infection [B34.2]  Unknown    Anasarca [R60.1]  Yes    Hypothyroidism [E03.9]  Yes    Chronic diastolic (congestive) heart failure [I50.32]  Yes    Anticoagulant long-term use [Z79.01]  Not Applicable    Paroxysmal A-fib [I48.0]  Yes    Benign essential hypertension [I10]  Yes    Parkinson's disease [G20.A1]  Yes      Resolved Hospital Problems   No resolved problems to display.       Assessment & Plan    -IV diuresis ongoing with Lasix 20 mg every 12 hours.  Appreciate assistance from nephrology.  His anasarca is multifactorial and being greatly influenced by his hypoalbuminemia.  May benefit from albumin.  -Soft blood pressures last evening noted.  This is limiting ability to diurese.  -He is having some intermittent urinary retention and required straight cath overnight last night.  Plan in place from nephrology to anchor a Cat if he has retention greater than 200 cc  -Appreciate assistance from cardiology.  Maximizing medical  management of heart failure  -Continue supportive care for coronavirus infection.    -Low-grade fever last night noted.  I will check for secondary bacterial pneumonia with chest x-ray, CBC, procalcitonin, blood cultures.  -TSH noted to be elevated at 17.  Increase Synthroid dose to 88 mcg.  He will need repeat TFTs in 4 to 6 weeks.  -Chronic anemia noted.  Monitor.  -Continue Sinemet for Parkinson's disease  -I have discussed extensively with his wife at the bedside his guarded prognosis.  She states she is not ready to relinquish optimism that he can achieve meaningful recovery but does feel as if it is waning.  I was very honest with her and told her that this repeat hospitalization is a poor prognostic sign.  She expresses understanding and agreement with that.  Will continue to address this with her on a daily basis as I do think that he is palliative care appropriate but she is not ready to make that decision.     Dispo  TBD      Expected Discharge Date: 1/25/2025; Expected Discharge Time:  3:00 PM     Triston Cowart MD  [facility]  01/22/25  11:17 EST    Addendum: Pro-Marcin significantly elevated.  He has a mild leukocytosis.  Chest x-ray does not show any significant change.  I have started him on Zosyn.  Check MRSA screen and if positive will add vancomycin.

## 2025-01-22 NOTE — PROGRESS NOTES
Nutrition Services    Patient Name: Casey Snowden .  YOB: 1934  MRN: [id number]  Admission date: 1/19/2025    PROGRESS NOTE      Encounter Information: Checking in on TF tolerance. Pt with 2+ and 3+ edema - IV albumin to help with third spacing and anasarca. Attending recommending palliative care but family not ready at this time.        PO Diet: NPO Diet NPO Type: Strict NPO   PO Supplements: -   PO Intake:  -       Current nutrition support: Isosource 1.5 @ 60 mL/hr with 30 mL q6 FWF   Nutrition support review: Infusing at goal as ordered per documentation        Labs (reviewed below): Recent hyperglycemia         Edema 2+ generalized, L leg, L knee, R ankle, feet  3+ abdomen, R leg, R knee, L ankle       GI Function:  + BM 1/21       Nutrition Intervention Updates: Continue TF at goal as ordered. RD to follow up for NFPE and regarding possible addition of prosource.        Results from last 7 days   Lab Units 01/22/25 0301 01/21/25 0429 01/20/25 0055 01/19/25  1643   SODIUM mmol/L 133* 133* 135* 131*   POTASSIUM mmol/L 3.8 3.5 4.1 4.1   CHLORIDE mmol/L 99 99 97* 98   CO2 mmol/L 24.8 26.2 29.0 25.4   BUN mg/dL 31* 21 18 19   CREATININE mg/dL 1.15 0.76 0.69* 0.60*   CALCIUM mg/dL 7.6* 7.7* 8.1* 8.2   BILIRUBIN mg/dL  --   --   --  0.3   ALK PHOS U/L  --   --   --  81   ALT (SGPT) U/L  --   --   --  22   AST (SGOT) U/L  --   --   --  31   GLUCOSE mg/dL 198* 120* 90 83     Results from last 7 days   Lab Units 01/22/25 0301 01/21/25 0429 01/20/25  0055   PHOSPHORUS mg/dL 2.9   < >  --    HEMOGLOBIN g/dL  --   --  9.7*   HEMATOCRIT %  --   --  28.9*    < > = values in this interval not displayed.     COVID19   Date Value Ref Range Status   01/20/2025 Not Detected Not Detected - Ref. Range Final     Lab Results   Component Value Date    HGBA1C 5.40 12/18/2024       Wt Readings from Last 10 Encounters:   01/22/25 0510 78.9 kg (173 lb 15.1 oz)   01/21/25 0351 81.8 kg (180 lb 5.4 oz)   01/20/25 1132  76.7 kg (169 lb)   01/20/25 0600 77.1 kg (169 lb 15.6 oz)   01/19/25 1545 83 kg (183 lb)   01/09/25 0415 81.8 kg (180 lb 5.4 oz)   01/08/25 0515 82.6 kg (182 lb 1.6 oz)   01/07/25 0500 84 kg (185 lb 3 oz)   01/02/25 0426 84.6 kg (186 lb 8.2 oz)   01/01/25 0520 84.3 kg (185 lb 13.6 oz)   12/31/24 0600 83.2 kg (183 lb 6.8 oz)   12/30/24 0412 78.6 kg (173 lb 4.5 oz)   12/29/24 0519 74.9 kg (165 lb 2 oz)   12/28/24 0408 74.2 kg (163 lb 9.3 oz)   12/27/24 0403 75.5 kg (166 lb 7.2 oz)   12/26/24 0658 74.5 kg (164 lb 3.9 oz)   12/25/24 0536 70 kg (154 lb 5.2 oz)   12/24/24 0505 65.4 kg (144 lb 2.9 oz)   12/23/24 0500 66.4 kg (146 lb 6.2 oz)   12/22/24 0510 68.7 kg (151 lb 7.3 oz)   12/21/24 0500 66 kg (145 lb 8.1 oz)   12/18/24 0500 66.7 kg (147 lb 0.8 oz)   11/25/24 1624 68.9 kg (152 lb)   11/13/24 1140 67.8 kg (149 lb 8 oz)   11/06/24 1336 66.9 kg (147 lb 8 oz)   08/27/24 1119 68.9 kg (152 lb)   08/14/24 1632 71.7 kg (158 lb)   08/14/24 1112 70 kg (154 lb 4.8 oz)   08/09/24 1256 70.3 kg (155 lb)       RD to follow up per protocol.    Electronically signed by:  Kathryn Montano RD  01/22/25 10:06 EST

## 2025-01-22 NOTE — PROGRESS NOTES
LOS: 3 days   Patient Care Team:  Aleksander Diez MD as PCP - General  Marck, Joseph Youngblood MD as Consulting Physician (Ophthalmology)  Ford Dorado MD as Consulting Physician (Vascular Surgery)  Cecilio Armas III, MD as Consulting Physician (Cardiology)  Kashif Goodwin MD as Consulting Physician (Neurology)  Enoch Lares MD (Hematology)  Kayden Chance MD (Dermatology)  Vinod Cadena MD as Consulting Physician (Neurosurgery)  Johnny Castellanos MD as Consulting Physician (Urology)  Inge Pedro, PT as Physical Therapist (Physical Therapy)  Lucrecia Mccloud, PT as Physical Therapist (Physical Therapy)  Nereyda Toledo, OT as Occupational Therapist (Occupational Therapy)  Junior Taylor MD as Surgeon (Orthopedic Surgery)  Casey Power MD as Surgeon (Wound Care)  Nahomi Jacobo APRN as Nurse Practitioner (Nurse Practitioner)    Chief Complaint:     F/u  HFrEF    Interval History:     He had some nausea yesterday but feels better today.  His breathing is stable and he has no chest pain.    Objective   Vital Signs  Temp:  [97.5 °F (36.4 °C)-100.8 °F (38.2 °C)] 97.5 °F (36.4 °C)  Heart Rate:  [67-78] 68  Resp:  [20-24] 20  BP: ()/(42-73) 99/73    Intake/Output Summary (Last 24 hours) at 1/22/2025 1354  Last data filed at 1/22/2025 0510  Gross per 24 hour   Intake 878 ml   Output 300 ml   Net 578 ml       Comfortable NAD  Neck supple, no JVD or thyromegaly appreciated  S1/S2 RRR, no m/r/g  Lungs CTA B, normal effort  Abdomen S/NT/ND (+) BS, no HSM appreciated  Extremities warm, no clubbing, cyanosis, 2+ lower extremity edema, presacral edema noted.  No visible or palpable skin lesions  A/Ox4, mood and affect appropriate    Results Review:      Results from last 7 days   Lab Units 01/22/25  0301 01/21/25  0429 01/20/25  0055   SODIUM mmol/L 133* 133* 135*   POTASSIUM mmol/L 3.8 3.5 4.1   CHLORIDE mmol/L 99 99 97*   CO2 mmol/L 24.8 26.2 29.0   BUN mg/dL 31* 21 18    CREATININE mg/dL 1.15 0.76 0.69*   GLUCOSE mg/dL 198* 120* 90   CALCIUM mg/dL 7.6* 7.7* 8.1*     Results from last 7 days   Lab Units 01/19/25  1820 01/19/25  1643   HSTROP T ng/L 35* 34*     Results from last 7 days   Lab Units 01/22/25  1048 01/20/25  0055 01/19/25  1643   WBC 10*3/mm3 11.18* 5.96 5.65   HEMOGLOBIN g/dL 8.0* 9.7* 10.0*   HEMATOCRIT % 23.7* 28.9* 30.3*   PLATELETS 10*3/mm3 158 196 186     Results from last 7 days   Lab Units 01/19/25  1643   INR  1.31*                   I reviewed the patient's new clinical results.  I personally viewed and interpreted the patient's EKG/Telemetry data        Medication Review:   albumin human, 25 g, Intravenous, Q6H  amiodarone, 200 mg, Per G Tube, Daily  apixaban, 5 mg, Per G Tube, Q12H  atorvastatin, 40 mg, Per G Tube, Nightly  carbidopa-levodopa, 1 tablet, Per G Tube, TID  finasteride, 5 mg, Per G Tube, Daily  [Held by provider] furosemide, 20 mg, Intravenous, Q12H  guaifenesin, 400 mg, Per PEG Tube, Q4H  hydrocortisone, 1 Application, Topical, Q8H  ipratropium-albuterol, 3 mL, Nebulization, 4x Daily - RT  [START ON 1/23/2025] levothyroxine, 88 mcg, Per G Tube, Daily  metoprolol tartrate, 12.5 mg, Per G Tube, BID  oxybutynin, 5 mg, Per G Tube, BID  piperacillin-tazobactam, 3.375 g, Intravenous, Q8H  sodium chloride, 10 mL, Intravenous, Q12H  sodium chloride, 4 mL, Nebulization, BID - RT  [Held by provider] spironolactone, 12.5 mg, Per G Tube, QAM  [Held by provider] valsartan, 40 mg, Per G Tube, Nightly             Assessment & Plan       Acute pulmonary edema    Benign essential hypertension    Parkinson's disease    Paroxysmal A-fib    Chronic diastolic (congestive) heart failure    Anticoagulant long-term use    Hypothyroidism    Anasarca    Coronavirus infection          Anasarca-multifactorial due to hypoalbuminemia, HFrEF, immobility.  HFrEF due to ischemic cardiomyopathy, ejection fraction 40% with apical akinesis and anteroseptal  akinesis/aneurysm.  PAF, on amiodarone started at last admission, A-fib was resistant to AV heather blockers and difficult to control.  He is anticoagulated with apixaban 5 mg twice daily.  H/o nsVT  Hypertension, BP low overnight.  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   Parkinson's.  On Sinemet  Coronavirus OC 43 positive  Anemia  Hyperlipidemia, on atorvastatin.    Chest x-ray shows bilateral infiltrates.  Still diuresing did receive albumin and IV Lasix.    Blood pressure is low, stop spironolactone and valsartan.  If blood pressure still low tomorrow, would consider discontinuing metoprolol.    Will follow, may be slight improvement from yesterday.    Tennille Willams MD  01/22/25  13:54 EST

## 2025-01-22 NOTE — PROGRESS NOTES
"   LOS: 3 days     Chief Complaint/ Reason for encounter: LISY Patel    Subjective   01/22/25 : Clinically seems to be doing little better, his weight is down compared to yesterday but still up from admission, edema about the same  He does arouse, somewhat responsive, requiring frequent suctioning  Denies any significant shortness of breath, chest pain fevers or chills, + cough      Medical history reviewed:  History of Present Illness    Subjective    History taken from: Chart and patient/family as able    Vital Signs  Temp:  [98.6 °F (37 °C)-100.8 °F (38.2 °C)] 99 °F (37.2 °C)  Heart Rate:  [55-78] 67  Resp:  [18-24] 20  BP: ()/(42-70) 110/55       Wt Readings from Last 1 Encounters:   01/22/25 0510 78.9 kg (173 lb 15.1 oz)   01/21/25 0351 81.8 kg (180 lb 5.4 oz)   01/20/25 1132 76.7 kg (169 lb)   01/20/25 0600 77.1 kg (169 lb 15.6 oz)       Objective:  Vital signs: (most recent): Blood pressure 110/55, pulse 67, temperature 99 °F (37.2 °C), temperature source Oral, resp. rate 20, height 182.9 cm (72\"), weight 78.9 kg (173 lb 15.1 oz), SpO2 100%.                Objective:  General Appearance:  Comfortable, chronically ill-appearing, in no acute distress and not in pain.  Thin and somewhat cachectic  HEENT: Mucous membranes moist  Lungs:  Normal effort and normal respiratory rate.  Coarse breath sounds, diminished effort  Heart:  S1, S2 normal.  No murmur.   Abdomen: Abdomen is soft, nontender/nondistended, + bowel sounds  Extremities: 2+ edema of bilateral lower extremities  Skin:  Warm and dry with no rashes      Results Review:    Intake/Output:     Intake/Output Summary (Last 24 hours) at 1/22/2025 1156  Last data filed at 1/22/2025 0510  Gross per 24 hour   Intake 878 ml   Output 300 ml   Net 578 ml         DATA:  Radiology and Labs:  The following labs independently reviewed by me. Additional labs ordered for tomorrow a.m.  Interval notes, chart personally reviewed by me.   Old records independently " reviewed showing previous admission for anasarca  The following radiologic studies independently viewed by me, findings echo shows EF 48% hypokinetic LV, chest x-ray with diffuse interstitial opacities  New problems include anasarca, new LISY, failure to thrive  Discussed with patient and family at bedside    Risk/ complexity of medical care/ medical decision making high risk, new LISY    Labs:   Recent Results (from the past 24 hours)   Renal Function Panel    Collection Time: 01/22/25  3:01 AM    Specimen: Blood   Result Value Ref Range    Glucose 198 (H) 65 - 99 mg/dL    BUN 31 (H) 8 - 23 mg/dL    Creatinine 1.15 0.76 - 1.27 mg/dL    Sodium 133 (L) 136 - 145 mmol/L    Potassium 3.8 3.5 - 5.2 mmol/L    Chloride 99 98 - 107 mmol/L    CO2 24.8 22.0 - 29.0 mmol/L    Calcium 7.6 (L) 8.2 - 9.6 mg/dL    Albumin 2.3 (L) 3.5 - 5.2 g/dL    Phosphorus 2.9 2.5 - 4.5 mg/dL    Anion Gap 9.2 5.0 - 15.0 mmol/L    BUN/Creatinine Ratio 27.0 (H) 7.0 - 25.0    eGFR 60.5 >60.0 mL/min/1.73   CBC Auto Differential    Collection Time: 01/22/25 10:48 AM    Specimen: Blood   Result Value Ref Range    WBC 11.18 (H) 3.40 - 10.80 10*3/mm3    RBC 2.42 (L) 4.14 - 5.80 10*6/mm3    Hemoglobin 8.0 (L) 13.0 - 17.7 g/dL    Hematocrit 23.7 (L) 37.5 - 51.0 %    MCV 97.9 (H) 79.0 - 97.0 fL    MCH 33.1 (H) 26.6 - 33.0 pg    MCHC 33.8 31.5 - 35.7 g/dL    RDW 14.3 12.3 - 15.4 %    RDW-SD 50.9 37.0 - 54.0 fl    MPV 9.8 6.0 - 12.0 fL    Platelets 158 140 - 450 10*3/mm3    Neutrophil % 88.6 (H) 42.7 - 76.0 %    Lymphocyte % 4.7 (L) 19.6 - 45.3 %    Monocyte % 5.4 5.0 - 12.0 %    Eosinophil % 0.4 0.3 - 6.2 %    Basophil % 0.2 0.0 - 1.5 %    Immature Grans % 0.7 (H) 0.0 - 0.5 %    Neutrophils, Absolute 9.91 (H) 1.70 - 7.00 10*3/mm3    Lymphocytes, Absolute 0.52 (L) 0.70 - 3.10 10*3/mm3    Monocytes, Absolute 0.60 0.10 - 0.90 10*3/mm3    Eosinophils, Absolute 0.05 0.00 - 0.40 10*3/mm3    Basophils, Absolute 0.02 0.00 - 0.20 10*3/mm3    Immature Grans, Absolute  0.08 (H) 0.00 - 0.05 10*3/mm3    nRBC 0.0 0.0 - 0.2 /100 WBC   Procalcitonin    Collection Time: 01/22/25 10:48 AM    Specimen: Blood   Result Value Ref Range    Procalcitonin 63.70 (H) 0.00 - 0.25 ng/mL       Radiology:  Pertinent radiology studies were reviewed as described above      Medications have been reviewed separately in chart review medication tab      ASSESSMENT:  Volume overload/anasarca, multifactorial.  Does now have a low EF    Acute pulmonary edema    Benign essential hypertension    Parkinson's disease    Paroxysmal A-fib    Chronic diastolic (congestive) heart failure    Anticoagulant long-term use    Anasarca    Coronavirus infection           DISCUSSION/PLAN:   Creatinine today 1.15 but this does represent an LISY with and almost doubling of his creatinine from admission  Not sure his blood pressure is going to be able to support diuresis, ARB and spironolactone  With his new LISY, will hold diuretics and ARB today  Newly elevated WBC and procalcitonin concerning for developing sepsis  Give scheduled IV albumin which will hopefully help with his third spacing, hypotension and anasarca  Will reevaluate and consider resuming diuretics tomorrow  Remains on tube feeds    Continue to monitor daily weights strict I's and O's  He remains DNR.  I think family seems to be coming around to the idea that he is pretty ill but they wish to continue full supportive care at this time.  Prognosis remains very poor      Joe Lyn MD  [facility]   Office phone number: [phone]  Answering service phone number: [phone]    01/22/25  11:56 EST    Dictation performed using Dragon dictation software

## 2025-01-22 NOTE — PLAN OF CARE
Patient had no sign of acute distress, Elevated Temp 100.8 at beginning of shift, Hypotensive 90/46 with low urine output. Bladder scan showed 100 cc at end of shift. Provider contacted for one time straight cath order. No output or signs or incontinence at end of shift, Straight cath completed with 225 ml output.  Patient was mouth breathing, HF NC applied to mouth as patient was desaturating high 80's to increase saturation. Pt is resting in bed and family at bedside.         Problem: Adult Inpatient Plan of Care  Goal: Plan of Care Review  Outcome: Not Progressing  Goal: Patient-Specific Goal (Individualized)  Outcome: Not Progressing  Goal: Absence of Hospital-Acquired Illness or Injury  Outcome: Not Progressing  Intervention: Identify and Manage Fall Risk  Recent Flowsheet Documentation  Taken 1/21/2025 2000 by Kolby Aguila RN  Safety Promotion/Fall Prevention: assistive device/personal items within reach  Intervention: Prevent Skin Injury  Recent Flowsheet Documentation  Taken 1/21/2025 2000 by Kolby Aguila RN  Body Position:   left   turned  Intervention: Prevent and Manage VTE (Venous Thromboembolism) Risk  Recent Flowsheet Documentation  Taken 1/21/2025 2000 by Kolby Aguila RN  VTE Prevention/Management: right  Goal: Optimal Comfort and Wellbeing  Outcome: Not Progressing  Intervention: Provide Person-Centered Care  Recent Flowsheet Documentation  Taken 1/21/2025 2000 by Kolby Aguila RN  Trust Relationship/Rapport: care explained  Goal: Readiness for Transition of Care  Outcome: Not Progressing     Problem: Fall Injury Risk  Goal: Absence of Fall and Fall-Related Injury  Outcome: Not Progressing  Intervention: Promote Injury-Free Environment  Recent Flowsheet Documentation  Taken 1/21/2025 2000 by Kolby Aguila RN  Safety Promotion/Fall Prevention: assistive device/personal items within reach     Problem: Violence Risk or Actual  Goal: Anger and Impulse Control  Outcome: Not Progressing  Intervention:  Promote Self-Control  Recent Flowsheet Documentation  Taken 1/21/2025 2000 by Kolby Aguila, RN  Environmental Support: calm environment promoted     Problem: Skin Injury Risk Increased  Goal: Skin Health and Integrity  Outcome: Not Progressing  Intervention: Optimize Skin Protection  Recent Flowsheet Documentation  Taken 1/21/2025 2000 by Kolby Aguila, RN  Activity Management: bedrest     Problem: Confusion Acute  Goal: Optimal Cognitive Function  Outcome: Not Progressing  Intervention: Minimize Contributing Factors  Recent Flowsheet Documentation  Taken 1/21/2025 2000 by Kolby Aguila, RN  Reorientation Measures: reorientation provided  Communication Support Strategies: simple statements used     Problem: Fluid Volume Excess  Goal: Fluid Balance  Outcome: Not Progressing   Goal Outcome Evaluation:

## 2025-01-23 NOTE — PLAN OF CARE
Goal Outcome Evaluation:              Outcome Evaluation: pt is A&Ox1, VSS, IV albumin given, IV antibiotics given, Family updated at bedside, Will continue plan of care.

## 2025-01-23 NOTE — CASE MANAGEMENT/SOCIAL WORK
Continued Stay Note  [facility]     Patient Name: Casey Snowden Sr.  MRN: [id number]  Today's Date: 1/23/2025    Admit Date: 1/19/2025    Plan: Referral pending for [facility], will need transport   Discharge Plan       Row Name 01/23/25 0848       Plan    Plan Referral pending for [facility], will need transport    Patient/Family in Agreement with Plan yes    Plan Comments Spoke with patient's spouse at bedside. Introduced self and reviewed current discharge plan. Patient recently discharge from [facility] to [facility] and spouse requested referral stating that patient had been making progress at SNF. CCP explained referral had been placed and is pending based on hospital course. Spouse voiced agreement and understanding with current discharge plan.                   Discharge Codes    No documentation.                 Expected Discharge Date and Time       Expected Discharge Date Expected Discharge Time    Jan 25, 2025               Tennille Carrillo RN

## 2025-01-23 NOTE — PROGRESS NOTES
"Enter Query Response Below      Query Response: Stage I pressure injury to sacral spine, present on admission             If applicable, please update the problem list.   Patient: Casey Snowden Sr.        : 1934  Account: [id number]           Admit Date:         How to Respond to this query:       a. Click New Note     b. Answer query within the yellow box.                c. Update the Problem List, if applicable.      If you have any questions about this query contact me at: [email or web address]     Dr. Cowart,     89-year-old male with history of Parkinson's disease and recent prolonged hospital admission admitted 2025 with acute pulmonary edema and anasarca per H&P.  RN documentation on the \"Pressure Injury Screen\" flowsheet notes pressure ulcer present on admission.  RN documentation on the \"Adult PCS Body System\" flowsheet reports \"Stage 1 sacral spine injury\".  Nutrition Consult also notes \"pressure injury: sacral spine st 1\".  Treatment has included: Nutrition Consult, and skin assessments completed by RN.     Please clarify if the patient was treated/monitored for:    Stage I pressure injury to sacral spine, present on admission  Stage I pressure injury to sacral spine, not present on admission  Other (please specify) __________  Unable to determine      By submitting this query, we are merely seeking further clarification of documentation to accurately reflect all conditions that you are monitoring, evaluating, treating or that extend the hospitalization or utilize additional resources of care. Please utilize your independent clinical judgment when addressing the question(s) above.     This query and your response, once completed, will be entered into the legal medical record.    Sincerely,  Alonso Glover, BSN, RN   Clinical Documentation Integrity Program   "

## 2025-01-23 NOTE — THERAPY TREATMENT NOTE
Patient Name: Casey Snowden Sr.  : 1934    MRN: [id number]                              Today's Date: 2025       Admit Date: 2025    Visit Dx: No diagnosis found.  Patient Active Problem List   Diagnosis    Benign essential hypertension    Stenosis of carotid artery    Mixed hyperlipidemia    Parkinson's disease    Peripheral arterial occlusive disease    Screening for prostate cancer    Medication monitoring encounter    Melanoma    Chronic deep vein thrombosis (DVT) of popliteal vein of right lower extremity    Uses hearing aid    Paroxysmal A-fib    Chronic diastolic (congestive) heart failure    Anticoagulant long-term use    Presence of IVC filter    Medicare annual wellness visit, subsequent    Iron deficiency anemia secondary to inadequate dietary iron intake    Orthostatic hypotension due to Parkinson's disease    Coronary artery disease involving native coronary artery of native heart without angina pectoris    ST elevation myocardial infarction involving left anterior descending (LAD) coronary artery    Sialorrhea    Hypertensive heart disease with heart failure    PAD (peripheral artery disease)    Metastatic squamous cell carcinoma    Secondary malignancy of lymph nodes of head, face and neck    Encounter for antineoplastic immunotherapy    RBD (REM behavioral disorder)    Hypothyroidism    Aspiration pneumonia of right lung due to vomit    Dysphagia, neurologic    ICAO (internal carotid artery occlusion), right    Arterial ischemic stroke, ICA (internal carotid artery), right, chroni    Renal mass, left    Abdominal aortic aneurysm    Disorientation    Falls frequently    Right inguinal hernia    Unable to ambulate    Herpes zoster without complication    LISY (acute kidney injury)    Moderate protein-calorie malnutrition    Acute pulmonary edema    Anasarca    Coronavirus infection     Past Medical History:   Diagnosis Date    Abdominal aortic aneurysm without rupture 10/14/2021     Acquired hypothyroidism 08/31/2022    Acute ischemic stroke 10/08/2023    Aneurysm     Arrhythmia     Atrial fibrillation     Cancer 04/2018    basil cell carcinoma    Carotid stenosis 10/29/2015    CHF (congestive heart failure)     Chronic deep vein thrombosis (DVT) of popliteal vein of right lower extremity     Clotting disorder     Coronary artery disease involving native coronary artery of native heart without angina pectoris 12/20/2018    DVT of lower extremity (deep venous thrombosis)     Dysphasia 12/2024    Hard of hearing     Hyperlipidemia     Hypertension     Localized edema 01/04/2017    Long-term use of high-risk medication     Metabolic encephalopathy 02/07/2023    Myocardial infarction 2918    LAD stent    PAD (peripheral artery disease) 09/10/2020    Parkinson's disease     Postphlebitic syndrome     Pure hypercholesterolemia     Shingles 2024    Skin tear of upper arm without complication, left, subsequent encounter     Stroke 11/02/2023    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     Past Surgical History:   Procedure Laterality Date    BASAL CELL CARCINOMA EXCISION  04/2018    CARDIAC CATHETERIZATION N/A 11/05/2018    Procedure: Left Heart Cath;  Surgeon: Oliverio Azar MD;  Location: [facility] CATH INVASIVE LOCATION;  Service: Cardiovascular    CARDIAC CATHETERIZATION N/A 11/05/2018    Procedure: Coronary angiography;  Surgeon: Oliverio Azar MD;  Location:  [facility] CATH INVASIVE LOCATION;  Service: Cardiovascular    CARDIAC CATHETERIZATION N/A 11/05/2018    Procedure: Left ventriculography;  Surgeon: Oliverio Azar MD;  Location:  [facility] CATH INVASIVE LOCATION;  Service: Cardiovascular    CARDIAC CATHETERIZATION N/A 06/04/2019    Procedure: Left Heart Cath;  Surgeon: Johnny Glasgow MD;  Location: [facility] CATH INVASIVE LOCATION;  Service: Cardiovascular    CARDIAC CATHETERIZATION N/A 06/04/2019    Procedure: Coronary angiography;  Surgeon: Johnny Glasgow MD;  Location: [facility] CATH INVASIVE  LOCATION;  Service: Cardiovascular    CARDIAC CATHETERIZATION N/A 06/04/2019    Procedure: Left ventriculography;  Surgeon: Johnny Glasgow MD;  Location: [facility] CATH INVASIVE LOCATION;  Service: Cardiovascular    CARDIAC CATHETERIZATION N/A 06/04/2019    Procedure: Stent BILL coronary;  Surgeon: Johnny Glasgow MD;  [facility];  Service: Cardiovascular    CARDIAC SURGERY      CAROTID ARTERY ANGIOPLASTY Right 10/11/2023    with stent    CAROTID STENT      CATARACT EXTRACTION Left 08/2018    CATARACT EXTRACTION Right 09/2018    COLONOSCOPY      2011    CORONARY STENT PLACEMENT      ENDOSCOPY W/ PEG TUBE PLACEMENT N/A 1/3/2025    Procedure: ESOPHAGOGASTRODUODENOSCOPY WITH PERCUTANEOUS ENDOSCOPIC GASTROSTOMY TUBE INSERTION;  Surgeon: Konstantin Persaud MD;  Location: [facility] ENDOSCOPY;  Service: General;  Laterality: N/A;  pre: dysphagia  post: same    INCISION AND DRAINAGE LEG Right 07/07/2017    Procedure: INCISION AND DRAINAGE INFECTED HEMATOMA RIGHT THIGH;  Surgeon: Valentin Peña MD;  Location: [facility] MAIN OR;  Service:     JOINT REPLACEMENT      KNEE INCISION AND DRAINAGE Right 12/27/2016    Procedure: KNEE INCISION AND DRAINAGE;  Surgeon: Triston Whitten MD;  Location: [facility] MAIN OR;  Service:     NOSE SURGERY      nose replacement    SINUS SURGERY  1960    SKIN BIOPSY      SKIN GRAFT  12/2017    TOTAL KNEE ARTHROPLASTY Right     VASCULAR SURGERY      VENA CAVA FILTER PLACEMENT        General Information       Row Name 01/23/25 1251          OT Time and Intention    Subjective Information no complaints  -BC     Document Type therapy note (daily note)  -BC     Mode of Treatment occupational therapy  -BC     Patient Effort good  -BC       Row Name 01/23/25 1252          General Information    Patient Profile Reviewed yes  -BC     Existing Precautions/Restrictions fall;oxygen therapy device and L/min  -BC       Row Name 01/23/25 1257          Cognition    Orientation  Status (Cognition) oriented to;person  with increased time/cues was able to verbalize hospital. 2024 for the year, cues for correcting and said 2025.  -BC       Row Name 01/23/25 1252          Safety Issues/Impairments Affecting Functional Mobility    Impairments Affecting Function (Mobility) balance;endurance/activity tolerance;strength;range of motion (ROM);postural/trunk control;cognition;coordination  -BC     Cognitive Impairments, Mobility Safety/Performance problem-solving/reasoning;safety precaution awareness;insight into deficits/self-awareness;judgment;safety precaution follow-through  -BC               User Key  (r) = Recorded By, (t) = Taken By, (c) = Cosigned By      Initials Name Provider Type    BC Kevin Pisano OT Occupational Therapist                     Mobility/ADL's       Row Name 01/23/25 1254          Bed Mobility    Bed Mobility supine-sit;sit-supine;scooting/bridging  -BC     Rolling Left [place] (Bed Mobility) maximum assist (25% patient effort);1 person assist  -BC     Rolling Right [place] (Bed Mobility) maximum assist (25% patient effort);1 person assist  -BC     Scooting/Bridging [place] (Bed Mobility) dependent (less than 25% patient effort)  -BC     Supine-Sit [place] (Bed Mobility) maximum assist (25% patient effort);1 person assist  -BC     Sit-Supine [place] (Bed Mobility) maximum assist (25% patient effort);1 person assist  -BC     Bed Mobility, Safety Issues cognitive deficits limit understanding;decreased use of legs for bridging/pushing;decreased use of arms for pushing/pulling;impaired trunk control for bed mobility  -BC     Assistive Device (Bed Mobility) head of bed elevated;bed rails  -BC     Comment, (Bed Mobility) Pt much more alert today than in previous session. Was able to assist w/ BLEs towards EOB but still required max A for BLEs and trunk w/ HOB slightly elevated. pt did initiate pushing self up w max cues and increased time, but  assistance still required. OT assisted Pt to/from chair for increased OOB tolerance, with Max A x1 sit back to supine w/ decreased trunk control w/ descent and help at trunk and BLEs.  -BC       Row Name 01/23/25 1254          Transfers    Transfers sit-stand transfer;stand-sit transfer;bed-chair transfer  -BC       Row Name 01/23/25 1254          Bed-Chair Transfer    Bed-Chair [place] (Transfers) maximum assist (25% patient effort);1 person assist  -BC     Comment, (Bed-Chair Transfer) Max A pivot, did not come to full stand. Cont'd w/ max A chair back to bed at end of session.  -BC       Row Name 01/23/25 1254          Sit-Stand Transfer    Sit-Stand [place] (Transfers) maximum assist (25% patient effort);1 person assist  -BC     Comment, (Sit-Stand Transfer) w/o AD  -BC       Row Name 01/23/25 1254          Stand-Sit Transfer    Stand-Sit [place] (Transfers) maximum assist (25% patient effort);1 person assist  -BC       Row Name 01/23/25 1254          Functional Mobility    Patient was able to Ambulate no, other medical factors prevent ambulation  -BC       Row Name 01/23/25 1254          Activities of Daily Living    BADL Assessment/Intervention lower body dressing;grooming;feeding;toileting  -BC       Row Name 01/23/25 1254          Lower Body Dressing Assessment/Training    [place] Level (Lower Body Dressing) don;socks;doff;dependent (less than 25% patient effort)  -BC     Position (Lower Body Dressing) sitting up in bed  -BC       Row Name 01/23/25 1254          Grooming Assessment/Training    [place] Level (Grooming) maximum assist (25% patient effort)  -BC     Position (Grooming) supported sitting  -BC     Comment, (Grooming) seated supported chair level grooming participation to wash face, comb hair, clean hands. Max cues and increased time for completion of tasks, max A.  -BC       Row Name 01/23/25 1254          Toileting Assessment/Training    [place] Level (Toileting)  toileting skills;dependent (less than 25% patient effort)  -BC     Position (Toileting) supine  -BC     Comment, (Toileting) purewick not properly working, assist of 1 for rolling, 2nd helper for brief doff/don & purewick adjustment.  -BC       Row Name 01/23/25 1254          Self-Feeding Assessment/Training    Comment, (Feeding) yauker bringing to mouth w/ max cues and mod A supported sitting.  -BC               User Key  (r) = Recorded By, (t) = Taken By, (c) = Cosigned By      Initials Name Provider Type    Kevin Freitas OT Occupational Therapist                   Obj/Interventions       Row Name 01/23/25 1306          Balance    Balance Assessment sitting static balance;sitting dynamic balance  -BC     Static Sitting Balance contact guard;minimal assist  -BC     Dynamic Sitting Balance minimal assist;moderate assist  -BC     Position, Sitting Balance sitting edge of bed  -BC     Static Standing Balance maximum assist  -BC     Comment, Balance unsupported sitting balance training w/ improved head/neck and trunk control today to maintain more neutral positioning once B feet on floor and with mod cues for orientation. Pt was able to improve to periods of close SBA.  -BC               User Key  (r) = Recorded By, (t) = Taken By, (c) = Cosigned By      Initials Name Provider Type    Kevin Freitas, FREDDIE Occupational Therapist                   Goals/Plan    No documentation.                  Clinical Impression       Row Name 01/23/25 1307          Pain Assessment    Pretreatment Pain Rating 0/10 - no pain  -BC     Posttreatment Pain Rating 0/10 - no pain  -BC       Row Name 01/23/25 1307          Plan of Care Review    Plan of Care Reviewed With patient;spouse  -BC     Progress improving  -BC     Outcome Evaluation Pt demonstrated w/ improved alertness, participation, and performance this date. Pt tolerated supine rolling L/R for toileting clean up and up to EOB w/ AX1. Pt sat up unsupported w/ close SBA to  min A provided max cues for incresed UB control. Pt tolerated max A modified stand pivot transfer up to chair towards L side and sat up for grooming, balance, and cognitive tasks. Pt tolerated chair position well, mod A for scooting once in chair, but was able to sustain upright control for future transfers/ADLs w/ SBA and min cues. OT gave Pt time up in the chair w/ spouse present in room. OT checking back in every few mins to ensure safety and that Pt is not too fatigued. Upon returning to room Pt was assisted w/ 2 PCAs to/from Bedside commode for BM. Time given for him to rest following toileting tasks and cont'd w/ max A stand-pivot transfer chair>bedside and supine. Pt much more alert, increased trunk control, c spine extension, and tolerated >1 hour in the chair this AM w/ no pillows needed/required for positioning. Continue OT services while IP to address ADL and mobility deficits w/ DC REC SNU.  -BC       Row Name 01/23/25 1305          Therapy Assessment/Plan (OT)    Rehab Potential (OT) good  -BC     Criteria for Skilled Therapeutic Interventions Met (OT) yes;meets criteria  -BC       Row Name 01/23/25 1307          Therapy Plan Review/Discharge Plan (OT)    Anticipated Discharge Disposition (OT) skilled nursing facility  -BC       Row Name 01/23/25 1307          Vital Signs    O2 Delivery Pre Treatment room air  -BC     O2 Delivery Intra Treatment room air  -BC     O2 Delivery Post Treatment room air  -BC     Pre Patient Position Supine  -BC     Intra Patient Position Sitting  -BC     Post Patient Position Supine  -BC       Row Name 01/23/25 1307          Positioning and Restraints    Pre-Treatment Position in bed  -BC     Post Treatment Position bed  -BC     In Bed fowlers;call light within reach;encouraged to call for assist;exit alarm on;with family/caregiver  -BC               User Key  (r) = Recorded By, (t) = Taken By, (c) = Cosigned By      Initials Name Provider Type    Kevin Freitas OT  Occupational Therapist                   Outcome Measures       Row Name 01/23/25 1313          How much help from another is currently needed...    Putting on and taking off regular lower body clothing? 1  -BC     Bathing (including washing, rinsing, and drying) 1  -BC     Toileting (which includes using toilet bed pan or urinal) 1  -BC     Putting on and taking off regular upper body clothing 2  -BC     Taking care of personal grooming (such as brushing teeth) 2  -BC     Eating meals 1  -BC     AM-PAC 6 Clicks Score (OT) 8  -BC       Row Name 01/23/25 0943          How much help from another person do you currently need...    Turning from your back to your side while in flat bed without using bedrails? 2  -TA     Moving from lying on back to sitting on the side of a flat bed without bedrails? 2  -TA     Moving to and from a bed to a chair (including a wheelchair)? 1  -TA     Standing up from a chair using your arms (e.g., wheelchair, bedside chair)? 3  -TA     Climbing 3-5 steps with a railing? 2  -TA     To walk in hospital room? 2  -TA     AM-PAC 6 Clicks Score (PT) 12  -TA     Highest Level of Mobility Goal 4 --> Transfer to chair/commode  -TA       Row Name 01/23/25 1313          Functional Assessment    Outcome Measure Options AM-PAC 6 Clicks Daily Activity (OT)  -BC               User Key  (r) = Recorded By, (t) = Taken By, (c) = Cosigned By      Initials Name Provider Type    Victorino MCMANUS RN Registered Nurse    Kevin Freitas OT Occupational Therapist                    Occupational Therapy Education       Title: PT OT SLP Therapies (In Progress)       Topic: Occupational Therapy (In Progress)       Point: ADL training (Done)       Description:   Instruct learner(s) on proper safety adaptation and remediation techniques during self care or transfers.   Instruct in proper use of assistive devices.                  Learning Progress Summary            Patient Acceptance, E,TB, VU by  BC at 1/21/2025 1258    Comment: role of OT at acute level, POC, ADL participation, bed mobility practice, DC recs/planning, cont teaching                      Point: Home exercise program (Not Started)       Description:   Instruct learner(s) on appropriate technique for monitoring, assisting and/or progressing therapeutic exercises/activities.                  Learner Progress:  Not documented in this visit.              Point: Precautions (In Progress)       Description:   Instruct learner(s) on prescribed precautions during self-care and functional transfers.                  Learning Progress Summary            Patient Acceptance, E,TB, NL by KIM at 1/22/2025 2220    Acceptance, E,TB, NL by KIM at 1/22/2025 0556                      Point: Body mechanics (Not Started)       Description:   Instruct learner(s) on proper positioning and spine alignment during self-care, functional mobility activities and/or exercises.                  Learner Progress:  Not documented in this visit.                              User Key       Initials Effective Dates Name Provider Type Discipline    BC 07/29/24 -  Kevin Pisano OT Occupational Therapist OT     01/10/25 -  Kolby Aguila, JEAN CLAUDE Registered Nurse Nurse                  OT Recommendation and Plan  Planned Therapy Interventions (OT): activity tolerance training, BADL retraining, cognitive/visual perception retraining, functional balance retraining, IADL retraining, neuromuscular control/coordination retraining, occupation/activity based interventions, patient/caregiver education/training, ROM/therapeutic exercise, strengthening exercise, transfer/mobility retraining  Therapy Frequency (OT): 3 times/wk  Plan of Care Review  Plan of Care Reviewed With: patient, spouse  Progress: improving  Outcome Evaluation: Pt demonstrated w/ improved alertness, participation, and performance this date. Pt tolerated supine rolling L/R for toileting clean up and up to EOB w/ AX1. Pt sat  up unsupported w/ close SBA to min A provided max cues for incresed UB control. Pt tolerated max A modified stand pivot transfer up to chair towards L side and sat up for grooming, balance, and cognitive tasks. Pt tolerated chair position well, mod A for scooting once in chair, but was able to sustain upright control for future transfers/ADLs w/ SBA and min cues. OT gave Pt time up in the chair w/ spouse present in room. OT checking back in every few mins to ensure safety and that Pt is not too fatigued. Upon returning to room Pt was assisted w/ 2 PCAs to/from Bedside commode for BM. Time given for him to rest following toileting tasks and cont'd w/ max A stand-pivot transfer chair>bedside and supine. Pt much more alert, increased trunk control, c spine extension, and tolerated >1 hour in the chair this AM w/ no pillows needed/required for positioning. Continue OT services while IP to address ADL and mobility deficits w/ DC REC SNU.     Time Calculation:   Evaluation Complexity (OT)  Review Occupational Profile/Medical/Therapy History Complexity: expanded/moderate complexity  Assessment, Occupational Performance/Identification of Deficit Complexity: 3-5 performance deficits  Clinical Decision Making Complexity (OT): detailed assessment/moderate complexity  Overall Complexity of Evaluation (OT): moderate complexity     Time Calculation- OT       Row Name 01/23/25 1314             Time Calculation- OT    OT Start Time 0909  -BC      OT Stop Time --  3012-4549, 8731-9426= 55 minutes.  -BC      Total Timed Code Minutes- OT 55 minute(s)  -BC      OT Received On 01/23/25  -BC      OT - Next Appointment 01/24/25  -BC         Timed Charges    88739 -  OT Neuromuscular Reeducation Minutes 15  -BC      89790 - OT Therapeutic Activity Minutes 25  -BC      63644 - OT Self Care/Mgmt Minutes 15  -BC         Total Minutes    Timed Charges Total Minutes 55  -BC       Total Minutes 55  -BC                User Key  (r) = Recorded By,  (t) = Taken By, (c) = Cosigned By      Initials Name Provider Type    BC Kevin Pisano OT Occupational Therapist                  Therapy Charges for Today       Code Description Service Date Service Provider Modifiers Qty    06682823718 HC OT NEUROMUSC RE EDUCATION EA 15 MIN 1/23/2025 Kevin Pisano OT GO 1    [id number]  OT THERAPEUTIC ACT EA 15 MIN 1/23/2025 Kevin Pisano OT GO 2    [id number]  OT SELF CARE/MGMT/TRAIN EA 15 MIN 1/23/2025 Kevin Pisano OT GO 1                 Kevin Pisano OT  1/23/2025

## 2025-01-23 NOTE — PROGRESS NOTES
" LOS: 4 days     Name: Casey Snowden .  Age: 89 y.o.  Sex: male  :  1934  MRN: [id number]         Primary Care Physician: Aleksander Diez MD    Subjective   Subjective  His general appearance looks better today.  Stood and pivoted to the chair with therapy.  Still with upper airway congestion.  Currently on 3.5 L of oxygen.  Blood pressure improved today.    Objective   Vital Signs  Temp:  [97.5 °F (36.4 °C)-98.4 °F (36.9 °C)] 97.7 °F (36.5 °C)  Heart Rate:  [56-69] 60  Resp:  [18-20] 20  BP: ()/(56-79) 131/56  Body mass index is 23.32 kg/m².    Objective:  General Appearance:  Comfortable and in no acute distress.    Vital signs: (most recent): Blood pressure 131/56, pulse 60, temperature 97.7 °F (36.5 °C), temperature source Oral, resp. rate 20, height 182.9 cm (72\"), weight 78 kg (171 lb 15.3 oz), SpO2 100%.    Lungs:  Normal effort and normal respiratory rate.  He is not in respiratory distress.  There are decreased breath sounds.  (Faint crackles at the bilateral bases)  Heart: Normal rate.  Regular rhythm.    Abdomen: Abdomen is soft.  Bowel sounds are normal.   There is no abdominal tenderness.     Extremities: There is no dependent edema or local swelling.    Neurological: Patient is alert and oriented to person, place and time.    Skin:  Warm and dry.                Results Review:       I reviewed the patient's new clinical results.    Results from last 7 days   Lab Units 25  1048 25  0055 25  1643   WBC 10*3/mm3 11.18* 5.96 5.65   HEMOGLOBIN g/dL 8.0* 9.7* 10.0*   PLATELETS 10*3/mm3 158 196 186     Results from last 7 days   Lab Units 25  0327 25  0301 25  0429 25  0055 25  1643   SODIUM mmol/L 138 133* 133* 135* 131*   POTASSIUM mmol/L 3.9 3.8 3.5 4.1 4.1   CHLORIDE mmol/L 99 99 99 97* 98   CO2 mmol/L 28.2 24.8 26.2 29.0 25.4   BUN mg/dL 39* 31* 21 18 19   CREATININE mg/dL 1.09 1.15 0.76 0.69* 0.60*   CALCIUM mg/dL 7.8* 7.6* 7.7* 8.1* " 8.2   GLUCOSE mg/dL 161* 198* 120* 90 83     Results from last 7 days   Lab Units 01/19/25  1643   INR  1.31*             Scheduled Meds:   amiodarone, 200 mg, Per G Tube, Daily  apixaban, 5 mg, Per G Tube, Q12H  atorvastatin, 40 mg, Per G Tube, Nightly  carbidopa-levodopa, 1 tablet, Per G Tube, TID  finasteride, 5 mg, Per G Tube, Daily  [Held by provider] furosemide, 20 mg, Intravenous, Q12H  guaifenesin, 400 mg, Per PEG Tube, Q4H  hydrocortisone, 1 Application, Topical, Q8H  ipratropium-albuterol, 3 mL, Nebulization, 4x Daily - RT  levothyroxine, 88 mcg, Per G Tube, Daily  metoprolol tartrate, 12.5 mg, Per G Tube, BID  oxybutynin, 5 mg, Per G Tube, BID  piperacillin-tazobactam, 3.375 g, Intravenous, Q8H  sodium chloride, 10 mL, Intravenous, Q12H  sodium chloride, 4 mL, Nebulization, BID - RT  [Held by provider] spironolactone, 12.5 mg, Per G Tube, QAM  [Held by provider] valsartan, 40 mg, Per G Tube, Nightly      PRN Meds:     acetaminophen    senna-docusate sodium **AND** polyethylene glycol **AND** bisacodyl **AND** bisacodyl    guaifenesin    nitroglycerin    ondansetron ODT **OR** ondansetron    sodium chloride    sodium chloride  Continuous Infusions:       Assessment & Plan   Active Hospital Problems    Diagnosis  POA    **Acute pulmonary edema [J81.0]  Yes    Coronavirus infection [B34.2]  Unknown    Anasarca [R60.1]  Yes    Hypothyroidism [E03.9]  Yes    Chronic diastolic (congestive) heart failure [I50.32]  Yes    Anticoagulant long-term use [Z79.01]  Not Applicable    Paroxysmal A-fib [I48.0]  Yes    Benign essential hypertension [I10]  Yes    Parkinson's disease [G20.A1]  Yes      Resolved Hospital Problems   No resolved problems to display.       Assessment & Plan    -IV diuresis presently on hold given hypotension yesterday.  Blood pressure now improved.  Appreciate assistance from nephrology.  His anasarca is multifactorial and being greatly influenced by his hypoalbuminemia.  Receiving albumin.  -He  is having some intermittent urinary retention.  Plan in place from nephrology to anchor a Cat if he has retention greater than 200 cc  -Appreciate assistance from cardiology.  Maximizing medical management of heart failure although this is limited by hypotension.  -Continue supportive care for coronavirus infection.    -Continue Zosyn for suspected secondary bacterial pneumonia.  Will have him complete a 7-day course of antibiotics.  Awaiting repeat CBC and procalcitonin today.  -TSH noted to be elevated at 17.  Increased Synthroid dose to 88 mcg.  He will need repeat TFTs in 4 to 6 weeks.  -Chronic anemia noted.  Monitor.  -Continue Sinemet for Parkinson's disease  -I have discussed extensively with his wife at the bedside his guarded prognosis.  She states she is not ready to relinquish optimism that he can achieve meaningful recovery but does feel as if it is waning.  I was very honest with her and told her that this repeat hospitalization is a poor prognostic sign.  She expresses understanding and agreement with that.  Will continue to address this with her on a daily basis as I do think that he is palliative care appropriate but she is not ready to make that decision.     Dispo  TBD      Expected Discharge Date: 1/26/2025; Expected Discharge Time:  3:00 PM     Triston Cowart MD  [facility]  01/23/25  09:48 EST

## 2025-01-23 NOTE — PROGRESS NOTES
LOS: 4 days   Patient Care Team:  Aleksander Diez MD as PCP - General  MarckJoseph kaufman MD as Consulting Physician (Ophthalmology)  Ford Dorado MD as Consulting Physician (Vascular Surgery)  Cecilio Armas III, MD as Consulting Physician (Cardiology)  Kashif Goodwin MD as Consulting Physician (Neurology)  Enoch Lares MD (Hematology)  Kayden Chance MD (Dermatology)  Vinod Cadena MD as Consulting Physician (Neurosurgery)  Johnny Castellanos MD as Consulting Physician (Urology)  Inge Pedro, PT as Physical Therapist (Physical Therapy)  Lucrecia Mccloud, PT as Physical Therapist (Physical Therapy)  Nereyda Toledo, OT as Occupational Therapist (Occupational Therapy)  Junior Taylor MD as Surgeon (Orthopedic Surgery)  Casey Power MD as Surgeon (Wound Care)  Nahomi Jacobo APRN as Nurse Practitioner (Nurse Practitioner)    Chief Complaint:     Follow-up volume overload    Interval History:     He has intermittent quick dizziness.  His breathing is better.  He has no chest pain.  He has no nausea.  His edema is better.    Objective   Vital Signs  Temp:  [97.5 °F (36.4 °C)-98.4 °F (36.9 °C)] 97.7 °F (36.5 °C)  Heart Rate:  [56-69] 63  Resp:  [18-20] 20  BP: ()/(56-79) 131/56    Intake/Output Summary (Last 24 hours) at 1/23/2025 1143  Last data filed at 1/23/2025 0514  Gross per 24 hour   Intake 1490 ml   Output 970 ml   Net 520 ml       Comfortable NAD  Neck supple, no JVD or thyromegaly appreciated  S1/S2 RRR, no m/r/g  Lungs CTA B, normal effort  Abdomen S/NT/ND (+) BS, no HSM appreciated  Extremities warm, no clubbing, cyanosis, or edema  No visible or palpable skin lesions  A/Ox4, mood and affect appropriate    Results Review:      Results from last 7 days   Lab Units 01/23/25  0327 01/22/25  0301 01/21/25  0429   SODIUM mmol/L 138 133* 133*   POTASSIUM mmol/L 3.9 3.8 3.5   CHLORIDE mmol/L 99 99 99   CO2 mmol/L 28.2 24.8 26.2   BUN mg/dL 39* 31* 21    CREATININE mg/dL 1.09 1.15 0.76   GLUCOSE mg/dL 161* 198* 120*   CALCIUM mg/dL 7.8* 7.6* 7.7*     Results from last 7 days   Lab Units 01/19/25  1820 01/19/25  1643   HSTROP T ng/L 35* 34*     Results from last 7 days   Lab Units 01/23/25  0936 01/22/25  1048 01/20/25  0055   WBC 10*3/mm3 7.84 11.18* 5.96   HEMOGLOBIN g/dL 7.2* 8.0* 9.7*   HEMATOCRIT % 21.4* 23.7* 28.9*   PLATELETS 10*3/mm3 143 158 196     Results from last 7 days   Lab Units 01/19/25  1643   INR  1.31*                   I reviewed the patient's new clinical results.  I personally viewed and interpreted the patient's EKG/Telemetry data        Medication Review:   amiodarone, 200 mg, Per G Tube, Daily  apixaban, 5 mg, Per G Tube, Q12H  atorvastatin, 40 mg, Per G Tube, Nightly  carbidopa-levodopa, 1 tablet, Per G Tube, TID  finasteride, 5 mg, Per G Tube, Daily  [Held by provider] furosemide, 20 mg, Intravenous, Q12H  guaifenesin, 400 mg, Per PEG Tube, Q4H  hydrocortisone, 1 Application, Topical, Q8H  ipratropium-albuterol, 3 mL, Nebulization, 4x Daily - RT  levothyroxine, 88 mcg, Per G Tube, Daily  metoprolol tartrate, 12.5 mg, Per G Tube, BID  oxybutynin, 5 mg, Per G Tube, BID  piperacillin-tazobactam, 3.375 g, Intravenous, Q8H  sodium chloride, 10 mL, Intravenous, Q12H  sodium chloride, 4 mL, Nebulization, BID - RT  [Held by provider] spironolactone, 12.5 mg, Per G Tube, QAM  [Held by provider] valsartan, 40 mg, Per G Tube, Nightly             Assessment & Plan       Acute pulmonary edema    Benign essential hypertension    Parkinson's disease    Paroxysmal A-fib    Chronic diastolic (congestive) heart failure    Anticoagulant long-term use    Hypothyroidism    Anasarca    Coronavirus infection              Anasarca-multifactorial due to hypoalbuminemia, HFrEF, immobility.  HFrEF due to ischemic cardiomyopathy, ejection fraction 40% with apical akinesis and anteroseptal akinesis/aneurysm.  PAF, on amiodarone started at last admission, A-fib was  resistant to AV heather blockers and difficult to control.  He is anticoagulated with apixaban 5 mg twice daily.  H/o nsVT  Hypertension, BP low overnight.  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   Parkinson's.  On Sinemet  Coronavirus OC 43 positive  Anemia-worsening  Hyperlipidemia, on atorvastatin.    He is fairly anemic, check iron studies.  Not sure if he is losing blood somewhere, check stool.  Lasix has been held.  Vitals are stable.    Lasix was stopped today.  Will have to watch his volume status as he may need this restarted.    Will follow.    Tennille Willams MD  01/23/25  11:43 EST

## 2025-01-23 NOTE — PLAN OF CARE
Goal Outcome Evaluation:  Plan of Care Reviewed With: patient, spouse        Progress: improving  Outcome Evaluation: Pt demonstrated w/ improved alertness, participation, and performance this date. Pt tolerated supine rolling L/R for toileting clean up and up to EOB w/ AX1. Pt sat up unsupported w/ close SBA to min A provided max cues for incresed UB control. Pt tolerated max A modified stand pivot transfer up to chair towards L side and sat up for grooming, balance, and cognitive tasks. Pt tolerated chair position well, mod A for scooting once in chair, but was able to sustain upright control for future transfers/ADLs w/ SBA and min cues. OT gave Pt time up in the chair w/ spouse present in room. OT checking back in every few mins to ensure safety and that Pt is not too fatigued. Upon returning to room Pt was assisted w/ 2 PCAs to/from Bedside commode for BM. Time given for him to rest following toileting tasks and cont'd w/ max A stand-pivot transfer chair>bedside and supine. Pt much more alert, increased trunk control, c spine extension, and tolerated >1 hour in the chair this AM w/ no pillows needed/required for positioning. Continue OT services while IP to address ADL and mobility deficits w/ DC REC SNU.    Anticipated Discharge Disposition (OT): skilled nursing facility

## 2025-01-23 NOTE — PROGRESS NOTES
"   LOS: 4 days     Chief Complaint/ Reason for encounter: LISY Patel    Subjective   01/22/25 : Clinically seems to be doing little better, his weight is down compared to yesterday but still up from admission, edema about the same  He does arouse, somewhat responsive, requiring frequent suctioning  Denies any significant shortness of breath, chest pain fevers or chills, + cough    1/23: He is much more awake and alert, conversant has been out of bed working with therapy  Edema much better, no shortness of breath  Still with copious secretions requiring suction    Medical history reviewed:  History of Present Illness    Subjective    History taken from: Chart and patient/family as able    Vital Signs  Temp:  [97.7 °F (36.5 °C)-98.4 °F (36.9 °C)] 98.2 °F (36.8 °C)  Heart Rate:  [52-69] 52  Resp:  [18-20] 20  BP: (106-144)/(56-79) 144/56       Wt Readings from Last 1 Encounters:   01/23/25 0418 78 kg (171 lb 15.3 oz)   01/22/25 0510 78.9 kg (173 lb 15.1 oz)   01/21/25 0351 81.8 kg (180 lb 5.4 oz)   01/20/25 1132 76.7 kg (169 lb)   01/20/25 0600 77.1 kg (169 lb 15.6 oz)       Objective:  Vital signs: (most recent): Blood pressure 144/56, pulse 52, temperature 98.2 °F (36.8 °C), temperature source Oral, resp. rate 20, height 182.9 cm (72\"), weight 78 kg (171 lb 15.3 oz), SpO2 100%.                Objective:  General Appearance:  Comfortable, chronically ill-appearing, in no acute distress and not in pain.  Thin and somewhat cachectic  HEENT: Mucous membranes moist  Lungs:  Normal effort and normal respiratory rate.  Coarse breath sounds, diminished effort  Heart:  S1, S2 normal.  No murmur.   Abdomen: Abdomen is soft, nontender/nondistended, + bowel sounds  Extremities: Decreased, 1+ edema of bilateral lower extremities  Skin:  Warm and dry with no rashes      Results Review:    Intake/Output:     Intake/Output Summary (Last 24 hours) at 1/23/2025 1454  Last data filed at 1/23/2025 0943  Gross per 24 hour   Intake " 2270 ml   Output 970 ml   Net 1300 ml         DATA:  Radiology and Labs:  The following labs independently reviewed by me. Additional labs ordered for tomorrow a.m.  Interval notes, chart personally reviewed by me.   Old records independently reviewed showing previous admission for anasarca  Discussed with patient and family at bedside    Risk/ complexity of medical care/ medical decision making high risk: Hypotension, LISY      Labs:   Recent Results (from the past 24 hours)   Renal Function Panel    Collection Time: 01/23/25  3:27 AM    Specimen: Blood   Result Value Ref Range    Glucose 161 (H) 65 - 99 mg/dL    BUN 39 (H) 8 - 23 mg/dL    Creatinine 1.09 0.76 - 1.27 mg/dL    Sodium 138 136 - 145 mmol/L    Potassium 3.9 3.5 - 5.2 mmol/L    Chloride 99 98 - 107 mmol/L    CO2 28.2 22.0 - 29.0 mmol/L    Calcium 7.8 (L) 8.2 - 9.6 mg/dL    Albumin 2.9 (L) 3.5 - 5.2 g/dL    Phosphorus 2.7 2.5 - 4.5 mg/dL    Anion Gap 10.8 5.0 - 15.0 mmol/L    BUN/Creatinine Ratio 35.8 (H) 7.0 - 25.0    eGFR 64.5 >60.0 mL/min/1.73   CBC Auto Differential    Collection Time: 01/23/25  9:36 AM    Specimen: Blood   Result Value Ref Range    WBC 7.84 3.40 - 10.80 10*3/mm3    RBC 2.19 (L) 4.14 - 5.80 10*6/mm3    Hemoglobin 7.2 (L) 13.0 - 17.7 g/dL    Hematocrit 21.4 (L) 37.5 - 51.0 %    MCV 97.7 (H) 79.0 - 97.0 fL    MCH 32.9 26.6 - 33.0 pg    MCHC 33.6 31.5 - 35.7 g/dL    RDW 13.9 12.3 - 15.4 %    RDW-SD 48.5 37.0 - 54.0 fl    MPV 10.1 6.0 - 12.0 fL    Platelets 143 140 - 450 10*3/mm3    Neutrophil % 86.9 (H) 42.7 - 76.0 %    Lymphocyte % 4.8 (L) 19.6 - 45.3 %    Monocyte % 6.0 5.0 - 12.0 %    Eosinophil % 1.7 0.3 - 6.2 %    Basophil % 0.1 0.0 - 1.5 %    Immature Grans % 0.5 0.0 - 0.5 %    Neutrophils, Absolute 6.81 1.70 - 7.00 10*3/mm3    Lymphocytes, Absolute 0.38 (L) 0.70 - 3.10 10*3/mm3    Monocytes, Absolute 0.47 0.10 - 0.90 10*3/mm3    Eosinophils, Absolute 0.13 0.00 - 0.40 10*3/mm3    Basophils, Absolute 0.01 0.00 - 0.20 10*3/mm3     Immature Grans, Absolute 0.04 0.00 - 0.05 10*3/mm3    nRBC 0.0 0.0 - 0.2 /100 WBC   Procalcitonin    Collection Time: 01/23/25  9:36 AM    Specimen: Blood   Result Value Ref Range    Procalcitonin 35.00 (H) 0.00 - 0.25 ng/mL       Radiology:  Pertinent radiology studies were reviewed as described above      Medications have been reviewed separately in chart review medication tab      ASSESSMENT:  Volume overload/anasarca, multifactorial.  Does now have a low EF    Acute pulmonary edema    Benign essential hypertension    Parkinson's disease    Paroxysmal A-fib    Chronic diastolic (congestive) heart failure    Anticoagulant long-term use    Anasarca    Coronavirus infection           DISCUSSION/PLAN:   His renal function is improved today.  Still with LISY based on his prior baseline creatinine  Blood pressure is much improved  His anasarca also looks much better after the IV albumin  Will repeat IV albumin today  Resume IV Lasix and spironolactone but hold ARB due to LISY  Antibiotics per primary team    Remains on tube feeds    Continue to monitor daily weights strict I's and O's  Seems to be having a much better day today which is encouraging      Joe Lyn MD  [facility]   Office phone number: [phone]  Answering service phone number: [phone]    01/23/25  14:54 EST    Dictation performed using Dragon dictation software

## 2025-01-24 NOTE — PROGRESS NOTES
"DAILY PROGRESS NOTE  [facility]    Patient Identification:  Name: Casey Snowden Sr.  Age: 89 y.o.  Sex: male  :  1934  MRN: [id number]         Primary Care Physician: Aleksander Diez MD    Subjective:  Interval History: The patient is declining and family is willing to go with palliative care.    Objective:    Scheduled Meds:amiodarone, 200 mg, Per G Tube, Daily  apixaban, 5 mg, Per G Tube, Q12H  atorvastatin, 40 mg, Per G Tube, Nightly  carbidopa-levodopa, 1 tablet, Per G Tube, TID  finasteride, 5 mg, Per G Tube, Daily  furosemide, 20 mg, Intravenous, Q12H  glycopyrrolate, 0.1 mg, Intravenous, Once  guaifenesin, 400 mg, Per PEG Tube, Q4H  hydrocortisone, 1 Application, Topical, Q8H  ipratropium-albuterol, 3 mL, Nebulization, 4x Daily - RT  levothyroxine, 88 mcg, Per G Tube, Daily  melatonin, 5 mg, Oral, Once  metoprolol tartrate, 12.5 mg, Per G Tube, BID  oxybutynin, 5 mg, Per G Tube, BID  piperacillin-tazobactam, 3.375 g, Intravenous, Q8H  sodium chloride, 10 mL, Intravenous, Q12H  sodium chloride, 4 mL, Nebulization, BID - RT  spironolactone, 12.5 mg, Per G Tube, QAM  [Held by provider] valsartan, 40 mg, Per G Tube, Nightly      Continuous Infusions:     Vital signs in last 24 hours:  Temp:  [97.7 °F (36.5 °C)-100.3 °F (37.9 °C)] 97.7 °F (36.5 °C)  Heart Rate:  [52-77] 77  Resp:  [16-20] 18  BP: (141-153)/(56-99) 141/99    Intake/Output:    Intake/Output Summary (Last 24 hours) at 2025 111  Last data filed at 2025  Gross per 24 hour   Intake 60 ml   Output --   Net 60 ml       Exam:  /99 (BP Location: Right arm, Patient Position: Lying)   Pulse 77   Temp 97.7 °F (36.5 °C) (Oral)   Resp 18   Ht 182.9 cm (72\")   Wt 80.8 kg (178 lb 2.1 oz)   SpO2 100%   BMI 24.16 kg/m²     General Appearance:    Alert, cooperative, no distress   Head:    Normocephalic, without obvious abnormality, atraumatic   Eyes:       Throat:   Lips, tongue, gums normal   Neck:   " "Supple, symmetrical, trachea midline, no JVD   Lungs:     Clear to auscultation bilaterally, respirations unlabored   Chest Wall:    No tenderness or deformity    Heart:    Regular rate and rhythm, S1 and S2 normal, no murmur,no  rub or gallop   Abdomen:     Soft, nontender, bowel sounds active, no masses, no organomegaly    Extremities:   Extremities normal, atraumatic, no cyanosis or edema   Pulses:      Skin:   Skin is warm and dry,  no rashes or palpable lesions   Neurologic: He is pretty weak and confused      Lab Results (last 72 hours)       Procedure Component Value Units Date/Time    Ferritin [[id number]]  (Normal) Collected: 01/24/25 0529    Specimen: Blood Updated: 01/24/25 1002     Ferritin 356.00 ng/mL     Narrative:      Results may be falsely decreased if patient taking Biotin.      Procalcitonin [[id number]]  (Abnormal) Collected: 01/24/25 0529    Specimen: Blood Updated: 01/24/25 0638     Procalcitonin 18.60 ng/mL     Narrative:      As a Marker for Sepsis (Non-Neonates):    1. <0.5 ng/mL represents a low risk of severe sepsis and/or septic shock.  2. >2 ng/mL represents a high risk of severe sepsis and/or septic shock.    As a Marker for Lower Respiratory Tract Infections that require antibiotic therapy:    PCT on Admission    Antibiotic Therapy       6-12 Hrs later    >0.5                Strongly Recommended  >0.25 - <0.5        Recommended   0.1 - 0.25          Discouraged              Remeasure/reassess PCT  <0.1                Strongly Discouraged     Remeasure/reassess PCT    As 28 day mortality risk marker: \"Change in Procalcitonin Result\" (>80% or <=80%) if Day 0 (or Day 1) and Day 4 values are available. Refer to http://www.[employer]-pct-calculator.[email or web address]    Change in PCT <=80%  A decrease of PCT levels below or equal to 80% defines a positive change in PCT test result representing a higher risk for 28-day all-cause mortality of patients diagnosed with severe sepsis for septic shock.    Change in " PCT >80%  A decrease of PCT levels of more than 80% defines a negative change in PCT result representing a lower risk for 28-day all-cause mortality of patients diagnosed with severe sepsis or septic shock.       Renal Function Panel [[id number]]  (Abnormal) Collected: 01/24/25 0529    Specimen: Blood Updated: 01/24/25 0631     Glucose 171 mg/dL      BUN 39 mg/dL      Creatinine 0.87 mg/dL      Sodium 141 mmol/L      Potassium 4.3 mmol/L      Chloride 103 mmol/L      CO2 28.3 mmol/L      Calcium 8.1 mg/dL      Albumin 3.2 g/dL      Phosphorus 2.9 mg/dL      Anion Gap 9.7 mmol/L      BUN/Creatinine Ratio 44.8     eGFR 82.0 mL/min/1.73     Narrative:      GFR Categories in Chronic Kidney Disease (CKD)      GFR Category          GFR (mL/min/1.73)    Interpretation  G1                     90 or greater         Normal or high (1)  G2                      60-89                Mild decrease (1)  G3a                   45-59                Mild to moderate decrease  G3b                   30-44                Moderate to severe decrease  G4                    15-29                Severe decrease  G5                    14 or less           Kidney failure          (1)In the absence of evidence of kidney disease, neither GFR category G1 or G2 fulfill the criteria for CKD.    eGFR calculation 2021 CKD-EPI creatinine equation, which does not include race as a factor    CBC (No Diff) [[id number]]  (Abnormal) Collected: 01/24/25 0530    Specimen: Blood Updated: 01/24/25 0605     WBC 10.17 10*3/mm3      RBC 2.18 10*6/mm3      Hemoglobin 7.2 g/dL      Hematocrit 21.5 %      MCV 98.6 fL      MCH 33.0 pg      MCHC 33.5 g/dL      RDW 13.9 %      RDW-SD 49.7 fl      MPV 10.2 fL      Platelets 144 10*3/mm3     Iron Profile [[id number]]  (Abnormal) Collected: 01/23/25 0936    Specimen: Blood Updated: 01/23/25 1636     Iron 14 mcg/dL      Iron Saturation (TSAT) 9 %      Transferrin 101 mg/dL      TIBC 150 mcg/dL     Occult Blood X 1, Stool -  "Stool, Per Rectum [[id number]]  (Normal) Collected: 01/23/25 1448    Specimen: Stool from Per Rectum Updated: 01/23/25 1459     Fecal Occult Blood Negative    Blood Culture - Blood, Arm, Right [[id number]]  (Normal) Collected: 01/22/25 1241    Specimen: Blood from Arm, Right Updated: 01/23/25 1300     Blood Culture No growth at 24 hours    Blood Culture - Blood, Arm, Left [[id number]]  (Normal) Collected: 01/22/25 1232    Specimen: Blood from Arm, Left Updated: 01/23/25 1300     Blood Culture No growth at 24 hours    Procalcitonin [[id number]]  (Abnormal) Collected: 01/23/25 0936    Specimen: Blood Updated: 01/23/25 1140     Procalcitonin 35.00 ng/mL     Narrative:      As a Marker for Sepsis (Non-Neonates):    1. <0.5 ng/mL represents a low risk of severe sepsis and/or septic shock.  2. >2 ng/mL represents a high risk of severe sepsis and/or septic shock.    As a Marker for Lower Respiratory Tract Infections that require antibiotic therapy:    PCT on Admission    Antibiotic Therapy       6-12 Hrs later    >0.5                Strongly Recommended  >0.25 - <0.5        Recommended   0.1 - 0.25          Discouraged              Remeasure/reassess PCT  <0.1                Strongly Discouraged     Remeasure/reassess PCT    As 28 day mortality risk marker: \"Change in Procalcitonin Result\" (>80% or <=80%) if Day 0 (or Day 1) and Day 4 values are available. Refer to http://www.[employer] [place]-pct-calculator.[email or web address]    Change in PCT <=80%  A decrease of PCT levels below or equal to 80% defines a positive change in PCT test result representing a higher risk for 28-day all-cause mortality of patients diagnosed with severe sepsis for septic shock.    Change in PCT >80%  A decrease of PCT levels of more than 80% defines a negative change in PCT result representing a lower risk for 28-day all-cause mortality of patients diagnosed with severe sepsis or septic shock.       CBC & Differential [[id number]]  (Abnormal) Collected: 01/23/25 0936    " Specimen: Blood Updated: 01/23/25 1026    Narrative:      The following orders were created for panel order CBC & Differential.  Procedure                               Abnormality         Status                     ---------                               -----------         ------                     CBC Auto Differential[[id number]]        Abnormal            Final result                 Please view results for these tests on the individual orders.    CBC Auto Differential [[id number]]  (Abnormal) Collected: 01/23/25 0936    Specimen: Blood Updated: 01/23/25 1026     WBC 7.84 10*3/mm3      RBC 2.19 10*6/mm3      Hemoglobin 7.2 g/dL      Hematocrit 21.4 %      MCV 97.7 fL      MCH 32.9 pg      MCHC 33.6 g/dL      RDW 13.9 %      RDW-SD 48.5 fl      MPV 10.1 fL      Platelets 143 10*3/mm3      Neutrophil % 86.9 %      Lymphocyte % 4.8 %      Monocyte % 6.0 %      Eosinophil % 1.7 %      Basophil % 0.1 %      Immature Grans % 0.5 %      Neutrophils, Absolute 6.81 10*3/mm3      Lymphocytes, Absolute 0.38 10*3/mm3      Monocytes, Absolute 0.47 10*3/mm3      Eosinophils, Absolute 0.13 10*3/mm3      Basophils, Absolute 0.01 10*3/mm3      Immature Grans, Absolute 0.04 10*3/mm3      nRBC 0.0 /100 WBC     Renal Function Panel [[id number]]  (Abnormal) Collected: 01/23/25 0327    Specimen: Blood Updated: 01/23/25 0416     Glucose 161 mg/dL      BUN 39 mg/dL      Creatinine 1.09 mg/dL      Sodium 138 mmol/L      Potassium 3.9 mmol/L      Chloride 99 mmol/L      CO2 28.2 mmol/L      Calcium 7.8 mg/dL      Albumin 2.9 g/dL      Phosphorus 2.7 mg/dL      Anion Gap 10.8 mmol/L      BUN/Creatinine Ratio 35.8     eGFR 64.5 mL/min/1.73     Narrative:      GFR Categories in Chronic Kidney Disease (CKD)      GFR Category          GFR (mL/min/1.73)    Interpretation  G1                     90 or greater         Normal or high (1)  G2                      60-89                Mild decrease (1)  G3a                   45-59                 "Mild to moderate decrease  G3b                   30-44                Moderate to severe decrease  G4                    15-29                Severe decrease  G5                    14 or less           Kidney failure          (1)In the absence of evidence of kidney disease, neither GFR category G1 or G2 fulfill the criteria for CKD.    eGFR calculation 2021 CKD-EPI creatinine equation, which does not include race as a factor    MRSA Screen, PCR (Inpatient) - Swab, Nares [[id number]]  (Normal) Collected: 01/22/25 1228    Specimen: Swab from Nares Updated: 01/22/25 1355     MRSA PCR No MRSA Detected    Narrative:      The negative predictive value of this diagnostic test is high and should only be used to consider de-escalating anti-MRSA therapy. A positive result may indicate colonization with MRSA and must be correlated clinically.    Procalcitonin [[id number]]  (Abnormal) Collected: 01/22/25 1048    Specimen: Blood Updated: 01/22/25 1136     Procalcitonin 63.70 ng/mL     Narrative:      As a Marker for Sepsis (Non-Neonates):    1. <0.5 ng/mL represents a low risk of severe sepsis and/or septic shock.  2. >2 ng/mL represents a high risk of severe sepsis and/or septic shock.    As a Marker for Lower Respiratory Tract Infections that require antibiotic therapy:    PCT on Admission    Antibiotic Therapy       6-12 Hrs later    >0.5                Strongly Recommended  >0.25 - <0.5        Recommended   0.1 - 0.25          Discouraged              Remeasure/reassess PCT  <0.1                Strongly Discouraged     Remeasure/reassess PCT    As 28 day mortality risk marker: \"Change in Procalcitonin Result\" (>80% or <=80%) if Day 0 (or Day 1) and Day 4 values are available. Refer to http://[email or web address]-pct-[email or web address]    Change in PCT <=80%  A decrease of PCT levels below or equal to 80% defines a positive change in PCT test result representing a higher risk for 28-day all-cause mortality of patients diagnosed with severe " sepsis for septic shock.    Change in PCT >80%  A decrease of PCT levels of more than 80% defines a negative change in PCT result representing a lower risk for 28-day all-cause mortality of patients diagnosed with severe sepsis or septic shock.       CBC & Differential [[id number]]  (Abnormal) Collected: 01/22/25 1048    Specimen: Blood Updated: 01/22/25 1108    Narrative:      The following orders were created for panel order CBC & Differential.  Procedure                               Abnormality         Status                     ---------                               -----------         ------                     CBC Auto Differential[[id number]]        Abnormal            Final result                 Please view results for these tests on the individual orders.    CBC Auto Differential [[id number]]  (Abnormal) Collected: 01/22/25 1048    Specimen: Blood Updated: 01/22/25 1108     WBC 11.18 10*3/mm3      RBC 2.42 10*6/mm3      Hemoglobin 8.0 g/dL      Hematocrit 23.7 %      MCV 97.9 fL      MCH 33.1 pg      MCHC 33.8 g/dL      RDW 14.3 %      RDW-SD 50.9 fl      MPV 9.8 fL      Platelets 158 10*3/mm3      Neutrophil % 88.6 %      Lymphocyte % 4.7 %      Monocyte % 5.4 %      Eosinophil % 0.4 %      Basophil % 0.2 %      Immature Grans % 0.7 %      Neutrophils, Absolute 9.91 10*3/mm3      Lymphocytes, Absolute 0.52 10*3/mm3      Monocytes, Absolute 0.60 10*3/mm3      Eosinophils, Absolute 0.05 10*3/mm3      Basophils, Absolute 0.02 10*3/mm3      Immature Grans, Absolute 0.08 10*3/mm3      nRBC 0.0 /100 WBC     Renal Function Panel [[id number]]  (Abnormal) Collected: 01/22/25 0301    Specimen: Blood Updated: 01/22/25 0435     Glucose 198 mg/dL      BUN 31 mg/dL      Creatinine 1.15 mg/dL      Sodium 133 mmol/L      Potassium 3.8 mmol/L      Chloride 99 mmol/L      CO2 24.8 mmol/L      Calcium 7.6 mg/dL      Albumin 2.3 g/dL      Phosphorus 2.9 mg/dL      Anion Gap 9.2 mmol/L      BUN/Creatinine Ratio 27.0     eGFR  60.5 mL/min/1.73     Narrative:      GFR Categories in Chronic Kidney Disease (CKD)      GFR Category          GFR (mL/min/1.73)    Interpretation  G1                     90 or greater         Normal or high (1)  G2                      60-89                Mild decrease (1)  G3a                   45-59                Mild to moderate decrease  G3b                   30-44                Moderate to severe decrease  G4                    15-29                Severe decrease  G5                    14 or less           Kidney failure          (1)In the absence of evidence of kidney disease, neither GFR category G1 or G2 fulfill the criteria for CKD.    eGFR calculation 2021 CKD-EPI creatinine equation, which does not include race as a factor          Data Review:  Results from last 7 days   Lab Units 01/24/25  0529 01/23/25  0327 01/22/25  0301   SODIUM mmol/L 141 138 133*   POTASSIUM mmol/L 4.3 3.9 3.8   CHLORIDE mmol/L 103 99 99   CO2 mmol/L 28.3 28.2 24.8   BUN mg/dL 39* 39* 31*   CREATININE mg/dL 0.87 1.09 1.15   GLUCOSE mg/dL 171* 161* 198*   CALCIUM mg/dL 8.1* 7.8* 7.6*     Results from last 7 days   Lab Units 01/24/25  0530 01/23/25  0936 01/22/25  1048   WBC 10*3/mm3 10.17 7.84 11.18*   HEMOGLOBIN g/dL 7.2* 7.2* 8.0*   HEMATOCRIT % 21.5* 21.4* 23.7*   PLATELETS 10*3/mm3 144 143 158     Results from last 7 days   Lab Units 01/20/25  0055   TSH uIU/mL 17.800*         Lab Results   Lab Value Date/Time    TROPONINT 35 (H) 01/19/2025 1820    TROPONINT 34 (H) 01/19/2025 1643    TROPONINT 32 (H) 05/24/2024 1423    TROPONINT 31 (H) 10/09/2023 1203    TROPONINT <0.010 02/07/2023 1651    TROPONINT 0.013 02/07/2023 0443    TROPONINT <0.010 10/23/2022 2121    TROPONINT 0.911 (C) 06/04/2019 0559    TROPONINT 0.875 (C) 06/04/2019 0004    TROPONINT 0.420 (C) 06/03/2019 1908    TROPONINT <0.010 06/03/2019 1253    TROPONINT <0.010 08/25/2018 1555    TROPONINT 0.012 08/25/2018 1344    TROPONINT 0.017 12/27/2016 1817    TROPONINT  "0.019 12/27/2016 1426         Results from last 7 days   Lab Units 01/19/25  1643   ALK PHOS U/L 81   BILIRUBIN mg/dL 0.3   ALT (SGPT) U/L 22   AST (SGOT) U/L 31     Results from last 7 days   Lab Units 01/20/25  0055   TSH uIU/mL 17.800*         No results found for: \"POCGLU\"  Results from last 7 days   Lab Units 01/19/25  1643   INR  1.31*       Past Medical History:   Diagnosis Date    Abdominal aortic aneurysm without rupture 10/14/2021    Acquired hypothyroidism 08/31/2022    Acute ischemic stroke 10/08/2023    Aneurysm     Arrhythmia     Atrial fibrillation     Cancer 04/2018    basil cell carcinoma    Carotid stenosis 10/29/2015    CHF (congestive heart failure)     Chronic deep vein thrombosis (DVT) of popliteal vein of right lower extremity     Clotting disorder     Coronary artery disease involving native coronary artery of native heart without angina pectoris 12/20/2018    DVT of lower extremity (deep venous thrombosis)     Dysphasia 12/2024    Hard of hearing     Hyperlipidemia     Hypertension     Localized edema 01/04/2017    Long-term use of high-risk medication     Metabolic encephalopathy 02/07/2023    Myocardial infarction 2918    LAD stent    PAD (peripheral artery disease) 09/10/2020    Parkinson's disease     Postphlebitic syndrome     Pure hypercholesterolemia     Shingles 2024    Skin tear of upper arm without complication, left, subsequent encounter     Stroke 11/02/2023    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       Plan:  Plan to move to [facility] with palliative care.  Follow-up.  Is DNR/DNI and now I think were going to just comfort measures.  Palliative care consult noted.  Will put in the palliative care order set.    Kevin Langford MD  1/24/2025  11:10 EST

## 2025-01-24 NOTE — SIGNIFICANT NOTE
01/24/25 7964   OTHER   Discipline physical therapist   Rehab Time/Intention   Session Not Performed other (see comments);unable to treat, medical status change  (Noted pt with change in status overnight and per chart, plans for comfort measures with transfer to . PT will sign-off.)   Therapy Assessment/Plan (PT)   Criteria for Skilled Interventions Met (PT) no;does not meet criteria for skilled intervention

## 2025-01-24 NOTE — CONSULTS
Purpose of the visit was to evaluate for: goals of care/advanced care planning and support for patient/family. Spoke with MD and RN as well as family and discussed palliative care, goals of care, and care options.      Assessment:  Patient is palliative care appropriate for inpatient care given (list diagnosis/symptoms):  89 year old male who was recently treated for VZV encephalitis at this facility and discharged to rehab 1/9. Readmitted 1/19 with anasarca, shortness of breath. PM includes Parkinson's disease, CHF, afib, HTN. Patient had acute decline overnight, change in mental status and worsening respiratory status. He is seen today on telemetry unit, he is audibly congested, confused and asking his wife to clean the ceiling tiles. He is not able to participate in goals of care conversation. Mobility and nutrition significantly limited by acute and chronic illness. PPS 20%    Cultural and spiritual needs have been assessed. Wife cites her shivani as being very important to her. No needs at this time.     Recommendations/Plan: Change code status to comfort measures only and gear all treatment options towards symptom management. Transfer to inpatient palliative care unit. Consult [facility].     Other Comments: Spoke with patient's wife Evonne at bedside. She wanted to step away from patient to talk but he kept calling her back so we stayed in the room. She sees hat patient is in distress and needs to kept comfortable at this time, she declines any further testing or workup. We discussed medications used to alleviate suffering, answered all questions and provided support. She has notified her children and her daughter is on her way. Updated Dr. Langford and RN Tia.

## 2025-01-24 NOTE — PROGRESS NOTES
"   LOS: 5 days     Chief Complaint/ Reason for encounter: LISY Patel    Subjective   01/22/25 : Clinically seems to be doing little better, his weight is down compared to yesterday but still up from admission, edema about the same  He does arouse, somewhat responsive, requiring frequent suctioning  Denies any significant shortness of breath, chest pain fevers or chills, + cough    1/23: He is much more awake and alert, conversant has been out of bed working with therapy  Edema much better, no shortness of breath  Still with copious secretions requiring suction    1/24: After a pretty good day yesterday he has had a significant decline overnight.  Now with a fixed and dilated left pupil, more confused  Increased work of breathing, probable aspiration, coarse rhonchi heard  Family wants to discuss again with palliative care and possibly moved to comfort care    Medical history reviewed:  History of Present Illness    Subjective    History taken from: Chart and patient/family as able    Vital Signs  Temp:  [97.7 °F (36.5 °C)-100.7 °F (38.2 °C)] 100.7 °F (38.2 °C)  Heart Rate:  [52-77] 77  Resp:  [16-20] 18  BP: (141-153)/(56-99) 141/99       Wt Readings from Last 1 Encounters:   01/24/25 0533 80.8 kg (178 lb 2.1 oz)   01/23/25 0418 78 kg (171 lb 15.3 oz)   01/22/25 0510 78.9 kg (173 lb 15.1 oz)   01/21/25 0351 81.8 kg (180 lb 5.4 oz)   01/20/25 1132 76.7 kg (169 lb)   01/20/25 0600 77.1 kg (169 lb 15.6 oz)       Objective:  Vital signs: (most recent): Blood pressure 141/99, pulse 77, temperature (!) 100.7 °F (38.2 °C), temperature source Rectal, resp. rate 18, height 182.9 cm (72\"), weight 80.8 kg (178 lb 2.1 oz), SpO2 100%.                Objective:  General Appearance:  Comfortable, chronically ill-appearing, in mild respiratory distress, cachectic  HEENT: Mucous membranes moist  Lungs: Coarse breath sounds, increased respiratory effort, diffuse rhonchi  Heart:  S1, S2 normal.  No murmur.   Abdomen: Abdomen is " soft, nontender/nondistended, + bowel sounds  Extremities: Decreased, 1+ edema of bilateral lower extremities  Skin:  Warm and dry with no rashes      Results Review:    Intake/Output:     Intake/Output Summary (Last 24 hours) at 1/24/2025 1313  Last data filed at 1/23/2025 2000  Gross per 24 hour   Intake 60 ml   Output --   Net 60 ml         DATA:  Radiology and Labs:  The following labs independently reviewed by me. Additional labs ordered for tomorrow a.m.  Interval notes, chart personally reviewed by me.   Old records independently reviewed showing previous admission for anasarca  Discussed with patient and family at bedside    Risk/ complexity of medical care/ medical decision making high risk: Hypotension, LISY, now with worsening respiratory failure, possible stroke, decreased urine output      Labs:   Recent Results (from the past 24 hours)   Occult Blood X 1, Stool - Stool, Per Rectum    Collection Time: 01/23/25  2:48 PM    Specimen: Per Rectum; Stool   Result Value Ref Range    Fecal Occult Blood Negative Negative   Renal Function Panel    Collection Time: 01/24/25  5:29 AM    Specimen: Blood   Result Value Ref Range    Glucose 171 (H) 65 - 99 mg/dL    BUN 39 (H) 8 - 23 mg/dL    Creatinine 0.87 0.76 - 1.27 mg/dL    Sodium 141 136 - 145 mmol/L    Potassium 4.3 3.5 - 5.2 mmol/L    Chloride 103 98 - 107 mmol/L    CO2 28.3 22.0 - 29.0 mmol/L    Calcium 8.1 (L) 8.2 - 9.6 mg/dL    Albumin 3.2 (L) 3.5 - 5.2 g/dL    Phosphorus 2.9 2.5 - 4.5 mg/dL    Anion Gap 9.7 5.0 - 15.0 mmol/L    BUN/Creatinine Ratio 44.8 (H) 7.0 - 25.0    eGFR 82.0 >60.0 mL/min/1.73   Procalcitonin    Collection Time: 01/24/25  5:29 AM    Specimen: Blood   Result Value Ref Range    Procalcitonin 18.60 (H) 0.00 - 0.25 ng/mL   Ferritin    Collection Time: 01/24/25  5:29 AM    Specimen: Blood   Result Value Ref Range    Ferritin 356.00 30.00 - 400.00 ng/mL   CBC (No Diff)    Collection Time: 01/24/25  5:30 AM    Specimen: Blood   Result Value  Ref Range    WBC 10.17 3.40 - 10.80 10*3/mm3    RBC 2.18 (L) 4.14 - 5.80 10*6/mm3    Hemoglobin 7.2 (L) 13.0 - 17.7 g/dL    Hematocrit 21.5 (L) 37.5 - 51.0 %    MCV 98.6 (H) 79.0 - 97.0 fL    MCH 33.0 26.6 - 33.0 pg    MCHC 33.5 31.5 - 35.7 g/dL    RDW 13.9 12.3 - 15.4 %    RDW-SD 49.7 37.0 - 54.0 fl    MPV 10.2 6.0 - 12.0 fL    Platelets 144 140 - 450 10*3/mm3       Radiology:  Pertinent radiology studies were reviewed as described above      Medications have been reviewed separately in chart review medication tab      ASSESSMENT:  Volume overload/anasarca, multifactorial.  Does now have a low EF    Acute pulmonary edema    Benign essential hypertension    Parkinson's disease    Paroxysmal A-fib    Chronic diastolic (congestive) heart failure    Anticoagulant long-term use    Anasarca    Coronavirus infection           DISCUSSION/PLAN:   He is significantly worse today  Respirations more labored, coarse rhonchi low-grade fevers.  I suspect aspiration  Also now for concerns with a new stroke  Urine output has been low, no improvement with IV Lasix  He is in significant distress.  Family request DNR/DNI  Family is interested in talking with palliative care again and I recommend transition to comfort/palliative care  Most of his current medications can be discontinued and recommend no further labs  Probably best to stop tube feeds since he appears to be aspirating      Joe Lyn MD  [facility]   Office phone number: [phone]  Answering service phone number: [phone]    01/24/25  13:13 EST    Dictation performed using Dragon dictation software

## 2025-01-24 NOTE — SIGNIFICANT NOTE
01/24/25 1546   OTHER   Discipline occupational therapist   Rehab Time/Intention   Session Not Performed other (see comments);unable to treat, medical status change  (Noted pt with change in status overnight and per chart, plans for comfort measures with transfer to . OT will sign-off.)

## 2025-01-24 NOTE — PLAN OF CARE
Goal Outcome Evaluation:         Outcome Evaluation: pt is AOX1,  IV albumin, zosyn given. Patient had consistent  periods of desaturation, elevated temp rectally 100.8, pt was restless most of shift. Pt family at bedside and was seen numerous times during shift suctioning patient. Will continue plan of care and monitor patient for safety .

## 2025-01-24 NOTE — PROGRESS NOTES
Nut[facility] Services    Patient Name:  Casey Snowden Sr.  YOB: 1934  MRN: [id number]  Admit Date:  1/19/2025    Pt moved to [facility] and pt's wife has asked that code status be changed to comfort measures only. TF order discontinued.     Electronically signed by:  Kathryn Montano RD  01/24/25 14:19 EST

## 2025-01-24 NOTE — CONSULTS
Chap visited with family.  Family told many stories about pt's life and their love for pt.  Chap prayed with pt and family.  Chap remains available.

## 2025-01-24 NOTE — PLAN OF CARE
Goal Outcome Evaluation:           Progress: declining  Outcome Evaluation: PPS 10%. New admit to palliative unit. Resp distress, air hungry upon arrival. O2 was 15lpm high flow. Family at bedside. Nsg education provided to family about palliative medications, especially Morphine. Nsg administered another 2mg of Morphine and introduced Diazepam 2.5mg. Pt in recovery position, resting comfortably. Large amount of orange secretions coming from mouth. Cat to bsd. Pt is non responsive, bedbound, NPO. Nsg to cont with plan of care.

## 2025-01-24 NOTE — PROGRESS NOTES
LOS: 5 days   Patient Care Team:  Aleksander Diez MD as PCP - General  Joseph Acharya MD as Consulting Physician (Ophthalmology)  Ford Dorado MD as Consulting Physician (Vascular Surgery)  Cecilio Armas III, MD as Consulting Physician (Cardiology)  Kashif Goodwin MD as Consulting Physician (Neurology)  Enoch Lares MD (Hematology)  Kayden Chance MD (Dermatology)  Vinod Cadena MD as Consulting Physician (Neurosurgery)  Johnny Castellanos MD as Consulting Physician (Urology)  Inge Pedro, PT as Physical Therapist (Physical Therapy)  Lucrecia Mccloud, PT as Physical Therapist (Physical Therapy)  Nereyda Toledo, OT as Occupational Therapist (Occupational Therapy)  Junior Taylor MD as Surgeon (Orthopedic Surgery)  Casey Power MD as Surgeon (Wound Care)  Nahomi Jacobo APRN as Nurse Practitioner (Nurse Practitioner)    Chief Complaint:     F/u chf    Interval History:     He is more short winded today.  He is not really able to talk.  He is less responsive.  He has a lot of secretions.  His lungs are diminished throughout.  He now has a dilated left pupil.  Abdomen is slightly distended.      Objective   Vital Signs  Temp:  [97.7 °F (36.5 °C)-100.3 °F (37.9 °C)] 97.7 °F (36.5 °C)  Heart Rate:  [52-77] 77  Resp:  [16-20] 18  BP: (141-153)/(56-99) 141/99    Intake/Output Summary (Last 24 hours) at 1/24/2025 0917  Last data filed at 1/23/2025 2000  Gross per 24 hour   Intake 840 ml   Output --   Net 840 ml       Comfortable NAD  Neck supple, no JVD or thyromegaly appreciated  S1/S2 RRR, no m/r/g  Lungs CTA B, normal effort  Abdomen S/NT/mild distention  (+) BS, no HSM appreciated  Extremities warm, no clubbing, cyanosis, or edema  No visible or palpable skin lesions  A/Ox4, mood and affect appropriate    Results Review:      Results from last 7 days   Lab Units 01/24/25  0529 01/23/25  0327 01/22/25  0301   SODIUM mmol/L 141 138 133*   POTASSIUM mmol/L 4.3 3.9  3.8   CHLORIDE mmol/L 103 99 99   CO2 mmol/L 28.3 28.2 24.8   BUN mg/dL 39* 39* 31*   CREATININE mg/dL 0.87 1.09 1.15   GLUCOSE mg/dL 171* 161* 198*   CALCIUM mg/dL 8.1* 7.8* 7.6*     Results from last 7 days   Lab Units 01/19/25  1820 01/19/25  1643   HSTROP T ng/L 35* 34*     Results from last 7 days   Lab Units 01/24/25  0530 01/23/25  0936 01/22/25  1048   WBC 10*3/mm3 10.17 7.84 11.18*   HEMOGLOBIN g/dL 7.2* 7.2* 8.0*   HEMATOCRIT % 21.5* 21.4* 23.7*   PLATELETS 10*3/mm3 144 143 158     Results from last 7 days   Lab Units 01/19/25  1643   INR  1.31*                   I reviewed the patient's new clinical results.  I personally viewed and interpreted the patient's EKG/Telemetry data        Medication Review:   amiodarone, 200 mg, Per G Tube, Daily  apixaban, 5 mg, Per G Tube, Q12H  atorvastatin, 40 mg, Per G Tube, Nightly  carbidopa-levodopa, 1 tablet, Per G Tube, TID  finasteride, 5 mg, Per G Tube, Daily  furosemide, 20 mg, Intravenous, Q12H  guaifenesin, 400 mg, Per PEG Tube, Q4H  hydrocortisone, 1 Application, Topical, Q8H  ipratropium-albuterol, 3 mL, Nebulization, 4x Daily - RT  levothyroxine, 88 mcg, Per G Tube, Daily  melatonin, 5 mg, Oral, Once  metoprolol tartrate, 12.5 mg, Per G Tube, BID  oxybutynin, 5 mg, Per G Tube, BID  piperacillin-tazobactam, 3.375 g, Intravenous, Q8H  sodium chloride, 10 mL, Intravenous, Q12H  sodium chloride, 4 mL, Nebulization, BID - RT  spironolactone, 12.5 mg, Per G Tube, QAM  [Held by provider] valsartan, 40 mg, Per G Tube, Nightly             Assessment & Plan       Acute pulmonary edema    Benign essential hypertension    Parkinson's disease    Paroxysmal A-fib    Chronic diastolic (congestive) heart failure    Anticoagulant long-term use    Hypothyroidism    Anasarca    Coronavirus infection    Anasarca-multifactorial due to hypoalbuminemia, HFrEF, immobility.  HFrEF due to ischemic cardiomyopathy, ejection fraction 40% with apical akinesis and anteroseptal  akinesis/aneurysm.  PAF, on amiodarone started at last admission, A-fib was resistant to AV heather blockers and difficult to control.  He is anticoagulated with apixaban 5 mg twice daily.  H/o nsVT  Hypertension, BP low overnight.  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   Parkinson's.  On Sinemet  Coronavirus OC 43 positive  Anemia-worsening  Hyperlipidemia, on atorvastatin.  Change in neurologic status with dilated left pupil, neurology consulted and stat CT head ordered.    Urine output has been low, check bladder scan.  Will do this after he gets back from the CT.  Possibly aspirated overnight, he definitely has a worsening of his mental status and his breathing today.  Await neurology opinion after they see him.  Wife is at bedside and very tearful.  I did talk with nursing about plan of care.    Tennille Willams MD  01/24/25  09:17 EST

## 2025-01-25 NOTE — DISCHARGE SUMMARY
PHYSICIAN DISCHARGE SUMMARY                                                                        [facility]    Patient Identification:  Name: Casey Snowden Sr.  Age: 89 y.o.  Sex: male  :  1934  MRN: [id number]  Primary Care Physician: Aleksander Diez MD    Admit date: 2025  Discharge date and time:2025  Discharged Condition:   Date and time of death:2025 at 9:15 AM  Discharge Diagnoses:  Active Hospital Problems    Diagnosis  POA    **Acute pulmonary edema [J81.0]  Yes    Coronavirus infection [B34.2]  Unknown    Anasarca [R60.1]  Yes    Hypothyroidism [E03.9]  Yes    Chronic diastolic (congestive) heart failure [I50.32]  Yes    Anticoagulant long-term use [Z79.01]  Not Applicable    Paroxysmal A-fib [I48.0]  Yes    Benign essential hypertension [I10]  Yes    Parkinson's disease [G20.A1]  Yes      Resolved Hospital Problems   No resolved problems to display.          PMHX:   Past Medical History:   Diagnosis Date    Abdominal aortic aneurysm without rupture 10/14/2021    Acquired hypothyroidism 2022    Acute ischemic stroke 10/08/2023    Aneurysm     Arrhythmia     Atrial fibrillation     Cancer 2018    basil cell carcinoma    Carotid stenosis 10/29/2015    CHF (congestive heart failure)     Chronic deep vein thrombosis (DVT) of popliteal vein of right lower extremity     Clotting disorder     Coronary artery disease involving native coronary artery of native heart without angina pectoris 2018    DVT of lower extremity (deep venous thrombosis)     Dysphasia 2024    Hard of hearing     Hyperlipidemia     Hypertension     Localized edema 2017    Long-term use of high-risk medication     Metabolic encephalopathy 2023    Myocardial infarction 2918    LAD stent    PAD (peripheral artery disease) 09/10/2020    Parkinson's disease     Postphlebitic syndrome     Pure  hypercholesterolemia     Shingles 2024    Skin tear of upper arm without complication, left, subsequent encounter     Stroke 11/02/2023    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     PSHX:   Past Surgical History:   Procedure Laterality Date    BASAL CELL CARCINOMA EXCISION  04/2018    CARDIAC CATHETERIZATION N/A 11/05/2018    Procedure: Left Heart Cath;  Surgeon: Oliverio Azar MD;  [facility]AS[facility] LOC[facility];  Service: Cardiovascular    CARDIAC CATHETERIZATION N/A 11/05/2018    Procedure: Coronary angiography;  Surgeon: Oliverio Azar MD;  [facility];  Service: Cardiovascular    CARDIAC CATHETERIZATION N/A 11/05/2018    Procedure: Left ventriculography;  Surgeon: Oliverio Azar MD;  [facility];  Service: Cardiovascular    CARDIAC CATHETERIZATION N/A 06/04/2019    Procedure: Left Heart Cath;  Surgeon: Johnny Glasgow MD;  Location: [facility] CATH INVASIVE LOCATION;  Service: Cardiovascular    CARDIAC CATHETERIZATION N/A 06/04/2019    Procedure: Coronary angiography;  Surgeon: Johnny Glasgow MD;  Location: [facility] CATH INVASIVE LOCATION;  Service: Cardiovascular    CARDIAC CATHETERIZATION N/A 06/04/2019    Procedure: Left ventriculography;  Surgeon: Johnny Glasgow MD;  Location: [facility] CATH INVASIVE LOCATION;  Service: Cardiovascular    CARDIAC CATHETERIZATION N/A 06/04/2019    Procedure: Stent BILL coronary;  Surgeon: Johnny Glasgow MD;  Location: [facility] CATH INVASIVE LOCATION;  Service: Cardiovascular    CARDIAC SURGERY      CAROTID ARTERY ANGIOPLASTY Right 10/11/2023    with stent    CAROTID STENT      CATARACT EXTRACTION Left 08/2018    CATARACT EXTRACTION Right 09/2018    COLONOSCOPY      2011    CORONARY STENT PLACEMENT      ENDOSCOPY W/ PEG TUBE PLACEMENT N/A 1/3/2025    Procedure: ESOPHAGOGASTRODUODENOSCOPY WITH PERCUTANEOUS ENDOSCOPIC GASTROSTOMY TUBE INSERTION;  Surgeon: Konstantin Persaud MD;  Location: [facility]  ENDOSCOPY;  Service: General;  Laterality: N/A;  pre: dysphagia  post: same    INCISION AND DRAINAGE LEG Right 07/07/2017    Procedure: INCISION AND DRAINAGE INFECTED HEMATOMA RIGHT THIGH;  Surgeon: Valentin Peña MD;  Location: [facility] OR;  Service:     JOINT REPLACEMENT      KNEE INCISION AND DRAINAGE Right 12/27/2016    Procedure: KNEE INCISION AND DRAINAGE;  Surgeon: Triston Whitten MD;  Location: [facility] OR;  Service:     NOSE SURGERY      nose replacement    SINUS SURGERY  1960    SKIN BIOPSY      SKIN GRAFT  12/2017    TOTAL KNEE ARTHROPLASTY Right     VASCULAR SURGERY      VENA CAVA FILTER PLACEMENT         Hospital Course: Casey Snowden Sr.   is a 89-year-old male with a past medical history of hypertension, paroxysmal A-fib on Eliquis, congestive heart failure, and Parkinson's disease presented to [facility] with complaints of whole body swelling.  He was transferred to [facility] for further management of symptoms.  Patient moaning in bed intermittently and unable to answer questions.  Wife at bedside to provide all of history.  Ms. Snowden reports patient was in the hospital for 3 weeks with encephalitis and he has been at the [facility] re[facility] for approximately 1 week.  She reported that he had edema to his arms, abdomen, and legs today and this prompted him to go to the emergency room.  She reports that he has been getting up in a chair with the use of a lift at the [facility].  She reports that he does make conversation and did not want palliative care because his goal was to get up and walk again.  She reports that he failed his speech dysphagia screening and he is currently still receiving tube feeding on admission.  The patient made little progress in the hospital and was seen by neurology, nephrology and cardiology.  The patient was declining and after being in the hospital for several days the family did agree to palliative care and comfort measures only.   The patient was transferred to [facility] and kept comfortable and  from his illness.    Consults:     Consults       Date and Time Order Name Status Description    2025  9:50 AM Inpatient Neurology Consult General Completed     2025 12:35 PM Inpatient Nephrology Consult Completed     2025  9:31 PM Inpatient Cardiology Consult Completed     2025  2:03 PM Inpatient Psychiatrist Consult Completed     2024  9:52 AM Inpatient General Surgery Consult Completed     2024  2:22 PM Inpatient Infectious Diseases Consult Completed     2024 12:16 PM Inpatient Cardiology Consult Completed     2024 10:28 PM Inpatient Neurology Consult General Completed           Results from last 7 days   Lab Units 25  0530   WBC 10*3/mm3 10.17   HEMOGLOBIN g/dL 7.2*   HEMATOCRIT % 21.5*   PLATELETS 10*3/mm3 144     Results from last 7 days   Lab Units 25  0529   SODIUM mmol/L 141   POTASSIUM mmol/L 4.3   CHLORIDE mmol/L 103   CO2 mmol/L 28.3   BUN mg/dL 39*   CREATININE mg/dL 0.87   GLUCOSE mg/dL 171*   CALCIUM mg/dL 8.1*     Significant Diagnostic Studies:   WBC   Date Value Ref Range Status   2025 10.17 3.40 - 10.80 10*3/mm3 Final     Hemoglobin   Date Value Ref Range Status   2025 7.2 (L) 13.0 - 17.7 g/dL Final     Hematocrit   Date Value Ref Range Status   2025 21.5 (L) 37.5 - 51.0 % Final     Platelets   Date Value Ref Range Status   2025 144 140 - 450 10*3/mm3 Final     Sodium   Date Value Ref Range Status   2025 141 136 - 145 mmol/L Final     Potassium   Date Value Ref Range Status   2025 4.3 3.5 - 5.2 mmol/L Final     Chloride   Date Value Ref Range Status   2025 103 98 - 107 mmol/L Final     CO2   Date Value Ref Range Status   2025 28.3 22.0 - 29.0 mmol/L Final     BUN   Date Value Ref Range Status   2025 39 (H) 8 - 23 mg/dL Final     Creatinine   Date Value Ref Range Status   2025 0.87 0.76 - 1.27 mg/dL Final  "    Glucose   Date Value Ref Range Status   01/24/2025 171 (H) 65 - 99 mg/dL Final     Calcium   Date Value Ref Range Status   01/24/2025 8.1 (L) 8.2 - 9.6 mg/dL Final     Phosphorus   Date Value Ref Range Status   01/24/2025 2.9 2.5 - 4.5 mg/dL Final     No results found for: \"AST\", \"ALT\", \"ALKPHOS\"  No results found for: \"APTT\", \"INR\"  No results found for: \"COLORU\", \"CLARITYU\", \"SPECGRAV\", \"PHUR\", \"PROTEINUR\", \"GLUCOSEU\", \"KETONESU\", \"BLOODU\", \"NITRITE\", \"LEUKOCYTESUR\", \"BILIRUBINUR\", \"UROBILINOGEN\", \"RBCUA\", \"WBCUA\", \"BACTERIA\", \"UACOMMENT\"  No results found for: \"TROPONINT\", \"TROPONINI\", \"BNP\"  No components found for: \"HGBA1C;2\"  No components found for: \"TSH;2\"  Imaging Results (All)       Procedure Component Value Units Date/Time    CT Head Without Contrast [[id number]] Collected: 01/24/25 1037     Updated: 01/24/25 1048    Narrative:      CT HEAD WO CONTRAST-     HISTORY:  uneven pupils     COMPARISON: MRI brain 12/18/2024     FINDINGS: The brain and ventricles are symmetrical. There is diffuse  atrophy. Moderate vascular calcification is noted. There is mild mucosal  thickening involving the maxillary sinuses and involving the ethmoid air  cells bilaterally. A trace amount of fluid is present within the mastoid  process on the right. There is no evidence of hemorrhage, hydrocephalus  or of a focal area of decreased attenuation to suggest acute infarction.  Further evaluation could be performed with MRI examination of the brain.                 Radiation dose reduction techniques were utilized, including automated  exposure modulation based on body size.     This report was finalized on 1/24/2025 10:44 AM by Dr. Liban Smith M.D on Workstation: [id number]       XR Chest 1 View [[id number]] Collected: 01/22/25 1313     Updated: 01/22/25 1321    Narrative:      XR CHEST 1 VW-     HISTORY: Male who is 89 years-old, fever     TECHNIQUE: Frontal views of the chest     COMPARISON: 1/19/2025     FINDINGS: The " "heart is enlarged. Aorta is calcified. Pulmonary  vasculature is unremarkable. Bilateral infiltrates appear similar to  prior exam with mild to moderate right, minimal left pleural effusions,  continued follow-up recommended. No pneumothorax. No acute osseous  process.       Impression:      As described.     This report was finalized on 1/22/2025 1:17 PM by Dr. Wesley Amaya M.D on Workstation: [id number]             Lab Results (last 7 days)       Procedure Component Value Units Date/Time    Blood Culture - Blood, Arm, Left [[id number]]  (Normal) Collected: 01/22/25 1232    Specimen: Blood from Arm, Left Updated: 01/24/25 1300     Blood Culture No growth at 2 days    Blood Culture - Blood, Arm, Right [[id number]]  (Normal) Collected: 01/22/25 1241    Specimen: Blood from Arm, Right Updated: 01/24/25 1300     Blood Culture No growth at 2 days    Ferritin [[id number]]  (Normal) Collected: 01/24/25 0529    Specimen: Blood Updated: 01/24/25 1002     Ferritin 356.00 ng/mL     Narrative:      Results may be falsely decreased if patient taking Biotin.      Procalcitonin [[id number]]  (Abnormal) Collected: 01/24/25 0529    Specimen: Blood Updated: 01/24/25 0638     Procalcitonin 18.60 ng/mL     Narrative:      As a Marker for Sepsis (Non-Neonates):    1. <0.5 ng/mL represents a low risk of severe sepsis and/or septic shock.  2. >2 ng/mL represents a high risk of severe sepsis and/or septic shock.    As a Marker for Lower Respiratory Tract Infections that require antibiotic therapy:    PCT on Admission    Antibiotic Therapy       6-12 Hrs later    >0.5                Strongly Recommended  >0.25 - <0.5        Recommended   0.1 - 0.25          Discouraged              Remeasure/reassess PCT  <0.1                Strongly Discouraged     Remeasure/reassess PCT    As 28 day mortality risk marker: \"Change in Procalcitonin Result\" (>80% or <=80%) if Day 0 (or Day 1) and Day 4 values are available. Refer to " http://[email or web address]-pct-[email or web address]    Change in PCT <=80%  A decrease of PCT levels below or equal to 80% defines a positive change in PCT test result representing a higher risk for 28-day all-cause mortality of patients diagnosed with severe sepsis for septic shock.    Change in PCT >80%  A decrease of PCT levels of more than 80% defines a negative change in PCT result representing a lower risk for 28-day all-cause mortality of patients diagnosed with severe sepsis or septic shock.       Renal Function Panel [[id number]]  (Abnormal) Collected: 01/24/25 0529    Specimen: Blood Updated: 01/24/25 0631     Glucose 171 mg/dL      BUN 39 mg/dL      Creatinine 0.87 mg/dL      Sodium 141 mmol/L      Potassium 4.3 mmol/L      Chloride 103 mmol/L      CO2 28.3 mmol/L      Calcium 8.1 mg/dL      Albumin 3.2 g/dL      Phosphorus 2.9 mg/dL      Anion Gap 9.7 mmol/L      BUN/Creatinine Ratio 44.8     eGFR 82.0 mL/min/1.73     Narrative:      GFR Categories in Chronic Kidney Disease (CKD)      GFR Category          GFR (mL/min/1.73)    Interpretation  G1                     90 or greater         Normal or high (1)  G2                      60-89                Mild decrease (1)  G3a                   45-59                Mild to moderate decrease  G3b                   30-44                Moderate to severe decrease  G4                    15-29                Severe decrease  G5                    14 or less           Kidney failure          (1)In the absence of evidence of kidney disease, neither GFR category G1 or G2 fulfill the criteria for CKD.    eGFR calculation 2021 CKD-EPI creatinine equation, which does not include race as a factor    CBC (No Diff) [[id number]]  (Abnormal) Collected: 01/24/25 0530    Specimen: Blood Updated: 01/24/25 0605     WBC 10.17 10*3/mm3      RBC 2.18 10*6/mm3      Hemoglobin 7.2 g/dL      Hematocrit 21.5 %      MCV 98.6 fL      MCH 33.0 pg      MCHC 33.5 g/dL      RDW 13.9 %      RDW-SD 49.7 fl   "    MPV 10.2 fL      Platelets 144 10*3/mm3     Iron Profile [[id number]]  (Abnormal) Collected: 01/23/25 0936    Specimen: Blood Updated: 01/23/25 1636     Iron 14 mcg/dL      Iron Saturation (TSAT) 9 %      Transferrin 101 mg/dL      TIBC 150 mcg/dL     Occult Blood X 1, Stool - Stool, Per Rectum [[id number]]  (Normal) Collected: 01/23/25 1448    Specimen: Stool from Per Rectum Updated: 01/23/25 1459     Fecal Occult Blood Negative    Procalcitonin [[id number]]  (Abnormal) Collected: 01/23/25 0936    Specimen: Blood Updated: 01/23/25 1140     Procalcitonin 35.00 ng/mL     Narrative:      As a Marker for Sepsis (Non-Neonates):    1. <0.5 ng/mL represents a low risk of severe sepsis and/or septic shock.  2. >2 ng/mL represents a high risk of severe sepsis and/or septic shock.    As a Marker for Lower Respiratory Tract Infections that require antibiotic therapy:    PCT on Admission    Antibiotic Therapy       6-12 Hrs later    >0.5                Strongly Recommended  >0.25 - <0.5        Recommended   0.1 - 0.25          Discouraged              Remeasure/reassess PCT  <0.1                Strongly Discouraged     Remeasure/reassess PCT    As 28 day mortality risk marker: \"Change in Procalcitonin Result\" (>80% or <=80%) if Day 0 (or Day 1) and Day 4 values are available. Refer to http://www.[employer]-pct-calculator.com    Change in PCT <=80%  A decrease of PCT levels below or equal to 80% defines a positive change in PCT test result representing a higher risk for 28-day all-cause mortality of patients diagnosed with severe sepsis for septic shock.    Change in PCT >80%  A decrease of PCT levels of more than 80% defines a negative change in PCT result representing a lower risk for 28-day all-cause mortality of patients diagnosed with severe sepsis or septic shock.       CBC & Differential [[id number]]  (Abnormal) Collected: 01/23/25 0936    Specimen: Blood Updated: 01/23/25 1026    Narrative:      The following orders were " created for panel order CBC & Differential.  Procedure                               Abnormality         Status                     ---------                               -----------         ------                     CBC Auto Differential[[id number]]        Abnormal            Final result                 Please view results for these tests on the individual orders.    CBC Auto Differential [[id number]]  (Abnormal) Collected: 01/23/25 0936    Specimen: Blood Updated: 01/23/25 1026     WBC 7.84 10*3/mm3      RBC 2.19 10*6/mm3      Hemoglobin 7.2 g/dL      Hematocrit 21.4 %      MCV 97.7 fL      MCH 32.9 pg      MCHC 33.6 g/dL      RDW 13.9 %      RDW-SD 48.5 fl      MPV 10.1 fL      Platelets 143 10*3/mm3      Neutrophil % 86.9 %      Lymphocyte % 4.8 %      Monocyte % 6.0 %      Eosinophil % 1.7 %      Basophil % 0.1 %      Immature Grans % 0.5 %      Neutrophils, Absolute 6.81 10*3/mm3      Lymphocytes, Absolute 0.38 10*3/mm3      Monocytes, Absolute 0.47 10*3/mm3      Eosinophils, Absolute 0.13 10*3/mm3      Basophils, Absolute 0.01 10*3/mm3      Immature Grans, Absolute 0.04 10*3/mm3      nRBC 0.0 /100 WBC     Renal Function Panel [[id number]]  (Abnormal) Collected: 01/23/25 0327    Specimen: Blood Updated: 01/23/25 0416     Glucose 161 mg/dL      BUN 39 mg/dL      Creatinine 1.09 mg/dL      Sodium 138 mmol/L      Potassium 3.9 mmol/L      Chloride 99 mmol/L      CO2 28.2 mmol/L      Calcium 7.8 mg/dL      Albumin 2.9 g/dL      Phosphorus 2.7 mg/dL      Anion Gap 10.8 mmol/L      BUN/Creatinine Ratio 35.8     eGFR 64.5 mL/min/1.73     Narrative:      GFR Categories in Chronic Kidney Disease (CKD)      GFR Category          GFR (mL/min/1.73)    Interpretation  G1                     90 or greater         Normal or high (1)  G2                      60-89                Mild decrease (1)  G3a                   45-59                Mild to moderate decrease  G3b                   30-44                Moderate to  "severe decrease  G4                    15-29                Severe decrease  G5                    14 or less           Kidney failure          (1)In the absence of evidence of kidney disease, neither GFR category G1 or G2 fulfill the criteria for CKD.    eGFR calculation 2021 CKD-EPI creatinine equation, which does not include race as a factor    MRSA Screen, PCR (Inpatient) - Swab, Nares [[id number]]  (Normal) Collected: 01/22/25 1228    Specimen: Swab from Nares Updated: 01/22/25 1355     MRSA PCR No MRSA Detected    Narrative:      The negative predictive value of this diagnostic test is high and should only be used to consider de-escalating anti-MRSA therapy. A positive result may indicate colonization with MRSA and must be correlated clinically.    Procalcitonin [[id number]]  (Abnormal) Collected: 01/22/25 1048    Specimen: Blood Updated: 01/22/25 1136     Procalcitonin 63.70 ng/mL     Narrative:      As a Marker for Sepsis (Non-Neonates):    1. <0.5 ng/mL represents a low risk of severe sepsis and/or septic shock.  2. >2 ng/mL represents a high risk of severe sepsis and/or septic shock.    As a Marker for Lower Respiratory Tract Infections that require antibiotic therapy:    PCT on Admission    Antibiotic Therapy       6-12 Hrs later    >0.5                Strongly Recommended  >0.25 - <0.5        Recommended   0.1 - 0.25          Discouraged              Remeasure/reassess PCT  <0.1                Strongly Discouraged     Remeasure/reassess PCT    As 28 day mortality risk marker: \"Change in Procalcitonin Result\" (>80% or <=80%) if Day 0 (or Day 1) and Day 4 values are available. Refer to http://www.[employer]-pct-calculator.com    Change in PCT <=80%  A decrease of PCT levels below or equal to 80% defines a positive change in PCT test result representing a higher risk for 28-day all-cause mortality of patients diagnosed with severe sepsis for septic shock.    Change in PCT >80%  A decrease of PCT levels of more " than 80% defines a negative change in PCT result representing a lower risk for 28-day all-cause mortality of patients diagnosed with severe sepsis or septic shock.       CBC & Differential [[id number]]  (Abnormal) Collected: 01/22/25 1048    Specimen: Blood Updated: 01/22/25 1108    Narrative:      The following orders were created for panel order CBC & Differential.  Procedure                               Abnormality         Status                     ---------                               -----------         ------                     CBC Auto Differential[[id number]]        Abnormal            Final result                 Please view results for these tests on the individual orders.    CBC Auto Differential [[id number]]  (Abnormal) Collected: 01/22/25 1048    Specimen: Blood Updated: 01/22/25 1108     WBC 11.18 10*3/mm3      RBC 2.42 10*6/mm3      Hemoglobin 8.0 g/dL      Hematocrit 23.7 %      MCV 97.9 fL      MCH 33.1 pg      MCHC 33.8 g/dL      RDW 14.3 %      RDW-SD 50.9 fl      MPV 9.8 fL      Platelets 158 10*3/mm3      Neutrophil % 88.6 %      Lymphocyte % 4.7 %      Monocyte % 5.4 %      Eosinophil % 0.4 %      Basophil % 0.2 %      Immature Grans % 0.7 %      Neutrophils, Absolute 9.91 10*3/mm3      Lymphocytes, Absolute 0.52 10*3/mm3      Monocytes, Absolute 0.60 10*3/mm3      Eosinophils, Absolute 0.05 10*3/mm3      Basophils, Absolute 0.02 10*3/mm3      Immature Grans, Absolute 0.08 10*3/mm3      nRBC 0.0 /100 WBC     Renal Function Panel [[id number]]  (Abnormal) Collected: 01/22/25 0301    Specimen: Blood Updated: 01/22/25 0439     Glucose 198 mg/dL      BUN 31 mg/dL      Creatinine 1.15 mg/dL      Sodium 133 mmol/L      Potassium 3.8 mmol/L      Chloride 99 mmol/L      CO2 24.8 mmol/L      Calcium 7.6 mg/dL      Albumin 2.3 g/dL      Phosphorus 2.9 mg/dL      Anion Gap 9.2 mmol/L      BUN/Creatinine Ratio 27.0     eGFR 60.5 mL/min/1.73     Narrative:      GFR Categories in Chronic Kidney Disease  (CKD)      GFR Category          GFR (mL/min/1.73)    Interpretation  G1                     90 or greater         Normal or high (1)  G2                      60-89                Mild decrease (1)  G3a                   45-59                Mild to moderate decrease  G3b                   30-44                Moderate to severe decrease  G4                    15-29                Severe decrease  G5                    14 or less           Kidney failure          (1)In the absence of evidence of kidney disease, neither GFR category G1 or G2 fulfill the criteria for CKD.    eGFR calculation 2021 CKD-EPI creatinine equation, which does not include race as a factor    Renal Function Panel [[id number]]  (Abnormal) Collected: 01/21/25 0429    Specimen: Blood Updated: 01/21/25 0623     Glucose 120 mg/dL      BUN 21 mg/dL      Creatinine 0.76 mg/dL      Sodium 133 mmol/L      Potassium 3.5 mmol/L      Chloride 99 mmol/L      CO2 26.2 mmol/L      Calcium 7.7 mg/dL      Albumin 2.2 g/dL      Phosphorus 3.8 mg/dL      Anion Gap 7.8 mmol/L      BUN/Creatinine Ratio 27.6     eGFR 85.4 mL/min/1.73     Narrative:      GFR Categories in Chronic Kidney Disease (CKD)      GFR Category          GFR (mL/min/1.73)    Interpretation  G1                     90 or greater         Normal or high (1)  G2                      60-89                Mild decrease (1)  G3a                   45-59                Mild to moderate decrease  G3b                   30-44                Moderate to severe decrease  G4                    15-29                Severe decrease  G5                    14 or less           Kidney failure          (1)In the absence of evidence of kidney disease, neither GFR category G1 or G2 fulfill the criteria for CKD.    eGFR calculation 2021 CKD-EPI creatinine equation, which does not include race as a factor    TSH [[id number]]  (Abnormal) Collected: 01/20/25 0055    Specimen: Blood Updated: 01/20/25 1737     TSH 17.800  uIU/mL     Respiratory Panel PCR w/COVID-19(SARS-CoV-2) MARIA GUADALUPE/ALLI/BARB/PAD/COR/RAFY In-House, NP Swab in UTM/VTM, 2 HR TAT - Swab, Nasopharynx [[id number]]  (Abnormal) Collected: 01/20/25 0125    Specimen: Swab from Nasopharynx Updated: 01/20/25 0403     ADENOVIRUS, PCR Not Detected     Coronavirus 229E Not Detected     Coronavirus HKU1 Not Detected     Coronavirus NL63 Not Detected     Coronavirus OC43 Detected     COVID19 Not Detected     Human Metapneumovirus Not Detected     Human Rhinovirus/Enterovirus Not Detected     Influenza A PCR Not Detected     Influenza B PCR Not Detected     Parainfluenza Virus 1 Not Detected     Parainfluenza Virus 2 Not Detected     Parainfluenza Virus 3 Not Detected     Parainfluenza Virus 4 Not Detected     RSV, PCR Not Detected     Bordetella pertussis pcr Not Detected     Bordetella parapertussis PCR Not Detected     Chlamydophila pneumoniae PCR Not Detected     Mycoplasma pneumo by PCR Not Detected    Narrative:      In the setting of a positive respiratory panel with a viral infection PLUS a negative procalcitonin without other underlying concern for bacterial infection, consider observing off antibiotics or discontinuation of antibiotics and continue supportive care. If the respiratory panel is positive for atypical bacterial infection (Bordetella pertussis, Chlamydophila pneumoniae, or Mycoplasma pneumoniae), consider antibiotic de-escalation to target atypical bacterial infection.    Procalcitonin [[id number]]  (Abnormal) Collected: 01/20/25 0055    Specimen: Blood Updated: 01/20/25 0134     Procalcitonin 0.38 ng/mL     Narrative:      As a Marker for Sepsis (Non-Neonates):    1. <0.5 ng/mL represents a low risk of severe sepsis and/or septic shock.  2. >2 ng/mL represents a high risk of severe sepsis and/or septic shock.    As a Marker for Lower Respiratory Tract Infections that require antibiotic therapy:    PCT on Admission    Antibiotic Therapy       6-12 Hrs later    >0.5      "           Strongly Recommended  >0.25 - <0.5        Recommended   0.1 - 0.25          Discouraged              Remeasure/reassess PCT  <0.1                Strongly Discouraged     Remeasure/reassess PCT    As 28 day mortality risk marker: \"Change in Procalcitonin Result\" (>80% or <=80%) if Day 0 (or Day 1) and Day 4 values are available. Refer to [email or web address]    Change in PCT <=80%  A decrease of PCT levels below or equal to 80% defines a positive change in PCT test result representing a higher risk for 28-day all-cause mortality of patients diagnosed with severe sepsis for septic shock.    Change in PCT >80%  A decrease of PCT levels of more than 80% defines a negative change in PCT result representing a lower risk for 28-day all-cause mortality of patients diagnosed with severe sepsis or septic shock.       Basic Metabolic Panel [[id number]]  (Abnormal) Collected: 01/20/25 0055    Specimen: Blood Updated: 01/20/25 0127     Glucose 90 mg/dL      BUN 18 mg/dL      Creatinine 0.69 mg/dL      Sodium 135 mmol/L      Potassium 4.1 mmol/L      Chloride 97 mmol/L      CO2 29.0 mmol/L      Calcium 8.1 mg/dL      BUN/Creatinine Ratio 26.1     Anion Gap 9.0 mmol/L      eGFR 87.9 mL/min/1.73     Narrative:      GFR Categories in Chronic Kidney Disease (CKD)      GFR Category          GFR (mL/min/1.73)    Interpretation  G1                     90 or greater         Normal or high (1)  G2                      60-89                Mild decrease (1)  G3a                   45-59                Mild to moderate decrease  G3b                   30-44                Moderate to severe decrease  G4                    15-29                Severe decrease  G5                    14 or less           Kidney failure          (1)In the absence of evidence of kidney disease, neither GFR category G1 or G2 fulfill the criteria for CKD.    eGFR calculation 2021 CKD-EPI creatinine equation, which does not include race as a " "factor    CBC (No Diff) [[id number]]  (Abnormal) Collected: 25 0055    Specimen: Blood Updated: 25 0108     WBC 5.96 10*3/mm3      RBC 2.96 10*6/mm3      Hemoglobin 9.7 g/dL      Hematocrit 28.9 %      MCV 97.6 fL      MCH 32.8 pg      MCHC 33.6 g/dL      RDW 14.1 %      RDW-SD 49.2 fl      MPV 9.3 fL      Platelets 196 10*3/mm3     BNP [[id number]]  (Abnormal) Collected: 25    Specimen: Blood from Arm, Left Updated: 25     proBNP 2,242.0 pg/mL     Narrative:      This assay is used as an aid in the diagnosis of individuals suspected of having heart failure. It can be used as an aid in the diagnosis of acute decompensated heart failure (ADHF) in patients presenting with signs and symptoms of ADHF to the emergency department (ED). In addition, NT-proBNP of <300 pg/mL indicates ADHF is not likely.    Age Range Result Interpretation  NT-proBNP Concentration (pg/mL:      <50             Positive            >450                   Gray                 300-450                    Negative             <300    50-75           Positive            >900                  Gray                300-900                  Negative            <300      >75             Positive            >1800                  Gray                300-1800                  Negative            <300          BP 98/46 (BP Location: Left arm, Patient Position: Lying)   Pulse 72   Temp 99.3 °F (37.4 °C) (Axillary)   Resp 14   Ht 182.9 cm (72\")   Wt 80.8 kg (178 lb 2.1 oz)   SpO2 (!) 77%   BMI 24.16 kg/m²     Discharge Exam:                                    Disposition:      Patient Instructions:   Discharge Order (From admission, onward)      None            Total time spent discharging patient including evaluation,post hospitalization follow up,  medication and post hospitalization instructions and education total time exceeds 30 minutes.    Signed:  Kevin Langford MD  2025  10:33 EST     " MD  1/25/2025  10:33 EST

## 2025-01-25 NOTE — CASE MANAGEMENT/SOCIAL WORK
Case Management Discharge Note      Final Note: Pt  2025 at 9:15 AM.    Provided Post Acute Provider List?: N/A  N/A Provider List Comment: Requested to return to [facility]  Provided Post Acute Provider Quality & Resource List?: N/A    Selected Continued Care - Discharged on 2025 Admission date: 2025 - Discharge disposition:       Destination    No services have been selected for the patient.                Durable Medical Equipment    No services have been selected for the patient.                Dialysis/Infusion    No services have been selected for the patient.                Home Medical Care    No services have been selected for the patient.                Therapy    No services have been selected for the patient.                Community Resources    No services have been selected for the patient.                Community & DME    No services have been selected for the patient.                    Selected Continued Care - Prior Encounters Includes continued care and service providers with selected services from prior encounters from 10/21/2024 to 2025      Discharged on 2025 Admission date: 2024 - Discharge disposition: Skilled Nursing Facility (DC - External)      Destination       Service Provider Services Address Phone Fax Patient Preferred    [facility]  [facility] [place] [address] [phone] --                               Final Discharge Disposition Code: 20 -    Drysol Pregnancy And Lactation Text: This medication is considered safe during pregnancy and breast feeding.

## 2025-01-27 LAB
BACTERIA SPEC AEROBE CULT: NORMAL
BACTERIA SPEC AEROBE CULT: NORMAL

## 2025-02-12 NOTE — HOME HEALTH
Physical Therapy Note:    Attempted to see patient this AM for physical therapy evaluation session. Patient agitated and in restraints, not appropriate for skilled PT. Will follow and re-attempt as schedule permits/patient available. Thank you,    MARTA Ledesma, PT     Rehab Caseload Tracker     Routine Visit Note:    Skill/education provided: SLP provided skilled education/instructions on recommended diet (puree with nectar thick liquids), water protocol guidelines, swallowing precautions and instructions in proper technique of swallowing exercises. SLP reviewed results of VFSS with patient and his wife.     Patient/caregiver response: Patient reports that he does not like thickened liquids and is drinking water, coffee, ice tea and OJ unthickened. Wife reported that he is eating foods like Cream of Wheat, gravy and biscuits, tuna, baked sweet potato (mashed). Patient's wife is presenting pureed/forkmashed foods. SLP will reinstruct patient/wife in food and liquid consistencies recommended.     Plan for next visit: Dysphagia therapy to restore swallowing strength and function for safe intake of least restrictive diet without signs or symptoms of aspiration/aspiration pneumonia.   Swallowing exercise program  Education    Other pertinent info: None

## (undated) DEVICE — RADIFOCUS GLIDEWIRE: Brand: GLIDEWIRE

## (undated) DEVICE — 6F .070 XB 3.5 100CM: Brand: VISTA BRITE TIP

## (undated) DEVICE — GOWN,NON-REINFORCED,SIRUS,SET IN SLV,XL: Brand: MEDLINE

## (undated) DEVICE — DEV INDEFLATOR

## (undated) DEVICE — GW EMR FIX EXCHG J STD .035 3MM 260CM

## (undated) DEVICE — BNDR ABD 4PANEL 12IN MED/LG

## (undated) DEVICE — Device: Brand: CORSAIR PRO

## (undated) DEVICE — KT MANIFLD CARDIAC

## (undated) DEVICE — BND PRESS RADL COMFRT 14IN STRL

## (undated) DEVICE — CATH DIAG IMPULSE FL3.5 5F 100CM

## (undated) DEVICE — CANN O2 ETCO2 FITS ALL CONN CO2 SMPL A/ 7IN DISP LF

## (undated) DEVICE — SENSR O2 OXIMAX FNGR A/ 18IN NONSTR

## (undated) DEVICE — GAUZE,SPONGE,FLUFF,6"X6.75",STRL,10/TRAY: Brand: MEDLINE

## (undated) DEVICE — HYPODERMIC SAFETY NEEDLE: Brand: MONOJECT

## (undated) DEVICE — KT PEG ENDOVIVE ENFIT SFTY PUSH 20F 1P/U

## (undated) DEVICE — NC TREK CORONARY DILATATION CATHETER 3.0 MM X 20 MM / RAPID-EXCHANGE: Brand: NC TREK

## (undated) DEVICE — CATH DIAG IMPULSE FR4 5F 100CM

## (undated) DEVICE — PK CATH CARD 40

## (undated) DEVICE — TBG FEED PULL FLOW20 NO/DRUG 20F 4.47MM 150CM

## (undated) DEVICE — RUNTHROUGH NS EXTRA FLOPPY PTCA GUIDEWIRE: Brand: RUNTHROUGH

## (undated) DEVICE — NC TREK CORONARY DILATATION CATHETER 3.25 MM X 20 MM / RAPID-EXCHANGE: Brand: NC TREK

## (undated) DEVICE — CATH VENT MIV RADL PIG ST TIP 4F 110CM

## (undated) DEVICE — CATH DIAG IMPULSE FR5 5F 100CM

## (undated) DEVICE — BLCK/BITE BLOX W/DENTL/RIM W/STRAP 54F

## (undated) DEVICE — SYR LUERLOK 5CC

## (undated) DEVICE — TUBING, SUCTION, 1/4" X 10', STRAIGHT: Brand: MEDLINE

## (undated) DEVICE — KT ORCA ORCAPOD DISP STRL

## (undated) DEVICE — CATH DIAG IMPULSE PIG145 6F 110CM

## (undated) DEVICE — ADAPT CLN BIOGUARD AIR/H2O DISP

## (undated) DEVICE — RADIFOCUS GLIDECATH: Brand: GLIDECATH

## (undated) DEVICE — GLV SURG BIOGEL LTX PF 7

## (undated) DEVICE — Device: Brand: FIELDER XT

## (undated) DEVICE — GLIDESHEATH SLENDER STAINLESS STEEL KIT: Brand: GLIDESHEATH SLENDER

## (undated) DEVICE — HI-TORQUE BALANCE MIDDLEWEIGHT GUIDE WIRE .014 STRAIGHT TIP 3.0 CM X 190 CM: Brand: HI-TORQUE BALANCE MIDDLEWEIGHT

## (undated) DEVICE — CATH DIAG IMPULSE PIG 5F 100CM

## (undated) DEVICE — TREK CORONARY DILATATION CATHETER 2.50 MM X 15 MM / RAPID-EXCHANGE: Brand: TREK

## (undated) DEVICE — GW HITORQUE/BAL MID/WT J W/HCOAT .014 3X190CM

## (undated) DEVICE — SPNG GZ WOVN 4X4IN 12PLY 10/BX STRL

## (undated) DEVICE — PK ORTHO MINOR 40

## (undated) DEVICE — GLIDESHEATH BASIC HYDROPHILIC COATED INTRODUCER SHEATH: Brand: GLIDESHEATH

## (undated) DEVICE — SPNG LAP 18X18IN LF STRL PK/5